# Patient Record
Sex: FEMALE | Race: BLACK OR AFRICAN AMERICAN | NOT HISPANIC OR LATINO | Employment: OTHER | URBAN - METROPOLITAN AREA
[De-identification: names, ages, dates, MRNs, and addresses within clinical notes are randomized per-mention and may not be internally consistent; named-entity substitution may affect disease eponyms.]

---

## 2017-01-16 ENCOUNTER — LAB VISIT (OUTPATIENT)
Dept: LAB | Facility: HOSPITAL | Age: 77
End: 2017-01-16
Attending: FAMILY MEDICINE
Payer: MEDICARE

## 2017-01-16 DIAGNOSIS — N18.3 CKD (CHRONIC KIDNEY DISEASE), STAGE 3 (MODERATE): ICD-10-CM

## 2017-01-16 LAB
ALBUMIN SERPL BCP-MCNC: 3.4 G/DL
ALP SERPL-CCNC: 66 U/L
ALT SERPL W/O P-5'-P-CCNC: 13 U/L
ANION GAP SERPL CALC-SCNC: 5 MMOL/L
AST SERPL-CCNC: 15 U/L
BASOPHILS # BLD AUTO: 0.01 K/UL
BASOPHILS NFR BLD: 0.2 %
BILIRUB SERPL-MCNC: 0.5 MG/DL
BUN SERPL-MCNC: 20 MG/DL
CALCIUM SERPL-MCNC: 8.7 MG/DL
CHLORIDE SERPL-SCNC: 109 MMOL/L
CO2 SERPL-SCNC: 27 MMOL/L
CREAT SERPL-MCNC: 1.2 MG/DL
DIFFERENTIAL METHOD: ABNORMAL
EOSINOPHIL # BLD AUTO: 0.3 K/UL
EOSINOPHIL NFR BLD: 4.4 %
ERYTHROCYTE [DISTWIDTH] IN BLOOD BY AUTOMATED COUNT: 13.3 %
EST. GFR  (AFRICAN AMERICAN): 51.4 ML/MIN/1.73 M^2
EST. GFR  (NON AFRICAN AMERICAN): 44.6 ML/MIN/1.73 M^2
GLUCOSE SERPL-MCNC: 107 MG/DL
HCT VFR BLD AUTO: 39.8 %
HGB BLD-MCNC: 12.8 G/DL
IRON SERPL-MCNC: 92 UG/DL
LYMPHOCYTES # BLD AUTO: 2.4 K/UL
LYMPHOCYTES NFR BLD: 38.9 %
MAGNESIUM SERPL-MCNC: 1.9 MG/DL
MCH RBC QN AUTO: 32.7 PG
MCHC RBC AUTO-ENTMCNC: 32.2 %
MCV RBC AUTO: 102 FL
MONOCYTES # BLD AUTO: 0.5 K/UL
MONOCYTES NFR BLD: 8.5 %
NEUTROPHILS # BLD AUTO: 2.9 K/UL
NEUTROPHILS NFR BLD: 47.8 %
PHOSPHATE SERPL-MCNC: 3.8 MG/DL
PLATELET # BLD AUTO: 247 K/UL
PMV BLD AUTO: 11.3 FL
POTASSIUM SERPL-SCNC: 4.3 MMOL/L
PROT SERPL-MCNC: 7.2 G/DL
PTH-INTACT SERPL-MCNC: 114 PG/ML
RBC # BLD AUTO: 3.91 M/UL
SATURATED IRON: 32 %
SODIUM SERPL-SCNC: 141 MMOL/L
TOTAL IRON BINDING CAPACITY: 292 UG/DL
TRANSFERRIN SERPL-MCNC: 197 MG/DL
WBC # BLD AUTO: 6.14 K/UL

## 2017-01-16 PROCEDURE — 36415 COLL VENOUS BLD VENIPUNCTURE: CPT | Mod: PO

## 2017-01-16 PROCEDURE — 80053 COMPREHEN METABOLIC PANEL: CPT

## 2017-01-16 PROCEDURE — 84100 ASSAY OF PHOSPHORUS: CPT

## 2017-01-16 PROCEDURE — 83970 ASSAY OF PARATHORMONE: CPT

## 2017-01-16 PROCEDURE — 83540 ASSAY OF IRON: CPT

## 2017-01-16 PROCEDURE — 83735 ASSAY OF MAGNESIUM: CPT

## 2017-01-16 PROCEDURE — 85025 COMPLETE CBC W/AUTO DIFF WBC: CPT

## 2017-01-18 ENCOUNTER — DOCUMENTATION ONLY (OUTPATIENT)
Dept: FAMILY MEDICINE | Facility: CLINIC | Age: 77
End: 2017-01-18

## 2017-01-18 NOTE — PROGRESS NOTES
Pre-Visit Chart Review  For Appointment Scheduled on 1/26/17    There are no preventive care reminders to display for this patient.

## 2017-01-20 ENCOUNTER — CLINICAL SUPPORT (OUTPATIENT)
Dept: OPHTHALMOLOGY | Facility: CLINIC | Age: 77
End: 2017-01-20
Payer: MEDICARE

## 2017-01-20 ENCOUNTER — OFFICE VISIT (OUTPATIENT)
Dept: OPTOMETRY | Facility: CLINIC | Age: 77
End: 2017-01-20
Payer: MEDICARE

## 2017-01-20 DIAGNOSIS — H40.013 OPEN ANGLE WITH BORDERLINE FINDINGS OF BOTH EYES: ICD-10-CM

## 2017-01-20 DIAGNOSIS — H40.013 OPEN ANGLE WITH BORDERLINE FINDINGS OF BOTH EYES: Primary | ICD-10-CM

## 2017-01-20 PROCEDURE — 92012 INTRM OPH EXAM EST PATIENT: CPT | Mod: S$GLB,,, | Performed by: OPTOMETRIST

## 2017-01-20 PROCEDURE — 76514 ECHO EXAM OF EYE THICKNESS: CPT | Mod: S$GLB,,, | Performed by: OPTOMETRIST

## 2017-01-20 PROCEDURE — 92133 CPTRZD OPH DX IMG PST SGM ON: CPT | Mod: S$GLB,,, | Performed by: OPTOMETRIST

## 2017-01-20 PROCEDURE — 99499 UNLISTED E&M SERVICE: CPT | Mod: S$GLB,,, | Performed by: OPTOMETRIST

## 2017-01-20 PROCEDURE — 92083 EXTENDED VISUAL FIELD XM: CPT | Mod: S$GLB,,, | Performed by: OPTOMETRIST

## 2017-01-20 NOTE — PROGRESS NOTES
HPI     CC: Pt here today for yearly glaucoma suspect eval. HVF 24-2 SS, OCT,   PAch , IOP check. Pt has no new visual complaints since last visit.     (-) pain / (-) discomfort  (-) headaches  (-) diplopia   (-) flashes / (--) floaters         Last edited by Filemon Thomason on 1/20/2017  2:39 PM.         Assessment /Plan     For exam results, see Encounter Report.    Open angle with borderline findings of both eyes    Glaucoma suspect secondary to larger than average CD ratio OU (OS > OD). Discussed results with patient. Within normal limits, no evidence of glaucoma. No drops needed at this time. Monitor yearly.   (+) fam hx  (+) DM  (+) HTN  (+) sleep apnea     Photos 11/28/16    IOP 1/20/17:  OD 10 mmHg, OS 10 mmHg    HVF 1/20/17  OD within normal limits     OS within normal limits    OCT 1/20/17  OD within normal limits     OS within normal limits    Pach 1/20/17   //     RTC in 1 year for comprehensive eye exam, or sooner prn.

## 2017-01-25 ENCOUNTER — OFFICE VISIT (OUTPATIENT)
Dept: FAMILY MEDICINE | Facility: CLINIC | Age: 77
End: 2017-01-25
Payer: MEDICARE

## 2017-01-25 VITALS
SYSTOLIC BLOOD PRESSURE: 140 MMHG | WEIGHT: 236.13 LBS | BODY MASS INDEX: 41.84 KG/M2 | HEIGHT: 63 IN | RESPIRATION RATE: 14 BRPM | OXYGEN SATURATION: 99 % | DIASTOLIC BLOOD PRESSURE: 72 MMHG | HEART RATE: 69 BPM

## 2017-01-25 DIAGNOSIS — I10 ESSENTIAL HYPERTENSION: Primary | ICD-10-CM

## 2017-01-25 DIAGNOSIS — G47.33 OSA ON CPAP: ICD-10-CM

## 2017-01-25 DIAGNOSIS — N18.30 CKD (CHRONIC KIDNEY DISEASE) STAGE 3, GFR 30-59 ML/MIN: ICD-10-CM

## 2017-01-25 DIAGNOSIS — N18.30 CONTROLLED TYPE 2 DIABETES MELLITUS WITH STAGE 3 CHRONIC KIDNEY DISEASE, WITHOUT LONG-TERM CURRENT USE OF INSULIN: ICD-10-CM

## 2017-01-25 DIAGNOSIS — E66.01 MORBID OBESITY WITH BMI OF 40.0-44.9, ADULT: ICD-10-CM

## 2017-01-25 DIAGNOSIS — R05.9 COUGH: ICD-10-CM

## 2017-01-25 DIAGNOSIS — J30.9 CHRONIC ALLERGIC RHINITIS: ICD-10-CM

## 2017-01-25 DIAGNOSIS — E11.22 CONTROLLED TYPE 2 DIABETES MELLITUS WITH STAGE 3 CHRONIC KIDNEY DISEASE, WITHOUT LONG-TERM CURRENT USE OF INSULIN: ICD-10-CM

## 2017-01-25 PROCEDURE — 1157F ADVNC CARE PLAN IN RCRD: CPT | Mod: S$GLB,,, | Performed by: PHYSICIAN ASSISTANT

## 2017-01-25 PROCEDURE — 99499 UNLISTED E&M SERVICE: CPT | Mod: S$GLB,,, | Performed by: PHYSICIAN ASSISTANT

## 2017-01-25 PROCEDURE — 1160F RVW MEDS BY RX/DR IN RCRD: CPT | Mod: S$GLB,,, | Performed by: PHYSICIAN ASSISTANT

## 2017-01-25 PROCEDURE — 99214 OFFICE O/P EST MOD 30 MIN: CPT | Mod: S$GLB,,, | Performed by: PHYSICIAN ASSISTANT

## 2017-01-25 PROCEDURE — 3044F HG A1C LEVEL LT 7.0%: CPT | Mod: S$GLB,,, | Performed by: PHYSICIAN ASSISTANT

## 2017-01-25 PROCEDURE — 99999 PR PBB SHADOW E&M-EST. PATIENT-LVL V: CPT | Mod: PBBFAC,,, | Performed by: PHYSICIAN ASSISTANT

## 2017-01-25 PROCEDURE — 1126F AMNT PAIN NOTED NONE PRSNT: CPT | Mod: S$GLB,,, | Performed by: PHYSICIAN ASSISTANT

## 2017-01-25 PROCEDURE — 3078F DIAST BP <80 MM HG: CPT | Mod: S$GLB,,, | Performed by: PHYSICIAN ASSISTANT

## 2017-01-25 PROCEDURE — 1159F MED LIST DOCD IN RCRD: CPT | Mod: S$GLB,,, | Performed by: PHYSICIAN ASSISTANT

## 2017-01-25 PROCEDURE — 4010F ACE/ARB THERAPY RXD/TAKEN: CPT | Mod: S$GLB,,, | Performed by: PHYSICIAN ASSISTANT

## 2017-01-25 PROCEDURE — 3077F SYST BP >= 140 MM HG: CPT | Mod: S$GLB,,, | Performed by: PHYSICIAN ASSISTANT

## 2017-01-25 RX ORDER — FLUTICASONE PROPIONATE 50 MCG
1 SPRAY, SUSPENSION (ML) NASAL DAILY
Qty: 3 BOTTLE | Refills: 3 | Status: SHIPPED | OUTPATIENT
Start: 2017-01-25 | End: 2018-03-16 | Stop reason: SDUPTHER

## 2017-01-25 RX ORDER — BENZONATATE 200 MG/1
200 CAPSULE ORAL 3 TIMES DAILY PRN
Qty: 30 CAPSULE | Refills: 0 | Status: SHIPPED | OUTPATIENT
Start: 2017-01-25 | End: 2017-02-02

## 2017-01-25 RX ORDER — CETIRIZINE HYDROCHLORIDE 5 MG/1
5 TABLET ORAL DAILY PRN
Qty: 30 TABLET | Refills: 0 | Status: SHIPPED | OUTPATIENT
Start: 2017-01-25 | End: 2019-01-10 | Stop reason: CLARIF

## 2017-01-25 NOTE — MR AVS SNAPSHOT
[facility]  [address]  [place] [address]  Phone: [phone]  Fax: [phone]                  Sammi Abreu   2017 12:40 PM   Office Visit    Description:  Female : 1940   Provider:  Sadaf Courtney PA-C   Department:  [facility] Medicine           Reason for Visit     Follow-up           Diagnoses this Visit        Comments    Essential hypertension    -  Primary     CHARLIE on CPAP         Chronic allergic rhinitis         Cough                To Do List           Future Appointments        Provider Department Dept Phone    2017 1:00 PM Burt Friend MD [facility] Cardiology [phone]    2017 10:40 AM Sadaf Courtney PA-C [facility] [phone]    2017 9:30 AM Osmani Villatoro MD [facility] [phone]      Goals (5 Years of Data)     None       These Medications        Disp Refills Start End    fluticasone (FLONASE) 50 mcg/actuation nasal spray 3 Bottle 3 2017     1 spray by Each Nare route once daily. - Each Nare    Pharmacy: [facility] [address] [address] Ph #: [phone]       benzonatate (TESSALON) 200 MG capsule 30 capsule 0 2017    Take 1 capsule (200 mg total) by mouth 3 (three) times daily as needed. - Oral    Pharmacy: [facility] [place] - [address] [address] Ph #: [phone]       cetirizine (ZYRTEC) 5 MG tablet 30 tablet 0 2017     Take 1 tablet (5 mg total) by mouth daily as needed. - Oral    [facility] [address] [place] [address] [place] - [address] [address] Ph #: [phone]         [facility]     [facility] On [facility] Nurse Care Line -  Assistance  Registered nurses in the [facility] Center provide clinical advisement, health education, appointment booking, and other advisory services.  Call for this free service at [phone].             Medications           Message regarding Medications     Verify  the changes and/or additions to your medication regime listed below are the same as discussed with your clinician today.  If any of these changes or additions are incorrect, please notify your healthcare provider.        START taking these NEW medications        Refills    benzonatate (TESSALON) 200 MG capsule 0    Sig: Take 1 capsule (200 mg total) by mouth 3 (three) times daily as needed.    Class: Normal    Route: Oral      CHANGE how you are taking these medications     Start Taking Instead of    cetirizine (ZYRTEC) 5 MG tablet cetirizine (ZYRTEC) 5 MG tablet    Dosage:  Take 1 tablet (5 mg total) by mouth daily as needed. Dosage:  Take 5 mg by mouth daily as needed.    Reason for Change:  Reorder            Verify that the below list of medications is an accurate representation of the medications you are currently taking.  If none reported, the list may be blank. If incorrect, please contact your healthcare provider. Carry this list with you in case of emergency.           Current Medications     ascorbic acid (VITAMIN C) 100 MG tablet Take 100 mg by mouth once daily. Hold until further notice, due to blood clot    b complex vitamins tablet Take 1 tablet by mouth once daily.    benzonatate (TESSALON) 200 MG capsule Take 1 capsule (200 mg total) by mouth 3 (three) times daily as needed.    blood sugar diagnostic Strp TrueResult  Strips/ lancets ac breakfast and bedtime    blood-glucose meter kit DX DM True Test  Use as instructed ac breakfast    calcitRIOL (ROCALTROL) 0.25 MCG Cap Take 0.25 mcg by mouth once daily.    calcium carbonate (OS-POLY) 600 mg (1,500 mg) Tab Take 600 mg by mouth 2 (two) times daily with meals.    cetirizine (ZYRTEC) 5 MG tablet Take 1 tablet (5 mg total) by mouth daily as needed.    diclofenac sodium 1 % Gel Apply 2 g topically 3 (three) times daily.    esomeprazole (NEXIUM) 40 MG capsule Take 40 mg by mouth once daily.    fish oil-omega-3 fatty acids 300-1,000 mg capsule Take 2 g by  "mouth once daily.    fluticasone (FLONASE) 50 mcg/actuation nasal spray 1 spray by Each Nare route once daily.    furosemide (LASIX) 40 MG tablet TAKE ONE TABLET BY MOUTH ONCE DAILY    hydrocodone-acetaminophen 5-325mg (NORCO) 5-325 mg per tablet Take 1 tablet by mouth every meal as needed for Pain.    INV losartan (COZAAR) 100 MG Tab Take 1 tablet (100 mg total) by mouth once daily.    IRON, FERROUS SULFATE, ORAL Take by mouth.    lactulose (CHRONULAC) 10 gram/15 mL solution Take 30 mLs (20 g total) by mouth 3 (three) times daily. 2 Teaspoon(s) Oral PRN Every evening.    lancets Misc TrueResult lancets and strips ac breakfast.    metoprolol succinate (TOPROL-XL) 25 MG 24 hr tablet Take 1 tablet (25 mg total) by mouth once daily.    mometasone 0.1% (ELOCON) 0.1 % cream Apply topically once daily.    nifedipine 30 MG ORAL TR24 (PROCARDIA-XL) 30 MG (OSM) 24 hr tablet TAKE ONE TABLET BY MOUTH IN THE EVENING    spironolactone (ALDACTONE) 25 MG tablet 1 tab  Three a week           Clinical Reference Information           Vital Signs - Last Recorded  Most recent update: 1/25/2017 12:39 PM by Leticia Velasquez MA    BP Pulse Resp Ht Wt SpO2    (!) 148/69 69 14 5' 3" (1.6 m) 107.1 kg (236 lb 1.8 oz) 99%    BMI                41.83 kg/m2          Blood Pressure          Most Recent Value    BP  (!)  148/69      Allergies as of 1/25/2017     Cymbalta [Duloxetine]    Darvon [Propoxyphene]    Atorvastatin    Naprosyn [Naproxen]    Penicillins      Immunizations Administered on Date of Encounter - 1/25/2017     None      Instructions      Established High Blood Pressure    High blood pressure (hypertension) is a chronic disease. Often health care providers dont know what causes it. But it can be caused by certain health conditions and medicines.  If you have high blood pressure, you may not have any symptoms. If you do have symptoms, they may include headache, dizziness, changes in your vision, chest pain, and shortness of " breath. But even without symptoms, high blood pressure thats not treated raises your risk for heart attack and stroke. High blood pressure is a serious health risk and shouldnt be ignored.  A blood pressure reading is made up of two numbers: a higher number over a lower number. The top number is the systolic pressure. The bottom number is the diastolic pressure. A normal blood pressure is less than 120 over less than 80.  High blood pressure is when either the top number is 140 or higher, or the bottom number is 90 or higher. This must be the result when taking your blood pressure a number of times. The blood pressures between normal and high are called prehypertension.  Home care  If you have high blood pressure, you should do what is listed below to lower your blood pressure. If you are taking medicines for high blood pressure, these methods may reduce or end your need for medicines in the future.  · Begin a weight-loss program if you are overweight.  · Cut back on how much salt you get in your diet. Heres how to do this:  ¨ Dont eat foods that have a lot of salt. These include olives, pickles, smoked meats, and salted potato chips.  ¨ Dont add salt to your food at the table.  ¨ Use only small amounts of salt when cooking.  · Begin an exercise program. Talk with your health care provider about the type of exercise program that would be best for you. It doesn't have to be hard. Even brisk walking for 20 minutes 3 times a week is a good form of exercise.  · Dont take medicines that have heart stimulants. This includes many cold and sinus decongestant pills and sprays, as well as diet pills. Check the warnings about hypertension on the label. Stimulants such as amphetamine or cocaine could be lethal for someone with high blood pressure. Never take these.  · Limit how much caffeine you get in your diet. Switch to caffeine-free products.  · Stop smoking. If you are a long-time smoker, this can be hard. Enroll in  a stop-smoking program to make it more likely that you will quit for good.  · Learn how to handle stress. This is an important part of any program to lower blood pressure. Learn about relaxation methods like meditation, yoga, or biofeedback.  · If your provider prescribed medicines, take them exactly as directed. Missing doses may cause your blood pressure get out of control.  · Consider buying an automatic blood pressure machine. You can get one of these at most pharmacies. Use this to watch your blood pressure at home. Give the results to your provider.  Follow-up care  You will need to make regular visits to your health care provider. This is to check your blood pressure and to make changes to your medicines. Make a follow-up appointment as directed.  When to seek medical advice  Call your health care provider right away if any of these occur:  · Chest pain or shortness of breath  · Severe headache  · Throbbing or rushing sound in the ears  · Nosebleed  · Sudden severe pain in your belly (abdomen)  · Extreme drowsiness, confusion, or fainting  · Dizziness or dizziness with a spinning sensation (vertigo)  · Weakness of an arm or leg or one side of the face  · You have problems speaking or seeing   © 6892-3298 [employer]. [address] [place] [address], [place] [address]. All rights reserved. This information is not intended as a substitute for professional medical care. Always follow your healthcare professional's instructions.          Diabetes (General Information)  Diabetes is a long-term health problem. It means your body does not make enough insulin. Or it may mean that your body cannot use the insulin it makes. Insulin is a hormone in your body. It lets blood sugar (glucose) reach the cells in your body. All of your cells need glucose for fuel.  When you have diabetes, the glucose in your blood builds up because it cannot get into the cells. This buildup is called high blood sugar  (hyperglycemia).  Your blood sugar level depends on several things. It depends on what kind of food you eat and how much of it you eat. It also depends on how much exercise you get, and how much insulin you have in your body. Eating too much of the wrong kinds of food or not taking diabetes medicine on time can cause high blood sugar. Infections can cause high blood sugar even if you are taking medicines correctly.  These things can also cause low blood sugar:  · Missing meals  · Not eating enough food  · Taking too much diabetes medicine  Diabetes can cause serious problems over time if you do not get treated. These problems include heart disease, stroke, kidney failure, and blindness. They also include nerve pain or loss of feeling in your legs and feet, and gangrene of the feet. By keeping your blood sugar under control you can prevent or delay these problems.  Normal blood sugar levels are 80 to 100 before a meal and less than 180 in the 1 to 2 hours after a meal.  Home care  Follow these guidelines when caring for yourself at home:  · Follow the diet your healthcare provider gives you. Take insulin or other diabetes medicine exactly as told to.  · Watch your blood sugar as you are told to. Keep a log of your results. This will help your provider change your medicines to keep your blood sugar under control.  · Try to reach your ideal weight. You may be able to cut back on or not have to take diabetes medicine if you eat the right foods and get exercise.  · Do not smoke. Smoking worsens the effects of diabetes on your circulation. You are much more likely to have a heart attack if you have diabetes and you smoke.  · Take good care of your feet. If you have lost feeling in your feet, you may not see an injury or infection. Check your feet and between your toes at least once a week.  · Wear a medical alert bracelet or necklace, or carry a card in your wallet that says you have diabetes. This will help healthcare  providers give you the right care if you get very ill and cannot tell them that you have diabetes.  Sick day plan  If you get a cold, the flu, or a bacterial or viral infection, take these steps:  · Look at your diabetes sick plan and call your healthcare provider as you were told to. You may need to call your provider right away if:  ¨ Your blood sugar is above 240 while taking your diabetes medicine  ¨ Your urine ketone levels are above normal or high  ¨ You have been vomiting more than 6 hours  ¨ You have trouble breathing or your breath ha s a fruity smell  ¨ You have a high fever  ¨ You have a fever for several days and you are not getting better  ¨ You get light-headed and are sleepier than usual  · Keep taking your diabetes pills (oral medicine) even if you have been vomiting and are feeling sick. Call your provider right away because you may need insulin to lower your blood sugar until you recover from your illness.  · Keep taking your insulin even if you have been vomiting and are feeling sick. Call your provider right away to ask if you need to change your insulin dose. This will depend on your blood sugar results.  · Check your blood sugar every 2 to 4 hours, or at least 4 times a day.  · Check your ketones often. If you are vomiting and having diarrhea, watch them more often.  · Do not skip meals. Try to eat small meals on a regular schedule. Do this even if you do not feel like eating.  · Drink water or other liquids that do not have caffeine or calories. This will keep you from getting dehydrated. If you are nauseated or vomiting, takes small sips every 5 minutes. To prevent dehydration try to drink a cup (8 ounces) of fluids every hour while you are awake.  General care  Always bring a source of fast-acting sugar with you in case you have symptoms of low blood sugar (below 70). At the first sign of low blood sugar, eat or drink 15 to 20 grams of fast-acting sugar to raise your blood sugar. Examples  are:  · 3 to 4 glucose tablets. You can buy these at most appweevres.  · 4 ounces (1/2 cup) of regular (not diet) soft drinks  · 4 ounces (1/2 cup) of any fruit juice  · 8 ounces (1 cup) of milk  · 5 to 6 pieces of hard candy  · 1 tablespoon of honey  Check your blood sugar 15 minutes after treating yourself. If it is still below 70, take 15 to 20 more grams of fast-acting sugar. Test again in 15 minutes. If it returns to normal (70 or above), eat a snack or meal to keep your blood sugar in a safe range. If it stays low, call your doctor or go to an emergency room.  Follow-up care  Follow-up with your healthcare provider, or as advised. For more information about diabetes, visit the American Diabetes Association website at www.diabetes.org or call 119-462-5191.  When to seek medical advice  Call your healthcare provider right away if you have any of these symptoms of high blood sugar:  · Frequent urination  · Dizziness  · Drowsiness  · Thirst  · Headache  · Nausea or vomiting  · Abdominal pain  · Eyesight changes  · Fast breathing  · Confusion or loss of consciousness  Also call your provider right away if you have any of these signs of low blood sugar:  · Fatigue  · Headache  · Shakes  · Excess sweating  · Hunger  · Feeling anxious or restless  · Eyesight changes  · Drowsiness  · Weakness  · Confusion or loss of consciousness  Call 911  Call for emergency help right away if any of these occur:  · Chest pain or shortness of breath  · Dizziness or fainting  · Weakness of an arm or leg or one side of the face  · Trouble speaking or seeing   © 7880-4214 [employer]. [address] [place] [address]. All rights reserved. This information is not intended as a substitute for professional medical care. Always follow your healthcare professional's instructions.            Walking for Fitness  Fitness walking has something for everyone, even people who are already fit. Walking is one of the safest ways  to condition your body aerobically. It can boost energy, help you lose weight, and reduce stress.    Physical benefits  · Walking strengthens your heart and lungs, and tones your muscles.  · When walking, your feet land with less impact than in other sports. This reduces chances of muscle, bone, and joint injury.  · Regular walking improves your cholesterol levels and lowers your risk of heart disease. And it helps you control your blood sugar if you have diabetes.  · Walking is a weight-bearing activity, which helps maintain bone density. This can help prevent osteoporosis.  Personal rewards  · Taking walks can help you relax and manage stress. And fitness walking may make you feel better about yourself.  · Walking can help you sleep better at night and make you less likely to be depressed.  · Regular walking may help maintain your memory as you get older.  · Walking is a great way to spend extra time with friends and family members. Be sure to invite your dog along!  Q&A about fitness walking  Q: Will walking keep me fit?  A: Yes. Regular walking at the right pace gives you all the benefits of other aerobic activities, such as jogging and swimming.  Q: Will walking help me lose weight and keep it off?  A: Yes. Per mile, walking can burn as many calories as jogging. Your health care provider can help work walking into your weight-loss plan.  Q: Is walking safe for my health?  A: Yes. Walking is safe if you have high blood pressure, diabetes, heart disease, or other conditions. Talk to your health care provider before you start.  © 7483-4149 [employer]. [address] [place] [address], [place] [address]. All rights reserved. This information is not intended as a substitute for professional medical care. Always follow your healthcare professional's instructions.      Weight Management: Getting Started  Healthy bodies come in all shapes and sizes. Not all bodies are made to be thin. For some people, a healthy  weight is higher than the average weight listed on weight charts. Your healthcare provider can help you decide on a healthy weight for you.    Reasons to lose weight  Losing weight can help with some health problems, such as high blood pressure, heart disease, diabetes, sleep apnea, and arthritis. You may also feel more energy.  Set your long-term goal  Your goal doesn't even have to be a specific weight. You may decide on a fitness goal (such as being able to walk 10 miles a week), or a health goal (such as lowering your blood pressure). Choose a goal that is measurable and reasonable, so you know when you've reached it. A goal of reaching a BMI of less than 25 is not always reasonable (or possible).   Make an action plan  Habits dont change overnight. Setting your goals too high can leave you feeling discouraged if you cant reach them. Be realistic. Choose one or two small changes you can make now. Set an action plan for how you are going to make these changes. When you can stick to this plan, keep making a few more small changes. Taking small steps will help you stay on the path to success.  Track your progress  Write down your goals. Then, keep a daily record of your progress. Write down what you eat and how active you are. This record lets you look back on how much youve done. It may also help when youre feeling frustrated. Reward yourself for success. Even if you dont reach every goal, give yourself credit for what you do get done.  Get support  Encouragement from others can help make losing weight easier. Ask your family members and friends for support. They may even want to join you. Also look to your healthcare provider, registered dietitian, and  for help. Your local hospital can give you more information about nutrition, exercise, and weight loss.  © 6869-4459 [employer]. [address] [place] [address], [place] [address]. All rights reserved. This information is not  intended as a substitute for professional medical care. Always follow your healthcare professional's instructions.

## 2017-01-25 NOTE — PATIENT INSTRUCTIONS
Established High Blood Pressure    High blood pressure (hypertension) is a chronic disease. Often health care providers dont know what causes it. But it can be caused by certain health conditions and medicines.  If you have high blood pressure, you may not have any symptoms. If you do have symptoms, they may include headache, dizziness, changes in your vision, chest pain, and shortness of breath. But even without symptoms, high blood pressure thats not treated raises your risk for heart attack and stroke. High blood pressure is a serious health risk and shouldnt be ignored.  A blood pressure reading is made up of two numbers: a higher number over a lower number. The top number is the systolic pressure. The bottom number is the diastolic pressure. A normal blood pressure is less than 120 over less than 80.  High blood pressure is when either the top number is 140 or higher, or the bottom number is 90 or higher. This must be the result when taking your blood pressure a number of times. The blood pressures between normal and high are called prehypertension.  Home care  If you have high blood pressure, you should do what is listed below to lower your blood pressure. If you are taking medicines for high blood pressure, these methods may reduce or end your need for medicines in the future.  · Begin a weight-loss program if you are overweight.  · Cut back on how much salt you get in your diet. Heres how to do this:  ¨ Dont eat foods that have a lot of salt. These include olives, pickles, smoked meats, and salted potato chips.  ¨ Dont add salt to your food at the table.  ¨ Use only small amounts of salt when cooking.  · Begin an exercise program. Talk with your health care provider about the type of exercise program that would be best for you. It doesn't have to be hard. Even brisk walking for 20 minutes 3 times a week is a good form of exercise.  · Dont take medicines that have heart stimulants. This includes many  cold and sinus decongestant pills and sprays, as well as diet pills. Check the warnings about hypertension on the label. Stimulants such as amphetamine or cocaine could be lethal for someone with high blood pressure. Never take these.  · Limit how much caffeine you get in your diet. Switch to caffeine-free products.  · Stop smoking. If you are a long-time smoker, this can be hard. Enroll in a stop-smoking program to make it more likely that you will quit for good.  · Learn how to handle stress. This is an important part of any program to lower blood pressure. Learn about relaxation methods like meditation, yoga, or biofeedback.  · If your provider prescribed medicines, take them exactly as directed. Missing doses may cause your blood pressure get out of control.  · Consider buying an automatic blood pressure machine. You can get one of these at most pharmacies. Use this to watch your blood pressure at home. Give the results to your provider.  Follow-up care  You will need to make regular visits to your health care provider. This is to check your blood pressure and to make changes to your medicines. Make a follow-up appointment as directed.  When to seek medical advice  Call your health care provider right away if any of these occur:  · Chest pain or shortness of breath  · Severe headache  · Throbbing or rushing sound in the ears  · Nosebleed  · Sudden severe pain in your belly (abdomen)  · Extreme drowsiness, confusion, or fainting  · Dizziness or dizziness with a spinning sensation (vertigo)  · Weakness of an arm or leg or one side of the face  · You have problems speaking or seeing   © 5171-3423 [employer]. [address] [place] [address], [place] [address]. All rights reserved. This information is not intended as a substitute for professional medical care. Always follow your healthcare professional's instructions.          Diabetes (General Information)  Diabetes is a long-term health problem. It means your  body does not make enough insulin. Or it may mean that your body cannot use the insulin it makes. Insulin is a hormone in your body. It lets blood sugar (glucose) reach the cells in your body. All of your cells need glucose for fuel.  When you have diabetes, the glucose in your blood builds up because it cannot get into the cells. This buildup is called high blood sugar (hyperglycemia).  Your blood sugar level depends on several things. It depends on what kind of food you eat and how much of it you eat. It also depends on how much exercise you get, and how much insulin you have in your body. Eating too much of the wrong kinds of food or not taking diabetes medicine on time can cause high blood sugar. Infections can cause high blood sugar even if you are taking medicines correctly.  These things can also cause low blood sugar:  · Missing meals  · Not eating enough food  · Taking too much diabetes medicine  Diabetes can cause serious problems over time if you do not get treated. These problems include heart disease, stroke, kidney failure, and blindness. They also include nerve pain or loss of feeling in your legs and feet, and gangrene of the feet. By keeping your blood sugar under control you can prevent or delay these problems.  Normal blood sugar levels are 80 to 100 before a meal and less than 180 in the 1 to 2 hours after a meal.  Home care  Follow these guidelines when caring for yourself at home:  · Follow the diet your healthcare provider gives you. Take insulin or other diabetes medicine exactly as told to.  · Watch your blood sugar as you are told to. Keep a log of your results. This will help your provider change your medicines to keep your blood sugar under control.  · Try to reach your ideal weight. You may be able to cut back on or not have to take diabetes medicine if you eat the right foods and get exercise.  · Do not smoke. Smoking worsens the effects of diabetes on your circulation. You are much  more likely to have a heart attack if you have diabetes and you smoke.  · Take good care of your feet. If you have lost feeling in your feet, you may not see an injury or infection. Check your feet and between your toes at least once a week.  · Wear a medical alert bracelet or necklace, or carry a card in your wallet that says you have diabetes. This will help healthcare providers give you the right care if you get very ill and cannot tell them that you have diabetes.  Sick day plan  If you get a cold, the flu, or a bacterial or viral infection, take these steps:  · Look at your diabetes sick plan and call your healthcare provider as you were told to. You may need to call your provider right away if:  ¨ Your blood sugar is above 240 while taking your diabetes medicine  ¨ Your urine ketone levels are above normal or high  ¨ You have been vomiting more than 6 hours  ¨ You have trouble breathing or your breath ha s a fruity smell  ¨ You have a high fever  ¨ You have a fever for several days and you are not getting better  ¨ You get light-headed and are sleepier than usual  · Keep taking your diabetes pills (oral medicine) even if you have been vomiting and are feeling sick. Call your provider right away because you may need insulin to lower your blood sugar until you recover from your illness.  · Keep taking your insulin even if you have been vomiting and are feeling sick. Call your provider right away to ask if you need to change your insulin dose. This will depend on your blood sugar results.  · Check your blood sugar every 2 to 4 hours, or at least 4 times a day.  · Check your ketones often. If you are vomiting and having diarrhea, watch them more often.  · Do not skip meals. Try to eat small meals on a regular schedule. Do this even if you do not feel like eating.  · Drink water or other liquids that do not have caffeine or calories. This will keep you from getting dehydrated. If you are nauseated or vomiting,  takes small sips every 5 minutes. To prevent dehydration try to drink a cup (8 ounces) of fluids every hour while you are awake.  General care  Always bring a source of fast-acting sugar with you in case you have symptoms of low blood sugar (below 70). At the first sign of low blood sugar, eat or drink 15 to 20 grams of fast-acting sugar to raise your blood sugar. Examples are:  · 3 to 4 glucose tablets. You can buy these at most drugstores.  · 4 ounces (1/2 cup) of regular (not diet) soft drinks  · 4 ounces (1/2 cup) of any fruit juice  · 8 ounces (1 cup) of milk  · 5 to 6 pieces of hard candy  · 1 tablespoon of honey  Check your blood sugar 15 minutes after treating yourself. If it is still below 70, take 15 to 20 more grams of fast-acting sugar. Test again in 15 minutes. If it returns to normal (70 or above), eat a snack or meal to keep your blood sugar in a safe range. If it stays low, call your doctor or go to an emergency room.  Follow-up care  Follow-up with your healthcare provider, or as advised. For more information about diabetes, visit the American Diabetes Association website at www.diabetes.org or call 075-610-4756.  When to seek medical advice  Call your healthcare provider right away if you have any of these symptoms of high blood sugar:  · Frequent urination  · Dizziness  · Drowsiness  · Thirst  · Headache  · Nausea or vomiting  · Abdominal pain  · Eyesight changes  · Fast breathing  · Confusion or loss of consciousness  Also call your provider right away if you have any of these signs of low blood sugar:  · Fatigue  · Headache  · Shakes  · Excess sweating  · Hunger  · Feeling anxious or restless  · Eyesight changes  · Drowsiness  · Weakness  · Confusion or loss of consciousness  Call 911  Call for emergency help right away if any of these occur:  · Chest pain or shortness of breath  · Dizziness or fainting  · Weakness of an arm or leg or one side of the face  · Trouble speaking or seeing   ©  8784-9307 [employer]. [address] [place] [address], [place] [address]. All rights reserved. This information is not intended as a substitute for professional medical care. Always follow your healthcare professional's instructions.            Walking for Fitness  Fitness walking has something for everyone, even people who are already fit. Walking is one of the safest ways to condition your body aerobically. It can boost energy, help you lose weight, and reduce stress.    Physical benefits  · Walking strengthens your heart and lungs, and tones your muscles.  · When walking, your feet land with less impact than in other sports. This reduces chances of muscle, bone, and joint injury.  · Regular walking improves your cholesterol levels and lowers your risk of heart disease. And it helps you control your blood sugar if you have diabetes.  · Walking is a weight-bearing activity, which helps maintain bone density. This can help prevent osteoporosis.  Personal rewards  · Taking walks can help you relax and manage stress. And fitness walking may make you feel better about yourself.  · Walking can help you sleep better at night and make you less likely to be depressed.  · Regular walking may help maintain your memory as you get older.  · Walking is a great way to spend extra time with friends and family members. Be sure to invite your dog along!  Q&A about fitness walking  Q: Will walking keep me fit?  A: Yes. Regular walking at the right pace gives you all the benefits of other aerobic activities, such as jogging and swimming.  Q: Will walking help me lose weight and keep it off?  A: Yes. Per mile, walking can burn as many calories as jogging. Your health care provider can help work walking into your weight-loss plan.  Q: Is walking safe for my health?  A: Yes. Walking is safe if you have high blood pressure, diabetes, heart disease, or other conditions. Talk to your health care provider before you start.  ©  6665-3941 [employer]. [address] [place] [address], [place] [address]. All rights reserved. This information is not intended as a substitute for professional medical care. Always follow your healthcare professional's instructions.      Weight Management: Getting Started  Healthy bodies come in all shapes and sizes. Not all bodies are made to be thin. For some people, a healthy weight is higher than the average weight listed on weight charts. Your healthcare provider can help you decide on a healthy weight for you.    Reasons to lose weight  Losing weight can help with some health problems, such as high blood pressure, heart disease, diabetes, sleep apnea, and arthritis. You may also feel more energy.  Set your long-term goal  Your goal doesn't even have to be a specific weight. You may decide on a fitness goal (such as being able to walk 10 miles a week), or a health goal (such as lowering your blood pressure). Choose a goal that is measurable and reasonable, so you know when you've reached it. A goal of reaching a BMI of less than 25 is not always reasonable (or possible).   Make an action plan  Habits dont change overnight. Setting your goals too high can leave you feeling discouraged if you cant reach them. Be realistic. Choose one or two small changes you can make now. Set an action plan for how you are going to make these changes. When you can stick to this plan, keep making a few more small changes. Taking small steps will help you stay on the path to success.  Track your progress  Write down your goals. Then, keep a daily record of your progress. Write down what you eat and how active you are. This record lets you look back on how much youve done. It may also help when youre feeling frustrated. Reward yourself for success. Even if you dont reach every goal, give yourself credit for what you do get done.  Get support  Encouragement from others can help make losing weight easier. Ask your family  members and friends for support. They may even want to join you. Also look to your healthcare provider, registered dietitian, and  for help. Your local hospital can give you more information about nutrition, exercise, and weight loss.  © 1292-7548 [employer]. [address] [place] [address], [place] [address]. All rights reserved. This information is not intended as a substitute for professional medical care. Always follow your healthcare professional's instructions.

## 2017-01-25 NOTE — PROGRESS NOTES
Subjective:       Patient ID: Sammi Abreu is a 76 y.o. female.    Chief Complaint: No chief complaint on file.    HPI Comments: Ms. Abreu comes to clinic today for follow up. She recently had blood work that revealed mild decrease in kidney function and mild increase in PTH. The patient is followed by Dr. Sanz, nephrology, every 6 months. The patient is followed by cardiologist, Dr. Friend. The patient does complain of allergy symptoms including runny nose, post nasal drip, and dry cough. The patient reports she is not taking any medication for this. The patient denies having fever, chills, or purulent mucus.    Review of Systems   Constitutional: Negative for activity change, appetite change and fever.   HENT: Positive for postnasal drip, rhinorrhea and sinus pressure.    Eyes: Negative for visual disturbance.   Respiratory: Positive for cough. Negative for shortness of breath.    Cardiovascular: Negative for chest pain.   Gastrointestinal: Negative for abdominal distention and abdominal pain.   Genitourinary: Negative for difficulty urinating and dysuria.   Musculoskeletal: Negative for arthralgias and myalgias.   Neurological: Negative for headaches.   Hematological: Negative for adenopathy.   Psychiatric/Behavioral: The patient is not nervous/anxious.        Objective:      Physical Exam   Constitutional: She is oriented to person, place, and time.   HENT:   Posterior oropharynx erythematous with post nasal drip present  Nasal turbinates edematous with clear rhinorrhea present   Eyes: Conjunctivae are normal.   Cardiovascular: Normal rate and regular rhythm.    Pulmonary/Chest: Effort normal and breath sounds normal. She has no wheezes.   Dry cough   Abdominal: Soft. Bowel sounds are normal. There is no tenderness.   Musculoskeletal: She exhibits no edema.   Lymphadenopathy:     She has no cervical adenopathy.   Neurological: She is alert and oriented to person, place, and time.   Skin: No erythema.    Psychiatric: Her behavior is normal.       Assessment:       1. Essential hypertension    2. CHARLIE on CPAP nightly    3. Chronic allergic rhinitis    4. Cough    5. CKD (chronic kidney disease) stage 3, GFR 30-59 ml/min    6. Controlled type 2 diabetes mellitus with stage 3 chronic kidney disease, without long-term current use of insulin    7. Morbid obesity with BMI of 40.0-44.9, adult        Plan:   Sammi was seen today for follow-up.    Diagnoses and all orders for this visit:    Essential hypertension  Stable  Low salt diet  Continue current medication  CHARLIE on CPAP nightly  Patient not compliant with cpap use  Chronic allergic rhinitis  -     fluticasone (FLONASE) 50 mcg/actuation nasal spray; 1 spray by Each Nare route once daily.  -     cetirizine (ZYRTEC) 5 MG tablet; Take 1 tablet (5 mg total) by mouth daily as needed.  If symptoms worsen or if fever, chills, purulent mucus develops call clinic for further treatment and evaluation  Cough  -     benzonatate (TESSALON) 200 MG capsule; Take 1 capsule (200 mg total) by mouth 3 (three) times daily as needed.    CKD (chronic kidney disease) stage 3, GFR 30-59 ml/min  Continue follow up with Dr. Sanz    Controlled type 2 diabetes mellitus with stage 3 chronic kidney disease, without long-term current use of insulin  Well controlled  Diabetic diet  Continue current medication  Morbid obesity with BMI of 40.0-44.9, adult  Increase exercise as able  Low carbohydrate, high fiber, high protein diet    Patient readiness: acceptance and barriers:none    During the course of the visit the patient was educated and counseled about the following:     Diabetes:  Discussed general issues about diabetes pathophysiology and management.  Educational material distributed.  Addressed ADA diet.  Suggested low cholesterol diet.  Encouraged aerobic exercise.  Discussed foot care.  Reminded to get yearly retinal exam.  Hypertension:   Medication: no change.  Dietary sodium  restriction.  Regular aerobic exercise.  Obesity:   General weight loss/lifestyle modification strategies discussed (elicit support from others; identify saboteurs; non-food rewards, etc).  Informal exercise measures discussed, e.g. taking stairs instead of elevator.    Goals: Diabetes: Maintain Hemoglobin A1C below 7, Hypertension: Reduce Blood Pressure and Obesity: Reduce calorie intake and BMI    Did patient meet goals/outcomes: Yes    The following self management tools provided: declined    Patient Instructions (the written plan) was given to the patient/family.     Time spent with patient: 30 minutes

## 2017-02-02 ENCOUNTER — OFFICE VISIT (OUTPATIENT)
Dept: CARDIOLOGY | Facility: CLINIC | Age: 77
End: 2017-02-02
Payer: MEDICARE

## 2017-02-02 VITALS
HEART RATE: 62 BPM | WEIGHT: 232.88 LBS | OXYGEN SATURATION: 99 % | HEIGHT: 64 IN | SYSTOLIC BLOOD PRESSURE: 155 MMHG | DIASTOLIC BLOOD PRESSURE: 68 MMHG | BODY MASS INDEX: 39.76 KG/M2

## 2017-02-02 DIAGNOSIS — N18.30 CONTROLLED TYPE 2 DIABETES MELLITUS WITH STAGE 3 CHRONIC KIDNEY DISEASE, WITHOUT LONG-TERM CURRENT USE OF INSULIN: ICD-10-CM

## 2017-02-02 DIAGNOSIS — E78.00 PURE HYPERCHOLESTEROLEMIA: ICD-10-CM

## 2017-02-02 DIAGNOSIS — E11.22 CONTROLLED TYPE 2 DIABETES MELLITUS WITH STAGE 3 CHRONIC KIDNEY DISEASE, WITHOUT LONG-TERM CURRENT USE OF INSULIN: ICD-10-CM

## 2017-02-02 DIAGNOSIS — Z91.89 CARDIOVASCULAR RISK FACTOR: Primary | ICD-10-CM

## 2017-02-02 DIAGNOSIS — I10 ESSENTIAL HYPERTENSION: ICD-10-CM

## 2017-02-02 DIAGNOSIS — I11.9 LVH (LEFT VENTRICULAR HYPERTROPHY) DUE TO HYPERTENSIVE DISEASE, WITHOUT HEART FAILURE: ICD-10-CM

## 2017-02-02 PROCEDURE — 99999 PR PBB SHADOW E&M-EST. PATIENT-LVL IV: CPT | Mod: PBBFAC,,, | Performed by: INTERNAL MEDICINE

## 2017-02-02 PROCEDURE — 4010F ACE/ARB THERAPY RXD/TAKEN: CPT | Mod: S$GLB,,, | Performed by: INTERNAL MEDICINE

## 2017-02-02 PROCEDURE — 1160F RVW MEDS BY RX/DR IN RCRD: CPT | Mod: S$GLB,,, | Performed by: INTERNAL MEDICINE

## 2017-02-02 PROCEDURE — 3077F SYST BP >= 140 MM HG: CPT | Mod: S$GLB,,, | Performed by: INTERNAL MEDICINE

## 2017-02-02 PROCEDURE — 1126F AMNT PAIN NOTED NONE PRSNT: CPT | Mod: S$GLB,,, | Performed by: INTERNAL MEDICINE

## 2017-02-02 PROCEDURE — 99214 OFFICE O/P EST MOD 30 MIN: CPT | Mod: S$GLB,,, | Performed by: INTERNAL MEDICINE

## 2017-02-02 PROCEDURE — 1157F ADVNC CARE PLAN IN RCRD: CPT | Mod: S$GLB,,, | Performed by: INTERNAL MEDICINE

## 2017-02-02 PROCEDURE — 3044F HG A1C LEVEL LT 7.0%: CPT | Mod: S$GLB,,, | Performed by: INTERNAL MEDICINE

## 2017-02-02 PROCEDURE — 99499 UNLISTED E&M SERVICE: CPT | Mod: S$GLB,,, | Performed by: INTERNAL MEDICINE

## 2017-02-02 PROCEDURE — 1159F MED LIST DOCD IN RCRD: CPT | Mod: S$GLB,,, | Performed by: INTERNAL MEDICINE

## 2017-02-02 PROCEDURE — 3078F DIAST BP <80 MM HG: CPT | Mod: S$GLB,,, | Performed by: INTERNAL MEDICINE

## 2017-02-02 NOTE — PROGRESS NOTES
Subjective:    Patient ID:  Sammi Abreu is a 76 y.o. female who presents for evaluation of Annual Exam  For HTN, ASCVD event  risk  PCP: Dr. Villatoro, see biannually  Referred by Ms. Leach, GENARO  Orthopedic:  Finger  Pain: Dr. 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 Problem exacerbated after left THR in 2/2015. Also note some progressive HILL. Past history significant for left groin DVT post op and currently on Xarelto. Have had HTN sor past 5 years. ECG is NSR, rate 69, late transition. Home -150/60-70. Denies any CP. Have been on nifedipine 60 mg for over 6 months along with diclofenac 75 mg for about 4 months. Off NSAID since use of Xarelto.     Recent ECHO CONCLUSIONS     1 - Concentric remodeling. Wall thickness is increased, with the septum measuring 1.4 cm and the posterior wall measuring 1.2 cm across.    2 - Normal left ventricular systolic function (EF 60-65%).     3 - Left ventricular diastolic dysfunction. E/e'(lat) is 14    4 - Mild mitral regurgitation.     5 - Normal right ventricular systolic function .     6 - Pulmonary hypertension. estimated PA systolic pressure is 50 mmHg.    7 - No significant change from Echo on 7/23/2013.     CTA non-coronary - mild aortic atherosclerosis    Venous US on the left - Acute deep venous thrombosis of the left common femoral vein over an approximately 7.1 cm in length.  No involvement of the left superficial femoral vein or left external iliac vein.    Since visit of 4/9/2015, no new problem. Still with peripheral edema especially with standing / sitting.  Medications reviewed, take Pravachol only when she remembers, about once a week. Labs in 10/2014, LDL-C 175.4, non-HDL-C 202, A1C 6.1%  Venous US - Impression:    RIGHT:  No evidence of Right  lower extremity DVT.      LEFT:  No evidence of Left lower extremity DVT.      Some venous reflux noted in CFV, on the right 776 msec, left 1154 msec. No evidence of DVT and the prior thrombus in the left CFV have resolved.    In 2/2017, returned for follow up after almost 2 years. No hospitalization, no new problem. Active with housework, previously walking now just now and then, average 0-2 times monthly for about 20 minutes. Lipid 10/2016 , non-. No prior history of MI nor stroke. Rarely take the ASA. ECG shows late transition in NSR. Labs - normal iron, CBC, CMP with CKD stage 3    Stress Echo 5/2015 - EKG Conclusions:    1. The EKG portion of this study is negative for ischemia at a peak heart rate of 155 bpm (109% of predicted).   2. Blood pressure remained stable throughout the protocol  (Presenting BP: 152/70 Peak BP: 205/72).   3. The following arrhythmias were present: PACs.   4. There were no symptoms of chest discomfort or significant dyspnea throughout the protocol.    ECHO CONCLUSIONS     1 - Concentric remodeling.     2 - Hyperdynamic left ventricular systolic function (EF 65-70%).     No evidence of stress induced myocardial ischemia.     Patient took her 25 mg of metoprolor succinate at 7 PM the evening prior to the examination.    ECHO 11/2016  CONCLUSIONS     1 - Concentric remodeling.     2 - Hyperdynamic left ventricular systolic function (EF 65-70%).     3 - Normal left ventricular diastolic function.     4 - Normal right ventricular systolic function .     5 - The estimated PA systolic pressure is 39 mmHg.     6 - No significant change from Echo in 5/2015.       Review of Systems   Constitution: no further malaise/fatigue. Negative for diaphoresis, fever, weakness, night sweats and weight gain.   HENT: Positive for seasonal congestion. Negative for headaches, nosebleeds and tinnitus.    Eyes: Negative for visual disturbance.   Cardiovascular: no further dyspnea on exertion, still  "some bilateral leg swelling and seldom palpitations (with caffeine). Negative for chest pain, claudication, cyanosis, irregular heartbeat, near-syncope, orthopnea and paroxysmal nocturnal dyspnea.   Respiratory: no further cough (bronchitis). Negative for shortness of breath, sleep disturbances due to breathing, snoring and wheezing.  [place] score 3  Endocrine: Negative for polydipsia and polyuria.   Hematologic/Lymphatic: Does not bruise/bleed easily.   Skin: Negative for nail changes, color change, flushing, poor wound healing and suspicious lesions.   Musculoskeletal: Positive for arthritis, back pain, joint pain, joint swelling, muscle cramps, neck pain and stiffness. Negative for falls, gout, muscle weakness and myalgias.   Gastrointestinal: Positive for abdominal pain, constipation and heartburn. Negative for hematemesis, hematochezia, melena and nausea.   Genitourinary: Positive for bladder incontinence.   Neurological: Positive for vertigo. Negative for disturbances in coordination, excessive daytime sleepiness, dizziness, focal weakness, light-headedness, loss of balance and numbness.   Psychiatric/Behavioral: Negative for depression and substance abuse. The patient is nervous/anxious. The patient does not have insomnia.    Allergic/Immunologic: Positive for environmental allergies.        Objective:    Physical Exam   Constitutional: She is oriented to person, place, and time. She appears well-developed and well-nourished.   HENT:   Head: Normocephalic.   Eyes: Conjunctivae and EOM are normal. Pupils are equal, round, and reactive to light.   Neck: Normal range of motion. Neck supple. No JVD present. No thyromegaly present.   Circumference 13.75"   Cardiovascular: Normal rate, regular rhythm, normal heart sounds and intact distal pulses.  Exam reveals no gallop and no friction rub.    No murmur heard.  Pulmonary/Chest: Effort normal and breath sounds normal. She has no rales. She exhibits no tenderness. " "  Abdominal: Soft. Bowel sounds are normal. There is no tenderness.   Waist 39" up to 41", hip 48"   Musculoskeletal: Normal range of motion. She exhibits no edema.   Lymphadenopathy:     She has no cervical adenopathy.   Neurological: She is alert and oriented to person, place, and time.   Skin: Skin is warm and dry. No rash noted.         Assessment:       1. Cardiovascular risk factor, ASCVD 10-year risk 38.7%, 2014    2. Controlled type 2 diabetes mellitus with stage 3 chronic kidney disease, without long-term current use of insulin    3. BMI 39.0-39.9,adult, today 39.9    4. LVH (left ventricular hypertrophy) due to hypertensive disease, without heart failure    5. Essential hypertension    6. Pure hypercholesterolemia         Plan:       Sammi was seen today for consult.    Diagnoses and associated orders for this visit:    Cardiovascular risk factor, ASCVD 10-year risk 38.7%, 2014    Controlled type 2 diabetes mellitus with stage 3 chronic kidney disease, without long-term current use of insulin    BMI 39.0-39.9,adult, today 39.9    LVH (left ventricular hypertrophy) due to hypertensive disease, without heart failure    Essential hypertension    Pure hypercholesterolemia    - CV status stable, continue current Rx, all medications reviewed, patient acknowledge good understanding.  - Instruction for Mediterranean diet and heart healthy exercise given.  - Highly recommend 30 minutes of exercise daily, can have Sunday off, with 2-3 sessions of muscle strengthening weekly. A  would be very helpful.  - Need to consider high-intensity Statin, intolerant to Lipitor  - Recommend at least annual cardiovascular evaluation in view of her significant risk factors.  - Healthy habits  - Weigh twice weekly, try to lose 1-2 lbs per week    CHARLIE off CPAP since 2015    - Check home blood pressure, 2 days weekly, do 2 readings within 5 minutes in AM and PM, keep log for review.    Phlebitis and thrombophlebitis " of superficial vessels of lower extremities, left    Diastolic dysfunction, left ventricle    HILL (dyspnea on exertion), post op THR 2/2015 - resolved    Peripheral edema - resolved  - Conservative Rx - waiting on knee-high support hose, on before OOB in am and off at bedtime, keep leg elevated when not active  - Low salt diet  - Walking is helpful, avoid inactivities         Patient Active Problem List   Diagnosis    Vertigo of central origin    DDD (degenerative disc disease), lumbar    Arthritis of hip    Lumbosacral spondylosis without myelopathy    HTN (hypertension), onset 2010    DM neuropathy, type II diabetes mellitus    Hyperlipidemia, baseline     Arthritis    Right groin pain    Radicular pain    Lumbar spondylosis    DM II (diabetes mellitus, type II), controlled, onset 2012    Degenerative disc disease    Shoulder pain    S/P hip replacement    Hip osteoarthritis    Left hip pain    Breast mass    Glaucoma suspect    Obesity, unspecified    BMI 39.0-39.9,adult, today 39.9    Phlebitis and thrombophlebitis of superficial vessels of lower extremities, post THR, left groin    Peripheral edema    LVH (left ventricular hypertrophy) due to hypertensive disease    Diastolic dysfunction, left ventricle    Diverticulosis    GERD (gastroesophageal reflux disease)    CHARLIE on CPAP nightly    Cardiovascular risk factor, ASCVD 10-year risk 38.7%, 2014    Obesity     Total face-to-face time with the patient was 35 minutes and greater than 50% was spent in counseling and coordination of care. The above assessment and plan have been discussed at length. Referring physician's note reviewed. Labs and procedure over the last 6 months reviewed. Problem List updated. Asked to bring in all active medications / pills bottles with next visit.

## 2017-02-02 NOTE — PATIENT INSTRUCTIONS
Heart Disease Education    The heart beats 60 to 100 times per minute, 24 hours a day. This equals almost 1000,000 times a day. It pumps blood with oxygen and nutrients to the tissues and organs of the body. But the heart is a muscle and needs its own supply of blood. Blood flow to the heart is supplied by the coronary arteries. Coronary artery disease (atherosclerosis) is a result of cholesterol, saturated fat, and calcium deposits (plaques) that build up inside the walls. This causes inflammation within the coronary arteries. These plaques narrow the artery and reduce blood flow to the heart muscle. The reduction in blood flow to the heart muscle decreases oxygen supply to the heart. If the narrowing is significant enough, the oxygen supply to one or more regions of the heart can be temporarily or permanently shut down. This can cause chest pain, and possibly death of heart tissue (heart attack).  Types of chest pain  Angina is the name for pain in the heart muscle. Angina is a warning sign of serious heart disease. When untreated it can lead to a heart attack, also known as acute myocardial infarction, or AMI. Angina occurs when there is not enough blood and oxygen flowing to the heart for the amount of work it is doing. This most often happens during physical exertion, when the heart is working hardest. It is usually relieved by rest or nitroglycerin. Angina may also occur after a large meal when extra blood is sent to the digestive organs and less goes to the heart. In the case of advanced or unstable heart disease, angina can occur at rest or awaken you from sleep. Angina usually lasts from a few minutes up to 20 minutes or more. When treated early, the effects of angina can be reversed without permanent damage to the heart. Angina is a serious condition and needs to be evaluated by a medical professional immediately.  There are two types of angina -- stable and unstable:  · Stable angina usually occurs  with a predictable level of activity. Being stable, its character, severity, and occurrence do not change much over time. It usually starts with activity, and resolves with rest or taking your medicine as instructed by your doctor. The symptoms usually do not last long.  · Unstable angina changes or gets worse over time. It is different from whatever you are used to. It may feel different or worse, begin without cause, occur with exercise or exertion, wake you up from sleep, and last longer. It may not respond in the same way as it does when you take your usual medicines for an attack. This type of angina can be a warning sign of an impending heart attack.     A heart attack is usually the result of a blood clot that suddenly forms in a coronary artery that has been narrowed with plaque. When this occurs, blood flow may be cut off to a part of the heart muscle, causing the cells to die. This weakens the pumping action of the heart, which affects the delivery of blood to all the other organs in the body including the brain. This damage is not reversible. However, early treatment can limit the amount of damage.  The pain you feel with angina and a heart attack may have a similar quality. However, it is usually different in intensity and duration. Here are some typical descriptions of a heart attack:  · It is most often experienced as a squeezing, crushing, pressure-like sensation in the center of the chest.  · It is sometimes described as something heavy sitting on my chest.  · It may feel more like a bad case of indigestion.  · The pain may spread from the chest to the arm, shoulder, throat or jaw.  · Sometimes the pain is not felt in the chest at all, but only in the arm, shoulder, throat or jaw.  · There may also be nausea, vomiting, dizziness or light-headedness, sweating and trouble breathing.  · Palpitations, or your heart beating rapidly  · A new, irregular heart beat  · Unexplained weakness  You may not be  "able to tell the difference between "bad" angina and a heart attack at home. Seek help if your symptoms are different than usual. Do not be in denial or just try to "tough it out."  Call 911  This is the fastest and safest way to get to the emergency department. The paramedics can also start treatment on the way to the hospital, saving valuable time for your heart.  · If the angina gets worse, if it continues, or if it stops and returns, call 911 immediately. Do not delay. You may be having a heart attack.  · After you call 911, take a second tablet or spray unless instructed otherwise. When repeating doses, sit down if possible, because it can make you feel lightheaded or dizzy. Wait another 5 minutes. If the angina still does not go away, take a third tablet or spray. Do not take more than 3 tablets or sprays within 15 minutes. Stay on the phone with 911 for further instruction.  · Your healthcare provider may give you slightly different instructions than those above. If so, follow them carefully.  Do not wait until symptoms become severe to call 911.  Other reasons to call 911 include:  · Trouble breathing  · Feeling lightheaded, faint, or dizzy  · Rapid heart beat  · Slower than usual heart rate compared to your normal  · Angina with weakness, dizziness, fainting, heavy sweating, nausea, or vomiting  · Extreme drowsiness, confusion  · Weakness of an arm or leg or one side of the face  · Difficulty with speech or vision  When to seek medical care  Remember, the signs and symptoms of a heart attack are not always like they are on TV. Sometimes they are not so obvious. You may only feel weak, or just not right. If it is not clear or if you have any doubt, call for advice.  · Seek help if there is a change in the type of pain, if it feels different, or if your symptoms are mild.  · Do not drive yourself. Have someone else drive you. If no one can drive, call 911.  · Do not delay. Fast diagnosis and treatment can " "prevent or limit the amount of heart damage during a heart attack.  · Do not go to your doctor's office or a clinic as they may not be able to provide all the testing and treatment required for this condition.  · If your doctor has given you medicine to take when symptoms occur, take them but don't delay getting help trying to locate medicines.  What happens in the emergency department  The emergency department is connected to your local emergency medical system (EMS) through 911. That's why during a cardiac emergency, calling 911 is the fastest way to get help. The goal of the emergency department is to rapidly screen, evaluate, and treat people.  Once you are there, an electrocardiogram (ECG or heart tracing) will be done. Blood samples may be taken to look for the presence of heart enzymes that leak from damaged heart cells and show if a heart attack is occurring. You will often be evaluated by a heart specialist (cardiologist) who decides the best course of action. In the case of severe angina or early heart attack, and depending on the circumstances, powerful "clot busting" medicines can be used to dissolve blood clots in the coronary artery. In other cases, you may be taken to a cardiac catheterization lab. Here, a tiny balloon-tipped catheter is advanced through blood vessels to the heart. There the balloon is inflated pushing open the blood vessel restoring blood flow.  Risk factors for heart disease  Risk factors for heart disease are a combination of genetic and lifestyle. Many risk factors work by either directly or indirectly damaging the blood vessels of the heart, or by increasing the risk of forming blood or cholesterol clots, which then clog up and block the arteries.     Examples of physical lifestyle risk factors:  · Cigarette smoking  · High blood pressure  · High blood cholesterol  · Use of stimulant drugs such as cocaine, crack, and amphetamines  · Eating a high-fat, high-cholesterol " meal  · Diabetes   · Obesity which increases risk for diabetes and high blood pressure  · Lack of regular physical activity     Examples of emotional lifestyle factors:  · Chronic high stress levels release stress hormones. These raise blood pressure and cholesterol level and makes blood clot more easily.  · Held-in anger, hostile or cynical attitude  · Social and emotional isolation, lack of intimacy  · Loss of relationship  · Depression  Other factors that increase the risk of heart attack that you cannot control :  · Age. The older you get beyond 40, the greater is your risk of significant coronary artery disease.  · Gender. More men than women get heart disease; but once past menopause, women who are not taking estrogen replacement have the same risk as men for a heart attack.  · Family history. If your mother, father, brother or sister has coronary artery disease, your risk of having it is higher than a person your age without this family history.  What can you do to decrease your risk  To reduce your risk of heart disease:  · Get regular checkups with your doctor.  · Take your medicines for blood pressure, cholesterol or diabetes as directed.  · Watch your diet. Eat a heart healthy diet choosing fresh foods, less salt, cholesterol, and fat  · Stop smoking. Get help if needed.  · Get regular exercise.  · Manage stress.  · Carry a list of medicines and doses in your wallet.  © 6797-0351 [employer]. [address] [place] [address], [place] [address]. All rights reserved. This information is not intended as a substitute for professional medical care. Always follow your healthcare professional's instructions.        Risk Factors for Heart Disease  A risk factor is something that increases your chance of having heart disease. Heart disease (also called coronary artery disease) involves damage to the heart arteries. These blood vessels need to work well to provide the oxygen your heart needs to pump blood to  "the rest of your body. Things like smoking or high cholesterol levels can damage arteries. You cant control some risk factors, such as age and a family history of heart disease. But there are many things you can control to reduce your risk for heart disease.    Unhealthy cholesterol levels  Cholesterol is a fat-like substance in your blood. It can build up along the artery walls. This is called plaque. Over time, plaque narrows the arteries and reduces blood flow to your heart or brain. If a blood clot forms or a piece of the plaque breaks off, it can completely block the artery and cause a heart attack or stroke. Your risk of heart disease goes up if you have high levels of LDL ("bad") cholesterol or triglycerides (another fatty substance that can build up). Youre also at risk if you have low HDL cholesterol ("good") cholesterol. HDL helps clear the bad cholesterol away. You're at risk if you have:  HDL cholesterol 50 mg/dL or lower; LDL cholesterol 100 mg/dL; or triglycerides of 150 mg/dL or higher.  Smoking  This is the most important risk factor you can change. Smoking causes inflammation and damages the smooth muscle that lines the arteries making them less flexible. It also raises your blood pressure, causing further damage to the artery lining. Smoking also increases your risk that your blood may clot, block an artery, and cause a heart attack or stroke. Smoking also damages your lungs, which affects the delivery of oxygen to the body. If you smoke, you are 2 to 4 times more likely to develop coronary artery disease. If you smoke, it's never too late to help your heart. Ask your doctor about nicotine replacement products and smoking cessation support.  High blood pressure  High blood pressure occurs when blood pushes too hard against artery walls. This causes damage to the artery walls and the formation of scar tissue as it heals. This makes the arteries stiff and weak. Plaque sticks to the scarred tissue " narrowing and hardening the arteries. High blood pressure also causes your heart to work harder to get blood out to the body. High blood pressure raises your risk of heart attack, also known as acute myocardial infarction, or AMI, and especially stroke. The brain tissue is especially sensitive to high blood pressure damage. You're at risk if your blood pressure is 120/80 or higher.  Negative emotions  Chronic stress, pent-up anger, and other negative emotions have been linked to heart disease. This occurs because stress increases the levels of a hormone that increase the demand on your heart. Over time, these emotions could raise your heart disease risk.  Metabolic syndrome  This is caused by a combination of certain risk factors. It puts you at extra high risk of heart disease, stroke, and diabetes. You have metabolic syndrome if you have 3 or more of the following: low HDL cholesterol; high triglycerides; high blood pressure; high blood sugar; extra weight around the waist.  Diabetes  Diabetes occurs when you have high levels of sugar (glucose) in your blood. This can damage arteries if not kept under control. Having diabetes also makes you more likely to have a silent heart attack--one without any symptoms.  Excess weight  Excess weight makes other risk factors, such as diabetes, more likely. Excess weight around the waist or stomach increases your heart disease risk the most.  Lack of physical activity  When youre not active, youre more likely to develop diabetes, high blood pressure, high cholesterol levels, and excess weight.     Most people with heart disease have more than one risk factor.   © 4283-4698 [employer]. [address] [place] [address], [place] [address]. All rights reserved. This information is not intended as a substitute for professional medical care. Always follow your healthcare professional's instructions.        Women and Heart Disease: Understanding the Risks  Risk factors are  habits and conditions that make heart disease more likely. The more you have, the higher your chances of heart attack, also known as acute myocardial infarction, or AMI, and other problems. You can manage most risk factors to help make your heart healthier. Below are factors that increase your risk for having heart disease.    Smoking  This is the most important risk factor you can change. Smoking causes inflammation and damages the smooth muscle that lines the arteries making them less flexible. It also raises your blood pressure, causing further damage to the artery lining. Smoking also increased your risk that your blood may clot, block an artery, and lead to a heart attack or stroke. Smoking also damages your lungs, which can affect the delivery of oxygen to the body. Research shows that smoking makes women up to 6 times more likely to have a heart attack. It's also important to avoid secondhand smoke (smoke from other peoples tobacco products). If you smoke, it's never too late to improve your heart. Ask your doctor about nicotine replacement products and smoking cessation counseling.  Diabetes  Diabetes causes high blood sugar, which can damage blood vessels if not kept under control. Having diabetes also makes you more likely to have a silent heart attack--one without any symptoms. This occurs because long periods of high sugar levels in your blood eventually degrade nerve conduction which reduces your sensation. This translates to decreased chest pain than would be felt in a person without diabetes during a heart attack. Youre at risk if your blood sugar level is above 100 mg/dL.  High cholesterol  Cholesterol is a fat-like substance in your blood. It can build up along the artery walls. This is called plaque. Over time, plaque narrows the arteries and reduces blood flow to your heart or brain. If a blood clot forms or a piece of the plaque breaks off, it can completely block the artery and cause a heart  "attack or stroke. Your risk of heart disease goes up if you have high levels of LDL ("bad") cholesterol or triglycerides (another fatty substance that can build up). Youre also at risk if you have low HDL cholesterol ("good") cholesterol. HDL helps clear the bad cholesterol away. Youre at risk if you have:  HDL cholesterol 50 mg/dL or lower; LDL cholesterol 100 mg/dL or higher; triglycerides of 150 mg/dL or higher.  High blood pressure  High blood pressure occurs when blood pushes too hard against artery walls. This causes damage to the artery walls and then the formation of scar tissue as it heals. This makes the arteries stiff and weak. Plaque sticks to the scarred tissue narrowing and hardening the arteries. High blood pressure also causes your heart to work harder to get blood out to the body. High blood pressure raises your risk of heart attack, also known as acute myocardial infarction, or AMI, and especially stroke. The brain tissue is especially sensitive to high blood pressure damage. Youre at risk if your blood pressure is 120/80 or higher.  Excess weight  Excess weight makes your heart work harder. This raises your risk of a heart attack. Being overweight also puts you at risk of developing diabetes and high blood pressure. Excess weight around the waist or stomach increases your risk the most. Being obese puts you at risk for developing heart disease.  Lack of exercise  Without regular exercise, youre more likely to develop other risk factors, such as being overweight and developing diabetes. High blood pressure and unhealthy lipid levels are also more likely. Exercise promotes good blood flow and ensures your heart is able to meet the demands placed on it.  Negative emotions  Emotions such as stress and pent-up anger have been linked to heart disease. Over time, these emotions could raise your heart disease risk. If you have heart disease, emotion such as anxiety and depression can make it worse. " Managing these emotions is important as they have been shown to reduce hormones that increase stress on the heart in the long run.  Metabolic syndrome  This is caused by a combination of certain risk factors. It puts you at extra high risk of heart disease, stroke, and diabetes. You have metabolic syndrome if you have 3 or more of the following: low HDL cholesterol; high triglycerides; high blood pressure; high blood sugar; extra weight around the waist.  Risks you cant control  A few risk factors cant be changed. But they still raise your heart disease risk.  · Family history. If your mother or sister had heart trouble before age 65 or your father or brother before age 55, youre at higher risk of having a heart attack.  · Age. The older you are, the higher your heart disease risk.     For more information  · www.smokefree.gov/eeii-eb-jg-expert  · www.women.smokefree.gov  · [employer] Smoking Quitline: 877-44U-QUIT (692-708-5961)      © 3663-9618 [employer]. [address] [place] [address]. All rights reserved. This information is not intended as a substitute for professional medical care. Always follow your healthcare professional's instructions.        Women and Heart Disease: What Women Need to Know  You may be surprised to learn that heart disease is the biggest threat to your health--even more so than breast cancer. And the same factors that put you at risk of a heart attack, also known as acute myocardial infarction, or AMI, also increase your chances of stroke and other health problems. The main risk factors include high blood pressure, smoking, poor diet, diabetes, family history, obesity, and sedentary lifestyle. If your heart is in trouble, your body may send you warning signs. Its up to you to notice these and talk to your healthcare provider about them. Your health--and your life--could depend on it.    Learn to read the signs  When your heart isnt getting  enough oxygen, you may experience a feeling called angina. It can be a sign that you are at risk for having a heart attack.  Angina is often referred to as chest pain, but this can be misleading. Its not always painful, and its not always in the chest. Many women have other symptoms along with--or instead of--chest pain or discomfort. Talk to your healthcare provider if you notice any of the following:  · Discomfort, aching, tightness, or pressure that comes and goes, in the back, abdomen, arm, shoulder, neck, or jaw or chest  · Feeling much more tired than usual, for no clear reason  · Becoming breathless while doing something that used to be easy  · Heartburn, nausea, or a burning feeling that seems unrelated to food  · Lightheadedness or faintness  Get to the heart of the problem  Women often dont realize their symptoms could be related to heart trouble. Even some healthcare providers dont make the connection. If you feel any of the symptoms listed here, see your healthcare provider and ask to be tested for heart disease--even if youre not sure thats the cause. Tests, such as a stress echocardiogram and nuclear imaging, will reveal more about the problem. If your symptoms are heart-related, your healthcare provider will start treatment.  Hormone therapy is not the answer  Healthcare providers used to think that older women could reduce their heart disease risk by taking hormones in pill form (hormone therapy, or HT). It turns out thats not true. In fact, HT could actually increase your risk of having a heart attack or stroke. Talk to your healthcare provider about the risks and benefits of HT. It may be prescribed for other health problems. But it should not be taken to prevent or treat heart disease.     Is it angina or a heart attack?  Angina that occurs on exertion and goes away after a few minutes of rest or with medicine is considered stable angina. Unstable angina is unpredictable or unexpected  angina symptoms that do not resolve, or go away and come back. This is a medical emergency and could be a sign of an active heart attack. Call 911 right away!   © 0440-0557 [employer]. [address] [place] [address], [place] [address]. All rights reserved. This information is not intended as a substitute for professional medical care. Always follow your healthcare professional's instructions.        Women and Heart Disease: Tips for Making Small Changes  Making even one lifestyle change for your heart reduces your risk for heart disease. Change is hard for everyone, so take it one step at a time. Here are some tips to help you get started on making changes that are good for your heart.    Make a plan  Trying to do too much too fast can end in failure:  · Start by writing down all the things youd like to do to lower your risks.  · Break each one into small steps. If you said, Cut down on fat, a small step could be to use fruit spread instead of butter on your toast. Or to take soup and a roll for lunch instead of going out for a hamburger and fries.  · Decide which step youd like to take first. Then choose a second and a third step.  · Check off each step as you achieve it. Add new steps as you go along.  · If a step isnt working, try another. Come back to the first one later.  Keep records  Keeping records helps you know your habits and see your successes:  · Keeping an exercise record can help you see your progress and keep you going.  · Keeping food records can help you see your eating patterns and plan ways to make small changes.  · Noting when you feel stressed or want to smoke can help you think of ways to avoid these triggers.  Reward yourself  Making changes isnt easy. You deserve to reward yourself when you succeed. Just making the change may be its own reward. But why not give yourself an extra pat on the back?  · Give yourself something special youve been wanting.  · Do something that youve  always promised yourself youd do, such as going dancing.  © 0711-1468 [employer]. [address] [place] [address], [place] [address]. All rights reserved. This information is not intended as a substitute for professional medical care. Always follow your healthcare professional's instructions.        4 Steps for Eating Healthier  Changing the way you eat can improve your health. It can lower your cholesterol and blood pressure, and help you stay at a healthy weight. Your diet doesnt have to be bland and boring to be healthy. Just watch your calories and follow these steps:    1. Eat fewer unhealthy fats  · Choose more fish and lean meats instead of fatty cuts of meat.  · Skip butter and lard, and use less margarine.  · Pass on foods that have palm, coconut, or hydrogenated oils.  · Eat fewer high-fat dairy foods like cheese, ice cream, and whole milk.  · Get a heart-healthy cookbook and try some low-fat recipes.  2. Go light on salt  · Keep the saltshaker off the table.  · Limit high-salt ingredients, such as soy sauce, bouillon, and garlic salt.  · Instead of adding salt when cooking, season your food with herbs and flavorings. Try lemon, garlic, and onion.  · Limit convenience foods, such as boxed or canned foods and restaurant food.  · Read food labels and choose lower-sodium options.  3. Limit sugar  · Pause before you add sugars to pancakes, cereal, coffee, or tea. This includes white and brown table sugar, syrup, honey, and molasses. Cut your usual amount by half.  · Use non-sugar sweeteners. Stevia, aspartame, and sucralose can satisfy a sweet tooth without adding calories.  · Swap out sugar-filled soda and other drinks. Buy sugar-free or low-calorie beverages. Remember water is always the best choice.  · Read labels and choose foods with less added sugar. Keep in mind that dairy foods and foods with fruit will have some natural sugar.  · Cut the sugar in recipes by 1/3 to 1/2. Boost the flavor with  extracts like almond, vanilla, or orange. Or add spices such as cinnamon or nutmeg.  4. Eat more fiber  · Eat fresh fruits and vegetables every day.  · Boost your diet with whole grains. Go for oats, whole-grain rice, and bran.  · Add beans and lentils to your meals.  · Drink more water to match your fiber increase. This is to help prevent constipation.  © 0972-3975 [employer]. [address] [place] [address], [place] [address]. All rights reserved. This information is not intended as a substitute for professional medical care. Always follow your healthcare professional's instructions.        Medication for Cholesterol Control  Cholesterol is a waxy substance in your bloodstream. If there is too much of it in your blood, it can build up in the walls of your arteries. Medication can give you the extra help you need to control your cholesterol.  How medication helps  There are different kinds of medications to help with cholesterol levels. Some help lower your LDL (bad cholesterol). Some help raise your HDL (good cholesterol). And some do both. It may take some time to find the right medication for you. Taking medication will be only one part of your cholesterol control plan. You will still need to eat right and get regular exercise.  Taking your medication  It is important to:  · Tell your doctor about any other medications you take. This includes over-the-counter medications. It also includes vitamins and herbs.  · Take your medication exactly as directed. This helps ensure that it works as it should.  · Do not skip a dose.  · Do not stop taking it if you feel better.  · Do not stop taking it when your cholesterol numbers improve.  · Order your refill before your medication runs out.  Side effects  Medications can cause side effects. These often occur at the start of taking a new medication. Side effects can include headache and upset stomach. These should go away in a few weeks. Tell your health care provider  about any side effects you have.  When to call your health care provider  When taking your medication, let your health care provider know if you have:  · Yellowing of the whites of eyes  · Blurred vision  · Muscle aches  · Trouble breathing   © 2000-2016 [employer]. [address] [place] [address]. All rights reserved. This information is not intended as a substitute for professional medical care. Always follow your healthcare professional's instructions.        Lifestyle Changes to Control Cholesterol  Diet, exercise, weight management, quitting smoking, stress management, and taking your medications right can help you control your cholesterol.    Diet  Your health care provider will give you information on changes to your diet you may need to make, based on your situation. Your provider may recommend that you see a registered dietitian for help with diet changes. Changes may include:  · Reducing the amount of fat and cholesterol in your meals  · Reducing the amount of sodium (salt) in your food, especially if you have high blood pressure  · Eating more fresh vegetables and fruits  · Eating lean proteins, such as fish, poultry, and legumes (beans and peas), and eating less red meat and processed meats  · Using low-fat dairy products  · Using vegetable and nut oils in limited amounts  · Limiting how many sweets and processed foods like chips, cookies, and baked goods that you eat   Exercise  Regular exercise is a good way to help your body control cholesterol. Regular exercise has many benefits. It can:  · Raise your good cholesterol.  · Help lower your bad cholesterol.  · Let blood flow better through your body.  · Give more oxygen to your muscles and tissues.  · Help you manage your weight.  Your health care provider may recommend that you get more physical activity if you haven't been active. Depending on your situation, your provider may recommend that you get moderate to vigorous  physical activity for at least 40 minutes each day and for at least 3 to 4 days each week. A few examples of moderate to vigorous activity include:  · Walking at a brisk pace, about 3 to 4 miles per hour  · Jogging or running  · Swimming or water aerobics  · Hiking  · Dancing  · Martial arts  · Tennis  · Riding a bicycle or stationary bike  · Dancing  Weight management  If you are overweight or obese, your health care provider will work with you to help you lose weight and lower your BMI (body mass index) to a normal or near-normal level. Making diet changes and getting more physical activity can help.    Quitting smoking  Smoking and other tobacco use can raise cholesterol and make it harder to control. Quitting is tough. But millions of people have given up tobacco for good. You can quit, too! Think about some of the reasons below to quit smoking. Do any of them make you think twice about your smoking habit?  Stop smoking because it:  · Keeps your cholesterol high, even if you make all the other changes youre supposed to.  · Damages your body, especially your heart, lungs, and blood vessels.  · Makes you more likely to have a heart attack (also known as acute myocardial infarction, or AMI), stroke, or cancer.  · Stains your teeth and makes your skin, clothes, and breath smell bad.  · Costs a lot of money.  Stress   Learn stress-management techniques to help you deal with stress in your home and work life.   Making the most of medications  Healthy eating and exercise are a good start to keeping your cholesterol down. But you may need some extra help from medication. If your doctor prescribes medication, be sure to take it exactly as directed. Remember:  · Tell your doctor about all other medications you take, including vitamins and herbs.  · Tell your doctor if you have any side effects after starting to take a medication. Examples of side effects to watch for include: muscle aches, weakness, blurred vision,  rust-colored urine, yellowing of eyes or skin (jaundice), or headache.  · Dont skip a dose or stop taking your medication because you feel better or because your cholesterol numbers go down. Never stop taking your medication unless your doctor has told you its OK.  © 2296-3391 [employer]. [address] [place] [address]. All rights reserved. This information is not intended as a substitute for professional medical care. Always follow your healthcare professional's instructions.

## 2017-03-16 ENCOUNTER — OFFICE VISIT (OUTPATIENT)
Dept: FAMILY MEDICINE | Facility: CLINIC | Age: 77
End: 2017-03-16
Payer: MEDICARE

## 2017-03-16 ENCOUNTER — HOSPITAL ENCOUNTER (OUTPATIENT)
Dept: RADIOLOGY | Facility: CLINIC | Age: 77
Discharge: HOME | End: 2017-03-16
Attending: INTERNAL MEDICINE
Payer: MEDICARE

## 2017-03-16 ENCOUNTER — DOCUMENTATION ONLY (OUTPATIENT)
Dept: FAMILY MEDICINE | Facility: CLINIC | Age: 77
End: 2017-03-16

## 2017-03-16 VITALS
BODY MASS INDEX: 39.55 KG/M2 | WEIGHT: 231.69 LBS | HEART RATE: 73 BPM | DIASTOLIC BLOOD PRESSURE: 61 MMHG | SYSTOLIC BLOOD PRESSURE: 126 MMHG | TEMPERATURE: 98 F | OXYGEN SATURATION: 99 % | HEIGHT: 64 IN

## 2017-03-16 DIAGNOSIS — J45.21 ASTHMATIC BRONCHITIS, MILD INTERMITTENT, WITH ACUTE EXACERBATION: Primary | ICD-10-CM

## 2017-03-16 DIAGNOSIS — E11.9 CONTROLLED TYPE 2 DIABETES MELLITUS WITHOUT COMPLICATION, WITHOUT LONG-TERM CURRENT USE OF INSULIN: ICD-10-CM

## 2017-03-16 DIAGNOSIS — J45.21 ASTHMATIC BRONCHITIS, MILD INTERMITTENT, WITH ACUTE EXACERBATION: ICD-10-CM

## 2017-03-16 DIAGNOSIS — E66.01 MORBID OBESITY DUE TO EXCESS CALORIES: ICD-10-CM

## 2017-03-16 PROCEDURE — 3074F SYST BP LT 130 MM HG: CPT | Mod: S$GLB,,, | Performed by: INTERNAL MEDICINE

## 2017-03-16 PROCEDURE — 3044F HG A1C LEVEL LT 7.0%: CPT | Mod: S$GLB,,, | Performed by: INTERNAL MEDICINE

## 2017-03-16 PROCEDURE — 99499 UNLISTED E&M SERVICE: CPT | Mod: S$GLB,,, | Performed by: INTERNAL MEDICINE

## 2017-03-16 PROCEDURE — 99214 OFFICE O/P EST MOD 30 MIN: CPT | Mod: 25,S$GLB,, | Performed by: INTERNAL MEDICINE

## 2017-03-16 PROCEDURE — 4010F ACE/ARB THERAPY RXD/TAKEN: CPT | Mod: S$GLB,,, | Performed by: INTERNAL MEDICINE

## 2017-03-16 PROCEDURE — 1160F RVW MEDS BY RX/DR IN RCRD: CPT | Mod: S$GLB,,, | Performed by: INTERNAL MEDICINE

## 2017-03-16 PROCEDURE — 1157F ADVNC CARE PLAN IN RCRD: CPT | Mod: S$GLB,,, | Performed by: INTERNAL MEDICINE

## 2017-03-16 PROCEDURE — 3078F DIAST BP <80 MM HG: CPT | Mod: S$GLB,,, | Performed by: INTERNAL MEDICINE

## 2017-03-16 PROCEDURE — 1125F AMNT PAIN NOTED PAIN PRSNT: CPT | Mod: S$GLB,,, | Performed by: INTERNAL MEDICINE

## 2017-03-16 PROCEDURE — 94640 AIRWAY INHALATION TREATMENT: CPT | Mod: S$GLB,,, | Performed by: INTERNAL MEDICINE

## 2017-03-16 PROCEDURE — 71020 XR CHEST PA AND LATERAL: CPT | Mod: TC,PO

## 2017-03-16 PROCEDURE — 1159F MED LIST DOCD IN RCRD: CPT | Mod: S$GLB,,, | Performed by: INTERNAL MEDICINE

## 2017-03-16 PROCEDURE — 71020 XR CHEST PA AND LATERAL: CPT | Mod: 26,,, | Performed by: RADIOLOGY

## 2017-03-16 PROCEDURE — 96372 THER/PROPH/DIAG INJ SC/IM: CPT | Mod: 59,S$GLB,, | Performed by: INTERNAL MEDICINE

## 2017-03-16 RX ORDER — PREDNISONE 20 MG/1
40 TABLET ORAL DAILY
Qty: 10 TABLET | Refills: 0 | Status: SHIPPED | OUTPATIENT
Start: 2017-03-16 | End: 2017-03-26

## 2017-03-16 RX ORDER — ALBUTEROL SULFATE 0.83 MG/ML
2.5 SOLUTION RESPIRATORY (INHALATION)
Status: COMPLETED | OUTPATIENT
Start: 2017-03-16 | End: 2017-03-16

## 2017-03-16 RX ORDER — DEXAMETHASONE SODIUM PHOSPHATE 4 MG/ML
4 INJECTION, SOLUTION INTRA-ARTICULAR; INTRALESIONAL; INTRAMUSCULAR; INTRAVENOUS; SOFT TISSUE
Status: COMPLETED | OUTPATIENT
Start: 2017-03-16 | End: 2017-03-16

## 2017-03-16 RX ORDER — DOXYCYCLINE 100 MG/1
100 CAPSULE ORAL EVERY 12 HOURS
Qty: 20 CAPSULE | Refills: 0 | Status: SHIPPED | OUTPATIENT
Start: 2017-03-16 | End: 2017-05-26 | Stop reason: ALTCHOICE

## 2017-03-16 RX ORDER — DEXAMETHASONE SODIUM PHOSPHATE 4 MG/ML
4 INJECTION, SOLUTION INTRA-ARTICULAR; INTRALESIONAL; INTRAMUSCULAR; INTRAVENOUS; SOFT TISSUE
Status: DISCONTINUED | OUTPATIENT
Start: 2017-03-16 | End: 2017-03-16

## 2017-03-16 RX ORDER — ALBUTEROL SULFATE 90 UG/1
2 AEROSOL, METERED RESPIRATORY (INHALATION) EVERY 6 HOURS PRN
Qty: 1 EACH | Refills: 11 | Status: SHIPPED | OUTPATIENT
Start: 2017-03-16 | End: 2017-09-08

## 2017-03-16 RX ADMIN — DEXAMETHASONE SODIUM PHOSPHATE 4 MG: 4 INJECTION, SOLUTION INTRA-ARTICULAR; INTRALESIONAL; INTRAMUSCULAR; INTRAVENOUS; SOFT TISSUE at 11:03

## 2017-03-16 RX ADMIN — ALBUTEROL SULFATE 2.5 MG: 0.83 SOLUTION RESPIRATORY (INHALATION) at 11:03

## 2017-03-16 NOTE — PATIENT INSTRUCTIONS
While on the prednisone it a very low carbohydrate diet.    If you need to use the puffer more than every 4 hours you need to be in the hospital.    If getting worse go to the ER.    Lose 5 pounds.  Suggest [facility]

## 2017-03-16 NOTE — PROGRESS NOTES
Pre-Visit Chart Review  For Appointment Scheduled on 3/16/17    There are no preventive care reminders to display for this patient.

## 2017-03-16 NOTE — MR AVS SNAPSHOT
[facility]  [address] [address] [address]  Phone: [phone]  Fax: [phone]                  Sammi Abreu   3/16/2017 9:20 AM   Office Visit    Description:  Female : 1940   Provider:  Mitchell Healy MD   Department:  [facility]           Reason for Visit     Cough     Chest Congestion           Diagnoses this Visit        Comments    Asthmatic bronchitis, mild intermittent, with acute exacerbation    -  Primary     Controlled type 2 diabetes mellitus without complication, without long-term current use of insulin         Morbid obesity due to excess calories                To Do List           Future Appointments        Provider Department Dept Phone    3/16/2017 11:45 AM [facility]Ray [phone]    2017 10:40 AM Sadaf Courtney PA-C [facility] [phone]    2017 9:30 AM Osmani Villatoro MD [facility] [phone]      Goals (5 Years of Data)     None      Follow-Up and Disposition     Return for if you are not better return in one week.       These Medications        Disp Refills Start End    albuterol 90 mcg/actuation inhaler 1 each 11 3/16/2017     Inhale 2 puffs into the lungs every 6 (six) hours as needed for Wheezing. - Inhalation    Pharmacy: [facility] [address] [place] [id number] [address] Ph #: [phone]       doxycycline (VIBRAMYCIN) 100 MG Cap 20 capsule 0 3/16/2017     Take 1 capsule (100 mg total) by mouth every 12 (twelve) hours. - Oral    Pharmacy: [facility] [address] [place] [id number] [address] Ph #: [phone]       predniSONE (DELTASONE) 20 MG tablet 10 tablet 0 3/16/2017 3/26/2017    Take 2 tablets (40 mg total) by mouth once daily. - Oral    Pharmacy: [facility] [address] [place] [id number] [address] Ph #: [phone]         [facility]     [facility] -  Assistance  Registered nurses in the  [facility] provide clinical advisement, health education, appointment booking, and other advisory services.  Call for this free service at [phone].             Medications           Message regarding Medications     Verify the changes and/or additions to your medication regime listed below are the same as discussed with your clinician today.  If any of these changes or additions are incorrect, please notify your healthcare provider.        START taking these NEW medications        Refills    albuterol 90 mcg/actuation inhaler 11    Sig: Inhale 2 puffs into the lungs every 6 (six) hours as needed for Wheezing.    Class: Normal    Route: Inhalation    doxycycline (VIBRAMYCIN) 100 MG Cap 0    Sig: Take 1 capsule (100 mg total) by mouth every 12 (twelve) hours.    Class: Normal    Route: Oral    predniSONE (DELTASONE) 20 MG tablet 0    Sig: Take 2 tablets (40 mg total) by mouth once daily.    Class: Normal    Route: Oral      These medications were administered today        Dose Freq    albuterol nebulizer solution 2.5 mg 2.5 mg Clinic/HOD 1 time    Sig: Take 3 mLs (2.5 mg total) by nebulization one time.    Class: Normal    Route: Nebulization    dexamethasone injection 4 mg 4 mg Clinic/HOD 1 time    Sig: Inject 1 mL (4 mg total) into the muscle one time.    Class: Normal    Route: Intramuscular      STOP taking these medications     hydrocodone-acetaminophen 5-325mg (NORCO) 5-325 mg per tablet Take 1 tablet by mouth every meal as needed for Pain.           Verify that the below list of medications is an accurate representation of the medications you are currently taking.  If none reported, the list may be blank. If incorrect, please contact your healthcare provider. Carry this list with you in case of emergency.           Current Medications     ascorbic acid (VITAMIN C) 100 MG tablet Take 100 mg by mouth once daily. Hold until further notice, due to blood clot    b complex vitamins tablet Take 1  tablet by mouth once daily.    blood sugar diagnostic Strp TrueResult  Strips/ lancets ac breakfast and bedtime    blood-glucose meter kit DX DM True Test  Use as instructed ac breakfast    calcitRIOL (ROCALTROL) 0.25 MCG Cap Take 0.25 mcg by mouth once daily.    calcium carbonate (OS-POLY) 600 mg (1,500 mg) Tab Take 600 mg by mouth 2 (two) times daily with meals.    cetirizine (ZYRTEC) 5 MG tablet Take 1 tablet (5 mg total) by mouth daily as needed.    diclofenac sodium 1 % Gel Apply 2 g topically 3 (three) times daily.    esomeprazole (NEXIUM) 40 MG capsule Take 40 mg by mouth once daily.    fish oil-omega-3 fatty acids 300-1,000 mg capsule Take 2 g by mouth once daily.    fluticasone (FLONASE) 50 mcg/actuation nasal spray 1 spray by Each Nare route once daily.    furosemide (LASIX) 40 MG tablet TAKE ONE TABLET BY MOUTH ONCE DAILY    INV losartan (COZAAR) 100 MG Tab Take 1 tablet (100 mg total) by mouth once daily.    IRON, FERROUS SULFATE, ORAL Take by mouth.    lactulose (CHRONULAC) 10 gram/15 mL solution Take 30 mLs (20 g total) by mouth 3 (three) times daily. 2 Teaspoon(s) Oral PRN Every evening.    lancets Misc TrueResult lancets and strips ac breakfast.    metoprolol succinate (TOPROL-XL) 25 MG 24 hr tablet Take 1 tablet (25 mg total) by mouth once daily.    nifedipine 30 MG ORAL TR24 (PROCARDIA-XL) 30 MG (OSM) 24 hr tablet TAKE ONE TABLET BY MOUTH IN THE EVENING    spironolactone (ALDACTONE) 25 MG tablet 1 tab  Three a week    albuterol 90 mcg/actuation inhaler Inhale 2 puffs into the lungs every 6 (six) hours as needed for Wheezing.    doxycycline (VIBRAMYCIN) 100 MG Cap Take 1 capsule (100 mg total) by mouth every 12 (twelve) hours.    mometasone 0.1% (ELOCON) 0.1 % cream Apply topically once daily.    predniSONE (DELTASONE) 20 MG tablet Take 2 tablets (40 mg total) by mouth once daily.           Clinical Reference Information           Your Vitals Were     BP Pulse Temp Height Weight SpO2    126/61 (BP  "Location: Right arm, Patient Position: Sitting, BP Method: Automatic) 73 98.3 °F (36.8 °C) (Oral) 5' 4" (1.626 m) 105.1 kg (231 lb 11.3 oz) 99%    BMI                39.77 kg/m2          Blood Pressure          Most Recent Value    BP  126/61      Allergies as of 3/16/2017     Cymbalta [Duloxetine]    Darvon [Propoxyphene]    Atorvastatin    Naprosyn [Naproxen]    Penicillins      Immunizations Administered on Date of Encounter - 3/16/2017     None      Orders Placed During Today's Visit     Future Labs/Procedures Expected by Expires    X-Ray Chest PA And Lateral  3/16/2017 6/14/2017      Administrations This Visit     albuterol nebulizer solution 2.5 mg     Admin Date Action Dose Route Administered By             03/16/2017 Given 2.5 mg Nebulization Yandy Worthy LPN                    dexamethasone injection 4 mg     Admin Date Action Dose Route Administered By             03/16/2017 Given 4 mg Intramuscular Yandy Worthy LPN                      Instructions    While on the prednisone it a very low carbohydrate diet.    If you need to use the puffer more than every 4 hours you need to be in the hospital.    If getting worse go to the ER.    Lose 5 pounds.  Suggest [place] diet       Language Assistance Services     ATTENTION: Language assistance services are available, free of charge. Please call [phone].      ATENCIÓN: Si habla español, tiene a de la cruz disposición servicios gratuitos de asistencia lingüística. Llame al [phone].     CHÚ Ý: N?u b?n nói Ti?ng Vi?t, có các d?ch v? h? tr? ngôn ng? mi?n phí dành cho b?n. G?i s? [phone].         [facility] complies with applicable Federal civil rights laws and does not discriminate on the basis of race, color, national origin, age, disability, or sex.        "

## 2017-03-16 NOTE — PROGRESS NOTES
"Subjective:       Patient ID: Sammi Abreu is a 76 y.o. female.    Chief Complaint: Cough and Chest Congestion    HPI     CHIEF COMPLAINT: Cough(+).  HPI: Gets asthmatic bronchitis about twice a year    ONSET/TIMING: .   6 days ago    DURATION:     Continuous        Paroxysmal: no .    QUALITY/COURSE:.  Worsening    INTENSITY/SEVERITY: Severity is # 6 (10 point scale)      The following symptoms/statements are positive if BOLD, negative otherwise.      CONTEXT/WHEN:  Tobacco_use. Smokers_in_home. Seasonal_pattern. Allergies/Hayfever. Sinusitis. Irritant_Exposure(smoke/dust/fumes). Exposure_to_others_with_similar_symptoms.        Similar_problems_in_past.   PAST TREATMENT OR EVALUATION:   previous PPD. Recent_previous_chest_x-ray. Recent_antibiotics.  Associated Symptoms:     sputum production: scant. copious. Hemoptysis.  Medical History: Past_pulmonary_infections.  Cardiovascular_disease.chronic_lung_disease.  tuberculosis. Asthma. AIDS. Gastroesophageal_reflux_disease .      Has diabetes.  Her sugars have remained good but slightly up with her fasting this morning 124.    Has morbid obesity.  Her weights are stable.    Review of Systems   Constitutional: Positive for chills, diaphoresis and fever. Negative for unexpected weight change.   HENT: Negative for rhinorrhea, sinus pressure and sore throat.    Respiratory: Positive for cough, shortness of breath and wheezing.    Cardiovascular: Negative for chest pain.   Musculoskeletal: Negative for myalgias.       Objective:      Vitals:    03/16/17 0957   BP: 126/61   Pulse: 73   Temp: 98.3 °F (36.8 °C)   TempSrc: Oral   SpO2: 99%   Weight: 105.1 kg (231 lb 11.3 oz)   Height: 5' 4" (1.626 m)   PainSc:   6   PainLoc: Chest     Physical Exam   Constitutional: She appears well-developed and well-nourished.   HENT:   Right Ear: External ear normal.   Left Ear: External ear normal.   Mouth/Throat: Oropharynx is clear and moist. No oropharyngeal exudate. "   Cardiovascular: Normal rate, regular rhythm and normal heart sounds.  Exam reveals no friction rub.    No murmur heard.  Pulmonary/Chest: No respiratory distress. She has wheezes. She has no rales. She exhibits no tenderness.   Breathing is tight with mild respiratory distress   Abdominal: Soft. Bowel sounds are normal. There is no tenderness.   Musculoskeletal: She exhibits no edema.   Lymphadenopathy:     She has no cervical adenopathy.   Nursing note and vitals reviewed.      after aerosol treatment she was moving air much better but was still wheezing.  Assessment:       1. Asthmatic bronchitis, mild intermittent, with acute exacerbation    2. Controlled type 2 diabetes mellitus without complication, without long-term current use of insulin    3. Morbid obesity due to excess calories          Plan:     Asthmatic bronchitis, mild intermittent, with acute exacerbation  -     albuterol nebulizer solution 2.5 mg; Take 3 mLs (2.5 mg total) by nebulization one time.  -     albuterol 90 mcg/actuation inhaler; Inhale 2 puffs into the lungs every 6 (six) hours as needed for Wheezing.  Dispense: 1 each; Refill: 11  -     X-Ray Chest PA And Lateral; Future; Expected date: 3/16/17  -     doxycycline (VIBRAMYCIN) 100 MG Cap; Take 1 capsule (100 mg total) by mouth every 12 (twelve) hours.  Dispense: 20 capsule; Refill: 0  -     predniSONE (DELTASONE) 20 MG tablet; Take 2 tablets (40 mg total) by mouth once daily.  Dispense: 10 tablet; Refill: 0  -     Discontinue: dexamethasone injection 4 mg; Inject 1 mL (4 mg total) into the vein one time.  -     dexamethasone injection 4 mg; Inject 1 mL (4 mg total) into the muscle one time.    Controlled type 2 diabetes mellitus without complication, without long-term current use of insulin    Morbid obesity due to excess calories      Return for if you are not better return in one week.

## 2017-03-16 NOTE — PROGRESS NOTES
2 Patient identifiers checked (name & ). Administered Dexamethasone sodium phosphate 4 mg IM in the right gluteus. Patient tolerated well. No bleeding at insertion site. Pain scale 0/10. Aseptic technique maintained.

## 2017-05-17 ENCOUNTER — DOCUMENTATION ONLY (OUTPATIENT)
Dept: FAMILY MEDICINE | Facility: CLINIC | Age: 77
End: 2017-05-17

## 2017-05-17 NOTE — PROGRESS NOTES
Pre-Visit Chart Review  For Appointment Scheduled on 5/26/17    Health Maintenance Due   Topic Date Due    Hemoglobin A1c  04/28/2017

## 2017-05-18 DIAGNOSIS — E11.9 TYPE 2 DIABETES MELLITUS WITHOUT COMPLICATION: ICD-10-CM

## 2017-05-26 ENCOUNTER — LAB VISIT (OUTPATIENT)
Dept: LAB | Facility: HOSPITAL | Age: 77
End: 2017-05-26
Attending: FAMILY MEDICINE
Payer: MEDICARE

## 2017-05-26 ENCOUNTER — OFFICE VISIT (OUTPATIENT)
Dept: FAMILY MEDICINE | Facility: CLINIC | Age: 77
End: 2017-05-26
Payer: MEDICARE

## 2017-05-26 VITALS
SYSTOLIC BLOOD PRESSURE: 130 MMHG | HEART RATE: 63 BPM | DIASTOLIC BLOOD PRESSURE: 60 MMHG | WEIGHT: 234.13 LBS | BODY MASS INDEX: 39.97 KG/M2 | OXYGEN SATURATION: 98 % | HEIGHT: 64 IN | RESPIRATION RATE: 14 BRPM

## 2017-05-26 DIAGNOSIS — E11.22 CONTROLLED TYPE 2 DIABETES MELLITUS WITH STAGE 3 CHRONIC KIDNEY DISEASE, WITHOUT LONG-TERM CURRENT USE OF INSULIN: ICD-10-CM

## 2017-05-26 DIAGNOSIS — E66.01 OBESITY, CLASS III, BMI 40-49.9 (MORBID OBESITY): ICD-10-CM

## 2017-05-26 DIAGNOSIS — N18.30 CONTROLLED TYPE 2 DIABETES MELLITUS WITH STAGE 3 CHRONIC KIDNEY DISEASE, WITHOUT LONG-TERM CURRENT USE OF INSULIN: ICD-10-CM

## 2017-05-26 DIAGNOSIS — R60.0 PERIPHERAL EDEMA: ICD-10-CM

## 2017-05-26 DIAGNOSIS — I10 ESSENTIAL HYPERTENSION: Primary | ICD-10-CM

## 2017-05-26 DIAGNOSIS — I10 ESSENTIAL HYPERTENSION: ICD-10-CM

## 2017-05-26 LAB
ANION GAP SERPL CALC-SCNC: 7 MMOL/L
BUN SERPL-MCNC: 23 MG/DL
CALCIUM SERPL-MCNC: 9.4 MG/DL
CHLORIDE SERPL-SCNC: 106 MMOL/L
CO2 SERPL-SCNC: 28 MMOL/L
CREAT SERPL-MCNC: 1.2 MG/DL
EST. GFR  (AFRICAN AMERICAN): 51.4 ML/MIN/1.73 M^2
EST. GFR  (NON AFRICAN AMERICAN): 44.6 ML/MIN/1.73 M^2
GLUCOSE SERPL-MCNC: 75 MG/DL
POTASSIUM SERPL-SCNC: 5.1 MMOL/L
SODIUM SERPL-SCNC: 141 MMOL/L

## 2017-05-26 PROCEDURE — 36415 COLL VENOUS BLD VENIPUNCTURE: CPT | Mod: PO

## 2017-05-26 PROCEDURE — 99999 PR PBB SHADOW E&M-EST. PATIENT-LVL V: CPT | Mod: PBBFAC,,, | Performed by: PHYSICIAN ASSISTANT

## 2017-05-26 PROCEDURE — 99499 UNLISTED E&M SERVICE: CPT | Mod: S$GLB,,, | Performed by: PHYSICIAN ASSISTANT

## 2017-05-26 PROCEDURE — 1125F AMNT PAIN NOTED PAIN PRSNT: CPT | Mod: S$GLB,,, | Performed by: PHYSICIAN ASSISTANT

## 2017-05-26 PROCEDURE — 80048 BASIC METABOLIC PNL TOTAL CA: CPT

## 2017-05-26 PROCEDURE — 1159F MED LIST DOCD IN RCRD: CPT | Mod: S$GLB,,, | Performed by: PHYSICIAN ASSISTANT

## 2017-05-26 PROCEDURE — 99214 OFFICE O/P EST MOD 30 MIN: CPT | Mod: S$GLB,,, | Performed by: PHYSICIAN ASSISTANT

## 2017-05-26 PROCEDURE — 4010F ACE/ARB THERAPY RXD/TAKEN: CPT | Mod: S$GLB,,, | Performed by: PHYSICIAN ASSISTANT

## 2017-05-26 PROCEDURE — 83036 HEMOGLOBIN GLYCOSYLATED A1C: CPT

## 2017-05-26 PROCEDURE — 3044F HG A1C LEVEL LT 7.0%: CPT | Mod: S$GLB,,, | Performed by: PHYSICIAN ASSISTANT

## 2017-05-26 RX ORDER — METOPROLOL SUCCINATE 50 MG/1
50 TABLET, EXTENDED RELEASE ORAL DAILY
Qty: 30 TABLET | Refills: 11 | Status: SHIPPED | OUTPATIENT
Start: 2017-05-26 | End: 2017-06-07 | Stop reason: SDUPTHER

## 2017-05-26 NOTE — PATIENT INSTRUCTIONS
Established High Blood Pressure    High blood pressure (hypertension) is a chronic disease. Often health care providers dont know what causes it. But it can be caused by certain health conditions and medicines.  If you have high blood pressure, you may not have any symptoms. If you do have symptoms, they may include headache, dizziness, changes in your vision, chest pain, and shortness of breath. But even without symptoms, high blood pressure thats not treated raises your risk for heart attack and stroke. High blood pressure is a serious health risk and shouldnt be ignored.  A blood pressure reading is made up of two numbers: a higher number over a lower number. The top number is the systolic pressure. The bottom number is the diastolic pressure. A normal blood pressure is less than 120 over less than 80.  High blood pressure is when either the top number is 140 or higher, or the bottom number is 90 or higher. This must be the result when taking your blood pressure a number of times. The blood pressures between normal and high are called prehypertension.  Home care  If you have high blood pressure, you should do what is listed below to lower your blood pressure. If you are taking medicines for high blood pressure, these methods may reduce or end your need for medicines in the future.  · Begin a weight-loss program if you are overweight.  · Cut back on how much salt you get in your diet. Heres how to do this:  ¨ Dont eat foods that have a lot of salt. These include olives, pickles, smoked meats, and salted potato chips.  ¨ Dont add salt to your food at the table.  ¨ Use only small amounts of salt when cooking.  · Begin an exercise program. Talk with your health care provider about the type of exercise program that would be best for you. It doesn't have to be hard. Even brisk walking for 20 minutes 3 times a week is a good form of exercise.  · Dont take medicines that have heart stimulants. This includes many  cold and sinus decongestant pills and sprays, as well as diet pills. Check the warnings about hypertension on the label. Stimulants such as amphetamine or cocaine could be lethal for someone with high blood pressure. Never take these.  · Limit how much caffeine you get in your diet. Switch to caffeine-free products.  · Stop smoking. If you are a long-time smoker, this can be hard. Enroll in a stop-smoking program to make it more likely that you will quit for good.  · Learn how to handle stress. This is an important part of any program to lower blood pressure. Learn about relaxation methods like meditation, yoga, or biofeedback.  · If your provider prescribed medicines, take them exactly as directed. Missing doses may cause your blood pressure get out of control.  · Consider buying an automatic blood pressure machine. You can get one of these at most pharmacies. Use this to watch your blood pressure at home. Give the results to your provider.  Follow-up care  You will need to make regular visits to your health care provider. This is to check your blood pressure and to make changes to your medicines. Make a follow-up appointment as directed.  When to seek medical advice  Call your health care provider right away if any of these occur:  · Chest pain or shortness of breath  · Severe headache  · Throbbing or rushing sound in the ears  · Nosebleed  · Sudden severe pain in your belly (abdomen)  · Extreme drowsiness, confusion, or fainting  · Dizziness or dizziness with a spinning sensation (vertigo)  · Weakness of an arm or leg or one side of the face  · You have problems speaking or seeing   Date Last Reviewed: 11/25/2014  © 9023-0374 "[employer]. [address] [place] [address], [place] [address]. All rights reserved. This information is not intended as a substitute for professional medical care. Always follow your healthcare professional's instructions.            Diabetes (General Information)  Diabetes is a  long-term health problem. It means your body does not make enough insulin. Or it may mean that your body cannot use the insulin it makes. Insulin is a hormone in your body. It lets blood sugar (glucose) reach the cells in your body. All of your cells need glucose for fuel.  When you have diabetes, the glucose in your blood builds up because it cannot get into the cells. This buildup is called high blood sugar (hyperglycemia).  Your blood sugar level depends on several things. It depends on what kind of food you eat and how much of it you eat. It also depends on how much exercise you get, and how much insulin you have in your body. Eating too much of the wrong kinds of food or not taking diabetes medicine on time can cause high blood sugar. Infections can cause high blood sugar even if you are taking medicines correctly.  These things can also cause low blood sugar:  · Missing meals  · Not eating enough food  · Taking too much diabetes medicine  Diabetes can cause serious problems over time if you do not get treated. These problems include heart disease, stroke, kidney failure, and blindness. They also include nerve pain or loss of feeling in your legs and feet, and gangrene of the feet. By keeping your blood sugar under control you can prevent or delay these problems.  Normal blood sugar levels are 80 to 100 before a meal and less than 180 in the 1 to 2 hours after a meal.  Home care  Follow these guidelines when caring for yourself at home:  · Follow the diet your healthcare provider gives you. Take insulin or other diabetes medicine exactly as told to.  · Watch your blood sugar as you are told to. Keep a log of your results. This will help your provider change your medicines to keep your blood sugar under control.  · Try to reach your ideal weight. You may be able to cut back on or not have to take diabetes medicine if you eat the right foods and get exercise.  · Do not smoke. Smoking worsens the effects of  diabetes on your circulation. You are much more likely to have a heart attack if you have diabetes and you smoke.  · Take good care of your feet. If you have lost feeling in your feet, you may not see an injury or infection. Check your feet and between your toes at least once a week.  · Wear a medical alert bracelet or necklace, or carry a card in your wallet that says you have diabetes. This will help healthcare providers give you the right care if you get very ill and cannot tell them that you have diabetes.  Sick day plan  If you get a cold, the flu, or a bacterial or viral infection, take these steps:  · Look at your diabetes sick plan and call your healthcare provider as you were told to. You may need to call your provider right away if:  ¨ Your blood sugar is above 240 while taking your diabetes medicine  ¨ Your urine ketone levels are above normal or high  ¨ You have been vomiting more than 6 hours  ¨ You have trouble breathing or your breath ha s a fruity smell  ¨ You have a high fever  ¨ You have a fever for several days and you are not getting better  ¨ You get light-headed and are sleepier than usual  · Keep taking your diabetes pills (oral medicine) even if you have been vomiting and are feeling sick. Call your provider right away because you may need insulin to lower your blood sugar until you recover from your illness.  · Keep taking your insulin even if you have been vomiting and are feeling sick. Call your provider right away to ask if you need to change your insulin dose. This will depend on your blood sugar results.  · Check your blood sugar every 2 to 4 hours, or at least 4 times a day.  · Check your ketones often. If you are vomiting and having diarrhea, watch them more often.  · Do not skip meals. Try to eat small meals on a regular schedule. Do this even if you do not feel like eating.  · Drink water or other liquids that do not have caffeine or calories. This will keep you from getting  dehydrated. If you are nauseated or vomiting, takes small sips every 5 minutes. To prevent dehydration try to drink a cup (8 ounces) of fluids every hour while you are awake.  General care  Always bring a source of fast-acting sugar with you in case you have symptoms of low blood sugar (below 70). At the first sign of low blood sugar, eat or drink 15 to 20 grams of fast-acting sugar to raise your blood sugar. Examples are:  · 3 to 4 glucose tablets. You can buy these at most [employer].  · 4 ounces (1/2 cup) of regular (not diet) soft drinks  · 4 ounces (1/2 cup) of any fruit juice  · 8 ounces (1 cup) of milk  · 5 to 6 pieces of hard candy  · 1 tablespoon of honey  Check your blood sugar 15 minutes after treating yourself. If it is still below 70, take 15 to 20 more grams of fast-acting sugar. Test again in 15 minutes. If it returns to normal (70 or above), eat a snack or meal to keep your blood sugar in a safe range. If it stays low, call your doctor or go to an emergency room.  Follow-up care  Follow-up with your healthcare provider, or as advised. For more information about diabetes, visit the American Diabetes Association website at www.diabetes.org or call 898-223-7215.  When to seek medical advice  Call your healthcare provider right away if you have any of these symptoms of high blood sugar:  · Frequent urination  · Dizziness  · Drowsiness  · Thirst  · Headache  · Nausea or vomiting  · Abdominal pain  · Eyesight changes  · Fast breathing  · Confusion or loss of consciousness  Also call your provider right away if you have any of these signs of low blood sugar:  · Fatigue  · Headache  · Shakes  · Excess sweating  · Hunger  · Feeling anxious or restless  · Eyesight changes  · Drowsiness  · Weakness  · Confusion or loss of consciousness  Call 911  Call for emergency help right away if any of these occur:  · Chest pain or shortness of breath  · Dizziness or fainting  · Weakness of an arm or leg or one side of the  face  · Trouble speaking or seeing   Date Last Reviewed: 6/1/2016 © 2000-2016 [employer]. [address] [place] [address], [place] [address]. All rights reserved. This information is not intended as a substitute for professional medical care. Always follow your healthcare professional's instructions.            Walking for Fitness  Fitness walking has something for everyone, even people who are already fit. Walking is one of the safest ways to condition your body aerobically. It can boost energy, help you lose weight, and reduce stress.    Physical benefits  · Walking strengthens your heart and lungs, and tones your muscles.  · When walking, your feet land with less impact than in other sports. This reduces chances of muscle, bone, and joint injury.  · Regular walking improves your cholesterol levels and lowers your risk of heart disease. And it helps you control your blood sugar if you have diabetes.  · Walking is a weight-bearing activity, which helps maintain bone density. This can help prevent osteoporosis.  Personal rewards  · Taking walks can help you relax and manage stress. And fitness walking may make you feel better about yourself.  · Walking can help you sleep better at night and make you less likely to be depressed.  · Regular walking may help maintain your memory as you get older.  · Walking is a great way to spend extra time with friends and family members. Be sure to invite your dog along!  Q&A about fitness walking  Q: Will walking keep me fit?  A: Yes. Regular walking at the right pace gives you all the benefits of other aerobic activities, such as jogging and swimming.  Q: Will walking help me lose weight and keep it off?  A: Yes. Per mile, walking can burn as many calories as jogging. Your health care provider can help work walking into your weight-loss plan.  Q: Is walking safe for my health?  A: Yes. Walking is safe if you have high blood pressure, diabetes, heart disease, or other  conditions. Talk to your health care provider before you start.  Date Last Reviewed: 5/9/2015  © 6273-4004 [employer]. [address] [place] [address], [place] [address]. All rights reserved. This information is not intended as a substitute for professional medical care. Always follow your healthcare professional's instructions.            Weight Management: Getting Started  Healthy bodies come in all shapes and sizes. Not all bodies are made to be thin. For some people, a healthy weight is higher than the average weight listed on weight charts. Your healthcare provider can help you decide on a healthy weight for you.    Reasons to lose weight  Losing weight can help with some health problems, such as high blood pressure, heart disease, diabetes, sleep apnea, and arthritis. You may also feel more energy.  Set your long-term goal  Your goal doesn't even have to be a specific weight. You may decide on a fitness goal (such as being able to walk 10 miles a week), or a health goal (such as lowering your blood pressure). Choose a goal that is measurable and reasonable, so you know when you've reached it. A goal of reaching a BMI of less than 25 is not always reasonable (or possible).   Make an action plan  Habits dont change overnight. Setting your goals too high can leave you feeling discouraged if you cant reach them. Be realistic. Choose one or two small changes you can make now. Set an action plan for how you are going to make these changes. When you can stick to this plan, keep making a few more small changes. Taking small steps will help you stay on the path to success.  Track your progress  Write down your goals. Then, keep a daily record of your progress. Write down what you eat and how active you are. This record lets you look back on how much youve done. It may also help when youre feeling frustrated. Reward yourself for success. Even if you dont reach every goal, give yourself credit for what you do  get done.  Get support  Encouragement from others can help make losing weight easier. Ask your family members and friends for support. They may even want to join you. Also look to your healthcare provider, registered dietitian, and  for help. Your local hospital can give you more information about nutrition, exercise, and weight loss.  Date Last Reviewed: 1/31/2016 © 2000-2016 [employer]. [address] [place] [address], [place] [address]. All rights reserved. This information is not intended as a substitute for professional medical care. Always follow your healthcare professional's instructions.        Patient to stop nifedipine  Patient to take two Toprol XL to equal 50mg nightly  Follow up in 10 days or sooner  Elevate legs  Wear compression stockings  Low salt diet

## 2017-05-26 NOTE — PROGRESS NOTES
"Subjective:       Patient ID: Sammi Abreu is a 76 y.o. female.    Chief Complaint: Follow-up (4mth f/u diabetes)    Ms. Abreu comes to clinic today for follow up. She also complains of leg swelling. The patient takes spironolactone three times weekly. She is prescribed lasix daily but she is not taking this as prescribed. The patient reports that if she has to "go out" she does not take the fluid pills as she will have to urinate too much. The patient does not like wearing compression stocking as they are difficult to get on and off. The patient is frustrated with this situation and looking for a solution. She does admit that after elevating her legs the swelling improves. The patient is due for a HGA1c today.       Review of Systems   Constitutional: Negative for activity change, appetite change and fever.   HENT: Negative for postnasal drip, rhinorrhea and sinus pressure.    Eyes: Negative for visual disturbance.   Respiratory: Negative for cough and shortness of breath.    Cardiovascular: Positive for leg swelling. Negative for chest pain.   Gastrointestinal: Negative for abdominal distention and abdominal pain.   Genitourinary: Negative for difficulty urinating and dysuria.   Musculoskeletal: Negative for arthralgias and myalgias.   Neurological: Negative for headaches.   Hematological: Negative for adenopathy.   Psychiatric/Behavioral: The patient is not nervous/anxious.        Objective:      Physical Exam   Constitutional: She is oriented to person, place, and time.   HENT:   Mouth/Throat: Oropharynx is clear and moist. No oropharyngeal exudate.   Eyes: Conjunctivae are normal. Pupils are equal, round, and reactive to light.   Cardiovascular: Normal rate and regular rhythm.    Pulmonary/Chest: Effort normal and breath sounds normal. She has no wheezes.   Abdominal: Soft. Bowel sounds are normal. She exhibits no distension. There is no tenderness.   Musculoskeletal: She exhibits edema. "   Lymphadenopathy:     She has no cervical adenopathy.   Neurological: She is alert and oriented to person, place, and time.   Skin: No erythema.   Psychiatric: Her behavior is normal.       Assessment:       1. Essential hypertension    2. Controlled type 2 diabetes mellitus with stage 3 chronic kidney disease, without long-term current use of insulin    3. Peripheral edema    4. Obesity, Class III, BMI 40-49.9 (morbid obesity)        Plan:   Sammi was seen today for follow-up.    Diagnoses and all orders for this visit:    Essential hypertension  -     Hemoglobin A1c; Future  -     Basic metabolic panel; Future    Controlled type 2 diabetes mellitus with stage 3 chronic kidney disease, without long-term current use of insulin  -     Hemoglobin A1c; Future  -     Basic metabolic panel; Future  -     Hemoglobin A1c; Future    Peripheral edema  Stop nifedipine due to possible side effect of peripheral edema  Increase toprol XL to 50mg  Follow up in 10 days for blood pressure check and to check on swelling  Take spironolactone three times weekly. Take lasix on other 4 days  Low salt diet  Elevate legs when resting  Try to wear compression stockings  Other orders  -     metoprolol succinate (TOPROL-XL) 50 MG 24 hr tablet; Take 1 tablet (50 mg total) by mouth once daily.    Obesity, Class III, BMI 40-49.9 (morbid obesity)  Low carbohydrate diet, high fiber, high protein diet  Increase exercise as able      Patient readiness: acceptance and barriers:none    During the course of the visit the patient was educated and counseled about the following:     Diabetes:  Discussed general issues about diabetes pathophysiology and management.  Addressed ADA diet.  Suggested low cholesterol diet.  Encouraged aerobic exercise.  Discussed foot care.  Reminded to get yearly retinal exam.  Hypertension:   Medication: see above changes.  Dietary sodium restriction.  Regular aerobic exercise.  Obesity:   General weight loss/lifestyle  modification strategies discussed (elicit support from others; identify saboteurs; non-food rewards, etc).  Informal exercise measures discussed, e.g. taking stairs instead of elevator.  Regular aerobic exercise program discussed.    Goals: Diabetes: Maintain Hemoglobin A1C below 7, Hypertension: Reduce Blood Pressure and Obesity: Reduce calorie intake and BMI    Did patient meet goals/outcomes: Yes    The following self management tools provided: declined    Patient Instructions (the written plan) was given to the patient/family.     Time spent with patient: 30 minutes

## 2017-05-27 LAB
ESTIMATED AVG GLUCOSE: 131 MG/DL
ESTIMATED AVG GLUCOSE: 131 MG/DL
HBA1C MFR BLD HPLC: 6.2 %
HBA1C MFR BLD HPLC: 6.2 %

## 2017-05-29 ENCOUNTER — HOSPITAL ENCOUNTER (OUTPATIENT)
Dept: RADIOLOGY | Facility: HOSPITAL | Age: 77
Discharge: HOME | End: 2017-05-29
Attending: FAMILY MEDICINE
Payer: MEDICARE

## 2017-05-29 ENCOUNTER — TELEPHONE (OUTPATIENT)
Dept: FAMILY MEDICINE | Facility: CLINIC | Age: 77
End: 2017-05-29

## 2017-05-29 DIAGNOSIS — R92.8 ABNORMAL MAMMOGRAM OF LEFT BREAST: ICD-10-CM

## 2017-05-29 PROCEDURE — 76642 ULTRASOUND BREAST LIMITED: CPT | Mod: 26,LT,, | Performed by: RADIOLOGY

## 2017-05-29 PROCEDURE — 77062 BREAST TOMOSYNTHESIS BI: CPT | Mod: 26,,, | Performed by: RADIOLOGY

## 2017-05-29 PROCEDURE — 77066 DX MAMMO INCL CAD BI: CPT | Mod: 26,,, | Performed by: RADIOLOGY

## 2017-05-29 PROCEDURE — 76642 ULTRASOUND BREAST LIMITED: CPT | Mod: TC,LT

## 2017-05-29 PROCEDURE — 77062 BREAST TOMOSYNTHESIS BI: CPT | Mod: TC

## 2017-05-29 NOTE — TELEPHONE ENCOUNTER
----- Message from Naveen GORDON Frisaida sent at 5/29/2017 10:10 AM CDT -----  Contact: same  Unsuccessful call placed to office.  Patient called in and stated she was returning a call regarding her lab results she had done this past Friday 5/26/17.    Patient call back number is [phone]

## 2017-05-29 NOTE — TELEPHONE ENCOUNTER
Patient has been given her results, she has stated that swelling has improved however she is still having some swelling.

## 2017-06-06 ENCOUNTER — DOCUMENTATION ONLY (OUTPATIENT)
Dept: FAMILY MEDICINE | Facility: CLINIC | Age: 77
End: 2017-06-06

## 2017-06-06 NOTE — PROGRESS NOTES
Pre-Visit Chart Review  For Appointment Scheduled on 6/7/17    There are no preventive care reminders to display for this patient.

## 2017-06-07 ENCOUNTER — OFFICE VISIT (OUTPATIENT)
Dept: FAMILY MEDICINE | Facility: CLINIC | Age: 77
End: 2017-06-07
Payer: MEDICARE

## 2017-06-07 VITALS
BODY MASS INDEX: 40.04 KG/M2 | OXYGEN SATURATION: 98 % | HEART RATE: 59 BPM | WEIGHT: 234.56 LBS | SYSTOLIC BLOOD PRESSURE: 123 MMHG | RESPIRATION RATE: 16 BRPM | DIASTOLIC BLOOD PRESSURE: 59 MMHG | HEIGHT: 64 IN

## 2017-06-07 DIAGNOSIS — I83.893 VARICOSE VEINS OF LEG WITH SWELLING, BILATERAL: ICD-10-CM

## 2017-06-07 DIAGNOSIS — I10 ESSENTIAL HYPERTENSION: Primary | ICD-10-CM

## 2017-06-07 DIAGNOSIS — R60.0 BILATERAL EDEMA OF LOWER EXTREMITY: ICD-10-CM

## 2017-06-07 PROCEDURE — 99999 PR PBB SHADOW E&M-EST. PATIENT-LVL V: CPT | Mod: PBBFAC,,, | Performed by: PHYSICIAN ASSISTANT

## 2017-06-07 PROCEDURE — 99213 OFFICE O/P EST LOW 20 MIN: CPT | Mod: S$GLB,,, | Performed by: PHYSICIAN ASSISTANT

## 2017-06-07 PROCEDURE — 1159F MED LIST DOCD IN RCRD: CPT | Mod: S$GLB,,, | Performed by: PHYSICIAN ASSISTANT

## 2017-06-07 PROCEDURE — 99499 UNLISTED E&M SERVICE: CPT | Mod: S$GLB,,, | Performed by: PHYSICIAN ASSISTANT

## 2017-06-07 PROCEDURE — 1126F AMNT PAIN NOTED NONE PRSNT: CPT | Mod: S$GLB,,, | Performed by: PHYSICIAN ASSISTANT

## 2017-06-07 RX ORDER — METOPROLOL SUCCINATE 50 MG/1
50 TABLET, EXTENDED RELEASE ORAL DAILY
Qty: 90 TABLET | Refills: 3 | Status: SHIPPED | OUTPATIENT
Start: 2017-06-07 | End: 2018-07-12 | Stop reason: SDUPTHER

## 2017-06-07 NOTE — PATIENT INSTRUCTIONS
Established High Blood Pressure    High blood pressure (hypertension) is a chronic disease. Often health care providers dont know what causes it. But it can be caused by certain health conditions and medicines.  If you have high blood pressure, you may not have any symptoms. If you do have symptoms, they may include headache, dizziness, changes in your vision, chest pain, and shortness of breath. But even without symptoms, high blood pressure thats not treated raises your risk for heart attack and stroke. High blood pressure is a serious health risk and shouldnt be ignored.  A blood pressure reading is made up of two numbers: a higher number over a lower number. The top number is the systolic pressure. The bottom number is the diastolic pressure. A normal blood pressure is less than 120 over less than 80.  High blood pressure is when either the top number is 140 or higher, or the bottom number is 90 or higher. This must be the result when taking your blood pressure a number of times. The blood pressures between normal and high are called prehypertension.  Home care  If you have high blood pressure, you should do what is listed below to lower your blood pressure. If you are taking medicines for high blood pressure, these methods may reduce or end your need for medicines in the future.  · Begin a weight-loss program if you are overweight.  · Cut back on how much salt you get in your diet. Heres how to do this:  ¨ Dont eat foods that have a lot of salt. These include olives, pickles, smoked meats, and salted potato chips.  ¨ Dont add salt to your food at the table.  ¨ Use only small amounts of salt when cooking.  · Begin an exercise program. Talk with your health care provider about the type of exercise program that would be best for you. It doesn't have to be hard. Even brisk walking for 20 minutes 3 times a week is a good form of exercise.  · Dont take medicines that have heart stimulants. This includes many  cold and sinus decongestant pills and sprays, as well as diet pills. Check the warnings about hypertension on the label. Stimulants such as amphetamine or cocaine could be lethal for someone with high blood pressure. Never take these.  · Limit how much caffeine you get in your diet. Switch to caffeine-free products.  · Stop smoking. If you are a long-time smoker, this can be hard. Enroll in a stop-smoking program to make it more likely that you will quit for good.  · Learn how to handle stress. This is an important part of any program to lower blood pressure. Learn about relaxation methods like meditation, yoga, or biofeedback.  · If your provider prescribed medicines, take them exactly as directed. Missing doses may cause your blood pressure get out of control.  · Consider buying an automatic blood pressure machine. You can get one of these at most pharmacies. Use this to watch your blood pressure at home. Give the results to your provider.  Follow-up care  You will need to make regular visits to your health care provider. This is to check your blood pressure and to make changes to your medicines. Make a follow-up appointment as directed.  When to seek medical advice  Call your health care provider right away if any of these occur:  · Chest pain or shortness of breath  · Severe headache  · Throbbing or rushing sound in the ears  · Nosebleed  · Sudden severe pain in your belly (abdomen)  · Extreme drowsiness, confusion, or fainting  · Dizziness or dizziness with a spinning sensation (vertigo)  · Weakness of an arm or leg or one side of the face  · You have problems speaking or seeing   Date Last Reviewed: 11/25/2014  © 1818-0332 [employer]. [address] [place] [address], [place] [address]. All rights reserved. This information is not intended as a substitute for professional medical care. Always follow your healthcare professional's instructions.            Diabetes (General Information)  Diabetes is a  long-term health problem. It means your body does not make enough insulin. Or it may mean that your body cannot use the insulin it makes. Insulin is a hormone in your body. It lets blood sugar (glucose) reach the cells in your body. All of your cells need glucose for fuel.  When you have diabetes, the glucose in your blood builds up because it cannot get into the cells. This buildup is called high blood sugar (hyperglycemia).  Your blood sugar level depends on several things. It depends on what kind of food you eat and how much of it you eat. It also depends on how much exercise you get, and how much insulin you have in your body. Eating too much of the wrong kinds of food or not taking diabetes medicine on time can cause high blood sugar. Infections can cause high blood sugar even if you are taking medicines correctly.  These things can also cause low blood sugar:  · Missing meals  · Not eating enough food  · Taking too much diabetes medicine  Diabetes can cause serious problems over time if you do not get treated. These problems include heart disease, stroke, kidney failure, and blindness. They also include nerve pain or loss of feeling in your legs and feet, and gangrene of the feet. By keeping your blood sugar under control you can prevent or delay these problems.  Normal blood sugar levels are 80 to 100 before a meal and less than 180 in the 1 to 2 hours after a meal.  Home care  Follow these guidelines when caring for yourself at home:  · Follow the diet your healthcare provider gives you. Take insulin or other diabetes medicine exactly as told to.  · Watch your blood sugar as you are told to. Keep a log of your results. This will help your provider change your medicines to keep your blood sugar under control.  · Try to reach your ideal weight. You may be able to cut back on or not have to take diabetes medicine if you eat the right foods and get exercise.  · Do not smoke. Smoking worsens the effects of  diabetes on your circulation. You are much more likely to have a heart attack if you have diabetes and you smoke.  · Take good care of your feet. If you have lost feeling in your feet, you may not see an injury or infection. Check your feet and between your toes at least once a week.  · Wear a medical alert bracelet or necklace, or carry a card in your wallet that says you have diabetes. This will help healthcare providers give you the right care if you get very ill and cannot tell them that you have diabetes.  Sick day plan  If you get a cold, the flu, or a bacterial or viral infection, take these steps:  · Look at your diabetes sick plan and call your healthcare provider as you were told to. You may need to call your provider right away if:  ¨ Your blood sugar is above 240 while taking your diabetes medicine  ¨ Your urine ketone levels are above normal or high  ¨ You have been vomiting more than 6 hours  ¨ You have trouble breathing or your breath ha s a fruity smell  ¨ You have a high fever  ¨ You have a fever for several days and you are not getting better  ¨ You get light-headed and are sleepier than usual  · Keep taking your diabetes pills (oral medicine) even if you have been vomiting and are feeling sick. Call your provider right away because you may need insulin to lower your blood sugar until you recover from your illness.  · Keep taking your insulin even if you have been vomiting and are feeling sick. Call your provider right away to ask if you need to change your insulin dose. This will depend on your blood sugar results.  · Check your blood sugar every 2 to 4 hours, or at least 4 times a day.  · Check your ketones often. If you are vomiting and having diarrhea, watch them more often.  · Do not skip meals. Try to eat small meals on a regular schedule. Do this even if you do not feel like eating.  · Drink water or other liquids that do not have caffeine or calories. This will keep you from getting  dehydrated. If you are nauseated or vomiting, takes small sips every 5 minutes. To prevent dehydration try to drink a cup (8 ounces) of fluids every hour while you are awake.  General care  Always bring a source of fast-acting sugar with you in case you have symptoms of low blood sugar (below 70). At the first sign of low blood sugar, eat or drink 15 to 20 grams of fast-acting sugar to raise your blood sugar. Examples are:  · 3 to 4 glucose tablets. You can buy these at most [employer].  · 4 ounces (1/2 cup) of regular (not diet) soft drinks  · 4 ounces (1/2 cup) of any fruit juice  · 8 ounces (1 cup) of milk  · 5 to 6 pieces of hard candy  · 1 tablespoon of honey  Check your blood sugar 15 minutes after treating yourself. If it is still below 70, take 15 to 20 more grams of fast-acting sugar. Test again in 15 minutes. If it returns to normal (70 or above), eat a snack or meal to keep your blood sugar in a safe range. If it stays low, call your doctor or go to an emergency room.  Follow-up care  Follow-up with your healthcare provider, or as advised. For more information about diabetes, visit the American Diabetes Association website at www.diabetes.org or call 361-400-3793.  When to seek medical advice  Call your healthcare provider right away if you have any of these symptoms of high blood sugar:  · Frequent urination  · Dizziness  · Drowsiness  · Thirst  · Headache  · Nausea or vomiting  · Abdominal pain  · Eyesight changes  · Fast breathing  · Confusion or loss of consciousness  Also call your provider right away if you have any of these signs of low blood sugar:  · Fatigue  · Headache  · Shakes  · Excess sweating  · Hunger  · Feeling anxious or restless  · Eyesight changes  · Drowsiness  · Weakness  · Confusion or loss of consciousness  Call 911  Call for emergency help right away if any of these occur:  · Chest pain or shortness of breath  · Dizziness or fainting  · Weakness of an arm or leg or one side of the  face  · Trouble speaking or seeing   Date Last Reviewed: 6/1/2016 © 2000-2016 [employer]. [address] [place] [address], [place] [address]. All rights reserved. This information is not intended as a substitute for professional medical care. Always follow your healthcare professional's instructions.            Walking for Fitness  Fitness walking has something for everyone, even people who are already fit. Walking is one of the safest ways to condition your body aerobically. It can boost energy, help you lose weight, and reduce stress.    Physical benefits  · Walking strengthens your heart and lungs, and tones your muscles.  · When walking, your feet land with less impact than in other sports. This reduces chances of muscle, bone, and joint injury.  · Regular walking improves your cholesterol levels and lowers your risk of heart disease. And it helps you control your blood sugar if you have diabetes.  · Walking is a weight-bearing activity, which helps maintain bone density. This can help prevent osteoporosis.  Personal rewards  · Taking walks can help you relax and manage stress. And fitness walking may make you feel better about yourself.  · Walking can help you sleep better at night and make you less likely to be depressed.  · Regular walking may help maintain your memory as you get older.  · Walking is a great way to spend extra time with friends and family members. Be sure to invite your dog along!  Q&A about fitness walking  Q: Will walking keep me fit?  A: Yes. Regular walking at the right pace gives you all the benefits of other aerobic activities, such as jogging and swimming.  Q: Will walking help me lose weight and keep it off?  A: Yes. Per mile, walking can burn as many calories as jogging. Your health care provider can help work walking into your weight-loss plan.  Q: Is walking safe for my health?  A: Yes. Walking is safe if you have high blood pressure, diabetes, heart disease, or other  conditions. Talk to your health care provider before you start.  Date Last Reviewed: 5/9/2015  © 9126-2759 [employer]. [address] [place] [address], [place] [address]. All rights reserved. This information is not intended as a substitute for professional medical care. Always follow your healthcare professional's instructions.            Weight Management: Getting Started  Healthy bodies come in all shapes and sizes. Not all bodies are made to be thin. For some people, a healthy weight is higher than the average weight listed on weight charts. Your healthcare provider can help you decide on a healthy weight for you.    Reasons to lose weight  Losing weight can help with some health problems, such as high blood pressure, heart disease, diabetes, sleep apnea, and arthritis. You may also feel more energy.  Set your long-term goal  Your goal doesn't even have to be a specific weight. You may decide on a fitness goal (such as being able to walk 10 miles a week), or a health goal (such as lowering your blood pressure). Choose a goal that is measurable and reasonable, so you know when you've reached it. A goal of reaching a BMI of less than 25 is not always reasonable (or possible).   Make an action plan  Habits dont change overnight. Setting your goals too high can leave you feeling discouraged if you cant reach them. Be realistic. Choose one or two small changes you can make now. Set an action plan for how you are going to make these changes. When you can stick to this plan, keep making a few more small changes. Taking small steps will help you stay on the path to success.  Track your progress  Write down your goals. Then, keep a daily record of your progress. Write down what you eat and how active you are. This record lets you look back on how much youve done. It may also help when youre feeling frustrated. Reward yourself for success. Even if you dont reach every goal, give yourself credit for what you do  get done.  Get support  Encouragement from others can help make losing weight easier. Ask your family members and friends for support. They may even want to join you. Also look to your healthcare provider, registered dietitian, and  for help. Your local hospital can give you more information about nutrition, exercise, and weight loss.  Date Last Reviewed: 1/31/2016  © 2348-3474 [employer]. [address] [place] [address], [place] [address]. All rights reserved. This information is not intended as a substitute for professional medical care. Always follow your healthcare professional's instructions.

## 2017-06-07 NOTE — PROGRESS NOTES
Subjective:       Patient ID: Sammi Abreu is a 76 y.o. female.    Chief Complaint: Follow-up (1wk f/u)    Ms. Abreu comes to clinic today for 10 day follow up of leg swelling. Since stopping the nifedipine she has had some improvement in her leg swelling though she does have chronic leg swelling. She is taking her diuretics as prescribed and is wearing compression stockings. The patient has no new complaints today and reports she is doing well. She is leaving in 2 days for a cross [place] bus trip.      Review of Systems   Constitutional: Negative for activity change, appetite change and fever.   HENT: Negative for postnasal drip, rhinorrhea and sinus pressure.    Eyes: Negative for visual disturbance.   Respiratory: Negative for cough and shortness of breath.    Cardiovascular: Positive for leg swelling. Negative for chest pain.   Gastrointestinal: Negative for abdominal distention and abdominal pain.   Genitourinary: Negative for difficulty urinating and dysuria.   Musculoskeletal: Negative for arthralgias and myalgias.   Neurological: Negative for headaches.   Hematological: Negative for adenopathy.   Psychiatric/Behavioral: The patient is not nervous/anxious.        Objective:      Physical Exam   Constitutional: She is oriented to person, place, and time.   HENT:   Mouth/Throat: Oropharynx is clear and moist. No oropharyngeal exudate.   Eyes: Conjunctivae are normal. Pupils are equal, round, and reactive to light.   Cardiovascular: Normal rate and regular rhythm.    Pulmonary/Chest: Effort normal and breath sounds normal. She has no wheezes.   Abdominal: Soft. Bowel sounds are normal. She exhibits no distension. There is no tenderness.   Musculoskeletal: She exhibits edema.   Lymphadenopathy:     She has no cervical adenopathy.   Neurological: She is alert and oriented to person, place, and time.   Skin: No erythema.   Psychiatric: Her behavior is normal.       Assessment:       1. Essential hypertension     2. Varicose veins of leg with swelling, bilateral        Plan:   Sammi was seen today for follow-up.    Diagnoses and all orders for this visit:    Essential hypertension  Controlled  Low salt diet  Continue current medication  Varicose veins of leg with swelling, bilateral  Swelling improving  Continue current medication  Continue compression stockings  Elevate legs when possible  Bilateral edema of lower extremity  Swelling improving  Continue current medication  Continue compression stockings  Elevate legs when possible  Be sure to wear compression stockings during travel.   Other orders  -     metoprolol succinate (TOPROL-XL) 50 MG 24 hr tablet; Take 1 tablet (50 mg total) by mouth once daily.

## 2017-06-22 ENCOUNTER — LAB VISIT (OUTPATIENT)
Dept: LAB | Facility: HOSPITAL | Age: 77
End: 2017-06-22
Attending: INTERNAL MEDICINE
Payer: MEDICARE

## 2017-06-22 DIAGNOSIS — D63.1 ANEMIA IN CHRONIC KIDNEY DISEASE(285.21): Primary | ICD-10-CM

## 2017-06-22 DIAGNOSIS — I12.9 MALIGNANT HYPERTENSIVE KIDNEY DISEASE WITH CHRONIC KIDNEY DISEASE STAGE I THROUGH STAGE IV, OR UNSPECIFIED(403.00): ICD-10-CM

## 2017-06-22 DIAGNOSIS — D63.1 ANEMIA IN CHRONIC KIDNEY DISEASE(285.21): ICD-10-CM

## 2017-06-22 DIAGNOSIS — E11.9 DIABETES MELLITUS WITHOUT COMPLICATION: ICD-10-CM

## 2017-06-22 DIAGNOSIS — N18.9 ANEMIA IN CHRONIC KIDNEY DISEASE(285.21): ICD-10-CM

## 2017-06-22 DIAGNOSIS — N18.9 ANEMIA IN CHRONIC KIDNEY DISEASE(285.21): Primary | ICD-10-CM

## 2017-06-22 DIAGNOSIS — N18.30 CHRONIC KIDNEY DISEASE, STAGE III (MODERATE): ICD-10-CM

## 2017-06-22 LAB
ALBUMIN SERPL BCP-MCNC: 3.3 G/DL
ALP SERPL-CCNC: 69 U/L
ALT SERPL W/O P-5'-P-CCNC: 13 U/L
ANION GAP SERPL CALC-SCNC: 8 MMOL/L
AST SERPL-CCNC: 15 U/L
BASOPHILS # BLD AUTO: 0.01 K/UL
BASOPHILS NFR BLD: 0.1 %
BILIRUB SERPL-MCNC: 0.8 MG/DL
BUN SERPL-MCNC: 12 MG/DL
CALCIUM SERPL-MCNC: 9.1 MG/DL
CHLORIDE SERPL-SCNC: 105 MMOL/L
CO2 SERPL-SCNC: 29 MMOL/L
CREAT SERPL-MCNC: 1.3 MG/DL
DIFFERENTIAL METHOD: ABNORMAL
EOSINOPHIL # BLD AUTO: 0.2 K/UL
EOSINOPHIL NFR BLD: 2.5 %
ERYTHROCYTE [DISTWIDTH] IN BLOOD BY AUTOMATED COUNT: 13.7 %
EST. GFR  (AFRICAN AMERICAN): 46.7 ML/MIN/1.73 M^2
EST. GFR  (NON AFRICAN AMERICAN): 40.5 ML/MIN/1.73 M^2
GLUCOSE SERPL-MCNC: 117 MG/DL
HCT VFR BLD AUTO: 42.8 %
HGB BLD-MCNC: 13.3 G/DL
LYMPHOCYTES # BLD AUTO: 2.1 K/UL
LYMPHOCYTES NFR BLD: 26.1 %
MCH RBC QN AUTO: 31.6 PG
MCHC RBC AUTO-ENTMCNC: 31.1 %
MCV RBC AUTO: 102 FL
MONOCYTES # BLD AUTO: 0.8 K/UL
MONOCYTES NFR BLD: 9.6 %
NEUTROPHILS # BLD AUTO: 4.9 K/UL
NEUTROPHILS NFR BLD: 61.4 %
PHOSPHATE SERPL-MCNC: 4 MG/DL
PLATELET # BLD AUTO: 231 K/UL
PMV BLD AUTO: 11 FL
POTASSIUM SERPL-SCNC: 4.4 MMOL/L
PROT SERPL-MCNC: 7.2 G/DL
RBC # BLD AUTO: 4.21 M/UL
SODIUM SERPL-SCNC: 142 MMOL/L
WBC # BLD AUTO: 7.92 K/UL

## 2017-06-22 PROCEDURE — 84100 ASSAY OF PHOSPHORUS: CPT

## 2017-06-22 PROCEDURE — 80053 COMPREHEN METABOLIC PANEL: CPT

## 2017-06-22 PROCEDURE — 36415 COLL VENOUS BLD VENIPUNCTURE: CPT | Mod: PO

## 2017-06-22 PROCEDURE — 85025 COMPLETE CBC W/AUTO DIFF WBC: CPT

## 2017-06-27 ENCOUNTER — TELEPHONE (OUTPATIENT)
Dept: FAMILY MEDICINE | Facility: CLINIC | Age: 77
End: 2017-06-27

## 2017-06-27 NOTE — TELEPHONE ENCOUNTER
----- Message from Elisha Alaniz sent at 6/27/2017 10:15 AM CDT -----  Patient is congested and coughing up mucus/scheduled appointment today at 1:20 today in [place] but would like earlier appointment if possible in [place]/please call patient back at [phone] to schedule or advise.

## 2017-06-27 NOTE — TELEPHONE ENCOUNTER
Patient states she has an appointment today in [facility] at 1:20 pm, requesting to know if there was anything available in [place] [facility] earlier than !:20 pm. Writer advised patient there is no earlier availability in [place] [facility]. Advised patient to be seen at an urgent care facility or ER for sooner access. Patient verbalized understanding.

## 2017-08-01 DIAGNOSIS — I10 HTN (HYPERTENSION), BENIGN: ICD-10-CM

## 2017-08-01 RX ORDER — LOSARTAN POTASSIUM 100 MG/1
TABLET ORAL
Qty: 90 TABLET | Refills: 0 | Status: SHIPPED | OUTPATIENT
Start: 2017-08-01 | End: 2017-11-05 | Stop reason: SDUPTHER

## 2017-09-05 ENCOUNTER — TELEPHONE (OUTPATIENT)
Dept: FAMILY MEDICINE | Facility: CLINIC | Age: 77
End: 2017-09-05

## 2017-09-05 DIAGNOSIS — E11.9 CONTROLLED TYPE 2 DIABETES MELLITUS WITHOUT COMPLICATION, WITHOUT LONG-TERM CURRENT USE OF INSULIN: Primary | ICD-10-CM

## 2017-09-05 NOTE — TELEPHONE ENCOUNTER
----- Message from Megha Sher sent at 9/5/2017  7:52 AM CDT -----  Contact: self  Patient needs to know if she needs orders for A1C blood work. Patient is coming in on 09/08/17.  Please call patient at [phone]. Thanks!

## 2017-09-06 NOTE — TELEPHONE ENCOUNTER
Lab orders placed by Dr. Villatoro. Patient notified. Verbalized understanding. Lab appointment scheduled for 9-7-17.  Patient agreed to lab appointment date and time.

## 2017-09-07 ENCOUNTER — LAB VISIT (OUTPATIENT)
Dept: LAB | Facility: HOSPITAL | Age: 77
End: 2017-09-07
Attending: FAMILY MEDICINE
Payer: MEDICARE

## 2017-09-07 ENCOUNTER — DOCUMENTATION ONLY (OUTPATIENT)
Dept: FAMILY MEDICINE | Facility: CLINIC | Age: 77
End: 2017-09-07

## 2017-09-07 DIAGNOSIS — E11.9 TYPE 2 DIABETES MELLITUS WITHOUT COMPLICATION: ICD-10-CM

## 2017-09-07 DIAGNOSIS — E11.9 CONTROLLED TYPE 2 DIABETES MELLITUS WITHOUT COMPLICATION, WITHOUT LONG-TERM CURRENT USE OF INSULIN: ICD-10-CM

## 2017-09-07 LAB
ANION GAP SERPL CALC-SCNC: 8 MMOL/L
BUN SERPL-MCNC: 16 MG/DL
CALCIUM SERPL-MCNC: 8.8 MG/DL
CHLORIDE SERPL-SCNC: 107 MMOL/L
CHOLEST SERPL-MCNC: 182 MG/DL
CHOLEST/HDLC SERPL: 4.3 {RATIO}
CO2 SERPL-SCNC: 28 MMOL/L
CREAT SERPL-MCNC: 1 MG/DL
EST. GFR  (AFRICAN AMERICAN): >60 ML/MIN/1.73 M^2
EST. GFR  (NON AFRICAN AMERICAN): 55.6 ML/MIN/1.73 M^2
ESTIMATED AVG GLUCOSE: 120 MG/DL
GLUCOSE SERPL-MCNC: 113 MG/DL
HBA1C MFR BLD HPLC: 5.8 %
HDLC SERPL-MCNC: 42 MG/DL
HDLC SERPL: 23.1 %
LDLC SERPL CALC-MCNC: 120.4 MG/DL
NONHDLC SERPL-MCNC: 140 MG/DL
POTASSIUM SERPL-SCNC: 4.3 MMOL/L
SODIUM SERPL-SCNC: 143 MMOL/L
TRIGL SERPL-MCNC: 98 MG/DL

## 2017-09-07 PROCEDURE — 80048 BASIC METABOLIC PNL TOTAL CA: CPT

## 2017-09-07 PROCEDURE — 83036 HEMOGLOBIN GLYCOSYLATED A1C: CPT

## 2017-09-07 PROCEDURE — 80061 LIPID PANEL: CPT

## 2017-09-07 PROCEDURE — 36415 COLL VENOUS BLD VENIPUNCTURE: CPT | Mod: PO

## 2017-09-07 NOTE — PROGRESS NOTES
Pre-Visit Chart Review  For Appointment Scheduled on 08/09/2017  Health Maintenance Due   Topic Date Due    Influenza Vaccine  08/01/2017    Foot Exam  11/02/2017

## 2017-09-08 ENCOUNTER — OFFICE VISIT (OUTPATIENT)
Dept: FAMILY MEDICINE | Facility: CLINIC | Age: 77
End: 2017-09-08
Payer: MEDICARE

## 2017-09-08 VITALS
BODY MASS INDEX: 41.41 KG/M2 | HEIGHT: 63 IN | SYSTOLIC BLOOD PRESSURE: 130 MMHG | OXYGEN SATURATION: 98 % | HEART RATE: 57 BPM | WEIGHT: 233.69 LBS | TEMPERATURE: 98 F | DIASTOLIC BLOOD PRESSURE: 80 MMHG

## 2017-09-08 DIAGNOSIS — J45.30 MILD PERSISTENT ASTHMA WITHOUT COMPLICATION: Primary | ICD-10-CM

## 2017-09-08 PROCEDURE — 1126F AMNT PAIN NOTED NONE PRSNT: CPT | Mod: S$GLB,,, | Performed by: FAMILY MEDICINE

## 2017-09-08 PROCEDURE — 3008F BODY MASS INDEX DOCD: CPT | Mod: S$GLB,,, | Performed by: FAMILY MEDICINE

## 2017-09-08 PROCEDURE — 3079F DIAST BP 80-89 MM HG: CPT | Mod: S$GLB,,, | Performed by: FAMILY MEDICINE

## 2017-09-08 PROCEDURE — 99214 OFFICE O/P EST MOD 30 MIN: CPT | Mod: S$GLB,,, | Performed by: FAMILY MEDICINE

## 2017-09-08 PROCEDURE — 99999 PR PBB SHADOW E&M-EST. PATIENT-LVL III: CPT | Mod: PBBFAC,,, | Performed by: FAMILY MEDICINE

## 2017-09-08 PROCEDURE — 1159F MED LIST DOCD IN RCRD: CPT | Mod: S$GLB,,, | Performed by: FAMILY MEDICINE

## 2017-09-08 PROCEDURE — 99499 UNLISTED E&M SERVICE: CPT | Mod: S$GLB,,, | Performed by: FAMILY MEDICINE

## 2017-09-08 PROCEDURE — 3075F SYST BP GE 130 - 139MM HG: CPT | Mod: S$GLB,,, | Performed by: FAMILY MEDICINE

## 2017-09-08 RX ORDER — HYDROGEN PEROXIDE 3 %
20 SOLUTION, NON-ORAL MISCELLANEOUS
Qty: 30 CAPSULE | Refills: 2 | Status: ON HOLD | OUTPATIENT
Start: 2017-09-08 | End: 2020-07-13

## 2017-09-08 RX ORDER — FLUTICASONE PROPIONATE 110 UG/1
1 AEROSOL, METERED RESPIRATORY (INHALATION) 2 TIMES DAILY
Qty: 12 G | Refills: 3 | Status: SHIPPED | OUTPATIENT
Start: 2017-09-08 | End: 2023-11-30

## 2017-09-08 RX ORDER — ALBUTEROL SULFATE 90 UG/1
2 AEROSOL, METERED RESPIRATORY (INHALATION) EVERY 6 HOURS PRN
Qty: 3 INHALER | Refills: 3 | Status: SHIPPED | OUTPATIENT
Start: 2017-09-08 | End: 2019-01-09 | Stop reason: SDUPTHER

## 2017-09-08 NOTE — PROGRESS NOTES
Subjective:       Patient ID: Sammi Abreu is a 76 y.o. female.    Chief Complaint: Hypertension and Diabetes    Diabetes   She presents for her follow-up diabetic visit. She has type 2 diabetes mellitus. Pertinent negatives for hypoglycemia include no mood changes, nervousness/anxiousness, pallor, speech difficulty or sweats. There are no diabetic associated symptoms. Pertinent negatives for diabetes include no blurred vision, no chest pain, no fatigue, no foot paresthesias, no foot ulcerations, no polydipsia, no polyphagia and no polyuria. Pertinent negatives for hypoglycemia complications include no blackouts and no nocturnal hypoglycemia. Diabetic complications include PVD. Pertinent negatives for diabetic complications include no autonomic neuropathy. Risk factors for coronary artery disease include sedentary lifestyle, post-menopausal, hypertension and obesity. Current diabetic treatment includes oral agent (monotherapy). She is compliant with treatment most of the time. Her home blood glucose trend is fluctuating minimally.   Knee Pain      Hypertension   The problem has been resolved since onset. The problem is controlled. Associated symptoms include shortness of breath. Pertinent negatives include no blurred vision, chest pain or sweats. Risk factors for coronary artery disease include dyslipidemia, stress and post-menopausal state. Compliance problems include diet.  Hypertensive end-organ damage includes PVD. Identifiable causes of hypertension include chronic renal disease.     Review of Systems   Constitutional: Negative.  Negative for fatigue.   HENT: Negative.    Eyes: Positive for visual disturbance. Negative for blurred vision.   Respiratory: Positive for cough, shortness of breath and wheezing.         Episodes of dry cough, exp dyspnea, and wheezes.   Cardiovascular: Negative for chest pain.   Endocrine: Negative for polydipsia, polyphagia and polyuria.   Genitourinary: Negative.     Musculoskeletal: Positive for arthralgias.        Chronic shoulder, and knee aches despite her TKR   Skin: Negative for pallor.   Neurological: Negative for speech difficulty.   Psychiatric/Behavioral: The patient is not nervous/anxious.        Objective:      Physical Exam   Constitutional: She is oriented to person, place, and time. She appears well-developed and well-nourished.   HENT:   Head: Normocephalic and atraumatic.   Right Ear: External ear normal.   Left Ear: External ear normal.   Nose: Nose normal.   Mouth/Throat: No oropharyngeal exudate.   Eyes: Conjunctivae and EOM are normal. Pupils are equal, round, and reactive to light. Right eye exhibits no discharge. Left eye exhibits no discharge. No scleral icterus.   Neck: Normal range of motion. Neck supple. No JVD present. No tracheal deviation present. No thyromegaly present.   Cardiovascular: Normal rate, normal heart sounds and intact distal pulses.  Exam reveals no gallop and no friction rub.    No murmur heard.  Pulses:       Dorsalis pedis pulses are 3+ on the right side, and 3+ on the left side.        Posterior tibial pulses are 3+ on the right side, and 3+ on the left side.   Pulmonary/Chest: Effort normal. No respiratory distress. She has wheezes. She has no rales. She exhibits no tenderness.   Abdominal: Soft. Bowel sounds are normal. She exhibits no distension and no mass. There is no tenderness. There is no rebound and no guarding.   Musculoskeletal: She exhibits no edema.        Right knee: She exhibits decreased range of motion, swelling and effusion. She exhibits normal alignment and no LCL laxity. Tenderness found. Medial joint line tenderness noted.        Left knee: Normal. She exhibits normal range of motion and no swelling. No tenderness found. No medial joint line, no lateral joint line and no MCL tenderness noted.   Feet:   Right Foot:   Protective Sensation: 6 sites tested. 3 sites sensed.   Skin Integrity: Negative for ulcer,  blister or skin breakdown.   Left Foot:   Protective Sensation: 6 sites tested. 3 sites sensed.   Skin Integrity: Negative for ulcer, blister or skin breakdown.   Lymphadenopathy:     She has no cervical adenopathy.   Neurological: She is alert and oriented to person, place, and time. She displays normal reflexes. No cranial nerve deficit. She exhibits normal muscle tone. Coordination normal.   Skin: Skin is dry. No rash noted. She is not diaphoretic. No erythema. No pallor.   Psychiatric: She has a normal mood and affect. Her behavior is normal. Judgment and thought content normal.   Vitals reviewed.      Lab Results   Component Value Date    HGBA1C 5.8 (H) 09/07/2017       Chemistry        Component Value Date/Time     09/07/2017 0834    K 4.3 09/07/2017 0834     09/07/2017 0834    CO2 28 09/07/2017 0834    BUN 16 09/07/2017 0834    CREATININE 1.0 09/07/2017 0834     (H) 09/07/2017 0834        Component Value Date/Time    CALCIUM 8.8 09/07/2017 0834    ALKPHOS 69 06/22/2017 1112    AST 15 06/22/2017 1112    ALT 13 06/22/2017 1112    BILITOT 0.8 06/22/2017 1112            Assessment:       1. Mild persistent asthma without complication        Plan:       Mild persistent asthma without complication  -     Complete PFT with bronchodilator; Future  -     PULSE OXIMETRY WITH REST - PULM; Future  -     fluticasone (FLOVENT HFA) 110 mcg/actuation inhaler; Inhale 1 puff into the lungs 2 (two) times daily. Controller  Dispense: 12 g; Refill: 3  -     VENTOLIN HFA 90 mcg/actuation inhaler; Inhale 2 puffs into the lungs every 6 (six) hours as needed for Wheezing. Rescue  Dispense: 3 Inhaler; Refill: 3    Other orders  -     esomeprazole (NEXIUM) 20 MG capsule; Take 1 capsule (20 mg total) by mouth before breakfast.  Dispense: 30 capsule; Refill: 2      Patient readiness: acceptance and barriers:social stressors    During the course of the visit the patient was educated and counseled about the following:      Diabetes:  Discussed general issues about diabetes pathophysiology and management.  Hypertension:   Screening for causes of secondary hypertension: GFR (chronic kidney disease).  Check blood pressures daily and record.  Obesity:   General weight loss/lifestyle modification strategies discussed (elicit support from others; identify saboteurs; non-food rewards, etc).  Informal exercise measures discussed, e.g. taking stairs instead of elevator.  Medication: bulk-forming agents.    Goals: Diabetes: Maintain Hemoglobin A1C below 7, Hypertension: Reduce Blood Pressure and Obesity: Reduce calorie intake and BMI    Did patient meet goals/outcomes: Yes    The following self management tools provided: blood pressure log  blood glucose log  excercise log    Patient Instructions (the written plan) was given to the patient/family.     Time spent with patient: 30 minutes

## 2017-09-11 ENCOUNTER — HOSPITAL ENCOUNTER (OUTPATIENT)
Dept: RESPIRATORY THERAPY | Facility: HOSPITAL | Age: 77
Discharge: HOME | End: 2017-09-11
Attending: FAMILY MEDICINE
Payer: MEDICARE

## 2017-09-11 VITALS — OXYGEN SATURATION: 97 %

## 2017-09-11 DIAGNOSIS — J45.30 MILD PERSISTENT ASTHMA WITHOUT COMPLICATION: ICD-10-CM

## 2017-09-11 PROCEDURE — 94060 EVALUATION OF WHEEZING: CPT

## 2017-09-11 PROCEDURE — 94760 N-INVAS EAR/PLS OXIMETRY 1: CPT

## 2017-09-11 PROCEDURE — 94727 GAS DIL/WSHOT DETER LNG VOL: CPT

## 2017-09-11 PROCEDURE — 94729 DIFFUSING CAPACITY: CPT

## 2017-09-20 NOTE — PROCEDURES
DATE OF STUDY:  09/11/2017    The patient of Dr. Villatoro.    The effort appears adequate.  The inspiratory portion of the flow volume loop is   normal.  The FVC is normal.  FEV1 is normal, measuring 2.07 liters or 114%   predicted, FEV1/FVC is normal.  FEF 25-75 is normal.  After administering an   appropriate bronchodilator, no significant change is appreciated.  Lung volumes   are normal.  Diffusion is normal.    IMPRESSION:  Pulmonary function studies are normal.    Clinical and radiographic correlation is suggested.      HES/HN  dd: 09/19/2017 20:31:20 (CDT)  td: 09/20/2017 02:58:44 (CDT)  Doc ID   #[id number]  Job ID #[id number]    CC:

## 2017-10-26 ENCOUNTER — CLINICAL SUPPORT (OUTPATIENT)
Dept: FAMILY MEDICINE | Facility: CLINIC | Age: 77
End: 2017-10-26
Payer: MEDICARE

## 2017-10-26 DIAGNOSIS — E78.2 MIXED HYPERLIPIDEMIA: ICD-10-CM

## 2017-10-26 DIAGNOSIS — I10 ESSENTIAL HYPERTENSION: Primary | ICD-10-CM

## 2017-10-26 PROCEDURE — 99499 UNLISTED E&M SERVICE: CPT | Mod: S$GLB,,, | Performed by: FAMILY MEDICINE

## 2017-10-26 PROCEDURE — G0008 ADMIN INFLUENZA VIRUS VAC: HCPCS | Mod: S$GLB,,, | Performed by: FAMILY MEDICINE

## 2017-10-26 PROCEDURE — 90662 IIV NO PRSV INCREASED AG IM: CPT | Mod: S$GLB,,, | Performed by: FAMILY MEDICINE

## 2017-11-05 DIAGNOSIS — I10 HTN (HYPERTENSION), BENIGN: ICD-10-CM

## 2017-11-06 RX ORDER — LOSARTAN POTASSIUM 100 MG/1
TABLET ORAL
Qty: 90 TABLET | Refills: 0 | Status: SHIPPED | OUTPATIENT
Start: 2017-11-06 | End: 2018-02-07 | Stop reason: SDUPTHER

## 2017-11-16 NOTE — PROGRESS NOTES
Subjective:       Patient ID: Sammi Abreu is a 76 y.o. female.    Chief Complaint: No chief complaint on file.    Hypertension   This is a chronic problem. The current episode started more than 1 year ago. The problem has been resolved since onset. The problem is controlled. Pertinent negatives include no anxiety, blurred vision, chest pain, headaches, palpitations or shortness of breath. Agents associated with hypertension include NSAIDs. Risk factors for coronary artery disease include obesity, sedentary lifestyle and dyslipidemia.     Review of Systems   Constitutional: Negative for fatigue and unexpected weight change.   Eyes: Negative for blurred vision.   Respiratory: Negative for chest tightness and shortness of breath.    Cardiovascular: Negative for chest pain, palpitations and leg swelling.   Gastrointestinal: Negative for abdominal pain.   Musculoskeletal: Positive for arthralgias, back pain, gait problem and joint swelling.   Neurological: Negative for dizziness, syncope, light-headedness and headaches.       Patient Active Problem List   Diagnosis    Vertigo of central origin    DDD (degenerative disc disease), lumbar    Arthritis of hip    Lumbosacral spondylosis without myelopathy    HTN (hypertension), onset 2010    DM neuropathy, type II diabetes mellitus    Hyperlipidemia, baseline     Arthritis    Right groin pain    Radicular pain    Lumbar spondylosis    DM II (diabetes mellitus, type II), controlled, onset 2012    Degenerative disc disease    Shoulder pain    S/P hip replacement    Hip osteoarthritis    Left hip pain    Breast mass    Glaucoma suspect    Obesity, unspecified    BMI 39.0-39.9,adult, today 39.9    Phlebitis and thrombophlebitis of superficial vessels of lower extremities, post THR, left groin    Peripheral edema    LVH (left ventricular hypertrophy) due to hypertensive disease    Diastolic dysfunction, left ventricle    Diverticulosis     GERD (gastroesophageal reflux disease)    CHARLIE on CPAP nightly    Cardiovascular risk factor, ASCVD 10-year risk 38.7%, 2014    Obesity       Objective:      Physical Exam   Constitutional: She is oriented to person, place, and time. She appears well-developed and well-nourished.   Cardiovascular: Normal rate, regular rhythm and normal heart sounds.    Pulmonary/Chest: Effort normal and breath sounds normal.   Musculoskeletal: She exhibits no edema.        Right knee: She exhibits decreased range of motion and swelling.        Left knee: She exhibits decreased range of motion and swelling.   Neurological: She is alert and oriented to person, place, and time.   Skin: Skin is warm and dry.   Psychiatric: She has a normal mood and affect.   Nursing note and vitals reviewed.      Lab Results   Component Value Date    WBC 7.92 06/22/2017    HGB 13.3 06/22/2017    HCT 42.8 06/22/2017     06/22/2017    CHOL 182 09/07/2017    TRIG 98 09/07/2017    HDL 42 09/07/2017    ALT 13 06/22/2017    AST 15 06/22/2017     09/07/2017    K 4.3 09/07/2017     09/07/2017    CREATININE 1.0 09/07/2017    BUN 16 09/07/2017    CO2 28 09/07/2017    HGBA1C 5.8 (H) 09/07/2017     The 10-year ASCVD risk score ([place] THOMAS Jr., et al., 2013) is: 27.7%    Values used to calculate the score:      Age: 76 years      Sex: Female      Is Non- : Yes      Diabetic: Yes      Tobacco smoker: No      Systolic Blood Pressure: 130 mmHg      Is BP treated: Yes      HDL Cholesterol: 42 mg/dL      Total Cholesterol: 182 mg/dL    Assessment:       1. Essential hypertension    2. Mixed hyperlipidemia        Plan:       Essential hypertension    Mixed hyperlipidemia    Other orders  -     Influenza - High Dose (65+) (PF) (IM)      Patient readiness: eager and barriers:readiness    During the course of the visit the patient was educated and counseled about the following:     Diabetes:  Discussed general issues about diabetes  pathophysiology and management.  Hypertension:   Dietary sodium restriction.  Regular aerobic exercise.  Check blood pressures daily and record.  Obesity:   General weight loss/lifestyle modification strategies discussed (elicit support from others; identify saboteurs; non-food rewards, etc).    Goals: Diabetes: Maintain Hemoglobin A1C below 7, Hypertension: Reduce Blood Pressure and Obesity: Reduce calorie intake and BMI    Did patient meet goals/outcomes: No    The following self management tools provided: blood pressure log    Patient Instructions (the written plan) was given to the patient/family.     Time spent with patient: 30 minutes

## 2017-11-22 ENCOUNTER — TELEPHONE (OUTPATIENT)
Dept: FAMILY MEDICINE | Facility: CLINIC | Age: 77
End: 2017-11-22

## 2017-11-22 NOTE — TELEPHONE ENCOUNTER
----- Message from Elisha Simmons LPN sent at 11/17/2017  4:14 PM CST -----  Contact: self       ----- Message -----  From: Janie Burns  Sent: 11/17/2017   4:00 PM  To: [facility] Staff    Patient want to speak with a nurse regarding a mammogram and ultra sound to be scheduled at the hospital. Please call back at [phone] ([email or web address])

## 2017-11-30 ENCOUNTER — TELEPHONE (OUTPATIENT)
Dept: FAMILY MEDICINE | Facility: CLINIC | Age: 77
End: 2017-11-30

## 2017-11-30 ENCOUNTER — HOSPITAL ENCOUNTER (EMERGENCY)
Facility: HOSPITAL | Age: 77
Discharge: HOME | End: 2017-11-30
Attending: EMERGENCY MEDICINE
Payer: MEDICARE

## 2017-11-30 ENCOUNTER — NURSE TRIAGE (OUTPATIENT)
Dept: ADMINISTRATIVE | Facility: CLINIC | Age: 77
End: 2017-11-30

## 2017-11-30 VITALS
SYSTOLIC BLOOD PRESSURE: 151 MMHG | TEMPERATURE: 99 F | BODY MASS INDEX: 39.87 KG/M2 | WEIGHT: 225 LBS | DIASTOLIC BLOOD PRESSURE: 70 MMHG | HEART RATE: 88 BPM | HEIGHT: 63 IN | RESPIRATION RATE: 18 BRPM | OXYGEN SATURATION: 99 %

## 2017-11-30 DIAGNOSIS — J18.9 PNEUMONIA OF LEFT LOWER LOBE DUE TO INFECTIOUS ORGANISM: Primary | ICD-10-CM

## 2017-11-30 DIAGNOSIS — R04.2 HEMOPTYSIS: ICD-10-CM

## 2017-11-30 LAB
ALBUMIN SERPL BCP-MCNC: 3 G/DL
ALP SERPL-CCNC: 83 U/L
ALT SERPL W/O P-5'-P-CCNC: 24 U/L
ANION GAP SERPL CALC-SCNC: 9 MMOL/L
AST SERPL-CCNC: 25 U/L
BASOPHILS # BLD AUTO: 0 K/UL
BASOPHILS NFR BLD: 0.2 %
BILIRUB SERPL-MCNC: 1.2 MG/DL
BUN SERPL-MCNC: 17 MG/DL
CALCIUM SERPL-MCNC: 8.8 MG/DL
CHLORIDE SERPL-SCNC: 104 MMOL/L
CO2 SERPL-SCNC: 26 MMOL/L
CREAT SERPL-MCNC: 1.3 MG/DL
DIFFERENTIAL METHOD: ABNORMAL
EOSINOPHIL # BLD AUTO: 0.2 K/UL
EOSINOPHIL NFR BLD: 1.7 %
ERYTHROCYTE [DISTWIDTH] IN BLOOD BY AUTOMATED COUNT: 13.9 %
EST. GFR  (AFRICAN AMERICAN): 46 ML/MIN/1.73 M^2
EST. GFR  (NON AFRICAN AMERICAN): 40 ML/MIN/1.73 M^2
FLUAV AG SPEC QL IA: NEGATIVE
FLUBV AG SPEC QL IA: NEGATIVE
GLUCOSE SERPL-MCNC: 134 MG/DL
HCT VFR BLD AUTO: 40.7 %
HGB BLD-MCNC: 13.5 G/DL
LYMPHOCYTES # BLD AUTO: 1.6 K/UL
LYMPHOCYTES NFR BLD: 12.1 %
MCH RBC QN AUTO: 32.6 PG
MCHC RBC AUTO-ENTMCNC: 33.2 G/DL
MCV RBC AUTO: 98 FL
MONOCYTES # BLD AUTO: 1 K/UL
MONOCYTES NFR BLD: 7.6 %
NEUTROPHILS # BLD AUTO: 10.3 K/UL
NEUTROPHILS NFR BLD: 78.4 %
PLATELET # BLD AUTO: 199 K/UL
PMV BLD AUTO: 8.8 FL
POTASSIUM SERPL-SCNC: 4.2 MMOL/L
PROT SERPL-MCNC: 7.4 G/DL
RBC # BLD AUTO: 4.15 M/UL
SODIUM SERPL-SCNC: 139 MMOL/L
SPECIMEN SOURCE: NORMAL
WBC # BLD AUTO: 13.1 K/UL

## 2017-11-30 PROCEDURE — 25000003 PHARM REV CODE 250: Performed by: NURSE PRACTITIONER

## 2017-11-30 PROCEDURE — 25500020 PHARM REV CODE 255

## 2017-11-30 PROCEDURE — 99284 EMERGENCY DEPT VISIT MOD MDM: CPT

## 2017-11-30 PROCEDURE — 36415 COLL VENOUS BLD VENIPUNCTURE: CPT

## 2017-11-30 PROCEDURE — 80053 COMPREHEN METABOLIC PANEL: CPT

## 2017-11-30 PROCEDURE — 87400 INFLUENZA A/B EACH AG IA: CPT

## 2017-11-30 PROCEDURE — 85025 COMPLETE CBC W/AUTO DIFF WBC: CPT

## 2017-11-30 RX ORDER — GUAIFENESIN/DEXTROMETHORPHAN 100-10MG/5
5 SYRUP ORAL 4 TIMES DAILY PRN
Qty: 120 ML | Refills: 0 | COMMUNITY
Start: 2017-11-30 | End: 2017-12-10

## 2017-11-30 RX ORDER — AZITHROMYCIN 250 MG/1
TABLET, FILM COATED ORAL
Qty: 6 TABLET | Refills: 0 | Status: SHIPPED | OUTPATIENT
Start: 2017-11-30 | End: 2017-11-30 | Stop reason: CLARIF

## 2017-11-30 RX ORDER — AZITHROMYCIN 250 MG/1
250 TABLET, FILM COATED ORAL DAILY
Qty: 4 TABLET | Refills: 0 | Status: SHIPPED | OUTPATIENT
Start: 2017-11-30 | End: 2017-12-06

## 2017-11-30 RX ORDER — AZITHROMYCIN 250 MG/1
500 TABLET, FILM COATED ORAL
Status: COMPLETED | OUTPATIENT
Start: 2017-11-30 | End: 2017-11-30

## 2017-11-30 RX ADMIN — IOHEXOL 100 ML: 350 INJECTION, SOLUTION INTRAVENOUS at 02:11

## 2017-11-30 RX ADMIN — AZITHROMYCIN 500 MG: 250 TABLET, FILM COATED ORAL at 04:11

## 2017-11-30 NOTE — TELEPHONE ENCOUNTER
Reason for Disposition   [1] Coughed up blood AND [2] > 1 tablespoon (15 ml) (Exception: blood-tinged sputum)    Protocols used:  COUGHING UP BLOOD-THERESA    Sammi is calling to report that she has chest congestion and is coughing up blood.  States it is a large amount of blood not just blood tinged sputum.  Advised to go to ER.Please contact caller directly with any further care advice.

## 2017-11-30 NOTE — ED PROVIDER NOTES
Encounter Date: 11/30/2017       History     Chief Complaint   Patient presents with    Hemoptysis     with congestion, chest pain, and low grade fever x4 days     Patient is a 77 y.o. female who presents to the ED 11/30/2017 with a chief complaint of hemoptysis; patient reports that Monday she woke up with itchy ears rhinorrhea and productive cough. she states her symptoms have progressively worsened and today she started coughing up blood.  She called her doctor's office and spoke with the nurse who told her to present to the ER.  She complains of mild shortness of breath with activity she denies chest pain, fever, nausea, vomiting, abdominal pain, lower extremity swelling or dysuria.  She has a history of asthma and uses an albuterol inhaler as needed a few times a day without relief of symptoms. She c/o some wheezing at night only. She denies any history of CHF. She has a history of blood clot in her leg after surgery 4 years ago and took blood thinners then but is not currently on blood thinners now. She denies any blood in stool or urine.  Past medical history includes diabetes and asthma and hypertension.  She denies any past surgical history.           Review of patient's allergies indicates:   Allergen Reactions    Cymbalta [duloxetine] Other (See Comments)     Nightmares      Darvon [propoxyphene] Nausea Only and Other (See Comments)     Sweating, slept for 3 days    Atorvastatin Other (See Comments)     Muscle cramps    Naprosyn [naproxen] Nausea Only    Penicillins Rash     Past Medical History:   Diagnosis Date    ALLERGIC RHINITIS     Arthritis     Cataract     OU    Degenerative disc disease     Diabetes mellitus type II     stopped meds    Diverticulosis     Edema     Fibromyalgia     Hyperlipidemia     Hypertension     Reflux     Sleep apnea     non compliant with CPAP    Vestibulitis of ear      Past Surgical History:   Procedure Laterality Date    HYSTERECTOMY      JOINT  REPLACEMENT      R total hip     Family History   Problem Relation Age of Onset    Hypertension Mother     Diabetes Sister     Glaucoma Neg Hx     Macular degeneration Neg Hx     Retinal detachment Neg Hx      Social History   Substance Use Topics    Smoking status: Former Smoker     Packs/day: 0.25     Years: 5.00     Quit date: 1/6/1972    Smokeless tobacco: Never Used    Alcohol use 0.0 oz/week      Comment: Rarely     Review of Systems   Constitutional: Negative for chills and fever.   HENT: Positive for congestion, ear pain, rhinorrhea, sinus pressure and sore throat. Negative for ear discharge, facial swelling, sinus pain, sneezing, tinnitus and trouble swallowing.    Respiratory: Positive for cough, shortness of breath and wheezing. Negative for chest tightness.    Cardiovascular: Negative for chest pain.   Gastrointestinal: Negative for abdominal pain, diarrhea and vomiting.   Genitourinary: Negative for dysuria.   Musculoskeletal: Negative for arthralgias, myalgias, neck pain and neck stiffness.   Skin: Negative for rash and wound.   Neurological: Negative for syncope and headaches.   Hematological: Negative for adenopathy.       Physical Exam     Initial Vitals [11/30/17 1245]   BP Pulse Resp Temp SpO2   138/64 98 16 99 °F (37.2 °C) 96 %      MAP       88.67         Physical Exam    Nursing note and vitals reviewed.  Constitutional: Vital signs are normal. She appears well-developed and well-nourished.   HENT:   Head: Normocephalic and atraumatic.   Right Ear: External ear normal. No lacerations. No drainage, swelling or tenderness. No foreign bodies. No mastoid tenderness. Tympanic membrane is bulging. Tympanic membrane is not injected, not scarred, not perforated, not erythematous and not retracted. A middle ear effusion is present. No hemotympanum. No decreased hearing is noted.   Left Ear: External ear normal. No lacerations. No drainage, swelling or tenderness. No foreign bodies. No mastoid  tenderness. Tympanic membrane is bulging. Tympanic membrane is not injected, not scarred, not perforated, not erythematous and not retracted. A middle ear effusion is present. No hemotympanum. No decreased hearing is noted.   Mouth/Throat: Uvula is midline and oropharynx is clear and moist. No oropharyngeal exudate.   Eyes: Conjunctivae are normal. Pupils are equal, round, and reactive to light.   Neck: Normal range of motion. Neck supple.   Cardiovascular: Normal rate, regular rhythm, normal heart sounds and intact distal pulses.   No murmur heard.  Pulmonary/Chest: No respiratory distress. She has no wheezes. She has rhonchi. She has no rales. She exhibits no tenderness.   Abdominal: Soft. Normal appearance and bowel sounds are normal. She exhibits no distension. There is no tenderness. There is no rebound.   Neurological: She is alert and oriented to person, place, and time. She has normal strength.   Skin: Skin is warm, dry and intact.   Psychiatric: She has a normal mood and affect. Her speech is normal and behavior is normal.         ED Course   Procedures  Labs Reviewed   CBC W/ AUTO DIFFERENTIAL   COMPREHENSIVE METABOLIC PANEL   INFLUENZA A AND B ANTIGEN             Medical Decision Making:   Differential Diagnosis:   Pneumonia  Bronchitis  Influenza  PE       APC / Resident Notes:   Patient is a 77 y.o. female who presents to the ED 11/30/2017 with a chief complaint of hemoptysis with associated upper respiratory symptoms since Monday and SOB. Patient is afebrile without hypoxia. On exam she is a well appearing 77 year old female without acute respiratory distress, no tachypnea, no accessory muscle use; bi breath sounds with scattered rhonchi; noted hemoptysis during examination; she has no abdominal pain or tenderness; She has no peripheral edema and + 2 distal pulses. Given hemoptysis, age and history of DVT; CTA of chest done and revealed no PE; LLL consistent with pneumonia but does not rule out nodule;  discussed recommendation for follow up CT in 3 months; patient treated with azithromycin for CAP and given robitussin DM for cough; patient has no anemia or plt dysfunction; follow up and return precautions discussed; patient verbalized understanding.                    ED Course as of Nov 30 1623   Thu Nov 30, 2017   1600 RDW: 13.9 [JK]      ED Course User Index  [JK] Clara Rust NP     Clinical Impression:   The primary encounter diagnosis was Pneumonia of left lower lobe due to infectious organism. A diagnosis of Hemoptysis was also pertinent to this visit.                           Clara Rust NP  11/30/17 3097

## 2017-12-01 ENCOUNTER — TELEPHONE (OUTPATIENT)
Dept: FAMILY MEDICINE | Facility: CLINIC | Age: 77
End: 2017-12-01

## 2017-12-01 NOTE — TELEPHONE ENCOUNTER
Patient seen in ER on yesterday 11-30-17. Hospital follow up appointment scheduled for 12-6-17.  Patient agreed to appointment date and time.

## 2017-12-01 NOTE — TELEPHONE ENCOUNTER
----- Message from Arelis Baig sent at 12/1/2017 10:09 AM CST -----  Contact: [phone]  Patient is requesting a call back from the nurse stated she need a hospital follow up.    Please call the patient upon request at phone number [phone].

## 2017-12-05 ENCOUNTER — HOSPITAL ENCOUNTER (OUTPATIENT)
Dept: RADIOLOGY | Facility: HOSPITAL | Age: 77
Discharge: HOME | End: 2017-12-05
Attending: FAMILY MEDICINE
Payer: MEDICARE

## 2017-12-05 DIAGNOSIS — R92.8 ABNORMAL MAMMOGRAM OF LEFT BREAST: ICD-10-CM

## 2017-12-05 PROCEDURE — 77065 DX MAMMO INCL CAD UNI: CPT | Mod: 26,LT,, | Performed by: RADIOLOGY

## 2017-12-05 PROCEDURE — 76642 ULTRASOUND BREAST LIMITED: CPT | Mod: TC,LT

## 2017-12-05 PROCEDURE — 77061 BREAST TOMOSYNTHESIS UNI: CPT | Mod: 26,LT,, | Performed by: RADIOLOGY

## 2017-12-05 PROCEDURE — 77061 BREAST TOMOSYNTHESIS UNI: CPT | Mod: TC,LT

## 2017-12-05 PROCEDURE — 76642 ULTRASOUND BREAST LIMITED: CPT | Mod: 26,LT,, | Performed by: RADIOLOGY

## 2017-12-05 PROCEDURE — 77065 DX MAMMO INCL CAD UNI: CPT | Mod: TC,LT

## 2017-12-06 ENCOUNTER — DOCUMENTATION ONLY (OUTPATIENT)
Dept: FAMILY MEDICINE | Facility: CLINIC | Age: 77
End: 2017-12-06

## 2017-12-06 ENCOUNTER — OFFICE VISIT (OUTPATIENT)
Dept: FAMILY MEDICINE | Facility: CLINIC | Age: 77
End: 2017-12-06
Payer: MEDICARE

## 2017-12-06 VITALS
BODY MASS INDEX: 41.79 KG/M2 | HEART RATE: 63 BPM | TEMPERATURE: 98 F | HEIGHT: 63 IN | OXYGEN SATURATION: 98 % | WEIGHT: 235.88 LBS | DIASTOLIC BLOOD PRESSURE: 68 MMHG | SYSTOLIC BLOOD PRESSURE: 145 MMHG

## 2017-12-06 DIAGNOSIS — I10 ESSENTIAL HYPERTENSION: ICD-10-CM

## 2017-12-06 DIAGNOSIS — J18.9 PNEUMONIA OF LEFT LOWER LOBE DUE TO INFECTIOUS ORGANISM: Primary | ICD-10-CM

## 2017-12-06 DIAGNOSIS — E66.01 SEVERE OBESITY (BMI >= 40): ICD-10-CM

## 2017-12-06 DIAGNOSIS — N18.30 CONTROLLED TYPE 2 DIABETES MELLITUS WITH STAGE 3 CHRONIC KIDNEY DISEASE, WITHOUT LONG-TERM CURRENT USE OF INSULIN: ICD-10-CM

## 2017-12-06 DIAGNOSIS — E11.22 CONTROLLED TYPE 2 DIABETES MELLITUS WITH STAGE 3 CHRONIC KIDNEY DISEASE, WITHOUT LONG-TERM CURRENT USE OF INSULIN: ICD-10-CM

## 2017-12-06 PROCEDURE — 99999 PR PBB SHADOW E&M-EST. PATIENT-LVL III: CPT | Mod: PBBFAC,,, | Performed by: PHYSICIAN ASSISTANT

## 2017-12-06 PROCEDURE — 99214 OFFICE O/P EST MOD 30 MIN: CPT | Mod: S$GLB,,, | Performed by: PHYSICIAN ASSISTANT

## 2017-12-06 PROCEDURE — 99499 UNLISTED E&M SERVICE: CPT | Mod: S$GLB,,, | Performed by: PHYSICIAN ASSISTANT

## 2017-12-06 RX ORDER — DOXYCYCLINE HYCLATE 100 MG/1
100 TABLET, DELAYED RELEASE ORAL 2 TIMES DAILY
Qty: 20 TABLET | Refills: 0 | Status: SHIPPED | OUTPATIENT
Start: 2017-12-06 | End: 2018-03-16

## 2017-12-06 NOTE — PROGRESS NOTES
"Subjective:       Patient ID: Sammi Abreu is a 77 y.o. female.    Chief Complaint: Hospital Follow Up    HPI   Pt is a 77 year old female presenting to the clinic for f/u ER visit on 11/30/17 for CAP after presenting with hemoptysis & SOB. Patient underwent CTA chest "1. Negative for pulmonary thromboembolic disease.2. Mild left lower lobe predominant air space and nodular opacities most compatible with an infectious process. Short-term followup in 3 months recommended to ensure lack of true underlying pulmonary nodules."     Patient reports she does feel better, but continues to have purulent cough. Her SOB has improved. She denies any wheezing or fevers.   Review of Systems   Constitutional: Negative for activity change, appetite change, chills, diaphoresis, fatigue and fever.   HENT: Positive for congestion. Negative for postnasal drip, rhinorrhea, sinus pain and sinus pressure.    Eyes: Negative for visual disturbance.   Respiratory: Positive for cough. Negative for choking, chest tightness, shortness of breath and wheezing.    Cardiovascular: Negative.  Negative for chest pain.   Gastrointestinal: Negative for abdominal pain, blood in stool, constipation, diarrhea, nausea and vomiting.   Genitourinary: Negative for dysuria, frequency, hematuria and urgency.   Musculoskeletal: Negative.    Skin: Negative.  Negative for color change and rash.   Neurological: Negative for dizziness and syncope.   Psychiatric/Behavioral: Negative for agitation, behavioral problems and confusion.       Objective:      Physical Exam   Constitutional: Vital signs are normal. She appears well-developed and well-nourished. No distress.   Cardiovascular: Normal rate, regular rhythm, S1 normal, S2 normal and normal heart sounds.  Exam reveals no gallop.    No murmur heard.  Pulses:       Radial pulses are 2+ on the right side, and 2+ on the left side.   Pulmonary/Chest: Effort normal. No apnea. No respiratory distress. She has no " decreased breath sounds. She has no wheezes. She has rhonchi in the right upper field, the right lower field, the left upper field, the left middle field and the left lower field.   Skin: Skin is warm and dry. She is not diaphoretic.   Psychiatric: She has a normal mood and affect. Her speech is normal and behavior is normal. Judgment and thought content normal. Cognition and memory are normal.       Assessment:       1. Pneumonia of left lower lobe due to infectious organism    2. Controlled type 2 diabetes mellitus with stage 3 chronic kidney disease, without long-term current use of insulin    3. Essential hypertension    4. Severe obesity (BMI >= 40)        Plan:   Sammi was seen today for hospital follow up.    Diagnoses and all orders for this visit:    Pneumonia of left lower lobe due to infectious organism  -     doxycycline (DORYX) 100 MG EC tablet; Take 1 tablet (100 mg total) by mouth 2 (two) times daily.  Add Mucinex twice daily with increased water intake  Take antibiotics with food.  Increase fluid intake.  Call the clinic if symptoms worsen, new symptoms develop or if you are not any better after completion of your antibiotics.      -     CT Chest Without Contrast; Future    Controlled type 2 diabetes mellitus with stage 3 chronic kidney disease, without long-term current use of insulin  Well controlled; no changes needed    Essential hypertension  Not at goal, but good for age; patient persistently coughing  We will not adjust anyting until her 2 week f/u when she is feeling better     Severe obesity (BMI >= 40)  Patient readiness: acceptance and barriers:none    During the course of the visit the patient was educated and counseled about the following:     Diabetes:  Discussed general issues about diabetes pathophysiology and management.  Hypertension:   Medication: no change.  Obesity:   Regular aerobic exercise program discussed.    Goals: Diabetes: Maintain Hemoglobin A1C below 7, Hypertension:  Reduce Blood Pressure and Obesity: Reduce calorie intake and BMI    Did patient meet goals/outcomes: No    The following self management tools provided: declined    Patient Instructions (the written plan) was given to the patient/family.     Time spent with patient: 30 minutes

## 2017-12-06 NOTE — PROGRESS NOTES
Pre-Visit Chart Review  For Appointment Scheduled on 12/06/17    Health Maintenance Due   Topic Date Due    DEXA SCAN  10/21/2017

## 2017-12-06 NOTE — PROGRESS NOTES
Pre-Visit Chart Review  For Appointment Scheduled on 12/8/17    Health Maintenance Due   Topic Date Due    DEXA SCAN  10/21/2017

## 2017-12-22 ENCOUNTER — LAB VISIT (OUTPATIENT)
Dept: LAB | Facility: HOSPITAL | Age: 77
End: 2017-12-22
Attending: INTERNAL MEDICINE
Payer: MEDICARE

## 2017-12-22 DIAGNOSIS — I12.9 PARENCHYMAL RENAL HYPERTENSION: ICD-10-CM

## 2017-12-22 DIAGNOSIS — R80.9 PROTEINURIA: ICD-10-CM

## 2017-12-22 DIAGNOSIS — N18.9 ANEMIA OF CHRONIC RENAL FAILURE: ICD-10-CM

## 2017-12-22 DIAGNOSIS — D63.1 ANEMIA OF CHRONIC RENAL FAILURE: ICD-10-CM

## 2017-12-22 DIAGNOSIS — E11.9 DIABETES MELLITUS WITHOUT COMPLICATION: ICD-10-CM

## 2017-12-22 DIAGNOSIS — N18.30 CHRONIC KIDNEY DISEASE, STAGE III (MODERATE): ICD-10-CM

## 2017-12-22 DIAGNOSIS — J20.9 ACUTE BRONCHITIS: ICD-10-CM

## 2017-12-22 DIAGNOSIS — J20.9 ACUTE BRONCHITIS: Primary | ICD-10-CM

## 2017-12-22 LAB
ALBUMIN SERPL BCP-MCNC: 3.1 G/DL
ALP SERPL-CCNC: 60 U/L
ALT SERPL W/O P-5'-P-CCNC: 9 U/L
ANION GAP SERPL CALC-SCNC: 6 MMOL/L
AST SERPL-CCNC: 14 U/L
BASOPHILS # BLD AUTO: 0.01 K/UL
BASOPHILS NFR BLD: 0.2 %
BILIRUB SERPL-MCNC: 0.7 MG/DL
BUN SERPL-MCNC: 18 MG/DL
CALCIUM SERPL-MCNC: 9 MG/DL
CHLORIDE SERPL-SCNC: 105 MMOL/L
CO2 SERPL-SCNC: 28 MMOL/L
CREAT SERPL-MCNC: 1.2 MG/DL
DIFFERENTIAL METHOD: ABNORMAL
EOSINOPHIL # BLD AUTO: 0.2 K/UL
EOSINOPHIL NFR BLD: 3.7 %
ERYTHROCYTE [DISTWIDTH] IN BLOOD BY AUTOMATED COUNT: 13.3 %
EST. GFR  (AFRICAN AMERICAN): 51.1 ML/MIN/1.73 M^2
EST. GFR  (NON AFRICAN AMERICAN): 44.3 ML/MIN/1.73 M^2
GLUCOSE SERPL-MCNC: 103 MG/DL
HCT VFR BLD AUTO: 38.6 %
HGB BLD-MCNC: 12.2 G/DL
IMM GRANULOCYTES # BLD AUTO: 0.01 K/UL
IMM GRANULOCYTES NFR BLD AUTO: 0.2 %
LYMPHOCYTES # BLD AUTO: 2 K/UL
LYMPHOCYTES NFR BLD: 36.4 %
MCH RBC QN AUTO: 32.1 PG
MCHC RBC AUTO-ENTMCNC: 31.6 G/DL
MCV RBC AUTO: 102 FL
MONOCYTES # BLD AUTO: 0.5 K/UL
MONOCYTES NFR BLD: 10 %
NEUTROPHILS # BLD AUTO: 2.7 K/UL
NEUTROPHILS NFR BLD: 49.5 %
NRBC BLD-RTO: 0 /100 WBC
PLATELET # BLD AUTO: 200 K/UL
PMV BLD AUTO: 11.5 FL
POTASSIUM SERPL-SCNC: 4.3 MMOL/L
PROT SERPL-MCNC: 6.9 G/DL
PTH-INTACT SERPL-MCNC: 57 PG/ML
RBC # BLD AUTO: 3.8 M/UL
SODIUM SERPL-SCNC: 139 MMOL/L
WBC # BLD AUTO: 5.39 K/UL

## 2017-12-22 PROCEDURE — 80053 COMPREHEN METABOLIC PANEL: CPT

## 2017-12-22 PROCEDURE — 36415 COLL VENOUS BLD VENIPUNCTURE: CPT | Mod: PO

## 2017-12-22 PROCEDURE — 83970 ASSAY OF PARATHORMONE: CPT

## 2017-12-22 PROCEDURE — 85025 COMPLETE CBC W/AUTO DIFF WBC: CPT

## 2018-01-08 DIAGNOSIS — I10 HYPERTENSION: ICD-10-CM

## 2018-01-08 RX ORDER — FUROSEMIDE 40 MG/1
TABLET ORAL
Qty: 90 TABLET | Refills: 4 | Status: SHIPPED | OUTPATIENT
Start: 2018-01-08 | End: 2019-01-16 | Stop reason: SDUPTHER

## 2018-02-07 DIAGNOSIS — I10 HTN (HYPERTENSION), BENIGN: ICD-10-CM

## 2018-02-07 RX ORDER — LOSARTAN POTASSIUM 100 MG/1
TABLET ORAL
Qty: 90 TABLET | Refills: 0 | Status: SHIPPED | OUTPATIENT
Start: 2018-02-07 | End: 2018-05-08 | Stop reason: SDUPTHER

## 2018-03-06 ENCOUNTER — HOSPITAL ENCOUNTER (OUTPATIENT)
Dept: RADIOLOGY | Facility: HOSPITAL | Age: 78
Discharge: HOME | End: 2018-03-06
Attending: PHYSICIAN ASSISTANT
Payer: MEDICARE

## 2018-03-06 ENCOUNTER — TELEPHONE (OUTPATIENT)
Dept: FAMILY MEDICINE | Facility: CLINIC | Age: 78
End: 2018-03-06

## 2018-03-06 DIAGNOSIS — J18.9 PNEUMONIA OF LEFT LOWER LOBE DUE TO INFECTIOUS ORGANISM: ICD-10-CM

## 2018-03-06 PROCEDURE — 71250 CT THORAX DX C-: CPT | Mod: TC

## 2018-03-06 PROCEDURE — 71250 CT THORAX DX C-: CPT | Mod: 26,,, | Performed by: RADIOLOGY

## 2018-03-06 NOTE — TELEPHONE ENCOUNTER
----- Message from WALTER Lee sent at 3/6/2018  2:27 PM CST -----  Please inform patient that pneumonia in the left lower lobe has resolved.

## 2018-03-07 ENCOUNTER — LAB VISIT (OUTPATIENT)
Dept: LAB | Facility: HOSPITAL | Age: 78
End: 2018-03-07
Attending: FAMILY MEDICINE
Payer: MEDICARE

## 2018-03-07 ENCOUNTER — TELEPHONE (OUTPATIENT)
Dept: FAMILY MEDICINE | Facility: CLINIC | Age: 78
End: 2018-03-07

## 2018-03-07 DIAGNOSIS — N18.30 CONTROLLED TYPE 2 DIABETES MELLITUS WITH STAGE 3 CHRONIC KIDNEY DISEASE, WITHOUT LONG-TERM CURRENT USE OF INSULIN: ICD-10-CM

## 2018-03-07 DIAGNOSIS — E11.22 CONTROLLED TYPE 2 DIABETES MELLITUS WITH STAGE 3 CHRONIC KIDNEY DISEASE, WITHOUT LONG-TERM CURRENT USE OF INSULIN: Primary | ICD-10-CM

## 2018-03-07 DIAGNOSIS — E11.22 CONTROLLED TYPE 2 DIABETES MELLITUS WITH STAGE 3 CHRONIC KIDNEY DISEASE, WITHOUT LONG-TERM CURRENT USE OF INSULIN: ICD-10-CM

## 2018-03-07 DIAGNOSIS — N18.30 CONTROLLED TYPE 2 DIABETES MELLITUS WITH STAGE 3 CHRONIC KIDNEY DISEASE, WITHOUT LONG-TERM CURRENT USE OF INSULIN: Primary | ICD-10-CM

## 2018-03-07 LAB
ALBUMIN SERPL BCP-MCNC: 3.3 G/DL
ALP SERPL-CCNC: 61 U/L
ALT SERPL W/O P-5'-P-CCNC: 14 U/L
ANION GAP SERPL CALC-SCNC: 4 MMOL/L
AST SERPL-CCNC: 13 U/L
BILIRUB SERPL-MCNC: 0.5 MG/DL
BUN SERPL-MCNC: 18 MG/DL
CALCIUM SERPL-MCNC: 8.9 MG/DL
CHLORIDE SERPL-SCNC: 108 MMOL/L
CO2 SERPL-SCNC: 29 MMOL/L
CREAT SERPL-MCNC: 1.1 MG/DL
EST. GFR  (AFRICAN AMERICAN): 56.8 ML/MIN/1.73 M^2
EST. GFR  (NON AFRICAN AMERICAN): 49.2 ML/MIN/1.73 M^2
ESTIMATED AVG GLUCOSE: 120 MG/DL
GLUCOSE SERPL-MCNC: 121 MG/DL
HBA1C MFR BLD HPLC: 5.8 %
POTASSIUM SERPL-SCNC: 4.5 MMOL/L
PROT SERPL-MCNC: 7 G/DL
SODIUM SERPL-SCNC: 141 MMOL/L

## 2018-03-07 PROCEDURE — 83036 HEMOGLOBIN GLYCOSYLATED A1C: CPT

## 2018-03-07 PROCEDURE — 36415 COLL VENOUS BLD VENIPUNCTURE: CPT | Mod: PO

## 2018-03-07 PROCEDURE — 80053 COMPREHEN METABOLIC PANEL: CPT

## 2018-03-07 NOTE — TELEPHONE ENCOUNTER
----- Message from Elisha Simmons LPN sent at 3/7/2018  1:26 PM CST -----      ----- Message -----  From: Fabiola Bettencourt  Sent: 3/7/2018  12:44 PM  To: Shauna Keen Staff    Returning your call.  Please call patient at [phone].

## 2018-03-13 DIAGNOSIS — E11.8 TYPE 2 DIABETES MELLITUS WITH COMPLICATION, WITHOUT LONG-TERM CURRENT USE OF INSULIN: ICD-10-CM

## 2018-03-13 DIAGNOSIS — Z78.0 ASYMPTOMATIC MENOPAUSAL STATE: Primary | ICD-10-CM

## 2018-03-15 ENCOUNTER — DOCUMENTATION ONLY (OUTPATIENT)
Dept: FAMILY MEDICINE | Facility: CLINIC | Age: 78
End: 2018-03-15

## 2018-03-15 NOTE — PROGRESS NOTES
Pre-Visit Chart Review  For Appointment Scheduled on 03/16/2018    Health Maintenance Due   Topic Date Due    DEXA SCAN  10/21/2017    Eye Exam  01/20/2018

## 2018-03-16 ENCOUNTER — OFFICE VISIT (OUTPATIENT)
Dept: FAMILY MEDICINE | Facility: CLINIC | Age: 78
End: 2018-03-16
Payer: MEDICARE

## 2018-03-16 VITALS
TEMPERATURE: 98 F | HEIGHT: 62 IN | WEIGHT: 238.13 LBS | BODY MASS INDEX: 43.82 KG/M2 | SYSTOLIC BLOOD PRESSURE: 138 MMHG | DIASTOLIC BLOOD PRESSURE: 78 MMHG | HEART RATE: 58 BPM

## 2018-03-16 DIAGNOSIS — R35.1 NOCTURIA: ICD-10-CM

## 2018-03-16 DIAGNOSIS — E11.40 TYPE 2 DIABETES MELLITUS WITH DIABETIC NEUROPATHY, WITHOUT LONG-TERM CURRENT USE OF INSULIN: Primary | Chronic | ICD-10-CM

## 2018-03-16 DIAGNOSIS — R35.0 URINARY FREQUENCY: ICD-10-CM

## 2018-03-16 DIAGNOSIS — J30.1 CHRONIC SEASONAL ALLERGIC RHINITIS DUE TO POLLEN: ICD-10-CM

## 2018-03-16 PROCEDURE — 99214 OFFICE O/P EST MOD 30 MIN: CPT | Mod: S$GLB,,, | Performed by: FAMILY MEDICINE

## 2018-03-16 PROCEDURE — 3044F HG A1C LEVEL LT 7.0%: CPT | Mod: CPTII,S$GLB,, | Performed by: FAMILY MEDICINE

## 2018-03-16 PROCEDURE — 99999 PR PBB SHADOW E&M-EST. PATIENT-LVL III: CPT | Mod: PBBFAC,,, | Performed by: FAMILY MEDICINE

## 2018-03-16 PROCEDURE — 3075F SYST BP GE 130 - 139MM HG: CPT | Mod: CPTII,S$GLB,, | Performed by: FAMILY MEDICINE

## 2018-03-16 PROCEDURE — 99499 UNLISTED E&M SERVICE: CPT | Mod: S$GLB,,, | Performed by: FAMILY MEDICINE

## 2018-03-16 PROCEDURE — 3078F DIAST BP <80 MM HG: CPT | Mod: CPTII,S$GLB,, | Performed by: FAMILY MEDICINE

## 2018-03-16 RX ORDER — LEVOCETIRIZINE DIHYDROCHLORIDE 5 MG/1
5 TABLET, FILM COATED ORAL NIGHTLY
Qty: 30 TABLET | Refills: 11 | Status: SHIPPED | OUTPATIENT
Start: 2018-03-16 | End: 2018-09-18 | Stop reason: SDUPTHER

## 2018-03-16 RX ORDER — NORTRIPTYLINE HYDROCHLORIDE 25 MG/1
25 CAPSULE ORAL NIGHTLY
Qty: 30 CAPSULE | Refills: 3 | Status: SHIPPED | OUTPATIENT
Start: 2018-03-16 | End: 2018-09-18

## 2018-03-16 RX ORDER — FLUTICASONE PROPIONATE 50 MCG
1 SPRAY, SUSPENSION (ML) NASAL DAILY
Qty: 3 BOTTLE | Refills: 3 | Status: SHIPPED | OUTPATIENT
Start: 2018-03-16 | End: 2019-04-22 | Stop reason: SDUPTHER

## 2018-04-09 NOTE — PROGRESS NOTES
Subjective:       Patient ID: Sammi Abreu is a 77 y.o. female.    Chief Complaint: Hypertension and Diabetes    Hypertension   This is a chronic problem. The current episode started more than 1 year ago. The problem has been gradually improving since onset. The problem is controlled. Associated symptoms include malaise/fatigue. Pertinent negatives include no blurred vision, chest pain, headaches, neck pain, palpitations, peripheral edema, PND or shortness of breath. There are no associated agents to hypertension. The current treatment provides moderate improvement. Compliance problems include exercise and diet.  Hypertensive end-organ damage includes PVD. Identifiable causes of hypertension include chronic renal disease.   Diabetes   She presents for her follow-up diabetic visit. She has type 2 diabetes mellitus. Her disease course has been improving. There are no hypoglycemic associated symptoms. Pertinent negatives for hypoglycemia include no confusion, dizziness or headaches. Associated symptoms include foot paresthesias. Pertinent negatives for diabetes include no blurred vision, no chest pain, no fatigue, no foot ulcerations, no polyphagia, no weakness and no weight loss. There are no hypoglycemic complications. Symptoms are improving. Diabetic complications include nephropathy, peripheral neuropathy and PVD.     Review of Systems   Constitutional: Positive for malaise/fatigue. Negative for chills, fatigue, fever, unexpected weight change and weight loss.   HENT: Positive for congestion, postnasal drip, rhinorrhea, sneezing and sore throat. Negative for ear discharge, ear pain and sinus pressure.    Eyes: Negative for blurred vision.   Respiratory: Positive for cough. Negative for chest tightness, shortness of breath and wheezing.    Cardiovascular: Negative for chest pain, palpitations, leg swelling and PND.   Gastrointestinal: Negative for abdominal pain.   Endocrine: Negative for polyphagia.    Musculoskeletal: Positive for arthralgias, back pain, gait problem and joint swelling. Negative for neck pain.   Neurological: Negative for dizziness, syncope, weakness, light-headedness and headaches.   Psychiatric/Behavioral: Negative for confusion.       Patient Active Problem List   Diagnosis    Vertigo of central origin    DDD (degenerative disc disease), lumbar    Arthritis of hip    Lumbosacral spondylosis without myelopathy    HTN (hypertension), onset 2010    DM neuropathy, type II diabetes mellitus    Hyperlipidemia, baseline     Arthritis    Right groin pain    Radicular pain    Lumbar spondylosis    DM II (diabetes mellitus, type II), controlled, onset 2012    Degenerative disc disease    Shoulder pain    S/P hip replacement    Hip osteoarthritis    Left hip pain    Breast mass    Glaucoma suspect    Obesity, unspecified    BMI 39.0-39.9,adult    Phlebitis and thrombophlebitis of superficial vessels of lower extremities, post THR, left groin    Peripheral edema    LVH (left ventricular hypertrophy) due to hypertensive disease    Diastolic dysfunction, left ventricle    Diverticulosis    GERD (gastroesophageal reflux disease)    CHARLIE on CPAP nightly    Cardiovascular risk factor, ASCVD 10-year risk 38.7%, 2014    Obesity       Objective:      Physical Exam   Constitutional: She is oriented to person, place, and time. She appears well-developed and well-nourished.   HENT:   Right Ear: Tympanic membrane and ear canal normal.   Left Ear: Tympanic membrane and ear canal normal.   Nose: Mucosal edema and rhinorrhea present. Right sinus exhibits no maxillary sinus tenderness and no frontal sinus tenderness. Left sinus exhibits no maxillary sinus tenderness and no frontal sinus tenderness.   Mouth/Throat: Posterior oropharyngeal erythema present. No oropharyngeal exudate, posterior oropharyngeal edema or tonsillar abscesses.   Cardiovascular: Normal rate, regular rhythm and  normal heart sounds.    Pulses:       Dorsalis pedis pulses are 2+ on the right side, and 2+ on the left side.        Posterior tibial pulses are 2+ on the right side, and 2+ on the left side.   Pulmonary/Chest: Effort normal and breath sounds normal.   Musculoskeletal: She exhibits no edema.   Feet:   Right Foot:   Protective Sensation: 6 sites tested. 3 sites sensed.   Left Foot:   Protective Sensation: 6 sites tested. 4 sites sensed.   Neurological: She is alert and oriented to person, place, and time.   Skin: Skin is warm and dry.   Psychiatric: She has a normal mood and affect.   Nursing note and vitals reviewed.      Lab Results   Component Value Date    WBC 5.39 12/22/2017    HGB 12.2 12/22/2017    HCT 38.6 12/22/2017     12/22/2017    CHOL 182 09/07/2017    TRIG 98 09/07/2017    HDL 42 09/07/2017    ALT 14 03/07/2018    AST 13 03/07/2018     03/07/2018    K 4.5 03/07/2018     03/07/2018    CREATININE 1.1 03/07/2018    BUN 18 03/07/2018    CO2 29 03/07/2018    HGBA1C 5.8 (H) 03/07/2018     The 10-year ASCVD risk score (Franciscobetty GUZMAN Jr., et al., 2013) is: 30.7%    Values used to calculate the score:      Age: 77 years      Sex: Female      Is Non- : Yes      Diabetic: Yes      Tobacco smoker: No      Systolic Blood Pressure: 138 mmHg      Is BP treated: Yes      HDL Cholesterol: 42 mg/dL      Total Cholesterol: 182 mg/dL    Assessment:       1. Type 2 diabetes mellitus with diabetic neuropathy, without long-term current use of insulin    2. Chronic seasonal allergic rhinitis due to pollen    3. Urinary frequency    4. Nocturia        Plan:       Type 2 diabetes mellitus with diabetic neuropathy, without long-term current use of insulin  -     Basic metabolic panel; Future; Expected date: 03/16/2018  -     Magnesium; Future; Expected date: 03/16/2018  -     CBC auto differential; Future; Expected date: 03/16/2018    Chronic seasonal allergic rhinitis due to pollen  -      fluticasone (FLONASE) 50 mcg/actuation nasal spray; 1 spray (50 mcg total) by Each Nare route once daily.  Dispense: 3 Bottle; Refill: 3  -     levocetirizine (XYZAL) 5 MG tablet; Take 1 tablet (5 mg total) by mouth every evening.  Dispense: 30 tablet; Refill: 11    Urinary frequency  -     URINALYSIS; Future; Expected date: 03/16/2018  -     Urine culture; Future; Expected date: 03/16/2018  -     nortriptyline (PAMELOR) 25 MG capsule; Take 1 capsule (25 mg total) by mouth every evening.  Dispense: 30 capsule; Refill: 3    Nocturia  -     Ambulatory referral to Urology      Patient readiness: acceptance and barriers:none    During the course of the visit the patient was educated and counseled about the following:     Diabetes:  Discussed general issues about diabetes pathophysiology and management.  Hypertension:   Screening for causes of secondary hypertension: GFR (chronic kidney disease).  Dietary sodium restriction.  Regular aerobic exercise.  Check blood pressures daily and record.  Follow up: 4 months and as needed.  Obesity:   General weight loss/lifestyle modification strategies discussed (elicit support from others; identify saboteurs; non-food rewards, etc).  Informal exercise measures discussed, e.g. taking stairs instead of elevator.  Regular aerobic exercise program discussed.  Medication: bulk-forming agents.    Goals: Diabetes: Maintain Hemoglobin A1C below 7, Hypertension: Reduce Blood Pressure and Obesity: Reduce calorie intake and BMI    Did patient meet goals/outcomes: Yes    The following self management tools provided: blood pressure log  blood glucose log  excercise log    Patient Instructions (the written plan) was given to the patient/family.     Time spent with patient: 30 minutes    Barriers to medications present (yes )    Adverse reactions to current medications (yes)    Over the counter medications reviewed (No)        30-minute visit. 20 minutes spent counseling patient on diet,  exercise, and weight loss.  This has been fully explained to the patient, who indicates understanding.

## 2018-04-11 NOTE — PROGRESS NOTES
Patient, Sammi Abreu (MRN #[id number]), presented with a recorded BMI of 43.55 kg/m^2 consistent with the definition of morbid obesity (ICD-10 E66.01). The patient's morbid obesity was monitored, evaluated, addressed and/or treated. This addendum to the medical record is made on 04/11/2018.

## 2018-05-08 DIAGNOSIS — I10 HTN (HYPERTENSION), BENIGN: ICD-10-CM

## 2018-05-10 RX ORDER — LOSARTAN POTASSIUM 100 MG/1
TABLET ORAL
Qty: 90 TABLET | Refills: 0 | Status: SHIPPED | OUTPATIENT
Start: 2018-05-10 | End: 2018-08-07 | Stop reason: SDUPTHER

## 2018-06-22 ENCOUNTER — LAB VISIT (OUTPATIENT)
Dept: LAB | Facility: HOSPITAL | Age: 78
End: 2018-06-22
Attending: INTERNAL MEDICINE
Payer: MEDICARE

## 2018-06-22 DIAGNOSIS — R80.9 PROTEINURIA: ICD-10-CM

## 2018-06-22 DIAGNOSIS — E11.9 DIABETES MELLITUS WITHOUT COMPLICATION: ICD-10-CM

## 2018-06-22 DIAGNOSIS — I12.9 PARENCHYMAL RENAL HYPERTENSION: ICD-10-CM

## 2018-06-22 DIAGNOSIS — N18.9 ANEMIA OF CHRONIC RENAL FAILURE: ICD-10-CM

## 2018-06-22 DIAGNOSIS — J20.9 ACUTE BRONCHITIS: Primary | ICD-10-CM

## 2018-06-22 DIAGNOSIS — D63.1 ANEMIA OF CHRONIC RENAL FAILURE: ICD-10-CM

## 2018-06-22 DIAGNOSIS — N18.30 CHRONIC KIDNEY DISEASE, STAGE III (MODERATE): ICD-10-CM

## 2018-06-22 DIAGNOSIS — J20.9 ACUTE BRONCHITIS: ICD-10-CM

## 2018-06-22 LAB
25(OH)D3+25(OH)D2 SERPL-MCNC: 26 NG/ML
ALBUMIN SERPL BCP-MCNC: 3.5 G/DL
ALP SERPL-CCNC: 64 U/L
ALT SERPL W/O P-5'-P-CCNC: 13 U/L
ANION GAP SERPL CALC-SCNC: 6 MMOL/L
AST SERPL-CCNC: 16 U/L
BASOPHILS # BLD AUTO: 0.01 K/UL
BASOPHILS NFR BLD: 0.2 %
BILIRUB SERPL-MCNC: 0.8 MG/DL
BUN SERPL-MCNC: 16 MG/DL
CALCIUM SERPL-MCNC: 9.1 MG/DL
CHLORIDE SERPL-SCNC: 104 MMOL/L
CO2 SERPL-SCNC: 29 MMOL/L
CREAT SERPL-MCNC: 1.1 MG/DL
DIFFERENTIAL METHOD: ABNORMAL
EOSINOPHIL # BLD AUTO: 0.2 K/UL
EOSINOPHIL NFR BLD: 3.2 %
ERYTHROCYTE [DISTWIDTH] IN BLOOD BY AUTOMATED COUNT: 13 %
EST. GFR  (AFRICAN AMERICAN): 56.8 ML/MIN/1.73 M^2
EST. GFR  (NON AFRICAN AMERICAN): 49.2 ML/MIN/1.73 M^2
GLUCOSE SERPL-MCNC: 119 MG/DL
HCT VFR BLD AUTO: 41.2 %
HGB BLD-MCNC: 13.5 G/DL
IMM GRANULOCYTES # BLD AUTO: 0.02 K/UL
IMM GRANULOCYTES NFR BLD AUTO: 0.3 %
LYMPHOCYTES # BLD AUTO: 2 K/UL
LYMPHOCYTES NFR BLD: 33.6 %
MCH RBC QN AUTO: 33.9 PG
MCHC RBC AUTO-ENTMCNC: 32.8 G/DL
MCV RBC AUTO: 104 FL
MONOCYTES # BLD AUTO: 0.6 K/UL
MONOCYTES NFR BLD: 9.9 %
NEUTROPHILS # BLD AUTO: 3.1 K/UL
NEUTROPHILS NFR BLD: 52.8 %
NRBC BLD-RTO: 0 /100 WBC
PHOSPHATE SERPL-MCNC: 3.8 MG/DL
PLATELET # BLD AUTO: 208 K/UL
PMV BLD AUTO: 11.6 FL
POTASSIUM SERPL-SCNC: 4.7 MMOL/L
PROT SERPL-MCNC: 7.3 G/DL
PTH-INTACT SERPL-MCNC: 115 PG/ML
RBC # BLD AUTO: 3.98 M/UL
SODIUM SERPL-SCNC: 139 MMOL/L
WBC # BLD AUTO: 5.86 K/UL

## 2018-06-22 PROCEDURE — 80053 COMPREHEN METABOLIC PANEL: CPT

## 2018-06-22 PROCEDURE — 85025 COMPLETE CBC W/AUTO DIFF WBC: CPT

## 2018-06-22 PROCEDURE — 84100 ASSAY OF PHOSPHORUS: CPT

## 2018-06-22 PROCEDURE — 83970 ASSAY OF PARATHORMONE: CPT

## 2018-06-22 PROCEDURE — 82306 VITAMIN D 25 HYDROXY: CPT

## 2018-06-22 PROCEDURE — 36415 COLL VENOUS BLD VENIPUNCTURE: CPT | Mod: PO

## 2018-07-09 ENCOUNTER — TELEPHONE (OUTPATIENT)
Dept: FAMILY MEDICINE | Facility: CLINIC | Age: 78
End: 2018-07-09

## 2018-07-09 DIAGNOSIS — Z12.31 VISIT FOR SCREENING MAMMOGRAM: Primary | ICD-10-CM

## 2018-07-09 NOTE — TELEPHONE ENCOUNTER
----- Message from Naveen GORDON Emily sent at 7/9/2018  2:44 PM CDT -----  Contact: same  Patient called in and stated she needs Dr. Villatoro to send an order for her annual mammo to [facility] so she can have that done.  Patient stressed it must be sent over to [facility].    Patient call back number is [phone]

## 2018-07-10 ENCOUNTER — HOSPITAL ENCOUNTER (OUTPATIENT)
Dept: RADIOLOGY | Facility: HOSPITAL | Age: 78
Discharge: HOME | End: 2018-07-10
Attending: FAMILY MEDICINE
Payer: MEDICARE

## 2018-07-10 DIAGNOSIS — Z12.31 VISIT FOR SCREENING MAMMOGRAM: ICD-10-CM

## 2018-07-10 PROCEDURE — 77067 SCR MAMMO BI INCL CAD: CPT | Mod: 26,,, | Performed by: RADIOLOGY

## 2018-07-10 PROCEDURE — 77063 BREAST TOMOSYNTHESIS BI: CPT | Mod: 26,,, | Performed by: RADIOLOGY

## 2018-07-10 PROCEDURE — 77067 SCR MAMMO BI INCL CAD: CPT | Mod: TC

## 2018-07-10 NOTE — TELEPHONE ENCOUNTER
Patient notified Mammogram orders placed. Mammogram appointment scheduled for today. Patient agreed to appointment date and time.

## 2018-07-13 RX ORDER — METOPROLOL SUCCINATE 50 MG/1
TABLET, EXTENDED RELEASE ORAL
Qty: 90 TABLET | Refills: 3 | Status: SHIPPED | OUTPATIENT
Start: 2018-07-13 | End: 2019-07-18 | Stop reason: SDUPTHER

## 2018-07-16 ENCOUNTER — HOSPITAL ENCOUNTER (OUTPATIENT)
Dept: RADIOLOGY | Facility: CLINIC | Age: 78
Discharge: HOME | End: 2018-07-16
Attending: FAMILY MEDICINE
Payer: MEDICARE

## 2018-07-16 DIAGNOSIS — Z78.0 ASYMPTOMATIC MENOPAUSAL STATE: ICD-10-CM

## 2018-07-16 DIAGNOSIS — Z78.0 ASYMPTOMATIC AGE-RELATED POSTMENOPAUSAL STATE: ICD-10-CM

## 2018-07-16 PROCEDURE — 77081 DXA BONE DENSITY APPENDICULR: CPT | Mod: 26,,, | Performed by: RADIOLOGY

## 2018-07-16 PROCEDURE — 77080 DXA BONE DENSITY AXIAL: CPT | Mod: 26,,, | Performed by: RADIOLOGY

## 2018-07-16 PROCEDURE — 77080 DXA BONE DENSITY AXIAL: CPT | Mod: TC,PO

## 2018-07-16 PROCEDURE — 77081 DXA BONE DENSITY APPENDICULR: CPT | Mod: TC,PO

## 2018-08-07 DIAGNOSIS — I10 HTN (HYPERTENSION), BENIGN: ICD-10-CM

## 2018-08-08 RX ORDER — LOSARTAN POTASSIUM 100 MG/1
TABLET ORAL
Qty: 90 TABLET | Refills: 1 | Status: SHIPPED | OUTPATIENT
Start: 2018-08-08 | End: 2019-01-16 | Stop reason: SDUPTHER

## 2018-09-18 ENCOUNTER — OFFICE VISIT (OUTPATIENT)
Dept: FAMILY MEDICINE | Facility: CLINIC | Age: 78
End: 2018-09-18
Payer: MEDICARE

## 2018-09-18 VITALS
TEMPERATURE: 99 F | BODY MASS INDEX: 43.29 KG/M2 | HEIGHT: 62 IN | WEIGHT: 235.25 LBS | RESPIRATION RATE: 16 BRPM | HEART RATE: 62 BPM | DIASTOLIC BLOOD PRESSURE: 70 MMHG | SYSTOLIC BLOOD PRESSURE: 126 MMHG | OXYGEN SATURATION: 98 %

## 2018-09-18 DIAGNOSIS — J30.1 CHRONIC SEASONAL ALLERGIC RHINITIS DUE TO POLLEN: ICD-10-CM

## 2018-09-18 DIAGNOSIS — J45.901 BRONCHITIS, ALLERGIC, UNSPECIFIED ASTHMA SEVERITY, WITH ACUTE EXACERBATION: Primary | ICD-10-CM

## 2018-09-18 DIAGNOSIS — N18.30 CKD (CHRONIC KIDNEY DISEASE), STAGE III: ICD-10-CM

## 2018-09-18 DIAGNOSIS — E78.2 MIXED HYPERLIPIDEMIA: ICD-10-CM

## 2018-09-18 DIAGNOSIS — R35.0 URINARY FREQUENCY: ICD-10-CM

## 2018-09-18 DIAGNOSIS — R73.02 GLUCOSE INTOLERANCE (IMPAIRED GLUCOSE TOLERANCE): Chronic | ICD-10-CM

## 2018-09-18 PROCEDURE — 90662 IIV NO PRSV INCREASED AG IM: CPT | Mod: PBBFAC,PO

## 2018-09-18 PROCEDURE — 99214 OFFICE O/P EST MOD 30 MIN: CPT | Mod: S$PBB,,, | Performed by: FAMILY MEDICINE

## 2018-09-18 PROCEDURE — 99999 PR PBB SHADOW E&M-EST. PATIENT-LVL III: CPT | Mod: PBBFAC,,, | Performed by: FAMILY MEDICINE

## 2018-09-18 PROCEDURE — 99213 OFFICE O/P EST LOW 20 MIN: CPT | Mod: PBBFAC,PO | Performed by: FAMILY MEDICINE

## 2018-09-18 PROCEDURE — 3078F DIAST BP <80 MM HG: CPT | Mod: CPTII,,, | Performed by: FAMILY MEDICINE

## 2018-09-18 PROCEDURE — 3074F SYST BP LT 130 MM HG: CPT | Mod: CPTII,,, | Performed by: FAMILY MEDICINE

## 2018-09-18 PROCEDURE — 1101F PT FALLS ASSESS-DOCD LE1/YR: CPT | Mod: CPTII,,, | Performed by: FAMILY MEDICINE

## 2018-09-18 RX ORDER — METHYLPREDNISOLONE 4 MG/1
TABLET ORAL
Qty: 1 PACKAGE | Refills: 1 | Status: SHIPPED | OUTPATIENT
Start: 2018-09-18 | End: 2018-10-09

## 2018-09-18 RX ORDER — NORTRIPTYLINE HYDROCHLORIDE 25 MG/1
25 CAPSULE ORAL NIGHTLY
Qty: 30 CAPSULE | Refills: 3 | Status: SHIPPED | OUTPATIENT
Start: 2018-09-18 | End: 2019-01-10 | Stop reason: CLARIF

## 2018-09-18 RX ORDER — LEVOCETIRIZINE DIHYDROCHLORIDE 5 MG/1
5 TABLET, FILM COATED ORAL NIGHTLY
Qty: 30 TABLET | Refills: 11 | Status: SHIPPED | OUTPATIENT
Start: 2018-09-18 | End: 2019-06-27 | Stop reason: ALTCHOICE

## 2018-09-18 NOTE — PROGRESS NOTES
SUBJECTIVE:   Sammi Abreu is a 77 y.o. female who complains of coryza, congestion and productive cough for 14 days. She denies a history of anorexia, chest pain, dizziness, fevers, myalgias and shortness of breath. She denies a history of asthma. Patient does not smoke cigarettes.     OBJECTIVE:  Vitals as noted above.  Appearance: alert, well appearing, and in no distress, oriented to person, place, and time and obese.   ENT- ENT exam normal, no neck nodes or sinus tenderness, neck without nodes, throat normal without erythema or exudate and post nasal drip noted.   Chest - clear to auscultation, no wheezes, rales or rhonchi, symmetric air entry, no tachypnea, retractions or cyanosis.    ASSESSMENT:   bronchitis  Bronchitis, allergic, unspecified asthma severity, with acute exacerbation  -     methylPREDNISolone (MEDROL DOSEPACK) 4 mg tablet; use as directed  Dispense: 1 Package; Refill: 1    Urinary frequency  -     nortriptyline (PAMELOR) 25 MG capsule; Take 1 capsule (25 mg total) by mouth every evening.  Dispense: 30 capsule; Refill: 3    Chronic seasonal allergic rhinitis due to pollen  -     levocetirizine (XYZAL) 5 MG tablet; Take 1 tablet (5 mg total) by mouth every evening.  Dispense: 30 tablet; Refill: 11    Glucose intolerance (impaired glucose tolerance)  -     Lipid panel; Future  -     Comprehensive metabolic panel; Future  -     Hemoglobin A1c; Future    CKD (chronic kidney disease), stage III  -     Lipid panel; Future  -     Comprehensive metabolic panel; Future  -     CBC auto differential; Future    Mixed hyperlipidemia  -     Lipid panel; Future        PLAN:  Symptomatic therapy suggested: push fluids, rest and return office visit prn if symptoms persist or worsen. Call or return to clinic prn if these symptoms worsen or fail to improve as anticipated.

## 2018-10-02 ENCOUNTER — TELEPHONE (OUTPATIENT)
Dept: FAMILY MEDICINE | Facility: CLINIC | Age: 78
End: 2018-10-02

## 2018-10-02 NOTE — TELEPHONE ENCOUNTER
----- Message from An Carl sent at 10/2/2018  8:57 AM CDT -----  Type: Needs Medical Advice    Who Called: self   Symptoms (please be specific):  Sweating How long has patient had these symptoms:  NA Pharmacy name and phone #: [facility] [address] Best Call Back Number: [phone]  Additional Information: Patient states new prescribed  rx  nortriptylin 25 mg capsules, causing the patient to sweat a lot. Patient asking to discuss with the nurse.

## 2018-10-02 NOTE — TELEPHONE ENCOUNTER
Medical necessity form, last clinical notes, and order for diabetic testing supplies faxed to [facility] at [phone]. Fax confirmation received.

## 2018-10-02 NOTE — TELEPHONE ENCOUNTER
----- Message from Artis Sosa sent at 10/1/2018  2:30 PM CDT -----  Contact: Laura ([email or web address])            Name of Who is Calling: Laura ([email or web address])      What is the request in detail: Needs new orders for patient's diabetic testing supply (strips and lancets) along clinical notes and medical necessity form fax to [phone]      Can the clinic reply by [facility]: no      What Number to Call Back if not in [facility]: [phone]

## 2018-10-02 NOTE — TELEPHONE ENCOUNTER
Please see attached message from patient. Patient states she has been experiencing increase sweating. Patient believes this is due to Nortriptyline. Spoke to Dr. Villatoro regarding this matter. Received verbal order to stop Nortriptyline x's 2 days. If issue of sweating resolves then sweating is due to Nortriptyline. If sweating continues after 2 days, patient needs to schedule appointment for evaluation. Patient notified. Appointment scheduled for 10-5-18. Patient agreed to appointment date and time.

## 2018-10-10 ENCOUNTER — TELEPHONE (OUTPATIENT)
Dept: FAMILY MEDICINE | Facility: CLINIC | Age: 78
End: 2018-10-10

## 2018-10-10 NOTE — TELEPHONE ENCOUNTER
[facility] diabetic testing supplies [facility] on 10[facility]2-18. Patient placed follow up call to office requesting status of this matter. Writer spoke to Mrs. Eloina Verdugo RN ([facility]) regarding this matter. Advised additional information needed related to diagnosis code for diabetic testing supplies. Diagnosis code E11.40/supporting clinical notes faxed to [facility] at this time. Fax confirmation received. Call placed to patient for notification. No answer received. Left message.             ----- Message from Elisha Simmons LPN sent at 10/9/2018  4:57 PM CDT -----      ----- Message -----  From: Elisha Alaniz  Sent: 10/9/2018   3:54 PM  To: Shauna Keen Staff    Type: Needs diabetic supplies ordered    Who Called:  Patient  Best Call Back Number: [phone]  Additional Information: Patient needs order sent to [facility] for Diabetic Glucose supply refill/please call back to confirm ordered.

## 2018-12-10 ENCOUNTER — LAB VISIT (OUTPATIENT)
Dept: LAB | Facility: HOSPITAL | Age: 78
End: 2018-12-10
Attending: INTERNAL MEDICINE
Payer: MEDICARE

## 2018-12-10 DIAGNOSIS — D63.1 ANEMIA OF CHRONIC RENAL FAILURE: ICD-10-CM

## 2018-12-10 DIAGNOSIS — N18.9 ANEMIA OF CHRONIC RENAL FAILURE: ICD-10-CM

## 2018-12-10 DIAGNOSIS — N18.30 CHRONIC KIDNEY DISEASE, STAGE III (MODERATE): ICD-10-CM

## 2018-12-10 DIAGNOSIS — R80.9 PROTEINURIA: ICD-10-CM

## 2018-12-10 DIAGNOSIS — E78.00 LOW DENSITY LIPOPROTEIN (LDL) CHOLESTEROL GREATER THAN 129 MG/DL: ICD-10-CM

## 2018-12-10 DIAGNOSIS — E11.9 DIABETES MELLITUS WITHOUT COMPLICATION: ICD-10-CM

## 2018-12-10 DIAGNOSIS — J20.9 ACUTE BRONCHITIS: Primary | ICD-10-CM

## 2018-12-10 LAB
25(OH)D3+25(OH)D2 SERPL-MCNC: 24 NG/ML
ALBUMIN SERPL BCP-MCNC: 3.2 G/DL
ALP SERPL-CCNC: 59 U/L
ALT SERPL W/O P-5'-P-CCNC: 12 U/L
ANION GAP SERPL CALC-SCNC: 6 MMOL/L
AST SERPL-CCNC: 15 U/L
BASOPHILS # BLD AUTO: 0.02 K/UL
BASOPHILS NFR BLD: 0.3 %
BILIRUB SERPL-MCNC: 0.6 MG/DL
BUN SERPL-MCNC: 20 MG/DL
CALCIUM SERPL-MCNC: 9.1 MG/DL
CHLORIDE SERPL-SCNC: 107 MMOL/L
CO2 SERPL-SCNC: 29 MMOL/L
CREAT SERPL-MCNC: 1 MG/DL
DIFFERENTIAL METHOD: ABNORMAL
EOSINOPHIL # BLD AUTO: 0.2 K/UL
EOSINOPHIL NFR BLD: 2.8 %
ERYTHROCYTE [DISTWIDTH] IN BLOOD BY AUTOMATED COUNT: 12.8 %
EST. GFR  (AFRICAN AMERICAN): >60 ML/MIN/1.73 M^2
EST. GFR  (NON AFRICAN AMERICAN): 54.9 ML/MIN/1.73 M^2
GLUCOSE SERPL-MCNC: 112 MG/DL
HCT VFR BLD AUTO: 40 %
HGB BLD-MCNC: 12.5 G/DL
IMM GRANULOCYTES # BLD AUTO: 0.02 K/UL
IMM GRANULOCYTES NFR BLD AUTO: 0.3 %
LYMPHOCYTES # BLD AUTO: 2.3 K/UL
LYMPHOCYTES NFR BLD: 35.1 %
MCH RBC QN AUTO: 32.7 PG
MCHC RBC AUTO-ENTMCNC: 31.3 G/DL
MCV RBC AUTO: 105 FL
MONOCYTES # BLD AUTO: 0.6 K/UL
MONOCYTES NFR BLD: 9.7 %
NEUTROPHILS # BLD AUTO: 3.4 K/UL
NEUTROPHILS NFR BLD: 51.8 %
NRBC BLD-RTO: 0 /100 WBC
PHOSPHATE SERPL-MCNC: 3.8 MG/DL
PLATELET # BLD AUTO: 209 K/UL
PMV BLD AUTO: 11.6 FL
POTASSIUM SERPL-SCNC: 4.4 MMOL/L
PROT SERPL-MCNC: 7.1 G/DL
PTH-INTACT SERPL-MCNC: 81 PG/ML
RBC # BLD AUTO: 3.82 M/UL
SODIUM SERPL-SCNC: 142 MMOL/L
WBC # BLD AUTO: 6.52 K/UL

## 2018-12-10 PROCEDURE — 85025 COMPLETE CBC W/AUTO DIFF WBC: CPT

## 2018-12-10 PROCEDURE — 82306 VITAMIN D 25 HYDROXY: CPT

## 2018-12-10 PROCEDURE — 84100 ASSAY OF PHOSPHORUS: CPT

## 2018-12-10 PROCEDURE — 80053 COMPREHEN METABOLIC PANEL: CPT

## 2018-12-10 PROCEDURE — 83970 ASSAY OF PARATHORMONE: CPT

## 2018-12-10 PROCEDURE — 36415 COLL VENOUS BLD VENIPUNCTURE: CPT | Mod: PO

## 2019-01-09 DIAGNOSIS — J45.30 MILD PERSISTENT ASTHMA WITHOUT COMPLICATION: ICD-10-CM

## 2019-01-09 RX ORDER — ALBUTEROL SULFATE 90 UG/1
AEROSOL, METERED RESPIRATORY (INHALATION)
Qty: 54 EACH | Refills: 3 | Status: SHIPPED | OUTPATIENT
Start: 2019-01-09 | End: 2019-06-27 | Stop reason: ALTCHOICE

## 2019-01-10 ENCOUNTER — HOSPITAL ENCOUNTER (EMERGENCY)
Facility: HOSPITAL | Age: 79
Discharge: HOME | End: 2019-01-10
Attending: EMERGENCY MEDICINE
Payer: MEDICARE

## 2019-01-10 VITALS
DIASTOLIC BLOOD PRESSURE: 60 MMHG | BODY MASS INDEX: 41.64 KG/M2 | RESPIRATION RATE: 18 BRPM | HEART RATE: 56 BPM | OXYGEN SATURATION: 97 % | SYSTOLIC BLOOD PRESSURE: 153 MMHG | WEIGHT: 235 LBS | TEMPERATURE: 98 F | HEIGHT: 63 IN

## 2019-01-10 DIAGNOSIS — I10 ACCELERATED HYPERTENSION: Primary | ICD-10-CM

## 2019-01-10 LAB
ANION GAP SERPL CALC-SCNC: 12 MMOL/L
BUN SERPL-MCNC: 19 MG/DL
CALCIUM SERPL-MCNC: 9.6 MG/DL
CHLORIDE SERPL-SCNC: 102 MMOL/L
CO2 SERPL-SCNC: 28 MMOL/L
CREAT SERPL-MCNC: 1.3 MG/DL
EST. GFR  (AFRICAN AMERICAN): 46 ML/MIN/1.73 M^2
EST. GFR  (NON AFRICAN AMERICAN): 40 ML/MIN/1.73 M^2
GLUCOSE SERPL-MCNC: 122 MG/DL
POTASSIUM SERPL-SCNC: 4.1 MMOL/L
SODIUM SERPL-SCNC: 142 MMOL/L

## 2019-01-10 PROCEDURE — 25000003 PHARM REV CODE 250: Performed by: EMERGENCY MEDICINE

## 2019-01-10 PROCEDURE — 99283 EMERGENCY DEPT VISIT LOW MDM: CPT

## 2019-01-10 PROCEDURE — 36415 COLL VENOUS BLD VENIPUNCTURE: CPT

## 2019-01-10 PROCEDURE — 80048 BASIC METABOLIC PNL TOTAL CA: CPT

## 2019-01-10 RX ORDER — SPIRONOLACTONE 25 MG/1
25 TABLET ORAL DAILY
COMMUNITY
End: 2019-01-16

## 2019-01-10 RX ORDER — METHYLPREDNISOLONE 4 MG/1
4 TABLET ORAL DAILY
COMMUNITY
Start: 2019-01-09 | End: 2019-01-14

## 2019-01-10 RX ORDER — CLONIDINE HYDROCHLORIDE 0.1 MG/1
0.1 TABLET ORAL 2 TIMES DAILY PRN
Qty: 15 TABLET | Refills: 0 | Status: SHIPPED | OUTPATIENT
Start: 2019-01-10 | End: 2019-06-27 | Stop reason: ALTCHOICE

## 2019-01-10 RX ORDER — CLONIDINE HYDROCHLORIDE 0.2 MG/1
0.2 TABLET ORAL
Status: COMPLETED | OUTPATIENT
Start: 2019-01-10 | End: 2019-01-10

## 2019-01-10 RX ADMIN — CLONIDINE HYDROCHLORIDE 0.2 MG: 0.2 TABLET ORAL at 08:01

## 2019-01-11 ENCOUNTER — TELEPHONE (OUTPATIENT)
Dept: FAMILY MEDICINE | Facility: CLINIC | Age: 79
End: 2019-01-11

## 2019-01-11 ENCOUNTER — NURSE TRIAGE (OUTPATIENT)
Dept: ADMINISTRATIVE | Facility: CLINIC | Age: 79
End: 2019-01-11

## 2019-01-11 NOTE — TELEPHONE ENCOUNTER
Reason for Disposition   Patient sounds very sick or weak to the triager    Protocols used: ST HIGH BLOOD PRESSURE-A-OH  Pt called re HBP. 1250 pm 128/70  npw /84 , took BP meds today losartan & metoprolol at 10am, 'not feeling right'.  rec ED. ED MD told pt to go to drug store for new pill. Going to drug store to get med now as she did not have time to  med yest. Rec ED.  Pt transferred to speak with Regina at [facility] med re appt.

## 2019-01-11 NOTE — TELEPHONE ENCOUNTER
----- Message from Megha Sher sent at 1/11/2019  1:13 PM CST -----  Contact: self  Type:  Sooner Apoointment Request    Caller is requesting a sooner appointment.  Caller declined first available appointment listed below.  Caller will not accept being placed on the waitlist and is requesting a message be sent to doctor.    Name of Caller:  self  When is the first available appointment?  02/04/19  Symptoms:  [facility] 01[facility]10[facility] dx hypertension  Best Call Back Number:  [phone]  Additional Information:  Patient states they want her to be seen in 3 or 4 days but her blood pressure when back up to 196 over 84. Please call patient. Thanks!

## 2019-01-16 ENCOUNTER — OFFICE VISIT (OUTPATIENT)
Dept: FAMILY MEDICINE | Facility: CLINIC | Age: 79
End: 2019-01-16
Payer: MEDICARE

## 2019-01-16 VITALS
HEART RATE: 67 BPM | DIASTOLIC BLOOD PRESSURE: 74 MMHG | HEIGHT: 63 IN | WEIGHT: 240.31 LBS | SYSTOLIC BLOOD PRESSURE: 172 MMHG | BODY MASS INDEX: 42.58 KG/M2 | TEMPERATURE: 99 F

## 2019-01-16 DIAGNOSIS — Z91.89 CARDIOVASCULAR RISK FACTOR: ICD-10-CM

## 2019-01-16 DIAGNOSIS — R63.5 ABNORMAL WEIGHT GAIN: ICD-10-CM

## 2019-01-16 DIAGNOSIS — I27.20 PULMONARY HYPERTENSION: Primary | ICD-10-CM

## 2019-01-16 DIAGNOSIS — R73.02 GLUCOSE INTOLERANCE (IMPAIRED GLUCOSE TOLERANCE): Chronic | ICD-10-CM

## 2019-01-16 DIAGNOSIS — E78.00 PURE HYPERCHOLESTEROLEMIA: ICD-10-CM

## 2019-01-16 DIAGNOSIS — I10 ESSENTIAL HYPERTENSION: ICD-10-CM

## 2019-01-16 DIAGNOSIS — I10 HTN (HYPERTENSION), BENIGN: ICD-10-CM

## 2019-01-16 PROCEDURE — 3078F PR MOST RECENT DIASTOLIC BLOOD PRESSURE < 80 MM HG: ICD-10-PCS | Mod: CPTII,S$GLB,, | Performed by: FAMILY MEDICINE

## 2019-01-16 PROCEDURE — 99214 PR OFFICE/OUTPT VISIT, EST, LEVL IV, 30-39 MIN: ICD-10-PCS | Mod: S$GLB,,, | Performed by: FAMILY MEDICINE

## 2019-01-16 PROCEDURE — 1101F PR PT FALLS ASSESS DOC 0-1 FALLS W/OUT INJ PAST YR: ICD-10-PCS | Mod: CPTII,S$GLB,, | Performed by: FAMILY MEDICINE

## 2019-01-16 PROCEDURE — 99214 OFFICE O/P EST MOD 30 MIN: CPT | Mod: S$GLB,,, | Performed by: FAMILY MEDICINE

## 2019-01-16 PROCEDURE — 99999 PR PBB SHADOW E&M-EST. PATIENT-LVL III: CPT | Mod: PBBFAC,,, | Performed by: FAMILY MEDICINE

## 2019-01-16 PROCEDURE — 99499 UNLISTED E&M SERVICE: CPT | Mod: S$GLB,,, | Performed by: FAMILY MEDICINE

## 2019-01-16 PROCEDURE — 3077F PR MOST RECENT SYSTOLIC BLOOD PRESSURE >= 140 MM HG: ICD-10-PCS | Mod: CPTII,S$GLB,, | Performed by: FAMILY MEDICINE

## 2019-01-16 PROCEDURE — 99499 RISK ADDL DX/OHS AUDIT: ICD-10-PCS | Mod: S$GLB,,, | Performed by: FAMILY MEDICINE

## 2019-01-16 PROCEDURE — 3077F SYST BP >= 140 MM HG: CPT | Mod: CPTII,S$GLB,, | Performed by: FAMILY MEDICINE

## 2019-01-16 PROCEDURE — 1101F PT FALLS ASSESS-DOCD LE1/YR: CPT | Mod: CPTII,S$GLB,, | Performed by: FAMILY MEDICINE

## 2019-01-16 PROCEDURE — 99999 PR PBB SHADOW E&M-EST. PATIENT-LVL III: ICD-10-PCS | Mod: PBBFAC,,, | Performed by: FAMILY MEDICINE

## 2019-01-16 PROCEDURE — 3078F DIAST BP <80 MM HG: CPT | Mod: CPTII,S$GLB,, | Performed by: FAMILY MEDICINE

## 2019-01-16 RX ORDER — ASPIRIN 325 MG
325 TABLET ORAL DAILY
COMMUNITY
End: 2020-01-20

## 2019-01-16 RX ORDER — LOSARTAN POTASSIUM 100 MG/1
100 TABLET ORAL DAILY
Qty: 90 TABLET | Refills: 4 | Status: SHIPPED | OUTPATIENT
Start: 2019-01-16 | End: 2020-01-20 | Stop reason: SDUPTHER

## 2019-01-16 RX ORDER — FUROSEMIDE 40 MG/1
40 TABLET ORAL DAILY
Qty: 90 TABLET | Refills: 4 | Status: ON HOLD | OUTPATIENT
Start: 2019-01-16 | End: 2020-07-29 | Stop reason: HOSPADM

## 2019-01-16 RX ORDER — SPIRONOLACTONE 50 MG/1
50 TABLET, FILM COATED ORAL DAILY
Qty: 90 TABLET | Refills: 3 | Status: SHIPPED | OUTPATIENT
Start: 2019-01-16 | End: 2020-01-20

## 2019-01-16 NOTE — PROGRESS NOTES
Subjective:       Patient ID: Sammi Abreu is a 78 y.o. female.    Chief Complaint: Hypertension and hospital follow up for hypertension    She has complaints of malignant HT. She has 210/100, headaches, concern of dyspnea, fatigue, and wheezes.she has poor compliance with lasix. She does not want to urinate that frequent. Echo 2016 PA 39, EF 65%.CKD 3.       Edema   This is a chronic problem. The current episode started 1 to 4 weeks ago. The problem occurs constantly. The problem has been unchanged. Associated symptoms include fatigue. Pertinent negatives include no abdominal pain or chest pain.     Review of Systems   Constitutional: Positive for appetite change, fatigue and unexpected weight change.   Respiratory: Positive for shortness of breath and wheezing. Negative for chest tightness.    Cardiovascular: Positive for leg swelling. Negative for chest pain.   Gastrointestinal: Negative for abdominal pain.   Genitourinary: Negative for menstrual problem.   Allergic/Immunologic: Positive for food allergies.   Psychiatric/Behavioral: Negative for dysphoric mood.       Patient Active Problem List   Diagnosis    Vertigo of central origin    DDD (degenerative disc disease), lumbar    Arthritis of hip    Lumbosacral spondylosis without myelopathy    HTN (hypertension), onset 2010    Hyperlipidemia, baseline     Arthritis    Radicular pain    Lumbar spondylosis    Degenerative disc disease    Shoulder pain    S/P hip replacement    Hip osteoarthritis    Left hip pain    Breast mass    Glaucoma suspect    Obesity, unspecified    BMI 39.0-39.9,adult    Peripheral edema    LVH (left ventricular hypertrophy) due to hypertensive disease    Diastolic dysfunction, left ventricle    GERD (gastroesophageal reflux disease)    CHARLIE on CPAP nightly    Cardiovascular risk factor, ASCVD 10-year risk 38.7%, 2014    Obesity    Glucose intolerance (impaired glucose tolerance)       Objective:       Physical Exam   Constitutional: She is oriented to person, place, and time. She appears well-developed and well-nourished. She appears ill.   HENT:   Head: Normocephalic and atraumatic.   Right Ear: External ear normal.   Left Ear: External ear normal.   Nose: Nose normal.   Mouth/Throat: No oropharyngeal exudate.   Eyes: Conjunctivae and EOM are normal. Pupils are equal, round, and reactive to light. Right eye exhibits no discharge. Left eye exhibits no discharge. No scleral icterus.   Neck: Normal range of motion. Neck supple. No JVD present. No tracheal deviation present. No thyromegaly present.   Cardiovascular: Normal rate, regular rhythm, normal heart sounds, intact distal pulses and normal pulses. PMI is displaced. Exam reveals no gallop and no friction rub.   No murmur heard.  Pulmonary/Chest: Effort normal. No stridor. No respiratory distress. She has decreased breath sounds in the right lower field and the left lower field. She has no wheezes. She has no rales. She exhibits no tenderness.   Abdominal: Soft. Bowel sounds are normal. She exhibits no distension and no mass. There is no tenderness. There is no rebound and no guarding.   Musculoskeletal: Normal range of motion. She exhibits no edema.   Lymphadenopathy:     She has no cervical adenopathy.   Neurological: She is alert and oriented to person, place, and time. She displays normal reflexes. No cranial nerve deficit. She exhibits normal muscle tone. Coordination normal.   Skin: Skin is dry. No rash noted. She is not diaphoretic. No erythema. No pallor.   Psychiatric: She has a normal mood and affect. Her behavior is normal. Judgment and thought content normal.   Vitals reviewed.      Lab Results   Component Value Date    WBC 6.52 12/10/2018    HGB 12.5 12/10/2018    HCT 40.0 12/10/2018     12/10/2018    CHOL 182 09/07/2017    TRIG 98 09/07/2017    HDL 42 09/07/2017    ALT 12 12/10/2018    AST 15 12/10/2018     01/10/2019    K 4.1  01/10/2019     01/10/2019    CREATININE 1.3 01/10/2019    BUN 19 01/10/2019    CO2 28 01/10/2019    HGBA1C 5.8 (H) 03/07/2018     The 10-year ASCVD risk score (Jatinder GUZMAN Jr., et al., 2013) is: 40.4%    Values used to calculate the score:      Age: 78 years      Sex: Female      Is Non- : Yes      Diabetic: Yes      Tobacco smoker: No      Systolic Blood Pressure: 172 mmHg      Is BP treated: Yes      HDL Cholesterol: 42 mg/dL      Total Cholesterol: 182 mg/dL    Assessment:       1. Pulmonary hypertension    2. HTN (hypertension), benign    3. Hypertension    4. BMI 39.0-39.9,adult    5. Cardiovascular risk factor, ASCVD 10-year risk 38.7%, 2014    6. Glucose intolerance (impaired glucose tolerance)    7. Pure hypercholesterolemia    8. Abnormal weight gain         Plan:       Pulmonary hypertension  -     spironolactone (ALDACTONE) 50 MG tablet; Take 1 tablet (50 mg total) by mouth once daily.  Dispense: 90 tablet; Refill: 3  -     Transthoracic echo (TTE) complete (Cupid Only); Future    HTN (hypertension), benign  -     losartan (COZAAR) 100 MG tablet; Take 1 tablet (100 mg total) by mouth once daily.  Dispense: 90 tablet; Refill: 4    Hypertension  -     furosemide (LASIX) 40 MG tablet; Take 1 tablet (40 mg total) by mouth once daily.  Dispense: 90 tablet; Refill: 4    BMI 39.0-39.9,adult    Cardiovascular risk factor, ASCVD 10-year risk 38.7%, 2014  -     TSH; Future; Expected date: 01/16/2019    Glucose intolerance (impaired glucose tolerance)  -     TSH; Future; Expected date: 01/16/2019    Pure hypercholesterolemia    Abnormal weight gain   -     TSH; Future; Expected date: 01/16/2019    There have been some probable medication compliance issues here. I have discussed with her the great importance of following the treatment plan exactly as directed in order to achieve a good medical outcome.    Patient readiness: acceptance and barriers:readiness, social stressors and poor sleep  habits    During the course of the visit the patient was educated and counseled about the following:     Hypertension:   Dietary sodium restriction.  Regular aerobic exercise.  Obesity:   General weight loss/lifestyle modification strategies discussed (elicit support from others; identify saboteurs; non-food rewards, etc).    Goals: Hypertension: Reduce Blood Pressure and Obesity: Reduce calorie intake and BMI    Did patient meet goals/outcomes: No    The following self management tools provided: blood pressure log  excercise log    Patient Instructions (the written plan) was given to the patient/family.     Time spent with patient: 30 minutes    Barriers to medications present (yes )    Adverse reactions to current medications (yes)    Over the counter medications reviewed (Yes)        40 minutes spent counseling patient on low salt diet, light exercise, and weight loss.  This has been fully explained to the patient, who indicates understanding.

## 2019-01-16 NOTE — PATIENT INSTRUCTIONS

## 2019-01-24 ENCOUNTER — OFFICE VISIT (OUTPATIENT)
Dept: OPHTHALMOLOGY | Facility: CLINIC | Age: 79
End: 2019-01-24
Payer: MEDICARE

## 2019-01-24 DIAGNOSIS — H40.013 OPEN ANGLE WITH BORDERLINE FINDINGS OF BOTH EYES: ICD-10-CM

## 2019-01-24 DIAGNOSIS — I10 ESSENTIAL HYPERTENSION: ICD-10-CM

## 2019-01-24 DIAGNOSIS — H52.7 REFRACTIVE ERROR: ICD-10-CM

## 2019-01-24 DIAGNOSIS — H25.13 NUCLEAR SCLEROSIS, BILATERAL: ICD-10-CM

## 2019-01-24 DIAGNOSIS — E11.9 DIABETES MELLITUS TYPE 2 WITHOUT RETINOPATHY: Primary | ICD-10-CM

## 2019-01-24 PROCEDURE — 92014 COMPRE OPH EXAM EST PT 1/>: CPT | Mod: S$GLB,,, | Performed by: OPHTHALMOLOGY

## 2019-01-24 PROCEDURE — 99499 UNLISTED E&M SERVICE: CPT | Mod: S$GLB,,, | Performed by: OPHTHALMOLOGY

## 2019-01-24 PROCEDURE — 99999 PR PBB SHADOW E&M-EST. PATIENT-LVL III: CPT | Mod: PBBFAC,,, | Performed by: OPHTHALMOLOGY

## 2019-01-24 PROCEDURE — 92133 CPTRZD OPH DX IMG PST SGM ON: CPT | Mod: S$GLB,,, | Performed by: OPHTHALMOLOGY

## 2019-01-24 PROCEDURE — 92014 PR EYE EXAM, EST PATIENT,COMPREHESV: ICD-10-PCS | Mod: S$GLB,,, | Performed by: OPHTHALMOLOGY

## 2019-01-24 PROCEDURE — 99999 PR PBB SHADOW E&M-EST. PATIENT-LVL III: ICD-10-PCS | Mod: PBBFAC,,, | Performed by: OPHTHALMOLOGY

## 2019-01-24 PROCEDURE — 92133 POSTERIOR SEGMENT OCT OPTIC NERVE(OCULAR COHERENCE TOMOGRAPHY) - OU - BOTH EYES: ICD-10-PCS | Mod: S$GLB,,, | Performed by: OPHTHALMOLOGY

## 2019-01-24 PROCEDURE — 99499 RISK ADDL DX/OHS AUDIT: ICD-10-PCS | Mod: S$GLB,,, | Performed by: OPHTHALMOLOGY

## 2019-01-24 NOTE — PROGRESS NOTES
HPI     Routine diabetic eye exam--dle-1/20/17 Raul    Pt denies any changes in vision. Complains of eyes always red- using otc   allergy gtts. Denies any eye pain. Denies any flashes or floaters   reacently (past 6 years). BSL controlled.    Hemoglobin A1C       Date                     Value               Ref Range             Status                03/07/2018               5.8 (H)             4.0 - 5.6 %           Final                   09/07/2017               5.8 (H)             4.0 - 5.6 %           Final                     05/26/2017               6.2                 4.5 - 6.2 %           Final                   05/26/2017               6.2                 4.5 - 6.2 %           Final              ----------    Agree with above. Recent hypertension - ED for , was treated with   clonidine, was still high for about a week. Dr. Villatoro increased her med   dosage. This am 128/79. Denies recent vision changes with increased BP.   States she is pre-diabetic.     Last edited by Kelly Banks MD on 1/24/2019  2:21 PM.   (History)        ROS     Positive for: Endocrine (DM type 2 - pre-dm per pt), Cardiovascular   (HTN), Eyes (glc suspect)    Negative for: Constitutional (denies HA), Gastrointestinal, Neurological,   Skin, Genitourinary (denies nephropathy), Musculoskeletal, HENT,   Respiratory, Psychiatric, Allergic/Imm, Heme/Lymph    Last edited by Kelly Banks MD on 1/24/2019  2:21 PM.   (History)        Assessment /Plan     For exam results, see Encounter Report.    Diabetes mellitus type 2 without retinopathy    Essential hypertension    Open angle with borderline findings of both eyes  -     Posterior Segment OCT Optic Nerve- Both eyes    Nuclear sclerosis, bilateral    Refractive error          No hemorrhages. Continue to work on BP control and maintain glucose control.    C:D asymmetry, inferior thinning OS. OCT nerve today WNL/stable, f/u with Dr. Carter in 1 year.     Not visually  significant.  Observe.    MRx printed, will not drop charge because [employer] won't pay for it if I do it.

## 2019-01-29 ENCOUNTER — TELEPHONE (OUTPATIENT)
Dept: FAMILY MEDICINE | Facility: CLINIC | Age: 79
End: 2019-01-29

## 2019-01-29 NOTE — TELEPHONE ENCOUNTER
----- Message from Katerin Herrera sent at 1/29/2019 10:49 AM CST -----  Contact: patient  Type: Needs Medical Advice    Who Called:  patient  Best Call Back Number: [phone]  Additional Information: patient states that she dropped some paperwork off in the office to be filled out by the doctor. She states that no one has given her a call back to let her know if the paperwork was ready to be picked up. Please advise

## 2019-03-07 ENCOUNTER — LAB VISIT (OUTPATIENT)
Dept: LAB | Facility: HOSPITAL | Age: 79
End: 2019-03-07
Attending: FAMILY MEDICINE
Payer: MEDICARE

## 2019-03-07 DIAGNOSIS — R63.5 ABNORMAL WEIGHT GAIN: ICD-10-CM

## 2019-03-07 DIAGNOSIS — N18.30 CKD (CHRONIC KIDNEY DISEASE), STAGE III: ICD-10-CM

## 2019-03-07 DIAGNOSIS — E78.2 MIXED HYPERLIPIDEMIA: ICD-10-CM

## 2019-03-07 DIAGNOSIS — Z91.89 CARDIOVASCULAR RISK FACTOR: ICD-10-CM

## 2019-03-07 DIAGNOSIS — R73.02 GLUCOSE INTOLERANCE (IMPAIRED GLUCOSE TOLERANCE): Chronic | ICD-10-CM

## 2019-03-07 LAB
ALBUMIN SERPL BCP-MCNC: 3.3 G/DL
ALP SERPL-CCNC: 64 U/L
ALT SERPL W/O P-5'-P-CCNC: 13 U/L
ANION GAP SERPL CALC-SCNC: 9 MMOL/L
AST SERPL-CCNC: 15 U/L
BASOPHILS # BLD AUTO: 0.02 K/UL
BASOPHILS NFR BLD: 0.3 %
BILIRUB SERPL-MCNC: 0.6 MG/DL
BUN SERPL-MCNC: 18 MG/DL
CALCIUM SERPL-MCNC: 9.2 MG/DL
CHLORIDE SERPL-SCNC: 106 MMOL/L
CHOLEST SERPL-MCNC: 184 MG/DL
CHOLEST/HDLC SERPL: 4.3 {RATIO}
CO2 SERPL-SCNC: 25 MMOL/L
CREAT SERPL-MCNC: 1 MG/DL
DIFFERENTIAL METHOD: ABNORMAL
EOSINOPHIL # BLD AUTO: 0.2 K/UL
EOSINOPHIL NFR BLD: 3.1 %
ERYTHROCYTE [DISTWIDTH] IN BLOOD BY AUTOMATED COUNT: 13.5 %
EST. GFR  (AFRICAN AMERICAN): >60 ML/MIN/1.73 M^2
EST. GFR  (NON AFRICAN AMERICAN): 54.9 ML/MIN/1.73 M^2
ESTIMATED AVG GLUCOSE: 137 MG/DL
GLUCOSE SERPL-MCNC: 122 MG/DL
HBA1C MFR BLD HPLC: 6.4 %
HCT VFR BLD AUTO: 40.9 %
HDLC SERPL-MCNC: 43 MG/DL
HDLC SERPL: 23.4 %
HGB BLD-MCNC: 12.7 G/DL
IMM GRANULOCYTES # BLD AUTO: 0.02 K/UL
IMM GRANULOCYTES NFR BLD AUTO: 0.3 %
LDLC SERPL CALC-MCNC: 122 MG/DL
LYMPHOCYTES # BLD AUTO: 2.2 K/UL
LYMPHOCYTES NFR BLD: 36.7 %
MCH RBC QN AUTO: 33 PG
MCHC RBC AUTO-ENTMCNC: 31.1 G/DL
MCV RBC AUTO: 106 FL
MONOCYTES # BLD AUTO: 0.6 K/UL
MONOCYTES NFR BLD: 10.2 %
NEUTROPHILS # BLD AUTO: 3 K/UL
NEUTROPHILS NFR BLD: 49.4 %
NONHDLC SERPL-MCNC: 141 MG/DL
NRBC BLD-RTO: 0 /100 WBC
PLATELET # BLD AUTO: 207 K/UL
PMV BLD AUTO: 11.1 FL
POTASSIUM SERPL-SCNC: 4.3 MMOL/L
PROT SERPL-MCNC: 6.9 G/DL
RBC # BLD AUTO: 3.85 M/UL
SODIUM SERPL-SCNC: 140 MMOL/L
T4 FREE SERPL-MCNC: 0.87 NG/DL
TRIGL SERPL-MCNC: 95 MG/DL
TSH SERPL DL<=0.005 MIU/L-ACNC: 5.66 UIU/ML
WBC # BLD AUTO: 6.07 K/UL

## 2019-03-07 PROCEDURE — 84439 ASSAY OF FREE THYROXINE: CPT

## 2019-03-07 PROCEDURE — 36415 COLL VENOUS BLD VENIPUNCTURE: CPT | Mod: PO

## 2019-03-07 PROCEDURE — 84443 ASSAY THYROID STIM HORMONE: CPT

## 2019-03-07 PROCEDURE — 83036 HEMOGLOBIN GLYCOSYLATED A1C: CPT

## 2019-03-07 PROCEDURE — 80053 COMPREHEN METABOLIC PANEL: CPT

## 2019-03-07 PROCEDURE — 80061 LIPID PANEL: CPT

## 2019-03-07 PROCEDURE — 85025 COMPLETE CBC W/AUTO DIFF WBC: CPT

## 2019-03-14 ENCOUNTER — OFFICE VISIT (OUTPATIENT)
Dept: FAMILY MEDICINE | Facility: CLINIC | Age: 79
End: 2019-03-14
Payer: MEDICARE

## 2019-03-14 ENCOUNTER — LAB VISIT (OUTPATIENT)
Dept: LAB | Facility: HOSPITAL | Age: 79
End: 2019-03-14
Attending: NURSE PRACTITIONER
Payer: MEDICARE

## 2019-03-14 VITALS
DIASTOLIC BLOOD PRESSURE: 65 MMHG | WEIGHT: 238.13 LBS | HEART RATE: 66 BPM | OXYGEN SATURATION: 97 % | HEIGHT: 63 IN | TEMPERATURE: 99 F | SYSTOLIC BLOOD PRESSURE: 166 MMHG | BODY MASS INDEX: 42.19 KG/M2

## 2019-03-14 DIAGNOSIS — M10.9 ACUTE GOUT OF HAND, UNSPECIFIED CAUSE, UNSPECIFIED LATERALITY: Primary | ICD-10-CM

## 2019-03-14 DIAGNOSIS — D53.9 ANEMIA, MACROCYTIC: ICD-10-CM

## 2019-03-14 DIAGNOSIS — E03.9 HYPOTHYROIDISM, UNSPECIFIED TYPE: ICD-10-CM

## 2019-03-14 DIAGNOSIS — M79.646 THUMB PAIN, UNSPECIFIED LATERALITY: ICD-10-CM

## 2019-03-14 DIAGNOSIS — R73.03 PREDIABETES: ICD-10-CM

## 2019-03-14 DIAGNOSIS — E66.01 OBESITY, MORBID, BMI 40.0-49.9: ICD-10-CM

## 2019-03-14 DIAGNOSIS — I10 ESSENTIAL HYPERTENSION: Primary | ICD-10-CM

## 2019-03-14 LAB
FOLATE SERPL-MCNC: 12.8 NG/ML
T4 FREE SERPL-MCNC: 0.88 NG/DL
TSH SERPL DL<=0.005 MIU/L-ACNC: 3.74 UIU/ML
URATE SERPL-MCNC: 6.8 MG/DL
VIT B12 SERPL-MCNC: 417 PG/ML

## 2019-03-14 PROCEDURE — 99999 PR PBB SHADOW E&M-EST. PATIENT-LVL III: CPT | Mod: PBBFAC,,, | Performed by: NURSE PRACTITIONER

## 2019-03-14 PROCEDURE — 1101F PT FALLS ASSESS-DOCD LE1/YR: CPT | Mod: CPTII,S$GLB,, | Performed by: NURSE PRACTITIONER

## 2019-03-14 PROCEDURE — 84439 ASSAY OF FREE THYROXINE: CPT

## 2019-03-14 PROCEDURE — 82607 VITAMIN B-12: CPT

## 2019-03-14 PROCEDURE — 3078F PR MOST RECENT DIASTOLIC BLOOD PRESSURE < 80 MM HG: ICD-10-PCS | Mod: CPTII,S$GLB,, | Performed by: NURSE PRACTITIONER

## 2019-03-14 PROCEDURE — 3078F DIAST BP <80 MM HG: CPT | Mod: CPTII,S$GLB,, | Performed by: NURSE PRACTITIONER

## 2019-03-14 PROCEDURE — 84443 ASSAY THYROID STIM HORMONE: CPT

## 2019-03-14 PROCEDURE — 84550 ASSAY OF BLOOD/URIC ACID: CPT

## 2019-03-14 PROCEDURE — 99499 RISK ADDL DX/OHS AUDIT: ICD-10-PCS | Mod: S$GLB,,, | Performed by: NURSE PRACTITIONER

## 2019-03-14 PROCEDURE — 99214 OFFICE O/P EST MOD 30 MIN: CPT | Mod: S$GLB,,, | Performed by: NURSE PRACTITIONER

## 2019-03-14 PROCEDURE — 36415 COLL VENOUS BLD VENIPUNCTURE: CPT | Mod: PO

## 2019-03-14 PROCEDURE — 3077F PR MOST RECENT SYSTOLIC BLOOD PRESSURE >= 140 MM HG: ICD-10-PCS | Mod: CPTII,S$GLB,, | Performed by: NURSE PRACTITIONER

## 2019-03-14 PROCEDURE — 82746 ASSAY OF FOLIC ACID SERUM: CPT

## 2019-03-14 PROCEDURE — 99214 PR OFFICE/OUTPT VISIT, EST, LEVL IV, 30-39 MIN: ICD-10-PCS | Mod: S$GLB,,, | Performed by: NURSE PRACTITIONER

## 2019-03-14 PROCEDURE — 1101F PR PT FALLS ASSESS DOC 0-1 FALLS W/OUT INJ PAST YR: ICD-10-PCS | Mod: CPTII,S$GLB,, | Performed by: NURSE PRACTITIONER

## 2019-03-14 PROCEDURE — 3077F SYST BP >= 140 MM HG: CPT | Mod: CPTII,S$GLB,, | Performed by: NURSE PRACTITIONER

## 2019-03-14 PROCEDURE — 99499 UNLISTED E&M SERVICE: CPT | Mod: S$GLB,,, | Performed by: NURSE PRACTITIONER

## 2019-03-14 PROCEDURE — 99999 PR PBB SHADOW E&M-EST. PATIENT-LVL III: ICD-10-PCS | Mod: PBBFAC,,, | Performed by: NURSE PRACTITIONER

## 2019-03-14 RX ORDER — COLCHICINE 0.6 MG/1
0.6 TABLET ORAL DAILY
Qty: 7 TABLET | Refills: 0 | Status: SHIPPED | OUTPATIENT
Start: 2019-03-14 | End: 2019-05-16

## 2019-03-14 NOTE — PATIENT INSTRUCTIONS
Hypothyroidism       You have hypothyroidism. This means your thyroid gland is not making enough thyroid hormone. This hormone is vital to body growth and metabolism. If you dont make enough, many body processes slow down. This can cause symptoms throughout the body. Hypothyroidism can range from mild to severe. The most severe form is called myxedema.  There are a number of causes of hypothyroidism. A common cause is Hashimotos disease. This disease causes the bodys own immune system to attack the thyroid gland. When you have certain treatments, such as surgery to remove the thyroid gland, this can also cause hypothyroidism.  Symptoms of hypothyroidism can include:  · Fatigue  · Trouble concentrating or thinking clearly; forgetfulness  · Dry skin  · Hair loss  · Weight gain  · Low tolerance to cold  · Constipation  · Depression  · Personality changes  · Tingling or prickling of the hands or feet  · Heavy, absent, or irregular periods (women only)  Older adults may sometimes have other symptoms. These can include:  · Muscle aches and weakness  · Confusion  · Incontinence (unable to control urine or stool)  · Trouble moving around  · Falling  Treatment for hypothyroidism involves taking thyroid hormone pills daily. These pills replace the hormone your thyroid doesnt make. You will likely need to take a daily pill for the rest of your life. Tips for taking this medicine are given below.  Home care  Tips for taking your medicine  · Take your thyroid hormone pills as prescribed by your healthcare provider. This is most often 1 pill a day on an empty stomach. Use a pillbox labeled with the days of the week. This will help you remember to take your pill each day.  · Dont take products that contain iron and calcium or antacids within 4 hours of taking your thyroid hormone pills.  · Dont take other medicines with your thyroid hormone pill without checking with your provider first.  · Tell your provider if you have  any side effects from your medicines that bother you.  · Never change the dosage or stop taking your thyroid pills without talking to your provider first.  General care  · Always talk with your provider before trying other medicines or treatments for your thyroid problem.  · If you see other healthcare providers, be sure to let them know about your thyroid problem.  Follow-up care  See your healthcare provider for checkups as advised. You may need regular tests to check the level of thyroid hormone in your blood.  When to seek medical advice  Call your healthcare provider right away if any of these occur:  · New symptoms develop  · Symptoms return, continue, or worsen even after treatment  · Extreme fatigue  · Puffy hands, face, or feet  · Fast or irregular heartbeat  · Confusion  Call 911  Call 911 right away if any of these occur:  · Fainting  · Chest pain  · Shortness of breath or trouble breathing  Date Last Reviewed: 8/24/2015  © 2443-1708 [employer]. [address] [place] [address], [place] [address]. All rights reserved. This information is not intended as a substitute for professional medical care. Always follow your healthcare professional's instructions.

## 2019-03-14 NOTE — PROGRESS NOTES
Subjective:       Patient ID: Sammi Abreu is a 78 y.o. female.    Chief Complaint: follow up hypertension    Patient presents today for three month follow-up. Patient denies distress or discomfort. Labs reviewed, showed hypothyroidism, prediabetes and macrocytic anemia,; additional labs ordered.        Past Medical History:   Diagnosis Date    ALLERGIC RHINITIS     Arthritis     Cataract     OU    Degenerative disc disease     Diabetes mellitus type II     stopped meds    Diverticulosis     Edema     Fibromyalgia     Hyperlipidemia     Hypertension     Reflux     Sleep apnea     non compliant with CPAP    Vestibulitis of ear        Review of patient's allergies indicates:   Allergen Reactions    Cymbalta [duloxetine] Other (See Comments)     Nightmares      Darvon [propoxyphene] Nausea Only and Other (See Comments)     Sweating, slept for 3 days    Atorvastatin Other (See Comments)     Muscle cramps    Naprosyn [naproxen] Nausea Only    Penicillins Rash         Current Outpatient Medications:     ascorbic acid (VITAMIN C) 100 MG tablet, Take 100 mg by mouth once daily. , Disp: , Rfl:     aspirin 81 MG Chew, Take 81 mg by mouth once daily., Disp: , Rfl:     b complex vitamins tablet, Take 1 tablet by mouth once daily., Disp: , Rfl:     cloNIDine (CATAPRES) 0.1 MG tablet, Take 1 tablet (0.1 mg total) by mouth 2 (two) times daily as needed. Take for systolic pressure greater than 180, Disp: 15 tablet, Rfl: 0    diclofenac sodium 1 % Gel, Apply 2 g topically 3 (three) times daily., Disp: 300 g, Rfl: 3    fluticasone (FLONASE) 50 mcg/actuation nasal spray, 1 spray (50 mcg total) by Each Nare route once daily., Disp: 3 Bottle, Rfl: 3    furosemide (LASIX) 40 MG tablet, Take 1 tablet (40 mg total) by mouth once daily., Disp: 90 tablet, Rfl: 4    lactulose (CHRONULAC) 10 gram/15 mL solution, Take 30 mLs (20 g total) by mouth 3 (three) times daily. 2 Teaspoon(s) Oral PRN Every evening., Disp:  "500 mL, Rfl: 3    levocetirizine (XYZAL) 5 MG tablet, Take 1 tablet (5 mg total) by mouth every evening., Disp: 30 tablet, Rfl: 11    losartan (COZAAR) 100 MG tablet, Take 1 tablet (100 mg total) by mouth once daily., Disp: 90 tablet, Rfl: 4    metoprolol succinate (TOPROL-XL) 50 MG 24 hr tablet, TAKE ONE TABLET BY MOUTH ONCE DAILY, Disp: 90 tablet, Rfl: 3    spironolactone (ALDACTONE) 50 MG tablet, Take 1 tablet (50 mg total) by mouth once daily., Disp: 90 tablet, Rfl: 3    VENTOLIN HFA 90 mcg/actuation inhaler, INHALE TWO PUFFS BY MOUTH INTO LUNGS EVERY 6 HOURS AS NEEDED FOR WHEEZING RESCUE, Disp: 54 each, Rfl: 3    esomeprazole (NEXIUM) 20 MG capsule, Take 1 capsule (20 mg total) by mouth before breakfast. (Patient taking differently: Take 20 mg by mouth daily as needed (heartburn). ), Disp: 30 capsule, Rfl: 2    Current Facility-Administered Medications:     lidocaine HCL 10 mg/ml (1%) injection 5 mL, 5 mL, Other, Once, Matt Gilliam MD    Review of Systems   Constitutional: Positive for fatigue.   HENT: Positive for congestion.    Eyes: Positive for itching.   Respiratory: Positive for shortness of breath.    Cardiovascular: Negative for chest pain and palpitations.   Gastrointestinal: Negative for constipation and diarrhea.   Endocrine: Positive for polyuria. Negative for polyphagia.   Genitourinary: Negative for frequency and urgency.   Musculoskeletal: Positive for arthralgias (right knee).   Skin: Negative for rash.   Allergic/Immunologic: Positive for environmental allergies.   Neurological: Negative for dizziness and light-headedness.   Psychiatric/Behavioral: Negative for agitation. The patient is not nervous/anxious.        Objective:      BP (!) 166/65 (BP Location: Right arm, Patient Position: Sitting, BP Method: Medium (Automatic)) Comment (BP Method): below elbow  Pulse 66   Temp 98.7 °F (37.1 °C) (Oral)   Ht 5' 3" (1.6 m)   Wt 108 kg (238 lb 1.6 oz)   SpO2 97%   BMI 42.18 kg/m² "   Physical Exam   Constitutional: She is oriented to person, place, and time. She appears well-developed and well-nourished.   Eyes: EOM are normal. Pupils are equal, round, and reactive to light.   Neck: Normal range of motion.   Cardiovascular: Normal rate, regular rhythm and normal heart sounds.   Pulmonary/Chest: Effort normal and breath sounds normal.   Abdominal: Soft. Bowel sounds are normal.   Musculoskeletal:        Left shoulder: She exhibits decreased range of motion and tenderness.   Neurological: She is alert and oriented to person, place, and time.   Skin: Skin is warm and dry.   Psychiatric: She has a normal mood and affect. Her behavior is normal. Judgment and thought content normal.       Assessment:       1. Essential hypertension    2. Anemia, macrocytic    3. Thumb pain, unspecified laterality    4. Hypothyroidism, unspecified type    5. Prediabetes    6. Obesity, morbid, BMI 40.0-49.9        Plan:       Essential hypertension   Current /65   One week BP log   Low sodium diet  BP Readings from Last 3 Encounters:   03/14/19 (!) 166/65   01/16/19 (!) 172/74   01/10/19 (!) 153/60     Anemia, macrocytic  -     Vitamin B12; Future; Expected date: 03/14/2019  -     Folate; Future; Expected date: 03/14/2019    Thumb pain, unspecified laterality  -     Uric acid; Future; Expected date: 03/14/2019    Hypothyroidism, unspecified type  -     TSH; Future; Expected date: 03/14/2019  -     T4, free; Future; Expected date: 03/14/2019    Prediabetes   Follow a low carbohydrate diet   Regular exercise  Hemoglobin A1C   Date Value Ref Range Status   03/07/2019 6.4 (H) 4.0 - 5.6 % Final     Comment:     ADA Screening Guidelines:  5.7-6.4%  Consistent with prediabetes  >or=6.5%  Consistent with diabetes  High levels of fetal hemoglobin interfere with the HbA1C  assay. Heterozygous hemoglobin variants (HbS, HgC, etc)do  not significantly interfere with this assay.   However, presence of multiple variants may  affect accuracy.     03/07/2018 5.8 (H) 4.0 - 5.6 % Final     Comment:     According to ADA guidelines, hemoglobin A1c <7.0% represents  optimal control in non-pregnant diabetic patients. Different  metrics may apply to specific patient populations.   Standards of Medical Care in Diabetes-2016.  For the purpose of screening for the presence of diabetes:  <5.7%     Consistent with the absence of diabetes  5.7-6.4%  Consistent with increasing risk for diabetes   (prediabetes)  >or=6.5%  Consistent with diabetes  Currently, no consensus exists for use of hemoglobin A1c  for diagnosis of diabetes for children.  This Hemoglobin A1c assay has significant interference with fetal   hemoglobin   (HbF). The results are invalid for patients with abnormal amounts of   HbF,   including those with known Hereditary Persistence   of Fetal Hemoglobin. Heterozygous hemoglobin variants (HbAS, HbAC,   HbAD, HbAE, HbA2) do not significantly interfere with this assay;   however, presence of multiple variants in a sample may impact the %   interference.     09/07/2017 5.8 (H) 4.0 - 5.6 % Final     Comment:     According to ADA guidelines, hemoglobin A1c <7.0% represents  optimal control in non-pregnant diabetic patients. Different  metrics may apply to specific patient populations.   Standards of Medical Care in Diabetes-2016.  For the purpose of screening for the presence of diabetes:  <5.7%     Consistent with the absence of diabetes  5.7-6.4%  Consistent with increasing risk for diabetes   (prediabetes)  >or=6.5%  Consistent with diabetes  Currently, no consensus exists for use of hemoglobin A1c  for diagnosis of diabetes for children.  This Hemoglobin A1c assay has significant interference with fetal   hemoglobin   (HbF). The results are invalid for patients with abnormal amounts of   HbF,   including those with known Hereditary Persistence   of Fetal Hemoglobin. Heterozygous hemoglobin variants (HbAS, HbAC,   HbAD, HbAE, HbA2) do not  "significantly interfere with this assay;   however, presence of multiple variants in a sample may impact the %   interference.       Obesity, morbid, BMI 40.0-49.9  Counseled patient on his ideal body weight, health consequences of being obese and current recommendations including weekly exercise and a heart healthy diet.  Current BMI is:Estimated body mass index is 42.18 kg/m² as calculated from the following:    Height as of this encounter: 5' 3" (1.6 m).    Weight as of this encounter: 108 kg (238 lb 1.6 oz)..  Patient is aware that ideal BMI < 25 or Weight in (lb) to have BMI = 25: 140.8.          Patient readiness: acceptance and barriers:none    During the course of the visit the patient was educated and counseled about the following:     Diabetes:  Discussed general issues about diabetes pathophysiology and management.  Addressed ADA diet.  Hypertension:   Dietary sodium restriction.  Regular aerobic exercise.  Obesity:   General weight loss/lifestyle modification strategies discussed (elicit support from others; identify saboteurs; non-food rewards, etc).  Regular aerobic exercise program discussed.    Goals: Diabetes: Maintain Hemoglobin A1C below 7, Hypertension: Reduce Blood Pressure and Obesity: Reduce calorie intake and BMI    Did patient meet goals/outcomes: No    The following self management tools provided: declined    Patient Instructions (the written plan) was given to the patient/family.     Time spent with patient: 30 minutes    Barriers to medications present (no )    Adverse reactions to current medications (no)    Over the counter medications reviewed (Yes)        "

## 2019-03-19 ENCOUNTER — CLINICAL SUPPORT (OUTPATIENT)
Dept: CARDIOLOGY | Facility: CLINIC | Age: 79
End: 2019-03-19
Attending: FAMILY MEDICINE
Payer: MEDICARE

## 2019-03-19 VITALS — BODY MASS INDEX: 42.19 KG/M2 | WEIGHT: 238.13 LBS | HEIGHT: 63 IN

## 2019-03-19 DIAGNOSIS — I27.20 PULMONARY HYPERTENSION: ICD-10-CM

## 2019-03-19 PROCEDURE — 99999 PR PBB SHADOW E&M-EST. PATIENT-LVL I: CPT | Mod: PBBFAC,,,

## 2019-03-19 PROCEDURE — 99999 PR PBB SHADOW E&M-EST. PATIENT-LVL I: ICD-10-PCS | Mod: PBBFAC,,,

## 2019-03-19 PROCEDURE — 93306 TRANSTHORACIC ECHO (TTE) COMPLETE (CUPID ONLY): ICD-10-PCS | Mod: S$GLB,,, | Performed by: INTERNAL MEDICINE

## 2019-03-19 PROCEDURE — 93306 TTE W/DOPPLER COMPLETE: CPT | Mod: S$GLB,,, | Performed by: INTERNAL MEDICINE

## 2019-03-20 DIAGNOSIS — I10 HYPERTENSION, UNSPECIFIED TYPE: Primary | ICD-10-CM

## 2019-03-20 LAB
ASCENDING AORTA: 2.54 CM
AV INDEX (PROSTH): 0.86
AV MEAN GRADIENT: 3.84 MMHG
AV PEAK GRADIENT: 6.45 MMHG
AV VALVE AREA: 2.66 CM2
AV VELOCITY RATIO: 0.82
BSA FOR ECHO PROCEDURE: 2.19 M2
CV ECHO LV RWT: 0.49 CM
DOP CALC AO PEAK VEL: 1.27 M/S
DOP CALC AO VTI: 31.56 CM
DOP CALC LVOT AREA: 3.08 CM2
DOP CALC LVOT DIAMETER: 1.98 CM
DOP CALC LVOT PEAK VEL: 1.04 M/S
DOP CALC LVOT STROKE VOLUME: 83.99 CM3
DOP CALCLVOT PEAK VEL VTI: 27.29 CM
E WAVE DECELERATION TIME: 344.29 MSEC
E/A RATIO: 0.71
ECHO LV POSTERIOR WALL: 1.12 CM (ref 0.6–1.1)
FRACTIONAL SHORTENING: 40 % (ref 28–44)
INTERVENTRICULAR SEPTUM: 1.2 CM (ref 0.6–1.1)
IVRT: 0.1 MSEC
LA MAJOR: 5.26 CM
LA MINOR: 5.09 CM
LA WIDTH: 3.86 CM
LEFT ATRIUM SIZE: 3.46 CM
LEFT ATRIUM VOLUME INDEX: 28.2 ML/M2
LEFT ATRIUM VOLUME: 58.73 CM3
LEFT INTERNAL DIMENSION IN SYSTOLE: 2.74 CM (ref 2.1–4)
LEFT VENTRICLE DIASTOLIC VOLUME INDEX: 45.89 ML/M2
LEFT VENTRICLE DIASTOLIC VOLUME: 95.57 ML
LEFT VENTRICLE MASS INDEX: 92.5 G/M2
LEFT VENTRICLE SYSTOLIC VOLUME INDEX: 13.4 ML/M2
LEFT VENTRICLE SYSTOLIC VOLUME: 27.96 ML
LEFT VENTRICULAR INTERNAL DIMENSION IN DIASTOLE: 4.56 CM (ref 3.5–6)
LEFT VENTRICULAR MASS: 192.66 G
MV PEAK A VEL: 1.13 M/S
MV PEAK E VEL: 0.8 M/S
PISA TR MAX VEL: 2.62 M/S
PULM VEIN S/D RATIO: 1.28
PV PEAK D VEL: 0.36 M/S
PV PEAK S VEL: 0.46 M/S
PV PEAK VELOCITY: 0.99 CM/S
RA MAJOR: 2.84 CM
RA PRESSURE: 3 MMHG
RA WIDTH: 3.47 CM
RIGHT VENTRICULAR END-DIASTOLIC DIMENSION: 2.96 CM
SINUS: 2.34 CM
STJ: 2.49 CM
TR MAX PG: 27.46 MMHG
TRICUSPID ANNULAR PLANE SYSTOLIC EXCURSION: 1.73 CM
TV REST PULMONARY ARTERY PRESSURE: 30 MMHG

## 2019-03-21 ENCOUNTER — OFFICE VISIT (OUTPATIENT)
Dept: CARDIOLOGY | Facility: CLINIC | Age: 79
End: 2019-03-21
Payer: MEDICARE

## 2019-03-21 ENCOUNTER — TELEPHONE (OUTPATIENT)
Dept: PULMONOLOGY | Facility: CLINIC | Age: 79
End: 2019-03-21

## 2019-03-21 VITALS
HEART RATE: 60 BPM | DIASTOLIC BLOOD PRESSURE: 85 MMHG | HEIGHT: 64 IN | BODY MASS INDEX: 40.42 KG/M2 | SYSTOLIC BLOOD PRESSURE: 186 MMHG | OXYGEN SATURATION: 95 % | WEIGHT: 236.75 LBS

## 2019-03-21 DIAGNOSIS — R06.09 DOE (DYSPNEA ON EXERTION): Primary | ICD-10-CM

## 2019-03-21 DIAGNOSIS — G47.19 EXCESSIVE DAYTIME SLEEPINESS: ICD-10-CM

## 2019-03-21 DIAGNOSIS — I11.9 HYPERTENSIVE LEFT VENTRICULAR HYPERTROPHY, WITHOUT HEART FAILURE: ICD-10-CM

## 2019-03-21 DIAGNOSIS — R09.89 LABILE HYPERTENSION: ICD-10-CM

## 2019-03-21 DIAGNOSIS — G47.33 OSA ON CPAP: ICD-10-CM

## 2019-03-21 DIAGNOSIS — E78.2 MIXED HYPERLIPIDEMIA: ICD-10-CM

## 2019-03-21 DIAGNOSIS — I10 HYPERTENSION, UNSPECIFIED TYPE: ICD-10-CM

## 2019-03-21 DIAGNOSIS — R73.02 GLUCOSE INTOLERANCE (IMPAIRED GLUCOSE TOLERANCE): Chronic | ICD-10-CM

## 2019-03-21 DIAGNOSIS — R94.31 NONSPECIFIC ABNORMAL ELECTROCARDIOGRAM (ECG) (EKG): ICD-10-CM

## 2019-03-21 DIAGNOSIS — Z91.89 CARDIOVASCULAR RISK FACTOR: ICD-10-CM

## 2019-03-21 PROCEDURE — 99215 PR OFFICE/OUTPT VISIT, EST, LEVL V, 40-54 MIN: ICD-10-PCS | Mod: S$GLB,,, | Performed by: INTERNAL MEDICINE

## 2019-03-21 PROCEDURE — 99215 OFFICE O/P EST HI 40 MIN: CPT | Mod: S$GLB,,, | Performed by: INTERNAL MEDICINE

## 2019-03-21 PROCEDURE — 3077F PR MOST RECENT SYSTOLIC BLOOD PRESSURE >= 140 MM HG: ICD-10-PCS | Mod: CPTII,S$GLB,, | Performed by: INTERNAL MEDICINE

## 2019-03-21 PROCEDURE — 3077F SYST BP >= 140 MM HG: CPT | Mod: CPTII,S$GLB,, | Performed by: INTERNAL MEDICINE

## 2019-03-21 PROCEDURE — 1101F PR PT FALLS ASSESS DOC 0-1 FALLS W/OUT INJ PAST YR: ICD-10-PCS | Mod: CPTII,S$GLB,, | Performed by: INTERNAL MEDICINE

## 2019-03-21 PROCEDURE — 93000 EKG 12-LEAD: ICD-10-PCS | Mod: S$GLB,,, | Performed by: INTERNAL MEDICINE

## 2019-03-21 PROCEDURE — 99999 PR PBB SHADOW E&M-EST. PATIENT-LVL III: ICD-10-PCS | Mod: PBBFAC,,, | Performed by: INTERNAL MEDICINE

## 2019-03-21 PROCEDURE — 99499 RISK ADDL DX/OHS AUDIT: ICD-10-PCS | Mod: S$GLB,,, | Performed by: INTERNAL MEDICINE

## 2019-03-21 PROCEDURE — 3079F PR MOST RECENT DIASTOLIC BLOOD PRESSURE 80-89 MM HG: ICD-10-PCS | Mod: CPTII,S$GLB,, | Performed by: INTERNAL MEDICINE

## 2019-03-21 PROCEDURE — 99499 UNLISTED E&M SERVICE: CPT | Mod: S$GLB,,, | Performed by: INTERNAL MEDICINE

## 2019-03-21 PROCEDURE — 99999 PR PBB SHADOW E&M-EST. PATIENT-LVL III: CPT | Mod: PBBFAC,,, | Performed by: INTERNAL MEDICINE

## 2019-03-21 PROCEDURE — 93000 ELECTROCARDIOGRAM COMPLETE: CPT | Mod: S$GLB,,, | Performed by: INTERNAL MEDICINE

## 2019-03-21 PROCEDURE — 3079F DIAST BP 80-89 MM HG: CPT | Mod: CPTII,S$GLB,, | Performed by: INTERNAL MEDICINE

## 2019-03-21 PROCEDURE — 1101F PT FALLS ASSESS-DOCD LE1/YR: CPT | Mod: CPTII,S$GLB,, | Performed by: INTERNAL MEDICINE

## 2019-03-21 NOTE — TELEPHONE ENCOUNTER
Left voicemail. Pt needs first available NEW PT with MD  ----- Message from Carly Adan LPN sent at 3/21/2019  4:37 PM CDT -----  Referral to Pulmonology put in. Please call pt to schedule.

## 2019-03-21 NOTE — PROGRESS NOTES
"Subjective:    Patient ID:  Sammi Abreu is a 78 y.o. female who presents for evaluation of Annual Exam  For labile HTN, ASCVD event risk, CHARLIE could not tolerate the mask, HILL, morbid obesity  PCP: Dr. Villatoro, see biannually  Orthopedic:  Finger  Pain: Dr. Gilliam  Eye: Dr. Carter  Sleep: Dr. 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     Activities: lots of HILL but been going to the gym twice a week for 90 minutes each, have to stop frequent to catch her breath  Nicotine: quit 1972  Alcohol: rare  Illicit drugs: none  Cardiac symptoms: HILL progressive over the last year  Home SBP: 145 to 158, feels very labile been off CPAP for at least 2 years  Medication compliance: yes and no  Diet: cut back on salt  Caffeine: none  Labs: 3/2019,  not on statin Rx, CMP (, Albumin 3.3), CBC, TSH, uric acid 6.8, A1C 6.4%  Last Echo: 3/2019  Last stress test: 5/2015 Lexiscan  Prior cardiovascular angiogram: No  ECG: NSR, rate 60, NSTWA  Fundoscopic exam: 2/2019, "fine"      In 4/2015:  Black female with start of peripheral swelling of lower extremities summer of last year. Problem exacerbated after left THR in 2/2015. Also note some progressive HILL. Past history significant for left groin DVT post op and currently on Xarelto. Have had HTN sor past 5 years. ECG is NSR, rate 69, late transition. Home -150/60-70. Denies any CP. Have been on nifedipine 60 mg for over 6 months along with diclofenac 75 mg for about 4 months. Off NSAID since use of Xarelto.     Recent ECHO CONCLUSIONS     1 - Concentric remodeling. Wall thickness is increased, with the septum measuring 1.4 cm and the posterior wall measuring 1.2 cm across.    2 - Normal left ventricular systolic function (EF 60-65%).     3 - Left ventricular diastolic dysfunction. E/e'(lat) is 14    4 - Mild mitral regurgitation.     5 - Normal right " ventricular systolic function .     6 - Pulmonary hypertension. estimated PA systolic pressure is 50 mmHg.    7 - No significant change from Echo on 7/23/2013.     CTA non-coronary - mild aortic atherosclerosis    Venous US on the left - Acute deep venous thrombosis of the left common femoral vein over an approximately 7.1 cm in length.  No involvement of the left superficial femoral vein or left external iliac vein.    Since visit of 4/9/2015, no new problem. Still with peripheral edema especially with standing / sitting.  Medications reviewed, take Pravachol only when she remembers, about once a week. Labs in 10/2014, LDL-C 175.4, non-HDL-C 202, A1C 6.1%  Venous US - Impression:    RIGHT:  No evidence of Right lower extremity DVT.      LEFT:  No evidence of Left lower extremity DVT.      Some venous reflux noted in CFV, on the right 776 msec, left 1154 msec. No evidence of DVT and the prior thrombus in the left CFV have resolved.    In 2/2017, returned for follow up after almost 2 years. No hospitalization, no new problem. Active with housework, previously walking now just now and then, average 0-2 times monthly for about 20 minutes. Lipid 10/2016 , non-. No prior history of MI nor stroke. Rarely take the ASA. ECG shows late transition in NSR. Labs - normal iron, CBC, CMP with CKD stage 3    Stress Echo 5/2015 - EKG Conclusions:    1. The EKG portion of this study is negative for ischemia at a peak heart rate of 155 bpm (109% of predicted).   2. Blood pressure remained stable throughout the protocol  (Presenting BP: 152/70 Peak BP: 205/72).   3. The following arrhythmias were present: PACs.   4. There were no symptoms of chest discomfort or significant dyspnea throughout the protocol.    ECHO CONCLUSIONS     1 - Concentric remodeling.     2 - Hyperdynamic left ventricular systolic function (EF 65-70%).     No evidence of stress induced myocardial ischemia.     Patient took her 25 mg of metoprolor  succinate at 7 PM the evening prior to the examination.    ECHO 11/2016  CONCLUSIONS     1 - Concentric remodeling.     2 - Hyperdynamic left ventricular systolic function (EF 65-70%).     3 - Normal left ventricular diastolic function.     4 - Normal right ventricular systolic function .     5 - The estimated PA systolic pressure is 39 mmHg.     6 - No significant change from Echo in 5/2015.     HPI Comments: in 3/2019, return for routine follow up after 2 years. Major concern is progressive HILL for the last year. Have very high ASCVD event risk.      Review of Systems   Constitution: have malaise/fatigue, sweating, night sweat and weight stable, up 4 lbs since 2/2017. Negative for diaphoresis, fever, weakness, and weight gain.   HENT: Positive for seasonal congestion. Negative for headaches, nosebleeds and tinnitus.    Eyes: Negative for visual disturbance.   Cardiovascular: progressive dyspnea on exertion, still some bilateral leg swelling and no palpitations (off caffeine). Negative for chest pain, claudication, cyanosis, irregular heartbeat, near-syncope, orthopnea and paroxysmal nocturnal dyspnea.   Respiratory: some cough (bronchitis) but have some wheezing. Negative for shortness of breath, sleep disturbances due to breathing, snoring and wheezing.  Williford score 3 up to an 8  Endocrine: Negative for polydipsia and polyuria.   Hematologic/Lymphatic: Does not bruise/bleed easily.   Skin: Negative for nail changes, color change, flushing, poor wound healing and suspicious lesions.   Musculoskeletal: Positive for arthritis, now gout, back pain, joint pain, joint swelling, muscle cramps, neck pain and stiffness. Negative for falls, gout, muscle weakness and myalgias.   Gastrointestinal: no further abdominal pain, constipation and heartburn, just flatus. Negative for hematemesis, hematochezia, melena and nausea.   Genitourinary: Positive for bladder incontinence, frequency, incomplete emptying, nocturia.  "  Neurological: Positive for vertigo and excess daytime sleepiness. Negative for disturbances in coordination, dizziness, focal weakness, light-headedness, loss of balance and numbness.   Psychiatric/Behavioral: Negative for depression and substance abuse. The patient is nervous/anxious. The patient does not have insomnia.    Allergic/Immunologic: Positive for environmental allergies.        Objective:    Physical Exam   Constitutional: She is oriented to person, place, and time. She appears well-developed and well-nourished.   HENT:   Head: Normocephalic.   Eyes: Conjunctivae and EOM are normal. Pupils are equal, round, and reactive to light.   Neck: Normal range of motion. Neck supple. No JVD present. No thyromegaly present.   Circumference 13.75"   Cardiovascular: Normal rate, regular rhythm, normal heart sounds and intact distal pulses.  Exam reveals no gallop and no friction rub.    No murmur heard.  Pulmonary/Chest: Effort normal and breath sounds normal. She has no rales. She exhibits no tenderness.   Abdominal: Soft. Bowel sounds are normal. There is no tenderness.   Waist 39" up to 42.5", hip 48"   Musculoskeletal: Normal range of motion. She exhibits trace pitting edema in both LE.   Lymphadenopathy:     She has no cervical adenopathy.   Neurological: She is alert and oriented to person, place, and time.   Skin: Skin is warm and dry. No rash noted.         Assessment:       1. HILL (dyspnea on exertion), post op THR 2/2015    2. Hypertension, unspecified type    3. BMI 40.0-44.9, adult, today 40.6    4. Cardiovascular risk factor, ASCVD 10-year risk 38.7%, 2014    5. Glucose intolerance (impaired glucose tolerance)    6. Mixed hyperlipidemia    7. Hypertensive left ventricular hypertrophy, without heart failure    8. CHARLIE on CPAP, off Rx due to URTI, could not tolerate    9. Labile hypertension    10. Excessive daytime sleepiness    11. Nonspecific abnormal electrocardiogram (ECG) (EKG)         Plan:     "   Sammi was seen today for consult.    Diagnoses and associated orders for this visit:    HILL (dyspnea on exertion), post op THR 2/2015  -     Echocardiogram stress test with CFD (Cupid Only); Future  -     Ambulatory consult to Pulmonology    Hypertension, unspecified type  -     EKG 12-lead    BMI 40.0-44.9, adult, today 40.6    Cardiovascular risk factor, ASCVD 10-year risk 38.7%, 2014  -     Echocardiogram stress test with CFD (Cupid Only); Future    Glucose intolerance (impaired glucose tolerance)    Mixed hyperlipidemia    Hypertensive left ventricular hypertrophy, without heart failure  -     Echocardiogram stress test with CFD (Cupid Only); Future    CHARLIE on CPAP, off Rx due to URTI, could not tolerate  -     Ambulatory consult to Pulmonology    Labile hypertension  -     Echocardiogram stress test with CFD (Cupid Only); Future    Excessive daytime sleepiness    Nonspecific abnormal electrocardiogram (ECG) (EKG)  -     Echocardiogram stress test with CFD (Cupid Only); Future    - All medical issues reviewed, continue current Rx.  - CV status stable, continue current Rx, all medications reviewed, patient acknowledge good understanding.  - Recommend healthy living: no nicotine, moderate alcohol, healthy diet and regular exercise aiming for some fitness, and weight control  - Instruction for Mediterranean diet and heart healthy exercise given.  - Highly recommend 30 minutes of exercise daily, can have Sunday off, with 2-3 sessions of muscle strengthening weekly. A  would be very helpful.  - Need to consider high-intensity Statin, intolerant to Lipitor  - Recommend at least annual cardiovascular evaluation in view of her significant risk factors.  - Highly recommend reevaluation and treatment for CHARLIE  - Weigh twice weekly, try to lose 1-2 lbs per week  - Check home blood pressure, 2 days weekly, do 2 readings within 5 minutes in AM and PM, keep log for review.    Phlebitis and thrombophlebitis of  superficial vessels of lower extremities, left    Diastolic dysfunction, left ventricle    Peripheral edema - resolved  - Conservative Rx - waiting on knee-high support hose, on before OOB in am and off at bedtime, keep leg elevated when not active  - Low salt diet  - Walking is helpful, avoid inactivities         Patient Active Problem List   Diagnosis    Vertigo of central origin    DDD (degenerative disc disease), lumbar    Arthritis of hip    Lumbosacral spondylosis without myelopathy    HTN (hypertension), onset 2010    Hyperlipidemia, baseline     Arthritis    Radicular pain    Lumbar spondylosis    Degenerative disc disease    Shoulder pain    S/P hip replacement    Hip osteoarthritis    Left hip pain    Breast mass    BMI 40.0-44.9, adult, today 40.6    Glaucoma suspect    Obesity, unspecified    BMI 39.0-39.9,adult    Peripheral edema    LVH (left ventricular hypertrophy) due to hypertensive disease    Diastolic dysfunction, left ventricle    HILL (dyspnea on exertion), post op THR 2/2015    GERD (gastroesophageal reflux disease)    CHARLIE on CPAP, off Rx due to URTI, could not tolerate    Cardiovascular risk factor, ASCVD 10-year risk 38.7%, 2014    Obesity    Glucose intolerance (impaired glucose tolerance)    Labile hypertension    Excessive daytime sleepiness    Nonspecific abnormal electrocardiogram (ECG) (EKG)     Total face-to-face time with the patient was 45 minutes and greater than 50% was spent in counseling and coordination of care. The above assessment and plan have been discussed at length. Labs and procedure over the last 6 months reviewed. Problem List updated. Asked to bring in all active medications / pills bottles with next visit.

## 2019-03-21 NOTE — PATIENT INSTRUCTIONS
Recommended Mediterranean dietEating Heart-Healthy Food: Using the DASH Plan  Eating for your heart doesnt have to be hard or boring. You just need to know how to make healthier choices. The DASH eating plan has been developed to help you do just that. DASH stands for Dietary Approaches to Stop Hypertension. It is a plan that has been proven to be healthier for your heart and to lower your risk for high blood pressure. It can also help lower your risk for cancer, heart disease, osteoporosis, and diabetes.  Choosing from Each Food Group  Choose foods from each of the food groups below each day. Try to get the recommended number of servings for each food group. The serving numbers are based on a diet of 2,000 calories a day. Talk to your doctor if youre unsure about your calorie needs.  Grains   Servings: 7-8 a day  A serving is:  · 1 slice bread  · 1 ounce dry cereal  · half a cup cooked rice or pasta  Best choices: Whole grains and any grains high in fiber.  Vegetables   Servings: 4-5 a day  A serving is:  · 1 cup raw leafy vegetable  · Half a cup cooked vegetable  · Three-quarter cup vegetable juice  Best choices: Fresh or frozen vegetable prepared without too much added salt or fat.    Fruits   Servings: 4-5 a day  A serving is:  · Three-quarter cup fruit juice  · 1 medium fruit  · One-quarter cup dried fruit  · One-half cup fresh, frozen, or canned fruit  Best choices: A variety of fresh fruits of different colors. Whole fruits are a much better choice than fruit juices.  Low-fat or Fat Free Dairy   Servings: 2-3 a day  A serving is:  · 8 ounces milk  · 1 cup yogurt  · One and a half ounces cheese  Best choices: Skim or 1% milk, low-fat or fat free yogurt or buttermilk, and low-fat cheeses.       Meat, Poultry, Fish   Servings: 2 or fewer a day  A serving is:  · 3 ounces cooked meat, poultry, or fish  Best choices: Lean meats and fish. Trim away visible fat. Broil, roast, or boil instead of frying. Remove skin  from poultry before eating.  Nuts, Seeds, Beans   Servings: 4-5 a week  A serving is:  · One third cup nuts (or one and a half ounces)  · 2 tablespoons sunflower seeds  · Half a cup cooked beans  Best choices: Dry roasted nuts with no salt added, lentils, kidney beans, garbanzo beans, and whole randolph beans.    Fats and Oils   Servings: 2 a day  A serving is:  · 1 teaspoon vegetable oil  · 1 teaspoon soft margarine  · 1 tablespoon low-fat mayonnaise  · 1 teaspoon regular mayonnaise  · 2 tablespoons light salad dressing  · 1 tablespoon regular salad dressing  Best choices: Monounsaturated and polyunsaturated fats such as olive, canola, or safflower oil.  Sweets   Servings: 5 a week or fewer  A serving is:  · 1 tablespoon sugar, maple syrup, or honey  · 1 tablespoon jam or jelly  · 1 half-ounce jelly beans (about 15)  · 8 ounces lemonade  Best choices: Dried fruit can be a satisfying sweet. Choose low-fat sweets when possible. And watch your serving sizes!       Aerobic Exercise for a Healthy Heart  Exercise is a lot more than an energy booster and a stress reliever. It also strengthens your heart muscle, lowers your blood pressure and blood cholesterol, and burns calories.      Remember, some activity is better than none.     Choose an Aerobic Activity  Choose a nonstop activity that makes your heart and lungs work harder than they do when you rest or walk normally. This aerobic exercise can improve the way your heart and other muscles use oxygen. Make it fun by exercising with a friend and choosing an activity you enjoy. Here are some ideas:  · Walking  · Swimming  · Bicycling  · Stair climbing  · Dancing  · Jogging  Exercise Regularly  If you havent been exercising regularly,  get your doctors okay first. Then start slowly.  Here are some tips:  · Begin exercising 3 times a week for 5-10 minutes at a time.  · When you feel comfortable, add a few minutes each week.  · Slowly build up to exercising 3-4 times each  week for 20-40 minutes. Aim for a total of 150 or more minutes a week.  · Be sure to carry your nitroglycerin with you when you exercise.  · If you get angina when youre exercising, stop what youre doing, take your nitroglycerin, and call your doctor.  © 1597-9066 [employer], [address] [place] [address], [place] [address]. All rights reserved. This information is not intended as a substitute for professional medical care. Always follow your healthcare professional's instructions.  Losing Weight (Cardiovascular)  Excess weight is a major risk factor for heart disease. Losing weight may help keep your arteries open so that your heart can get the oxygen-rich blood it needs. Weight loss can also help lower your blood pressure and reduce your risk for diabetes. All in all, losing weight makes you healthier.          Exercise with a friend. When activity is fun, you're more likely to stick with it.        Calories and Weight Loss  Calories are the fuel your body burns for energy. You get the calories you need from the food you eat. For healthy weight loss, women should eat at least 1,200 calories a day, men at least 1,500.    When you eat more calories than you need, your body stores the extra calories as fat. One pound of fat equals 3,500 calories.    To lose weight, try to burn 500 calories a day more than you eat. To do this, eat 250 calories less each day. Add activity to burn the other 250 calories. Walking 21/2 miles burns about 250 calories.    Eat a variety of healthy foods. Its the best way to make calories count.     Tips for losing weight:  Drink 8 to 10 glasses of water a day.    Dont skip meals. Instead, eat smaller portions.       Brisk Activity Is Best  Brisk activity gets your heart pumping faster. It makes your heart healthier. Its also the best way to burn calories. In fact, your body may keep burning calories for hours after you stop a brisk activity.    Begin by walking 10 minutes most  days.    Add more time and speed to your walk. Build up as you feel able.    Try to walk briskly at least 30 minutes most days. If needed, you can break this into 2 shorter sessions.     Check off the ideas below that you could try to make your day more active:    Take the stairs instead of the elevator.    Park your car farther away and walk.    Ride a bike to work or to the store.    Walk laps around the mall.  Obstructive Sleep Apnea  Obstructive sleep apnea is a condition that causes your air passages to become narrowed or blocked during sleep. As a result, breathing stops for short periods. Your body wakes up enough for breathing to begin again, though you don't remember it. The cycle of stopped breathing and brief awakenings can repeat dozens of times a night. This prevents the body from getting to the deeper stages of sleep that are needed for good rest and may cause your body's oxygen level to fall.  Signs of sleep apnea include loud snoring, noisy breathing, and gasping sounds during sleep. Daytime symptoms include waking up tired after a full night's sleep, waking up with headaches, feeling very sleepy or falling asleep during the day, and having problems with memory or concentration.  Risk factors for sleep apnea include:  · Being overweight  · Being a man, or a woman in menopause  · Smoking  · Using alcohol or sedating medicines  · Having enlarged structures in the nose or throat  Home care  Lifestyle changes that can help treat snoring and sleep apnea include the following:  · If you are overweight, lose weight. Talk to your healthcare provider about a weight-loss plan for you.  · Avoid alcohol for 3 to 4 hours before bedtime. Avoid sedating medications. Ask your healthcare provider about the medicines you take.  · If you smoke, talk to your healthcare provider about ways to quit.  · Sleep on your side. This can help prevent gravity from pulling relaxed throat tissues into your breathing  passages.  · If you have allergies or sinus problems that block your nose, ask your healthcare provider for help.  Follow-up care  Follow up with your healthcare provider, or as advised. A diagnosis of sleep apnea is made with a sleep study. Your healthcare provider can tell you more about this test.  When to seek medical advice  Sleep apnea can make you more likely to have certain health problems. These include high blood pressure, heart attack, stroke, and sexual dysfunction. If you have sleep apnea, talk to your healthcare provider about the best treatments for you.  Date Last Reviewed: 4/1/2017 © 2000-2017 [employer]. [address] [place] [address]. All rights reserved. This information is not intended as a substitute for professional medical care. Always follow your healthcare professional's instructions.        Snoring and Sleep Apnea: Notes for a Partner    Snoring and sleep apnea affect your life, as well as your partners. You can help in the treatment of the problem. Be supportive. Encourage your partner both to get treatment and to make the adjustments needed to follow through.  Adjusting to changes  Your partners treatment may involve making changes to certain life habits. You can help your partner make and stick with these changes. For example:  · Support and even join your partners exercise program.  · Be supportive if your partner gets CPAP (continuous positive airway pressure). He or she may feel self-conscious at first. Remind your partner to expect adjustments to CPAP before it feels just right.  · Consider joining a snoring and sleep apnea support group.  Go along to see the healthcare provider  You can give the healthcare provider the best account of your partners nighttime breathing and snoring patterns. Try to go along to healthcare providers appointments. If you cant go, write notes for your partner to give to the healthcare provider. Describe your partners  snoring and sleep breathing patterns in detail.  Tips for sleeping with a snorer  Until treatment takes care of your partners snoring:  · Try to go to bed first. It may help if youre already asleep when your partner starts to snore.  · Wear earplugs to bed. A fan or other source of background noise may also help drown out snoring.   Date Last Reviewed: 8/10/2015  © 5683-6279 [employer]. [address] [place] [address], [place] [address]. All rights reserved. This information is not intended as a substitute for professional medical care. Always follow your healthcare professional's instructions.

## 2019-03-28 ENCOUNTER — HOSPITAL ENCOUNTER (OUTPATIENT)
Dept: CARDIOLOGY | Facility: HOSPITAL | Age: 79
Discharge: HOME | End: 2019-03-28
Attending: INTERNAL MEDICINE
Payer: MEDICARE

## 2019-03-28 VITALS — SYSTOLIC BLOOD PRESSURE: 150 MMHG | HEART RATE: 63 BPM | DIASTOLIC BLOOD PRESSURE: 65 MMHG

## 2019-03-28 DIAGNOSIS — R06.09 DOE (DYSPNEA ON EXERTION): ICD-10-CM

## 2019-03-28 LAB
CV STRESS BASE HR: 66 BPM
DIASTOLIC BLOOD PRESSURE: 65 MMHG
OHS CV CPX 1 MINUTE RECOVERY HEART RATE: 113 BPM
OHS CV CPX 85 PERCENT MAX PREDICTED HEART RATE MALE: 118
OHS CV CPX ESTIMATED METS: 6
OHS CV CPX MAX PREDICTED HEART RATE: 139
OHS CV CPX PATIENT IS FEMALE: 1
OHS CV CPX PATIENT IS MALE: 0
OHS CV CPX PEAK DIASTOLIC BLOOD PRESSURE: 98 MMHG
OHS CV CPX PEAK HEAR RATE: 136 BPM
OHS CV CPX PEAK RATE PRESSURE PRODUCT: NORMAL
OHS CV CPX PEAK SYSTOLIC BLOOD PRESSURE: 219 MMHG
OHS CV CPX PERCENT MAX PREDICTED HEART RATE ACHIEVED: 98
OHS CV CPX PERCENT TARGET HEART RATE ACHIEVED: 111.48
OHS CV CPX RATE PRESSURE PRODUCT PRESENTING: 9900
OHS CV CPX TARGET HEART RATE: 122
STRESS ECHO POST EXERCISE DUR MIN: 3 MIN
STRESS ECHO POST EXERCISE DUR SEC: 46
SYSTOLIC BLOOD PRESSURE: 150 MMHG

## 2019-03-28 PROCEDURE — 93351 STRESS TTE COMPLETE: CPT

## 2019-03-29 ENCOUNTER — PES CALL (OUTPATIENT)
Dept: ADMINISTRATIVE | Facility: CLINIC | Age: 79
End: 2019-03-29

## 2019-04-22 ENCOUNTER — OFFICE VISIT (OUTPATIENT)
Dept: PULMONOLOGY | Facility: CLINIC | Age: 79
End: 2019-04-22
Payer: MEDICARE

## 2019-04-22 VITALS
TEMPERATURE: 97 F | DIASTOLIC BLOOD PRESSURE: 68 MMHG | HEART RATE: 75 BPM | WEIGHT: 233 LBS | RESPIRATION RATE: 17 BRPM | OXYGEN SATURATION: 97 % | BODY MASS INDEX: 39.78 KG/M2 | HEIGHT: 64 IN | SYSTOLIC BLOOD PRESSURE: 143 MMHG

## 2019-04-22 DIAGNOSIS — I70.0 CALCIFICATION OF AORTA: ICD-10-CM

## 2019-04-22 DIAGNOSIS — J30.1 CHRONIC SEASONAL ALLERGIC RHINITIS DUE TO POLLEN: ICD-10-CM

## 2019-04-22 DIAGNOSIS — R06.09 DOE (DYSPNEA ON EXERTION): ICD-10-CM

## 2019-04-22 DIAGNOSIS — R09.89 CHRONIC SINUS COMPLAINTS: Primary | ICD-10-CM

## 2019-04-22 DIAGNOSIS — G47.33 OBSTRUCTIVE SLEEP APNEA SYNDROME: ICD-10-CM

## 2019-04-22 DIAGNOSIS — J45.31 MILD PERSISTENT ASTHMA WITH ACUTE EXACERBATION: ICD-10-CM

## 2019-04-22 DIAGNOSIS — J47.9 BRONCHIECTASIS WITHOUT COMPLICATION: ICD-10-CM

## 2019-04-22 PROCEDURE — 99499 RISK ADDL DX/OHS AUDIT: ICD-10-PCS | Mod: S$GLB,,, | Performed by: INTERNAL MEDICINE

## 2019-04-22 PROCEDURE — 99499 UNLISTED E&M SERVICE: CPT | Mod: S$GLB,,, | Performed by: INTERNAL MEDICINE

## 2019-04-22 PROCEDURE — 99205 OFFICE O/P NEW HI 60 MIN: CPT | Mod: S$GLB,,, | Performed by: INTERNAL MEDICINE

## 2019-04-22 PROCEDURE — 99999 PR PBB SHADOW E&M-EST. PATIENT-LVL IV: ICD-10-PCS | Mod: PBBFAC,,, | Performed by: INTERNAL MEDICINE

## 2019-04-22 PROCEDURE — 99205 PR OFFICE/OUTPT VISIT, NEW, LEVL V, 60-74 MIN: ICD-10-PCS | Mod: S$GLB,,, | Performed by: INTERNAL MEDICINE

## 2019-04-22 PROCEDURE — 3077F PR MOST RECENT SYSTOLIC BLOOD PRESSURE >= 140 MM HG: ICD-10-PCS | Mod: CPTII,S$GLB,, | Performed by: INTERNAL MEDICINE

## 2019-04-22 PROCEDURE — 3078F PR MOST RECENT DIASTOLIC BLOOD PRESSURE < 80 MM HG: ICD-10-PCS | Mod: CPTII,S$GLB,, | Performed by: INTERNAL MEDICINE

## 2019-04-22 PROCEDURE — 1101F PR PT FALLS ASSESS DOC 0-1 FALLS W/OUT INJ PAST YR: ICD-10-PCS | Mod: CPTII,S$GLB,, | Performed by: INTERNAL MEDICINE

## 2019-04-22 PROCEDURE — 1101F PT FALLS ASSESS-DOCD LE1/YR: CPT | Mod: CPTII,S$GLB,, | Performed by: INTERNAL MEDICINE

## 2019-04-22 PROCEDURE — 3077F SYST BP >= 140 MM HG: CPT | Mod: CPTII,S$GLB,, | Performed by: INTERNAL MEDICINE

## 2019-04-22 PROCEDURE — 3078F DIAST BP <80 MM HG: CPT | Mod: CPTII,S$GLB,, | Performed by: INTERNAL MEDICINE

## 2019-04-22 PROCEDURE — 99999 PR PBB SHADOW E&M-EST. PATIENT-LVL IV: CPT | Mod: PBBFAC,,, | Performed by: INTERNAL MEDICINE

## 2019-04-22 RX ORDER — FLUTICASONE PROPIONATE 50 MCG
2 SPRAY, SUSPENSION (ML) NASAL DAILY
Qty: 3 BOTTLE | Refills: 3 | Status: SHIPPED | OUTPATIENT
Start: 2019-04-22 | End: 2020-09-11 | Stop reason: SDUPTHER

## 2019-04-22 RX ORDER — PREDNISONE 20 MG/1
TABLET ORAL
Qty: 21 TABLET | Refills: 1 | Status: SHIPPED | OUTPATIENT
Start: 2019-04-22 | End: 2020-06-02

## 2019-04-22 RX ORDER — MONTELUKAST SODIUM 10 MG/1
10 TABLET ORAL NIGHTLY
Qty: 90 TABLET | Refills: 3 | Status: SHIPPED | OUTPATIENT
Start: 2019-04-22 | End: 2020-09-11 | Stop reason: SDUPTHER

## 2019-04-22 RX ORDER — BUDESONIDE AND FORMOTEROL FUMARATE DIHYDRATE 160; 4.5 UG/1; UG/1
2 AEROSOL RESPIRATORY (INHALATION) EVERY 12 HOURS
Qty: 3 INHALER | Refills: 3 | Status: SHIPPED | OUTPATIENT
Start: 2019-04-22 | End: 2020-12-14

## 2019-04-22 RX ORDER — ALBUTEROL SULFATE 0.83 MG/ML
2.5 SOLUTION RESPIRATORY (INHALATION) EVERY 6 HOURS PRN
Qty: 120 EACH | Refills: 11 | Status: ON HOLD | OUTPATIENT
Start: 2019-04-22 | End: 2020-07-29 | Stop reason: HOSPADM

## 2019-04-22 RX ORDER — PNV NO.95/FERROUS FUM/FOLIC AC 28MG-0.8MG
100 TABLET ORAL DAILY
COMMUNITY
End: 2020-01-20

## 2019-04-22 RX ORDER — AZITHROMYCIN 500 MG/1
TABLET, FILM COATED ORAL
Qty: 3 TABLET | Refills: 3 | Status: SHIPPED | OUTPATIENT
Start: 2019-04-22 | End: 2019-06-20 | Stop reason: ALTCHOICE

## 2019-04-22 NOTE — LETTER
April 22, 2019      Burt Friend MD  [address]  [address]  [place] [address]           [facility]  [address]  [place] [address]  Phone: [phone]  Fax: [phone]          Patient: Sammi Abreu   MR Number: [id number]   YOB: 1940   Date of Visit: 4/22/2019       Dear Dr. Burt Friend:    Thank you for referring Sammi Abreu to me for evaluation. Attached you will find relevant portions of my assessment and plan of care.    If you have questions, please do not hesitate to call me. I look forward to following Sammi Abreu along with you.    Sincerely,    Gabe Suero MD    Enclosure  CC:  No Recipients    If you would like to receive this communication electronically, please contact [email or web address] or [phone] to request more information on [employer] Link access.    For providers and/or their staff who would like to refer a patient to [facility], please contact us through our one-stop-shop provider referral line, [facility], at [phone].    If you feel you have received this communication in error or would no longer like to receive these types of communications, please e-mail [email or web address]

## 2019-04-22 NOTE — PROGRESS NOTES
"4/22/2019    Sammi Abreu  New Patient Consult    No chief complaint on file.    Cc is asthma and sleep apnea      HPI: had dx 2009 sleep apnea, got cpap but could not use-not clear how severe sleep.  Pt wore off and on 2 yrs with bronchitis ppt non compliance.    Had severe bronchitis with hemoptysis ppt stopping cpap.     H/o intermittent wheezes, worse night, ongoing seasonally, sister with asthma, pt onset- 2010.  Had normal pft 9/11/17.   Uses albuterol daily and nocturnal arousal daily later.  Sleeps on right side as left side breathing worsens.  Good sense smell.      Sinus problems with drainage and itchy eyes.  occ sinus infection- zpak    The chief compliant  problem is new to me",    PFSH:  Past Medical History:   Diagnosis Date    ALLERGIC RHINITIS     Anemia     Arthritis     Cataract     OU    Degenerative disc disease     Diabetes mellitus type II     stopped meds    Diverticulosis     Edema     Fibromyalgia     Gout     Hyperlipidemia     Hypertension     Reflux     Sleep apnea     non compliant with CPAP    Vestibulitis of ear          Past Surgical History:   Procedure Laterality Date    COLONOSCOPY N/A 11/4/2014    [facility]    EGD (ESOPHAGOGASTRODUODENOSCOPY) N/A 2/17/2012    [facility]    TYLER-TRANSFORAMINAL Right 12/18/2012    [facility]    HYSTERECTOMY      INJECTION, JOINT Left 4/29/2013    [facility]    INJECTION, JOINT Left 11/13/2012    [facility]    INJECTION, JOINT Left 6/6/2012    [facility]    INJECTION, JOINT, SHOULDER, HIP, OR KNEE Left 5/9/2014    [facility]    INJECTION, JOINT, SHOULDER, HIP, OR KNEE Left 1/10/2014    [facility]    INJECTION, JOINT, SHOULDER, HIP, OR KNEE Left " "10/4/2013    [facility] at [facility] OR    INJECTION-JOINT Left 10/3/2014    [facility] at [facility]    INJECTION-STEROID-EPIDURAL-TRANSFORAMINAL Left 2014    [facility]formed [facility]    INJECTION-STEROID-EPIDURAL-TRANSFORAMINAL Left 2012    [facility]    JOINT REPLACEMENT      R total hip     Social History     Tobacco Use    Smoking status: Former Smoker     Packs/day: 0.25     Years: 5.00     Pack years: 1.25     Last attempt to quit: 1972     Years since quittin.3    Smokeless tobacco: Never Used   Substance Use Topics    Alcohol use: Yes     Alcohol/week: 0.0 oz     Comment: Rarely    Drug use: Yes     Types: Oxycodone, Hydrocodone     Family History   Problem Relation Age of Onset    Hypertension Mother     Diabetes Sister     Glaucoma Neg Hx     Macular degeneration Neg Hx     Retinal detachment Neg Hx      Review of patient's allergies indicates:   Allergen Reactions    Cymbalta [duloxetine] Other (See Comments)     Nightmares      Darvon [propoxyphene] Nausea Only and Other (See Comments)     Sweating, slept for 3 days    Atorvastatin Other (See Comments)     Muscle cramps    Naprosyn [naproxen] Nausea Only    Penicillins Rash       Performance Status:The patient's activity level is functions out of house.      Review of Systems:  a review of eleven systems covering constitutional, Eye, HEENT, Psych, Respiratory, Cardiac, GI, , Musculoskeletal, Endocrine, Dermatologic was negative except for pertinent findings as listed ABOVE and below:  pertinent positive as above, rest is good       Exam:Comprehensive exam done. BP (!) 143/68   Pulse 75   Temp 96.7 °F (35.9 °C) (Oral)   Resp 17   Ht 5' 4" (1.626 m)   Wt 105.7 kg (233 lb 0.4 oz)   SpO2 97% Comment: on room air  BMI 40.00 kg/m²   Exam included Vitals as listed, and patient's appearance and affect and alertness and mood, oral exam for " yeast and hygiene and pharynx lesions and Mallapatti (M) score, neck with inspection for jvd and masses and thyroid abnormalities and lymph nodes (supraclavicular and infraclavicular nodes and axillary also examined and noted if abn), chest exam included symmetry and effort and fremitus and percussion and auscultation, cardiac exam included rhythm and gallops and murmur and rubs and jvd and edema, abdominal exam for mass and hepatosplenomegaly and tenderness and hernias and bowel sounds, Musculoskeletal exam with muscle tone and posture and mobility/gait and  strength, and skin for rashes and cyanosis and pallor and turgor, extremity for clubbing.  Findings were normal except for pertinent findings listed below:  M4, chest is symmetric, no distress, normal percussion, normal fremitus and good normal breath sounds      Radiographs (ct chest and cxr) reviewed: view by direct vision  - 3/6/18 ct chest mild prominent bronchi with calcified aorta    Labs reviewed       Ct chest 3/6/18 calcified aorta, bronchiectasis mild lower lungs      Results for MEME SALAS (MRN [id number]) as of 4/22/2019 11:03   Ref. Range 12/22/2017 11:41 6/22/2018 08:48   Eosinophil% Latest Ref Range: 0.0 - 8.0 % 3.7 3.2   Eos # Latest Ref Range: 0.0 - 0.5 K/uL 0.2 0.2     PFT results reviewed 9/11/17 wnl.      Plan:  Clinical impression is apparently straight forward and impression with management as below.    Diagnoses and all orders for this visit:    Chronic sinus complaints  -     montelukast (SINGULAIR) 10 mg tablet; Take 1 tablet (10 mg total) by mouth every evening.  -     azithromycin (ZITHROMAX) 500 MG tablet; One daily for yellow mucous, repeat if needed  -     fluticasone (FLONASE) 50 mcg/actuation nasal spray; 2 sprays (100 mcg total) by Each Nare route once daily.    Chronic seasonal allergic rhinitis due to pollen    Mild persistent asthma with acute exacerbation  -     montelukast (SINGULAIR) 10 mg tablet; Take 1 tablet  (10 mg total) by mouth every evening.  -     budesonide-formoterol 160-4.5 mcg (SYMBICORT) 160-4.5 mcg/actuation HFAA; Inhale 2 puffs into the lungs every 12 (twelve) hours. Controller  -     predniSONE (DELTASONE) 20 MG tablet; One daily for 3 days and repeat for flare of lung symptoms as intructed  -     NEBULIZER FOR HOME USE  -     albuterol (PROVENTIL) 2.5 mg /3 mL (0.083 %) nebulizer solution; Take 3 mLs (2.5 mg total) by nebulization every 6 (six) hours as needed.    HILL (dyspnea on exertion)    Calcification of aorta    Obstructive sleep apnea syndrome  -     CPAP/BIPAP SUPPLIES    Bronchiectasis without complication- seen ct chest 3/6/18    BMI 40.0-44.9, adult,         Follow up in about 3 months (around 7/22/2019).    Discussed with patient above for education the following:      Patient Instructions   cpap- we will try to get your 2009 study to review how bad sleep apnea was - suggest using cpap now and sent in order for mask.   If severe need to try to treat.  epworth score is 10    Sinuses may worsen asthma- singulair, flonase 2 each side, zpak if infections- use afrin before flonase if stuffy.    Asthma mild persistent- ct chest in 2018 showed bronchiectasis (mild)   Use symbicort 2 twice daily to prevent, also singulair      Use albuterol inhaler or nebulizer-  Up to every 4 hours as needed      action plan is prednisone - take for 3 days and repeat as needed    Weight loss may help.  Might consider weight loss surgery.  Could help diabetes, sleep apnea, asthma,  Arthritis, and others.

## 2019-04-22 NOTE — PATIENT INSTRUCTIONS
cpap- we will try to get your 2009 study to review how bad sleep apnea was - suggest using cpap now and sent in order for mask.   If severe need to try to treat.  epworth score is 10    Sinuses may worsen asthma- singulair, flonase 2 each side, zpak if infections- use afrin before flonase if stuffy.    Asthma mild persistent- ct chest in 2018 showed bronchiectasis (mild)   Use symbicort 2 twice daily to prevent, also singulair      Use albuterol inhaler or nebulizer-  Up to every 4 hours as needed      action plan is prednisone - take for 3 days and repeat as needed    Weight loss may help.  Might consider weight loss surgery.  Could help diabetes, sleep apnea, asthma,  Arthritis, and others.

## 2019-04-30 ENCOUNTER — HOSPITAL ENCOUNTER (EMERGENCY)
Facility: HOSPITAL | Age: 79
Discharge: HOME | End: 2019-05-01
Attending: EMERGENCY MEDICINE
Payer: MEDICARE

## 2019-04-30 DIAGNOSIS — M25.551 PAIN OF RIGHT HIP JOINT: Primary | ICD-10-CM

## 2019-04-30 LAB
BASOPHILS # BLD AUTO: 0 K/UL (ref 0–0.2)
BASOPHILS NFR BLD: 0.5 % (ref 0–1.9)
DIFFERENTIAL METHOD: ABNORMAL
EOSINOPHIL # BLD AUTO: 0.2 K/UL (ref 0–0.5)
EOSINOPHIL NFR BLD: 2.1 % (ref 0–8)
ERYTHROCYTE [DISTWIDTH] IN BLOOD BY AUTOMATED COUNT: 14 % (ref 11.5–14.5)
HCT VFR BLD AUTO: 40.7 % (ref 37–48.5)
HGB BLD-MCNC: 13 G/DL (ref 12–16)
LYMPHOCYTES # BLD AUTO: 1.9 K/UL (ref 1–4.8)
LYMPHOCYTES NFR BLD: 24.7 % (ref 18–48)
MCH RBC QN AUTO: 32.1 PG (ref 27–31)
MCHC RBC AUTO-ENTMCNC: 32 G/DL (ref 32–36)
MCV RBC AUTO: 100 FL (ref 82–98)
MONOCYTES # BLD AUTO: 0.7 K/UL (ref 0.3–1)
MONOCYTES NFR BLD: 9.4 % (ref 4–15)
NEUTROPHILS # BLD AUTO: 4.9 K/UL (ref 1.8–7.7)
NEUTROPHILS NFR BLD: 63.3 % (ref 38–73)
PLATELET # BLD AUTO: 208 K/UL (ref 150–350)
PMV BLD AUTO: 8.9 FL (ref 9.2–12.9)
RBC # BLD AUTO: 4.05 M/UL (ref 4–5.4)
WBC # BLD AUTO: 7.8 K/UL (ref 3.9–12.7)

## 2019-04-30 PROCEDURE — 99285 EMERGENCY DEPT VISIT HI MDM: CPT | Mod: 25

## 2019-04-30 PROCEDURE — 83690 ASSAY OF LIPASE: CPT

## 2019-04-30 PROCEDURE — 36415 COLL VENOUS BLD VENIPUNCTURE: CPT

## 2019-04-30 PROCEDURE — 80053 COMPREHEN METABOLIC PANEL: CPT

## 2019-04-30 PROCEDURE — 85025 COMPLETE CBC W/AUTO DIFF WBC: CPT

## 2019-05-01 VITALS
SYSTOLIC BLOOD PRESSURE: 137 MMHG | WEIGHT: 233 LBS | DIASTOLIC BLOOD PRESSURE: 65 MMHG | RESPIRATION RATE: 17 BRPM | BODY MASS INDEX: 39.99 KG/M2 | HEART RATE: 64 BPM | TEMPERATURE: 98 F | OXYGEN SATURATION: 99 %

## 2019-05-01 LAB
ALBUMIN SERPL BCP-MCNC: 3.8 G/DL (ref 3.5–5.2)
ALP SERPL-CCNC: 64 U/L (ref 55–135)
ALT SERPL W/O P-5'-P-CCNC: 16 U/L (ref 10–44)
ANION GAP SERPL CALC-SCNC: 11 MMOL/L (ref 8–16)
AST SERPL-CCNC: 15 U/L (ref 10–40)
BILIRUB SERPL-MCNC: 0.4 MG/DL (ref 0.1–1)
BUN SERPL-MCNC: 29 MG/DL (ref 8–23)
CALCIUM SERPL-MCNC: 10 MG/DL (ref 8.7–10.5)
CHLORIDE SERPL-SCNC: 104 MMOL/L (ref 95–110)
CO2 SERPL-SCNC: 25 MMOL/L (ref 23–29)
CREAT SERPL-MCNC: 1.4 MG/DL (ref 0.5–1.4)
EST. GFR  (AFRICAN AMERICAN): 42 ML/MIN/1.73 M^2
EST. GFR  (NON AFRICAN AMERICAN): 37 ML/MIN/1.73 M^2
GLUCOSE SERPL-MCNC: 119 MG/DL (ref 70–110)
LIPASE SERPL-CCNC: 43 U/L (ref 4–60)
POTASSIUM SERPL-SCNC: 4.9 MMOL/L (ref 3.5–5.1)
PROT SERPL-MCNC: 7.7 G/DL (ref 6–8.4)
SODIUM SERPL-SCNC: 140 MMOL/L (ref 136–145)

## 2019-05-01 PROCEDURE — 25500020 PHARM REV CODE 255: Performed by: EMERGENCY MEDICINE

## 2019-05-01 RX ADMIN — IOHEXOL 100 ML: 350 INJECTION, SOLUTION INTRAVENOUS at 12:05

## 2019-05-01 NOTE — ED NOTES
Pt resting in bed. Updated on plan of care. Bed rails are up and call light is within patient reach. Will continue to monitor.

## 2019-05-01 NOTE — ED PROVIDER NOTES
Encounter Date: 4/30/2019    SCRIBE #1 NOTE: I, Leatha Choe , am scribing for, and in the presence of,  Dr. Jose . I have scribed the entire note.       History     Chief Complaint   Patient presents with    Hip Pain     right that radiates down right lower ext. denies injury     04/30/2019  10:40 PM       The patient is a 78 y.o. female who is presenting with the acute onset of anterior R hip pain that began earlier this evening while sitting. The pt reports that the pain is constant, moderate, and radiates down the anterior leg. The pt reports that the pain is exacerbated by standing and ROM. She denies being able to bear weight without significant pain. No numbness or weakness. No mitigating factors. No associated numbness, weakness, fever, emesis, or nausea. Pertinent PMhx includes arthritis, DDD, DM, HDL, HTN, fibromyalgia. Pertinent past surgical hx includes joint replacement.         The history is provided by the patient and medical records.     Review of patient's allergies indicates:   Allergen Reactions    Cymbalta [duloxetine] Other (See Comments)     Nightmares      Darvon [propoxyphene] Nausea Only and Other (See Comments)     Sweating, slept for 3 days    Atorvastatin Other (See Comments)     Muscle cramps    Naprosyn [naproxen] Nausea Only    Penicillins Rash     Past Medical History:   Diagnosis Date    ALLERGIC RHINITIS     Anemia     Arthritis     Cataract     OU    Degenerative disc disease     Diabetes mellitus type II     stopped meds    Diverticulosis     Edema     Fibromyalgia     Gout     Hyperlipidemia     Hypertension     Reflux     Sleep apnea     non compliant with CPAP    Vestibulitis of ear      Past Surgical History:   Procedure Laterality Date    COLONOSCOPY N/A 11/4/2014    [facility]    EGD (ESOPHAGOGASTRODUODENOSCOPY) N/A 2/17/2012    [facility] ENDO    TYLER-TRANSFORAMINAL Right  2012    [facility]    HYSTERECTOMY      INJECTION, JOINT Left 2013    [facility]    INJECTION, JOINT Left 2012    [facility]    INJECTION, JOINT Left 2012    [facility]    INJECTION, JOINT, SHOULDER, HIP, OR KNEE Left 2014    [facility] at [facility]    INJECTION, JOINT, SHOULDER, HIP, OR KNEE Left 1/10/2014    [facility]    INJECTION, JOINT, SHOULDER, HIP, OR KNEE Left 10/4/2013    [facility]    INJECTION-JOINT Left 10/3/2014    [facility]    INJECTION-STEROID-EPIDURAL-TRANSFORAMINAL Left 2014    [facility]    INJECTION-STEROID-EPIDURAL-TRANSFORAMINAL Left 2012    [facility]    JOINT REPLACEMENT      R total hip     Family History   Problem Relation Age of Onset    Hypertension Mother     Diabetes Sister     Glaucoma Neg Hx     Macular degeneration Neg Hx     Retinal detachment Neg Hx      Social History     Tobacco Use    Smoking status: Former Smoker     Packs/day: 0.25     Years: 5.00     Pack years: 1.25     Last attempt to quit: 1972     Years since quittin.3    Smokeless tobacco: Never Used   Substance Use Topics    Alcohol use: Yes     Alcohol/week: 0.0 oz     Comment: Rarely    Drug use: Yes     Types: Oxycodone, Hydrocodone     Review of Systems   Constitutional: Negative for activity change, diaphoresis and fever.   HENT: Negative for ear pain, rhinorrhea, sore throat and trouble swallowing.    Eyes: Negative for pain and visual disturbance.   Respiratory: Negative for cough, shortness of breath and stridor.    Cardiovascular: Negative for chest pain.   Gastrointestinal: Negative for abdominal pain, blood in stool, diarrhea, nausea and  vomiting.   Genitourinary: Negative for dysuria, hematuria, vaginal bleeding and vaginal discharge.   Musculoskeletal: Positive for arthralgias and myalgias. Negative for gait problem.   Skin: Negative for rash.   Neurological: Negative for seizures and headaches.   Psychiatric/Behavioral: Negative for hallucinations and suicidal ideas.       Physical Exam     Initial Vitals [04/30/19 2124]   BP Pulse Resp Temp SpO2   (!) 216/80 75 17 98.3 °F (36.8 °C) 98 %      MAP       --         Physical Exam    Nursing note and vitals reviewed.  Constitutional: She appears well-developed and well-nourished.   HENT:   Head: Normocephalic and atraumatic.   Eyes: EOM are normal. Pupils are equal, round, and reactive to light.   Cardiovascular: Normal rate, regular rhythm, normal heart sounds and intact distal pulses. Exam reveals no gallop and no friction rub.    No murmur heard.  2+ PT and DP pulses    Pulmonary/Chest: Breath sounds normal. She has no wheezes.   Abdominal: Soft. She exhibits no distension. There is no tenderness.   Musculoskeletal: She exhibits tenderness. She exhibits no edema.   Decreased AROM of the R hip secondary to pain, but able to flex and extend the hip and knee. There is TTP of the R lateral hip. No significant edema present.    Neurological: She is alert and oriented to person, place, and time. She has normal strength. No sensory deficit.   5/5 strength and sensation to BLE.    Skin: Skin is warm and dry. Capillary refill takes less than 2 seconds. No erythema.   No overlying skin changes to the R hip.          ED Course   Procedures  Labs Reviewed   CBC W/ AUTO DIFFERENTIAL - Abnormal; Notable for the following components:       Result Value    Mean Corpuscular Volume 100 (*)     Mean Corpuscular Hemoglobin 32.1 (*)     MPV 8.9 (*)     All other components within normal limits   COMPREHENSIVE METABOLIC PANEL - Abnormal; Notable for the following components:    Glucose 119 (*)     BUN, Bld 29 (*)      eGFR if  42 (*)     eGFR if non  37 (*)     All other components within normal limits   LIPASE          Imaging Results          CT Abdomen Pelvis With Contrast (In process)                  Medical Decision Making:   History:   Old Medical Records: I decided to obtain old medical records.  Clinical Tests:   Lab Tests: Ordered and Reviewed  Radiological Study: Ordered  ED Management:  79 yo F BL hip prosthesis pw pain to R hip x 1 day.    CT neg. Unlikely fracture, dislocation.  Unlikely septic arthritis or gonococcal arthropathy.  Unlikely gout flare, reactive arthritis, lupus arthropathy or other autoimmune etiology.  Patient ambulatory with mildly antalgic gait. Well appearing, nonseptic, VSS.    Plan DC home with close fu with orthopedics Dr. Solis for R hip pain. Pt understands and agrees with discharge instructions. Pt also given strict return precautions for any new or worsening symptoms and plans to follow up closely with PCP.               Scribe Attestation:   Scribe #1: I performed the above scribed service and the documentation accurately describes the services I performed. I attest to the accuracy of the note.            ED Course as of May 01 0437   Wed May 01, 2019   0218 IMPRESSION:  1. Evaluation of the pelvis limited due to bilateral hip prosthesis artifact. Bilateral hip prosthesis are in  anatomic alignment. No obvious acute fracture.  2. No acute findings.  3. Other findings as described above.  Thank you for allowing us to participate in the care of your patient.  Dictated and Authenticated by: Satish Perez M    [BD]      ED Course User Index  [BD] Nasim Jose MD     Clinical Impression:       ICD-10-CM ICD-9-CM   1. Pain of right hip joint M25.551 719.45                  Attending Attestation:     Physician Attestation for Scribe:    I, Dr. Nasim Jose, personally performed the services described in this documentation.   All medical record entries  made by the scribe were at my direction and in my presence.   I have reviewed the chart and agree that the record is accurate and complete.   Nasim Jose MD  4:39 AM 05/01/2019     DISCLAIMER: This note was prepared with [employer] voice recognition transcription software. Garbled syntax, mangled pronouns, and other bizarre constructions may be attributed to that software system.                   Nasim Jose MD  05/01/19 0449

## 2019-05-01 NOTE — ED NOTES
Pt presents to the ED with complaints of sudden posterior R hip pain which began while sitting down. Pain radiates downward into right leg. She reports hx of Sciatica but that she has not had an episode in a while. Bed rails are up and call light is within patient reach. Will continue to monitor.

## 2019-05-16 ENCOUNTER — OFFICE VISIT (OUTPATIENT)
Dept: ORTHOPEDICS | Facility: CLINIC | Age: 79
End: 2019-05-16
Payer: MEDICARE

## 2019-05-16 VITALS
HEART RATE: 63 BPM | DIASTOLIC BLOOD PRESSURE: 64 MMHG | SYSTOLIC BLOOD PRESSURE: 138 MMHG | HEIGHT: 64 IN | WEIGHT: 230 LBS | BODY MASS INDEX: 39.27 KG/M2

## 2019-05-16 DIAGNOSIS — Z96.641 HISTORY OF TOTAL HIP ARTHROPLASTY, RIGHT: Primary | ICD-10-CM

## 2019-05-16 DIAGNOSIS — Z96.642 HISTORY OF TOTAL HIP ARTHROPLASTY, LEFT: ICD-10-CM

## 2019-05-16 PROCEDURE — 3075F SYST BP GE 130 - 139MM HG: CPT | Mod: ,,, | Performed by: ORTHOPAEDIC SURGERY

## 2019-05-16 PROCEDURE — 3075F PR MOST RECENT SYSTOLIC BLOOD PRESS GE 130-139MM HG: ICD-10-PCS | Mod: ,,, | Performed by: ORTHOPAEDIC SURGERY

## 2019-05-16 PROCEDURE — 73502 PR X-RAY EXAM HIP UNI 2-3 VIEWS: ICD-10-PCS | Mod: ,,, | Performed by: ORTHOPAEDIC SURGERY

## 2019-05-16 PROCEDURE — 1101F PT FALLS ASSESS-DOCD LE1/YR: CPT | Mod: ,,, | Performed by: ORTHOPAEDIC SURGERY

## 2019-05-16 PROCEDURE — 3078F PR MOST RECENT DIASTOLIC BLOOD PRESSURE < 80 MM HG: ICD-10-PCS | Mod: ,,, | Performed by: ORTHOPAEDIC SURGERY

## 2019-05-16 PROCEDURE — 99213 OFFICE O/P EST LOW 20 MIN: CPT | Mod: ,,, | Performed by: ORTHOPAEDIC SURGERY

## 2019-05-16 PROCEDURE — 1101F PR PT FALLS ASSESS DOC 0-1 FALLS W/OUT INJ PAST YR: ICD-10-PCS | Mod: ,,, | Performed by: ORTHOPAEDIC SURGERY

## 2019-05-16 PROCEDURE — 99213 PR OFFICE/OUTPT VISIT, EST, LEVL III, 20-29 MIN: ICD-10-PCS | Mod: ,,, | Performed by: ORTHOPAEDIC SURGERY

## 2019-05-16 PROCEDURE — 3078F DIAST BP <80 MM HG: CPT | Mod: ,,, | Performed by: ORTHOPAEDIC SURGERY

## 2019-05-16 PROCEDURE — 73502 X-RAY EXAM HIP UNI 2-3 VIEWS: CPT | Mod: ,,, | Performed by: ORTHOPAEDIC SURGERY

## 2019-05-16 NOTE — PROGRESS NOTES
[facility]    Subjective:     Chief Complaint:   Chief Complaint   Patient presents with    Right Hip - Pain     Right hip pain x 5.1.19. States that she was sitting and states that had gotten a sharp pain in her hip and states that it radiated down her leg. States that the hip felt like it locked up on her.        Past Medical History:   Diagnosis Date    ALLERGIC RHINITIS     Anemia     Arthritis     Cataract     OU    Degenerative disc disease     Diabetes mellitus type II     stopped meds    Diverticulosis     Edema     Fibromyalgia     Gout     Hyperlipidemia     Hypertension     Reflux     Sleep apnea     non compliant with CPAP    Vestibulitis of ear        Past Surgical History:   Procedure Laterality Date    COLONOSCOPY N/A 11/4/2014    Performed by Jerardo García [facility] at [facility]    EGD (ESOPHAGOGASTRODUODENOSCOPY) N/A 2/17/2012    Performed by Jerardo [facility] at [facility] END[facility]    TYLER-TRANSFORAMINAL Right 12/18/2012    Performed by [facility] at [facility] OR    HIP SURGERY      HYSTERECTOMY      INJECTION, JOINT Left 4/29/2013    Performed by [facility] at [facility] OR    INJECTION, JOINT Left 11/13/2012    Performed by [facility] at [facility] OR    INJECTION, JOINT Left 6/6/2012    Performed by [facility] at [facility] OR    INJECTION, JOINT, SHOULDER, HIP, OR KNEE Left 5/9/2014    [facility]    INJECTION, JOINT, SHOULDER, HIP, OR KNEE Left 1/10/2014    [facility]    INJECTION, JOINT, SHOULDER, HIP, OR KNEE Left 10/4/2013    [facility]    INJECTION-JOINT Left 10/3/2014    [facility]    INJECTION-STEROID-EPIDURAL-TRANSFORAMINAL Left 6/5/2014    [facility]    INJECTION-STEROID-EPIDURAL-TRANSFORAMINAL Left 5/2/2012    Performed by [facility] at [facility] OR    JOINT REPLACEMENT      R  total hip       Current Outpatient Medications   Medication Sig    albuterol (PROVENTIL) 2.5 mg /3 mL (0.083 %) nebulizer solution Take 3 mLs (2.5 mg total) by nebulization every 6 (six) hours as needed.    ascorbic acid (VITAMIN C) 100 MG tablet Take 100 mg by mouth once daily.     aspirin 81 MG Chew Take 81 mg by mouth once daily.    b complex vitamins tablet Take 1 tablet by mouth once daily.    budesonide-formoterol 160-4.5 mcg (SYMBICORT) 160-4.5 mcg/actuation HFAA Inhale 2 puffs into the lungs every 12 (twelve) hours. Controller    cloNIDine (CATAPRES) 0.1 MG tablet Take 1 tablet (0.1 mg total) by mouth 2 (two) times daily as needed. Take for systolic pressure greater than 180    cyanocobalamin (VITAMIN B-12) 100 MCG tablet Take 100 mcg by mouth once daily.    diclofenac sodium 1 % Gel Apply 2 g topically 3 (three) times daily.    esomeprazole (NEXIUM) 20 MG capsule Take 1 capsule (20 mg total) by mouth before breakfast. (Patient taking differently: Take 20 mg by mouth daily as needed (heartburn). )    fluticasone (FLONASE) 50 mcg/actuation nasal spray 2 sprays (100 mcg total) by Each Nare route once daily.    furosemide (LASIX) 40 MG tablet Take 1 tablet (40 mg total) by mouth once daily.    lactulose (CHRONULAC) 10 gram/15 mL solution Take 30 mLs (20 g total) by mouth 3 (three) times daily. 2 Teaspoon(s) Oral PRN Every evening.    levocetirizine (XYZAL) 5 MG tablet Take 1 tablet (5 mg total) by mouth every evening.    losartan (COZAAR) 100 MG tablet Take 1 tablet (100 mg total) by mouth once daily.    metoprolol succinate (TOPROL-XL) 50 MG 24 hr tablet TAKE ONE TABLET BY MOUTH ONCE DAILY    montelukast (SINGULAIR) 10 mg tablet Take 1 tablet (10 mg total) by mouth every evening.    spironolactone (ALDACTONE) 50 MG tablet Take 1 tablet (50 mg total) by mouth once daily.    VENTOLIN HFA 90 mcg/actuation inhaler INHALE TWO PUFFS BY MOUTH INTO LUNGS EVERY 6 HOURS AS NEEDED FOR WHEEZING RESCUE     azithromycin (ZITHROMAX) 500 MG tablet One daily for yellow mucous, repeat if needed    predniSONE (DELTASONE) 20 MG tablet One daily for 3 days and repeat for flare of lung symptoms as intructed     Current Facility-Administered Medications   Medication    lidocaine HCL 10 mg/ml (1%) injection 5 mL       Review of patient's allergies indicates:   Allergen Reactions    Cymbalta [duloxetine] Other (See Comments)     Nightmares      Darvon [propoxyphene] Nausea Only and Other (See Comments)     Sweating, slept for 3 days    Atorvastatin Other (See Comments)     Muscle cramps    Naprosyn [naproxen] Nausea Only    Penicillins Rash       Family History   Problem Relation Age of Onset    Hypertension Mother     Diabetes Sister     Glaucoma Neg Hx     Macular degeneration Neg Hx     Retinal detachment Neg Hx        Social History     Socioeconomic History    Marital status:      Spouse name: Not on file    Number of children: Not on file    Years of education: Not on file    Highest education level: Not on file   Occupational History    Not on file   Social Needs    Financial resource strain: Not on file    Food insecurity:     Worry: Not on file     Inability: Not on file    Transportation needs:     Medical: Not on file     Non-medical: Not on file   Tobacco Use    Smoking status: Former Smoker     Packs/day: 0.25     Years: 5.00     Pack years: 1.25     Last attempt to quit: 1972     Years since quittin.3    Smokeless tobacco: Never Used   Substance and Sexual Activity    Alcohol use: Yes     Alcohol/week: 0.0 oz     Comment: Rarely    Drug use: Yes     Types: Oxycodone, Hydrocodone    Sexual activity: Not on file   Lifestyle    Physical activity:     Days per week: Not on file     Minutes per session: Not on file    Stress: Not on file   Relationships    Social connections:     Talks on phone: Not on file     Gets together: Not on file     Attends Cheondo[employer] service: Not  on file     Active member of club or organization: Not on file     Attends meetings of clubs or organizations: Not on file     Relationship status: Not on file   Other Topics Concern    Not on file   Social History Narrative    Not on file       History of present illness: Patient comes in today for her right hip. She had a right total hip done about 5 years ago. She's done very well with that but recently she had an incident where she had severe groin pain which lasted just minutes. She went to the emergency room and nothing was noted. She comes in for follow-up.      Review of Systems:    Constitution: Negative for chills, fever, and sweats.  Negative for unexplained weight loss.    HENT:  Negative for headaches and blurry vision.    Cardiovascular:Negative for chest pain or irregular heart beat. Negative for hypertension.    Respiratory:  Negative for cough and shortness of breath.    Gastrointestinal: Negative for abdominal pain, heartburn, melena, nausea, and vomitting.    Genitourinary:  Negative bladder incontinence and dysuria.    Musculoskeletal:  See HPI for details.     Neurological: Negative for numbness.    Psychiatric/Behavioral: Negative for depression.  The patient is not nervous/anxious.      Endocrine: Negative for polyuria    Hematologic/Lymphatic: Negative for bleeding problem.  Does not bruise/bleed easily.    Skin: Negative for poor would healing and rash    Objective:      Physical Examination:    Vital Signs:    Vitals:    05/16/19 1158   BP: 138/64   Pulse: 63       Body mass index is 39.48 kg/m².    This a well-developed, well nourished patient in no acute distress.  They are alert and oriented and cooperative to examination.        Patient has symmetric leg lengths. She has well-positioned incisions both on the right and left. She has no discomfort with rigorous motion of the hip  Pertinent New Results:    XRAY Report / Interpretation:   AP pelvis demonstrates bilateral total hip  arthroplasties to be in excellent position. No evidence of subsidence or loosening.    Lateral of the right hip demonstrates a total hip arthroplasty be in excellent position with no evidence of subsidence or loosening    Assessment/Plan:      History of bilateral total hips. No intervention at this time as she is asymptomatic. Should she develop further symptoms she will follow-up      This note was created using Dragon voice recognition software that occasionally misinterpreted phrases or words.

## 2019-05-20 ENCOUNTER — TELEPHONE (OUTPATIENT)
Dept: PULMONOLOGY | Facility: CLINIC | Age: 79
End: 2019-05-20

## 2019-05-20 NOTE — TELEPHONE ENCOUNTER
Faxed copies to [facility]     ----- Message from Jaelyn Paz sent at 5/20/2019  3:03 PM CDT -----  Contact: self  Patient [phone] is calling/[facility] will not fill prescription for c-pap and supplies as they are needing patient's sleep study results from [facility] from about 8 years ago/please send as soon as possible

## 2019-06-14 ENCOUNTER — LAB VISIT (OUTPATIENT)
Dept: LAB | Facility: HOSPITAL | Age: 79
End: 2019-06-14
Attending: INTERNAL MEDICINE
Payer: MEDICARE

## 2019-06-14 DIAGNOSIS — E11.9 DIABETES MELLITUS WITHOUT COMPLICATION: ICD-10-CM

## 2019-06-14 DIAGNOSIS — I12.9 PARENCHYMAL RENAL HYPERTENSION: ICD-10-CM

## 2019-06-14 DIAGNOSIS — R80.9 PROTEINURIA: ICD-10-CM

## 2019-06-14 DIAGNOSIS — J20.9 ACUTE BRONCHITIS: Primary | ICD-10-CM

## 2019-06-14 DIAGNOSIS — N18.30 CHRONIC KIDNEY DISEASE, STAGE III (MODERATE): ICD-10-CM

## 2019-06-14 DIAGNOSIS — N18.9 ANEMIA OF CHRONIC RENAL FAILURE: ICD-10-CM

## 2019-06-14 DIAGNOSIS — D63.1 ANEMIA OF CHRONIC RENAL FAILURE: ICD-10-CM

## 2019-06-14 LAB
ALBUMIN SERPL BCP-MCNC: 3.3 G/DL (ref 3.5–5.2)
ALP SERPL-CCNC: 63 U/L (ref 55–135)
ALT SERPL W/O P-5'-P-CCNC: 20 U/L (ref 10–44)
ANION GAP SERPL CALC-SCNC: 7 MMOL/L (ref 8–16)
AST SERPL-CCNC: 16 U/L (ref 10–40)
BASOPHILS # BLD AUTO: 0.02 K/UL (ref 0–0.2)
BASOPHILS NFR BLD: 0.3 % (ref 0–1.9)
BILIRUB SERPL-MCNC: 0.6 MG/DL (ref 0.1–1)
BUN SERPL-MCNC: 18 MG/DL (ref 8–23)
CALCIUM SERPL-MCNC: 9.3 MG/DL (ref 8.7–10.5)
CHLORIDE SERPL-SCNC: 105 MMOL/L (ref 95–110)
CO2 SERPL-SCNC: 27 MMOL/L (ref 23–29)
CREAT SERPL-MCNC: 1.2 MG/DL (ref 0.5–1.4)
DIFFERENTIAL METHOD: ABNORMAL
EOSINOPHIL # BLD AUTO: 0.1 K/UL (ref 0–0.5)
EOSINOPHIL NFR BLD: 1.7 % (ref 0–8)
ERYTHROCYTE [DISTWIDTH] IN BLOOD BY AUTOMATED COUNT: 12.9 % (ref 11.5–14.5)
EST. GFR  (AFRICAN AMERICAN): 50.7 ML/MIN/1.73 M^2
EST. GFR  (NON AFRICAN AMERICAN): 44 ML/MIN/1.73 M^2
GLUCOSE SERPL-MCNC: 113 MG/DL (ref 70–110)
HCT VFR BLD AUTO: 38.2 % (ref 37–48.5)
HGB BLD-MCNC: 12.2 G/DL (ref 12–16)
IMM GRANULOCYTES # BLD AUTO: 0.02 K/UL (ref 0–0.04)
IMM GRANULOCYTES NFR BLD AUTO: 0.3 % (ref 0–0.5)
LYMPHOCYTES # BLD AUTO: 2.1 K/UL (ref 1–4.8)
LYMPHOCYTES NFR BLD: 27.7 % (ref 18–48)
MCH RBC QN AUTO: 33.2 PG (ref 27–31)
MCHC RBC AUTO-ENTMCNC: 31.9 G/DL (ref 32–36)
MCV RBC AUTO: 104 FL (ref 82–98)
MONOCYTES # BLD AUTO: 0.9 K/UL (ref 0.3–1)
MONOCYTES NFR BLD: 11.4 % (ref 4–15)
NEUTROPHILS # BLD AUTO: 4.5 K/UL (ref 1.8–7.7)
NEUTROPHILS NFR BLD: 58.6 % (ref 38–73)
NRBC BLD-RTO: 0 /100 WBC
PLATELET # BLD AUTO: 183 K/UL (ref 150–350)
PMV BLD AUTO: 11.5 FL (ref 9.2–12.9)
POTASSIUM SERPL-SCNC: 5.2 MMOL/L (ref 3.5–5.1)
PROT SERPL-MCNC: 7.1 G/DL (ref 6–8.4)
RBC # BLD AUTO: 3.67 M/UL (ref 4–5.4)
SODIUM SERPL-SCNC: 139 MMOL/L (ref 136–145)
WBC # BLD AUTO: 7.69 K/UL (ref 3.9–12.7)

## 2019-06-14 PROCEDURE — 80053 COMPREHEN METABOLIC PANEL: CPT

## 2019-06-14 PROCEDURE — 36415 COLL VENOUS BLD VENIPUNCTURE: CPT | Mod: PO

## 2019-06-14 PROCEDURE — 85025 COMPLETE CBC W/AUTO DIFF WBC: CPT

## 2019-06-20 ENCOUNTER — OFFICE VISIT (OUTPATIENT)
Dept: FAMILY MEDICINE | Facility: CLINIC | Age: 79
End: 2019-06-20
Payer: MEDICARE

## 2019-06-20 VITALS
BODY MASS INDEX: 40.12 KG/M2 | DIASTOLIC BLOOD PRESSURE: 78 MMHG | TEMPERATURE: 99 F | HEART RATE: 78 BPM | HEIGHT: 64 IN | SYSTOLIC BLOOD PRESSURE: 136 MMHG | WEIGHT: 235 LBS | OXYGEN SATURATION: 96 %

## 2019-06-20 DIAGNOSIS — I15.2 HYPERTENSION ASSOCIATED WITH DIABETES: ICD-10-CM

## 2019-06-20 DIAGNOSIS — E78.00 TYPE 2 DIABETES MELLITUS WITH HYPERCHOLESTEROLEMIA: ICD-10-CM

## 2019-06-20 DIAGNOSIS — N18.30 CONTROLLED TYPE 2 DIABETES MELLITUS WITH STAGE 3 CHRONIC KIDNEY DISEASE, WITHOUT LONG-TERM CURRENT USE OF INSULIN: ICD-10-CM

## 2019-06-20 DIAGNOSIS — E11.22 CONTROLLED TYPE 2 DIABETES MELLITUS WITH STAGE 3 CHRONIC KIDNEY DISEASE, WITHOUT LONG-TERM CURRENT USE OF INSULIN: ICD-10-CM

## 2019-06-20 DIAGNOSIS — E66.01 OBESITY, MORBID, BMI 40.0-49.9: ICD-10-CM

## 2019-06-20 DIAGNOSIS — E11.69 TYPE 2 DIABETES MELLITUS WITH HYPERCHOLESTEROLEMIA: ICD-10-CM

## 2019-06-20 DIAGNOSIS — R22.1 LUMP IN NECK: Primary | ICD-10-CM

## 2019-06-20 DIAGNOSIS — E11.59 HYPERTENSION ASSOCIATED WITH DIABETES: ICD-10-CM

## 2019-06-20 PROCEDURE — 99999 PR PBB SHADOW E&M-EST. PATIENT-LVL IV: ICD-10-PCS | Mod: PBBFAC,,, | Performed by: NURSE PRACTITIONER

## 2019-06-20 PROCEDURE — 99499 RISK ADDL DX/OHS AUDIT: ICD-10-PCS | Mod: S$GLB,,, | Performed by: NURSE PRACTITIONER

## 2019-06-20 PROCEDURE — 1101F PR PT FALLS ASSESS DOC 0-1 FALLS W/OUT INJ PAST YR: ICD-10-PCS | Mod: CPTII,S$GLB,, | Performed by: NURSE PRACTITIONER

## 2019-06-20 PROCEDURE — 1101F PT FALLS ASSESS-DOCD LE1/YR: CPT | Mod: CPTII,S$GLB,, | Performed by: NURSE PRACTITIONER

## 2019-06-20 PROCEDURE — 3078F PR MOST RECENT DIASTOLIC BLOOD PRESSURE < 80 MM HG: ICD-10-PCS | Mod: CPTII,S$GLB,, | Performed by: NURSE PRACTITIONER

## 2019-06-20 PROCEDURE — 99214 PR OFFICE/OUTPT VISIT, EST, LEVL IV, 30-39 MIN: ICD-10-PCS | Mod: S$GLB,,, | Performed by: NURSE PRACTITIONER

## 2019-06-20 PROCEDURE — 99499 UNLISTED E&M SERVICE: CPT | Mod: S$GLB,,, | Performed by: NURSE PRACTITIONER

## 2019-06-20 PROCEDURE — 99999 PR PBB SHADOW E&M-EST. PATIENT-LVL IV: CPT | Mod: PBBFAC,,, | Performed by: NURSE PRACTITIONER

## 2019-06-20 PROCEDURE — 3075F PR MOST RECENT SYSTOLIC BLOOD PRESS GE 130-139MM HG: ICD-10-PCS | Mod: CPTII,S$GLB,, | Performed by: NURSE PRACTITIONER

## 2019-06-20 PROCEDURE — 99214 OFFICE O/P EST MOD 30 MIN: CPT | Mod: S$GLB,,, | Performed by: NURSE PRACTITIONER

## 2019-06-20 PROCEDURE — 3078F DIAST BP <80 MM HG: CPT | Mod: CPTII,S$GLB,, | Performed by: NURSE PRACTITIONER

## 2019-06-20 PROCEDURE — 3075F SYST BP GE 130 - 139MM HG: CPT | Mod: CPTII,S$GLB,, | Performed by: NURSE PRACTITIONER

## 2019-06-20 NOTE — PROGRESS NOTES
Subjective:       Patient ID: Sammi Abreu is a 78 y.o. female.    Chief Complaint: follow up hypertension    Hypertension   This is a chronic problem. The current episode started more than 1 year ago. Pertinent negatives include no chest pain, headaches, palpitations or shortness of breath.     The 10-year ASCVD risk score (Jatinder GUZMAN Jr., et al., 2013) is: 31.4%    Values used to calculate the score:      Age: 78 years      Sex: Female      Is Non- : Yes      Diabetic: Yes      Tobacco smoker: No      Systolic Blood Pressure: 136 mmHg      Is BP treated: Yes      HDL Cholesterol: 43 mg/dL      Total Cholesterol: 184 mg/dL    Past Medical History:   Diagnosis Date    ALLERGIC RHINITIS     Anemia     Arthritis     Cataract     OU    Degenerative disc disease     Diabetes mellitus type II     stopped meds    Diverticulosis     Edema     Fibromyalgia     Gout     Hyperlipidemia     Hypertension     Reflux     Sleep apnea     non compliant with CPAP    Vestibulitis of ear        Review of patient's allergies indicates:   Allergen Reactions    Cymbalta [duloxetine] Other (See Comments)     Nightmares      Darvon [propoxyphene] Nausea Only and Other (See Comments)     Sweating, slept for 3 days    Atorvastatin Other (See Comments)     Muscle cramps    Naprosyn [naproxen] Nausea Only    Penicillins Rash         Current Outpatient Medications:     albuterol (PROVENTIL) 2.5 mg /3 mL (0.083 %) nebulizer solution, Take 3 mLs (2.5 mg total) by nebulization every 6 (six) hours as needed., Disp: 120 each, Rfl: 11    ascorbic acid (VITAMIN C) 100 MG tablet, Take 100 mg by mouth once daily. , Disp: , Rfl:     aspirin 81 MG Chew, Take 81 mg by mouth once daily., Disp: , Rfl:     b complex vitamins tablet, Take 1 tablet by mouth once daily., Disp: , Rfl:     budesonide-formoterol 160-4.5 mcg (SYMBICORT) 160-4.5 mcg/actuation HFAA, Inhale 2 puffs into the lungs every 12 (twelve)  hours. Controller, Disp: 3 Inhaler, Rfl: 3    cloNIDine (CATAPRES) 0.1 MG tablet, Take 1 tablet (0.1 mg total) by mouth 2 (two) times daily as needed. Take for systolic pressure greater than 180, Disp: 15 tablet, Rfl: 0    cyanocobalamin (VITAMIN B-12) 100 MCG tablet, Take 100 mcg by mouth once daily., Disp: , Rfl:     diclofenac sodium 1 % Gel, Apply 2 g topically 3 (three) times daily., Disp: 300 g, Rfl: 3    esomeprazole (NEXIUM) 20 MG capsule, Take 1 capsule (20 mg total) by mouth before breakfast. (Patient taking differently: Take 20 mg by mouth daily as needed (heartburn). ), Disp: 30 capsule, Rfl: 2    fluticasone (FLONASE) 50 mcg/actuation nasal spray, 2 sprays (100 mcg total) by Each Nare route once daily., Disp: 3 Bottle, Rfl: 3    furosemide (LASIX) 40 MG tablet, Take 1 tablet (40 mg total) by mouth once daily., Disp: 90 tablet, Rfl: 4    lactulose (CHRONULAC) 10 gram/15 mL solution, Take 30 mLs (20 g total) by mouth 3 (three) times daily. 2 Teaspoon(s) Oral PRN Every evening., Disp: 500 mL, Rfl: 3    levocetirizine (XYZAL) 5 MG tablet, Take 1 tablet (5 mg total) by mouth every evening., Disp: 30 tablet, Rfl: 11    losartan (COZAAR) 100 MG tablet, Take 1 tablet (100 mg total) by mouth once daily., Disp: 90 tablet, Rfl: 4    metoprolol succinate (TOPROL-XL) 50 MG 24 hr tablet, TAKE ONE TABLET BY MOUTH ONCE DAILY, Disp: 90 tablet, Rfl: 3    montelukast (SINGULAIR) 10 mg tablet, Take 1 tablet (10 mg total) by mouth every evening., Disp: 90 tablet, Rfl: 3    predniSONE (DELTASONE) 20 MG tablet, One daily for 3 days and repeat for flare of lung symptoms as intructed, Disp: 21 tablet, Rfl: 1    spironolactone (ALDACTONE) 50 MG tablet, Take 1 tablet (50 mg total) by mouth once daily., Disp: 90 tablet, Rfl: 3    VENTOLIN HFA 90 mcg/actuation inhaler, INHALE TWO PUFFS BY MOUTH INTO LUNGS EVERY 6 HOURS AS NEEDED FOR WHEEZING RESCUE, Disp: 54 each, Rfl: 3    Current Facility-Administered Medications:  "    lidocaine HCL 10 mg/ml (1%) injection 5 mL, 5 mL, Other, Once, Matt Gilliam MD    Review of Systems   Constitutional: Negative for unexpected weight change.   HENT: Negative for trouble swallowing.    Eyes: Negative for visual disturbance.   Respiratory: Negative for shortness of breath.    Cardiovascular: Negative for chest pain, palpitations and leg swelling.   Gastrointestinal: Negative for blood in stool.   Genitourinary: Negative for hematuria.   Skin: Negative for rash.   Allergic/Immunologic: Negative for immunocompromised state.   Neurological: Negative for headaches.   Hematological: Does not bruise/bleed easily.   Psychiatric/Behavioral: Negative for agitation. The patient is not nervous/anxious.        Objective:      /78 (BP Location: Left arm, Patient Position: Sitting, BP Method: Large (Manual))   Pulse 78   Temp 98.7 °F (37.1 °C) (Oral)   Ht 5' 4" (1.626 m)   Wt 106.6 kg (235 lb 0.2 oz)   SpO2 96%   BMI 40.34 kg/m²   Physical Exam   Constitutional: She is oriented to person, place, and time. She appears well-developed and well-nourished.   Eyes: Pupils are equal, round, and reactive to light. EOM are normal.   Neck: Normal range of motion.   Cardiovascular: Normal rate, regular rhythm and normal heart sounds.   Pulmonary/Chest: Effort normal and breath sounds normal.   Abdominal: Soft. Bowel sounds are normal.   Musculoskeletal: Normal range of motion.   Neurological: She is alert and oriented to person, place, and time.   Skin: Skin is warm and dry.   Psychiatric: She has a normal mood and affect. Her behavior is normal. Judgment and thought content normal.       Assessment:       1. Lump in neck    2. Controlled type 2 diabetes mellitus with stage 3 chronic kidney disease, without long-term current use of insulin    3. Hypertension associated with diabetes    4. Type 2 diabetes mellitus with hypercholesterolemia    5. Obesity, morbid, BMI 40.0-49.9        Plan:       Lump in neck  -  "    US Soft Tissue Head Neck Thyroid; Future; Expected date: 06/20/2019    Controlled type 2 diabetes mellitus with stage 3 chronic kidney disease, without long-term current use of insulin  -     TSH; Future; Expected date: 06/20/2019  -     T4, free; Future; Expected date: 06/20/2019  -     Hemoglobin A1c; Future; Expected date: 06/20/2019  Stable, diet and exercise controlled  Hemoglobin A1C   Date Value Ref Range Status   03/07/2019 6.4 (H) 4.0 - 5.6 % Final     Comment:     ADA Screening Guidelines:  5.7-6.4%  Consistent with prediabetes  >or=6.5%  Consistent with diabetes  High levels of fetal hemoglobin interfere with the HbA1C  assay. Heterozygous hemoglobin variants (HbS, HgC, etc)do  not significantly interfere with this assay.   However, presence of multiple variants may affect accuracy.     03/07/2018 5.8 (H) 4.0 - 5.6 % Final     Comment:     According to ADA guidelines, hemoglobin A1c <7.0% represents  optimal control in non-pregnant diabetic patients. Different  metrics may apply to specific patient populations.   Standards of Medical Care in Diabetes-2016.  For the purpose of screening for the presence of diabetes:  <5.7%     Consistent with the absence of diabetes  5.7-6.4%  Consistent with increasing risk for diabetes   (prediabetes)  >or=6.5%  Consistent with diabetes  Currently, no consensus exists for use of hemoglobin A1c  for diagnosis of diabetes for children.  This Hemoglobin A1c assay has significant interference with fetal   hemoglobin   (HbF). The results are invalid for patients with abnormal amounts of   HbF,   including those with known Hereditary Persistence   of Fetal Hemoglobin. Heterozygous hemoglobin variants (HbAS, HbAC,   HbAD, HbAE, HbA2) do not significantly interfere with this assay;   however, presence of multiple variants in a sample may impact the %   interference.     09/07/2017 5.8 (H) 4.0 - 5.6 % Final     Comment:     According to ADA guidelines, hemoglobin A1c <7.0%  represents  optimal control in non-pregnant diabetic patients. Different  metrics may apply to specific patient populations.   Standards of Medical Care in Diabetes-2016.  For the purpose of screening for the presence of diabetes:  <5.7%     Consistent with the absence of diabetes  5.7-6.4%  Consistent with increasing risk for diabetes   (prediabetes)  >or=6.5%  Consistent with diabetes  Currently, no consensus exists for use of hemoglobin A1c  for diagnosis of diabetes for children.  This Hemoglobin A1c assay has significant interference with fetal   hemoglobin   (HbF). The results are invalid for patients with abnormal amounts of   HbF,   including those with known Hereditary Persistence   of Fetal Hemoglobin. Heterozygous hemoglobin variants (HbAS, HbAC,   HbAD, HbAE, HbA2) do not significantly interfere with this assay;   however, presence of multiple variants in a sample may impact the %   interference.       Hypertension associated with diabetes  -     Comprehensive metabolic panel; Future; Expected date: 06/20/2019  -     CBC W/ AUTO DIFFERENTIAL; Future; Expected date: 06/20/2019  Controlled, continue medication  BP Readings from Last 3 Encounters:   06/20/19 136/78   05/16/19 138/64   05/01/19 137/65     Type 2 diabetes mellitus with hypercholesterolemia  -     Lipid panel; Future; Expected date: 06/20/2019   The 10-year ASCVD risk score ([place] THOMAS Jr., et al., 2013) is: 31.4%    Values used to calculate the score:      Age: 78 years      Sex: Female      Is Non- : Yes      Diabetic: Yes      Tobacco smoker: No      Systolic Blood Pressure: 136 mmHg      Is BP treated: Yes      HDL Cholesterol: 43 mg/dL      Total Cholesterol: 184 mg/dL     Obesity, morbid, BMI 40.0-49.9  Counseled patient on his ideal body weight, health consequences of being obese and current recommendations including weekly exercise and a heart healthy diet.  Current BMI is:Estimated body mass index is 40.34 kg/m²  "as calculated from the following:    Height as of this encounter: 5' 4" (1.626 m).    Weight as of this encounter: 106.6 kg (235 lb 0.2 oz)..  Patient is aware that ideal BMI < 25 or Weight in (lb) to have BMI = 25: 145.3.            Patient readiness: acceptance and barriers:none    During the course of the visit the patient was educated and counseled about the following:     Diabetes:  Addressed ADA diet.  Encouraged aerobic exercise.  Hypertension:   Dietary sodium restriction.  Regular aerobic exercise.  Obesity:   General weight loss/lifestyle modification strategies discussed (elicit support from others; identify saboteurs; non-food rewards, etc).  Regular aerobic exercise program discussed.    Goals: Diabetes: Maintain Hemoglobin A1C below 7, Hypertension: Reduce Blood Pressure and Obesity: Reduce calorie intake and BMI    Did patient meet goals/outcomes: No    The following self management tools provided: declined    Patient Instructions (the written plan) was given to the patient/family.     Time spent with patient: 30 minutes    Barriers to medications present (no )    Adverse reactions to current medications (no)    Over the counter medications reviewed (Yes)        "

## 2019-06-21 ENCOUNTER — HOSPITAL ENCOUNTER (OUTPATIENT)
Dept: RADIOLOGY | Facility: CLINIC | Age: 79
Discharge: HOME | End: 2019-06-21
Attending: NURSE PRACTITIONER
Payer: MEDICARE

## 2019-06-21 DIAGNOSIS — R22.1 LUMP IN NECK: ICD-10-CM

## 2019-06-21 PROCEDURE — 76536 US EXAM OF HEAD AND NECK: CPT | Mod: 26,,, | Performed by: RADIOLOGY

## 2019-06-21 PROCEDURE — 76536 US SOFT TISSUE HEAD NECK THYROID: ICD-10-PCS | Mod: 26,,, | Performed by: RADIOLOGY

## 2019-06-21 PROCEDURE — 76536 US EXAM OF HEAD AND NECK: CPT | Mod: TC,PO

## 2019-06-27 ENCOUNTER — OFFICE VISIT (OUTPATIENT)
Dept: FAMILY MEDICINE | Facility: CLINIC | Age: 79
End: 2019-06-27
Payer: MEDICARE

## 2019-06-27 ENCOUNTER — NURSE TRIAGE (OUTPATIENT)
Dept: ADMINISTRATIVE | Facility: CLINIC | Age: 79
End: 2019-06-27

## 2019-06-27 ENCOUNTER — HOSPITAL ENCOUNTER (OUTPATIENT)
Dept: RADIOLOGY | Facility: CLINIC | Age: 79
Discharge: HOME | End: 2019-06-27
Attending: NURSE PRACTITIONER
Payer: MEDICARE

## 2019-06-27 VITALS
BODY MASS INDEX: 39.61 KG/M2 | HEART RATE: 62 BPM | HEIGHT: 64 IN | DIASTOLIC BLOOD PRESSURE: 62 MMHG | SYSTOLIC BLOOD PRESSURE: 120 MMHG | WEIGHT: 232 LBS | TEMPERATURE: 99 F

## 2019-06-27 DIAGNOSIS — R07.81 RIB PAIN ON LEFT SIDE: ICD-10-CM

## 2019-06-27 DIAGNOSIS — R73.03 PREDIABETES: ICD-10-CM

## 2019-06-27 DIAGNOSIS — J45.31 MILD PERSISTENT ASTHMA WITH ACUTE EXACERBATION: Primary | ICD-10-CM

## 2019-06-27 DIAGNOSIS — R05.9 COUGH: ICD-10-CM

## 2019-06-27 DIAGNOSIS — R06.02 SHORTNESS OF BREATH: ICD-10-CM

## 2019-06-27 DIAGNOSIS — I10 ESSENTIAL HYPERTENSION: ICD-10-CM

## 2019-06-27 DIAGNOSIS — R09.82 POST-NASAL DRIP: ICD-10-CM

## 2019-06-27 DIAGNOSIS — E66.01 SEVERE OBESITY (BMI 35.0-39.9) WITH COMORBIDITY: ICD-10-CM

## 2019-06-27 DIAGNOSIS — R06.2 WHEEZING: ICD-10-CM

## 2019-06-27 DIAGNOSIS — R60.0 PERIPHERAL EDEMA: ICD-10-CM

## 2019-06-27 PROBLEM — R09.89 CHRONIC SINUS COMPLAINTS: Status: RESOLVED | Noted: 2019-04-22 | Resolved: 2019-06-27

## 2019-06-27 PROBLEM — R73.02 GLUCOSE INTOLERANCE (IMPAIRED GLUCOSE TOLERANCE): Chronic | Status: RESOLVED | Noted: 2018-09-18 | Resolved: 2019-06-27

## 2019-06-27 PROBLEM — R09.89 LABILE HYPERTENSION: Status: RESOLVED | Noted: 2019-03-21 | Resolved: 2019-06-27

## 2019-06-27 PROCEDURE — 71100 XR RIBS 2 VIEW LEFT: ICD-10-PCS | Mod: 26,LT,S$GLB, | Performed by: RADIOLOGY

## 2019-06-27 PROCEDURE — 99999 PR PBB SHADOW E&M-EST. PATIENT-LVL V: ICD-10-PCS | Mod: PBBFAC,,, | Performed by: NURSE PRACTITIONER

## 2019-06-27 PROCEDURE — 1101F PR PT FALLS ASSESS DOC 0-1 FALLS W/OUT INJ PAST YR: ICD-10-PCS | Mod: CPTII,S$GLB,, | Performed by: NURSE PRACTITIONER

## 2019-06-27 PROCEDURE — 71100 X-RAY EXAM RIBS UNI 2 VIEWS: CPT | Mod: 26,LT,S$GLB, | Performed by: RADIOLOGY

## 2019-06-27 PROCEDURE — 99499 UNLISTED E&M SERVICE: CPT | Mod: S$GLB,,, | Performed by: NURSE PRACTITIONER

## 2019-06-27 PROCEDURE — 99999 PR PBB SHADOW E&M-EST. PATIENT-LVL V: CPT | Mod: PBBFAC,,, | Performed by: NURSE PRACTITIONER

## 2019-06-27 PROCEDURE — 99214 PR OFFICE/OUTPT VISIT, EST, LEVL IV, 30-39 MIN: ICD-10-PCS | Mod: S$GLB,,, | Performed by: NURSE PRACTITIONER

## 2019-06-27 PROCEDURE — 3074F SYST BP LT 130 MM HG: CPT | Mod: CPTII,S$GLB,, | Performed by: NURSE PRACTITIONER

## 2019-06-27 PROCEDURE — 99499 RISK ADDL DX/OHS AUDIT: ICD-10-PCS | Mod: S$GLB,,, | Performed by: NURSE PRACTITIONER

## 2019-06-27 PROCEDURE — 71100 X-RAY EXAM RIBS UNI 2 VIEWS: CPT | Mod: TC,FY,PO,LT

## 2019-06-27 PROCEDURE — 99214 OFFICE O/P EST MOD 30 MIN: CPT | Mod: S$GLB,,, | Performed by: NURSE PRACTITIONER

## 2019-06-27 PROCEDURE — 3078F DIAST BP <80 MM HG: CPT | Mod: CPTII,S$GLB,, | Performed by: NURSE PRACTITIONER

## 2019-06-27 PROCEDURE — 3078F PR MOST RECENT DIASTOLIC BLOOD PRESSURE < 80 MM HG: ICD-10-PCS | Mod: CPTII,S$GLB,, | Performed by: NURSE PRACTITIONER

## 2019-06-27 PROCEDURE — 1101F PT FALLS ASSESS-DOCD LE1/YR: CPT | Mod: CPTII,S$GLB,, | Performed by: NURSE PRACTITIONER

## 2019-06-27 PROCEDURE — 3074F PR MOST RECENT SYSTOLIC BLOOD PRESSURE < 130 MM HG: ICD-10-PCS | Mod: CPTII,S$GLB,, | Performed by: NURSE PRACTITIONER

## 2019-06-27 RX ORDER — GUAIFENESIN/DEXTROMETHORPHAN 100-10MG/5
5 SYRUP ORAL EVERY 6 HOURS PRN
Qty: 120 ML | Refills: 0 | COMMUNITY
Start: 2019-06-27 | End: 2019-07-07

## 2019-06-27 RX ORDER — AZITHROMYCIN 250 MG/1
TABLET, FILM COATED ORAL
Qty: 6 TABLET | Refills: 0 | Status: ON HOLD | OUTPATIENT
Start: 2019-06-27 | End: 2020-06-11 | Stop reason: HOSPADM

## 2019-06-27 RX ORDER — TRAMADOL HYDROCHLORIDE 50 MG/1
50 TABLET ORAL EVERY 6 HOURS PRN
Qty: 25 TABLET | Refills: 0 | Status: SHIPPED | OUTPATIENT
Start: 2019-06-27 | End: 2020-05-25

## 2019-06-27 NOTE — TELEPHONE ENCOUNTER
"Left lower ribcage pain, radiates into her back, getting worse, couldn't sleep.  Difficulty breathing, when she exerts herself, pain is worse when she takes a deep breath.  She has a cough, but states it's not much, but she coughs almost constantly through our conversation.  She has a humidifier and is using "DM Robitussin " cough medicine, nebulizer, and inhalers, none of it has been  Helping.  Refuses ER or [facility], wants an appt.    Reason for Disposition   SEVERE chest pain   Difficulty breathing    Protocols used: CHEST PAIN-A-OH      "

## 2019-06-27 NOTE — PATIENT INSTRUCTIONS
Rib Fracture    You broke one or more ribs. This is called a rib fracture. Rib fractures do not require a cast like other bones. They will heal by themselves in about 4-6 weeks. The first 3-4 weeks will be the most painful because deep breathing, coughing, or changing position from sitting to lying down, may cause the broken ends to move slightly.  Home care  · Rest. You should not be doing any heavy lifting or strenuous exertion until the pain goes away.  · It hurts to breathe when you have a broken rib. This puts you at risk of getting pneumonia from poor airflow through your lungs. To prevent this:  ¨ Take several very deep breaths once an hour while you're awake. Exhale through pursed lips as if you are blowing up a balloon. If possible, actually blow up a balloon or a rubber glove. This exercise builds up pressure inside the lung and prevents collapse of the small air sacs of the lung. This exercise may cause some pain at the site of injury, which is normal.  ¨ You may have gotten a breathing exercise device called an incentive spirometer. Use it at least 4 times a day, or as directed.  · Apply an ice pack over the injured area for 15 to 20 minutes every 1 to 2 hours. You should do this for the first 24 to 48 hours. You can make an ice pack by filling a plastic bag that seals at the top with ice cubes and then wrapping it with a thin towel. Continue with ice packs as needed for the relief of pain and swelling.  · You may use over-the-counter pain medicine to control pain, unless another pain medicine was prescribed. If you have chronic liver or kidney disease or ever had a stomach ulcer or GI bleeding, talk with your healthcare provider before using these medicines.  · If your pain is not controlled, contact your healthcare provider. Sometimes a stronger pain medicine may be needed. A nerve block can be done in case of severe pain. It will numb the nerve between the ribs.  Follow-up care  Follow up with your  healthcare provider, or as advised. Rarely, a broken rib will cause complications within the first few days that may not be evident during your initial exam. This can include collapsed lung, bleeding around the lung or into the abdomen, or pneumonia. Therefore, watch for the signs below.  If X-rays were taken, you will be told of any new findings that may affect your care.  Call 911  Call 911 if you have:  · Dizziness, weakness or fainting  · Shortness of breath with or without chest discomfort  · New or worsening abdominal pain  · Discomfort in other areas of your upper body such as your shoulders, jaw, neck, or arms  When to seek medical advice  Call your healthcare provider right away if any of these occur:  · Increasing chest pain with breathing  · Fever of 100.4°F (38°C) or above lasting for 24 to 48 hours  · Congested cough  Date Last Reviewed: 12/3/2015  © 5415-9657 [employer]. [address] [place] [address]. All rights reserved. This information is not intended as a substitute for professional medical care. Always follow your healthcare professional's instructions.        Costochondritis    Costochondritis is inflammation of a rib or the cartilage that connects a rib to your breastbone (sternum). It causes tenderness, and sometimes chest pain may be sharp or aching, or it may feel like pressure. Pain may get worse with deep breathing, movement, or exercise. In some cases, the pain is mistaken for a heart attack. Despite this, the condition is not serious. Read on to learn more about the condition and how it can be treated.  What causes costochondritis?  The cause of costochondritis is not completely clear, but it may happen after a chest injury, chest infection, or coughing episode. Some physical activities can sometimes lead to costochondritis. Large-breasted women may be more likely to have the condition. Often, the reason for the inflammation is unknown.  Diagnosing  costochondritis  There is no test for costochondritis. The condition is diagnosed by the symptoms you have. Your healthcare provider will perform a physical exam. He or she will ask you about your symptoms and examine your chest for tenderness. In some cases, tests are done to rule out more serious problems. These tests may include imaging tests such as chest X-ray, CT scan, or an ECG.  Treating costochondritis  If an underlying cause is found, treatment for that will likely relieve the problem. Costochondritis often goes away on its own. The course of the condition varies from person to person. It usually lasts from weeks to months. In some cases, mild symptoms continue for months to years. To ease symptoms:  · Take medicine as directed. These relieve pain and swelling. Ibuprofen or other NSAIDs are often recommended. In some cases, you may be given prescription medicine, such as muscle relaxants.  · Avoid activities that put stress on the chest or spine.  · Apply a heating pad (set to warm, not too high, heat) to the breastbone several times a day.  · Perform stretching exercises as directed.  Call the healthcare provider right away if you have any of the following:  · Pain that is not relieved by medicine  · Shortness of breath  · Lightheadedness, dizziness, or fainting  · Feeling of irregular heartbeat or fast pulse  Anyone with chest pain should see a healthcare provider, especially those who are older and may be at risk for heart disease.   Date Last Reviewed: 10/1/2016  © 3363-7887 [employer]. [address] [place] [address], [place] [address]. All rights reserved. This information is not intended as a substitute for professional medical care. Always follow your healthcare professional's instructions.

## 2019-06-27 NOTE — PROGRESS NOTES
Subjective:       Patient ID: Sammi Abreu is a 78 y.o. female.    Chief Complaint: Cough and Shortness of Breath    Chief Complaint  Chief Complaint   Patient presents with    Cough    Shortness of Breath       HPI  Sammi Abreu is a 78 y.o. female with medical diagnoses as listed in the medical history and problem list that presents with complaints of shortness of breath, cough, and pain to the left lower ribcage below breast that radiates into back with cough and deep breath.  She has had the symptoms for six days.  Her oxygen saturation on room air by pulse ox is 98%.  She does have a history of asthma with prescription for nebulizer treatments, Symbicort inhaler, daily prednisone 20 mg for 3 days as needed for flare and repeat as necessary, and Singulair. She has been taking all prescribed meds along with prednisone 20 mg daily for 7 days. Reviewed original instructions on prednisone prescription from Dr. Suero with patient.  Patient has also been taking mucinex, robitussin DM and tylenol. Cough is non productive. Denies fever and chills. Has had some wheezing. Symptoms are constant. Patient is regularly followed for hypertension, hyperlipidemia, prediabetes, GERD, and obstructive sleep apnea.  Patient does not wear a CPAP due to inability to tolerate.  Blood pressure today is 120/62.  BMI is 39.82 and the patient has lost 3 lb since her last visit.  Established patient with last clinic appointment 6/20/2019.        PAST MEDICAL HISTORY:  Past Medical History:   Diagnosis Date    ALLERGIC RHINITIS     Anemia     Arthritis     Cataract     OU    Degenerative disc disease     Diverticulosis     Edema     Fibromyalgia     Gout     Hyperlipidemia     Hypertension     Reflux     Sleep apnea     non compliant with CPAP    Vestibulitis of ear        PAST SURGICAL HISTORY:  Past Surgical History:   Procedure Laterality Date    COLONOSCOPY N/A 11/4/2014    Performed by [facility]  at [facility]    EGD (ESOPHAGOGASTRODUODENOSCOPY) N/A 2012    [facility] at [facility]    TYLER-TRANSFORAMINAL Right 2012    [facility]    HIP SURGERY      HYSTERECTOMY      INJECTION, JOINT Left 2013    [facility]    INJECTION, JOINT Left 2012    [facility]    INJECTION, JOINT Left 2012    [facility]    INJECTION, JOINT, SHOULDER, HIP, OR KNEE Left 2014    [facility]    INJECTION, JOINT, SHOULDER, HIP, OR KNEE Left 1/10/2014    [facility]    INJECTION, JOINT, SHOULDER, HIP, OR KNEE Left 10/4/2013    [facility]    INJECTION-JOINT Left 10/3/2014    [facility]    INJECTION-STEROID-EPIDURAL-TRANSFORAMINAL Left 2014    [facility]    INJECTION-STEROID-EPIDURAL-TRANSFORAMINAL Left 2012    [facility]    JOINT REPLACEMENT      R total hip       SOCIAL HISTORY:  Social History     Socioeconomic History    Marital status:      Spouse name: Not on file    Number of children: Not on file    Years of education: Not on file    Highest education level: Not on file   Occupational History    Not on file   Social Needs    Financial resource strain: Not on file    Food insecurity:     Worry: Not on file     Inability: Not on file    Transportation needs:     Medical: Not on file     Non-medical: Not on file   Tobacco Use    Smoking status: Former Smoker     Packs/day: 0.25     Years: 5.00     Pack years: 1.25     Last attempt to quit: 1972     Years since quittin.5    Smokeless tobacco: Never Used   Substance and Sexual Activity    Alcohol use: Yes     Alcohol/week: 0.0 oz     Comment: Rarely    Drug use: Yes     Types:  Oxycodone, Hydrocodone    Sexual activity: Not on file   Lifestyle    Physical activity:     Days per week: Not on file     Minutes per session: Not on file    Stress: Not on file   Relationships    Social connections:     Talks on phone: Not on file     Gets together: Not on file     Attends [employer] service: Not on file     Active member of club or organization: Not on file     Attends meetings of clubs or organizations: Not on file     Relationship status: Not on file   Other Topics Concern    Not on file   Social History Narrative    Not on file       FAMILY HISTORY:  Family History   Problem Relation Age of Onset    Hypertension Mother     Diabetes Sister     Glaucoma Neg Hx     Macular degeneration Neg Hx     Retinal detachment Neg Hx        ALLERGIES AND MEDICATIONS: updated and reviewed.  Review of patient's allergies indicates:   Allergen Reactions    Cymbalta [duloxetine] Other (See Comments)     Nightmares      Darvon [propoxyphene] Nausea Only and Other (See Comments)     Sweating, slept for 3 days    Atorvastatin Other (See Comments)     Muscle cramps    Naprosyn [naproxen] Nausea Only    Penicillins Rash     Current Outpatient Medications   Medication Sig Dispense Refill    albuterol (PROVENTIL) 2.5 mg /3 mL (0.083 %) nebulizer solution Take 3 mLs (2.5 mg total) by nebulization every 6 (six) hours as needed. 120 each 11    ascorbic acid (VITAMIN C) 100 MG tablet Take 100 mg by mouth once daily.       aspirin 81 MG Chew Take 81 mg by mouth once daily.      b complex vitamins tablet Take 1 tablet by mouth once daily.      budesonide-formoterol 160-4.5 mcg (SYMBICORT) 160-4.5 mcg/actuation HFAA Inhale 2 puffs into the lungs every 12 (twelve) hours. Controller 3 Inhaler 3    cyanocobalamin (VITAMIN B-12) 100 MCG tablet Take 100 mcg by mouth once daily.      diclofenac sodium 1 % Gel Apply 2 g topically 3 (three) times daily. 300 g 3    esomeprazole (NEXIUM) 20 MG capsule Take 1  capsule (20 mg total) by mouth before breakfast. (Patient taking differently: Take 20 mg by mouth daily as needed (heartburn). ) 30 capsule 2    fluticasone (FLONASE) 50 mcg/actuation nasal spray 2 sprays (100 mcg total) by Each Nare route once daily. 3 Bottle 3    furosemide (LASIX) 40 MG tablet Take 1 tablet (40 mg total) by mouth once daily. 90 tablet 4    lactulose (CHRONULAC) 10 gram/15 mL solution Take 30 mLs (20 g total) by mouth 3 (three) times daily. 2 Teaspoon(s) Oral PRN Every evening. 500 mL 3    losartan (COZAAR) 100 MG tablet Take 1 tablet (100 mg total) by mouth once daily. 90 tablet 4    metoprolol succinate (TOPROL-XL) 50 MG 24 hr tablet TAKE ONE TABLET BY MOUTH ONCE DAILY 90 tablet 3    montelukast (SINGULAIR) 10 mg tablet Take 1 tablet (10 mg total) by mouth every evening. 90 tablet 3    predniSONE (DELTASONE) 20 MG tablet One daily for 3 days and repeat for flare of lung symptoms as intructed 21 tablet 1    spironolactone (ALDACTONE) 50 MG tablet Take 1 tablet (50 mg total) by mouth once daily. 90 tablet 3    azithromycin (Z-ARNAUD) 250 MG tablet Take 2 tablets by mouth on day 1; Take 1 tablet by mouth on days 2-5 6 tablet 0    dextromethorphan-guaifenesin  mg/5 ml (ROBITUSSIN-DM)  mg/5 mL liquid Take 5 mLs by mouth every 6 (six) hours as needed. 120 mL 0    traMADol (ULTRAM) 50 mg tablet Take 1 tablet (50 mg total) by mouth every 6 (six) hours as needed for Pain. 25 tablet 0     Current Facility-Administered Medications   Medication Dose Route Frequency Provider Last Rate Last Dose    lidocaine HCL 10 mg/ml (1%) injection 5 mL  5 mL Other Once Matt Gilliam MD           I have reviewed the patient's medical, family, and social history in detail and updated the computerized patient record.    Review of Systems   Constitutional: Positive for activity change, appetite change and fatigue. Negative for chills and fever.        Patient feels more fatigued than usual, appetite is  "decreased, less active than usual due to illness.   HENT: Positive for congestion, ear pain, postnasal drip and rhinorrhea. Negative for sinus pressure, sinus pain and sore throat.         C/O nasal congestion and runny nose, secretions are clear, post nasal drip with throat clearing, some ear pain.   Eyes: Negative for visual disturbance.        Vision corrected with glasses.   Respiratory: Positive for cough, chest tightness, shortness of breath and wheezing.         Chest tightness, cough non-productive, wheezing and shortness of breath   Cardiovascular: Positive for leg swelling. Negative for chest pain and palpitations.        Chronic edema to ankles and feet.    Gastrointestinal: Negative for abdominal pain, constipation, diarrhea, nausea and vomiting.   Genitourinary: Negative for difficulty urinating and dysuria.   Musculoskeletal: Positive for arthralgias.        Left lower rib cage pain, even at rest, worse with cough or deep breath.   Skin: Negative for rash and wound.   Neurological: Negative for dizziness, light-headedness, numbness and headaches.   Hematological: Does not bruise/bleed easily.   Psychiatric/Behavioral: Positive for sleep disturbance. Negative for dysphoric mood. The patient is not nervous/anxious.         Pain to rib cage and cough are keeping patient awake at night.          Objective:      Vitals:    06/27/19 1420   BP: 120/62   BP Location: Right arm   Patient Position: Sitting   BP Method: X-Large (Manual)   Pulse: 62   Temp: 98.9 °F (37.2 °C)   TempSrc: Oral   Weight: 105.2 kg (232 lb)   Height: 5' 4" (1.626 m)     Physical Exam   Constitutional: She is oriented to person, place, and time. Vital signs are normal. She appears well-developed. She appears ill. She appears distressed.   Blood pressure 120/62, afebrile   HENT:   Head: Normocephalic and atraumatic.   Right Ear: Hearing, tympanic membrane, external ear and ear canal normal.   Left Ear: Hearing, tympanic membrane, external " ear and ear canal normal.   Nose: Nose normal. No mucosal edema.   Mouth/Throat: Mucous membranes are normal. Oropharyngeal exudate present.   Posterior pharynx with thick white PND noted   Eyes: Pupils are equal, round, and reactive to light. Conjunctivae and lids are normal. Right eye exhibits no discharge. Left eye exhibits no discharge. Right conjunctiva is not injected. Left conjunctiva is not injected.   Neck: Normal range of motion. Neck supple. Normal carotid pulses present. Carotid bruit is not present. Normal range of motion present.   Cardiovascular: Normal rate, regular rhythm, S1 normal, S2 normal, normal heart sounds, intact distal pulses and normal pulses.   No murmur heard.  Pulses:       Carotid pulses are 2+ on the right side, and 2+ on the left side.       Radial pulses are 2+ on the right side, and 2+ on the left side.   Edema noted to ankles and feet   Pulmonary/Chest: No respiratory distress. She has decreased breath sounds in the left lower field. She has wheezes. She has no rhonchi. She has no rales.   Decreased breath sounds bilateral lower lung fields, possibly due to decreased effort related to rib pain. Scattered expiratory wheezes, faint throughout   Musculoskeletal:        Arms:  Lymphadenopathy:        Head (right side): No submental, no submandibular and no tonsillar adenopathy present.        Head (left side): No submental, no submandibular and no tonsillar adenopathy present.     She has no cervical adenopathy.        Right: No supraclavicular adenopathy present.        Left: No supraclavicular adenopathy present.   Neurological: She is alert and oriented to person, place, and time. She has normal strength. Gait normal.   Skin: Skin is warm, dry and intact. No lesion and no rash noted. She is not diaphoretic. No pallor.   Psychiatric: She has a normal mood and affect. Her speech is normal and behavior is normal. Judgment and thought content normal. Her mood appears not anxious.  Cognition and memory are normal. She does not exhibit a depressed mood.         Assessment:       1. Mild persistent asthma with acute exacerbation    2. Cough    3. Wheezing    4. Shortness of breath    5. Post-nasal drip    6. Rib pain on left side    7. Essential hypertension    8. Peripheral edema    9. Prediabetes    10. Severe obesity (BMI 35.0-39.9) with comorbidity          Plan:       Sammi was seen today for cough and shortness of breath.    Diagnoses and all orders for this visit:    Mild persistent asthma with acute exacerbation  -     azithromycin (Z-ARNAUD) 250 MG tablet; Take 2 tablets by mouth on day 1; Take 1 tablet by mouth on days 2-5  - Patient to continue albuterol nebulizer treatments Q6 hours PRN SOB or wheezing, symbicort 2 puffs 2 times daily, and singulair 10 mg at bedtime. Advised to take prednisone 20 mg michaels x 3 more days, then stop. If she uses the albuterol inhaler, needs to wait 6 hours before using nebulized albuterol.     Cough  -     dextromethorphan-guaifenesin  mg/5 ml (ROBITUSSIN-DM)  mg/5 mL liquid; Take 5 mLs by mouth every 6 (six) hours as needed.    Wheezing   Patient to continue albuterol nebulizer treatments Q6 hours PRN SOB or wheezing, symbicort 2 puffs 2 times daily, and singulair 10 mg at bedtime. Advised to take prednisone 20 mg michaels x 3 more days, then stop. If she uses the albuterol inhaler, needs to wait 6 hours before using nebulized albuterol.     Shortness of breath   Patient to continue albuterol nebulizer treatments Q6 hours PRN SOB or wheezing, symbicort 2 puffs 2 times daily, and singulair 10 mg at bedtime. Advised to take prednisone 20 mg michaels x 3 more days, then stop. If she uses the albuterol inhaler, needs to wait 6 hours before using nebulized albuterol.     Post-nasal drip   Continue flonase and singulair as prescribed  Rib pain on left side  -     traMADol (ULTRAM) 50 mg tablet; Take 1 tablet (50 mg total) by mouth every 6 (six) hours as  needed for Pain.  -     X-Ray Ribs 2 View Left; Future  - Educational handouts provided. Rib fracture; Costochondritis  - Discussed importance of deep breathing exercises 10 breathes per hour while awake to prevent pneumonia/atelectasis    Essential hypertension   Blood pressure controlled 120/62, continue current medication without change  Peripheral edema   Chronic edema to feet and ankles, non pitting   Elevate legs when at rest   Continue diuretics, lasix and spironolactone as prescribed  Prediabetes     Lab Results   Component Value Date    HGBA1C 6.4 (H) 03/07/2019    Patient is aware that when A1c reaches 6.5% she will be diagnosed with type 2 diabetes   Discussed that prednisone elevates blood sugar and use can lead to a secondary type 2 diabetes but treatment is the same   At this time lifestyle and diet changes are recommended.  Increase exercise and lose weight by eating a portion controlled well-balanced diet.  Avoid concentrated sweets like cookies, cakes, and candy.  Do not drink sugar sweetened soft drinks.  Eat fewer white carbohydrates like potatoes, rice, bread, and pasta.  Choose sweet potatoes, brown rice, whole wheat bread and wheat pasta.      Severe obesity (BMI 35.0-39.9) with comorbidity   BMI today is 39.82 and patient has lost 3 pounds since last visit 6/20/19   Weight loss recommended with well balanced diet and portion controlled meals and increased activity.     Follow up if symptoms worsen or fail to improve.      Patient readiness: acceptance and barriers:none    During the course of the visit the patient was educated and counseled about the following:     Hypertension:   Medication: no change.  Dietary sodium restriction.  Regular aerobic exercise.  Obesity:   General weight loss/lifestyle modification strategies discussed (elicit support from others; identify saboteurs; non-food rewards, etc).  Diet interventions: moderate (500 kCal/d) deficit diet and qualitative changes (increase  low-fat,  high-fiber foods).  Informal exercise measures discussed, e.g. taking stairs instead of elevator.    Goals: Hypertension: Reduce Blood Pressure and Obesity: Reduce calorie intake and BMI    Did patient meet goals/outcomes: Yes    The following self management tools provided: declined    Patient Instructions (the written plan) was given to the patient/family.     Time spent with patient: 30 minutes    Barriers to medications present (no )    Adverse reactions to current medications (no)    Over the counter medications reviewed (Yes)

## 2019-07-08 ENCOUNTER — OFFICE VISIT (OUTPATIENT)
Dept: PODIATRY | Facility: CLINIC | Age: 79
End: 2019-07-08
Payer: MEDICARE

## 2019-07-08 VITALS
HEART RATE: 56 BPM | DIASTOLIC BLOOD PRESSURE: 76 MMHG | HEIGHT: 64 IN | SYSTOLIC BLOOD PRESSURE: 163 MMHG | BODY MASS INDEX: 40.12 KG/M2 | RESPIRATION RATE: 20 BRPM | WEIGHT: 235 LBS

## 2019-07-08 DIAGNOSIS — I70.90 ASO (ARTERIOSCLEROSIS OBLITERANS): ICD-10-CM

## 2019-07-08 DIAGNOSIS — E11.49 OTHER DIABETIC NEUROLOGICAL COMPLICATION ASSOCIATED WITH TYPE 2 DIABETES MELLITUS: Primary | ICD-10-CM

## 2019-07-08 PROCEDURE — 3077F PR MOST RECENT SYSTOLIC BLOOD PRESSURE >= 140 MM HG: ICD-10-PCS | Mod: ,,, | Performed by: PODIATRIST

## 2019-07-08 PROCEDURE — 1101F PR PT FALLS ASSESS DOC 0-1 FALLS W/OUT INJ PAST YR: ICD-10-PCS | Mod: ,,, | Performed by: PODIATRIST

## 2019-07-08 PROCEDURE — 3077F SYST BP >= 140 MM HG: CPT | Mod: ,,, | Performed by: PODIATRIST

## 2019-07-08 PROCEDURE — 99203 PR OFFICE/OUTPT VISIT, NEW, LEVL III, 30-44 MIN: ICD-10-PCS | Mod: ,,, | Performed by: PODIATRIST

## 2019-07-08 PROCEDURE — 99203 OFFICE O/P NEW LOW 30 MIN: CPT | Mod: ,,, | Performed by: PODIATRIST

## 2019-07-08 PROCEDURE — 3078F PR MOST RECENT DIASTOLIC BLOOD PRESSURE < 80 MM HG: ICD-10-PCS | Mod: ,,, | Performed by: PODIATRIST

## 2019-07-08 PROCEDURE — 1101F PT FALLS ASSESS-DOCD LE1/YR: CPT | Mod: ,,, | Performed by: PODIATRIST

## 2019-07-08 PROCEDURE — 3078F DIAST BP <80 MM HG: CPT | Mod: ,,, | Performed by: PODIATRIST

## 2019-07-08 NOTE — PROGRESS NOTES
[address] [facility] [address]  [place] [address]  Phone: [phone]   [phone]    Patient's PCP:Osmani Villatoro MD  Referring Provider: No ref. provider found    Subjective:      Chief Complaint:: Diabetic Foot Exam and Ingrown Toenail (right great toe medial and lateral border)    HPI  Sammi Abreu is a 78 y.o. female who presents with a complaint of  Possible infection in right great toe (ingrown toenail - medial and lateral border) lasting for about 1 month. Onset of the symptoms was unknown.  Current symptoms include pain, swelling, and drainage.  Aggravating factors are pressure in the area and standing/walking. Symptoms have not improved since this began.Treatment to date has not been tried. Patients rates pain 6/10 on pain scale. Patient also presents to the clinic for a diabetic foot exam.   Pt has seen Osmani Villatoro MD on 06/27/20149 who treats them for their diabetes.  Pt has been a diabetic for.  Patient using diet and exercise to treat diabetes.  Patient admits to having calf pain while walking.  Blood sugar: 137  Hemoglobin A1C: 6.4   (03/2019)        Vitals:    07/08/19 1517   BP: (!) 163/76   Pulse: (!) 56   Resp: 20     Shoe Size:     Past Surgical History:   Procedure Laterality Date    COLONOSCOPY N/A 11/4/2014    Performed by Jerardo García MD at [facility] ENDO    EGD (ESOPHAGOGASTRODUODENOSCOPY) N/A 2/17/2012    Performed by Jerardo [facility]ía [facility] at [facility] ENDO    TYLER-TRANSFORAMINAL Right 12/18/2012    Per[facility] by [facility] at [facility] OR    HIP SURGERY      HYSTERECTOMY      INJECTION, JOINT Left 4/29/2013    Performed by [facility] at [facility] OR    INJECTION, JOINT Left 11/13/2012    Performed by [facility]se MD at [facility] OR    INJECTION, JOINT Left 6/6/2012    Performed by [facility] at [facility] OR    INJECTION, JOINT, SHOULDER, HIP, OR KNEE Left 5/9/2014    [facility] at [facility]    INJECTION, JOINT, SHOULDER, HIP, OR  KNEE Left 1/10/2014    [facility] at [facility] OR    INJECTION, JOINT, SHOULDER, HIP, OR KNEE Left 10/4/2013    Performed by [facility] at [facility] OR    INJECTION-JOINT Left 10/3/2014    [facility] by [facility] at [facility] OR    INJECTION-STEROID-EPIDURAL-TRANSFORAMINAL Left 6/5/2014    Performed by [facility] at [facility] OR    INJECTION-STEROID-EPIDURAL-TRANSFORAMINAL Left 5/2/2012    [facility] at [facility] OR    JOINT REPLACEMENT      R total hip     Past Medical History:   Diagnosis Date    ALLERGIC RHINITIS     Anemia     Arthritis     Cataract     OU    Degenerative disc disease     Diverticulosis     Edema     Fibromyalgia     Gout     Hyperlipidemia     Hypertension     Reflux     Sleep apnea     non compliant with CPAP    Vestibulitis of ear      Family History   Problem Relation Age of Onset    Hypertension Mother     Diabetes Sister     Glaucoma Neg Hx     Macular degeneration Neg Hx     Retinal detachment Neg Hx         Social History:   Marital Status:   Alcohol History:  reports that she drinks alcohol.  Tobacco History:  reports that she quit smoking about 47 years ago. She has a 1.25 pack-year smoking history. She has never used smokeless tobacco.  Drug History:  reports that she has current or past drug history. Drugs: Oxycodone and Hydrocodone.    Review of patient's allergies indicates:   Allergen Reactions    Cymbalta [duloxetine] Other (See Comments)     Nightmares      Darvon [propoxyphene] Nausea Only and Other (See Comments)     Sweating, slept for 3 days    Atorvastatin Other (See Comments)     Muscle cramps    Naprosyn [naproxen] Nausea Only    Penicillins Rash       Current Outpatient Medications   Medication Sig Dispense Refill    albuterol (PROVENTIL) 2.5 mg /3 mL (0.083 %) nebulizer solution Take 3 mLs (2.5 mg total) by nebulization every 6 (six) hours as needed. 120 each 11    ascorbic acid (VITAMIN C) 100 MG tablet  Take 100 mg by mouth once daily.       aspirin 81 MG Chew Take 81 mg by mouth once daily.      azithromycin (Z-ARNAUD) 250 MG tablet Take 2 tablets by mouth on day 1; Take 1 tablet by mouth on days 2-5 6 tablet 0    b complex vitamins tablet Take 1 tablet by mouth once daily.      budesonide-formoterol 160-4.5 mcg (SYMBICORT) 160-4.5 mcg/actuation HFAA Inhale 2 puffs into the lungs every 12 (twelve) hours. Controller 3 Inhaler 3    cyanocobalamin (VITAMIN B-12) 100 MCG tablet Take 100 mcg by mouth once daily.      diclofenac sodium 1 % Gel Apply 2 g topically 3 (three) times daily. 300 g 3    esomeprazole (NEXIUM) 20 MG capsule Take 1 capsule (20 mg total) by mouth before breakfast. (Patient taking differently: Take 20 mg by mouth daily as needed (heartburn). ) 30 capsule 2    fluticasone (FLONASE) 50 mcg/actuation nasal spray 2 sprays (100 mcg total) by Each Nare route once daily. 3 Bottle 3    furosemide (LASIX) 40 MG tablet Take 1 tablet (40 mg total) by mouth once daily. 90 tablet 4    lactulose (CHRONULAC) 10 gram/15 mL solution Take 30 mLs (20 g total) by mouth 3 (three) times daily. 2 Teaspoon(s) Oral PRN Every evening. 500 mL 3    losartan (COZAAR) 100 MG tablet Take 1 tablet (100 mg total) by mouth once daily. 90 tablet 4    metoprolol succinate (TOPROL-XL) 50 MG 24 hr tablet TAKE ONE TABLET BY MOUTH ONCE DAILY 90 tablet 3    montelukast (SINGULAIR) 10 mg tablet Take 1 tablet (10 mg total) by mouth every evening. 90 tablet 3    predniSONE (DELTASONE) 20 MG tablet One daily for 3 days and repeat for flare of lung symptoms as intructed 21 tablet 1    spironolactone (ALDACTONE) 50 MG tablet Take 1 tablet (50 mg total) by mouth once daily. 90 tablet 3    traMADol (ULTRAM) 50 mg tablet Take 1 tablet (50 mg total) by mouth every 6 (six) hours as needed for Pain. 25 tablet 0     Current Facility-Administered Medications   Medication Dose Route Frequency Provider Last Rate Last Dose    lidocaine HCL  10 mg/ml (1%) injection 5 mL  5 mL Other Once Matt Gilliam MD           Review of Systems   Constitutional: Positive for chills. Negative for fatigue, fever and unexpected weight change.   HENT: Negative for hearing loss and trouble swallowing.    Eyes: Negative for photophobia and visual disturbance.   Respiratory: Positive for cough, shortness of breath and wheezing.    Cardiovascular: Positive for chest pain, palpitations and leg swelling.   Gastrointestinal: Positive for nausea. Negative for abdominal pain.   Genitourinary: Negative for dysuria and frequency.   Musculoskeletal: Positive for arthralgias. Negative for back pain and joint swelling.   Skin: Negative for rash.   Neurological: Negative for tremors, seizures, weakness, numbness and headaches.   Hematological: Does not bruise/bleed easily.         Objective:        Physical Exam:   Foot Exam    General  General Appearance: appears stated age and healthy   Orientation: alert and oriented to person, place, and time   Affect: appropriate   Gait: antalgic       Right Foot/Ankle     Inspection and Palpation  Tenderness: great toe interphalangeal joint     Neurovascular  Dorsalis pedis: doppler  Posterior tibial: doppler      Left Foot/Ankle      Neurovascular  Dorsalis pedis: 1+  Posterior tibial: doppler          Physical Exam   Cardiovascular:   Pulses:       Dorsalis pedis pulses are Doppler on the right side, and 1+ on the left side.        Posterior tibial pulses are Doppler on the right side, and Doppler on the left side.       Imaging:            Assessment:       1. Other diabetic neurological complication associated with type 2 diabetes mellitus    2. ASO (arteriosclerosis obliterans)      Plan:   Other diabetic neurological complication associated with type 2 diabetes mellitus    ASO (arteriosclerosis obliterans)    Because of nonpalpable pulses only audible by Doppler and intermittent claudication, I am ordering lower extremity arterial Dopplers  with pressures waveforms.  Once tests are done if they show abnormal blockage of the vessels I will refer to Dr. Gino Arana.  Follow up in about 2 weeks (around 7/22/2019).    Procedures - None    Counseling:     I provided patient education verbally regarding:   Patient diagnosis, treatment options, as well as alternatives, risks, and benefits.     This note was created using Dragon voice recognition software that occasionally misinterpreted phrases or words.

## 2019-07-08 NOTE — PATIENT INSTRUCTIONS

## 2019-07-15 ENCOUNTER — TELEPHONE (OUTPATIENT)
Dept: PODIATRY | Facility: CLINIC | Age: 79
End: 2019-07-15

## 2019-07-17 ENCOUNTER — TELEPHONE (OUTPATIENT)
Dept: FAMILY MEDICINE | Facility: CLINIC | Age: 79
End: 2019-07-17

## 2019-07-17 ENCOUNTER — TELEPHONE (OUTPATIENT)
Dept: PODIATRY | Facility: CLINIC | Age: 79
End: 2019-07-17

## 2019-07-17 NOTE — TELEPHONE ENCOUNTER
----- Message from Naveen HEIDI Diaz sent at 7/17/2019  3:30 PM CDT -----  Contact: same  Patient called in and stated she saw Dr. Dias at [facility] who told her he thinks she may have blockages in legs.  Patient she cannot see the vascular surgeon until next week but wanted to see if Dr. Villatoro could squeeze her in before then?  Patient would like a call back at [phone]

## 2019-07-17 NOTE — TELEPHONE ENCOUNTER
I spoke to patient.  Dr Villatoro is on vacation.  She is very upset because she is in pain.  She has seen a podiatrist and had a doppler done on her legs where they found blockage. Patient has an appointment with vascular surgery next week.  But she states she needs to know what to do for the pain before then.  I spoke to Dr. Dias's office and explained this to them. They stated they would be happy to call the patient to see what they could do to help her before she sees the vascular surgeon.

## 2019-07-18 RX ORDER — METOPROLOL SUCCINATE 50 MG/1
TABLET, EXTENDED RELEASE ORAL
Qty: 90 TABLET | Refills: 3 | Status: SHIPPED | OUTPATIENT
Start: 2019-07-18 | End: 2020-01-20 | Stop reason: SDUPTHER

## 2019-07-31 ENCOUNTER — HOSPITAL ENCOUNTER (OUTPATIENT)
Facility: HOSPITAL | Age: 79
Discharge: HOME | End: 2019-07-31
Attending: SURGERY | Admitting: SURGERY
Payer: MEDICARE

## 2019-07-31 ENCOUNTER — TELEPHONE (OUTPATIENT)
Dept: CARDIOLOGY | Facility: HOSPITAL | Age: 79
End: 2019-07-31

## 2019-07-31 VITALS
TEMPERATURE: 99 F | HEART RATE: 84 BPM | WEIGHT: 235 LBS | OXYGEN SATURATION: 99 % | BODY MASS INDEX: 41.64 KG/M2 | SYSTOLIC BLOOD PRESSURE: 165 MMHG | DIASTOLIC BLOOD PRESSURE: 73 MMHG | HEIGHT: 63 IN | RESPIRATION RATE: 16 BRPM

## 2019-07-31 DIAGNOSIS — I73.9 PERIPHERAL VASCULAR DISEASE, UNSPECIFIED: Primary | ICD-10-CM

## 2019-07-31 DIAGNOSIS — I70.228: ICD-10-CM

## 2019-07-31 PROCEDURE — C1769 GUIDE WIRE: HCPCS | Performed by: SURGERY

## 2019-07-31 PROCEDURE — 96374 THER/PROPH/DIAG INJ IV PUSH: CPT

## 2019-07-31 PROCEDURE — C1887 CATHETER, GUIDING: HCPCS | Performed by: SURGERY

## 2019-07-31 PROCEDURE — C1760 CLOSURE DEV, VASC: HCPCS | Performed by: SURGERY

## 2019-07-31 PROCEDURE — 63600175 PHARM REV CODE 636 W HCPCS: Mod: GZ | Performed by: SURGERY

## 2019-07-31 PROCEDURE — 25000003 PHARM REV CODE 250: Performed by: SURGERY

## 2019-07-31 PROCEDURE — 37228 HC TIB/PER REVASC W/TLA: CPT | Performed by: SURGERY

## 2019-07-31 PROCEDURE — 75710 ARTERY X-RAYS ARM/LEG: CPT | Mod: 59 | Performed by: SURGERY

## 2019-07-31 PROCEDURE — 99153 MOD SED SAME PHYS/QHP EA: CPT | Performed by: SURGERY

## 2019-07-31 PROCEDURE — C1725 CATH, TRANSLUMIN NON-LASER: HCPCS | Performed by: SURGERY

## 2019-07-31 PROCEDURE — 99152 MOD SED SAME PHYS/QHP 5/>YRS: CPT | Performed by: SURGERY

## 2019-07-31 PROCEDURE — C1894 INTRO/SHEATH, NON-LASER: HCPCS | Performed by: SURGERY

## 2019-07-31 PROCEDURE — 27201423 OPTIME MED/SURG SUP & DEVICES STERILE SUPPLY: Performed by: SURGERY

## 2019-07-31 RX ORDER — DEXTROSE MONOHYDRATE AND SODIUM CHLORIDE 5; .45 G/100ML; G/100ML
75 INJECTION, SOLUTION INTRAVENOUS CONTINUOUS
Status: DISCONTINUED | OUTPATIENT
Start: 2019-07-31 | End: 2019-07-31

## 2019-07-31 RX ORDER — HEPARIN SODIUM 1000 [USP'U]/ML
INJECTION, SOLUTION INTRAVENOUS; SUBCUTANEOUS
Status: DISCONTINUED | OUTPATIENT
Start: 2019-07-31 | End: 2019-07-31 | Stop reason: HOSPADM

## 2019-07-31 RX ORDER — CLOPIDOGREL BISULFATE 75 MG/1
75 TABLET ORAL DAILY
Qty: 30 TABLET | Refills: 3 | OUTPATIENT
Start: 2019-07-31 | End: 2020-01-20

## 2019-07-31 RX ORDER — MIDAZOLAM HYDROCHLORIDE 1 MG/ML
INJECTION INTRAMUSCULAR; INTRAVENOUS
Status: DISCONTINUED | OUTPATIENT
Start: 2019-07-31 | End: 2019-07-31 | Stop reason: HOSPADM

## 2019-07-31 RX ORDER — CLONIDINE HYDROCHLORIDE 0.1 MG/1
0.1 TABLET ORAL ONCE
Status: COMPLETED | OUTPATIENT
Start: 2019-07-31 | End: 2019-07-31

## 2019-07-31 RX ORDER — CLOPIDOGREL BISULFATE 75 MG/1
75 TABLET ORAL DAILY
Qty: 30 TABLET | Refills: 11 | Status: SHIPPED | OUTPATIENT
Start: 2019-07-31 | End: 2020-01-20

## 2019-07-31 RX ORDER — FENTANYL CITRATE 50 UG/ML
INJECTION, SOLUTION INTRAMUSCULAR; INTRAVENOUS
Status: DISCONTINUED | OUTPATIENT
Start: 2019-07-31 | End: 2019-07-31 | Stop reason: HOSPADM

## 2019-07-31 RX ORDER — CLOPIDOGREL BISULFATE 75 MG/1
75 TABLET ORAL DAILY
Status: DISCONTINUED | OUTPATIENT
Start: 2019-07-31 | End: 2019-07-31 | Stop reason: HOSPADM

## 2019-07-31 RX ORDER — HYDRALAZINE HYDROCHLORIDE 20 MG/ML
10 INJECTION INTRAMUSCULAR; INTRAVENOUS ONCE
Status: COMPLETED | OUTPATIENT
Start: 2019-07-31 | End: 2019-07-31

## 2019-07-31 RX ORDER — LIDOCAINE HYDROCHLORIDE 10 MG/ML
INJECTION INFILTRATION; PERINEURAL
Status: DISCONTINUED | OUTPATIENT
Start: 2019-07-31 | End: 2019-07-31 | Stop reason: HOSPADM

## 2019-07-31 RX ORDER — SODIUM CHLORIDE 9 MG/ML
1000 INJECTION, SOLUTION INTRAVENOUS CONTINUOUS
Status: DISCONTINUED | OUTPATIENT
Start: 2019-07-31 | End: 2019-07-31 | Stop reason: HOSPADM

## 2019-07-31 RX ADMIN — CLOPIDOGREL BISULFATE 75 MG: 75 TABLET, FILM COATED ORAL at 02:07

## 2019-07-31 RX ADMIN — SODIUM CHLORIDE 1000 ML: 0.9 INJECTION, SOLUTION INTRAVENOUS at 11:07

## 2019-07-31 RX ADMIN — CLONIDINE HYDROCHLORIDE 0.1 MG: 0.1 TABLET ORAL at 05:07

## 2019-07-31 RX ADMIN — HYDRALAZINE HYDROCHLORIDE 10 MG: 20 INJECTION INTRAMUSCULAR; INTRAVENOUS at 02:07

## 2019-07-31 NOTE — Clinical Note
Angiography of the  left femoral artery performed to evaluate for the placement of a closure device. Vascade for closure device

## 2019-07-31 NOTE — NURSING
Ambulated around unit without difficulty. Returned to room with no complaints of pain or distress noted. VSS except for HTN. MD notified see note. Dressing to left groin remains CDI. Proceed with discharge as ordered once BP returns to baseline.

## 2019-07-31 NOTE — Clinical Note
Angiography performed of the distal right anterior tibial artery. Angiography performed via hand injection with 10 mL of contrast. is inserted in to the Post angio.

## 2019-07-31 NOTE — Clinical Note
Angiography performed of the proximal suprarenal . Angiography performed via hand injection with 10 mL of contrast.

## 2019-07-31 NOTE — Clinical Note
Angiography of the left lower extremity performed with the catheter   and hand injected with 10 mL of contrast.

## 2019-07-31 NOTE — DISCHARGE INSTRUCTIONS
Home care  · Only do light and easy activities for the next 2 to 3 days. Ask for help with chores and errands while you recover. Have someone drive you to your appointments.  · Don't lift anything heavy ( over 10 pounds) for the next week.  · Ask your healthcare team when you can expect to return to work. Unless your job involves lifting, you may be able to return to your normal activities within a couple of days.  · Take your medicines as directed. Don't skip doses.  · Drink 6 to 8 glasses of water a day. This is to help flush the contrast dye out of your body. Call your healthcare team if your urine has any change in color.  · Take your temperature each day for 7 days. If you feel cold and clammy or start sweating, take your temperature right away and call your healthcare team.  · Check your incisions every day for signs of infection. These include redness, swelling, and drainage. It is normal to have a small bruise or bump where the catheter was inserted. A bruise that is getting larger is not normal and should be reported to your healthcare team. If you see blood forming in the incision, call your healthcare team. Go to the emergency department if you have uncontrolled bleeding from the artery site. This is especially true if you take medicines that make it difficult for your blood to clot. Examples are aspirin, clopidogrel, and warfarin.  · Eat a healthy diet. Make sure it is low in fat, salt, and cholesterol. Ask your healthcare team for diet information.   · Exercise as your healthcare team tells you to.   · Don't swim or take baths until your healthcare team says its OK. You can shower the day after the procedure. Keep the site clean and dry. This keeps the incision from getting wet and infected until the skin and artery can heal.  Follow-up care  · Make a follow-up appointment as advised by our staff. It's common to have a follow-up appointment 2 to 4 weeks after an angioplasty or coronary stent  procedure.  · Make a yearly appointment, too. This is to make sure you are still doing well and not having any new symptoms.  · Don't wait for a follow-up appointment if your medicines aren't working or you are having heart-related symptoms.  When to seek medical care  Call your healthcare provider right away if you have any of the following:  · Chest pain  · Constant or increasing pain or numbness in your leg  · Fever of 100.4°F (38.0°C) or higher, or as directed by your healthcare provider  · Symptoms of infection. These include redness, swelling, drainage, or warmth at the incision site.  · Shortness of breath  · A leg that feels cold or appears blue  · Bleeding, bruising, or a lot of swelling where the catheter was inserted  · Blood in your urine  · Black or tarry stools  · Any unusual bleeding

## 2019-07-31 NOTE — Clinical Note
Angiography performed of the ostial right popliteal artery. Angiography performed via hand injection with 10 mL of contrast.

## 2019-07-31 NOTE — PLAN OF CARE
Patient remains. HTN remains. Awaiting response. No needs expressed at this time. Family remains at bedside.

## 2019-07-31 NOTE — Clinical Note
Angiography performed of the ostial right . Angiography performed via hand injection with 10 mL of contrast.

## 2019-07-31 NOTE — Clinical Note
Angiography performed of the ostial right posterior tibial artery. Angiography performed via hand injection with 10 mL of contrast.

## 2019-08-01 NOTE — NURSING
Blood pressure now at base line. Proceed with discharge. Verbalizes understanding of discharge instructions, angio site care, and follow up care. Belongings gathered and dressed in personal clothing. Left via wheelchair to private auto accompanied by family.

## 2019-08-09 ENCOUNTER — TELEPHONE (OUTPATIENT)
Dept: FAMILY MEDICINE | Facility: CLINIC | Age: 79
End: 2019-08-09

## 2019-08-09 DIAGNOSIS — Z12.31 ENCOUNTER FOR SCREENING MAMMOGRAM FOR BREAST CANCER: Primary | ICD-10-CM

## 2019-08-09 NOTE — TELEPHONE ENCOUNTER
Patient's last screening mammogram performed 7/10/18. Orders placed for screening mammogram at this time. Appointment scheduled for 8/13/19. Patient agreed to appointment date and time.

## 2019-08-09 NOTE — TELEPHONE ENCOUNTER
----- Message from Maria Guadalupe Brock sent at 8/9/2019  8:44 AM CDT -----  Contact: SELF  PATIENT SAID SHE REC'D HER MAMMO RECALL LETTER. PLEASE CALL TO LET HER KNOW THAT ORDERS HAVE BEEN PUT IN SO SHE CAN SCHEDULED.

## 2019-08-13 ENCOUNTER — HOSPITAL ENCOUNTER (OUTPATIENT)
Dept: RADIOLOGY | Facility: CLINIC | Age: 79
Discharge: HOME | End: 2019-08-13
Attending: FAMILY MEDICINE
Payer: MEDICARE

## 2019-08-13 DIAGNOSIS — Z12.31 ENCOUNTER FOR SCREENING MAMMOGRAM FOR BREAST CANCER: ICD-10-CM

## 2019-08-13 PROCEDURE — 77067 SCR MAMMO BI INCL CAD: CPT | Mod: TC,PO

## 2019-08-13 PROCEDURE — 77063 MAMMO DIGITAL SCREENING BILAT WITH TOMOSYNTHESIS_CAD: ICD-10-PCS | Mod: 26,,, | Performed by: RADIOLOGY

## 2019-08-13 PROCEDURE — 77067 MAMMO DIGITAL SCREENING BILAT WITH TOMOSYNTHESIS_CAD: ICD-10-PCS | Mod: 26,,, | Performed by: RADIOLOGY

## 2019-08-13 PROCEDURE — 77063 BREAST TOMOSYNTHESIS BI: CPT | Mod: 26,,, | Performed by: RADIOLOGY

## 2019-08-13 PROCEDURE — 77067 SCR MAMMO BI INCL CAD: CPT | Mod: 26,,, | Performed by: RADIOLOGY

## 2019-10-28 ENCOUNTER — HOSPITAL ENCOUNTER (EMERGENCY)
Facility: HOSPITAL | Age: 79
Discharge: HOME | End: 2019-10-28
Attending: EMERGENCY MEDICINE
Payer: MEDICARE

## 2019-10-28 VITALS
OXYGEN SATURATION: 100 % | HEIGHT: 63 IN | WEIGHT: 230 LBS | BODY MASS INDEX: 40.75 KG/M2 | SYSTOLIC BLOOD PRESSURE: 214 MMHG | TEMPERATURE: 99 F | RESPIRATION RATE: 18 BRPM | HEART RATE: 65 BPM | DIASTOLIC BLOOD PRESSURE: 94 MMHG

## 2019-10-28 DIAGNOSIS — R68.84 JAW PAIN: ICD-10-CM
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
## 2019-10-28 DIAGNOSIS — S16.1XXA NECK STRAIN, INITIAL ENCOUNTER: ICD-10-CM

## 2019-10-28 LAB — POCT GLUCOSE: 129 MG/DL (ref 70–110)

## 2019-10-28 PROCEDURE — 82962 GLUCOSE BLOOD TEST: CPT

## 2019-10-28 PROCEDURE — 99284 EMERGENCY DEPT VISIT MOD MDM: CPT | Mod: 25

## 2019-10-28 PROCEDURE — 63600175 PHARM REV CODE 636 W HCPCS

## 2019-10-28 PROCEDURE — 96372 THER/PROPH/DIAG INJ SC/IM: CPT

## 2019-10-28 PROCEDURE — 25000003 PHARM REV CODE 250: Performed by: EMERGENCY MEDICINE

## 2019-10-28 RX ORDER — METHOCARBAMOL 500 MG/1
1500 TABLET, FILM COATED ORAL 4 TIMES DAILY
Status: DISCONTINUED | OUTPATIENT
Start: 2019-10-28 | End: 2019-10-28 | Stop reason: HOSPADM

## 2019-10-28 RX ORDER — NALOXONE HCL 0.4 MG/ML
VIAL (ML) INJECTION
Status: COMPLETED
Start: 2019-10-28 | End: 2019-10-28

## 2019-10-28 RX ORDER — HYDROCODONE BITARTRATE AND ACETAMINOPHEN 5; 325 MG/1; MG/1
1 TABLET ORAL
Status: COMPLETED | OUTPATIENT
Start: 2019-10-28 | End: 2019-10-28

## 2019-10-28 RX ORDER — ONDANSETRON 4 MG/1
4 TABLET, ORALLY DISINTEGRATING ORAL
Status: COMPLETED | OUTPATIENT
Start: 2019-10-28 | End: 2019-10-28

## 2019-10-28 RX ORDER — METHOCARBAMOL 500 MG/1
1500 TABLET, FILM COATED ORAL 4 TIMES DAILY
Status: DISCONTINUED | OUTPATIENT
Start: 2019-10-28 | End: 2019-10-28

## 2019-10-28 RX ORDER — NALOXONE HYDROCHLORIDE 1 MG/ML
1 INJECTION INTRAMUSCULAR; INTRAVENOUS; SUBCUTANEOUS
Status: DISCONTINUED | OUTPATIENT
Start: 2019-10-28 | End: 2019-10-28 | Stop reason: HOSPADM

## 2019-10-28 RX ORDER — ACETAMINOPHEN 325 MG/1
650 TABLET ORAL
Status: COMPLETED | OUTPATIENT
Start: 2019-10-28 | End: 2019-10-28

## 2019-10-28 RX ADMIN — NALOXONE HYDROCHLORIDE 0.4 MG: 0.4 INJECTION, SOLUTION INTRAMUSCULAR; INTRAVENOUS; SUBCUTANEOUS at 07:10

## 2019-10-28 RX ADMIN — METHOCARBAMOL 1500 MG: 500 TABLET, FILM COATED ORAL at 06:10

## 2019-10-28 RX ADMIN — ONDANSETRON 4 MG: 4 TABLET, ORALLY DISINTEGRATING ORAL at 09:10

## 2019-10-28 RX ADMIN — HYDROCODONE BITARTRATE AND ACETAMINOPHEN 1 TABLET: 5; 325 TABLET ORAL at 05:10

## 2019-10-28 RX ADMIN — ACETAMINOPHEN 650 MG: 325 TABLET ORAL at 05:10
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
## 2019-10-29 NOTE — ED NOTES
Pt AAOx4, Abc's intact. NADN. No adverse reaction to medication given. Post Narcan administration. Pt complained of nausea, stated that the Zofran helped her very much.

## 2019-10-29 NOTE — ED NOTES
Pt asking for food. Per Dr. Giraldo pt provided with sandwich and pudding. Pt tolerating oral intake without difficulty. Will continue to monitor Pt. Friends at bedside assisting Pt with food. Nurse in direct view of Pt. ABC's [place].

## 2019-10-29 NOTE — ED NOTES
Pt given 0.4mg intranasal Narcan, instantaneous arousal. Pt able to confirm , oriented to self, people in room with her are her spiritual sisters. MD aware.

## 2019-12-30 ENCOUNTER — CLINICAL SUPPORT (OUTPATIENT)
Dept: URGENT CARE | Facility: CLINIC | Age: 79
End: 2019-12-30
Payer: MEDICARE

## 2019-12-30 ENCOUNTER — TELEPHONE (OUTPATIENT)
Dept: FAMILY MEDICINE | Facility: CLINIC | Age: 79
End: 2019-12-30

## 2019-12-30 VITALS
SYSTOLIC BLOOD PRESSURE: 158 MMHG | WEIGHT: 237 LBS | HEIGHT: 63 IN | DIASTOLIC BLOOD PRESSURE: 67 MMHG | BODY MASS INDEX: 41.99 KG/M2 | OXYGEN SATURATION: 99 % | HEART RATE: 58 BPM | TEMPERATURE: 97 F

## 2019-12-30 DIAGNOSIS — H66.91 RIGHT OTITIS MEDIA, UNSPECIFIED OTITIS MEDIA TYPE: Primary | ICD-10-CM

## 2019-12-30 PROCEDURE — 99204 PR OFFICE/OUTPT VISIT, NEW, LEVL IV, 45-59 MIN: ICD-10-PCS | Mod: 25,S$GLB,, | Performed by: NURSE PRACTITIONER

## 2019-12-30 PROCEDURE — 96372 THER/PROPH/DIAG INJ SC/IM: CPT | Mod: S$GLB,,, | Performed by: NURSE PRACTITIONER

## 2019-12-30 PROCEDURE — 96372 PR INJECTION,THERAP/PROPH/DIAG2ST, IM OR SUBCUT: ICD-10-PCS | Mod: S$GLB,,, | Performed by: NURSE PRACTITIONER

## 2019-12-30 PROCEDURE — 99204 OFFICE O/P NEW MOD 45 MIN: CPT | Mod: 25,S$GLB,, | Performed by: NURSE PRACTITIONER

## 2019-12-30 RX ORDER — DEXAMETHASONE SODIUM PHOSPHATE 4 MG/ML
8 INJECTION, SOLUTION INTRA-ARTICULAR; INTRALESIONAL; INTRAMUSCULAR; INTRAVENOUS; SOFT TISSUE
Status: COMPLETED | OUTPATIENT
Start: 2019-12-30 | End: 2019-12-30

## 2019-12-30 RX ORDER — AMOXICILLIN 875 MG/1
875 TABLET, FILM COATED ORAL 2 TIMES DAILY
Qty: 20 TABLET | Refills: 0 | Status: SHIPPED | OUTPATIENT
Start: 2019-12-30 | End: 2020-01-09

## 2019-12-30 RX ADMIN — DEXAMETHASONE SODIUM PHOSPHATE 8 MG: 4 INJECTION, SOLUTION INTRA-ARTICULAR; INTRALESIONAL; INTRAMUSCULAR; INTRAVENOUS; SOFT TISSUE at 12:12

## 2019-12-30 NOTE — PROGRESS NOTES
"Subjective:       Patient ID: Sammi Abreu is a 79 y.o. female.    Vitals:  height is 5' 3" (1.6 m) and weight is 107.5 kg (237 lb). Her oral temperature is 97.2 °F (36.2 °C). Her blood pressure is 158/67 (abnormal) and her pulse is 58 (abnormal). Her oxygen saturation is 99%.     Chief Complaint: Otalgia    Pt presents with right ear pain x 2 weeks. Pt states pain is gradually worsening, aching quality, mod severity. She denies f/c/n/v.     Otalgia    There is pain in the right ear. This is a new problem. The current episode started 1 to 4 weeks ago. The problem has been gradually worsening. There has been no fever. The pain is moderate. Pertinent negatives include no coughing, hearing loss, rash, sore throat or vomiting. Treatments tried: Peroxide, Sinus meds. The treatment provided no relief.       Constitution: Negative for chills, sweating, fatigue and fever.   HENT: Positive for ear pain. Negative for hearing loss, congestion, sinus pain, sinus pressure, sore throat and voice change.    Neck: Negative for painful lymph nodes.   Eyes: Negative for eye redness.   Respiratory: Negative for chest tightness, cough, sputum production, bloody sputum, COPD, shortness of breath, stridor, wheezing and asthma.    Gastrointestinal: Negative for nausea and vomiting.   Musculoskeletal: Negative for muscle ache.   Skin: Negative for rash.   Allergic/Immunologic: Negative for seasonal allergies and asthma.   Hematologic/Lymphatic: Negative for swollen lymph nodes.       Objective:      Physical Exam   Constitutional: She is oriented to person, place, and time. She appears well-developed and well-nourished. She is cooperative.  Non-toxic appearance. She does not have a sickly appearance. She does not appear ill. No distress.   HENT:   Head: Normocephalic and atraumatic.   Right Ear: Hearing, external ear and ear canal normal. Tympanic membrane is erythematous.   Left Ear: Hearing, tympanic membrane, external ear and ear " canal normal.   Nose: Nose normal. No mucosal edema, rhinorrhea or nasal deformity. No epistaxis. Right sinus exhibits no maxillary sinus tenderness and no frontal sinus tenderness. Left sinus exhibits no maxillary sinus tenderness and no frontal sinus tenderness.   Mouth/Throat: Uvula is midline, oropharynx is clear and moist and mucous membranes are normal. No trismus in the jaw. Normal dentition. No uvula swelling. No oropharyngeal exudate, posterior oropharyngeal edema or posterior oropharyngeal erythema.   Eyes: Conjunctivae and lids are normal. No scleral icterus.   Neck: Trachea normal, full passive range of motion without pain and phonation normal. Neck supple. No neck rigidity. No edema and no erythema present.   Cardiovascular: Normal rate, regular rhythm, normal heart sounds, intact distal pulses and normal pulses.   Pulmonary/Chest: Effort normal and breath sounds normal. No stridor. No respiratory distress. She has no decreased breath sounds. She has no wheezes. She has no rhonchi. She has no rales. She exhibits no tenderness.   Abdominal: Normal appearance.   Musculoskeletal: Normal range of motion. She exhibits no edema or deformity.   Neurological: She is alert and oriented to person, place, and time. She exhibits normal muscle tone. Coordination normal.   Skin: Skin is warm, dry, intact, not diaphoretic and not pale.   Psychiatric: She has a normal mood and affect. Her speech is normal and behavior is normal. Judgment and thought content normal. Cognition and memory are normal.   Nursing note and vitals reviewed.        Assessment:       1. Right otitis media, unspecified otitis media type        Plan:         Right otitis media, unspecified otitis media type    Other orders  -     dexamethasone injection 8 mg  -     amoxicillin (AMOXIL) 875 MG tablet; Take 1 tablet (875 mg total) by mouth 2 (two) times daily. for 10 days  Dispense: 20 tablet; Refill: 0

## 2019-12-30 NOTE — TELEPHONE ENCOUNTER
----- Message from Leonila Bailey sent at 12/30/2019  8:01 AM CST -----  Contact: Pt  Type:  Same Day Appointment Request    Caller is requesting a same day appointment.  Caller declined first available appointment listed below.      Name of Caller:  Pt  When is the first available appointment?  01/06/2019  Symptoms: possible ear infection  Best Call Back Number:  [phone]  Additional Information:   Please Advise ---Thank you

## 2019-12-30 NOTE — PATIENT INSTRUCTIONS
Middle Ear Infection (Adult)  You have an infection of the middle ear, the space behind the eardrum. This is also called acute otitis media (AOM). Sometimes it is caused by the common cold. This is because congestion can block the internal passage (eustachian tube) that drains fluid from the middle ear. When the middle ear fills with fluid, bacteria can grow there and cause an infection. Oral antibiotics are used to treat this illness, not ear drops. Symptoms usually start to improve within 1 to 2 days of treatment.    Home care  The following are general care guidelines:  · Finish all of the antibiotic medicine given, even though you may feel better after the first few days.  · You may use over-the-counter medicine, such as acetaminophen or ibuprofen, to control pain and fever, unless something else was prescribed. If you have chronic liver or kidney disease or have ever had a stomach ulcer or gastrointestinal bleeding, talk with your healthcare provider before using these medicines. Do not give aspirin to anyone under 18 years of age who has a fever. It may cause severe illness or death.  Follow-up care  Follow up with your healthcare provider, or as advised, in 2 weeks if all symptoms have not gotten better, or if hearing doesn't go back to normal within 1 month.  When to seek medical advice  Call your healthcare provider right away if any of these occur:  · Ear pain gets worse or does not improve after 3 days of treatment  · Unusual drowsiness or confusion  · Neck pain, stiff neck, or headache  · Fluid or blood draining from the ear canal  · Fever of 100.4°F (38°C) or as advised   · Seizure  Date Last Reviewed: 6/1/2016  © 1504-9769 [employer]. [address] [place] [address], [place] [address]. All rights reserved. This information is not intended as a substitute for professional medical care. Always follow your healthcare professional's instructions.

## 2020-01-16 ENCOUNTER — LAB VISIT (OUTPATIENT)
Dept: LAB | Facility: HOSPITAL | Age: 80
End: 2020-01-16
Attending: NURSE PRACTITIONER
Payer: MEDICARE

## 2020-01-16 DIAGNOSIS — E11.69 TYPE 2 DIABETES MELLITUS WITH HYPERCHOLESTEROLEMIA: ICD-10-CM

## 2020-01-16 DIAGNOSIS — E78.00 TYPE 2 DIABETES MELLITUS WITH HYPERCHOLESTEROLEMIA: ICD-10-CM

## 2020-01-16 DIAGNOSIS — E11.59 HYPERTENSION ASSOCIATED WITH DIABETES: ICD-10-CM

## 2020-01-16 DIAGNOSIS — N18.30 CONTROLLED TYPE 2 DIABETES MELLITUS WITH STAGE 3 CHRONIC KIDNEY DISEASE, WITHOUT LONG-TERM CURRENT USE OF INSULIN: ICD-10-CM

## 2020-01-16 DIAGNOSIS — I15.2 HYPERTENSION ASSOCIATED WITH DIABETES: ICD-10-CM

## 2020-01-16 DIAGNOSIS — E11.22 CONTROLLED TYPE 2 DIABETES MELLITUS WITH STAGE 3 CHRONIC KIDNEY DISEASE, WITHOUT LONG-TERM CURRENT USE OF INSULIN: ICD-10-CM

## 2020-01-16 LAB
ALBUMIN SERPL BCP-MCNC: 3.3 G/DL (ref 3.5–5.2)
ALP SERPL-CCNC: 60 U/L (ref 55–135)
ALT SERPL W/O P-5'-P-CCNC: 13 U/L (ref 10–44)
ANION GAP SERPL CALC-SCNC: 10 MMOL/L (ref 8–16)
AST SERPL-CCNC: 16 U/L (ref 10–40)
BASOPHILS # BLD AUTO: 0.03 K/UL (ref 0–0.2)
BASOPHILS NFR BLD: 0.5 % (ref 0–1.9)
BILIRUB SERPL-MCNC: 0.6 MG/DL (ref 0.1–1)
BUN SERPL-MCNC: 21 MG/DL (ref 8–23)
CALCIUM SERPL-MCNC: 8.8 MG/DL (ref 8.7–10.5)
CHLORIDE SERPL-SCNC: 107 MMOL/L (ref 95–110)
CHOLEST SERPL-MCNC: 194 MG/DL (ref 120–199)
CHOLEST/HDLC SERPL: 4.9 {RATIO} (ref 2–5)
CO2 SERPL-SCNC: 25 MMOL/L (ref 23–29)
CREAT SERPL-MCNC: 1.2 MG/DL (ref 0.5–1.4)
DIFFERENTIAL METHOD: ABNORMAL
EOSINOPHIL # BLD AUTO: 0.3 K/UL (ref 0–0.5)
EOSINOPHIL NFR BLD: 5.1 % (ref 0–8)
ERYTHROCYTE [DISTWIDTH] IN BLOOD BY AUTOMATED COUNT: 13.4 % (ref 11.5–14.5)
EST. GFR  (AFRICAN AMERICAN): 50.4 ML/MIN/1.73 M^2
EST. GFR  (NON AFRICAN AMERICAN): 43.7 ML/MIN/1.73 M^2
GLUCOSE SERPL-MCNC: 107 MG/DL (ref 70–110)
HCT VFR BLD AUTO: 40.7 % (ref 37–48.5)
HDLC SERPL-MCNC: 40 MG/DL (ref 40–75)
HDLC SERPL: 20.6 % (ref 20–50)
HGB BLD-MCNC: 12.7 G/DL (ref 12–16)
IMM GRANULOCYTES # BLD AUTO: 0.03 K/UL (ref 0–0.04)
IMM GRANULOCYTES NFR BLD AUTO: 0.5 % (ref 0–0.5)
LDLC SERPL CALC-MCNC: 134.4 MG/DL (ref 63–159)
LYMPHOCYTES # BLD AUTO: 1.8 K/UL (ref 1–4.8)
LYMPHOCYTES NFR BLD: 32.5 % (ref 18–48)
MCH RBC QN AUTO: 33.8 PG (ref 27–31)
MCHC RBC AUTO-ENTMCNC: 31.2 G/DL (ref 32–36)
MCV RBC AUTO: 108 FL (ref 82–98)
MONOCYTES # BLD AUTO: 0.5 K/UL (ref 0.3–1)
MONOCYTES NFR BLD: 9.3 % (ref 4–15)
NEUTROPHILS # BLD AUTO: 2.9 K/UL (ref 1.8–7.7)
NEUTROPHILS NFR BLD: 52.1 % (ref 38–73)
NONHDLC SERPL-MCNC: 154 MG/DL
NRBC BLD-RTO: 0 /100 WBC
PLATELET # BLD AUTO: 205 K/UL (ref 150–350)
PMV BLD AUTO: 11.3 FL (ref 9.2–12.9)
POTASSIUM SERPL-SCNC: 4.4 MMOL/L (ref 3.5–5.1)
PROT SERPL-MCNC: 6.7 G/DL (ref 6–8.4)
RBC # BLD AUTO: 3.76 M/UL (ref 4–5.4)
SODIUM SERPL-SCNC: 142 MMOL/L (ref 136–145)
T4 FREE SERPL-MCNC: 0.81 NG/DL (ref 0.71–1.51)
TRIGL SERPL-MCNC: 98 MG/DL (ref 30–150)
TSH SERPL DL<=0.005 MIU/L-ACNC: 4.57 UIU/ML (ref 0.4–4)
WBC # BLD AUTO: 5.51 K/UL (ref 3.9–12.7)

## 2020-01-16 PROCEDURE — 36415 COLL VENOUS BLD VENIPUNCTURE: CPT | Mod: PO

## 2020-01-16 PROCEDURE — 85025 COMPLETE CBC W/AUTO DIFF WBC: CPT

## 2020-01-16 PROCEDURE — 84443 ASSAY THYROID STIM HORMONE: CPT

## 2020-01-16 PROCEDURE — 84439 ASSAY OF FREE THYROXINE: CPT

## 2020-01-16 PROCEDURE — 80053 COMPREHEN METABOLIC PANEL: CPT

## 2020-01-16 PROCEDURE — 80061 LIPID PANEL: CPT

## 2020-01-16 PROCEDURE — 83036 HEMOGLOBIN GLYCOSYLATED A1C: CPT

## 2020-01-17 LAB
ESTIMATED AVG GLUCOSE: 128 MG/DL (ref 68–131)
HBA1C MFR BLD HPLC: 6.1 % (ref 4–5.6)

## 2020-01-20 ENCOUNTER — HOSPITAL ENCOUNTER (OUTPATIENT)
Dept: RADIOLOGY | Facility: CLINIC | Age: 80
Discharge: HOME | End: 2020-01-20
Attending: FAMILY MEDICINE
Payer: MEDICARE

## 2020-01-20 ENCOUNTER — OFFICE VISIT (OUTPATIENT)
Dept: FAMILY MEDICINE | Facility: CLINIC | Age: 80
End: 2020-01-20
Payer: MEDICARE

## 2020-01-20 VITALS
SYSTOLIC BLOOD PRESSURE: 127 MMHG | HEART RATE: 61 BPM | HEIGHT: 63 IN | WEIGHT: 235.88 LBS | BODY MASS INDEX: 41.79 KG/M2 | TEMPERATURE: 99 F | DIASTOLIC BLOOD PRESSURE: 80 MMHG | OXYGEN SATURATION: 98 %

## 2020-01-20 DIAGNOSIS — M17.0 PRIMARY OSTEOARTHRITIS OF BOTH KNEES: ICD-10-CM

## 2020-01-20 DIAGNOSIS — H70.91 MASTOIDITIS OF RIGHT SIDE: ICD-10-CM

## 2020-01-20 DIAGNOSIS — I27.20 PULMONARY HYPERTENSION: ICD-10-CM

## 2020-01-20 DIAGNOSIS — I10 HTN (HYPERTENSION), BENIGN: ICD-10-CM

## 2020-01-20 DIAGNOSIS — Z96.659: Primary | ICD-10-CM

## 2020-01-20 DIAGNOSIS — T84.058A: Primary | ICD-10-CM

## 2020-01-20 DIAGNOSIS — D53.1 MEGALOBLASTIC RED BLOOD CELLS: ICD-10-CM

## 2020-01-20 PROCEDURE — 99999 PR PBB SHADOW E&M-EST. PATIENT-LVL V: CPT | Mod: PBBFAC,,, | Performed by: FAMILY MEDICINE

## 2020-01-20 PROCEDURE — 99999 PR PBB SHADOW E&M-EST. PATIENT-LVL V: ICD-10-PCS | Mod: PBBFAC,,, | Performed by: FAMILY MEDICINE

## 2020-01-20 PROCEDURE — 3079F DIAST BP 80-89 MM HG: CPT | Mod: CPTII,S$GLB,, | Performed by: FAMILY MEDICINE

## 2020-01-20 PROCEDURE — 73562 X-RAY EXAM OF KNEE 3: CPT | Mod: 26,50,S$GLB, | Performed by: RADIOLOGY

## 2020-01-20 PROCEDURE — 99499 RISK ADDL DX/OHS AUDIT: ICD-10-PCS | Mod: S$GLB,,, | Performed by: FAMILY MEDICINE

## 2020-01-20 PROCEDURE — 3074F SYST BP LT 130 MM HG: CPT | Mod: CPTII,S$GLB,, | Performed by: FAMILY MEDICINE

## 2020-01-20 PROCEDURE — 3079F PR MOST RECENT DIASTOLIC BLOOD PRESSURE 80-89 MM HG: ICD-10-PCS | Mod: CPTII,S$GLB,, | Performed by: FAMILY MEDICINE

## 2020-01-20 PROCEDURE — 99499 UNLISTED E&M SERVICE: CPT | Mod: S$GLB,,, | Performed by: FAMILY MEDICINE

## 2020-01-20 PROCEDURE — 1159F MED LIST DOCD IN RCRD: CPT | Mod: S$GLB,,, | Performed by: FAMILY MEDICINE

## 2020-01-20 PROCEDURE — 73562 XR KNEE ORTHO BILAT: ICD-10-PCS | Mod: 26,50,S$GLB, | Performed by: RADIOLOGY

## 2020-01-20 PROCEDURE — 99214 PR OFFICE/OUTPT VISIT, EST, LEVL IV, 30-39 MIN: ICD-10-PCS | Mod: S$GLB,,, | Performed by: FAMILY MEDICINE

## 2020-01-20 PROCEDURE — 1126F AMNT PAIN NOTED NONE PRSNT: CPT | Mod: S$GLB,,, | Performed by: FAMILY MEDICINE

## 2020-01-20 PROCEDURE — 1159F PR MEDICATION LIST DOCUMENTED IN MEDICAL RECORD: ICD-10-PCS | Mod: S$GLB,,, | Performed by: FAMILY MEDICINE

## 2020-01-20 PROCEDURE — 1101F PT FALLS ASSESS-DOCD LE1/YR: CPT | Mod: CPTII,S$GLB,, | Performed by: FAMILY MEDICINE

## 2020-01-20 PROCEDURE — 1126F PR PAIN SEVERITY QUANTIFIED, NO PAIN PRESENT: ICD-10-PCS | Mod: S$GLB,,, | Performed by: FAMILY MEDICINE

## 2020-01-20 PROCEDURE — 3074F PR MOST RECENT SYSTOLIC BLOOD PRESSURE < 130 MM HG: ICD-10-PCS | Mod: CPTII,S$GLB,, | Performed by: FAMILY MEDICINE

## 2020-01-20 PROCEDURE — 1101F PR PT FALLS ASSESS DOC 0-1 FALLS W/OUT INJ PAST YR: ICD-10-PCS | Mod: CPTII,S$GLB,, | Performed by: FAMILY MEDICINE

## 2020-01-20 PROCEDURE — 99214 OFFICE O/P EST MOD 30 MIN: CPT | Mod: S$GLB,,, | Performed by: FAMILY MEDICINE

## 2020-01-20 PROCEDURE — 73562 X-RAY EXAM OF KNEE 3: CPT | Mod: TC,50,FY,PO

## 2020-01-20 RX ORDER — THIAMINE HCL 100 MG
2 TABLET ORAL DAILY
Qty: 60 LOZENGE | Refills: 11 | Status: SHIPPED | OUTPATIENT
Start: 2020-01-20 | End: 2022-08-31 | Stop reason: ALTCHOICE

## 2020-01-20 RX ORDER — LOSARTAN POTASSIUM 100 MG/1
100 TABLET ORAL DAILY
Qty: 90 TABLET | Refills: 4 | Status: ON HOLD | OUTPATIENT
Start: 2020-01-20 | End: 2020-07-29 | Stop reason: HOSPADM

## 2020-01-20 RX ORDER — METOPROLOL SUCCINATE 50 MG/1
50 TABLET, EXTENDED RELEASE ORAL DAILY
Qty: 90 TABLET | Refills: 3 | Status: SHIPPED | OUTPATIENT
Start: 2020-01-20 | End: 2021-05-27

## 2020-01-20 RX ORDER — SPIRONOLACTONE 25 MG/1
25 TABLET ORAL DAILY
Qty: 90 TABLET | Refills: 6 | Status: ON HOLD | OUTPATIENT
Start: 2020-01-20 | End: 2020-08-16 | Stop reason: HOSPADM

## 2020-01-20 RX ORDER — CIPROFLOXACIN 500 MG/1
500 TABLET ORAL 2 TIMES DAILY
Qty: 20 TABLET | Refills: 0 | Status: SHIPPED | OUTPATIENT
Start: 2020-01-20 | End: 2020-06-02

## 2020-01-20 RX ORDER — ASPIRIN 81 MG/1
81 TABLET ORAL DAILY
Status: ON HOLD | COMMUNITY
End: 2020-07-29 | Stop reason: HOSPADM

## 2020-01-20 NOTE — PATIENT INSTRUCTIONS

## 2020-01-20 NOTE — PROGRESS NOTES
SUBJECTIVE:  Sammi Abreu is a 79 y.o. female who sustained a right knee injury several year(s) ago. Mechanism of injury: none. Immediate symptoms: immediate pain, delayed pain, delayed swelling, was able to bear weight directly after injury. Symptoms have been chronic since that time. Prior history of related problems: previous knee injury .    OBJECTIVE:  Vital signs as noted above.  Appearance: alert, well appearing, and in no distress, morbidly obese, acyanotic, in no respiratory distress and playful, active.  Knee exam: antalgic gait, reduced range of motion, negative drawer sign, positive pivot-shift, collateral ligaments intact, positive Guerda sign, negative Apley's sign, negative Lachman sign, patellar tenderness, normal ipsilateral hip exam, normal ipsilateral foot and ankle exam, abnormal contralateral knee exam.  X-ray: shows DJD changes, likely chronic.    ASSESSMENT:  Knee meniscal injury and underlying chronic DJD likely  Osteolysis around knee prosthesis    Primary osteoarthritis of both knees  -     Ambulatory referral to Physical Medicine Rehab  -     X-ray Knee Ortho Right; Future; Expected date: 01/20/2020  -     Cancel: X-ray Knee Ortho Left; Future; Expected date: 01/20/2020    Mastoiditis of right side  -     Ambulatory referral to ENT  -     ciprofloxacin HCl (CIPRO) 500 MG tablet; Take 1 tablet (500 mg total) by mouth 2 (two) times daily.  Dispense: 20 tablet; Refill: 0  -     CT Sinuses without Contrast; Future; Expected date: 01/20/2020    Megaloblastic red blood cells  -     cyanocobalamin, vitamin B-12, 2,500 mcg Lozg; Place 2 tablets under the tongue once daily.  Dispense: 60 lozenge; Refill: 11  -     Homocysteine, serum; Future; Expected date: 01/20/2020  -     Methylmalonic acid, serum; Future; Expected date: 01/20/2020    HTN (hypertension), benign  -     losartan (COZAAR) 100 MG tablet; Take 1 tablet (100 mg total) by mouth once daily.  Dispense: 90 tablet; Refill:  4    Pulmonary hypertension  -     spironolactone (ALDACTONE) 25 MG tablet; Take 1 tablet (25 mg total) by mouth once daily.  Dispense: 90 tablet; Refill: 6    Other orders  -     metoprolol succinate (TOPROL-XL) 50 MG 24 hr tablet; Take 1 tablet (50 mg total) by mouth once daily.  Dispense: 90 tablet; Refill: 3        PLAN:  rest the injured area as much as practical, elevate the injured limb, compressive bandage, splint dispensed and applied, purchase a splint, obtain crutches  See orders for this visit as documented in the electronic medical record.  Discussed health maintenance guidelines appropriate for age.

## 2020-01-21 ENCOUNTER — HOSPITAL ENCOUNTER (OUTPATIENT)
Dept: RADIOLOGY | Facility: HOSPITAL | Age: 80
Discharge: HOME | End: 2020-01-21
Attending: FAMILY MEDICINE
Payer: MEDICARE

## 2020-01-21 ENCOUNTER — TELEPHONE (OUTPATIENT)
Dept: FAMILY MEDICINE | Facility: CLINIC | Age: 80
End: 2020-01-21

## 2020-01-21 DIAGNOSIS — H70.91 MASTOIDITIS OF RIGHT SIDE: ICD-10-CM

## 2020-01-21 PROCEDURE — 70486 CT MAXILLOFACIAL W/O DYE: CPT | Mod: 26,,, | Performed by: RADIOLOGY

## 2020-01-21 PROCEDURE — 70486 CT MAXILLOFACIAL W/O DYE: CPT | Mod: TC

## 2020-01-21 PROCEDURE — 70486 CT SINUSES WITHOUT CONTRAST: ICD-10-PCS | Mod: 26,,, | Performed by: RADIOLOGY

## 2020-01-21 NOTE — TELEPHONE ENCOUNTER
----- Message from Antonio Mcarthur sent at 1/21/2020 12:59 PM CST -----  Contact: pt   Type:  Patient Returning Call    Who Called:  Pt   Who Left Message for Patient:  Sol   Does the patient know what this is regarding?:  Yes   Best Call Back Number:     Additional Information:returning a call

## 2020-01-23 ENCOUNTER — INITIAL CONSULT (OUTPATIENT)
Dept: PHYSICAL MEDICINE AND REHAB | Facility: CLINIC | Age: 80
End: 2020-01-23
Payer: MEDICARE

## 2020-01-23 VITALS
WEIGHT: 235 LBS | DIASTOLIC BLOOD PRESSURE: 72 MMHG | SYSTOLIC BLOOD PRESSURE: 149 MMHG | HEART RATE: 69 BPM | BODY MASS INDEX: 41.64 KG/M2 | HEIGHT: 63 IN

## 2020-01-23 DIAGNOSIS — M17.11 PRIMARY OSTEOARTHRITIS OF RIGHT KNEE: Primary | ICD-10-CM

## 2020-01-23 PROCEDURE — 1125F PR PAIN SEVERITY QUANTIFIED, PAIN PRESENT: ICD-10-PCS | Mod: S$GLB,,, | Performed by: PHYSICAL MEDICINE & REHABILITATION

## 2020-01-23 PROCEDURE — 99999 PR PBB SHADOW E&M-EST. PATIENT-LVL III: CPT | Mod: PBBFAC,,, | Performed by: PHYSICAL MEDICINE & REHABILITATION

## 2020-01-23 PROCEDURE — 3077F PR MOST RECENT SYSTOLIC BLOOD PRESSURE >= 140 MM HG: ICD-10-PCS | Mod: CPTII,S$GLB,, | Performed by: PHYSICAL MEDICINE & REHABILITATION

## 2020-01-23 PROCEDURE — 1101F PR PT FALLS ASSESS DOC 0-1 FALLS W/OUT INJ PAST YR: ICD-10-PCS | Mod: CPTII,S$GLB,, | Performed by: PHYSICAL MEDICINE & REHABILITATION

## 2020-01-23 PROCEDURE — 99204 PR OFFICE/OUTPT VISIT, NEW, LEVL IV, 45-59 MIN: ICD-10-PCS | Mod: S$GLB,,, | Performed by: PHYSICAL MEDICINE & REHABILITATION

## 2020-01-23 PROCEDURE — 99499 RISK ADDL DX/OHS AUDIT: ICD-10-PCS | Mod: S$GLB,,, | Performed by: PHYSICAL MEDICINE & REHABILITATION

## 2020-01-23 PROCEDURE — 99204 OFFICE O/P NEW MOD 45 MIN: CPT | Mod: S$GLB,,, | Performed by: PHYSICAL MEDICINE & REHABILITATION

## 2020-01-23 PROCEDURE — 1159F MED LIST DOCD IN RCRD: CPT | Mod: S$GLB,,, | Performed by: PHYSICAL MEDICINE & REHABILITATION

## 2020-01-23 PROCEDURE — 99999 PR PBB SHADOW E&M-EST. PATIENT-LVL III: ICD-10-PCS | Mod: PBBFAC,,, | Performed by: PHYSICAL MEDICINE & REHABILITATION

## 2020-01-23 PROCEDURE — 1125F AMNT PAIN NOTED PAIN PRSNT: CPT | Mod: S$GLB,,, | Performed by: PHYSICAL MEDICINE & REHABILITATION

## 2020-01-23 PROCEDURE — 3078F PR MOST RECENT DIASTOLIC BLOOD PRESSURE < 80 MM HG: ICD-10-PCS | Mod: CPTII,S$GLB,, | Performed by: PHYSICAL MEDICINE & REHABILITATION

## 2020-01-23 PROCEDURE — 3077F SYST BP >= 140 MM HG: CPT | Mod: CPTII,S$GLB,, | Performed by: PHYSICAL MEDICINE & REHABILITATION

## 2020-01-23 PROCEDURE — 3078F DIAST BP <80 MM HG: CPT | Mod: CPTII,S$GLB,, | Performed by: PHYSICAL MEDICINE & REHABILITATION

## 2020-01-23 PROCEDURE — 1159F PR MEDICATION LIST DOCUMENTED IN MEDICAL RECORD: ICD-10-PCS | Mod: S$GLB,,, | Performed by: PHYSICAL MEDICINE & REHABILITATION

## 2020-01-23 PROCEDURE — 1101F PT FALLS ASSESS-DOCD LE1/YR: CPT | Mod: CPTII,S$GLB,, | Performed by: PHYSICAL MEDICINE & REHABILITATION

## 2020-01-23 PROCEDURE — 99499 UNLISTED E&M SERVICE: CPT | Mod: S$GLB,,, | Performed by: PHYSICAL MEDICINE & REHABILITATION

## 2020-01-23 RX ORDER — DICLOFENAC SODIUM 10 MG/G
2 GEL TOPICAL DAILY
Qty: 2 TUBE | Refills: 6 | Status: SHIPPED | OUTPATIENT
Start: 2020-01-23 | End: 2020-06-02

## 2020-01-23 NOTE — LETTER
January 23, 2020      Osmani Villatoro MD  [address]  [place] [address]           [place] [facility]  [address] [place] [address]  [address]  [place] [address]  Phone: [phone]  Fax: [phone]          Patient: Sammi Abreu   MR Number: [id number]   YOB: 1940   Date of Visit: 1/23/2020       Dear Dr. Osmani Villatoro:    Thank you for referring Sammi Abreu to me for evaluation. Attached you will find relevant portions of my assessment and plan of care.    If you have questions, please do not hesitate to call me. I look forward to following Sammi Abreu along with you.    Sincerely,    China Humphries,     Enclosure  CC:  No Recipients    If you would like to receive this communication electronically, please contact [email or web address] or [phone] to request more information on [employer] Link access.    For providers and/or their staff who would like to refer a patient to [facility], please contact us through our one-stop-shop provider referral line, [facility], at [phone].    If you feel you have received this communication in error or would no longer like to receive these types of communications, please e-mail [email or web address]

## 2020-01-23 NOTE — PROGRESS NOTES
HPI:  Patient is a 79 y.o. year old female w. Right knee pain. It started over a year ago. It is painful w. Walking. At times she states her knee locks. This is extremely painful. It occurs several times a month. She does not report any trauma or falls. She denies weakness.    Imaging  X-ray Knee Ortho Bilateral   Order: [id number]   Status:  Final result   Visible to patient:  Yes (Patient Portal) Next appt:  01/30/2020 at 01:20 PM in Otolaryngology (Dipika Alonso NP) Dx:  Primary osteoarthritis of both knees   Details     Reading Physician Reading Date Result Priority   Daniel Bradley IV, MD 1/20/2020 Routine      Narrative     EXAMINATION:  XR KNEE ORTHO BILAT    CLINICAL HISTORY:  Bilateral primary osteoarthritis of knee    TECHNIQUE:  AP standing, lateral and Merchant views of both knees were performed.    COMPARISON:  None    FINDINGS:  The patella appear well positioned within the intercondylar notches bilaterally.  Some irregularity is suspected to the articular surface of the right femur and right patella at the patello femoral articulation suggestive of chondromalacia patella and osteochondral injury.  A quadriceps spur off of the left patella is noted.  A quadriceps spur off the right patella is noted.  No significant suprapatellar joint effusions are noted on either side.  Medial compartment narrowing on the left is noted with lateral compartment narrowing on the right.  Sclerosis is noted at the lateral compartment of the right knee      Impression       See above           Labs  egfr 50  Cr 1.2  Gluc nl  lfts nl  hgbaic elev 6.1      Past Medical History:   Diagnosis Date    ALLERGIC RHINITIS     Anemia     Arthritis     Back pain     Cataract     OU    COPD (chronic obstructive pulmonary disease)     Degenerative disc disease     Diabetes mellitus     NIDDM    Diverticulosis     DVT (deep venous thrombosis)     Edema     Fibromyalgia     Glaucoma     Gout     Hyperlipidemia      Hypertension     Incontinence     Osteoporosis     Reflux     Sleep apnea     non compliant with CPAP    Vestibulitis of ear      Past Surgical History:   Procedure Laterality Date    ANGIOGRAPHY OF LOWER EXTREMITY N/A 2019    Procedure: ANGIOGRAM, LOWER EXTREMITY;  Surgeon: Gino Arana MD;  Location: [facility] CATH/EP LAB;  Service: General;  Laterality: N/A;    HIP SURGERY      HYSTERECTOMY      JOINT REPLACEMENT      R total hip     Family History   Problem Relation Age of Onset    Hypertension Mother     Diabetes Sister     Glaucoma Neg Hx     Macular degeneration Neg Hx     Retinal detachment Neg Hx      Social History     Socioeconomic History    Marital status:      Spouse name: Not on file    Number of children: Not on file    Years of education: Not on file    Highest education level: Not on file   Occupational History    Not on file   Social Needs    Financial resource strain: Not on file    Food insecurity:     Worry: Not on file     Inability: Not on file    Transportation needs:     Medical: Not on file     Non-medical: Not on file   Tobacco Use    Smoking status: Former Smoker     Packs/day: 0.25     Years: 5.00     Pack years: 1.25     Last attempt to quit: 1972     Years since quittin.0    Smokeless tobacco: Never Used   Substance and Sexual Activity    Alcohol use: Yes     Alcohol/week: 0.0 standard drinks     Comment: Rarely    Drug use: Yes     Types: Oxycodone, Hydrocodone    Sexual activity: Not on file   Lifestyle    Physical activity:     Days per week: Not on file     Minutes per session: Not on file    Stress: Not on file   Relationships    Social connections:     Talks on phone: Not on file     Gets together: Not on file     Attends Congregation service: Not on file     Active member of club or organization: Not on file     Attends meetings of clubs or organizations: Not on file     Relationship status: Not on file   Other Topics Concern     Not on file   Social History Narrative    Not on file       Review of patient's allergies indicates:   Allergen Reactions    Cymbalta [duloxetine] Other (See Comments)     Nightmares      Darvon [propoxyphene] Nausea Only and Other (See Comments)     Sweating, slept for 3 days    Atorvastatin Other (See Comments)     Muscle cramps    Naprosyn [naproxen] Nausea Only    Penicillins Rash       Current Outpatient Medications:     albuterol (PROVENTIL) 2.5 mg /3 mL (0.083 %) nebulizer solution, Take 3 mLs (2.5 mg total) by nebulization every 6 (six) hours as needed., Disp: 120 each, Rfl: 11    ascorbic acid (VITAMIN C) 100 MG tablet, Take 100 mg by mouth once daily. , Disp: , Rfl:     aspirin (ECOTRIN) 81 MG EC tablet, Take 81 mg by mouth once daily., Disp: , Rfl:     azithromycin (Z-ARNAUD) 250 MG tablet, Take 2 tablets by mouth on day 1; Take 1 tablet by mouth on days 2-5, Disp: 6 tablet, Rfl: 0    b complex vitamins tablet, Take 1 tablet by mouth once daily., Disp: , Rfl:     budesonide-formoterol 160-4.5 mcg (SYMBICORT) 160-4.5 mcg/actuation HFAA, Inhale 2 puffs into the lungs every 12 (twelve) hours. Controller, Disp: 3 Inhaler, Rfl: 3    ciprofloxacin HCl (CIPRO) 500 MG tablet, Take 1 tablet (500 mg total) by mouth 2 (two) times daily., Disp: 20 tablet, Rfl: 0    cyanocobalamin, vitamin B-12, 2,500 mcg Lozg, Place 2 tablets under the tongue once daily., Disp: 60 lozenge, Rfl: 11    diclofenac sodium 1 % Gel, Apply 2 g topically 3 (three) times daily., Disp: 300 g, Rfl: 3    fluticasone (FLONASE) 50 mcg/actuation nasal spray, 2 sprays (100 mcg total) by Each Nare route once daily., Disp: 3 Bottle, Rfl: 3    furosemide (LASIX) 40 MG tablet, Take 1 tablet (40 mg total) by mouth once daily., Disp: 90 tablet, Rfl: 4    lactulose (CHRONULAC) 10 gram/15 mL solution, Take 30 mLs (20 g total) by mouth 3 (three) times daily. 2 Teaspoon(s) Oral PRN Every evening., Disp: 500 mL, Rfl: 3    losartan (COZAAR) 100  MG tablet, Take 1 tablet (100 mg total) by mouth once daily., Disp: 90 tablet, Rfl: 4    metoprolol succinate (TOPROL-XL) 50 MG 24 hr tablet, Take 1 tablet (50 mg total) by mouth once daily., Disp: 90 tablet, Rfl: 3    montelukast (SINGULAIR) 10 mg tablet, Take 1 tablet (10 mg total) by mouth every evening., Disp: 90 tablet, Rfl: 3    predniSONE (DELTASONE) 20 MG tablet, One daily for 3 days and repeat for flare of lung symptoms as intructed, Disp: 21 tablet, Rfl: 1    spironolactone (ALDACTONE) 25 MG tablet, Take 1 tablet (25 mg total) by mouth once daily., Disp: 90 tablet, Rfl: 6    traMADol (ULTRAM) 50 mg tablet, Take 1 tablet (50 mg total) by mouth every 6 (six) hours as needed for Pain., Disp: 25 tablet, Rfl: 0    diclofenac sodium (VOLTAREN) 1 % Gel, Apply 2 g topically once daily., Disp: 2 Tube, Rfl: 6    esomeprazole (NEXIUM) 20 MG capsule, Take 1 capsule (20 mg total) by mouth before breakfast. (Patient taking differently: Take 20 mg by mouth daily as needed (heartburn). ), Disp: 30 capsule, Rfl: 2    Current Facility-Administered Medications:     lidocaine HCL 10 mg/ml (1%) injection 5 mL, 5 mL, Other, Once, Matt Gilliam MD    Review of Systems  No nausea, vomiting, fevers, Chills , contipation, diarrhea or sweats    Physical Exam:      Vitals:    01/23/20 1109   BP: (!) 149/72   Pulse: 69     alert and oriented ×4 follows commands answers all questions appropriately  Manual muscle test 5 out of 5 sensation to light touch grossly intact  Positive crepitus right knee  Dec rom in full extension and fwd flexion  No knee laxity  Antalgic gait  No knee effusion no clubbing cyanosis or edema      Assessment:   knee oa  ckd 3-avoid nsaids  Plan:  Reviewed xrays images w. Pt.  Discussed treatment options, she would like to proceed with scheduling synvisc-one injection for her right knee  Will place prior auth  I have also given her voltaren gel to be used as an adjunct    Greater than 50%of time spent  reviewing diagnosis, prognosis and treatment

## 2020-01-29 DIAGNOSIS — M50.10 CERVICAL DISC DISORDER WITH RADICULOPATHY: Primary | ICD-10-CM

## 2020-01-30 ENCOUNTER — TELEPHONE (OUTPATIENT)
Dept: ORTHOPEDICS | Facility: CLINIC | Age: 80
End: 2020-01-30

## 2020-01-30 NOTE — TELEPHONE ENCOUNTER
----- Message from Jodi Murray sent at 1/30/2020  3:40 PM CST -----  Contact: Patient  Patient had hip surgery with Dr. Solis over 5 years ago and will be having a MRI of her back, but they need to know what kind of metal he used for her surgery. Please call [phone]

## 2020-01-31 NOTE — PROGRESS NOTES
Patient, Sammi Abreu (MRN #[id number]), presented with a recorded BMI of 41.79 kg/m^2 consistent with the definition of morbid obesity (ICD-10 E66.01). The patient's morbid obesity was monitored, evaluated, addressed and/or treated. This addendum to the medical record is made on 01/31/2020.

## 2020-02-04 ENCOUNTER — HOSPITAL ENCOUNTER (OUTPATIENT)
Dept: RADIOLOGY | Facility: HOSPITAL | Age: 80
Discharge: HOME | End: 2020-02-04
Attending: ORTHOPAEDIC SURGERY

## 2020-02-04 DIAGNOSIS — M50.10 CERVICAL DISC DISORDER WITH RADICULOPATHY: ICD-10-CM

## 2020-02-04 PROCEDURE — 72141 MRI NECK SPINE W/O DYE: CPT | Mod: TC,PO

## 2020-02-06 ENCOUNTER — LAB VISIT (OUTPATIENT)
Dept: LAB | Facility: HOSPITAL | Age: 80
End: 2020-02-06
Attending: INTERNAL MEDICINE
Payer: MEDICARE

## 2020-02-06 DIAGNOSIS — E11.9 DIABETES MELLITUS WITHOUT COMPLICATION: ICD-10-CM

## 2020-02-06 DIAGNOSIS — D63.1 ANEMIA OF CHRONIC RENAL FAILURE: ICD-10-CM

## 2020-02-06 DIAGNOSIS — J20.9 ACUTE BRONCHITIS: Primary | ICD-10-CM

## 2020-02-06 DIAGNOSIS — N18.30 CHRONIC KIDNEY DISEASE, STAGE III (MODERATE): ICD-10-CM

## 2020-02-06 DIAGNOSIS — N18.9 ANEMIA OF CHRONIC RENAL FAILURE: ICD-10-CM

## 2020-02-06 DIAGNOSIS — I12.9 PARENCHYMAL RENAL HYPERTENSION: ICD-10-CM

## 2020-02-06 DIAGNOSIS — R80.9 PROTEINURIA: ICD-10-CM

## 2020-02-06 DIAGNOSIS — E87.5 HYPERPOTASSEMIA: ICD-10-CM

## 2020-02-06 LAB
ALBUMIN SERPL BCP-MCNC: 3.3 G/DL (ref 3.5–5.2)
ALP SERPL-CCNC: 60 U/L (ref 55–135)
ALT SERPL W/O P-5'-P-CCNC: 11 U/L (ref 10–44)
ANION GAP SERPL CALC-SCNC: 5 MMOL/L (ref 8–16)
AST SERPL-CCNC: 16 U/L (ref 10–40)
BASOPHILS # BLD AUTO: 0.01 K/UL (ref 0–0.2)
BASOPHILS NFR BLD: 0.2 % (ref 0–1.9)
BILIRUB SERPL-MCNC: 0.6 MG/DL (ref 0.1–1)
BUN SERPL-MCNC: 21 MG/DL (ref 8–23)
CALCIUM SERPL-MCNC: 9.1 MG/DL (ref 8.7–10.5)
CHLORIDE SERPL-SCNC: 107 MMOL/L (ref 95–110)
CO2 SERPL-SCNC: 29 MMOL/L (ref 23–29)
CREAT SERPL-MCNC: 1.2 MG/DL (ref 0.5–1.4)
DIFFERENTIAL METHOD: ABNORMAL
EOSINOPHIL # BLD AUTO: 0.3 K/UL (ref 0–0.5)
EOSINOPHIL NFR BLD: 5 % (ref 0–8)
ERYTHROCYTE [DISTWIDTH] IN BLOOD BY AUTOMATED COUNT: 13.2 % (ref 11.5–14.5)
EST. GFR  (AFRICAN AMERICAN): 50.4 ML/MIN/1.73 M^2
EST. GFR  (NON AFRICAN AMERICAN): 43.7 ML/MIN/1.73 M^2
GLUCOSE SERPL-MCNC: 109 MG/DL (ref 70–110)
HCT VFR BLD AUTO: 40 % (ref 37–48.5)
HGB BLD-MCNC: 12.5 G/DL (ref 12–16)
IMM GRANULOCYTES # BLD AUTO: 0.01 K/UL (ref 0–0.04)
IMM GRANULOCYTES NFR BLD AUTO: 0.2 % (ref 0–0.5)
LYMPHOCYTES # BLD AUTO: 2.3 K/UL (ref 1–4.8)
LYMPHOCYTES NFR BLD: 35.9 % (ref 18–48)
MCH RBC QN AUTO: 34 PG (ref 27–31)
MCHC RBC AUTO-ENTMCNC: 31.3 G/DL (ref 32–36)
MCV RBC AUTO: 109 FL (ref 82–98)
MONOCYTES # BLD AUTO: 0.6 K/UL (ref 0.3–1)
MONOCYTES NFR BLD: 9.6 % (ref 4–15)
NEUTROPHILS # BLD AUTO: 3.1 K/UL (ref 1.8–7.7)
NEUTROPHILS NFR BLD: 49.1 % (ref 38–73)
NRBC BLD-RTO: 0 /100 WBC
PLATELET # BLD AUTO: 185 K/UL (ref 150–350)
PMV BLD AUTO: 11.5 FL (ref 9.2–12.9)
POTASSIUM SERPL-SCNC: 4.5 MMOL/L (ref 3.5–5.1)
PROT SERPL-MCNC: 6.8 G/DL (ref 6–8.4)
RBC # BLD AUTO: 3.68 M/UL (ref 4–5.4)
SODIUM SERPL-SCNC: 141 MMOL/L (ref 136–145)
WBC # BLD AUTO: 6.35 K/UL (ref 3.9–12.7)

## 2020-02-06 PROCEDURE — 80053 COMPREHEN METABOLIC PANEL: CPT

## 2020-02-06 PROCEDURE — 85025 COMPLETE CBC W/AUTO DIFF WBC: CPT

## 2020-02-06 PROCEDURE — 36415 COLL VENOUS BLD VENIPUNCTURE: CPT | Mod: PO

## 2020-05-05 ENCOUNTER — PATIENT MESSAGE (OUTPATIENT)
Dept: ADMINISTRATIVE | Facility: HOSPITAL | Age: 80
End: 2020-05-05

## 2020-05-22 ENCOUNTER — TELEPHONE (OUTPATIENT)
Dept: PAIN MEDICINE | Facility: CLINIC | Age: 80
End: 2020-05-22

## 2020-05-22 ENCOUNTER — PATIENT OUTREACH (OUTPATIENT)
Dept: ADMINISTRATIVE | Facility: OTHER | Age: 80
End: 2020-05-22

## 2020-05-22 NOTE — TELEPHONE ENCOUNTER
----- Message from Clara Scarlett sent at 5/22/2020  9:22 AM CDT -----  Contact: pt  Type: Needs Medical Advice    Who Called:      Best Call Back Number:   Additional Information: Requesting a call back from Nurse, regarding pt is in pain and wants to come in for a shot ,please call back with advice

## 2020-05-23 NOTE — PROGRESS NOTES
Patient's chart was reviewed.   Requested updates within Care Everywhere.  Hx of colon polyps  HM updated

## 2020-05-25 ENCOUNTER — OFFICE VISIT (OUTPATIENT)
Dept: PAIN MEDICINE | Facility: CLINIC | Age: 80
End: 2020-05-25
Payer: MEDICARE

## 2020-05-25 VITALS
HEIGHT: 63 IN | WEIGHT: 235 LBS | DIASTOLIC BLOOD PRESSURE: 79 MMHG | BODY MASS INDEX: 41.64 KG/M2 | HEART RATE: 62 BPM | SYSTOLIC BLOOD PRESSURE: 201 MMHG

## 2020-05-25 DIAGNOSIS — M51.36 DDD (DEGENERATIVE DISC DISEASE), LUMBAR: ICD-10-CM

## 2020-05-25 DIAGNOSIS — M54.16 LUMBAR RADICULITIS: Primary | ICD-10-CM

## 2020-05-25 DIAGNOSIS — Z01.818 PREOP TESTING: ICD-10-CM

## 2020-05-25 DIAGNOSIS — M54.16 LUMBAR RADICULOPATHY: Primary | ICD-10-CM

## 2020-05-25 DIAGNOSIS — M47.896 OTHER SPONDYLOSIS, LUMBAR REGION: ICD-10-CM

## 2020-05-25 PROCEDURE — 1159F PR MEDICATION LIST DOCUMENTED IN MEDICAL RECORD: ICD-10-PCS | Mod: S$GLB,,, | Performed by: ANESTHESIOLOGY

## 2020-05-25 PROCEDURE — 1101F PT FALLS ASSESS-DOCD LE1/YR: CPT | Mod: CPTII,S$GLB,, | Performed by: ANESTHESIOLOGY

## 2020-05-25 PROCEDURE — 99999 PR PBB SHADOW E&M-EST. PATIENT-LVL IV: CPT | Mod: PBBFAC,,, | Performed by: ANESTHESIOLOGY

## 2020-05-25 PROCEDURE — 96372 PR INJECTION,THERAP/PROPH/DIAG2ST, IM OR SUBCUT: ICD-10-PCS | Mod: S$GLB,,, | Performed by: ANESTHESIOLOGY

## 2020-05-25 PROCEDURE — 3077F SYST BP >= 140 MM HG: CPT | Mod: CPTII,S$GLB,, | Performed by: ANESTHESIOLOGY

## 2020-05-25 PROCEDURE — 99214 PR OFFICE/OUTPT VISIT, EST, LEVL IV, 30-39 MIN: ICD-10-PCS | Mod: 25,S$GLB,, | Performed by: ANESTHESIOLOGY

## 2020-05-25 PROCEDURE — 1125F AMNT PAIN NOTED PAIN PRSNT: CPT | Mod: S$GLB,,, | Performed by: ANESTHESIOLOGY

## 2020-05-25 PROCEDURE — 3078F DIAST BP <80 MM HG: CPT | Mod: CPTII,S$GLB,, | Performed by: ANESTHESIOLOGY

## 2020-05-25 PROCEDURE — 96372 THER/PROPH/DIAG INJ SC/IM: CPT | Mod: S$GLB,,, | Performed by: ANESTHESIOLOGY

## 2020-05-25 PROCEDURE — 99214 OFFICE O/P EST MOD 30 MIN: CPT | Mod: 25,S$GLB,, | Performed by: ANESTHESIOLOGY

## 2020-05-25 PROCEDURE — 1101F PR PT FALLS ASSESS DOC 0-1 FALLS W/OUT INJ PAST YR: ICD-10-PCS | Mod: CPTII,S$GLB,, | Performed by: ANESTHESIOLOGY

## 2020-05-25 PROCEDURE — 3077F PR MOST RECENT SYSTOLIC BLOOD PRESSURE >= 140 MM HG: ICD-10-PCS | Mod: CPTII,S$GLB,, | Performed by: ANESTHESIOLOGY

## 2020-05-25 PROCEDURE — 1159F MED LIST DOCD IN RCRD: CPT | Mod: S$GLB,,, | Performed by: ANESTHESIOLOGY

## 2020-05-25 PROCEDURE — 3078F PR MOST RECENT DIASTOLIC BLOOD PRESSURE < 80 MM HG: ICD-10-PCS | Mod: CPTII,S$GLB,, | Performed by: ANESTHESIOLOGY

## 2020-05-25 PROCEDURE — 1125F PR PAIN SEVERITY QUANTIFIED, PAIN PRESENT: ICD-10-PCS | Mod: S$GLB,,, | Performed by: ANESTHESIOLOGY

## 2020-05-25 PROCEDURE — 99999 PR PBB SHADOW E&M-EST. PATIENT-LVL IV: ICD-10-PCS | Mod: PBBFAC,,, | Performed by: ANESTHESIOLOGY

## 2020-05-25 RX ORDER — TRAMADOL HYDROCHLORIDE 50 MG/1
50 TABLET ORAL EVERY 8 HOURS PRN
Qty: 90 TABLET | Refills: 0 | Status: SHIPPED | OUTPATIENT
Start: 2020-05-25 | End: 2020-05-26 | Stop reason: SDUPTHER

## 2020-05-25 RX ORDER — KETOROLAC TROMETHAMINE 30 MG/ML
30 INJECTION, SOLUTION INTRAMUSCULAR; INTRAVENOUS
Status: COMPLETED | OUTPATIENT
Start: 2020-05-25 | End: 2020-05-25

## 2020-05-25 RX ADMIN — KETOROLAC TROMETHAMINE 30 MG: 30 INJECTION, SOLUTION INTRAMUSCULAR; INTRAVENOUS at 01:05

## 2020-05-25 NOTE — PROGRESS NOTES
This note was completed with dictation software and grammatical errors may exist.    REF PROV:Self, Aaareferral  PCP: Osmani Villatoro MD     CC:  Low back and left leg pain  INTERVAL HISTORY: Ms. Abreu is known patient to our clinic.  She was last seen in May 2016.  She presented last time with chronic left hip pain.  She's had short lived relief of left hip injections in underwent left hip replacement surgery.  She also has history of lower back and radiating left leg pain.  She underwent lumbar TYLER in the past with moderate benefit.  Pain has since recurred due to increased activity.  She wishes to have repeat procedure.  She denies any weakness.  No bowel bladder changes.  She rates her pain 8/10 today.  PRIOR HPI: The patient is a 72-year-old woman with a history of diabetes, hypertension, GERD, right hip replacement, sleep apnea and chronic low back pain with radiation into the left hip, thigh and buttocks who presents in referral from Dr. Villatoro. She has seen my colleague, Dr. Montes in the past and was most recently treated with a left hip injection that provided about 90% relief for about 3 months.  She was most recently seen by orthopedics for possible left hip replacement but concerns about blood transfusion caused her delay her possible left hip surgery.  She presents today with recurrence of her left hip and groin pain.  She rates the pain 8/10.      Pain intervention history:The patient has seen Dr. Zuniga in the past and recently consulted with nurse practitioner Clara Lofton and complaining of left thoracic pain as well and had discussed a possible thoracic and lumbar spine MRIs but apparently had declined these. She has a history of right hip replacement.  She has not been evaluated for her left knee pain by Dr. Guzmán.  She is status post left L2/3 and left L3/4 TFESI on 5/2/12 greater than 60% relief of her back and left leg pain. She is status post left hip injection on 6/6/12 and 11/2012 with  "greater than 90% relief. Has taken Gabapentin in the past did not work. Cymbalta caused nightmares. Right L2 and L3 transforaminal epidural steroid injection on 12/18/12 with 90% relief.   Left hip injection 4/29/2013 provided 90% relief of hip pain that lasted 3 months.  -  s/p repeat left hip injection on 10/4/2013 and 1/10/14 and states having about 80-90% relief of her hip for 3 months  - s/p repeat left hip injection on 5/9/14 with 80% x 3 months  - s/p repeat right L2 and L3 TFESI on 6/5/14 with 75% relief of her back pain  - s/p repeat left hip injection on 10/3/14 and reports about 50% relief so far.     ROS:She reports back pain, stomach pain and seizures. Balance of review of systems is negative.    Medical, surgical, family and social history reviewed elsewhere in record.    Medications/Allergies: See med card    .  Vitals:    05/25/20 1310   BP: (!) 201/79   Pulse: 62   Weight: 106.6 kg (235 lb)   Height: 5' 3" (1.6 m)   PainSc: 10-Worst pain ever   PainLoc: Back         Physical exam:  Gen: A and O x3, tearful, well-groomed, accompanied by friend, in wheelchair  Skin: No rashes or obvious lesions  HEENT: PERRLA  CVS: Regular rate and rhythm, normal S1 and S2, no murmurs.  Resp: Clear to auscultation bilaterally, no wheezes or rales.  Musculoskeletal: Positive crepitus over the right knee.  Full range of motion.    Neuro:  Upper extremities: 5/5 strength bilaterally   Lower extremities: 5/5 strength bilaterally  Reflexes: Patellar 2+, Achilles 2+.  Sensory:  Intact and symmetrical to light touch and pinprick in L2-S1 dermatomes bilaterally.    Lumbar spine:  Lumbar spine: ROM is unable to be examined as patient is in severe pain with placing weight on foot.     Oren's test increased pain right low back / hip  Supine straight leg raise is negative  Internal and external rotation of the hip causes Severe Left hip pain.  Myofascial exam: Tenderness to palpation across Left hip, groin " region.      Imaging:  MRI thoracic spine 2/22/12: There is a note of a synovial cyst on the left at T. 10/11 3 mm without foraminal stenosis.  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    Lumbar spine MRI 5/8/09: Annular disc bulge at L2/3 with left posterior lateral disc protrusion left greater than right foraminal narrowing.  Degenerative facet changes at L3/4 with superimposed right posterior lateral disc protrusion with moderate canal and moderate right greater than left foraminal narrowing.  L4/5 there is moderate annular disc bulge with facet changes and ligamentous hypertrophy causing mild to moderate canal stenosis with moderate right greater than left foraminal narrowing.    Lumbar spine MRI: 4/10/12: No major changes from previous MRI other than slightly progressed disc bulging at L3/4.    Assessment:  Patient presents with   1. Lumbar radiculitis     2. DDD (degenerative disc disease), lumbar     3. Other spondylosis, lumbar region           Plan:  1. I have stressed the importance of physical activity and exercise to improve overall health  2. Reviewed pertinent imaging and records with patient  3. I think that the patient's back pain and radicular leg symptoms are due to degenerative disc disease and have recommended a lumbar epidural steroid injection to the Left L2-3, L3-4 level(s).  4. IM Toradol today  5. She can take Tramadol as needed for pain in the meantime  6. Follow up after procedure

## 2020-05-26 ENCOUNTER — TELEPHONE (OUTPATIENT)
Dept: PAIN MEDICINE | Facility: CLINIC | Age: 80
End: 2020-05-26

## 2020-05-26 RX ORDER — TRAMADOL HYDROCHLORIDE 50 MG/1
50 TABLET ORAL EVERY 6 HOURS PRN
Qty: 28 TABLET | Refills: 0 | Status: SHIPPED | OUTPATIENT
Start: 2020-05-26 | End: 2020-05-28 | Stop reason: SINTOL

## 2020-05-26 NOTE — TELEPHONE ENCOUNTER
----- Message from Princess ABEL Castellano sent at 5/26/2020  9:17 AM CDT -----  Contact: pt  Type: Needs Medical Advice  Who Called:  Patient   Pharmacy name and phone #:     [id number] - [place] - [address]  [address] [place] [id number]  Phone: [phone] Fax: [phone]  Best Call Back Number: [phone] (home)   Additional Information: Requesting a call in regards to rx being denied for fill due to  The 90 days supply. Pharmacy will only fill 7 day supply and a new rx has to  Be sent over stating 7 day. Please call to correct.

## 2020-05-28 ENCOUNTER — HOSPITAL ENCOUNTER (OUTPATIENT)
Dept: RADIOLOGY | Facility: CLINIC | Age: 80
Discharge: HOME | End: 2020-05-28
Attending: NURSE PRACTITIONER
Payer: MEDICARE

## 2020-05-28 ENCOUNTER — TELEPHONE (OUTPATIENT)
Dept: FAMILY MEDICINE | Facility: CLINIC | Age: 80
End: 2020-05-28

## 2020-05-28 ENCOUNTER — OFFICE VISIT (OUTPATIENT)
Dept: FAMILY MEDICINE | Facility: CLINIC | Age: 80
End: 2020-05-28
Payer: MEDICARE

## 2020-05-28 VITALS
OXYGEN SATURATION: 96 % | HEART RATE: 63 BPM | DIASTOLIC BLOOD PRESSURE: 84 MMHG | BODY MASS INDEX: 40.86 KG/M2 | HEIGHT: 63 IN | SYSTOLIC BLOOD PRESSURE: 140 MMHG | RESPIRATION RATE: 18 BRPM | TEMPERATURE: 99 F | WEIGHT: 230.63 LBS

## 2020-05-28 DIAGNOSIS — R60.9 EDEMA, UNSPECIFIED TYPE: ICD-10-CM

## 2020-05-28 DIAGNOSIS — M16.10 ARTHRITIS OF HIP: ICD-10-CM

## 2020-05-28 DIAGNOSIS — I10 HTN (HYPERTENSION), BENIGN: ICD-10-CM

## 2020-05-28 DIAGNOSIS — Z96.649 STATUS POST HIP REPLACEMENT, UNSPECIFIED LATERALITY: ICD-10-CM

## 2020-05-28 DIAGNOSIS — M25.552 PAIN OF LEFT HIP JOINT: ICD-10-CM

## 2020-05-28 DIAGNOSIS — M51.36 DDD (DEGENERATIVE DISC DISEASE), LUMBAR: ICD-10-CM

## 2020-05-28 DIAGNOSIS — M79.606 PAIN OF LOWER EXTREMITY, UNSPECIFIED LATERALITY: ICD-10-CM

## 2020-05-28 DIAGNOSIS — M79.606 PAIN OF LOWER EXTREMITY, UNSPECIFIED LATERALITY: Primary | ICD-10-CM

## 2020-05-28 PROCEDURE — 1125F PR PAIN SEVERITY QUANTIFIED, PAIN PRESENT: ICD-10-PCS | Mod: S$GLB,,, | Performed by: NURSE PRACTITIONER

## 2020-05-28 PROCEDURE — 1159F MED LIST DOCD IN RCRD: CPT | Mod: S$GLB,,, | Performed by: NURSE PRACTITIONER

## 2020-05-28 PROCEDURE — 1125F AMNT PAIN NOTED PAIN PRSNT: CPT | Mod: S$GLB,,, | Performed by: NURSE PRACTITIONER

## 2020-05-28 PROCEDURE — 93970 US LOWER EXTREMITY VEINS BILATERAL: ICD-10-PCS | Mod: 26,,, | Performed by: RADIOLOGY

## 2020-05-28 PROCEDURE — 3079F DIAST BP 80-89 MM HG: CPT | Mod: CPTII,S$GLB,, | Performed by: NURSE PRACTITIONER

## 2020-05-28 PROCEDURE — 3079F PR MOST RECENT DIASTOLIC BLOOD PRESSURE 80-89 MM HG: ICD-10-PCS | Mod: CPTII,S$GLB,, | Performed by: NURSE PRACTITIONER

## 2020-05-28 PROCEDURE — 73502 X-RAY EXAM HIP UNI 2-3 VIEWS: CPT | Mod: TC,FY,PO,LT

## 2020-05-28 PROCEDURE — 99999 PR PBB SHADOW E&M-EST. PATIENT-LVL IV: ICD-10-PCS | Mod: PBBFAC,,, | Performed by: NURSE PRACTITIONER

## 2020-05-28 PROCEDURE — 99214 PR OFFICE/OUTPT VISIT, EST, LEVL IV, 30-39 MIN: ICD-10-PCS | Mod: S$GLB,,, | Performed by: NURSE PRACTITIONER

## 2020-05-28 PROCEDURE — 1159F PR MEDICATION LIST DOCUMENTED IN MEDICAL RECORD: ICD-10-PCS | Mod: S$GLB,,, | Performed by: NURSE PRACTITIONER

## 2020-05-28 PROCEDURE — 93970 EXTREMITY STUDY: CPT | Mod: TC,PO

## 2020-05-28 PROCEDURE — 99214 OFFICE O/P EST MOD 30 MIN: CPT | Mod: S$GLB,,, | Performed by: NURSE PRACTITIONER

## 2020-05-28 PROCEDURE — 3077F PR MOST RECENT SYSTOLIC BLOOD PRESSURE >= 140 MM HG: ICD-10-PCS | Mod: CPTII,S$GLB,, | Performed by: NURSE PRACTITIONER

## 2020-05-28 PROCEDURE — 73502 XR HIP 2 VIEW LEFT: ICD-10-PCS | Mod: 26,LT,S$GLB, | Performed by: RADIOLOGY

## 2020-05-28 PROCEDURE — 1101F PT FALLS ASSESS-DOCD LE1/YR: CPT | Mod: CPTII,S$GLB,, | Performed by: NURSE PRACTITIONER

## 2020-05-28 PROCEDURE — 99999 PR PBB SHADOW E&M-EST. PATIENT-LVL IV: CPT | Mod: PBBFAC,,, | Performed by: NURSE PRACTITIONER

## 2020-05-28 PROCEDURE — 3077F SYST BP >= 140 MM HG: CPT | Mod: CPTII,S$GLB,, | Performed by: NURSE PRACTITIONER

## 2020-05-28 PROCEDURE — 73502 X-RAY EXAM HIP UNI 2-3 VIEWS: CPT | Mod: 26,LT,S$GLB, | Performed by: RADIOLOGY

## 2020-05-28 PROCEDURE — 1101F PR PT FALLS ASSESS DOC 0-1 FALLS W/OUT INJ PAST YR: ICD-10-PCS | Mod: CPTII,S$GLB,, | Performed by: NURSE PRACTITIONER

## 2020-05-28 PROCEDURE — 93970 EXTREMITY STUDY: CPT | Mod: 26,,, | Performed by: RADIOLOGY

## 2020-05-28 RX ORDER — ACETAMINOPHEN 500 MG
500 TABLET ORAL EVERY 6 HOURS PRN
Qty: 20 TABLET | Refills: 0 | Status: ON HOLD | OUTPATIENT
Start: 2020-05-28 | End: 2020-06-11 | Stop reason: HOSPADM

## 2020-05-28 NOTE — TELEPHONE ENCOUNTER
Spoke to patient in regards to test results. Patient verbalized understanding. Patient has no questions or concerns at this time.

## 2020-05-28 NOTE — PATIENT INSTRUCTIONS
R.I.C.E.    R.I.C.E. stands for Rest, Ice, Compression, and Elevation. Doing these things helps limit pain and swelling after an injury. R.I.C.E. also helps injuries heal faster. Use R.I.C.E. for sprains, strains, and severe bruises or bumps. Follow the tips on this handout and begin R.I.C.E. as soon as possible after an injury.  ? Rest  Pain is your bodys way of telling you to rest an injured area. Whether you have hurt an elbow, hand, foot, or knee, limiting its use will prevent further injury and help you heal.  ? Ice  Applying ice right after an injury helps prevent swelling and reduce pain. Dont place ice directly on your skin.  · Wrap a cold pack or bag of ice in a thin cloth. Place it over the injured area.  · Ice for 10 minutes every 3 hours. Dont ice for more than 20 minutes at a time.  ? Compression  Putting pressure (compression) on an injury helps prevent swelling and provides support.  · Wrap the injured area firmly with an elastic bandage. If your hand or foot tingles, becomes discolored, or feels cold to the touch, the bandage may be too tight. Rewrap it more loosely.  · If your bandage becomes too loose, rewrap it.  · Do not wear an elastic bandage overnight.  ? Elevation  Keeping an injury elevated helps reduce swelling, pain, and throbbing. Elevation is most effective when the injury is kept elevated higher than the heart.     Call your healthcare provider if you notice any of the following:  · Fingers or toes feel numb, are cold to the touch, or change color  · Skin looks shiny or tight  · Pain, swelling, or bruising worsens and is not improved with elevation   Date Last Reviewed: 9/3/2015  © 2310-1274 [employer]. [address] [place] [address], [place] [address]. All rights reserved. This information is not intended as a substitute for professional medical care. Always follow your healthcare professional's instructions.

## 2020-05-28 NOTE — TELEPHONE ENCOUNTER
----- Message from Hugh Cordero sent at 5/28/2020  1:38 PM CDT -----  Contact: pt  Type:  Patient Returning Call    Who Called:  pt  Who Left Message for Patient:  Marion  Does the patient know what this is regarding?:  results  Best Call Back Number:      Additional Information:

## 2020-05-28 NOTE — PROGRESS NOTES
Patient ID: Sammi Abreu is a 79 y.o. female.    Chief Complaint:   Leg Pain (both legs )    Mrs. Abreu presents today reporting pain in her left leg. She states the pain starts in her left hip and radiates to her knee. She has had bilateral hip replacement in the past. Pain is described as aching and worse with movement. She reports using a cane to aid with mobility due to severe pain. Denies trauma, numbness, tingling, difficulty with bowels/urination.Treatments include Aleeve with some relief but she stopped taking because it upset her stomach. Patient states she does not take tramadol due to side effects. Due to a history of DVT, she is concerned the pain in her leg is caused by a clot and would like to be evaluated.     Patient is new to me. Reviewed past medical and social history.    Past Medical History:   Diagnosis Date    ALLERGIC RHINITIS     Anemia     Arthritis     Back pain     Cataract     OU    COPD (chronic obstructive pulmonary disease)     Degenerative disc disease     Diabetes mellitus     NIDDM    Diverticulosis     DVT (deep venous thrombosis)     Edema     Fibromyalgia     Glaucoma     Gout     Hx of colonic polyps     Hyperlipidemia     Hypertension     Incontinence     Osteoporosis     Reflux     Sleep apnea     non compliant with CPAP    Vestibulitis of ear      Past Surgical History:   Procedure Laterality Date    ANGIOGRAPHY OF LOWER EXTREMITY N/A 7/31/2019    Procedure: ANGIOGRAM, LOWER EXTREMITY;  Surgeon: Gino Arana MD;  Location: [facility] CATH[facility]EP LAB;  Service: General;  Laterality: N/A;    HIP SURGERY      HYSTERECTOMY      JOINT REPLACEMENT      R total hip     Current Outpatient Medications on File Prior to Visit   Medication Sig Dispense Refill    ascorbic acid (VITAMIN C) 100 MG tablet Take 100 mg by mouth once daily.       aspirin (ECOTRIN) 81 MG EC tablet Take 81 mg by mouth once daily.      azithromycin (Z-ARNAUD) 250 MG tablet Take 2  tablets by mouth on day 1; Take 1 tablet by mouth on days 2-5 6 tablet 0    b complex vitamins tablet Take 1 tablet by mouth once daily.      ciprofloxacin HCl (CIPRO) 500 MG tablet Take 1 tablet (500 mg total) by mouth 2 (two) times daily. 20 tablet 0    cyanocobalamin, vitamin B-12, 2,500 mcg Lozg Place 2 tablets under the tongue once daily. 60 lozenge 11    diclofenac sodium (VOLTAREN) 1 % Gel Apply 2 g topically once daily. 2 Tube 6    diclofenac sodium 1 % Gel Apply 2 g topically 3 (three) times daily. 300 g 3    fluticasone (FLONASE) 50 mcg/actuation nasal spray 2 sprays (100 mcg total) by Each Nare route once daily. 3 Bottle 3    furosemide (LASIX) 40 MG tablet Take 1 tablet (40 mg total) by mouth once daily. 90 tablet 4    lactulose (CHRONULAC) 10 gram/15 mL solution Take 30 mLs (20 g total) by mouth 3 (three) times daily. 2 Teaspoon(s) Oral PRN Every evening. 500 mL 3    metoprolol succinate (TOPROL-XL) 50 MG 24 hr tablet Take 1 tablet (50 mg total) by mouth once daily. 90 tablet 3    montelukast (SINGULAIR) 10 mg tablet Take 1 tablet (10 mg total) by mouth every evening. 90 tablet 3    predniSONE (DELTASONE) 20 MG tablet One daily for 3 days and repeat for flare of lung symptoms as intructed 21 tablet 1    spironolactone (ALDACTONE) 25 MG tablet Take 1 tablet (25 mg total) by mouth once daily. 90 tablet 6    albuterol (PROVENTIL) 2.5 mg /3 mL (0.083 %) nebulizer solution Take 3 mLs (2.5 mg total) by nebulization every 6 (six) hours as needed. 120 each 11    budesonide-formoterol 160-4.5 mcg (SYMBICORT) 160-4.5 mcg/actuation HFAA Inhale 2 puffs into the lungs every 12 (twelve) hours. Controller 3 Inhaler 3    esomeprazole (NEXIUM) 20 MG capsule Take 1 capsule (20 mg total) by mouth before breakfast. (Patient taking differently: Take 20 mg by mouth daily as needed (heartburn). ) 30 capsule 2    losartan (COZAAR) 100 MG tablet Take 1 tablet (100 mg total) by mouth once daily. 90 tablet 4     [DISCONTINUED] traMADoL (ULTRAM) 50 mg tablet Take 1 tablet (50 mg total) by mouth every 6 (six) hours as needed for Pain. Medically necessary for greater than 7 days for chronic pain (Patient not taking: Reported on 5/28/2020) 28 tablet 0     Current Facility-Administered Medications on File Prior to Visit   Medication Dose Route Frequency Provider Last Rate Last Dose    lidocaine HCL 10 mg/ml (1%) injection 5 mL  5 mL Other Once Matt Gilliam MD           Review of Systems   Genitourinary: Negative for difficulty urinating.   Musculoskeletal: Positive for gait problem, joint swelling and myalgias.   Skin: Negative for rash and wound.   Neurological: Negative for weakness and light-headedness.   All other systems reviewed and are negative.      Objective:      Physical Exam   Constitutional: She is oriented to person, place, and time. She appears well-developed and well-nourished.   HENT:   Head: Normocephalic.   Mouth/Throat: Oropharynx is clear and moist.   Eyes: Pupils are equal, round, and reactive to light. Conjunctivae and EOM are normal.   Neck: Normal range of motion. Neck supple.   Cardiovascular: Normal rate, regular rhythm and normal heart sounds.   Pulmonary/Chest: Effort normal and breath sounds normal.   Abdominal: Soft. Bowel sounds are normal.   Musculoskeletal:        Left hip: She exhibits decreased range of motion.   Neurological: She is alert and oriented to person, place, and time.   Skin: Skin is warm and dry.   Psychiatric: She has a normal mood and affect.   Vitals reviewed.      Assessment:       1. Pain of lower extremity, unspecified laterality    2. Pain of left hip joint    3. Edema, unspecified type     4. Status post hip replacement, unspecified laterality    5. Arthritis of hip    6. DDD (degenerative disc disease), lumbar    7. HTN (hypertension), benign        Plan:       Sammi was seen today for leg pain.    Diagnoses and all orders for this visit:    Pain of lower extremity,  unspecified laterality  -     US Lower Extremity Veins Bilateral; Future  -     acetaminophen (TYLENOL) 500 MG tablet; Take 1 tablet (500 mg total) by mouth every 6 (six) hours as needed for Pain.    Pain of left hip joint  -     X-Ray Hip 2 View Left; Future    Edema, unspecified type   -     US Lower Extremity Veins Bilateral; Future    Status post hip replacement, unspecified laterality    Arthritis of hip    DDD (degenerative disc disease), lumbar    HTN (hypertension), benign    Ultrasound and xray today. Will call with results and discuss further plan of care.  Patient education provided.  All questions and concerns addressed  RTC PRN and if symptoms worsen or fail to improve  Patient verbalizes understanding        Patient Instructions       R.I.C.E.    R.I.C.E. stands for Rest, Ice, Compression, and Elevation. Doing these things helps limit pain and swelling after an injury. R.I.C.E. also helps injuries heal faster. Use R.I.C.E. for sprains, strains, and severe bruises or bumps. Follow the tips on this handout and begin R.I.C.E. as soon as possible after an injury.  ? Rest  Pain is your bodys way of telling you to rest an injured area. Whether you have hurt an elbow, hand, foot, or knee, limiting its use will prevent further injury and help you heal.  ? Ice  Applying ice right after an injury helps prevent swelling and reduce pain. Dont place ice directly on your skin.  · Wrap a cold pack or bag of ice in a thin cloth. Place it over the injured area.  · Ice for 10 minutes every 3 hours. Dont ice for more than 20 minutes at a time.  ? Compression  Putting pressure (compression) on an injury helps prevent swelling and provides support.  · Wrap the injured area firmly with an elastic bandage. If your hand or foot tingles, becomes discolored, or feels cold to the touch, the bandage may be too tight. Rewrap it more loosely.  · If your bandage becomes too loose, rewrap it.  · Do not wear an elastic bandage  overnight.  ? Elevation  Keeping an injury elevated helps reduce swelling, pain, and throbbing. Elevation is most effective when the injury is kept elevated higher than the heart.     Call your healthcare provider if you notice any of the following:  · Fingers or toes feel numb, are cold to the touch, or change color  · Skin looks shiny or tight  · Pain, swelling, or bruising worsens and is not improved with elevation   Date Last Reviewed: 9/3/2015  © 7234-7447 [employer]. [address] [place] [address]. All rights reserved. This information is not intended as a substitute for professional medical care. Always follow your healthcare professional's instructions.

## 2020-06-01 RX ORDER — HYDROCODONE BITARTRATE AND ACETAMINOPHEN 5; 325 MG/1; MG/1
1 TABLET ORAL EVERY 8 HOURS PRN
Qty: 24 TABLET | Refills: 0 | Status: SHIPPED | OUTPATIENT
Start: 2020-06-01 | End: 2020-06-15

## 2020-06-01 NOTE — TELEPHONE ENCOUNTER
Please pend Hydrocodone-acetaminophen 5-325 mg q 8 h #24 for severe pain until she can have her procedure. If pain continues to be intolerable, I recommend evaluation at ED.

## 2020-06-02 ENCOUNTER — HOSPITAL ENCOUNTER (EMERGENCY)
Facility: HOSPITAL | Age: 80
Discharge: HOME | End: 2020-06-02
Attending: EMERGENCY MEDICINE
Payer: MEDICARE

## 2020-06-02 VITALS
WEIGHT: 230 LBS | HEART RATE: 60 BPM | BODY MASS INDEX: 40.75 KG/M2 | TEMPERATURE: 99 F | SYSTOLIC BLOOD PRESSURE: 145 MMHG | HEIGHT: 63 IN | RESPIRATION RATE: 20 BRPM | DIASTOLIC BLOOD PRESSURE: 93 MMHG | OXYGEN SATURATION: 100 %

## 2020-06-02 DIAGNOSIS — R10.31 RIGHT LOWER QUADRANT PAIN: ICD-10-CM

## 2020-06-02 LAB
ALBUMIN SERPL BCP-MCNC: 3.6 G/DL (ref 3.5–5.2)
ALP SERPL-CCNC: 82 U/L (ref 55–135)
ALT SERPL W/O P-5'-P-CCNC: 19 U/L (ref 10–44)
ANION GAP SERPL CALC-SCNC: 9 MMOL/L (ref 8–16)
AST SERPL-CCNC: 17 U/L (ref 10–40)
BASOPHILS # BLD AUTO: 0.01 K/UL (ref 0–0.2)
BASOPHILS NFR BLD: 0.2 % (ref 0–1.9)
BILIRUB SERPL-MCNC: 0.6 MG/DL (ref 0.1–1)
BILIRUB UR QL STRIP: NEGATIVE
BUN SERPL-MCNC: 21 MG/DL (ref 8–23)
CALCIUM SERPL-MCNC: 9.1 MG/DL (ref 8.7–10.5)
CHLORIDE SERPL-SCNC: 105 MMOL/L (ref 95–110)
CLARITY UR: CLEAR
CO2 SERPL-SCNC: 25 MMOL/L (ref 23–29)
COLOR UR: YELLOW
CREAT SERPL-MCNC: 1.2 MG/DL (ref 0.5–1.4)
DIFFERENTIAL METHOD: ABNORMAL
EOSINOPHIL # BLD AUTO: 0.3 K/UL (ref 0–0.5)
EOSINOPHIL NFR BLD: 4.1 % (ref 0–8)
ERYTHROCYTE [DISTWIDTH] IN BLOOD BY AUTOMATED COUNT: 12.5 % (ref 11.5–14.5)
EST. GFR  (AFRICAN AMERICAN): 50 ML/MIN/1.73 M^2
EST. GFR  (NON AFRICAN AMERICAN): 44 ML/MIN/1.73 M^2
GLUCOSE SERPL-MCNC: 145 MG/DL (ref 70–110)
GLUCOSE UR QL STRIP: NEGATIVE
HCT VFR BLD AUTO: 43.6 % (ref 37–48.5)
HGB BLD-MCNC: 13.9 G/DL (ref 12–16)
HGB UR QL STRIP: NEGATIVE
IMM GRANULOCYTES # BLD AUTO: 0.02 K/UL (ref 0–0.04)
IMM GRANULOCYTES NFR BLD AUTO: 0.3 % (ref 0–0.5)
KETONES UR QL STRIP: NEGATIVE
LACTATE SERPL-SCNC: 2.5 MMOL/L (ref 0.5–2.2)
LEUKOCYTE ESTERASE UR QL STRIP: NEGATIVE
LIPASE SERPL-CCNC: 22 U/L (ref 4–60)
LYMPHOCYTES # BLD AUTO: 2.5 K/UL (ref 1–4.8)
LYMPHOCYTES NFR BLD: 37.7 % (ref 18–48)
MCH RBC QN AUTO: 34 PG (ref 27–31)
MCHC RBC AUTO-ENTMCNC: 31.9 G/DL (ref 32–36)
MCV RBC AUTO: 107 FL (ref 82–98)
MONOCYTES # BLD AUTO: 0.5 K/UL (ref 0.3–1)
MONOCYTES NFR BLD: 7.8 % (ref 4–15)
NEUTROPHILS # BLD AUTO: 3.3 K/UL (ref 1.8–7.7)
NEUTROPHILS NFR BLD: 49.9 % (ref 38–73)
NITRITE UR QL STRIP: NEGATIVE
NRBC BLD-RTO: 0 /100 WBC
PH UR STRIP: 6 [PH] (ref 5–8)
PLATELET # BLD AUTO: 203 K/UL (ref 150–350)
PMV BLD AUTO: 10.8 FL (ref 9.2–12.9)
POTASSIUM SERPL-SCNC: 3.9 MMOL/L (ref 3.5–5.1)
PROT SERPL-MCNC: 7.6 G/DL (ref 6–8.4)
PROT UR QL STRIP: NEGATIVE
RBC # BLD AUTO: 4.09 M/UL (ref 4–5.4)
SODIUM SERPL-SCNC: 139 MMOL/L (ref 136–145)
SP GR UR STRIP: 1.02 (ref 1–1.03)
URN SPEC COLLECT METH UR: NORMAL
UROBILINOGEN UR STRIP-ACNC: NEGATIVE EU/DL
WBC # BLD AUTO: 6.66 K/UL (ref 3.9–12.7)

## 2020-06-02 PROCEDURE — 96374 THER/PROPH/DIAG INJ IV PUSH: CPT

## 2020-06-02 PROCEDURE — 83605 ASSAY OF LACTIC ACID: CPT

## 2020-06-02 PROCEDURE — 99284 EMERGENCY DEPT VISIT MOD MDM: CPT | Mod: 25

## 2020-06-02 PROCEDURE — 63600175 PHARM REV CODE 636 W HCPCS: Performed by: EMERGENCY MEDICINE

## 2020-06-02 PROCEDURE — 96361 HYDRATE IV INFUSION ADD-ON: CPT

## 2020-06-02 PROCEDURE — 85025 COMPLETE CBC W/AUTO DIFF WBC: CPT

## 2020-06-02 PROCEDURE — 36415 COLL VENOUS BLD VENIPUNCTURE: CPT

## 2020-06-02 PROCEDURE — 83690 ASSAY OF LIPASE: CPT

## 2020-06-02 PROCEDURE — 25000003 PHARM REV CODE 250: Performed by: EMERGENCY MEDICINE

## 2020-06-02 PROCEDURE — 96375 TX/PRO/DX INJ NEW DRUG ADDON: CPT

## 2020-06-02 PROCEDURE — 80053 COMPREHEN METABOLIC PANEL: CPT

## 2020-06-02 PROCEDURE — 81003 URINALYSIS AUTO W/O SCOPE: CPT

## 2020-06-02 RX ORDER — MORPHINE SULFATE 10 MG/ML
10 INJECTION INTRAMUSCULAR; INTRAVENOUS; SUBCUTANEOUS
Status: COMPLETED | OUTPATIENT
Start: 2020-06-02 | End: 2020-06-02

## 2020-06-02 RX ORDER — SODIUM CHLORIDE 9 MG/ML
1000 INJECTION, SOLUTION INTRAVENOUS
Status: COMPLETED | OUTPATIENT
Start: 2020-06-02 | End: 2020-06-02

## 2020-06-02 RX ORDER — DICLOFENAC SODIUM 25 MG/1
25 TABLET, DELAYED RELEASE ORAL 3 TIMES DAILY PRN
Qty: 15 TABLET | Refills: 0 | Status: SHIPPED | OUTPATIENT
Start: 2020-06-02 | End: 2020-06-15 | Stop reason: SDUPTHER

## 2020-06-02 RX ORDER — KETOROLAC TROMETHAMINE 30 MG/ML
15 INJECTION, SOLUTION INTRAMUSCULAR; INTRAVENOUS
Status: COMPLETED | OUTPATIENT
Start: 2020-06-02 | End: 2020-06-02

## 2020-06-02 RX ADMIN — MORPHINE SULFATE 10 MG: 10 INJECTION, SOLUTION INTRAMUSCULAR; INTRAVENOUS at 12:06

## 2020-06-02 RX ADMIN — KETOROLAC TROMETHAMINE 15 MG: 30 INJECTION, SOLUTION INTRAMUSCULAR at 12:06

## 2020-06-02 RX ADMIN — SODIUM CHLORIDE 1000 ML: 0.9 INJECTION, SOLUTION INTRAVENOUS at 12:06

## 2020-06-02 NOTE — ED PROVIDER NOTES
Encounter Date: 6/2/2020       History     Chief Complaint   Patient presents with    Hip Pain     chronic rt. hip pain  / pain management      Chief complaint:  Abdominal pain    HPI:  79-year-old female presents with a 2 week history of progressively worsening right flank pain and right lower quadrant pain.  She denies any nausea, vomiting, diarrhea, melena, hematochezia or hematemesis.  She has seen 2 pain doctors which have prescribed pain medication and performed hip injections.  She has bilateral hip arthroplasties I will place 10 years ago.  She denies any dysuria, urinary frequency or hematuria.  She has had no fever, night sweats or chills.        Review of patient's allergies indicates:   Allergen Reactions    Cymbalta [duloxetine] Other (See Comments)     Nightmares      Darvon [propoxyphene] Nausea Only and Other (See Comments)     Sweating, slept for 3 days    Atorvastatin Other (See Comments)     Muscle cramps    Naprosyn [naproxen] Nausea Only    Penicillins Rash    Tramadol Nausea Only and Palpitations     Past Medical History:   Diagnosis Date    ALLERGIC RHINITIS     Anemia     Arthritis     Back pain     Cataract     OU    COPD (chronic obstructive pulmonary disease)     Degenerative disc disease     Diabetes mellitus     NIDDM    Diverticulosis     DVT (deep venous thrombosis)     Edema     Fibromyalgia     Glaucoma     Gout     Hx of colonic polyps     Hyperlipidemia     Hypertension     Incontinence     Osteoporosis     Reflux     Sleep apnea     non compliant with CPAP    Vestibulitis of ear      Past Surgical History:   Procedure Laterality Date    ANGIOGRAPHY OF LOWER EXTREMITY N/A 7/31/2019    Procedure: ANGIOGRAM, LOWER EXTREMITY;  Surgeon: Gino Arana MD;  Location: [facility] CATH/EP LAB;  Service: General;  Laterality: N/A;    HIP SURGERY      HYSTERECTOMY      JOINT REPLACEMENT      R total hip     Family History   Problem Relation Age of Onset     Hypertension Mother     Diabetes Sister     Glaucoma Neg Hx     Macular degeneration Neg Hx     Retinal detachment Neg Hx      Social History     Tobacco Use    Smoking status: Former Smoker     Packs/day: 0.25     Years: 5.00     Pack years: 1.25     Last attempt to quit: 1972     Years since quittin.4    Smokeless tobacco: Never Used   Substance Use Topics    Alcohol use: Yes     Alcohol/week: 0.0 standard drinks     Comment: Rarely    Drug use: Yes     Types: Oxycodone, Hydrocodone     Review of Systems   Constitutional: Negative for activity change, appetite change, chills, fatigue and fever.   Eyes: Negative for visual disturbance.   Respiratory: Negative for apnea and shortness of breath.    Cardiovascular: Negative for chest pain and palpitations.   Gastrointestinal: Positive for abdominal pain. Negative for abdominal distention, anal bleeding, blood in stool, constipation, diarrhea, nausea and vomiting.   Genitourinary: Negative for difficulty urinating.   Musculoskeletal: Negative for neck pain.   Skin: Negative for pallor and rash.   Neurological: Negative for headaches.   Hematological: Does not bruise/bleed easily.   Psychiatric/Behavioral: Negative for agitation.       Physical Exam     Initial Vitals [20 1202]   BP Pulse Resp Temp SpO2   (!) 144/82 84 20 98.5 °F (36.9 °C) 99 %      MAP       --         Physical Exam    Nursing note and vitals reviewed.  Constitutional: She appears well-developed and well-nourished.   Morbidly obese   HENT:   Head: Normocephalic and atraumatic.   Eyes: Conjunctivae are normal.   Neck: Normal range of motion. Neck supple.   Cardiovascular: Normal rate, regular rhythm and normal heart sounds. Exam reveals no gallop and no friction rub.    No murmur heard.  Pulmonary/Chest: Effort normal and breath sounds normal. No respiratory distress. She has no wheezes. She has no rhonchi. She has no rales.   Abdominal: Soft. She exhibits no distension and no  mass. There is tenderness (Right CVA tenderness and right lower quadrant tenderness). There is no rebound and no guarding.   Musculoskeletal: Normal range of motion.   Neurological: She is alert and oriented to person, place, and time.   Skin: Skin is warm and dry. No erythema.   Psychiatric:   Anxious         ED Course   Procedures  Labs Reviewed   CBC W/ AUTO DIFFERENTIAL - Abnormal; Notable for the following components:       Result Value    Mean Corpuscular Volume 107 (*)     Mean Corpuscular Hemoglobin 34.0 (*)     Mean Corpuscular Hemoglobin Conc 31.9 (*)     All other components within normal limits   COMPREHENSIVE METABOLIC PANEL - Abnormal; Notable for the following components:    Glucose 145 (*)     eGFR if  50 (*)     eGFR if non  44 (*)     All other components within normal limits   LACTIC ACID, PLASMA - Abnormal; Notable for the following components:    Lactate (Lactic Acid) 2.5 (*)     All other components within normal limits   LIPASE   URINALYSIS, REFLEX TO URINE CULTURE    Narrative:     Preferred Collection Type->Urine, Clean Catch          Imaging Results    None          Medical Decision Making:   ED Management:  79-year-old female presents with right flank and abdominal pain.  The etiology remains unclear.  CT of the abdomen is normal with no evidence of diverticulitis, appendicitis, bowel obstruction or intra-abdominal bleed.  She has no bony tenderness although primary hip pathology remains a possibility.  She is encouraged to return for worsening pain or fever.                                 Clinical Impression:       ICD-10-CM ICD-9-CM   1. Right lower quadrant pain R10.31 789.03                                Chepe Champagne III, MD  06/02/20 [id number]

## 2020-06-03 ENCOUNTER — TELEPHONE (OUTPATIENT)
Dept: FAMILY MEDICINE | Facility: CLINIC | Age: 80
End: 2020-06-03

## 2020-06-03 ENCOUNTER — PES CALL (OUTPATIENT)
Dept: ADMINISTRATIVE | Facility: CLINIC | Age: 80
End: 2020-06-03

## 2020-06-03 NOTE — TELEPHONE ENCOUNTER
Call placed to patient to assist with scheduling an ER follow up appointment. Patient wants to schedule with Dr. Villatoro only. Unable to locate an appointment earlier than patient's existing scheduled appointment on 7-. Offered to schedule with different provider, patient declined. States she was just seen by NP on 5-, and does not wish to schedule with anyone other than Dr. Villatoro. Encouraged patient to call office if she changes her mind.         ----- Message from Naveen Diaz sent at 6/3/2020 10:32 AM CDT -----  Contact: same  Patient called in and stated she went to the ER at [facility] yesterday 6/2/2020 for right hip & groin pain.  Patient was not admitted.  Patient would like to see the soonest she can get in with Dr. Villatoro?  Patient declined care with an NP and Virtual.    Patient call back number is [phone]

## 2020-06-06 ENCOUNTER — CLINICAL SUPPORT (OUTPATIENT)
Dept: URGENT CARE | Facility: CLINIC | Age: 80
End: 2020-06-06
Payer: MEDICARE

## 2020-06-06 VITALS
WEIGHT: 230 LBS | HEIGHT: 63 IN | OXYGEN SATURATION: 98 % | BODY MASS INDEX: 40.75 KG/M2 | HEART RATE: 56 BPM | TEMPERATURE: 98 F | RESPIRATION RATE: 18 BRPM | SYSTOLIC BLOOD PRESSURE: 177 MMHG | DIASTOLIC BLOOD PRESSURE: 70 MMHG

## 2020-06-06 DIAGNOSIS — R10.31 RIGHT LOWER QUADRANT ABDOMINAL PAIN: Primary | ICD-10-CM

## 2020-06-06 PROCEDURE — 99214 PR OFFICE/OUTPT VISIT, EST, LEVL IV, 30-39 MIN: ICD-10-PCS | Mod: S$GLB,,, | Performed by: NURSE PRACTITIONER

## 2020-06-06 PROCEDURE — 99214 OFFICE O/P EST MOD 30 MIN: CPT | Mod: S$GLB,,, | Performed by: NURSE PRACTITIONER

## 2020-06-06 NOTE — PROGRESS NOTES
"Subjective:       Patient ID: Sammi Abreu is a 79 y.o. female.    Vitals:  height is 5' 3" (1.6 m) and weight is 104.3 kg (230 lb). Her oral temperature is 97.6 °F (36.4 °C). Her blood pressure is 177/70 (abnormal) and her pulse is 56 (abnormal). Her respiration is 18 and oxygen saturation is 98%.     Chief Complaint: Abdominal Pain    Sammi Abreu is a 79 year old female presenting to the clinic with c/o right lower quadrant abdominal pain. She states she began having pain in the left leg two weeks ago which has now moved to the RLQ and right hip. She was seen in the ED 4 days ago with no findings on abdominal CT. She states she continues to have pain.     Abdominal Pain   This is a new problem. The current episode started in the past 7 days. The problem occurs constantly. The problem has been rapidly worsening. The pain is at a severity of 10/10. Associated symptoms include arthralgias (right hip). Pertinent negatives include no diarrhea, dysuria, fever, frequency, headaches, myalgias, nausea or vomiting. Nothing aggravates the pain. The pain is relieved by nothing.       Constitution: Negative for chills, fatigue and fever.   HENT: Negative for congestion and sore throat.    Neck: Negative for painful lymph nodes.   Cardiovascular: Negative for chest pain and leg swelling.   Eyes: Negative for double vision and blurred vision.   Respiratory: Negative for cough and shortness of breath.    Gastrointestinal: Positive for abdominal pain. Negative for nausea, vomiting and diarrhea.   Genitourinary: Negative for dysuria, frequency, urgency and history of kidney stones.   Musculoskeletal: Positive for joint pain (right hip). Negative for joint swelling, muscle cramps and muscle ache.   Skin: Negative for color change, pale, rash and bruising.   Allergic/Immunologic: Negative for seasonal allergies.   Neurological: Negative for dizziness, history of vertigo, light-headedness, passing out and headaches. "   Hematologic/Lymphatic: Negative for swollen lymph nodes.   Psychiatric/Behavioral: Negative for nervous/anxious, sleep disturbance and depression. The patient is not nervous/anxious.        Objective:      Physical Exam   Constitutional: She is oriented to person, place, and time. She appears well-developed and well-nourished. She is cooperative.  Non-toxic appearance. She does not appear ill. No distress.   Patient visibly uncomfortable, holding right lower quadrant   HENT:   Head: Normocephalic and atraumatic.   Right Ear: Hearing, tympanic membrane, external ear and ear canal normal.   Left Ear: Hearing, tympanic membrane, external ear and ear canal normal.   Nose: Nose normal. No mucosal edema, rhinorrhea or nasal deformity. No epistaxis. Right sinus exhibits no maxillary sinus tenderness and no frontal sinus tenderness. Left sinus exhibits no maxillary sinus tenderness and no frontal sinus tenderness.   Mouth/Throat: Uvula is midline, oropharynx is clear and moist and mucous membranes are normal. No trismus in the jaw. Normal dentition. No uvula swelling. No posterior oropharyngeal erythema.   Eyes: Conjunctivae and lids are normal. Right eye exhibits no discharge. Left eye exhibits no discharge. No scleral icterus.   Neck: Trachea normal, normal range of motion, full passive range of motion without pain and phonation normal. Neck supple.   Cardiovascular: Normal rate, regular rhythm, normal heart sounds, intact distal pulses and normal pulses.   Pulmonary/Chest: Effort normal and breath sounds normal. No respiratory distress.   Abdominal: Soft. Normal appearance and bowel sounds are normal. She exhibits no distension, no pulsatile midline mass and no mass. There is tenderness (RLQ).   Musculoskeletal: Normal range of motion. She exhibits no edema or deformity.   Neurological: She is alert and oriented to person, place, and time. She exhibits normal muscle tone. Coordination normal.   Skin: Skin is warm, dry,  intact, not diaphoretic and not pale.   Psychiatric: She has a normal mood and affect. Her speech is normal and behavior is normal. Judgment and thought content normal. Cognition and memory are normal.   Nursing note and vitals reviewed.        Assessment:       1. Right lower quadrant abdominal pain        Plan:       Patient has RLQ with tenderness to palpation. She was seen in the ED 4 days ago with no definitive diagnosis but continues to have worsening pain. Recommended ER for additional workup. Patient offered EMS transfer but prefers to have family member bring her.   Right lower quadrant abdominal pain

## 2020-06-07 ENCOUNTER — HOSPITAL ENCOUNTER (INPATIENT)
Facility: HOSPITAL | Age: 80
LOS: 3 days | Discharge: HOME | DRG: 446 | End: 2020-06-11
Attending: EMERGENCY MEDICINE | Admitting: INTERNAL MEDICINE
Payer: MEDICARE

## 2020-06-07 DIAGNOSIS — K80.20 CHOLELITHIASIS WITHOUT CHOLECYSTITIS: Primary | ICD-10-CM

## 2020-06-07 DIAGNOSIS — K81.0 ACUTE CHOLECYSTITIS: ICD-10-CM

## 2020-06-07 DIAGNOSIS — M79.606 LEG PAIN: ICD-10-CM

## 2020-06-07 DIAGNOSIS — Z01.818 PRE-OP EXAM: ICD-10-CM

## 2020-06-07 DIAGNOSIS — M47.16 OSTEOARTHRITIS OF LUMBAR SPINE WITH MYELOPATHY: ICD-10-CM

## 2020-06-07 PROBLEM — R74.01 TRANSAMINITIS: Status: ACTIVE | Noted: 2020-06-07

## 2020-06-07 PROBLEM — E86.0 DEHYDRATION: Status: ACTIVE | Noted: 2020-06-07

## 2020-06-07 LAB
ALBUMIN SERPL BCP-MCNC: 3.7 G/DL (ref 3.5–5.2)
ALP SERPL-CCNC: 143 U/L (ref 55–135)
ALT SERPL W/O P-5'-P-CCNC: 246 U/L (ref 10–44)
ANION GAP SERPL CALC-SCNC: 11 MMOL/L (ref 8–16)
AST SERPL-CCNC: 215 U/L (ref 10–40)
BACTERIA #/AREA URNS HPF: NEGATIVE /HPF
BASOPHILS # BLD AUTO: 0.02 K/UL (ref 0–0.2)
BASOPHILS NFR BLD: 0.4 % (ref 0–1.9)
BILIRUB SERPL-MCNC: 0.8 MG/DL (ref 0.1–1)
BILIRUB UR QL STRIP: NEGATIVE
BUN SERPL-MCNC: 24 MG/DL (ref 8–23)
CALCIUM SERPL-MCNC: 8.6 MG/DL (ref 8.7–10.5)
CHLORIDE SERPL-SCNC: 106 MMOL/L (ref 95–110)
CK SERPL-CCNC: 153 U/L (ref 20–180)
CLARITY UR: CLEAR
CO2 SERPL-SCNC: 23 MMOL/L (ref 23–29)
COLOR UR: YELLOW
CREAT SERPL-MCNC: 1.3 MG/DL (ref 0.5–1.4)
DIFFERENTIAL METHOD: ABNORMAL
EOSINOPHIL # BLD AUTO: 0.2 K/UL (ref 0–0.5)
EOSINOPHIL NFR BLD: 3.8 % (ref 0–8)
ERYTHROCYTE [DISTWIDTH] IN BLOOD BY AUTOMATED COUNT: 12.6 % (ref 11.5–14.5)
EST. GFR  (AFRICAN AMERICAN): 45.7 ML/MIN/1.73 M^2
EST. GFR  (NON AFRICAN AMERICAN): 39.7 ML/MIN/1.73 M^2
GLUCOSE SERPL-MCNC: 131 MG/DL (ref 70–110)
GLUCOSE UR QL STRIP: NEGATIVE
HCT VFR BLD AUTO: 42 % (ref 37–48.5)
HGB BLD-MCNC: 13.5 G/DL (ref 12–16)
HGB UR QL STRIP: NEGATIVE
HYALINE CASTS #/AREA URNS LPF: 3 /LPF
IMM GRANULOCYTES # BLD AUTO: 0.01 K/UL (ref 0–0.04)
IMM GRANULOCYTES NFR BLD AUTO: 0.2 % (ref 0–0.5)
KETONES UR QL STRIP: NEGATIVE
LACTATE SERPL-SCNC: 1.4 MMOL/L (ref 0.5–1.9)
LEUKOCYTE ESTERASE UR QL STRIP: ABNORMAL
LIPASE SERPL-CCNC: 29 U/L (ref 4–60)
LYMPHOCYTES # BLD AUTO: 2.2 K/UL (ref 1–4.8)
LYMPHOCYTES NFR BLD: 42.8 % (ref 18–48)
MCH RBC QN AUTO: 33.3 PG (ref 27–31)
MCHC RBC AUTO-ENTMCNC: 32.1 G/DL (ref 32–36)
MCV RBC AUTO: 103 FL (ref 82–98)
MICROSCOPIC COMMENT: ABNORMAL
MONOCYTES # BLD AUTO: 0.6 K/UL (ref 0.3–1)
MONOCYTES NFR BLD: 11.1 % (ref 4–15)
NEUTROPHILS # BLD AUTO: 2.2 K/UL (ref 1.8–7.7)
NEUTROPHILS NFR BLD: 41.7 % (ref 38–73)
NITRITE UR QL STRIP: NEGATIVE
NRBC BLD-RTO: 0 /100 WBC
PH UR STRIP: 7 [PH] (ref 5–8)
PLATELET # BLD AUTO: 195 K/UL (ref 150–350)
PMV BLD AUTO: 11 FL (ref 9.2–12.9)
POTASSIUM SERPL-SCNC: 4.4 MMOL/L (ref 3.5–5.1)
PROT SERPL-MCNC: 7.5 G/DL (ref 6–8.4)
PROT UR QL STRIP: NEGATIVE
RBC # BLD AUTO: 4.06 M/UL (ref 4–5.4)
RBC #/AREA URNS HPF: 0 /HPF (ref 0–4)
SARS-COV-2 RDRP RESP QL NAA+PROBE: NEGATIVE
SODIUM SERPL-SCNC: 140 MMOL/L (ref 136–145)
SP GR UR STRIP: 1.01 (ref 1–1.03)
SQUAMOUS #/AREA URNS HPF: 2 /HPF
URN SPEC COLLECT METH UR: ABNORMAL
UROBILINOGEN UR STRIP-ACNC: NEGATIVE EU/DL
WBC # BLD AUTO: 5.21 K/UL (ref 3.9–12.7)
WBC #/AREA URNS HPF: 2 /HPF (ref 0–5)

## 2020-06-07 PROCEDURE — A9585 GADOBUTROL INJECTION: HCPCS | Performed by: FAMILY MEDICINE

## 2020-06-07 PROCEDURE — G0378 HOSPITAL OBSERVATION PER HR: HCPCS

## 2020-06-07 PROCEDURE — 81001 URINALYSIS AUTO W/SCOPE: CPT

## 2020-06-07 PROCEDURE — 99223 PR INITIAL HOSPITAL CARE,LEVL III: ICD-10-PCS | Mod: ,,, | Performed by: SURGERY

## 2020-06-07 PROCEDURE — U0002 COVID-19 LAB TEST NON-CDC: HCPCS

## 2020-06-07 PROCEDURE — 63600175 PHARM REV CODE 636 W HCPCS: Performed by: EMERGENCY MEDICINE

## 2020-06-07 PROCEDURE — 36415 COLL VENOUS BLD VENIPUNCTURE: CPT

## 2020-06-07 PROCEDURE — 99900035 HC TECH TIME PER 15 MIN (STAT)

## 2020-06-07 PROCEDURE — 85025 COMPLETE CBC W/AUTO DIFF WBC: CPT

## 2020-06-07 PROCEDURE — 96365 THER/PROPH/DIAG IV INF INIT: CPT

## 2020-06-07 PROCEDURE — 63600175 PHARM REV CODE 636 W HCPCS: Performed by: NURSE PRACTITIONER

## 2020-06-07 PROCEDURE — 83690 ASSAY OF LIPASE: CPT

## 2020-06-07 PROCEDURE — 87040 BLOOD CULTURE FOR BACTERIA: CPT | Mod: 59

## 2020-06-07 PROCEDURE — 99285 EMERGENCY DEPT VISIT HI MDM: CPT | Mod: 25

## 2020-06-07 PROCEDURE — 96376 TX/PRO/DX INJ SAME DRUG ADON: CPT

## 2020-06-07 PROCEDURE — 82550 ASSAY OF CK (CPK): CPT

## 2020-06-07 PROCEDURE — 80053 COMPREHEN METABOLIC PANEL: CPT

## 2020-06-07 PROCEDURE — 93005 ELECTROCARDIOGRAM TRACING: CPT | Performed by: INTERNAL MEDICINE

## 2020-06-07 PROCEDURE — 25500020 PHARM REV CODE 255: Performed by: FAMILY MEDICINE

## 2020-06-07 PROCEDURE — 96375 TX/PRO/DX INJ NEW DRUG ADDON: CPT

## 2020-06-07 PROCEDURE — 25500020 PHARM REV CODE 255: Performed by: EMERGENCY MEDICINE

## 2020-06-07 PROCEDURE — 25000003 PHARM REV CODE 250: Performed by: NURSE PRACTITIONER

## 2020-06-07 PROCEDURE — 99223 1ST HOSP IP/OBS HIGH 75: CPT | Mod: ,,, | Performed by: SURGERY

## 2020-06-07 PROCEDURE — 96361 HYDRATE IV INFUSION ADD-ON: CPT

## 2020-06-07 PROCEDURE — 83605 ASSAY OF LACTIC ACID: CPT

## 2020-06-07 PROCEDURE — 25000003 PHARM REV CODE 250: Performed by: EMERGENCY MEDICINE

## 2020-06-07 RX ORDER — ASCORBIC ACID 500 MG
500 TABLET ORAL DAILY
Status: DISCONTINUED | OUTPATIENT
Start: 2020-06-07 | End: 2020-06-11 | Stop reason: HOSPADM

## 2020-06-07 RX ORDER — POTASSIUM CHLORIDE 7.45 MG/ML
20 INJECTION INTRAVENOUS
Status: DISCONTINUED | OUTPATIENT
Start: 2020-06-07 | End: 2020-06-11 | Stop reason: HOSPADM

## 2020-06-07 RX ORDER — LEVOFLOXACIN 5 MG/ML
750 INJECTION, SOLUTION INTRAVENOUS
Status: COMPLETED | OUTPATIENT
Start: 2020-06-07 | End: 2020-06-07

## 2020-06-07 RX ORDER — SODIUM CHLORIDE 9 MG/ML
1000 INJECTION, SOLUTION INTRAVENOUS
Status: COMPLETED | OUTPATIENT
Start: 2020-06-07 | End: 2020-06-07

## 2020-06-07 RX ORDER — POTASSIUM CHLORIDE 20 MEQ/1
40 TABLET, EXTENDED RELEASE ORAL
Status: DISCONTINUED | OUTPATIENT
Start: 2020-06-07 | End: 2020-06-11 | Stop reason: HOSPADM

## 2020-06-07 RX ORDER — POTASSIUM CHLORIDE 7.45 MG/ML
40 INJECTION INTRAVENOUS
Status: DISCONTINUED | OUTPATIENT
Start: 2020-06-07 | End: 2020-06-11 | Stop reason: HOSPADM

## 2020-06-07 RX ORDER — ALBUTEROL SULFATE 0.83 MG/ML
2.5 SOLUTION RESPIRATORY (INHALATION) EVERY 6 HOURS PRN
Status: DISCONTINUED | OUTPATIENT
Start: 2020-06-07 | End: 2020-06-11 | Stop reason: HOSPADM

## 2020-06-07 RX ORDER — MONTELUKAST SODIUM 10 MG/1
10 TABLET ORAL NIGHTLY
Status: DISCONTINUED | OUTPATIENT
Start: 2020-06-07 | End: 2020-06-11 | Stop reason: HOSPADM

## 2020-06-07 RX ORDER — POTASSIUM CHLORIDE 20 MEQ/1
20 TABLET, EXTENDED RELEASE ORAL
Status: DISCONTINUED | OUTPATIENT
Start: 2020-06-07 | End: 2020-06-11 | Stop reason: HOSPADM

## 2020-06-07 RX ORDER — MAGNESIUM SULFATE 1 G/100ML
1 INJECTION INTRAVENOUS
Status: DISCONTINUED | OUTPATIENT
Start: 2020-06-07 | End: 2020-06-11 | Stop reason: HOSPADM

## 2020-06-07 RX ORDER — METOPROLOL TARTRATE 50 MG/1
50 TABLET ORAL
Status: COMPLETED | OUTPATIENT
Start: 2020-06-07 | End: 2020-06-07

## 2020-06-07 RX ORDER — CALCIUM CHLORIDE IN 0.9 % NACL 1 G/100 ML
1 INTRAVENOUS SOLUTION, PIGGYBACK (ML) INTRAVENOUS
Status: DISCONTINUED | OUTPATIENT
Start: 2020-06-07 | End: 2020-06-11 | Stop reason: HOSPADM

## 2020-06-07 RX ORDER — LANOLIN ALCOHOL/MO/W.PET/CERES
800 CREAM (GRAM) TOPICAL
Status: DISCONTINUED | OUTPATIENT
Start: 2020-06-07 | End: 2020-06-11 | Stop reason: HOSPADM

## 2020-06-07 RX ORDER — MORPHINE SULFATE 4 MG/ML
4 INJECTION, SOLUTION INTRAMUSCULAR; INTRAVENOUS
Status: COMPLETED | OUTPATIENT
Start: 2020-06-07 | End: 2020-06-07

## 2020-06-07 RX ORDER — AMOXICILLIN 250 MG
1 CAPSULE ORAL 2 TIMES DAILY
Status: DISCONTINUED | OUTPATIENT
Start: 2020-06-07 | End: 2020-06-11 | Stop reason: HOSPADM

## 2020-06-07 RX ORDER — FLUTICASONE PROPIONATE 50 MCG
2 SPRAY, SUSPENSION (ML) NASAL DAILY
Status: DISCONTINUED | OUTPATIENT
Start: 2020-06-07 | End: 2020-06-11 | Stop reason: HOSPADM

## 2020-06-07 RX ORDER — TALC
6 POWDER (GRAM) TOPICAL NIGHTLY PRN
Status: DISCONTINUED | OUTPATIENT
Start: 2020-06-07 | End: 2020-06-11 | Stop reason: HOSPADM

## 2020-06-07 RX ORDER — FLUTICASONE FUROATE AND VILANTEROL 100; 25 UG/1; UG/1
1 POWDER RESPIRATORY (INHALATION) DAILY
Status: DISCONTINUED | OUTPATIENT
Start: 2020-06-07 | End: 2020-06-11 | Stop reason: HOSPADM

## 2020-06-07 RX ORDER — MAGNESIUM SULFATE HEPTAHYDRATE 40 MG/ML
4 INJECTION, SOLUTION INTRAVENOUS
Status: DISCONTINUED | OUTPATIENT
Start: 2020-06-07 | End: 2020-06-11 | Stop reason: HOSPADM

## 2020-06-07 RX ORDER — SODIUM CHLORIDE 0.9 % (FLUSH) 0.9 %
10 SYRINGE (ML) INJECTION
Status: DISCONTINUED | OUTPATIENT
Start: 2020-06-07 | End: 2020-06-11 | Stop reason: HOSPADM

## 2020-06-07 RX ORDER — HYDRALAZINE HYDROCHLORIDE 20 MG/ML
10 INJECTION INTRAMUSCULAR; INTRAVENOUS EVERY 4 HOURS PRN
Status: DISCONTINUED | OUTPATIENT
Start: 2020-06-07 | End: 2020-06-11 | Stop reason: HOSPADM

## 2020-06-07 RX ORDER — LOSARTAN POTASSIUM 50 MG/1
100 TABLET ORAL
Status: COMPLETED | OUTPATIENT
Start: 2020-06-07 | End: 2020-06-07

## 2020-06-07 RX ORDER — PANTOPRAZOLE SODIUM 40 MG/1
40 TABLET, DELAYED RELEASE ORAL DAILY
Status: DISCONTINUED | OUTPATIENT
Start: 2020-06-07 | End: 2020-06-11 | Stop reason: HOSPADM

## 2020-06-07 RX ORDER — MAGNESIUM SULFATE HEPTAHYDRATE 40 MG/ML
2 INJECTION, SOLUTION INTRAVENOUS
Status: DISCONTINUED | OUTPATIENT
Start: 2020-06-07 | End: 2020-06-11 | Stop reason: HOSPADM

## 2020-06-07 RX ORDER — HYDROCODONE BITARTRATE AND ACETAMINOPHEN 5; 325 MG/1; MG/1
1 TABLET ORAL EVERY 6 HOURS PRN
Status: DISCONTINUED | OUTPATIENT
Start: 2020-06-07 | End: 2020-06-11 | Stop reason: HOSPADM

## 2020-06-07 RX ORDER — ONDANSETRON 2 MG/ML
4 INJECTION INTRAMUSCULAR; INTRAVENOUS
Status: COMPLETED | OUTPATIENT
Start: 2020-06-07 | End: 2020-06-07

## 2020-06-07 RX ORDER — DEXAMETHASONE SODIUM PHOSPHATE 4 MG/ML
4 INJECTION, SOLUTION INTRA-ARTICULAR; INTRALESIONAL; INTRAMUSCULAR; INTRAVENOUS; SOFT TISSUE EVERY 12 HOURS
Status: DISCONTINUED | OUTPATIENT
Start: 2020-06-07 | End: 2020-06-11 | Stop reason: HOSPADM

## 2020-06-07 RX ORDER — ONDANSETRON 2 MG/ML
4 INJECTION INTRAMUSCULAR; INTRAVENOUS EVERY 8 HOURS PRN
Status: DISCONTINUED | OUTPATIENT
Start: 2020-06-07 | End: 2020-06-11 | Stop reason: HOSPADM

## 2020-06-07 RX ORDER — LOSARTAN POTASSIUM 50 MG/1
100 TABLET ORAL DAILY
Status: DISCONTINUED | OUTPATIENT
Start: 2020-06-07 | End: 2020-06-11 | Stop reason: HOSPADM

## 2020-06-07 RX ORDER — FUROSEMIDE 40 MG/1
40 TABLET ORAL DAILY
Status: DISCONTINUED | OUTPATIENT
Start: 2020-06-07 | End: 2020-06-07

## 2020-06-07 RX ORDER — OXYCODONE HYDROCHLORIDE 5 MG/1
5 TABLET ORAL EVERY 6 HOURS PRN
Status: DISCONTINUED | OUTPATIENT
Start: 2020-06-07 | End: 2020-06-11 | Stop reason: HOSPADM

## 2020-06-07 RX ORDER — GADOBUTROL 604.72 MG/ML
10 INJECTION INTRAVENOUS
Status: COMPLETED | OUTPATIENT
Start: 2020-06-07 | End: 2020-06-07

## 2020-06-07 RX ORDER — MORPHINE SULFATE 2 MG/ML
2 INJECTION, SOLUTION INTRAMUSCULAR; INTRAVENOUS EVERY 4 HOURS PRN
Status: DISCONTINUED | OUTPATIENT
Start: 2020-06-07 | End: 2020-06-11 | Stop reason: HOSPADM

## 2020-06-07 RX ORDER — MORPHINE SULFATE 4 MG/ML
4 INJECTION, SOLUTION INTRAMUSCULAR; INTRAVENOUS EVERY 4 HOURS PRN
Status: DISCONTINUED | OUTPATIENT
Start: 2020-06-07 | End: 2020-06-11 | Stop reason: HOSPADM

## 2020-06-07 RX ORDER — SPIRONOLACTONE 25 MG/1
25 TABLET ORAL DAILY
Status: DISCONTINUED | OUTPATIENT
Start: 2020-06-07 | End: 2020-06-07

## 2020-06-07 RX ORDER — MORPHINE SULFATE 2 MG/ML
6 INJECTION, SOLUTION INTRAMUSCULAR; INTRAVENOUS
Status: COMPLETED | OUTPATIENT
Start: 2020-06-07 | End: 2020-06-07

## 2020-06-07 RX ORDER — METOPROLOL SUCCINATE 25 MG/1
50 TABLET, EXTENDED RELEASE ORAL DAILY
Status: DISCONTINUED | OUTPATIENT
Start: 2020-06-07 | End: 2020-06-11 | Stop reason: HOSPADM

## 2020-06-07 RX ADMIN — HYDRALAZINE HYDROCHLORIDE 10 MG: 20 INJECTION INTRAMUSCULAR; INTRAVENOUS at 02:06

## 2020-06-07 RX ADMIN — MORPHINE SULFATE 6 MG: 2 INJECTION, SOLUTION INTRAMUSCULAR; INTRAVENOUS at 08:06

## 2020-06-07 RX ADMIN — PROMETHAZINE HYDROCHLORIDE 6.25 MG: 25 INJECTION INTRAMUSCULAR; INTRAVENOUS at 10:06

## 2020-06-07 RX ADMIN — SENNOSIDES AND DOCUSATE SODIUM 1 TABLET: 8.6; 5 TABLET ORAL at 08:06

## 2020-06-07 RX ADMIN — DEXAMETHASONE SODIUM PHOSPHATE 4 MG: 4 INJECTION, SOLUTION INTRA-ARTICULAR; INTRALESIONAL; INTRAMUSCULAR; INTRAVENOUS; SOFT TISSUE at 08:06

## 2020-06-07 RX ADMIN — ONDANSETRON HYDROCHLORIDE 4 MG: 2 SOLUTION INTRAMUSCULAR; INTRAVENOUS at 08:06

## 2020-06-07 RX ADMIN — METOPROLOL TARTRATE 50 MG: 50 TABLET ORAL at 01:06

## 2020-06-07 RX ADMIN — MONTELUKAST 10 MG: 10 TABLET, FILM COATED ORAL at 08:06

## 2020-06-07 RX ADMIN — LEVOFLOXACIN 750 MG: 5 INJECTION, SOLUTION INTRAVENOUS at 12:06

## 2020-06-07 RX ADMIN — IOHEXOL 100 ML: 350 INJECTION, SOLUTION INTRAVENOUS at 11:06

## 2020-06-07 RX ADMIN — MORPHINE SULFATE 4 MG: 4 INJECTION INTRAVENOUS at 11:06

## 2020-06-07 RX ADMIN — SODIUM CHLORIDE 1000 ML: 0.9 INJECTION, SOLUTION INTRAVENOUS at 08:06

## 2020-06-07 RX ADMIN — ONDANSETRON 4 MG: 2 INJECTION INTRAMUSCULAR; INTRAVENOUS at 01:06

## 2020-06-07 RX ADMIN — LOSARTAN POTASSIUM 100 MG: 50 TABLET, FILM COATED ORAL at 01:06

## 2020-06-07 RX ADMIN — HYDROCODONE BITARTRATE AND ACETAMINOPHEN 1 TABLET: 5; 325 TABLET ORAL at 07:06

## 2020-06-07 RX ADMIN — GADOBUTROL 10 ML: 604.72 INJECTION INTRAVENOUS at 06:06

## 2020-06-07 NOTE — ED PROVIDER NOTES
Encounter Date: 6/7/2020       History     Chief Complaint   Patient presents with    Abdominal Pain     Patient presents complaining of abdominal pain intermittently for the last 1 week.  Patient states pain is getting worse.  Patient was previously evaluated at an ER with a negative CT scan.  She then saw urgent care physician who advised ER evaluation.  Patient states pain continues to get worse.  She describes pain in the mid to lower abdomen on the right side.  No nausea or vomiting.  No fever.  At the worst symptoms are moderate.        Review of patient's allergies indicates:   Allergen Reactions    Cymbalta [duloxetine] Other (See Comments)     Nightmares      Darvon [propoxyphene] Nausea Only and Other (See Comments)     Sweating, slept for 3 days    Atorvastatin Other (See Comments)     Muscle cramps    Naprosyn [naproxen] Nausea Only    Penicillins Rash    Tramadol Nausea Only and Palpitations     Past Medical History:   Diagnosis Date    ALLERGIC RHINITIS     Anemia     Arthritis     Back pain     Cataract     OU    COPD (chronic obstructive pulmonary disease)     Degenerative disc disease     Diabetes mellitus     NIDDM    Diverticulosis     DVT (deep venous thrombosis)     Edema     Fibromyalgia     Glaucoma     Gout     Hx of colonic polyps     Hyperlipidemia     Hypertension     Incontinence     Osteoporosis     Reflux     Sleep apnea     non compliant with CPAP    Vestibulitis of ear      Past Surgical History:   Procedure Laterality Date    ANGIOGRAPHY OF LOWER EXTREMITY N/A 7/31/2019    Procedure: ANGIOGRAM, LOWER EXTREMITY;  Surgeon: Gino Arana MD;  [facility]: [facility]ATH[facility]EP LAB;  Service: General;  Laterality: N/A;    HIP SURGERY      HYSTERECTOMY      JOINT REPLACEMENT      B total hip     Family History   Problem Relation Age of Onset    Hypertension Mother     Diabetes Sister     Glaucoma Neg Hx     Macular degeneration Neg Hx     Retinal detachment  Neg Hx      Social History     Tobacco Use    Smoking status: Former Smoker     Packs/day: 0.25     Years: 5.00     Pack years: 1.25     Last attempt to quit: 1972     Years since quittin.4    Smokeless tobacco: Never Used   Substance Use Topics    Alcohol use: Yes     Alcohol/week: 0.0 standard drinks     Comment: Rarely    Drug use: Yes     Types: Oxycodone, Hydrocodone     Review of Systems   Gastrointestinal: Positive for abdominal pain.   All other systems reviewed and are negative.      Physical Exam     Initial Vitals [20 0837]   BP Pulse Resp Temp SpO2   (!) 162/85 92 (!) 22 99 °F (37.2 °C) 99 %      MAP       --         Physical Exam    Nursing note and vitals reviewed.  Constitutional: She appears well-developed and well-nourished.   HENT:   Head: Normocephalic and atraumatic.   Eyes: EOM are normal.   Neck: Normal range of motion. Neck supple.   Cardiovascular: Normal rate, regular rhythm, normal heart sounds and intact distal pulses.   Pulmonary/Chest: Breath sounds normal. No respiratory distress.   Abdominal:   Abdomen is soft and diffusely tender.  More tender on the right side.  No McBurney's sign or Berrios sign   Neurological: She is alert and oriented to person, place, and time.   Skin: Skin is warm and dry. Capillary refill takes less than 2 seconds.   Psychiatric: She has a normal mood and affect. Her behavior is normal. Judgment and thought content normal.         ED Course   Procedures  Labs Reviewed   CBC W/ AUTO DIFFERENTIAL - Abnormal; Notable for the following components:       Result Value    Mean Corpuscular Volume 103 (*)     Mean Corpuscular Hemoglobin 33.3 (*)     All other components within normal limits   COMPREHENSIVE METABOLIC PANEL - Abnormal; Notable for the following components:    Glucose 131 (*)     BUN, Bld 24 (*)     Calcium 8.6 (*)     Alkaline Phosphatase 143 (*)      (*)      (*)     eGFR if  45.7 (*)     eGFR if non   39.7 (*)     All other components within normal limits   URINALYSIS, REFLEX TO URINE CULTURE - Abnormal; Notable for the following components:    Leukocytes, UA Trace (*)     All other components within normal limits    Narrative:     Preferred Collection Type->Urine, Clean Catch  Specimen Source->Urine   URINALYSIS MICROSCOPIC - Abnormal; Notable for the following components:    Hyaline Casts, UA 3 (*)     All other components within normal limits    Narrative:     Preferred Collection Type->Urine, Clean Catch  Specimen Source->Urine   CULTURE, BLOOD   CULTURE, BLOOD   LIPASE   LACTIC ACID, PLASMA   SARS-COV-2 RNA AMPLIFICATION, QUAL          Imaging Results          US Lower Extremity Veins Right (Final result)  Result time 06/07/20 11:40:50    Final result by Miki Cano MD (06/07/20 11:40:50)                 Narrative:    REASON: Swelling.    FINDINGS:    Grayscale, color and spectral Doppler analysis of the right lower  extremity deep venous system was performed.    There is normal compressibility, color and spectral Doppler analysis,  and augmentation in the right lower extremity deep venous system.    IMPRESSION:    No DVT of the right lower extremity veins.    Electronically Signed by iMki Cano on 6/7/2020 11:49 AM                             CT Abdomen Pelvis With Contrast (Final result)  Result time 06/07/20 11:18:49    Final result by Miki Cano MD (06/07/20 11:18:49)                 Narrative:    CMS MANDATED QUALITY DATA - CT RADIATION - 436    All CT scans at this facility utilize dose modulation, iterative  reconstruction, and/or weight based dosing when appropriate to reduce  radiation dose to as low as reasonably achievable.      REASON: Abd pain, fever, abscess suspected    TECHNIQUE: Abdomen and pelvis CT with IV contrast.    COMPARISON: CT abdomen and pelvis June 2, 2020.    FINDINGS:    The lung bases are clear. The heart is normal size.    Tiny left hepatic lobe  cyst identified. No other hepatic lesions  demonstrated. The gallbladder is mildly distended. Previously  identified gallstones are not identified gallstones are not seen with  CT. The pancreas, spleen, and adrenal glands are unremarkable.    Kidneys are normal size. No hydronephrosis or nephrolithiasis. Right  renal cortical scar noted. No enhancing renal lesions. The visualized  ureters are normal caliber. Pelvic structures are obscured from beam  hardening artifact from bilateral total hip arthroplasties. Visualized  portions of the bladder grossly unremarkable.    Colonic diverticulosis noted. The appendix is normal. The small bowel  is normal caliber. No bowel wall thickening or inflammatory changes.  The stomach is grossly unremarkable.    No intra-abdominal lymphadenopathy. Aorta is normal caliber with  atherosclerosis. No mesenteric fat stranding or free fluid.    No acute osseous abnormality.    IMPRESSION:    1.  No acute intra-abdominal abnormality.  2.  Gallbladder is distended. Previously identified gallstones seen  on prior ultrasound are not identified with CT.  3.  Colonic diverticulosis.    Electronically Signed by Miki Cano on 6/7/2020 11:26 AM                             US Abdomen Limited (Final result)  Result time 06/07/20 10:34:49    Final result by Miki Cano MD (06/07/20 10:34:49)                 Narrative:    Reason: abd pain    COMPARISON: None    FINDINGS:    Liver maintains normal echogenicity without focal mass or intrahepatic  bile duct dilation. Tiny hepatic cysts identified in the left liver  lobe measuring 6 mm. Hepatopedal flow in main portal vein.    Echogenic gallstones identified with posterior acoustic shadowing. No  gallbladder wall thickening or pericholecystic fluid. Common duct  diameter is normal.    Visualized pancreas is unremarkable. Right kidney is free of  nephrolithiasis or hydronephrosis. Aorta is nonaneurysmal.    IMPRESSION:    1.  Cholelithiasis.  2.   Tiny left liver lobe hepatic cyst.    Electronically Signed by Miki Cano on 6/7/2020 10:40 AM                               Medical Decision Making:   ED Management:  Differential is broad considered appendicitis, acute cholecystitis, acute pancreatitis.  CT scan shows a distended gallbladder and ultrasound shows gallstones.  Liver enzymes are elevated.  Up think this is the likely source of patient's pain.  Patient be admitted to Hospital Medicine.  I discussed the case with general surgeon on-call Dr. Mcdonald was available to see the patient in the hospital.  Patient be admitted in stable condition.                                 Clinical Impression:       ICD-10-CM ICD-9-CM   1. Acute cholecystitis K81.0 575.0   2. Leg pain M79.606 729.5   3. Pre-op exam Z01.818 V72.84             ED Disposition Condition    Admit                           Dima Daugherty MD  06/09/20 1546

## 2020-06-07 NOTE — CONSULTS
[facility]  General Surgery  Consult Note    Patient Name: Sammi Abreu  MRN: [id number]  Code Status: Full Code  Admission Date: 6/7/2020  Hospital Length of Stay: 0 days  Attending Physician: Cassie Baldwin MD  Primary Care Provider: Osmani Villatoro MD    Patient information was obtained from patient and ER records.     Inpatient consult to General surgery  Consult performed by: Jomar Mcdonald MD  Consult ordered by: Elida Fernandez NP        Subjective:     Principal Problem: Acute cholecystitis    History of Present Illness: 79-year-old female who was admitted today with cholelithiasis and elevated transaminases and alkaline phosphatase.  Her bilirubin is normal.  The patient has been having right lower quadrant pain and flank pain and hip pain for the past couple of weeks.  She was seen in urgent care and at the emergency department at [facility].  CT scans done there were negative.  Labs were unremarkable.  Her elevated transaminases or new finding today.  The patient says the pain is not related to food.  It hurts all the time but mostly when she stands up and especially when she walks.  She has had bilateral hip arthroplasties in the past.  She has had evaluation by pain management for left leg pain previously.  She has no significant abdominal pain or any right upper quadrant pain.  She is completely nontender in the epigastric and right upper quadrant area.  On palpation of her right buttock and hip she cries out with severe pain.  There is no skin change consistent with infection.  CT scan is unremarkable still.  Ultrasound identified cholelithiasis without evidence of acute cholecystitis.    No current facility-administered medications on file prior to encounter.      Current Outpatient Medications on File Prior to Encounter   Medication Sig    ascorbic acid (VITAMIN C) 100 MG tablet Take 100 mg by mouth once daily.     aspirin (ECOTRIN) 81 MG EC tablet Take 81 mg by mouth  once daily.    b complex vitamins tablet Take 1 tablet by mouth once daily.    cyanocobalamin, vitamin B-12, 2,500 mcg Lo Place 2 tablets under the tongue once daily.    diclofenac (VOLTAREN) 25 MG TbEC Take 1 tablet (25 mg total) by mouth 3 (three) times daily as needed.    fluticasone (FLONASE) 50 mcg/actuation nasal spray 2 sprays (100 mcg total) by Each Nare route once daily.    furosemide (LASIX) 40 MG tablet Take 1 tablet (40 mg total) by mouth once daily.    losartan (COZAAR) 100 MG tablet Take 1 tablet (100 mg total) by mouth once daily.    metoprolol succinate (TOPROL-XL) 50 MG 24 hr tablet Take 1 tablet (50 mg total) by mouth once daily.    montelukast (SINGULAIR) 10 mg tablet Take 1 tablet (10 mg total) by mouth every evening.    spironolactone (ALDACTONE) 25 MG tablet Take 1 tablet (25 mg total) by mouth once daily.    acetaminophen (TYLENOL) 500 MG tablet Take 1 tablet (500 mg total) by mouth every 6 (six) hours as needed for Pain.    albuterol (PROVENTIL) 2.5 mg /3 mL (0.083 %) nebulizer solution Take 3 mLs (2.5 mg total) by nebulization every 6 (six) hours as needed.    azithromycin (Z-ARNAUD) 250 MG tablet Take 2 tablets by mouth on day 1; Take 1 tablet by mouth on days 2-5    budesonide-formoterol 160-4.5 mcg (SYMBICORT) 160-4.5 mcg/actuation HFAA Inhale 2 puffs into the lungs every 12 (twelve) hours. Controller    esomeprazole (NEXIUM) 20 MG capsule Take 1 capsule (20 mg total) by mouth before breakfast. (Patient taking differently: Take 20 mg by mouth daily as needed (heartburn). )    HYDROcodone-acetaminophen (NORCO) 5-325 mg per tablet Take 1 tablet by mouth every 8 (eight) hours as needed for Pain.    lactulose (CHRONULAC) 10 gram/15 mL solution Take 30 mLs (20 g total) by mouth 3 (three) times daily. 2 Teaspoon(s) Oral PRN Every evening.       Review of patient's allergies indicates:   Allergen Reactions    Cymbalta [duloxetine] Other (See Comments)     Nightmares      Apple  [propoxyphene] Nausea Only and Other (See Comments)     Sweating, slept for 3 days    Atorvastatin Other (See Comments)     Muscle cramps    Naprosyn [naproxen] Nausea Only    Penicillins Rash    Tramadol Nausea Only and Palpitations       Past Medical History:   Diagnosis Date    ALLERGIC RHINITIS     Anemia     Arthritis     Back pain     Cataract     OU    COPD (chronic obstructive pulmonary disease)     Degenerative disc disease     Diabetes mellitus     NIDDM    Diverticulosis     DVT (deep venous thrombosis)     Edema     Fibromyalgia     Glaucoma     Gout     Hx of colonic polyps     Hyperlipidemia     Hypertension     Incontinence     Osteoporosis     Reflux     Sleep apnea     non compliant with CPAP    Vestibulitis of ear      Past Surgical History:   Procedure Laterality Date    ANGIOGRAPHY OF LOWER EXTREMITY N/A 2019    Procedure: ANGIOGRAM, LOWER EXTREMITY;  Surgeon: Gino Arana MD;  Location: [facility] CATH/EP LAB;  Service: General;  Laterality: N/A;    HIP SURGERY      HYSTERECTOMY      JOINT REPLACEMENT      B total hip     Family History     Problem Relation (Age of Onset)    Diabetes Sister    Hypertension Mother        Tobacco Use    Smoking status: Former Smoker     Packs/day: 0.25     Years: 5.00     Pack years: 1.25     Last attempt to quit: 1972     Years since quittin.4    Smokeless tobacco: Never Used   Substance and Sexual Activity    Alcohol use: Yes     Alcohol/week: 0.0 standard drinks     Comment: Rarely    Drug use: Yes     Types: Oxycodone, Hydrocodone    Sexual activity: Not on file     Review of Systems   Constitutional: Negative for activity change, chills, fever and unexpected weight change.   HENT: Negative for congestion, sore throat, trouble swallowing and voice change.    Eyes: Negative for redness and visual disturbance.   Respiratory: Negative for cough, shortness of breath and wheezing.    Cardiovascular: Negative for chest  pain and palpitations.   Gastrointestinal: Positive for constipation and vomiting (She did have 1 episode of vomiting after receiving some morphine but otherwise is not nauseated.). Negative for abdominal pain, blood in stool and nausea.   Endocrine: Negative.    Genitourinary: Negative for dysuria, frequency and hematuria.   Musculoskeletal: Negative for arthralgias, back pain and neck pain.   Skin: Negative for rash and wound.   Allergic/Immunologic: Negative.    Neurological: Negative for dizziness, weakness and headaches.   Hematological: Negative for adenopathy.   Psychiatric/Behavioral: Negative for agitation and dysphoric mood. The patient is not nervous/anxious.      Objective:     Vital Signs (Most Recent):  Temp: 97.4 °F (36.3 °C) (06/07/20 1600)  Pulse: 86 (06/07/20 1600)  Resp: 17 (06/07/20 1600)  BP: (!) 153/70 (06/07/20 1600)  SpO2: 98 % (06/07/20 1600) Vital Signs (24h Range):  Temp:  [97.4 °F (36.3 °C)-99 °F (37.2 °C)] 97.4 °F (36.3 °C)  Pulse:  [60-99] 86  Resp:  [17-22] 17  SpO2:  [95 %-99 %] 98 %  BP: (144-192)/(66-85) 153/70     Weight: 104.3 kg (230 lb)  Body mass index is 40.74 kg/m².    Physical Exam   Constitutional: She is oriented to person, place, and time. She appears well-developed and well-nourished. No distress.   Obese   HENT:   Head: Normocephalic and atraumatic.   Mouth/Throat: Oropharyngeal exudate present.   Eyes: Pupils are equal, round, and reactive to light. Conjunctivae and EOM are normal.   Neck: Normal range of motion.   Cardiovascular: Normal rate and regular rhythm.   No murmur heard.  Pulmonary/Chest: Effort normal and breath sounds normal. She has no wheezes. She has no rales.   Abdominal: Soft. Bowel sounds are normal. She exhibits no distension and no mass. There is tenderness (Minimal if any tenderness in the lower abdomen.  There is no right upper quadrant tenderness or epigastric tenderness at all.). No hernia.   Musculoskeletal: Normal range of motion. She exhibits  no edema.   Patient complains of significant pain in the right groin right hip and right buttock area and lateral thigh.  There is no evidence of infection.  The pain is worse with manipulation of the hip.      Neurological: She is alert and oriented to person, place, and time. No cranial nerve deficit.   Skin: Skin is warm and dry. No rash noted. No erythema.   Psychiatric: She has a normal mood and affect. Her behavior is normal.       Significant Labs:  CBC:   Recent Labs   Lab 06/07/20  0858   WBC 5.21   RBC 4.06   HGB 13.5   HCT 42.0      *   MCH 33.3*   MCHC 32.1     CMP:   Recent Labs   Lab 06/07/20  0858   *   CALCIUM 8.6*   ALBUMIN 3.7   PROT 7.5      K 4.4   CO2 23      BUN 24*   CREATININE 1.3   ALKPHOS 143*   *   *   BILITOT 0.8       Significant Diagnostics:  CT report and images reviewed   Ultrasound report reviewed.    Assessment/Plan:     Cholelithiasis without cholecystitis  1.  Cholelithiasis without evidence of cholecystitis on ultrasound.  2.  Elevated transaminases and alkaline phosphatase.  3.  Patient has no right upper quadrant or epigastric tenderness.  Her pain does not appear to be related to the gallbladder.  4.  Repeat CMP in the a.m..  Will make further plans regarding possible cholecystectomy pending those results.  Workup for her groin hip and leg pain needs to be done prior to proceeding as this was her presenting symptom.        VTE Risk Mitigation (From admission, onward)         Ordered     IP VTE HIGH RISK PATIENT  Once      06/07/20 1309     Place sequential compression device  Until discontinued      06/07/20 1309                Thank you for your consult. I will follow-up with patient. Please contact us if you have any additional questions.    Jomar Mcdonald MD  General Surgery  [facility]

## 2020-06-07 NOTE — SUBJECTIVE & OBJECTIVE
No current facility-administered medications on file prior to encounter.      Current Outpatient Medications on File Prior to Encounter   Medication Sig    ascorbic acid (VITAMIN C) 100 MG tablet Take 100 mg by mouth once daily.     aspirin (ECOTRIN) 81 MG EC tablet Take 81 mg by mouth once daily.    b complex vitamins tablet Take 1 tablet by mouth once daily.    cyanocobalamin, vitamin B-12, 2,500 mcg Lozg Place 2 tablets under the tongue once daily.    diclofenac (VOLTAREN) 25 MG TbEC Take 1 tablet (25 mg total) by mouth 3 (three) times daily as needed.    fluticasone (FLONASE) 50 mcg/actuation nasal spray 2 sprays (100 mcg total) by Each Nare route once daily.    furosemide (LASIX) 40 MG tablet Take 1 tablet (40 mg total) by mouth once daily.    losartan (COZAAR) 100 MG tablet Take 1 tablet (100 mg total) by mouth once daily.    metoprolol succinate (TOPROL-XL) 50 MG 24 hr tablet Take 1 tablet (50 mg total) by mouth once daily.    montelukast (SINGULAIR) 10 mg tablet Take 1 tablet (10 mg total) by mouth every evening.    spironolactone (ALDACTONE) 25 MG tablet Take 1 tablet (25 mg total) by mouth once daily.    acetaminophen (TYLENOL) 500 MG tablet Take 1 tablet (500 mg total) by mouth every 6 (six) hours as needed for Pain.    albuterol (PROVENTIL) 2.5 mg /3 mL (0.083 %) nebulizer solution Take 3 mLs (2.5 mg total) by nebulization every 6 (six) hours as needed.    azithromycin (Z-ARNAUD) 250 MG tablet Take 2 tablets by mouth on day 1; Take 1 tablet by mouth on days 2-5    budesonide-formoterol 160-4.5 mcg (SYMBICORT) 160-4.5 mcg/actuation HFAA Inhale 2 puffs into the lungs every 12 (twelve) hours. Controller    esomeprazole (NEXIUM) 20 MG capsule Take 1 capsule (20 mg total) by mouth before breakfast. (Patient taking differently: Take 20 mg by mouth daily as needed (heartburn). )    HYDROcodone-acetaminophen (NORCO) 5-325 mg per tablet Take 1 tablet by mouth every 8 (eight) hours as needed for  Pain.    lactulose (CHRONULAC) 10 gram/15 mL solution Take 30 mLs (20 g total) by mouth 3 (three) times daily. 2 Teaspoon(s) Oral PRN Every evening.       Review of patient's allergies indicates:   Allergen Reactions    Cymbalta [duloxetine] Other (See Comments)     Nightmares      Darvon [propoxyphene] Nausea Only and Other (See Comments)     Sweating, slept for 3 days    Atorvastatin Other (See Comments)     Muscle cramps    Naprosyn [naproxen] Nausea Only    Penicillins Rash    Tramadol Nausea Only and Palpitations       Past Medical History:   Diagnosis Date    ALLERGIC RHINITIS     Anemia     Arthritis     Back pain     Cataract     OU    COPD (chronic obstructive pulmonary disease)     Degenerative disc disease     Diabetes mellitus     NIDDM    Diverticulosis     DVT (deep venous thrombosis)     Edema     Fibromyalgia     Glaucoma     Gout     Hx of colonic polyps     Hyperlipidemia     Hypertension     Incontinence     Osteoporosis     Reflux     Sleep apnea     non compliant with CPAP    Vestibulitis of ear      Past Surgical History:   Procedure Laterality Date    ANGIOGRAPHY OF LOWER EXTREMITY N/A 2019    Procedure: ANGIOGRAM, LOWER EXTREMITY;  Surgeon: Gino Arana MD;  Location: [facility] CATH/EP LAB;  Service: General;  Laterality: N/A;    HIP SURGERY      HYSTERECTOMY      JOINT REPLACEMENT      B total hip     Family History     Problem Relation (Age of Onset)    Diabetes Sister    Hypertension Mother        Tobacco Use    Smoking status: Former Smoker     Packs/day: 0.25     Years: 5.00     Pack years: 1.25     Last attempt to quit: 1972     Years since quittin.4    Smokeless tobacco: Never Used   Substance and Sexual Activity    Alcohol use: Yes     Alcohol/week: 0.0 standard drinks     Comment: Rarely    Drug use: Yes     Types: Oxycodone, Hydrocodone    Sexual activity: Not on file     Review of Systems   Constitutional: Negative for activity  change, chills, fever and unexpected weight change.   HENT: Negative for congestion, sore throat, trouble swallowing and voice change.    Eyes: Negative for redness and visual disturbance.   Respiratory: Negative for cough, shortness of breath and wheezing.    Cardiovascular: Negative for chest pain and palpitations.   Gastrointestinal: Positive for constipation and vomiting (She did have 1 episode of vomiting after receiving some morphine but otherwise is not nauseated.). Negative for abdominal pain, blood in stool and nausea.   Endocrine: Negative.    Genitourinary: Negative for dysuria, frequency and hematuria.   Musculoskeletal: Negative for arthralgias, back pain and neck pain.   Skin: Negative for rash and wound.   Allergic/Immunologic: Negative.    Neurological: Negative for dizziness, weakness and headaches.   Hematological: Negative for adenopathy.   Psychiatric/Behavioral: Negative for agitation and dysphoric mood. The patient is not nervous/anxious.      Objective:     Vital Signs (Most Recent):  Temp: 97.4 °F (36.3 °C) (06/07/20 1600)  Pulse: 86 (06/07/20 1600)  Resp: 17 (06/07/20 1600)  BP: (!) 153/70 (06/07/20 1600)  SpO2: 98 % (06/07/20 1600) Vital Signs (24h Range):  Temp:  [97.4 °F (36.3 °C)-99 °F (37.2 °C)] 97.4 °F (36.3 °C)  Pulse:  [60-99] 86  Resp:  [17-22] 17  SpO2:  [95 %-99 %] 98 %  BP: (144-192)/(66-85) 153/70     Weight: 104.3 kg (230 lb)  Body mass index is 40.74 kg/m².    Physical Exam   Constitutional: She is oriented to person, place, and time. She appears well-developed and well-nourished. No distress.   Obese   HENT:   Head: Normocephalic and atraumatic.   Mouth/Throat: Oropharyngeal exudate present.   Eyes: Pupils are equal, round, and reactive to light. Conjunctivae and EOM are normal.   Neck: Normal range of motion.   Cardiovascular: Normal rate and regular rhythm.   No murmur heard.  Pulmonary/Chest: Effort normal and breath sounds normal. She has no wheezes. She has no rales.    Abdominal: Soft. Bowel sounds are normal. She exhibits no distension and no mass. There is tenderness (Minimal if any tenderness in the lower abdomen.  There is no right upper quadrant tenderness or epigastric tenderness at all.). No hernia.   Musculoskeletal: Normal range of motion. She exhibits no edema.   Patient complains of significant pain in the right groin right hip and right buttock area and lateral thigh.  There is no evidence of infection.  The pain is worse with manipulation of the hip.      Neurological: She is alert and oriented to person, place, and time. No cranial nerve deficit.   Skin: Skin is warm and dry. No rash noted. No erythema.   Psychiatric: She has a normal mood and affect. Her behavior is normal.       Significant Labs:  CBC:   Recent Labs   Lab 06/07/20  0858   WBC 5.21   RBC 4.06   HGB 13.5   HCT 42.0      *   MCH 33.3*   MCHC 32.1     CMP:   Recent Labs   Lab 06/07/20  0858   *   CALCIUM 8.6*   ALBUMIN 3.7   PROT 7.5      K 4.4   CO2 23      BUN 24*   CREATININE 1.3   ALKPHOS 143*   *   *   BILITOT 0.8       Significant Diagnostics:  CT report and images reviewed   Ultrasound report reviewed.

## 2020-06-07 NOTE — HPI
Ms. Abreu presents today with complaints of abd pain. It is severe. It is associated with constipation and nausea. She denies V/D, cough, fever, chills, SOB, chest pain, or dizziness. She has a history of asthma/COPD, HTN, pulm HTN, prediabetes, and arthritis s/p total hips and knee replacements. She has been having this abd pain for about a week. It starts in her mid back on the right side and wraps around to the RLQ. Her RLQ is exquisitely tender. Stools have been hard as she's been constipated, but they have been brown. She went to another ED on 6/2 and had a normal CT abd/pelvis with unremarkable labs. She then went to an urgent care for the same pain and was referred back to the hospital and came this morning. A CT abd/pelvis was repeated and showed cholelithiasis and gallbladder distention. Her CMP shows newly elevated liver enzymes that were normal 5 days ago. Dr. Mcdonald was consulted per ED for suspected acute cholecystitis.

## 2020-06-07 NOTE — ASSESSMENT & PLAN NOTE
1.  Cholelithiasis without evidence of cholecystitis on ultrasound.  2.  Elevated transaminases and alkaline phosphatase.  3.  Patient has no right upper quadrant or epigastric tenderness.  Her pain does not appear to be related to the gallbladder.  4.  Repeat CMP in the a.m..  Will make further plans regarding possible cholecystectomy pending those results.  Workup for her groin hip and leg pain needs to be done prior to proceeding as this was her presenting symptom.

## 2020-06-07 NOTE — ASSESSMENT & PLAN NOTE
Admit to med/surg tele  Consult Dr. Mcdonald - called per ED   Rocephin  Hold NPO  CT abd/pelvis and abd US done in ED showing cholelithiasis and gallbladder distention   IV antiemetics and pain medication as needed

## 2020-06-07 NOTE — SUBJECTIVE & OBJECTIVE
Past Medical History:   Diagnosis Date    ALLERGIC RHINITIS     Anemia     Arthritis     Back pain     Cataract     OU    COPD (chronic obstructive pulmonary disease)     Degenerative disc disease     Diabetes mellitus     NIDDM    Diverticulosis     DVT (deep venous thrombosis)     Edema     Fibromyalgia     Glaucoma     Gout     Hx of colonic polyps     Hyperlipidemia     Hypertension     Incontinence     Osteoporosis     Reflux     Sleep apnea     non compliant with CPAP    Vestibulitis of ear        Past Surgical History:   Procedure Laterality Date    ANGIOGRAPHY OF LOWER EXTREMITY N/A 7/31/2019    Procedure: ANGIOGRAM, LOWER EXTREMITY;  Surgeon: Gino Arana MD;  Location: [facility] CATH/EP LAB;  Service: General;  Laterality: N/A;    HIP SURGERY      HYSTERECTOMY      JOINT REPLACEMENT      B total hip       Review of patient's allergies indicates:   Allergen Reactions    Cymbalta [duloxetine] Other (See Comments)     Nightmares      Darvon [propoxyphene] Nausea Only and Other (See Comments)     Sweating, slept for 3 days    Atorvastatin Other (See Comments)     Muscle cramps    Naprosyn [naproxen] Nausea Only    Penicillins Rash    Tramadol Nausea Only and Palpitations       Current Facility-Administered Medications on File Prior to Encounter   Medication    lidocaine HCL 10 mg/ml (1%) injection 5 mL     Current Outpatient Medications on File Prior to Encounter   Medication Sig    ascorbic acid (VITAMIN C) 100 MG tablet Take 100 mg by mouth once daily.     aspirin (ECOTRIN) 81 MG EC tablet Take 81 mg by mouth once daily.    b complex vitamins tablet Take 1 tablet by mouth once daily.    cyanocobalamin, vitamin B-12, 2,500 mcg Lozg Place 2 tablets under the tongue once daily.    diclofenac (VOLTAREN) 25 MG TbEC Take 1 tablet (25 mg total) by mouth 3 (three) times daily as needed.    fluticasone (FLONASE) 50 mcg/actuation nasal spray 2 sprays (100 mcg total) by Each Nare  route once daily.    furosemide (LASIX) 40 MG tablet Take 1 tablet (40 mg total) by mouth once daily.    losartan (COZAAR) 100 MG tablet Take 1 tablet (100 mg total) by mouth once daily.    metoprolol succinate (TOPROL-XL) 50 MG 24 hr tablet Take 1 tablet (50 mg total) by mouth once daily.    montelukast (SINGULAIR) 10 mg tablet Take 1 tablet (10 mg total) by mouth every evening.    spironolactone (ALDACTONE) 25 MG tablet Take 1 tablet (25 mg total) by mouth once daily.    acetaminophen (TYLENOL) 500 MG tablet Take 1 tablet (500 mg total) by mouth every 6 (six) hours as needed for Pain.    albuterol (PROVENTIL) 2.5 mg /3 mL (0.083 %) nebulizer solution Take 3 mLs (2.5 mg total) by nebulization every 6 (six) hours as needed.    azithromycin (Z-ARNAUD) 250 MG tablet Take 2 tablets by mouth on day 1; Take 1 tablet by mouth on days 2-5    budesonide-formoterol 160-4.5 mcg (SYMBICORT) 160-4.5 mcg/actuation HFAA Inhale 2 puffs into the lungs every 12 (twelve) hours. Controller    esomeprazole (NEXIUM) 20 MG capsule Take 1 capsule (20 mg total) by mouth before breakfast. (Patient taking differently: Take 20 mg by mouth daily as needed (heartburn). )    HYDROcodone-acetaminophen (NORCO) 5-325 mg per tablet Take 1 tablet by mouth every 8 (eight) hours as needed for Pain.    lactulose (CHRONULAC) 10 gram/15 mL solution Take 30 mLs (20 g total) by mouth 3 (three) times daily. 2 Teaspoon(s) Oral PRN Every evening.     Family History     Problem Relation (Age of Onset)    Diabetes Sister    Hypertension Mother        Tobacco Use    Smoking status: Former Smoker     Packs/day: 0.25     Years: 5.00     Pack years: 1.25     Last attempt to quit: 1972     Years since quittin.4    Smokeless tobacco: Never Used   Substance and Sexual Activity    Alcohol use: Yes     Alcohol/week: 0.0 standard drinks     Comment: Rarely    Drug use: Yes     Types: Oxycodone, Hydrocodone    Sexual activity: Not on file     Review  of Systems   Constitutional: Positive for fatigue. Negative for activity change, appetite change, chills, diaphoresis, fever and unexpected weight change.   HENT: Negative for congestion, ear pain, facial swelling, hearing loss, sore throat and trouble swallowing.    Eyes: Negative for pain and discharge.   Respiratory: Negative for cough, chest tightness, shortness of breath and wheezing.    Cardiovascular: Negative for chest pain, palpitations and leg swelling.   Gastrointestinal: Positive for abdominal pain, constipation and nausea. Negative for blood in stool, diarrhea and vomiting.   Endocrine: Negative for polydipsia, polyphagia and polyuria.   Genitourinary: Negative for difficulty urinating, dysuria, flank pain, frequency and urgency.   Musculoskeletal: Negative for arthralgias, back pain, joint swelling, neck pain and neck stiffness.   Skin: Negative for rash and wound.   Allergic/Immunologic: Negative for environmental allergies and immunocompromised state.   Neurological: Negative for dizziness, seizures, syncope, speech difficulty, weakness, light-headedness, numbness and headaches.   Hematological: Negative for adenopathy.   Psychiatric/Behavioral: Negative for sleep disturbance and suicidal ideas. The patient is not nervous/anxious.    All other systems reviewed and are negative.    Objective:     Vital Signs (Most Recent):  Temp: 99 °F (37.2 °C) (06/07/20 0837)  Pulse: 76 (06/07/20 1231)  Resp: (!) 22 (06/07/20 0837)  BP: (!) 180/67 (06/07/20 1231)  SpO2: 96 % (06/07/20 1231) Vital Signs (24h Range):  Temp:  [99 °F (37.2 °C)] 99 °F (37.2 °C)  Pulse:  [60-92] 76  Resp:  [22] 22  SpO2:  [96 %-99 %] 96 %  BP: (144-180)/(66-85) 180/67     Weight: 104.3 kg (230 lb)  Body mass index is 40.74 kg/m².    Physical Exam   Constitutional: She is oriented to person, place, and time. She appears well-developed and well-nourished.   Appears uncomfortable   HENT:   Head: Normocephalic and atraumatic.   Eyes: Pupils  are equal, round, and reactive to light. EOM are normal.   Neck: Normal range of motion. Neck supple.   Cardiovascular: Normal rate, regular rhythm, normal heart sounds and intact distal pulses.   No murmur heard.  Pulmonary/Chest: Effort normal and breath sounds normal. No stridor. No respiratory distress. She has no wheezes.   Abdominal: Soft. Bowel sounds are normal. She exhibits no distension. There is tenderness.   Pain in the RLQ on palpation   Musculoskeletal: Normal range of motion. She exhibits edema.   bilat LE edema 1+   Neurological: She is alert and oriented to person, place, and time.   Skin: Skin is warm and dry. Capillary refill takes less than 2 seconds.   Psychiatric: She has a normal mood and affect.   Nursing note and vitals reviewed.        CRANIAL NERVES     CN III, IV, VI   Pupils are equal, round, and reactive to light.  Extraocular motions are normal.        Significant Labs:   CBC:   Recent Labs   Lab 06/07/20  0858   WBC 5.21   HGB 13.5   HCT 42.0        CMP:   Recent Labs   Lab 06/07/20  0858      K 4.4      CO2 23   *   BUN 24*   CREATININE 1.3   CALCIUM 8.6*   PROT 7.5   ALBUMIN 3.7   BILITOT 0.8   ALKPHOS 143*   *   *   ANIONGAP 11   EGFRNONAA 39.7*     Lipase:   Recent Labs   Lab 06/07/20  0858   LIPASE 29       Significant Imaging: I have reviewed all pertinent imaging results/findings within the past 24 hours.     X-ray Hip 2 View Left    Result Date: 5/28/2020  EXAMINATION: XR HIP 2 VIEW LEFT CLINICAL HISTORY: Pain in left hip TECHNIQUE: AP view of the pelvis and frog leg lateral view of the left hip were performed. COMPARISON: CT 05/01/2019 FINDINGS: Patient is status post bilateral total hip arthroplasty with placement of bipolar prostheses.  There is no periprosthetic fracture.  There is no evidence of hardware failure.  The pubic symphysis, sacroiliac joints are intact.  Hardware is stable in position and appearance compared to the prior  CT.  There are degenerative changes noted in the lower lumbar spine.     Patient is status post bilateral total hip arthroplasty with no acute abnormality. Electronically signed by: Amarilis Hillman Date:    05/28/2020 Time:    13:38    Ct Abdomen Pelvis With Contrast    Result Date: 6/7/2020  CMS MANDATED QUALITY DATA - CT RADIATION - 436 All CT scans at this facility utilize dose modulation, iterative reconstruction, and/or weight based dosing when appropriate to reduce radiation dose to as low as reasonably achievable. REASON: Abd pain, fever, abscess suspected TECHNIQUE: Abdomen and pelvis CT with IV contrast. COMPARISON: CT abdomen and pelvis June 2, 2020. FINDINGS: The lung bases are clear. The heart is normal size. Tiny left hepatic lobe cyst identified. No other hepatic lesions demonstrated. The gallbladder is mildly distended. Previously identified gallstones are not identified gallstones are not seen with CT. The pancreas, spleen, and adrenal glands are unremarkable. Kidneys are normal size. No hydronephrosis or nephrolithiasis. Right renal cortical scar noted. No enhancing renal lesions. The visualized ureters are normal caliber. Pelvic structures are obscured from beam hardening artifact from bilateral total hip arthroplasties. Visualized portions of the bladder grossly unremarkable. Colonic diverticulosis noted. The appendix is normal. The small bowel is normal caliber. No bowel wall thickening or inflammatory changes. The stomach is grossly unremarkable. No intra-abdominal lymphadenopathy. Aorta is normal caliber with atherosclerosis. No mesenteric fat stranding or free fluid. No acute osseous abnormality. IMPRESSION: 1.  No acute intra-abdominal abnormality. 2.  Gallbladder is distended. Previously identified gallstones seen on prior ultrasound are not identified with CT. 3.  Colonic diverticulosis. Electronically Signed by Miki Cano on 6/7/2020 11:26 AM    Us Lower Extremity Veins  Bilateral    Result Date: 5/28/2020  EXAMINATION: US LOWER EXTREMITY VEINS BILATERAL CLINICAL HISTORY: Edema, unspecified TECHNIQUE: Duplex and color flow Doppler and dynamic compression was performed of the bilateral lower extremity veins was performed. COMPARISON: Lower extremity venous ultrasound-04/13/2015 FINDINGS: Right thigh veins: The common femoral, femoral, popliteal, upper greater saphenous, and deep femoral veins are patent and free of thrombus. The veins are normally compressible and have normal phasic flow and augmentation response. Right calf veins: The visualized calf veins are patent. Left thigh veins: The common femoral, femoral, popliteal, upper greater saphenous, and deep femoral veins are patent and free of thrombus. The veins are normally compressible and have normal phasic flow and augmentation response. Left calf veins: The visualized calf veins are patent. Miscellaneous: None     No evidence of acute deep venous thrombosis in the left or right lower extremity veins. Electronically signed by: Naif Cervantes MD Date:    05/28/2020 Time:    13:12    Us Lower Extremity Veins Right    Result Date: 6/7/2020  REASON: Swelling. FINDINGS: Grayscale, color and spectral Doppler analysis of the right lower extremity deep venous system was performed. There is normal compressibility, color and spectral Doppler analysis, and augmentation in the right lower extremity deep venous system. IMPRESSION: No DVT of the right lower extremity veins. Electronically Signed by Miki Cano on 6/7/2020 11:49 AM    Us Abdomen Limited    Result Date: 6/7/2020  Reason: abd pain COMPARISON: None FINDINGS: Liver maintains normal echogenicity without focal mass or intrahepatic bile duct dilation. Tiny hepatic cysts identified in the left liver lobe measuring 6 mm. Hepatopedal flow in main portal vein. Echogenic gallstones identified with posterior acoustic shadowing. No gallbladder wall thickening or pericholecystic fluid.  Common duct diameter is normal. Visualized pancreas is unremarkable. Right kidney is free of nephrolithiasis or hydronephrosis. Aorta is nonaneurysmal. IMPRESSION: 1.  Cholelithiasis. 2.  Tiny left liver lobe hepatic cyst. Electronically Signed by Miki Cano on 6/7/2020 10:40 AM

## 2020-06-07 NOTE — H&P
[facility]  History & Physical    DOS:06/07/2020  2:04 PM    Patient Name: Sammi Abreu  MRN: [id number]  Admission Date: 6/7/2020  Attending Physician: Dr. Baldwin  Primary Care Provider: Osmani Villatoro MD         Patient information was obtained from patient, caregiver / friend and ER records.     Subjective:     Principal Problem:Acute cholecystitis    Chief Complaint:   Chief Complaint   Patient presents with    Abdominal Pain        HPI: Ms. Abreu presents today with complaints of abd pain. It is severe. It is associated with constipation and nausea. She denies V/D, cough, fever, chills, SOB, chest pain, or dizziness. She has a history of asthma/COPD, HTN, pulm HTN, prediabetes, and arthritis s/p total hips and knee replacements. She has been having this abd pain for about a week. It starts in her mid back on the right side and wraps around to the RLQ. Her RLQ is exquisitely tender. Stools have been hard as she's been constipated, but they have been brown. She went to another ED on 6/2 and had a normal CT abd/pelvis with unremarkable labs. She then went to an urgent care for the same pain and was referred back to the hospital and came this morning. A CT abd/pelvis was repeated and showed cholelithiasis and gallbladder distention. Her CMP shows newly elevated liver enzymes that were normal 5 days ago. Dr. Mcdonald was consulted per ED for suspected acute cholecystitis.     Past Medical History:   Diagnosis Date    ALLERGIC RHINITIS     Anemia     Arthritis     Back pain     Cataract     OU    COPD (chronic obstructive pulmonary disease)     Degenerative disc disease     Diabetes mellitus     NIDDM    Diverticulosis     DVT (deep venous thrombosis)     Edema     Fibromyalgia     Glaucoma     Gout     Hx of colonic polyps     Hyperlipidemia     Hypertension     Incontinence     Osteoporosis     Reflux     Sleep apnea     non compliant with CPAP     Vestibulitis of ear        Past Surgical History:   Procedure Laterality Date    ANGIOGRAPHY OF LOWER EXTREMITY N/A 7/31/2019    Procedure: ANGIOGRAM, LOWER EXTREMITY;  Surgeon: Gino Arana MD;  Location: [facility] CATH/EP LAB;  Service: General;  Laterality: N/A;    HIP SURGERY      HYSTERECTOMY      JOINT REPLACEMENT      B total hip       Review of patient's allergies indicates:   Allergen Reactions    Cymbalta [duloxetine] Other (See Comments)     Nightmares      Darvon [propoxyphene] Nausea Only and Other (See Comments)     Sweating, slept for 3 days    Atorvastatin Other (See Comments)     Muscle cramps    Naprosyn [naproxen] Nausea Only    Penicillins Rash    Tramadol Nausea Only and Palpitations       Current Facility-Administered Medications on File Prior to Encounter   Medication    lidocaine HCL 10 mg/ml (1%) injection 5 mL     Current Outpatient Medications on File Prior to Encounter   Medication Sig    ascorbic acid (VITAMIN C) 100 MG tablet Take 100 mg by mouth once daily.     aspirin (ECOTRIN) 81 MG EC tablet Take 81 mg by mouth once daily.    b complex vitamins tablet Take 1 tablet by mouth once daily.    cyanocobalamin, vitamin B-12, 2,500 mcg Lozg Place 2 tablets under the tongue once daily.    diclofenac (VOLTAREN) 25 MG TbEC Take 1 tablet (25 mg total) by mouth 3 (three) times daily as needed.    fluticasone (FLONASE) 50 mcg/actuation nasal spray 2 sprays (100 mcg total) by Each Nare route once daily.    furosemide (LASIX) 40 MG tablet Take 1 tablet (40 mg total) by mouth once daily.    losartan (COZAAR) 100 MG tablet Take 1 tablet (100 mg total) by mouth once daily.    metoprolol succinate (TOPROL-XL) 50 MG 24 hr tablet Take 1 tablet (50 mg total) by mouth once daily.    montelukast (SINGULAIR) 10 mg tablet Take 1 tablet (10 mg total) by mouth every evening.    spironolactone (ALDACTONE) 25 MG tablet Take 1 tablet (25 mg total) by mouth once daily.    acetaminophen  (TYLENOL) 500 MG tablet Take 1 tablet (500 mg total) by mouth every 6 (six) hours as needed for Pain.    albuterol (PROVENTIL) 2.5 mg /3 mL (0.083 %) nebulizer solution Take 3 mLs (2.5 mg total) by nebulization every 6 (six) hours as needed.    azithromycin (Z-ARNAUD) 250 MG tablet Take 2 tablets by mouth on day 1; Take 1 tablet by mouth on days 2-5    budesonide-formoterol 160-4.5 mcg (SYMBICORT) 160-4.5 mcg/actuation HFAA Inhale 2 puffs into the lungs every 12 (twelve) hours. Controller    esomeprazole (NEXIUM) 20 MG capsule Take 1 capsule (20 mg total) by mouth before breakfast. (Patient taking differently: Take 20 mg by mouth daily as needed (heartburn). )    HYDROcodone-acetaminophen (NORCO) 5-325 mg per tablet Take 1 tablet by mouth every 8 (eight) hours as needed for Pain.    lactulose (CHRONULAC) 10 gram/15 mL solution Take 30 mLs (20 g total) by mouth 3 (three) times daily. 2 Teaspoon(s) Oral PRN Every evening.     Family History     Problem Relation (Age of Onset)    Diabetes Sister    Hypertension Mother        Tobacco Use    Smoking status: Former Smoker     Packs/day: 0.25     Years: 5.00     Pack years: 1.25     Last attempt to quit: 1972     Years since quittin.4    Smokeless tobacco: Never Used   Substance and Sexual Activity    Alcohol use: Yes     Alcohol/week: 0.0 standard drinks     Comment: Rarely    Drug use: Yes     Types: Oxycodone, Hydrocodone    Sexual activity: Not on file     Review of Systems   Constitutional: Positive for fatigue. Negative for activity change, appetite change, chills, diaphoresis, fever and unexpected weight change.   HENT: Negative for congestion, ear pain, facial swelling, hearing loss, sore throat and trouble swallowing.    Eyes: Negative for pain and discharge.   Respiratory: Negative for cough, chest tightness, shortness of breath and wheezing.    Cardiovascular: Negative for chest pain, palpitations and leg swelling.   Gastrointestinal: Positive  for abdominal pain, constipation and nausea. Negative for blood in stool, diarrhea and vomiting.   Endocrine: Negative for polydipsia, polyphagia and polyuria.   Genitourinary: Negative for difficulty urinating, dysuria, flank pain, frequency and urgency.   Musculoskeletal: Negative for arthralgias, back pain, joint swelling, neck pain and neck stiffness.   Skin: Negative for rash and wound.   Allergic/Immunologic: Negative for environmental allergies and immunocompromised state.   Neurological: Negative for dizziness, seizures, syncope, speech difficulty, weakness, light-headedness, numbness and headaches.   Hematological: Negative for adenopathy.   Psychiatric/Behavioral: Negative for sleep disturbance and suicidal ideas. The patient is not nervous/anxious.    All other systems reviewed and are negative.    Objective:     Vital Signs (Most Recent):  Temp: 99 °F (37.2 °C) (06/07/20 0837)  Pulse: 76 (06/07/20 1231)  Resp: (!) 22 (06/07/20 0837)  BP: (!) 180/67 (06/07/20 1231)  SpO2: 96 % (06/07/20 1231) Vital Signs (24h Range):  Temp:  [99 °F (37.2 °C)] 99 °F (37.2 °C)  Pulse:  [60-92] 76  Resp:  [22] 22  SpO2:  [96 %-99 %] 96 %  BP: (144-180)/(66-85) 180/67     Weight: 104.3 kg (230 lb)  Body mass index is 40.74 kg/m².    Physical Exam   Constitutional: She is oriented to person, place, and time. She appears well-developed and well-nourished.   Appears uncomfortable   HENT:   Head: Normocephalic and atraumatic.   Eyes: Pupils are equal, round, and reactive to light. EOM are normal.   Neck: Normal range of motion. Neck supple.   Cardiovascular: Normal rate, regular rhythm, normal heart sounds and intact distal pulses.   No murmur heard.  Pulmonary/Chest: Effort normal and breath sounds normal. No stridor. No respiratory distress. She has no wheezes.   Abdominal: Soft. Bowel sounds are normal. She exhibits no distension. There is tenderness.   Pain in the RLQ on palpation   Musculoskeletal: Normal range of motion.  She exhibits edema.   bilat LE edema 1+   Neurological: She is alert and oriented to person, place, and time.   Skin: Skin is warm and dry. Capillary refill takes less than 2 seconds.   Psychiatric: She has a normal mood and affect.   Nursing note and vitals reviewed.        CRANIAL NERVES     CN III, IV, VI   Pupils are equal, round, and reactive to light.  Extraocular motions are normal.        Significant Labs:   CBC:   Recent Labs   Lab 06/07/20  0858   WBC 5.21   HGB 13.5   HCT 42.0        CMP:   Recent Labs   Lab 06/07/20  0858      K 4.4      CO2 23   *   BUN 24*   CREATININE 1.3   CALCIUM 8.6*   PROT 7.5   ALBUMIN 3.7   BILITOT 0.8   ALKPHOS 143*   *   *   ANIONGAP 11   EGFRNONAA 39.7*     Lipase:   Recent Labs   Lab 06/07/20  0858   LIPASE 29       Significant Imaging: I have reviewed all pertinent imaging results/findings within the past 24 hours.     X-ray Hip 2 View Left    Result Date: 5/28/2020  EXAMINATION: XR HIP 2 VIEW LEFT CLINICAL HISTORY: Pain in left hip TECHNIQUE: AP view of the pelvis and frog leg lateral view of the left hip were performed. COMPARISON: CT 05/01/2019 FINDINGS: Patient is status post bilateral total hip arthroplasty with placement of bipolar prostheses.  There is no periprosthetic fracture.  There is no evidence of hardware failure.  The pubic symphysis, sacroiliac joints are intact.  Hardware is stable in position and appearance compared to the prior CT.  There are degenerative changes noted in the lower lumbar spine.     Patient is status post bilateral total hip arthroplasty with no acute abnormality. Electronically signed by: Amarilis Hillman Date:    05/28/2020 Time:    13:38    Ct Abdomen Pelvis With Contrast    Result Date: 6/7/2020  CMS MANDATED QUALITY DATA - CT RADIATION - 436 All CT scans at this facility utilize dose modulation, iterative reconstruction, and/or weight based dosing when appropriate to reduce radiation  dose to as low as reasonably achievable. REASON: Abd pain, fever, abscess suspected TECHNIQUE: Abdomen and pelvis CT with IV contrast. COMPARISON: CT abdomen and pelvis June 2, 2020. FINDINGS: The lung bases are clear. The heart is normal size. Tiny left hepatic lobe cyst identified. No other hepatic lesions demonstrated. The gallbladder is mildly distended. Previously identified gallstones are not identified gallstones are not seen with CT. The pancreas, spleen, and adrenal glands are unremarkable. Kidneys are normal size. No hydronephrosis or nephrolithiasis. Right renal cortical scar noted. No enhancing renal lesions. The visualized ureters are normal caliber. Pelvic structures are obscured from beam hardening artifact from bilateral total hip arthroplasties. Visualized portions of the bladder grossly unremarkable. Colonic diverticulosis noted. The appendix is normal. The small bowel is normal caliber. No bowel wall thickening or inflammatory changes. The stomach is grossly unremarkable. No intra-abdominal lymphadenopathy. Aorta is normal caliber with atherosclerosis. No mesenteric fat stranding or free fluid. No acute osseous abnormality. IMPRESSION: 1.  No acute intra-abdominal abnormality. 2.  Gallbladder is distended. Previously identified gallstones seen on prior ultrasound are not identified with CT. 3.  Colonic diverticulosis. Electronically Signed by Miki Cano on 6/7/2020 11:26 AM    Us Lower Extremity Veins Bilateral    Result Date: 5/28/2020  EXAMINATION: US LOWER EXTREMITY VEINS BILATERAL CLINICAL HISTORY: Edema, unspecified TECHNIQUE: Duplex and color flow Doppler and dynamic compression was performed of the bilateral lower extremity veins was performed. COMPARISON: Lower extremity venous ultrasound-04/13/2015 FINDINGS: Right thigh veins: The common femoral, femoral, popliteal, upper greater saphenous, and deep femoral veins are patent and free of thrombus. The veins are normally compressible and  have normal phasic flow and augmentation response. Right calf veins: The visualized calf veins are patent. Left thigh veins: The common femoral, femoral, popliteal, upper greater saphenous, and deep femoral veins are patent and free of thrombus. The veins are normally compressible and have normal phasic flow and augmentation response. Left calf veins: The visualized calf veins are patent. Miscellaneous: None     No evidence of acute deep venous thrombosis in the left or right lower extremity veins. Electronically signed by: Naif Cervantes MD Date:    05/28/2020 Time:    13:12    Us Lower Extremity Veins Right    Result Date: 6/7/2020  REASON: Swelling. FINDINGS: Grayscale, color and spectral Doppler analysis of the right lower extremity deep venous system was performed. There is normal compressibility, color and spectral Doppler analysis, and augmentation in the right lower extremity deep venous system. IMPRESSION: No DVT of the right lower extremity veins. Electronically Signed by Miki Cano on 6/7/2020 11:49 AM    Us Abdomen Limited    Result Date: 6/7/2020  Reason: abd pain COMPARISON: None FINDINGS: Liver maintains normal echogenicity without focal mass or intrahepatic bile duct dilation. Tiny hepatic cysts identified in the left liver lobe measuring 6 mm. Hepatopedal flow in main portal vein. Echogenic gallstones identified with posterior acoustic shadowing. No gallbladder wall thickening or pericholecystic fluid. Common duct diameter is normal. Visualized pancreas is unremarkable. Right kidney is free of nephrolithiasis or hydronephrosis. Aorta is nonaneurysmal. IMPRESSION: 1.  Cholelithiasis. 2.  Tiny left liver lobe hepatic cyst. Electronically Signed by Miki Cano on 6/7/2020 10:40 AM            Assessment/Plan:     * Acute cholecystitis  Admit to med/surg tele  Consult Dr. Mcdonald - called per ED   Rocephin  Hold NPO  CT abd/pelvis and abd US done in ED showing cholelithiasis and gallbladder  distention   IV antiemetics and pain medication as needed      Dehydration  Given 1L bolus in ED   Hold lasix/aldactone for now - this may need to be resumed when she can tolerate PO intake  Repeat CMP in AM    Transaminitis  Suspect this is due d/t cholecystitis/gallstones  Given 1L bolus in ED  Repeat CMP in AM       HTN (hypertension), onset 2010  Pt did not take her meds today  Her BP is up to 220/98 in the ED, but she was just given her BP meds   Will add telemetry monitoring and hydralazine PRN        VTE Risk Mitigation (From admission, onward)         Ordered     IP VTE HIGH RISK PATIENT  Once      06/07/20 1309     Place sequential compression device  Until discontinued      06/07/20 1309                   Elida Fernandez NP  Department of Hospital Medicine   [facility]

## 2020-06-07 NOTE — ASSESSMENT & PLAN NOTE
Given 1L bolus in ED   Hold lasix/aldactone for now - this may need to be resumed when she can tolerate PO intake  Repeat CMP in AM

## 2020-06-07 NOTE — ASSESSMENT & PLAN NOTE
Pt did not take her meds today  Her BP is up to 220/98 in the ED, but she was just given her BP meds   Will add telemetry monitoring and hydralazine PRN

## 2020-06-07 NOTE — HPI
79-year-old female who was admitted today with cholelithiasis and elevated transaminases and alkaline phosphatase.  Her bilirubin is normal.  The patient has been having right lower quadrant pain and flank pain and hip pain for the past couple of weeks.  She was seen in urgent care and at the emergency department at [facility].  CT scans done there were negative.  Labs were unremarkable.  Her elevated transaminases or new finding today.  The patient says the pain is not related to food.  It hurts all the time but mostly when she stands up and especially when she walks.  She has had bilateral hip arthroplasties in the past.  She has had evaluation by pain management for left leg pain previously.  She has no significant abdominal pain or any right upper quadrant pain.  She is completely nontender in the epigastric and right upper quadrant area.  On palpation of her right buttock and hip she cries out with severe pain.  There is no skin change consistent with infection.  CT scan is unremarkable still.  Ultrasound identified cholelithiasis without evidence of acute cholecystitis.

## 2020-06-08 ENCOUNTER — TELEPHONE (OUTPATIENT)
Dept: PAIN MEDICINE | Facility: CLINIC | Age: 80
End: 2020-06-08

## 2020-06-08 PROBLEM — K80.20 CHOLELITHIASIS WITHOUT CHOLECYSTITIS: Status: RESOLVED | Noted: 2020-06-07 | Resolved: 2020-06-08

## 2020-06-08 LAB
ALBUMIN SERPL BCP-MCNC: 3.6 G/DL (ref 3.5–5.2)
ALBUMIN SERPL BCP-MCNC: 3.6 G/DL (ref 3.5–5.2)
ALP SERPL-CCNC: 118 U/L (ref 55–135)
ALP SERPL-CCNC: 118 U/L (ref 55–135)
ALT SERPL W/O P-5'-P-CCNC: 165 U/L (ref 10–44)
ALT SERPL W/O P-5'-P-CCNC: 165 U/L (ref 10–44)
ANION GAP SERPL CALC-SCNC: 9 MMOL/L (ref 8–16)
ANION GAP SERPL CALC-SCNC: 9 MMOL/L (ref 8–16)
AST SERPL-CCNC: 77 U/L (ref 10–40)
AST SERPL-CCNC: 77 U/L (ref 10–40)
BASOPHILS # BLD AUTO: 0.02 K/UL (ref 0–0.2)
BASOPHILS # BLD AUTO: 0.02 K/UL (ref 0–0.2)
BASOPHILS NFR BLD: 0.3 % (ref 0–1.9)
BASOPHILS NFR BLD: 0.3 % (ref 0–1.9)
BILIRUB SERPL-MCNC: 0.8 MG/DL (ref 0.1–1)
BILIRUB SERPL-MCNC: 0.8 MG/DL (ref 0.1–1)
BUN SERPL-MCNC: 17 MG/DL (ref 8–23)
BUN SERPL-MCNC: 17 MG/DL (ref 8–23)
CALCIUM SERPL-MCNC: 9.3 MG/DL (ref 8.7–10.5)
CALCIUM SERPL-MCNC: 9.3 MG/DL (ref 8.7–10.5)
CHLORIDE SERPL-SCNC: 102 MMOL/L (ref 95–110)
CHLORIDE SERPL-SCNC: 102 MMOL/L (ref 95–110)
CO2 SERPL-SCNC: 26 MMOL/L (ref 23–29)
CO2 SERPL-SCNC: 26 MMOL/L (ref 23–29)
CREAT SERPL-MCNC: 1.1 MG/DL (ref 0.5–1.4)
CREAT SERPL-MCNC: 1.1 MG/DL (ref 0.5–1.4)
DIFFERENTIAL METHOD: ABNORMAL
DIFFERENTIAL METHOD: ABNORMAL
EOSINOPHIL # BLD AUTO: 0 K/UL (ref 0–0.5)
EOSINOPHIL # BLD AUTO: 0 K/UL (ref 0–0.5)
EOSINOPHIL NFR BLD: 0 % (ref 0–8)
EOSINOPHIL NFR BLD: 0 % (ref 0–8)
ERYTHROCYTE [DISTWIDTH] IN BLOOD BY AUTOMATED COUNT: 12.6 % (ref 11.5–14.5)
ERYTHROCYTE [DISTWIDTH] IN BLOOD BY AUTOMATED COUNT: 12.6 % (ref 11.5–14.5)
EST. GFR  (AFRICAN AMERICAN): 56 ML/MIN/1.73 M^2
EST. GFR  (AFRICAN AMERICAN): 56 ML/MIN/1.73 M^2
EST. GFR  (NON AFRICAN AMERICAN): 48.5 ML/MIN/1.73 M^2
EST. GFR  (NON AFRICAN AMERICAN): 48.5 ML/MIN/1.73 M^2
GLUCOSE SERPL-MCNC: 138 MG/DL (ref 70–110)
GLUCOSE SERPL-MCNC: 138 MG/DL (ref 70–110)
HCT VFR BLD AUTO: 42.8 % (ref 37–48.5)
HCT VFR BLD AUTO: 42.8 % (ref 37–48.5)
HGB BLD-MCNC: 13.5 G/DL (ref 12–16)
HGB BLD-MCNC: 13.5 G/DL (ref 12–16)
IMM GRANULOCYTES # BLD AUTO: 0.02 K/UL (ref 0–0.04)
IMM GRANULOCYTES # BLD AUTO: 0.02 K/UL (ref 0–0.04)
IMM GRANULOCYTES NFR BLD AUTO: 0.3 % (ref 0–0.5)
IMM GRANULOCYTES NFR BLD AUTO: 0.3 % (ref 0–0.5)
LYMPHOCYTES # BLD AUTO: 0.9 K/UL (ref 1–4.8)
LYMPHOCYTES # BLD AUTO: 0.9 K/UL (ref 1–4.8)
LYMPHOCYTES NFR BLD: 14.3 % (ref 18–48)
LYMPHOCYTES NFR BLD: 14.3 % (ref 18–48)
MAGNESIUM SERPL-MCNC: 2.1 MG/DL (ref 1.6–2.6)
MCH RBC QN AUTO: 32.5 PG (ref 27–31)
MCH RBC QN AUTO: 32.5 PG (ref 27–31)
MCHC RBC AUTO-ENTMCNC: 31.5 G/DL (ref 32–36)
MCHC RBC AUTO-ENTMCNC: 31.5 G/DL (ref 32–36)
MCV RBC AUTO: 103 FL (ref 82–98)
MCV RBC AUTO: 103 FL (ref 82–98)
MONOCYTES # BLD AUTO: 0.2 K/UL (ref 0.3–1)
MONOCYTES # BLD AUTO: 0.2 K/UL (ref 0.3–1)
MONOCYTES NFR BLD: 3.5 % (ref 4–15)
MONOCYTES NFR BLD: 3.5 % (ref 4–15)
NEUTROPHILS # BLD AUTO: 5.3 K/UL (ref 1.8–7.7)
NEUTROPHILS # BLD AUTO: 5.3 K/UL (ref 1.8–7.7)
NEUTROPHILS NFR BLD: 81.6 % (ref 38–73)
NEUTROPHILS NFR BLD: 81.6 % (ref 38–73)
NRBC BLD-RTO: 0 /100 WBC
NRBC BLD-RTO: 0 /100 WBC
PLATELET # BLD AUTO: 194 K/UL (ref 150–350)
PLATELET # BLD AUTO: 194 K/UL (ref 150–350)
PMV BLD AUTO: 11.1 FL (ref 9.2–12.9)
PMV BLD AUTO: 11.1 FL (ref 9.2–12.9)
POTASSIUM SERPL-SCNC: 5.6 MMOL/L (ref 3.5–5.1)
POTASSIUM SERPL-SCNC: 5.6 MMOL/L (ref 3.5–5.1)
PROT SERPL-MCNC: 7.3 G/DL (ref 6–8.4)
PROT SERPL-MCNC: 7.3 G/DL (ref 6–8.4)
RBC # BLD AUTO: 4.15 M/UL (ref 4–5.4)
RBC # BLD AUTO: 4.15 M/UL (ref 4–5.4)
SODIUM SERPL-SCNC: 137 MMOL/L (ref 136–145)
SODIUM SERPL-SCNC: 137 MMOL/L (ref 136–145)
WBC # BLD AUTO: 6.52 K/UL (ref 3.9–12.7)
WBC # BLD AUTO: 6.52 K/UL (ref 3.9–12.7)

## 2020-06-08 PROCEDURE — 80053 COMPREHEN METABOLIC PANEL: CPT

## 2020-06-08 PROCEDURE — 94761 N-INVAS EAR/PLS OXIMETRY MLT: CPT

## 2020-06-08 PROCEDURE — 36415 COLL VENOUS BLD VENIPUNCTURE: CPT

## 2020-06-08 PROCEDURE — 85025 COMPLETE CBC W/AUTO DIFF WBC: CPT

## 2020-06-08 PROCEDURE — 99232 PR SUBSEQUENT HOSPITAL CARE,LEVL II: ICD-10-PCS | Mod: ,,, | Performed by: SURGERY

## 2020-06-08 PROCEDURE — 25000003 PHARM REV CODE 250: Performed by: INTERNAL MEDICINE

## 2020-06-08 PROCEDURE — 83735 ASSAY OF MAGNESIUM: CPT

## 2020-06-08 PROCEDURE — 25000003 PHARM REV CODE 250: Performed by: NURSE PRACTITIONER

## 2020-06-08 PROCEDURE — 94640 AIRWAY INHALATION TREATMENT: CPT

## 2020-06-08 PROCEDURE — 63600175 PHARM REV CODE 636 W HCPCS: Performed by: NURSE PRACTITIONER

## 2020-06-08 PROCEDURE — 12000002 HC ACUTE/MED SURGE SEMI-PRIVATE ROOM

## 2020-06-08 PROCEDURE — 25000242 PHARM REV CODE 250 ALT 637 W/ HCPCS: Performed by: NURSE PRACTITIONER

## 2020-06-08 PROCEDURE — 99232 SBSQ HOSP IP/OBS MODERATE 35: CPT | Mod: ,,, | Performed by: SURGERY

## 2020-06-08 RX ORDER — GABAPENTIN 300 MG/1
300 CAPSULE ORAL 3 TIMES DAILY
Status: DISCONTINUED | OUTPATIENT
Start: 2020-06-08 | End: 2020-06-10

## 2020-06-08 RX ADMIN — MORPHINE SULFATE 4 MG: 4 INJECTION INTRAVENOUS at 02:06

## 2020-06-08 RX ADMIN — DEXAMETHASONE SODIUM PHOSPHATE 4 MG: 4 INJECTION, SOLUTION INTRA-ARTICULAR; INTRALESIONAL; INTRAMUSCULAR; INTRAVENOUS; SOFT TISSUE at 10:06

## 2020-06-08 RX ADMIN — CEFTRIAXONE 1 G: 1 INJECTION, SOLUTION INTRAVENOUS at 09:06

## 2020-06-08 RX ADMIN — OXYCODONE HYDROCHLORIDE 5 MG: 5 TABLET ORAL at 10:06

## 2020-06-08 RX ADMIN — SENNOSIDES AND DOCUSATE SODIUM 1 TABLET: 8.6; 5 TABLET ORAL at 09:06

## 2020-06-08 RX ADMIN — GABAPENTIN 300 MG: 300 CAPSULE ORAL at 10:06

## 2020-06-08 RX ADMIN — MONTELUKAST 10 MG: 10 TABLET, FILM COATED ORAL at 10:06

## 2020-06-08 RX ADMIN — FLUTICASONE FUROATE AND VILANTEROL TRIFENATATE 1 PUFF: 100; 25 POWDER RESPIRATORY (INHALATION) at 07:06

## 2020-06-08 RX ADMIN — METOPROLOL SUCCINATE 50 MG: 25 TABLET, FILM COATED, EXTENDED RELEASE ORAL at 09:06

## 2020-06-08 RX ADMIN — LOSARTAN POTASSIUM 100 MG: 50 TABLET, FILM COATED ORAL at 09:06

## 2020-06-08 RX ADMIN — ASCORBIC ACID (VITAMIN C) 500 MG TABLET 500 MG: TABLET at 09:06

## 2020-06-08 RX ADMIN — DEXAMETHASONE SODIUM PHOSPHATE 4 MG: 4 INJECTION, SOLUTION INTRA-ARTICULAR; INTRALESIONAL; INTRAMUSCULAR; INTRAVENOUS; SOFT TISSUE at 09:06

## 2020-06-08 RX ADMIN — SENNOSIDES AND DOCUSATE SODIUM 1 TABLET: 8.6; 5 TABLET ORAL at 10:06

## 2020-06-08 RX ADMIN — PANTOPRAZOLE SODIUM 40 MG: 40 TABLET, DELAYED RELEASE ORAL at 05:06

## 2020-06-08 NOTE — SUBJECTIVE & OBJECTIVE
Interval History: No significant change. Denies abdominal pain.    Medications:  Continuous Infusions:  Scheduled Meds:   ascorbic acid (vitamin C)  500 mg Oral Daily    cefTRIAXone (ROCEPHIN) IVPB  1 g Intravenous Q24H    dexamethasone  4 mg Intravenous Q12H    fluticasone furoate-vilanteroL  1 puff Inhalation Daily    fluticasone propionate  2 spray Each Nostril Daily    losartan  100 mg Oral Daily    metoprolol succinate  50 mg Oral Daily    montelukast  10 mg Oral QHS    pantoprazole  40 mg Oral Daily    senna-docusate 8.6-50 mg  1 tablet Oral BID     PRN Meds:albuterol, calcium chloride IVPB, calcium chloride IVPB, calcium chloride IVPB, hydrALAZINE, HYDROcodone-acetaminophen, magnesium oxide, magnesium sulfate IVPB, magnesium sulfate IVPB, magnesium sulfate IVPB, magnesium sulfate IVPB, melatonin, morphine, morphine, ondansetron, oxyCODONE, potassium chloride in water, potassium chloride in water, potassium chloride in water, potassium chloride in water, potassium chloride, potassium chloride, potassium chloride, potassium chloride, sodium chloride 0.9%, sodium phosphate IVPB, sodium phosphate IVPB, sodium phosphate IVPB, sodium phosphate IVPB, sodium phosphate IVPB     Review of patient's allergies indicates:   Allergen Reactions    Cymbalta [duloxetine] Other (See Comments)     Nightmares      Darvon [propoxyphene] Nausea Only and Other (See Comments)     Sweating, slept for 3 days    Atorvastatin Other (See Comments)     Muscle cramps    Naprosyn [naproxen] Nausea Only    Penicillins Rash    Tramadol Nausea Only and Palpitations     Objective:     Vital Signs (Most Recent):  Temp: 98.3 °F (36.8 °C) (06/08/20 0752)  Pulse: 70 (06/08/20 0752)  Resp: 16 (06/08/20 0752)  BP: (!) 176/81 (06/08/20 0752)  SpO2: 98 % (06/08/20 0752) Vital Signs (24h Range):  Temp:  [97.4 °F (36.3 °C)-98.3 °F (36.8 °C)] 98.3 °F (36.8 °C)  Pulse:  [70-99] 70  Resp:  [16-20] 16  SpO2:  [95 %-99 %] 98 %  BP:  (153-192)/(67-81) 176/81     Weight: 104.3 kg (230 lb)  Body mass index is 40.74 kg/m².    Intake/Output - Last 3 Shifts       06/06 0700 - 06/07 0659 06/07 0700 - 06/08 0659 06/08 0700 - 06/09 0659    I.V. (mL/kg)  900 (8.6)     IV Piggyback  150     Total Intake(mL/kg)  1050 (10.1)     Urine (mL/kg/hr)   1 (0)    Total Output   1    Net  +1050 -1           Urine Occurrence  2 x           Physical Exam   Constitutional: She is oriented to person, place, and time. She appears well-developed and well-nourished. No distress.   HENT:   Head: Normocephalic and atraumatic.   Eyes: Pupils are equal, round, and reactive to light. EOM are normal. No scleral icterus.   Cardiovascular: Normal rate and regular rhythm.   No murmur heard.  Pulmonary/Chest: Effort normal and breath sounds normal. She has no wheezes. She has no rales.   Abdominal: Soft. Bowel sounds are normal. She exhibits no distension and no mass. There is no tenderness. No hernia.   Musculoskeletal: Normal range of motion. She exhibits no edema.   Symptoms in right leg, hip, back, and buttock unchanged.   Neurological: She is alert and oriented to person, place, and time. No cranial nerve deficit.   Skin: Skin is warm and dry. No rash noted. No erythema.   Psychiatric: She has a normal mood and affect. Her behavior is normal.       Significant Labs:  CBC:   Recent Labs   Lab 06/08/20  0545   WBC 6.52  6.52   RBC 4.15  4.15   HGB 13.5  13.5   HCT 42.8  42.8     194   *  103*   MCH 32.5*  32.5*   MCHC 31.5*  31.5*     CMP:   Recent Labs   Lab 06/08/20  0545   *  138*   CALCIUM 9.3  9.3   ALBUMIN 3.6  3.6   PROT 7.3  7.3     137   K 5.6*  5.6*   CO2 26  26     102   BUN 17  17   CREATININE 1.1  1.1   ALKPHOS 118  118   *  165*   AST 77*  77*   BILITOT 0.8  0.8

## 2020-06-08 NOTE — PLAN OF CARE
06/08/20 0752   Patient Assessment/Suction   Level of Consciousness (AVPU) alert   Respiratory Effort Normal;Unlabored   Expansion/Accessory Muscles/Retractions expansion symmetric;no retractions;no use of accessory muscles   All Lung Fields Breath Sounds clear   Rhythm/Pattern, Respiratory no shortness of breath reported   Cough Frequency no cough   PRE-TX-O2   O2 Device (Oxygen Therapy) room air   SpO2 98 %   Pulse Oximetry Type Intermittent   $ Pulse Oximetry - Multiple Charge Pulse Oximetry - Multiple   Pulse 70   Resp 16   Inhaler   $ Inhaler Charges MDI (Metered Dose Inahler) Treatment;Mouth rinsed post treatment   Daily Review of Necessity (Inhaler) completed   Respiratory Treatment Status (Inhaler) given   Treatment Route (Inhaler) mouthpiece   Patient Position (Inhaler) HOB elevated   Post Treatment Assessment (Inhaler) breath sounds unchanged   Signs of Intolerance (Inhaler) none   Breath Sounds Post-Respiratory Treatment   Throughout All Fields Post-Treatment All Fields   Throughout All Fields Post-Treatment no change   Post-treatment Heart Rate (beats/min) 72   Post-treatment Resp Rate (breaths/min) 18   PATIENT HAS PRN TX AVAILABLE WITH QD BREO

## 2020-06-08 NOTE — ASSESSMENT & PLAN NOTE
Advanced multilevel lumbar spine degenerative changes and not sure referred pain from there    Plan   Patient is going to need outpatient follow-up with spine surgeon for possible need for intervention

## 2020-06-08 NOTE — ASSESSMENT & PLAN NOTE
1.  Cholelithiasis without evidence of cholecystitis on ultrasound.  2.  Elevated transaminases improved. Alk Phos normal. WBC normal.  3.  Workup ongoing.  4. No plans for cholecystectomy at this time. Pt will follow up with me as outpt unless she develops abdominal pain sooner.

## 2020-06-08 NOTE — SUBJECTIVE & OBJECTIVE
Interval History:     Review of Systems  Objective:     Vital Signs (Most Recent):  Temp: 98.8 °F (37.1 °C) (06/08/20 1232)  Pulse: 72 (06/08/20 1232)  Resp: 19 (06/08/20 1232)  BP: (!) 172/81 (06/08/20 1232)  SpO2: 97 % (06/08/20 1232) Vital Signs (24h Range):  Temp:  [97.6 °F (36.4 °C)-98.8 °F (37.1 °C)] 98.8 °F (37.1 °C)  Pulse:  [70-82] 72  Resp:  [16-20] 19  SpO2:  [95 %-99 %] 97 %  BP: (155-176)/(73-81) 172/81     Weight: 104.3 kg (230 lb)  Body mass index is 40.74 kg/m².    Intake/Output Summary (Last 24 hours) at 6/8/2020 1648  Last data filed at 6/8/2020 0755  Gross per 24 hour   Intake --   Output 1 ml   Net -1 ml      Physical Exam   Constitutional: She is oriented to person, place, and time.   HENT:   Head: Normocephalic and atraumatic.   Eyes: Conjunctivae are normal.   Neck: No JVD present.   Cardiovascular: Normal heart sounds.   Pulmonary/Chest: Effort normal and breath sounds normal.   Abdominal: Soft. Bowel sounds are normal.   Very nonspecific abdominal exam and no significant tenderness on the right hypochondrium   Neurological: She is alert and oriented to person, place, and time.   Skin: Skin is warm.   Psychiatric: She has a normal mood and affect.   Nursing note and vitals reviewed.      Significant Labs:   CBC:   Recent Labs   Lab 06/07/20  0858 06/08/20  0545   WBC 5.21 6.52  6.52   HGB 13.5 13.5  13.5   HCT 42.0 42.8  42.8    194  194     CMP:   Recent Labs   Lab 06/07/20  0858 06/08/20  0545    137  137   K 4.4 5.6*  5.6*    102  102   CO2 23 26  26   * 138*  138*   BUN 24* 17  17   CREATININE 1.3 1.1  1.1   CALCIUM 8.6* 9.3  9.3   PROT 7.5 7.3  7.3   ALBUMIN 3.7 3.6  3.6   BILITOT 0.8 0.8  0.8   ALKPHOS 143* 118  118   * 77*  77*   * 165*  165*   ANIONGAP 11 9  9   EGFRNONAA 39.7* 48.5*  48.5*     Cardiac Markers: No results for input(s): CKMB, MYOGLOBIN, BNP, TROPISTAT in the last 48 hours.    Significant Imaging: I have  reviewed all pertinent imaging results/findings within the past 24 hours.

## 2020-06-08 NOTE — PLAN OF CARE
Problem: Fall Injury Risk  Goal: Absence of Fall and Fall-Related Injury  Outcome: Ongoing, Progressing     Problem: Adult Inpatient Plan of Care  Goal: Plan of Care Review  Outcome: Ongoing, Progressing  Goal: Patient-Specific Goal (Individualization)  Outcome: Ongoing, Progressing  Goal: Absence of Hospital-Acquired Illness or Injury  Outcome: Ongoing, Progressing  Goal: Optimal Comfort and Wellbeing  Outcome: Ongoing, Progressing     Problem: Bariatric Environmental Safety  Goal: Safety Maintained with Care  Outcome: Ongoing, Progressing     Problem: Skin Injury Risk Increased  Goal: Skin Health and Integrity  Outcome: Ongoing, Progressing

## 2020-06-08 NOTE — PLAN OF CARE
06/07/20 2030   Patient Assessment/Suction   Level of Consciousness (AVPU) alert   Respiratory Effort Unlabored   Expansion/Accessory Muscles/Retractions no use of accessory muscles   All Lung Fields Breath Sounds clear   Rhythm/Pattern, Respiratory unlabored   Cough Frequency no cough   PRE-TX-O2   O2 Device (Oxygen Therapy) room air   SpO2 95 %   Pulse 79   Resp 18   Aerosol Therapy   $ Aerosol Therapy Charges PRN treatment not required   Respiratory Treatment Status (SVN) PRN treatment not required   Respiratory Evaluation   $ Care Plan Tech Time 15 min   Evaluation For   (CARE PLAN)

## 2020-06-08 NOTE — HOSPITAL COURSE
Patient was admitted with epigastric area and bilateral nonspecific 0 than left side of the upper abdominal pain, patient is not sure with the pain is coming from  MRI scan was done with multilevel severe central canal stenosis at lumbar and sacral areas  New onset LFT elevations are noted to but general surgeon does not believe that patient have cholecystitis  She will be re-evaluated tomorrow for possible need for surgery.  But CT gallbladder is distended and ultrasound with cholelithiasis    6/9/2020  Pt complains of 10/10 burning, shooting and aching pain for a month in the right L 1-2 distribution. My pain MGMT doctor put me off for 12 days and I cannot live like this. If I stand up my legs seize up and I cannot walk.    6/10/2020  Pt is able to walk with a walker with 6/10 pain. The neurontin is helping the right L1-2 pain. I want outpatient physical therapy.    6/11/2020  Pt is able to ambulate with a walker but needs Home pt and ot. She will see Dr GARCIA pain management for an injection. Pt refuses inpatient rehab.

## 2020-06-08 NOTE — ASSESSMENT & PLAN NOTE
Suspect this is due d/t cholecystitis/gallstones    Plan   MRCP tomorrow after LFT test   Repeat CMP in AM   NPO

## 2020-06-08 NOTE — PROGRESS NOTES
[facility]  Progress Note    Patient Name: Sammi Abreu  MRN: [id number]  Patient Class: IP- Inpatient   Admission Date: 6/7/2020  Length of Stay: 0 days  Attending Physician: Mahesh Domingo MD  Primary Care Provider: Osmani Villatoro MD        Subjective:     Principal Problem:Acute cholecystitis        HPI:  Ms. Abreu presents today with complaints of abd pain. It is severe. It is associated with constipation and nausea. She denies V/D, cough, fever, chills, SOB, chest pain, or dizziness. She has a history of asthma/COPD, HTN, pulm HTN, prediabetes, and arthritis s/p total hips and knee replacements. She has been having this abd pain for about a week. It starts in her mid back on the right side and wraps around to the RLQ. Her RLQ is exquisitely tender. Stools have been hard as she's been constipated, but they have been brown. She went to another ED on 6/2 and had a normal CT abd/pelvis with unremarkable labs. She then went to an urgent care for the same pain and was referred back to the hospital and came this morning. A CT abd/pelvis was repeated and showed cholelithiasis and gallbladder distention. Her CMP shows newly elevated liver enzymes that were normal 5 days ago. Dr. Mcdonald was consulted per ED for suspected acute cholecystitis.     Overview/Hospital Course:  Patient was admitted with epigastric area and bilateral nonspecific 0 than left side of the upper abdominal pain, patient is not sure with the pain is coming from  MRI scan was done with multilevel severe central canal stenosis at lumbar and sacral areas  New onset LFT elevations are noted to but general surgeon does not believe that patient have cholecystitis  She will be re-evaluated tomorrow for possible need for surgery.  But CT gallbladder is distended and ultrasound with cholelithiasis    Interval History:     Review of Systems  Objective:     Vital Signs (Most Recent):  Temp: 98.8 °F (37.1 °C) (06/08/20  1232)  Pulse: 72 (06/08/20 1232)  Resp: 19 (06/08/20 1232)  BP: (!) 172/81 (06/08/20 1232)  SpO2: 97 % (06/08/20 1232) Vital Signs (24h Range):  Temp:  [97.6 °F (36.4 °C)-98.8 °F (37.1 °C)] 98.8 °F (37.1 °C)  Pulse:  [70-82] 72  Resp:  [16-20] 19  SpO2:  [95 %-99 %] 97 %  BP: (155-176)/(73-81) 172/81     Weight: 104.3 kg (230 lb)  Body mass index is 40.74 kg/m².    Intake/Output Summary (Last 24 hours) at 6/8/2020 1648  Last data filed at 6/8/2020 0755  Gross per 24 hour   Intake --   Output 1 ml   Net -1 ml      Physical Exam   Constitutional: She is oriented to person, place, and time.   HENT:   Head: Normocephalic and atraumatic.   Eyes: Conjunctivae are normal.   Neck: No JVD present.   Cardiovascular: Normal heart sounds.   Pulmonary/Chest: Effort normal and breath sounds normal.   Abdominal: Soft. Bowel sounds are normal.   Very nonspecific abdominal exam and no significant tenderness on the right hypochondrium   Neurological: She is alert and oriented to person, place, and time.   Skin: Skin is warm.   Psychiatric: She has a normal mood and affect.   Nursing note and vitals reviewed.      Significant Labs:   CBC:   Recent Labs   Lab 06/07/20  0858 06/08/20  0545   WBC 5.21 6.52  6.52   HGB 13.5 13.5  13.5   HCT 42.0 42.8  42.8    194  194     CMP:   Recent Labs   Lab 06/07/20  0858 06/08/20  0545    137  137   K 4.4 5.6*  5.6*    102  102   CO2 23 26  26   * 138*  138*   BUN 24* 17  17   CREATININE 1.3 1.1  1.1   CALCIUM 8.6* 9.3  9.3   PROT 7.5 7.3  7.3   ALBUMIN 3.7 3.6  3.6   BILITOT 0.8 0.8  0.8   ALKPHOS 143* 118  118   * 77*  77*   * 165*  165*   ANIONGAP 11 9  9   EGFRNONAA 39.7* 48.5*  48.5*     Cardiac Markers: No results for input(s): CKMB, MYOGLOBIN, BNP, TROPISTAT in the last 48 hours.    Significant Imaging: I have reviewed all pertinent imaging results/findings within the past 24 hours.      Assessment/Plan:      * questionable Acute  cholecystitis  Repeat liver function test and re-evaluation by surgeon tomorrow and possible knee decision for cholecystectomy  If obstructive pattern, patient going to need MRCP  No significant medications on the list to cause elevated liver enzymes    Plan   Continue Rocephin  Feed and keep NPO after midnight  IV antiemetics and pain medication as needed      Dehydration  Hold lasix/aldactone for now     Plan   Repeat CMP in AM    Transaminitis  Suspect this is due d/t cholecystitis/gallstones    Plan   MRCP tomorrow after LFT test   Repeat CMP in AM   NPO       Degenerative arthritis of lumbar spine  Advanced multilevel lumbar spine degenerative changes and not sure referred pain from there    Plan   Patient is going to need outpatient follow-up with spine surgeon for possible need for intervention      HTN (hypertension), onset 2010  Better controlled      VTE Risk Mitigation (From admission, onward)         Ordered     IP VTE HIGH RISK PATIENT  Once      06/07/20 1309     Place sequential compression device  Until discontinued      06/07/20 1309                      Mahesh Domingo MD  Department of Hospital Medicine   [facility]

## 2020-06-08 NOTE — PROGRESS NOTES
Discussed case with Dr. Mcdonald, he agrees pain is likely not from the gallbladder, but will continue to work this up given transaminitis. Pt does have pain with hip adduction as well as bending of the rt knee, she is exquisitely tender to the rt buttock and right hip. I ordered an MRI of the lumbar spine and sacrum which showed:  1. 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 Correlation for left L3 radiculopathy is requested.  2. Moderate central canal narrowing at L1-L2 and L4-L5.  3. Additional multilevel lumbar spine degenerative changes as  Described.    I have discussed this with the patient who is still in pain and ordered decadron to decrease inflammation in the short term. She will need to see neurosurgery, but this will likely need to be done on an outpatient basis. In the interim, she will need pain control, which she is sensitive to narcotics which make her nauseous.

## 2020-06-08 NOTE — PROGRESS NOTES
[facility]  General Surgery  Progress Note    Subjective:     History of Present Illness:  79-year-old female who was admitted today with cholelithiasis and elevated transaminases and alkaline phosphatase.  Her bilirubin is normal.  The patient has been having right lower quadrant pain and flank pain and hip pain for the past couple of weeks.  She was seen in urgent care and at the emergency department at [facility].  CT scans done there were negative.  Labs were unremarkable.  Her elevated transaminases or new finding today.  The patient says the pain is not related to food.  It hurts all the time but mostly when she stands up and especially when she walks.  She has had bilateral hip arthroplasties in the past.  She has had evaluation by pain management for left leg pain previously.  She has no significant abdominal pain or any right upper quadrant pain.  She is completely nontender in the epigastric and right upper quadrant area.  On palpation of her right buttock and hip she cries out with severe pain.  There is no skin change consistent with infection.  CT scan is unremarkable still.  Ultrasound identified cholelithiasis without evidence of acute cholecystitis.    Post-Op Info:  * No surgery found *         Interval History: No significant change. Denies abdominal pain.    Medications:  Continuous Infusions:  Scheduled Meds:   ascorbic acid (vitamin C)  500 mg Oral Daily    cefTRIAXone (ROCEPHIN) IVPB  1 g Intravenous Q24H    dexamethasone  4 mg Intravenous Q12H    fluticasone furoate-vilanteroL  1 puff Inhalation Daily    fluticasone propionate  2 spray Each Nostril Daily    losartan  100 mg Oral Daily    metoprolol succinate  50 mg Oral Daily    montelukast  10 mg Oral QHS    pantoprazole  40 mg Oral Daily    senna-docusate 8.6-50 mg  1 tablet Oral BID     PRN Meds:albuterol, calcium chloride IVPB, calcium chloride IVPB, calcium chloride IVPB, hydrALAZINE, HYDROcodone-acetaminophen,  magnesium oxide, magnesium sulfate IVPB, magnesium sulfate IVPB, magnesium sulfate IVPB, magnesium sulfate IVPB, melatonin, morphine, morphine, ondansetron, oxyCODONE, potassium chloride in water, potassium chloride in water, potassium chloride in water, potassium chloride in water, potassium chloride, potassium chloride, potassium chloride, potassium chloride, sodium chloride 0.9%, sodium phosphate IVPB, sodium phosphate IVPB, sodium phosphate IVPB, sodium phosphate IVPB, sodium phosphate IVPB     Review of patient's allergies indicates:   Allergen Reactions    Cymbalta [duloxetine] Other (See Comments)     Nightmares      Darvon [propoxyphene] Nausea Only and Other (See Comments)     Sweating, slept for 3 days    Atorvastatin Other (See Comments)     Muscle cramps    Naprosyn [naproxen] Nausea Only    Penicillins Rash    Tramadol Nausea Only and Palpitations     Objective:     Vital Signs (Most Recent):  Temp: 98.3 °F (36.8 °C) (06/08/20 0752)  Pulse: 70 (06/08/20 0752)  Resp: 16 (06/08/20 0752)  BP: (!) 176/81 (06/08/20 0752)  SpO2: 98 % (06/08/20 0752) Vital Signs (24h Range):  Temp:  [97.4 °F (36.3 °C)-98.3 °F (36.8 °C)] 98.3 °F (36.8 °C)  Pulse:  [70-99] 70  Resp:  [16-20] 16  SpO2:  [95 %-99 %] 98 %  BP: (153-192)/(67-81) 176/81     Weight: 104.3 kg (230 lb)  Body mass index is 40.74 kg/m².    Intake/Output - Last 3 Shifts       06/06 0700 - 06/07 0659 06/07 0700 - 06/08 0659 06/08 0700 - 06/09 0659    I.V. (mL/kg)  900 (8.6)     IV Piggyback  150     Total Intake(mL/kg)  1050 (10.1)     Urine (mL/kg/hr)   1 (0)    Total Output   1    Net  +1050 -1           Urine Occurrence  2 x           Physical Exam   Constitutional: She is oriented to person, place, and time. She appears well-developed and well-nourished. No distress.   HENT:   Head: Normocephalic and atraumatic.   Eyes: Pupils are equal, round, and reactive to light. EOM are normal. No scleral icterus.   Cardiovascular: Normal rate and regular  rhythm.   No murmur heard.  Pulmonary/Chest: Effort normal and breath sounds normal. She has no wheezes. She has no rales.   Abdominal: Soft. Bowel sounds are normal. She exhibits no distension and no mass. There is no tenderness. No hernia.   Musculoskeletal: Normal range of motion. She exhibits no edema.   Symptoms in right leg, hip, back, and buttock unchanged.   Neurological: She is alert and oriented to person, place, and time. No cranial nerve deficit.   Skin: Skin is warm and dry. No rash noted. No erythema.   Psychiatric: She has a normal mood and affect. Her behavior is normal.       Significant Labs:  CBC:   Recent Labs   Lab 06/08/20  0545   WBC 6.52  6.52   RBC 4.15  4.15   HGB 13.5  13.5   HCT 42.8  42.8     194   *  103*   MCH 32.5*  32.5*   MCHC 31.5*  31.5*     CMP:   Recent Labs   Lab 06/08/20  0545   *  138*   CALCIUM 9.3  9.3   ALBUMIN 3.6  3.6   PROT 7.3  7.3     137   K 5.6*  5.6*   CO2 26  26     102   BUN 17  17   CREATININE 1.1  1.1   ALKPHOS 118  118   *  165*   AST 77*  77*   BILITOT 0.8  0.8             Assessment/Plan:     Cholelithiasis without cholecystitis  1.  Cholelithiasis without evidence of cholecystitis on ultrasound.  2.  Elevated transaminases improved. Alk Phos normal. WBC normal.  3.  Workup ongoing.  4. No plans for cholecystectomy at this time. Pt will follow up with me as outpt unless she develops abdominal pain sooner.          Jomar Mcdonald MD  General Surgery  [facility]

## 2020-06-09 LAB
ALBUMIN SERPL BCP-MCNC: 3.5 G/DL (ref 3.5–5.2)
ALP SERPL-CCNC: 94 U/L (ref 55–135)
ALT SERPL W/O P-5'-P-CCNC: 125 U/L (ref 10–44)
ANION GAP SERPL CALC-SCNC: 10 MMOL/L (ref 8–16)
AST SERPL-CCNC: 45 U/L (ref 10–40)
BASOPHILS # BLD AUTO: 0.01 K/UL (ref 0–0.2)
BASOPHILS NFR BLD: 0.1 % (ref 0–1.9)
BILIRUB SERPL-MCNC: 0.7 MG/DL (ref 0.1–1)
BUN SERPL-MCNC: 27 MG/DL (ref 8–23)
CALCIUM SERPL-MCNC: 9.2 MG/DL (ref 8.7–10.5)
CHLORIDE SERPL-SCNC: 104 MMOL/L (ref 95–110)
CO2 SERPL-SCNC: 23 MMOL/L (ref 23–29)
CREAT SERPL-MCNC: 1.2 MG/DL (ref 0.5–1.4)
DIFFERENTIAL METHOD: ABNORMAL
EOSINOPHIL # BLD AUTO: 0 K/UL (ref 0–0.5)
EOSINOPHIL NFR BLD: 0 % (ref 0–8)
ERYTHROCYTE [DISTWIDTH] IN BLOOD BY AUTOMATED COUNT: 12.7 % (ref 11.5–14.5)
EST. GFR  (AFRICAN AMERICAN): 50.4 ML/MIN/1.73 M^2
EST. GFR  (NON AFRICAN AMERICAN): 43.7 ML/MIN/1.73 M^2
GLUCOSE SERPL-MCNC: 159 MG/DL (ref 70–110)
HCT VFR BLD AUTO: 39.6 % (ref 37–48.5)
HGB BLD-MCNC: 13.1 G/DL (ref 12–16)
IMM GRANULOCYTES # BLD AUTO: 0.11 K/UL (ref 0–0.04)
IMM GRANULOCYTES NFR BLD AUTO: 0.7 % (ref 0–0.5)
LYMPHOCYTES # BLD AUTO: 1.4 K/UL (ref 1–4.8)
LYMPHOCYTES NFR BLD: 8.7 % (ref 18–48)
MAGNESIUM SERPL-MCNC: 2.1 MG/DL (ref 1.6–2.6)
MCH RBC QN AUTO: 33.9 PG (ref 27–31)
MCHC RBC AUTO-ENTMCNC: 33.1 G/DL (ref 32–36)
MCV RBC AUTO: 102 FL (ref 82–98)
MONOCYTES # BLD AUTO: 0.7 K/UL (ref 0.3–1)
MONOCYTES NFR BLD: 3.9 % (ref 4–15)
NEUTROPHILS # BLD AUTO: 14.3 K/UL (ref 1.8–7.7)
NEUTROPHILS NFR BLD: 86.6 % (ref 38–73)
NRBC BLD-RTO: 0 /100 WBC
PLATELET # BLD AUTO: 191 K/UL (ref 150–350)
PMV BLD AUTO: 10.8 FL (ref 9.2–12.9)
POTASSIUM SERPL-SCNC: 5.2 MMOL/L (ref 3.5–5.1)
PROT SERPL-MCNC: 7 G/DL (ref 6–8.4)
RBC # BLD AUTO: 3.87 M/UL (ref 4–5.4)
SODIUM SERPL-SCNC: 137 MMOL/L (ref 136–145)
WBC # BLD AUTO: 16.48 K/UL (ref 3.9–12.7)

## 2020-06-09 PROCEDURE — 94640 AIRWAY INHALATION TREATMENT: CPT

## 2020-06-09 PROCEDURE — 25000003 PHARM REV CODE 250: Performed by: INTERNAL MEDICINE

## 2020-06-09 PROCEDURE — 63600175 PHARM REV CODE 636 W HCPCS: Performed by: NURSE PRACTITIONER

## 2020-06-09 PROCEDURE — 25000003 PHARM REV CODE 250: Performed by: NURSE PRACTITIONER

## 2020-06-09 PROCEDURE — 36415 COLL VENOUS BLD VENIPUNCTURE: CPT

## 2020-06-09 PROCEDURE — 99900035 HC TECH TIME PER 15 MIN (STAT)

## 2020-06-09 PROCEDURE — 83735 ASSAY OF MAGNESIUM: CPT

## 2020-06-09 PROCEDURE — 12000002 HC ACUTE/MED SURGE SEMI-PRIVATE ROOM

## 2020-06-09 PROCEDURE — 94761 N-INVAS EAR/PLS OXIMETRY MLT: CPT

## 2020-06-09 PROCEDURE — 80053 COMPREHEN METABOLIC PANEL: CPT

## 2020-06-09 PROCEDURE — 85025 COMPLETE CBC W/AUTO DIFF WBC: CPT

## 2020-06-09 RX ADMIN — MORPHINE SULFATE 4 MG: 4 INJECTION INTRAVENOUS at 02:06

## 2020-06-09 RX ADMIN — HYDROCODONE BITARTRATE AND ACETAMINOPHEN 1 TABLET: 5; 325 TABLET ORAL at 09:06

## 2020-06-09 RX ADMIN — LOSARTAN POTASSIUM 100 MG: 50 TABLET, FILM COATED ORAL at 08:06

## 2020-06-09 RX ADMIN — ASCORBIC ACID (VITAMIN C) 500 MG TABLET 500 MG: TABLET at 08:06

## 2020-06-09 RX ADMIN — HYDROCODONE BITARTRATE AND ACETAMINOPHEN 1 TABLET: 5; 325 TABLET ORAL at 03:06

## 2020-06-09 RX ADMIN — DEXAMETHASONE SODIUM PHOSPHATE 4 MG: 4 INJECTION, SOLUTION INTRA-ARTICULAR; INTRALESIONAL; INTRAMUSCULAR; INTRAVENOUS; SOFT TISSUE at 08:06

## 2020-06-09 RX ADMIN — MONTELUKAST 10 MG: 10 TABLET, FILM COATED ORAL at 08:06

## 2020-06-09 RX ADMIN — GABAPENTIN 300 MG: 300 CAPSULE ORAL at 08:06

## 2020-06-09 RX ADMIN — SENNOSIDES AND DOCUSATE SODIUM 1 TABLET: 8.6; 5 TABLET ORAL at 08:06

## 2020-06-09 RX ADMIN — METOPROLOL SUCCINATE 50 MG: 25 TABLET, FILM COATED, EXTENDED RELEASE ORAL at 08:06

## 2020-06-09 RX ADMIN — CEFTRIAXONE 1 G: 1 INJECTION, SOLUTION INTRAVENOUS at 08:06

## 2020-06-09 RX ADMIN — PANTOPRAZOLE SODIUM 40 MG: 40 TABLET, DELAYED RELEASE ORAL at 05:06

## 2020-06-09 RX ADMIN — FLUTICASONE FUROATE AND VILANTEROL TRIFENATATE 1 PUFF: 100; 25 POWDER RESPIRATORY (INHALATION) at 07:06

## 2020-06-09 NOTE — PLAN OF CARE
06/09/20 0757   Patient Assessment/Suction   Level of Consciousness (AVPU) alert   Respiratory Effort Normal;Unlabored   Expansion/Accessory Muscles/Retractions no use of accessory muscles;no retractions   All Lung Fields Breath Sounds coarse   Rhythm/Pattern, Respiratory unlabored;pattern regular;depth regular   PRE-TX-O2   O2 Device (Oxygen Therapy) room air   SpO2 98 %   Pulse Oximetry Type Intermittent   $ Pulse Oximetry - Multiple Charge Pulse Oximetry - Multiple   Pulse 105   Resp 16   Inhaler   $ Inhaler Charges MDI (Metered Dose Inahler) Treatment;Mouth rinsed post treatment   Daily Review of Necessity (Inhaler) completed   Respiratory Treatment Status (Inhaler) given;mouth rinsed post treatment   Treatment Route (Inhaler) mouthpiece   Patient Position (Inhaler) semi-[place]'s   Post Treatment Assessment (Inhaler) breath sounds unchanged   Signs of Intolerance (Inhaler) none   Respiratory Evaluation   $ Care Plan Tech Time 15 min   Evaluation For   (care plan)

## 2020-06-09 NOTE — PLAN OF CARE
Problem: Adult Inpatient Plan of Care  Goal: Patient-Specific Goal (Individualization)  Outcome: Ongoing, Progressing     Problem: Skin Injury Risk Increased  Goal: Skin Health and Integrity  Outcome: Ongoing, Progressing

## 2020-06-09 NOTE — SUBJECTIVE & OBJECTIVE
Interval History: Pt describes right L 1-2 radicular pain that has been present. She has spinal stenosis on lumbar MRI. I have asked neurology to review the above as she has no abdominal complaints. PT and OT evaluation      Review of Systems   Constitutional: Positive for diaphoresis and fatigue. Negative for chills and fever.   HENT: Negative.    Eyes: Negative.    Respiratory: Negative.    Cardiovascular: Negative.    Gastrointestinal: Negative.    Endocrine: Negative.    Genitourinary: Negative.    Musculoskeletal: Positive for arthralgias and myalgias.        L 1-2 radicular pain on the right secondary to spinal stenosis   Allergic/Immunologic: Negative.    Neurological: Positive for weakness.   Hematological: Negative.    Psychiatric/Behavioral: The patient is nervous/anxious.      Objective:     Vital Signs (Most Recent):  Temp: 97.5 °F (36.4 °C) (06/09/20 0812)  Pulse: 60 (06/09/20 0812)  Resp: 18 (06/09/20 0812)  BP: (!) 161/76 (06/09/20 0812)  SpO2: 99 % (06/09/20 0812) Vital Signs (24h Range):  Temp:  [97.5 °F (36.4 °C)-98.8 °F (37.1 °C)] 97.5 °F (36.4 °C)  Pulse:  [] 60  Resp:  [16-19] 18  SpO2:  [95 %-99 %] 99 %  BP: (161-178)/(68-81) 161/76     Weight: 104.3 kg (230 lb)  Body mass index is 40.74 kg/m².    Intake/Output Summary (Last 24 hours) at 6/9/2020 0959  Last data filed at 6/9/2020 0334  Gross per 24 hour   Intake 240 ml   Output --   Net 240 ml      Physical Exam   Constitutional: She appears distressed.   HENT:   Head: Normocephalic and atraumatic.   Eyes: Pupils are equal, round, and reactive to light. Conjunctivae and EOM are normal.   Neck: Normal range of motion. Neck supple.   Cardiovascular: Normal rate and regular rhythm. Exam reveals gallop.   Pulmonary/Chest: Effort normal and breath sounds normal.   Abdominal: Soft. Bowel sounds are normal.   Musculoskeletal:   Limited rom RLE secondary to pain   Skin: Skin is warm. She is diaphoretic.   Psychiatric:   I just feel like I am  getting no help       Significant Labs:   Bilirubin:   Recent Labs   Lab 06/02/20  1236 06/07/20  0858 06/08/20  0545 06/09/20  0555   BILITOT 0.6 0.8 0.8  0.8 0.7     Blood Culture: No results for input(s): LABBLOO in the last 48 hours.  BMP:   Recent Labs   Lab 06/09/20  0555   *      K 5.2*      CO2 23   BUN 27*   CREATININE 1.2   CALCIUM 9.2   MG 2.1     CBC:   Recent Labs   Lab 06/08/20  0545 06/09/20  0555   WBC 6.52  6.52 16.48*   HGB 13.5  13.5 13.1   HCT 42.8  42.8 39.6     194 191     CMP:   Recent Labs   Lab 06/08/20  0545 06/09/20  0555     137 137   K 5.6*  5.6* 5.2*     102 104   CO2 26  26 23   *  138* 159*   BUN 17  17 27*   CREATININE 1.1  1.1 1.2   CALCIUM 9.3  9.3 9.2   PROT 7.3  7.3 7.0   ALBUMIN 3.6  3.6 3.5   BILITOT 0.8  0.8 0.7   ALKPHOS 118  118 94   AST 77*  77* 45*   *  165* 125*   ANIONGAP 9  9 10   EGFRNONAA 48.5*  48.5* 43.7*     Cardiac Markers: No results for input(s): CKMB, MYOGLOBIN, BNP, TROPISTAT in the last 48 hours.  Coagulation: No results for input(s): PT, INR, APTT in the last 48 hours.  Magnesium:   Recent Labs   Lab 06/08/20  0545 06/09/20  0555   MG 2.1 2.1     Troponin: No results for input(s): TROPONINI in the last 48 hours.    Significant Imaging: I have reviewed all pertinent imaging results/findings within the past 24 hours.

## 2020-06-09 NOTE — PLAN OF CARE
06/09/20 0634   WATSON Message   Medicare Outpatient and Observation Notification regarding financial responsibility Given to patient/caregiver;Explained to patient/caregiver;Signed/date by patient/caregiver   Date WATSON was signed 06/08/20   Time WATSON was signed 1148

## 2020-06-09 NOTE — PROGRESS NOTES
[facility]  Progress Note    Patient Name: Sammi Abreu  MRN: [id number]  Patient Class: IP- Inpatient   Admission Date: 6/7/2020  Length of Stay: 1 days  Attending Physician: Mahesh Domingo MD  Primary Care Provider: Osmani Villatoro MD        Subjective:     Principal Problem:Acute cholecystitis        HPI:  Ms. Abreu presents today with complaints of abd pain. It is severe. It is associated with constipation and nausea. She denies V/D, cough, fever, chills, SOB, chest pain, or dizziness. She has a history of asthma/COPD, HTN, pulm HTN, prediabetes, and arthritis s/p total hips and knee replacements. She has been having this abd pain for about a week. It starts in her mid back on the right side and wraps around to the RLQ. Her RLQ is exquisitely tender. Stools have been hard as she's been constipated, but they have been brown. She went to another ED on 6/2 and had a normal CT abd/pelvis with unremarkable labs. She then went to an urgent care for the same pain and was referred back to the hospital and came this morning. A CT abd/pelvis was repeated and showed cholelithiasis and gallbladder distention. Her CMP shows newly elevated liver enzymes that were normal 5 days ago. Dr. Mcdonald was consulted per ED for suspected acute cholecystitis.     Overview/Hospital Course:  Patient was admitted with epigastric area and bilateral nonspecific 0 than left side of the upper abdominal pain, patient is not sure with the pain is coming from  MRI scan was done with multilevel severe central canal stenosis at lumbar and sacral areas  New onset LFT elevations are noted to but general surgeon does not believe that patient have cholecystitis  She will be re-evaluated tomorrow for possible need for surgery.  But CT gallbladder is distended and ultrasound with cholelithiasis    6/9/2020  Pt complains of 10/10 burning, shooting and aching pain for a month in the right L 1-2 distribution. My pain MGMT  doctor put me off for 12 days and I cannot live like this. If I stand up my legs seize up and I cannot walk.    Interval History: Pt describes right L 1-2 radicular pain that has been present. She has spinal stenosis on lumbar MRI. I have asked neurology to review the above as she has no abdominal complaints. PT and OT evaluation      Review of Systems   Constitutional: Positive for diaphoresis and fatigue. Negative for chills and fever.   HENT: Negative.    Eyes: Negative.    Respiratory: Negative.    Cardiovascular: Negative.    Gastrointestinal: Negative.    Endocrine: Negative.    Genitourinary: Negative.    Musculoskeletal: Positive for arthralgias and myalgias.        L 1-2 radicular pain on the right secondary to spinal stenosis   Allergic/Immunologic: Negative.    Neurological: Positive for weakness.   Hematological: Negative.    Psychiatric/Behavioral: The patient is nervous/anxious.      Objective:     Vital Signs (Most Recent):  Temp: 97.5 °F (36.4 °C) (06/09/20 0812)  Pulse: 60 (06/09/20 0812)  Resp: 18 (06/09/20 0812)  BP: (!) 161/76 (06/09/20 0812)  SpO2: 99 % (06/09/20 0812) Vital Signs (24h Range):  Temp:  [97.5 °F (36.4 °C)-98.8 °F (37.1 °C)] 97.5 °F (36.4 °C)  Pulse:  [] 60  Resp:  [16-19] 18  SpO2:  [95 %-99 %] 99 %  BP: (161-178)/(68-81) 161/76     Weight: 104.3 kg (230 lb)  Body mass index is 40.74 kg/m².    Intake/Output Summary (Last 24 hours) at 6/9/2020 0915  Last data filed at 6/9/2020 0334  Gross per 24 hour   Intake 240 ml   Output --   Net 240 ml      Physical Exam   Constitutional: She appears distressed.   HENT:   Head: Normocephalic and atraumatic.   Eyes: Pupils are equal, round, and reactive to light. Conjunctivae and EOM are normal.   Neck: Normal range of motion. Neck supple.   Cardiovascular: Normal rate and regular rhythm. Exam reveals gallop.   Pulmonary/Chest: Effort normal and breath sounds normal.   Abdominal: Soft. Bowel sounds are normal.   Musculoskeletal:   Limited  rom RLE secondary to pain   Skin: Skin is warm. She is diaphoretic.   Psychiatric:   I just feel like I am getting no help       Significant Labs:   Bilirubin:   Recent Labs   Lab 06/02/20  1236 06/07/20  0858 06/08/20  0545 06/09/20  0555   BILITOT 0.6 0.8 0.8  0.8 0.7     Blood Culture: No results for input(s): LABBLOO in the last 48 hours.  BMP:   Recent Labs   Lab 06/09/20  0555   *      K 5.2*      CO2 23   BUN 27*   CREATININE 1.2   CALCIUM 9.2   MG 2.1     CBC:   Recent Labs   Lab 06/08/20  0545 06/09/20  0555   WBC 6.52  6.52 16.48*   HGB 13.5  13.5 13.1   HCT 42.8  42.8 39.6     194 191     CMP:   Recent Labs   Lab 06/08/20  0545 06/09/20  0555     137 137   K 5.6*  5.6* 5.2*     102 104   CO2 26  26 23   *  138* 159*   BUN 17  17 27*   CREATININE 1.1  1.1 1.2   CALCIUM 9.3  9.3 9.2   PROT 7.3  7.3 7.0   ALBUMIN 3.6  3.6 3.5   BILITOT 0.8  0.8 0.7   ALKPHOS 118  118 94   AST 77*  77* 45*   *  165* 125*   ANIONGAP 9  9 10   EGFRNONAA 48.5*  48.5* 43.7*     Cardiac Markers: No results for input(s): CKMB, MYOGLOBIN, BNP, TROPISTAT in the last 48 hours.  Coagulation: No results for input(s): PT, INR, APTT in the last 48 hours.  Magnesium:   Recent Labs   Lab 06/08/20  0545 06/09/20  0555   MG 2.1 2.1     Troponin: No results for input(s): TROPONINI in the last 48 hours.    Significant Imaging: I have reviewed all pertinent imaging results/findings within the past 24 hours.      Assessment/Plan:      * questionable Acute cholecystitis  Repeat liver function test and re-evaluation by surgeon tomorrow and possible knee decision for cholecystectomy  If obstructive pattern, patient going to need MRCP  No significant medications on the list to cause elevated liver enzymes    Plan   Continue Rocephin  Feed and keep NPO after midnight  IV antiemetics and pain medication as needed    L 1-2 radicular pain on the right which is severe in a patient  with spinal stenosis  P) I have asked neurology to review the case and consider medications or intervention as therapy    Dehydration  Hold lasix/aldactone for now     Plan   Repeat CMP in AM    Transaminitis  Suspect this is due d/t cholecystitis/gallstones    Plan   MRCP tomorrow after LFT test   Repeat CMP in AM   NPO       Degenerative arthritis of lumbar spine  Advanced multilevel lumbar spine degenerative changes and not sure referred pain from there    Plan   Patient is going to need outpatient follow-up with spine surgeon for possible need for intervention      HTN (hypertension), onset 2010  Better controlled        VTE Risk Mitigation (From admission, onward)         Ordered     IP VTE HIGH RISK PATIENT  Once      06/07/20 1309     Place sequential compression device  Until discontinued      06/07/20 1309                      Burt King MD  Department of Hospital Medicine   [facility]
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
## 2020-06-10 LAB
ALBUMIN SERPL BCP-MCNC: 3.6 G/DL (ref 3.5–5.2)
ALBUMIN SERPL BCP-MCNC: 3.7 G/DL (ref 3.5–5.2)
ALP SERPL-CCNC: 101 U/L (ref 55–135)
ALP SERPL-CCNC: 96 U/L (ref 55–135)
ALT SERPL W/O P-5'-P-CCNC: 96 U/L (ref 10–44)
ALT SERPL W/O P-5'-P-CCNC: 97 U/L (ref 10–44)
ANION GAP SERPL CALC-SCNC: 10 MMOL/L (ref 8–16)
AST SERPL-CCNC: 26 U/L (ref 10–40)
AST SERPL-CCNC: 28 U/L (ref 10–40)
BASOPHILS # BLD AUTO: 0.02 K/UL (ref 0–0.2)
BASOPHILS NFR BLD: 0.1 % (ref 0–1.9)
BILIRUB DIRECT SERPL-MCNC: 0.1 MG/DL (ref 0.1–0.3)
BILIRUB SERPL-MCNC: 0.6 MG/DL (ref 0.1–1)
BILIRUB SERPL-MCNC: 0.8 MG/DL (ref 0.1–1)
BUN SERPL-MCNC: 30 MG/DL (ref 8–23)
CALCIUM SERPL-MCNC: 9.2 MG/DL (ref 8.7–10.5)
CHLORIDE SERPL-SCNC: 104 MMOL/L (ref 95–110)
CO2 SERPL-SCNC: 26 MMOL/L (ref 23–29)
CREAT SERPL-MCNC: 1.2 MG/DL (ref 0.5–1.4)
DIFFERENTIAL METHOD: ABNORMAL
EOSINOPHIL # BLD AUTO: 0 K/UL (ref 0–0.5)
EOSINOPHIL NFR BLD: 0 % (ref 0–8)
ERYTHROCYTE [DISTWIDTH] IN BLOOD BY AUTOMATED COUNT: 12.5 % (ref 11.5–14.5)
EST. GFR  (AFRICAN AMERICAN): 50.4 ML/MIN/1.73 M^2
EST. GFR  (NON AFRICAN AMERICAN): 43.7 ML/MIN/1.73 M^2
GLUCOSE SERPL-MCNC: 159 MG/DL (ref 70–110)
HCT VFR BLD AUTO: 41.5 % (ref 37–48.5)
HGB BLD-MCNC: 13.4 G/DL (ref 12–16)
IMM GRANULOCYTES # BLD AUTO: 0.12 K/UL (ref 0–0.04)
IMM GRANULOCYTES NFR BLD AUTO: 0.7 % (ref 0–0.5)
LYMPHOCYTES # BLD AUTO: 1.5 K/UL (ref 1–4.8)
LYMPHOCYTES NFR BLD: 9.4 % (ref 18–48)
MAGNESIUM SERPL-MCNC: 2.1 MG/DL (ref 1.6–2.6)
MCH RBC QN AUTO: 33.3 PG (ref 27–31)
MCHC RBC AUTO-ENTMCNC: 32.3 G/DL (ref 32–36)
MCV RBC AUTO: 103 FL (ref 82–98)
MONOCYTES # BLD AUTO: 0.7 K/UL (ref 0.3–1)
MONOCYTES NFR BLD: 4.4 % (ref 4–15)
NEUTROPHILS # BLD AUTO: 14 K/UL (ref 1.8–7.7)
NEUTROPHILS NFR BLD: 85.4 % (ref 38–73)
NRBC BLD-RTO: 0 /100 WBC
PLATELET # BLD AUTO: 199 K/UL (ref 150–350)
PMV BLD AUTO: 11 FL (ref 9.2–12.9)
POTASSIUM SERPL-SCNC: 5.4 MMOL/L (ref 3.5–5.1)
PROT SERPL-MCNC: 7 G/DL (ref 6–8.4)
PROT SERPL-MCNC: 7.8 G/DL (ref 6–8.4)
RBC # BLD AUTO: 4.02 M/UL (ref 4–5.4)
SODIUM SERPL-SCNC: 140 MMOL/L (ref 136–145)
WBC # BLD AUTO: 16.44 K/UL (ref 3.9–12.7)

## 2020-06-10 PROCEDURE — 94761 N-INVAS EAR/PLS OXIMETRY MLT: CPT

## 2020-06-10 PROCEDURE — 99900035 HC TECH TIME PER 15 MIN (STAT)

## 2020-06-10 PROCEDURE — 97161 PT EVAL LOW COMPLEX 20 MIN: CPT

## 2020-06-10 PROCEDURE — 36415 COLL VENOUS BLD VENIPUNCTURE: CPT

## 2020-06-10 PROCEDURE — 85025 COMPLETE CBC W/AUTO DIFF WBC: CPT

## 2020-06-10 PROCEDURE — 94640 AIRWAY INHALATION TREATMENT: CPT

## 2020-06-10 PROCEDURE — 80053 COMPREHEN METABOLIC PANEL: CPT

## 2020-06-10 PROCEDURE — 25000242 PHARM REV CODE 250 ALT 637 W/ HCPCS: Performed by: NURSE PRACTITIONER

## 2020-06-10 PROCEDURE — 12000002 HC ACUTE/MED SURGE SEMI-PRIVATE ROOM

## 2020-06-10 PROCEDURE — 63600175 PHARM REV CODE 636 W HCPCS: Performed by: NURSE PRACTITIONER

## 2020-06-10 PROCEDURE — 83735 ASSAY OF MAGNESIUM: CPT

## 2020-06-10 PROCEDURE — 25000003 PHARM REV CODE 250: Performed by: INTERNAL MEDICINE

## 2020-06-10 PROCEDURE — 80076 HEPATIC FUNCTION PANEL: CPT

## 2020-06-10 PROCEDURE — 97530 THERAPEUTIC ACTIVITIES: CPT

## 2020-06-10 PROCEDURE — 25000003 PHARM REV CODE 250: Performed by: NURSE PRACTITIONER

## 2020-06-10 RX ORDER — GABAPENTIN 300 MG/1
600 CAPSULE ORAL 3 TIMES DAILY
Status: DISCONTINUED | OUTPATIENT
Start: 2020-06-10 | End: 2020-06-11

## 2020-06-10 RX ADMIN — METOPROLOL SUCCINATE 50 MG: 25 TABLET, FILM COATED, EXTENDED RELEASE ORAL at 08:06

## 2020-06-10 RX ADMIN — HYDROCODONE BITARTRATE AND ACETAMINOPHEN 1 TABLET: 5; 325 TABLET ORAL at 02:06

## 2020-06-10 RX ADMIN — GABAPENTIN 300 MG: 300 CAPSULE ORAL at 08:06

## 2020-06-10 RX ADMIN — GABAPENTIN 600 MG: 300 CAPSULE ORAL at 09:06

## 2020-06-10 RX ADMIN — FLUTICASONE FUROATE AND VILANTEROL TRIFENATATE 1 PUFF: 100; 25 POWDER RESPIRATORY (INHALATION) at 10:06

## 2020-06-10 RX ADMIN — SENNOSIDES AND DOCUSATE SODIUM 1 TABLET: 8.6; 5 TABLET ORAL at 09:06

## 2020-06-10 RX ADMIN — FLUTICASONE PROPIONATE 100 MCG: 50 SPRAY, METERED NASAL at 08:06

## 2020-06-10 RX ADMIN — MONTELUKAST 10 MG: 10 TABLET, FILM COATED ORAL at 09:06

## 2020-06-10 RX ADMIN — GABAPENTIN 600 MG: 300 CAPSULE ORAL at 02:06

## 2020-06-10 RX ADMIN — LOSARTAN POTASSIUM 100 MG: 50 TABLET, FILM COATED ORAL at 08:06

## 2020-06-10 RX ADMIN — CEFTRIAXONE 1 G: 1 INJECTION, SOLUTION INTRAVENOUS at 08:06

## 2020-06-10 RX ADMIN — DEXAMETHASONE SODIUM PHOSPHATE 4 MG: 4 INJECTION, SOLUTION INTRA-ARTICULAR; INTRALESIONAL; INTRAMUSCULAR; INTRAVENOUS; SOFT TISSUE at 08:06

## 2020-06-10 RX ADMIN — SENNOSIDES AND DOCUSATE SODIUM 1 TABLET: 8.6; 5 TABLET ORAL at 08:06

## 2020-06-10 RX ADMIN — DEXAMETHASONE SODIUM PHOSPHATE 4 MG: 4 INJECTION, SOLUTION INTRA-ARTICULAR; INTRALESIONAL; INTRAMUSCULAR; INTRAVENOUS; SOFT TISSUE at 09:06

## 2020-06-10 RX ADMIN — OXYCODONE HYDROCHLORIDE 5 MG: 5 TABLET ORAL at 06:06

## 2020-06-10 RX ADMIN — ASCORBIC ACID (VITAMIN C) 500 MG TABLET 500 MG: TABLET at 08:06

## 2020-06-10 RX ADMIN — PANTOPRAZOLE SODIUM 40 MG: 40 TABLET, DELAYED RELEASE ORAL at 06:06

## 2020-06-10 RX ADMIN — OXYCODONE HYDROCHLORIDE 5 MG: 5 TABLET ORAL at 12:06

## 2020-06-10 NOTE — PLAN OF CARE
06/09/20 2030   Patient Assessment/Suction   Level of Consciousness (AVPU) alert   Respiratory Effort Unlabored   Expansion/Accessory Muscles/Retractions no use of accessory muscles   All Lung Fields Breath Sounds clear   Rhythm/Pattern, Respiratory unlabored   Cough Frequency no cough   PRE-TX-O2   O2 Device (Oxygen Therapy) room air   SpO2 98 %   Pulse 60   Resp 18   Aerosol Therapy   $ Aerosol Therapy Charges PRN treatment not required   Respiratory Treatment Status (SVN) PRN treatment not required   Respiratory Evaluation   $ Care Plan Tech Time 15 min   Evaluation For   (CARE PLAN)

## 2020-06-10 NOTE — PROGRESS NOTES
Formerly [facility], [facility] Medicine  Progress Note    Patient Name: Sammi Abreu  MRN: [id number]  Patient Class: IP- Inpatient   Admission Date: 6/7/2020  Length of Stay: 2 days  Attending Physician: Burt King MD  Primary Care Provider: Osmani Villatoro MD        Subjective:     Principal Problem:Acute cholecystitis        HPI:  Ms. Abreu presents today with complaints of abd pain. It is severe. It is associated with constipation and nausea. She denies V/D, cough, fever, chills, SOB, chest pain, or dizziness. She has a history of asthma/COPD, HTN, pulm HTN, prediabetes, and arthritis s/p total hips and knee replacements. She has been having this abd pain for about a week. It starts in her mid back on the right side and wraps around to the RLQ. Her RLQ is exquisitely tender. Stools have been hard as she's been constipated, but they have been brown. She went to another ED on 6/2 and had a normal CT abd/pelvis with unremarkable labs. She then went to an urgent care for the same pain and was referred back to the hospital and came this morning. A CT abd/pelvis was repeated and showed cholelithiasis and gallbladder distention. Her CMP shows newly elevated liver enzymes that were normal 5 days ago. Dr. Mcdonald was consulted per ED for suspected acute cholecystitis.     Overview/Hospital Course:  Patient was admitted with epigastric area and bilateral nonspecific 0 than left side of the upper abdominal pain, patient is not sure with the pain is coming from  MRI scan was done with multilevel severe central canal stenosis at lumbar and sacral areas  New onset LFT elevations are noted to but general surgeon does not believe that patient have cholecystitis  She will be re-evaluated tomorrow for possible need for surgery.  But CT gallbladder is distended and ultrasound with cholelithiasis    6/9/2020  Pt complains of 10/10 burning, shooting and aching pain for a month in the right L 1-2 distribution. My pain  MGMT doctor put me off for 12 days and I cannot live like this. If I stand up my legs seize up and I cannot walk.    6/10/2020  Pt is able to walk with a walker with 6/10 pain. The neurontin is helping the right L1-2 pain. I want outpatient physical therapy.    Interval History: My right hip pain is better and I am able to walk with a walker    Review of Systems   Constitutional: Positive for fatigue. Negative for diaphoresis.   HENT: Negative.    Eyes: Negative.    Respiratory: Negative.    Cardiovascular: Negative.    Gastrointestinal: Negative.    Endocrine: Negative.    Genitourinary: Negative.    Musculoskeletal: Positive for arthralgias, gait problem and myalgias.   Neurological: Positive for weakness.   Hematological: Negative.    Psychiatric/Behavioral: Negative.      Objective:     Vital Signs (Most Recent):  Temp: 98.7 °F (37.1 °C) (06/10/20 1242)  Pulse: 64 (06/10/20 1242)  Resp: 18 (06/10/20 1242)  BP: (!) 173/80 (06/10/20 1242)  SpO2: 98 % (06/10/20 1242) Vital Signs (24h Range):  Temp:  [97.5 °F (36.4 °C)-98.7 °F (37.1 °C)] 98.7 °F (37.1 °C)  Pulse:  [51-71] 64  Resp:  [18-20] 18  SpO2:  [96 %-99 %] 98 %  BP: (173-183)/(74-80) 173/80     Weight: 104.3 kg (230 lb)  Body mass index is 40.74 kg/m².    Intake/Output Summary (Last 24 hours) at 6/10/2020 1644  Last data filed at 6/10/2020 0628  Gross per 24 hour   Intake 660 ml   Output --   Net 660 ml      Physical Exam   Constitutional: She is oriented to person, place, and time. She appears distressed.   HENT:   Head: Normocephalic and atraumatic.   Eyes: Pupils are equal, round, and reactive to light.   Neck: Normal range of motion.   Cardiovascular: Normal rate and regular rhythm. Exam reveals gallop.   Pulmonary/Chest: Effort normal and breath sounds normal.   Abdominal: Soft. Bowel sounds are normal.   Musculoskeletal: She exhibits tenderness.   Painful ROM of the right hip   Neurological: She is alert and oriented to person, place, and time.   Skin:  Skin is warm. She is not diaphoretic.   Psychiatric: She has a normal mood and affect.       Significant Labs:   BMP:   Recent Labs   Lab 06/10/20  0451   *      K 5.4*      CO2 26   BUN 30*   CREATININE 1.2   CALCIUM 9.2   MG 2.1     CBC:   Recent Labs   Lab 06/09/20  0555 06/10/20  0451   WBC 16.48* 16.44*   HGB 13.1 13.4   HCT 39.6 41.5    199     CMP:   Recent Labs   Lab 06/09/20  0555 06/10/20  0451 06/10/20  1305    140  --    K 5.2* 5.4*  --     104  --    CO2 23 26  --    * 159*  --    BUN 27* 30*  --    CREATININE 1.2 1.2  --    CALCIUM 9.2 9.2  --    PROT 7.0 7.0 7.8   ALBUMIN 3.5 3.6 3.7   BILITOT 0.7 0.6 0.8   ALKPHOS 94 96 101   AST 45* 28 26   * 97* 96*   ANIONGAP 10 10  --    EGFRNONAA 43.7* 43.7*  --      Cardiac Markers: No results for input(s): CKMB, MYOGLOBIN, BNP, TROPISTAT in the last 48 hours.  POCT Glucose: No results for input(s): POCTGLUCOSE in the last 48 hours.  Urine Culture: No results for input(s): LABURIN in the last 48 hours.  Urine Studies: No results for input(s): COLORU, APPEARANCEUA, PHUR, SPECGRAV, PROTEINUA, GLUCUA, KETONESU, BILIRUBINUA, OCCULTUA, NITRITE, UROBILINOGEN, LEUKOCYTESUR, RBCUA, WBCUA, BACTERIA, SQUAMEPITHEL, HYALINECASTS in the last 48 hours.    Invalid input(s): JUAN    Significant Imaging: I have reviewed all pertinent imaging results/findings within the past 24 hours.      Assessment/Plan:      * questionable Acute cholecystitis  Repeat liver function test and re-evaluation by surgeon tomorrow and possible knee decision for cholecystectomy  If obstructive pattern, patient going to need MRCP  No significant medications on the list to cause elevated liver enzymes    Plan   Continue Rocephin  Feed and keep NPO after midnight  IV antiemetics and pain medication as needed    6/10/2020  MRCP notes gallstones but no evidence if cholecystitis or ductal disease      Dehydration  Hold lasix/aldactone for now      Plan   Repeat CMP in AM    Transaminitis  Suspect this is due d/t cholecystitis/gallstones    Plan   MRCP tomorrow after LFT test   Repeat CMP in AM   NPO   6/10/2020  Resolving    Degenerative arthritis of lumbar spine  Advanced multilevel lumbar spine degenerative changes and not sure referred pain from there    Plan   Patient is going to need outpatient follow-up with spine surgeon for possible need for intervention    6/10/2020  Pt is able to walk with a walker and has decreased right L1-2 pain on neurontin      HTN (hypertension), onset 2010  Better controlled        VTE Risk Mitigation (From admission, onward)         Ordered     IP VTE HIGH RISK PATIENT  Once      06/07/20 1309     Place sequential compression device  Until discontinued      06/07/20 1309                      Burt King MD  Department of Hospital Medicine   [facility]

## 2020-06-10 NOTE — CARE UPDATE
06/10/20 1033   Patient Assessment/Suction   Level of Consciousness (AVPU) alert   Respiratory Effort Normal;Unlabored   Expansion/Accessory Muscles/Retractions no use of accessory muscles   All Lung Fields Breath Sounds diminished   PRE-TX-O2   O2 Device (Oxygen Therapy) room air   SpO2 99 %   Pulse Oximetry Type Intermittent   $ Pulse Oximetry - Multiple Charge Pulse Oximetry - Multiple   Pulse (!) 55   Resp 20   Aerosol Therapy   $ Aerosol Therapy Charges PRN treatment not required   Inhaler   $ Inhaler Charges MDI (Metered Dose Inahler) Treatment;Independent   Daily Review of Necessity (Inhaler) completed   Respiratory Treatment Status (Inhaler) given;mouth rinsed post treatment   Treatment Route (Inhaler) mouthpiece   Patient Position (Inhaler) HOB elevated   Post Treatment Assessment (Inhaler) breath sounds unchanged   Signs of Intolerance (Inhaler) none   Respiratory Evaluation   $ Care Plan Tech Time 15 min

## 2020-06-10 NOTE — PT/OT/SLP EVAL
"Physical Therapy Evaluation    Patient Name:  Sammi Abreu   MRN:  [id number]    Recommendations:     Discharge Recommendations:  home health PT(Pt refusing placement)   Discharge Equipment Recommendations: none   Barriers to discharge: None    Assessment:     Sammi Abreu is a 79 y.o. female admitted with a medical diagnosis of Acute cholecystitis.  She presents with the following impairments/functional limitations:  weakness, impaired functional mobilty, impaired endurance, gait instability, pain, decreased coordination, impaired self care skills, decreased lower extremity function, decreased ROM, impaired coordination, decreased safety awareness. Pt supine in NAD on cell phone. Agreeable to PT eval and treatment; pt requests use of BSC. Pt states 10/10 pain at low back and groin with mobilization; Pt cries out in pain with mobilization but able to perform transfer and toilet transfer with CGA. Offered OOB to chair but pt states she would rather lie down 2/2 pain and that if she is still pain eases. Educated patient on role of PT in acute services (increased indep with functional mobility) and patient would need to transition to HHPT or OPPT to address specific evaluation of low back/ groin pain and intervention. PT edu patient goal is for patient to be able to go home alone again but if not able to safely then pt may benefit from placement for rehab. Pt states she is not agreeable. States she had rehab at a SNF after her hip surgery and she had to leave because she was unhappy with her care there. "I'll go home and manage before I go anywhere."     Rehab Prognosis: Fair; patient would benefit from acute skilled PT services to address these deficits and reach maximum level of function.    Recent Surgery: * No surgery found *      Plan:     During this hospitalization, patient to be seen 6 x/week to address the identified rehab impairments via therapeutic exercises, gait training, therapeutic activities " and progress toward the following goals:    · Plan of Care Expires:  07/10/20    Subjective     Chief Complaint: R back and groin pain  Patient/Family Comments/goals: home  Pain/Comfort:  · Pain Rating 1: 10/10  · Location - Side 1: Right  · Location 1: (back and groin pain)  · Pain Addressed 1: Reposition, Cessation of Activity, Distraction, Nurse notified  · Pain Rating Post-Intervention 1: 10/10    Patients cultural, spiritual, Mu-ism conflicts given the current situation:      Living Environment:  Pt lives alone in [facility] with threshold step to enter.   Prior to admission, patients level of function was Mod indep with RW but states unable to walk far 2/2 pain.  Equipment used at home: walker, rolling(using RW 2/2 leg and back pain).  DME owned (not currently used): none.  Upon discharge, patient will have assistance from unsure.    Objective:     Communicated with RN prior to session.  Patient found supine with bed alarm, peripheral IV  upon PT entry to room.    General Precautions: Standard,     Orthopedic Precautions:    Braces:       Exams:  · Cognitive Exam:  Patient is oriented to Person, Place, Time and Situation  · RLE ROM: WFL; guarded  · RLE Strength: WFL  · LLE ROM: WFL  · LLE Strength: WFL    Functional Mobility:  · Bed Mobility:     · Rolling Right: stand by assistance  · Supine to Sit: stand by assistance  · Transfers:     · Sit to Stand:  contact guard assistance with no AD  · Toilet Transfer: contact guard assistance with  no AD  using  Stand Pivot  · Gait: NT 2/2 10/10 pain  · Balance: good in sitting; poor in standing      Therapeutic Activities and Exercises:   bed mobility; transfer training; toileting (set up and supervision); PT educated pt/ family on importance of out of bed to chair and functional mobility to negate negative effects of prolonged bed rest. Will progress activity as tolerated by patient; At this time patient only able to tolerate positional changes and transfers due to  overall pain/deconditioning.     AM-PAC 6 CLICK MOBILITY  Total Score:17     Patient left supine with all lines intact, call button in reach, bed alarm on and RN notified. RN notified that patient has questions regarding NPO status and if she will be having tests this afternoon.     GOALS:   Multidisciplinary Problems     Physical Therapy Goals        Problem: Physical Therapy Goal    Goal Priority Disciplines Outcome Goal Variances Interventions   Physical Therapy Goal     PT, PT/OT      Description:  Goals to be met by: discharge     Patient will increase functional independence with mobility by performin. Supine to sit with Stand-by Assistance  2. Sit to stand transfer with Supervision  3. Bed to chair transfer with Supervision using Rolling Walker  4. Gait  x 30 feet with Supervision using Rolling Walker.                       History:     Past Medical History:   Diagnosis Date    ALLERGIC RHINITIS     Anemia     Arthritis     Back pain     Cataract     OU    COPD (chronic obstructive pulmonary disease)     Degenerative disc disease     Diabetes mellitus     NIDDM    Diverticulosis     DVT (deep venous thrombosis)     Edema     Fibromyalgia     Glaucoma     Gout     Hx of colonic polyps     Hyperlipidemia     Hypertension     Incontinence     Osteoporosis     Reflux     Sleep apnea     non compliant with CPAP    Vestibulitis of ear        Past Surgical History:   Procedure Laterality Date    ANGIOGRAPHY OF LOWER EXTREMITY N/A 2019    Procedure: ANGIOGRAM, LOWER EXTREMITY;  Surgeon: Gino Arana MD;  Location: [facility] CATH/EP LAB;  Service: General;  Laterality: N/A;    HIP SURGERY      HYSTERECTOMY      JOINT REPLACEMENT      B total hip       Time Tracking:     PT Received On: 06/10/20  PT Start Time: 1122     PT Stop Time: 1137  PT Total Time (min): 15 min     Billable Minutes: Evaluation 7 and Therapeutic Activity 8      Cynthia Ballard, PT  06/10/2020

## 2020-06-10 NOTE — PLAN OF CARE
06/10/20 1606   Medicare Message   Important Message from Medicare regarding Discharge Appeal Rights Given to patient/caregiver;Explained to patient/caregiver;Signed/date by patient/caregiver   Date IMM was signed 06/10/20   Time IMM was signed 1607

## 2020-06-10 NOTE — NURSING
Uneventful day, Cardiac diet ordered, Bed rest all day, IV saline lock intact and no redness noted. Medicated with pain once today. Takes meds whole. NAD
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
## 2020-06-10 NOTE — PLAN OF CARE
06/10/20 1637   Discharge Reassessment   Assessment Type Discharge Planning Reassessment   Anticipated Discharge Disposition Home-Health   Patient choice form signed by patient/caregiver List with quality metrics by geographic area provided  (Pt Choice Form signed by Pt at 1605.)   Post-Acute Status   Post-Acute Authorization Home Health   Home Health Status Awaiting Orders for HH   Discharge Delays None known at this time     Pt has no preference in HH agency at this time.  PHN protocol explained to Pt at this time.

## 2020-06-10 NOTE — PLAN OF CARE
Problem: Fall Injury Risk  Goal: Absence of Fall and Fall-Related Injury  Outcome: Ongoing, Progressing     Problem: Bariatric Environmental Safety  Goal: Safety Maintained with Care  Outcome: Ongoing, Progressing     Problem: Skin Injury Risk Increased  Goal: Skin Health and Integrity  Outcome: Ongoing, Progressing

## 2020-06-10 NOTE — PLAN OF CARE
Assessment completed with Pt at bedside.  She has [facility] insurance and her PCP is Dr. Villatoro.  She declines SNF placement at this time and preference is to discharge home with HH.  SW provided her with information on Medical Alert options for use at home.  Also encouraged her to contact [facility] to see if they have any options through insurance.  Will continue to follow.     06/10/20 7230   Discharge Assessment   Assessment Type Discharge Planning Assessment   Confirmed/corrected address and phone number on facesheet? Yes   Assessment information obtained from? Patient;Medical Record   Communicated expected length of stay with patient/caregiver yes   Prior to hospitilization cognitive status: Alert/Oriented   Prior to hospitalization functional status: Independent;Assistive Equipment   Current cognitive status: Alert/Oriented   Current Functional Status: Assistive Equipment;Independent   Facility Arrived From: Home   Lives With alone   Able to Return to Prior Arrangements yes   Is patient able to care for self after discharge? Yes   Who are your caregiver(s) and their phone number(s)? Pt is  and has one adult son.  She lists Bassem Herrera, friend ([phone] / [phone]) as her NOK.   Readmission Within the Last 30 Days no previous admission in last 30 days   Patient currently being followed by outpatient case management? No   Patient currently receives any other outside agency services? No   Equipment Currently Used at Home walker, rolling   Do you have any problems affording any of your prescribed medications? No   Is the patient taking medications as prescribed? yes   Does the patient have transportation home? Yes   Transportation Anticipated car, drives self;family or friend will provide   Does the patient receive services at the [facility]? No   Discharge Plan A Home Health   Discharge Plan B Home   DME Needed Upon Discharge    (TBD)   Patient/Family in Agreement with Plan yes

## 2020-06-11 VITALS
HEIGHT: 63 IN | DIASTOLIC BLOOD PRESSURE: 67 MMHG | RESPIRATION RATE: 18 BRPM | SYSTOLIC BLOOD PRESSURE: 155 MMHG | WEIGHT: 230 LBS | OXYGEN SATURATION: 96 % | HEART RATE: 58 BPM | BODY MASS INDEX: 40.75 KG/M2 | TEMPERATURE: 98 F

## 2020-06-11 LAB
ALBUMIN SERPL BCP-MCNC: 3.3 G/DL (ref 3.5–5.2)
ALP SERPL-CCNC: 86 U/L (ref 55–135)
ALT SERPL W/O P-5'-P-CCNC: 69 U/L (ref 10–44)
ANION GAP SERPL CALC-SCNC: 8 MMOL/L (ref 8–16)
AST SERPL-CCNC: 17 U/L (ref 10–40)
BASOPHILS # BLD AUTO: 0.02 K/UL (ref 0–0.2)
BASOPHILS NFR BLD: 0.1 % (ref 0–1.9)
BILIRUB SERPL-MCNC: 0.8 MG/DL (ref 0.1–1)
BUN SERPL-MCNC: 41 MG/DL (ref 8–23)
CALCIUM SERPL-MCNC: 8.8 MG/DL (ref 8.7–10.5)
CHLORIDE SERPL-SCNC: 103 MMOL/L (ref 95–110)
CO2 SERPL-SCNC: 26 MMOL/L (ref 23–29)
CREAT SERPL-MCNC: 1.3 MG/DL (ref 0.5–1.4)
DIFFERENTIAL METHOD: ABNORMAL
EOSINOPHIL # BLD AUTO: 0 K/UL (ref 0–0.5)
EOSINOPHIL NFR BLD: 0 % (ref 0–8)
ERYTHROCYTE [DISTWIDTH] IN BLOOD BY AUTOMATED COUNT: 12.7 % (ref 11.5–14.5)
EST. GFR  (AFRICAN AMERICAN): 45.7 ML/MIN/1.73 M^2
EST. GFR  (NON AFRICAN AMERICAN): 39.7 ML/MIN/1.73 M^2
GLUCOSE SERPL-MCNC: 163 MG/DL (ref 70–110)
HCT VFR BLD AUTO: 40.8 % (ref 37–48.5)
HGB BLD-MCNC: 13.1 G/DL (ref 12–16)
IMM GRANULOCYTES # BLD AUTO: 0.11 K/UL (ref 0–0.04)
IMM GRANULOCYTES NFR BLD AUTO: 0.8 % (ref 0–0.5)
LYMPHOCYTES # BLD AUTO: 1.7 K/UL (ref 1–4.8)
LYMPHOCYTES NFR BLD: 11.8 % (ref 18–48)
MAGNESIUM SERPL-MCNC: 2.3 MG/DL (ref 1.6–2.6)
MCH RBC QN AUTO: 33.5 PG (ref 27–31)
MCHC RBC AUTO-ENTMCNC: 32.1 G/DL (ref 32–36)
MCV RBC AUTO: 104 FL (ref 82–98)
MONOCYTES # BLD AUTO: 0.9 K/UL (ref 0.3–1)
MONOCYTES NFR BLD: 6.1 % (ref 4–15)
NEUTROPHILS # BLD AUTO: 11.8 K/UL (ref 1.8–7.7)
NEUTROPHILS NFR BLD: 81.2 % (ref 38–73)
NRBC BLD-RTO: 0 /100 WBC
PLATELET # BLD AUTO: 194 K/UL (ref 150–350)
PMV BLD AUTO: 10.9 FL (ref 9.2–12.9)
POTASSIUM SERPL-SCNC: 4.8 MMOL/L (ref 3.5–5.1)
PROT SERPL-MCNC: 6.7 G/DL (ref 6–8.4)
RBC # BLD AUTO: 3.91 M/UL (ref 4–5.4)
SODIUM SERPL-SCNC: 137 MMOL/L (ref 136–145)
WBC # BLD AUTO: 14.54 K/UL (ref 3.9–12.7)

## 2020-06-11 PROCEDURE — 94761 N-INVAS EAR/PLS OXIMETRY MLT: CPT

## 2020-06-11 PROCEDURE — 25000003 PHARM REV CODE 250: Performed by: NURSE PRACTITIONER

## 2020-06-11 PROCEDURE — 99900035 HC TECH TIME PER 15 MIN (STAT)

## 2020-06-11 PROCEDURE — 97530 THERAPEUTIC ACTIVITIES: CPT | Mod: CQ

## 2020-06-11 PROCEDURE — 97116 GAIT TRAINING THERAPY: CPT | Mod: CQ

## 2020-06-11 PROCEDURE — 80053 COMPREHEN METABOLIC PANEL: CPT

## 2020-06-11 PROCEDURE — 63600175 PHARM REV CODE 636 W HCPCS: Performed by: NURSE PRACTITIONER

## 2020-06-11 PROCEDURE — 83735 ASSAY OF MAGNESIUM: CPT

## 2020-06-11 PROCEDURE — 85025 COMPLETE CBC W/AUTO DIFF WBC: CPT

## 2020-06-11 PROCEDURE — 36415 COLL VENOUS BLD VENIPUNCTURE: CPT

## 2020-06-11 PROCEDURE — 94640 AIRWAY INHALATION TREATMENT: CPT

## 2020-06-11 RX ORDER — GABAPENTIN 300 MG/1
600 CAPSULE ORAL 3 TIMES DAILY
Qty: 180 CAPSULE | Refills: 11 | Status: SHIPPED | OUTPATIENT
Start: 2020-06-11 | End: 2020-07-10

## 2020-06-11 RX ORDER — OXYCODONE HYDROCHLORIDE 5 MG/1
5 TABLET ORAL EVERY 6 HOURS PRN
Qty: 28 TABLET | Refills: 0 | Status: SHIPPED | OUTPATIENT
Start: 2020-06-11 | End: 2020-06-18

## 2020-06-11 RX ORDER — GABAPENTIN 300 MG/1
300 CAPSULE ORAL 3 TIMES DAILY
Status: DISCONTINUED | OUTPATIENT
Start: 2020-06-11 | End: 2020-06-11 | Stop reason: HOSPADM

## 2020-06-11 RX ADMIN — OXYCODONE HYDROCHLORIDE 5 MG: 5 TABLET ORAL at 07:06

## 2020-06-11 RX ADMIN — SENNOSIDES AND DOCUSATE SODIUM 1 TABLET: 8.6; 5 TABLET ORAL at 08:06

## 2020-06-11 RX ADMIN — GABAPENTIN 600 MG: 300 CAPSULE ORAL at 07:06

## 2020-06-11 RX ADMIN — ASCORBIC ACID (VITAMIN C) 500 MG TABLET 500 MG: TABLET at 08:06

## 2020-06-11 RX ADMIN — METOPROLOL SUCCINATE 50 MG: 25 TABLET, FILM COATED, EXTENDED RELEASE ORAL at 07:06

## 2020-06-11 RX ADMIN — DEXAMETHASONE SODIUM PHOSPHATE 4 MG: 4 INJECTION, SOLUTION INTRA-ARTICULAR; INTRALESIONAL; INTRAMUSCULAR; INTRAVENOUS; SOFT TISSUE at 07:06

## 2020-06-11 RX ADMIN — HYDROCODONE BITARTRATE AND ACETAMINOPHEN 1 TABLET: 5; 325 TABLET ORAL at 02:06

## 2020-06-11 RX ADMIN — PANTOPRAZOLE SODIUM 40 MG: 40 TABLET, DELAYED RELEASE ORAL at 05:06

## 2020-06-11 RX ADMIN — FLUTICASONE PROPIONATE 100 MCG: 50 SPRAY, METERED NASAL at 07:06

## 2020-06-11 RX ADMIN — FLUTICASONE FUROATE AND VILANTEROL TRIFENATATE 1 PUFF: 100; 25 POWDER RESPIRATORY (INHALATION) at 07:06

## 2020-06-11 RX ADMIN — LOSARTAN POTASSIUM 100 MG: 50 TABLET, FILM COATED ORAL at 07:06

## 2020-06-11 RX ADMIN — CEFTRIAXONE 1 G: 1 INJECTION, SOLUTION INTRAVENOUS at 07:06

## 2020-06-11 NOTE — PLAN OF CARE
06/10/20 2100   Patient Assessment/Suction   Level of Consciousness (AVPU) alert   Respiratory Effort Unlabored   Expansion/Accessory Muscles/Retractions no use of accessory muscles   All Lung Fields Breath Sounds clear   Rhythm/Pattern, Respiratory unlabored   Cough Frequency no cough   PRE-TX-O2   O2 Device (Oxygen Therapy) room air   SpO2 97 %   Pulse 60   Resp 18   Temp 97.8 °F (36.6 °C)   /72   Aerosol Therapy   $ Aerosol Therapy Charges PRN treatment not required   Respiratory Treatment Status (SVN) PRN treatment not required   Respiratory Evaluation   $ Care Plan Tech Time 15 min   Evaluation For   (CARE PLAN)

## 2020-06-11 NOTE — PLAN OF CARE
06/11/20 1608   Final Note   Assessment Type Final Discharge Note   Anticipated Discharge Disposition Home-Health   Post-Acute Status   Post-Acute Authorization Home Health;E   E Status Set-up Complete   Home Health Status Set-up Complete   Discharge Delays (!) Home Medical Equipment (Insurance, Delivery)     Pt discharged home this date, and DME and HH orders faxed to [facility] at 1117.      SW received notification from Magda with [facility], that HH has been arranged with [facility].    SW also received authorization to pull RW and BSC from [facility].  Communicated this with Tere with [facility], who authorized SW pulling from DME closet here at [facility].  Pt was provided with cash price for TTB of $75.00, as PHN will not cover, and she declined and stated she would purchase elsewhere.     RW and BSC delivered to bedside, Pt signed delivery ticket, and Pt informed of HH agency arranged.  She verbalized no further needs at this time.

## 2020-06-11 NOTE — PLAN OF CARE
Problem: Fall Injury Risk  Goal: Absence of Fall and Fall-Related Injury  Outcome: Met     Problem: Adult Inpatient Plan of Care  Goal: Plan of Care Review  Outcome: Met  Goal: Patient-Specific Goal (Individualization)  Outcome: Met  Goal: Absence of Hospital-Acquired Illness or Injury  Outcome: Met  Goal: Optimal Comfort and Wellbeing  Outcome: Met  Goal: Readiness for Transition of Care  Outcome: Met  Goal: Rounds/Family Conference  Outcome: Met     Problem: Bariatric Environmental Safety  Goal: Safety Maintained with Care  Outcome: Met     Problem: Skin Injury Risk Increased  Goal: Skin Health and Integrity  Outcome: Met     Problem: Pain Acute  Goal: Optimal Pain Control  Outcome: Met

## 2020-06-11 NOTE — DISCHARGE SUMMARY
[facility] Medicine  Discharge Summary      Patient Name: Sammi Abreu  MRN: [id number]  Admission Date: 6/7/2020  Hospital Length of Stay: 3 days  Discharge Date and Time:  06/11/2020 10:45 AM  Attending Physician: Burt King MD   Discharging Provider: Burt King MD  Primary Care Provider: Osmani Villatoro MD      HPI:   Ms. Abreu presents today with complaints of abd pain. It is severe. It is associated with constipation and nausea. She denies V/D, cough, fever, chills, SOB, chest pain, or dizziness. She has a history of asthma/COPD, HTN, pulm HTN, prediabetes, and arthritis s/p total hips and knee replacements. She has been having this abd pain for about a week. It starts in her mid back on the right side and wraps around to the RLQ. Her RLQ is exquisitely tender. Stools have been hard as she's been constipated, but they have been brown. She went to another ED on 6/2 and had a normal CT abd/pelvis with unremarkable labs. She then went to an urgent care for the same pain and was referred back to the hospital and came this morning. A CT abd/pelvis was repeated and showed cholelithiasis and gallbladder distention. Her CMP shows newly elevated liver enzymes that were normal 5 days ago. Dr. Mcdonald was consulted per ED for suspected acute cholecystitis.     * No surgery found *      Hospital Course:   Patient was admitted with epigastric area and bilateral nonspecific 0 than left side of the upper abdominal pain, patient is not sure with the pain is coming from  MRI scan was done with multilevel severe central canal stenosis at lumbar and sacral areas  New onset LFT elevations are noted to but general surgeon does not believe that patient have cholecystitis  She will be re-evaluated tomorrow for possible need for surgery.  But CT gallbladder is distended and ultrasound with cholelithiasis    6/9/2020  Pt complains of 10/10 burning, shooting and aching pain for a month in the right L  1-2 distribution. My pain MGMT doctor put me off for 12 days and I cannot live like this. If I stand up my legs seize up and I cannot walk.    6/10/2020  Pt is able to walk with a walker with 6/10 pain. The neurontin is helping the right L1-2 pain. I want outpatient physical therapy.    6/11/2020  Pt is able to ambulate with a walker but needs Home pt and ot. She will see Dr GARCIA pain management for an injection. Pt refuses inpatient rehab.     Consults:   Consults (From admission, onward)        Status Ordering Provider     Inpatient consult to General surgery  Once     Provider:  LORENZA Mcdonald MD    Completed WALE VELARDE     Inpatient consult to Neurology  Once     Provider:  Matt So MD    Acknowledged AMADA MARIE     Inpatient consult to   Once     Provider:  (Not yet assigned)    AMADA [place]          No new Assessment & Plan notes have been filed under this hospital service since the last note was generated.  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    Specialty:  Family Medicine  Contact information:  [address]  [place] [address]  [phone]             Matt Garcia MD In 1 week.    Specialty:  Pain Medicine  Contact information:  [address] [place] [address] [address]  [place] [address]  [phone]                 Patient Instructions:       Ambulatory referral/consult to Home Health   Standing Status: Future   Referral Priority: Routine Referral Type: Home Health   Referral Reason: Specialty Services Required   Requested Specialty: Home Health Services   Number of Visits Requested: 1     Notify your health care provider if you experience any of the following:  temperature >100.4     Notify your health care provider if you experience any of the following:  persistent nausea and vomiting or diarrhea     Notify your health care provider if you experience any of the following:  severe uncontrolled pain     Notify your health care provider if you experience any of the following:  redness, tenderness, or signs of infection (pain, swelling, redness, odor or green/yellow discharge around incision site)     Notify your health care provider if you experience any of the following:  difficulty breathing or increased cough     Notify your health care provider if you experience any of the following:  severe persistent headache     Notify your health care provider if you experience any of the following:  worsening rash     Notify your health care provider if you experience any of the following:  persistent dizziness, light-headedness, or visual disturbances     Notify your health care provider if you experience any of the following:  increased confusion or weakness     Activity as tolerated       Significant Diagnostic Studies: Labs:   BMP:   Recent Labs   Lab 06/10/20  0451 06/11/20  0458   * 163*    137   K 5.4* 4.8    103   CO2 26 26   BUN 30* 41*   CREATININE 1.2 1.3   CALCIUM 9.2 8.8   MG 2.1 2.3   , CMP   Recent Labs   Lab 06/10/20  0451 06/10/20  1305 06/11/20  0458     --  137   K 5.4*  --  4.8     --  103   CO2 26  --  26   *  --  163*   BUN 30*  --  41*   CREATININE 1.2  --  1.3   CALCIUM 9.2  --  8.8   PROT 7.0 7.8 6.7   ALBUMIN 3.6 3.7 3.3*   BILITOT 0.6 0.8 0.8   ALKPHOS 96 101 86   AST 28 26 17   ALT 97* 96* 69*    ANIONGAP 10  --  8   ESTGFRAFRICA 50.4*  --  45.7*   EGFRNONAA 43.7*  --  39.7*   , CBC   Recent Labs   Lab 06/10/20  0451 06/11/20  0458   WBC 16.44* 14.54*   HGB 13.4 13.1   HCT 41.5 40.8    194   , INR No results found for: INR, PROTIME and Lipid Panel   Lab Results   Component Value Date    CHOL 194 01/16/2020    HDL 40 01/16/2020    LDLCALC 134.4 01/16/2020    TRIG 98 01/16/2020    CHOLHDL 20.6 01/16/2020       Pending Diagnostic Studies:     None         Medications:  Reconciled Home Medications:      Medication List      START taking these medications    gabapentin 300 MG capsule  Commonly known as:  NEURONTIN  Take 2 capsules (600 mg total) by mouth 3 (three) times daily.     oxyCODONE 5 MG immediate release tablet  Commonly known as:  ROXICODONE  Take 1 tablet (5 mg total) by mouth every 6 (six) hours as needed for Pain.        CHANGE how you take these medications    esomeprazole 20 MG capsule  Commonly known as:  NEXIUM  Take 1 capsule (20 mg total) by mouth before breakfast.  What changed:    · when to take this  · reasons to take this        CONTINUE taking these medications    albuterol 2.5 mg /3 mL (0.083 %) nebulizer solution  Commonly known as:  PROVENTIL  Take 3 mLs (2.5 mg total) by nebulization every 6 (six) hours as needed.     aspirin 81 MG EC tablet  Commonly known as:  ECOTRIN  Take 81 mg by mouth once daily.     budesonide-formoterol 160-4.5 mcg 160-4.5 mcg/actuation Hfaa  Commonly known as:  SYMBICORT  Inhale 2 puffs into the lungs every 12 (twelve) hours. Controller     cyanocobalamin (vitamin B-12) 2,500 mcg Lozg  Place 2 tablets under the tongue once daily.     diclofenac 25 MG Tbec  Commonly known as:  VOLTAREN  Take 1 tablet (25 mg total) by mouth 3 (three) times daily as needed.     fluticasone propionate 50 mcg/actuation nasal spray  Commonly known as:  FLONASE  2 sprays (100 mcg total) by Each Nare route once daily.     furosemide 40 MG tablet  Commonly known as:   LASIX  Take 1 tablet (40 mg total) by mouth once daily.     lactulose 10 gram/15 mL solution  Commonly known as:  CHRONULAC  Take 30 mLs (20 g total) by mouth 3 (three) times daily. 2 Teaspoon(s) Oral PRN Every evening.     losartan 100 MG tablet  Commonly known as:  COZAAR  Take 1 tablet (100 mg total) by mouth once daily.     montelukast 10 mg tablet  Commonly known as:  SINGULAIR  Take 1 tablet (10 mg total) by mouth every evening.     spironolactone 25 MG tablet  Commonly known as:  ALDACTONE  Take 1 tablet (25 mg total) by mouth once daily.     VITAMIN C 100 MG tablet  Generic drug:  ascorbic acid (vitamin C)  Take 100 mg by mouth once daily.        STOP taking these medications    acetaminophen 500 MG tablet  Commonly known as:  TYLENOL     azithromycin 250 MG tablet  Commonly known as:  Z-ARNAUD     b complex vitamins tablet        ASK your doctor about these medications    HYDROcodone-acetaminophen 5-325 mg per tablet  Commonly known as:  NORCO  Take 1 tablet by mouth every 8 (eight) hours as needed for Pain.     metoprolol succinate 50 MG 24 hr tablet  Commonly known as:  TOPROL-XL  Take 1 tablet (50 mg total) by mouth once daily.            Indwelling Lines/Drains at time of discharge:   Lines/Drains/Airways     None                 Time spent on the discharge of patient: 50 minutes  Patient was seen and examined on the date of discharge and determined to be suitable for discharge.         Burt King MD  Department of Hospital Medicine  [facility]

## 2020-06-11 NOTE — PLAN OF CARE
06/11/20 0718   Patient Assessment/Suction   Level of Consciousness (AVPU) alert   Respiratory Effort Normal;Unlabored   Expansion/Accessory Muscles/Retractions no use of accessory muscles;no retractions;expansion symmetric   All Lung Fields Breath Sounds clear   Rhythm/Pattern, Respiratory unlabored;pattern regular;depth regular;chest wiggle adequate;no shortness of breath reported   Cough Frequency infrequent   Cough Type good   PRE-TX-O2   O2 Device (Oxygen Therapy) room air   SpO2 95 %   Pulse Oximetry Type Intermittent   $ Pulse Oximetry - Multiple Charge Pulse Oximetry - Multiple   Pulse 72   Resp 18   Inhaler   $ Inhaler Charges MDI (Metered Dose Inahler) Treatment;Mouth rinsed post treatment   Daily Review of Necessity (Inhaler) completed   Respiratory Treatment Status (Inhaler) given   Treatment Route (Inhaler) mouthpiece   Patient Position (Inhaler) HOB elevated   Post Treatment Assessment (Inhaler) increased aeration   Signs of Intolerance (Inhaler) none   Breath Sounds Post-Respiratory Treatment   Throughout All Fields Post-Treatment All Fields   Throughout All Fields Post-Treatment aeration increased   Post-treatment Heart Rate (beats/min) 75   Post-treatment Resp Rate (breaths/min) 18   Respiratory Evaluation   $ Care Plan Tech Time 15 min

## 2020-06-11 NOTE — PT/OT/SLP PROGRESS
"Physical Therapy Treatment    Patient Name:  Sammi Abreu   MRN:  [id number]    Recommendations:     Discharge Recommendations:  home health PT(Pt refusing placement)   Discharge Equipment Recommendations: bedside commode, bath bench, walker, rolling   Barriers to discharge:      Assessment:     Sammi Abreu is a 79 y.o. female admitted with a medical diagnosis of Acute cholecystitis.  She presents with the following impairments/functional limitations:  weakness, impaired endurance, impaired self care skills, impaired balance, gait instability, impaired functional mobilty, pain, decreased lower extremity function, decreased upper extremity function. Pt tearful upon arrival with reports of R hip and back pain. Pt with increased pain in standing. Pt ambulated in room without noting LOB. Pt ambulates with decreased stance time on RLE and  decreased step length of BLE. Pt was educated in home safety and home equipment use. Pt reports decreased pain in sitting position. Pt is limited during tx 2/2 pain and frear of increased pain with functional mobility. Continue with PT and POC.     Rehab Prognosis: Fair; patient would benefit from acute skilled PT services to address these deficits and reach maximum level of function.    Recent Surgery: * No surgery found *      Plan:     During this hospitalization, patient to be seen 6 x/week to address the identified rehab impairments via therapeutic activities, gait training, therapeutic exercises and progress toward the following goals:    · Plan of Care Expires:  07/10/20    Subjective     Chief Complaint: Pt reports "20/10" pain in standing  Patient/Family Comments/goals: decreased pain  Pain/Comfort:  · Pain Rating 1: 10/10  · Location - Side 1: Right  · Location 1: (back and groin pain)  · Pain Addressed 1: Reposition, Distraction, Cessation of Activity, Nurse notified      Objective:     Communicated with RN prior to session.  Patient found HOB elevated with bed " alarm, peripheral IV upon PT entry to room.     General Precautions: Standard,     Orthopedic Precautions:    Braces:       Functional Mobility:  · Bed Mobility:     · Rolling Left:  stand by assistance  · Supine to Sit: stand by assistance  · Transfers:     · Sit to Stand:  contact guard assistance with rolling walker and 2 trials  · Bed to Chair: contact guard assistance with  rolling walker  using  Step Transfer  · Gait: 10 feet with RW and CGA x 2 trials  Pt required one seated rest break between trials      AM-PAC 6 CLICK MOBILITY          Therapeutic Activities and Exercises:   bed mobility; sitting EOB for trunk control and midline orientation; sit<> stands; transfer training; gait training     Patient left up in chair with all lines intact and call button in reach..    GOALS:   Multidisciplinary Problems     Physical Therapy Goals        Problem: Physical Therapy Goal    Goal Priority Disciplines Outcome Goal Variances Interventions   Physical Therapy Goal     PT, PT/OT      Description:  Goals to be met by: discharge     Patient will increase functional independence with mobility by performin. Supine to sit with Stand-by Assistance  2. Sit to stand transfer with Supervision  3. Bed to chair transfer with Supervision using Rolling Walker  4. Gait  x 30 feet with Supervision using Rolling Walker.                       Time Tracking:     PT Received On: 20  PT Start Time: 1113     PT Stop Time: 1153  PT Total Time (min): 40 min     Billable Minutes: Gait Training 8 and Therapeutic Activity 32    Treatment Type: Treatment  PT/PTA: PTA     PTA Visit Number: 1 Hillary Hammant, PTA  2020
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
## 2020-06-12 ENCOUNTER — TELEPHONE (OUTPATIENT)
Dept: FAMILY MEDICINE | Facility: CLINIC | Age: 80
End: 2020-06-12

## 2020-06-12 LAB
BACTERIA BLD CULT: NORMAL
BACTERIA BLD CULT: NORMAL

## 2020-06-12 PROCEDURE — G0180 PR HOME HEALTH MD CERTIFICATION: ICD-10-PCS | Mod: ,,, | Performed by: FAMILY MEDICINE

## 2020-06-12 PROCEDURE — G0180 MD CERTIFICATION HHA PATIENT: HCPCS | Mod: ,,, | Performed by: FAMILY MEDICINE

## 2020-06-12 NOTE — TELEPHONE ENCOUNTER
----- Message from Maricarmen Ibarra sent at 6/12/2020  4:45 PM CDT -----  Contact: Elmo [phone]  Needs a sooner hospital follow up appointment.

## 2020-06-15 ENCOUNTER — LAB VISIT (OUTPATIENT)
Dept: LAB | Facility: HOSPITAL | Age: 80
End: 2020-06-15
Attending: NURSE PRACTITIONER
Payer: MEDICARE

## 2020-06-15 ENCOUNTER — OFFICE VISIT (OUTPATIENT)
Dept: FAMILY MEDICINE | Facility: CLINIC | Age: 80
End: 2020-06-15
Payer: MEDICARE

## 2020-06-15 VITALS
SYSTOLIC BLOOD PRESSURE: 130 MMHG | DIASTOLIC BLOOD PRESSURE: 76 MMHG | BODY MASS INDEX: 40.97 KG/M2 | HEIGHT: 63 IN | WEIGHT: 231.25 LBS | HEART RATE: 82 BPM | OXYGEN SATURATION: 96 % | TEMPERATURE: 98 F

## 2020-06-15 DIAGNOSIS — D72.829 LEUKOCYTOSIS, UNSPECIFIED TYPE: ICD-10-CM

## 2020-06-15 DIAGNOSIS — M51.36 DDD (DEGENERATIVE DISC DISEASE), LUMBAR: ICD-10-CM

## 2020-06-15 DIAGNOSIS — Z09 HOSPITAL DISCHARGE FOLLOW-UP: Primary | ICD-10-CM

## 2020-06-15 DIAGNOSIS — R74.8 ELEVATED LIVER ENZYMES: ICD-10-CM

## 2020-06-15 DIAGNOSIS — E66.01 SEVERE OBESITY (BMI 35.0-39.9) WITH COMORBIDITY: ICD-10-CM

## 2020-06-15 DIAGNOSIS — E11.49 OTHER DIABETIC NEUROLOGICAL COMPLICATION ASSOCIATED WITH TYPE 2 DIABETES MELLITUS: ICD-10-CM

## 2020-06-15 DIAGNOSIS — K80.20 CALCULUS OF GALLBLADDER WITHOUT CHOLECYSTITIS WITHOUT OBSTRUCTION: ICD-10-CM

## 2020-06-15 DIAGNOSIS — I73.9 PERIPHERAL VASCULAR DISEASE, UNSPECIFIED: ICD-10-CM

## 2020-06-15 PROBLEM — E11.40 DIABETIC NEUROPATHY ASSOCIATED WITH TYPE 2 DIABETES MELLITUS: Status: ACTIVE | Noted: 2020-06-15

## 2020-06-15 LAB
ALBUMIN SERPL BCP-MCNC: 3 G/DL (ref 3.5–5.2)
ALBUMIN SERPL BCP-MCNC: 3 G/DL (ref 3.5–5.2)
ALP SERPL-CCNC: 91 U/L (ref 55–135)
ALP SERPL-CCNC: 91 U/L (ref 55–135)
ALT SERPL W/O P-5'-P-CCNC: 45 U/L (ref 10–44)
ALT SERPL W/O P-5'-P-CCNC: 45 U/L (ref 10–44)
ANION GAP SERPL CALC-SCNC: 7 MMOL/L (ref 8–16)
AST SERPL-CCNC: 24 U/L (ref 10–40)
AST SERPL-CCNC: 24 U/L (ref 10–40)
BASOPHILS # BLD AUTO: 0.03 K/UL (ref 0–0.2)
BASOPHILS NFR BLD: 0.4 % (ref 0–1.9)
BILIRUB DIRECT SERPL-MCNC: 0.2 MG/DL (ref 0.1–0.3)
BILIRUB SERPL-MCNC: 0.5 MG/DL (ref 0.1–1)
BILIRUB SERPL-MCNC: 0.5 MG/DL (ref 0.1–1)
BUN SERPL-MCNC: 20 MG/DL (ref 8–23)
CALCIUM SERPL-MCNC: 8.8 MG/DL (ref 8.7–10.5)
CHLORIDE SERPL-SCNC: 102 MMOL/L (ref 95–110)
CO2 SERPL-SCNC: 29 MMOL/L (ref 23–29)
CREAT SERPL-MCNC: 1.3 MG/DL (ref 0.5–1.4)
DIFFERENTIAL METHOD: ABNORMAL
EOSINOPHIL # BLD AUTO: 0.3 K/UL (ref 0–0.5)
EOSINOPHIL NFR BLD: 3.8 % (ref 0–8)
ERYTHROCYTE [DISTWIDTH] IN BLOOD BY AUTOMATED COUNT: 12.8 % (ref 11.5–14.5)
EST. GFR  (AFRICAN AMERICAN): 45.7 ML/MIN/1.73 M^2
EST. GFR  (NON AFRICAN AMERICAN): 39.7 ML/MIN/1.73 M^2
GLUCOSE SERPL-MCNC: 146 MG/DL (ref 70–110)
HCT VFR BLD AUTO: 42 % (ref 37–48.5)
HGB BLD-MCNC: 12.9 G/DL (ref 12–16)
IMM GRANULOCYTES # BLD AUTO: 0.06 K/UL (ref 0–0.04)
IMM GRANULOCYTES NFR BLD AUTO: 0.8 % (ref 0–0.5)
LYMPHOCYTES # BLD AUTO: 1.4 K/UL (ref 1–4.8)
LYMPHOCYTES NFR BLD: 17.5 % (ref 18–48)
MCH RBC QN AUTO: 33.4 PG (ref 27–31)
MCHC RBC AUTO-ENTMCNC: 30.7 G/DL (ref 32–36)
MCV RBC AUTO: 109 FL (ref 82–98)
MONOCYTES # BLD AUTO: 0.7 K/UL (ref 0.3–1)
MONOCYTES NFR BLD: 9.2 % (ref 4–15)
NEUTROPHILS # BLD AUTO: 5.3 K/UL (ref 1.8–7.7)
NEUTROPHILS NFR BLD: 68.3 % (ref 38–73)
NRBC BLD-RTO: 0 /100 WBC
PLATELET # BLD AUTO: 174 K/UL (ref 150–350)
PMV BLD AUTO: 11 FL (ref 9.2–12.9)
POTASSIUM SERPL-SCNC: 5 MMOL/L (ref 3.5–5.1)
PROT SERPL-MCNC: 6.6 G/DL (ref 6–8.4)
PROT SERPL-MCNC: 6.6 G/DL (ref 6–8.4)
RBC # BLD AUTO: 3.86 M/UL (ref 4–5.4)
SODIUM SERPL-SCNC: 138 MMOL/L (ref 136–145)
WBC # BLD AUTO: 7.72 K/UL (ref 3.9–12.7)

## 2020-06-15 PROCEDURE — 99999 PR PBB SHADOW E&M-EST. PATIENT-LVL V: CPT | Mod: PBBFAC,,, | Performed by: NURSE PRACTITIONER

## 2020-06-15 PROCEDURE — 36415 COLL VENOUS BLD VENIPUNCTURE: CPT | Mod: PO

## 2020-06-15 PROCEDURE — 85025 COMPLETE CBC W/AUTO DIFF WBC: CPT

## 2020-06-15 PROCEDURE — 3075F SYST BP GE 130 - 139MM HG: CPT | Mod: CPTII,S$GLB,, | Performed by: NURSE PRACTITIONER

## 2020-06-15 PROCEDURE — 1101F PT FALLS ASSESS-DOCD LE1/YR: CPT | Mod: CPTII,S$GLB,, | Performed by: NURSE PRACTITIONER

## 2020-06-15 PROCEDURE — 1101F PR PT FALLS ASSESS DOC 0-1 FALLS W/OUT INJ PAST YR: ICD-10-PCS | Mod: CPTII,S$GLB,, | Performed by: NURSE PRACTITIONER

## 2020-06-15 PROCEDURE — 1159F PR MEDICATION LIST DOCUMENTED IN MEDICAL RECORD: ICD-10-PCS | Mod: S$GLB,,, | Performed by: NURSE PRACTITIONER

## 2020-06-15 PROCEDURE — 99999 PR PBB SHADOW E&M-EST. PATIENT-LVL V: ICD-10-PCS | Mod: PBBFAC,,, | Performed by: NURSE PRACTITIONER

## 2020-06-15 PROCEDURE — 3078F DIAST BP <80 MM HG: CPT | Mod: CPTII,S$GLB,, | Performed by: NURSE PRACTITIONER

## 2020-06-15 PROCEDURE — 99214 OFFICE O/P EST MOD 30 MIN: CPT | Mod: 25,S$GLB,, | Performed by: NURSE PRACTITIONER

## 2020-06-15 PROCEDURE — 3075F PR MOST RECENT SYSTOLIC BLOOD PRESS GE 130-139MM HG: ICD-10-PCS | Mod: CPTII,S$GLB,, | Performed by: NURSE PRACTITIONER

## 2020-06-15 PROCEDURE — 1159F MED LIST DOCD IN RCRD: CPT | Mod: S$GLB,,, | Performed by: NURSE PRACTITIONER

## 2020-06-15 PROCEDURE — 3078F PR MOST RECENT DIASTOLIC BLOOD PRESSURE < 80 MM HG: ICD-10-PCS | Mod: CPTII,S$GLB,, | Performed by: NURSE PRACTITIONER

## 2020-06-15 PROCEDURE — 80053 COMPREHEN METABOLIC PANEL: CPT

## 2020-06-15 PROCEDURE — 1125F PR PAIN SEVERITY QUANTIFIED, PAIN PRESENT: ICD-10-PCS | Mod: S$GLB,,, | Performed by: NURSE PRACTITIONER

## 2020-06-15 PROCEDURE — 96372 PR INJECTION,THERAP/PROPH/DIAG2ST, IM OR SUBCUT: ICD-10-PCS | Mod: S$GLB,,, | Performed by: NURSE PRACTITIONER

## 2020-06-15 PROCEDURE — 1125F AMNT PAIN NOTED PAIN PRSNT: CPT | Mod: S$GLB,,, | Performed by: NURSE PRACTITIONER

## 2020-06-15 PROCEDURE — 99214 PR OFFICE/OUTPT VISIT, EST, LEVL IV, 30-39 MIN: ICD-10-PCS | Mod: 25,S$GLB,, | Performed by: NURSE PRACTITIONER

## 2020-06-15 PROCEDURE — 96372 THER/PROPH/DIAG INJ SC/IM: CPT | Mod: S$GLB,,, | Performed by: NURSE PRACTITIONER

## 2020-06-15 RX ORDER — METHYLPREDNISOLONE ACETATE 40 MG/ML
80 INJECTION, SUSPENSION INTRA-ARTICULAR; INTRALESIONAL; INTRAMUSCULAR; SOFT TISSUE
Status: COMPLETED | OUTPATIENT
Start: 2020-06-15 | End: 2020-06-15

## 2020-06-15 RX ORDER — DICLOFENAC SODIUM 25 MG/1
25 TABLET, DELAYED RELEASE ORAL 3 TIMES DAILY PRN
Qty: 15 TABLET | Refills: 0 | Status: SHIPPED | OUTPATIENT
Start: 2020-06-15 | End: 2020-06-15 | Stop reason: SDUPTHER

## 2020-06-15 RX ORDER — TIZANIDINE 4 MG/1
4 TABLET ORAL EVERY 6 HOURS PRN
Qty: 30 TABLET | Refills: 1 | Status: SHIPPED | OUTPATIENT
Start: 2020-06-15 | End: 2020-06-25

## 2020-06-15 RX ORDER — DICLOFENAC SODIUM 25 MG/1
25 TABLET, DELAYED RELEASE ORAL 3 TIMES DAILY PRN
Qty: 15 TABLET | Refills: 0 | Status: SHIPPED | OUTPATIENT
Start: 2020-06-15 | End: 2020-09-17

## 2020-06-15 RX ADMIN — METHYLPREDNISOLONE ACETATE 80 MG: 40 INJECTION, SUSPENSION INTRA-ARTICULAR; INTRALESIONAL; INTRAMUSCULAR; SOFT TISSUE at 11:06

## 2020-06-15 NOTE — PROGRESS NOTES
Subjective:       Patient ID: Sammi Abreu is a 79 y.o. female.    Chief Complaint: Hospital Follow Up    Admission Date: 6/7/2020  Hospital Length of Stay: 2 days  Patient presents today for hospital follow up. Patient was seen at [facility] complaints of abd pain and back pain. A CT abd/pelvis was repeated and showed cholelithiasis and gallbladder distention. Her CMP shows newly elevated liver enzymes that were normal 5 days ago. Dr. Mcdonald was consulted per ED for suspected acute cholecystitis. Neurology consulted- MRI MRCP: Spinal stenosis  -MRI showed central canal narrowing severe L3-L4, moderate L1-L2, L4-L5, possible radiculopathy left L3; Increased gabapentin. Encouraged PT.         Past Medical History:   Diagnosis Date    ALLERGIC RHINITIS     Anemia     Arthritis     Back pain     Cataract     OU    COPD (chronic obstructive pulmonary disease)     Degenerative disc disease     Diabetes mellitus     NIDDM    Diverticulosis     DVT (deep venous thrombosis)     Edema     Fibromyalgia     Glaucoma     Gout     Hx of colonic polyps     Hyperlipidemia     Hypertension     Incontinence     Osteoporosis     Reflux     Sleep apnea     non compliant with CPAP    Vestibulitis of ear        Review of patient's allergies indicates:   Allergen Reactions    Cymbalta [duloxetine] Other (See Comments)     Nightmares      Darvon [propoxyphene] Nausea Only and Other (See Comments)     Sweating, slept for 3 days    Atorvastatin Other (See Comments)     Muscle cramps    Naprosyn [naproxen] Nausea Only    Penicillins Rash    Tramadol Nausea Only and Palpitations         Current Outpatient Medications:     ascorbic acid (VITAMIN C) 100 MG tablet, Take 100 mg by mouth once daily. , Disp: , Rfl:     aspirin (ECOTRIN) 81 MG EC tablet, Take 81 mg by mouth once daily., Disp: , Rfl:     budesonide-formoterol 160-4.5 mcg (SYMBICORT) 160-4.5 mcg/actuation HFAA, Inhale 2 puffs into the lungs every 12  (twelve) hours. Controller, Disp: 3 Inhaler, Rfl: 3    cyanocobalamin, vitamin B-12, 2,500 mcg Lozg, Place 2 tablets under the tongue once daily., Disp: 60 lozenge, Rfl: 11    esomeprazole (NEXIUM) 20 MG capsule, Take 1 capsule (20 mg total) by mouth before breakfast. (Patient taking differently: Take 20 mg by mouth daily as needed (heartburn). ), Disp: 30 capsule, Rfl: 2    fluticasone (FLONASE) 50 mcg/actuation nasal spray, 2 sprays (100 mcg total) by Each Nare route once daily., Disp: 3 Bottle, Rfl: 3    furosemide (LASIX) 40 MG tablet, Take 1 tablet (40 mg total) by mouth once daily., Disp: 90 tablet, Rfl: 4    gabapentin (NEURONTIN) 300 MG capsule, Take 2 capsules (600 mg total) by mouth 3 (three) times daily., Disp: 180 capsule, Rfl: 11    lactulose (CHRONULAC) 10 gram/15 mL solution, Take 30 mLs (20 g total) by mouth 3 (three) times daily. 2 Teaspoon(s) Oral PRN Every evening., Disp: 500 mL, Rfl: 3    losartan (COZAAR) 100 MG tablet, Take 1 tablet (100 mg total) by mouth once daily., Disp: 90 tablet, Rfl: 4    metoprolol succinate (TOPROL-XL) 50 MG 24 hr tablet, Take 1 tablet (50 mg total) by mouth once daily., Disp: 90 tablet, Rfl: 3    montelukast (SINGULAIR) 10 mg tablet, Take 1 tablet (10 mg total) by mouth every evening., Disp: 90 tablet, Rfl: 3    spironolactone (ALDACTONE) 25 MG tablet, Take 1 tablet (25 mg total) by mouth once daily., Disp: 90 tablet, Rfl: 6    albuterol (PROVENTIL) 2.5 mg /3 mL (0.083 %) nebulizer solution, Take 3 mLs (2.5 mg total) by nebulization every 6 (six) hours as needed., Disp: 120 each, Rfl: 11    diclofenac (VOLTAREN) 25 MG TbEC, Take 1 tablet (25 mg total) by mouth 3 (three) times daily as needed., Disp: 15 tablet, Rfl: 0    tiZANidine (ZANAFLEX) 4 MG tablet, Take 1 tablet (4 mg total) by mouth every 6 (six) hours as needed., Disp: 30 tablet, Rfl: 1    Review of Systems   Constitutional: Negative for unexpected weight change.   HENT: Negative for trouble  "swallowing.    Eyes: Negative for visual disturbance.   Respiratory: Negative for shortness of breath.    Cardiovascular: Negative for chest pain, palpitations and leg swelling.   Gastrointestinal: Negative for blood in stool.   Genitourinary: Negative for hematuria.   Musculoskeletal: Positive for arthralgias and back pain.   Skin: Negative for rash.   Allergic/Immunologic: Negative for immunocompromised state.   Neurological: Negative for headaches.   Hematological: Does not bruise/bleed easily.   Psychiatric/Behavioral: Negative for agitation. The patient is not nervous/anxious.        Objective:      /76 (BP Location: Right arm, Patient Position: Sitting, BP Method: X-Large (Manual))   Pulse 82   Temp 97.6 °F (36.4 °C)   Ht 5' 3" (1.6 m)   Wt 104.9 kg (231 lb 4.2 oz)   SpO2 96%   BMI 40.97 kg/m²   Physical Exam  Constitutional:       Appearance: She is well-developed.   Eyes:      Pupils: Pupils are equal, round, and reactive to light.   Neck:      Musculoskeletal: Normal range of motion.   Cardiovascular:      Rate and Rhythm: Normal rate and regular rhythm.      Heart sounds: Normal heart sounds.   Pulmonary:      Effort: Pulmonary effort is normal.      Breath sounds: Normal breath sounds.   Abdominal:      General: Bowel sounds are normal.      Palpations: Abdomen is soft.   Musculoskeletal:      Lumbar back: She exhibits decreased range of motion, tenderness and pain.   Skin:     General: Skin is warm and dry.   Neurological:      Mental Status: She is alert and oriented to person, place, and time.   Psychiatric:         Behavior: Behavior normal.         Thought Content: Thought content normal.         Judgment: Judgment normal.         Assessment:       1. Hospital discharge follow-up    2. DDD (degenerative disc disease), lumbar    3. Calculus of gallbladder without cholecystitis without obstruction    4. Other diabetic neurological complication associated with type 2 diabetes mellitus    5. " "Peripheral vascular disease, unspecified    6. Elevated liver enzymes    7. Leukocytosis, unspecified type    8. Severe obesity (BMI 35.0-39.9) with comorbidity        Plan:       Hospital discharge follow-up  -     Hepatic function panel; Future; Expected date: 06/15/2020  -     CBC W/ AUTO DIFFERENTIAL; Future; Expected date: 06/15/2020  -     Comprehensive metabolic panel; Future; Expected date: 06/15/2020  -     Ambulatory referral/consult to General Surgery; Future; Expected date: 06/22/2020    DDD (degenerative disc disease), lumbar  -     methylPREDNISolone acetate injection 80 mg  -     Discontinue: diclofenac (VOLTAREN) 25 MG TbEC; Take 1 tablet (25 mg total) by mouth 3 (three) times daily as needed.  Dispense: 15 tablet; Refill: 0  -     tiZANidine (ZANAFLEX) 4 MG tablet; Take 1 tablet (4 mg total) by mouth every 6 (six) hours as needed.  Dispense: 30 tablet; Refill: 1    Calculus of gallbladder without cholecystitis without obstruction  -     Ambulatory referral/consult to General Surgery; Future; Expected date: 06/22/2020    Other diabetic neurological complication associated with type 2 diabetes mellitus  Stable, on Neurontin  Peripheral vascular disease, unspecified  Stable, continue regimen  Elevated liver enzymes  -     Hepatic function panel; Future; Expected date: 06/15/2020    Leukocytosis, unspecified type  -     CBC W/ AUTO DIFFERENTIAL; Future; Expected date: 06/15/2020  -     Comprehensive metabolic panel; Future; Expected date: 06/15/2020    Severe obesity (BMI 35.0-39.9) with comorbidity  Counseled patient on his ideal body weight, health consequences of being obese and current recommendations including weekly exercise and a heart healthy diet.  Current BMI is:Estimated body mass index is 40.97 kg/m² as calculated from the following:    Height as of this encounter: 5' 3" (1.6 m).    Weight as of this encounter: 104.9 kg (231 lb 4.2 oz)..  Patient is aware that ideal BMI < 25 or Weight in " (lb) to have BMI = 25: 140.8.            Patient readiness: acceptance and barriers:none    During the course of the visit the patient was educated and counseled about the following:     Hypertension:   Dietary sodium restriction.  Regular aerobic exercise.  Obesity:   General weight loss/lifestyle modification strategies discussed (elicit support from others; identify saboteurs; non-food rewards, etc).  Regular aerobic exercise program discussed.    Goals: Hypertension: Reduce Blood Pressure and Obesity: Reduce calorie intake and BMI    Did patient meet goals/outcomes: No    The following self management tools provided: declined    Patient Instructions (the written plan) was given to the patient/family.     Time spent with patient: 15 minutes    Barriers to medications present (no )    Adverse reactions to current medications (no)    Over the counter medications reviewed (Yes)

## 2020-06-15 NOTE — PROGRESS NOTES
Patient verified by name and . Patient received 80mg methylprednisolone in right ventrogluteal. Patient tolerated injection well. Patient advised to wait in clinic for 15 minutes in case of adverse reactions. Patient demonstrated understanding.

## 2020-06-15 NOTE — TELEPHONE ENCOUNTER
----- Message from Leonila Bailey sent at 6/15/2020  3:47 PM CDT -----  Regarding: Medication  Contact: pt  Type: Needs Medical Advice    Who Called:  PT  Best Call Back Number: [phone]  Additional Information: Requesting a call back regarding pt medication diclofenac (VOLTAREN) 25 MG TbEC pt stated the pharmacy said it never made it  Please Advise ---Thank you

## 2020-06-15 NOTE — H&P (VIEW-ONLY)
Subjective:       Patient ID: Sammi Abreu is a 79 y.o. female.    Chief Complaint: Hospital Follow Up    Admission Date: 6/7/2020  Hospital Length of Stay: 2 days  Patient presents today for hospital follow up. Patient was seen at [facility] complaints of abd pain and back pain. A CT abd/pelvis was repeated and showed cholelithiasis and gallbladder distention. Her CMP shows newly elevated liver enzymes that were normal 5 days ago. Dr. Mcdonald was consulted per ED for suspected acute cholecystitis. Neurology consulted- MRI MRCP: Spinal stenosis  -MRI showed central canal narrowing severe L3-L4, moderate L1-L2, L4-L5, possible radiculopathy left L3; Increased gabapentin. Encouraged PT.         Past Medical History:   Diagnosis Date    ALLERGIC RHINITIS     Anemia     Arthritis     Back pain     Cataract     OU    COPD (chronic obstructive pulmonary disease)     Degenerative disc disease     Diabetes mellitus     NIDDM    Diverticulosis     DVT (deep venous thrombosis)     Edema     Fibromyalgia     Glaucoma     Gout     Hx of colonic polyps     Hyperlipidemia     Hypertension     Incontinence     Osteoporosis     Reflux     Sleep apnea     non compliant with CPAP    Vestibulitis of ear        Review of patient's allergies indicates:   Allergen Reactions    Cymbalta [duloxetine] Other (See Comments)     Nightmares      Darvon [propoxyphene] Nausea Only and Other (See Comments)     Sweating, slept for 3 days    Atorvastatin Other (See Comments)     Muscle cramps    Naprosyn [naproxen] Nausea Only    Penicillins Rash    Tramadol Nausea Only and Palpitations         Current Outpatient Medications:     ascorbic acid (VITAMIN C) 100 MG tablet, Take 100 mg by mouth once daily. , Disp: , Rfl:     aspirin (ECOTRIN) 81 MG EC tablet, Take 81 mg by mouth once daily., Disp: , Rfl:     budesonide-formoterol 160-4.5 mcg (SYMBICORT) 160-4.5 mcg/actuation HFAA, Inhale 2 puffs into the lungs every 12  (twelve) hours. Controller, Disp: 3 Inhaler, Rfl: 3    cyanocobalamin, vitamin B-12, 2,500 mcg Lozg, Place 2 tablets under the tongue once daily., Disp: 60 lozenge, Rfl: 11    esomeprazole (NEXIUM) 20 MG capsule, Take 1 capsule (20 mg total) by mouth before breakfast. (Patient taking differently: Take 20 mg by mouth daily as needed (heartburn). ), Disp: 30 capsule, Rfl: 2    fluticasone (FLONASE) 50 mcg/actuation nasal spray, 2 sprays (100 mcg total) by Each Nare route once daily., Disp: 3 Bottle, Rfl: 3    furosemide (LASIX) 40 MG tablet, Take 1 tablet (40 mg total) by mouth once daily., Disp: 90 tablet, Rfl: 4    gabapentin (NEURONTIN) 300 MG capsule, Take 2 capsules (600 mg total) by mouth 3 (three) times daily., Disp: 180 capsule, Rfl: 11    lactulose (CHRONULAC) 10 gram/15 mL solution, Take 30 mLs (20 g total) by mouth 3 (three) times daily. 2 Teaspoon(s) Oral PRN Every evening., Disp: 500 mL, Rfl: 3    losartan (COZAAR) 100 MG tablet, Take 1 tablet (100 mg total) by mouth once daily., Disp: 90 tablet, Rfl: 4    metoprolol succinate (TOPROL-XL) 50 MG 24 hr tablet, Take 1 tablet (50 mg total) by mouth once daily., Disp: 90 tablet, Rfl: 3    montelukast (SINGULAIR) 10 mg tablet, Take 1 tablet (10 mg total) by mouth every evening., Disp: 90 tablet, Rfl: 3    spironolactone (ALDACTONE) 25 MG tablet, Take 1 tablet (25 mg total) by mouth once daily., Disp: 90 tablet, Rfl: 6    albuterol (PROVENTIL) 2.5 mg /3 mL (0.083 %) nebulizer solution, Take 3 mLs (2.5 mg total) by nebulization every 6 (six) hours as needed., Disp: 120 each, Rfl: 11    diclofenac (VOLTAREN) 25 MG TbEC, Take 1 tablet (25 mg total) by mouth 3 (three) times daily as needed., Disp: 15 tablet, Rfl: 0    tiZANidine (ZANAFLEX) 4 MG tablet, Take 1 tablet (4 mg total) by mouth every 6 (six) hours as needed., Disp: 30 tablet, Rfl: 1    Review of Systems   Constitutional: Negative for unexpected weight change.   HENT: Negative for trouble  "swallowing.    Eyes: Negative for visual disturbance.   Respiratory: Negative for shortness of breath.    Cardiovascular: Negative for chest pain, palpitations and leg swelling.   Gastrointestinal: Negative for blood in stool.   Genitourinary: Negative for hematuria.   Musculoskeletal: Positive for arthralgias and back pain.   Skin: Negative for rash.   Allergic/Immunologic: Negative for immunocompromised state.   Neurological: Negative for headaches.   Hematological: Does not bruise/bleed easily.   Psychiatric/Behavioral: Negative for agitation. The patient is not nervous/anxious.        Objective:      /76 (BP Location: Right arm, Patient Position: Sitting, BP Method: X-Large (Manual))   Pulse 82   Temp 97.6 °F (36.4 °C)   Ht 5' 3" (1.6 m)   Wt 104.9 kg (231 lb 4.2 oz)   SpO2 96%   BMI 40.97 kg/m²   Physical Exam  Constitutional:       Appearance: She is well-developed.   Eyes:      Pupils: Pupils are equal, round, and reactive to light.   Neck:      Musculoskeletal: Normal range of motion.   Cardiovascular:      Rate and Rhythm: Normal rate and regular rhythm.      Heart sounds: Normal heart sounds.   Pulmonary:      Effort: Pulmonary effort is normal.      Breath sounds: Normal breath sounds.   Abdominal:      General: Bowel sounds are normal.      Palpations: Abdomen is soft.   Musculoskeletal:      Lumbar back: She exhibits decreased range of motion, tenderness and pain.   Skin:     General: Skin is warm and dry.   Neurological:      Mental Status: She is alert and oriented to person, place, and time.   Psychiatric:         Behavior: Behavior normal.         Thought Content: Thought content normal.         Judgment: Judgment normal.         Assessment:       1. Hospital discharge follow-up    2. DDD (degenerative disc disease), lumbar    3. Calculus of gallbladder without cholecystitis without obstruction    4. Other diabetic neurological complication associated with type 2 diabetes mellitus    5. " "Peripheral vascular disease, unspecified    6. Elevated liver enzymes    7. Leukocytosis, unspecified type    8. Severe obesity (BMI 35.0-39.9) with comorbidity        Plan:       Hospital discharge follow-up  -     Hepatic function panel; Future; Expected date: 06/15/2020  -     CBC W/ AUTO DIFFERENTIAL; Future; Expected date: 06/15/2020  -     Comprehensive metabolic panel; Future; Expected date: 06/15/2020  -     Ambulatory referral/consult to General Surgery; Future; Expected date: 06/22/2020    DDD (degenerative disc disease), lumbar  -     methylPREDNISolone acetate injection 80 mg  -     Discontinue: diclofenac (VOLTAREN) 25 MG TbEC; Take 1 tablet (25 mg total) by mouth 3 (three) times daily as needed.  Dispense: 15 tablet; Refill: 0  -     tiZANidine (ZANAFLEX) 4 MG tablet; Take 1 tablet (4 mg total) by mouth every 6 (six) hours as needed.  Dispense: 30 tablet; Refill: 1    Calculus of gallbladder without cholecystitis without obstruction  -     Ambulatory referral/consult to General Surgery; Future; Expected date: 06/22/2020    Other diabetic neurological complication associated with type 2 diabetes mellitus  Stable, on Neurontin  Peripheral vascular disease, unspecified  Stable, continue regimen  Elevated liver enzymes  -     Hepatic function panel; Future; Expected date: 06/15/2020    Leukocytosis, unspecified type  -     CBC W/ AUTO DIFFERENTIAL; Future; Expected date: 06/15/2020  -     Comprehensive metabolic panel; Future; Expected date: 06/15/2020    Severe obesity (BMI 35.0-39.9) with comorbidity  Counseled patient on his ideal body weight, health consequences of being obese and current recommendations including weekly exercise and a heart healthy diet.  Current BMI is:Estimated body mass index is 40.97 kg/m² as calculated from the following:    Height as of this encounter: 5' 3" (1.6 m).    Weight as of this encounter: 104.9 kg (231 lb 4.2 oz)..  Patient is aware that ideal BMI < 25 or Weight in " (lb) to have BMI = 25: 140.8.            Patient readiness: acceptance and barriers:none    During the course of the visit the patient was educated and counseled about the following:     Hypertension:   Dietary sodium restriction.  Regular aerobic exercise.  Obesity:   General weight loss/lifestyle modification strategies discussed (elicit support from others; identify saboteurs; non-food rewards, etc).  Regular aerobic exercise program discussed.    Goals: Hypertension: Reduce Blood Pressure and Obesity: Reduce calorie intake and BMI    Did patient meet goals/outcomes: No    The following self management tools provided: declined    Patient Instructions (the written plan) was given to the patient/family.     Time spent with patient: 15 minutes    Barriers to medications present (no )    Adverse reactions to current medications (no)    Over the counter medications reviewed (Yes)

## 2020-06-22 ENCOUNTER — TELEPHONE (OUTPATIENT)
Dept: PAIN MEDICINE | Facility: CLINIC | Age: 80
End: 2020-06-22

## 2020-06-22 DIAGNOSIS — M54.16 LUMBAR RADICULOPATHY: Primary | ICD-10-CM

## 2020-06-22 DIAGNOSIS — Z01.818 PREOP TESTING: ICD-10-CM

## 2020-06-22 NOTE — TELEPHONE ENCOUNTER
----- Message from Josefina Mc sent at 6/22/2020 11:16 AM CDT -----  Regarding: Need medical advice  Type: Need medical advice      Who Called:  Pt  Best Call Back Number:   [phone]  Additional Information:   Pt would like a call back in regards to r/s of a spine injection. Please advise

## 2020-06-27 ENCOUNTER — LAB VISIT (OUTPATIENT)
Dept: PRIMARY CARE CLINIC | Facility: CLINIC | Age: 80
End: 2020-06-27
Payer: MEDICARE

## 2020-06-27 DIAGNOSIS — Z01.818 PREOP TESTING: ICD-10-CM

## 2020-06-27 PROCEDURE — U0003 INFECTIOUS AGENT DETECTION BY NUCLEIC ACID (DNA OR RNA); SEVERE ACUTE RESPIRATORY SYNDROME CORONAVIRUS 2 (SARS-COV-2) (CORONAVIRUS DISEASE [COVID-19]), AMPLIFIED PROBE TECHNIQUE, MAKING USE OF HIGH THROUGHPUT TECHNOLOGIES AS DESCRIBED BY CMS-2020-01-R: HCPCS

## 2020-06-27 NOTE — PROGRESS NOTES
.Pt presented for Pre-procedure drive thru testing. Patient identified using two patient identifiers prior to specimen collection. Questions answered prior to departure and education given.

## 2020-06-28 LAB — SARS-COV-2 RNA RESP QL NAA+PROBE: NOT DETECTED

## 2020-06-30 ENCOUNTER — HOSPITAL ENCOUNTER (OUTPATIENT)
Facility: AMBULARY SURGERY CENTER | Age: 80
Discharge: HOME | End: 2020-06-30
Attending: ANESTHESIOLOGY | Admitting: ANESTHESIOLOGY
Payer: MEDICARE

## 2020-06-30 DIAGNOSIS — M51.36 DDD (DEGENERATIVE DISC DISEASE), LUMBAR: Primary | ICD-10-CM

## 2020-06-30 DIAGNOSIS — M54.16 LUMBAR RADICULITIS: ICD-10-CM

## 2020-06-30 LAB — GLUCOSE SERPL-MCNC: 140 MG/DL (ref 70–110)

## 2020-06-30 PROCEDURE — 64484 PRA INJECT ANES/STEROID FORAMEN LUMBAR/SACRAL W IMG GUIDE ,EA ADD LEVEL: ICD-10-PCS | Mod: LT,,, | Performed by: ANESTHESIOLOGY

## 2020-06-30 PROCEDURE — 64484 NJX AA&/STRD TFRM EPI L/S EA: CPT | Performed by: ANESTHESIOLOGY

## 2020-06-30 PROCEDURE — 64483 PR EPIDURAL INJ, ANES/STEROID, TRANSFORAMINAL, LUMB/SACR, SNGL LEVL: ICD-10-PCS | Mod: LT,,, | Performed by: ANESTHESIOLOGY

## 2020-06-30 PROCEDURE — 64483 NJX AA&/STRD TFRM EPI L/S 1: CPT | Performed by: ANESTHESIOLOGY

## 2020-06-30 PROCEDURE — 64484 NJX AA&/STRD TFRM EPI L/S EA: CPT | Mod: LT,,, | Performed by: ANESTHESIOLOGY

## 2020-06-30 PROCEDURE — 64483 NJX AA&/STRD TFRM EPI L/S 1: CPT | Mod: LT,,, | Performed by: ANESTHESIOLOGY

## 2020-06-30 RX ORDER — METHYLPREDNISOLONE ACETATE 80 MG/ML
INJECTION, SUSPENSION INTRA-ARTICULAR; INTRALESIONAL; INTRAMUSCULAR; SOFT TISSUE
Status: DISCONTINUED | OUTPATIENT
Start: 2020-06-30 | End: 2020-06-30 | Stop reason: HOSPADM

## 2020-06-30 RX ORDER — ALPRAZOLAM 1 MG/1
1 TABLET, ORALLY DISINTEGRATING ORAL ONCE
Status: COMPLETED | OUTPATIENT
Start: 2020-06-30 | End: 2020-06-30

## 2020-06-30 RX ORDER — SODIUM CHLORIDE, SODIUM LACTATE, POTASSIUM CHLORIDE, CALCIUM CHLORIDE 600; 310; 30; 20 MG/100ML; MG/100ML; MG/100ML; MG/100ML
INJECTION, SOLUTION INTRAVENOUS ONCE AS NEEDED
Status: DISCONTINUED | OUTPATIENT
Start: 2020-06-30 | End: 2020-06-30 | Stop reason: HOSPADM

## 2020-06-30 RX ORDER — LIDOCAINE HYDROCHLORIDE 10 MG/ML
INJECTION, SOLUTION EPIDURAL; INFILTRATION; INTRACAUDAL; PERINEURAL
Status: DISCONTINUED | OUTPATIENT
Start: 2020-06-30 | End: 2020-06-30 | Stop reason: HOSPADM

## 2020-06-30 RX ORDER — BUPIVACAINE HYDROCHLORIDE 2.5 MG/ML
INJECTION, SOLUTION EPIDURAL; INFILTRATION; INTRACAUDAL
Status: DISCONTINUED | OUTPATIENT
Start: 2020-06-30 | End: 2020-06-30 | Stop reason: HOSPADM

## 2020-06-30 RX ADMIN — ALPRAZOLAM 1 MG: 1 TABLET, ORALLY DISINTEGRATING ORAL at 10:06

## 2020-06-30 NOTE — DISCHARGE SUMMARY
[facility]  Discharge Note  Short Stay    Admit Date: 6/30/2020    Discharge Date and Time: 6/30/2020    Attending Physician: Matt Gilliam MD     Discharge Provider: Matt Gilliam    Diagnoses:  Active Hospital Problems    Diagnosis  POA    *Lumbar radiculitis [M54.16]  Yes      Resolved Hospital Problems   No resolved problems to display.       Hospital Course: Lumbar TYLER  Discharged Condition: Good    Final Diagnoses:   Active Hospital Problems    Diagnosis  POA    *Lumbar radiculitis [M54.16]  Yes      Resolved Hospital Problems   No resolved problems to display.       Disposition: Home or Self Care    Follow up/Patient Instructions:    Medications:  Reconciled Home Medications:      Medication List      CHANGE how you take these medications    esomeprazole 20 MG capsule  Commonly known as: NEXIUM  Take 1 capsule (20 mg total) by mouth before breakfast.  What changed:   · when to take this  · reasons to take this        CONTINUE taking these medications    albuterol 2.5 mg /3 mL (0.083 %) nebulizer solution  Commonly known as: PROVENTIL  Take 3 mLs (2.5 mg total) by nebulization every 6 (six) hours as needed.     aspirin 81 MG EC tablet  Commonly known as: ECOTRIN  Take 81 mg by mouth once daily.     budesonide-formoterol 160-4.5 mcg 160-4.5 mcg/actuation Hfaa  Commonly known as: SYMBICORT  Inhale 2 puffs into the lungs every 12 (twelve) hours. Controller     cyanocobalamin (vitamin B-12) 2,500 mcg Lozg  Place 2 tablets under the tongue once daily.     diclofenac 25 MG Tbec  Commonly known as: VOLTAREN  Take 1 tablet (25 mg total) by mouth 3 (three) times daily as needed.     fluticasone propionate 50 mcg/actuation nasal spray  Commonly known as: FLONASE  2 sprays (100 mcg total) by Each Nare route once daily.     furosemide 40 MG tablet  Commonly known as: LASIX  Take 1 tablet (40 mg total) by mouth once daily.     gabapentin 300 MG capsule  Commonly known as: NEURONTIN  Take 2 capsules (600 mg total) by  mouth 3 (three) times daily.     lactulose 10 gram/15 mL solution  Commonly known as: CHRONULAC  Take 30 mLs (20 g total) by mouth 3 (three) times daily. 2 Teaspoon(s) Oral PRN Every evening.     losartan 100 MG tablet  Commonly known as: COZAAR  Take 1 tablet (100 mg total) by mouth once daily.     metoprolol succinate 50 MG 24 hr tablet  Commonly known as: TOPROL-XL  Take 1 tablet (50 mg total) by mouth once daily.     montelukast 10 mg tablet  Commonly known as: SINGULAIR  Take 1 tablet (10 mg total) by mouth every evening.     spironolactone 25 MG tablet  Commonly known as: ALDACTONE  Take 1 tablet (25 mg total) by mouth once daily.     VITAMIN C 100 MG tablet  Generic drug: ascorbic acid (vitamin C)  Take 100 mg by mouth once daily.          Discharge Procedure Orders   Call MD for:  temperature >100.4     Call MD for:  persistent nausea and vomiting or diarrhea     Call MD for:  severe uncontrolled pain     Call MD for:  redness, tenderness, or signs of infection (pain, swelling, redness, odor or green/yellow discharge around incision site)     Call MD for:  difficulty breathing or increased cough     Call MD for:  severe persistent headache        Follow up with MD in 2-3 weeks    Discharge Procedure Orders (must include Diet, Follow-up, Activity):   Discharge Procedure Orders (must include Diet, Follow-up, Activity)   Call MD for:  temperature >100.4     Call MD for:  persistent nausea and vomiting or diarrhea     Call MD for:  severe uncontrolled pain     Call MD for:  redness, tenderness, or signs of infection (pain, swelling, redness, odor or green/yellow discharge around incision site)     Call MD for:  difficulty breathing or increased cough     Call MD for:  severe persistent headache

## 2020-06-30 NOTE — OP NOTE
PROCEDURE DATE: 6/30/2020    PROCEDURE: Left L2-3, L3-4 transforaminal epidural steroid injection under fluoroscopy    DIAGNOSIS: Lumbar disc displacement without myelopathy  Post op diagnosis: Same    PHYSICIAN: Matt Gilliam MD    MEDICATIONS INJECTED:  Methylprednisone 40mg (0.5ml) and 1.5ml 0.25% bupivicaine at each nerve root.     LOCAL ANESTHETIC INJECTED:  Lidocaine 1%. 2 ml per site.    SEDATION MEDICATIONS: none    ESTIMATED BLOOD LOSS:  None    COMPLICATIONS:  None    TECHNIQUE:   A time-out was taken to identify patient and procedure side prior to starting the procedure. The patient was placed in a prone position, prepped and draped in the usual sterile fashion using ChloraPrep and sterile towels.  The area to be injected was determined under fluoroscopic guidance in AP and oblique view.  Local anesthetic was given by raising a wheal and going down to the hub of a 25-gauge 1.5 inch needle.  In oblique view, a 5 inch 22-gauge bent-tip spinal needle was introduced towards 6 oclock position of the pedicle of each above named nerve root level.  The needle was walked medially then hinged into the neural foramen and position was confirmed in AP and lateral views.  1ml contrast dye was injected to confirm appropriate placement and that there was no vascular uptake.  After negative aspiration for blood or CSF, the medication was then injected. This was performed at the left L2-3, L3-4 level(s). The patient tolerated the procedure well.    The patient was monitored after the procedure.  Patient was given post procedure and discharge instructions to follow at home. The patient was discharged in a stable condition.

## 2020-06-30 NOTE — PLAN OF CARE
Stable states ready to go home,nauseated but deng ice chips po, pain tolerable, no new leg weakness, to car per wc with RN to friend

## 2020-07-01 VITALS
TEMPERATURE: 98 F | HEART RATE: 58 BPM | SYSTOLIC BLOOD PRESSURE: 146 MMHG | DIASTOLIC BLOOD PRESSURE: 66 MMHG | OXYGEN SATURATION: 97 % | RESPIRATION RATE: 20 BRPM

## 2020-07-02 ENCOUNTER — EXTERNAL HOME HEALTH (OUTPATIENT)
Dept: HOME HEALTH SERVICES | Facility: HOSPITAL | Age: 80
End: 2020-07-02
Payer: MEDICARE

## 2020-07-07 ENCOUNTER — DOCUMENT SCAN (OUTPATIENT)
Dept: HOME HEALTH SERVICES | Facility: HOSPITAL | Age: 80
End: 2020-07-07
Payer: MEDICARE

## 2020-07-07 ENCOUNTER — PATIENT OUTREACH (OUTPATIENT)
Dept: ADMINISTRATIVE | Facility: OTHER | Age: 80
End: 2020-07-07

## 2020-07-07 NOTE — PROGRESS NOTES
Chart was reviewed for overdue [facility] Encounters (OLAYINKA)  topics  Updates were requested from care everywhere  Health Maintenance has been updated

## 2020-07-09 ENCOUNTER — OFFICE VISIT (OUTPATIENT)
Dept: SURGERY | Facility: CLINIC | Age: 80
End: 2020-07-09
Payer: MEDICARE

## 2020-07-09 VITALS
WEIGHT: 231.25 LBS | BODY MASS INDEX: 40.97 KG/M2 | HEART RATE: 77 BPM | SYSTOLIC BLOOD PRESSURE: 201 MMHG | DIASTOLIC BLOOD PRESSURE: 86 MMHG

## 2020-07-09 DIAGNOSIS — K80.10 CALCULUS OF GALLBLADDER WITH CHRONIC CHOLECYSTITIS WITHOUT OBSTRUCTION: ICD-10-CM

## 2020-07-09 DIAGNOSIS — K80.50 BILIARY COLIC: Primary | ICD-10-CM

## 2020-07-09 DIAGNOSIS — Z01.818 PREOP TESTING: ICD-10-CM

## 2020-07-09 PROCEDURE — 99999 PR PBB SHADOW E&M-EST. PATIENT-LVL V: CPT | Mod: PBBFAC,,, | Performed by: PHYSICIAN ASSISTANT

## 2020-07-09 PROCEDURE — 99212 PR OFFICE/OUTPT VISIT, EST, LEVL II, 10-19 MIN: ICD-10-PCS | Mod: S$GLB,,, | Performed by: PHYSICIAN ASSISTANT

## 2020-07-09 PROCEDURE — 99999 PR PBB SHADOW E&M-EST. PATIENT-LVL V: ICD-10-PCS | Mod: PBBFAC,,, | Performed by: PHYSICIAN ASSISTANT

## 2020-07-09 PROCEDURE — 99212 OFFICE O/P EST SF 10 MIN: CPT | Mod: S$GLB,,, | Performed by: PHYSICIAN ASSISTANT

## 2020-07-09 NOTE — PATIENT INSTRUCTIONS
..PRE-OP INSTRUCTIONS    Your procedure has been scheduled at:    [facility]pre-admit nurse  [phone]      DAY monday    DATE 07/13/2020       Please call the pre-op nurse to schedule your pre-op testing and registration  Someone from the hospital will call you the evening before surgery to let you know what time you need to be at the hospital for surgery.                                               1:  DO NOT EAT OR DRINK ANYTHING AFTER MIDNIGHT THE NIGHT BEFORE SURGERY.     2:  You will need to stop any blood thinners 1 week prior to surgery.  This includes Aspirin, fish oil, Pradaxa, Coumadin, Plavix, Pletal.  Please contact the prescribing doctor to be sure it is ok to stop these medicines.    3:  Pre-admit nurse will go over your medicines and let you know which ones not to take the morning of surgery    4:  Plan to have someone drive you home after you are released from the hospital.  You WILL NOT be able to drive yourself.    5:  If you have any questions or need to change your surgery date, please call Chanel at [phone]    AFTER SURGERY:    You can shower 48 hours after surgery, REMOVE WET BANDAGES AND BANDAIDS, leave the steri- strips on.  If you have not had a bowel movement within 3 days after surgery you may take a laxative of your choice.  Do not lift anything over 5-10 pounds.    You need to have a follow up appointment 7-14 days after surgery, call the office to schedule or if you have questions or concerns.    For [facility] patients, you will receive a [facility] message with a link to schedule your post op appointment.

## 2020-07-10 ENCOUNTER — TELEPHONE (OUTPATIENT)
Dept: SURGERY | Facility: CLINIC | Age: 80
End: 2020-07-10

## 2020-07-10 ENCOUNTER — LAB VISIT (OUTPATIENT)
Dept: PRIMARY CARE CLINIC | Facility: CLINIC | Age: 80
End: 2020-07-10
Payer: MEDICARE

## 2020-07-10 ENCOUNTER — ANESTHESIA EVENT (OUTPATIENT)
Dept: SURGERY | Facility: HOSPITAL | Age: 80
End: 2020-07-10
Payer: MEDICARE

## 2020-07-10 DIAGNOSIS — Z01.818 PREOP TESTING: ICD-10-CM

## 2020-07-10 PROCEDURE — U0003 INFECTIOUS AGENT DETECTION BY NUCLEIC ACID (DNA OR RNA); SEVERE ACUTE RESPIRATORY SYNDROME CORONAVIRUS 2 (SARS-COV-2) (CORONAVIRUS DISEASE [COVID-19]), AMPLIFIED PROBE TECHNIQUE, MAKING USE OF HIGH THROUGHPUT TECHNOLOGIES AS DESCRIBED BY CMS-2020-01-R: HCPCS

## 2020-07-10 RX ORDER — SODIUM CHLORIDE 9 MG/ML
INJECTION, SOLUTION INTRAVENOUS CONTINUOUS
Status: CANCELLED | OUTPATIENT
Start: 2020-07-13

## 2020-07-10 NOTE — PROGRESS NOTES
Patient ID: Sammi Abreu is a 79 y.o. female.    Chief Complaint: Consult (GALLSTONES)      HPI:  Sammi Abreu is a 79 y.o. female with a pmhx of chronic back pain, hypertension, and is pre-diabetic. She has known gallstones and presents today to discuss cholecystectomy. She reports that within the past month she has been having more frequent and gradually worsening upper abdominal pain, nausea, belching, and bloating. Sx occur several times per week. Symptoms are usually triggered by eating but relieved by nothing.  She is [employer]. She was briefly hospitalized in June for severe back pain and biliary colic. At that time she chose to postpone surgery until her back issues were improved. She was d/c and had Lumbar TYLER approx 2 weeks ago. Her back pain is improved somewhat and she is ambulating with less difficutly.       Review of Systems   Constitutional: Negative for activity change, chills and fever.   Respiratory: Negative for shortness of breath.    Cardiovascular: Negative for chest pain.   Gastrointestinal: Positive for abdominal distention, abdominal pain and nausea. Negative for vomiting.   Genitourinary: Negative for dysuria.   Musculoskeletal: Positive for arthralgias and back pain. Negative for myalgias.   Skin: Negative for wound.   Neurological: Negative for weakness.   Hematological: Does not bruise/bleed easily.   Psychiatric/Behavioral: The patient is not nervous/anxious.        Current Outpatient Medications   Medication Sig Dispense Refill    ascorbic acid (VITAMIN C) 100 MG tablet Take 100 mg by mouth once daily.       aspirin (ECOTRIN) 81 MG EC tablet Take 81 mg by mouth once daily.      cyanocobalamin, vitamin B-12, 2,500 mcg Lozg Place 2 tablets under the tongue once daily. 60 lozenge 11    diclofenac (VOLTAREN) 25 MG TbEC Take 1 tablet (25 mg total) by mouth 3 (three) times daily as needed. 15 tablet 0    fluticasone (FLONASE) 50 mcg/actuation nasal spray 2 sprays  (100 mcg total) by Each Nare route once daily. 3 Bottle 3    furosemide (LASIX) 40 MG tablet Take 1 tablet (40 mg total) by mouth once daily. 90 tablet 4    gabapentin (NEURONTIN) 300 MG capsule Take 2 capsules (600 mg total) by mouth 3 (three) times daily. 180 capsule 11    lactulose (CHRONULAC) 10 gram/15 mL solution Take 30 mLs (20 g total) by mouth 3 (three) times daily. 2 Teaspoon(s) Oral PRN Every evening. 500 mL 3    losartan (COZAAR) 100 MG tablet Take 1 tablet (100 mg total) by mouth once daily. 90 tablet 4    metoprolol succinate (TOPROL-XL) 50 MG 24 hr tablet Take 1 tablet (50 mg total) by mouth once daily. 90 tablet 3    montelukast (SINGULAIR) 10 mg tablet Take 1 tablet (10 mg total) by mouth every evening. 90 tablet 3    spironolactone (ALDACTONE) 25 MG tablet Take 1 tablet (25 mg total) by mouth once daily. 90 tablet 6    albuterol (PROVENTIL) 2.5 mg /3 mL (0.083 %) nebulizer solution Take 3 mLs (2.5 mg total) by nebulization every 6 (six) hours as needed. 120 each 11    budesonide-formoterol 160-4.5 mcg (SYMBICORT) 160-4.5 mcg/actuation HFAA Inhale 2 puffs into the lungs every 12 (twelve) hours. Controller 3 Inhaler 3    esomeprazole (NEXIUM) 20 MG capsule Take 1 capsule (20 mg total) by mouth before breakfast. (Patient taking differently: Take 20 mg by mouth daily as needed (heartburn). ) 30 capsule 2     No current facility-administered medications for this visit.        Review of patient's allergies indicates:   Allergen Reactions    Cymbalta [duloxetine] Other (See Comments)     Nightmares      Darvon [propoxyphene] Nausea Only and Other (See Comments)     Sweating, slept for 3 days    Atorvastatin Other (See Comments)     Muscle cramps    Naprosyn [naproxen] Nausea Only    Penicillins Rash    Tramadol Nausea Only and Palpitations       Past Medical History:   Diagnosis Date    ALLERGIC RHINITIS     Anemia     Arthritis     Back pain     Cataract     OU    COPD (chronic  obstructive pulmonary disease)     Degenerative disc disease     Diabetes mellitus     NIDDM    Diverticulosis     DVT (deep venous thrombosis)     Edema     Fibromyalgia     Glaucoma     Gout     Hx of colonic polyps     Hyperlipidemia     Hypertension     Incontinence     Osteoporosis     Reflux     Sleep apnea     non compliant with CPAP    Vestibulitis of ear        Past Surgical History:   Procedure Laterality Date    ANGIOGRAPHY OF LOWER EXTREMITY N/A 2019    Procedure: ANGIOGRAM, LOWER EXTREMITY;  Surgeon: Gino Arana MD;  Location: [facility] CATH/EP LAB;  Service: General;  Laterality: N/A;    HIP SURGERY      HYSTERECTOMY      JOINT REPLACEMENT      B total hip    TRANSFORAMINAL EPIDURAL INJECTION OF STEROID Left 2020    Procedure: Injection,steroid,epidural,transforaminal approach;  Surgeon: Matt Gilliam MD;  Location: [facility] OR;  Service: Pain Management;  Laterality: Left;  L2-3, L3-4       Family History   Problem Relation Age of Onset    Hypertension Mother     Diabetes Sister     Glaucoma Neg Hx     Macular degeneration Neg Hx     Retinal detachment Neg Hx        Social History     Socioeconomic History    Marital status:      Spouse name: Not on file    Number of children: Not on file    Years of education: Not on file    Highest education level: Not on file   Occupational History    Not on file   Social Needs    Financial resource strain: Not on file    Food insecurity     Worry: Not on file     Inability: Not on file    Transportation needs     Medical: Not on file     Non-medical: Not on file   Tobacco Use    Smoking status: Former Smoker     Packs/day: 0.25     Years: 5.00     Pack years: 1.25     Quit date: 1972     Years since quittin.5    Smokeless tobacco: Never Used   Substance and Sexual Activity    Alcohol use: Yes     Alcohol/week: 0.0 standard drinks     Comment: Rarely    Drug use: Yes     Types: Oxycodone, Hydrocodone     Sexual activity: Not on file   Lifestyle    Physical activity     Days per week: Not on file     Minutes per session: Not on file    Stress: Not on file   Relationships    Social connections     Talks on phone: Not on file     Gets together: Not on file     Attends Mor[employer] service: Not on file     Active member of club or organization: Not on file     Attends meetings of clubs or organizations: Not on file     Relationship status: Not on file   Other Topics Concern    Not on file   Social History Narrative    Not on file       Vitals:    07/09/20 1045   BP: (!) 201/86   Pulse: 77       Physical Exam  Constitutional:       Appearance: She is obese. She is not ill-appearing.   Cardiovascular:      Rate and Rhythm: Normal rate.   Pulmonary:      Effort: Pulmonary effort is normal.   Abdominal:      General: There is no distension.      Palpations: Abdomen is soft.      Tenderness: There is no abdominal tenderness.   Musculoskeletal: Normal range of motion.   Skin:     General: Skin is warm.   Neurological:      Mental Status: She is alert.         Assessment & Plan:      Symptomatic cholelithiasis   She has gradually worsening symptoms with increasing frequency. She is ready to proceed with cholecystectomy.  The risks of laparoscopic cholecystectomy including bleeding, infection, common bile duct injury, bile leak, injury to abdominal organs, failure to alleviate symptoms, pulmonary embolus, deep vein thrombosis, cardiac event, and possibility of conversion to an open operation were explained to the patient.   The nature of the patient's condition, probability of success, risks of refusing treatment, and alternatives and risks of the alternatives were also explained.  The patient verbalized understanding.

## 2020-07-10 NOTE — TELEPHONE ENCOUNTER
Spoke to pt, states that case for Monday needs to be ordered Emergent for approval, Forward message to Jessica WATSON.

## 2020-07-10 NOTE — TELEPHONE ENCOUNTER
----- Message from Jemima Jeffers sent at 7/10/2020 11:56 AM CDT -----  Contact: Patient [phone]  Type: Patient Call Back    Who called:Patient     What is the request in detail: Would like to speak to nurse. Patient states that she received a call from her insurance company regarding the surgery that's scheduled for Monday, 07-13-20. She's being told that the Dr need to send in an Emergency Referral in order for her to have the surgery. Please call pt in regards to this.     Would the patient rather a call back or a response via [facility]? Call back    Best call back number:[phone]

## 2020-07-10 NOTE — H&P (VIEW-ONLY)
Patient ID: Sammi Abreu is a 79 y.o. female.    Chief Complaint: Consult (GALLSTONES)      HPI:  Sammi Abreu is a 79 y.o. female with a pmhx of chronic back pain, hypertension, and is pre-diabetic. She has known gallstones and presents today to discuss cholecystectomy. She reports that within the past month she has been having more frequent and gradually worsening upper abdominal pain, nausea, belching, and bloating. Sx occur several times per week. Symptoms are usually triggered by eating but relieved by nothing.  She is Jewish. She was briefly hospitalized in June for severe back pain and biliary colic. At that time she chose to postpone surgery until her back issues were improved. She was d/c and had Lumbar TYLER approx 2 weeks ago. Her back pain is improved somewhat and she is ambulating with less difficutly.       Review of Systems   Constitutional: Negative for activity change, chills and fever.   Respiratory: Negative for shortness of breath.    Cardiovascular: Negative for chest pain.   Gastrointestinal: Positive for abdominal distention, abdominal pain and nausea. Negative for vomiting.   Genitourinary: Negative for dysuria.   Musculoskeletal: Positive for arthralgias and back pain. Negative for myalgias.   Skin: Negative for wound.   Neurological: Negative for weakness.   Hematological: Does not bruise/bleed easily.   Psychiatric/Behavioral: The patient is not nervous/anxious.        Current Outpatient Medications   Medication Sig Dispense Refill    ascorbic acid (VITAMIN C) 100 MG tablet Take 100 mg by mouth once daily.       aspirin (ECOTRIN) 81 MG EC tablet Take 81 mg by mouth once daily.      cyanocobalamin, vitamin B-12, 2,500 mcg Lozg Place 2 tablets under the tongue once daily. 60 lozenge 11    diclofenac (VOLTAREN) 25 MG TbEC Take 1 tablet (25 mg total) by mouth 3 (three) times daily as needed. 15 tablet 0    fluticasone (FLONASE) 50 mcg/actuation nasal spray 2 sprays  (100 mcg total) by Each Nare route once daily. 3 Bottle 3    furosemide (LASIX) 40 MG tablet Take 1 tablet (40 mg total) by mouth once daily. 90 tablet 4    gabapentin (NEURONTIN) 300 MG capsule Take 2 capsules (600 mg total) by mouth 3 (three) times daily. 180 capsule 11    lactulose (CHRONULAC) 10 gram/15 mL solution Take 30 mLs (20 g total) by mouth 3 (three) times daily. 2 Teaspoon(s) Oral PRN Every evening. 500 mL 3    losartan (COZAAR) 100 MG tablet Take 1 tablet (100 mg total) by mouth once daily. 90 tablet 4    metoprolol succinate (TOPROL-XL) 50 MG 24 hr tablet Take 1 tablet (50 mg total) by mouth once daily. 90 tablet 3    montelukast (SINGULAIR) 10 mg tablet Take 1 tablet (10 mg total) by mouth every evening. 90 tablet 3    spironolactone (ALDACTONE) 25 MG tablet Take 1 tablet (25 mg total) by mouth once daily. 90 tablet 6    albuterol (PROVENTIL) 2.5 mg /3 mL (0.083 %) nebulizer solution Take 3 mLs (2.5 mg total) by nebulization every 6 (six) hours as needed. 120 each 11    budesonide-formoterol 160-4.5 mcg (SYMBICORT) 160-4.5 mcg/actuation HFAA Inhale 2 puffs into the lungs every 12 (twelve) hours. Controller 3 Inhaler 3    esomeprazole (NEXIUM) 20 MG capsule Take 1 capsule (20 mg total) by mouth before breakfast. (Patient taking differently: Take 20 mg by mouth daily as needed (heartburn). ) 30 capsule 2     No current facility-administered medications for this visit.        Review of patient's allergies indicates:   Allergen Reactions    Cymbalta [duloxetine] Other (See Comments)     Nightmares      Darvon [propoxyphene] Nausea Only and Other (See Comments)     Sweating, slept for 3 days    Atorvastatin Other (See Comments)     Muscle cramps    Naprosyn [naproxen] Nausea Only    Penicillins Rash    Tramadol Nausea Only and Palpitations       Past Medical History:   Diagnosis Date    ALLERGIC RHINITIS     Anemia     Arthritis     Back pain     Cataract     OU    COPD (chronic  obstructive pulmonary disease)     Degenerative disc disease     Diabetes mellitus     NIDDM    Diverticulosis     DVT (deep venous thrombosis)     Edema     Fibromyalgia     Glaucoma     Gout     Hx of colonic polyps     Hyperlipidemia     Hypertension     Incontinence     Osteoporosis     Reflux     Sleep apnea     non compliant with CPAP    Vestibulitis of ear        Past Surgical History:   Procedure Laterality Date    ANGIOGRAPHY OF LOWER EXTREMITY N/A 2019    Procedure: ANGIOGRAM, LOWER EXTREMITY;  Surgeon: Gino Arana MD;  Location: [facility] CATH/EP LAB;  Service: General;  Laterality: N/A;    HIP SURGERY      HYSTERECTOMY      JOINT REPLACEMENT      B total hip    TRANSFORAMINAL EPIDURAL INJECTION OF STEROID Left 2020    Procedure: Injection,steroid,epidural,transforaminal approach;  Surgeon: Matt Gilliam MD;  [facility];  Service: Pain Management;  Laterality: Left;  L2-3, L3-4       Family History   Problem Relation Age of Onset    Hypertension Mother     Diabetes Sister     Glaucoma Neg Hx     Macular degeneration Neg Hx     Retinal detachment Neg Hx        Social History     Socioeconomic History    Marital status:      Spouse name: Not on file    Number of children: Not on file    Years of education: Not on file    Highest education level: Not on file   Occupational History    Not on file   Social Needs    Financial resource strain: Not on file    Food insecurity     Worry: Not on file     Inability: Not on file    Transportation needs     Medical: Not on file     Non-medical: Not on file   Tobacco Use    Smoking status: Former Smoker     Packs/day: 0.25     Years: 5.00     Pack years: 1.25     Quit date: 1972     Years since quittin.5    Smokeless tobacco: Never Used   Substance and Sexual Activity    Alcohol use: Yes     Alcohol/week: 0.0 standard drinks     Comment: Rarely    Drug use: Yes     Types: Oxycodone, Hydrocodone     Sexual activity: Not on file   Lifestyle    Physical activity     Days per week: Not on file     Minutes per session: Not on file    Stress: Not on file   Relationships    Social connections     Talks on phone: Not on file     Gets together: Not on file     Attends Confucianist service: Not on file     Active member of club or organization: Not on file     Attends meetings of clubs or organizations: Not on file     Relationship status: Not on file   Other Topics Concern    Not on file   Social History Narrative    Not on file       Vitals:    07/09/20 1045   BP: (!) 201/86   Pulse: 77       Physical Exam  Constitutional:       Appearance: She is obese. She is not ill-appearing.   Cardiovascular:      Rate and Rhythm: Normal rate.   Pulmonary:      Effort: Pulmonary effort is normal.   Abdominal:      General: There is no distension.      Palpations: Abdomen is soft.      Tenderness: There is no abdominal tenderness.   Musculoskeletal: Normal range of motion.   Skin:     General: Skin is warm.   Neurological:      Mental Status: She is alert.         Assessment & Plan:      Symptomatic cholelithiasis   She has gradually worsening symptoms with increasing frequency. She is ready to proceed with cholecystectomy.  The risks of laparoscopic cholecystectomy including bleeding, infection, common bile duct injury, bile leak, injury to abdominal organs, failure to alleviate symptoms, pulmonary embolus, deep vein thrombosis, cardiac event, and possibility of conversion to an open operation were explained to the patient.   The nature of the patient's condition, probability of success, risks of refusing treatment, and alternatives and risks of the alternatives were also explained.  The patient verbalized understanding.

## 2020-07-11 LAB — SARS-COV-2 RNA RESP QL NAA+PROBE: NOT DETECTED

## 2020-07-13 ENCOUNTER — ANESTHESIA (OUTPATIENT)
Dept: SURGERY | Facility: HOSPITAL | Age: 80
End: 2020-07-13
Payer: MEDICARE

## 2020-07-13 ENCOUNTER — HOSPITAL ENCOUNTER (INPATIENT)
Facility: HOSPITAL | Age: 80
LOS: 15 days | Discharge: HOME HEALTH | DRG: 175 | End: 2020-07-29
Attending: INTERNAL MEDICINE | Admitting: INTERNAL MEDICINE
Payer: MEDICARE

## 2020-07-13 ENCOUNTER — HOSPITAL ENCOUNTER (OUTPATIENT)
Facility: HOSPITAL | Age: 80
Discharge: HOME | End: 2020-07-13
Attending: SURGERY | Admitting: SURGERY
Payer: MEDICARE

## 2020-07-13 VITALS
SYSTOLIC BLOOD PRESSURE: 167 MMHG | HEART RATE: 78 BPM | RESPIRATION RATE: 16 BRPM | WEIGHT: 215 LBS | HEIGHT: 63 IN | OXYGEN SATURATION: 94 % | TEMPERATURE: 98 F | DIASTOLIC BLOOD PRESSURE: 76 MMHG | BODY MASS INDEX: 38.09 KG/M2

## 2020-07-13 DIAGNOSIS — N18.30 CKD (CHRONIC KIDNEY DISEASE) STAGE 3, GFR 30-59 ML/MIN: ICD-10-CM

## 2020-07-13 DIAGNOSIS — R78.81 MRSA BACTEREMIA: Primary | ICD-10-CM

## 2020-07-13 DIAGNOSIS — J96.01 ACUTE RESPIRATORY FAILURE WITH HYPOXEMIA: ICD-10-CM

## 2020-07-13 DIAGNOSIS — B96.89 BACTEREMIA DUE TO OTHER BACTERIA: ICD-10-CM

## 2020-07-13 DIAGNOSIS — K80.50 BILIARY COLIC: Primary | ICD-10-CM

## 2020-07-13 DIAGNOSIS — R79.89 AZOTEMIA: ICD-10-CM

## 2020-07-13 DIAGNOSIS — D75.829: ICD-10-CM

## 2020-07-13 DIAGNOSIS — Z86.69 HISTORY OF OBSTRUCTIVE SLEEP APNEA: ICD-10-CM

## 2020-07-13 DIAGNOSIS — I82.431 ACUTE DEEP VEIN THROMBOSIS (DVT) OF POPLITEAL VEIN OF RIGHT LOWER EXTREMITY: ICD-10-CM

## 2020-07-13 DIAGNOSIS — B95.62 MRSA BACTEREMIA: Primary | ICD-10-CM

## 2020-07-13 DIAGNOSIS — I27.20 PULMONARY HYPERTENSION: ICD-10-CM

## 2020-07-13 DIAGNOSIS — Z22.322 MRSA (METHICILLIN RESISTANT STAPH AUREUS) CULTURE POSITIVE: ICD-10-CM

## 2020-07-13 DIAGNOSIS — D64.9 ANEMIA, UNSPECIFIED TYPE: ICD-10-CM

## 2020-07-13 DIAGNOSIS — I26.99 BILATERAL PULMONARY EMBOLISM: ICD-10-CM

## 2020-07-13 DIAGNOSIS — I82.4Z2 DEEP VEIN THROMBOSIS (DVT) OF DISTAL VEIN OF LEFT LOWER EXTREMITY, UNSPECIFIED CHRONICITY: ICD-10-CM

## 2020-07-13 DIAGNOSIS — I26.99 PULMONARY EMBOLISM: ICD-10-CM

## 2020-07-13 DIAGNOSIS — D64.9 ACUTE ANEMIA: ICD-10-CM

## 2020-07-13 DIAGNOSIS — R79.89 ELEVATED TROPONIN: ICD-10-CM

## 2020-07-13 DIAGNOSIS — D62 ACUTE BLOOD LOSS ANEMIA: ICD-10-CM

## 2020-07-13 DIAGNOSIS — Z01.818 PREOP TESTING: ICD-10-CM

## 2020-07-13 DIAGNOSIS — E66.01 CLASS 2 SEVERE OBESITY WITH SERIOUS COMORBIDITY IN ADULT, UNSPECIFIED BMI, UNSPECIFIED OBESITY TYPE: ICD-10-CM

## 2020-07-13 DIAGNOSIS — Z86.718 HISTORY OF DVT IN ADULTHOOD: ICD-10-CM

## 2020-07-13 DIAGNOSIS — R78.81 BACTEREMIA DUE TO OTHER BACTERIA: ICD-10-CM

## 2020-07-13 DIAGNOSIS — I49.9 CARDIAC RHYTHM DISORDER OR DISTURBANCE OR CHANGE: ICD-10-CM

## 2020-07-13 DIAGNOSIS — R09.02 HYPOXEMIA: ICD-10-CM

## 2020-07-13 PROBLEM — J44.9 COPD (CHRONIC OBSTRUCTIVE PULMONARY DISEASE): Status: ACTIVE | Noted: 2020-07-13

## 2020-07-13 PROBLEM — Z90.49 STATUS POST LAPAROSCOPIC CHOLECYSTECTOMY: Status: ACTIVE | Noted: 2020-07-13

## 2020-07-13 PROBLEM — M54.9 CHRONIC BACK PAIN: Status: ACTIVE | Noted: 2020-07-13

## 2020-07-13 PROBLEM — E11.40 TYPE 2 DIABETES MELLITUS WITH DIABETIC NEUROPATHY, WITHOUT LONG-TERM CURRENT USE OF INSULIN: Status: ACTIVE | Noted: 2020-07-13

## 2020-07-13 PROBLEM — G89.29 CHRONIC BACK PAIN: Status: ACTIVE | Noted: 2020-07-13

## 2020-07-13 LAB
ALBUMIN SERPL BCP-MCNC: 3.2 G/DL (ref 3.5–5.2)
ALLENS TEST: ABNORMAL
ALP SERPL-CCNC: 82 U/L (ref 55–135)
ALT SERPL W/O P-5'-P-CCNC: 33 U/L (ref 10–44)
ANION GAP SERPL CALC-SCNC: 13 MMOL/L (ref 8–16)
AST SERPL-CCNC: 37 U/L (ref 10–40)
BASOPHILS # BLD AUTO: 0.01 K/UL (ref 0–0.2)
BASOPHILS NFR BLD: 0.1 % (ref 0–1.9)
BILIRUB SERPL-MCNC: 0.4 MG/DL (ref 0.1–1)
BUN SERPL-MCNC: 21 MG/DL (ref 8–23)
CALCIUM SERPL-MCNC: 8.8 MG/DL (ref 8.7–10.5)
CHLORIDE SERPL-SCNC: 106 MMOL/L (ref 95–110)
CO2 SERPL-SCNC: 22 MMOL/L (ref 23–29)
CREAT SERPL-MCNC: 1.4 MG/DL (ref 0.5–1.4)
D DIMER PPP IA.FEU-MCNC: 23.49 MG/L FEU
DELSYS: ABNORMAL
DIFFERENTIAL METHOD: ABNORMAL
EOSINOPHIL # BLD AUTO: 0 K/UL (ref 0–0.5)
EOSINOPHIL NFR BLD: 0 % (ref 0–8)
ERYTHROCYTE [DISTWIDTH] IN BLOOD BY AUTOMATED COUNT: 12.5 % (ref 11.5–14.5)
ERYTHROCYTE [SEDIMENTATION RATE] IN BLOOD BY WESTERGREN METHOD: 19 MM/H
EST. GFR  (AFRICAN AMERICAN): 42 ML/MIN/1.73 M^2
EST. GFR  (NON AFRICAN AMERICAN): 36 ML/MIN/1.73 M^2
FLOW: 2
GLUCOSE SERPL-MCNC: 145 MG/DL (ref 70–110)
HCO3 UR-SCNC: 23.1 MMOL/L (ref 24–28)
HCT VFR BLD AUTO: 37.9 % (ref 37–48.5)
HGB BLD-MCNC: 12.1 G/DL (ref 12–16)
IMM GRANULOCYTES # BLD AUTO: 0.05 K/UL (ref 0–0.04)
IMM GRANULOCYTES NFR BLD AUTO: 0.5 % (ref 0–0.5)
LYMPHOCYTES # BLD AUTO: 0.9 K/UL (ref 1–4.8)
LYMPHOCYTES NFR BLD: 8.4 % (ref 18–48)
MAGNESIUM SERPL-MCNC: 1.9 MG/DL (ref 1.6–2.6)
MCH RBC QN AUTO: 34.1 PG (ref 27–31)
MCHC RBC AUTO-ENTMCNC: 31.9 G/DL (ref 32–36)
MCV RBC AUTO: 107 FL (ref 82–98)
MODE: ABNORMAL
MONOCYTES # BLD AUTO: 0.2 K/UL (ref 0.3–1)
MONOCYTES NFR BLD: 1.5 % (ref 4–15)
NEUTROPHILS # BLD AUTO: 9.2 K/UL (ref 1.8–7.7)
NEUTROPHILS NFR BLD: 89.5 % (ref 38–73)
NRBC BLD-RTO: 0 /100 WBC
PCO2 BLDA: 36.7 MMHG (ref 35–45)
PH SMN: 7.41 [PH] (ref 7.35–7.45)
PHOSPHATE SERPL-MCNC: 4.7 MG/DL (ref 2.7–4.5)
PLATELET # BLD AUTO: 139 K/UL (ref 150–350)
PMV BLD AUTO: 10.4 FL (ref 9.2–12.9)
PO2 BLDA: 70 MMHG (ref 80–100)
POC BE: -2 MMOL/L
POC SATURATED O2: 94 % (ref 95–100)
POC TCO2: 24 MMOL/L (ref 23–27)
POTASSIUM SERPL-SCNC: 4.4 MMOL/L (ref 3.5–5.1)
PROT SERPL-MCNC: 7.4 G/DL (ref 6–8.4)
RBC # BLD AUTO: 3.55 M/UL (ref 4–5.4)
SAMPLE: ABNORMAL
SITE: ABNORMAL
SODIUM SERPL-SCNC: 141 MMOL/L (ref 136–145)
SP02: 94
TROPONIN I SERPL DL<=0.01 NG/ML-MCNC: 0.04 NG/ML (ref 0–0.03)
WBC # BLD AUTO: 10.23 K/UL (ref 3.9–12.7)

## 2020-07-13 PROCEDURE — 36000709 HC OR TIME LEV III EA ADD 15 MIN: Mod: PO | Performed by: SURGERY

## 2020-07-13 PROCEDURE — 63600175 PHARM REV CODE 636 W HCPCS: Performed by: NURSE PRACTITIONER

## 2020-07-13 PROCEDURE — 83735 ASSAY OF MAGNESIUM: CPT

## 2020-07-13 PROCEDURE — 63600175 PHARM REV CODE 636 W HCPCS: Mod: PO | Performed by: ANESTHESIOLOGY

## 2020-07-13 PROCEDURE — 82803 BLOOD GASES ANY COMBINATION: CPT

## 2020-07-13 PROCEDURE — 63600175 PHARM REV CODE 636 W HCPCS: Mod: PO | Performed by: NURSE ANESTHETIST, CERTIFIED REGISTERED

## 2020-07-13 PROCEDURE — 27201423 OPTIME MED/SURG SUP & DEVICES STERILE SUPPLY: Mod: PO | Performed by: SURGERY

## 2020-07-13 PROCEDURE — D9220A PRA ANESTHESIA: Mod: ANES,,, | Performed by: ANESTHESIOLOGY

## 2020-07-13 PROCEDURE — 83036 HEMOGLOBIN GLYCOSYLATED A1C: CPT

## 2020-07-13 PROCEDURE — 94640 AIRWAY INHALATION TREATMENT: CPT

## 2020-07-13 PROCEDURE — D9220A PRA ANESTHESIA: ICD-10-PCS | Mod: ANES,,, | Performed by: ANESTHESIOLOGY

## 2020-07-13 PROCEDURE — 27000221 HC OXYGEN, UP TO 24 HOURS

## 2020-07-13 PROCEDURE — 37000009 HC ANESTHESIA EA ADD 15 MINS: Mod: PO | Performed by: SURGERY

## 2020-07-13 PROCEDURE — G0378 HOSPITAL OBSERVATION PER HR: HCPCS

## 2020-07-13 PROCEDURE — 25000003 PHARM REV CODE 250: Mod: PO | Performed by: ANESTHESIOLOGY

## 2020-07-13 PROCEDURE — 36000708 HC OR TIME LEV III 1ST 15 MIN: Mod: PO | Performed by: SURGERY

## 2020-07-13 PROCEDURE — 80053 COMPREHEN METABOLIC PANEL: CPT

## 2020-07-13 PROCEDURE — D9220A PRA ANESTHESIA: Mod: CRNA,,, | Performed by: NURSE ANESTHETIST, CERTIFIED REGISTERED

## 2020-07-13 PROCEDURE — G0379 DIRECT REFER HOSPITAL OBSERV: HCPCS

## 2020-07-13 PROCEDURE — 88304 PR  SURG PATH,LEVEL III: ICD-10-PCS | Mod: 26,,, | Performed by: PATHOLOGY

## 2020-07-13 PROCEDURE — 84100 ASSAY OF PHOSPHORUS: CPT

## 2020-07-13 PROCEDURE — 36415 COLL VENOUS BLD VENIPUNCTURE: CPT

## 2020-07-13 PROCEDURE — 25000003 PHARM REV CODE 250: Mod: PO | Performed by: NURSE ANESTHETIST, CERTIFIED REGISTERED

## 2020-07-13 PROCEDURE — 25000003 PHARM REV CODE 250: Mod: PO | Performed by: PHYSICIAN ASSISTANT

## 2020-07-13 PROCEDURE — 85379 FIBRIN DEGRADATION QUANT: CPT

## 2020-07-13 PROCEDURE — 71000039 HC RECOVERY, EACH ADD'L HOUR: Mod: PO | Performed by: SURGERY

## 2020-07-13 PROCEDURE — 25000242 PHARM REV CODE 250 ALT 637 W/ HCPCS: Performed by: NURSE PRACTITIONER

## 2020-07-13 PROCEDURE — 37000008 HC ANESTHESIA 1ST 15 MINUTES: Mod: PO | Performed by: SURGERY

## 2020-07-13 PROCEDURE — 88304 TISSUE EXAM BY PATHOLOGIST: CPT | Performed by: PATHOLOGY

## 2020-07-13 PROCEDURE — 84484 ASSAY OF TROPONIN QUANT: CPT

## 2020-07-13 PROCEDURE — 93005 ELECTROCARDIOGRAM TRACING: CPT

## 2020-07-13 PROCEDURE — 94799 UNLISTED PULMONARY SVC/PX: CPT

## 2020-07-13 PROCEDURE — 85025 COMPLETE CBC W/AUTO DIFF WBC: CPT

## 2020-07-13 PROCEDURE — 71000033 HC RECOVERY, INTIAL HOUR: Mod: PO | Performed by: SURGERY

## 2020-07-13 PROCEDURE — 36600 WITHDRAWAL OF ARTERIAL BLOOD: CPT

## 2020-07-13 PROCEDURE — 94761 N-INVAS EAR/PLS OXIMETRY MLT: CPT

## 2020-07-13 PROCEDURE — 25000003 PHARM REV CODE 250: Performed by: NURSE PRACTITIONER

## 2020-07-13 PROCEDURE — 88304 TISSUE EXAM BY PATHOLOGIST: CPT | Mod: 26,,, | Performed by: PATHOLOGY

## 2020-07-13 PROCEDURE — 25000003 PHARM REV CODE 250: Mod: PO | Performed by: SURGERY

## 2020-07-13 PROCEDURE — 99900035 HC TECH TIME PER 15 MIN (STAT)

## 2020-07-13 PROCEDURE — D9220A PRA ANESTHESIA: ICD-10-PCS | Mod: CRNA,,, | Performed by: NURSE ANESTHETIST, CERTIFIED REGISTERED

## 2020-07-13 PROCEDURE — 47562 LAPAROSCOPIC CHOLECYSTECTOMY: CPT | Mod: ,,, | Performed by: SURGERY

## 2020-07-13 PROCEDURE — 47562 PR LAP,CHOLECYSTECTOMY: ICD-10-PCS | Mod: ,,, | Performed by: SURGERY

## 2020-07-13 RX ORDER — IBUPROFEN 200 MG
16 TABLET ORAL
Status: DISCONTINUED | OUTPATIENT
Start: 2020-07-13 | End: 2020-07-29 | Stop reason: HOSPADM

## 2020-07-13 RX ORDER — TALC
6 POWDER (GRAM) TOPICAL NIGHTLY PRN
Status: DISCONTINUED | OUTPATIENT
Start: 2020-07-13 | End: 2020-07-29 | Stop reason: HOSPADM

## 2020-07-13 RX ORDER — DEXAMETHASONE SODIUM PHOSPHATE 4 MG/ML
8 INJECTION, SOLUTION INTRA-ARTICULAR; INTRALESIONAL; INTRAMUSCULAR; INTRAVENOUS; SOFT TISSUE
Status: COMPLETED | OUTPATIENT
Start: 2020-07-13 | End: 2020-07-13

## 2020-07-13 RX ORDER — SUCCINYLCHOLINE CHLORIDE 20 MG/ML
INJECTION INTRAMUSCULAR; INTRAVENOUS
Status: DISCONTINUED | OUTPATIENT
Start: 2020-07-13 | End: 2020-07-13

## 2020-07-13 RX ORDER — ROCURONIUM BROMIDE 10 MG/ML
INJECTION, SOLUTION INTRAVENOUS
Status: DISCONTINUED | OUTPATIENT
Start: 2020-07-13 | End: 2020-07-13

## 2020-07-13 RX ORDER — HYDROCODONE BITARTRATE AND ACETAMINOPHEN 5; 325 MG/1; MG/1
1 TABLET ORAL EVERY 6 HOURS PRN
Qty: 15 TABLET | Refills: 0 | Status: ON HOLD | OUTPATIENT
Start: 2020-07-13 | End: 2020-08-16 | Stop reason: HOSPADM

## 2020-07-13 RX ORDER — PANTOPRAZOLE SODIUM 40 MG/1
40 TABLET, DELAYED RELEASE ORAL DAILY
Status: DISCONTINUED | OUTPATIENT
Start: 2020-07-14 | End: 2020-07-29 | Stop reason: HOSPADM

## 2020-07-13 RX ORDER — NEOSTIGMINE METHYLSULFATE 1 MG/ML
INJECTION, SOLUTION INTRAVENOUS
Status: DISCONTINUED | OUTPATIENT
Start: 2020-07-13 | End: 2020-07-13

## 2020-07-13 RX ORDER — POLYETHYLENE GLYCOL 3350 17 G/17G
17 POWDER, FOR SOLUTION ORAL 2 TIMES DAILY PRN
Status: DISCONTINUED | OUTPATIENT
Start: 2020-07-13 | End: 2020-07-16

## 2020-07-13 RX ORDER — SODIUM CHLORIDE 0.9 % (FLUSH) 0.9 %
10 SYRINGE (ML) INJECTION
Status: DISCONTINUED | OUTPATIENT
Start: 2020-07-13 | End: 2020-07-13 | Stop reason: HOSPADM

## 2020-07-13 RX ORDER — GLUCAGON 1 MG
1 KIT INJECTION
Status: DISCONTINUED | OUTPATIENT
Start: 2020-07-13 | End: 2020-07-29 | Stop reason: HOSPADM

## 2020-07-13 RX ORDER — CLINDAMYCIN PHOSPHATE 900 MG/50ML
900 INJECTION, SOLUTION INTRAVENOUS
Status: COMPLETED | OUTPATIENT
Start: 2020-07-13 | End: 2020-07-13

## 2020-07-13 RX ORDER — HYDROCODONE BITARTRATE AND ACETAMINOPHEN 5; 325 MG/1; MG/1
1 TABLET ORAL EVERY 6 HOURS PRN
Status: DISCONTINUED | OUTPATIENT
Start: 2020-07-13 | End: 2020-07-29 | Stop reason: HOSPADM

## 2020-07-13 RX ORDER — FUROSEMIDE 40 MG/1
40 TABLET ORAL DAILY
Status: DISCONTINUED | OUTPATIENT
Start: 2020-07-14 | End: 2020-07-14

## 2020-07-13 RX ORDER — INSULIN ASPART 100 [IU]/ML
0-5 INJECTION, SOLUTION INTRAVENOUS; SUBCUTANEOUS
Status: DISCONTINUED | OUTPATIENT
Start: 2020-07-13 | End: 2020-07-29 | Stop reason: HOSPADM

## 2020-07-13 RX ORDER — SODIUM CHLORIDE 9 MG/ML
INJECTION, SOLUTION INTRAVENOUS CONTINUOUS
Status: DISCONTINUED | OUTPATIENT
Start: 2020-07-13 | End: 2020-07-13 | Stop reason: HOSPADM

## 2020-07-13 RX ORDER — KETOROLAC TROMETHAMINE 30 MG/ML
INJECTION, SOLUTION INTRAMUSCULAR; INTRAVENOUS
Status: DISCONTINUED | OUTPATIENT
Start: 2020-07-13 | End: 2020-07-13

## 2020-07-13 RX ORDER — LIDOCAINE HYDROCHLORIDE 10 MG/ML
1 INJECTION, SOLUTION EPIDURAL; INFILTRATION; INTRACAUDAL; PERINEURAL ONCE
Status: ACTIVE | OUTPATIENT
Start: 2020-07-13

## 2020-07-13 RX ORDER — ONDANSETRON 2 MG/ML
INJECTION INTRAMUSCULAR; INTRAVENOUS
Status: DISCONTINUED | OUTPATIENT
Start: 2020-07-13 | End: 2020-07-13

## 2020-07-13 RX ORDER — GLYCOPYRROLATE 0.2 MG/ML
INJECTION INTRAMUSCULAR; INTRAVENOUS
Status: DISCONTINUED | OUTPATIENT
Start: 2020-07-13 | End: 2020-07-13

## 2020-07-13 RX ORDER — SPIRONOLACTONE 25 MG/1
25 TABLET ORAL DAILY
Status: DISCONTINUED | OUTPATIENT
Start: 2020-07-13 | End: 2020-07-14

## 2020-07-13 RX ORDER — ONDANSETRON 2 MG/ML
4 INJECTION INTRAMUSCULAR; INTRAVENOUS EVERY 4 HOURS PRN
Status: DISCONTINUED | OUTPATIENT
Start: 2020-07-13 | End: 2020-07-29 | Stop reason: HOSPADM

## 2020-07-13 RX ORDER — METOPROLOL SUCCINATE 50 MG/1
50 TABLET, EXTENDED RELEASE ORAL DAILY
Status: DISCONTINUED | OUTPATIENT
Start: 2020-07-13 | End: 2020-07-29 | Stop reason: HOSPADM

## 2020-07-13 RX ORDER — ONDANSETRON 2 MG/ML
4 INJECTION INTRAMUSCULAR; INTRAVENOUS DAILY PRN
Status: DISCONTINUED | OUTPATIENT
Start: 2020-07-13 | End: 2020-07-13 | Stop reason: HOSPADM

## 2020-07-13 RX ORDER — LIDOCAINE HCL/PF 100 MG/5ML
SYRINGE (ML) INTRAVENOUS
Status: DISCONTINUED | OUTPATIENT
Start: 2020-07-13 | End: 2020-07-13

## 2020-07-13 RX ORDER — HYDRALAZINE HYDROCHLORIDE 20 MG/ML
5 INJECTION INTRAMUSCULAR; INTRAVENOUS ONCE
Status: COMPLETED | OUTPATIENT
Start: 2020-07-13 | End: 2020-07-13

## 2020-07-13 RX ORDER — FENTANYL CITRATE 50 UG/ML
INJECTION, SOLUTION INTRAMUSCULAR; INTRAVENOUS
Status: DISCONTINUED | OUTPATIENT
Start: 2020-07-13 | End: 2020-07-13

## 2020-07-13 RX ORDER — OXYCODONE HYDROCHLORIDE 5 MG/1
5 TABLET ORAL
Status: DISCONTINUED | OUTPATIENT
Start: 2020-07-13 | End: 2020-07-13 | Stop reason: HOSPADM

## 2020-07-13 RX ORDER — FUROSEMIDE 10 MG/ML
40 INJECTION INTRAMUSCULAR; INTRAVENOUS ONCE
Status: COMPLETED | OUTPATIENT
Start: 2020-07-13 | End: 2020-07-13

## 2020-07-13 RX ORDER — LOSARTAN POTASSIUM 100 MG/1
100 TABLET ORAL DAILY
Status: DISCONTINUED | OUTPATIENT
Start: 2020-07-13 | End: 2020-07-14

## 2020-07-13 RX ORDER — ASPIRIN 81 MG/1
81 TABLET ORAL DAILY
Status: DISCONTINUED | OUTPATIENT
Start: 2020-07-14 | End: 2020-07-14

## 2020-07-13 RX ORDER — SODIUM CHLORIDE 0.9 % (FLUSH) 0.9 %
10 SYRINGE (ML) INJECTION
Status: DISCONTINUED | OUTPATIENT
Start: 2020-07-13 | End: 2020-07-29 | Stop reason: HOSPADM

## 2020-07-13 RX ORDER — SODIUM CHLORIDE, SODIUM LACTATE, POTASSIUM CHLORIDE, CALCIUM CHLORIDE 600; 310; 30; 20 MG/100ML; MG/100ML; MG/100ML; MG/100ML
INJECTION, SOLUTION INTRAVENOUS CONTINUOUS
Status: DISPENSED | OUTPATIENT
Start: 2020-07-13

## 2020-07-13 RX ORDER — MONTELUKAST SODIUM 10 MG/1
10 TABLET ORAL NIGHTLY
Status: DISCONTINUED | OUTPATIENT
Start: 2020-07-13 | End: 2020-07-29 | Stop reason: HOSPADM

## 2020-07-13 RX ORDER — FLUTICASONE PROPIONATE 50 MCG
2 SPRAY, SUSPENSION (ML) NASAL DAILY
Status: DISCONTINUED | OUTPATIENT
Start: 2020-07-14 | End: 2020-07-29 | Stop reason: HOSPADM

## 2020-07-13 RX ORDER — ENOXAPARIN SODIUM 100 MG/ML
40 INJECTION SUBCUTANEOUS EVERY 24 HOURS
Status: DISCONTINUED | OUTPATIENT
Start: 2020-07-14 | End: 2020-07-14

## 2020-07-13 RX ORDER — PROPOFOL 10 MG/ML
VIAL (ML) INTRAVENOUS
Status: DISCONTINUED | OUTPATIENT
Start: 2020-07-13 | End: 2020-07-13

## 2020-07-13 RX ORDER — ACETAMINOPHEN 10 MG/ML
INJECTION, SOLUTION INTRAVENOUS
Status: DISCONTINUED | OUTPATIENT
Start: 2020-07-13 | End: 2020-07-13

## 2020-07-13 RX ORDER — ACETAMINOPHEN 325 MG/1
650 TABLET ORAL EVERY 4 HOURS PRN
Status: DISCONTINUED | OUTPATIENT
Start: 2020-07-13 | End: 2020-07-29 | Stop reason: HOSPADM

## 2020-07-13 RX ORDER — FENTANYL CITRATE 50 UG/ML
25 INJECTION, SOLUTION INTRAMUSCULAR; INTRAVENOUS EVERY 5 MIN PRN
Status: DISCONTINUED | OUTPATIENT
Start: 2020-07-13 | End: 2020-07-13 | Stop reason: HOSPADM

## 2020-07-13 RX ORDER — BUPIVACAINE HYDROCHLORIDE AND EPINEPHRINE 2.5; 5 MG/ML; UG/ML
INJECTION, SOLUTION EPIDURAL; INFILTRATION; INTRACAUDAL; PERINEURAL
Status: DISCONTINUED | OUTPATIENT
Start: 2020-07-13 | End: 2020-07-13 | Stop reason: HOSPADM

## 2020-07-13 RX ORDER — METOCLOPRAMIDE HYDROCHLORIDE 5 MG/ML
10 INJECTION INTRAMUSCULAR; INTRAVENOUS EVERY 10 MIN PRN
Status: COMPLETED | OUTPATIENT
Start: 2020-07-13 | End: 2020-07-13

## 2020-07-13 RX ORDER — MIDAZOLAM HYDROCHLORIDE 1 MG/ML
INJECTION, SOLUTION INTRAMUSCULAR; INTRAVENOUS
Status: DISCONTINUED | OUTPATIENT
Start: 2020-07-13 | End: 2020-07-13

## 2020-07-13 RX ORDER — BUDESONIDE 0.5 MG/2ML
0.5 INHALANT ORAL EVERY 12 HOURS
Status: DISCONTINUED | OUTPATIENT
Start: 2020-07-13 | End: 2020-07-29 | Stop reason: HOSPADM

## 2020-07-13 RX ORDER — IBUPROFEN 200 MG
24 TABLET ORAL
Status: DISCONTINUED | OUTPATIENT
Start: 2020-07-13 | End: 2020-07-29 | Stop reason: HOSPADM

## 2020-07-13 RX ORDER — IPRATROPIUM BROMIDE AND ALBUTEROL SULFATE 2.5; .5 MG/3ML; MG/3ML
3 SOLUTION RESPIRATORY (INHALATION)
Status: DISCONTINUED | OUTPATIENT
Start: 2020-07-13 | End: 2020-07-28

## 2020-07-13 RX ADMIN — FENTANYL CITRATE 50 MCG: 50 INJECTION, SOLUTION INTRAMUSCULAR; INTRAVENOUS at 01:07

## 2020-07-13 RX ADMIN — SUGAMMADEX 200 MG: 100 INJECTION, SOLUTION INTRAVENOUS at 02:07

## 2020-07-13 RX ADMIN — ONDANSETRON 4 MG: 2 INJECTION, SOLUTION INTRAMUSCULAR; INTRAVENOUS at 01:07

## 2020-07-13 RX ADMIN — METOPROLOL SUCCINATE 50 MG: 50 TABLET, FILM COATED, EXTENDED RELEASE ORAL at 08:07

## 2020-07-13 RX ADMIN — SODIUM CHLORIDE, SODIUM LACTATE, POTASSIUM CHLORIDE, AND CALCIUM CHLORIDE: .6; .31; .03; .02 INJECTION, SOLUTION INTRAVENOUS at 01:07

## 2020-07-13 RX ADMIN — LOSARTAN POTASSIUM 100 MG: 100 TABLET ORAL at 08:07

## 2020-07-13 RX ADMIN — HYDRALAZINE HYDROCHLORIDE 5 MG: 20 INJECTION INTRAMUSCULAR; INTRAVENOUS at 04:07

## 2020-07-13 RX ADMIN — LIDOCAINE HYDROCHLORIDE 100 MG: 20 INJECTION PARENTERAL at 01:07

## 2020-07-13 RX ADMIN — FENTANYL CITRATE 25 MCG: 50 INJECTION INTRAMUSCULAR; INTRAVENOUS at 02:07

## 2020-07-13 RX ADMIN — SPIRONOLACTONE 25 MG: 25 TABLET ORAL at 08:07

## 2020-07-13 RX ADMIN — CLINDAMYCIN IN 5 PERCENT DEXTROSE 900 MG: 18 INJECTION, SOLUTION INTRAVENOUS at 01:07

## 2020-07-13 RX ADMIN — METOCLOPRAMIDE 10 MG: 5 INJECTION, SOLUTION INTRAMUSCULAR; INTRAVENOUS at 03:07

## 2020-07-13 RX ADMIN — BUDESONIDE INHALATION 0.5 MG: 0.5 SUSPENSION RESPIRATORY (INHALATION) at 07:07

## 2020-07-13 RX ADMIN — PROPOFOL 160 MG: 10 INJECTION, EMULSION INTRAVENOUS at 01:07

## 2020-07-13 RX ADMIN — NEOSTIGMINE METHYLSULFATE 4 MG: 1 INJECTION INTRAVENOUS at 02:07

## 2020-07-13 RX ADMIN — GLYCOPYRROLATE 0.6 MG: 0.2 INJECTION, SOLUTION INTRAMUSCULAR; INTRAVENOUS at 02:07

## 2020-07-13 RX ADMIN — SUCCINYLCHOLINE CHLORIDE 160 MG: 20 INJECTION, SOLUTION INTRAMUSCULAR; INTRAVENOUS at 01:07

## 2020-07-13 RX ADMIN — MIDAZOLAM 1 MG: 1 INJECTION INTRAMUSCULAR; INTRAVENOUS at 01:07

## 2020-07-13 RX ADMIN — FENTANYL CITRATE 25 MCG: 50 INJECTION, SOLUTION INTRAMUSCULAR; INTRAVENOUS at 01:07

## 2020-07-13 RX ADMIN — OXYCODONE HYDROCHLORIDE 5 MG: 5 TABLET ORAL at 02:07

## 2020-07-13 RX ADMIN — DEXAMETHASONE SODIUM PHOSPHATE 8 MG: 4 INJECTION, SOLUTION INTRAMUSCULAR; INTRAVENOUS at 12:07

## 2020-07-13 RX ADMIN — SODIUM CHLORIDE, SODIUM LACTATE, POTASSIUM CHLORIDE, AND CALCIUM CHLORIDE: .6; .31; .03; .02 INJECTION, SOLUTION INTRAVENOUS at 02:07

## 2020-07-13 RX ADMIN — MONTELUKAST 10 MG: 10 TABLET, FILM COATED ORAL at 08:07

## 2020-07-13 RX ADMIN — ROCURONIUM BROMIDE 20 MG: 10 INJECTION, SOLUTION INTRAVENOUS at 01:07

## 2020-07-13 RX ADMIN — ROCURONIUM BROMIDE 5 MG: 10 INJECTION, SOLUTION INTRAVENOUS at 01:07

## 2020-07-13 RX ADMIN — FUROSEMIDE 40 MG: 10 INJECTION, SOLUTION INTRAVENOUS at 08:07

## 2020-07-13 RX ADMIN — ACETAMINOPHEN 1000 MG: 10 INJECTION, SOLUTION INTRAVENOUS at 01:07

## 2020-07-13 RX ADMIN — IPRATROPIUM BROMIDE AND ALBUTEROL SULFATE 3 ML: .5; 3 SOLUTION RESPIRATORY (INHALATION) at 07:07

## 2020-07-13 NOTE — DISCHARGE INSTRUCTIONS
Discharge Instructions: After Your Surgery  Youve just had surgery. During surgery, you were given medicine called anesthesia to keep you relaxed and free of pain. After surgery, you may have some pain or nausea. This is common. Here are some tips for feeling better and getting well after surgery.     Stay on schedule with your medicine.   Going home  Your healthcare provider will show you how to take care of yourself when you go home. He or she will also answer your questions. Have an adult family member or friend drive you home. For the first 24 hours after your surgery:  · Do not drive or use heavy equipment.  · Do not make important decisions or sign legal papers.  · Do not drink alcohol.  · Have someone stay with you, if needed. He or she can watch for problems and help keep you safe.  Be sure to go to all follow-up visits with your healthcare provider. And rest after your surgery for as long as your healthcare provider tells you to.  Coping with pain  If you have pain after surgery, pain medicine will help you feel better. Take it as told, before pain becomes severe. Also, ask your healthcare provider or pharmacist about other ways to control pain. This might be with heat, ice, or relaxation. And follow any other instructions your surgeon or nurse gives you.  Tips for taking pain medicine  To get the best relief possible, remember these points:  · Pain medicines can upset your stomach. Taking them with a little food may help.  · Most pain relievers taken by mouth need at least 20 to 30 minutes to start to work.  · Taking medicine on a schedule can help you remember to take it. Try to time your medicine so that you can take it before starting an activity. This might be before you get dressed, go for a walk, or sit down for dinner.  · Constipation is a common side effect of pain medicines. Call your healthcare provider before taking any medicines such as laxatives or stool softeners to help ease constipation.  Also ask if you should skip any foods. Drinking lots of fluids and eating foods such as fruits and vegetables that are high in fiber can also help. Remember, do not take laxatives unless your surgeon has prescribed them.  · Drinking alcohol and taking pain medicine can cause dizziness and slow your breathing. It can even be deadly. Do not drink alcohol while taking pain medicine.  · Pain medicine can make you react more slowly to things. Do not drive or run machinery while taking pain medicine.  Your healthcare provider may tell you to take acetaminophen to help ease your pain. Ask him or her how much you are supposed to take each day. Acetaminophen or other pain relievers may interact with your prescription medicines or other over-the-counter (OTC) medicines. Some prescription medicines have acetaminophen and other ingredients. Using both prescription and OTC acetaminophen for pain can cause you to overdose. Read the labels on your OTC medicines with care. This will help you to clearly know the list of ingredients, how much to take, and any warnings. It may also help you not take too much acetaminophen. If you have questions or do not understand the information, ask your pharmacist or healthcare provider to explain it to you before you take the OTC medicine.  Managing nausea  Some people have an upset stomach after surgery. This is often because of anesthesia, pain, or pain medicine, or the stress of surgery. These tips will help you handle nausea and eat healthy foods as you get better. If you were on a special food plan before surgery, ask your healthcare provider if you should follow it while you get better. These tips may help:  · Do not push yourself to eat. Your body will tell you when to eat and how much.  · Start off with clear liquids and soup. They are easier to digest.  · Next try semi-solid foods, such as mashed potatoes, applesauce, and gelatin, as you feel ready.  · Slowly move to solid foods. Dont  eat fatty, rich, or spicy foods at first.  · Do not force yourself to have 3 large meals a day. Instead eat smaller amounts more often.  · Take pain medicines with a small amount of solid food, such as crackers or toast, to avoid nausea.     Call your surgeon if  · You still have pain an hour after taking medicine. The medicine may not be strong enough.  · You feel too sleepy, dizzy, or groggy. The medicine may be too strong.  · You have side effects like nausea, vomiting, or skin changes, such as rash, itching, or hives.       If you have obstructive sleep apnea  You were given anesthesia medicine during surgery to keep you comfortable and free of pain. After surgery, you may have more apnea spells because of this medicine and other medicines you were given. The spells may last longer than usual.   At home:  · Keep using the continuous positive airway pressure (CPAP) device when you sleep. Unless your healthcare provider tells you not to, use it when you sleep, day or night. CPAP is a common device used to treat obstructive sleep apnea.  · Talk with your provider before taking any pain medicine, muscle relaxants, or sedatives. Your provider will tell you about the possible dangers of taking these medicines.  Date Last Reviewed: 12/1/2016 © 2000-2017 [employer]. [address] [place] [address], [place] [address]. All rights reserved. This information is not intended as a substitute for professional medical care. Always follow your healthcare professional's instructions.        Department of General Surgery  [facility]  LAPAROSCOPIC CHOLECYSTECTOMY    DOS:   Minimal activity for 48 hours   Regular diet as tolerated   May shower in 48 hours   May remove outer dressing in 48 hours. Leave ster-strips in place. If steri-strips get wet, pat dry. If they fall off, do not worry.   Resume home medications as prescribed.    DONT:   Do not lift anything over 15 pounds until you have been released by  your physician.   Do not soak in tub   No driving for 24 hours or while taking narcotic pain medication.   DO NOT TAKE ADDITIONAL TYLENOL/ACETAMINOPHEN WHILE TAKING NARCOTIC PAIN MEDICINE THAT CONTAINS TYLENOL/ACETAMINOPHEN.    CALL PHYSICIAN FOR:   Redness, swelling, or bleeding from surgical site   Fever greater then 101.   Drainage from the incision site that appears infected.   Persistent pain not relieved by pain medication.    RETURN APPOINTMENT: Call to schedule appointment in 10-14 days    FOR EMERGENCIES: Contact  Your doctor at [phone]

## 2020-07-13 NOTE — TRANSFER OF CARE
"Anesthesia Transfer of Care Note    Patient: Sammi Abreu    Procedure(s) Performed: Procedure(s) (LRB):  CHOLECYSTECTOMY, LAPAROSCOPIC (N/A)    Patient location: PACU    Anesthesia Type: general    Transport from OR: Transported from OR on room air with adequate spontaneous ventilation    Post pain: adequate analgesia    Post assessment: no apparent anesthetic complications and tolerated procedure well    Post vital signs: stable    Level of consciousness: sedated and responds to stimulation    Nausea/Vomiting: no nausea/vomiting    Complications: none    Transfer of care protocol was followed      Last vitals:   Visit Vitals  BP (!) 164/68 (BP Location: Right arm, Patient Position: Lying)   Pulse (!) (P) 121   Temp (P) 36.7 °C (98 °F) (Skin)   Resp (!) (P) 24   Ht 5' 3" (1.6 m)   Wt 97.5 kg (215 lb)   SpO2 (!) (P) 74%   Breastfeeding No   BMI 38.09 kg/m²     "

## 2020-07-13 NOTE — DISCHARGE SUMMARY
Discharge Summary  General Surgery      Admit Date: 7/13/2020    Discharge Date :7/13/2020    Attending Physician: Jomar Mcdonald     Discharge Physician: Jomar Mcdonald    Discharged Condition: good    Discharge Diagnosis: Biliary colic [K80.50]    Treatments/Procedures: Procedure(s) (LRB):  CHOLECYSTECTOMY, LAPAROSCOPIC (N/A)    Hospital Course: Uneventful; Discharged home from Recovery    Significant Diagnostic Studies: none    Disposition: Home or Self Care    Diet: Regular    Follow up: Office 10-14 days    Activity: No heavy lifting till seen in office.    Patient Instructions:   Current Discharge Medication List      START taking these medications    Details   HYDROcodone-acetaminophen (NORCO) 5-325 mg per tablet Take 1 tablet by mouth every 6 (six) hours as needed for Pain.  Qty: 15 tablet, Refills: 0    Comments: Quantity prescribed more than 7 day supply? No         CONTINUE these medications which have NOT CHANGED    Details   budesonide-formoterol 160-4.5 mcg (SYMBICORT) 160-4.5 mcg/actuation HFAA Inhale 2 puffs into the lungs every 12 (twelve) hours. Controller  Qty: 3 Inhaler, Refills: 3    Associated Diagnoses: Mild persistent asthma with acute exacerbation      diclofenac (VOLTAREN) 25 MG TbEC Take 1 tablet (25 mg total) by mouth 3 (three) times daily as needed.  Qty: 15 tablet, Refills: 0    Associated Diagnoses: DDD (degenerative disc disease), lumbar      esomeprazole (NEXIUM) 20 MG capsule Take 1 capsule (20 mg total) by mouth before breakfast.  Qty: 30 capsule, Refills: 2      fluticasone (FLONASE) 50 mcg/actuation nasal spray 2 sprays (100 mcg total) by Each Nare route once daily.  Qty: 3 Bottle, Refills: 3    Associated Diagnoses: Chronic sinus complaints      furosemide (LASIX) 40 MG tablet Take 1 tablet (40 mg total) by mouth once daily.  Qty: 90 tablet, Refills: 4    Associated Diagnoses: Essential hypertension      losartan (COZAAR) 100 MG tablet Take 1 tablet (100 mg total) by mouth  once daily.  Qty: 90 tablet, Refills: 4    Associated Diagnoses: HTN (hypertension), benign      metoprolol succinate (TOPROL-XL) 50 MG 24 hr tablet Take 1 tablet (50 mg total) by mouth once daily.  Qty: 90 tablet, Refills: 3      montelukast (SINGULAIR) 10 mg tablet Take 1 tablet (10 mg total) by mouth every evening.  Qty: 90 tablet, Refills: 3    Associated Diagnoses: Chronic sinus complaints; Mild persistent asthma with acute exacerbation      spironolactone (ALDACTONE) 25 MG tablet Take 1 tablet (25 mg total) by mouth once daily.  Qty: 90 tablet, Refills: 6    Associated Diagnoses: Pulmonary hypertension      albuterol (PROVENTIL) 2.5 mg /3 mL (0.083 %) nebulizer solution Take 3 mLs (2.5 mg total) by nebulization every 6 (six) hours as needed.  Qty: 120 each, Refills: 11    Associated Diagnoses: Mild persistent asthma with acute exacerbation      ascorbic acid (VITAMIN C) 100 MG tablet Take 100 mg by mouth once daily.       aspirin (ECOTRIN) 81 MG EC tablet Take 81 mg by mouth once daily.      cyanocobalamin, vitamin B-12, 2,500 mcg Lozg Place 2 tablets under the tongue once daily.  Qty: 60 lozenge, Refills: 11    Associated Diagnoses: Megaloblastic red blood cells      lactulose (CHRONULAC) 10 gram/15 mL solution Take 30 mLs (20 g total) by mouth 3 (three) times daily. 2 Teaspoon(s) Oral PRN Every evening.  Qty: 500 mL, Refills: 3    Associated Diagnoses: DM (diabetes mellitus) type II controlled peripheral vascular disorder             Discharge Procedure Orders   Diet general

## 2020-07-13 NOTE — ASSESSMENT & PLAN NOTE
Monitor O2 sats, supplement O2 as needed  Stat ABG  Q 4 hr while awake duo nebs and b.i.d. Pulmicort  Incentive spirometer q.1 hour while awake

## 2020-07-13 NOTE — OP NOTE
PATIENT NAME:  Sammi Abreu     DATE OF PROCEDURE: 7/13/2020    PROCEDURE: Laparoscopic Cholecystectomy    PREOPERATIVE DIAGNOSIS: Cholelithiasis / Cholecystitis   POSTOPERATIVE DIAGNOSIS: Cholelithiasis / Cholecystitis     SURGEON: Jomar Soria Jr., M.D.  ASSISTANT: None     DESCRIPTION OF PROCEDURE: After appropriate consent was obtained, consent forms   signed and questions answered, the patient was taken to the Operating Room and   placed on the operating room table where general endotracheal anesthesia was   induced without difficulty. Preoperative antibiotics were administered. SCDs were in   place. A Timeout procedure was performed. The abdomen was prepped and draped in the usual sterile fashion. A midline incision was made beneath the umbilicus. Dissection was performed down to the fascia, which was incised. Stay sutures were placed on the fascia and the [place] trocar was inserted. The abdomen was insufflated. Under direct   vision and after injection with local anesthetic, a 5 mm trocar was placed in   the upper midline. A second 5 mm trocar was placed between these two. The   gallbladder was identified, grasped, and retracted. There was some mild to moderate  inflammation. The cystic duct was identified and carefully dissected. Three clips   were placed on the common duct side, 2 on the gallbladder side, and the duct was   transected. The artery was identified, dissected, clipped and cut as well. The   gallbladder was then dissected free of the liver bed with electrocautery. It was   placed in a retrieval bag and removed through the umbilical port site. No bile   was spilled. The gallbladder fossa was examined and found to be hemostatic. The trocars were then removed under direct vision.The abdomen was desufflated. The fascia at the umbilicus was closed with interrupted 0 Vicryl suture and additional local was injected. The skin was then closed with 4-0 Monocryl subcuticular stitches. Steri-Strips  were then applied to all incisions. The patient was awakened, extubated and transferred to the Recovery Room in good condition. The patient tolerated the procedure without complication. Lap and instrument counts were reported as correct at the termination of the procedure.    EBL: 10 cc  SPECIMEN: gallbladder  COMPLICATIONS: none  ANESTHESIA: General

## 2020-07-13 NOTE — ASSESSMENT & PLAN NOTE
Monitor O2 sats, supplement O2 as needed  Telemetry  Stat chest x-ray, CBC, CMP, magnesium level, phosphorus level, ABGs  Incentive spirometer q.1 hour while awake  Patient with a history of COPD- Add Q 4 hr duo nebs while awake and b.i.d. Pulmicort

## 2020-07-13 NOTE — HPI
This is a 79-year-old female who has a past medical history of arthritis and chronic back pain, fibromyalgia, glaucoma, hyperlipidemia, hypertension, sleep apnea with history of noncompliance with CPAP, morbid obesity, hypertension, GERD, COPD, diabetes type 2, prior DVT, and gallstones.  Please note the patient is also a Mu-ism.  Patient is being received from Outpatient surgical center.  She is status post laparoscopic cholecystectomy today by Dr. Jomar Soria.  Patient tolerated procedure well.  She however was unable to be discharged postop due to ongoing hypoxemia and has been transferred here for further evaluation and treatment.

## 2020-07-13 NOTE — ANESTHESIA PREPROCEDURE EVALUATION
07/13/2020  Sammi Abreu is a 79 y.o., female.    Anesthesia Evaluation    I have reviewed the Patient Summary Reports.    I have reviewed the Nursing Notes. I have reviewed the NPO Status.   I have reviewed the Medications.     Review of Systems  Anesthesia Hx:  Denies Family Hx of Anesthesia complications.   Denies Personal Hx of Anesthesia complications.   Cardiovascular:   Hypertension ECG has been reviewed.    Pulmonary:   COPD, moderate Asthma mild Sleep Apnea    Hepatic/GI:   GERD    Musculoskeletal:   Arthritis   Spine Disorders: lumbar Chronic Pain    Endocrine:   Diabetes, type 2        Physical Exam  General:  Morbid Obesity, Malnutrition    Airway/Jaw/Neck:  Airway Findings: Mouth Opening: Normal Tongue: Normal  General Airway Assessment: Adult  Mallampati: III  Improves to II with phonation.  TM Distance: Normal, at least 6 cm  Jaw/Neck Findings:  Neck ROM: Extension Decreased, Mild  Neck Findings:  Girth Increased     Eyes/Ears/Nose:  EYES/EARS/NOSE FINDINGS: Normal   Dental:  Dental Findings: Upper partial dentures   Chest/Lungs:  Chest/Lungs Findings: Normal Respiratory Rate     Heart/Vascular:  Heart Findings: Rate: Normal  Rhythm: Regular Rhythm        Mental Status:  Mental Status Findings:  Alert and Oriented, Cooperative, Anxious         Anesthesia Plan  Type of Anesthesia, risks & benefits discussed:  Anesthesia Type:  general  Patient's Preference:   Intra-op Monitoring Plan: standard ASA monitors  Intra-op Monitoring Plan Comments:   Post Op Pain Control Plan:   Post Op Pain Control Plan Comments:   Induction:   IV  Beta Blocker:  Patient is on a Beta-Blocker and has received one dose within the past 24 hours (No further documentation required).       Informed Consent: Patient understands risks and agrees with Anesthesia plan.  Questions answered. Anesthesia consent signed with  patient.  ASA Score: 3     Day of Surgery Review of History & Physical:    H&P update referred to the surgeon.         Ready For Surgery From Anesthesia Perspective.

## 2020-07-13 NOTE — OR NURSING
Per Dr. Mcdonald and Dr. Fontana patient to be a direct admit to [facility] in [place]. Patient systolic BP remaining in 200's; patient still having increase work of breathing and unable to tolerate room air. Patient on 2L nasal cannula. Patient friend, Bassem Herrera updated on plan and verbalizes understanding.

## 2020-07-13 NOTE — ANESTHESIA PROCEDURE NOTES
Intubation  Performed by: Miguel Leos CRNA  Authorized by: Elkin Taylor MD     Intubation:     Induction:  Intravenous    Intubated:  Postinduction    Mask Ventilation:  Easy mask    Attempts:  1    Attempted By:  CRNA    Method of Intubation:  Direct    Blade:  Herrera 2    Laryngeal View Grade: Grade I - full view of chords      Difficult Airway Encountered?: No      Complications:  None    Airway Device:  Oral endotracheal tube    Airway Device Size:  6.5    Style/Cuff Inflation:  Cuffed (inflated to minimal occlusive pressure)    Tube secured:  23    Secured at:  The lips    Placement Verified By:  Capnometry    Complicating Factors:  None    Findings Post-Intubation:  BS equal bilateral and atraumatic/condition of teeth unchanged

## 2020-07-13 NOTE — ASSESSMENT & PLAN NOTE
Patient is status post laparoscopic cholecystectomy done today by Dr. Jairo Soria at outpatient surgical center-  Orders as per surgeon left back low  Low-fat low-cholesterol ADA diet  P.r.n. pain and antiemetic medication

## 2020-07-14 ENCOUNTER — TELEPHONE (OUTPATIENT)
Dept: SURGERY | Facility: CLINIC | Age: 80
End: 2020-07-14

## 2020-07-14 PROBLEM — I26.99 BILATERAL PULMONARY EMBOLISM: Status: ACTIVE | Noted: 2020-07-14

## 2020-07-14 PROBLEM — R79.89 ELEVATED TROPONIN: Status: ACTIVE | Noted: 2020-07-14

## 2020-07-14 PROBLEM — I82.431 ACUTE DEEP VEIN THROMBOSIS (DVT) OF POPLITEAL VEIN OF RIGHT LOWER EXTREMITY: Status: ACTIVE | Noted: 2020-07-14

## 2020-07-14 PROBLEM — E87.5 HYPERKALEMIA: Status: ACTIVE | Noted: 2020-07-14

## 2020-07-14 LAB
ALBUMIN SERPL BCP-MCNC: 3.2 G/DL (ref 3.5–5.2)
ALP SERPL-CCNC: 77 U/L (ref 55–135)
ALT SERPL W/O P-5'-P-CCNC: 37 U/L (ref 10–44)
ANION GAP SERPL CALC-SCNC: 11 MMOL/L (ref 8–16)
ANION GAP SERPL CALC-SCNC: 13 MMOL/L (ref 8–16)
AORTIC ROOT ANNULUS: 2.68 CM
AORTIC VALVE CUSP SEPERATION: 1.85 CM
APTT BLDCRRT: 32.1 SEC (ref 21–32)
ASCENDING AORTA: 2.8 CM
AST SERPL-CCNC: 39 U/L (ref 10–40)
AV INDEX (PROSTH): 0.78
AV MEAN GRADIENT: 7 MMHG
AV PEAK GRADIENT: 12 MMHG
AV VALVE AREA: 2.58 CM2
AV VELOCITY RATIO: 0.73
BASOPHILS # BLD AUTO: 0.01 K/UL (ref 0–0.2)
BASOPHILS NFR BLD: 0.1 % (ref 0–1.9)
BILIRUB SERPL-MCNC: 0.5 MG/DL (ref 0.1–1)
BSA FOR ECHO PROCEDURE: 2.08 M2
BUN SERPL-MCNC: 20 MG/DL (ref 8–23)
BUN SERPL-MCNC: 25 MG/DL (ref 8–23)
CALCIUM SERPL-MCNC: 9.3 MG/DL (ref 8.7–10.5)
CALCIUM SERPL-MCNC: 9.3 MG/DL (ref 8.7–10.5)
CHLORIDE SERPL-SCNC: 103 MMOL/L (ref 95–110)
CHLORIDE SERPL-SCNC: 106 MMOL/L (ref 95–110)
CO2 SERPL-SCNC: 23 MMOL/L (ref 23–29)
CO2 SERPL-SCNC: 25 MMOL/L (ref 23–29)
CREAT SERPL-MCNC: 1.4 MG/DL (ref 0.5–1.4)
CREAT SERPL-MCNC: 1.5 MG/DL (ref 0.5–1.4)
CV ECHO LV RWT: 0.82 CM
DIFFERENTIAL METHOD: ABNORMAL
DOP CALC AO PEAK VEL: 1.73 M/S
DOP CALC AO VTI: 35.52 CM
DOP CALC LVOT AREA: 3.3 CM2
DOP CALC LVOT DIAMETER: 2.05 CM
DOP CALC LVOT PEAK VEL: 1.27 M/S
DOP CALC LVOT STROKE VOLUME: 91.48 CM3
DOP CALCLVOT PEAK VEL VTI: 27.73 CM
E WAVE DECELERATION TIME: 250.87 MSEC
E/A RATIO: 0.55
E/E' RATIO: 12 M/S
ECHO LV POSTERIOR WALL: 1.34 CM (ref 0.6–1.1)
EOSINOPHIL # BLD AUTO: 0 K/UL (ref 0–0.5)
EOSINOPHIL NFR BLD: 0 % (ref 0–8)
ERYTHROCYTE [DISTWIDTH] IN BLOOD BY AUTOMATED COUNT: 12.4 % (ref 11.5–14.5)
EST. GFR  (AFRICAN AMERICAN): 38 ML/MIN/1.73 M^2
EST. GFR  (AFRICAN AMERICAN): 42 ML/MIN/1.73 M^2
EST. GFR  (NON AFRICAN AMERICAN): 33 ML/MIN/1.73 M^2
EST. GFR  (NON AFRICAN AMERICAN): 36 ML/MIN/1.73 M^2
ESTIMATED AVG GLUCOSE: 137 MG/DL (ref 68–131)
FRACTIONAL SHORTENING: 35 % (ref 28–44)
GLUCOSE SERPL-MCNC: 133 MG/DL (ref 70–110)
GLUCOSE SERPL-MCNC: 142 MG/DL (ref 70–110)
HBA1C MFR BLD HPLC: 6.4 % (ref 4–5.6)
HCT VFR BLD AUTO: 36 % (ref 37–48.5)
HGB BLD-MCNC: 11.8 G/DL (ref 12–16)
IMM GRANULOCYTES # BLD AUTO: 0.02 K/UL (ref 0–0.04)
IMM GRANULOCYTES NFR BLD AUTO: 0.2 % (ref 0–0.5)
INR PPP: 1 (ref 0.8–1.2)
INTERVENTRICULAR SEPTUM: 1.33 CM (ref 0.6–1.1)
IVRT: 77.07 MSEC
LA MAJOR: 5.84 CM
LA MINOR: 5.78 CM
LA WIDTH: 3.29 CM
LEFT ATRIUM SIZE: 3.23 CM
LEFT ATRIUM VOLUME INDEX: 26.4 ML/M2
LEFT ATRIUM VOLUME: 52.48 CM3
LEFT INTERNAL DIMENSION IN SYSTOLE: 2.12 CM (ref 2.1–4)
LEFT VENTRICLE DIASTOLIC VOLUME INDEX: 21.91 ML/M2
LEFT VENTRICLE DIASTOLIC VOLUME: 43.61 ML
LEFT VENTRICLE MASS INDEX: 74 G/M2
LEFT VENTRICLE SYSTOLIC VOLUME INDEX: 7.4 ML/M2
LEFT VENTRICLE SYSTOLIC VOLUME: 14.78 ML
LEFT VENTRICULAR INTERNAL DIMENSION IN DIASTOLE: 3.28 CM (ref 3.5–6)
LEFT VENTRICULAR MASS: 146.5 G
LV LATERAL E/E' RATIO: 11 M/S
LV SEPTAL E/E' RATIO: 13.2 M/S
LYMPHOCYTES # BLD AUTO: 1.3 K/UL (ref 1–4.8)
LYMPHOCYTES NFR BLD: 12.3 % (ref 18–48)
MAGNESIUM SERPL-MCNC: 1.7 MG/DL (ref 1.6–2.6)
MCH RBC QN AUTO: 34 PG (ref 27–31)
MCHC RBC AUTO-ENTMCNC: 32.8 G/DL (ref 32–36)
MCV RBC AUTO: 104 FL (ref 82–98)
MONOCYTES # BLD AUTO: 0.4 K/UL (ref 0.3–1)
MONOCYTES NFR BLD: 3.5 % (ref 4–15)
MV A" WAVE DURATION": 177 MSEC
MV MEAN GRADIENT: 3 MMHG
MV PEAK A VEL: 1.21 M/S
MV PEAK E VEL: 0.66 M/S
MV STENOSIS PRESSURE HALF TIME: 72.75 MS
MV VALVE AREA P 1/2 METHOD: 3.02 CM2
NEUTROPHILS # BLD AUTO: 8.6 K/UL (ref 1.8–7.7)
NEUTROPHILS NFR BLD: 83.9 % (ref 38–73)
NRBC BLD-RTO: 0 /100 WBC
PISA MRMAX VEL: 0.05 M/S
PISA TR MAX VEL: 3.26 M/S
PLATELET # BLD AUTO: 120 K/UL (ref 150–350)
PMV BLD AUTO: 10.1 FL (ref 9.2–12.9)
POCT GLUCOSE: 121 MG/DL (ref 70–110)
POCT GLUCOSE: 136 MG/DL (ref 70–110)
POCT GLUCOSE: 143 MG/DL (ref 70–110)
POTASSIUM SERPL-SCNC: 5.1 MMOL/L (ref 3.5–5.1)
POTASSIUM SERPL-SCNC: 5.4 MMOL/L (ref 3.5–5.1)
PROT SERPL-MCNC: 7.3 G/DL (ref 6–8.4)
PROTHROMBIN TIME: 11.4 SEC (ref 9–12.5)
PULM VEIN A" WAVE DURATION": 95 MSEC
PULM VEIN S/D RATIO: 2.02
PV PEAK D VEL: 0.46 M/S
PV PEAK S VEL: 0.93 M/S
PV PEAK VELOCITY: 1.07 CM/S
RA MAJOR: 4.6 CM
RA PRESSURE: 15 MMHG
RA WIDTH: 2.88 CM
RBC # BLD AUTO: 3.47 M/UL (ref 4–5.4)
RIGHT VENTRICULAR END-DIASTOLIC DIMENSION: 2.52 CM
SINUS: 2.6 CM
SODIUM SERPL-SCNC: 139 MMOL/L (ref 136–145)
SODIUM SERPL-SCNC: 142 MMOL/L (ref 136–145)
STJ: 2.53 CM
TDI LATERAL: 0.06 M/S
TDI SEPTAL: 0.05 M/S
TDI: 0.06 M/S
TR MAX PG: 43 MMHG
TRICUSPID ANNULAR PLANE SYSTOLIC EXCURSION: 2.57 CM
TROPONIN I SERPL DL<=0.01 NG/ML-MCNC: 0.03 NG/ML (ref 0–0.03)
TROPONIN I SERPL DL<=0.01 NG/ML-MCNC: 0.03 NG/ML (ref 0–0.03)
TROPONIN I SERPL DL<=0.01 NG/ML-MCNC: 0.05 NG/ML (ref 0–0.03)
TROPONIN I SERPL DL<=0.01 NG/ML-MCNC: 0.07 NG/ML (ref 0–0.03)
TROPONIN I SERPL DL<=0.01 NG/ML-MCNC: 0.08 NG/ML (ref 0–0.03)
TV REST PULMONARY ARTERY PRESSURE: 58 MMHG
WBC # BLD AUTO: 10.27 K/UL (ref 3.9–12.7)

## 2020-07-14 PROCEDURE — 85730 THROMBOPLASTIN TIME PARTIAL: CPT

## 2020-07-14 PROCEDURE — 93005 ELECTROCARDIOGRAM TRACING: CPT

## 2020-07-14 PROCEDURE — 25000242 PHARM REV CODE 250 ALT 637 W/ HCPCS: Performed by: NURSE PRACTITIONER

## 2020-07-14 PROCEDURE — 85025 COMPLETE CBC W/AUTO DIFF WBC: CPT

## 2020-07-14 PROCEDURE — 63600175 PHARM REV CODE 636 W HCPCS: Performed by: INTERNAL MEDICINE

## 2020-07-14 PROCEDURE — 25000003 PHARM REV CODE 250: Performed by: NURSE PRACTITIONER

## 2020-07-14 PROCEDURE — 85610 PROTHROMBIN TIME: CPT

## 2020-07-14 PROCEDURE — 94761 N-INVAS EAR/PLS OXIMETRY MLT: CPT

## 2020-07-14 PROCEDURE — 94799 UNLISTED PULMONARY SVC/PX: CPT

## 2020-07-14 PROCEDURE — 84484 ASSAY OF TROPONIN QUANT: CPT | Mod: 91

## 2020-07-14 PROCEDURE — 27000221 HC OXYGEN, UP TO 24 HOURS

## 2020-07-14 PROCEDURE — 99900035 HC TECH TIME PER 15 MIN (STAT)

## 2020-07-14 PROCEDURE — 99223 PR INITIAL HOSPITAL CARE,LEVL III: ICD-10-PCS | Mod: ,,, | Performed by: INTERNAL MEDICINE

## 2020-07-14 PROCEDURE — 99223 1ST HOSP IP/OBS HIGH 75: CPT | Mod: ,,, | Performed by: INTERNAL MEDICINE

## 2020-07-14 PROCEDURE — 83735 ASSAY OF MAGNESIUM: CPT

## 2020-07-14 PROCEDURE — 63600175 PHARM REV CODE 636 W HCPCS: Performed by: NURSE PRACTITIONER

## 2020-07-14 PROCEDURE — 80053 COMPREHEN METABOLIC PANEL: CPT

## 2020-07-14 PROCEDURE — 94640 AIRWAY INHALATION TREATMENT: CPT

## 2020-07-14 PROCEDURE — 80048 BASIC METABOLIC PNL TOTAL CA: CPT

## 2020-07-14 PROCEDURE — 36415 COLL VENOUS BLD VENIPUNCTURE: CPT

## 2020-07-14 PROCEDURE — 25000003 PHARM REV CODE 250: Performed by: INTERNAL MEDICINE

## 2020-07-14 PROCEDURE — 84484 ASSAY OF TROPONIN QUANT: CPT

## 2020-07-14 PROCEDURE — 25500020 PHARM REV CODE 255

## 2020-07-14 PROCEDURE — 12000002 HC ACUTE/MED SURGE SEMI-PRIVATE ROOM

## 2020-07-14 RX ORDER — DOCUSATE SODIUM 100 MG/1
100 CAPSULE, LIQUID FILLED ORAL DAILY
Status: DISCONTINUED | OUTPATIENT
Start: 2020-07-14 | End: 2020-07-25

## 2020-07-14 RX ORDER — ENOXAPARIN SODIUM 100 MG/ML
1 INJECTION SUBCUTANEOUS
Status: DISCONTINUED | OUTPATIENT
Start: 2020-07-14 | End: 2020-07-14

## 2020-07-14 RX ORDER — HEPARIN SODIUM,PORCINE/D5W 25000/250
18 INTRAVENOUS SOLUTION INTRAVENOUS CONTINUOUS
Status: DISCONTINUED | OUTPATIENT
Start: 2020-07-14 | End: 2020-07-16

## 2020-07-14 RX ORDER — SODIUM CHLORIDE 9 MG/ML
INJECTION, SOLUTION INTRAVENOUS CONTINUOUS
Status: ACTIVE | OUTPATIENT
Start: 2020-07-14 | End: 2020-07-15

## 2020-07-14 RX ORDER — ASPIRIN 325 MG
325 TABLET ORAL ONCE
Status: COMPLETED | OUTPATIENT
Start: 2020-07-14 | End: 2020-07-14

## 2020-07-14 RX ADMIN — FUROSEMIDE 40 MG: 40 TABLET ORAL at 09:07

## 2020-07-14 RX ADMIN — FLUTICASONE PROPIONATE 100 MCG: 50 SPRAY, METERED NASAL at 10:07

## 2020-07-14 RX ADMIN — PANTOPRAZOLE SODIUM 40 MG: 40 TABLET, DELAYED RELEASE ORAL at 09:07

## 2020-07-14 RX ADMIN — SPIRONOLACTONE 25 MG: 25 TABLET ORAL at 09:07

## 2020-07-14 RX ADMIN — METOPROLOL SUCCINATE 50 MG: 50 TABLET, FILM COATED, EXTENDED RELEASE ORAL at 09:07

## 2020-07-14 RX ADMIN — SODIUM CHLORIDE: 0.9 INJECTION, SOLUTION INTRAVENOUS at 06:07

## 2020-07-14 RX ADMIN — POLYETHYLENE GLYCOL (3350) 17 G: 17 POWDER, FOR SOLUTION ORAL at 08:07

## 2020-07-14 RX ADMIN — IOHEXOL 100 ML: 350 INJECTION, SOLUTION INTRAVENOUS at 10:07

## 2020-07-14 RX ADMIN — IPRATROPIUM BROMIDE AND ALBUTEROL SULFATE 3 ML: .5; 3 SOLUTION RESPIRATORY (INHALATION) at 03:07

## 2020-07-14 RX ADMIN — IPRATROPIUM BROMIDE AND ALBUTEROL SULFATE 3 ML: .5; 3 SOLUTION RESPIRATORY (INHALATION) at 07:07

## 2020-07-14 RX ADMIN — ENOXAPARIN SODIUM 100 MG: 100 INJECTION SUBCUTANEOUS at 10:07

## 2020-07-14 RX ADMIN — BUDESONIDE INHALATION 0.5 MG: 0.5 SUSPENSION RESPIRATORY (INHALATION) at 07:07

## 2020-07-14 RX ADMIN — ASPIRIN 325 MG ORAL TABLET 325 MG: 325 PILL ORAL at 09:07

## 2020-07-14 RX ADMIN — HEPARIN SODIUM AND DEXTROSE 18 UNITS/KG/HR: 10000; 5 INJECTION INTRAVENOUS at 08:07

## 2020-07-14 RX ADMIN — MONTELUKAST 10 MG: 10 TABLET, FILM COATED ORAL at 08:07

## 2020-07-14 RX ADMIN — IPRATROPIUM BROMIDE AND ALBUTEROL SULFATE 3 ML: .5; 3 SOLUTION RESPIRATORY (INHALATION) at 11:07

## 2020-07-14 NOTE — PROGRESS NOTES
Dr. Hall updated on patient's status.  Stat CTA of the chest pending.  Continue full-dose Lovenox.  Trend troponins and check stat echocardiogram to see if any possible heart strain.  If CTA positive for pulmonary emboli then will consult pulmonology, if I CTA is negative for PE then will consult Cardiology.

## 2020-07-14 NOTE — HPI
79-year-old female underwent laparoscopic cholecystectomy yesterday at the [facility] in [place].  The patient had previously been admitted with elevated transaminases and right back and flank pain.  We postpone cholecystectomy at that time on that hospitalization.  She followed up and we rescheduled.  Postoperatively she had trouble weaning from O2.  She was transferred to [facility] for observation as she was in an outpatient surgical unit.  She has not had any specific acute events but her D-dimer was high in the CT scan was obtained which confirmed pulmonary emboli.  Her troponins are slightly elevated as well.  She is being worked up currently.  She is alert and in good spirits.  She denies any significant chest pain.  She does not appear to be in distress.

## 2020-07-14 NOTE — PROGRESS NOTES
[facility] Medicine  Progress Note    Patient Name: Sammi Abreu  MRN: [id number]  Patient Class: IP- Inpatient   Admission Date: 7/13/2020  Length of Stay: 0 days  Attending Physician: Yesenia Hall MD  Primary Care Provider: Osmani Villatoro MD      Subjective:     Principal Problem:Acute respiratory failure with hypoxemia secondary to extensive bilateral pulmonary emboli, status post laparoscopic cholecystectomy      HPI:  This is a 79-year-old female who has a past medical history of arthritis and chronic back pain, fibromyalgia, glaucoma, hyperlipidemia, hypertension, sleep apnea with history of noncompliance with CPAP, morbid obesity, hypertension, GERD, COPD, diabetes type 2, prior DVT, and gallstones.  Please note the patient is also a [employer].  Patient is being received from Outpatient surgical center.  She is status post laparoscopic cholecystectomy today by Dr. Jomar Soria.  Patient tolerated procedure well.  She however was unable to be discharged postop due to ongoing hypoxemia and has been transferred here for further evaluation and treatment.      Overview/Hospital Course:  The patient was monitored closely during her stay.  She was noted to have an elevated D-dimer.  She was placed on full-dose Lovenox.  Patient when CTA of the chest which showed extensive bilateral pulmonary emboli.  Pulmonology was consulted.  Patient's full-dose Lovenox was discontinued and she was placed on IV heparin drip.    Interval  History:  Patient with elevated D-dimer.  CTA of the chest was done which showed extensive bilateral pulmonary emboli.  Bilateral lower extremity venous ultrasound also ordered.     Review of Systems   Constitutional: Negative for activity change, appetite change, chills, diaphoresis, fatigue and fever.   HENT: Negative for ear discharge, ear pain and facial swelling.    Eyes: Negative for pain and redness.   Respiratory: Positive for shortness of  breath. Negative for cough.         Patient reports she does have some shortness of breath upon exertion which is chronic for her    Patient has a history of obstructive sleep apnea but reports she is not compliant with her CPAP machine at home   Cardiovascular: Negative for chest pain, palpitations and leg swelling.   Gastrointestinal: Positive for nausea. Negative for abdominal distention, blood in stool and vomiting.   Endocrine: Negative for polydipsia and polyphagia.   Musculoskeletal: Positive for back pain. Negative for gait problem, neck pain and neck stiffness.        Patient reports chronic back pain for greater than 20 years but states over the past   6 years has become progressively worse   Skin: Positive for wound. Negative for color change.        Abdominal surgical incisions   Allergic/Immunologic: Negative for food allergies.   Neurological: Negative for facial asymmetry, speech difficulty and weakness.        Forgetful   Hematological: Does not bruise/bleed easily.   Psychiatric/Behavioral: Negative for agitation, behavioral problems, confusion, hallucinations and suicidal ideas. The patient is not nervous/anxious.      Objective:     Vital Signs (Most Recent):  Temp: 96.3 °F (35.7 °C) (07/14/20 1540)  Pulse: 66 (07/14/20 1543)  Resp: 18 (07/14/20 1543)  BP: 133/61 (07/14/20 1540)  SpO2: 95 % (07/14/20 1543) Vital Signs (24h Range):  Temp:  [96.3 °F (35.7 °C)-98.1 °F (36.7 °C)] 96.3 °F (35.7 °C)  Pulse:  [57-81] 66  Resp:  [16-20] 18  SpO2:  [90 %-98 %] 95 %  BP: (133-189)/(60-84) 133/61     Weight: 97.1 kg (214 lb)  Body mass index is 37.91 kg/m².    Physical Exam  Constitutional:       General: She is not in acute distress.     Appearance: Normal appearance. She is obese. She is not ill-appearing, toxic-appearing or diaphoretic.      Comments: Morbidly obese   HENT:      Head: Normocephalic and atraumatic.      Mouth/Throat:      Mouth: Mucous membranes are moist.   Eyes:      General:          Right eye: No discharge.         Left eye: No discharge.      Extraocular Movements: Extraocular movements intact.      Conjunctiva/sclera: Conjunctivae normal.      Pupils: Pupils are equal, round, and reactive to light.   Neck:      Musculoskeletal: Normal range of motion and neck supple. No neck rigidity or muscular tenderness.   Cardiovascular:      Rate and Rhythm: Normal rate and regular rhythm.      Pulses: Normal pulses.   Pulmonary:      Effort: Pulmonary effort is normal. No respiratory distress.      Breath sounds: No wheezing or rales.      Comments: Bilateral breath sounds diminished  Abdominal:      General: Bowel sounds are normal. There is no distension.      Palpations: Abdomen is soft.      Tenderness: There is abdominal tenderness. There is no guarding.      Comments: Obese  Mild generalized tenderness   Genitourinary:     Comments: Not examined  Musculoskeletal: Normal range of motion.      Right lower leg: Edema present.      Left lower leg: Edema present.      Comments: Scant bilateral lower extremity edema   Skin:     General: Skin is warm and dry.      Capillary Refill: Capillary refill takes less than 2 seconds.      Comments: Abdominal surgical dressings intact   Neurological:      General: No focal deficit present.      Mental Status: She is alert and oriented to person, place, and time. Mental status is at baseline.      Cranial Nerves: No cranial nerve deficit.      Motor: No weakness.   Psychiatric:         Mood and Affect: Mood normal.         Behavior: Behavior normal.         Thought Content: Thought content normal.         Judgment: Judgment normal.           CRANIAL NERVES     CN III, IV, VI   Pupils are equal, round, and reactive to light.    Labs reviewed      Assessment/Plan:      * Acute respiratory failure with hypoxemia  Secondary to extensive bilateral pulmonary emboli-  Pulmonary consulted  Echocardiogram pending    Acute deep vein thrombosis (DVT) of popliteal vein of  right lower extremity  Continue full-dose anticoagulation      Hyperkalemia  Monitor  Trend BMP  Hold home Aldactone for now      Bilateral pulmonary embolism  07/14/2020 received message from staff nurse at 1046  that CTA of the chest results show patient with extensive bilateral pulmonary emboli  Monitor O2 sats, supplement O2 as needed  Continue full-dose Lovenox-was changed to IV heparin drip per pulmonology  Consult pulmonology  Echocardiogram pending    History of DVT in adulthood  Patient reports history of DVT in the right upper extremity greater than 10 years ago and  history of groin DVT following a hip surgery approximately 8 years ago-  Continue MARIANELA hose  Patient now with lower extremity DVT and extensive bilateral pulmonary emboli  Patient with decreased activity/ambulation due to increased recent back pain however she does have history of prior DVT in the past once related to prior hip surgery, consider hematology consult      Chronic back pain  With chronic pain syndrome  Patient reports recent back injection approximately 2 weeks ago  Continue p.r.n. pain medication  Patient reports she sees Dr. Gilliam      Hypoxemia  Secondary to extensive bilateral pulmonary emboli    Elevated troponin-  Suspected secondary to cardiac strain from extensive bilateral pulmonary emboli      History of obstructive sleep apnea  Noted  Patient reports she does not use her CPAP at home      COPD (chronic obstructive pulmonary disease)  Monitor O2 sats, supplement O2 as needed  Q 4 hr while awake duo nebs and b.i.d. Pulmicort  Incentive spirometer q.1 hour while awake      Type 2 diabetes mellitus with diabetic neuropathy, without long-term current use of insulin  Diabetic diet  Accu-Cheks with correctional sliding scale insulin  Blood sugars running 121- 136  HGB A1c is 6.4      Status post laparoscopic cholecystectomy  Patient is status post laparoscopic cholecystectomy done today by Dr. Jairo Soria at outpatient surgical  center-  Orders as per surgeon - Dr. Walker  Low-fat low-cholesterol ADA diet  P.r.n. pain and antiemetic medication      Morbid obesity  Noted        GERD (gastroesophageal reflux disease)  Continue PPI      Hyperlipidemia associated with type 2 diabetes mellitus  No home anti-lipidemic noted- low fat, low-cholesterol diet    Hypertension associated with diabetes  Continue home medications        VTE Risk Mitigation (From admission, onward)         Ordered     heparin 25,000 units in dextrose 5% (100 units/ml) IV bolus from bag INITIAL BOLUS  Once     Question:  Heparin Infusion Adjustment (DO NOT MODIFY ANSWER)  Answer:  [email or web address]\epic\Images\Pharmacy\HeparinInfusions\heparin HIGH INTENSITY nomogram for [facility] [id number]    07/14/20 1639     heparin 25,000 units in dextrose 5% 250 mL (100 units/mL) infusion HIGH INTENSITY nomogram - [facility]  Continuous     Question:  Heparin Infusion Adjustment (DO NOT MODIFY ANSWER)  Answer:  [email or web address]\epic\Images\Pharmacy\HeparinInfusions\heparin HIGH INTENSITY nomogram for [facility] [id number].pdf    07/14/20 1639     heparin 25,000 units in dextrose 5% (100 units/ml) IV bolus from bag - ADDITIONAL PRN BOLUS - 60 units/kg  As needed (PRN)     Question:  Heparin Infusion Adjustment (DO NOT MODIFY ANSWER)  Answer:  [email or web address]\epic\Images\Pharmacy\HeparinInfusions\heparin HIGH INTENSITY nomogram for [facility] [id number].pdf    07/14/20 1639     heparin 25,000 units in dextrose 5% (100 units/ml) IV bolus from bag - ADDITIONAL PRN BOLUS - 30 units/kg  As needed (PRN)     Question:  Heparin Infusion Adjustment (DO NOT MODIFY ANSWER)  Answer:  [email or web address]\epic\Images\Pharmacy\HeparinInfusions\heparin HIGH INTENSITY nomogram for [facility] [id number]    07/14/20 1639     IP VTE HIGH RISK PATIENT  Once      07/13/20 1900     Place MARIANELA hose  Until discontinued      07/13/20 1900                Time spent seeing patient( greater than 1/2 spent in direct contact) :  48 minutes    Elida Mccoy,  FNP  Department of Hospital Medicine   [facility]

## 2020-07-14 NOTE — TELEPHONE ENCOUNTER
----- Message from Bibiana Epps sent at 7/14/2020  2:32 PM CDT -----  Contact: yana  with  [facility] //[phone]    Type:  Pharmacy Calling to Clarify an RX    Name of Caller:  yana  Pharmacy Name:     [facility]  [place] [address]  [address] [place], [place] [address] [address]  Phone: [phone] Fax: [phone]     Prescription Name:  narco 5 / 325  mg  What do they need to clarify?:  diag   code  Best Call Back Number: yana  with  [facility] //[phone]

## 2020-07-14 NOTE — PLAN OF CARE
Patient AAO X 4. Patient free from injury during shift. Pain managed pharmacologically. Provided relief. Patient free from N/V during shift. IV access has saline lock. Patient placed on cardiac monitoring. Running NSR. Remained afebrile during shift. Pt on O2 at 2 liters via nasal canula. tolerating well. SCDs in place. Lasix given per order, purewick placed on pt for comfort. Restroom offered. Repositioned as needed. Safety maintained with bed alarm. Bed in lowest position, call light and personal items within reach. Patient verbalized understanding of care. Will continue to monitor with every 2 hour rounding.

## 2020-07-14 NOTE — H&P
[facility] Medicine  History & Physical    Patient Name: Sammi Abreu  MRN: [id number]  Admission Date: 7/13/2020  Attending Physician: Yesenia Hall MD   Primary Care Provider: Osmani Villatoro MD      Patient information was obtained from patient and Records available.     Subjective:     Principal Problem:Acute respiratory failure with hypoxemia    Chief Complaint:  Postop hypoxemia following laparoscopic cholecystectomy     HPI: This is a 79-year-old female who has a past medical history of arthritis and chronic back pain, fibromyalgia, glaucoma, hyperlipidemia, hypertension, sleep apnea with history of noncompliance with CPAP, morbid obesity, hypertension, GERD, COPD, diabetes type 2, prior DVT, and gallstones.  Please note the patient is also a [employer].  Patient is being received from Outpatient surgical center.  She is status post laparoscopic cholecystectomy today by Dr. Jomar Soria.  Patient tolerated procedure well.  She however was unable to be discharged postop due to ongoing hypoxemia and has been transferred here for further evaluation and treatment.    Patient room air O2 sats down the 80s when previously tried to be weaned off O2 after surgery.      Past Medical History:   Diagnosis Date    ALLERGIC RHINITIS     Anemia     Arthritis     Back pain     Bronchitis     Cataract     OU    Cholelithiasis     COPD (chronic obstructive pulmonary disease)     Degenerative disc disease     Diabetes mellitus     pre diabetic    Diverticulosis     DVT (deep venous thrombosis)     Edema     Encounter for blood transfusion 1979    Fibromyalgia     Glaucoma     Gout     Hx of colonic polyps     Hyperlipidemia     Hypertension     Incontinence     Osteoporosis     Reflux     Refusal of blood transfusions as patient is [employer]     Sleep apnea     non compliant with CPAP    Vestibulitis of ear        Past Surgical History:    Procedure Laterality Date    ANGIOGRAPHY OF LOWER EXTREMITY N/A 7/31/2019    Procedure: ANGIOGRAM, LOWER EXTREMITY;  Surgeon: Gino Arana MD;  Location: [facility] CATH/EP LAB;  Service: General;  Laterality: N/A;    HIP SURGERY      HYSTERECTOMY      JOINT REPLACEMENT      B total hip    TRANSFORAMINAL EPIDURAL INJECTION OF STEROID Left 6/30/2020    Procedure: Injection,steroid,epidural,transforaminal approach;  Surgeon: Matt Gilliam MD;  Location: [facility] OR;  Service: Pain Management;  Laterality: Left;  L2-3, L3-4       Review of patient's allergies indicates:   Allergen Reactions    Cymbalta [duloxetine] Other (See Comments)     Nightmares      Darvon [propoxyphene] Nausea Only and Other (See Comments)     Sweating, slept for 3 days    Atorvastatin Other (See Comments)     Muscle cramps    Naprosyn [naproxen] Nausea Only    Penicillins Rash    Tramadol Nausea Only and Palpitations       Current Facility-Administered Medications on File Prior to Encounter   Medication    [COMPLETED] clindamycin in D5W 900 mg/50 mL IVPB 900 mg    [COMPLETED] dexamethasone injection 8 mg    [COMPLETED] hydrALAZINE injection 5 mg    lactated ringers infusion    lidocaine (PF) 10 mg/ml (1%) injection 10 mg    [COMPLETED] metoclopramide HCl injection 10 mg    [DISCONTINUED] 0.9%  NaCl infusion    [DISCONTINUED] 0.9%  NaCl infusion    [DISCONTINUED] acetaminophen 1,000 mg/100 mL (10 mg/mL) injection    [DISCONTINUED] bupivacaine-EPINEPHrine (PF) 0.25%-1:200,000 injection    [DISCONTINUED] fentaNYL injection 25 mcg    [DISCONTINUED] fentaNYL injection    [DISCONTINUED] glycopyrrolate injection    [DISCONTINUED] ketorolac injection    [DISCONTINUED] lidocaine (cardiac) injection    [DISCONTINUED] midazolam injection    [DISCONTINUED] neostigmine injection    [DISCONTINUED] ondansetron injection 4 mg    [DISCONTINUED] ondansetron injection    [DISCONTINUED] oxyCODONE immediate release tablet 5 mg     [DISCONTINUED] propofol (DIPRIVAN) 10 mg/mL infusion    [DISCONTINUED] rocuronium injection    [DISCONTINUED] sodium chloride 0.9% flush 10 mL    [DISCONTINUED] succinylcholine injection     Current Outpatient Medications on File Prior to Encounter   Medication Sig    albuterol (PROVENTIL) 2.5 mg /3 mL (0.083 %) nebulizer solution Take 3 mLs (2.5 mg total) by nebulization every 6 (six) hours as needed.    ascorbic acid (VITAMIN C) 100 MG tablet Take 100 mg by mouth once daily.     aspirin (ECOTRIN) 81 MG EC tablet Take 81 mg by mouth once daily.    budesonide-formoterol 160-4.5 mcg (SYMBICORT) 160-4.5 mcg/actuation HFAA Inhale 2 puffs into the lungs every 12 (twelve) hours. Controller    cyanocobalamin, vitamin B-12, 2,500 mcg Lozg Place 2 tablets under the tongue once daily.    diclofenac (VOLTAREN) 25 MG TbEC Take 1 tablet (25 mg total) by mouth 3 (three) times daily as needed.    esomeprazole (NEXIUM) 20 MG capsule Take 1 capsule (20 mg total) by mouth before breakfast. (Patient taking differently: Take 20 mg by mouth daily as needed (heartburn). )    fluticasone (FLONASE) 50 mcg/actuation nasal spray 2 sprays (100 mcg total) by Each Nare route once daily.    furosemide (LASIX) 40 MG tablet Take 1 tablet (40 mg total) by mouth once daily.    HYDROcodone-acetaminophen (NORCO) 5-325 mg per tablet Take 1 tablet by mouth every 6 (six) hours as needed for Pain.    lactulose (CHRONULAC) 10 gram/15 mL solution Take 30 mLs (20 g total) by mouth 3 (three) times daily. 2 Teaspoon(s) Oral PRN Every evening.    losartan (COZAAR) 100 MG tablet Take 1 tablet (100 mg total) by mouth once daily.    metoprolol succinate (TOPROL-XL) 50 MG 24 hr tablet Take 1 tablet (50 mg total) by mouth once daily.    montelukast (SINGULAIR) 10 mg tablet Take 1 tablet (10 mg total) by mouth every evening.    spironolactone (ALDACTONE) 25 MG tablet Take 1 tablet (25 mg total) by mouth once daily.     Family History     Problem  Relation (Age of Onset)    Diabetes Sister    Hypertension Mother        Tobacco Use    Smoking status: Former Smoker     Packs/day: 0.25     Years: 5.00     Pack years: 1.25     Quit date: 1972     Years since quittin.5    Smokeless tobacco: Never Used   Substance and Sexual Activity    Alcohol use: Yes     Alcohol/week: 0.0 standard drinks     Comment: Rarely    Drug use: Yes     Types: Oxycodone, Hydrocodone    Sexual activity: Not on file     Review of Systems   Constitutional: Negative for activity change, appetite change, chills, diaphoresis, fatigue and fever.   HENT: Negative for ear discharge, ear pain and facial swelling.    Eyes: Negative for pain and redness.   Respiratory: Positive for shortness of breath. Negative for cough.         Patient reports she does have some shortness of breath upon exertion which is chronic for her    Patient has a history of obstructive sleep apnea but reports she is not compliant with her CPAP machine at home   Cardiovascular: Negative for chest pain, palpitations and leg swelling.   Gastrointestinal: Positive for nausea. Negative for abdominal distention, blood in stool and vomiting.   Endocrine: Negative for polydipsia and polyphagia.   Musculoskeletal: Positive for back pain. Negative for gait problem, neck pain and neck stiffness.        Patient reports chronic back pain for greater than 20 years but states over the past   6 years has become progressively worse   Skin: Positive for wound. Negative for color change.        Abdominal surgical incisions   Allergic/Immunologic: Negative for food allergies.   Neurological: Negative for facial asymmetry, speech difficulty and weakness.        Forgetful   Hematological: Does not bruise/bleed easily.   Psychiatric/Behavioral: Negative for agitation, behavioral problems, confusion, hallucinations and suicidal ideas. The patient is not nervous/anxious.      Objective:     Vital Signs (Most Recent):  Temp: 98 °F  (36.7 °C) (07/13/20 1906)  Pulse: 81 (07/13/20 1906)  Resp: 16 (07/13/20 1906)  BP: (!) 189/84 (07/13/20 1906)  SpO2: 97 % (07/13/20 1906) Vital Signs (24h Range):  Temp:  [98 °F (36.7 °C)-98.2 °F (36.8 °C)] 98 °F (36.7 °C)  Pulse:  [] 81  Resp:  [13-24] 16  SpO2:  [74 %-100 %] 97 %  BP: (164-208)/(66-91) 189/84        There is no height or weight on file to calculate BMI.    Physical Exam  Constitutional:       General: She is not in acute distress.     Appearance: Normal appearance. She is obese. She is not ill-appearing, toxic-appearing or diaphoretic.      Comments: Morbidly obese   HENT:      Head: Normocephalic and atraumatic.      Mouth/Throat:      Mouth: Mucous membranes are moist.   Eyes:      General:         Right eye: No discharge.         Left eye: No discharge.      Extraocular Movements: Extraocular movements intact.      Conjunctiva/sclera: Conjunctivae normal.      Pupils: Pupils are equal, round, and reactive to light.   Neck:      Musculoskeletal: Normal range of motion and neck supple. No neck rigidity or muscular tenderness.   Cardiovascular:      Rate and Rhythm: Normal rate and regular rhythm.      Pulses: Normal pulses.   Pulmonary:      Effort: Pulmonary effort is normal. No respiratory distress.      Breath sounds: No wheezing or rales.      Comments: Bilateral breath sounds diminished  Abdominal:      General: Bowel sounds are normal. There is no distension.      Palpations: Abdomen is soft.      Tenderness: There is abdominal tenderness. There is no guarding.      Comments: Obese  Mild generalized tenderness   Genitourinary:     Comments: Not examined  Musculoskeletal: Normal range of motion.      Right lower leg: Edema present.      Left lower leg: Edema present.      Comments: Scant bilateral lower extremity edema   Skin:     General: Skin is warm and dry.      Capillary Refill: Capillary refill takes less than 2 seconds.      Comments: Abdominal surgical dressings intact    Neurological:      General: No focal deficit present.      Mental Status: She is alert and oriented to person, place, and time. Mental status is at baseline.      Cranial Nerves: No cranial nerve deficit.      Motor: No weakness.   Psychiatric:         Mood and Affect: Mood normal.         Behavior: Behavior normal.         Thought Content: Thought content normal.         Judgment: Judgment normal.           CRANIAL NERVES     CN III, IV, VI   Pupils are equal, round, and reactive to light.       Admit labs, ABG, and chest x-ray pending    Assessment/Plan:     * Acute respiratory failure with hypoxemia-  History of COPD, history diastolic dysfunction  Monitor O2 sats, supplement O2 as needed  Telemetry  Stat chest x-ray, CBC, CMP, magnesium level, phosphorus level, ABGs  Incentive spirometer q.1 hour while awake  Patient with a history of COPD- Add Q 4 hr duo nebs while awake and b.i.d. Pulmicort  Will give 1 dose of IV Lasix now and continue home p.o. Lasix dose in a.m.      History of DVT in adulthood  Patient reports history of DVT in the right upper extremity greater than 10 years ago and  history of groin DVT approximately 8 years ago-  Add DVT Lovenox prophylactic add MARIANELA hose      Chronic back pain  With chronic pain syndrome  Patient reports recent back injection approximately 2 weeks ago  Continue p.r.n. pain medication  Patient reports she sees Dr. Gilliam      Hypoxemia  Check stat labs, ABG, chest x-ray  Encourage incentive spirometer  Also check D-dimer      History of obstructive sleep apnea  Noted      COPD (chronic obstructive pulmonary disease)  Monitor O2 sats, supplement O2 as needed  Stat ABG  Q 4 hr while awake duo nebs and b.i.d. Pulmicort  Incentive spirometer q.1 hour while awake      Type 2 diabetes mellitus with diabetic neuropathy, without long-term current use of insulin  Diabetic diet  Accu-Cheks with correctional sliding scale insulin  Check HGB A1c      Status post laparoscopic  cholecystectomy  Patient is status post laparoscopic cholecystectomy done today by Dr. Jairo Soria at outpatient surgical center-  Orders as per surgeon left back low  Low-fat low-cholesterol ADA diet  P.r.n. pain and antiemetic medication      Morbid obesity  Noted  Will encourage increased activity as tolerated      GERD (gastroesophageal reflux disease)  Continue PPI      Hyperlipidemia associated with type 2 diabetes mellitus  No home anti-lipidemic noted- low fat, low-cholesterol diet    Hypertension associated with diabetes  Continue home medications        VTE Risk Mitigation (From admission, onward)         Ordered     enoxaparin injection 40 mg  Every 24 hours      07/13/20 1900     IP VTE HIGH RISK PATIENT  Once      07/13/20 1900     Place sequential compression device  Until discontinued      07/13/20 1900     Place MARIANELA hose  Until discontinued      07/13/20 1900     Place MARIANELA hose  Until discontinued      07/13/20 1900               Time spent seeing patient( greater than 1/2 spent in direct contact) :  72 minutes      GENARO Ross  Department of Hospital Medicine   [facility]

## 2020-07-14 NOTE — CONSULTS
07/14/2020      Admit Date: 7/13/2020  Sammi Abreu  New Patient Consult    No chief complaint on file.      History of Present Illness:  Pt had laparoscopic rosette yesterday with post op low ox, cta with central pe.  Pt had prior h/o dvt- was on xarelto in 2015 - occurred with elective left hip replacement - left groin dvt.  Religion.      Pt seen by me last yr with h/o wheezes and cough and sob, also osas- non compliant.    Pt has had episodes of sob but vague - some sob with no cough/wheezes. Vague left leg swelling.    Pt had back problems needing injections in past- last injections est 5 yrs ago.     Pt developed back pain, worse movement ppt eval pcp/pain doc- injection set up.  She also developed left leg pain - upper thigh- occurred last couple months.  She was having eval with vascular doc for right leg with min right leg symptoms.  She had procedure with improved cirulation right.   Pt had bilat u/s legs in May, and right u/s in June -- both neg for dvt.      Pt developed sob- episodic with one episode of impending doom occurring 2 wks pta.      Pt had had some schwartz since severe episode 2 wks ago.      Pt had had severe pain in chest , then left leg, then sob - had eval at er , pcp, pain doc, then [facility]- biliary dz found and surg deferred til after back injection (injection was not effective for pain). Pt had lap rosette yesterday.  No recent cough/wheezes, no compliant with cpap.          PFSH:  Past Medical History:   Diagnosis Date    ALLERGIC RHINITIS     Anemia     Arthritis     Back pain     Bronchitis     Cataract     OU    Cholelithiasis     COPD (chronic obstructive pulmonary disease)     Degenerative disc disease     Diabetes mellitus     pre diabetic    Diverticulosis     DVT (deep venous thrombosis)     Edema     Encounter for blood transfusion 1979    Fibromyalgia     Glaucoma     Gout     Hx of colonic polyps     Hyperlipidemia     Hypertension     Incontinence      Osteoporosis     Reflux     Refusal of blood transfusions as patient is Hoahaoism     Sleep apnea     non compliant with CPAP    Vestibulitis of ear      Past Surgical History:   Procedure Laterality Date    ANGIOGRAPHY OF LOWER EXTREMITY N/A 2019    Procedure: ANGIOGRAM, LOWER EXTREMITY;  Surgeon: Gino Arana MD;  Location: [facility] CATH/EP LAB;  Service: General;  Laterality: N/A;    HIP SURGERY      HYSTERECTOMY      JOINT REPLACEMENT      B total hip    LAPAROSCOPIC CHOLECYSTECTOMY N/A 2020    Procedure: CHOLECYSTECTOMY, LAPAROSCOPIC;  Surgeon: Jomar Mcdonald MD;  Location: [facility] OR;  Service: General;  Laterality: N/A;    TRANSFORAMINAL EPIDURAL INJECTION OF STEROID Left 2020    Procedure: Injection,steroid,epidural,transforaminal approach;  Surgeon: Matt Gilliam MD;  Location: [facility] OR;  Service: Pain Management;  Laterality: Left;  L2-3, L3-4     Social History     Tobacco Use    Smoking status: Former Smoker     Packs/day: 0.25     Years: 5.00     Pack years: 1.25     Quit date: 1972     Years since quittin.5    Smokeless tobacco: Never Used   Substance Use Topics    Alcohol use: Yes     Alcohol/week: 0.0 standard drinks     Comment: Rarely    Drug use: Yes     Types: Oxycodone, Hydrocodone     Family History   Problem Relation Age of Onset    Hypertension Mother     Diabetes Sister     Glaucoma Neg Hx     Macular degeneration Neg Hx     Retinal detachment Neg Hx      Review of patient's allergies indicates:   Allergen Reactions    Cymbalta [duloxetine] Other (See Comments)     Nightmares      Darvon [propoxyphene] Nausea Only and Other (See Comments)     Sweating, slept for 3 days    Atorvastatin Other (See Comments)     Muscle cramps    Naprosyn [naproxen] Nausea Only    Penicillins Rash    Tramadol Nausea Only and Palpitations       Performance Status:Performance Status:The patient's activity level is housebound activities.    Review of  "Systems:  a review of eleven systems covering constitutional, Psych, Eye, HEENT, Respiratory, Cardiac, GI, , Musculoskeletal, Endocrine, Dermatologicwas negative except the above mentioned abnormalities and for any pertinent findings as listed below:  pertinent positive as above, rest is good         Exam:Comprehensive exam done. /61   Pulse 66   Temp 96.3 °F (35.7 °C)   Resp 18   Ht 5' 3" (1.6 m)   Wt 97.1 kg (214 lb)   SpO2 95%   Breastfeeding No   BMI 37.91 kg/m²   Exam included Vitals as listed, and patient's appearance and affect and alertness and mood, oral exam for yeast and hygiene and pharynx lesions and Mallapatti (M) score, neck with inspection for jvd and masses and thyroid abnormalities and lymph nodes (supraclavicular and infraclavicular nodes also examined and noted if abn), chest exam included symmetry and effort and fremitus and percussion and auscultation, cardiac exam included rhythm and gallops and murmur and rubs and jvd and edema, abdominal exam for mass and hepatosplenomegaly and tenderness and hernias and bowel sounds, Musculoskeletal exam with muscle tone and posture and mobility/gait and  strenght, and skin for rashes and cyanosis and pallor and turgor, extremity for clubbing.  Findings were normal except as listed below:  M3, obese, chest is symmetric, no distress, normal percussion, normal fremitus and good normal breath sounds, no clubbing nor edema , left leg sl larger than right.      Radiographs reviewed: view by direct vision  - 7/14/2020 massive bilat clot. Large pulm art c/w pulm htn.      No results found for this or any previous visit.]    Labs     Recent Labs   Lab 07/13/20 1940 07/14/20 0135   WBC 10.23 10.27   HGB 12.1 11.8*   HCT 37.9 36.0*   * 120*   HGBA1C 6.4*  --      Recent Labs   Lab 07/13/20 1940 07/14/20 0135 07/14/20  1138    142  --  139   K 4.4 5.4*  --  5.1    106  --  103   CO2 22* 23  --  25   BUN 21 20  --  25* "   CREATININE 1.4 1.4  --  1.5*   * 142*  --  133*   CALCIUM 8.8 9.3  --  9.3   MG 1.9 1.7  --   --    PHOS 4.7*  --   --   --    AST 37 39  --   --    ALT 33 37  --   --    ALKPHOS 82 77  --   --    BILITOT 0.4 0.5  --   --    PROT 7.4 7.3  --   --    ALBUMIN 3.2* 3.2*  --   --    TROPONINI 0.036* 0.075*   < > 0.026    < > = values in this interval not displayed.     Recent Labs   Lab 07/13/20 1946   PH 7.406   PCO2 36.7   PO2 70*   HCO3 23.1*     Microbiology Results (last 7 days)     ** No results found for the last 168 hours. **      Conclusion echo 7/14/2020     · The mean diastolic gradient across the mitral valve is 3 mmHg at a heart rate of 70 bpm.  · Concentric left ventricular remodeling.  · Small left ventricular cavity size.  · Hyperdynamic left ventricular systolic function. The estimated ejection fraction is 66%.  · Grade I (mild) left ventricular diastolic dysfunction consistent with impaired relaxation.  · Elevated central venous pressure (15 mmHg).  · The estimated PA systolic pressure is 58 mmHg.  · Pulmonary hypertension present.  · Mild to moderate pulmonic regurgitation.     Echo 3/19/2019-Conclusion    · Concentric left ventricular remodeling.  · Increased (hyperdynamic) left ventricular systolic function. The estimated ejection fraction is 73%  · No wall motion abnormalities.  · Normal LV diastolic function.  · Hyperdynamic right ventricular systolic function.  · Mild-to-moderate mitral regurgitation.  · Mild tricuspid regurgitation.  · Normal central venous pressure (3 mm Hg).  · The estimated PA systolic pressure is 30 mm Hg  · Suggest increase in dynamic state with increase in mitral regurgitation from Echo in 11/2016         Impression:  Active Hospital Problems    Diagnosis  POA    *Acute respiratory failure with hypoxemia [J96.01]  Yes    Bilateral pulmonary embolism [I26.99]  Yes    Hyperkalemia [E87.5]  Yes    Morbid obesity [E66.01]  Yes    Status post laparoscopic  cholecystectomy [Z90.49]  Not Applicable     07/13/2020 done by Dr. Jomar Soria      Type 2 diabetes mellitus with diabetic neuropathy, without long-term current use of insulin [E11.40]  Yes    COPD (chronic obstructive pulmonary disease) [J44.9]  Yes    History of obstructive sleep apnea [Z86.69]  Yes    Hypoxemia [R09.02]  Yes    Chronic back pain [M54.9, G89.29]  Yes    History of DVT in adulthood [Z86.718]  Not Applicable    GERD (gastroesophageal reflux disease) [K21.9]  Yes    Hyperlipidemia associated with type 2 diabetes mellitus [E11.69, E78.5]  Yes    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               Plan: unprovoked pe with delayed presentation - neg leg studies in May Lisha.  Consider life long anticoagg.  ivc filter reasonable later.  Increase risk bleeding in Jehov[employer] discussed.  Gastris prophylaxis noted.  Would use heparin drip here.  ivc filter consideration for problems.  Need f/u echo with pulm htn, no thrombolytic now post op and hemodynamic ok  F/u creat- creat 1.4 - hold lasix, give some ivf 50/h x a liter.

## 2020-07-14 NOTE — RESPIRATORY THERAPY
07/13/20 1945   Patient Assessment/Suction   Level of Consciousness (AVPU) alert   Respiratory Effort Unlabored   Expansion/Accessory Muscles/Retractions no use of accessory muscles   All Lung Fields Breath Sounds clear;diminished   Rhythm/Pattern, Respiratory unlabored   Cough Frequency infrequent   Cough Type good;nonproductive   PRE-TX-O2   O2 Device (Oxygen Therapy) nasal cannula   $ Is the patient on Low Flow Oxygen? Yes   Flow (L/min) 2   Oxygen Concentration (%) 28   SpO2 (!) 94 %   Pulse Oximetry Type Intermittent   $ Pulse Oximetry - Multiple Charge Pulse Oximetry - Multiple   Pulse 71   Resp 16   Aerosol Therapy   $ Aerosol Therapy Charges Aerosol Treatment   Respiratory Treatment Status (SVN) given   Treatment Route (SVN) mask   Patient Position (SVN) HOB elevated   Post Treatment Assessment (SVN) breath sounds unchanged   Signs of Intolerance (SVN) none   Breath Sounds Post-Respiratory Treatment   Throughout All Fields Post-Treatment All Fields   Throughout All Fields Post-Treatment aeration increased   Post-treatment Heart Rate (beats/min) 76   Post-treatment Resp Rate (breaths/min) 16   Ready to Wean/Extubation Screen   FIO2<=50 (chart decimal) 0.28   Labs   $ Was an ABG obtained? Arterial Puncture;ISTAT - Blood gas   $ Labs Tech Time 15 min   Critical Value Communication   Date Result Received 07/13/20   Time Result Received 1946   Resulting Department of Critical Value resp   Who communicated critical value from resulting department? Sheba   Critical Test #1 pH   Critical Test #1 Result 7.40   Critical Test #2 PCO2   Critical Test #2 Result 36   Critical Test #3  PO2   Critical Test #3 Result 70   Critical Test #4 HCO3   Critical Test #4 Result 23.1   Critical Test #5 O2SAT   Critical Test #5 Result 94%

## 2020-07-14 NOTE — CONSULTS
[facility]  General Surgery  Consult Note    Patient Name: Sammi Abreu  MRN: [id number]  Code Status: Full Code  Admission Date: 7/13/2020  Hospital Length of Stay: 0 days  Attending Physician: Yesenia Hall MD  Primary Care Provider: Osmani Villatoro MD    Patient information was obtained from patient and ER records.     Inpatient consult to General Surgery  Consult performed by: Jomar Mcdonald MD  Consult ordered by: GENARO Villarreal        Subjective:     Principal Problem: Acute respiratory failure with hypoxemia    History of Present Illness: 79-year-old female underwent laparoscopic cholecystectomy yesterday at the [facility] in [place].  The patient had previously been admitted with elevated transaminases and right back and flank pain.  We postpone cholecystectomy at that time on that hospitalization.  She followed up and we rescheduled.  Postoperatively she had trouble weaning from O2.  She was transferred to [facility] for observation as she was in an outpatient surgical unit.  She has not had any specific acute events but her D-dimer was high in the CT scan was obtained which confirmed pulmonary emboli.  Her troponins are slightly elevated as well.  She is being worked up currently.  She is alert and in good spirits.  She denies any significant chest pain.  She does not appear to be in distress.    Current Facility-Administered Medications on File Prior to Encounter   Medication    [COMPLETED] clindamycin in D5W 900 mg/50 mL IVPB 900 mg    [COMPLETED] dexamethasone injection 8 mg    [COMPLETED] hydrALAZINE injection 5 mg    lactated ringers infusion    lidocaine (PF) 10 mg/ml (1%) injection 10 mg    [COMPLETED] metoclopramide HCl injection 10 mg    [DISCONTINUED] 0.9%  NaCl infusion    [DISCONTINUED] 0.9%  NaCl infusion    [DISCONTINUED] acetaminophen 1,000 mg/100 mL (10 mg/mL) injection    [DISCONTINUED] bupivacaine-EPINEPHrine (PF)  0.25%-1:200,000 injection    [DISCONTINUED] fentaNYL injection 25 mcg    [DISCONTINUED] fentaNYL injection    [DISCONTINUED] glycopyrrolate injection    [DISCONTINUED] ketorolac injection    [DISCONTINUED] lidocaine (cardiac) injection    [DISCONTINUED] midazolam injection    [DISCONTINUED] neostigmine injection    [DISCONTINUED] ondansetron injection 4 mg    [DISCONTINUED] ondansetron injection    [DISCONTINUED] oxyCODONE immediate release tablet 5 mg    [DISCONTINUED] propofol (DIPRIVAN) 10 mg/mL infusion    [DISCONTINUED] rocuronium injection    [DISCONTINUED] sodium chloride 0.9% flush 10 mL    [DISCONTINUED] succinylcholine injection    [DISCONTINUED] sugammadex (BRIDION) 100 mg/mL injection     Current Outpatient Medications on File Prior to Encounter   Medication Sig    albuterol (PROVENTIL) 2.5 mg /3 mL (0.083 %) nebulizer solution Take 3 mLs (2.5 mg total) by nebulization every 6 (six) hours as needed.    ascorbic acid (VITAMIN C) 100 MG tablet Take 100 mg by mouth once daily.     aspirin (ECOTRIN) 81 MG EC tablet Take 81 mg by mouth once daily.    budesonide-formoterol 160-4.5 mcg (SYMBICORT) 160-4.5 mcg/actuation HFAA Inhale 2 puffs into the lungs every 12 (twelve) hours. Controller    cyanocobalamin, vitamin B-12, 2,500 mcg Lozg Place 2 tablets under the tongue once daily.    diclofenac (VOLTAREN) 25 MG TbEC Take 1 tablet (25 mg total) by mouth 3 (three) times daily as needed.    esomeprazole (NEXIUM) 20 MG capsule Take 1 capsule (20 mg total) by mouth before breakfast. (Patient taking differently: Take 20 mg by mouth daily as needed (heartburn). )    fluticasone (FLONASE) 50 mcg/actuation nasal spray 2 sprays (100 mcg total) by Each Nare route once daily.    furosemide (LASIX) 40 MG tablet Take 1 tablet (40 mg total) by mouth once daily.    HYDROcodone-acetaminophen (NORCO) 5-325 mg per tablet Take 1 tablet by mouth every 6 (six) hours as needed for Pain.    lactulose  (CHRONULAC) 10 gram/15 mL solution Take 30 mLs (20 g total) by mouth 3 (three) times daily. 2 Teaspoon(s) Oral PRN Every evening.    losartan (COZAAR) 100 MG tablet Take 1 tablet (100 mg total) by mouth once daily.    metoprolol succinate (TOPROL-XL) 50 MG 24 hr tablet Take 1 tablet (50 mg total) by mouth once daily.    montelukast (SINGULAIR) 10 mg tablet Take 1 tablet (10 mg total) by mouth every evening.    spironolactone (ALDACTONE) 25 MG tablet Take 1 tablet (25 mg total) by mouth once daily.       Review of patient's allergies indicates:   Allergen Reactions    Cymbalta [duloxetine] Other (See Comments)     Nightmares      Darvon [propoxyphene] Nausea Only and Other (See Comments)     Sweating, slept for 3 days    Atorvastatin Other (See Comments)     Muscle cramps    Naprosyn [naproxen] Nausea Only    Penicillins Rash    Tramadol Nausea Only and Palpitations       Past Medical History:   Diagnosis Date    ALLERGIC RHINITIS     Anemia     Arthritis     Back pain     Bronchitis     Cataract     OU    Cholelithiasis     COPD (chronic obstructive pulmonary disease)     Degenerative disc disease     Diabetes mellitus     pre diabetic    Diverticulosis     DVT (deep venous thrombosis)     Edema     Encounter for blood transfusion 1979    Fibromyalgia     Glaucoma     Gout     Hx of colonic polyps     Hyperlipidemia     Hypertension     Incontinence     Osteoporosis     Reflux     Refusal of blood transfusions as patient is Taoism     Sleep apnea     non compliant with CPAP    Vestibulitis of ear      Past Surgical History:   Procedure Laterality Date    ANGIOGRAPHY OF LOWER EXTREMITY N/A 7/31/2019    Procedure: ANGIOGRAM, LOWER EXTREMITY;  Surgeon: Gino Arana MD;  Location: [facility] CATH/EP LAB;  Service: General;  Laterality: N/A;    HIP SURGERY      HYSTERECTOMY      JOINT REPLACEMENT      B total hip    LAPAROSCOPIC CHOLECYSTECTOMY N/A 7/13/2020    Procedure:  CHOLECYSTECTOMY, LAPAROSCOPIC;  Surgeon: Jomar Mcdonald MD;  Location: [facility] OR;  Service: General;  Laterality: N/A;    TRANSFORAMINAL EPIDURAL INJECTION OF STEROID Left 2020    Procedure: Injection,steroid,epidural,transforaminal approach;  Surgeon: Matt Gilliam MD;  Location: [facility] OR;  Service: Pain Management;  Laterality: Left;  L2-3, L3-4     Family History     Problem Relation (Age of Onset)    Diabetes Sister    Hypertension Mother        Tobacco Use    Smoking status: Former Smoker     Packs/day: 0.25     Years: 5.00     Pack years: 1.25     Quit date: 1972     Years since quittin.5    Smokeless tobacco: Never Used   Substance and Sexual Activity    Alcohol use: Yes     Alcohol/week: 0.0 standard drinks     Comment: Rarely    Drug use: Yes     Types: Oxycodone, Hydrocodone    Sexual activity: Not on file     Review of Systems   Constitutional: Negative for activity change, chills, fever and unexpected weight change.   HENT: Negative for congestion, trouble swallowing and voice change.    Eyes: Negative for redness and visual disturbance.   Respiratory: Positive for shortness of breath. Negative for cough and wheezing.    Cardiovascular: Negative for chest pain and palpitations.   Gastrointestinal: Negative for abdominal pain, blood in stool, nausea and vomiting.   Endocrine: Negative.    Genitourinary: Negative for dysuria, frequency and hematuria.   Musculoskeletal: Positive for back pain. Negative for neck pain.   Skin: Negative for rash and wound.   Allergic/Immunologic: Negative.    Neurological: Negative for dizziness, weakness and headaches.   Hematological: Negative for adenopathy.   Psychiatric/Behavioral: Negative for agitation and dysphoric mood. The patient is not nervous/anxious.      Objective:     Vital Signs (Most Recent):  Temp: 96.6 °F (35.9 °C) (20 0756)  Pulse: 60 (20 1145)  Resp: 18 (20 1145)  BP: (!) 150/66 (20 0756)  SpO2: 95 % (20  1145) Vital Signs (24h Range):  Temp:  [96.6 °F (35.9 °C)-98.2 °F (36.8 °C)] 96.6 °F (35.9 °C)  Pulse:  [] 60  Resp:  [13-24] 18  SpO2:  [74 %-100 %] 95 %  BP: (150-208)/(66-91) 150/66     Weight: 97.5 kg (214 lb 15.2 oz)  Body mass index is 38.08 kg/m².    Physical Exam  Constitutional:       General: She is not in acute distress.     Appearance: She is well-developed.   HENT:      Head: Normocephalic and atraumatic.      Mouth/Throat:      Pharynx: No oropharyngeal exudate.   Eyes:      General: No scleral icterus.     Conjunctiva/sclera: Conjunctivae normal.      Pupils: Pupils are equal, round, and reactive to light.   Neck:      Musculoskeletal: Normal range of motion.      Thyroid: No thyromegaly.   Cardiovascular:      Rate and Rhythm: Normal rate and regular rhythm.      Heart sounds: No murmur.   Pulmonary:      Effort: Pulmonary effort is normal.      Breath sounds: Normal breath sounds. No wheezing or rales.   Abdominal:      General: Bowel sounds are normal. There is no distension.      Palpations: Abdomen is soft. There is no mass.      Tenderness: There is abdominal tenderness (Minimal and expected tenderness.).      Hernia: No hernia is present.      Comments: Incisions clean and dry.   Musculoskeletal: Normal range of motion.   Lymphadenopathy:      Cervical: No cervical adenopathy.   Skin:     General: Skin is warm and dry.      Findings: No erythema or rash.   Neurological:      Mental Status: She is alert and oriented to person, place, and time.      Cranial Nerves: No cranial nerve deficit.   Psychiatric:         Behavior: Behavior normal.         Significant Labs:  CBC:   Recent Labs   Lab 07/14/20 0135   WBC 10.27   RBC 3.47*   HGB 11.8*   HCT 36.0*   *   *   MCH 34.0*   MCHC 32.8     CMP:   Recent Labs   Lab 07/14/20 0135   *   CALCIUM 9.3   ALBUMIN 3.2*   PROT 7.3      K 5.4*   CO2 23      BUN 20   CREATININE 1.4   ALKPHOS 77   ALT 37   AST 39    BILITOT 0.5       Significant Diagnostics:  CT scan report and images reviewed.    Assessment/Plan:     * Acute respiratory failure with hypoxemia  1.  Pulmonary emboli.  2.  Patient received full-dose Lovenox.  3.  Cardiology and Pulmonary consult ordered.  4.  Echo pending.  5.  Following.      VTE Risk Mitigation (From admission, onward)         Ordered     enoxaparin injection 100 mg  Every 12 hours (non-standard times)      07/14/20 0930     Place MARIANELA hose  Until discontinued      07/13/20 1939     IP VTE HIGH RISK PATIENT  Once      07/13/20 1900     Place sequential compression device  Until discontinued      07/13/20 1900     Place MARIANELA hose  Until discontinued      07/13/20 1900     Place MARIANELA hose  Until discontinued      07/13/20 1900                Thank you for your consult. I will follow-up with patient. Please contact us if you have any additional questions.    Jomar Mcdonald MD  General Surgery  [facility]

## 2020-07-14 NOTE — RESPIRATORY THERAPY
07/14/20 0704   Patient Assessment/Suction   Level of Consciousness (AVPU) alert   Respiratory Effort Normal;Unlabored   Expansion/Accessory Muscles/Retractions no use of accessory muscles;no retractions;expansion symmetric   All Lung Fields Breath Sounds clear;diminished   Rhythm/Pattern, Respiratory unlabored;pattern regular;depth regular   Cough Frequency infrequent   Cough Type nonproductive;good   PRE-TX-O2   O2 Device (Oxygen Therapy) nasal cannula   $ Is the patient on Low Flow Oxygen? Yes   Flow (L/min) 2   SpO2 (!) 94 %   Pulse Oximetry Type Intermittent   $ Pulse Oximetry - Multiple Charge Pulse Oximetry - Multiple   Pulse (!) 57   Resp 16   Positioning HOB elevated 30 degrees   Aerosol Therapy   $ Aerosol Therapy Charges Aerosol Treatment  (duoneb given)   Respiratory Treatment Status (SVN) given   Treatment Route (SVN) mask   Patient Position (SVN) HOB elevated   Post Treatment Assessment (SVN) breath sounds unchanged   Signs of Intolerance (SVN) none   Breath Sounds Post-Respiratory Treatment   Throughout All Fields Post-Treatment All Fields   Throughout All Fields Post-Treatment no change   Post-treatment Heart Rate (beats/min) 60   Post-treatment Resp Rate (breaths/min) 16   Incentive Spirometer   $ Incentive Spirometer Charges done with encouragement;proper technique demonstrated   Administration (IS) proper technique demonstrated   Number of Repetitions (IS) 10   Level Incentive Spirometer (mL) 1250   Patient Tolerance (IS) good

## 2020-07-14 NOTE — ANESTHESIA POSTPROCEDURE EVALUATION
Anesthesia Post Evaluation    Patient: Sammi Abreu    Procedure(s) Performed: Procedure(s) (LRB):  CHOLECYSTECTOMY, LAPAROSCOPIC (N/A)    Final Anesthesia Type: general    Patient location during evaluation: PACU  Patient participation: Yes- Able to Participate  Level of consciousness: awake and alert  Post-procedure vital signs: reviewed and stable  Pain management: adequate  Airway patency: patent  CHARLIE mitigation strategies: Multimodal analgesia, Extubation while patient is awake and Extubation and recovery carried out in lateral, semiupright, or other nonsupine position  PONV status at discharge: nausea (controlled)  Anesthetic complications: no      Cardiovascular status: hemodynamically stable  Respiratory status: nasal cannula (Patient unable to maintain SAO2 >92% on room air and still tachypnic so [facility] for [facility] recovery and respiratory care.)  Hydration status: euvolemic  Follow-up not needed (Will see patient in AM.).          Vitals Value Taken Time   /76 07/13/20 1721   Temp 36.7 °C (98 °F) 07/13/20 1426   Pulse 78 07/13/20 1721   Resp 16 07/13/20 1721   SpO2 94 % 07/13/20 1721         Event Time   Out of Recovery 17:40:00         Pain/Don Score: Pain Rating Prior to Med Admin: 8 (7/13/2020  2:55 PM)  Don Score: 7 (7/13/2020  5:00 PM)

## 2020-07-14 NOTE — ADDENDUM NOTE
Addendum  created 07/14/20 0810 by Miguel Leos CRNA    Intraprocedure Meds edited, Orders acknowledged in Narrator

## 2020-07-14 NOTE — ASSESSMENT & PLAN NOTE
Patient reports history of DVT in the right upper extremity greater than 10 years ago and  history of groin DVT following a hip surgery approximately 8 years ago-  Continue MARIANELA hose  Patient now with lower extremity DVT and extensive bilateral pulmonary emboli  Patient with decreased activity/ambulation due to increased recent back pain however she does have history of prior DVT in the past once related to prior hip surgery, consider hematology consult

## 2020-07-14 NOTE — RESPIRATORY THERAPY
07/14/20 0709   Patient Assessment/Suction   Level of Consciousness (AVPU) alert   PRE-TX-O2   Pulse 60   Resp 16   Aerosol Therapy   $ Aerosol Therapy Charges Aerosol Treatment  (pulmicort given)   Respiratory Treatment Status (SVN) given   Treatment Route (SVN) mask   Patient Position (SVN) HOB elevated   Post Treatment Assessment (SVN) breath sounds unchanged   Signs of Intolerance (SVN) none   Breath Sounds Post-Respiratory Treatment   Throughout All Fields Post-Treatment All Fields   Throughout All Fields Post-Treatment no change   Post-treatment Heart Rate (beats/min) 59   Post-treatment Resp Rate (breaths/min) 16

## 2020-07-14 NOTE — ASSESSMENT & PLAN NOTE
With chronic pain syndrome  Patient reports recent back injection approximately 2 weeks ago  Continue p.r.n. pain medication  Patient reports she sees Dr. Gilliam

## 2020-07-14 NOTE — SUBJECTIVE & OBJECTIVE
Past Medical History:   Diagnosis Date    ALLERGIC RHINITIS     Anemia     Arthritis     Back pain     Bronchitis     Cataract     OU    Cholelithiasis     COPD (chronic obstructive pulmonary disease)     Degenerative disc disease     Diabetes mellitus     pre diabetic    Diverticulosis     DVT (deep venous thrombosis)     Edema     Encounter for blood transfusion 1979    Fibromyalgia     Glaucoma     Gout     Hx of colonic polyps     Hyperlipidemia     Hypertension     Incontinence     Osteoporosis     Reflux     Refusal of blood transfusions as patient is [employer]     Sleep apnea     non compliant with CPAP    Vestibulitis of ear        Past Surgical History:   Procedure Laterality Date    ANGIOGRAPHY OF LOWER EXTREMITY N/A 7/31/2019    Procedure: ANGIOGRAM, LOWER EXTREMITY;  Surgeon: Gino Arana MD;  [facility]: [facility] CATH[facility]EP LAB;  Service: General;  Laterality: N/A;    HIP SURGERY      HYSTERECTOMY      JOINT REPLACEMENT      B total hip    TRANSFORAMINAL EPIDURAL INJECTION OF STEROID Left 6/30/2020    Procedure: Injection,steroid,epidural,transforaminal approach;  Surgeon: Matt Gilliam MD;  Location: [facility];  Service: Pain Management;  Laterality: Left;  L2-3, L3-4       Review of patient's allergies indicates:   Allergen Reactions    Cymbalta [duloxetine] Other (See Comments)     Nightmares      Darvon [propoxyphene] Nausea Only and Other (See Comments)     Sweating, slept for 3 days    Atorvastatin Other (See Comments)     Muscle cramps    Naprosyn [naproxen] Nausea Only    Penicillins Rash    Tramadol Nausea Only and Palpitations       Current Facility-Administered Medications on File Prior to Encounter   Medication    [COMPLETED] clindamycin in D5W 900 mg/50 mL IVPB 900 mg    [COMPLETED] dexamethasone injection 8 mg    [COMPLETED] hydrALAZINE injection 5 mg    lactated ringers infusion    lidocaine (PF) 10 mg/ml (1%) injection 10 mg    [COMPLETED]  metoclopramide HCl injection 10 mg    [DISCONTINUED] 0.9%  NaCl infusion    [DISCONTINUED] 0.9%  NaCl infusion    [DISCONTINUED] acetaminophen 1,000 mg/100 mL (10 mg/mL) injection    [DISCONTINUED] bupivacaine-EPINEPHrine (PF) 0.25%-1:200,000 injection    [DISCONTINUED] fentaNYL injection 25 mcg    [DISCONTINUED] fentaNYL injection    [DISCONTINUED] glycopyrrolate injection    [DISCONTINUED] ketorolac injection    [DISCONTINUED] lidocaine (cardiac) injection    [DISCONTINUED] midazolam injection    [DISCONTINUED] neostigmine injection    [DISCONTINUED] ondansetron injection 4 mg    [DISCONTINUED] ondansetron injection    [DISCONTINUED] oxyCODONE immediate release tablet 5 mg    [DISCONTINUED] propofol (DIPRIVAN) 10 mg/mL infusion    [DISCONTINUED] rocuronium injection    [DISCONTINUED] sodium chloride 0.9% flush 10 mL    [DISCONTINUED] succinylcholine injection     Current Outpatient Medications on File Prior to Encounter   Medication Sig    albuterol (PROVENTIL) 2.5 mg /3 mL (0.083 %) nebulizer solution Take 3 mLs (2.5 mg total) by nebulization every 6 (six) hours as needed.    ascorbic acid (VITAMIN C) 100 MG tablet Take 100 mg by mouth once daily.     aspirin (ECOTRIN) 81 MG EC tablet Take 81 mg by mouth once daily.    budesonide-formoterol 160-4.5 mcg (SYMBICORT) 160-4.5 mcg/actuation HFAA Inhale 2 puffs into the lungs every 12 (twelve) hours. Controller    cyanocobalamin, vitamin B-12, 2,500 mcg Lozg Place 2 tablets under the tongue once daily.    diclofenac (VOLTAREN) 25 MG TbEC Take 1 tablet (25 mg total) by mouth 3 (three) times daily as needed.    esomeprazole (NEXIUM) 20 MG capsule Take 1 capsule (20 mg total) by mouth before breakfast. (Patient taking differently: Take 20 mg by mouth daily as needed (heartburn). )    fluticasone (FLONASE) 50 mcg/actuation nasal spray 2 sprays (100 mcg total) by Each Nare route once daily.    furosemide (LASIX) 40 MG tablet Take 1 tablet (40 mg  total) by mouth once daily.    HYDROcodone-acetaminophen (NORCO) 5-325 mg per tablet Take 1 tablet by mouth every 6 (six) hours as needed for Pain.    lactulose (CHRONULAC) 10 gram/15 mL solution Take 30 mLs (20 g total) by mouth 3 (three) times daily. 2 Teaspoon(s) Oral PRN Every evening.    losartan (COZAAR) 100 MG tablet Take 1 tablet (100 mg total) by mouth once daily.    metoprolol succinate (TOPROL-XL) 50 MG 24 hr tablet Take 1 tablet (50 mg total) by mouth once daily.    montelukast (SINGULAIR) 10 mg tablet Take 1 tablet (10 mg total) by mouth every evening.    spironolactone (ALDACTONE) 25 MG tablet Take 1 tablet (25 mg total) by mouth once daily.     Family History     Problem Relation (Age of Onset)    Diabetes Sister    Hypertension Mother        Tobacco Use    Smoking status: Former Smoker     Packs/day: 0.25     Years: 5.00     Pack years: 1.25     Quit date: 1972     Years since quittin.5    Smokeless tobacco: Never Used   Substance and Sexual Activity    Alcohol use: Yes     Alcohol/week: 0.0 standard drinks     Comment: Rarely    Drug use: Yes     Types: Oxycodone, Hydrocodone    Sexual activity: Not on file     Review of Systems   Constitutional: Negative for activity change, appetite change, chills, diaphoresis, fatigue and fever.   HENT: Negative for ear discharge, ear pain and facial swelling.    Eyes: Negative for pain and redness.   Respiratory: Positive for shortness of breath. Negative for cough.         Patient reports she does have some shortness of breath upon exertion which is chronic for her    Patient has a history of obstructive sleep apnea but reports she is not compliant with her CPAP machine at home   Cardiovascular: Negative for chest pain, palpitations and leg swelling.   Gastrointestinal: Positive for nausea. Negative for abdominal distention, blood in stool and vomiting.   Endocrine: Negative for polydipsia and polyphagia.   Musculoskeletal: Positive for back  pain. Negative for gait problem, neck pain and neck stiffness.        Patient reports chronic back pain for greater than 20 years but states over the past   6 years has become progressively worse   Skin: Positive for wound. Negative for color change.        Abdominal surgical incisions   Allergic/Immunologic: Negative for food allergies.   Neurological: Negative for facial asymmetry, speech difficulty and weakness.        Forgetful   Hematological: Does not bruise/bleed easily.   Psychiatric/Behavioral: Negative for agitation, behavioral problems, confusion, hallucinations and suicidal ideas. The patient is not nervous/anxious.      Objective:     Vital Signs (Most Recent):  Temp: 98 °F (36.7 °C) (07/13/20 1906)  Pulse: 81 (07/13/20 1906)  Resp: 16 (07/13/20 1906)  BP: (!) 189/84 (07/13/20 1906)  SpO2: 97 % (07/13/20 1906) Vital Signs (24h Range):  Temp:  [98 °F (36.7 °C)-98.2 °F (36.8 °C)] 98 °F (36.7 °C)  Pulse:  [] 81  Resp:  [13-24] 16  SpO2:  [74 %-100 %] 97 %  BP: (164-208)/(66-91) 189/84        There is no height or weight on file to calculate BMI.    Physical Exam  Constitutional:       General: She is not in acute distress.     Appearance: Normal appearance. She is obese. She is not ill-appearing, toxic-appearing or diaphoretic.      Comments: Morbidly obese   HENT:      Head: Normocephalic and atraumatic.      Mouth/Throat:      Mouth: Mucous membranes are moist.   Eyes:      General:         Right eye: No discharge.         Left eye: No discharge.      Extraocular Movements: Extraocular movements intact.      Conjunctiva/sclera: Conjunctivae normal.      Pupils: Pupils are equal, round, and reactive to light.   Neck:      Musculoskeletal: Normal range of motion and neck supple. No neck rigidity or muscular tenderness.   Cardiovascular:      Rate and Rhythm: Normal rate and regular rhythm.      Pulses: Normal pulses.   Pulmonary:      Effort: Pulmonary effort is normal. No respiratory distress.       Breath sounds: No wheezing or rales.      Comments: Bilateral breath sounds diminished  Abdominal:      General: Bowel sounds are normal. There is no distension.      Palpations: Abdomen is soft.      Tenderness: There is abdominal tenderness. There is no guarding.      Comments: Obese  Mild generalized tenderness   Genitourinary:     Comments: Not examined  Musculoskeletal: Normal range of motion.      Right lower leg: Edema present.      Left lower leg: Edema present.      Comments: Scant bilateral lower extremity edema   Skin:     General: Skin is warm and dry.      Capillary Refill: Capillary refill takes less than 2 seconds.      Comments: Abdominal surgical dressings intact   Neurological:      General: No focal deficit present.      Mental Status: She is alert and oriented to person, place, and time. Mental status is at baseline.      Cranial Nerves: No cranial nerve deficit.      Motor: No weakness.   Psychiatric:         Mood and Affect: Mood normal.         Behavior: Behavior normal.         Thought Content: Thought content normal.         Judgment: Judgment normal.           CRANIAL NERVES     CN III, IV, VI   Pupils are equal, round, and reactive to light.       Admit labs, ABG, and chest x-ray pending

## 2020-07-14 NOTE — PLAN OF CARE
Cm completed the assessment with pt at bedside. Pt takes care of her 98 year old mother.  PCP is Dr. Villatoro.  Insurance verified as [employer]. Pt is a diabetic, denies dialysis and coumadin.  Disposition: Pt will discharge to home with family.  No needs verbalized at this time.       07/14/20 1000   Discharge Assessment   Assessment Type Discharge Planning Assessment   Confirmed/corrected address and phone number on facesheet? Yes   Assessment information obtained from? Patient   Expected Length of Stay (days) 2   Communicated expected length of stay with patient/caregiver yes   Prior to hospitilization cognitive status: Alert/Oriented   Prior to hospitalization functional status: Independent;Assistive Equipment   Current cognitive status: Alert/Oriented   Current Functional Status: Assistive Equipment;Independent   Facility Arrived From: HOME   Lives With parent(s)   Able to Return to Prior Arrangements yes   Is patient able to care for self after discharge? Yes   Who are your caregiver(s) and their phone number(s)? Bette - [phone]   Patient's perception of discharge disposition home or selfcare   Readmission Within the Last 30 Days no previous admission in last 30 days   Patient currently being followed by outpatient case management? Yes   If yes, name of outpatient case management following: insurance company assigned oupatient case management   Patient currently receives any other outside agency services? No   Equipment Currently Used at Home CPAP;walker, rolling;cane, straight;bedside commode;glucometer   Do you have any problems affording any of your prescribed medications? No   Is the patient taking medications as prescribed? yes   Does the patient have transportation home? Yes   Transportation Anticipated family or friend will provide   Dialysis Name and Scheduled days na   Does the patient receive services at the Coumadin Clinic? No   Discharge Plan A Home with family   Discharge Plan B Home with family    DME Needed Upon Discharge  none   Patient/Family in Agreement with Plan yes

## 2020-07-14 NOTE — ASSESSMENT & PLAN NOTE
Patient is status post laparoscopic cholecystectomy done today by Dr. Jairo Soria at outpatient surgical center-  Orders as per surgeon - Dr. Walker  Low-fat low-cholesterol ADA diet  P.r.n. pain and antiemetic medication

## 2020-07-14 NOTE — ASSESSMENT & PLAN NOTE
Patient reports history of DVT in the right upper extremity greater than 10 years ago and  history of groin DVT approximately 8 years ago-  Add DVT Lovenox prophylactic add MARIANELA hose

## 2020-07-14 NOTE — ASSESSMENT & PLAN NOTE
1.  Pulmonary emboli.  2.  Patient received full-dose Lovenox.  3.  Cardiology and Pulmonary consult ordered.  4.  Echo pending.  5.  Following.

## 2020-07-14 NOTE — ASSESSMENT & PLAN NOTE
Monitor O2 sats, supplement O2 as needed  Q 4 hr while awake duo nebs and b.i.d. Pulmicort  Incentive spirometer q.1 hour while awake

## 2020-07-14 NOTE — PROGRESS NOTES
Received call from staff nurse that patient's D-dimer is 23.49.  Discussed renal status with Radiology-   Will order stat CTA of the chest and place on full-dose Lovenox.  Patient also with elevated troponin trending upward-will order stat repeat EKG and give a full-dose of aspirin

## 2020-07-14 NOTE — PLAN OF CARE
CM  unable to complete the assessment,ASPEN Marrero in room completing assessment.  Cm will follow-up.       07/14/20 0930   Discharge Assessment   Assessment Type Discharge Planning Assessment

## 2020-07-14 NOTE — ASSESSMENT & PLAN NOTE
Diabetic diet  Accu-Cheks with correctional sliding scale insulin  Blood sugars running 121- 136  HGB A1c is 6.4

## 2020-07-14 NOTE — SUBJECTIVE & OBJECTIVE
Current Facility-Administered Medications on File Prior to Encounter   Medication    [COMPLETED] clindamycin in D5W 900 mg/50 mL IVPB 900 mg    [COMPLETED] dexamethasone injection 8 mg    [COMPLETED] hydrALAZINE injection 5 mg    lactated ringers infusion    lidocaine (PF) 10 mg/ml (1%) injection 10 mg    [COMPLETED] metoclopramide HCl injection 10 mg    [DISCONTINUED] 0.9%  NaCl infusion    [DISCONTINUED] 0.9%  NaCl infusion    [DISCONTINUED] acetaminophen 1,000 mg/100 mL (10 mg/mL) injection    [DISCONTINUED] bupivacaine-EPINEPHrine (PF) 0.25%-1:200,000 injection    [DISCONTINUED] fentaNYL injection 25 mcg    [DISCONTINUED] fentaNYL injection    [DISCONTINUED] glycopyrrolate injection    [DISCONTINUED] ketorolac injection    [DISCONTINUED] lidocaine (cardiac) injection    [DISCONTINUED] midazolam injection    [DISCONTINUED] neostigmine injection    [DISCONTINUED] ondansetron injection 4 mg    [DISCONTINUED] ondansetron injection    [DISCONTINUED] oxyCODONE immediate release tablet 5 mg    [DISCONTINUED] propofol (DIPRIVAN) 10 mg/mL infusion    [DISCONTINUED] rocuronium injection    [DISCONTINUED] sodium chloride 0.9% flush 10 mL    [DISCONTINUED] succinylcholine injection    [DISCONTINUED] sugammadex (BRIDION) 100 mg/mL injection     Current Outpatient Medications on File Prior to Encounter   Medication Sig    albuterol (PROVENTIL) 2.5 mg /3 mL (0.083 %) nebulizer solution Take 3 mLs (2.5 mg total) by nebulization every 6 (six) hours as needed.    ascorbic acid (VITAMIN C) 100 MG tablet Take 100 mg by mouth once daily.     aspirin (ECOTRIN) 81 MG EC tablet Take 81 mg by mouth once daily.    budesonide-formoterol 160-4.5 mcg (SYMBICORT) 160-4.5 mcg/actuation HFAA Inhale 2 puffs into the lungs every 12 (twelve) hours. Controller    cyanocobalamin, vitamin B-12, 2,500 mcg Lozg Place 2 tablets under the tongue once daily.    diclofenac (VOLTAREN) 25 MG TbEC Take 1 tablet (25 mg total) by  mouth 3 (three) times daily as needed.    esomeprazole (NEXIUM) 20 MG capsule Take 1 capsule (20 mg total) by mouth before breakfast. (Patient taking differently: Take 20 mg by mouth daily as needed (heartburn). )    fluticasone (FLONASE) 50 mcg/actuation nasal spray 2 sprays (100 mcg total) by Each Nare route once daily.    furosemide (LASIX) 40 MG tablet Take 1 tablet (40 mg total) by mouth once daily.    HYDROcodone-acetaminophen (NORCO) 5-325 mg per tablet Take 1 tablet by mouth every 6 (six) hours as needed for Pain.    lactulose (CHRONULAC) 10 gram/15 mL solution Take 30 mLs (20 g total) by mouth 3 (three) times daily. 2 Teaspoon(s) Oral PRN Every evening.    losartan (COZAAR) 100 MG tablet Take 1 tablet (100 mg total) by mouth once daily.    metoprolol succinate (TOPROL-XL) 50 MG 24 hr tablet Take 1 tablet (50 mg total) by mouth once daily.    montelukast (SINGULAIR) 10 mg tablet Take 1 tablet (10 mg total) by mouth every evening.    spironolactone (ALDACTONE) 25 MG tablet Take 1 tablet (25 mg total) by mouth once daily.       Review of patient's allergies indicates:   Allergen Reactions    Cymbalta [duloxetine] Other (See Comments)     Nightmares      Darvon [propoxyphene] Nausea Only and Other (See Comments)     Sweating, slept for 3 days    Atorvastatin Other (See Comments)     Muscle cramps    Naprosyn [naproxen] Nausea Only    Penicillins Rash    Tramadol Nausea Only and Palpitations       Past Medical History:   Diagnosis Date    ALLERGIC RHINITIS     Anemia     Arthritis     Back pain     Bronchitis     Cataract     OU    Cholelithiasis     COPD (chronic obstructive pulmonary disease)     Degenerative disc disease     Diabetes mellitus     pre diabetic    Diverticulosis     DVT (deep venous thrombosis)     Edema     Encounter for blood transfusion 1979    Fibromyalgia     Glaucoma     Gout     Hx of colonic polyps     Hyperlipidemia     Hypertension     Incontinence      Osteoporosis     Reflux     Refusal of blood transfusions as patient is [employer]     Sleep apnea     non compliant with CPAP    Vestibulitis of ear      Past Surgical History:   Procedure Laterality Date    ANGIOGRAPHY OF LOWER EXTREMITY N/A 2019    Procedure: ANGIOGRAM, LOWER EXTREMITY;  Surgeon: Gino Arana MD;  Location: [facility] CATH/EP LAB;  Service: General;  Laterality: N/A;    HIP SURGERY      HYSTERECTOMY      JOINT REPLACEMENT      B total hip    LAPAROSCOPIC CHOLECYSTECTOMY N/A 2020    Procedure: CHOLECYSTECTOMY, LAPAROSCOPIC;  Surgeon: Jomar Mcdonald MD;  Location: [facility] OR;  Service: General;  Laterality: N/A;    TRANSFORAMINAL EPIDURAL INJECTION OF STEROID Left 2020    Procedure: Injection,steroid,epidural,transforaminal approach;  Surgeon: Matt Gilliam MD;  Location: [facility] OR;  Service: Pain Management;  Laterality: Left;  L2-3, L3-4     Family History     Problem Relation (Age of Onset)    Diabetes Sister    Hypertension Mother        Tobacco Use    Smoking status: Former Smoker     Packs/day: 0.25     Years: 5.00     Pack years: 1.25     Quit date: 1972     Years since quittin.5    Smokeless tobacco: Never Used   Substance and Sexual Activity    Alcohol use: Yes     Alcohol/week: 0.0 standard drinks     Comment: Rarely    Drug use: Yes     Types: Oxycodone, Hydrocodone    Sexual activity: Not on file     Review of Systems   Constitutional: Negative for activity change, chills, fever and unexpected weight change.   HENT: Negative for congestion, trouble swallowing and voice change.    Eyes: Negative for redness and visual disturbance.   Respiratory: Positive for shortness of breath. Negative for cough and wheezing.    Cardiovascular: Negative for chest pain and palpitations.   Gastrointestinal: Negative for abdominal pain, blood in stool, nausea and vomiting.   Endocrine: Negative.    Genitourinary: Negative for dysuria, frequency and hematuria.    Musculoskeletal: Positive for back pain. Negative for neck pain.   Skin: Negative for rash and wound.   Allergic/Immunologic: Negative.    Neurological: Negative for dizziness, weakness and headaches.   Hematological: Negative for adenopathy.   Psychiatric/Behavioral: Negative for agitation and dysphoric mood. The patient is not nervous/anxious.      Objective:     Vital Signs (Most Recent):  Temp: 96.6 °F (35.9 °C) (07/14/20 0756)  Pulse: 60 (07/14/20 1145)  Resp: 18 (07/14/20 1145)  BP: (!) 150/66 (07/14/20 0756)  SpO2: 95 % (07/14/20 1145) Vital Signs (24h Range):  Temp:  [96.6 °F (35.9 °C)-98.2 °F (36.8 °C)] 96.6 °F (35.9 °C)  Pulse:  [] 60  Resp:  [13-24] 18  SpO2:  [74 %-100 %] 95 %  BP: (150-208)/(66-91) 150/66     Weight: 97.5 kg (214 lb 15.2 oz)  Body mass index is 38.08 kg/m².    Physical Exam  Constitutional:       General: She is not in acute distress.     Appearance: She is well-developed.   HENT:      Head: Normocephalic and atraumatic.      Mouth/Throat:      Pharynx: No oropharyngeal exudate.   Eyes:      General: No scleral icterus.     Conjunctiva/sclera: Conjunctivae normal.      Pupils: Pupils are equal, round, and reactive to light.   Neck:      Musculoskeletal: Normal range of motion.      Thyroid: No thyromegaly.   Cardiovascular:      Rate and Rhythm: Normal rate and regular rhythm.      Heart sounds: No murmur.   Pulmonary:      Effort: Pulmonary effort is normal.      Breath sounds: Normal breath sounds. No wheezing or rales.   Abdominal:      General: Bowel sounds are normal. There is no distension.      Palpations: Abdomen is soft. There is no mass.      Tenderness: There is abdominal tenderness (Minimal and expected tenderness.).      Hernia: No hernia is present.      Comments: Incisions clean and dry.   Musculoskeletal: Normal range of motion.   Lymphadenopathy:      Cervical: No cervical adenopathy.   Skin:     General: Skin is warm and dry.      Findings: No erythema or rash.    Neurological:      Mental Status: She is alert and oriented to person, place, and time.      Cranial Nerves: No cranial nerve deficit.   Psychiatric:         Behavior: Behavior normal.         Significant Labs:  CBC:   Recent Labs   Lab 07/14/20 0135   WBC 10.27   RBC 3.47*   HGB 11.8*   HCT 36.0*   *   *   MCH 34.0*   MCHC 32.8     CMP:   Recent Labs   Lab 07/14/20 0135   *   CALCIUM 9.3   ALBUMIN 3.2*   PROT 7.3      K 5.4*   CO2 23      BUN 20   CREATININE 1.4   ALKPHOS 77   ALT 37   AST 39   BILITOT 0.5       Significant Diagnostics:  CT scan report and images reviewed.

## 2020-07-14 NOTE — ASSESSMENT & PLAN NOTE
07/14/2020 received message from staff nurse at 1046  that CTA of the chest results show patient with extensive bilateral pulmonary emboli  Monitor O2 sats, supplement O2 as needed  Continue full-dose Lovenox-was changed to IV heparin drip per pulmonology  Consult pulmonology

## 2020-07-14 NOTE — SUBJECTIVE & OBJECTIVE
Interval  History:  Patient with elevated D-dimer.  CTA of the chest was done which showed extensive bilateral pulmonary emboli.  Bilateral lower extremity venous ultrasound also ordered.     Review of Systems   Constitutional: Negative for activity change, appetite change, chills, diaphoresis, fatigue and fever.   HENT: Negative for ear discharge, ear pain and facial swelling.    Eyes: Negative for pain and redness.   Respiratory: Positive for shortness of breath. Negative for cough.         Patient reports she does have some shortness of breath upon exertion which is chronic for her    Patient has a history of obstructive sleep apnea but reports she is not compliant with her CPAP machine at home   Cardiovascular: Negative for chest pain, palpitations and leg swelling.   Gastrointestinal: Positive for nausea. Negative for abdominal distention, blood in stool and vomiting.   Endocrine: Negative for polydipsia and polyphagia.   Musculoskeletal: Positive for back pain. Negative for gait problem, neck pain and neck stiffness.        Patient reports chronic back pain for greater than 20 years but states over the past   6 years has become progressively worse   Skin: Positive for wound. Negative for color change.        Abdominal surgical incisions   Allergic/Immunologic: Negative for food allergies.   Neurological: Negative for facial asymmetry, speech difficulty and weakness.        Forgetful   Hematological: Does not bruise/bleed easily.   Psychiatric/Behavioral: Negative for agitation, behavioral problems, confusion, hallucinations and suicidal ideas. The patient is not nervous/anxious.      Objective:     Vital Signs (Most Recent):  Temp: 96.3 °F (35.7 °C) (07/14/20 1540)  Pulse: 66 (07/14/20 1543)  Resp: 18 (07/14/20 1543)  BP: 133/61 (07/14/20 1540)  SpO2: 95 % (07/14/20 1543) Vital Signs (24h Range):  Temp:  [96.3 °F (35.7 °C)-98.1 °F (36.7 °C)] 96.3 °F (35.7 °C)  Pulse:  [57-81] 66  Resp:  [16-20] 18  SpO2:  [90  %-98 %] 95 %  BP: (133-189)/(60-84) 133/61     Weight: 97.1 kg (214 lb)  Body mass index is 37.91 kg/m².    Physical Exam  Constitutional:       General: She is not in acute distress.     Appearance: Normal appearance. She is obese. She is not ill-appearing, toxic-appearing or diaphoretic.      Comments: Morbidly obese   HENT:      Head: Normocephalic and atraumatic.      Mouth/Throat:      Mouth: Mucous membranes are moist.   Eyes:      General:         Right eye: No discharge.         Left eye: No discharge.      Extraocular Movements: Extraocular movements intact.      Conjunctiva/sclera: Conjunctivae normal.      Pupils: Pupils are equal, round, and reactive to light.   Neck:      Musculoskeletal: Normal range of motion and neck supple. No neck rigidity or muscular tenderness.   Cardiovascular:      Rate and Rhythm: Normal rate and regular rhythm.      Pulses: Normal pulses.   Pulmonary:      Effort: Pulmonary effort is normal. No respiratory distress.      Breath sounds: No wheezing or rales.      Comments: Bilateral breath sounds diminished  Abdominal:      General: Bowel sounds are normal. There is no distension.      Palpations: Abdomen is soft.      Tenderness: There is abdominal tenderness. There is no guarding.      Comments: Obese  Mild generalized tenderness   Genitourinary:     Comments: Not examined  Musculoskeletal: Normal range of motion.      Right lower leg: Edema present.      Left lower leg: Edema present.      Comments: Scant bilateral lower extremity edema   Skin:     General: Skin is warm and dry.      Capillary Refill: Capillary refill takes less than 2 seconds.      Comments: Abdominal surgical dressings intact   Neurological:      General: No focal deficit present.      Mental Status: She is alert and oriented to person, place, and time. Mental status is at baseline.      Cranial Nerves: No cranial nerve deficit.      Motor: No weakness.   Psychiatric:         Mood and Affect: Mood normal.          Behavior: Behavior normal.         Thought Content: Thought content normal.         Judgment: Judgment normal.           CRANIAL NERVES     CN III, IV, VI   Pupils are equal, round, and reactive to light.    Labs reviewed

## 2020-07-14 NOTE — HOSPITAL COURSE
The patient was monitored closely during her stay.  She was noted to have an elevated D-dimer.  She was placed on full-dose Lovenox.  Patient when CTA of the chest which showed extensive bilateral pulmonary emboli.  Bilateral venous ultrasound showed left lower extremity DVT.  Pulmonology was consulted  .  Patient's full-dose Lovenox was discontinued and she was placed on IV heparin drip.  Patient was noted to hav and recommended full anticoagulation and also felt patient may benefit from IVC filter placement. Patient with worsening anemia.  Patient noted to be Jehovah witness.  Hematology was  consulted.  Patient was seen by vascular surgeon and plans were made for IVC filter placement.  However in the meantime, patient had blood cultures x2 that came back positive for MRSA.  She was started on IV vancomycin and  Infectious disease was consulted and the IVC filter placement was canceled.  Repeat blood cultures and echocardiogram were ordered.  Patient had MRI of the thoracic and lumbar spine with significant degenerative disease changes and some bulging noted in areas with some foraminal stenosis but no signs of osteomyelitis noted.  Cardiology was consulted to read echocardiogram and also for possible SADIE if echocardiogram was negative for  any signs of bacterial endocarditis.  Initial echocardiogram was negative.  Patient was scheduled for a SADIE which was negative for any signs of SBE.  Patient's overall condition remained stable.  Her anemia remained stable.  Her heparin drip was discontinued and she was started on oral anticoagulant.  Patient was discharged to home with home health to follow.  The patient was to follow-up with Dr. De Dios for weekly Procrit treatments.  Patient was to receive daptomycin 1 g IV daily times a total of 6 weeks and was to follow-up with Dr. Velarde as outpatient.

## 2020-07-15 PROBLEM — D75.829: Status: ACTIVE | Noted: 2020-07-15

## 2020-07-15 PROBLEM — D69.6 THROMBOCYTOPENIA: Status: ACTIVE | Noted: 2020-07-15

## 2020-07-15 LAB
ANION GAP SERPL CALC-SCNC: 10 MMOL/L (ref 8–16)
APTT BLDCRRT: 146.1 SEC (ref 21–32)
APTT BLDCRRT: 149.7 SEC (ref 21–32)
APTT BLDCRRT: 89.7 SEC (ref 21–32)
BASOPHILS # BLD AUTO: 0.03 K/UL (ref 0–0.2)
BASOPHILS NFR BLD: 0.2 % (ref 0–1.9)
BUN SERPL-MCNC: 27 MG/DL (ref 8–23)
CALCIUM SERPL-MCNC: 8.6 MG/DL (ref 8.7–10.5)
CHLORIDE SERPL-SCNC: 107 MMOL/L (ref 95–110)
CO2 SERPL-SCNC: 22 MMOL/L (ref 23–29)
CREAT SERPL-MCNC: 1.3 MG/DL (ref 0.5–1.4)
DIFFERENTIAL METHOD: ABNORMAL
EOSINOPHIL # BLD AUTO: 0.1 K/UL (ref 0–0.5)
EOSINOPHIL NFR BLD: 0.4 % (ref 0–8)
ERYTHROCYTE [DISTWIDTH] IN BLOOD BY AUTOMATED COUNT: 13 % (ref 11.5–14.5)
EST. GFR  (AFRICAN AMERICAN): 46 ML/MIN/1.73 M^2
EST. GFR  (NON AFRICAN AMERICAN): 40 ML/MIN/1.73 M^2
GLUCOSE SERPL-MCNC: 126 MG/DL (ref 70–110)
HCT VFR BLD AUTO: 34.8 % (ref 37–48.5)
HGB BLD-MCNC: 11.2 G/DL (ref 12–16)
IMM GRANULOCYTES # BLD AUTO: 0.05 K/UL (ref 0–0.04)
IMM GRANULOCYTES NFR BLD AUTO: 0.3 % (ref 0–0.5)
LYMPHOCYTES # BLD AUTO: 2.5 K/UL (ref 1–4.8)
LYMPHOCYTES NFR BLD: 16.3 % (ref 18–48)
MAGNESIUM SERPL-MCNC: 1.8 MG/DL (ref 1.6–2.6)
MCH RBC QN AUTO: 33.8 PG (ref 27–31)
MCHC RBC AUTO-ENTMCNC: 32.2 G/DL (ref 32–36)
MCV RBC AUTO: 105 FL (ref 82–98)
MONOCYTES # BLD AUTO: 1.1 K/UL (ref 0.3–1)
MONOCYTES NFR BLD: 7 % (ref 4–15)
NEUTROPHILS # BLD AUTO: 11.8 K/UL (ref 1.8–7.7)
NEUTROPHILS NFR BLD: 75.8 % (ref 38–73)
NRBC BLD-RTO: 0 /100 WBC
PLATELET # BLD AUTO: 128 K/UL (ref 150–350)
PMV BLD AUTO: 10.5 FL (ref 9.2–12.9)
POCT GLUCOSE: 136 MG/DL (ref 70–110)
POTASSIUM SERPL-SCNC: 4.1 MMOL/L (ref 3.5–5.1)
RBC # BLD AUTO: 3.31 M/UL (ref 4–5.4)
SODIUM SERPL-SCNC: 139 MMOL/L (ref 136–145)
WBC # BLD AUTO: 15.59 K/UL (ref 3.9–12.7)

## 2020-07-15 PROCEDURE — 63600175 PHARM REV CODE 636 W HCPCS: Performed by: INTERNAL MEDICINE

## 2020-07-15 PROCEDURE — 80048 BASIC METABOLIC PNL TOTAL CA: CPT

## 2020-07-15 PROCEDURE — 94640 AIRWAY INHALATION TREATMENT: CPT

## 2020-07-15 PROCEDURE — 36415 COLL VENOUS BLD VENIPUNCTURE: CPT

## 2020-07-15 PROCEDURE — 63600175 PHARM REV CODE 636 W HCPCS: Performed by: NURSE PRACTITIONER

## 2020-07-15 PROCEDURE — 99232 SBSQ HOSP IP/OBS MODERATE 35: CPT | Mod: ,,, | Performed by: INTERNAL MEDICINE

## 2020-07-15 PROCEDURE — 25000003 PHARM REV CODE 250: Performed by: INTERNAL MEDICINE

## 2020-07-15 PROCEDURE — 12000002 HC ACUTE/MED SURGE SEMI-PRIVATE ROOM

## 2020-07-15 PROCEDURE — 85730 THROMBOPLASTIN TIME PARTIAL: CPT | Mod: 91

## 2020-07-15 PROCEDURE — 25000003 PHARM REV CODE 250: Performed by: NURSE PRACTITIONER

## 2020-07-15 PROCEDURE — 83735 ASSAY OF MAGNESIUM: CPT

## 2020-07-15 PROCEDURE — 85025 COMPLETE CBC W/AUTO DIFF WBC: CPT

## 2020-07-15 PROCEDURE — 25000242 PHARM REV CODE 250 ALT 637 W/ HCPCS: Performed by: NURSE PRACTITIONER

## 2020-07-15 PROCEDURE — 94761 N-INVAS EAR/PLS OXIMETRY MLT: CPT

## 2020-07-15 PROCEDURE — 99232 PR SUBSEQUENT HOSPITAL CARE,LEVL II: ICD-10-PCS | Mod: ,,, | Performed by: INTERNAL MEDICINE

## 2020-07-15 PROCEDURE — 85730 THROMBOPLASTIN TIME PARTIAL: CPT

## 2020-07-15 PROCEDURE — 99900035 HC TECH TIME PER 15 MIN (STAT)

## 2020-07-15 PROCEDURE — 94799 UNLISTED PULMONARY SVC/PX: CPT

## 2020-07-15 PROCEDURE — 27000221 HC OXYGEN, UP TO 24 HOURS

## 2020-07-15 RX ORDER — SIMETHICONE 80 MG
2 TABLET,CHEWABLE ORAL 3 TIMES DAILY PRN
Status: DISCONTINUED | OUTPATIENT
Start: 2020-07-15 | End: 2020-07-29 | Stop reason: HOSPADM

## 2020-07-15 RX ADMIN — BUDESONIDE INHALATION 0.5 MG: 0.5 SUSPENSION RESPIRATORY (INHALATION) at 07:07

## 2020-07-15 RX ADMIN — ONDANSETRON 4 MG: 2 INJECTION INTRAMUSCULAR; INTRAVENOUS at 09:07

## 2020-07-15 RX ADMIN — HYDROCODONE BITARTRATE AND ACETAMINOPHEN 1 TABLET: 5; 325 TABLET ORAL at 02:07

## 2020-07-15 RX ADMIN — PANTOPRAZOLE SODIUM 40 MG: 40 TABLET, DELAYED RELEASE ORAL at 08:07

## 2020-07-15 RX ADMIN — IPRATROPIUM BROMIDE AND ALBUTEROL SULFATE 3 ML: .5; 3 SOLUTION RESPIRATORY (INHALATION) at 07:07

## 2020-07-15 RX ADMIN — HEPARIN SODIUM AND DEXTROSE 11 UNITS/KG/HR: 10000; 5 INJECTION INTRAVENOUS at 05:07

## 2020-07-15 RX ADMIN — IPRATROPIUM BROMIDE AND ALBUTEROL SULFATE 3 ML: .5; 3 SOLUTION RESPIRATORY (INHALATION) at 11:07

## 2020-07-15 RX ADMIN — MONTELUKAST 10 MG: 10 TABLET, FILM COATED ORAL at 09:07

## 2020-07-15 RX ADMIN — DOCUSATE SODIUM 100 MG: 100 CAPSULE, LIQUID FILLED ORAL at 08:07

## 2020-07-15 RX ADMIN — METOPROLOL SUCCINATE 50 MG: 50 TABLET, FILM COATED, EXTENDED RELEASE ORAL at 08:07

## 2020-07-15 RX ADMIN — HEPARIN SODIUM AND DEXTROSE 11 UNITS/KG/HR: 10000; 5 INJECTION INTRAVENOUS at 06:07

## 2020-07-15 NOTE — PLAN OF CARE
07/14/20 1937 07/14/20 1943   Patient Assessment/Suction   Level of Consciousness (AVPU) alert alert   Respiratory Effort Normal;Unlabored Normal;Unlabored   Expansion/Accessory Muscles/Retractions no use of accessory muscles no use of accessory muscles   All Lung Fields Breath Sounds clear;diminished clear;diminished   CHRISTOPHER Breath Sounds clear  --    LLL Breath Sounds clear;diminished  --    RUL Breath Sounds clear  --    RML Breath Sounds clear;diminished  --    RLL Breath Sounds clear;diminished  --    Rhythm/Pattern, Respiratory unlabored;pattern regular unlabored;pattern regular   Cough Frequency no cough no cough   PRE-TX-O2   O2 Device (Oxygen Therapy) nasal cannula  --    $ Is the patient on Low Flow Oxygen? Yes  --    Flow (L/min) 2  --    Oxygen Concentration (%) 28  --    SpO2 (!) 94 % 98 %   Pulse Oximetry Type Intermittent  --    $ Pulse Oximetry - Multiple Charge Pulse Oximetry - Multiple  --    Pulse 70 72   Resp 17 17   Aerosol Therapy   $ Aerosol Therapy Charges Aerosol Treatment Aerosol Treatment   Respiratory Treatment Status (SVN) given given   Treatment Route (SVN) mask;oxygen mask;oxygen   Patient Position (SVN) HOB elevated HOB elevated   Post Treatment Assessment (SVN) breath sounds unchanged breath sounds unchanged   Signs of Intolerance (SVN) none none   Breath Sounds Post-Respiratory Treatment   Throughout All Fields Post-Treatment All Fields All Fields   Throughout All Fields Post-Treatment no change no change   Post-treatment Heart Rate (beats/min) 72 72   Post-treatment Resp Rate (breaths/min) 17 16   Incentive Spirometer   $ Incentive Spirometer Charges  --  proper technique demonstrated   Administration (IS)  --  proper technique demonstrated   Number of Repetitions (IS)  --  10   Level Incentive Spirometer (mL)  --  1500   Patient Tolerance (IS)  --  good   Ready to Wean/Extubation Screen   FIO2<=50 (chart decimal) 0.28  --      Pt receives Q12 Pulmicort 0.5mg and Q4WA Duoneb  aerosol treatments

## 2020-07-15 NOTE — PROGRESS NOTES
[facility]  General Surgery  Progress Note    Subjective:     History of Present Illness:  79-year-old female underwent laparoscopic cholecystectomy yesterday at the [facility] in [place].  The patient had previously been admitted with elevated transaminases and right back and flank pain.  We postpone cholecystectomy at that time on that hospitalization.  She followed up and we rescheduled.  Postoperatively she had trouble weaning from O2.  She was transferred to [facility] for observation as she was in an outpatient surgical unit.  She has not had any specific acute events but her D-dimer was high in the CT scan was obtained which confirmed pulmonary emboli.  Her troponins are slightly elevated as well.  She is being worked up currently.  She is alert and in good spirits.  She denies any significant chest pain.  She does not appear to be in distress.    Post-Op Info:  * No surgery found *         Interval History: No new complaints. Tolerating diet    Medications:  Continuous Infusions:   sodium chloride 0.9% 50 mL/hr at 07/14/20 1820    heparin (porcine) in D5W 14 Units/kg/hr (07/15/20 0512)     Scheduled Meds:   albuterol-ipratropium  3 mL Nebulization Q4H WAKE    budesonide  0.5 mg Nebulization Q12H    docusate sodium  100 mg Oral Daily    fluticasone propionate  2 spray Each Nostril Daily    metoprolol succinate  50 mg Oral Daily    montelukast  10 mg Oral QHS    pantoprazole  40 mg Oral Daily     PRN Meds:acetaminophen, dextrose 50%, dextrose 50%, glucagon (human recombinant), glucose, glucose, heparin (PORCINE), heparin (PORCINE), HYDROcodone-acetaminophen, insulin aspart U-100, melatonin, ondansetron, polyethylene glycol, sodium chloride 0.9%     Review of patient's allergies indicates:   Allergen Reactions    Cymbalta [duloxetine] Other (See Comments)     Nightmares      Darvon [propoxyphene] Nausea Only and Other (See Comments)     Sweating, slept for 3  days    Atorvastatin Other (See Comments)     Muscle cramps    Naprosyn [naproxen] Nausea Only    Penicillins Rash    Tramadol Nausea Only and Palpitations     Objective:     Vital Signs (Most Recent):  Temp: 97 °F (36.1 °C) (07/14/20 2312)  Pulse: 65 (07/15/20 0708)  Resp: 16 (07/15/20 0708)  BP: (!) 149/66 (07/14/20 2312)  SpO2: 99 % (07/15/20 0704) Vital Signs (24h Range):  Temp:  [96.3 °F (35.7 °C)-98 °F (36.7 °C)] 97 °F (36.1 °C)  Pulse:  [60-75] 65  Resp:  [14-18] 16  SpO2:  [90 %-99 %] 99 %  BP: (133-159)/(49-66) 149/66     Weight: 103.8 kg (228 lb 14.4 oz)  Body mass index is 40.55 kg/m².    Intake/Output - Last 3 Shifts       07/13 0700 - 07/14 0659 07/14 0700 - 07/15 0659 07/15 0700 - 07/16 0659    I.V. (mL/kg)  0 (0)     IV Piggyback  28.1     Total Intake(mL/kg)  28.1 (0.3)     Urine (mL/kg/hr) 650      Total Output 650      Net -650 +28.1            Urine Occurrence  5 x           Physical Exam  Constitutional:       General: She is not in acute distress.  HENT:      Head: Normocephalic and atraumatic.   Eyes:      General: No scleral icterus.  Cardiovascular:      Rate and Rhythm: Normal rate and regular rhythm.   Pulmonary:      Effort: Pulmonary effort is normal. No respiratory distress.      Breath sounds: Normal breath sounds. No wheezing or rales.   Abdominal:      General: Bowel sounds are normal. There is no distension.      Palpations: Abdomen is soft.      Tenderness: There is abdominal tenderness (Expected tenderness, minimal).      Comments: Incisions clean.   Musculoskeletal:      Comments: Small hematoma at left arm   Neurological:      Mental Status: She is alert and oriented to person, place, and time.   Psychiatric:         Behavior: Behavior normal.         Significant Labs:  CBC:   Recent Labs   Lab 07/15/20  0158   WBC 15.59*   RBC 3.31*   HGB 11.2*   HCT 34.8*   *   *   MCH 33.8*   MCHC 32.2     BMP:   Recent Labs   Lab 07/15/20  0158   *      K 4.1       CO2 22*   BUN 27*   CREATININE 1.3   CALCIUM 8.6*   MG 1.8           Assessment/Plan:     * Acute respiratory failure with hypoxemia  1.  Pulmonary emboli.  2.  Patient received full-dose Lovenox.  3.  Cardiology and Pulmonary consult ordered.  4.  Echo pending.  5.  Following.    Hyperkalemia  1. S/P lap rosette. Pain better. Tolerating diet.  2. PE management per Hospital Medicine and Pulmonary.        Jomar Mcdonald MD  General Surgery  [facility]

## 2020-07-15 NOTE — SUBJECTIVE & OBJECTIVE
Interval History: No new complaints. Tolerating diet    Medications:  Continuous Infusions:   sodium chloride 0.9% 50 mL/hr at 07/14/20 1820    heparin (porcine) in D5W 14 Units/kg/hr (07/15/20 0512)     Scheduled Meds:   albuterol-ipratropium  3 mL Nebulization Q4H WAKE    budesonide  0.5 mg Nebulization Q12H    docusate sodium  100 mg Oral Daily    fluticasone propionate  2 spray Each Nostril Daily    metoprolol succinate  50 mg Oral Daily    montelukast  10 mg Oral QHS    pantoprazole  40 mg Oral Daily     PRN Meds:acetaminophen, dextrose 50%, dextrose 50%, glucagon (human recombinant), glucose, glucose, heparin (PORCINE), heparin (PORCINE), HYDROcodone-acetaminophen, insulin aspart U-100, melatonin, ondansetron, polyethylene glycol, sodium chloride 0.9%     Review of patient's allergies indicates:   Allergen Reactions    Cymbalta [duloxetine] Other (See Comments)     Nightmares      Darvon [propoxyphene] Nausea Only and Other (See Comments)     Sweating, slept for 3 days    Atorvastatin Other (See Comments)     Muscle cramps    Naprosyn [naproxen] Nausea Only    Penicillins Rash    Tramadol Nausea Only and Palpitations     Objective:     Vital Signs (Most Recent):  Temp: 97 °F (36.1 °C) (07/14/20 2312)  Pulse: 65 (07/15/20 0708)  Resp: 16 (07/15/20 0708)  BP: (!) 149/66 (07/14/20 2312)  SpO2: 99 % (07/15/20 0704) Vital Signs (24h Range):  Temp:  [96.3 °F (35.7 °C)-98 °F (36.7 °C)] 97 °F (36.1 °C)  Pulse:  [60-75] 65  Resp:  [14-18] 16  SpO2:  [90 %-99 %] 99 %  BP: (133-159)/(49-66) 149/66     Weight: 103.8 kg (228 lb 14.4 oz)  Body mass index is 40.55 kg/m².    Intake/Output - Last 3 Shifts       07/13 0700 - 07/14 0659 07/14 0700 - 07/15 0659 07/15 0700 - 07/16 0659    I.V. (mL/kg)  0 (0)     IV Piggyback  28.1     Total Intake(mL/kg)  28.1 (0.3)     Urine (mL/kg/hr) 650      Total Output 650      Net -650 +28.1            Urine Occurrence  5 x           Physical Exam  Constitutional:        General: She is not in acute distress.  HENT:      Head: Normocephalic and atraumatic.   Eyes:      General: No scleral icterus.  Cardiovascular:      Rate and Rhythm: Normal rate and regular rhythm.   Pulmonary:      Effort: Pulmonary effort is normal. No respiratory distress.      Breath sounds: Normal breath sounds. No wheezing or rales.   Abdominal:      General: Bowel sounds are normal. There is no distension.      Palpations: Abdomen is soft.      Tenderness: There is abdominal tenderness (Expected tenderness, minimal).      Comments: Incisions clean.   Musculoskeletal:      Comments: Small hematoma at left arm   Neurological:      Mental Status: She is alert and oriented to person, place, and time.   Psychiatric:         Behavior: Behavior normal.         Significant Labs:  CBC:   Recent Labs   Lab 07/15/20  0158   WBC 15.59*   RBC 3.31*   HGB 11.2*   HCT 34.8*   *   *   MCH 33.8*   MCHC 32.2     BMP:   Recent Labs   Lab 07/15/20  0158   *      K 4.1      CO2 22*   BUN 27*   CREATININE 1.3   CALCIUM 8.6*   MG 1.8

## 2020-07-15 NOTE — PLAN OF CARE
Plan of care reviewed with patient. Patient verbalized complete understanding. No pain or SOB noted throughout shift. Heparin gtt infusing according to the [employer]. PTT redraw scheduled. SR on cardiac monitor. All fall precautions maintained. Bed in lowest position, locked, call light within reach. Side rails up x's 2. Slip resistant socks maintained.

## 2020-07-15 NOTE — ASSESSMENT & PLAN NOTE
1. S/P lap rosette. Pain better. Tolerating diet.  2. PE management per [facility] Pulmonary.

## 2020-07-15 NOTE — PROGRESS NOTES
Progress Note  PULMONARY    Admit Date: 7/13/2020   07/15/2020  From consult 7/14:  History of Present Illness:  Pt had laparoscopic rosette yesterday with post op low ox, cta with central pe.  Pt had prior h/o dvt- was on xarelto in 2015 - occurred with elective left hip replacement - left groin dvt.  [employer].       Pt seen by me last yr with h/o wheezes and cough and sob, also osas- non compliant.     Pt has had episodes of sob but vague - some sob with no cough/wheezes. Vague left leg swelling.     Pt had back problems needing injections in past- last injections est 5 yrs ago.      Pt developed back pain, worse movement ppt eval pcp/pain doc- injection set up.  She also developed left leg pain - upper thigh- occurred last couple months.  She was having eval with vascular doc for right leg with min right leg symptoms.  She had procedure with improved cirulation right.   Pt had bilat u/s legs in May, and right u/s in June -- both neg for dvt.       Pt developed sob- episodic with one episode of impending doom occurring 2 wks pta.       Pt had had some schwartz since severe episode 2 wks ago.       Pt had had severe pain in chest , then left leg, then sob - had eval at er , pcp, pain doc, then [facility]- biliary dz found and surg deferred til after back injection (injection was not effective for pain). Pt had lap rosette yesterday.  No recent cough/wheezes, no compliant with cpap.         SUBJECTIVE:     7/15 having min umbilical wound pain, breathing better.          PFSH and Allergies reviewed.    OBJECTIVE:     Vitals (Most recent):  Vitals:    07/15/20 1122   BP:    Pulse: 68   Resp: 20   Temp:        Vitals (24 hour range):  Temp:  [96.3 °F (35.7 °C)-98 °F (36.7 °C)]   Pulse:  [63-75]   Resp:  [14-20]   BP: (133-159)/(49-66)   SpO2:  [90 %-99 %]       Intake/Output Summary (Last 24 hours) at 7/15/2020 1240  Last data filed at 7/14/2020 1820  Gross per 24 hour   Intake 28.12 ml   Output --   Net 28.12 ml           Physical Exam:  The patient's neuro status (alertness,orientation,cognitive function,motor skills,), pharyngeal exam (oral lesions, hygiene, abn dentition,), Neck (jvd,mass,thyroid,nodes in neck and above/below clavicle),RESPIRATORY(symmetry,effort,fremitus,percussion,auscultation),  Cor(rhythm,heart tones including gallops,perfusion,edema)ABD(distention,hepatic&splenomegaly,tenderness,masses), Skin(rash,cyanosis),Psyc(affect,judgement,).  Exam negative except for these pertinent findings:    Alert, in chair, chest is symmetric, no distress, normal percussion, normal fremitus and good normal breath sounds      Radiographs reviewed: view by direct vision  Massive pe 7/14 cta    No results found for this or any previous visit.]    Labs     Recent Labs   Lab 07/15/20  0158   WBC 15.59*   HGB 11.2*   HCT 34.8*   *     Recent Labs   Lab 07/14/20  1702 07/14/20  2304 07/15/20  0158   NA  --   --  139   K  --   --  4.1   CL  --   --  107   CO2  --   --  22*   BUN  --   --  27*   CREATININE  --   --  1.3   GLU  --   --  126*   CALCIUM  --   --  8.6*   MG  --   --  1.8   INR 1.0  --   --    TROPONINI 0.049* 0.029*  --    No results for input(s): PH, PCO2, PO2, HCO3 in the last 24 hours.  Microbiology Results (last 7 days)     ** No results found for the last 168 hours. **          Impression:  Active Hospital Problems    Diagnosis  POA    *Acute respiratory failure with hypoxemia [J96.01]  Yes    Bilateral pulmonary embolism [I26.99]  Yes    Hyperkalemia [E87.5]  Yes    Acute deep vein thrombosis (DVT) of popliteal vein of right lower extremity [I82.431]  Yes    Elevated troponin [R79.89]  Yes    Morbid obesity [E66.01]  Yes    Status post laparoscopic cholecystectomy [Z90.49]  Not Applicable     07/13/2020 done by Dr. Jomar Soria      Type 2 diabetes mellitus with diabetic neuropathy, without long-term current use of insulin [E11.40]  Yes    COPD (chronic obstructive pulmonary disease) [J44.9]  Yes     History of obstructive sleep apnea [Z86.69]  Yes    Hypoxemia [R09.02]  Yes    Chronic back pain [M54.9, G89.29]  Yes    History of DVT in adulthood [Z86.718]  Not Applicable    GERD (gastroesophageal reflux disease) [K21.9]  Yes    Pulmonary hypertension [I27.20]  Yes    Hyperlipidemia associated with type 2 diabetes mellitus [E11.69, E78.5]  Yes    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               Plan:     7/15 hit type 1, cont heparin going to oral rx in am.  Hold mobilization/walking with pulm htn    No resp meds needed.    Below umbilicus sl erythema- monitor.                                .

## 2020-07-15 NOTE — PLAN OF CARE
Patient AAO X 4. Patient free from injury during shift. Pain managed pharmacologically. Provided full relief. Patient free from N/V during shift. IV access has heparin running. Patient placed on cardiac monitoring. Running NSR. Remained afebrile during shift. Pt on O2 @1 liter  via nasal canula tolerating well. Pt able to transfer to bedside commode with assist X 1. Benson hose in place per order. Restroom offered. Repositioned as needed. Safety maintained with bed alarm. Bed in lowest position, call light and personal items within reach. Patient verbalized understanding of care. Will continue to monitor with every 2 hour rounding.

## 2020-07-15 NOTE — NURSING
Called down to lab for aptt draw on pt at 1235 and 0110.  in ER. Once drawn lab retested aptt, awaiting results.

## 2020-07-15 NOTE — RESPIRATORY THERAPY
07/15/20 0704   Patient Assessment/Suction   Level of Consciousness (AVPU) alert   Respiratory Effort Normal;Unlabored   All Lung Fields Breath Sounds diminished   PRE-TX-O2   O2 Device (Oxygen Therapy) nasal cannula   $ Is the patient on Low Flow Oxygen? Yes   Flow (L/min) 2   SpO2 99 %   Pulse Oximetry Type Intermittent   $ Pulse Oximetry - Multiple Charge Pulse Oximetry - Multiple   Pulse 69   Resp 16   Aerosol Therapy   $ Aerosol Therapy Charges Aerosol Treatment   Respiratory Treatment Status (SVN) given   Treatment Route (SVN) mask   Patient Position (SVN) HOB elevated   Signs of Intolerance (SVN) none   Breath Sounds Post-Respiratory Treatment   Throughout All Fields Post-Treatment All Fields   Throughout All Fields Post-Treatment diminished;aeration increased   Post-treatment Heart Rate (beats/min) 65   Post-treatment Resp Rate (breaths/min) 16        07/15/20 0704   Patient Assessment/Suction   Level of Consciousness (AVPU) alert   Respiratory Effort Normal;Unlabored   All Lung Fields Breath Sounds diminished   PRE-TX-O2   O2 Device (Oxygen Therapy) nasal cannula   $ Is the patient on Low Flow Oxygen? Yes   Flow (L/min) 2   SpO2 99 %   Pulse Oximetry Type Intermittent   $ Pulse Oximetry - Multiple Charge Pulse Oximetry - Multiple   Pulse 69   Resp 16   Aerosol Therapy   $ Aerosol Therapy Charges Aerosol Treatment   Respiratory Treatment Status (SVN) given   Treatment Route (SVN) mask   Patient Position (SVN) HOB elevated   Signs of Intolerance (SVN) none   Breath Sounds Post-Respiratory Treatment   Throughout All Fields Post-Treatment All Fields   Throughout All Fields Post-Treatment diminished;aeration increased   Post-treatment Heart Rate (beats/min) 65   Post-treatment Resp Rate (breaths/min) 16   Pt rec duoneb and pulmicort treatments

## 2020-07-15 NOTE — PLAN OF CARE
Heparin drip started per orders. No ocmplaints of pain. Up to BSC with stand by assist. 02 @ 1 L. No complaints of increased SOB. Purposeful rouhnding utilized to ensure needs met and safety maintained.

## 2020-07-15 NOTE — NURSING
Heparin drip restarted at this time with rate change of dec 2 units. New rate of 9.8ml. next aptt draw scheduled for 0910 will continue to monitor.

## 2020-07-16 PROBLEM — G47.33 OSA (OBSTRUCTIVE SLEEP APNEA): Status: ACTIVE | Noted: 2020-07-13

## 2020-07-16 LAB
APTT BLDCRRT: 66.3 SEC (ref 21–32)
APTT BLDCRRT: 75.4 SEC (ref 21–32)
POCT GLUCOSE: 113 MG/DL (ref 70–110)
POCT GLUCOSE: 115 MG/DL (ref 70–110)
POCT GLUCOSE: 123 MG/DL (ref 70–110)

## 2020-07-16 PROCEDURE — 25000242 PHARM REV CODE 250 ALT 637 W/ HCPCS: Performed by: NURSE PRACTITIONER

## 2020-07-16 PROCEDURE — 99232 PR SUBSEQUENT HOSPITAL CARE,LEVL II: ICD-10-PCS | Mod: ,,, | Performed by: INTERNAL MEDICINE

## 2020-07-16 PROCEDURE — 99900035 HC TECH TIME PER 15 MIN (STAT)

## 2020-07-16 PROCEDURE — 25000003 PHARM REV CODE 250: Performed by: NURSE PRACTITIONER

## 2020-07-16 PROCEDURE — 94799 UNLISTED PULMONARY SVC/PX: CPT

## 2020-07-16 PROCEDURE — 99232 SBSQ HOSP IP/OBS MODERATE 35: CPT | Mod: ,,, | Performed by: INTERNAL MEDICINE

## 2020-07-16 PROCEDURE — 25000003 PHARM REV CODE 250: Performed by: INTERNAL MEDICINE

## 2020-07-16 PROCEDURE — 94640 AIRWAY INHALATION TREATMENT: CPT

## 2020-07-16 PROCEDURE — 12000002 HC ACUTE/MED SURGE SEMI-PRIVATE ROOM

## 2020-07-16 PROCEDURE — 27000221 HC OXYGEN, UP TO 24 HOURS

## 2020-07-16 PROCEDURE — 94761 N-INVAS EAR/PLS OXIMETRY MLT: CPT

## 2020-07-16 PROCEDURE — 36415 COLL VENOUS BLD VENIPUNCTURE: CPT

## 2020-07-16 PROCEDURE — 85730 THROMBOPLASTIN TIME PARTIAL: CPT

## 2020-07-16 RX ORDER — BISACODYL 5 MG
5 TABLET, DELAYED RELEASE (ENTERIC COATED) ORAL DAILY
Status: DISCONTINUED | OUTPATIENT
Start: 2020-07-16 | End: 2020-07-29 | Stop reason: HOSPADM

## 2020-07-16 RX ORDER — POLYETHYLENE GLYCOL 3350 17 G/17G
17 POWDER, FOR SOLUTION ORAL DAILY
Status: DISCONTINUED | OUTPATIENT
Start: 2020-07-16 | End: 2020-07-29 | Stop reason: HOSPADM

## 2020-07-16 RX ADMIN — FLUTICASONE PROPIONATE 100 MCG: 50 SPRAY, METERED NASAL at 09:07

## 2020-07-16 RX ADMIN — HEPARIN SODIUM AND DEXTROSE 9 UNITS/KG/HR: 10000; 5 INJECTION INTRAVENOUS at 12:07

## 2020-07-16 RX ADMIN — BUDESONIDE INHALATION 0.5 MG: 0.5 SUSPENSION RESPIRATORY (INHALATION) at 07:07

## 2020-07-16 RX ADMIN — PANTOPRAZOLE SODIUM 40 MG: 40 TABLET, DELAYED RELEASE ORAL at 09:07

## 2020-07-16 RX ADMIN — METOPROLOL SUCCINATE 50 MG: 50 TABLET, FILM COATED, EXTENDED RELEASE ORAL at 09:07

## 2020-07-16 RX ADMIN — RIVAROXABAN 15 MG: 15 TABLET, FILM COATED ORAL at 05:07

## 2020-07-16 RX ADMIN — IPRATROPIUM BROMIDE AND ALBUTEROL SULFATE 3 ML: .5; 3 SOLUTION RESPIRATORY (INHALATION) at 11:07

## 2020-07-16 RX ADMIN — POLYETHYLENE GLYCOL (3350) 17 G: 17 POWDER, FOR SOLUTION ORAL at 12:07

## 2020-07-16 RX ADMIN — IPRATROPIUM BROMIDE AND ALBUTEROL SULFATE 3 ML: .5; 3 SOLUTION RESPIRATORY (INHALATION) at 08:07

## 2020-07-16 RX ADMIN — IPRATROPIUM BROMIDE AND ALBUTEROL SULFATE 3 ML: .5; 3 SOLUTION RESPIRATORY (INHALATION) at 03:07

## 2020-07-16 RX ADMIN — BUDESONIDE INHALATION 0.5 MG: 0.5 SUSPENSION RESPIRATORY (INHALATION) at 08:07

## 2020-07-16 RX ADMIN — BACITRACIN ZINC NEOMYCIN SULFATE POLYMYXIN B SULFATE: 400; 3.5; 5 OINTMENT TOPICAL at 12:07

## 2020-07-16 RX ADMIN — BISACODYL 5 MG: 5 TABLET, COATED ORAL at 12:07

## 2020-07-16 RX ADMIN — DOCUSATE SODIUM 100 MG: 100 CAPSULE, LIQUID FILLED ORAL at 09:07

## 2020-07-16 RX ADMIN — IPRATROPIUM BROMIDE AND ALBUTEROL SULFATE 3 ML: .5; 3 SOLUTION RESPIRATORY (INHALATION) at 07:07

## 2020-07-16 RX ADMIN — MONTELUKAST 10 MG: 10 TABLET, FILM COATED ORAL at 09:07

## 2020-07-16 RX ADMIN — RIVAROXABAN 15 MG: 15 TABLET, FILM COATED ORAL at 09:07

## 2020-07-16 NOTE — ASSESSMENT & PLAN NOTE
Body mass index is 40.55 kg/m².  General weight loss/lifestyle modification strategies discussed (elicit support from others; identify saboteurs; non-food rewards, etc).

## 2020-07-16 NOTE — PROGRESS NOTES
Progress Note  PULMONARY    Admit Date: 7/13/2020 07/16/2020  From consult 7/14:  History of Present Illness:  Pt had laparoscopic rosette yesterday with post op low ox, cta with central pe.  Pt had prior h/o dvt- was on xarelto in 2015 - occurred with elective left hip replacement - left groin dvt.  [employer] orthodox.       Pt seen by me last yr with h/o wheezes and cough and sob, also osas- non compliant.     Pt has had episodes of sob but vague - some sob with no cough/wheezes. Vague left leg swelling.     Pt had back problems needing injections in past- last injections est 5 yrs ago.      Pt developed back pain, worse movement ppt eval pcp/pain doc- injection set up.  She also developed left leg pain - upper thigh- occurred last couple months.  She was having eval with vascular doc for right leg with min right leg symptoms.  She had procedure with improved cirulation right.   Pt had bilat u/s legs in May, and right u/s in June -- both neg for dvt.       Pt developed sob- episodic with one episode of impending doom occurring 2 wks pta.       Pt had had some schwartz since severe episode 2 wks ago.       Pt had had severe pain in chest , then left leg, then sob - had eval at er , pcp, pain doc, then h- biliary dz found and surg deferred til after back injection (injection was not effective for pain). Pt had lap rosette yesterday.  No recent cough/wheezes, no compliant with cpap.         SUBJECTIVE:     7/15 having min umbilical wound pain, breathing better.  7/16 no c/o          PFSH and Allergies reviewed.    OBJECTIVE:     Vitals (Most recent):  Vitals:    07/16/20 0706   BP:    Pulse: 74   Resp: 16   Temp:        Vitals (24 hour range):  Temp:  [96.2 °F (35.7 °C)-98.9 °F (37.2 °C)]   Pulse:  [63-79]   Resp:  [16-20]   BP: (140-181)/(64-76)   SpO2:  [93 %-100 %]       Intake/Output Summary (Last 24 hours) at 7/16/2020 0842  Last data filed at 7/15/2020 2100  Gross per 24 hour   Intake --   Output 400 ml   Net -400 ml           Physical Exam:  The patient's neuro status (alertness,orientation,cognitive function,motor skills,), pharyngeal exam (oral lesions, hygiene, abn dentition,), Neck (jvd,mass,thyroid,nodes in neck and above/below clavicle),RESPIRATORY(symmetry,effort,fremitus,percussion,auscultation),  Cor(rhythm,heart tones including gallops,perfusion,edema)ABD(distention,hepatic&splenomegaly,tenderness,masses), Skin(rash,cyanosis),Psyc(affect,judgement,).  Exam negative except for these pertinent findings:    Alert, in chair, chest is symmetric, no distress, normal percussion, normal fremitus and good normal breath sounds      Radiographs reviewed: view by direct vision  Massive pe 7/14 cta    No results found for this or any previous visit.]    Labs     No results for input(s): WBC, HGB, HCT, PLT, BAND, METAMYELOCYT, MYELOPCT, HGBA1C in the last 24 hours.  No results for input(s): NA, K, CL, CO2, BUN, CREATININE, GLU, CALCIUM, CAION, MG, PHOS, AST, ALT, ALKPHOS, BILITOT, BILIDIR, PROT, ALBUMIN, PREALBUMIN, AMYLASE, LIPASE, CRP, HSCRP, SEDRATE, PROCAL, INR, PTT, LABHEPA, LACTATE, TROPONINI, CPK, CPKMB, MB, BNP in the last 24 hours.No results for input(s): PH, PCO2, PO2, HCO3 in the last 24 hours.  Microbiology Results (last 7 days)     ** No results found for the last 168 hours. **          Impression:  Active Hospital Problems    Diagnosis  POA    *Acute respiratory failure with hypoxemia [J96.01]  Yes    Thrombocytopenia [D69.6]  No    Heparin-induced thrombocytopenia, type 1 [D75.82]  No    Bilateral pulmonary embolism [I26.99]  Yes    Hyperkalemia [E87.5]  Yes    Acute deep vein thrombosis (DVT) of popliteal vein of right lower extremity [I82.431]  Yes    Elevated troponin [R79.89]  Yes    Morbid obesity [E66.01]  Yes    Status post laparoscopic cholecystectomy [Z90.49]  Not Applicable     07/13/2020 done by Dr. Jomar Soria      Type 2 diabetes mellitus with diabetic neuropathy, without long-term current  use of insulin [E11.40]  Yes    COPD (chronic obstructive pulmonary disease) [J44.9]  Yes    History of obstructive sleep apnea [Z86.69]  Yes    Hypoxemia [R09.02]  Yes    Chronic back pain [M54.9, G89.29]  Yes    History of DVT in adulthood [Z86.718]  Not Applicable    GERD (gastroesophageal reflux disease) [K21.9]  Yes    Pulmonary hypertension [I27.20]  Yes    Hyperlipidemia associated with type 2 diabetes mellitus [E11.69, E78.5]  Yes    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               Plan:     7/15 hit type 1, cont heparin going to oral rx in am.  Hold mobilization/walking with pulm htn    No resp meds needed.    Below umbilicus sl erythema- monitor.    7/16 plt up sl 128, will go to po rx,   Creat 1.3,     Dose xarelto 15 bid x 21 days then 20/d, stop heparin drip, Make certain Dr Mcdonald aware of periumbilical pain pt relates, outpt when pt on rm air with restricted activities next couple wks.  Discussed ivc filter to decrease anti coagg in future - minimize bleed risk. Pt prefers oral anticoagg.  Would anticoagg indefinitely.                          .

## 2020-07-16 NOTE — ASSESSMENT & PLAN NOTE
07/14/2020 received message from staff nurse at 1046  that CTA of the chest results show patient with extensive bilateral pulmonary emboli  Monitor O2 sats, supplement O2 as needed  Continue full-dose Lovenox-was changed to IV heparin drip per pulmonology  Consult pulmonology.    Heparin drip DCed. Now on xarelto

## 2020-07-16 NOTE — SUBJECTIVE & OBJECTIVE
Interval  History:  .  CTA of the chest was done which showed extensive bilateral pulmonary emboli.  Bilateral lower extremity venous demonstrates a closest deep vein thrombosis to proximal left popliteal vein.  She is on heparin drip.  She reports persistent dyspnea on exertion and is requiring 3 L nasal cannula.      Still with [facility] on 2 LNC this am. Heparin drip DCed. Now on OAC  Preparing for Dc. Qualifies for home oxygen     Review of Systems   Constitutional: Positive for activity change. Negative for appetite change, chills and fever.   HENT: Negative for ear discharge and facial swelling.    Respiratory: Positive for shortness of breath. Negative for cough.    Cardiovascular: Negative for chest pain, palpitations and leg swelling.   Gastrointestinal: Positive for nausea. Negative for abdominal distention, blood in stool and vomiting.   Musculoskeletal: Positive for back pain. Negative for gait problem, neck pain and neck stiffness.        Patient reports chronic back pain for greater than 20 years but states over the past   6 years has become progressively worse   Skin: Positive for wound. Negative for color change.        Abdominal surgical incisions   Neurological: Negative for facial asymmetry, speech difficulty and weakness.        Forgetful   Psychiatric/Behavioral: Negative for agitation and confusion. The patient is nervous/anxious.      Objective:     Vital Signs (Most Recent):  Temp: 98.9 °F (37.2 °C) (07/16/20 0931)  Pulse: 81 (07/16/20 1107)  Resp: 18 (07/16/20 1107)  BP: (!) 145/67 (07/16/20 0931)  SpO2: 98 % (07/16/20 1107) Vital Signs (24h Range):  Temp:  [96.2 °F (35.7 °C)-98.9 °F (37.2 °C)] 98.9 °F (37.2 °C)  Pulse:  [70-85] 81  Resp:  [16-18] 18  SpO2:  [90 %-100 %] 98 %  BP: (140-181)/(64-76) 145/67     Weight: 103.8 kg (228 lb 14.4 oz)  Body mass index is 40.55 kg/m².    Physical Exam  Constitutional:       General: She is not in acute distress.     Appearance: Normal appearance. She is  obese. She is not ill-appearing, toxic-appearing or diaphoretic.      Comments: Morbidly obese   HENT:      Head: Normocephalic and atraumatic.      Mouth/Throat:      Mouth: Mucous membranes are moist.   Eyes:      General:         Right eye: No discharge.         Left eye: No discharge.      Extraocular Movements: Extraocular movements intact.      Conjunctiva/sclera: Conjunctivae normal.      Pupils: Pupils are equal, round, and reactive to light.   Neck:      Musculoskeletal: Normal range of motion and neck supple. No neck rigidity or muscular tenderness.   Cardiovascular:      Rate and Rhythm: Normal rate and regular rhythm.      Pulses: Normal pulses.      Comments: Trace pretib edema L>R  Pulmonary:      Effort: Pulmonary effort is normal. No respiratory distress.      Breath sounds: No wheezing or rales.      Comments: Bilateral breath sounds diminished  Abdominal:      General: Bowel sounds are normal. There is no distension.      Palpations: Abdomen is soft.      Tenderness: There is abdominal tenderness. There is no guarding.      Comments: Obese  Mild generalized tenderness   Genitourinary:     Comments: Not examined  Musculoskeletal: Normal range of motion.      Right lower leg: Edema present.      Left lower leg: Edema present.      Comments: Scant bilateral lower extremity edema   Skin:     General: Skin is warm and dry.      Capillary Refill: Capillary refill takes less than 2 seconds.      Comments: Abdominal surgical dressings intact   Neurological:      General: No focal deficit present.      Mental Status: She is alert and oriented to person, place, and time. Mental status is at baseline.      Cranial Nerves: No cranial nerve deficit.      Motor: No weakness.   Psychiatric:         Mood and Affect: Mood normal.         Behavior: Behavior normal.         Thought Content: Thought content normal.         Judgment: Judgment normal.           CRANIAL NERVES     CN III, IV, VI   Pupils are equal,  round, and reactive to light.    Labs reviewed

## 2020-07-16 NOTE — PLAN OF CARE
07/15/20 1931 07/15/20 1935   Patient Assessment/Suction   Level of Consciousness (AVPU) alert alert   Respiratory Effort Normal;Unlabored  --    Expansion/Accessory Muscles/Retractions no use of accessory muscles  --    All Lung Fields Breath Sounds diminished  --    Rhythm/Pattern, Respiratory unlabored;pattern regular unlabored;pattern regular   Cough Frequency  --  no cough   PRE-TX-O2   O2 Device (Oxygen Therapy) nasal cannula  --    $ Is the patient on Low Flow Oxygen? Yes  --    Flow (L/min) 2  --    Oxygen Concentration (%) 28  --    SpO2 95 % 100 %   Pulse Oximetry Type Intermittent  --    $ Pulse Oximetry - Multiple Charge Pulse Oximetry - Multiple  --    Pulse 73 74   Resp 18 18   Aerosol Therapy   $ Aerosol Therapy Charges Aerosol Treatment Aerosol Treatment   Respiratory Treatment Status (SVN) given given   Treatment Route (SVN) mask;oxygen mask;oxygen   Patient Position (SVN) [place]'s   Post Treatment Assessment (SVN)  --  breath sounds unchanged   Signs of Intolerance (SVN) none none   Breath Sounds Post-Respiratory Treatment   Throughout All Fields Post-Treatment All Fields All Fields   Throughout All Fields Post-Treatment no change no change   Post-treatment Heart Rate (beats/min) 73 76   Post-treatment Resp Rate (breaths/min) 18 18   Incentive Spirometer   $ Incentive Spirometer Charges  --  done with encouragement   Administration (IS)  --  mouthpiece   Number of Repetitions (IS)  --  8   Level Incentive Spirometer (mL)  --  1000   Patient Tolerance (IS)  --  good   Ready to Wean/Extubation Screen   FIO2<=50 (chart decimal) 0.28  --

## 2020-07-16 NOTE — CARE UPDATE
07/16/20 0938   Patient Assessment/Suction   Level of Consciousness (AVPU) alert   Respiratory Effort Unlabored   Expansion/Accessory Muscles/Retractions no use of accessory muscles   Home Oxygen Qualification   Room Air SpO2 At Rest 90 %   Room Air SpO2 on Exertion (!) 86 %   SpO2 During Exertion on O2 92 %   Heart Rate on O2 99 bpm   Exertion O2 LPM 2 LPM   SpO2 on Recovery 96 %   Recovery Heart Rate 106 bpm   Recovery O2 LPM 2 LPM   Home O2 Eval Comments pt qualifies

## 2020-07-16 NOTE — PROGRESS NOTES
[facility] Medicine  Progress Note    Patient Name: Sammi Abreu  MRN: [id number]  Patient Class: IP- Inpatient   Admission Date: 7/13/2020  Length of Stay: 2 days  Attending Physician: Alix Ruth MD  Primary Care Provider: Osmani Villatoro MD        Subjective:     Principal Problem:Acute respiratory failure with hypoxemia        HPI:  This is a 79-year-old female who has a past medical history of arthritis and chronic back pain, fibromyalgia, glaucoma, hyperlipidemia, hypertension, sleep apnea with history of noncompliance with CPAP, morbid obesity, hypertension, GERD, COPD, diabetes type 2, prior DVT, and gallstones.  Please note the patient is also a [employer].  Patient is being received [facility].  She is status post laparoscopic cholecystectomy today by Dr. Jomar Soria.  Patient tolerated procedure well.  She however was unable to be discharged postop due to ongoing hypoxemia and has been transferred here for further evaluation and treatment.                Overview/Hospital Course:  The patient was monitored closely during her stay.  She was noted to have an elevated D-dimer.  She was placed on full-dose Lovenox.  Patient when CTA of the chest which showed extensive bilateral pulmonary emboli.  Pulmonology was consulted.  Patient's full-dose Lovenox was discontinued and she was placed on IV heparin drip.    Interval  History:  .  CTA of the chest was done which showed extensive bilateral pulmonary emboli.  Bilateral lower extremity venous demonstrates a closest deep vein thrombosis to proximal left popliteal vein.  She is on heparin drip.  She reports persistent dyspnea on exertion and is requiring 3 L nasal cannula.      Still with [facility]ILL on 2 LNC this am. Heparin drip DCed. Now on OAC  Preparing for Dc. Qualifies for home oxygen     Review of Systems   Constitutional: Positive for activity change. Negative for appetite change, chills and fever.    HENT: Negative for ear discharge and facial swelling.    Respiratory: Positive for shortness of breath. Negative for cough.    Cardiovascular: Negative for chest pain, palpitations and leg swelling.   Gastrointestinal: Positive for nausea. Negative for abdominal distention, blood in stool and vomiting.   Musculoskeletal: Positive for back pain. Negative for gait problem, neck pain and neck stiffness.        Patient reports chronic back pain for greater than 20 years but states over the past   6 years has become progressively worse   Skin: Positive for wound. Negative for color change.        Abdominal surgical incisions   Neurological: Negative for facial asymmetry, speech difficulty and weakness.        Forgetful   Psychiatric/Behavioral: Negative for agitation and confusion. The patient is nervous/anxious.      Objective:     Vital Signs (Most Recent):  Temp: 98.9 °F (37.2 °C) (07/16/20 0931)  Pulse: 81 (07/16/20 1107)  Resp: 18 (07/16/20 1107)  BP: (!) 145/67 (07/16/20 0931)  SpO2: 98 % (07/16/20 1107) Vital Signs (24h Range):  Temp:  [96.2 °F (35.7 °C)-98.9 °F (37.2 °C)] 98.9 °F (37.2 °C)  Pulse:  [70-85] 81  Resp:  [16-18] 18  SpO2:  [90 %-100 %] 98 %  BP: (140-181)/(64-76) 145/67     Weight: 103.8 kg (228 lb 14.4 oz)  Body mass index is 40.55 kg/m².    Physical Exam  Constitutional:       General: She is not in acute distress.     Appearance: Normal appearance. She is obese. She is not ill-appearing, toxic-appearing or diaphoretic.      Comments: Morbidly obese   HENT:      Head: Normocephalic and atraumatic.      Mouth/Throat:      Mouth: Mucous membranes are moist.   Eyes:      General:         Right eye: No discharge.         Left eye: No discharge.      Extraocular Movements: Extraocular movements intact.      Conjunctiva/sclera: Conjunctivae normal.      Pupils: Pupils are equal, round, and reactive to light.   Neck:      Musculoskeletal: Normal range of motion and neck supple. No neck rigidity or  muscular tenderness.   Cardiovascular:      Rate and Rhythm: Normal rate and regular rhythm.      Pulses: Normal pulses.      Comments: Trace pretib edema L>R  Pulmonary:      Effort: Pulmonary effort is normal. No respiratory distress.      Breath sounds: No wheezing or rales.      Comments: Bilateral breath sounds diminished  Abdominal:      General: Bowel sounds are normal. There is no distension.      Palpations: Abdomen is soft.      Tenderness: There is abdominal tenderness. There is no guarding.      Comments: Obese  Mild generalized tenderness   Genitourinary:     Comments: Not examined  Musculoskeletal: Normal range of motion.      Right lower leg: Edema present.      Left lower leg: Edema present.      Comments: Scant bilateral lower extremity edema   Skin:     General: Skin is warm and dry.      Capillary Refill: Capillary refill takes less than 2 seconds.      Comments: Abdominal surgical dressings intact   Neurological:      General: No focal deficit present.      Mental Status: She is alert and oriented to person, place, and time. Mental status is at baseline.      Cranial Nerves: No cranial nerve deficit.      Motor: No weakness.   Psychiatric:         Mood and Affect: Mood normal.         Behavior: Behavior normal.         Thought Content: Thought content normal.         Judgment: Judgment normal.           CRANIAL NERVES     CN III, IV, VI   Pupils are equal, round, and reactive to light.    Labs reviewed      Assessment/Plan:      * Acute respiratory failure with hypoxemia  Secondary to extensive bilateral pulmonary emboli-  Pulmonary consulted      Acute deep vein thrombosis (DVT) of popliteal vein of right lower extremity  Continue full-dose anticoagulation      Hyperkalemia  Monitor  Trend BMP  Hold home Aldactone for now      Bilateral pulmonary embolism  07/14/2020 received message from staff nurse at 1046  that CTA of the chest results show patient with extensive bilateral pulmonary  emboli  Monitor O2 sats, supplement O2 as needed  Continue full-dose Lovenox-was changed to IV heparin drip per pulmonology  Consult pulmonology.    Heparin drip DCed. Now on xarelto      History of DVT in adulthood  Patient reports history of DVT in the right upper extremity greater than 10 years ago and  history of groin DVT following a hip surgery approximately 8 years ago-  Continue MARIANELA hose  Patient now with lower extremity DVT and extensive bilateral pulmonary emboli  Patient with decreased activity/ambulation due to increased recent back pain however she does have history of prior DVT in the past once related to prior hip surgery, consider hematology consult      Chronic back pain  With chronic pain syndrome  Patient reports recent back injection approximately 2 weeks ago  Continue p.r.n. pain medication  Patient reports she sees Dr. Gilliam      Hypoxemia  Secondary to extensive bilateral pulmonary emboli      CHARLIE (obstructive sleep apnea)  Noted  Patient reports she does not use her CPAP at home      COPD (chronic obstructive pulmonary disease)  Monitor O2 sats, supplement O2 as needed  Q 4 hr while awake duo nebs and b.i.d. Pulmicort  Incentive spirometer q.1 hour while awake      Type 2 diabetes mellitus with diabetic neuropathy, without long-term current use of insulin  Diabetic diet  Accu-Cheks with correctional sliding scale insulin  Blood sugars running 121- 136  HGB A1c is 6.4      Status post laparoscopic cholecystectomy  Patient is status post laparoscopic cholecystectomy done today by Dr. Jairo Soria at outpatient surgical center-  Orders as per surgeon - Dr. Walker  Low-fat low-cholesterol ADA diet  P.r.n. pain and antiemetic medication      Morbid obesity  Body mass index is 40.55 kg/m².  General weight loss/lifestyle modification strategies discussed (elicit support from others; identify saboteurs; non-food rewards, etc).          GERD (gastroesophageal reflux disease)  Continue  PPI      Pulmonary hypertension  Secondary to PE      Hyperlipidemia associated with type 2 diabetes mellitus  No home anti-lipidemic noted- low fat, low-cholesterol diet    Hypertension associated with diabetes  Continue home medications        VTE Risk Mitigation (From admission, onward)         Ordered     rivaroxaban tablet 15 mg  2 times daily with meals      07/16/20 0849     IP VTE HIGH RISK PATIENT  Once      07/13/20 1900     Place MARIANELA cotoe  Until discontinued      07/13/20 1900                      Anabel Haji NP  Department of Hospital Medicine   [facility]

## 2020-07-16 NOTE — PROGRESS NOTES
[facility]  Progress Note    Patient Name: Sammi Abreu  MRN: [id number]  Patient Class: IP- Inpatient   Admission Date: 7/13/2020  Length of Stay: 2 days  Attending Physician: Alix Ruth MD  Primary Care Provider: Osmani Villatoro MD        Subjective:     Principal Problem:Acute respiratory failure with hypoxemia        HPI:  This is a 79-year-old female who has a past medical history of arthritis and chronic back pain, fibromyalgia, glaucoma, hyperlipidemia, hypertension, sleep apnea with history of noncompliance with CPAP, morbid obesity, hypertension, GERD, COPD, diabetes type 2, prior DVT, and gallstones.  Please note the patient is also a [employer].  Patient is being received from Outpatient surgical center.  She is status post laparoscopic cholecystectomy today by Dr. Jomar Soria.  Patient tolerated procedure well.  She however was unable to be discharged postop due to ongoing hypoxemia and has been transferred here for further evaluation and treatment.                Overview/Hospital Course:  The patient was monitored closely during her stay.  She was noted to have an elevated D-dimer.  She was placed on full-dose Lovenox.  Patient when CTA of the chest which showed extensive bilateral pulmonary emboli.  Pulmonology was consulted.  Patient's full-dose Lovenox was discontinued and she was placed on IV heparin drip.    Interval  History:  Patient with elevated D-dimer.  CTA of the chest was done which showed extensive bilateral pulmonary emboli.  Bilateral lower extremity venous demonstrates a closest deep vein thrombosis to proximal left popliteal vein.  She is on heparin drip.  She reports persistent dyspnea on exertion and is requiring 3 L nasal cannula.       Review of Systems   Constitutional: Positive for activity change. Negative for appetite change, chills and fever.   HENT: Negative for ear discharge and facial swelling.    Respiratory: Positive  for shortness of breath. Negative for cough.    Cardiovascular: Negative for chest pain, palpitations and leg swelling.   Gastrointestinal: Positive for nausea. Negative for abdominal distention, blood in stool and vomiting.   Musculoskeletal: Positive for back pain. Negative for gait problem, neck pain and neck stiffness.        Patient reports chronic back pain for greater than 20 years but states over the past   6 years has become progressively worse   Skin: Positive for wound. Negative for color change.        Abdominal surgical incisions   Neurological: Negative for facial asymmetry, speech difficulty and weakness.        Forgetful   Psychiatric/Behavioral: Negative for agitation and confusion. The patient is not nervous/anxious.      Objective:     Vital Signs (Most Recent):  Temp: 98.9 °F (37.2 °C) (07/16/20 0931)  Pulse: 81 (07/16/20 1107)  Resp: 18 (07/16/20 1107)  BP: (!) 145/67 (07/16/20 0931)  SpO2: 98 % (07/16/20 1107) Vital Signs (24h Range):  Temp:  [96.2 °F (35.7 °C)-98.9 °F (37.2 °C)] 98.9 °F (37.2 °C)  Pulse:  [70-85] 81  Resp:  [16-18] 18  SpO2:  [90 %-100 %] 98 %  BP: (140-181)/(64-76) 145/67     Weight: 103.8 kg (228 lb 14.4 oz)  Body mass index is 40.55 kg/m².    Physical Exam  Constitutional:       General: She is not in acute distress.     Appearance: Normal appearance. She is obese. She is not ill-appearing, toxic-appearing or diaphoretic.      Comments: Morbidly obese   HENT:      Head: Normocephalic and atraumatic.      Mouth/Throat:      Mouth: Mucous membranes are moist.   Eyes:      General:         Right eye: No discharge.         Left eye: No discharge.      Extraocular Movements: Extraocular movements intact.      Conjunctiva/sclera: Conjunctivae normal.      Pupils: Pupils are equal, round, and reactive to light.   Neck:      Musculoskeletal: Normal range of motion and neck supple. No neck rigidity or muscular tenderness.   Cardiovascular:      Rate and Rhythm: Normal rate and  regular rhythm.      Pulses: Normal pulses.      Comments: Trace pretib edema L>R  Pulmonary:      Effort: Pulmonary effort is normal. No respiratory distress.      Breath sounds: No wheezing or rales.      Comments: Bilateral breath sounds diminished  Abdominal:      General: Bowel sounds are normal. There is no distension.      Palpations: Abdomen is soft.      Tenderness: There is abdominal tenderness. There is no guarding.      Comments: Obese  Mild generalized tenderness   Genitourinary:     Comments: Not examined  Musculoskeletal: Normal range of motion.      Right lower leg: Edema present.      Left lower leg: Edema present.      Comments: Scant bilateral lower extremity edema   Skin:     General: Skin is warm and dry.      Capillary Refill: Capillary refill takes less than 2 seconds.      Comments: Abdominal surgical dressings intact   Neurological:      General: No focal deficit present.      Mental Status: She is alert and oriented to person, place, and time. Mental status is at baseline.      Cranial Nerves: No cranial nerve deficit.      Motor: No weakness.   Psychiatric:         Mood and Affect: Mood normal.         Behavior: Behavior normal.         Thought Content: Thought content normal.         Judgment: Judgment normal.           CRANIAL NERVES     CN III, IV, VI   Pupils are equal, round, and reactive to light.    Labs reviewed      Assessment/Plan:      * Acute respiratory failure with hypoxemia  Secondary to extensive bilateral pulmonary emboli-  Pulmonary consulted      Acute deep vein thrombosis (DVT) of popliteal vein of right lower extremity  Continue full-dose anticoagulation      Hyperkalemia  Monitor  Trend BMP  Hold home Aldactone for now      Bilateral pulmonary embolism  07/14/2020 received message from staff nurse at 1046  that CTA of the chest results show patient with extensive bilateral pulmonary emboli  Monitor O2 sats, supplement O2 as needed  Continue full-dose Lovenox-was  changed to IV heparin drip per pulmonology  Consult pulmonology. Plan to transition to OAC tomorrow      History of DVT in adulthood  Patient reports history of DVT in the right upper extremity greater than 10 years ago and  history of groin DVT following a hip surgery approximately 8 years ago-  Continue MARIANELA hose  Patient now with lower extremity DVT and extensive bilateral pulmonary emboli  Patient with decreased activity/ambulation due to increased recent back pain however she does have history of prior DVT in the past once related to prior hip surgery, consider hematology consult      Chronic back pain  With chronic pain syndrome  Patient reports recent back injection approximately 2 weeks ago  Continue p.r.n. pain medication  Patient reports she sees Dr. Gilliam      Hypoxemia  Secondary to extensive bilateral pulmonary emboli      CHARLIE (obstructive sleep apnea)  Noted  Patient reports she does not use her CPAP at home      COPD (chronic obstructive pulmonary disease)  Monitor O2 sats, supplement O2 as needed  Q 4 hr while awake duo nebs and b.i.d. Pulmicort  Incentive spirometer q.1 hour while awake      Type 2 diabetes mellitus with diabetic neuropathy, without long-term current use of insulin  Diabetic diet  Accu-Cheks with correctional sliding scale insulin  Blood sugars running 121- 136  HGB A1c is 6.4      Status post laparoscopic cholecystectomy  Patient is status post laparoscopic cholecystectomy done today by Dr. Jairo Soria at outpatient surgical center-  Orders as per surgeon - Dr. Walker  Low-fat low-cholesterol ADA diet  P.r.n. pain and antiemetic medication      Morbid obesity  Body mass index is 40.55 kg/m².  General weight loss/lifestyle modification strategies discussed (elicit support from others; identify saboteurs; non-food rewards, etc).          GERD (gastroesophageal reflux disease)  Continue PPI      Pulmonary hypertension  Secondary to PE      Hyperlipidemia associated with type 2 diabetes  mellitus  No home anti-lipidemic noted- low fat, low-cholesterol diet    Hypertension associated with diabetes  Continue home medications        VTE Risk Mitigation (From admission, onward)         Ordered     rivaroxaban tablet 15 mg  2 times daily with meals      07/16/20 0849     IP VTE HIGH RISK PATIENT  Once      07/13/20 1900     Place MARIANELA cotoe  Until discontinued      07/13/20 1900                      Anabel Haji NP  Department of Hospital Medicine   [facility]

## 2020-07-16 NOTE — PLAN OF CARE
Heparin drip discontinued and xarelto started per orders. Minimal complaints of pain. Given dulcolax and miralax today to help produce BM. Ambulation promoted. Home 02 eval completed, pt. Qualified for home 02. BS monitored and has not required SSI. Purposeful rounding utilized to ensure pt. Needs met and safety maintained.

## 2020-07-16 NOTE — ASSESSMENT & PLAN NOTE
07/14/2020 received message from staff nurse at 1046  that CTA of the chest results show patient with extensive bilateral pulmonary emboli  Monitor O2 sats, supplement O2 as needed  Continue full-dose Lovenox-was changed to IV heparin drip per pulmonology  Consult pulmonology. Plan to transition to OAC tomorrow

## 2020-07-16 NOTE — SUBJECTIVE & OBJECTIVE
Interval  History:  Patient with elevated D-dimer.  CTA of the chest was done which showed extensive bilateral pulmonary emboli.  Bilateral lower extremity venous demonstrates a closest deep vein thrombosis to proximal left popliteal vein.  She is on heparin drip.  She reports persistent dyspnea on exertion and is requiring 3 L nasal cannula.       Review of Systems   Constitutional: Positive for activity change. Negative for appetite change, chills and fever.   HENT: Negative for ear discharge and facial swelling.    Respiratory: Positive for shortness of breath. Negative for cough.    Cardiovascular: Negative for chest pain, palpitations and leg swelling.   Gastrointestinal: Positive for nausea. Negative for abdominal distention, blood in stool and vomiting.   Musculoskeletal: Positive for back pain. Negative for gait problem, neck pain and neck stiffness.        Patient reports chronic back pain for greater than 20 years but states over the past   6 years has become progressively worse   Skin: Positive for wound. Negative for color change.        Abdominal surgical incisions   Neurological: Negative for facial asymmetry, speech difficulty and weakness.        Forgetful   Psychiatric/Behavioral: Negative for agitation and confusion. The patient is not nervous/anxious.      Objective:     Vital Signs (Most Recent):  Temp: 98.9 °F (37.2 °C) (07/16/20 0931)  Pulse: 81 (07/16/20 1107)  Resp: 18 (07/16/20 1107)  BP: (!) 145/67 (07/16/20 0931)  SpO2: 98 % (07/16/20 1107) Vital Signs (24h Range):  Temp:  [96.2 °F (35.7 °C)-98.9 °F (37.2 °C)] 98.9 °F (37.2 °C)  Pulse:  [70-85] 81  Resp:  [16-18] 18  SpO2:  [90 %-100 %] 98 %  BP: (140-181)/(64-76) 145/67     Weight: 103.8 kg (228 lb 14.4 oz)  Body mass index is 40.55 kg/m².    Physical Exam  Constitutional:       General: She is not in acute distress.     Appearance: Normal appearance. She is obese. She is not ill-appearing, toxic-appearing or diaphoretic.      Comments:  Morbidly obese   HENT:      Head: Normocephalic and atraumatic.      Mouth/Throat:      Mouth: Mucous membranes are moist.   Eyes:      General:         Right eye: No discharge.         Left eye: No discharge.      Extraocular Movements: Extraocular movements intact.      Conjunctiva/sclera: Conjunctivae normal.      Pupils: Pupils are equal, round, and reactive to light.   Neck:      Musculoskeletal: Normal range of motion and neck supple. No neck rigidity or muscular tenderness.   Cardiovascular:      Rate and Rhythm: Normal rate and regular rhythm.      Pulses: Normal pulses.      Comments: Trace pretib edema L>R  Pulmonary:      Effort: Pulmonary effort is normal. No respiratory distress.      Breath sounds: No wheezing or rales.      Comments: Bilateral breath sounds diminished  Abdominal:      General: Bowel sounds are normal. There is no distension.      Palpations: Abdomen is soft.      Tenderness: There is abdominal tenderness. There is no guarding.      Comments: Obese  Mild generalized tenderness   Genitourinary:     Comments: Not examined  Musculoskeletal: Normal range of motion.      Right lower leg: Edema present.      Left lower leg: Edema present.      Comments: Scant bilateral lower extremity edema   Skin:     General: Skin is warm and dry.      Capillary Refill: Capillary refill takes less than 2 seconds.      Comments: Abdominal surgical dressings intact   Neurological:      General: No focal deficit present.      Mental Status: She is alert and oriented to person, place, and time. Mental status is at baseline.      Cranial Nerves: No cranial nerve deficit.      Motor: No weakness.   Psychiatric:         Mood and Affect: Mood normal.         Behavior: Behavior normal.         Thought Content: Thought content normal.         Judgment: Judgment normal.           CRANIAL NERVES     CN III, IV, VI   Pupils are equal, round, and reactive to light.    Labs reviewed

## 2020-07-16 NOTE — PLAN OF CARE
SW acknowledge RT home O2 evaluation for home oxygen.  SW sent secure message for NP requesting orders.       07/16/20 1009   Post-Acute Status   Post-Acute Authorization HME   HME Status Awaiting Clarification Orders

## 2020-07-16 NOTE — PLAN OF CARE
Patient AAO, VSS. Pt verbalized understanding of POC. Purposeful hourly/q2hr rounding done during shift to promote patient safety. Patient free from falls and injury during shift.  Heparin drip titrated according to PTT levels. No signs of bleeding noted. Telemetry monitor in place. Bed in lowest position, brakes locked, and call light within reach.  Will continue to monitor.

## 2020-07-16 NOTE — PROGRESS NOTES
[facility]  General Surgery  Progress Note    Subjective:     History of Present Illness:  79-year-old female underwent laparoscopic cholecystectomy yesterday at the [facility] in [place].  The patient had previously been admitted with elevated transaminases and right back and flank pain.  We postpone cholecystectomy at that time on that hospitalization.  She followed up and we rescheduled.  Postoperatively she had trouble weaning from O2.  She was transferred to [facility] for observation as she was in an outpatient surgical unit.  She has not had any specific acute events but her D-dimer was high in the CT scan was obtained which confirmed pulmonary emboli.  Her troponins are slightly elevated as well.  She is being worked up currently.  She is alert and in good spirits.  She denies any significant chest pain.  She does not appear to be in distress.    Post-Op Info:  * No surgery found *         Interval History: Tender at umbilicus.    Medications:  Continuous Infusions:  Scheduled Meds:   albuterol-ipratropium  3 mL Nebulization Q4H WAKE    bisacodyL  5 mg Oral Daily    budesonide  0.5 mg Nebulization Q12H    docusate sodium  100 mg Oral Daily    fluticasone propionate  2 spray Each Nostril Daily    metoprolol succinate  50 mg Oral Daily    montelukast  10 mg Oral QHS    neomycin-bacitracin-polymyxin   Topical (Top) Daily    pantoprazole  40 mg Oral Daily    polyethylene glycol  17 g Oral Daily    rivaroxaban  15 mg Oral BID WM     PRN Meds:acetaminophen, dextrose 50%, dextrose 50%, glucagon (human recombinant), glucose, glucose, HYDROcodone-acetaminophen, insulin aspart U-100, melatonin, ondansetron, simethicone, sodium chloride 0.9%     Review of patient's allergies indicates:   Allergen Reactions    Cymbalta [duloxetine] Other (See Comments)     Nightmares      Darvon [propoxyphene] Nausea Only and Other (See Comments)     Sweating, slept for 3 days     Atorvastatin Other (See Comments)     Muscle cramps    Naprosyn [naproxen] Nausea Only    Penicillins Rash    Tramadol Nausea Only and Palpitations     Objective:     Vital Signs (Most Recent):  Temp: 98.4 °F (36.9 °C) (07/16/20 1533)  Pulse: 83 (07/16/20 1533)  Resp: 16 (07/16/20 1533)  BP: (!) 176/68 (07/16/20 1533)  SpO2: (!) 92 % (07/16/20 1533) Vital Signs (24h Range):  Temp:  [98.1 °F (36.7 °C)-98.9 °F (37.2 °C)] 98.4 °F (36.9 °C)  Pulse:  [73-85] 83  Resp:  [16-18] 16  SpO2:  [90 %-100 %] 92 %  BP: (140-181)/(64-76) 176/68     Weight: 103.8 kg (228 lb 14.4 oz)  Body mass index is 40.55 kg/m².    Intake/Output - Last 3 Shifts       07/14 0700 - 07/15 0659 07/15 0700 - 07/16 0659 07/16 0700 - 07/17 0659    I.V. (mL/kg) 0 (0)      IV Piggyback 28.1      Total Intake(mL/kg) 28.1 (0.3)      Urine (mL/kg/hr)  400 (0.2)     Stool  0     Total Output  400     Net +28.1 -400            Urine Occurrence 5 x 2 x     Stool Occurrence  0 x           Physical Exam  Constitutional:       General: She is not in acute distress.  HENT:      Head: Normocephalic and atraumatic.   Eyes:      General: No scleral icterus.  Cardiovascular:      Rate and Rhythm: Normal rate and regular rhythm.   Pulmonary:      Effort: Pulmonary effort is normal. No respiratory distress.      Breath sounds: Normal breath sounds. No wheezing or rales.   Abdominal:      General: Bowel sounds are normal. There is no distension.      Palpations: Abdomen is soft.      Tenderness: There is abdominal tenderness.      Comments: Tenderness at umbilical incision as expected. No erythema.   Neurological:      Mental Status: She is alert and oriented to person, place, and time.   Psychiatric:         Behavior: Behavior normal.         Significant Labs:  CBC:   Recent Labs   Lab 07/15/20  0158   WBC 15.59*   RBC 3.31*   HGB 11.2*   HCT 34.8*   *   *   MCH 33.8*   MCHC 32.2     BMP:   Recent Labs   Lab 07/15/20  0158   *      K 4.1       CO2 22*   BUN 27*   CREATININE 1.3   CALCIUM 8.6*   MG 1.8           Assessment/Plan:     * Acute respiratory failure with hypoxemia  1.  Pulmonary emboli.  2.  Patient received full-dose Lovenox.  3.  Cardiology and Pulmonary consult ordered.  4.  Echo pending.  5.  Following.    Hyperkalemia  1. S/P lap rosette. Pain better. Tolerating diet.  2. PE management per Hospital Medicine and Pulmonary.    Status post laparoscopic cholecystectomy  1. S/P lap rosette.  2. Expected tenderness at umbilical incision.        Jomar Mcdonald MD  General Surgery  [facility]

## 2020-07-17 LAB
POCT GLUCOSE: 103 MG/DL (ref 70–110)
POCT GLUCOSE: 115 MG/DL (ref 70–110)
POCT GLUCOSE: 146 MG/DL (ref 70–110)
POCT GLUCOSE: 152 MG/DL (ref 70–110)

## 2020-07-17 PROCEDURE — 25000003 PHARM REV CODE 250: Performed by: INTERNAL MEDICINE

## 2020-07-17 PROCEDURE — 25000003 PHARM REV CODE 250: Performed by: NURSE PRACTITIONER

## 2020-07-17 PROCEDURE — 25000242 PHARM REV CODE 250 ALT 637 W/ HCPCS: Performed by: NURSE PRACTITIONER

## 2020-07-17 PROCEDURE — 94640 AIRWAY INHALATION TREATMENT: CPT

## 2020-07-17 PROCEDURE — 99232 SBSQ HOSP IP/OBS MODERATE 35: CPT | Mod: ,,, | Performed by: INTERNAL MEDICINE

## 2020-07-17 PROCEDURE — 94761 N-INVAS EAR/PLS OXIMETRY MLT: CPT

## 2020-07-17 PROCEDURE — 94799 UNLISTED PULMONARY SVC/PX: CPT

## 2020-07-17 PROCEDURE — 99232 PR SUBSEQUENT HOSPITAL CARE,LEVL II: ICD-10-PCS | Mod: ,,, | Performed by: INTERNAL MEDICINE

## 2020-07-17 PROCEDURE — 27000221 HC OXYGEN, UP TO 24 HOURS

## 2020-07-17 PROCEDURE — 12000002 HC ACUTE/MED SURGE SEMI-PRIVATE ROOM

## 2020-07-17 RX ADMIN — POLYETHYLENE GLYCOL (3350) 17 G: 17 POWDER, FOR SOLUTION ORAL at 08:07

## 2020-07-17 RX ADMIN — IPRATROPIUM BROMIDE AND ALBUTEROL SULFATE 3 ML: .5; 3 SOLUTION RESPIRATORY (INHALATION) at 08:07

## 2020-07-17 RX ADMIN — IPRATROPIUM BROMIDE AND ALBUTEROL SULFATE 3 ML: .5; 3 SOLUTION RESPIRATORY (INHALATION) at 11:07

## 2020-07-17 RX ADMIN — RIVAROXABAN 15 MG: 15 TABLET, FILM COATED ORAL at 05:07

## 2020-07-17 RX ADMIN — IPRATROPIUM BROMIDE AND ALBUTEROL SULFATE 3 ML: .5; 3 SOLUTION RESPIRATORY (INHALATION) at 06:07

## 2020-07-17 RX ADMIN — RIVAROXABAN 15 MG: 15 TABLET, FILM COATED ORAL at 08:07

## 2020-07-17 RX ADMIN — BACITRACIN ZINC NEOMYCIN SULFATE POLYMYXIN B SULFATE: 400; 3.5; 5 OINTMENT TOPICAL at 08:07

## 2020-07-17 RX ADMIN — METOPROLOL SUCCINATE 50 MG: 50 TABLET, FILM COATED, EXTENDED RELEASE ORAL at 08:07

## 2020-07-17 RX ADMIN — MONTELUKAST 10 MG: 10 TABLET, FILM COATED ORAL at 09:07

## 2020-07-17 RX ADMIN — FLUTICASONE PROPIONATE 100 MCG: 50 SPRAY, METERED NASAL at 08:07

## 2020-07-17 RX ADMIN — IPRATROPIUM BROMIDE AND ALBUTEROL SULFATE 3 ML: .5; 3 SOLUTION RESPIRATORY (INHALATION) at 03:07

## 2020-07-17 RX ADMIN — PANTOPRAZOLE SODIUM 40 MG: 40 TABLET, DELAYED RELEASE ORAL at 08:07

## 2020-07-17 RX ADMIN — BUDESONIDE INHALATION 0.5 MG: 0.5 SUSPENSION RESPIRATORY (INHALATION) at 06:07

## 2020-07-17 RX ADMIN — BUDESONIDE INHALATION 0.5 MG: 0.5 SUSPENSION RESPIRATORY (INHALATION) at 08:07

## 2020-07-17 RX ADMIN — ACETAMINOPHEN 650 MG: 325 TABLET ORAL at 10:07

## 2020-07-17 RX ADMIN — BISACODYL 5 MG: 5 TABLET, COATED ORAL at 08:07

## 2020-07-17 RX ADMIN — DOCUSATE SODIUM 100 MG: 100 CAPSULE, LIQUID FILLED ORAL at 08:07

## 2020-07-17 NOTE — PROGRESS NOTES
07/16/20 2047   Patient Assessment/Suction   Level of Consciousness (AVPU) alert   Respiratory Effort Normal;Unlabored   Expansion/Accessory Muscles/Retractions expansion symmetric   All Lung Fields Breath Sounds diminished   Rhythm/Pattern, Respiratory prolonged expiratory phase;pattern regular   Cough Frequency infrequent   Cough Type nonproductive   PRE-TX-O2   O2 Device (Oxygen Therapy) nasal cannula   Flow (L/min) 2   SpO2 (!) 92 %   Pulse Oximetry Type Intermittent   Pulse 86   Resp 18   Aerosol Therapy   $ Aerosol Therapy Charges Aerosol Treatment   Respiratory Treatment Status (SVN) given   Treatment Route (SVN) mask   Patient Position (SVN) semi-[place]'s   Post Treatment Assessment (SVN) breath sounds unchanged   Signs of Intolerance (SVN) none   Breath Sounds Post-Respiratory Treatment   Post-treatment Heart Rate (beats/min) 80   Post-treatment Resp Rate (breaths/min) 18   Incentive Spirometer   $ Incentive Spirometer Charges done with encouragement   Administration (IS) proper technique demonstrated   Number of Repetitions (IS) 10   Level Incentive Spirometer (mL) 1000   Patient Tolerance (IS) good

## 2020-07-17 NOTE — PROGRESS NOTES
Progress Note  PULMONARY    Admit Date: 7/13/2020 07/17/2020  From consult 7/14:  History of Present Illness:  Pt had laparoscopic rosette yesterday with post op low ox, cta with central pe.  Pt had prior h/o dvt- was on xarelto in 2015 - occurred with elective left hip replacement - left groin dvt.  Sabianist.       Pt seen by me last yr with h/o wheezes and cough and sob, also osas- non compliant.     Pt has had episodes of sob but vague - some sob with no cough/wheezes. Vague left leg swelling.     Pt had back problems needing injections in past- last injections est 5 yrs ago.      Pt developed back pain, worse movement ppt eval pcp/pain doc- injection set up.  She also developed left leg pain - upper thigh- occurred last couple months.  She was having eval with vascular doc for right leg with min right leg symptoms.  She had procedure with improved cirulation right.   Pt had bilat u/s legs in May, and right u/s in June -- both neg for dvt.       Pt developed sob- episodic with one episode of impending doom occurring 2 wks pta.       Pt had had some schwartz since severe episode 2 wks ago.       Pt had had severe pain in chest , then left leg, then sob - had eval at er , pcp, pain doc, then h- biliary dz found and surg deferred til after back injection (injection was not effective for pain). Pt had lap rosette yesterday.  No recent cough/wheezes, no compliant with cpap.         SUBJECTIVE:     7/15 having min umbilical wound pain, breathing better.  7/16 no c/o  July 17th-still on oxygen.  Mobilizing, no new complaints  Umbilicus more comfortable.      PFSH and Allergies reviewed.    OBJECTIVE:     Vitals (Most recent):  Vitals:    07/17/20 0818   BP: 135/63   Pulse: 97   Resp: 20   Temp: 99.8 °F (37.7 °C)       Vitals (24 hour range):  Temp:  [97.6 °F (36.4 °C)-99.8 °F (37.7 °C)]   Pulse:  [74-97]   Resp:  [16-20]   BP: (135-176)/(63-68)   SpO2:  [92 %-96 %]     No intake or output data in the 24 hours ending  07/17/20 1130       Physical Exam:  The patient's neuro status (alertness,orientation,cognitive function,motor skills,), pharyngeal exam (oral lesions, hygiene, abn dentition,), Neck (jvd,mass,thyroid,nodes in neck and above/below clavicle),RESPIRATORY(symmetry,effort,fremitus,percussion,auscultation),  Cor(rhythm,heart tones including gallops,perfusion,edema)ABD(distention,hepatic&splenomegaly,tenderness,masses), Skin(rash,cyanosis),Psyc(affect,judgement,).  Exam negative except for these pertinent findings:    Alert, in chair, chest is symmetric, no distress, normal percussion, normal fremitus and good normal breath sounds      Radiographs reviewed: view by direct vision  Massive pe 7/14 cta    No results found for this or any previous visit.]    Labs     No results for input(s): WBC, HGB, HCT, PLT, BAND, METAMYELOCYT, MYELOPCT, HGBA1C in the last 24 hours.  No results for input(s): NA, K, CL, CO2, BUN, CREATININE, GLU, CALCIUM, CAION, MG, PHOS, AST, ALT, ALKPHOS, BILITOT, BILIDIR, PROT, ALBUMIN, PREALBUMIN, AMYLASE, LIPASE, CRP, HSCRP, SEDRATE, PROCAL, INR, PTT, LABHEPA, LACTATE, TROPONINI, CPK, CPKMB, MB, BNP in the last 24 hours.No results for input(s): PH, PCO2, PO2, HCO3 in the last 24 hours.  Microbiology Results (last 7 days)     ** No results found for the last 168 hours. **          Impression:  Active Hospital Problems    Diagnosis  POA    *Acute respiratory failure with hypoxemia [J96.01]  Yes    Thrombocytopenia [D69.6]  No    Heparin-induced thrombocytopenia, type 1 [D75.82]  No    Bilateral pulmonary embolism [I26.99]  Yes    Hyperkalemia [E87.5]  Yes    Acute deep vein thrombosis (DVT) of popliteal vein of right lower extremity [I82.431]  Yes    Elevated troponin [R79.89]  Yes    Morbid obesity [E66.01]  Yes    Status post laparoscopic cholecystectomy [Z90.49]  Not Applicable     07/13/2020 done by Dr. Jomar Soria      Type 2 diabetes mellitus with diabetic neuropathy, without  long-term current use of insulin [E11.40]  Yes    COPD (chronic obstructive pulmonary disease) [J44.9]  Yes    CHARLIE (obstructive sleep apnea) [G47.33]  Yes    Hypoxemia [R09.02]  Yes    Chronic back pain [M54.9, G89.29]  Yes    History of DVT in adulthood [Z86.718]  Not Applicable    GERD (gastroesophageal reflux disease) [K21.9]  Yes    Pulmonary hypertension [I27.20]  Yes    Hyperlipidemia associated with type 2 diabetes mellitus [E11.69, E78.5]  Yes    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               Plan:     7/15 hit type 1, cont heparin going to oral rx in am.  Hold mobilization/walking with pulm htn    No resp meds needed.    Below umbilicus sl erythema- monitor.    7/16 plt up sl 128, will go to po rx,   Creat 1.3,     Dose xarelto 15 bid x 21 days then 20/d, stop heparin drip, Make certain Dr Mcdonald aware of periumbilical pain pt relates, outpt when pt on rm air with restricted activities next couple wks.  Discussed ivc filter to decrease anti coagg in future - minimize bleed risk. Pt prefers oral anticoagg.  Would anticoagg indefinitely.    July 17th-patient requires 3 L of oxygen currently-once on room air should be safe to go home.  She did have pulmonary hypertension on echo in addition to massive pulmonary emboli.  Would be cautious.      Will follow up CBC in a.m.                .

## 2020-07-17 NOTE — RESPIRATORY THERAPY
07/17/20 0649   Patient Assessment/Suction   Level of Consciousness (AVPU) alert   All Lung Fields Breath Sounds diminished   PRE-TX-O2   O2 Device (Oxygen Therapy) nasal cannula   $ Is the patient on Low Flow Oxygen? Yes   Flow (L/min) 3  (decreased to 2lpm)   SpO2 (!) 94 %   Pulse Oximetry Type Intermittent   $ Pulse Oximetry - Multiple Charge Pulse Oximetry - Multiple   Pulse 88   Resp 16   Aerosol Therapy   $ Aerosol Therapy Charges Aerosol Treatment   Respiratory Treatment Status (SVN) given   Treatment Route (SVN) mask   Patient Position (SVN) HOB elevated   Signs of Intolerance (SVN) none   Breath Sounds Post-Respiratory Treatment   Throughout All Fields Post-Treatment All Fields   Throughout All Fields Post-Treatment no change   Post-treatment Heart Rate (beats/min) 88   Post-treatment Resp Rate (breaths/min) 16   Incentive Spirometer   $ Incentive Spirometer Charges done with encouragement   Incentive Spirometer Predicted Level (mL) 2250   Administration (IS) instruction provided, follow-up   Number of Repetitions (IS) 10   Level Incentive Spirometer (mL) 1000   Patient Tolerance (IS) good

## 2020-07-17 NOTE — PROGRESS NOTES
[facility] Hospital Medicine  Progress Note    Patient Name: Sammi Abreu  MRN: [id number]  Patient Class: IP- Inpatient   Admission Date: 7/13/2020  Length of Stay: 3 days  Attending Physician: Alix Ruth MD  Primary Care Provider: Osmani Villatoro MD        Subjective:     Principal Problem:Acute respiratory failure with hypoxemia        HPI:  This is a 79-year-old female who has a past medical history of arthritis and chronic back pain, fibromyalgia, glaucoma, hyperlipidemia, hypertension, sleep apnea with history of noncompliance with CPAP, morbid obesity, hypertension, GERD, COPD, diabetes type 2, prior DVT, and gallstones.  Please note the patient is also a Denominational.  Patient is being received from Outpatient surgical center.  She is status post laparoscopic cholecystectomy today by Dr. Jomar Soria.  Patient tolerated procedure well.  She however was unable to be discharged postop due to ongoing hypoxemia and has been transferred here for further evaluation and treatment.                Overview/Hospital Course:  The patient was monitored closely during her stay.  She was noted to have an elevated D-dimer.  She was placed on full-dose Lovenox.  Patient when CTA of the chest which showed extensive bilateral pulmonary emboli.  Pulmonology was consulted.  Patient's full-dose Lovenox was discontinued and she was placed on IV heparin drip.    Interval  History:  .  CTA of the chest was done which showed extensive bilateral pulmonary emboli.  Bilateral lower extremity venous demonstrates a closest deep vein thrombosis to proximal left popliteal vein.  She is on heparin drip.  She reports persistent dyspnea on exertion and is requiring 3 L nasal cannula.      Still with HILL on 2 LNC this am. desats with exertion. On OAC and tolerating.   Pulmonary wishes to monitor patient until Monday.  Patient tearful this morning.       Review of Systems   Constitutional: Positive for activity  change. Negative for appetite change, chills and fever.   HENT: Negative for ear discharge and facial swelling.    Respiratory: Positive for shortness of breath. Negative for cough.    Cardiovascular: Negative for chest pain, palpitations and leg swelling.   Gastrointestinal: Positive for nausea. Negative for abdominal distention, blood in stool and vomiting.   Musculoskeletal: Positive for back pain. Negative for gait problem, neck pain and neck stiffness.        Patient reports chronic back pain for greater than 20 years but states over the past   6 years has become progressively worse   Skin: Positive for wound. Negative for color change.        Abdominal surgical incisions   Neurological: Negative for facial asymmetry, speech difficulty and weakness.        Forgetful   Psychiatric/Behavioral: Negative for agitation and confusion. The patient is nervous/anxious.      Objective:     Vital Signs (Most Recent):  Temp: 99.7 °F (37.6 °C) (07/17/20 1600)  Pulse: 94 (07/17/20 1546)  Resp: 16 (07/17/20 1546)  BP: 138/67 (07/17/20 1546)  SpO2: 95 % (07/17/20 1600) Vital Signs (24h Range):  Temp:  [97.6 °F (36.4 °C)-100.9 °F (38.3 °C)] 99.7 °F (37.6 °C)  Pulse:  [74-97] 94  Resp:  [16-20] 16  SpO2:  [86 %-96 %] 95 %  BP: (135-177)/(63-72) 138/67     Weight: 106.9 kg (235 lb 10.8 oz)  Body mass index is 41.75 kg/m².    Physical Exam  Constitutional:       General: She is not in acute distress.     Appearance: Normal appearance. She is obese. She is not ill-appearing, toxic-appearing or diaphoretic.      Comments: Morbidly obese   HENT:      Head: Normocephalic and atraumatic.      Mouth/Throat:      Mouth: Mucous membranes are moist.   Eyes:      General:         Right eye: No discharge.         Left eye: No discharge.      Extraocular Movements: Extraocular movements intact.      Conjunctiva/sclera: Conjunctivae normal.      Pupils: Pupils are equal, round, and reactive to light.   Neck:      Musculoskeletal: Normal range  of motion and neck supple. No neck rigidity or muscular tenderness.   Cardiovascular:      Rate and Rhythm: Normal rate and regular rhythm.      Pulses: Normal pulses.      Comments: Trace pretib edema L>R  Pulmonary:      Effort: Pulmonary effort is normal. No respiratory distress.      Breath sounds: No wheezing or rales.      Comments: Bilateral breath sounds diminished  Abdominal:      General: Bowel sounds are normal. There is no distension.      Palpations: Abdomen is soft.      Tenderness: There is abdominal tenderness. There is no guarding.      Comments: Obese  Mild generalized tenderness   Genitourinary:     Comments: Not examined  Musculoskeletal: Normal range of motion.      Right lower leg: Edema present.      Left lower leg: Edema present.      Comments: Scant bilateral lower extremity edema   Skin:     General: Skin is warm and dry.      Capillary Refill: Capillary refill takes less than 2 seconds.      Comments: Abdominal surgical dressings intact   Neurological:      General: No focal deficit present.      Mental Status: She is alert and oriented to person, place, and time. Mental status is at baseline.      Cranial Nerves: No cranial nerve deficit.      Motor: No weakness.   Psychiatric:         Mood and Affect: Mood normal.         Behavior: Behavior normal.         Thought Content: Thought content normal.         Judgment: Judgment normal.           CRANIAL NERVES     CN III, IV, VI   Pupils are equal, round, and reactive to light.    Labs reviewed      Assessment/Plan:      * Acute respiratory failure with hypoxemia  Secondary to extensive bilateral pulmonary emboli-  Pulmonary consulted      Acute deep vein thrombosis (DVT) of popliteal vein of right lower extremity  Continue full-dose anticoagulation      Hyperkalemia  Monitor  Trend BMP  Hold home Aldactone for now      Bilateral pulmonary embolism  07/14/2020 received message from staff nurse at 1046  that CTA of the chest results show  patient with extensive bilateral pulmonary emboli  Monitor O2 sats, supplement O2 as needed  Continue full-dose Lovenox-was changed to IV heparin drip per pulmonology  Consult pulmonology.    Heparin drip DCed. Now on xarelto      History of DVT in adulthood  Patient reports history of DVT in the right upper extremity greater than 10 years ago and  history of groin DVT following a hip surgery approximately 8 years ago-  Continue MARIANELA hose  Patient now with lower extremity DVT and extensive bilateral pulmonary emboli  Patient with decreased activity/ambulation due to increased recent back pain however she does have history of prior DVT in the past once related to prior hip surgery, consider hematology consult      Chronic back pain  With chronic pain syndrome  Patient reports recent back injection approximately 2 weeks ago  Continue p.r.n. pain medication  Patient reports she sees Dr. Gilliam      Hypoxemia  Secondary to extensive bilateral pulmonary emboli      CHARLIE (obstructive sleep apnea)  Noted  Patient reports she does not use her CPAP at home      COPD (chronic obstructive pulmonary disease)  Monitor O2 sats, supplement O2 as needed  Q 4 hr while awake duo nebs and b.i.d. Pulmicort  Incentive spirometer q.1 hour while awake      Type 2 diabetes mellitus with diabetic neuropathy, without long-term current use of insulin  Diabetic diet  Accu-Cheks with correctional sliding scale insulin  Blood sugars running 121- 136  HGB A1c is 6.4      Status post laparoscopic cholecystectomy  Patient is status post laparoscopic cholecystectomy done today by Dr. Jairo Soria at outpatient surgical center-  Orders as per surgeon - Dr. Walker  Low-fat low-cholesterol ADA diet  P.r.n. pain and antiemetic medication      Morbid obesity  Body mass index is 40.55 kg/m².  General weight loss/lifestyle modification strategies discussed (elicit support from others; identify saboteurs; non-food rewards, etc).          GERD (gastroesophageal  reflux disease)  Continue PPI      Pulmonary hypertension  Secondary to PE      Hyperlipidemia associated with type 2 diabetes mellitus  No home anti-lipidemic noted- low fat, low-cholesterol diet    Hypertension associated with diabetes  Continue home medications        VTE Risk Mitigation (From admission, onward)         Ordered     rivaroxaban tablet 15 mg  2 times daily with meals      07/16/20 0849     IP VTE HIGH RISK PATIENT  Once      07/13/20 1900     Place MARIANELA hose  Until discontinued      07/13/20 1900                      Anabel Haji NP  Department of Hospital Medicine   [facility]

## 2020-07-17 NOTE — PLAN OF CARE
Pt alert and oriented. Aware of POC. Ambulatory in room but has c/o SOB and chest pain with minimal exertion. Increased oxygen demands. Telemetry monitoring continued. Blood glucose levels WNL. Pt has umbilical tenderness but denies overall pain. Afebrile. Call light/ needed items placed within patient reach. Safety maintained.

## 2020-07-17 NOTE — SUBJECTIVE & OBJECTIVE
Interval  History:  .  CTA of the chest was done which showed extensive bilateral pulmonary emboli.  Bilateral lower extremity venous demonstrates a closest deep vein thrombosis to proximal left popliteal vein.  She is on heparin drip.  She reports persistent dyspnea on exertion and is requiring 3 L nasal cannula.      Still with HILL on 2 LNC this am. desats with exertion. On OAC and tolerating.   Pulmonary wishes to monitor patient until Monday.  Patient tearful this morning.       Review of Systems   Constitutional: Positive for activity change. Negative for appetite change, chills and fever.   HENT: Negative for ear discharge and facial swelling.    Respiratory: Positive for shortness of breath. Negative for cough.    Cardiovascular: Negative for chest pain, palpitations and leg swelling.   Gastrointestinal: Positive for nausea. Negative for abdominal distention, blood in stool and vomiting.   Musculoskeletal: Positive for back pain. Negative for gait problem, neck pain and neck stiffness.        Patient reports chronic back pain for greater than 20 years but states over the past   6 years has become progressively worse   Skin: Positive for wound. Negative for color change.        Abdominal surgical incisions   Neurological: Negative for facial asymmetry, speech difficulty and weakness.        Forgetful   Psychiatric/Behavioral: Negative for agitation and confusion. The patient is nervous/anxious.      Objective:     Vital Signs (Most Recent):  Temp: 99.7 °F (37.6 °C) (07/17/20 1600)  Pulse: 94 (07/17/20 1546)  Resp: 16 (07/17/20 1546)  BP: 138/67 (07/17/20 1546)  SpO2: 95 % (07/17/20 1600) Vital Signs (24h Range):  Temp:  [97.6 °F (36.4 °C)-100.9 °F (38.3 °C)] 99.7 °F (37.6 °C)  Pulse:  [74-97] 94  Resp:  [16-20] 16  SpO2:  [86 %-96 %] 95 %  BP: (135-177)/(63-72) 138/67     Weight: 106.9 kg (235 lb 10.8 oz)  Body mass index is 41.75 kg/m².    Physical Exam  Constitutional:       General: She is not in acute  distress.     Appearance: Normal appearance. She is obese. She is not ill-appearing, toxic-appearing or diaphoretic.      Comments: Morbidly obese   HENT:      Head: Normocephalic and atraumatic.      Mouth/Throat:      Mouth: Mucous membranes are moist.   Eyes:      General:         Right eye: No discharge.         Left eye: No discharge.      Extraocular Movements: Extraocular movements intact.      Conjunctiva/sclera: Conjunctivae normal.      Pupils: Pupils are equal, round, and reactive to light.   Neck:      Musculoskeletal: Normal range of motion and neck supple. No neck rigidity or muscular tenderness.   Cardiovascular:      Rate and Rhythm: Normal rate and regular rhythm.      Pulses: Normal pulses.      Comments: Trace pretib edema L>R  Pulmonary:      Effort: Pulmonary effort is normal. No respiratory distress.      Breath sounds: No wheezing or rales.      Comments: Bilateral breath sounds diminished  Abdominal:      General: Bowel sounds are normal. There is no distension.      Palpations: Abdomen is soft.      Tenderness: There is abdominal tenderness. There is no guarding.      Comments: Obese  Mild generalized tenderness   Genitourinary:     Comments: Not examined  Musculoskeletal: Normal range of motion.      Right lower leg: Edema present.      Left lower leg: Edema present.      Comments: Scant bilateral lower extremity edema   Skin:     General: Skin is warm and dry.      Capillary Refill: Capillary refill takes less than 2 seconds.      Comments: Abdominal surgical dressings intact   Neurological:      General: No focal deficit present.      Mental Status: She is alert and oriented to person, place, and time. Mental status is at baseline.      Cranial Nerves: No cranial nerve deficit.      Motor: No weakness.   Psychiatric:         Mood and Affect: Mood normal.         Behavior: Behavior normal.         Thought Content: Thought content normal.         Judgment: Judgment normal.           CRANIAL  NERVES     CN III, IV, VI   Pupils are equal, round, and reactive to light.    Labs reviewed

## 2020-07-17 NOTE — PLAN OF CARE
Patient accepted and authorization for home health services [facility] for home O2 w/portable tanks.           07/17/20 1420   Post-Acute Status   Post-Acute Authorization HME;Home Health   HME Status Set-up Complete   Home Health Status Set-up Complete

## 2020-07-17 NOTE — NURSING
Pt c/o SOB and chest pain with ambulation to bedside commode. Oxygen saturation at 92% on 2L nasal cannula. Color-WNL. Skin warm and dry to touch. Increased oxygen needs with exertion. Respiratory therapy notified for treatment. NAKUL Tanner NP notified of patient status.

## 2020-07-18 LAB
BACTERIA #/AREA URNS HPF: ABNORMAL /HPF
BACTERIA #/AREA URNS HPF: ABNORMAL /HPF
BASOPHILS # BLD AUTO: 0.02 K/UL (ref 0–0.2)
BASOPHILS NFR BLD: 0.2 % (ref 0–1.9)
BILIRUB UR QL STRIP: NEGATIVE
BILIRUB UR QL STRIP: NEGATIVE
CLARITY UR: ABNORMAL
CLARITY UR: CLEAR
COLOR UR: YELLOW
COLOR UR: YELLOW
DIFFERENTIAL METHOD: ABNORMAL
EOSINOPHIL # BLD AUTO: 0.1 K/UL (ref 0–0.5)
EOSINOPHIL NFR BLD: 1.1 % (ref 0–8)
ERYTHROCYTE [DISTWIDTH] IN BLOOD BY AUTOMATED COUNT: 13 % (ref 11.5–14.5)
GLUCOSE UR QL STRIP: NEGATIVE
GLUCOSE UR QL STRIP: NEGATIVE
HCT VFR BLD AUTO: 26.6 % (ref 37–48.5)
HGB BLD-MCNC: 8.5 G/DL (ref 12–16)
HGB UR QL STRIP: NEGATIVE
HGB UR QL STRIP: NEGATIVE
IMM GRANULOCYTES # BLD AUTO: 0.04 K/UL (ref 0–0.04)
IMM GRANULOCYTES NFR BLD AUTO: 0.4 % (ref 0–0.5)
KETONES UR QL STRIP: NEGATIVE
KETONES UR QL STRIP: NEGATIVE
LEUKOCYTE ESTERASE UR QL STRIP: ABNORMAL
LEUKOCYTE ESTERASE UR QL STRIP: ABNORMAL
LYMPHOCYTES # BLD AUTO: 1.9 K/UL (ref 1–4.8)
LYMPHOCYTES NFR BLD: 18.3 % (ref 18–48)
MCH RBC QN AUTO: 33.5 PG (ref 27–31)
MCHC RBC AUTO-ENTMCNC: 32 G/DL (ref 32–36)
MCV RBC AUTO: 105 FL (ref 82–98)
MICROSCOPIC COMMENT: ABNORMAL
MICROSCOPIC COMMENT: ABNORMAL
MONOCYTES # BLD AUTO: 1 K/UL (ref 0.3–1)
MONOCYTES NFR BLD: 10 % (ref 4–15)
NEUTROPHILS # BLD AUTO: 7.1 K/UL (ref 1.8–7.7)
NEUTROPHILS NFR BLD: 70 % (ref 38–73)
NITRITE UR QL STRIP: NEGATIVE
NITRITE UR QL STRIP: NEGATIVE
NRBC BLD-RTO: 0 /100 WBC
PH UR STRIP: 6 [PH] (ref 5–8)
PH UR STRIP: 6 [PH] (ref 5–8)
PLATELET # BLD AUTO: 128 K/UL (ref 150–350)
PMV BLD AUTO: 10.8 FL (ref 9.2–12.9)
POCT GLUCOSE: 108 MG/DL (ref 70–110)
POCT GLUCOSE: 115 MG/DL (ref 70–110)
POCT GLUCOSE: 119 MG/DL (ref 70–110)
POCT GLUCOSE: 181 MG/DL (ref 70–110)
PROT UR QL STRIP: NEGATIVE
PROT UR QL STRIP: NEGATIVE
RBC # BLD AUTO: 2.54 M/UL (ref 4–5.4)
RBC #/AREA URNS HPF: 0 /HPF (ref 0–4)
RBC #/AREA URNS HPF: 2 /HPF (ref 0–4)
SP GR UR STRIP: 1.01 (ref 1–1.03)
SP GR UR STRIP: 1.02 (ref 1–1.03)
SQUAMOUS #/AREA URNS HPF: 1 /HPF
SQUAMOUS #/AREA URNS HPF: 12 /HPF
URN SPEC COLLECT METH UR: ABNORMAL
URN SPEC COLLECT METH UR: ABNORMAL
UROBILINOGEN UR STRIP-ACNC: 1 EU/DL
UROBILINOGEN UR STRIP-ACNC: NEGATIVE EU/DL
WBC # BLD AUTO: 10.15 K/UL (ref 3.9–12.7)
WBC #/AREA URNS HPF: 11 /HPF (ref 0–5)
WBC #/AREA URNS HPF: 15 /HPF (ref 0–5)

## 2020-07-18 PROCEDURE — 36415 COLL VENOUS BLD VENIPUNCTURE: CPT

## 2020-07-18 PROCEDURE — 94799 UNLISTED PULMONARY SVC/PX: CPT

## 2020-07-18 PROCEDURE — 12000002 HC ACUTE/MED SURGE SEMI-PRIVATE ROOM

## 2020-07-18 PROCEDURE — 25000003 PHARM REV CODE 250: Performed by: INTERNAL MEDICINE

## 2020-07-18 PROCEDURE — 25000003 PHARM REV CODE 250: Performed by: NURSE PRACTITIONER

## 2020-07-18 PROCEDURE — 99233 PR SUBSEQUENT HOSPITAL CARE,LEVL III: ICD-10-PCS | Mod: ,,, | Performed by: INTERNAL MEDICINE

## 2020-07-18 PROCEDURE — 81000 URINALYSIS NONAUTO W/SCOPE: CPT | Mod: 91

## 2020-07-18 PROCEDURE — 87040 BLOOD CULTURE FOR BACTERIA: CPT

## 2020-07-18 PROCEDURE — 81000 URINALYSIS NONAUTO W/SCOPE: CPT

## 2020-07-18 PROCEDURE — 87086 URINE CULTURE/COLONY COUNT: CPT | Mod: 59

## 2020-07-18 PROCEDURE — 85025 COMPLETE CBC W/AUTO DIFF WBC: CPT

## 2020-07-18 PROCEDURE — 27000221 HC OXYGEN, UP TO 24 HOURS

## 2020-07-18 PROCEDURE — 63600175 PHARM REV CODE 636 W HCPCS: Performed by: INTERNAL MEDICINE

## 2020-07-18 PROCEDURE — 99233 SBSQ HOSP IP/OBS HIGH 50: CPT | Mod: ,,, | Performed by: INTERNAL MEDICINE

## 2020-07-18 PROCEDURE — 87077 CULTURE AEROBIC IDENTIFY: CPT

## 2020-07-18 PROCEDURE — 94761 N-INVAS EAR/PLS OXIMETRY MLT: CPT

## 2020-07-18 PROCEDURE — 25000242 PHARM REV CODE 250 ALT 637 W/ HCPCS: Performed by: NURSE PRACTITIONER

## 2020-07-18 PROCEDURE — 87186 SC STD MICRODIL/AGAR DIL: CPT

## 2020-07-18 PROCEDURE — 87086 URINE CULTURE/COLONY COUNT: CPT

## 2020-07-18 PROCEDURE — 94640 AIRWAY INHALATION TREATMENT: CPT

## 2020-07-18 RX ORDER — DEXTROMETHORPHAN POLISTIREX 30 MG/5 ML
1 SUSPENSION, EXTENDED RELEASE 12 HR ORAL ONCE
Status: COMPLETED | OUTPATIENT
Start: 2020-07-18 | End: 2020-07-18

## 2020-07-18 RX ADMIN — METOPROLOL SUCCINATE 50 MG: 50 TABLET, FILM COATED, EXTENDED RELEASE ORAL at 09:07

## 2020-07-18 RX ADMIN — PANTOPRAZOLE SODIUM 40 MG: 40 TABLET, DELAYED RELEASE ORAL at 09:07

## 2020-07-18 RX ADMIN — IPRATROPIUM BROMIDE AND ALBUTEROL SULFATE 3 ML: .5; 3 SOLUTION RESPIRATORY (INHALATION) at 08:07

## 2020-07-18 RX ADMIN — IPRATROPIUM BROMIDE AND ALBUTEROL SULFATE 3 ML: .5; 3 SOLUTION RESPIRATORY (INHALATION) at 04:07

## 2020-07-18 RX ADMIN — CEFTRIAXONE 1 G: 1 INJECTION, SOLUTION INTRAVENOUS at 05:07

## 2020-07-18 RX ADMIN — DOCUSATE SODIUM 100 MG: 100 CAPSULE, LIQUID FILLED ORAL at 09:07

## 2020-07-18 RX ADMIN — MINERAL OIL 1 ENEMA: 100 ENEMA RECTAL at 05:07

## 2020-07-18 RX ADMIN — IPRATROPIUM BROMIDE AND ALBUTEROL SULFATE 3 ML: .5; 3 SOLUTION RESPIRATORY (INHALATION) at 12:07

## 2020-07-18 RX ADMIN — ACETAMINOPHEN 650 MG: 325 TABLET ORAL at 05:07

## 2020-07-18 RX ADMIN — FLUTICASONE PROPIONATE 100 MCG: 50 SPRAY, METERED NASAL at 09:07

## 2020-07-18 RX ADMIN — BUDESONIDE INHALATION 0.5 MG: 0.5 SUSPENSION RESPIRATORY (INHALATION) at 08:07

## 2020-07-18 RX ADMIN — RIVAROXABAN 15 MG: 15 TABLET, FILM COATED ORAL at 09:07

## 2020-07-18 RX ADMIN — RIVAROXABAN 15 MG: 15 TABLET, FILM COATED ORAL at 05:07

## 2020-07-18 RX ADMIN — BACITRACIN ZINC NEOMYCIN SULFATE POLYMYXIN B SULFATE: 400; 3.5; 5 OINTMENT TOPICAL at 09:07

## 2020-07-18 RX ADMIN — POLYETHYLENE GLYCOL (3350) 17 G: 17 POWDER, FOR SOLUTION ORAL at 09:07

## 2020-07-18 RX ADMIN — BISACODYL 5 MG: 5 TABLET, COATED ORAL at 09:07

## 2020-07-18 RX ADMIN — ACETAMINOPHEN 650 MG: 325 TABLET ORAL at 11:07

## 2020-07-18 RX ADMIN — MONTELUKAST 10 MG: 10 TABLET, FILM COATED ORAL at 08:07

## 2020-07-18 NOTE — CARE UPDATE
07/18/20 0957   PRE-TX-O2   O2 Device (Oxygen Therapy) nasal cannula w/ humidification  (tried to wean off 02 but sat dropped to 86% room air)   $ Is the patient on Low Flow Oxygen? Yes   Flow (L/min) 2   Oxygen Concentration (%) 28   SpO2 (!) 91 %   Pulse Oximetry Type Intermittent   $ Pulse Oximetry - Multiple Charge Pulse Oximetry - Multiple   Ready to Wean/Extubation Screen   FIO2<=50 (chart decimal) 0.28

## 2020-07-18 NOTE — PLAN OF CARE
POC/Meds reviewed, pt verbalized understanding. Vitals stable. Febrile UA and Chest Xray done. Tele In place-NSR. Up with x1 assist  to BR. SCD's on. IS at bedside, instructed on use and return demonstration performed, encouraged frequently. Blood glucose monitoring ac/hs, coverage per pts sliding scale. Denies nausea. Reposistions self. Hourly/Q2hr rounding performed, safety maintained. Bed in lowest position, wheels locked, SR up x2, call light in easy reach. No  complaints at this time. Will continue to monitor.

## 2020-07-18 NOTE — PROGRESS NOTES
[facility]  Pulmonology  Progress Note    Subjective     7/18/2020:  Spiked a fever overnight.  Has since defervesced.  No new complaints.  Still short of breath with minimal exertion.  No new complaints.     Review of Systems   Constitutional: Positive for fever.   HENT: Positive for nosebleeds.    Respiratory: Positive for shortness of breath and dyspnea on extertion. Negative for wheezing.    Gastrointestinal: Negative for nausea, vomiting and abdominal pain.      I have personally reviewed the followng during today's evaluation:  past medical history, ROS, family history, social history, surgical history, current inpatient medications,drug allergies, vital signs over the past 24 hours, results of relevant diagnostic studies and nursing/provider documentation from the past 24 hours.     Objective     VS Temp:  [97 °F (36.1 °C)-101.1 °F (38.4 °C)]   Pulse:  [85-98]   Resp:  [16-19]   BP: (138-177)/(65-97)   SpO2:  [86 %-95 %]   Ideal body weight: 52.4 kg (115 lb 8.3 oz)  Adjusted ideal body weight: 74.2 kg (163 lb 9.3 oz)   I/O   Intake/Output Summary (Last 24 hours) at 7/18/2020 1122  Last data filed at 7/18/2020 0600  Gross per 24 hour   Intake 720 ml   Output 400 ml   Net 320 ml        Vent SpO2 (!) 91% on 2L NC   PE Physical Exam   Constitutional: She is oriented to person, place, and time. She appears well-developed and well-nourished.  Non-toxic appearance. She does not have a sickly appearance. No distress. Nasal cannula in place.   HENT:   Head: Normocephalic.   Right Ear: External ear normal.   Left Ear: External ear normal.   Nose: Nose normal.   Mouth/Throat: Oropharynx is clear and moist. Mallampati Score: II.   Neck: Normal range of motion. Neck supple. No JVD present. No thyromegaly present.   Cardiovascular: Normal rate, regular rhythm, normal heart sounds and intact distal pulses.   No murmur heard.  Pulmonary/Chest: Normal expansion, symmetric chest wall expansion and breath sounds normal.  She has no wheezes. She has no rhonchi. She has no rales.   Abdominal: Soft. Bowel sounds are normal. She exhibits no distension. There is no abdominal tenderness.   Musculoskeletal: Normal range of motion.         General: No edema.   Lymphadenopathy:     She has no cervical adenopathy.   Neurological: She is alert and oriented to person, place, and time. No cranial nerve deficit.   Skin: Skin is warm and dry. No rash noted.   Psychiatric: She has a normal mood and affect.   Nursing note and vitals reviewed.        Labs I have personally reviewed and interpreted all labs / diagnostic studies obtained over the past 24 hours, and relevant results are as follows:  Recent Labs   Lab 07/18/20  0504   WBC 10.15   RBC 2.54*   HGB 8.5*   HCT 26.6*   *   *   MCH 33.5*   MCHC 32.0     U/A:  unremarkable     Imaging I have personally reviewed and interpreted the following images and reviewed the associated Radiology report.  I have reviewed and interpreted all pertinent imaging results/findings within the past 24 hours.  CXR:  7/17/2020:  unremarkable     Micro I have personally reviewed and interpreted the available culture data.  Relevant results are as follows.  Blood Culture   Lab Results   Component Value Date    LABBLOO No growth after 5 days. 06/07/2020   , Sputum Culture   Lab Results   Component Value Date    RESPIRATORYC NO SIGNIFICANT ISOLATE. 05/05/2011    and Urine Culture    Lab Results   Component Value Date    LABURIN No growth 07/16/2018      Medications Scheduled    albuterol-ipratropium  3 mL Nebulization Q4H WAKE    bisacodyL  5 mg Oral Daily    budesonide  0.5 mg Nebulization Q12H    docusate sodium  100 mg Oral Daily    fluticasone propionate  2 spray Each Nostril Daily    metoprolol succinate  50 mg Oral Daily    montelukast  10 mg Oral QHS    neomycin-bacitracin-polymyxin   Topical (Top) Daily    pantoprazole  40 mg Oral Daily    polyethylene glycol  17 g Oral Daily     rivaroxaban  15 mg Oral BID WM      Continuous Infusions:      PRN   acetaminophen, dextrose 50%, dextrose 50%, glucagon (human recombinant), glucose, glucose, HYDROcodone-acetaminophen, insulin aspart U-100, melatonin, ondansetron, simethicone, sodium chloride 0.9%        Assessment       Active Hospital Problems    Diagnosis    *Acute respiratory failure with hypoxemia    Thrombocytopenia    Heparin-induced thrombocytopenia, type 1    Bilateral pulmonary embolism    Hyperkalemia    Acute deep vein thrombosis (DVT) of popliteal vein of right lower extremity    Elevated troponin    Morbid obesity    Status post laparoscopic cholecystectomy     07/13/2020 done by Dr. Jomar Soria      Type 2 diabetes mellitus with diabetic neuropathy, without long-term current use of insulin    COPD (chronic obstructive pulmonary disease)    CHARLIE (obstructive sleep apnea)    Hypoxemia    Chronic back pain    History of DVT in adulthood    GERD (gastroesophageal reflux disease)    Pulmonary hypertension    Hyperlipidemia associated with type 2 diabetes mellitus    Hypertension associated with diabetes      My Impression:  Acute hypoxemic respiratory failure 2/2 submassive Pulmonary embolism.  Isolated fever without clear infectious nidus.    Plan     Pulmonary  · Continue DOAC.  Cost may be an issue.  Consider consulting Case Management to evaluate for financial assistance programs.  · Monitor fever cure for now.  · Not quite ready to transition to the outpatient setting.  · Titrate supplemental oxygen to maintain SpO2 > 90%.       Maicol Serrano MD  Pulmonary / Critical Care Medicine  [facility]

## 2020-07-18 NOTE — PLAN OF CARE
"Plan of care review with pt, pt states "understanding,"  no redness/swelling noted to IV site, sterile strips has old dry drainage to abdominal lap site, elevated temp 101.2 new order per francesca zhou rocephine 1mg IV qd, BC x2, U/A and enema due to unsuccessful BM x5 days, pt easily exerted with locomotion, sat 88 RA, unable to ween pt off 02 2L NC at this time, will continue to monitor, observe and note any changes, safety maintain    " normal...

## 2020-07-18 NOTE — ASSESSMENT & PLAN NOTE
· Continue DOAC.  Cost may be an issue.  Consider consulting Case Management to evaluate for financial assistance programs.  · Continue ABx at the direction of the primary service.  · Not quite ready to transition to the outpatient setting.  · Titrate supplemental oxygen to maintain SpO2 > 90%.

## 2020-07-18 NOTE — NURSING
PT with fever of 101.1, NP notified, Tylenol given, Chest X-RAY ordered, UA ordered, IS encouraged.

## 2020-07-18 NOTE — PROGRESS NOTES
[facility]  General Surgery  Progress Note    Subjective:     History of Present Illness:  79-year-old female underwent laparoscopic cholecystectomy yesterday at the [facility] in [place].  The patient had previously been admitted with elevated transaminases and right back and flank pain.  We postpone cholecystectomy at that time on that hospitalization.  She followed up and we rescheduled.  Postoperatively she had trouble weaning from O2.  She was transferred to [facility] for observation as she was in an outpatient surgical unit.  She has not had any specific acute events but her D-dimer was high in the CT scan was obtained which confirmed pulmonary emboli.  Her troponins are slightly elevated as well.  She is being worked up currently.  She is alert and in good spirits.  She denies any significant chest pain.  She does not appear to be in distress.    Post-Op Info:  * No surgery found *         No new subjective & objective note has been filed under this hospital service since the last note was generated.    Assessment/Plan:     * Acute respiratory failure with hypoxemia  1.  Pulmonary emboli.  2.  Patient received full-dose Lovenox.  3.  Cardiology and Pulmonary consult ordered.  4.  Echo pending.  5.  Following.    Hyperkalemia  1. S/P lap rosette. Pain better. Tolerating diet.  2. PE management per Hospital Medicine and Pulmonary.    Status post laparoscopic cholecystectomy  1. S/P lap rosette.  2. Expected tenderness at umbilical incision.        Jomar Mcdonald MD  General Surgery  [facility]

## 2020-07-18 NOTE — CARE UPDATE
Receiving aerosol [id number] with duoneb, Q12 with pulmicort. Has IIS at bedside. Tolerates therapy well.       07/18/20 0823 07/18/20 0831   PRE-TX-O2   O2 Device (Oxygen Therapy) room air  --    SpO2 (!) 91 %  --    Pulse Oximetry Type Intermittent  --    $ Pulse Oximetry - Multiple Charge Pulse Oximetry - Multiple  --    Pulse 95 95   Resp 16 16   Aerosol Therapy   $ Aerosol Therapy Charges Aerosol Treatment Aerosol Treatment   Respiratory Treatment Status (SVN) given given   Treatment Route (SVN) mask;oxygen mask;oxygen   Patient Position (SVN) HOB elevated HOB elevated   Signs of Intolerance (SVN) none none   Breath Sounds Post-Respiratory Treatment   Throughout All Fields Post-Treatment All Fields All Fields   Throughout All Fields Post-Treatment aeration increased aeration increased   Post-treatment Heart Rate (beats/min) 93 93   Post-treatment Resp Rate (breaths/min) 16 16   Incentive Spirometer   $ Incentive Spirometer Charges done with encouragement  --    Administration (IS) proper technique demonstrated;instruction provided, follow-up  --    Number of Repetitions (IS) 10  --    Level Incentive Spirometer (mL) 1100  --    Patient Tolerance (IS) good  --

## 2020-07-18 NOTE — PROGRESS NOTES
[facility]  General Surgery  Progress Note    Subjective:     History of Present Illness:  79-year-old female underwent laparoscopic cholecystectomy yesterday at the [facility] in [place].  The patient had previously been admitted with elevated transaminases and right back and flank pain.  We postpone cholecystectomy at that time on that hospitalization.  She followed up and we rescheduled.  Postoperatively she had trouble weaning from O2.  She was transferred to [facility] for observation as she was in an outpatient surgical unit.  She has not had any specific acute events but her D-dimer was high in the CT scan was obtained which confirmed pulmonary emboli.  Her troponins are slightly elevated as well.  She is being worked up currently.  She is alert and in good spirits.  She denies any significant chest pain.  She does not appear to be in distress.    Post-Op Info:  * No surgery found *         Interval History: Feels OK. Still a little SOB.    Medications:  Continuous Infusions:  Scheduled Meds:   albuterol-ipratropium  3 mL Nebulization Q4H WAKE    bisacodyL  5 mg Oral Daily    budesonide  0.5 mg Nebulization Q12H    docusate sodium  100 mg Oral Daily    fluticasone propionate  2 spray Each Nostril Daily    metoprolol succinate  50 mg Oral Daily    montelukast  10 mg Oral QHS    neomycin-bacitracin-polymyxin   Topical (Top) Daily    pantoprazole  40 mg Oral Daily    polyethylene glycol  17 g Oral Daily    rivaroxaban  15 mg Oral BID WM     PRN Meds:acetaminophen, dextrose 50%, dextrose 50%, glucagon (human recombinant), glucose, glucose, HYDROcodone-acetaminophen, insulin aspart U-100, melatonin, ondansetron, simethicone, sodium chloride 0.9%     Review of patient's allergies indicates:   Allergen Reactions    Cymbalta [duloxetine] Other (See Comments)     Nightmares      Darvon [propoxyphene] Nausea Only and Other (See Comments)     Sweating, slept for 3 days     Atorvastatin Other (See Comments)     Muscle cramps    Naprosyn [naproxen] Nausea Only    Penicillins Rash    Tramadol Nausea Only and Palpitations     Objective:     Vital Signs (Most Recent):  Temp: 100 °F (37.8 °C) (07/18/20 1233)  Pulse: 106 (07/18/20 1233)  Resp: (!) 22 (07/18/20 1233)  BP: (!) 163/69 (07/18/20 1233)  SpO2: (!) 93 % (07/18/20 1233) Vital Signs (24h Range):  Temp:  [97 °F (36.1 °C)-101.1 °F (38.4 °C)] 100 °F (37.8 °C)  Pulse:  [] 106  Resp:  [16-22] 22  SpO2:  [86 %-95 %] 93 %  BP: (138-163)/(65-97) 163/69     Weight: 106.9 kg (235 lb 10.8 oz)  Body mass index is 41.75 kg/m².    Intake/Output - Last 3 Shifts       07/16 0700 - 07/17 0659 07/17 0700 - 07/18 0659 07/18 0700 - 07/19 0659    P.O.  960     Total Intake(mL/kg)  960 (9)     Urine (mL/kg/hr)  400 (0.2)     Stool       Total Output  400     Net  +560            Urine Occurrence  2 x           Physical Exam  Constitutional:       General: She is not in acute distress.  HENT:      Head: Normocephalic and atraumatic.   Eyes:      General: No scleral icterus.  Cardiovascular:      Rate and Rhythm: Normal rate and regular rhythm.   Pulmonary:      Effort: Pulmonary effort is normal. No respiratory distress.      Breath sounds: Normal breath sounds. No wheezing or rales.   Abdominal:      General: Bowel sounds are normal. There is no distension.      Palpations: Abdomen is soft.      Tenderness: There is abdominal tenderness (Minimal tenderness at umbilical incision.).   Neurological:      Mental Status: She is alert and oriented to person, place, and time.   Psychiatric:         Behavior: Behavior normal.         Significant Labs:  CBC:   Recent Labs   Lab 07/18/20  0504   WBC 10.15   RBC 2.54*   HGB 8.5*   HCT 26.6*   *   *   MCH 33.5*   MCHC 32.0     BMP:       Assessment/Plan:     * Acute respiratory failure with hypoxemia  1.  Pulmonary emboli.  2.  Patient received full-dose Lovenox.  3.  Cardiology and  Pulmonary consult ordered.  4.  Echo pending.  5.  Following.    Hyperkalemia  1. S/P lap rosette. Pain better. Tolerating diet.  2. PE management per Hospital Medicine and Pulmonary.    Status post laparoscopic cholecystectomy  1. S/P lap orsette.  2. Expected tenderness at umbilical incision.        Jomar Mcdonald MD  General Surgery  [facility]

## 2020-07-18 NOTE — PLAN OF CARE
07/17/20 2013   Patient Assessment/Suction   Level of Consciousness (AVPU) alert   Respiratory Effort Normal;Unlabored   Expansion/Accessory Muscles/Retractions expansion symmetric   All Lung Fields Breath Sounds diminished   Rhythm/Pattern, Respiratory pattern regular   Cough Frequency no cough   PRE-TX-O2   O2 Device (Oxygen Therapy) nasal cannula   Flow (L/min) 3   SpO2 (!) 91 %   Pulse Oximetry Type Intermittent   Pulse 91   Resp 18   Aerosol Therapy   $ Aerosol Therapy Charges Aerosol Treatment   Respiratory Treatment Status (SVN) given   Treatment Route (SVN) mask   Patient Position (SVN) HOB elevated   Post Treatment Assessment (SVN) breath sounds unchanged   Signs of Intolerance (SVN) none   Breath Sounds Post-Respiratory Treatment   Post-treatment Heart Rate (beats/min) 92   Post-treatment Resp Rate (breaths/min) 18   Incentive Spirometer   $ Incentive Spirometer Charges done with encouragement   Administration (IS) instruction provided, follow-up   Number of Repetitions (IS) 10   Level Incentive Spirometer (mL) 1000   Patient Tolerance (IS) good

## 2020-07-18 NOTE — SUBJECTIVE & OBJECTIVE
Interval  History:  .  CTA of the chest was done which showed extensive bilateral pulmonary emboli.  Bilateral lower extremity venous demonstrates a closest deep vein thrombosis to proximal left popliteal vein.     Still with HILL on 2 LNC this am. desats with exertion. On OAC and tolerating.   Pulmonary wishes to monitor patient until Monday.  No new issues overnight      Review of Systems   Constitutional: Positive for activity change. Negative for appetite change, chills and fever.   HENT: Negative for ear discharge and facial swelling.    Respiratory: Positive for shortness of breath. Negative for cough.    Cardiovascular: Negative for chest pain, palpitations and leg swelling.   Gastrointestinal: Positive for nausea. Negative for abdominal distention, blood in stool and vomiting.   Musculoskeletal: Positive for back pain. Negative for gait problem, neck pain and neck stiffness.        Patient reports chronic back pain for greater than 20 years but states over the past   6 years has become progressively worse   Skin: Positive for wound. Negative for color change.        Abdominal surgical incisions   Neurological: Negative for facial asymmetry, speech difficulty and weakness.        Forgetful   Psychiatric/Behavioral: Negative for agitation and confusion. The patient is nervous/anxious.      Objective:     Vital Signs (Most Recent):  Temp: 100 °F (37.8 °C) (07/18/20 1233)  Pulse: 106 (07/18/20 1233)  Resp: (!) 22 (07/18/20 1233)  BP: (!) 163/69 (07/18/20 1233)  SpO2: (!) 93 % (07/18/20 1233) Vital Signs (24h Range):  Temp:  [97 °F (36.1 °C)-101.1 °F (38.4 °C)] 100 °F (37.8 °C)  Pulse:  [] 106  Resp:  [16-22] 22  SpO2:  [86 %-95 %] 93 %  BP: (138-163)/(65-97) 163/69     Weight: 106.9 kg (235 lb 10.8 oz)  Body mass index is 41.75 kg/m².    Physical Exam  Constitutional:       General: She is not in acute distress.     Appearance: Normal appearance. She is obese. She is not ill-appearing, toxic-appearing or  diaphoretic.      Comments: Morbidly obese   HENT:      Head: Normocephalic and atraumatic.      Mouth/Throat:      Mouth: Mucous membranes are moist.   Eyes:      General:         Right eye: No discharge.         Left eye: No discharge.      Extraocular Movements: Extraocular movements intact.      Conjunctiva/sclera: Conjunctivae normal.      Pupils: Pupils are equal, round, and reactive to light.   Neck:      Musculoskeletal: Normal range of motion and neck supple. No neck rigidity or muscular tenderness.   Cardiovascular:      Rate and Rhythm: Normal rate and regular rhythm.      Pulses: Normal pulses.      Comments: Trace pretib edema L>R  Pulmonary:      Effort: Pulmonary effort is normal. No respiratory distress.      Breath sounds: No wheezing or rales.      Comments: Bilateral breath sounds diminished  Abdominal:      General: Bowel sounds are normal. There is no distension.      Palpations: Abdomen is soft.      Tenderness: There is abdominal tenderness. There is no guarding.      Comments: Obese  Mild generalized tenderness   Genitourinary:     Comments: Not examined  Musculoskeletal: Normal range of motion.      Right lower leg: Edema present.      Left lower leg: Edema present.      Comments: Scant bilateral lower extremity edema   Skin:     General: Skin is warm and dry.      Capillary Refill: Capillary refill takes less than 2 seconds.      Comments: Abdominal surgical dressings intact   Neurological:      General: No focal deficit present.      Mental Status: She is alert and oriented to person, place, and time. Mental status is at baseline.      Cranial Nerves: No cranial nerve deficit.      Motor: No weakness.   Psychiatric:         Mood and Affect: Mood normal.         Behavior: Behavior normal.         Thought Content: Thought content normal.         Judgment: Judgment normal.           CRANIAL NERVES     CN III, IV, VI   Pupils are equal, round, and reactive to light.    Labs reviewed

## 2020-07-18 NOTE — SUBJECTIVE & OBJECTIVE
Interval History: Feels OK. Still a little SOB.    Medications:  Continuous Infusions:  Scheduled Meds:   albuterol-ipratropium  3 mL Nebulization Q4H WAKE    bisacodyL  5 mg Oral Daily    budesonide  0.5 mg Nebulization Q12H    docusate sodium  100 mg Oral Daily    fluticasone propionate  2 spray Each Nostril Daily    metoprolol succinate  50 mg Oral Daily    montelukast  10 mg Oral QHS    neomycin-bacitracin-polymyxin   Topical (Top) Daily    pantoprazole  40 mg Oral Daily    polyethylene glycol  17 g Oral Daily    rivaroxaban  15 mg Oral BID WM     PRN Meds:acetaminophen, dextrose 50%, dextrose 50%, glucagon (human recombinant), glucose, glucose, HYDROcodone-acetaminophen, insulin aspart U-100, melatonin, ondansetron, simethicone, sodium chloride 0.9%     Review of patient's allergies indicates:   Allergen Reactions    Cymbalta [duloxetine] Other (See Comments)     Nightmares      Darvon [propoxyphene] Nausea Only and Other (See Comments)     Sweating, slept for 3 days    Atorvastatin Other (See Comments)     Muscle cramps    Naprosyn [naproxen] Nausea Only    Penicillins Rash    Tramadol Nausea Only and Palpitations     Objective:     Vital Signs (Most Recent):  Temp: 100 °F (37.8 °C) (07/18/20 1233)  Pulse: 106 (07/18/20 1233)  Resp: (!) 22 (07/18/20 1233)  BP: (!) 163/69 (07/18/20 1233)  SpO2: (!) 93 % (07/18/20 1233) Vital Signs (24h Range):  Temp:  [97 °F (36.1 °C)-101.1 °F (38.4 °C)] 100 °F (37.8 °C)  Pulse:  [] 106  Resp:  [16-22] 22  SpO2:  [86 %-95 %] 93 %  BP: (138-163)/(65-97) 163/69     Weight: 106.9 kg (235 lb 10.8 oz)  Body mass index is 41.75 kg/m².    Intake/Output - Last 3 Shifts       07/16 0700 - 07/17 0659 07/17 0700 - 07/18 0659 07/18 0700 - 07/19 0659    P.O.  960     Total Intake(mL/kg)  960 (9)     Urine (mL/kg/hr)  400 (0.2)     Stool       Total Output  400     Net  +560            Urine Occurrence  2 x           Physical Exam  Constitutional:       General: She is  not in acute distress.  HENT:      Head: Normocephalic and atraumatic.   Eyes:      General: No scleral icterus.  Cardiovascular:      Rate and Rhythm: Normal rate and regular rhythm.   Pulmonary:      Effort: Pulmonary effort is normal. No respiratory distress.      Breath sounds: Normal breath sounds. No wheezing or rales.   Abdominal:      General: Bowel sounds are normal. There is no distension.      Palpations: Abdomen is soft.      Tenderness: There is abdominal tenderness (Minimal tenderness at umbilical incision.).   Neurological:      Mental Status: She is alert and oriented to person, place, and time.   Psychiatric:         Behavior: Behavior normal.         Significant Labs:  CBC:   Recent Labs   Lab 07/18/20  0504   WBC 10.15   RBC 2.54*   HGB 8.5*   HCT 26.6*   *   *   MCH 33.5*   MCHC 32.0     BMP:

## 2020-07-18 NOTE — PROGRESS NOTES
[facility] Hospital Medicine  Progress Note    Patient Name: Sammi Abreu  MRN: [id number]  Patient Class: IP- Inpatient   Admission Date: 7/13/2020  Length of Stay: 4 days  Attending Physician: Alix Ruth MD  Primary Care Provider: Osmani Villatoro MD        Subjective:     Principal Problem:Acute respiratory failure with hypoxemia        HPI:  This is a 79-year-old female who has a past medical history of arthritis and chronic back pain, fibromyalgia, glaucoma, hyperlipidemia, hypertension, sleep apnea with history of noncompliance with CPAP, morbid obesity, hypertension, GERD, COPD, diabetes type 2, prior DVT, and gallstones.  Please note the patient is also a [employer].  Patient is being received [facility].  She is status post laparoscopic cholecystectomy today by Dr. Jomar Soria.  Patient tolerated procedure well.  She however was unable to be discharged postop due to ongoing hypoxemia and has been transferred here for further evaluation and treatment.                Overview/Hospital Course:  The patient was monitored closely during her stay.  She was noted to have an elevated D-dimer.  She was placed on full-dose Lovenox.  Patient when CTA of the chest which showed extensive bilateral pulmonary emboli.  Pulmonology was consulted.  Patient's full-dose Lovenox was discontinued and she was placed on IV heparin drip.    Interval  History:  .  CTA of the chest was done which showed extensive bilateral pulmonary emboli.  Bilateral lower extremity venous demonstrates a closest deep vein thrombosis to proximal left popliteal vein.     Still with HILL on 2 LNC this am. desats with exertion. On OAC and tolerating.   Pulmonary wishes to monitor patient until Monday.  No new issues overnight      Review of Systems   Constitutional: Positive for activity change. Negative for appetite change, chills and fever.   HENT: Negative for ear discharge and facial swelling.     Respiratory: Positive for shortness of breath. Negative for cough.    Cardiovascular: Negative for chest pain, palpitations and leg swelling.   Gastrointestinal: Positive for nausea. Negative for abdominal distention, blood in stool and vomiting.   Musculoskeletal: Positive for back pain. Negative for gait problem, neck pain and neck stiffness.        Patient reports chronic back pain for greater than 20 years but states over the past   6 years has become progressively worse   Skin: Positive for wound. Negative for color change.        Abdominal surgical incisions   Neurological: Negative for facial asymmetry, speech difficulty and weakness.        Forgetful   Psychiatric/Behavioral: Negative for agitation and confusion. The patient is nervous/anxious.      Objective:     Vital Signs (Most Recent):  Temp: 100 °F (37.8 °C) (07/18/20 1233)  Pulse: 106 (07/18/20 1233)  Resp: (!) 22 (07/18/20 1233)  BP: (!) 163/69 (07/18/20 1233)  SpO2: (!) 93 % (07/18/20 1233) Vital Signs (24h Range):  Temp:  [97 °F (36.1 °C)-101.1 °F (38.4 °C)] 100 °F (37.8 °C)  Pulse:  [] 106  Resp:  [16-22] 22  SpO2:  [86 %-95 %] 93 %  BP: (138-163)/(65-97) 163/69     Weight: 106.9 kg (235 lb 10.8 oz)  Body mass index is 41.75 kg/m².    Physical Exam  Constitutional:       General: She is not in acute distress.     Appearance: Normal appearance. She is obese. She is not ill-appearing, toxic-appearing or diaphoretic.      Comments: Morbidly obese   HENT:      Head: Normocephalic and atraumatic.      Mouth/Throat:      Mouth: Mucous membranes are moist.   Eyes:      General:         Right eye: No discharge.         Left eye: No discharge.      Extraocular Movements: Extraocular movements intact.      Conjunctiva/sclera: Conjunctivae normal.      Pupils: Pupils are equal, round, and reactive to light.   Neck:      Musculoskeletal: Normal range of motion and neck supple. No neck rigidity or muscular tenderness.   Cardiovascular:      Rate and  Rhythm: Normal rate and regular rhythm.      Pulses: Normal pulses.      Comments: Trace pretib edema L>R  Pulmonary:      Effort: Pulmonary effort is normal. No respiratory distress.      Breath sounds: No wheezing or rales.      Comments: Bilateral breath sounds diminished  Abdominal:      General: Bowel sounds are normal. There is no distension.      Palpations: Abdomen is soft.      Tenderness: There is abdominal tenderness. There is no guarding.      Comments: Obese  Mild generalized tenderness   Genitourinary:     Comments: Not examined  Musculoskeletal: Normal range of motion.      Right lower leg: Edema present.      Left lower leg: Edema present.      Comments: Scant bilateral lower extremity edema   Skin:     General: Skin is warm and dry.      Capillary Refill: Capillary refill takes less than 2 seconds.      Comments: Abdominal surgical dressings intact   Neurological:      General: No focal deficit present.      Mental Status: She is alert and oriented to person, place, and time. Mental status is at baseline.      Cranial Nerves: No cranial nerve deficit.      Motor: No weakness.   Psychiatric:         Mood and Affect: Mood normal.         Behavior: Behavior normal.         Thought Content: Thought content normal.         Judgment: Judgment normal.           CRANIAL NERVES     CN III, IV, VI   Pupils are equal, round, and reactive to light.    Labs reviewed      Assessment/Plan:      * Acute respiratory failure with hypoxemia  Secondary to extensive bilateral pulmonary emboli-  Pulmonary consulted      Acute deep vein thrombosis (DVT) of popliteal vein of right lower extremity  Continue full-dose anticoagulation      Hyperkalemia  Monitor  Trend BMP  Hold home Aldactone for now      Bilateral pulmonary embolism  07/14/2020 received message from staff nurse at 1046  that CTA of the chest results show patient with extensive bilateral pulmonary emboli  Monitor O2 sats, supplement O2 as needed  Continue  full-dose Lovenox-was changed to IV heparin drip per pulmonology  Consult pulmonology.    Heparin drip DCed. Now on xarelto      History of DVT in adulthood  Patient reports history of DVT in the right upper extremity greater than 10 years ago and  history of groin DVT following a hip surgery approximately 8 years ago-  Continue MAIRANELA hose  Patient now with lower extremity DVT and extensive bilateral pulmonary emboli  Patient with decreased activity/ambulation due to increased recent back pain however she does have history of prior DVT in the past once related to prior hip surgery, consider hematology consult      Chronic back pain  With chronic pain syndrome  Patient reports recent back injection approximately 2 weeks ago  Continue p.r.n. pain medication  Patient reports she sees Dr. Gilliam      Hypoxemia  Secondary to extensive bilateral pulmonary emboli      CHARLIE (obstructive sleep apnea)  Noted  Patient reports she does not use her CPAP at home      COPD (chronic obstructive pulmonary disease)  Monitor O2 sats, supplement O2 as needed  Q 4 hr while awake duo nebs and b.i.d. Pulmicort  Incentive spirometer q.1 hour while awake      Type 2 diabetes mellitus with diabetic neuropathy, without long-term current use of insulin  Diabetic diet  Accu-Cheks with correctional sliding scale insulin  Blood sugars running 121- 136  HGB A1c is 6.4      Status post laparoscopic cholecystectomy  Patient is status post laparoscopic cholecystectomy done today by Dr. Jairo Soria at outpatient surgical center-  Orders as per surgeon - Dr. Walker  Low-fat low-cholesterol ADA diet  P.r.n. pain and antiemetic medication      Morbid obesity  Body mass index is 40.55 kg/m².  General weight loss/lifestyle modification strategies discussed (elicit support from others; identify saboteurs; non-food rewards, etc).          GERD (gastroesophageal reflux disease)  Continue PPI      Pulmonary hypertension  Secondary to PE      Hyperlipidemia  associated with type 2 diabetes mellitus  No home anti-lipidemic noted- low fat, low-cholesterol diet    Hypertension associated with diabetes  Continue home medications        VTE Risk Mitigation (From admission, onward)         Ordered     rivaroxaban tablet 15 mg  2 times daily with meals      07/16/20 0849     IP VTE HIGH RISK PATIENT  Once      07/13/20 1900     Place MARIANELA cotoe  Until discontinued      07/13/20 1900                      Anabel Haji NP  Department of Hospital Medicine   [facility]

## 2020-07-19 PROBLEM — N39.0 UTI (URINARY TRACT INFECTION): Status: ACTIVE | Noted: 2020-07-19

## 2020-07-19 LAB
BACTERIA UR CULT: NORMAL
BACTERIA UR CULT: NORMAL
HCT VFR BLD AUTO: 26.7 % (ref 37–48.5)
HGB BLD-MCNC: 8.4 G/DL (ref 12–16)
POCT GLUCOSE: 124 MG/DL (ref 70–110)
POCT GLUCOSE: 140 MG/DL (ref 70–110)
POCT GLUCOSE: 146 MG/DL (ref 70–110)
POCT GLUCOSE: 173 MG/DL (ref 70–110)
PROCALCITONIN SERPL IA-MCNC: 0.19 NG/ML

## 2020-07-19 PROCEDURE — 99900035 HC TECH TIME PER 15 MIN (STAT)

## 2020-07-19 PROCEDURE — 25000003 PHARM REV CODE 250: Performed by: NURSE PRACTITIONER

## 2020-07-19 PROCEDURE — 27000221 HC OXYGEN, UP TO 24 HOURS

## 2020-07-19 PROCEDURE — 63600175 PHARM REV CODE 636 W HCPCS: Performed by: INTERNAL MEDICINE

## 2020-07-19 PROCEDURE — 94640 AIRWAY INHALATION TREATMENT: CPT

## 2020-07-19 PROCEDURE — 85018 HEMOGLOBIN: CPT

## 2020-07-19 PROCEDURE — 12000002 HC ACUTE/MED SURGE SEMI-PRIVATE ROOM

## 2020-07-19 PROCEDURE — 25000003 PHARM REV CODE 250: Performed by: INTERNAL MEDICINE

## 2020-07-19 PROCEDURE — 85014 HEMATOCRIT: CPT

## 2020-07-19 PROCEDURE — 25000242 PHARM REV CODE 250 ALT 637 W/ HCPCS: Performed by: NURSE PRACTITIONER

## 2020-07-19 PROCEDURE — 36415 COLL VENOUS BLD VENIPUNCTURE: CPT

## 2020-07-19 PROCEDURE — 99233 PR SUBSEQUENT HOSPITAL CARE,LEVL III: ICD-10-PCS | Mod: ,,, | Performed by: INTERNAL MEDICINE

## 2020-07-19 PROCEDURE — 99233 SBSQ HOSP IP/OBS HIGH 50: CPT | Mod: ,,, | Performed by: INTERNAL MEDICINE

## 2020-07-19 PROCEDURE — 94761 N-INVAS EAR/PLS OXIMETRY MLT: CPT

## 2020-07-19 PROCEDURE — 84145 PROCALCITONIN (PCT): CPT

## 2020-07-19 PROCEDURE — 94799 UNLISTED PULMONARY SVC/PX: CPT

## 2020-07-19 RX ORDER — BISACODYL 10 MG
10 SUPPOSITORY, RECTAL RECTAL DAILY PRN
Status: DISCONTINUED | OUTPATIENT
Start: 2020-07-19 | End: 2020-07-29 | Stop reason: HOSPADM

## 2020-07-19 RX ORDER — AMOXICILLIN 250 MG
1 CAPSULE ORAL DAILY PRN
Status: DISCONTINUED | OUTPATIENT
Start: 2020-07-19 | End: 2020-07-29 | Stop reason: HOSPADM

## 2020-07-19 RX ORDER — CIPROFLOXACIN 500 MG/1
500 TABLET ORAL EVERY 12 HOURS
Status: DISCONTINUED | OUTPATIENT
Start: 2020-07-19 | End: 2020-07-22

## 2020-07-19 RX ADMIN — RIVAROXABAN 15 MG: 15 TABLET, FILM COATED ORAL at 05:07

## 2020-07-19 RX ADMIN — CEFTRIAXONE 1 G: 1 INJECTION, SOLUTION INTRAVENOUS at 05:07

## 2020-07-19 RX ADMIN — IPRATROPIUM BROMIDE AND ALBUTEROL SULFATE 3 ML: .5; 3 SOLUTION RESPIRATORY (INHALATION) at 04:07

## 2020-07-19 RX ADMIN — ACETAMINOPHEN 650 MG: 325 TABLET ORAL at 05:07

## 2020-07-19 RX ADMIN — Medication 6 MG: at 08:07

## 2020-07-19 RX ADMIN — BUDESONIDE INHALATION 0.5 MG: 0.5 SUSPENSION RESPIRATORY (INHALATION) at 08:07

## 2020-07-19 RX ADMIN — RIVAROXABAN 15 MG: 15 TABLET, FILM COATED ORAL at 09:07

## 2020-07-19 RX ADMIN — IPRATROPIUM BROMIDE AND ALBUTEROL SULFATE 3 ML: .5; 3 SOLUTION RESPIRATORY (INHALATION) at 11:07

## 2020-07-19 RX ADMIN — CIPROFLOXACIN HYDROCHLORIDE 500 MG: 500 TABLET, FILM COATED ORAL at 08:07

## 2020-07-19 RX ADMIN — POLYETHYLENE GLYCOL (3350) 17 G: 17 POWDER, FOR SOLUTION ORAL at 09:07

## 2020-07-19 RX ADMIN — BISACODYL 5 MG: 5 TABLET, COATED ORAL at 09:07

## 2020-07-19 RX ADMIN — DOCUSATE SODIUM 100 MG: 100 CAPSULE, LIQUID FILLED ORAL at 09:07

## 2020-07-19 RX ADMIN — MONTELUKAST 10 MG: 10 TABLET, FILM COATED ORAL at 08:07

## 2020-07-19 RX ADMIN — BISACODYL 10 MG: 10 SUPPOSITORY RECTAL at 05:07

## 2020-07-19 RX ADMIN — PANTOPRAZOLE SODIUM 40 MG: 40 TABLET, DELAYED RELEASE ORAL at 09:07

## 2020-07-19 RX ADMIN — DOCUSATE SODIUM - SENNOSIDES 1 TABLET: 50; 8.6 TABLET, FILM COATED ORAL at 05:07

## 2020-07-19 RX ADMIN — IPRATROPIUM BROMIDE AND ALBUTEROL SULFATE 3 ML: .5; 3 SOLUTION RESPIRATORY (INHALATION) at 08:07

## 2020-07-19 RX ADMIN — METOPROLOL SUCCINATE 50 MG: 50 TABLET, FILM COATED, EXTENDED RELEASE ORAL at 09:07

## 2020-07-19 RX ADMIN — BACITRACIN ZINC NEOMYCIN SULFATE POLYMYXIN B SULFATE: 400; 3.5; 5 OINTMENT TOPICAL at 09:07

## 2020-07-19 RX ADMIN — FLUTICASONE PROPIONATE 100 MCG: 50 SPRAY, METERED NASAL at 09:07

## 2020-07-19 NOTE — PROGRESS NOTES
[facility]  Pulmonology  Progress Note    Subjective     7/18/2020:  Spiked a fever overnight.  Has since defervesced.  No new complaints.  Still short of breath with minimal exertion.  No new complaints.  7/19/2020:  Still spiking fevers.  Started on ceftriaxone and cipro by primary service yesterday.  Feeling cold today.  No othercomplaints.     Review of Systems   Constitutional: Positive for fever and chills.   HENT: Positive for nosebleeds.    Respiratory: Positive for shortness of breath and dyspnea on extertion. Negative for wheezing.    Gastrointestinal: Negative for nausea, vomiting and abdominal pain.      I have personally reviewed the followng during today's evaluation:  past medical history, ROS, family history, social history, surgical history, current inpatient medications,drug allergies, vital signs over the past 24 hours, results of relevant diagnostic studies and nursing/provider documentation from the past 24 hours.     Objective     VS Temp:  [99 °F (37.2 °C)-101 °F (38.3 °C)]   Pulse:  [77-91]   Resp:  [16-20]   BP: (126-147)/(58-65)   SpO2:  [92 %-97 %]   Ideal body weight: 52.4 kg (115 lb 8.3 oz)  Adjusted ideal body weight: 74.2 kg (163 lb 9.3 oz)   I/O   Intake/Output Summary (Last 24 hours) at 7/19/2020 1715  Last data filed at 7/19/2020 1438  Gross per 24 hour   Intake 365 ml   Output 450 ml   Net -85 ml        Vent SpO2 (!) 94% on 4L NC   PE Physical Exam   Constitutional: She is oriented to person, place, and time. She appears well-developed and well-nourished.  Non-toxic appearance. She does not have a sickly appearance. No distress. Nasal cannula in place.   HENT:   Head: Normocephalic.   Right Ear: External ear normal.   Left Ear: External ear normal.   Nose: Nose normal.   Mouth/Throat: Oropharynx is clear and moist. Mallampati Score: II.   Neck: Normal range of motion. Neck supple. No JVD present. No thyromegaly present.   Cardiovascular: Normal rate, regular rhythm, normal  heart sounds and intact distal pulses.   No murmur heard.  Pulmonary/Chest: Normal expansion, symmetric chest wall expansion and breath sounds normal. She has no wheezes. She has no rhonchi. She has no rales.   Abdominal: Soft. Bowel sounds are normal. She exhibits no distension. There is no abdominal tenderness.   Musculoskeletal: Normal range of motion.         General: No edema.   Lymphadenopathy:     She has no cervical adenopathy.   Neurological: She is alert and oriented to person, place, and time. No cranial nerve deficit.   Skin: Skin is warm and dry. No rash noted.   Psychiatric: She has a normal mood and affect.   Nursing note and vitals reviewed.        Labs I have personally reviewed and interpreted all labs / diagnostic studies obtained over the past 24 hours, and relevant results are as follows:  Recent Labs   Lab 07/19/20  1609   HGB 8.4*   HCT 26.7*     U/A:  unremarkable     Imaging I have personally reviewed and interpreted the following images and reviewed the associated Radiology report.  I have reviewed and interpreted all pertinent imaging results/findings within the past 24 hours.  CXR:  7/17/2020:  unremarkable     Micro I have personally reviewed and interpreted the available culture data.  Relevant results are as follows.  Blood Culture   Lab Results   Component Value Date    LABBLOO No growth after 5 days. 06/07/2020   , Sputum Culture   Lab Results   Component Value Date    RESPIRATORYC NO SIGNIFICANT ISOLATE. 05/05/2011    and Urine Culture    Lab Results   Component Value Date    LABURIN  07/18/2020     Multiple organisms isolated. None in predominance.  Repeat if    LABURIN clinically necessary. 07/18/2020      Medications Scheduled    albuterol-ipratropium  3 mL Nebulization Q4H WAKE    bisacodyL  5 mg Oral Daily    budesonide  0.5 mg Nebulization Q12H    cefTRIAXone (ROCEPHIN) IVPB  1 g Intravenous Q24H    ciprofloxacin HCl  500 mg Oral Q12H    docusate sodium  100 mg Oral  Daily    fluticasone propionate  2 spray Each Nostril Daily    metoprolol succinate  50 mg Oral Daily    montelukast  10 mg Oral QHS    neomycin-bacitracin-polymyxin   Topical (Top) Daily    pantoprazole  40 mg Oral Daily    polyethylene glycol  17 g Oral Daily    rivaroxaban  15 mg Oral BID WM      Continuous Infusions:      PRN   acetaminophen, bisacodyL, dextrose 50%, dextrose 50%, glucagon (human recombinant), glucose, glucose, HYDROcodone-acetaminophen, insulin aspart U-100, melatonin, ondansetron, senna-docusate 8.6-50 mg, simethicone, sodium chloride 0.9%        Assessment       Active Hospital Problems    Diagnosis    *Acute respiratory failure with hypoxemia    UTI (urinary tract infection)    Thrombocytopenia    Heparin-induced thrombocytopenia, type 1    Bilateral pulmonary embolism    Hyperkalemia    Acute deep vein thrombosis (DVT) of popliteal vein of right lower extremity    Elevated troponin    Morbid obesity    Status post laparoscopic cholecystectomy     07/13/2020 done by Dr. Jomar Soria      Type 2 diabetes mellitus with diabetic neuropathy, without long-term current use of insulin    COPD (chronic obstructive pulmonary disease)    CHARLIE (obstructive sleep apnea)    Hypoxemia    Chronic back pain    History of DVT in adulthood    GERD (gastroesophageal reflux disease)    Pulmonary hypertension    Hyperlipidemia associated with type 2 diabetes mellitus    Hypertension associated with diabetes      My Impression:  Acute hypoxemic respiratory failure 2/2 submassive Pulmonary embolism.  Persistent fever without other compelling evidence of occult infection possibly due to clot burden.    Plan     Pulmonary  · Continue DOAC.  Cost may be an issue.  Consider consulting Case Management to evaluate for financial assistance programs.  · Continue ABx at the direction of the primary service.  · Not quite ready to transition to the outpatient setting.  · Titrate supplemental  oxygen to maintain SpO2 > 90%.       Maicol Serrano MD  Pulmonary / Critical Care Medicine  [facility]

## 2020-07-19 NOTE — PLAN OF CARE
07/18/20 2001   Patient Assessment/Suction   Level of Consciousness (AVPU) alert   Respiratory Effort Normal;Unlabored   Expansion/Accessory Muscles/Retractions expansion symmetric   All Lung Fields Breath Sounds diminished   Rhythm/Pattern, Respiratory pattern regular   Cough Frequency infrequent   Cough Type nonproductive   PRE-TX-O2   O2 Device (Oxygen Therapy) nasal cannula   Flow (L/min) 4   SpO2 95 %   Pulse Oximetry Type Intermittent   Pulse 83   Resp 20   Aerosol Therapy   $ Aerosol Therapy Charges Aerosol Treatment   Respiratory Treatment Status (SVN) given   Treatment Route (SVN) mask   Patient Position (SVN) HOB elevated   Post Treatment Assessment (SVN) breath sounds unchanged   Signs of Intolerance (SVN) none   Breath Sounds Post-Respiratory Treatment   Post-treatment Heart Rate (beats/min) 84   Post-treatment Resp Rate (breaths/min) 20   Incentive Spirometer   $ Incentive Spirometer Charges done with encouragement   Administration (IS) proper technique demonstrated   Number of Repetitions (IS) 10   Level Incentive Spirometer (mL) 750   Patient Tolerance (IS) good

## 2020-07-19 NOTE — PROGRESS NOTES
[facility] Medicine  Progress Note    Patient Name: Sammi Abreu  MRN: [id number]  Patient Class: IP- Inpatient   Admission Date: 7/13/2020  Length of Stay: 5 days  Attending Physician: Alix Ruth MD  Primary Care Provider: Osmani Villatoro MD        Subjective:     Principal Problem:Acute respiratory failure with hypoxemia        HPI:  This is a 79-year-old female who has a past medical history of arthritis and chronic back pain, fibromyalgia, glaucoma, hyperlipidemia, hypertension, sleep apnea with history of noncompliance with CPAP, morbid obesity, hypertension, GERD, COPD, diabetes type 2, prior DVT, and gallstones.  Please note the patient is also a [employer].  Patient is being [facility].  She is status post laparoscopic cholecystectomy today by Dr. Jomar Soria.  Patient tolerated procedure well.  She however was unable to be discharged postop due to ongoing hypoxemia and has been transferred here for further evaluation and treatment.                Overview/Hospital Course:  The patient was monitored closely during her stay.  She was noted to have an elevated D-dimer.  She was placed on full-dose Lovenox.  Patient when CTA of the chest which showed extensive bilateral pulmonary emboli.  Pulmonology was consulted.  Patient's full-dose Lovenox was discontinued and she was placed on IV heparin drip.    Interval  History:  .  CTA of the chest was done which showed extensive bilateral pulmonary emboli.  Bilateral lower extremity venous demonstrates a closest deep vein thrombosis to proximal left popliteal vein.     Sitting up in chair. still with HILL, on  4 LNC this am. desats with exertion. On OAC and tolerating. Temp last pm 101.  CXR no acute process. UA culture pending  Pulmonary wishes to monitor patient until Monday.  No new issues overnight      Review of Systems   Constitutional: Positive for activity change. Negative for appetite  change, chills and fever.   HENT: Negative for ear discharge and facial swelling.    Respiratory: Positive for shortness of breath. Negative for cough.    Cardiovascular: Negative for chest pain, palpitations and leg swelling.   Gastrointestinal: Positive for nausea. Negative for abdominal distention, blood in stool and vomiting.   Musculoskeletal: Positive for back pain. Negative for gait problem, neck pain and neck stiffness.        Patient reports chronic back pain for greater than 20 years but states over the past   6 years has become progressively worse   Skin: Positive for wound. Negative for color change.        Abdominal surgical incisions   Neurological: Negative for facial asymmetry, speech difficulty and weakness.        Forgetful   Psychiatric/Behavioral: Negative for agitation and confusion. The patient is nervous/anxious.      Objective:     Vital Signs (Most Recent):  Temp: 99 °F (37.2 °C) (07/19/20 1111)  Pulse: 80 (07/19/20 1125)  Resp: 16 (07/19/20 1125)  BP: 139/60 (07/19/20 1111)  SpO2: 96 % (07/19/20 1125) Vital Signs (24h Range):  Temp:  [99 °F (37.2 °C)-101.3 °F (38.5 °C)] 99 °F (37.2 °C)  Pulse:  [77-92] 80  Resp:  [16-22] 16  SpO2:  [91 %-97 %] 96 %  BP: (131-160)/(58-70) 139/60     Weight: 106.9 kg (235 lb 10.8 oz)  Body mass index is 41.75 kg/m².    Physical Exam  Constitutional:       General: She is not in acute distress.     Appearance: Normal appearance. She is obese. She is not ill-appearing, toxic-appearing or diaphoretic.      Comments: Morbidly obese   HENT:      Head: Normocephalic and atraumatic.      Mouth/Throat:      Mouth: Mucous membranes are moist.   Eyes:      General:         Right eye: No discharge.         Left eye: No discharge.      Extraocular Movements: Extraocular movements intact.      Conjunctiva/sclera: Conjunctivae normal.      Pupils: Pupils are equal, round, and reactive to light.   Neck:      Musculoskeletal: Normal range of motion and neck supple. No neck  rigidity or muscular tenderness.   Cardiovascular:      Rate and Rhythm: Normal rate and regular rhythm.      Pulses: Normal pulses.      Comments: Trace pretib edema L>R  Pulmonary:      Effort: Pulmonary effort is normal. No respiratory distress.      Breath sounds: No wheezing or rales.      Comments: Bilateral breath sounds diminished  Abdominal:      General: Bowel sounds are normal. There is no distension.      Palpations: Abdomen is soft.      Tenderness: There is abdominal tenderness. There is no guarding.      Comments: Obese  Mild generalized tenderness   Genitourinary:     Comments: Not examined  Musculoskeletal: Normal range of motion.      Right lower leg: Edema present.      Left lower leg: Edema present.      Comments: Scant bilateral lower extremity edema   Skin:     General: Skin is warm and dry.      Capillary Refill: Capillary refill takes less than 2 seconds.      Comments: Abdominal surgical dressings intact   Neurological:      General: No focal deficit present.      Mental Status: She is alert and oriented to person, place, and time. Mental status is at baseline.      Cranial Nerves: No cranial nerve deficit.      Motor: No weakness.   Psychiatric:         Mood and Affect: Mood normal.         Behavior: Behavior normal.         Thought Content: Thought content normal.         Judgment: Judgment normal.      Comments: Appears depressed.           CRANIAL NERVES     CN III, IV, VI   Pupils are equal, round, and reactive to light.    Labs reviewed      Assessment/Plan:      * Acute respiratory failure with hypoxemia  Secondary to extensive bilateral pulmonary emboli-  Pulmonary consulted      UTI (urinary tract infection)  Check UA and urine cx. Received rocephin x 1 dose. Change to cipro  Blood culture pending      Heparin-induced thrombocytopenia, type 1  Monitor CBC.   Lab Results   Component Value Date    WBC 10.15 07/18/2020    HGB 8.5 (L) 07/18/2020    HCT 26.6 (L) 07/18/2020     (H)  07/18/2020     (L) 07/18/2020             Acute deep vein thrombosis (DVT) of popliteal vein of right lower extremity  Continue full-dose anticoagulation      Hyperkalemia  Monitor  Trend BMP  Hold home Aldactone for now- resolved       Bilateral pulmonary embolism  07/14/2020 received message from staff nurse at 1046  that CTA of the chest results show patient with extensive bilateral pulmonary emboli  Monitor O2 sats, supplement O2 as needed  Continue full-dose Lovenox-was changed to IV heparin drip per pulmonology  Consult pulmonology.    Heparin drip DCed. Now on xarelto      History of DVT in adulthood  Patient reports history of DVT in the right upper extremity greater than 10 years ago and  history of groin DVT following a hip surgery approximately 8 years ago-  Continue MARIANELA hose  Patient now with lower extremity DVT and extensive bilateral pulmonary emboli  Patient with decreased activity/ambulation due to increased recent back pain however she does have history of prior DVT in the past once related to prior hip surgery, consider hematology consult      Chronic back pain  With chronic pain syndrome  Patient reports recent back injection approximately 2 weeks ago  Continue p.r.n. pain medication  Patient reports she sees Dr. Gilliam      Hypoxemia  Secondary to extensive bilateral pulmonary emboli      CHARLIE (obstructive sleep apnea)  Noted  Patient reports she does not use her CPAP at home      COPD (chronic obstructive pulmonary disease)  Monitor O2 sats, supplement O2 as needed  Q 4 hr while awake duo nebs and b.i.d. Pulmicort  Incentive spirometer q.1 hour while awake      Type 2 diabetes mellitus with diabetic neuropathy, without long-term current use of insulin  Diabetic diet  Accu-Cheks with correctional sliding scale insulin  Blood sugars running 121- 136  HGB A1c is 6.4      Status post laparoscopic cholecystectomy  Patient is status post laparoscopic cholecystectomy done today by Dr. Jairo Soria  at outpatient surgical center-  Orders as per surgeon - Dr. Walker  Low-fat low-cholesterol ADA diet  P.r.n. pain and antiemetic medication      Morbid obesity  Body mass index is 40.55 kg/m².  General weight loss/lifestyle modification strategies discussed (elicit support from others; identify saboteurs; non-food rewards, etc).          GERD (gastroesophageal reflux disease)  Continue PPI      Pulmonary hypertension  Secondary to PE      Hyperlipidemia associated with type 2 diabetes mellitus  No home anti-lipidemic noted- low fat, low-cholesterol diet    Hypertension associated with diabetes  Continue home medications        VTE Risk Mitigation (From admission, onward)         Ordered     rivaroxaban tablet 15 mg  2 times daily with meals      07/16/20 0849     IP VTE HIGH RISK PATIENT  Once      07/13/20 1900     Place MARIANELA hose  Until discontinued      07/13/20 1900                      Anabel Haji NP  Department of Hospital Medicine   [facility]

## 2020-07-19 NOTE — ASSESSMENT & PLAN NOTE
Monitor CBC.   Lab Results   Component Value Date    WBC 10.15 07/18/2020    HGB 8.5 (L) 07/18/2020    HCT 26.6 (L) 07/18/2020     (H) 07/18/2020     (L) 07/18/2020

## 2020-07-19 NOTE — PLAN OF CARE
"Plan of care review with pt, pt states "understanding," no redness/swelling noted to IV site, sterile strips has old dry drainage to abdominal lap site, prune juice given due to unsuccessful BM x6 days, pt is compliant with medication administration, pt is encourage to increase fluid intake and ambulate as tolerate, 02 increase to 4L NC on today, pt was tolerating 3L on yesterday, will continue to monitor, observe and note any changes, safety maintain  "

## 2020-07-19 NOTE — CARE UPDATE
07/19/20 0818   Patient Assessment/Suction   Level of Consciousness (AVPU) alert   Respiratory Effort Unlabored   Expansion/Accessory Muscles/Retractions no use of accessory muscles   All Lung Fields Breath Sounds clear;diminished   PRE-TX-O2   O2 Device (Oxygen Therapy) nasal cannula w/ humidification   $ Is the patient on Low Flow Oxygen? Yes   Flow (L/min) 4   Oxygen Concentration (%) 36   SpO2 95 %   Pulse Oximetry Type Intermittent   $ Pulse Oximetry - Multiple Charge Pulse Oximetry - Multiple   Pulse 77   Resp 16   Aerosol Therapy   $ Aerosol Therapy Charges Aerosol Treatment   Respiratory Treatment Status (SVN) given   Treatment Route (SVN) mask;oxygen   Patient Position (SVN) semi-[place]'s   Signs of Intolerance (SVN) none   Breath Sounds Post-Respiratory Treatment   Throughout All Fields Post-Treatment All Fields   Throughout All Fields Post-Treatment aeration increased   Post-treatment Heart Rate (beats/min) 79   Post-treatment Resp Rate (breaths/min) 18   Incentive Spirometer   $ Incentive Spirometer Charges done with encouragement   Administration (IS) proper technique demonstrated   Number of Repetitions (IS) 14   Level Incentive Spirometer (mL) 750   Patient Tolerance (IS) good   Ready to Wean/Extubation Screen   FIO2<=50 (chart decimal) 0.36

## 2020-07-19 NOTE — PLAN OF CARE
POC discussed with pt, pt verbalized understanding. Oriented x4. Both PIV cdi. Normal sinus rhythm on tele. Incision to abdomen clean and intact, steri strips intact. SCD's in place. Blood glucose monitored, no insulin needed. 3 liters 02 on, sats remain above 90%, when attempt to decrease 02 sats rapidly decreases. Pt up to bedside commode with assist, becomes very short of breath with movement. Increase in temp, medicated with tylenol, continuing to monitor. Complaints of mild pain in the abdomen, denies the need for pain medications. Call light in reach, bed alarm set, safety maintained. No complaints or requests at this time, will continue to monitor.

## 2020-07-19 NOTE — SUBJECTIVE & OBJECTIVE
Interval  History:  .  CTA of the chest was done which showed extensive bilateral pulmonary emboli.  Bilateral lower extremity venous demonstrates a closest deep vein thrombosis to proximal left popliteal vein.     Sitting up in chair. still with HILL, on  4 LNC this am. desats with exertion. On OAC and tolerating. Temp last pm 101.  CXR no acute process. UA culture pending  Pulmonary wishes to monitor patient until Monday.  No new issues overnight      Review of Systems   Constitutional: Positive for activity change. Negative for appetite change, chills and fever.   HENT: Negative for ear discharge and facial swelling.    Respiratory: Positive for shortness of breath. Negative for cough.    Cardiovascular: Negative for chest pain, palpitations and leg swelling.   Gastrointestinal: Positive for nausea. Negative for abdominal distention, blood in stool and vomiting.   Musculoskeletal: Positive for back pain. Negative for gait problem, neck pain and neck stiffness.        Patient reports chronic back pain for greater than 20 years but states over the past   6 years has become progressively worse   Skin: Positive for wound. Negative for color change.        Abdominal surgical incisions   Neurological: Negative for facial asymmetry, speech difficulty and weakness.        Forgetful   Psychiatric/Behavioral: Negative for agitation and confusion. The patient is nervous/anxious.      Objective:     Vital Signs (Most Recent):  Temp: 99 °F (37.2 °C) (07/19/20 1111)  Pulse: 80 (07/19/20 1125)  Resp: 16 (07/19/20 1125)  BP: 139/60 (07/19/20 1111)  SpO2: 96 % (07/19/20 1125) Vital Signs (24h Range):  Temp:  [99 °F (37.2 °C)-101.3 °F (38.5 °C)] 99 °F (37.2 °C)  Pulse:  [77-92] 80  Resp:  [16-22] 16  SpO2:  [91 %-97 %] 96 %  BP: (131-160)/(58-70) 139/60     Weight: 106.9 kg (235 lb 10.8 oz)  Body mass index is 41.75 kg/m².    Physical Exam  Constitutional:       General: She is not in acute distress.     Appearance: Normal  appearance. She is obese. She is not ill-appearing, toxic-appearing or diaphoretic.      Comments: Morbidly obese   HENT:      Head: Normocephalic and atraumatic.      Mouth/Throat:      Mouth: Mucous membranes are moist.   Eyes:      General:         Right eye: No discharge.         Left eye: No discharge.      Extraocular Movements: Extraocular movements intact.      Conjunctiva/sclera: Conjunctivae normal.      Pupils: Pupils are equal, round, and reactive to light.   Neck:      Musculoskeletal: Normal range of motion and neck supple. No neck rigidity or muscular tenderness.   Cardiovascular:      Rate and Rhythm: Normal rate and regular rhythm.      Pulses: Normal pulses.      Comments: Trace pretib edema L>R  Pulmonary:      Effort: Pulmonary effort is normal. No respiratory distress.      Breath sounds: No wheezing or rales.      Comments: Bilateral breath sounds diminished  Abdominal:      General: Bowel sounds are normal. There is no distension.      Palpations: Abdomen is soft.      Tenderness: There is abdominal tenderness. There is no guarding.      Comments: Obese  Mild generalized tenderness   Genitourinary:     Comments: Not examined  Musculoskeletal: Normal range of motion.      Right lower leg: Edema present.      Left lower leg: Edema present.      Comments: Scant bilateral lower extremity edema   Skin:     General: Skin is warm and dry.      Capillary Refill: Capillary refill takes less than 2 seconds.      Comments: Abdominal surgical dressings intact   Neurological:      General: No focal deficit present.      Mental Status: She is alert and oriented to person, place, and time. Mental status is at baseline.      Cranial Nerves: No cranial nerve deficit.      Motor: No weakness.   Psychiatric:         Mood and Affect: Mood normal.         Behavior: Behavior normal.         Thought Content: Thought content normal.         Judgment: Judgment normal.      Comments: Appears depressed.            CRANIAL NERVES     CN III, IV, VI   Pupils are equal, round, and reactive to light.    Labs reviewed

## 2020-07-20 PROBLEM — D64.9 ACUTE ANEMIA: Status: ACTIVE | Noted: 2020-07-20

## 2020-07-20 PROBLEM — E66.812 CLASS 2 SEVERE OBESITY WITH SERIOUS COMORBIDITY IN ADULT: Status: ACTIVE | Noted: 2020-07-13

## 2020-07-20 PROBLEM — D62 ACUTE BLOOD LOSS ANEMIA: Status: ACTIVE | Noted: 2020-07-20

## 2020-07-20 LAB
ANION GAP SERPL CALC-SCNC: 9 MMOL/L (ref 8–16)
APTT BLDCRRT: 54.7 SEC (ref 21–32)
BACTERIA UR CULT: NORMAL
BACTERIA UR CULT: NORMAL
BASOPHILS # BLD AUTO: 0.01 K/UL (ref 0–0.2)
BASOPHILS # BLD AUTO: 0.02 K/UL (ref 0–0.2)
BASOPHILS NFR BLD: 0.1 % (ref 0–1.9)
BASOPHILS NFR BLD: 0.2 % (ref 0–1.9)
BUN SERPL-MCNC: 9 MG/DL (ref 8–23)
CALCIUM SERPL-MCNC: 8.2 MG/DL (ref 8.7–10.5)
CHLORIDE SERPL-SCNC: 100 MMOL/L (ref 95–110)
CO2 SERPL-SCNC: 26 MMOL/L (ref 23–29)
CREAT SERPL-MCNC: 1.1 MG/DL (ref 0.5–1.4)
DIFFERENTIAL METHOD: ABNORMAL
DIFFERENTIAL METHOD: ABNORMAL
EOSINOPHIL # BLD AUTO: 0.2 K/UL (ref 0–0.5)
EOSINOPHIL # BLD AUTO: 0.2 K/UL (ref 0–0.5)
EOSINOPHIL NFR BLD: 1.9 % (ref 0–8)
EOSINOPHIL NFR BLD: 1.9 % (ref 0–8)
ERYTHROCYTE [DISTWIDTH] IN BLOOD BY AUTOMATED COUNT: 13 % (ref 11.5–14.5)
ERYTHROCYTE [DISTWIDTH] IN BLOOD BY AUTOMATED COUNT: 13.1 % (ref 11.5–14.5)
EST. GFR  (AFRICAN AMERICAN): 56 ML/MIN/1.73 M^2
EST. GFR  (NON AFRICAN AMERICAN): 49 ML/MIN/1.73 M^2
FERRITIN SERPL-MCNC: 819 NG/ML (ref 20–300)
FINAL PATHOLOGIC DIAGNOSIS: NORMAL
GLUCOSE SERPL-MCNC: 118 MG/DL (ref 70–110)
GROSS: NORMAL
HCT VFR BLD AUTO: 25.8 % (ref 37–48.5)
HCT VFR BLD AUTO: 26 % (ref 37–48.5)
HGB BLD-MCNC: 8 G/DL (ref 12–16)
HGB BLD-MCNC: 8.1 G/DL (ref 12–16)
IMM GRANULOCYTES # BLD AUTO: 0.04 K/UL (ref 0–0.04)
IMM GRANULOCYTES # BLD AUTO: 0.06 K/UL (ref 0–0.04)
IMM GRANULOCYTES NFR BLD AUTO: 0.4 % (ref 0–0.5)
IMM GRANULOCYTES NFR BLD AUTO: 0.6 % (ref 0–0.5)
INR PPP: 1.4 (ref 0.8–1.2)
LYMPHOCYTES # BLD AUTO: 1.7 K/UL (ref 1–4.8)
LYMPHOCYTES # BLD AUTO: 2.1 K/UL (ref 1–4.8)
LYMPHOCYTES NFR BLD: 18.1 % (ref 18–48)
LYMPHOCYTES NFR BLD: 23.1 % (ref 18–48)
MCH RBC QN AUTO: 33.1 PG (ref 27–31)
MCH RBC QN AUTO: 33.5 PG (ref 27–31)
MCHC RBC AUTO-ENTMCNC: 30.8 G/DL (ref 32–36)
MCHC RBC AUTO-ENTMCNC: 31.4 G/DL (ref 32–36)
MCV RBC AUTO: 107 FL (ref 82–98)
MCV RBC AUTO: 107 FL (ref 82–98)
MONOCYTES # BLD AUTO: 1.2 K/UL (ref 0.3–1)
MONOCYTES # BLD AUTO: 1.3 K/UL (ref 0.3–1)
MONOCYTES NFR BLD: 13.7 % (ref 4–15)
MONOCYTES NFR BLD: 14.2 % (ref 4–15)
NEUTROPHILS # BLD AUTO: 5.5 K/UL (ref 1.8–7.7)
NEUTROPHILS # BLD AUTO: 6 K/UL (ref 1.8–7.7)
NEUTROPHILS NFR BLD: 60.8 % (ref 38–73)
NEUTROPHILS NFR BLD: 65 % (ref 38–73)
NRBC BLD-RTO: 0 /100 WBC
NRBC BLD-RTO: 0 /100 WBC
PLATELET # BLD AUTO: 156 K/UL (ref 150–350)
PLATELET # BLD AUTO: 183 K/UL (ref 150–350)
PMV BLD AUTO: 11 FL (ref 9.2–12.9)
PMV BLD AUTO: 11.2 FL (ref 9.2–12.9)
POCT GLUCOSE: 130 MG/DL (ref 70–110)
POCT GLUCOSE: 131 MG/DL (ref 70–110)
POCT GLUCOSE: 131 MG/DL (ref 70–110)
POCT GLUCOSE: 146 MG/DL (ref 70–110)
POTASSIUM SERPL-SCNC: 4.4 MMOL/L (ref 3.5–5.1)
PROTHROMBIN TIME: 15.6 SEC (ref 9–12.5)
RBC # BLD AUTO: 2.42 M/UL (ref 4–5.4)
RBC # BLD AUTO: 2.42 M/UL (ref 4–5.4)
SODIUM SERPL-SCNC: 135 MMOL/L (ref 136–145)
WBC # BLD AUTO: 9.08 K/UL (ref 3.9–12.7)
WBC # BLD AUTO: 9.29 K/UL (ref 3.9–12.7)

## 2020-07-20 PROCEDURE — 99233 PR SUBSEQUENT HOSPITAL CARE,LEVL III: ICD-10-PCS | Mod: ,,, | Performed by: INTERNAL MEDICINE

## 2020-07-20 PROCEDURE — 27000221 HC OXYGEN, UP TO 24 HOURS

## 2020-07-20 PROCEDURE — 25000003 PHARM REV CODE 250: Performed by: NURSE PRACTITIONER

## 2020-07-20 PROCEDURE — 63600175 PHARM REV CODE 636 W HCPCS: Performed by: NURSE PRACTITIONER

## 2020-07-20 PROCEDURE — 85730 THROMBOPLASTIN TIME PARTIAL: CPT

## 2020-07-20 PROCEDURE — 94761 N-INVAS EAR/PLS OXIMETRY MLT: CPT

## 2020-07-20 PROCEDURE — 12000002 HC ACUTE/MED SURGE SEMI-PRIVATE ROOM

## 2020-07-20 PROCEDURE — 85025 COMPLETE CBC W/AUTO DIFF WBC: CPT | Mod: 91

## 2020-07-20 PROCEDURE — 85610 PROTHROMBIN TIME: CPT

## 2020-07-20 PROCEDURE — 25000003 PHARM REV CODE 250: Performed by: INTERNAL MEDICINE

## 2020-07-20 PROCEDURE — 99233 SBSQ HOSP IP/OBS HIGH 50: CPT | Mod: ,,, | Performed by: INTERNAL MEDICINE

## 2020-07-20 PROCEDURE — 36415 COLL VENOUS BLD VENIPUNCTURE: CPT

## 2020-07-20 PROCEDURE — 63600175 PHARM REV CODE 636 W HCPCS: Performed by: INTERNAL MEDICINE

## 2020-07-20 PROCEDURE — 83540 ASSAY OF IRON: CPT

## 2020-07-20 PROCEDURE — 94799 UNLISTED PULMONARY SVC/PX: CPT

## 2020-07-20 PROCEDURE — 80048 BASIC METABOLIC PNL TOTAL CA: CPT

## 2020-07-20 PROCEDURE — 94640 AIRWAY INHALATION TREATMENT: CPT

## 2020-07-20 PROCEDURE — 25000242 PHARM REV CODE 250 ALT 637 W/ HCPCS: Performed by: NURSE PRACTITIONER

## 2020-07-20 PROCEDURE — 82728 ASSAY OF FERRITIN: CPT

## 2020-07-20 RX ORDER — HEPARIN SODIUM,PORCINE/D5W 25000/250
12 INTRAVENOUS SOLUTION INTRAVENOUS CONTINUOUS
Status: DISCONTINUED | OUTPATIENT
Start: 2020-07-20 | End: 2020-07-28

## 2020-07-20 RX ADMIN — METOPROLOL SUCCINATE 50 MG: 50 TABLET, FILM COATED, EXTENDED RELEASE ORAL at 08:07

## 2020-07-20 RX ADMIN — CIPROFLOXACIN HYDROCHLORIDE 500 MG: 500 TABLET, FILM COATED ORAL at 08:07

## 2020-07-20 RX ADMIN — IPRATROPIUM BROMIDE AND ALBUTEROL SULFATE 3 ML: .5; 3 SOLUTION RESPIRATORY (INHALATION) at 03:07

## 2020-07-20 RX ADMIN — BUDESONIDE INHALATION 0.5 MG: 0.5 SUSPENSION RESPIRATORY (INHALATION) at 07:07

## 2020-07-20 RX ADMIN — RIVAROXABAN 15 MG: 15 TABLET, FILM COATED ORAL at 08:07

## 2020-07-20 RX ADMIN — POLYETHYLENE GLYCOL (3350) 17 G: 17 POWDER, FOR SOLUTION ORAL at 08:07

## 2020-07-20 RX ADMIN — BACITRACIN ZINC NEOMYCIN SULFATE POLYMYXIN B SULFATE: 400; 3.5; 5 OINTMENT TOPICAL at 08:07

## 2020-07-20 RX ADMIN — IRON SUCROSE 200 MG: 20 INJECTION, SOLUTION INTRAVENOUS at 09:07

## 2020-07-20 RX ADMIN — MONTELUKAST 10 MG: 10 TABLET, FILM COATED ORAL at 09:07

## 2020-07-20 RX ADMIN — IPRATROPIUM BROMIDE AND ALBUTEROL SULFATE 3 ML: .5; 3 SOLUTION RESPIRATORY (INHALATION) at 07:07

## 2020-07-20 RX ADMIN — BISACODYL 5 MG: 5 TABLET, COATED ORAL at 08:07

## 2020-07-20 RX ADMIN — IPRATROPIUM BROMIDE AND ALBUTEROL SULFATE 3 ML: .5; 3 SOLUTION RESPIRATORY (INHALATION) at 11:07

## 2020-07-20 RX ADMIN — HEPARIN SODIUM 12 UNITS/KG/HR: 10000 INJECTION, SOLUTION INTRAVENOUS at 06:07

## 2020-07-20 RX ADMIN — DOCUSATE SODIUM 100 MG: 100 CAPSULE, LIQUID FILLED ORAL at 08:07

## 2020-07-20 RX ADMIN — CIPROFLOXACIN HYDROCHLORIDE 500 MG: 500 TABLET, FILM COATED ORAL at 09:07

## 2020-07-20 RX ADMIN — PANTOPRAZOLE SODIUM 40 MG: 40 TABLET, DELAYED RELEASE ORAL at 08:07

## 2020-07-20 RX ADMIN — FLUTICASONE PROPIONATE 100 MCG: 50 SPRAY, METERED NASAL at 08:07

## 2020-07-20 RX ADMIN — CEFTRIAXONE 1 G: 1 INJECTION, SOLUTION INTRAVENOUS at 05:07

## 2020-07-20 NOTE — ASSESSMENT & PLAN NOTE
Body mass index is 41.58 kg/m².  General weight loss/lifestyle modification strategies discussed (elicit support from others; identify saboteurs; non-food rewards, etc).

## 2020-07-20 NOTE — RESPIRATORY THERAPY
07/20/20 0707   Patient Assessment/Suction   Level of Consciousness (AVPU) alert   All Lung Fields Breath Sounds clear;diminished   PRE-TX-O2   O2 Device (Oxygen Therapy) nasal cannula   $ Is the patient on Low Flow Oxygen? Yes   Oxygen Concentration (%) 4   Oxygen Analyzed Concentration (%) 36 %   SpO2 97 %   Pulse Oximetry Type Intermittent   $ Pulse Oximetry - Multiple Charge Pulse Oximetry - Multiple   Pulse 86   Resp 16   Aerosol Therapy   $ Aerosol Therapy Charges Aerosol Treatment   Respiratory Treatment Status (SVN) given   Treatment Route (SVN) mask   Patient Position (SVN) HOB elevated   Post Treatment Assessment (SVN) breath sounds unchanged   Signs of Intolerance (SVN) none   Breath Sounds Post-Respiratory Treatment   Throughout All Fields Post-Treatment All Fields   Throughout All Fields Post-Treatment no change   Post-treatment Heart Rate (beats/min) 80   Post-treatment Resp Rate (breaths/min) 17   Ready to Wean/Extubation Screen   FIO2<=50 (chart decimal) 0.04

## 2020-07-20 NOTE — PLAN OF CARE
POC reviewed with pt. Pt AAOX4. Pt verbalized understanding. IV antibiotics administered per order. Pt afebrile throughout shift. NSR on telemetry. Bed in low position, call light in reach.

## 2020-07-20 NOTE — PLAN OF CARE
Awaiting pulmonology medically clearance.  Patient accepted with [facility] home health.  Home O2 w/portable tank approved by [facility] and [facility] ok to pull portable tanks.  Case management following for discharge planning needs.       07/20/20 0966   Discharge Reassessment   Assessment Type Discharge Planning Reassessment   Discharge Plan A Home Health   Discharge Plan B Home Health   DME Needed Upon Discharge  oxygen  (may need)

## 2020-07-20 NOTE — ASSESSMENT & PLAN NOTE
Possible. hgb trending down. Check iron studies. IV venofer x 1 dose. Trend CBC. Consult hematology.  Lab Results   Component Value Date    WBC 9.08 07/20/2020    HGB 8.0 (L) 07/20/2020    HCT 26.0 (L) 07/20/2020     (H) 07/20/2020     07/20/2020

## 2020-07-20 NOTE — PHYSICIAN QUERY
PT Name: Sammi Abreu  MR #: [id number]     Documentation Clarification      CDS: Mino Bryson RN CCDS               Contact information: [email or web address]     This form is a permanent document in the medical record.     Query Date: July 20, 2020    By submitting this query, we are merely seeking further clarification of documentation. Please utilize your independent clinical judgment when addressing the question(s) below.    The Medical Record reflects the following:    Supporting Clinical Findings Location in Medical Record   Bilateral lower extremity venous demonstrates a closest deep vein thrombosis to proximal left popliteal vein.    Acute deep vein thrombosis (DVT) of popliteal vein of right lower extremity   7/16/2020 Hospital Medicine progress notes Anabel Haij NP   Occlusive deep vein thrombosis is identified in the proximal left popliteal vein.  Other focal areas of DVT are not seen in either leg. 7/14/2020 Bilateral lower extremity ultrasound   enoxaparin 100 mg SC  heparin 2,812 units IV  Heparin IV infusion ranging from 9-18 Units/kg/hr  rivaroxaban 15 mg PO BID WM   7/14/2020 Medications    7/14-7/16/2020 Medication  Current Medication                                                                            Provider, please provide diagnosis or diagnoses associated with above clinical findings.    [ x  ] Acute deep vein thrombosis (DVT) of popliteal vein of left lower extremity   [   ] Acute deep vein thrombosis (DVT) of popliteal vein of right lower extremity   [   ] Other (please specify): ____________   [  ] Clinically undetermined

## 2020-07-20 NOTE — PLAN OF CARE
07/17/2020 OK to deliver portable tank to patient's hospital room per Rolonda with [facility]( [phone].      07/20/2020 4:00pm SW delivered portable tank to patient's hospital room. Patient signed delivery ticket and rental agreement.       07/20/20 1620   Post-Acute Status   Post-Acute Authorization E   E Status Set-up Complete

## 2020-07-20 NOTE — NURSING
Main campus microbiology called positive blood cultures from the ped and anaerobic bottles:  Gram positive cocci in clusters resembling staph.  [facility] Nur messaged.

## 2020-07-20 NOTE — PROGRESS NOTES
Progress Note  PULMONARY    Admit Date: 7/13/2020 07/20/2020  From consult 7/14:  History of Present Illness:  Pt had laparoscopic rosette yesterday with post op low ox, cta with central pe.  Pt had prior h/o dvt- was on xarelto in 2015 - occurred with elective left hip replacement - left groin dvt.  [facility].       Pt seen by me last yr with h/o wheezes and cough and sob, also osas- non compliant.     Pt has had episodes of sob but vague - some sob with no cough/wheezes. Vague left leg swelling.     Pt had back problems needing injections in past- last injections est 5 yrs ago.      Pt developed back pain, worse movement ppt eval pcp/pain doc- injection set up.  She also developed left leg pain - upper thigh- occurred last couple months.  She was having eval with vascular doc for right leg with min right leg symptoms.  She had procedure with improved cirulation right.   Pt had bilat u/s legs in May, and right u/s in June -- both neg for dvt.       Pt developed sob- episodic with one episode of impending doom occurring 2 wks pta.       Pt had had some schwartz since severe episode 2 wks ago.       Pt had had severe pain in chest , then left leg, then sob - had eval at [facility] , pcp, pain doc, then [facility]- biliary dz found and surg deferred til after back injection (injection was not effective for pain). Pt had lap rosette yesterday.  No recent cough/wheezes, no compliant with cpap.         SUBJECTIVE:     7/15 having min umbilical wound pain, breathing better.  7/16 no c/o  July 17th-still on oxygen.  Mobilizing, no new complaints  Umbilicus more comfortable.    July 20 schwartz walking 30 ft, feels like will collapse.  No blood loss appreciated        PFSH and Allergies reviewed.    OBJECTIVE:     Vitals (Most recent):  Vitals:    07/20/20 1524   BP:    Pulse: 74   Resp: 18   Temp:        Vitals (24 hour range):  Temp:  [98.5 °F (36.9 °C)-101 °F (38.3 °C)]   Pulse:  [74-91]   Resp:  [16-20]   BP: ()/(44-65)   SpO2:  [94  %-98 %]       Intake/Output Summary (Last 24 hours) at 7/20/2020 1609  Last data filed at 7/20/2020 0509  Gross per 24 hour   Intake 530 ml   Output 400 ml   Net 130 ml          Physical Exam:  The patient's neuro status (alertness,orientation,cognitive function,motor skills,), pharyngeal exam (oral lesions, hygiene, abn dentition,), Neck (jvd,mass,thyroid,nodes in neck and above/below clavicle),RESPIRATORY(symmetry,effort,fremitus,percussion,auscultation),  Cor(rhythm,heart tones including gallops,perfusion,edema)ABD(distention,hepatic&splenomegaly,tenderness,masses), Skin(rash,cyanosis),Psyc(affect,judgement,).  Exam negative except for these pertinent findings:    Alert, in chair, chest is symmetric, no distress, normal percussion, normal fremitus and good normal breath sounds      Radiographs reviewed: view by direct vision  Massive pe 7/14 cta    No results found for this or any previous visit.]    Labs     Recent Labs   Lab 07/20/20  0541   WBC 9.29   HGB 8.1*   HCT 25.8*        Recent Labs   Lab 07/20/20  0541   *   K 4.4      CO2 26   BUN 9   CREATININE 1.1   *   CALCIUM 8.2*   No results for input(s): PH, PCO2, PO2, HCO3 in the last 24 hours.  Microbiology Results (last 7 days)     Procedure Component Value Units Date/Time    Urine culture [[id number]] Collected: 07/18/20 1920    Order Status: Completed Specimen: Urine Updated: 07/20/20 1334     Urine Culture, Routine Multiple organisms isolated. None in predominance.  Repeat if      clinically necessary.    Narrative:      Specimen Source->Urine    Blood culture [[id number]] Collected: 07/18/20 1653    Order Status: Completed Specimen: Blood Updated: 07/20/20 0554     Blood Culture, Routine Gram stain juan bottle: Gram positive cocci in clusters resembling Staph       Results called to and read back by: Marika Mcclendon RN 07/20/2020  05:53    Blood culture [[id number]] Collected: 07/18/20 1842    Order Status: Completed Specimen: Blood  Updated: 07/20/20 0554     Blood Culture, Routine Gram stain peds bottle: Gram positive cocci in clusters resembling Staph       Results called to and read back by: Marika Mcclendon RN 07/20/2020  05:53    Urine culture [[id number]] Collected: 07/18/20 0425    Order Status: Completed Specimen: Urine Updated: 07/19/20 1253     Urine Culture, Routine Multiple organisms isolated. None in predominance.  Repeat if      clinically necessary.    Narrative:      Specimen Source->Urine    Urine Culture High Risk [[id number]] Collected: 07/18/20 0740    Order Status: Canceled Specimen: Urine, Clean Catch           Impression:  Active Hospital Problems    Diagnosis  POA    *Acute respiratory failure with hypoxemia [J96.01]  Yes    Acute anemia [D64.9]  No    UTI (urinary tract infection) [N39.0]  No    Thrombocytopenia [D69.6]  No    Heparin-induced thrombocytopenia, type 1 [D75.82]  No    Bilateral pulmonary embolism [I26.99]  Yes    Hyperkalemia [E87.5]  Yes    Acute deep vein thrombosis (DVT) of popliteal vein of right lower extremity [I82.431]  Yes    Elevated troponin [R79.89]  Yes    Morbid obesity [E66.01]  Yes    Status post laparoscopic cholecystectomy [Z90.49]  Not Applicable     07/13/2020 done by Dr. Jomar Soria      Type 2 diabetes mellitus with diabetic neuropathy, without long-term current use of insulin [E11.40]  Yes    COPD (chronic obstructive pulmonary disease) [J44.9]  Yes    CHARLIE (obstructive sleep apnea) [G47.33]  Yes    Hypoxemia [R09.02]  Yes    Chronic back pain [M54.9, G89.29]  Yes    History of DVT in adulthood [Z86.718]  Not Applicable    GERD (gastroesophageal reflux disease) [K21.9]  Yes    Pulmonary hypertension [I27.20]  Yes    Hyperlipidemia associated with type 2 diabetes mellitus [E11.69, E78.5]  Yes    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               Plan:     7/15 hit type 1, cont  heparin going to oral rx in am.  Hold mobilization/walking with pulm htn    No resp meds needed.    Below umbilicus sl erythema- monitor.    7/16 plt up sl 128, will go to po rx,   Creat 1.3,     Dose xarelto 15 bid x 21 days then 20/d, stop heparin drip, Make certain Dr Mcdonald aware of periumbilical pain pt relates, outpt when pt on rm air with restricted activities next couple wks.  Discussed ivc filter to decrease anti coagg in future - minimize bleed risk. Pt prefers oral anticoagg.  Would anticoagg indefinitely.    July 17th-patient requires 3 L of oxygen currently-once on room air should be safe to go home.  She did have pulmonary hypertension on echo in addition to massive pulmonary emboli.  Would be cautious.      Will follow up CBC in a.m.        July 20 had fevers over wkend. Still on 3lpm  From 12.1 hgb - nowhgb 8.1 jehovah witness on anticoagg.  I had discussed ivc filter- will recommend ivc filter.  Pt cannot go home as lives alone and hgb not stable.  Discussed ecf but pt reluctant and not stable for outpt.  Will ask heme regarding anemia management.  Will stop oral anticoagg.  May need gi eval.  Discussed with ASPEN Haji.  Iron/ferritin ordered - may need iv iron.            .

## 2020-07-20 NOTE — RESPIRATORY THERAPY
07/20/20 1516   Home Oxygen Qualification   Room Air SpO2 At Rest 92 %   Room Air SpO2 on Exertion (!) 86 %   SpO2 During Exertion on O2 96 %   Heart Rate on O2 84 bpm   Exertion O2 LPM 2 LPM   SpO2 on Recovery 95 %   Recovery Heart Rate 86 bpm   Recovery O2 LPM 2 LPM

## 2020-07-20 NOTE — SUBJECTIVE & OBJECTIVE
Interval  History: still with [facility]ILL, on  3LNC this am. desats with exertion. On OAC and tolerating. Afebrile this am. declining concerning due to blood thinners and unable to give PRBCs due to Jwitness.  Appears depressed.     Discussed with Dr. Suero. Recommend IVC filter placement.  Spoke with Dr. Pino-plan for Thurs/Fri.  Hold OAC. Add heparin drip. Explained POC to patient. Answered all questions.      Review of Systems   Constitutional: Positive for activity change. Negative for appetite change, chills and fever.   HENT: Negative for ear discharge and facial swelling.    Respiratory: Positive for shortness of breath. Negative for cough.    Cardiovascular: Negative for chest pain, palpitations and leg swelling.   Gastrointestinal: Positive for nausea. Negative for abdominal distention, blood in stool and vomiting.   Musculoskeletal: Positive for back pain. Negative for gait problem, neck pain and neck stiffness.        Patient reports chronic back pain for greater than 20 years but states over the past   6 years has become progressively worse   Skin: Positive for wound. Negative for color change.        Abdominal surgical incisions   Neurological: Negative for facial asymmetry, speech difficulty and weakness.        Forgetful   Psychiatric/Behavioral: Negative for agitation and confusion. The patient is nervous/anxious.      Objective:     Vital Signs (Most Recent):  Temp: 98.5 °F (36.9 °C) (07/20/20 1142)  Pulse: 74 (07/20/20 1524)  Resp: 18 (07/20/20 1524)  BP: 136/65 (07/20/20 1142)  SpO2: 95 % (07/20/20 1524) Vital Signs (24h Range):  Temp:  [98.5 °F (36.9 °C)-100 °F (37.8 °C)] 98.5 °F (36.9 °C)  Pulse:  [74-86] 74  Resp:  [16-20] 18  SpO2:  [94 %-98 %] 95 %  BP: ()/(44-65) 136/65     Weight: 106.5 kg (234 lb 11.2 oz)  Body mass index is 41.58 kg/m².    Physical Exam  Constitutional:       General: She is not in acute distress.     Appearance: Normal appearance. She is obese. She is not ill-appearing,  toxic-appearing or diaphoretic.      Comments: Morbidly obese   HENT:      Head: Normocephalic and atraumatic.      Mouth/Throat:      Mouth: Mucous membranes are moist.   Eyes:      General:         Right eye: No discharge.         Left eye: No discharge.      Extraocular Movements: Extraocular movements intact.      Conjunctiva/sclera: Conjunctivae normal.      Pupils: Pupils are equal, round, and reactive to light.   Neck:      Musculoskeletal: Normal range of motion and neck supple. No neck rigidity or muscular tenderness.   Cardiovascular:      Rate and Rhythm: Normal rate and regular rhythm.      Pulses: Normal pulses.      Comments: Trace pretib edema L>R  Pulmonary:      Effort: Pulmonary effort is normal. No respiratory distress.      Breath sounds: No wheezing or rales.      Comments: Bilateral breath sounds diminished  Abdominal:      General: Bowel sounds are normal. There is no distension.      Palpations: Abdomen is soft.      Tenderness: There is no abdominal tenderness. There is no guarding.      Comments: Obese     Genitourinary:     Comments: Not examined  Musculoskeletal: Normal range of motion.      Right lower leg: Edema present.      Left lower leg: Edema present.      Comments: Scant bilateral lower extremity edema   Skin:     General: Skin is warm and dry.      Capillary Refill: Capillary refill takes less than 2 seconds.      Comments: Abdominal surgical dressings intact   Neurological:      General: No focal deficit present.      Mental Status: She is alert and oriented to person, place, and time. Mental status is at baseline.      Cranial Nerves: No cranial nerve deficit.      Motor: No weakness.   Psychiatric:         Mood and Affect: Mood normal.         Behavior: Behavior normal.         Thought Content: Thought content normal.         Judgment: Judgment normal.      Comments: Appears depressed.           CRANIAL NERVES     CN III, IV, VI   Pupils are equal, round, and reactive to  light.    Labs reviewed

## 2020-07-20 NOTE — RESPIRATORY THERAPY
07/19/20 2002   Patient Assessment/Suction   Level of Consciousness (AVPU) alert   Respiratory Effort Normal;Unlabored   Expansion/Accessory Muscles/Retractions expansion symmetric;no retractions;no use of accessory muscles   All Lung Fields Breath Sounds clear   PRE-TX-O2   O2 Device (Oxygen Therapy) nasal cannula   Flow (L/min) 4   Oxygen Concentration (%) 36   SpO2 96 %   Pulse Oximetry Type Intermittent   Pulse 79   Resp 20   Aerosol Therapy   $ Aerosol Therapy Charges Aerosol Treatment   Respiratory Treatment Status (SVN) given   Treatment Route (SVN) mask;oxygen   Patient Position (SVN) semi-[place]'s   Signs of Intolerance (SVN) none   Breath Sounds Post-Respiratory Treatment   Throughout All Fields Post-Treatment All Fields   Throughout All Fields Post-Treatment no change   Post-treatment Heart Rate (beats/min) 81   Post-treatment Resp Rate (breaths/min) 18   Incentive Spirometer   $ Incentive Spirometer Charges done with encouragement;breath hold utilized   Administration (IS) proper technique demonstrated   Number of Repetitions (IS) 10   Level Incentive Spirometer (mL) 750   Patient Tolerance (IS) good   Ready to Wean/Extubation Screen   FIO2<=50 (chart decimal) 0.36

## 2020-07-20 NOTE — PROGRESS NOTES
[facility] Hospital Medicine  Progress Note    Patient Name: Sammi Abreu  MRN: [id number]  Patient Class: IP- Inpatient   Admission Date: 7/13/2020  Length of Stay: 6 days  Attending Physician: Alix Ruth MD  Primary Care Provider: Osmani Villatoro MD        Subjective:     Principal Problem:Acute respiratory failure with hypoxemia        HPI:  This is a 79-year-old female who has a past medical history of arthritis and chronic back pain, fibromyalgia, glaucoma, hyperlipidemia, hypertension, sleep apnea with history of noncompliance with CPAP, morbid obesity, hypertension, GERD, COPD, diabetes type 2, prior DVT, and gallstones.  Please note the patient is also a [employer].  Patient is being received from [facility]patient surgical [facility].  She is status post laparoscopic cholecystectomy today by Dr. Jomar Soria.  Patient tolerated procedure well.  She however was unable to be discharged postop due to ongoing hypoxemia and has been transferred here for further evaluation and treatment.                Overview/Hospital Course:  The patient was monitored closely during her stay.  She was noted to have an elevated D-dimer.  She was placed on full-dose Lovenox.  Patient when CTA of the chest which showed extensive bilateral pulmonary emboli.  Pulmonology was consulted.  Patient's full-dose Lovenox was discontinued and she was placed on IV heparin drip.    Interval  History: still with HILL, on  3LNC this am. desats with exertion. On OAC and tolerating. Afebrile this am. declining concerning due to blood thinners and unable to give PRBCs due to Jwitness.  Appears depressed.     Discussed with Dr. Suero. Recommend IVC filter placement.  Spoke with Dr. Pino-plan for Thurs/Fri.  Hold OAC. Add heparin drip. Explained POC to patient. Answered all questions.      Review of Systems   Constitutional: Positive for activity change. Negative for appetite change, chills and fever.   HENT: Negative for  ear discharge and facial swelling.    Respiratory: Positive for shortness of breath. Negative for cough.    Cardiovascular: Negative for chest pain, palpitations and leg swelling.   Gastrointestinal: Positive for nausea. Negative for abdominal distention, blood in stool and vomiting.   Musculoskeletal: Positive for back pain. Negative for gait problem, neck pain and neck stiffness.        Patient reports chronic back pain for greater than 20 years but states over the past   6 years has become progressively worse   Skin: Positive for wound. Negative for color change.        Abdominal surgical incisions   Neurological: Negative for facial asymmetry, speech difficulty and weakness.        Forgetful   Psychiatric/Behavioral: Negative for agitation and confusion. The patient is nervous/anxious.      Objective:     Vital Signs (Most Recent):  Temp: 98.5 °F (36.9 °C) (07/20/20 1142)  Pulse: 74 (07/20/20 1524)  Resp: 18 (07/20/20 1524)  BP: 136/65 (07/20/20 1142)  SpO2: 95 % (07/20/20 1524) Vital Signs (24h Range):  Temp:  [98.5 °F (36.9 °C)-100 °F (37.8 °C)] 98.5 °F (36.9 °C)  Pulse:  [74-86] 74  Resp:  [16-20] 18  SpO2:  [94 %-98 %] 95 %  BP: ()/(44-65) 136/65     Weight: 106.5 kg (234 lb 11.2 oz)  Body mass index is 41.58 kg/m².    Physical Exam  Constitutional:       General: She is not in acute distress.     Appearance: Normal appearance. She is obese. She is not ill-appearing, toxic-appearing or diaphoretic.      Comments: Morbidly obese   HENT:      Head: Normocephalic and atraumatic.      Mouth/Throat:      Mouth: Mucous membranes are moist.   Eyes:      General:         Right eye: No discharge.         Left eye: No discharge.      Extraocular Movements: Extraocular movements intact.      Conjunctiva/sclera: Conjunctivae normal.      Pupils: Pupils are equal, round, and reactive to light.   Neck:      Musculoskeletal: Normal range of motion and neck supple. No neck rigidity or muscular tenderness.    Cardiovascular:      Rate and Rhythm: Normal rate and regular rhythm.      Pulses: Normal pulses.      Comments: Trace pretib edema L>R  Pulmonary:      Effort: Pulmonary effort is normal. No respiratory distress.      Breath sounds: No wheezing or rales.      Comments: Bilateral breath sounds diminished  Abdominal:      General: Bowel sounds are normal. There is no distension.      Palpations: Abdomen is soft.      Tenderness: There is no abdominal tenderness. There is no guarding.      Comments: Obese     Genitourinary:     Comments: Not examined  Musculoskeletal: Normal range of motion.      Right lower leg: Edema present.      Left lower leg: Edema present.      Comments: Scant bilateral lower extremity edema   Skin:     General: Skin is warm and dry.      Capillary Refill: Capillary refill takes less than 2 seconds.      Comments: Abdominal surgical dressings intact   Neurological:      General: No focal deficit present.      Mental Status: She is alert and oriented to person, place, and time. Mental status is at baseline.      Cranial Nerves: No cranial nerve deficit.      Motor: No weakness.   Psychiatric:         Mood and Affect: Mood normal.         Behavior: Behavior normal.         Thought Content: Thought content normal.         Judgment: Judgment normal.      Comments: Appears depressed.           CRANIAL NERVES     CN III, IV, VI   Pupils are equal, round, and reactive to light.    Labs reviewed      Assessment/Plan:      * Acute respiratory failure with hypoxemia  Secondary to extensive bilateral pulmonary emboli-  Pulmonary consulted      Acute blood loss anemia  Possible. hgb trending down. Check iron studies. IV venofer x 1 dose. Trend CBC. Consult hematology.  Lab Results   Component Value Date    WBC 9.08 07/20/2020    HGB 8.0 (L) 07/20/2020    HCT 26.0 (L) 07/20/2020     (H) 07/20/2020     07/20/2020             Azotemia  Renal function at baseline      UTI (urinary tract  infection)  Check UA and urine cx. Received rocephin x 1 dose. Change to cipro  Blood culture pending      Heparin-induced thrombocytopenia, type 1  Monitor CBC.   Lab Results   Component Value Date    WBC 10.15 07/18/2020    HGB 8.5 (L) 07/18/2020    HCT 26.6 (L) 07/18/2020     (H) 07/18/2020     (L) 07/18/2020             Acute deep vein thrombosis (DVT) of popliteal vein of right lower extremity  Continue full-dose anticoagulation      Hyperkalemia  Monitor  Trend BMP  Hold home Aldactone for now- resolved      Bilateral pulmonary embolism  07/14/2020 received message from staff nurse at 1046  that CTA of the chest results show patient with extensive bilateral pulmonary emboli  Monitor O2 sats, supplement O2 as needed  Continue full-dose Lovenox-was changed to IV heparin drip per pulmonology  Consult pulmonology.    Heparin drip DCed. Now on xarelto      History of DVT in adulthood  Patient reports history of DVT in the right upper extremity greater than 10 years ago and  history of groin DVT following a hip surgery approximately 8 years ago-  Continue MARIANELA hose  Patient now with lower extremity DVT and extensive bilateral pulmonary emboli  Patient with decreased activity/ambulation due to increased recent back pain however she does have history of prior DVT in the past once related to prior hip surgery, consider hematology consult      Chronic back pain  With chronic pain syndrome  Patient reports recent back injection approximately 2 weeks ago  Continue p.r.n. pain medication  Patient reports she sees Dr. Gilliam      Hypoxemia  Secondary to extensive bilateral pulmonary emboli      CHARLIE (obstructive sleep apnea)  Noted  Patient reports she does not use her CPAP at home      COPD (chronic obstructive pulmonary disease)  Monitor O2 sats, supplement O2 as needed  Q 4 hr while awake duo nebs and b.i.d. Pulmicort  Incentive spirometer q.1 hour while awake      Type 2 diabetes mellitus with diabetic  neuropathy, without long-term current use of insulin  Diabetic diet  Accu-Cheks with correctional sliding scale insulin  Blood sugars running 121- 136  HGB A1c is 6.4      Status post laparoscopic cholecystectomy  Patient is status post laparoscopic cholecystectomy done today by Dr. aJiro Soria at outpatient surgical center-  Orders as per surgeon - Dr. Walker  Low-fat low-cholesterol ADA diet  P.r.n. pain and antiemetic medication      Class 2 severe obesity with serious comorbidity in adult  Body mass index is 41.58 kg/m².  General weight loss/lifestyle modification strategies discussed (elicit support from others; identify saboteurs; non-food rewards, etc).          GERD (gastroesophageal reflux disease)  Continue PPI      Pulmonary hypertension  Secondary to PE      Hyperlipidemia associated with type 2 diabetes mellitus  No home anti-lipidemic noted- low fat, low-cholesterol diet    Hypertension associated with diabetes  Continue home medications        VTE Risk Mitigation (From admission, onward)         Ordered     heparin 25,000 units in dextrose 5% 250 ml (100 units/mL) infusion MINIMAL INTENSITY nomogram - [facility]  Continuous     Question:  Heparin Infusion Adjustment (DO NOT MODIFY ANSWER)  Answer:  \\[email or web address]\epic\Images\Pharmacy\HeparinInfusions\heparin MINIMAL  INTENSITY nomogram for [facility] [id number]    07/20/20 1604     IP VTE HIGH RISK PATIENT  Once      07/13/20 1900     Place MARIANELA cotoe  Until discontinued      07/13/20 1900                      Anabel Haji NP  Department of Hospital Medicine   [facility]

## 2020-07-20 NOTE — PLAN OF CARE
Patient continues to complain of intermittent abdominal pain.  Medicated with prn pain meds as requested and available.  Dyspnea on minimal exertion noted, O2 4LPM NC in place, O2 sats WNL.

## 2020-07-21 ENCOUNTER — PATIENT OUTREACH (OUTPATIENT)
Dept: ADMINISTRATIVE | Facility: OTHER | Age: 80
End: 2020-07-21

## 2020-07-21 ENCOUNTER — TELEPHONE (OUTPATIENT)
Dept: HEMATOLOGY/ONCOLOGY | Facility: CLINIC | Age: 80
End: 2020-07-21

## 2020-07-21 PROBLEM — A49.01 STAPHYLOCOCCUS AUREUS INFECTION: Status: ACTIVE | Noted: 2020-07-21

## 2020-07-21 LAB
APTT BLDCRRT: 47 SEC (ref 21–32)
APTT BLDCRRT: 56 SEC (ref 21–32)
APTT BLDCRRT: 58.4 SEC (ref 21–32)
BASOPHILS # BLD AUTO: 0.02 K/UL (ref 0–0.2)
BASOPHILS NFR BLD: 0.2 % (ref 0–1.9)
DIFFERENTIAL METHOD: ABNORMAL
EOSINOPHIL # BLD AUTO: 0.2 K/UL (ref 0–0.5)
EOSINOPHIL NFR BLD: 2.6 % (ref 0–8)
ERYTHROCYTE [DISTWIDTH] IN BLOOD BY AUTOMATED COUNT: 13.1 % (ref 11.5–14.5)
FOLATE SERPL-MCNC: 14.1 NG/ML (ref 4–24)
HCT VFR BLD AUTO: 24.9 % (ref 37–48.5)
HGB BLD-MCNC: 7.8 G/DL (ref 12–16)
IMM GRANULOCYTES # BLD AUTO: 0.04 K/UL (ref 0–0.04)
IMM GRANULOCYTES NFR BLD AUTO: 0.5 % (ref 0–0.5)
IRON SERPL-MCNC: 17 UG/DL (ref 30–160)
LYMPHOCYTES # BLD AUTO: 2.1 K/UL (ref 1–4.8)
LYMPHOCYTES NFR BLD: 24.7 % (ref 18–48)
MCH RBC QN AUTO: 33.8 PG (ref 27–31)
MCHC RBC AUTO-ENTMCNC: 31.3 G/DL (ref 32–36)
MCV RBC AUTO: 108 FL (ref 82–98)
MONOCYTES # BLD AUTO: 1.2 K/UL (ref 0.3–1)
MONOCYTES NFR BLD: 13.8 % (ref 4–15)
NEUTROPHILS # BLD AUTO: 4.8 K/UL (ref 1.8–7.7)
NEUTROPHILS NFR BLD: 58.2 % (ref 38–73)
NRBC BLD-RTO: 0 /100 WBC
PLATELET # BLD AUTO: 182 K/UL (ref 150–350)
PMV BLD AUTO: 10.4 FL (ref 9.2–12.9)
POCT GLUCOSE: 130 MG/DL (ref 70–110)
POCT GLUCOSE: 98 MG/DL (ref 70–110)
RBC # BLD AUTO: 2.31 M/UL (ref 4–5.4)
SATURATED IRON: 8 % (ref 20–50)
TOTAL IRON BINDING CAPACITY: 207 UG/DL (ref 250–450)
TRANSFERRIN SERPL-MCNC: 140 MG/DL (ref 200–375)
VIT B12 SERPL-MCNC: 774 PG/ML (ref 210–950)
WBC # BLD AUTO: 8.33 K/UL (ref 3.9–12.7)

## 2020-07-21 PROCEDURE — 85025 COMPLETE CBC W/AUTO DIFF WBC: CPT

## 2020-07-21 PROCEDURE — 94761 N-INVAS EAR/PLS OXIMETRY MLT: CPT

## 2020-07-21 PROCEDURE — 99900035 HC TECH TIME PER 15 MIN (STAT)

## 2020-07-21 PROCEDURE — 94799 UNLISTED PULMONARY SVC/PX: CPT

## 2020-07-21 PROCEDURE — 25000003 PHARM REV CODE 250: Performed by: INTERNAL MEDICINE

## 2020-07-21 PROCEDURE — 63600175 PHARM REV CODE 636 W HCPCS: Performed by: INTERNAL MEDICINE

## 2020-07-21 PROCEDURE — 99232 PR SUBSEQUENT HOSPITAL CARE,LEVL II: ICD-10-PCS | Mod: ,,, | Performed by: INTERNAL MEDICINE

## 2020-07-21 PROCEDURE — 99223 1ST HOSP IP/OBS HIGH 75: CPT | Mod: ,,, | Performed by: INTERNAL MEDICINE

## 2020-07-21 PROCEDURE — 85730 THROMBOPLASTIN TIME PARTIAL: CPT

## 2020-07-21 PROCEDURE — 27000221 HC OXYGEN, UP TO 24 HOURS

## 2020-07-21 PROCEDURE — 94640 AIRWAY INHALATION TREATMENT: CPT

## 2020-07-21 PROCEDURE — 12000002 HC ACUTE/MED SURGE SEMI-PRIVATE ROOM

## 2020-07-21 PROCEDURE — 82607 VITAMIN B-12: CPT

## 2020-07-21 PROCEDURE — 25000003 PHARM REV CODE 250: Performed by: NURSE PRACTITIONER

## 2020-07-21 PROCEDURE — 25000242 PHARM REV CODE 250 ALT 637 W/ HCPCS: Performed by: NURSE PRACTITIONER

## 2020-07-21 PROCEDURE — 82746 ASSAY OF FOLIC ACID SERUM: CPT

## 2020-07-21 PROCEDURE — 36415 COLL VENOUS BLD VENIPUNCTURE: CPT

## 2020-07-21 PROCEDURE — 99232 SBSQ HOSP IP/OBS MODERATE 35: CPT | Mod: ,,, | Performed by: INTERNAL MEDICINE

## 2020-07-21 PROCEDURE — 99223 PR INITIAL HOSPITAL CARE,LEVL III: ICD-10-PCS | Mod: ,,, | Performed by: INTERNAL MEDICINE

## 2020-07-21 RX ORDER — FOLIC ACID 1 MG/1
1 TABLET ORAL DAILY
Status: DISCONTINUED | OUTPATIENT
Start: 2020-07-21 | End: 2020-07-29 | Stop reason: HOSPADM

## 2020-07-21 RX ADMIN — BACITRACIN ZINC NEOMYCIN SULFATE POLYMYXIN B SULFATE: 400; 3.5; 5 OINTMENT TOPICAL at 08:07

## 2020-07-21 RX ADMIN — IPRATROPIUM BROMIDE AND ALBUTEROL SULFATE 3 ML: .5; 3 SOLUTION RESPIRATORY (INHALATION) at 12:07

## 2020-07-21 RX ADMIN — PANTOPRAZOLE SODIUM 40 MG: 40 TABLET, DELAYED RELEASE ORAL at 08:07

## 2020-07-21 RX ADMIN — BUDESONIDE INHALATION 0.5 MG: 0.5 SUSPENSION RESPIRATORY (INHALATION) at 07:07

## 2020-07-21 RX ADMIN — FOLIC ACID 1 MG: 1 TABLET ORAL at 02:07

## 2020-07-21 RX ADMIN — CEFTRIAXONE 1 G: 1 INJECTION, SOLUTION INTRAVENOUS at 04:07

## 2020-07-21 RX ADMIN — METOPROLOL SUCCINATE 50 MG: 50 TABLET, FILM COATED, EXTENDED RELEASE ORAL at 08:07

## 2020-07-21 RX ADMIN — IPRATROPIUM BROMIDE AND ALBUTEROL SULFATE 3 ML: .5; 3 SOLUTION RESPIRATORY (INHALATION) at 07:07

## 2020-07-21 RX ADMIN — CIPROFLOXACIN HYDROCHLORIDE 500 MG: 500 TABLET, FILM COATED ORAL at 08:07

## 2020-07-21 RX ADMIN — MONTELUKAST 10 MG: 10 TABLET, FILM COATED ORAL at 08:07

## 2020-07-21 RX ADMIN — FLUTICASONE PROPIONATE 100 MCG: 50 SPRAY, METERED NASAL at 08:07

## 2020-07-21 NOTE — NURSING
Call received from lab that PTT resulted at 146.  Infusion stopped and ordered stat redraw per orders.

## 2020-07-21 NOTE — NURSING
Repeat PTT resulted in 58.4. Dose decreased by 2 as ordeed on nomogram. Repeat aPTT scheduled for redraw in 6 hours.

## 2020-07-21 NOTE — CONSULTS
HPI    79 year old female who has a past medical history of arthritis and chronic back pain, fibromyalgia, glaucoma, hyperlipidemia, hypertension, sleep apnea with history of noncompliance with CPAP, morbid obesity, hypertension, GERD, COPD, diabetes type 2, prior DVT, and gallstones.        Please note the patient is also a [employer].        Patient is being received from [facility]patient surgical center.  She is status post laparoscopic cholecystectomy today by Dr. Jomar Mcdonald.  Patient tolerated procedure well.  She however was unable to be discharged postop due to ongoing hypoxemia and has been transferred here for further evaluation and treatment.     CT scan shows extensive pulmonary embolism.  Patient is currently heparin drip.    Past Medical History:   Diagnosis Date    ALLERGIC RHINITIS     Anemia     Arthritis     Back pain     Bronchitis     Cataract     OU    Cholelithiasis     COPD (chronic obstructive pulmonary disease)     Degenerative disc disease     Diabetes mellitus     pre diabetic    Diverticulosis     DVT (deep venous thrombosis)     Edema     Encounter for blood transfusion 1979    Fibromyalgia     Glaucoma     Gout     Hx of colonic polyps     Hyperlipidemia     Hypertension     Incontinence     Osteoporosis     Reflux     Refusal of blood transfusions as patient is [employer]     Sleep apnea     non compliant with CPAP    Vestibulitis of ear      Past Surgical History:   Procedure Laterality Date    ANGIOGRAPHY OF LOWER EXTREMITY N/A 7/31/2019    Procedure: ANGIOGRAM, LOWER EXTREMITY;  Surgeon: Gino Arana MD;  Location: [facility] CATH/EP LAB;  Service: General;  Laterality: N/A;    HIP SURGERY      HYSTERECTOMY      JOINT REPLACEMENT      B total hip    LAPAROSCOPIC CHOLECYSTECTOMY N/A 7/13/2020    Procedure: CHOLECYSTECTOMY, LAPAROSCOPIC;  Surgeon: Jomar Mcdonald MD;  Location: [facility] OR;  Service: General;  Laterality: N/A;     TRANSFORAMINAL EPIDURAL INJECTION OF STEROID Left 2020    Procedure: Injection,steroid,epidural,transforaminal approach;  Surgeon: Matt Gilliam MD;  Location: [facility] OR;  Service: Pain Management;  Laterality: Left;  L2-3, L3-4     Social History     Socioeconomic History    Marital status:      Spouse name: Not on file    Number of children: Not on file    Years of education: Not on file    Highest education level: Not on file   Occupational History    Not on file   Social Needs    Financial resource strain: Not on file    Food insecurity     Worry: Not on file     Inability: Not on file    Transportation needs     Medical: Not on file     Non-medical: Not on file   Tobacco Use    Smoking status: Former Smoker     Packs/day: 0.25     Years: 5.00     Pack years: 1.25     Quit date: 1972     Years since quittin.5    Smokeless tobacco: Never Used   Substance and Sexual Activity    Alcohol use: Yes     Alcohol/week: 0.0 standard drinks     Comment: Rarely    Drug use: Yes     Types: Oxycodone, Hydrocodone    Sexual activity: Not on file   Lifestyle    Physical activity     Days per week: Not on file     Minutes per session: Not on file    Stress: Not on file   Relationships    Social connections     Talks on phone: Not on file     Gets together: Not on file     Attends Church service: Not on file     Active member of club or organization: Not on file     Attends meetings of clubs or organizations: Not on file     Relationship status: Not on file   Other Topics Concern    Not on file   Social History Narrative    Not on file     Family History   Problem Relation Age of Onset    Hypertension Mother     Diabetes Sister     Glaucoma Neg Hx     Macular degeneration Neg Hx     Retinal detachment Neg Hx      Review of patient's allergies indicates:   Allergen Reactions    Cymbalta [duloxetine] Other (See Comments)     Nightmares      Darvon [propoxyphene] Nausea Only and Other  (See Comments)     Sweating, slept for 3 days    Atorvastatin Other (See Comments)     Muscle cramps    Naprosyn [naproxen] Nausea Only    Penicillins Rash    Tramadol Nausea Only and Palpitations     Physical exam  Vitals:    07/21/20 1217   BP:    Pulse: 71   Resp: 18   Temp:      Wake alert bedside eat  Normocephalic atraumatic  Normal rate  Soft nontender      CMP  Sodium   Date Value Ref Range Status   07/20/2020 135 (L) 136 - 145 mmol/L Final     Potassium   Date Value Ref Range Status   07/20/2020 4.4 3.5 - 5.1 mmol/L Final     Chloride   Date Value Ref Range Status   07/20/2020 100 95 - 110 mmol/L Final     CO2   Date Value Ref Range Status   07/20/2020 26 23 - 29 mmol/L Final     Glucose   Date Value Ref Range Status   07/20/2020 118 (H) 70 - 110 mg/dL Final     BUN, Bld   Date Value Ref Range Status   07/20/2020 9 8 - 23 mg/dL Final     Creatinine   Date Value Ref Range Status   07/20/2020 1.1 0.5 - 1.4 mg/dL Final     Calcium   Date Value Ref Range Status   07/20/2020 8.2 (L) 8.7 - 10.5 mg/dL Final     Total Protein   Date Value Ref Range Status   07/14/2020 7.3 6.0 - 8.4 g/dL Final     Albumin   Date Value Ref Range Status   07/14/2020 3.2 (L) 3.5 - 5.2 g/dL Final     Total Bilirubin   Date Value Ref Range Status   07/14/2020 0.5 0.1 - 1.0 mg/dL Final     Comment:     For infants and newborns, interpretation of results should be based  on gestational age, weight and in agreement with clinical  observations.  Premature Infant recommended reference ranges:  Up to 24 hours.............<8.0 mg/dL  Up to 48 hours............<12.0 mg/dL  3-5 days..................<15.0 mg/dL  6-29 days.................<15.0 mg/dL       Alkaline Phosphatase   Date Value Ref Range Status   07/14/2020 77 55 - 135 U/L Final     AST   Date Value Ref Range Status   07/14/2020 39 10 - 40 U/L Final     ALT   Date Value Ref Range Status   07/14/2020 37 10 - 44 U/L Final     Anion Gap   Date Value Ref Range Status   07/20/2020 9 8 -  16 mmol/L Final     eGFR if    Date Value Ref Range Status   07/20/2020 56 (A) >60 mL/min/1.73 m^2 Final     eGFR if non    Date Value Ref Range Status   07/20/2020 49 (A) >60 mL/min/1.73 m^2 Final     Comment:     Calculation used to obtain the estimated glomerular filtration  rate (eGFR) is the CKD-EPI equation.        Lab Results   Component Value Date    WBC 8.33 07/21/2020    HGB 7.8 (L) 07/21/2020    HCT 24.9 (L) 07/21/2020     (H) 07/21/2020     07/21/2020       Assessment and recommendation     Status post laparoscopic cholecystectomy presented to hospital with difficulty breathing plus anemia.  CT scan shows extensive pulmonary embolisms.    Patient is Christianity    Status post total of 400 mg iron on this visit    > ultrasound lower extremity DVT studies  > pulmonary embolism likely provoked with risk factor of obesity.  Recommend long-term anticoagulation given the extensiveness of clots. Ok to do heparin drip for now.  Outpatient Eliquis.  > hypercoagulable workup outpatient  > agree with IV iron

## 2020-07-21 NOTE — PLAN OF CARE
POC reviewed with pt. Pt AAOX4.  Pt verbalized understanding. Pt reports SOB with activity. Activity adjusted per tolerance. Sat up in chair approx. 4 hours today. Tolerated well. Incentive spirometer encouraged throughout shift. Heparin drip infusing per order. IV antibiotics administered per order. Pt afebrile throughout shift. Bed in low position,call light in reach.

## 2020-07-21 NOTE — PROGRESS NOTES
Progress Note  PULMONARY    Admit Date: 7/13/2020 07/21/2020  From consult 7/14:  History of Present Illness:  Pt had laparoscopic rosette yesterday with post op low ox, cta with central pe.  Pt had prior h/o dvt- was on xarelto in 2015 - occurred with elective left hip replacement - left groin dvt.  [facility].       Pt seen by me last yr with h/o wheezes and cough and sob, also osas- non compliant.     Pt has had episodes of sob but vague - some sob with no cough/wheezes. Vague left leg swelling.     Pt had back problems needing injections in past- last injections est 5 yrs ago.      Pt developed back pain, worse movement ppt eval pcp/pain doc- injection set up.  She also developed left leg pain - upper thigh- occurred last couple months.  She was having eval with vascular doc for right leg with min right leg symptoms.  She had procedure with improved cirulation right.   Pt had bilat u/s legs in May, and right u/s in June -- both neg for dvt.       Pt developed sob- episodic with one episode of impending doom occurring 2 wks pta.       Pt had had some schwartz since severe episode 2 wks ago.       Pt had had severe pain in chest , then left leg, then sob - had eval at [facility] , pcp, pain doc, then [facility]- biliary dz found and surg deferred til after back injection (injection was not effective for pain). Pt had lap rosette yesterday.  No recent cough/wheezes, no compliant with cpap.         SUBJECTIVE:     7/15 having min umbilical wound pain, breathing better.  7/16 no c/o  July 17th-still on oxygen.  Mobilizing, no new complaints  Umbilicus more comfortable.    July 20 schwartz walking 30 ft, feels like will collapse.  No blood loss appreciated    July 21 less sob, feels better.     PFSH and Allergies reviewed.    OBJECTIVE:     Vitals (Most recent):  Vitals:    07/21/20 0716   BP:    Pulse: 83   Resp: 18   Temp:        Vitals (24 hour range):  Temp:  [98.1 °F (36.7 °C)-98.9 °F (37.2 °C)]   Pulse:  [74-87]   Resp:  [16-25]    BP: (136-176)/(63-76)   SpO2:  [94 %-96 %]       Intake/Output Summary (Last 24 hours) at 7/21/2020 0746  Last data filed at 7/21/2020 0600  Gross per 24 hour   Intake --   Output 575 ml   Net -575 ml          Physical Exam:  The patient's neuro status (alertness,orientation,cognitive function,motor skills,), pharyngeal exam (oral lesions, hygiene, abn dentition,), Neck (jvd,mass,thyroid,nodes in neck and above/below clavicle),RESPIRATORY(symmetry,effort,fremitus,percussion,auscultation),  Cor(rhythm,heart tones including gallops,perfusion,edema)ABD(distention,hepatic&splenomegaly,tenderness,masses), Skin(rash,cyanosis),Psyc(affect,judgement,).  Exam negative except for these pertinent findings:    Alert, in chair, chest is symmetric, no distress, normal percussion, normal fremitus and good normal breath sounds      Radiographs reviewed: view by direct vision  Massive pe 7/14 cta    No results found for this or any previous visit.]    Labs     Recent Labs   Lab 07/21/20  0423   WBC 8.33   HGB 7.8*   HCT 24.9*        Recent Labs   Lab 07/20/20  1624   INR 1.4*   No results for input(s): PH, PCO2, PO2, HCO3 in the last 24 hours.  Microbiology Results (last 7 days)     Procedure Component Value Units Date/Time    Blood culture [[id number]] Collected: 07/18/20 1653    Order Status: Completed Specimen: Blood Updated: 07/20/20 1745     Blood Culture, Routine Gram stain juan bottle: Gram positive cocci in clusters resembling Staph       Results called to and read back by: Marika Mcclendon RN 07/20/2020  05:53      Gram stain aer bottle: Gram positive cocci in clusters resembling Staph    Urine culture [[id number]] Collected: 07/18/20 1920    Order Status: Completed Specimen: Urine Updated: 07/20/20 1334     Urine Culture, Routine Multiple organisms isolated. None in predominance.  Repeat if      clinically necessary.    Narrative:      Specimen Source->Urine    Blood culture [[id number]] Collected: 07/18/20 1842     Order Status: Completed Specimen: Blood Updated: 07/20/20 0554     Blood Culture, Routine Gram stain peds bottle: Gram positive cocci in clusters resembling Staph       Results called to and read back by: Marika Mcclendon RN 07/20/2020  05:53    Urine culture [[id number]] Collected: 07/18/20 0425    Order Status: Completed Specimen: Urine Updated: 07/19/20 1253     Urine Culture, Routine Multiple organisms isolated. None in predominance.  Repeat if      clinically necessary.    Narrative:      Specimen Source->Urine    Urine Culture High Risk [[id number]] Collected: 07/18/20 0740    Order Status: Canceled Specimen: Urine, Clean Catch           Impression:  Active Hospital Problems    Diagnosis  POA    *Acute respiratory failure with hypoxemia [J96.01]  Yes    Acute anemia [D64.9]  No    Acute blood loss anemia [D62]  Yes    History of obstructive sleep apnea [Z86.69]  Yes    Azotemia [R79.89]  No    UTI (urinary tract infection) [N39.0]  No    Thrombocytopenia [D69.6]  No    Heparin-induced thrombocytopenia, type 1 [D75.82]  No    Bilateral pulmonary embolism [I26.99]  Yes    Hyperkalemia [E87.5]  Yes    Acute deep vein thrombosis (DVT) of popliteal vein of right lower extremity [I82.431]  Yes    Elevated troponin [R79.89]  Yes    Class 2 severe obesity with serious comorbidity in adult [E66.01]  Yes    Status post laparoscopic cholecystectomy [Z90.49]  Not Applicable     07/13/2020 done by Dr. Jomar Soria      Type 2 diabetes mellitus with diabetic neuropathy, without long-term current use of insulin [E11.40]  Yes    COPD (chronic obstructive pulmonary disease) [J44.9]  Yes    CHARLIE (obstructive sleep apnea) [G47.33]  Yes    Hypoxemia [R09.02]  Yes    Chronic back pain [M54.9, G89.29]  Yes    History of DVT in adulthood [Z86.718]  Not Applicable    GERD (gastroesophageal reflux disease) [K21.9]  Yes    Pulmonary hypertension [I27.20]  Yes    Hyperlipidemia associated with type 2 diabetes  mellitus [E11.69, E78.5]  Yes    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               Plan:     7/15 hit type 1, cont heparin going to oral rx in am.  Hold mobilization/walking with pulm htn    No resp meds needed.    Below umbilicus sl erythema- monitor.    7/16 plt up sl 128, will go to po rx,   Creat 1.3,     Dose xarelto 15 bid x 21 days then 20/d, stop heparin drip, Make certain Dr Mcdonald aware of periumbilical pain pt relates, outpt when pt on rm air with restricted activities next couple wks.  Discussed ivc filter to decrease anti coagg in future - minimize bleed risk. Pt prefers oral anticoagg.  Would anticoagg indefinitely.    July 17th-patient requires 3 L of oxygen currently-once on room air should be safe to go home.  She did have pulmonary hypertension on echo in addition to massive pulmonary emboli.  Would be cautious.      Will follow up CBC in a.m.        July 20 had fevers over wkend. Still on 3lpm  From 12.1 hgb - nowhgb 8.1 jehovah witness on anticoagg.  I had discussed ivc filter- will recommend ivc filter.  Pt cannot go home as lives alone and hgb not stable.  Discussed ecf but pt reluctant and not stable for outpt.  Will ask heme regarding anemia management.  Will stop oral anticoagg.  May need gi eval.  Discussed with ASPEN Haji.  Iron/ferritin ordered - may need iv iron.      7/21 hgb 7.8, arrangement for ivc filter, iron levels low.  On heparin drip now.      Consider ct abd  If hgb continues to fall?  No bloody stools.  Got iv iron.  Would rather leave on some anticoagg but maybe wise to stop anticoagg with ivc filter?      .

## 2020-07-21 NOTE — CARE UPDATE
07/21/20 0716   Patient Assessment/Suction   Level of Consciousness (AVPU) alert   Respiratory Effort Normal   Expansion/Accessory Muscles/Retractions expansion symmetric   All Lung Fields Breath Sounds Anterior:   LLL Breath Sounds diminished   RLL Breath Sounds diminished   Rhythm/Pattern, Respiratory pattern regular   Cough Frequency no cough   PRE-TX-O2   O2 Device (Oxygen Therapy) nasal cannula   $ Is the patient on Low Flow Oxygen? Yes   Flow (L/min) 3   Oxygen Concentration (%) 32   SpO2 96 %   Pulse Oximetry Type Intermittent   $ Pulse Oximetry - Multiple Charge Pulse Oximetry - Multiple   Pulse 83   Resp 18   Positioning HOB elevated 30 degrees   Aerosol Therapy   $ Aerosol Therapy Charges Aerosol Treatment   Daily Review of Necessity (SVN) completed   Respiratory Treatment Status (SVN) given   Treatment Route (SVN) mask   Patient Position (SVN) HOB elevated   Signs of Intolerance (SVN) none   POx, nebs QID & BID, IS QS

## 2020-07-21 NOTE — RESPIRATORY THERAPY
07/20/20 1907   Patient Assessment/Suction   Level of Consciousness (AVPU) alert   Respiratory Effort Normal;Unlabored   Expansion/Accessory Muscles/Retractions no use of accessory muscles   All Lung Fields Breath Sounds diminished   Rhythm/Pattern, Respiratory depth regular;pattern regular;unlabored   PRE-TX-O2   O2 Device (Oxygen Therapy) nasal cannula   $ Is the patient on Low Flow Oxygen? Yes   Oxygen Concentration (%) 2   SpO2 95 %   Pulse Oximetry Type Intermittent   $ Pulse Oximetry - Multiple Charge Pulse Oximetry - Multiple   Pulse 85   Resp (!) 25   Aerosol Therapy   $ Aerosol Therapy Charges Aerosol Treatment   Daily Review of Necessity (SVN) completed   Respiratory Treatment Status (SVN) given   Treatment Route (SVN) mask   Patient Position (SVN) semi-[place]'s   Post Treatment Assessment (SVN) breath sounds improved   Signs of Intolerance (SVN) none   Breath Sounds Post-Respiratory Treatment   Post-treatment Heart Rate (beats/min) 88   Post-treatment Resp Rate (breaths/min) 20   Incentive Spirometer   $ Incentive Spirometer Charges done with encouragement   Incentive Spirometer Predicted Level (mL) 2500   Administration (IS) mouthpiece   Number of Repetitions (IS) 8   Level Incentive Spirometer (mL) 750   Patient Tolerance (IS) fair   Ready to Wean/Extubation Screen   FIO2<=50 (chart decimal) 0.02

## 2020-07-22 ENCOUNTER — OUTSIDE PLACE OF SERVICE (OUTPATIENT)
Dept: INFECTIOUS DISEASES | Facility: CLINIC | Age: 80
End: 2020-07-22
Payer: MEDICARE

## 2020-07-22 PROBLEM — E87.5 HYPERKALEMIA: Status: RESOLVED | Noted: 2020-07-14 | Resolved: 2020-07-22

## 2020-07-22 PROBLEM — I82.402 DEEP VEIN THROMBOSIS (DVT) OF LEFT LOWER EXTREMITY: Status: ACTIVE | Noted: 2020-07-14

## 2020-07-22 PROBLEM — R78.81 MRSA BACTEREMIA: Status: ACTIVE | Noted: 2020-07-22

## 2020-07-22 PROBLEM — B95.62 MRSA BACTEREMIA: Status: ACTIVE | Noted: 2020-07-22

## 2020-07-22 PROBLEM — R79.89 ELEVATED FERRITIN: Status: ACTIVE | Noted: 2020-07-22

## 2020-07-22 PROBLEM — E11.22 TYPE 2 DIABETES MELLITUS WITH STAGE 2 CHRONIC KIDNEY DISEASE: Status: ACTIVE | Noted: 2020-07-22

## 2020-07-22 PROBLEM — N18.2 TYPE 2 DIABETES MELLITUS WITH STAGE 2 CHRONIC KIDNEY DISEASE: Status: ACTIVE | Noted: 2020-07-22

## 2020-07-22 PROBLEM — D69.6 THROMBOCYTOPENIA: Status: RESOLVED | Noted: 2020-07-15 | Resolved: 2020-07-22

## 2020-07-22 PROBLEM — E87.1 HYPONATREMIA: Status: ACTIVE | Noted: 2020-07-22

## 2020-07-22 LAB
APTT BLDCRRT: 42.5 SEC (ref 21–32)
BACTERIA BLD CULT: ABNORMAL
BASOPHILS # BLD AUTO: 0.02 K/UL (ref 0–0.2)
BASOPHILS NFR BLD: 0.3 % (ref 0–1.9)
DIFFERENTIAL METHOD: ABNORMAL
EOSINOPHIL # BLD AUTO: 0.3 K/UL (ref 0–0.5)
EOSINOPHIL NFR BLD: 3.3 % (ref 0–8)
ERYTHROCYTE [DISTWIDTH] IN BLOOD BY AUTOMATED COUNT: 13.2 % (ref 11.5–14.5)
HCT VFR BLD AUTO: 24.4 % (ref 37–48.5)
HGB BLD-MCNC: 7.6 G/DL (ref 12–16)
IMM GRANULOCYTES # BLD AUTO: 0.04 K/UL (ref 0–0.04)
IMM GRANULOCYTES NFR BLD AUTO: 0.5 % (ref 0–0.5)
LYMPHOCYTES # BLD AUTO: 2.1 K/UL (ref 1–4.8)
LYMPHOCYTES NFR BLD: 26.3 % (ref 18–48)
MCH RBC QN AUTO: 33.3 PG (ref 27–31)
MCHC RBC AUTO-ENTMCNC: 31.1 G/DL (ref 32–36)
MCV RBC AUTO: 107 FL (ref 82–98)
MONOCYTES # BLD AUTO: 0.9 K/UL (ref 0.3–1)
MONOCYTES NFR BLD: 10.9 % (ref 4–15)
NEUTROPHILS # BLD AUTO: 4.6 K/UL (ref 1.8–7.7)
NEUTROPHILS NFR BLD: 58.7 % (ref 38–73)
NRBC BLD-RTO: 0 /100 WBC
OB PNL STL: NEGATIVE
PLATELET # BLD AUTO: 227 K/UL (ref 150–350)
PMV BLD AUTO: 10.5 FL (ref 9.2–12.9)
POCT GLUCOSE: 109 MG/DL (ref 70–110)
RBC # BLD AUTO: 2.28 M/UL (ref 4–5.4)
WBC # BLD AUTO: 7.86 K/UL (ref 3.9–12.7)

## 2020-07-22 PROCEDURE — 63600175 PHARM REV CODE 636 W HCPCS: Performed by: NURSE PRACTITIONER

## 2020-07-22 PROCEDURE — 25000003 PHARM REV CODE 250: Performed by: INTERNAL MEDICINE

## 2020-07-22 PROCEDURE — 94761 N-INVAS EAR/PLS OXIMETRY MLT: CPT

## 2020-07-22 PROCEDURE — 63600175 PHARM REV CODE 636 W HCPCS: Performed by: INTERNAL MEDICINE

## 2020-07-22 PROCEDURE — 85025 COMPLETE CBC W/AUTO DIFF WBC: CPT

## 2020-07-22 PROCEDURE — 12000002 HC ACUTE/MED SURGE SEMI-PRIVATE ROOM

## 2020-07-22 PROCEDURE — 94799 UNLISTED PULMONARY SVC/PX: CPT

## 2020-07-22 PROCEDURE — 25000003 PHARM REV CODE 250: Performed by: NURSE PRACTITIONER

## 2020-07-22 PROCEDURE — 82272 OCCULT BLD FECES 1-3 TESTS: CPT

## 2020-07-22 PROCEDURE — 25000242 PHARM REV CODE 250 ALT 637 W/ HCPCS: Performed by: NURSE PRACTITIONER

## 2020-07-22 PROCEDURE — 99233 SBSQ HOSP IP/OBS HIGH 50: CPT | Mod: ,,, | Performed by: INTERNAL MEDICINE

## 2020-07-22 PROCEDURE — 99223 1ST HOSP IP/OBS HIGH 75: CPT | Mod: S$GLB,,, | Performed by: INTERNAL MEDICINE

## 2020-07-22 PROCEDURE — 94640 AIRWAY INHALATION TREATMENT: CPT

## 2020-07-22 PROCEDURE — 36415 COLL VENOUS BLD VENIPUNCTURE: CPT

## 2020-07-22 PROCEDURE — 99233 PR SUBSEQUENT HOSPITAL CARE,LEVL III: ICD-10-PCS | Mod: ,,, | Performed by: INTERNAL MEDICINE

## 2020-07-22 PROCEDURE — 99900035 HC TECH TIME PER 15 MIN (STAT)

## 2020-07-22 PROCEDURE — 87040 BLOOD CULTURE FOR BACTERIA: CPT | Mod: 59

## 2020-07-22 PROCEDURE — 27000221 HC OXYGEN, UP TO 24 HOURS

## 2020-07-22 PROCEDURE — 99223 PR INITIAL HOSPITAL CARE,LEVL III: ICD-10-PCS | Mod: S$GLB,,, | Performed by: INTERNAL MEDICINE

## 2020-07-22 PROCEDURE — 87040 BLOOD CULTURE FOR BACTERIA: CPT

## 2020-07-22 PROCEDURE — 85730 THROMBOPLASTIN TIME PARTIAL: CPT

## 2020-07-22 RX ADMIN — BUDESONIDE INHALATION 0.5 MG: 0.5 SUSPENSION RESPIRATORY (INHALATION) at 08:07

## 2020-07-22 RX ADMIN — CIPROFLOXACIN HYDROCHLORIDE 500 MG: 500 TABLET, FILM COATED ORAL at 09:07

## 2020-07-22 RX ADMIN — FLUTICASONE PROPIONATE 100 MCG: 50 SPRAY, METERED NASAL at 09:07

## 2020-07-22 RX ADMIN — IPRATROPIUM BROMIDE AND ALBUTEROL SULFATE 3 ML: .5; 3 SOLUTION RESPIRATORY (INHALATION) at 08:07

## 2020-07-22 RX ADMIN — IRON SUCROSE 200 MG: 20 INJECTION, SOLUTION INTRAVENOUS at 09:07

## 2020-07-22 RX ADMIN — METOPROLOL SUCCINATE 50 MG: 50 TABLET, FILM COATED, EXTENDED RELEASE ORAL at 09:07

## 2020-07-22 RX ADMIN — VANCOMYCIN HYDROCHLORIDE 1750 MG: 1 INJECTION, POWDER, LYOPHILIZED, FOR SOLUTION INTRAVENOUS at 06:07

## 2020-07-22 RX ADMIN — POLYETHYLENE GLYCOL (3350) 17 G: 17 POWDER, FOR SOLUTION ORAL at 09:07

## 2020-07-22 RX ADMIN — CEFTRIAXONE 1 G: 1 INJECTION, SOLUTION INTRAVENOUS at 05:07

## 2020-07-22 RX ADMIN — EPOETIN ALFA-EPBX 20000 UNITS: 10000 INJECTION, SOLUTION INTRAVENOUS; SUBCUTANEOUS at 05:07

## 2020-07-22 RX ADMIN — FOLIC ACID 1 MG: 1 TABLET ORAL at 09:07

## 2020-07-22 RX ADMIN — BACITRACIN ZINC NEOMYCIN SULFATE POLYMYXIN B SULFATE: 400; 3.5; 5 OINTMENT TOPICAL at 09:07

## 2020-07-22 RX ADMIN — BISACODYL 5 MG: 5 TABLET, COATED ORAL at 09:07

## 2020-07-22 RX ADMIN — PANTOPRAZOLE SODIUM 40 MG: 40 TABLET, DELAYED RELEASE ORAL at 09:07

## 2020-07-22 RX ADMIN — DAPTOMYCIN 1000 MG: 350 INJECTION, POWDER, LYOPHILIZED, FOR SOLUTION INTRAVENOUS at 03:07

## 2020-07-22 RX ADMIN — DOCUSATE SODIUM 100 MG: 100 CAPSULE, LIQUID FILLED ORAL at 09:07

## 2020-07-22 RX ADMIN — MONTELUKAST 10 MG: 10 TABLET, FILM COATED ORAL at 08:07

## 2020-07-22 RX ADMIN — HEPARIN SODIUM 8 UNITS/KG/HR: 10000 INJECTION, SOLUTION INTRAVENOUS at 07:07

## 2020-07-22 RX ADMIN — IPRATROPIUM BROMIDE AND ALBUTEROL SULFATE 3 ML: .5; 3 SOLUTION RESPIRATORY (INHALATION) at 03:07

## 2020-07-22 RX ADMIN — IPRATROPIUM BROMIDE AND ALBUTEROL SULFATE 3 ML: .5; 3 SOLUTION RESPIRATORY (INHALATION) at 11:07

## 2020-07-22 NOTE — PROGRESS NOTES
[facility]  Hematology/Oncology  Progress Note    Patient Name: Sammi Abreu  Admission Date: 7/13/2020  Hospital Length of Stay: 8 days  Code Status: Full Code   The patient location is:  Hospital  Visit type: Virtual visit with synchronous audio and video 25 min  Face-to-face or time spent with patient on the encounter:  Total time spent on and for  this encounter which includes non face-to-face time preparing to see patient, review of tests, obtaining and or reviewing separately obtained records documenting clinical information in the electronic or other health records, independently interpreting results which is not separately reported ,and communicating results to the patient/family/caregiver and in care coordination and treatment planning/communicating with pharmacy for prescriptions/addressing social needs/arranging follow-up and or referrals :  45 min    Each patient I provide medical services by telemedicine is:  (1) informed of the relationship between the physician and patient and the respective role of any other health care provider with respect to management of the patient; and (2) notified that he or she may decline to receive medical services by telemedicine and may withdraw from such care at any time.      Subjective:     Interval History:  Religious with extensive pulmonary embolus s/p lap choly  Currently on heparin drip  Patient found to be iron deficient currently being repleted also has CKD  Severe anemia  Medications:  Continuous Infusions:   heparin (porcine) in D5W 8 Units/kg/hr (07/22/20 0705)     Scheduled Meds:   albuterol-ipratropium  3 mL Nebulization Q4H WAKE    bisacodyL  5 mg Oral Daily    budesonide  0.5 mg Nebulization Q12H    cefTRIAXone (ROCEPHIN) IVPB  1 g Intravenous Q24H    DAPTOmycin (CUBICIN)  IV  1,000 mg Intravenous Once    docusate sodium  100 mg Oral Daily    fluticasone propionate  2 spray Each Nostril Daily    folic acid  1 mg  Oral Daily    iron sucrose (VENOFER) IVPB  200 mg Intravenous Daily    metoprolol succinate  50 mg Oral Daily    montelukast  10 mg Oral QHS    neomycin-bacitracin-polymyxin   Topical (Top) Daily    pantoprazole  40 mg Oral Daily    polyethylene glycol  17 g Oral Daily    vancomycin (VANCOCIN) IVPB  1,750 mg Intravenous Once     PRN Meds:acetaminophen, bisacodyL, dextrose 50%, dextrose 50%, glucagon (human recombinant), glucose, glucose, HYDROcodone-acetaminophen, insulin aspart U-100, melatonin, ondansetron, senna-docusate 8.6-50 mg, simethicone, sodium chloride 0.9%, Pharmacy to dose Vancomycin consult **AND** vancomycin - pharmacy to dose     Review of Systems   Reports still feeling short of breath.  But better since admission has been moving around the room going to potty chest  Appetite has not come back since gallbladder surgery  Very tired and fatigued  Denies noting any bleeding last colonoscopy was nearly 9 years ago  ,, denies abdominal pain, chest pain, palpitations dizziness feels like she might pass out  Denies abdominal symptoms nausea vomiting or diarrhea  Denies neurological issues  Objective:     Vital Signs (Most Recent):  Temp: 98.9 °F (37.2 °C) (07/22/20 1122)  Pulse: 78 (07/22/20 1122)  Resp: 18 (07/22/20 1122)  BP: (!) 167/71 (07/22/20 1122)  SpO2: (!) 94 % (07/22/20 1122) Vital Signs (24h Range):  Temp:  [97.4 °F (36.3 °C)-99.3 °F (37.4 °C)] 98.9 °F (37.2 °C)  Pulse:  [68-86] 78  Resp:  [14-18] 18  SpO2:  [92 %-98 %] 94 %  BP: (126-167)/(57-71) 167/71     Weight: 104 kg (229 lb 4.5 oz)  Body mass index is 40.61 kg/m².  Body surface area is 2.15 meters squared.      Intake/Output Summary (Last 24 hours) at 7/22/2020 1440  Last data filed at 7/21/2020 1600  Gross per 24 hour   Intake --   Output 300 ml   Net -300 ml         Significant Labs:   Lab Results   Component Value Date    WBC 7.86 07/22/2020    HGB 7.6 (L) 07/22/2020    HCT 24.4 (L) 07/22/2020     (H) 07/22/2020    PLT  227 07/22/2020     CMP  Sodium   Date Value Ref Range Status   07/20/2020 135 (L) 136 - 145 mmol/L Final     Potassium   Date Value Ref Range Status   07/20/2020 4.4 3.5 - 5.1 mmol/L Final     Chloride   Date Value Ref Range Status   07/20/2020 100 95 - 110 mmol/L Final     CO2   Date Value Ref Range Status   07/20/2020 26 23 - 29 mmol/L Final     Glucose   Date Value Ref Range Status   07/20/2020 118 (H) 70 - 110 mg/dL Final     BUN, Bld   Date Value Ref Range Status   07/20/2020 9 8 - 23 mg/dL Final     Creatinine   Date Value Ref Range Status   07/20/2020 1.1 0.5 - 1.4 mg/dL Final     Calcium   Date Value Ref Range Status   07/20/2020 8.2 (L) 8.7 - 10.5 mg/dL Final     Total Protein   Date Value Ref Range Status   07/14/2020 7.3 6.0 - 8.4 g/dL Final     Albumin   Date Value Ref Range Status   07/14/2020 3.2 (L) 3.5 - 5.2 g/dL Final     Total Bilirubin   Date Value Ref Range Status   07/14/2020 0.5 0.1 - 1.0 mg/dL Final     Comment:     For infants and newborns, interpretation of results should be based  on gestational age, weight and in agreement with clinical  observations.  Premature Infant recommended reference ranges:  Up to 24 hours.............<8.0 mg/dL  Up to 48 hours............<12.0 mg/dL  3-5 days..................<15.0 mg/dL  6-29 days.................<15.0 mg/dL       Alkaline Phosphatase   Date Value Ref Range Status   07/14/2020 77 55 - 135 U/L Final     AST   Date Value Ref Range Status   07/14/2020 39 10 - 40 U/L Final     ALT   Date Value Ref Range Status   07/14/2020 37 10 - 44 U/L Final     Anion Gap   Date Value Ref Range Status   07/20/2020 9 8 - 16 mmol/L Final     eGFR if    Date Value Ref Range Status   07/20/2020 56 (A) >60 mL/min/1.73 m^2 Final     eGFR if non    Date Value Ref Range Status   07/20/2020 49 (A) >60 mL/min/1.73 m^2 Final     Comment:     Calculation used to obtain the estimated glomerular filtration  rate (eGFR) is the CKD-EPI equation.         LAST 7 DAYS MICROBIOLOGY            Microbiology Results (last 7 days)      Procedure Component Value Units Date/Time     Blood culture [[id number]]  (Abnormal) Collected: 07/18/20 1842     Order Status: Completed Specimen: Blood Updated: 07/21/20 0751       Blood Culture, Routine Gram stain peds bottle: Gram positive cocci in clusters resembling Staph          Results called to and read back by: Marika Mcclendon RN 07/20/2020  05:53         STAPHYLOCOCCUS AUREUS  ID consult required at [facility].  For susceptibility see order #[id number]       Blood culture [[id number]]  (Abnormal) Collected: 07/18/20 1653     Order Status: Completed Specimen: Blood Updated: 07/21/20 0749       Blood Culture, Routine Gram stain juan bottle: Gram positive cocci in clusters resembling Staph          Results called to and read back by: Marika Mcclendon RN 07/20/2020  05:53         Gram stain aer bottle: Gram positive cocci in clusters resembling Staph         STAPHYLOCOCCUS AUREUS  Susceptibility pending  ID consult required at [facility].       Urine culture [[id number]] Collected: 07/18/20 1920     Order Status: Completed Specimen: Urine Updated: 07/20/20 1334       Urine Culture, Routine Multiple organisms isolated. None in predominance.  Repeat if         clinically necessary.     Narrative:       Specimen Source->Urine     Urine culture [[id number]] Collected: 07/18/20 0425     Order Status: Completed Specimen: Urine Updated: 07/19/20 1253       Urine Culture, Routine Multiple organisms isolated. None in predominance.  Repeat if         clinically necessary.     Narrative:       Specimen Source->Urine     Urine Culture High Risk [[id number]] Collected: 07/18/20 0740     Order Status: Canceled Specimen: Urine, Clean Catch           Impression:     Bilateral pulmonary emboli with extensive clot burden and involvement of main pulmonary arteries.     Mediastinal adenopathy of  uncertain etiology.  This may be inflammatory/reactive or neoplastic, and follow-up is needed.     Development of nonspecific foci of ground-glass opacity throughout both lungs.       Assessment/Plan:     Active Diagnoses:    Diagnosis Date Noted POA    PRINCIPAL PROBLEM:  Acute respiratory failure with hypoxemia [J96.01] 07/13/2020 Yes    Type 2 diabetes mellitus with stage 2 chronic kidney disease [E11.22, N18.2] 07/22/2020 Yes    Hyponatremia [E87.1] 07/22/2020 Yes    MRSA bacteremia [R78.81] 07/22/2020 No    Staphylococcus aureus infection [A49.01] 07/21/2020 No    Acute anemia [D64.9] 07/20/2020 No    Acute blood loss anemia [D62] 07/20/2020 Yes    History of obstructive sleep apnea [Z86.69]  Yes    Azotemia [R79.89]  No    UTI (urinary tract infection) [N39.0] 07/19/2020 No    Bilateral pulmonary embolism [I26.99] 07/14/2020 Yes    Deep vein thrombosis (DVT) of left lower extremity [I82.402] 07/14/2020 Yes    Elevated troponin [R79.89] 07/14/2020 Yes    Class 2 severe obesity with serious comorbidity in adult [E66.01] 07/13/2020 Yes    Status post laparoscopic cholecystectomy [Z90.49] 07/13/2020 Not Applicable    Type 2 diabetes mellitus with diabetic neuropathy, without long-term current use of insulin [E11.40] 07/13/2020 Yes    COPD (chronic obstructive pulmonary disease) [J44.9] 07/13/2020 Yes    CHARLIE (obstructive sleep apnea) [G47.33] 07/13/2020 Yes    Hypoxemia [R09.02] 07/13/2020 Yes    Chronic back pain [M54.9, G89.29] 07/13/2020 Yes    History of DVT in adulthood [Z86.718] 07/13/2020 Not Applicable    GERD (gastroesophageal reflux disease) [K21.9] 04/09/2015 Yes    Pulmonary hypertension [I27.20] 04/09/2015 Yes    Hyperlipidemia associated with type 2 diabetes mellitus [E11.69, E78.5]  Yes    Hypertension associated with diabetes [E11.59, I10] 07/16/2012 Yes      Problems Resolved During this Admission:    Diagnosis Date Noted Date Resolved POA    Thrombocytopenia [D69.6]  07/15/2020 07/22/2020 No    Hyperkalemia [E87.5] 07/14/2020 07/22/2020 Yes     Jehovahs witness, with CV iron deficiency and CKD related anemia  Extensive pulmonary embolus currently on heparin patient also has obstructive sleep apnea and COPD which might be accounting for additional respiratory discomfort  Advised checking stool for occult blood  Filter placement has been recommended by CT surgery on Thursday for thrombus of popliteal vein right lower extremity  Add retacrit patient also has CKD  Patient currently on Cubicin and Rocephin for positive blood cultures  Will follow-up  Continue post laparotomy care      Mali Mcarthur MD  Hematology/Oncology  [facility]

## 2020-07-22 NOTE — RESPIRATORY THERAPY
07/1940   Patient Assessment/Suction   Level of Consciousness (AVPU) alert   Respiratory Effort Normal;Unlabored   Expansion/Accessory Muscles/Retractions expansion symmetric;no retractions;no use of accessory muscles   All Lung Fields Breath Sounds coarse   Cough Type nonproductive   PRE-TX-O2   O2 Device (Oxygen Therapy) nasal cannula   Flow (L/min) 3   Oxygen Concentration (%) 32   SpO2 95 %   Pulse Oximetry Type Intermittent   Pulse 78   Resp 16   Aerosol Therapy   $ Aerosol Therapy Charges Aerosol Treatment   Respiratory Treatment Status (SVN) given   Treatment Route (SVN) mask;oxygen   Patient Position (SVN) semi-[place]'s   Signs of Intolerance (SVN) none   Breath Sounds Post-Respiratory Treatment   Throughout All Fields Post-Treatment All Fields   Throughout All Fields Post-Treatment no change   Post-treatment Heart Rate (beats/min) 80   Post-treatment Resp Rate (breaths/min) 18   Incentive Spirometer   $ Incentive Spirometer Charges done with encouragement   Administration (IS) proper technique demonstrated   Number of Repetitions (IS) 10   Level Incentive Spirometer (mL) 1000   Patient Tolerance (IS) good   Ready to Wean/Extubation Screen   FIO2<=50 (chart decimal) 0.32

## 2020-07-22 NOTE — ASSESSMENT & PLAN NOTE
Check UA and urine cx. Received rocephin. Continue IV abx daily.   Urine  cx negative.   Blood culture pending-- MRSA sensitivity pending.

## 2020-07-22 NOTE — ASSESSMENT & PLAN NOTE
Monitor CBC.   Lab Results   Component Value Date    WBC 7.86 07/22/2020    HGB 7.6 (L) 07/22/2020    HCT 24.4 (L) 07/22/2020     (H) 07/22/2020     07/22/2020

## 2020-07-22 NOTE — PROGRESS NOTES
Spoke with Dr. Pino regarding positive blood cultures. He stated that the IVC filter will be placed on hold until pt cleared by ID.

## 2020-07-22 NOTE — SUBJECTIVE & OBJECTIVE
Interval  History: still with HILL, on  3LNC this am. desats with exertion. On OAC and tolerating. Afebrile this am. declining hgb concerning due to blood thinners and unable to give PRBCs due to Jwitness.  Appears depressed.   On Heparin drip. Awaiting hematology eval.   IVC filter thurs/Friday.    Appetite good. Better spirits today     Review of Systems   Constitutional: Positive for activity change. Negative for appetite change, chills and fever.   HENT: Negative for ear discharge and facial swelling.    Respiratory: Positive for shortness of breath. Negative for cough.    Cardiovascular: Negative for chest pain, palpitations and leg swelling.   Gastrointestinal: Positive for nausea. Negative for abdominal distention, blood in stool and vomiting.   Musculoskeletal: Positive for back pain. Negative for gait problem, neck pain and neck stiffness.        Patient reports chronic back pain for greater than 20 years but states over the past   6 years has become progressively worse   Skin: Positive for wound. Negative for color change.        Abdominal surgical incisions   Neurological: Negative for facial asymmetry, speech difficulty and weakness.        Forgetful   Psychiatric/Behavioral: Negative for agitation and confusion. The patient is nervous/anxious.      Objective:     Vital Signs (Most Recent):  Temp: 99.3 °F (37.4 °C) (07/21/20 2047)  Pulse: 86 (07/21/20 2047)  Resp: 14 (07/21/20 2047)  BP: (!) 135/59 (07/21/20 2047)  SpO2: (!) 92 % (07/21/20 2047) Vital Signs (24h Range):  Temp:  [97.4 °F (36.3 °C)-99.3 °F (37.4 °C)] 99.3 °F (37.4 °C)  Pulse:  [68-86] 86  Resp:  [14-20] 14  SpO2:  [92 %-98 %] 92 %  BP: (126-176)/(57-76) 135/59     Weight: 104 kg (229 lb 4.5 oz)  Body mass index is 40.61 kg/m².    Physical Exam  Constitutional:       General: She is not in acute distress.     Appearance: Normal appearance. She is obese. She is not ill-appearing, toxic-appearing or diaphoretic.      Comments: Morbidly obese    HENT:      Head: Normocephalic and atraumatic.      Mouth/Throat:      Mouth: Mucous membranes are moist.   Eyes:      General:         Right eye: No discharge.         Left eye: No discharge.      Extraocular Movements: Extraocular movements intact.      Conjunctiva/sclera: Conjunctivae normal.      Pupils: Pupils are equal, round, and reactive to light.   Neck:      Musculoskeletal: Normal range of motion and neck supple. No neck rigidity or muscular tenderness.   Cardiovascular:      Rate and Rhythm: Normal rate and regular rhythm.      Pulses: Normal pulses.      Comments: Trace pretib edema L>R  Pulmonary:      Effort: Pulmonary effort is normal. No respiratory distress.      Breath sounds: No wheezing or rales.      Comments: Bilateral breath sounds diminished  Abdominal:      General: Bowel sounds are normal. There is no distension.      Palpations: Abdomen is soft.      Tenderness: There is no abdominal tenderness. There is no guarding.      Comments: Obese     Genitourinary:     Comments: Not examined  Musculoskeletal: Normal range of motion.      Right lower leg: Edema present.      Left lower leg: Edema present.      Comments: Scant bilateral lower extremity edema   Skin:     General: Skin is warm and dry.      Capillary Refill: Capillary refill takes less than 2 seconds.      Comments: Abdominal surgical dressings intact   Neurological:      General: No focal deficit present.      Mental Status: She is alert and oriented to person, place, and time. Mental status is at baseline.      Cranial Nerves: No cranial nerve deficit.      Motor: No weakness.   Psychiatric:         Mood and Affect: Mood normal.         Behavior: Behavior normal.         Thought Content: Thought content normal.         Judgment: Judgment normal.      Comments: Appears depressed.           CRANIAL NERVES     CN III, IV, VI   Pupils are equal, round, and reactive to light.    Labs reviewed

## 2020-07-22 NOTE — PLAN OF CARE
Recommendation:   1. Will add protein on tray for mid afternoon snack   2. Will add extra condiments to help with altered taste   3. Boost Pudding TID   4. RD to monitor    Interventions:  Carbohydrate Modified Diet  Purpose of the nutrition education- DM  Commercial Food- Boost Pudding TID    Goals: meet at least 75% EEN/EPN with meals and supplement by RD f/u  Nutrition Goal Status: new  Nutrition Discharge Planning: diabetic/cardiac diet

## 2020-07-22 NOTE — PLAN OF CARE
"Plan of care review with pt, pt states "understanding,"heparin infusing without difficulty, no redness/swelling noted to IV site, bruising present to right lower back and left arm, 02 3L NC in progress, less than 3 seconds capillary refills, easily exerted with locomotion, pt is encourage to cough and deep breathe, will continue to monitor, observe and note any changes, safety maintain    "

## 2020-07-22 NOTE — ASSESSMENT & PLAN NOTE
Diabetic diet  Accu-Cheks with correctional sliding scale insulin  Blood sugars running 130-173  HGB A1c is 6.4

## 2020-07-22 NOTE — PROGRESS NOTES
[facility] Hospital Medicine  Progress Note    Patient Name: Sammi Abreu  MRN: [id number]  Patient Class: IP- Inpatient   Admission Date: 7/13/2020  Length of Stay: 7 days  Attending Physician: Alix Ruth MD  Primary Care Provider: Osmani Villatoro MD        Subjective:     Principal Problem:Acute respiratory failure with hypoxemia        HPI:  This is a 79-year-old female who has a past medical history of arthritis and chronic back pain, fibromyalgia, glaucoma, hyperlipidemia, hypertension, sleep apnea with history of noncompliance with CPAP, morbid obesity, hypertension, GERD, COPD, diabetes type 2, prior DVT, and gallstones.  Please note the patient is also a [employer].  Patient is being received [facility].  She is status post laparoscopic cholecystectomy today by Dr. Jomar Soria.  Patient tolerated procedure well.  She however was unable to be discharged postop due to ongoing hypoxemia and has been transferred here for further evaluation and treatment.                Overview/Hospital Course:  The patient was monitored closely during her stay.  She was noted to have an elevated D-dimer.  She was placed on full-dose Lovenox.  Patient when CTA of the chest which showed extensive bilateral pulmonary emboli.  Pulmonology was consulted.  Patient's full-dose Lovenox was discontinued and she was placed on IV heparin drip.    Interval  History: still with HILL, on  3LNC this am. desats with exertion. On OAC and tolerating. Afebrile this am. declining hgb concerning due to blood thinners and unable to give PRBCs due to Jwitness.  Appears depressed.   On Heparin drip. Awaiting hematology eval.   IVC filter thurs/Friday.    Appetite good. Better spirits today     Review of Systems   Constitutional: Positive for activity change. Negative for appetite change, chills and fever.   HENT: Negative for ear discharge and facial swelling.    Respiratory: Positive for  shortness of breath. Negative for cough.    Cardiovascular: Negative for chest pain, palpitations and leg swelling.   Gastrointestinal: Positive for nausea. Negative for abdominal distention, blood in stool and vomiting.   Musculoskeletal: Positive for back pain. Negative for gait problem, neck pain and neck stiffness.        Patient reports chronic back pain for greater than 20 years but states over the past   6 years has become progressively worse   Skin: Positive for wound. Negative for color change.        Abdominal surgical incisions   Neurological: Negative for facial asymmetry, speech difficulty and weakness.        Forgetful   Psychiatric/Behavioral: Negative for agitation and confusion. The patient is nervous/anxious.      Objective:     Vital Signs (Most Recent):  Temp: 99.3 °F (37.4 °C) (07/21/20 2047)  Pulse: 86 (07/21/20 2047)  Resp: 14 (07/21/20 2047)  BP: (!) 135/59 (07/21/20 2047)  SpO2: (!) 92 % (07/21/20 2047) Vital Signs (24h Range):  Temp:  [97.4 °F (36.3 °C)-99.3 °F (37.4 °C)] 99.3 °F (37.4 °C)  Pulse:  [68-86] 86  Resp:  [14-20] 14  SpO2:  [92 %-98 %] 92 %  BP: (126-176)/(57-76) 135/59     Weight: 104 kg (229 lb 4.5 oz)  Body mass index is 40.61 kg/m².    Physical Exam  Constitutional:       General: She is not in acute distress.     Appearance: Normal appearance. She is obese. She is not ill-appearing, toxic-appearing or diaphoretic.      Comments: Morbidly obese   HENT:      Head: Normocephalic and atraumatic.      Mouth/Throat:      Mouth: Mucous membranes are moist.   Eyes:      General:         Right eye: No discharge.         Left eye: No discharge.      Extraocular Movements: Extraocular movements intact.      Conjunctiva/sclera: Conjunctivae normal.      Pupils: Pupils are equal, round, and reactive to light.   Neck:      Musculoskeletal: Normal range of motion and neck supple. No neck rigidity or muscular tenderness.   Cardiovascular:      Rate and Rhythm: Normal rate and regular  rhythm.      Pulses: Normal pulses.      Comments: Trace pretib edema L>R  Pulmonary:      Effort: Pulmonary effort is normal. No respiratory distress.      Breath sounds: No wheezing or rales.      Comments: Bilateral breath sounds diminished  Abdominal:      General: Bowel sounds are normal. There is no distension.      Palpations: Abdomen is soft.      Tenderness: There is no abdominal tenderness. There is no guarding.      Comments: Obese     Genitourinary:     Comments: Not examined  Musculoskeletal: Normal range of motion.      Right lower leg: Edema present.      Left lower leg: Edema present.      Comments: Scant bilateral lower extremity edema   Skin:     General: Skin is warm and dry.      Capillary Refill: Capillary refill takes less than 2 seconds.      Comments: Abdominal surgical dressings intact   Neurological:      General: No focal deficit present.      Mental Status: She is alert and oriented to person, place, and time. Mental status is at baseline.      Cranial Nerves: No cranial nerve deficit.      Motor: No weakness.   Psychiatric:         Mood and Affect: Mood normal.         Behavior: Behavior normal.         Thought Content: Thought content normal.         Judgment: Judgment normal.      Comments: Appears depressed.           CRANIAL NERVES     CN III, IV, VI   Pupils are equal, round, and reactive to light.    Labs reviewed      Assessment/Plan:      * Acute respiratory failure with hypoxemia  Secondary to extensive bilateral pulmonary emboli-  Pulmonary consulted      Staphylococcus aureus infection  Continue IV rocephin. Repeat blood cultures. Consult ID       Acute blood loss anemia  Possible. hgb trending down. Check iron studies. IV venofer x 1 dose. Trend CBC. Consult hematology.  Lab Results   Component Value Date    WBC 9.08 07/20/2020    HGB 8.0 (L) 07/20/2020    HCT 26.0 (L) 07/20/2020     (H) 07/20/2020     07/20/2020             Azotemia  Renal function at  baseline      UTI (urinary tract infection)  Check UA and urine cx. Received rocephin. Continue IV abx daily.   Urine  cx negative.   Blood culture pending-- MRSA sensitivity pending.      Heparin-induced thrombocytopenia, type 1  Monitor CBC.   Lab Results   Component Value Date    WBC 10.15 07/18/2020    HGB 8.5 (L) 07/18/2020    HCT 26.6 (L) 07/18/2020     (H) 07/18/2020     (L) 07/18/2020             Acute deep vein thrombosis (DVT) of popliteal vein of right lower extremity  Continue full-dose anticoagulation      Hyperkalemia  Monitor  Trend BMP  Hold home Aldactone for now- resolved      Bilateral pulmonary embolism  07/14/2020 received message from staff nurse at 1046  that CTA of the chest results show patient with extensive bilateral pulmonary emboli  Monitor O2 sats, supplement O2 as needed  Continue full-dose Lovenox-was changed to IV heparin drip per pulmonology  Consult pulmonology.    Heparin drip DCed. Now on xarelto      History of DVT in adulthood  Patient reports history of DVT in the right upper extremity greater than 10 years ago and  history of groin DVT following a hip surgery approximately 8 years ago-  Continue MARIANELA hose  Patient now with lower extremity DVT and extensive bilateral pulmonary emboli  Patient with decreased activity/ambulation due to increased recent back pain however she does have history of prior DVT in the past once related to prior hip surgery, consider hematology consult      Chronic back pain  With chronic pain syndrome  Patient reports recent back injection approximately 2 weeks ago  Continue p.r.n. pain medication  Patient reports she sees Dr. Gilliam      Hypoxemia  Secondary to extensive bilateral pulmonary emboli      CHARLIE (obstructive sleep apnea)  Noted  Patient reports she does not use her CPAP at home      COPD (chronic obstructive pulmonary disease)  Monitor O2 sats, supplement O2 as needed  Q 4 hr while awake duo nebs and b.i.d. Pulmicort  Incentive  spirometer q.1 hour while awake      Type 2 diabetes mellitus with diabetic neuropathy, without long-term current use of insulin  Diabetic diet  Accu-Cheks with correctional sliding scale insulin  Blood sugars running 121- 136  HGB A1c is 6.4      Status post laparoscopic cholecystectomy  Patient is status post laparoscopic cholecystectomy done today by Dr. Jairo Soria at outpatient surgical center-  Orders as per surgeon - Dr. Walker  Low-fat low-cholesterol ADA diet  P.r.n. pain and antiemetic medication      Class 2 severe obesity with serious comorbidity in adult  Body mass index is 41.58 kg/m².  General weight loss/lifestyle modification strategies discussed (elicit support from others; identify saboteurs; non-food rewards, etc).          GERD (gastroesophageal reflux disease)  Continue PPI      Pulmonary hypertension  Secondary to PE      Hyperlipidemia associated with type 2 diabetes mellitus  No home anti-lipidemic noted- low fat, low-cholesterol diet    Hypertension associated with diabetes  Continue home medications        VTE Risk Mitigation (From admission, onward)         Ordered     heparin 25,000 units in dextrose 5% 250 ml (100 units/mL) infusion MINIMAL INTENSITY nomogram - [facility]  Continuous     Question:  Heparin Infusion Adjustment (DO NOT MODIFY ANSWER)  Answer:  \\[email or web address]\epic\Images\Pharmacy\HeparinInfusions\heparin MINIMAL  INTENSITY nomogram for [facility] [email or web address]    07/20/20 1604     IP VTE HIGH RISK PATIENT  Once      07/13/20 1900     Place MARIANELA hose  Until discontinued      07/13/20 1900                      Anabel Haji NP  Department of Hospital Medicine   [facility]

## 2020-07-22 NOTE — CONSULTS
Pharmacokinetic Initial Assessment: IV Vancomycin    Assessment/Plan:    Initiate intravenous vancomycin with a dose of 1750 mg once  Pharmacy dosing by level  Desired empiric serum trough concentration is 15 to 20 mcg/mL  Draw vancomycin random level on 7/23 at 1730.  Pharmacy will continue to follow and monitor vancomycin.      Please contact pharmacy at extension [phone] with any questions regarding this assessment.     Thank you for the consult,   Jessy Christine       Patient brief summary:  Sammi Abreu is a 79 y.o. female initiated on antimicrobial therapy with IV Vancomycin for treatment of suspected bacteremia    Drug Allergies:   Review of patient's allergies indicates:   Allergen Reactions    Cymbalta [duloxetine] Other (See Comments)     Nightmares      Darvon [propoxyphene] Nausea Only and Other (See Comments)     Sweating, slept for 3 days    Atorvastatin Other (See Comments)     Muscle cramps    Naprosyn [naproxen] Nausea Only    Penicillins Rash    Tramadol Nausea Only and Palpitations       Actual Body Weight:   104    Renal Function:   Estimated Creatinine Clearance: 49.4 mL/min (based on SCr of 1.1 mg/dL).,     Dialysis Method (if applicable):  N/A    CBC (last 72 hours):  Recent Labs   Lab Result Units 07/19/20  1609 07/20/20  0541 07/20/20  1624 07/21/20  0423 07/22/20  0609   WBC K/uL  --  9.29 9.08 8.33 7.86   Hemoglobin g/dL 8.4* 8.1* 8.0* 7.8* 7.6*   Hematocrit % 26.7* 25.8* 26.0* 24.9* 24.4*   Platelets K/uL  --  156 183 182 227   Gran% %  --  65.0 60.8 58.2 58.7   Lymph% %  --  18.1 23.1 24.7 26.3   Mono% %  --  14.2 13.7 13.8 10.9   Eosinophil% %  --  1.9 1.9 2.6 3.3   Basophil% %  --  0.2 0.1 0.2 0.3   Differential Method   --  Automated Automated Automated Automated       Metabolic Panel (last 72 hours):  Recent Labs   Lab Result Units 07/20/20  0541   Sodium mmol/L 135*   Potassium mmol/L 4.4   Chloride mmol/L 100   CO2 mmol/L 26   Glucose mg/dL 118*   BUN, Bld mg/dL 9   Creatinine  mg/dL 1.1       Drug levels (last 3 results):  No results for input(s): VANCOMYCINRA, VANCOMYCINPE, VANCOMYCINTR in the last 72 hours.    Microbiologic Results:  Microbiology Results (last 7 days)       Procedure Component Value Units Date/Time    Blood culture [[id number]]     Order Status: Sent Specimen: Blood     Blood culture [[id number]]     Order Status: Sent Specimen: Blood     Blood culture [[id number]]  (Abnormal) Collected: 07/18/20 1842    Order Status: Completed Specimen: Blood Updated: 07/22/20 0937     Blood Culture, Routine Gram stain peds bottle: Gram positive cocci in clusters resembling Staph       Results called to and read back by: Marika Mcclendon RN 07/20/2020  05:53      METHICILLIN RESISTANT STAPHYLOCOCCUS AUREUS  ID consult required at [facility] locations.  For susceptibility see order #[id number]      Blood culture [[id number]]  (Abnormal)  (Susceptibility) Collected: 07/18/20 1653    Order Status: Completed Specimen: Blood Updated: 07/22/20 0937     Blood Culture, Routine Gram stain juan bottle: Gram positive cocci in clusters resembling Staph       Results called to and read back by: Marika Mcclendon RN 07/20/2020  05:53      Gram stain aer bottle: Gram positive cocci in clusters resembling Staph      METHICILLIN RESISTANT STAPHYLOCOCCUS AUREUS  ID consult required at [facility] [place] locations.      Blood culture [[id number]] Collected: 07/22/20 0610    Order Status: Sent Specimen: Blood Updated: 07/22/20 0930    Urine culture [[id number]] Collected: 07/18/20 1920    Order Status: Completed Specimen: Urine Updated: 07/20/20 1334     Urine Culture, Routine Multiple organisms isolated. None in predominance.  Repeat if      clinically necessary.    Narrative:      Specimen Source->Urine    Urine culture [[id number]] Collected: 07/18/20 0425    Order Status: Completed Specimen: Urine Updated: 07/19/20 1253     Urine Culture, Routine Multiple organisms isolated. None  in predominance.  Repeat if      clinically necessary.    Narrative:      Specimen Source->Urine    Urine Culture High Risk [[id number]] Collected: 07/18/20 0740    Order Status: Canceled Specimen: Urine, Clean Catch

## 2020-07-22 NOTE — ASSESSMENT & PLAN NOTE
Blood cultures x2 positive for MRSA  Add IV vancomycin  Check echocardiogram  Repeat blood cultures x2 now  Consult infectious disease for further evaluation and  recommendation

## 2020-07-22 NOTE — CONSULTS
[facility]  Department of Cardiothoracic Surgery  Consult Note      PATIENT NAME: Sammi Abreu  MRN: [id number]  TODAY'S DATE: 07/21/2020  ADMIT DATE: 7/13/2020                                                                 CONSULT REQUESTED BY: Alix Ruth MD      PRINCIPAL PROBLEM: Acute respiratory failure with hypoxemia      REASON FOR CONSULT:  DVT      HPI:  78 yo female admitted through ER following Lap Lakshmi with SOB and hypoxia. Workup revealed extensive bilateral PE. Also persistent DVT involving L popliteal vein  Has been anticoagulated but worsening anemia has caused concern  Referred to consider for IVC filter  Patient states she was on anticoagulation years ago ( she is unsure why) but this was stopped   States she was having worsening SOB even before the cholecystectomy       Review of patient's allergies indicates:   Allergen Reactions    Cymbalta [duloxetine] Other (See Comments)     Nightmares      Darvon [propoxyphene] Nausea Only and Other (See Comments)     Sweating, slept for 3 days    Atorvastatin Other (See Comments)     Muscle cramps    Naprosyn [naproxen] Nausea Only    Penicillins Rash    Tramadol Nausea Only and Palpitations       REVIEW OF SYSTEMS  CARDIOVASCULAR: No recent chest pain, palpitations, arm, neck, or jaw pain  RESPIRATORY: No recent fever, cough chills, SOB or congestion  : No blood in the urine  GI: No Nausea, vomiting, constipation, diarrhea, blood, or reflux  MUSCULOSKELETAL: No myalgias  NEURO: No lightheadedness or dizziness  EYES: No Double vision, blurry, vision or headache       VITAL SIGNS (Most Recent)  Temp: 97.4 °F (36.3 °C) (07/21/20 1456)  Pulse: 79 (07/21/20 1950)  Resp: 18 (07/21/20 1950)  BP: (!) 126/57 (07/21/20 1456)  SpO2: 98 % (07/21/20 1950)    VENTILATION STATUS  Resp: 18 (07/21/20 1950)  SpO2: 98 % (07/21/20 1950)  Oxygen Concentration (%):  [32] 32    I & O (Last 24H):    Intake/Output Summary (Last 24 hours) at 7/21/2020  2037  Last data filed at 7/21/2020 1600  Gross per 24 hour   Intake --   Output 1125 ml   Net -1125 ml       WEIGHTS  Wt Readings from Last 1 Encounters:   07/21/20 0600 104 kg (229 lb 4.5 oz)   07/20/20 0509 106.5 kg (234 lb 11.2 oz)   07/17/20 0553 106.9 kg (235 lb 10.8 oz)   07/14/20 1700 103.8 kg (228 lb 14.4 oz)   07/14/20 1159 97.1 kg (214 lb)   07/13/20 2101 97.5 kg (214 lb 15.2 oz)       PHYSICAL EXAM  CONSTITUTIONAL: Well built, well nourished in no apparent distress  NECK: no carotid bruit, no JVD  LUNGS: CTA  CHEST WALL: No tenderness  HEART: regular rate and rhythm, S1, S2 normal, no murmur, click, rub or gallop   ABDOMEN: soft, non-tender; bowel sounds normal; no masses, no organomegaly  EXTREMITIES: Extremities normal, no edema  NEURO: AAO X 3    MEDICATIONS   SCHEDULED MEDS:   albuterol-ipratropium  3 mL Nebulization Q4H WAKE    bisacodyL  5 mg Oral Daily    budesonide  0.5 mg Nebulization Q12H    cefTRIAXone (ROCEPHIN) IVPB  1 g Intravenous Q24H    ciprofloxacin HCl  500 mg Oral Q12H    docusate sodium  100 mg Oral Daily    fluticasone propionate  2 spray Each Nostril Daily    folic acid  1 mg Oral Daily    metoprolol succinate  50 mg Oral Daily    montelukast  10 mg Oral QHS    neomycin-bacitracin-polymyxin   Topical (Top) Daily    pantoprazole  40 mg Oral Daily    polyethylene glycol  17 g Oral Daily       CONTINUOUS INFUSIONS:   heparin (porcine) in D5W 8 Units/kg/hr (07/21/20 1908)       PRN MEDS:acetaminophen, bisacodyL, dextrose 50%, dextrose 50%, glucagon (human recombinant), glucose, glucose, HYDROcodone-acetaminophen, insulin aspart U-100, melatonin, ondansetron, senna-docusate 8.6-50 mg, simethicone, sodium chloride 0.9%    LABS AND DIAGNOSTICS   CBC LAST 3 DAYS  Recent Labs   Lab 07/20/20  0541 07/20/20  1624 07/21/20  0423   WBC 9.29 9.08 8.33   RBC 2.42* 2.42* 2.31*   HGB 8.1* 8.0* 7.8*   HCT 25.8* 26.0* 24.9*   * 107* 108*   MCH 33.5* 33.1* 33.8*   MCHC 31.4* 30.8*  31.3*   RDW 13.1 13.0 13.1    183 182   MPV 11.0 11.2 10.4   GRAN 65.0  6.0 60.8  5.5 58.2  4.8   LYMPH 18.1  1.7 23.1  2.1 24.7  2.1   MONO 14.2  1.3* 13.7  1.2* 13.8  1.2*   BASO 0.02 0.01 0.02   NRBC 0 0 0       COAGULATION LAST 3 DAYS  Recent Labs   Lab 07/20/20  1624 07/21/20  0423 07/21/20  1126 07/21/20  1903   INR 1.4*  --   --   --    APTT 54.7* 58.4* 56.0* 47.0*       CHEMISTRY LAST 3 DAYS  Recent Labs   Lab 07/15/20  0158 07/20/20  0541    135*   K 4.1 4.4    100   CO2 22* 26   ANIONGAP 10 9   BUN 27* 9   CREATININE 1.3 1.1   * 118*   CALCIUM 8.6* 8.2*   MG 1.8  --        CARDIAC PROFILE LAST 3 DAYS  Recent Labs   Lab 07/14/20  2304   TROPONINI 0.029*       ENDOCRINE LAST 3 DAYS  Recent Labs   Lab 07/19/20  0539   PROCAL 0.19       LAST ARTERIAL BLOOD GAS  ABG  No results for input(s): PH, PO2, PCO2, HCO3, BE in the last 168 hours.    LAST 7 DAYS MICROBIOLOGY   Microbiology Results (last 7 days)     Procedure Component Value Units Date/Time    Blood culture [[id number]]  (Abnormal) Collected: 07/18/20 1842    Order Status: Completed Specimen: Blood Updated: 07/21/20 0751     Blood Culture, Routine Gram stain peds bottle: Gram positive cocci in clusters resembling Staph       Results called to and read back by: Marika Mcclendon RN 07/20/2020  05:53      STAPHYLOCOCCUS AUREUS  ID consult required at [facility].  For susceptibility see order #[id number]      Blood culture [[id number]]  (Abnormal) Collected: 07/18/20 1653    Order Status: Completed Specimen: Blood Updated: 07/21/20 0749     Blood Culture, Routine Gram stain juan bottle: Gram positive cocci in clusters resembling Staph       Results called to and read back by: Marika Mcclendon RN 07/20/2020  05:53      Gram stain aer bottle: Gram positive cocci in clusters resembling Staph      STAPHYLOCOCCUS AUREUS  Susceptibility pending  ID consult required at [facility] [address] [facility] [place]  locations.      Urine culture [[id number]] Collected: 07/18/20 1920    Order Status: Completed Specimen: Urine Updated: 07/20/20 1334     Urine Culture, Routine Multiple organisms isolated. None in predominance.  Repeat if      clinically necessary.    Narrative:      Specimen Source->Urine    Urine culture [[id number]] Collected: 07/18/20 0425    Order Status: Completed Specimen: Urine Updated: 07/19/20 1253     Urine Culture, Routine Multiple organisms isolated. None in predominance.  Repeat if      clinically necessary.    Narrative:      Specimen Source->Urine    Urine Culture High Risk [[id number]] Collected: 07/18/20 0740    Order Status: Canceled Specimen: Urine, Clean Catch           MOST RECENT IMAGING  X-Ray Abdomen AP 1 View  Narrative: EXAMINATION:  XR ABDOMEN AP 1 VIEW    CLINICAL HISTORY:  distention;    TECHNIQUE:  AP View(s) of the abdomen was performed.    COMPARISON:  None    FINDINGS:  Surgical clips in the right upper quadrant are consistent with prior cholecystectomy.  The patient has degenerative disc disease at L1-2 L2-3 L3-4 and L4-5.  Patient has had bilateral hip joint replacements.  The bowel gas pattern is within normal limits.  No free air is seen.  No masses or calcifications are noted.  Impression: Prior cholecystectomy and prior bilateral hip joint replacements.  Degenerative disc disease from L1-L5.  Otherwise negative abdomen x-ray    Electronically signed by: Gino Juarez MD  Date:    07/20/2020  Time:    08:09  X-Ray Chest 1 View  Narrative: EXAMINATION:  XR CHEST 1 VIEW    CLINICAL HISTORY:  dyspnea;    TECHNIQUE:  Single frontal view of the chest was performed.    COMPARISON:  Chest of July 17, 2020.    FINDINGS:  There is chronic elevation of the right hemidiaphragm.  The cardiac size is within normal limits.  Mild atelectasis is seen at the right lung base.  The lung apices are clear.  No pneumothorax is seen.  Impression: Mild atelectasis at the right lung base.  Chronic  elevation of the right hemidiaphragm.    Electronically signed by: Gino Juarez MD  Date:    07/20/2020  Time:    08:08      LASTECHO  Results for orders placed during the hospital encounter of 07/13/20   Echo Color Flow Doppler? Yes    Narrative · The mean diastolic gradient across the mitral valve is 3 mmHg at a heart   rate of 70 bpm.  · Concentric left ventricular remodeling.  · Small left ventricular cavity size.  · Hyperdynamic left ventricular systolic function. The estimated ejection   fraction is 66%.  · Grade I (mild) left ventricular diastolic dysfunction consistent with   impaired relaxation.  · Elevated central venous pressure (15 mmHg).  · The estimated PA systolic pressure is 58 mmHg.  · Pulmonary hypertension present.  · Mild to moderate pulmonic regurgitation.          CURRENT/PREVIOUS VISIT EKG  Results for orders placed or performed during the hospital encounter of 07/13/20   EKG 12-lead    Collection Time: 07/14/20  9:43 AM    Narrative    Test Reason : R79.89,    Vent. Rate : 067 BPM     Atrial Rate : 067 BPM     P-R Int : 140 ms          QRS Dur : 078 ms      QT Int : 430 ms       P-R-T Axes : 067 009 040 degrees     QTc Int : 454 ms    Normal sinus rhythm  Normal ECG  When compared with ECG of 13-JUL-2020 21:21,  No significant change was found  Confirmed by Shaan Maza MD ([id number]) on 7/15/2020 9:00:53 AM    Referred By: KANG LOUIE           Confirmed By:Shaan Maza MD         HOSPITAL PROBLEMS WITH ASSESSMENT & PLAN:     Active Hospital Problems    Diagnosis    *Acute respiratory failure with hypoxemia    Acute anemia    Acute blood loss anemia    History of obstructive sleep apnea    Azotemia    UTI (urinary tract infection)    Thrombocytopenia    Heparin-induced thrombocytopenia, type 1    Bilateral pulmonary embolism    Hyperkalemia    Acute deep vein thrombosis (DVT) of popliteal vein of right lower extremity    Elevated troponin    Class 2 severe obesity with  serious comorbidity in adult    Status post laparoscopic cholecystectomy     07/13/2020 done by Dr. Jomar Soria      Type 2 diabetes mellitus with diabetic neuropathy, without long-term current use of insulin    COPD (chronic obstructive pulmonary disease)    CHARLIE (obstructive sleep apnea)    Hypoxemia    Chronic back pain    History of DVT in adulthood    GERD (gastroesophageal reflux disease)    Pulmonary hypertension    Hyperlipidemia associated with type 2 diabetes mellitus    Hypertension associated with diabetes       ASSESSMENT & PLAN:  PE/DVT      RECOMMENDATIONS:  Reasonable candidate for IVC filter  Agree with Dr Suero. Even if gets filter would maintain at least low level anticoagulation provided H/H stabilize  Will discuss with other involved physicians  If all agree can proceed Thursday. Patient will have to be transferred to [facility] for procedure    Thank you for the consult.     Chandler Pino MD  [facility]  Department of Cardiothoracic Surgery  Date of Service: 07/21/2020  8:37 PM

## 2020-07-22 NOTE — CONSULTS
Consult Note  Infectious Disease    Reason for Consult:      HPI: Sammi Abreu is a  79 y.o. female with past medical history of obesity, DM 2, HTN, DLP, CHARLIE, noncompliance with CPAP, COPD, GERD, DJD, fibromyalgia, chronic back pain, Jehovah witness, allergic to penicillin, anemia.      Patient was at outpatient surgical center, after laparoscopic cholecystectomy by Jomar Hall on 07/13/2020.  Postop she was hypoxemic and could not be discharged.  She was diagnosed with left popliteal DVT and extensive bilateral PE.  She was admitted and anticoagulated with heparin.  Patient used to be on anticoagulation for a long time but it was discontinued, patient does not know why it was started and white was stopped    While hospitalized she was found to have MRSA bacteremia.  Id consult was called.      I came and show patient.  She has been dealing with lower back pain and bilateral groin pain rule out June.  She so spine specialist who D a steroid injection at the L-spine by the end of June.  Her lower back pain continued to worsen it looks like it went across the abdomen and words the left anterior thigh.  She she was hospitalized and was told that her pain was due to gallbladder issues.  Initially she did not want to proceed cholecystectomy but the pain worsened so bad that she had her home health nurse contact the general surgeons office and scheduled an elective cholecystectomy.  She seems concerned with the bacteremia and the and tries Little so.    Patient is on 3 L O2.  Pulse ox 95  Afebrile.  T-max 99.3°    Antibiotics (From admission, onward)    Start     Stop Route Frequency Ordered    07/22/20 1243  vancomycin - pharmacy to dose  (vancomycin IVPB)      -- IV pharmacy to manage frequency 07/22/20 1145    07/18/20 1730  cefTRIAXone (ROCEPHIN) 1 g/50 mL D5W IVPB      -- IV Every 24 hours (non-standard times) 07/18/20 1630    07/16/20 1200  neomycin-bacitracin-polymyxin ointment      -- Top Daily 07/16/20  1052        Antifungals (From admission, onward)    None            Review of patient's allergies indicates:   Allergen Reactions    Cymbalta [duloxetine] Other (See Comments)     Nightmares      Darvon [propoxyphene] Nausea Only and Other (See Comments)     Sweating, slept for 3 days    Atorvastatin Other (See Comments)     Muscle cramps    Naprosyn [naproxen] Nausea Only    Penicillins Rash    Tramadol Nausea Only and Palpitations     Past Medical History:   Diagnosis Date    ALLERGIC RHINITIS     Anemia     Arthritis     Back pain     Bronchitis     Cataract     OU    Cholelithiasis     COPD (chronic obstructive pulmonary disease)     Degenerative disc disease     Diabetes mellitus     pre diabetic    Diverticulosis     DVT (deep venous thrombosis)     Edema     Encounter for blood transfusion 1979    Fibromyalgia     Glaucoma     Gout     Hx of colonic polyps     Hyperlipidemia     Hypertension     Incontinence     Osteoporosis     Reflux     Refusal of blood transfusions as patient is Nondenominational     Sleep apnea     non compliant with CPAP    Vestibulitis of ear      Past Surgical History:   Procedure Laterality Date    ANGIOGRAPHY OF LOWER EXTREMITY N/A 7/31/2019    Procedure: ANGIOGRAM, LOWER EXTREMITY;  Surgeon: Gino Arana MD;  Location: [facility] CATH/EP LAB;  Service: General;  Laterality: N/A;    HIP SURGERY      HYSTERECTOMY      JOINT REPLACEMENT      B total hip    LAPAROSCOPIC CHOLECYSTECTOMY N/A 7/13/2020    Procedure: CHOLECYSTECTOMY, LAPAROSCOPIC;  Surgeon: Jomar Mcdonald MD;  Location: [facility] OR;  Service: General;  Laterality: N/A;    TRANSFORAMINAL EPIDURAL INJECTION OF STEROID Left 6/30/2020    Procedure: Injection,steroid,epidural,transforaminal approach;  Surgeon: Matt Gilliam MD;  Location: [facility] OR;  Service: Pain Management;  Laterality: Left;  L2-3, L3-4     Social History     Socioeconomic History    Marital status:      Spouse name:  Not on file    Number of children: Not on file    Years of education: Not on file    Highest education level: Not on file   Occupational History    Not on file   Social Needs    Financial resource strain: Not on file    Food insecurity     Worry: Not on file     Inability: Not on file    Transportation needs     Medical: Not on file     Non-medical: Not on file   Tobacco Use    Smoking status: Former Smoker     Packs/day: 0.25     Years: 5.00     Pack years: 1.25     Quit date: 1972     Years since quittin.5    Smokeless tobacco: Never Used   Substance and Sexual Activity    Alcohol use: Yes     Alcohol/week: 0.0 standard drinks     Comment: Rarely    Drug use: Yes     Types: Oxycodone, Hydrocodone    Sexual activity: Not on file   Lifestyle    Physical activity     Days per week: Not on file     Minutes per session: Not on file    Stress: Not on file   Relationships    Social connections     Talks on phone: Not on file     Gets together: Not on file     Attends [employer] service: Not on file     Active member of club or organization: Not on file     Attends meetings of clubs or organizations: Not on file     Relationship status: Not on file   Other Topics Concern    Not on file   Social History Narrative    Not on file     Family History   Problem Relation Age of Onset    Hypertension Mother     Diabetes Sister     Glaucoma Neg Hx     Macular degeneration Neg Hx     Retinal detachment Neg Hx        Pertinent medications noted:     Review of Systems:   She had fever and chills but on day were minimal and they have resolved.  No change in vision, loss of vision or diplopia  No sinus congestion, purulent nasal discharge, post nasal drip or facial pain  No pain in mouth or throat. No problems with teeth, gums.  No chest pain, palpitations, syncope  No cough, sputum production, shortness of breath, dyspnea on exertion, pleurisy, hemoptysis  No nausea, vomiting, diarrhea, constipation,  blood in stool, or focal abd pain,  No dysphagia, odynophagia  No dysuria, hematuria, strangury, retention, incontinence, nocturia, prostatism,   No vaginal/penile discharge, genital ulcers, risk for STD  Severe back pain at the lower for occult and lumbar area, radiating in circular fashion, going towards the groin bilaterally and especially to worse the left thigh.  No unusual headaches, dizziness, vertigo, numbness, paresthesias, neuropathy, falls  No anxiety, depression, substance abuse, sleep disturbance  No diabetes, thyroid, hypogonadal conditions  No bleeding, lymphadenopathy, anemia, malignancy, unusual bruising  No new rashes, lesions, or wounds  No TB exposure  Outdoor activities:  No  Travel:  No  Implants:  Bilateral hip replacements  Antibiotic History:   Has received epidural steroid injection.  Received Decadron on 07/13.    EXAM & DIAGNOSTICS REVIEWED:   Vitals:     Temp:  [97.4 °F (36.3 °C)-99.3 °F (37.4 °C)]   Temp: 98.9 °F (37.2 °C) (07/22/20 1122)  Pulse: 78 (07/22/20 1122)  Resp: 18 (07/22/20 1122)  BP: (!) 167/71 (07/22/20 1122)  SpO2: (!) 94 % (07/22/20 1122)    Intake/Output Summary (Last 24 hours) at 7/22/2020 1149  Last data filed at 7/21/2020 1600  Gross per 24 hour   Intake --   Output 550 ml   Net -550 ml       General:  In NAD. Alert and attentive, cooperative, comfortable  Eyes:  Anicteric, PERRL, EOMI  ENT:  No ulcers, exudates, thrush, nares patent, dentition is fair  Neck:  supple, no masses or adenopathy appreciated  Lungs: Diminished  Heart:  RRR, no gallop/murmur/rub noted  Abd:  Soft, NT, ND, normal BS, no masses or organomegaly appreciated.  :  Voids/Little, urine clear, no flank tenderness  Musc:  Joints without effusion, swelling, erythema, synovitis, muscle wasting.  Over the lower thoracolumbar area , it about T12 and slightly over lumbar area  Skin:  No rashes. No palmar or plantar lesions. No subungual petechiae  Wound:   Neuro: Alert, attentive, speech fluent, face  symmetric, moves all extremities, no focal weakness.  Takes her time to move due to severe pain.  Psych:  Understandably anxious and concerned.  Lymphatic:     No cervical, supraclavicular, axillary, or inguinal nodes  Extrem: No edema, erythema, phlebitis, cellulitis, warm and well perfused  VAD:       Isolation:      Lines/Tubes/Drains:    General Labs reviewed:  Recent Labs   Lab 07/20/20  1624 07/21/20  0423 07/22/20  0609   WBC 9.08 8.33 7.86   HGB 8.0* 7.8* 7.6*   HCT 26.0* 24.9* 24.4*    182 227       Recent Labs   Lab 07/20/20  0541   *   K 4.4      CO2 26   BUN 9   CREATININE 1.1   CALCIUM 8.2*           Micro:  Microbiology Results (last 7 days)     Procedure Component Value Units Date/Time    Blood culture [[id number]]     Order Status: Sent Specimen: Blood     Blood culture [[id number]]     Order Status: Sent Specimen: Blood     Blood culture [[id number]]  (Abnormal) Collected: 07/18/20 1842    Order Status: Completed Specimen: Blood Updated: 07/22/20 0937     Blood Culture, Routine Gram stain peds bottle: Gram positive cocci in clusters resembling Staph       Results called to and read back by: Marika Mcclendon RN 07/20/2020  05:53      METHICILLIN RESISTANT STAPHYLOCOCCUS AUREUS  ID consult required at [facility].  For susceptibility see order #[id number]      Blood culture [[id number]]  (Abnormal)  (Susceptibility) Collected: 07/18/20 1653    Order Status: Completed Specimen: Blood Updated: 07/22/20 0937     Blood Culture, Routine Gram stain juan bottle: Gram positive cocci in clusters resembling Staph       Results called to and read back by: Marika Mcclendon RN 07/20/2020  05:53      Gram stain aer bottle: Gram positive cocci in clusters resembling Staph      METHICILLIN RESISTANT STAPHYLOCOCCUS AUREUS  ID consult required at [facility].      Blood culture [[id number]] Collected: 07/22/20 0610    Order Status: Sent Specimen:  Blood Updated: 07/22/20 0930    Urine culture [[id number]] Collected: 07/18/20 1920    Order Status: Completed Specimen: Urine Updated: 07/20/20 1334     Urine Culture, Routine Multiple organisms isolated. None in predominance.  Repeat if      clinically necessary.    Narrative:      Specimen Source->Urine    Urine culture [[id number]] Collected: 07/18/20 0425    Order Status: Completed Specimen: Urine Updated: 07/19/20 1253     Urine Culture, Routine Multiple organisms isolated. None in predominance.  Repeat if      clinically necessary.    Narrative:      Specimen Source->Urine    Urine Culture High Risk [[id number]] Collected: 07/18/20 0740    Order Status: Canceled Specimen: Urine, Clean Catch         Imaging Reviewed:   [id number] acute radiographic abnormality.   CT   Bilateral pulmonary emboli with extensive clot burden and involvement of main pulmonary arteries.  The patient's nurse, Anil was called at the time of dictation.  Additionally, a message was sent via secure chat to the hospitalist taking care of the patient.  Mediastinal adenopathy of uncertain etiology.  This may be inflammatory/reactive or neoplastic, and follow-up is needed.  Development of nonspecific foci of ground-glass opacity throughout both lungs.     US Nonocclusive thrombus within the left popliteal vein, suggestive of some recanalization of the prior thrombus in left popliteal vein.  No additional focus of thrombus.     Cardiology:  · The mean diastolic gradient across the mitral valve is 3 mmHg at a heart rate of 70 bpm.  · Concentric left ventricular remodeling.  · Small left ventricular cavity size.  · Hyperdynamic left ventricular systolic function. The estimated ejection fraction is 66%.  · Grade I (mild) left ventricular diastolic dysfunction consistent with impaired relaxation.  · Elevated central venous pressure (15 mmHg).  · The estimated PA systolic pressure is 58 mmHg.  · Pulmonary hypertension present.  · Mild to moderate  pulmonic regurgitation.      IMPRESSION & PLAN     MRSA bacteremia.  On 08/17.   I am concerned she could have diskitis, especially at the T12 area?    Has received epidural steroid injection.  Received Decadron on 07/13.    PMHx of obesity, DM 2, HTN, DLP, CHARLIE, noncompliance with CPAP, COPD, GERD, DJD, fibromyalgia, chronic back pain, Jehovah witness, allergic to penicillin, anemia.       Recommendations:  Continue vancomycin   Daptomycin IV x1, I think it will be helpful until vancomycin reaches therapeutic level.  She will need a SADIE when she is more stable from a respiratory standpoint.    Please hold off IVC filter placement, until we make sure the bacteremia does not recur.  Do we even even need need the IVC filter ??

## 2020-07-22 NOTE — PLAN OF CARE
Patient AAO X 4. Patient free from injury during shift. Pain managed pharmacologically. Provided full relief. Patient free from N/V during shift. IV access has heparin drip running. Patient placed on cardiac monitoring. Running NSR. Remained afebrile during shift. Pt on O2 at 3 liters via nasal canula tolerating well. Pt able to ambulate to bedside commode. Restroom offered. Repositioned as needed. Safety maintained with bed alarm. Bed in lowest position, call light and personal items within reach. Patient verbalized understanding of care. Will continue to monitor with every 2 hour rounding.

## 2020-07-22 NOTE — PROGRESS NOTES
"[facility]  Adult Nutrition  Progress Note    SUMMARY       Recommendations    Recommendation:   1. Will add protein on tray for mid afternoon snack   2. Will add extra condiments to help with altered taste   3. Boost Pudding TID   4. RD to monitor    Interventions:  Carbohydrate Modified Diet  Purpose of the nutrition education- DM  Commercial Food- Boost Pudding TID    Goals: meet at least 75% EEN/EPN with meals and supplement by RD f/u  Nutrition Goal Status: new  Communication of RD Recs: (POC)    Reason for Assessment    Reason For Assessment: consult  Diagnosis: (Acute respiratory failure with hypoxemia)  Relevant Medical History: Anemia, COPD, DM, diverticulosis, DVT, Edema, gout, HTN, HLD, reflux, osteoporosis  Interdisciplinary Rounds: did not attend    General Information Comments: Remote assessment completed; interview and NFPE completed by RD on site today, no wasting found but pt is obese. Patient noted a decreased appetite x1 month (only 1-2 meals daily). She says her taste has changed, everything is bland and she was having nausea. Still having both bijal. with food smell but getting better; eating 10 bites of eat meal. Pt was educated on tips for taste changes and nausea. Will add protein on tray for mid afternoon snack, extra condiments, and boost pudding TID (did not want shakes). DM diet was discussed briefly, pt thinks she is pre-diabetic and not interested in carb counting but will cut back on added sugar/soda.    Nutrition Discharge Planning: diabetic/cardiac diet    Nutrition Risk Screen    Nutrition Risk Screen: no indicators present    Nutrition/Diet History    Patient Reported Diet/Restrictions/Preferences: general  Food Allergies: NKFA  Factors Affecting Nutritional Intake: decreased appetite, altered taste    Anthropometrics    Temp: 98.9 °F (37.2 °C)  Height: 5' 3"  Height (inches): 63 in  Weight Method: Bed Scale  Weight: 104 kg (229 lb 4.5 oz)  Weight (lb): 229.28 " lb  Ideal Body Weight (IBW), Female: 115 lb  % Ideal Body Weight, Female (lb): 186.09 %  BMI (Calculated): 40.6  BMI Grade: greater than 40 - morbid obesity     Lab/Procedures/Meds    Pertinent Labs Reviewed: reviewed  Pertinent Labs Comments: 7/20: Na 135, eGFR 56, glucose 118, A1c 6.4  Pertinent Medications Reviewed: reviewed  Pertinent Medications Comments: abx, docusate, folic acid, iron sucrose, metoprolol succinate, pantoprazole, polyethylene glycol    Physical Findings/Assessment    Post laparoscopic cholesysectomy-prodecure on 7/13    Estimated/Assessed Needs    Weight Used For Calorie Calculations: 104 kg (229 lb 4.5 oz)  Energy Calorie Requirements (kcal): 3836-0011  Energy Need Method: Kcal/kg(14-18 kcal/kg for obese)  Protein Requirements: 104-125g(1-1.2 g/kg)  Weight Used For Protein Calculations: 104 kg (229 lb 4.5 oz)  Fluid Requirements (mL): 1mL/kcal or per MD  Estimated Fluid Requirement Method: RDA Method  RDA Method (mL): 1456  CHO Requirement: 182 g    Nutrition Prescription Ordered    Current Diet Order: DM  Nutrition Order Comments: Low fat, low cholesterol    Evaluation of Received Nutrient/Fluid Intake    I/O: -941 mL since admit  Fluid Required: (per MD)  Comments: 7/20/20  % Intake of Estimated Energy Needs: 25 - 50 %  % Meal Intake: 25 - 50 %    Nutrition Risk    Level of Risk/Frequency of Follow-up: low - moderate     Assessment and Plan  Nutrition Problem:  Inadequate energy intake    Related to (etiology):   Post laparoscopic cholesysectomy    Signs and Symptoms (as evidenced by):   Decreased appetite, altered taste    Interventions:  Carbohydrate Modified Diet  Purpose of the nutrition education- DM  Commercial Food- Boost Pudding TID    Nutrition Diagnosis Status:   New      Monitor and Evaluation    Food and Nutrient Intake: energy intake, food and beverage intake  Food and Nutrient Adminstration: diet order  Knowledge/Beliefs/Attitudes: food and nutrition knowledge/skill  Physical  Activity and Function: nutrition-related ADLs and IADLs  Anthropometric Measurements: weight, weight change  Biochemical Data, Medical Tests and Procedures: electrolyte and renal panel, glucose/endocrine profile  Nutrition-Focused Physical Findings: overall appearance     Malnutrition Assessment    Energy Intake (Malnutrition): less than or equal to 75% for greater than or equal to 1 month     Nutrition Follow-Up    RD Follow-up?: Yes

## 2020-07-22 NOTE — PROGRESS NOTES
Progress Note  PULMONARY    Admit Date: 7/13/2020 07/22/2020      SUBJECTIVE:     7/23- no new complaints. Still SOB worse w/ walking in the room. no appetite, nauseated     PFSH and Allergies reviewed.    OBJECTIVE:     Vitals (Most recent):  Vitals:    07/22/20 1557   BP:    Pulse: 70   Resp: 15   Temp:        Vitals (24 hour range):  Temp:  [97.8 °F (36.6 °C)-99.3 °F (37.4 °C)]   Pulse:  [69-86]   Resp:  [14-18]   BP: (135-167)/(59-71)   SpO2:  [92 %-98 %]     No intake or output data in the 24 hours ending 07/22/20 1608       Physical Exam:  The patient's neuro status (alertness,orientation,cognitive function,motor skills,), pharyngeal exam (oral lesions, hygiene, abn dentition,), Neck (jvd,mass,thyroid,nodes in neck and above/below clavicle),RESPIRATORY(symmetry,effort,fremitus,percussion,auscultation),  Cor(rhythm,heart tones including gallops,perfusion,edema)ABD(distention,hepatic&splenomegaly,tenderness,masses), Skin(rash,cyanosis),Psyc(affect,judgement,).  Exam negative except for these pertinent findings:    Obese, sitting in chair   Lungs clear  Bilateral trace pitting edema    Radiographs reviewed: view by direct vision    Massive pe 7/14 cta  US lower ext 7/22/20- Nonocclusive thrombus within the left popliteal vein, suggestive of some recanalization of the prior thrombus in left popliteal vein.  No additional focus of thrombus.    TTE 7/14-   · The mean diastolic gradient across the mitral valve is 3 mmHg at a heart rate of 70 bpm.  · Concentric left ventricular remodeling.  · Small left ventricular cavity size.  · Hyperdynamic left ventricular systolic function. The estimated ejection fraction is 66%.  · Grade I (mild) left ventricular diastolic dysfunction consistent with impaired relaxation.  · Elevated central venous pressure (15 mmHg).  · The estimated PA systolic pressure is 58 mmHg.  · Pulmonary hypertension present.  · Mild to moderate pulmonic regurgitation.    Labs     Recent Labs   Lab  07/22/20  0609   WBC 7.86   HGB 7.6*   HCT 24.4*        No results for input(s): NA, K, CL, CO2, BUN, CREATININE, GLU, CALCIUM, CAION, MG, PHOS, AST, ALT, ALKPHOS, BILITOT, BILIDIR, PROT, ALBUMIN, PREALBUMIN, AMYLASE, LIPASE, CRP, HSCRP, SEDRATE, PROCAL, INR, PTT, LABHEPA, LACTATE, TROPONINI, CPK, CPKMB, MB, BNP in the last 24 hours.No results for input(s): PH, PCO2, PO2, HCO3 in the last 24 hours.  Microbiology Results (last 7 days)     Procedure Component Value Units Date/Time    Blood culture [[id number]] Collected: 07/22/20 0610    Order Status: Completed Specimen: Blood Updated: 07/22/20 1545     Blood Culture, Routine No Growth to date    Blood culture [[id number]] Collected: 07/22/20 1412    Order Status: Sent Specimen: Blood from Peripheral, Right Hand Updated: 07/22/20 1416    Narrative:      Collection has been rescheduled by [email or web address] at 07/22/2020 12:48 Reason:   nurse billie checking on it will call if needed  Collection has been rescheduled by PVL1 at 07/22/2020 12:48 Reason:   nurse billie checking on it will call if needed    Blood culture [[id number]]     Order Status: Canceled Specimen: Blood     Blood culture [[id number]]  (Abnormal) Collected: 07/18/20 1842    Order Status: Completed Specimen: Blood Updated: 07/22/20 0937     Blood Culture, Routine Gram stain peds bottle: Gram positive cocci in clusters resembling Staph       Results called to and read back by: Marika Mcclendon RN 07/20/2020  05:53      METHICILLIN RESISTANT STAPHYLOCOCCUS AUREUS  ID consult required at [facility],[place] and [facility].  For susceptibility see order #[id number]      Blood culture [[id number]]  (Abnormal)  (Susceptibility) Collected: 07/18/20 1653    Order Status: Completed Specimen: Blood Updated: 07/22/20 0937     Blood Culture, Routine Gram stain juan bottle: Gram positive cocci in clusters resembling Staph       Results called to and read back by: Marika Mcclendon RN 07/20/2020  05:53      Gram stain aer  bottle: Gram positive cocci in clusters resembling Staph      METHICILLIN RESISTANT STAPHYLOCOCCUS AUREUS  ID consult required at [facility],[place] and [facility] locations.      Urine culture [[id number]] Collected: 07/18/20 1920    Order Status: Completed Specimen: Urine Updated: 07/20/20 1334     Urine Culture, Routine Multiple organisms isolated. None in predominance.  Repeat if      clinically necessary.    Narrative:      Specimen Source->Urine    Urine culture [[id number]] Collected: 07/18/20 0425    Order Status: Completed Specimen: Urine Updated: 07/19/20 1253     Urine Culture, Routine Multiple organisms isolated. None in predominance.  Repeat if      clinically necessary.    Narrative:      Specimen Source->Urine    Urine Culture High Risk [[id number]] Collected: 07/18/20 0740    Order Status: Canceled Specimen: Urine, Clean Catch           Impression:  Active Hospital Problems    Diagnosis  POA    *Acute respiratory failure with hypoxemia [J96.01]  Yes    Type 2 diabetes mellitus with stage 2 chronic kidney disease [E11.22, N18.2]  Yes     With chronic kidney disease stage 2 to stage III      Hyponatremia [E87.1]  Yes    MRSA bacteremia [R78.81]  No    Staphylococcus aureus infection [A49.01]  No    Acute anemia [D64.9]  No    Acute blood loss anemia [D62]  Yes    History of obstructive sleep apnea [Z86.69]  Yes    Azotemia [R79.89]  No    UTI (urinary tract infection) [N39.0]  No    Bilateral pulmonary embolism [I26.99]  Yes    Deep vein thrombosis (DVT) of left lower extremity [I82.402]  Yes    Elevated troponin [R79.89]  Yes    Class 2 severe obesity with serious comorbidity in adult [E66.01]  Yes    Status post laparoscopic cholecystectomy [Z90.49]  Not Applicable     07/13/2020 done by Dr. Jomar Soria      Type 2 diabetes mellitus with diabetic neuropathy, without long-term current use of insulin [E11.40]  Yes    COPD (chronic obstructive pulmonary disease) [J44.9]  Yes    CHARLIE  (obstructive sleep apnea) [G47.33]  Yes    Hypoxemia [R09.02]  Yes    Chronic back pain [M54.9, G89.29]  Yes    History of DVT in adulthood [Z86.718]  Not Applicable    GERD (gastroesophageal reflux disease) [K21.9]  Yes    Pulmonary hypertension [I27.20]  Yes    Hyperlipidemia associated with type 2 diabetes mellitus [E11.69, E78.5]  Yes    Hypertension associated with diabetes [E11.59, I10]  Yes      Resolved Hospital Problems    Diagnosis Date Resolved POA    Thrombocytopenia [D69.6] 07/22/2020 No    Hyperkalemia [E87.5] 07/22/2020 Yes               Plan:   Acute hypoxemic respiratory failure  Bilateral PE  Left lower extremity DVT  Pulmonary hypertension- acute on chronic  Diastolic dysfunction  DM2  HTN  Suspected COPD?  Minimal smoking history  Obesity  Status post lap rosette    - continue heparin drip  - follow-up repeat echo  - lasix 40mg x1 today  - continue inhaled bronchodilators  - she is status post IVC filter  - hematology following- recommend lifelong anticoagulation and outpatient Eliquis  - w pulmonary will sign off, please call if any questions    Anabel Champagne MD  Pulmonary & Critical Care Medicine

## 2020-07-23 PROBLEM — M51.34 DDD (DEGENERATIVE DISC DISEASE), THORACIC: Status: ACTIVE | Noted: 2020-07-23

## 2020-07-23 LAB
APTT BLDCRRT: 25.1 SEC (ref 21–32)
APTT BLDCRRT: 48.1 SEC (ref 21–32)
APTT BLDCRRT: 66.7 SEC (ref 21–32)
BASOPHILS # BLD AUTO: 0.02 K/UL (ref 0–0.2)
BASOPHILS NFR BLD: 0.2 % (ref 0–1.9)
CREAT SERPL-MCNC: 1.2 MG/DL (ref 0.5–1.4)
CRP SERPL-MCNC: 77.8 MG/L (ref 0–8.2)
DIFFERENTIAL METHOD: ABNORMAL
EOSINOPHIL # BLD AUTO: 0.2 K/UL (ref 0–0.5)
EOSINOPHIL NFR BLD: 2.5 % (ref 0–8)
ERYTHROCYTE [DISTWIDTH] IN BLOOD BY AUTOMATED COUNT: 13.2 % (ref 11.5–14.5)
EST. GFR  (AFRICAN AMERICAN): 50 ML/MIN/1.73 M^2
EST. GFR  (NON AFRICAN AMERICAN): 44 ML/MIN/1.73 M^2
HCT VFR BLD AUTO: 26.9 % (ref 37–48.5)
HGB BLD-MCNC: 8.2 G/DL (ref 12–16)
IMM GRANULOCYTES # BLD AUTO: 0.14 K/UL (ref 0–0.04)
IMM GRANULOCYTES NFR BLD AUTO: 1.7 % (ref 0–0.5)
LYMPHOCYTES # BLD AUTO: 2.2 K/UL (ref 1–4.8)
LYMPHOCYTES NFR BLD: 25.7 % (ref 18–48)
MCH RBC QN AUTO: 32 PG (ref 27–31)
MCHC RBC AUTO-ENTMCNC: 30.5 G/DL (ref 32–36)
MCV RBC AUTO: 105 FL (ref 82–98)
MONOCYTES # BLD AUTO: 0.9 K/UL (ref 0.3–1)
MONOCYTES NFR BLD: 10.1 % (ref 4–15)
NEUTROPHILS # BLD AUTO: 5 K/UL (ref 1.8–7.7)
NEUTROPHILS NFR BLD: 59.8 % (ref 38–73)
NRBC BLD-RTO: 1 /100 WBC
PLATELET # BLD AUTO: 276 K/UL (ref 150–350)
PMV BLD AUTO: 10.1 FL (ref 9.2–12.9)
PROCALCITONIN SERPL IA-MCNC: 0.15 NG/ML
RBC # BLD AUTO: 2.56 M/UL (ref 4–5.4)
VANCOMYCIN SERPL-MCNC: 7.1 UG/ML
WBC # BLD AUTO: 8.39 K/UL (ref 3.9–12.7)

## 2020-07-23 PROCEDURE — 94761 N-INVAS EAR/PLS OXIMETRY MLT: CPT

## 2020-07-23 PROCEDURE — 99223 PR INITIAL HOSPITAL CARE,LEVL III: ICD-10-PCS | Mod: ,,, | Performed by: INTERNAL MEDICINE

## 2020-07-23 PROCEDURE — 99231 SBSQ HOSP IP/OBS SF/LOW 25: CPT | Mod: S$GLB,,, | Performed by: INTERNAL MEDICINE

## 2020-07-23 PROCEDURE — 25000003 PHARM REV CODE 250: Performed by: NURSE PRACTITIONER

## 2020-07-23 PROCEDURE — 87040 BLOOD CULTURE FOR BACTERIA: CPT

## 2020-07-23 PROCEDURE — 25000242 PHARM REV CODE 250 ALT 637 W/ HCPCS: Performed by: NURSE PRACTITIONER

## 2020-07-23 PROCEDURE — 84145 PROCALCITONIN (PCT): CPT

## 2020-07-23 PROCEDURE — 85730 THROMBOPLASTIN TIME PARTIAL: CPT | Mod: 91

## 2020-07-23 PROCEDURE — 85730 THROMBOPLASTIN TIME PARTIAL: CPT

## 2020-07-23 PROCEDURE — 99232 PR SUBSEQUENT HOSPITAL CARE,LEVL II: ICD-10-PCS | Mod: ,,, | Performed by: INTERNAL MEDICINE

## 2020-07-23 PROCEDURE — A9585 GADOBUTROL INJECTION: HCPCS | Performed by: INTERNAL MEDICINE

## 2020-07-23 PROCEDURE — 25500020 PHARM REV CODE 255: Performed by: INTERNAL MEDICINE

## 2020-07-23 PROCEDURE — 63600175 PHARM REV CODE 636 W HCPCS: Performed by: NURSE PRACTITIONER

## 2020-07-23 PROCEDURE — 36415 COLL VENOUS BLD VENIPUNCTURE: CPT

## 2020-07-23 PROCEDURE — 85025 COMPLETE CBC W/AUTO DIFF WBC: CPT

## 2020-07-23 PROCEDURE — 63600175 PHARM REV CODE 636 W HCPCS: Performed by: INTERNAL MEDICINE

## 2020-07-23 PROCEDURE — 27000221 HC OXYGEN, UP TO 24 HOURS

## 2020-07-23 PROCEDURE — 12000002 HC ACUTE/MED SURGE SEMI-PRIVATE ROOM

## 2020-07-23 PROCEDURE — 99232 SBSQ HOSP IP/OBS MODERATE 35: CPT | Mod: ,,, | Performed by: INTERNAL MEDICINE

## 2020-07-23 PROCEDURE — 86140 C-REACTIVE PROTEIN: CPT

## 2020-07-23 PROCEDURE — 82565 ASSAY OF CREATININE: CPT

## 2020-07-23 PROCEDURE — 99231 PR SUBSEQUENT HOSPITAL CARE,LEVL I: ICD-10-PCS | Mod: S$GLB,,, | Performed by: INTERNAL MEDICINE

## 2020-07-23 PROCEDURE — 25000003 PHARM REV CODE 250: Performed by: INTERNAL MEDICINE

## 2020-07-23 PROCEDURE — 36410 VNPNXR 3YR/> PHY/QHP DX/THER: CPT

## 2020-07-23 PROCEDURE — 94640 AIRWAY INHALATION TREATMENT: CPT

## 2020-07-23 PROCEDURE — 99900035 HC TECH TIME PER 15 MIN (STAT)

## 2020-07-23 PROCEDURE — 80202 ASSAY OF VANCOMYCIN: CPT

## 2020-07-23 PROCEDURE — 99223 1ST HOSP IP/OBS HIGH 75: CPT | Mod: ,,, | Performed by: INTERNAL MEDICINE

## 2020-07-23 RX ORDER — FUROSEMIDE 10 MG/ML
40 INJECTION INTRAMUSCULAR; INTRAVENOUS ONCE
Status: COMPLETED | OUTPATIENT
Start: 2020-07-23 | End: 2020-07-23

## 2020-07-23 RX ADMIN — MONTELUKAST 10 MG: 10 TABLET, FILM COATED ORAL at 09:07

## 2020-07-23 RX ADMIN — FOLIC ACID 1 MG: 1 TABLET ORAL at 09:07

## 2020-07-23 RX ADMIN — VANCOMYCIN HYDROCHLORIDE 1750 MG: 1 INJECTION, POWDER, LYOPHILIZED, FOR SOLUTION INTRAVENOUS at 07:07

## 2020-07-23 RX ADMIN — IPRATROPIUM BROMIDE AND ALBUTEROL SULFATE 3 ML: .5; 3 SOLUTION RESPIRATORY (INHALATION) at 07:07

## 2020-07-23 RX ADMIN — METOPROLOL SUCCINATE 50 MG: 50 TABLET, FILM COATED, EXTENDED RELEASE ORAL at 09:07

## 2020-07-23 RX ADMIN — BACITRACIN ZINC NEOMYCIN SULFATE POLYMYXIN B SULFATE: 400; 3.5; 5 OINTMENT TOPICAL at 09:07

## 2020-07-23 RX ADMIN — GADOBUTROL 10 ML: 604.72 INJECTION INTRAVENOUS at 11:07

## 2020-07-23 RX ADMIN — FUROSEMIDE 40 MG: 10 INJECTION, SOLUTION INTRAMUSCULAR; INTRAVENOUS at 02:07

## 2020-07-23 RX ADMIN — BUDESONIDE INHALATION 0.5 MG: 0.5 SUSPENSION RESPIRATORY (INHALATION) at 07:07

## 2020-07-23 RX ADMIN — POLYETHYLENE GLYCOL (3350) 17 G: 17 POWDER, FOR SOLUTION ORAL at 09:07

## 2020-07-23 RX ADMIN — IRON SUCROSE 200 MG: 20 INJECTION, SOLUTION INTRAVENOUS at 09:07

## 2020-07-23 RX ADMIN — BISACODYL 5 MG: 5 TABLET, COATED ORAL at 09:07

## 2020-07-23 RX ADMIN — PANTOPRAZOLE SODIUM 40 MG: 40 TABLET, DELAYED RELEASE ORAL at 09:07

## 2020-07-23 RX ADMIN — FLUTICASONE PROPIONATE 100 MCG: 50 SPRAY, METERED NASAL at 09:07

## 2020-07-23 RX ADMIN — IPRATROPIUM BROMIDE AND ALBUTEROL SULFATE 3 ML: .5; 3 SOLUTION RESPIRATORY (INHALATION) at 05:07

## 2020-07-23 NOTE — ASSESSMENT & PLAN NOTE
Patient reports history of DVT in the right upper extremity greater than 10 years ago and  history of groin DVT following a hip surgery approximately 8 years ago-  Continue MARIANELA hose  Patient now with lower extremity DVT and extensive bilateral pulmonary emboli  Patient with decreased activity/ambulation due to increased recent back pain however she does have history of prior DVT in the past once related to prior hip surgery

## 2020-07-23 NOTE — PROGRESS NOTES
[facility] Medicine  Progress Note    Patient Name: Sammi Abreu  MRN: [id number]  Patient Class: IP- Inpatient   Admission Date: 7/13/2020  Length of Stay: 9 days  Attending Physician: Alix Ruth MD  Primary Care Provider: Osmani Villatoro MD      Subjective:     Principal Problem:Acute respiratory failure with hypoxemia secondary to bilateral pulmonary emboli, MRSA bacteremia, status post UTI, chronic back pain with lumbar and thoracic degenerative disc disease      HPI:  This is a 79-year-old female who has a past medical history of arthritis and chronic back pain, fibromyalgia, glaucoma, hyperlipidemia, hypertension, sleep apnea with history of noncompliance with CPAP, morbid obesity, hypertension, GERD, COPD, diabetes type 2, prior DVT, and gallstones.  Please note the patient is also a Islam.  Patient is being received [facility].  She is status post laparoscopic cholecystectomy today by Dr. Jomar Soria.  Patient tolerated procedure well.  She however was unable to be discharged postop due to ongoing hypoxemia and has been transferred here for further evaluation and treatment.    Overview/Hospital Course:  The patient was monitored closely during her stay.  She was noted to have an elevated D-dimer.  She was placed on full-dose Lovenox.  Patient when CTA of the chest which showed extensive bilateral pulmonary emboli.  Bilateral venous ultrasound showed left lower extremity DVT.  Pulmonology was consulted.  Patient's full-dose Lovenox was discontinued and she was placed on IV heparin drip.  Patient was noted to have worsening anemia.  Patient noted to be Jehovah witness.  Hematology consulted.  Patient had blood cultures x2 that came back positive for MRSA.  She was started on IV vancomycin.  Infectious disease was consulted.  Repeat blood cultures and echocardiogram ordered.  Patient had MRI of the thoracic and lumbar spine with significant  degenerative disease changes and some bulging noted in areas with some foraminal stenosis but no signs of osteomyelitis noted.     Interval  History:  MRI of the lumbar and thoracic spine done today.  No signs of osteomyelitis noted.  Repeat echocardiogram pending.     Review of Systems   Constitutional: Positive for activity change. Negative for appetite change, chills and fever.   HENT: Negative for ear discharge and facial swelling.    Respiratory: Positive for shortness of breath. Negative for cough.    Cardiovascular: Negative for chest pain, palpitations and leg swelling.   Gastrointestinal: Positive for nausea. Negative for abdominal distention, blood in stool and vomiting.   Musculoskeletal: Positive for back pain. Negative for gait problem, neck pain and neck stiffness.        Patient reports chronic back pain for greater than 20 years but states over the past   6 years has become progressively worse   Skin: Positive for wound. Negative for color change.        Abdominal surgical incisions   Neurological: Negative for facial asymmetry, speech difficulty and weakness.        Forgetful   Psychiatric/Behavioral: Negative for agitation and confusion. The patient is nervous/anxious.      Objective:     Vital Signs (Most Recent):  Temp: 97.8 °F (36.6 °C) (07/23/20 0907)  Pulse: 82 (07/23/20 0907)  Resp: 18 (07/23/20 0907)  BP: (!) 157/67 (07/23/20 0907)  SpO2: 95 % (07/23/20 0907) Vital Signs (24h Range):  Temp:  [97.5 °F (36.4 °C)-98.9 °F (37.2 °C)] 97.8 °F (36.6 °C)  Pulse:  [68-86] 82  Resp:  [15-20] 18  SpO2:  [93 %-98 %] 95 %  BP: (138-157)/(63-67) 157/67     Weight: 103.9 kg (229 lb)  Body mass index is 40.57 kg/m².    Physical Exam  Constitutional:       General: She is not in acute distress.     Appearance: Normal appearance. She is obese. She is not ill-appearing, toxic-appearing or diaphoretic.      Comments: Morbidly obese   HENT:      Head: Normocephalic and atraumatic.      Mouth/Throat:      Mouth:  Mucous membranes are moist.   Eyes:      General:         Right eye: No discharge.         Left eye: No discharge.      Extraocular Movements: Extraocular movements intact.      Conjunctiva/sclera: Conjunctivae normal.      Pupils: Pupils are equal, round, and reactive to light.   Neck:      Musculoskeletal: Normal range of motion and neck supple. No neck rigidity or muscular tenderness.   Cardiovascular:      Rate and Rhythm: Normal rate and regular rhythm.      Pulses: Normal pulses.      Comments: Trace pretib edema L>R  Pulmonary:      Effort: Pulmonary effort is normal. No respiratory distress.      Breath sounds: No wheezing or rales.      Comments: Bilateral breath sounds diminished  Abdominal:      General: Bowel sounds are normal. There is no distension.      Palpations: Abdomen is soft.      Tenderness: There is no abdominal tenderness. There is no guarding.      Comments: Obese     Genitourinary:     Comments: Not examined  Musculoskeletal: Normal range of motion.      Right lower leg: Edema present.      Left lower leg: Edema present.      Comments: Scant bilateral lower extremity edema   Skin:     General: Skin is warm and dry.      Capillary Refill: Capillary refill takes less than 2 seconds.      Comments: Abdominal surgical dressings intact   Neurological:      General: No focal deficit present.      Mental Status: She is alert and oriented to person, place, and time. Mental status is at baseline.      Cranial Nerves: No cranial nerve deficit.      Motor: No weakness.   Psychiatric:         Mood and Affect: Mood normal.         Behavior: Behavior normal.         Thought Content: Thought content normal.         Judgment: Judgment normal.      Comments:             CRANIAL NERVES     CN III, IV, VI   Pupils are equal, round, and reactive to light.     Labs reviewed      Assessment/Plan:      * Acute respiratory failure with hypoxemia  Secondary to extensive bilateral pulmonary emboli-  Orders as per  pulmonology  Patient currently remains on IV heparin drip      DDD (degenerative disc disease), thoracic  Noted    MRSA bacteremia  7/18/2020 Blood cultures x2 positive for MRSA  Infectious Disease consulted-orders as recommended by Infectious Disease  Repeat echocardiogram results currently pending  Patient currently remains on IV vancomycin  Repeat blood cultures x2 on 07/22 and x1 on 07/23 currently showing no growth      Type 2 diabetes mellitus with stage 2 chronic kidney disease  Monitor renal status  Renal dose all medications      Acute blood loss anemia   Monitor  Patient is a Jehovah Witness  Hematology consulted  Patient received a dose of IV Venofer       History of obstructive sleep apnea  Noted      UTI (urinary tract infection)  Resolved  Patient completed a round of IV Rocephin      Elevated troponin  Suspected secondary to extensive bilateral pulmonary emboli      Deep vein thrombosis (DVT) of left lower extremity  Continue full-dose anticoagulation-patient currently remains on IV heparin drip      Bilateral pulmonary embolism  Patient with extensive bilateral pulmonary emboli and LLE DVT  Patient currently remains on IV heparin drip  Pulmonology following  Plan was previously for patient to have IVC filter placement which has been placed on hold due to MRSA bacteremia        History of DVT in adulthood  Patient reports history of DVT in the right upper extremity greater than 10 years ago and  history of groin DVT following a hip surgery approximately 8 years ago-  Continue MARIANELA hose  Patient now with left lower extremity DVT and extensive bilateral pulmonary emboli  Patient with decreased activity/ambulation due to increased recent back pain however she does have history of prior DVT in the past once related to prior hip surgery     Chronic back pain  With chronic pain syndrome secondary to extensive degenerative disc disease of thoracic and lumbar spine with some areas of bulging disc and foraminal  stenosis-  Patient will need outpatient follow-up in the future    Continue p.r.n. pain medication  Patient reports she sees Dr. Gilliam for pain management      Hypoxemia  Secondary to extensive bilateral pulmonary emboli      CHARLIE (obstructive sleep apnea)  Noted  Patient reports she does not use her CPAP at home      COPD (chronic obstructive pulmonary disease)  Monitor O2 sats, supplement O2 as needed  Q 4 hr while awake duo nebs and b.i.d. Pulmicort  Incentive spirometer q.1 hour while awake      Type 2 diabetes mellitus with diabetic neuropathy, without long-term current use of insulin  Diabetic diet  Blood sugars previously running less than 150 and Accu-Cheks with sliding scale were changed to p.r.n.  HGB A1c is 6.4      Status post laparoscopic cholecystectomy  Patient is status post laparoscopic cholecystectomy done today by Dr. Jomar Mcdonald at outpatient surgical center-  Orders as per surgeon - Dr. Mcdonald  Low-fat low-cholesterol ADA diet  P.r.n. pain and antiemetic medication      Class 2 severe obesity with serious comorbidity in adult  Body mass index is 40.57 kg/m².  General weight loss/lifestyle modification strategies discussed (elicit support from others; identify saboteurs; non-food rewards, etc).          GERD (gastroesophageal reflux disease)  Continue PPI      Pulmonary hypertension  Noted      Hyperlipidemia associated with type 2 diabetes mellitus  No home anti-lipidemic noted- low fat, low-cholesterol diet    Hypertension associated with diabetes  Continue home medications      DDD (degenerative disc disease), lumbar  Noted        VTE Risk Mitigation (From admission, onward)         Ordered     heparin 25,000 units in dextrose 5% 250 ml (100 units/mL) infusion MINIMAL INTENSITY nomogram - [facility]  Continuous     Question:  Heparin Infusion Adjustment (DO NOT MODIFY ANSWER)  Answer:  [email or web address]  INTENSITY nomogram for [facility] [email or web address]     07/20/20 1604     IP VTE HIGH RISK PATIENT  Once      07/13/20 1900     Place MARIANELA hose  Until discontinued      07/13/20 1900                Time spent seeing patient( greater than 1/2 spent in direct contact) :  33 minutes      GENARO Ross  Department of Hospital Medicine   [facility]

## 2020-07-23 NOTE — ASSESSMENT & PLAN NOTE
With chronic pain syndrome secondary to extensive degenerative disc disease of thoracic and lumbar spine with some areas of bulging disc and foraminal stenosis-  Patient will need outpatient follow-up in the future    Continue p.r.n. pain medication  Patient reports she sees Dr. Gilliam for pain management

## 2020-07-23 NOTE — SUBJECTIVE & OBJECTIVE
Interval  History:  MRI of the lumbar and thoracic spine done today.  No signs of osteomyelitis noted.  Repeat echocardiogram pending.     Review of Systems   Constitutional: Positive for activity change. Negative for appetite change, chills and fever.   HENT: Negative for ear discharge and facial swelling.    Respiratory: Positive for shortness of breath. Negative for cough.    Cardiovascular: Negative for chest pain, palpitations and leg swelling.   Gastrointestinal: Positive for nausea. Negative for abdominal distention, blood in stool and vomiting.   Musculoskeletal: Positive for back pain. Negative for gait problem, neck pain and neck stiffness.        Patient reports chronic back pain for greater than 20 years but states over the past   6 years has become progressively worse   Skin: Positive for wound. Negative for color change.        Abdominal surgical incisions   Neurological: Negative for facial asymmetry, speech difficulty and weakness.        Forgetful   Psychiatric/Behavioral: Negative for agitation and confusion. The patient is nervous/anxious.      Objective:     Vital Signs (Most Recent):  Temp: 97.8 °F (36.6 °C) (07/23/20 0907)  Pulse: 82 (07/23/20 0907)  Resp: 18 (07/23/20 0907)  BP: (!) 157/67 (07/23/20 0907)  SpO2: 95 % (07/23/20 0907) Vital Signs (24h Range):  Temp:  [97.5 °F (36.4 °C)-98.9 °F (37.2 °C)] 97.8 °F (36.6 °C)  Pulse:  [68-86] 82  Resp:  [15-20] 18  SpO2:  [93 %-98 %] 95 %  BP: (138-157)/(63-67) 157/67     Weight: 103.9 kg (229 lb)  Body mass index is 40.57 kg/m².    Physical Exam  Constitutional:       General: She is not in acute distress.     Appearance: Normal appearance. She is obese. She is not ill-appearing, toxic-appearing or diaphoretic.      Comments: Morbidly obese   HENT:      Head: Normocephalic and atraumatic.      Mouth/Throat:      Mouth: Mucous membranes are moist.   Eyes:      General:         Right eye: No discharge.         Left eye: No discharge.      Extraocular  Movements: Extraocular movements intact.      Conjunctiva/sclera: Conjunctivae normal.      Pupils: Pupils are equal, round, and reactive to light.   Neck:      Musculoskeletal: Normal range of motion and neck supple. No neck rigidity or muscular tenderness.   Cardiovascular:      Rate and Rhythm: Normal rate and regular rhythm.      Pulses: Normal pulses.      Comments: Trace pretib edema L>R  Pulmonary:      Effort: Pulmonary effort is normal. No respiratory distress.      Breath sounds: No wheezing or rales.      Comments: Bilateral breath sounds diminished  Abdominal:      General: Bowel sounds are normal. There is no distension.      Palpations: Abdomen is soft.      Tenderness: There is no abdominal tenderness. There is no guarding.      Comments: Obese     Genitourinary:     Comments: Not examined  Musculoskeletal: Normal range of motion.      Right lower leg: Edema present.      Left lower leg: Edema present.      Comments: Scant bilateral lower extremity edema   Skin:     General: Skin is warm and dry.      Capillary Refill: Capillary refill takes less than 2 seconds.      Comments: Abdominal surgical dressings intact   Neurological:      General: No focal deficit present.      Mental Status: She is alert and oriented to person, place, and time. Mental status is at baseline.      Cranial Nerves: No cranial nerve deficit.      Motor: No weakness.   Psychiatric:         Mood and Affect: Mood normal.         Behavior: Behavior normal.         Thought Content: Thought content normal.         Judgment: Judgment normal.      Comments:             CRANIAL NERVES     CN III, IV, VI   Pupils are equal, round, and reactive to light.     Labs reviewed

## 2020-07-23 NOTE — PROGRESS NOTES
[facility] Medicine  Progress Note    Patient Name: Sammi Abreu  MRN: [id number]  Patient Class: IP- Inpatient   Admission Date: 7/13/2020  Length of Stay: 8 days  Attending Physician: Alix Ruth MD  Primary Care Provider: Osmani Villatoro MD      Subjective:     Principal Problem:Acute respiratory failure with hypoxemia secondary to bilateral pulmonary emboli, MRSA bacteremia, status post laparoscopic cholecystectomy      HPI:  This is a 79-year-old female who has a past medical history of arthritis and chronic back pain, fibromyalgia, glaucoma, hyperlipidemia, hypertension, sleep apnea with history of noncompliance with CPAP, morbid obesity, hypertension, GERD, COPD, diabetes type 2, prior DVT, and gallstones.  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  Patient is being received [facility].  She is status post laparoscopic cholecystectomy today by Dr. Jomar Soria.  Patient tolerated procedure well.  She however was unable to be discharged postop due to ongoing hypoxemia and has been transferred here for further evaluation and treatment.    Overview/Hospital Course:  The patient was monitored closely during her stay.  She was noted to have an elevated D-dimer.  She was placed on full-dose Lovenox.  Patient when CTA of the chest which showed extensive bilateral pulmonary emboli.  Bilateral venous ultrasound showed left lower extremity DVT.  Pulmonology was consulted.  Patient's full-dose Lovenox was discontinued and she was placed on IV heparin drip.  Patient was noted to have worsening anemia.  Patient noted to be Jehovah witness.  Hematology consulted.  Patient had blood cultures x2 that came back positive for MRSA.  She was started on IV vancomycin.  Infectious disease was consulted.  Repeat blood cultures and echocardiogram ordered.    Interval  History:  Patient with blood cultures x2 positive for MRSA.  Id consulted     Review of Systems    Constitutional: Positive for activity change. Negative for appetite change, chills and fever.   HENT: Negative for ear discharge and facial swelling.    Respiratory: Positive for shortness of breath. Negative for cough.    Cardiovascular: Negative for chest pain, palpitations and leg swelling.   Gastrointestinal:  Negative for abdominal distention, blood in stool and vomiting.   Musculoskeletal: Positive for back pain. Negative for gait problem, neck pain and neck stiffness.   Skin: Positive for wound. Negative for color change.        Abdominal surgical incisions   Neurological: Negative for facial asymmetry, speech difficulty and weakness.   Psychiatric/Behavioral: Negative for agitation and confusion. The patient is nervous/anxious.      Objective:     Vital Signs (Most Recent):  Temp: 97.8 °F (36.6 °C) (07/22/20 1547)  Pulse: 68 (07/22/20 1600)  Resp: 15 (07/22/20 1557)  BP: (!) 142/65 (07/22/20 1547)  SpO2: 98 % (07/22/20 1557) Vital Signs (24h Range):  Temp:  [97.8 °F (36.6 °C)-99.3 °F (37.4 °C)] 97.8 °F (36.6 °C)  Pulse:  [68-92] 68  Resp:  [14-18] 15  SpO2:  [92 %-98 %] 98 %  BP: (135-167)/(59-71) 142/65     Weight: 103.9 kg (229 lb)  Body mass index is 40.57 kg/m².    Physical Exam  Constitutional:       General: She is not in acute distress.     Appearance: Normal appearance. She is obese. She is not ill-appearing, toxic-appearing or diaphoretic.      Comments: Morbidly obese   HENT:      Head: Normocephalic and atraumatic.      Mouth/Throat:      Mouth: Mucous membranes are moist.   Eyes:      General:         Right eye: No discharge.         Left eye: No discharge.      Extraocular Movements: Extraocular movements intact.      Conjunctiva/sclera: Conjunctivae normal.      Pupils: Pupils are equal, round, and reactive to light.   Neck:      Musculoskeletal: Normal range of motion and neck supple. No neck rigidity or muscular tenderness.   Cardiovascular:      Rate and Rhythm: Normal rate and regular  rhythm.      Pulses: Normal pulses.      Comments: Trace pretib edema L>R  Pulmonary:      Effort: Pulmonary effort is normal. No respiratory distress.      Breath sounds: No wheezing or rales.      Comments: Bilateral breath sounds diminished  Abdominal:      General: Bowel sounds are normal. There is no distension.      Palpations: Abdomen is soft.      Tenderness: There is no abdominal tenderness. There is no guarding.      Comments: Obese     Genitourinary:     Comments: Not examined  Musculoskeletal: Normal range of motion.      Right lower leg: Edema present.      Left lower leg: Edema present.      Comments: Scant bilateral lower extremity edema   Skin:     General: Skin is warm and dry.      Capillary Refill: Capillary refill takes less than 2 seconds.      Comments: Abdominal surgical dressings intact   Neurological:      General: No focal deficit present.      Mental Status: She is alert and oriented to person, place, and time. Mental status is at baseline.      Cranial Nerves: No cranial nerve deficit.      Motor: No weakness.   Psychiatric:         Mood and Affect: Mood normal.         Behavior: Behavior normal.         Thought Content: Thought content normal.         Judgment: Judgment normal.     CRANIAL NERVES     CN III, IV, VI   Pupils are equal, round, and reactive to light.     Labs reviewed      Assessment/Plan:      * Acute respiratory failure with hypoxemia  Secondary to extensive bilateral pulmonary emboli-  Patient currently remains on heparin drip  Patient was previously awaiting IVC filter placement however now has MRSA bacteremia and that has been placed on hold      MRSA bacteremia  Blood cultures x2 positive for MRSA  Add IV vancomycin  Check echocardiogram  Repeat blood cultures x2 now  Consult infectious disease for further evaluation and  recommendation      Staphylococcus aureus infection  Start IV vancomycin now   Consult ID       Acute blood loss anemia  Possible. hgb trending  down. Check iron studies. IV venofer x 1 dose. Trend CBC. Consult hematology.  Lab Results   Component Value Date    WBC 9.08 07/20/2020    HGB 8.0 (L) 07/20/2020    HCT 26.0 (L) 07/20/2020     (H) 07/20/2020     07/20/2020     Please note the patient is a [employer]        Azotemia  Renal function at baseline      UTI (urinary tract infection)  Continue current treatment  Blood culture pending-- MRSA noted      Deep vein thrombosis (DVT) of left lower extremity  Continue full-dose anticoagulation      Bilateral pulmonary embolism  07/14/2020 received message from staff nurse at 1046  that CTA of the chest results show patient with extensive bilateral pulmonary emboli  Monitor O2 sats, supplement O2 as needed  Continue full-dose Lovenox-was changed to IV heparin drip per pulmonology  Consult pulmonology.        History of DVT in adulthood  Patient reports history of DVT in the right upper extremity greater than 10 years ago and  history of groin DVT following a hip surgery approximately 8 years ago-  Continue MARIANELA hose  Patient now with left lower extremity DVT and extensive bilateral pulmonary emboli  Patient with decreased activity/ambulation due to increased recent back pain however she does have history of prior DVT in the past once related to prior hip surgery,       Chronic back pain  With chronic pain syndrome  Patient reports recent back injection approximately 2 weeks ago  Continue p.r.n. pain medication  Patient reports she sees Dr. Gilliam      Hypoxemia  Secondary to extensive bilateral pulmonary emboli      CHARLIE (obstructive sleep apnea)  Noted  Patient reports she does not use her CPAP at home      COPD (chronic obstructive pulmonary disease)  Monitor O2 sats, supplement O2 as needed  Q 4 hr while awake duo nebs and b.i.d. Pulmicort  Incentive spirometer q.1 hour while awake      Type 2 diabetes mellitus with diabetic neuropathy, without long-term current use of insulin  Diabetic  diet  Accu-Cheks with correctional sliding scale insulin  Blood sugars running 130-173  HGB A1c is 6.4      Status post laparoscopic cholecystectomy  Patient is status post laparoscopic cholecystectomy done today by Dr. Jairo Soria at outpatient surgical center-  Orders as per surgeon - Dr. Walker  Low-fat low-cholesterol ADA diet  P.r.n. pain and antiemetic medication      Class 2 severe obesity with serious comorbidity in adult  Body mass index is 41.58 kg/m².  General weight loss/lifestyle modification strategies discussed (elicit support from others; identify saboteurs; non-food rewards, etc).          GERD (gastroesophageal reflux disease)  Continue PPI      Pulmonary hypertension  Secondary to PE      Hyperlipidemia associated with type 2 diabetes mellitus  No home anti-lipidemic noted- low fat, low-cholesterol diet    Hypertension associated with diabetes  Continue home medications        VTE Risk Mitigation (From admission, onward)         Ordered     heparin 25,000 units in dextrose 5% 250 ml (100 units/mL) infusion MINIMAL INTENSITY nomogram - [facility]  Continuous     Question:  Heparin Infusion Adjustment (DO NOT MODIFY ANSWER)  Answer:  [email or web address] MINIMAL  INTENSITY nomogram for [facility] [email or web address]    07/20/20 1604     IP VTE HIGH RISK PATIENT  Once      07/13/20 1900     Place MARIANELA hose  Until discontinued      07/13/20 1900                Time spent seeing patient( greater than 1/2 spent in direct contact) :  42 minutes      GENARO Ross  Department of Hospital Medicine   [facility]

## 2020-07-23 NOTE — PLAN OF CARE
07/23/20 0727   Patient Assessment/Suction   Respiratory Effort Normal;Unlabored   Expansion/Accessory Muscles/Retractions expansion symmetric;no retractions   All Lung Fields Breath Sounds Anterior:;Lateral:;diminished   Rhythm/Pattern, Respiratory unlabored   Cough Frequency no cough   PRE-TX-O2   O2 Device (Oxygen Therapy) nasal cannula w/ humidification   $ Is the patient on Low Flow Oxygen? Yes   SpO2 96 %   Pulse Oximetry Type Intermittent   $ Pulse Oximetry - Multiple Charge Pulse Oximetry - Multiple   Pulse 86   Resp 18   Aerosol Therapy   $ Aerosol Therapy Charges Aerosol Treatment   Respiratory Treatment Status (SVN) given   Treatment Route (SVN) mask   Patient Position (SVN) HOB elevated   Post Treatment Assessment (SVN) breath sounds unchanged   Signs of Intolerance (SVN) none   Breath Sounds Post-Respiratory Treatment   Throughout All Fields Post-Treatment All Fields   Throughout All Fields Post-Treatment no change   Post-treatment Heart Rate (beats/min) 82   Post-treatment Resp Rate (breaths/min) 16   Incentive Spirometer   $ Incentive Spirometer Charges done independently per patient   Administration (IS) self-administered

## 2020-07-23 NOTE — SUBJECTIVE & OBJECTIVE
Interval  History:       Review of Systems   Constitutional: Positive for activity change. Negative for appetite change, chills and fever.   HENT: Negative for ear discharge and facial swelling.    Respiratory: Positive for shortness of breath. Negative for cough.    Cardiovascular: Negative for chest pain, palpitations and leg swelling.   Gastrointestinal: Positive for nausea. Negative for abdominal distention, blood in stool and vomiting.   Musculoskeletal: Positive for back pain. Negative for gait problem, neck pain and neck stiffness.        Patient reports chronic back pain for greater than 20 years but states over the past   6 years has become progressively worse   Skin: Positive for wound. Negative for color change.        Abdominal surgical incisions   Neurological: Negative for facial asymmetry, speech difficulty and weakness.        Forgetful   Psychiatric/Behavioral: Negative for agitation and confusion. The patient is nervous/anxious.      Objective:     Vital Signs (Most Recent):  Temp: 97.8 °F (36.6 °C) (07/22/20 1547)  Pulse: 68 (07/22/20 1600)  Resp: 15 (07/22/20 1557)  BP: (!) 142/65 (07/22/20 1547)  SpO2: 98 % (07/22/20 1557) Vital Signs (24h Range):  Temp:  [97.8 °F (36.6 °C)-99.3 °F (37.4 °C)] 97.8 °F (36.6 °C)  Pulse:  [68-92] 68  Resp:  [14-18] 15  SpO2:  [92 %-98 %] 98 %  BP: (135-167)/(59-71) 142/65     Weight: 103.9 kg (229 lb)  Body mass index is 40.57 kg/m².    Physical Exam  Constitutional:       General: She is not in acute distress.     Appearance: Normal appearance. She is obese. She is not ill-appearing, toxic-appearing or diaphoretic.      Comments: Morbidly obese   HENT:      Head: Normocephalic and atraumatic.      Mouth/Throat:      Mouth: Mucous membranes are moist.   Eyes:      General:         Right eye: No discharge.         Left eye: No discharge.      Extraocular Movements: Extraocular movements intact.      Conjunctiva/sclera: Conjunctivae normal.      Pupils: Pupils are  equal, round, and reactive to light.   Neck:      Musculoskeletal: Normal range of motion and neck supple. No neck rigidity or muscular tenderness.   Cardiovascular:      Rate and Rhythm: Normal rate and regular rhythm.      Pulses: Normal pulses.      Comments: Trace pretib edema L>R  Pulmonary:      Effort: Pulmonary effort is normal. No respiratory distress.      Breath sounds: No wheezing or rales.      Comments: Bilateral breath sounds diminished  Abdominal:      General: Bowel sounds are normal. There is no distension.      Palpations: Abdomen is soft.      Tenderness: There is no abdominal tenderness. There is no guarding.      Comments: Obese     Genitourinary:     Comments: Not examined  Musculoskeletal: Normal range of motion.      Right lower leg: Edema present.      Left lower leg: Edema present.      Comments: Scant bilateral lower extremity edema   Skin:     General: Skin is warm and dry.      Capillary Refill: Capillary refill takes less than 2 seconds.      Comments: Abdominal surgical dressings intact   Neurological:      General: No focal deficit present.      Mental Status: She is alert and oriented to person, place, and time. Mental status is at baseline.      Cranial Nerves: No cranial nerve deficit.      Motor: No weakness.   Psychiatric:         Mood and Affect: Mood normal.         Behavior: Behavior normal.         Thought Content: Thought content normal.         Judgment: Judgment normal.      Comments: Appears depressed.           CRANIAL NERVES     CN III, IV, VI   Pupils are equal, round, and reactive to light.     Labs reviewed

## 2020-07-23 NOTE — ASSESSMENT & PLAN NOTE
Secondary to extensive bilateral pulmonary emboli-  Orders as per pulmonology  Patient currently remains on IV heparin drip

## 2020-07-23 NOTE — RESPIRATORY THERAPY
07/22/20 2008   Patient Assessment/Suction   Level of Consciousness (AVPU) alert   Respiratory Effort Normal;Unlabored   Expansion/Accessory Muscles/Retractions expansion symmetric;no retractions;no use of accessory muscles   All Lung Fields Breath Sounds diminished;clear   Cough Frequency no cough   PRE-TX-O2   O2 Device (Oxygen Therapy) nasal cannula   Flow (L/min) 3   Oxygen Concentration (%) 32   SpO2 (!) 93 %   Pulse Oximetry Type Intermittent   Pulse 78   Resp 18   Aerosol Therapy   $ Aerosol Therapy Charges Aerosol Treatment   Respiratory Treatment Status (SVN) given   Treatment Route (SVN) mask;oxygen   Patient Position (SVN) HOB elevated   Signs of Intolerance (SVN) none   Breath Sounds Post-Respiratory Treatment   Throughout All Fields Post-Treatment All Fields   Throughout All Fields Post-Treatment no change   Post-treatment Heart Rate (beats/min) 81   Post-treatment Resp Rate (breaths/min) 16   Incentive Spirometer   $ Incentive Spirometer Charges done independently per patient   Ready to Wean/Extubation Screen   FIO2<=50 (chart decimal) 0.32

## 2020-07-23 NOTE — ASSESSMENT & PLAN NOTE
Patient with extensive bilateral pulmonary emboli  Patient currently remains on IV heparin drip  Pulmonology following  Plan was previously for patient to have IVC filter placement which has been placed on hold due to MRSA bacteremia

## 2020-07-23 NOTE — ASSESSMENT & PLAN NOTE
Patient currently remains on IV heparin drip but will need oral anticoagulant prior to discharge

## 2020-07-23 NOTE — PROGRESS NOTES
Pharmacokinetic Assessment Follow Up: IV Vancomycin    Vancomycin serum concentration assessment(s):    The random level was drawn correctly and can be used to guide therapy at this time. The measurement is below the desired definitive target range of 15 to 20 mcg/mL.    Vancomycin Regimen Plan:    Pharmacy will re-dose vancomycin 1750 mg x 1 today. Pharmacy dosing by level. Vancomycin random level ordered for 7/24 at 1830.     Drug levels (last 3 results):  Recent Labs   Lab Result Units 07/23/20  1707   Vancomycin, Random ug/mL 7.1       Pharmacy will continue to follow and monitor vancomycin.    Please contact pharmacy at extension [phone] for questions regarding this assessment.    Thank you for the consult,   Jessy Christine       Patient brief summary:  Sammi Abreu is a 79 y.o. female initiated on antimicrobial therapy with IV Vancomycin for treatment of bacteremia    The patient's current regimen is pulse dosing    Drug Allergies:   Review of patient's allergies indicates:   Allergen Reactions    Cymbalta [duloxetine] Other (See Comments)     Nightmares      Darvon [propoxyphene] Nausea Only and Other (See Comments)     Sweating, slept for 3 days    Atorvastatin Other (See Comments)     Muscle cramps    Naprosyn [naproxen] Nausea Only    Penicillins Rash    Tramadol Nausea Only and Palpitations       Actual Body Weight:   103.9    Renal Function:   Estimated Creatinine Clearance: 45.2 mL/min (based on SCr of 1.2 mg/dL).,     Dialysis Method (if applicable):  N/A    CBC (last 72 hours):  Recent Labs   Lab Result Units 07/21/20  0423 07/22/20  0609 07/23/20  0514   WBC K/uL 8.33 7.86 8.39   Hemoglobin g/dL 7.8* 7.6* 8.2*   Hematocrit % 24.9* 24.4* 26.9*   Platelets K/uL 182 227 276   Gran% % 58.2 58.7 59.8   Lymph% % 24.7 26.3 25.7   Mono% % 13.8 10.9 10.1   Eosinophil% % 2.6 3.3 2.5   Basophil% % 0.2 0.3 0.2   Differential Method  Automated Automated Automated       Metabolic Panel (last 72 hours):  Recent  Labs   Lab Result Units 07/23/20  1002   Creatinine mg/dL 1.2       Vancomycin Administrations:  vancomycin given in the last 96 hours                     vancomycin (VANCOCIN) 1,750 mg in dextrose 5 % 500 mL IVPB (mg) 1,750 mg New Bag 07/22/20 1839                    Microbiologic Results:  Microbiology Results (last 7 days)       Procedure Component Value Units Date/Time    Blood culture [[id number]] Collected: 07/23/20 0514    Order Status: Completed Specimen: Blood from Antecubital, Left Arm Updated: 07/23/20 1745     Blood Culture, Routine No Growth to date    Blood culture [[id number]] Collected: 07/22/20 0610    Order Status: Completed Specimen: Blood Updated: 07/23/20 1012     Blood Culture, Routine No Growth to date      No Growth to date    Blood culture [[id number]] Collected: 07/22/20 1412    Order Status: Completed Specimen: Blood from Peripheral, Right Hand Updated: 07/23/20 0515     Blood Culture, Routine No Growth to date    Narrative:      Collection has been rescheduled by [email or web address] at 07/22/2020 12:48 Reason:   nurse billie checking on it will call if needed  Collection has been rescheduled by PVL1 at 07/22/2020 12:48 Reason:   nurse billie checking on it will call if needed    Blood culture [[id number]]     Order Status: Canceled Specimen: Blood     Blood culture [[id number]]  (Abnormal) Collected: 07/18/20 1842    Order Status: Completed Specimen: Blood Updated: 07/22/20 0937     Blood Culture, Routine Gram stain peds bottle: Gram positive cocci in clusters resembling Staph       Results called to and read back by: Marika Mcclendon RN 07/20/2020  05:53      METHICILLIN RESISTANT STAPHYLOCOCCUS AUREUS  ID consult required at [facility].Jess,[place] and [facility].  For susceptibility see order #[id number]      Blood culture [[id number]]  (Abnormal)  (Susceptibility) Collected: 07/18/20 1653    Order Status: Completed Specimen: Blood Updated: 07/22/20 0937     Blood Culture, Routine Gram stain juan bottle:  Gram positive cocci in clusters resembling Staph       Results called to and read back by: Marika Mcclendon RN 07/20/2020  05:53      Gram stain aer bottle: Gram positive cocci in clusters resembling Staph      METHICILLIN RESISTANT STAPHYLOCOCCUS AUREUS  ID consult required at [facility].Jess,[place] and [place] locations.      Urine culture [[id number]] Collected: 07/18/20 1920    Order Status: Completed Specimen: Urine Updated: 07/20/20 1334     Urine Culture, Routine Multiple organisms isolated. None in predominance.  Repeat if      clinically necessary.    Narrative:      Specimen Source->Urine    Urine culture [[id number]] Collected: 07/18/20 0425    Order Status: Completed Specimen: Urine Updated: 07/19/20 1253     Urine Culture, Routine Multiple organisms isolated. None in predominance.  Repeat if      clinically necessary.    Narrative:      Specimen Source->Urine    Urine Culture High Risk [[id number]] Collected: 07/18/20 0740    Order Status: Canceled Specimen: Urine, Clean Catch

## 2020-07-23 NOTE — PLAN OF CARE
Patient AAO X 4. Patient free from injury during shift. Pain managed pharmacologically. Provided full relief. Patient free from N/V during shift. IV access has heparin running. Patient placed on cardiac monitoring. NSR. Remained afebrile during shift. Pt on O2 at 3 liters via nasal canula tolerating well. Pt able to ambulate to [facility] with minimal assistance. Restroom offered. Repositioned as needed. Safety maintained with bed alarm. Bed in lowest position, call light and personal items within reach. Patient verbalized understanding of care. Will continue to monitor with every 2 hour rounding.

## 2020-07-23 NOTE — PLAN OF CARE
Please place on contact isolation for + blood MRSA.  Contact Infection Control @ [phone] for questions.

## 2020-07-23 NOTE — ASSESSMENT & PLAN NOTE
Body mass index is 40.57 kg/m².  General weight loss/lifestyle modification strategies discussed (elicit support from others; identify saboteurs; non-food rewards, etc).

## 2020-07-23 NOTE — ASSESSMENT & PLAN NOTE
Patient is status post laparoscopic cholecystectomy done today by Dr. Jomar Mcdonald at outpatient surgical center-  Orders as per surgeon - Dr. Mcdonald  Low-fat low-cholesterol ADA diet  P.r.n. pain and antiemetic medication

## 2020-07-23 NOTE — PROGRESS NOTES
Progress Note  Infectious Disease    Reason for Consult:      HPI: Sammi Abreu is a  79 y.o. female with past medical history of obesity, DM 2, HTN, DLP, CHARLIE, noncompliance with CPAP, COPD, GERD, DJD, fibromyalgia, chronic back pain, Jehovah witness, allergic to penicillin, anemia.      Patient was at outpatient surgical center, after laparoscopic cholecystectomy by Jomar Hall on 07/13/2020.  Postop she was hypoxemic and could not be discharged.  She was diagnosed with left popliteal DVT and extensive bilateral PE.  She was admitted and anticoagulated with heparin.  Patient used to be on anticoagulation for a long time but it was discontinued, patient does not know why it was started and white was stopped    While hospitalized she was found to have MRSA bacteremia.  Id consult was called.    I came and show patient.  She has been dealing with lower back pain and bilateral groin pain rule out June.  She so spine specialist who D a steroid injection at the L-spine by the end of June.  Her lower back pain continued to worsen it looks like it went across the abdomen and words the left anterior thigh.  She she was hospitalized and was told that her pain was due to gallbladder issues.  Initially she did not want to proceed cholecystectomy but the pain worsened so bad that she had her home health nurse contact the general surgeons office and scheduled an elective cholecystectomy.  She seems concerned with the bacteremia and the and tries Little so.    Patient is on 3 L O2.  Pulse ox 95  Afebrile.  T-max 99.3°    07/23/2020.  Afebrile.  T-max 99.9°.  Repeat blood cultures are negative.  Tolerating vancomycin well.  MRI results noted.  No diskitis to my surprise, which is great news    Antibiotics (From admission, onward)    Start     Stop Route Frequency Ordered    07/22/20 1243  vancomycin - pharmacy to dose  (vancomycin IVPB)      -- IV pharmacy to manage frequency 07/22/20 1145    07/16/20 1200   neomycin-bacitracin-polymyxin ointment      -- Top Daily 07/16/20 1052        Antifungals (From admission, onward)    None          EXAM & DIAGNOSTICS REVIEWED:   Vitals:     Temp:  [97.5 °F (36.4 °C)-99 °F (37.2 °C)]   Temp: 99 °F (37.2 °C) (07/23/20 1530)  Pulse: 69 (07/23/20 1530)  Resp: 18 (07/23/20 1530)  BP: (!) 143/62 (07/23/20 1530)  SpO2: 96 % (07/23/20 1530)    Intake/Output Summary (Last 24 hours) at 7/23/2020 1552  Last data filed at 7/23/2020 1452  Gross per 24 hour   Intake 864.41 ml   Output 1700 ml   Net -835.59 ml       General:  In NAD. Alert and attentive, cooperative, comfortable  Eyes:  Anicteric, PERRL, EOMI  ENT:  No ulcers, exudates, thrush, nares patent, dentition is fair  Neck:  supple, no masses or adenopathy appreciated  Lungs: Diminished  Heart:  RRR, no gallop/murmur/rub noted  Abd:  Soft, NT, ND, normal BS, no masses or organomegaly appreciated.  :  Voids/Little, urine clear, no flank tenderness  Musc:  Joints without effusion, swelling, erythema, synovitis, muscle wasting.  Over the lower thoracolumbar area , it about T12 and slightly over lumbar area  Skin:  No rashes. No palmar or plantar lesions. No subungual petechiae  Wound:   Neuro: Alert, attentive, speech fluent, face symmetric, moves all extremities, no focal weakness.  Takes her time to move due to severe pain.  Psych:  Understandably anxious and concerned.  Lymphatic:     No cervical, supraclavicular, axillary, or inguinal nodes  Extrem: No edema, erythema, phlebitis, cellulitis, warm and well perfused  VAD:       Isolation:      Lines/Tubes/Drains:    General Labs reviewed:  Recent Labs   Lab 07/21/20  0423 07/22/20  0609 07/23/20  0514   WBC 8.33 7.86 8.39   HGB 7.8* 7.6* 8.2*   HCT 24.9* 24.4* 26.9*    227 276       Recent Labs   Lab 07/20/20  0541 07/23/20  1002   *  --    K 4.4  --      --    CO2 26  --    BUN 9  --    CREATININE 1.1 1.2   CALCIUM 8.2*  --            Micro:  Microbiology Results  (last 7 days)     Procedure Component Value Units Date/Time    Blood culture [[id number]] Collected: 07/23/20 0514    Order Status: Sent Specimen: Blood from Antecubital, Left Arm Updated: 07/23/20 1019    Blood culture [[id number]] Collected: 07/22/20 0610    Order Status: Completed Specimen: Blood Updated: 07/23/20 1012     Blood Culture, Routine No Growth to date      No Growth to date    Blood culture [[id number]] Collected: 07/22/20 1412    Order Status: Completed Specimen: Blood from Peripheral, Right Hand Updated: 07/23/20 0515     Blood Culture, Routine No Growth to date    Narrative:      Collection has been rescheduled by [email or web address] at 07/22/2020 12:48 Reason:   nurse billie checking on it will call if needed  Collection has been rescheduled by PVL1 at 07/22/2020 12:48 Reason:   nurse billie checking on it will call if needed    Blood culture [[id number]]     Order Status: Canceled Specimen: Blood     Blood culture [[id number]]  (Abnormal) Collected: 07/18/20 1842    Order Status: Completed Specimen: Blood Updated: 07/22/20 0937     Blood Culture, Routine Gram stain peds bottle: Gram positive cocci in clusters resembling Staph       Results called to and read back by: Marika Mcclendon RN 07/20/2020  05:53      METHICILLIN RESISTANT STAPHYLOCOCCUS AUREUS  ID consult required at [facility].  For susceptibility see order #[id number]      Blood culture [[id number]]  (Abnormal)  (Susceptibility) Collected: 07/18/20 1653    Order Status: Completed Specimen: Blood Updated: 07/22/20 0937     Blood Culture, Routine Gram stain juan bottle: Gram positive cocci in clusters resembling Staph       Results called to and read back by: Marika Mcclendon RN 07/20/2020  05:53      Gram stain aer bottle: Gram positive cocci in clusters resembling Staph      METHICILLIN RESISTANT STAPHYLOCOCCUS AUREUS  ID consult required at [facility].      Urine culture [[id number]] Collected:  07/18/20 1920    Order Status: Completed Specimen: Urine Updated: 07/20/20 1334     Urine Culture, Routine Multiple organisms isolated. None in predominance.  Repeat if      clinically necessary.    Narrative:      Specimen Source->Urine    Urine culture [[id number]] Collected: 07/18/20 0425    Order Status: Completed Specimen: Urine Updated: 07/19/20 1253     Urine Culture, Routine Multiple organisms isolated. None in predominance.  Repeat if      clinically necessary.    Narrative:      Specimen Source->Urine    Urine Culture High Risk [[id number]] Collected: 07/18/20 0740    Order Status: Canceled Specimen: Urine, Clean Catch         Imaging Reviewed:   07/23/20201 MRI There is no acute or significant abnormality.  There is no fracture.  There is stable trace anterolisthesis of T10 on T11.  There is no evidence of discitis or osteomyelitis.  There is no significant spinal stenosis and there is no cord compression.  There is no significant foraminal stenosis suspected.  There is minimal bulging of the annulus at several levels and some degree of facet joint arthropathy throughout the mid and lower thoracic spine.     CXR07/17No acute radiographic abnormality.     CT   Bilateral pulmonary emboli with extensive clot burden and involvement of main pulmonary arteries.  The patient's nurse, Anil was called at the time of dictation.  Additionally, a message was sent via secure chat to the hospitalist taking care of the patient.  Mediastinal adenopathy of uncertain etiology.  This may be inflammatory/reactive or neoplastic, and follow-up is needed.  Development of nonspecific foci of ground-glass opacity throughout both lungs.     US Nonocclusive thrombus within the left popliteal vein, suggestive of some recanalization of the prior thrombus in left popliteal vein.  No additional focus of thrombus.     Cardiology:  · The mean diastolic gradient across the mitral valve is 3 mmHg at a heart rate of 70 bpm.  · Concentric  left ventricular remodeling.  · Small left ventricular cavity size.  · Hyperdynamic left ventricular systolic function. The estimated ejection fraction is 66%.  · Grade I (mild) left ventricular diastolic dysfunction consistent with impaired relaxation.  · Elevated central venous pressure (15 mmHg).  · The estimated PA systolic pressure is 58 mmHg.  · Pulmonary hypertension present.  · Mild to moderate pulmonic regurgitation.      IMPRESSION & PLAN     MRSA bacteremia.  On 08/17.   Has received steroids, ES I and Decadron on 07/13.  Being treated for PE, and DVT..  History of DM  History of bilateral hip replacement  History of job I witnessed; will have to be careful with amount of blood draws    PMHx of obesity, DM 2, HTN, DLP, CHARLIE, noncompliance with CPAP, COPD, GERD, DJD, fibromyalgia, chronic back pain, Jehovah witness, allergic to penicillin, anemia.       Recommendations:  Continue vancomycin  ECHO has no vegetation.  SADIE when she is more stable from a respiratory standpoint.  I have not decided on duration of treatment of 3 versus 6 weeks?    MRI of T-spine where she was very tender does not show any diskitis.  This is great.  It does not answer my question were is the source of MRSA? Recent TYLER, Lt antecubital IVsite?      Monor CRP (77 today)  Consider LTAC/SNF

## 2020-07-23 NOTE — ASSESSMENT & PLAN NOTE
Diabetic diet  Blood sugars previously running less than 150 and Accu-Cheks with sliding scale were changed to p.r.n.  HGB A1c is 6.4

## 2020-07-23 NOTE — ASSESSMENT & PLAN NOTE
7/18/2020 Blood cultures x2 positive for MRSA  Infectious Disease consulted-orders as recommended by Infectious Disease  Repeat echocardiogram results currently pending  Patient currently remains on IV vancomycin  Repeat blood cultures x2 on 07/22 and x1 on 07/23 currently showing no growth

## 2020-07-23 NOTE — PROGRESS NOTES
HPI    79 year old female who has a past medical history of arthritis and chronic back pain, fibromyalgia, glaucoma, hyperlipidemia, hypertension, sleep apnea with history of noncompliance with CPAP, morbid obesity, hypertension, GERD, COPD, diabetes type 2, prior DVT, and gallstones.        Please note the patient is also a Yarsani.        Patient is being received from Outpatient surgical center.  She is status post laparoscopic cholecystectomy today by Dr. Jomar Mcdonald.  Patient tolerated procedure well.  She however was unable to be discharged postop due to ongoing hypoxemia and has been transferred here for further evaluation and treatment.     CT scan shows extensive pulmonary embolism.  Patient is currently heparin drip.    Past Medical History:   Diagnosis Date    ALLERGIC RHINITIS     Anemia     Arthritis     Back pain     Bronchitis     Cataract     OU    Cholelithiasis     COPD (chronic obstructive pulmonary disease)     Degenerative disc disease     Diabetes mellitus     pre diabetic    Diverticulosis     DVT (deep venous thrombosis)     Edema     Encounter for blood transfusion 1979    Fibromyalgia     Glaucoma     Gout     Hx of colonic polyps     Hyperlipidemia     Hypertension     Incontinence     Osteoporosis     Reflux     Refusal of blood transfusions as patient is Buddhism     Sleep apnea     non compliant with CPAP    Vestibulitis of ear      Past Surgical History:   Procedure Laterality Date    ANGIOGRAPHY OF LOWER EXTREMITY N/A 7/31/2019    Procedure: ANGIOGRAM, LOWER EXTREMITY;  Surgeon: Gino Arana MD;  Location: [facility] CATH/EP LAB;  Service: General;  Laterality: N/A;    HIP SURGERY      HYSTERECTOMY      JOINT REPLACEMENT      B total hip    LAPAROSCOPIC CHOLECYSTECTOMY N/A 7/13/2020    Procedure: CHOLECYSTECTOMY, LAPAROSCOPIC;  Surgeon: Jomar Mcdonald MD;  Location: [facility] OR;  Service: General;  Laterality: N/A;     TRANSFORAMINAL EPIDURAL INJECTION OF STEROID Left 2020    Procedure: Injection,steroid,epidural,transforaminal approach;  Surgeon: Matt Gilliam MD;  Location: [facility];  Service: Pain Management;  Laterality: Left;  L2-3, L3-4     Social History     Socioeconomic History    Marital status:      Spouse name: Not on file    Number of children: Not on file    Years of education: Not on file    Highest education level: Not on file   Occupational History    Not on file   Social Needs    Financial resource strain: Not on file    Food insecurity     Worry: Not on file     Inability: Not on file    Transportation needs     Medical: Not on file     Non-medical: Not on file   Tobacco Use    Smoking status: Former Smoker     Packs/day: 0.25     Years: 5.00     Pack years: 1.25     Quit date: 1972     Years since quittin.5    Smokeless tobacco: Never Used   Substance and Sexual Activity    Alcohol use: Yes     Alcohol/week: 0.0 standard drinks     Comment: Rarely    Drug use: Yes     Types: Oxycodone, Hydrocodone    Sexual activity: Not on file   Lifestyle    Physical activity     Days per week: Not on file     Minutes per session: Not on file    Stress: Not on file   Relationships    Social connections     Talks on phone: Not on file     Gets together: Not on file     Attends Sabianism service: Not on file     Active member of club or organization: Not on file     Attends meetings of clubs or organizations: Not on file     Relationship status: Not on file   Other Topics Concern    Not on file   Social History Narrative    Not on file     Family History   Problem Relation Age of Onset    Hypertension Mother     Diabetes Sister     Glaucoma Neg Hx     Macular degeneration Neg Hx     Retinal detachment Neg Hx      Review of patient's allergies indicates:   Allergen Reactions    Cymbalta [duloxetine] Other (See Comments)     Nightmares      Darvon [propoxyphene] Nausea Only and Other  (See Comments)     Sweating, slept for 3 days    Atorvastatin Other (See Comments)     Muscle cramps    Naprosyn [naproxen] Nausea Only    Penicillins Rash    Tramadol Nausea Only and Palpitations     Physical exam  Vitals:    07/23/20 0907   BP: (!) 157/67   Pulse: 82   Resp: 18   Temp: 97.8 °F (36.6 °C)     Wake alert bedside eat  Normocephalic atraumatic  Normal rate  Soft nontender      CMP  Sodium   Date Value Ref Range Status   07/20/2020 135 (L) 136 - 145 mmol/L Final     Potassium   Date Value Ref Range Status   07/20/2020 4.4 3.5 - 5.1 mmol/L Final     Chloride   Date Value Ref Range Status   07/20/2020 100 95 - 110 mmol/L Final     CO2   Date Value Ref Range Status   07/20/2020 26 23 - 29 mmol/L Final     Glucose   Date Value Ref Range Status   07/20/2020 118 (H) 70 - 110 mg/dL Final     BUN, Bld   Date Value Ref Range Status   07/20/2020 9 8 - 23 mg/dL Final     Creatinine   Date Value Ref Range Status   07/20/2020 1.1 0.5 - 1.4 mg/dL Final     Calcium   Date Value Ref Range Status   07/20/2020 8.2 (L) 8.7 - 10.5 mg/dL Final     Total Protein   Date Value Ref Range Status   07/14/2020 7.3 6.0 - 8.4 g/dL Final     Albumin   Date Value Ref Range Status   07/14/2020 3.2 (L) 3.5 - 5.2 g/dL Final     Total Bilirubin   Date Value Ref Range Status   07/14/2020 0.5 0.1 - 1.0 mg/dL Final     Comment:     For infants and newborns, interpretation of results should be based  on gestational age, weight and in agreement with clinical  observations.  Premature Infant recommended reference ranges:  Up to 24 hours.............<8.0 mg/dL  Up to 48 hours............<12.0 mg/dL  3-5 days..................<15.0 mg/dL  6-29 days.................<15.0 mg/dL       Alkaline Phosphatase   Date Value Ref Range Status   07/14/2020 77 55 - 135 U/L Final     AST   Date Value Ref Range Status   07/14/2020 39 10 - 40 U/L Final     ALT   Date Value Ref Range Status   07/14/2020 37 10 - 44 U/L Final     Anion Gap   Date Value Ref  Range Status   07/20/2020 9 8 - 16 mmol/L Final     eGFR if    Date Value Ref Range Status   07/20/2020 56 (A) >60 mL/min/1.73 m^2 Final     eGFR if non    Date Value Ref Range Status   07/20/2020 49 (A) >60 mL/min/1.73 m^2 Final     Comment:     Calculation used to obtain the estimated glomerular filtration  rate (eGFR) is the CKD-EPI equation.        Lab Results   Component Value Date    WBC 8.39 07/23/2020    HGB 8.2 (L) 07/23/2020    HCT 26.9 (L) 07/23/2020     (H) 07/23/2020     07/23/2020       Assessment and recommendation     Status post laparoscopic cholecystectomy presented to hospital with difficulty breathing plus anemia.  CT scan shows extensive pulmonary embolisms.    Patient is Cheondoism    Status post total of 400 mg iron on this visit    Lower extremity DVT, IVC filter    > pulmonary embolism likely provoked with risk factor of obesity.  Recommend long-term anticoagulation given the extensiveness of clots. Ok to do heparin drip for now pending on IVC placement.  Outpatient Eliquis.    > hypercoagulable workup outpatient    > hematology oncology will continue follow intermittently

## 2020-07-23 NOTE — ASSESSMENT & PLAN NOTE
Monitor  Patient is a Jehovah Witness  Hematology consulted  Patient received a dose of IV Venofer

## 2020-07-23 NOTE — PLAN OF CARE
Awaiting pulmonology medically clearance and possibly move [facility] to [facility].  Patient accepted with [facility] home health.  Home O2 w/portable tank approved by [facility] and [facility] DME ok to pull portable tanks.  SW delivered portable tank to patient's hospital room.  Case management following for discharge planning needs.       07/23/20 6100   Discharge Reassessment   Assessment Type Discharge Planning Reassessment   Discharge Plan A Other   Discharge Plan B Home Health   DME Needed Upon Discharge  oxygen

## 2020-07-24 LAB
APTT BLDCRRT: 44.9 SEC (ref 21–32)
BASOPHILS # BLD AUTO: 0.03 K/UL (ref 0–0.2)
BASOPHILS NFR BLD: 0.3 % (ref 0–1.9)
BSA FOR ECHO PROCEDURE: 2.15 M2
CREAT SERPL-MCNC: 1.5 MG/DL (ref 0.5–1.4)
CRP SERPL-MCNC: 60.9 MG/L (ref 0–8.2)
CV ECHO LV RWT: 0.48 CM
DIFFERENTIAL METHOD: ABNORMAL
E WAVE DECELERATION TIME: 239.65 MSEC
E/A RATIO: 1.12
E/E' RATIO: 17.17 M/S
ECHO LV POSTERIOR WALL: 0.99 CM (ref 0.6–1.1)
EOSINOPHIL # BLD AUTO: 0.3 K/UL (ref 0–0.5)
EOSINOPHIL NFR BLD: 2.9 % (ref 0–8)
ERYTHROCYTE [DISTWIDTH] IN BLOOD BY AUTOMATED COUNT: 13.5 % (ref 11.5–14.5)
EST. GFR  (AFRICAN AMERICAN): 38 ML/MIN/1.73 M^2
EST. GFR  (NON AFRICAN AMERICAN): 33 ML/MIN/1.73 M^2
FRACTIONAL SHORTENING: 44 % (ref 28–44)
HCT VFR BLD AUTO: 26.9 % (ref 37–48.5)
HGB BLD-MCNC: 8.2 G/DL (ref 12–16)
IMM GRANULOCYTES # BLD AUTO: 0.24 K/UL (ref 0–0.04)
IMM GRANULOCYTES NFR BLD AUTO: 2.4 % (ref 0–0.5)
INTERVENTRICULAR SEPTUM: 0.97 CM (ref 0.6–1.1)
LEFT INTERNAL DIMENSION IN SYSTOLE: 2.33 CM (ref 2.1–4)
LEFT VENTRICLE DIASTOLIC VOLUME INDEX: 37.34 ML/M2
LEFT VENTRICLE DIASTOLIC VOLUME: 76.48 ML
LEFT VENTRICLE MASS INDEX: 64 G/M2
LEFT VENTRICLE SYSTOLIC VOLUME INDEX: 9.1 ML/M2
LEFT VENTRICLE SYSTOLIC VOLUME: 18.71 ML
LEFT VENTRICULAR INTERNAL DIMENSION IN DIASTOLE: 4.15 CM (ref 3.5–6)
LEFT VENTRICULAR MASS: 130.91 G
LV LATERAL E/E' RATIO: 17.17 M/S
LV SEPTAL E/E' RATIO: 17.17 M/S
LYMPHOCYTES # BLD AUTO: 2.4 K/UL (ref 1–4.8)
LYMPHOCYTES NFR BLD: 24.2 % (ref 18–48)
MCH RBC QN AUTO: 32.5 PG (ref 27–31)
MCHC RBC AUTO-ENTMCNC: 30.5 G/DL (ref 32–36)
MCV RBC AUTO: 107 FL (ref 82–98)
MONOCYTES # BLD AUTO: 0.9 K/UL (ref 0.3–1)
MONOCYTES NFR BLD: 8.9 % (ref 4–15)
MV PEAK A VEL: 0.92 M/S
MV PEAK E VEL: 1.03 M/S
NEUTROPHILS # BLD AUTO: 6.2 K/UL (ref 1.8–7.7)
NEUTROPHILS NFR BLD: 61.3 % (ref 38–73)
NRBC BLD-RTO: 1 /100 WBC
PISA TR MAX VEL: 2.2 M/S
PLATELET # BLD AUTO: 283 K/UL (ref 150–350)
PMV BLD AUTO: 9.8 FL (ref 9.2–12.9)
POCT GLUCOSE: 103 MG/DL (ref 70–110)
POCT GLUCOSE: 133 MG/DL (ref 70–110)
POCT GLUCOSE: 204 MG/DL (ref 70–110)
RBC # BLD AUTO: 2.52 M/UL (ref 4–5.4)
TDI LATERAL: 0.06 M/S
TDI SEPTAL: 0.06 M/S
TDI: 0.06 M/S
TR MAX PG: 19 MMHG
VANCOMYCIN SERPL-MCNC: 13.4 UG/ML
WBC # BLD AUTO: 10.09 K/UL (ref 3.9–12.7)

## 2020-07-24 PROCEDURE — 94761 N-INVAS EAR/PLS OXIMETRY MLT: CPT

## 2020-07-24 PROCEDURE — 86140 C-REACTIVE PROTEIN: CPT

## 2020-07-24 PROCEDURE — 99233 SBSQ HOSP IP/OBS HIGH 50: CPT | Mod: ,,, | Performed by: INTERNAL MEDICINE

## 2020-07-24 PROCEDURE — 80202 ASSAY OF VANCOMYCIN: CPT

## 2020-07-24 PROCEDURE — 63600175 PHARM REV CODE 636 W HCPCS: Performed by: INTERNAL MEDICINE

## 2020-07-24 PROCEDURE — 25000003 PHARM REV CODE 250: Performed by: INTERNAL MEDICINE

## 2020-07-24 PROCEDURE — 63600175 PHARM REV CODE 636 W HCPCS: Performed by: NURSE PRACTITIONER

## 2020-07-24 PROCEDURE — 99231 SBSQ HOSP IP/OBS SF/LOW 25: CPT | Mod: S$GLB,,, | Performed by: INTERNAL MEDICINE

## 2020-07-24 PROCEDURE — 27000221 HC OXYGEN, UP TO 24 HOURS

## 2020-07-24 PROCEDURE — 99231 PR SUBSEQUENT HOSPITAL CARE,LEVL I: ICD-10-PCS | Mod: S$GLB,,, | Performed by: INTERNAL MEDICINE

## 2020-07-24 PROCEDURE — 36415 COLL VENOUS BLD VENIPUNCTURE: CPT

## 2020-07-24 PROCEDURE — 85730 THROMBOPLASTIN TIME PARTIAL: CPT

## 2020-07-24 PROCEDURE — 99233 PR SUBSEQUENT HOSPITAL CARE,LEVL III: ICD-10-PCS | Mod: ,,, | Performed by: INTERNAL MEDICINE

## 2020-07-24 PROCEDURE — 85025 COMPLETE CBC W/AUTO DIFF WBC: CPT

## 2020-07-24 PROCEDURE — 94799 UNLISTED PULMONARY SVC/PX: CPT

## 2020-07-24 PROCEDURE — 99900035 HC TECH TIME PER 15 MIN (STAT)

## 2020-07-24 PROCEDURE — 82565 ASSAY OF CREATININE: CPT

## 2020-07-24 PROCEDURE — 12000002 HC ACUTE/MED SURGE SEMI-PRIVATE ROOM

## 2020-07-24 PROCEDURE — 25000242 PHARM REV CODE 250 ALT 637 W/ HCPCS: Performed by: NURSE PRACTITIONER

## 2020-07-24 PROCEDURE — 94640 AIRWAY INHALATION TREATMENT: CPT

## 2020-07-24 PROCEDURE — 25000003 PHARM REV CODE 250: Performed by: NURSE PRACTITIONER

## 2020-07-24 RX ORDER — SODIUM CHLORIDE 9 MG/ML
INJECTION, SOLUTION INTRAVENOUS CONTINUOUS
Status: DISCONTINUED | OUTPATIENT
Start: 2020-07-27 | End: 2020-07-28

## 2020-07-24 RX ADMIN — BUDESONIDE INHALATION 0.5 MG: 0.5 SUSPENSION RESPIRATORY (INHALATION) at 07:07

## 2020-07-24 RX ADMIN — IRON SUCROSE 200 MG: 20 INJECTION, SOLUTION INTRAVENOUS at 09:07

## 2020-07-24 RX ADMIN — FOLIC ACID 1 MG: 1 TABLET ORAL at 09:07

## 2020-07-24 RX ADMIN — BACITRACIN ZINC NEOMYCIN SULFATE POLYMYXIN B SULFATE: 400; 3.5; 5 OINTMENT TOPICAL at 09:07

## 2020-07-24 RX ADMIN — IPRATROPIUM BROMIDE AND ALBUTEROL SULFATE 3 ML: .5; 3 SOLUTION RESPIRATORY (INHALATION) at 03:07

## 2020-07-24 RX ADMIN — BUDESONIDE INHALATION 0.5 MG: 0.5 SUSPENSION RESPIRATORY (INHALATION) at 08:07

## 2020-07-24 RX ADMIN — MONTELUKAST 10 MG: 10 TABLET, FILM COATED ORAL at 08:07

## 2020-07-24 RX ADMIN — FLUTICASONE PROPIONATE 100 MCG: 50 SPRAY, METERED NASAL at 09:07

## 2020-07-24 RX ADMIN — METOPROLOL SUCCINATE 50 MG: 50 TABLET, FILM COATED, EXTENDED RELEASE ORAL at 09:07

## 2020-07-24 RX ADMIN — HEPARIN SODIUM 8 UNITS/KG/HR: 10000 INJECTION, SOLUTION INTRAVENOUS at 12:07

## 2020-07-24 RX ADMIN — POLYETHYLENE GLYCOL (3350) 17 G: 17 POWDER, FOR SOLUTION ORAL at 09:07

## 2020-07-24 RX ADMIN — PANTOPRAZOLE SODIUM 40 MG: 40 TABLET, DELAYED RELEASE ORAL at 09:07

## 2020-07-24 RX ADMIN — IPRATROPIUM BROMIDE AND ALBUTEROL SULFATE 3 ML: .5; 3 SOLUTION RESPIRATORY (INHALATION) at 07:07

## 2020-07-24 RX ADMIN — BISACODYL 5 MG: 5 TABLET, COATED ORAL at 09:07

## 2020-07-24 RX ADMIN — VANCOMYCIN HYDROCHLORIDE 1500 MG: 1.5 INJECTION, POWDER, LYOPHILIZED, FOR SOLUTION INTRAVENOUS at 08:07

## 2020-07-24 RX ADMIN — IPRATROPIUM BROMIDE AND ALBUTEROL SULFATE 3 ML: .5; 3 SOLUTION RESPIRATORY (INHALATION) at 08:07

## 2020-07-24 NOTE — ASSESSMENT & PLAN NOTE
With chronic pain syndrome secondary to extensive degenerative disc disease of thoracic and lumbar spine with some areas of bulging disc and foraminal stenosis-  MRI of lumbar and thoracic spine without contrast done with no signs of diskitis or osteomyelitis noted  Patient will need outpatient follow-up in the future    Continue p.r.n. pain medication  Patient reports she sees Dr. Gilliam for pain management

## 2020-07-24 NOTE — ASSESSMENT & PLAN NOTE
Diabetic diet  Blood sugars previously running less than 150 and Accu-Cheks with sliding scale were changed to p.r.n.- blood sugar 103 this a.m.  HGB A1c is 6.4

## 2020-07-24 NOTE — SUBJECTIVE & OBJECTIVE
Interval  Repeat echocardiogram pending.  Cardiology consult pending.  IVC filter placement canceled due to MRSA bacteremia.     Review of Systems   Constitutional: Positive for activity change. Negative for appetite change, chills and fever.   HENT: Negative for ear discharge and facial swelling.    Respiratory: Positive for shortness of breath. Negative for cough.    Cardiovascular: Negative for chest pain, palpitations and leg swelling.   Gastrointestinal: Negative for abdominal distention, blood in stool, nausea and vomiting.   Musculoskeletal: Positive for back pain. Negative for gait problem, neck pain and neck stiffness.         chronic back pain     Skin: Positive for wound. Negative for color change.        Abdominal surgical incisions   Neurological: Negative for facial asymmetry, speech difficulty and weakness.        Forgetful   Psychiatric/Behavioral: Negative for agitation and confusion. The patient is nervous/anxious.      Objective:     Vital Signs (Most Recent):  Temp: 98.8 °F (37.1 °C) (07/24/20 1235)  Pulse: 78 (07/24/20 1235)  Resp: 18 (07/24/20 1235)  BP: (!) 162/74 (07/24/20 1235)  SpO2: 97 % (07/24/20 1235) Vital Signs (24h Range):  Temp:  [96.5 °F (35.8 °C)-99 °F (37.2 °C)] 98.8 °F (37.1 °C)  Pulse:  [68-82] 78  Resp:  [16-20] 18  SpO2:  [94 %-99 %] 97 %  BP: (138-162)/(62-74) 162/74     Weight: 103.9 kg (229 lb)  Body mass index is 40.57 kg/m².    Physical Exam  Constitutional:       General: She is not in acute distress.     Appearance: Normal appearance. She is obese. She is not ill-appearing, toxic-appearing or diaphoretic.      Comments: Morbidly obese   HENT:      Head: Normocephalic and atraumatic.      Mouth/Throat:      Mouth: Mucous membranes are moist.   Eyes:      General:         Right eye: No discharge.         Left eye: No discharge.      Extraocular Movements: Extraocular movements intact.      Conjunctiva/sclera: Conjunctivae normal.      Pupils: Pupils are equal, round, and  reactive to light.   Neck:      Musculoskeletal: Normal range of motion and neck supple. No neck rigidity or muscular tenderness.   Cardiovascular:      Rate and Rhythm: Normal rate and regular rhythm.      Pulses: Normal pulses.      Comments: Trace pretib edema L>R  Pulmonary:      Effort: Pulmonary effort is normal. No respiratory distress.      Breath sounds: No wheezing or rales.      Comments: Bilateral breath sounds diminished  Abdominal:      General: Bowel sounds are normal. There is no distension.      Palpations: Abdomen is soft.      Tenderness: There is no abdominal tenderness. There is no guarding.      Comments: Obese     Genitourinary:     Comments: Not examined  Musculoskeletal: Normal range of motion.      Right lower leg: Edema present.      Left lower leg: Edema present.      Comments: Scant bilateral lower extremity edema   Skin:     General: Skin is warm and dry.      Capillary Refill: Capillary refill takes less than 2 seconds.      Comments: Abdominal surgical dressings intact   Neurological:      General: No focal deficit present.      Mental Status: She is alert and oriented to person, place, and time. Mental status is at baseline.      Cranial Nerves: No cranial nerve deficit.      Motor: No weakness.   Psychiatric:         Mood and Affect: Mood normal.         Behavior: Behavior normal.         Thought Content: Thought content normal.         Judgment: Judgment normal.      Comments:             CRANIAL NERVES     CN III, IV, VI   Pupils are equal, round, and reactive to light.     Labs reviewed

## 2020-07-24 NOTE — ASSESSMENT & PLAN NOTE
Patient is status post laparoscopic cholecystectomy done by Dr. Jomar Mcdonald at outpatient surgical center-  Orders as per surgeon - Dr. Mcdonald  Low-fat low-cholesterol ADA diet  P.r.n. pain and antiemetic medication

## 2020-07-24 NOTE — PROGRESS NOTES
[facility]  Hematology/Oncology  Progress Note    Patient Name: Sammi Abreu  Admission Date: 7/13/2020  Hospital Length of Stay: 10 days  Code Status: Full Code     Subjective:     Interval History:  Severe anemia in patient with bilateral extensive pulmonary embolus left lower extremity DVT Christian receiving iron and Procrit currently blood cultures came back positive for MRSA started on vancomycin infectious Disease following patient echocardiogram done osteomyelitis ruled out.  She remains on telemetry on heparin drip wearing nasal cannula oxygen.  Pulmonary also following patient continues to stay somewhat short of breath continues to have productive cough O2 sats have maintaining 99% on 3 L nasal cannula  She continues to report decreased appetite and nauseated status post IVC filter  Massive pe 7/14 cta  US lower ext 7/22/20- Nonocclusive thrombus within the left popliteal vein, suggestive of some recanalization of the prior thrombus in left popliteal vein.  No additional focus of thrombus.     TTE 7/14-   · The mean diastolic gradient across the mitral valve is 3 mmHg at a heart rate of 70 bpm.  · Concentric left ventricular remodeling.  · Small left ventricular cavity size.  · Hyperdynamic left ventricular systolic function. The estimated ejection fraction is 66%.  · Grade I (mild) left ventricular diastolic dysfunction consistent with impaired relaxation.  · Elevated central venous pressure (15 mmHg).  · The estimated PA systolic pressure is 58 mmHg.  · Pulmonary hypertension present.  · Mild to moderate pulmonic regurgitation.    Medications:  Continuous Infusions:   heparin (porcine) in D5W 8 Units/kg/hr (07/24/20 0003)     Scheduled Meds:   albuterol-ipratropium  3 mL Nebulization Q4H WAKE    bisacodyL  5 mg Oral Daily    budesonide  0.5 mg Nebulization Q12H    docusate sodium  100 mg Oral Daily    fluticasone propionate  2 spray Each Nostril Daily    folic  acid  1 mg Oral Daily    iron sucrose (VENOFER) IVPB  200 mg Intravenous Daily    metoprolol succinate  50 mg Oral Daily    montelukast  10 mg Oral QHS    neomycin-bacitracin-polymyxin   Topical (Top) Daily    pantoprazole  40 mg Oral Daily    polyethylene glycol  17 g Oral Daily     PRN Meds:acetaminophen, bisacodyL, dextrose 50%, dextrose 50%, glucagon (human recombinant), glucose, glucose, HYDROcodone-acetaminophen, insulin aspart U-100, melatonin, ondansetron, senna-docusate 8.6-50 mg, simethicone, sodium chloride 0.9%, Pharmacy to dose Vancomycin consult **AND** vancomycin - pharmacy to dose       Objective:     Vital Signs (Most Recent):  Temp: 98 °F (36.7 °C) (07/24/20 0900)  Pulse: 76 (07/24/20 0900)  Resp: 18 (07/24/20 0900)  BP: 138/63 (07/24/20 0900)  SpO2: (!) 94 % (07/24/20 0900) Vital Signs (24h Range):  Temp:  [96.5 °F (35.8 °C)-99 °F (37.2 °C)] 98 °F (36.7 °C)  Pulse:  [68-82] 76  Resp:  [16-20] 18  SpO2:  [94 %-99 %] 94 %  BP: (138-162)/(62-72) 138/63     Weight: 103.9 kg (229 lb)  Body mass index is 40.57 kg/m².  Body surface area is 2.15 meters squared.      Intake/Output Summary (Last 24 hours) at 7/24/2020 1204  Last data filed at 7/24/2020 0600  Gross per 24 hour   Intake 310.8 ml   Output 950 ml   Net -639.2 ml       Significant Labs:   Lab Results   Component Value Date    WBC 10.09 07/24/2020    HGB 8.2 (L) 07/24/2020    HCT 26.9 (L) 07/24/2020     (H) 07/24/2020     07/24/2020     CMP  Sodium   Date Value Ref Range Status   07/20/2020 135 (L) 136 - 145 mmol/L Final     Potassium   Date Value Ref Range Status   07/20/2020 4.4 3.5 - 5.1 mmol/L Final     Chloride   Date Value Ref Range Status   07/20/2020 100 95 - 110 mmol/L Final     CO2   Date Value Ref Range Status   07/20/2020 26 23 - 29 mmol/L Final     Glucose   Date Value Ref Range Status   07/20/2020 118 (H) 70 - 110 mg/dL Final     BUN, Bld   Date Value Ref Range Status   07/20/2020 9 8 - 23 mg/dL Final      Creatinine   Date Value Ref Range Status   07/23/2020 1.2 0.5 - 1.4 mg/dL Final     Calcium   Date Value Ref Range Status   07/20/2020 8.2 (L) 8.7 - 10.5 mg/dL Final     Total Protein   Date Value Ref Range Status   07/14/2020 7.3 6.0 - 8.4 g/dL Final     Albumin   Date Value Ref Range Status   07/14/2020 3.2 (L) 3.5 - 5.2 g/dL Final     Total Bilirubin   Date Value Ref Range Status   07/14/2020 0.5 0.1 - 1.0 mg/dL Final     Comment:     For infants and newborns, interpretation of results should be based  on gestational age, weight and in agreement with clinical  observations.  Premature Infant recommended reference ranges:  Up to 24 hours.............<8.0 mg/dL  Up to 48 hours............<12.0 mg/dL  3-5 days..................<15.0 mg/dL  6-29 days.................<15.0 mg/dL       Alkaline Phosphatase   Date Value Ref Range Status   07/14/2020 77 55 - 135 U/L Final     AST   Date Value Ref Range Status   07/14/2020 39 10 - 40 U/L Final     ALT   Date Value Ref Range Status   07/14/2020 37 10 - 44 U/L Final     Anion Gap   Date Value Ref Range Status   07/20/2020 9 8 - 16 mmol/L Final     eGFR if    Date Value Ref Range Status   07/23/2020 50 (A) >60 mL/min/1.73 m^2 Final     eGFR if non    Date Value Ref Range Status   07/23/2020 44 (A) >60 mL/min/1.73 m^2 Final     Comment:     Calculation used to obtain the estimated glomerular filtration  rate (eGFR) is the CKD-EPI equation.            Assessment/Plan:     Active Diagnoses:    Diagnosis Date Noted POA    PRINCIPAL PROBLEM:  Acute respiratory failure with hypoxemia [J96.01] 07/13/2020 Yes    DDD (degenerative disc disease), 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] 07/23/2020 Yes    Acute deep vein thrombosis (DVT) of popliteal vein of right lower extremity [I82.431]  Yes    Type 2 diabetes mellitus with stage 2 chronic kidney disease [E11.22, N18.2] 07/22/2020 Yes    MRSA bacteremia [R78.81] 07/22/2020 No    Acute blood loss anemia [D62]  07/20/2020 Yes    History of obstructive sleep apnea [Z86.69]  Yes    UTI (urinary tract infection) [N39.0] 07/19/2020 No    Bilateral pulmonary embolism [I26.99] 07/14/2020 Yes    Deep vein thrombosis (DVT) of left lower extremity [I82.402] 07/14/2020 Yes    Elevated troponin [R79.89] 07/14/2020 Yes    Class 2 severe obesity with serious comorbidity in adult [E66.01] 07/13/2020 Yes    Status post laparoscopic cholecystectomy [Z90.49] 07/13/2020 Not Applicable    Type 2 diabetes mellitus with diabetic neuropathy, without long-term current use of insulin [E11.40] 07/13/2020 Yes    COPD (chronic obstructive pulmonary disease) [J44.9] 07/13/2020 Yes    CHARLIE (obstructive sleep apnea) [G47.33] 07/13/2020 Yes    Hypoxemia [R09.02] 07/13/2020 Yes    Chronic back pain [M54.9, G89.29] 07/13/2020 Yes    History of DVT in adulthood [Z86.718] 07/13/2020 Not Applicable    GERD (gastroesophageal reflux disease) [K21.9] 04/09/2015 Yes    Pulmonary hypertension [I27.20] 04/09/2015 Yes    Hyperlipidemia associated with type 2 diabetes mellitus [E11.69, E78.5]  Yes    Hypertension associated with diabetes [E11.59, I10] 07/16/2012 Yes    DDD (degenerative disc disease), lumbar [M51.36] 07/05/2012 Yes      Problems Resolved During this Admission:    Diagnosis Date Noted Date Resolved POA    Azotemia [R79.89]  07/23/2020 No    Thrombocytopenia [D69.6] 07/15/2020 07/22/2020 No    Hyperkalemia [E87.5] 07/14/2020 07/22/2020 Yes     Extensive nature pulmonary embolus will require long-term anticoagulation currently on heparin  Patient growing Staph MRSA enlarged currently id following on vancomycin echo showing no vegetation  Osteomyelitis ruled out due to patient complaining of significant back pain and tender points per MRI  Status post IVC filter for deemed  Continue weekly Procrit if patient is in hospital as outpatient she should follow in clinic to continue Procrit to improve her hemoglobin  At this point continue  with heparin she will need long-term anticoagulation we will follow  Mali Mcarthur MD  Hematology/Oncology  [facility]

## 2020-07-24 NOTE — PLAN OF CARE
07/23/20 1927 07/23/20 1936   Patient Assessment/Suction   Level of Consciousness (AVPU) alert alert   Respiratory Effort Normal;Unlabored Normal;Unlabored   Expansion/Accessory Muscles/Retractions no use of accessory muscles  --    All Lung Fields Breath Sounds clear;diminished  --    CHRISTOPHER Breath Sounds clear  --    LLL Breath Sounds clear;diminished  --    RUL Breath Sounds clear  --    RML Breath Sounds clear;diminished  --    RLL Breath Sounds diminished  --    Rhythm/Pattern, Respiratory unlabored  --    Cough Frequency infrequent  --    Cough Type nonproductive  --    PRE-TX-O2   O2 Device (Oxygen Therapy) nasal cannula  --    $ Is the patient on Low Flow Oxygen? Yes  --    Flow (L/min) 3  --    Oxygen Concentration (%) 32  --    SpO2 (!) 94 % 98 %   Pulse Oximetry Type Intermittent  --    $ Pulse Oximetry - Multiple Charge Pulse Oximetry - Multiple  --    Pulse 76 78   Resp 18 18   Aerosol Therapy   $ Aerosol Therapy Charges Aerosol Treatment Aerosol Treatment   Respiratory Treatment Status (SVN) given given   Treatment Route (SVN) mask;oxygen mask;oxygen   Patient Position (SVN) sitting in chair sitting in chair   Post Treatment Assessment (SVN) breath sounds unchanged breath sounds unchanged   Signs of Intolerance (SVN) none none   Breath Sounds Post-Respiratory Treatment   Throughout All Fields Post-Treatment  --  All Fields   Throughout All Fields Post-Treatment  --  no change   Post-treatment Heart Rate (beats/min)  --  80   Post-treatment Resp Rate (breaths/min)  --  18   Ready to Wean/Extubation Screen   FIO2<=50 (chart decimal) 0.32  --

## 2020-07-24 NOTE — ASSESSMENT & PLAN NOTE
Patient with extensive bilateral pulmonary emboli and left lower extremity DVT-  Patient currently remains on IV heparin drip plan to change to 0 OAC once ok with  pulmonology  Pulmonology following  Plan was previously for patient to have IVC filter placement which has been placed on hold due to patient with MRSA bacteremia

## 2020-07-24 NOTE — PROGRESS NOTES
[facility] Hospital Medicine  Progress Note    Patient Name: Sammi Abreu  MRN: [id number]  Patient Class: IP- Inpatient   Admission Date: 7/13/2020  Length of Stay: 10 days  Attending Physician: Alix Ruth MD  Primary Care Provider: Osmani Villatoro MD      Subjective:     Principal Problem:Acute respiratory failure with hypoxemia secondary to bilateral pulmonary emboli, left lower extremity DVT, MRSA bacteremia    HPI:  This is a 79-year-old female who has a past medical history of arthritis and chronic back pain, fibromyalgia, glaucoma, hyperlipidemia, hypertension, sleep apnea with history of noncompliance with CPAP, morbid obesity, hypertension, GERD, COPD, diabetes type 2, prior DVT, and gallstones.  Please note the patient is also a [employer].  Patient is being received from Outpatient surgical center.  She is status post laparoscopic cholecystectomy today by Dr. Jomar Soria.  Patient tolerated procedure well.  She however was unable to be discharged postop due to ongoing hypoxemia and has been transferred here for further evaluation and treatment.                Overview/Hospital Course:  The patient was monitored closely during her stay.  She was noted to have an elevated D-dimer.  She was placed on full-dose Lovenox.  Patient when CTA of the chest which showed extensive bilateral pulmonary emboli.  Bilateral venous ultrasound showed left lower extremity DVT.  Pulmonology was consulted  .  Patient's full-dose Lovenox was discontinued and she was placed on IV heparin drip.  Patient was noted to hav and recommended full anticoagulation and also felt patient may benefit from IVC filter placement. Patient with worsening anemia.  Patient noted to be Jehovah witness.  Hematology was  consulted.  Patient was seen by vascular surgeon and plans were made for IVC filter placement.  However in the meantime, patient had blood cultures x2 that came back positive for MRSA.  She was  started on IV vancomycin and  Infectious disease was consulted and the IVC filter placement was canceled.  Repeat blood cultures and echocardiogram were ordered.  Patient had MRI of the thoracic and lumbar spine with significant degenerative disease changes and some bulging noted in areas with some foraminal stenosis but no signs of osteomyelitis noted.  Cardiology was consulted to read echocardiogram and also for possible SADIE if echocardiogram was negative for  any signs of bacterial endocarditis.    Interval  Repeat echocardiogram pending.  Cardiology consult pending.  IVC filter placement canceled due to MRSA bacteremia.     Review of Systems   Constitutional: Positive for activity change. Negative for appetite change, chills and fever.   HENT: Negative for ear discharge and facial swelling.    Respiratory: Positive for shortness of breath. Negative for cough.    Cardiovascular: Negative for chest pain, palpitations and leg swelling.   Gastrointestinal: Negative for abdominal distention, blood in stool, nausea and vomiting.   Musculoskeletal: Positive for back pain. Negative for gait problem, neck pain and neck stiffness.         chronic back pain     Skin: Positive for wound. Negative for color change.        Abdominal surgical incisions   Neurological: Negative for facial asymmetry, speech difficulty and weakness.        Forgetful   Psychiatric/Behavioral: Negative for agitation and confusion. The patient is nervous/anxious.      Objective:     Vital Signs (Most Recent):  Temp: 98.8 °F (37.1 °C) (07/24/20 1235)  Pulse: 78 (07/24/20 1235)  Resp: 18 (07/24/20 1235)  BP: (!) 162/74 (07/24/20 1235)  SpO2: 97 % (07/24/20 1235) Vital Signs (24h Range):  Temp:  [96.5 °F (35.8 °C)-99 °F (37.2 °C)] 98.8 °F (37.1 °C)  Pulse:  [68-82] 78  Resp:  [16-20] 18  SpO2:  [94 %-99 %] 97 %  BP: (138-162)/(62-74) 162/74     Weight: 103.9 kg (229 lb)  Body mass index is 40.57 kg/m².    Physical Exam  Constitutional:       General:  She is not in acute distress.     Appearance: Normal appearance. She is obese. She is not ill-appearing, toxic-appearing or diaphoretic.      Comments: Morbidly obese   HENT:      Head: Normocephalic and atraumatic.      Mouth/Throat:      Mouth: Mucous membranes are moist.   Eyes:      General:         Right eye: No discharge.         Left eye: No discharge.      Extraocular Movements: Extraocular movements intact.      Conjunctiva/sclera: Conjunctivae normal.      Pupils: Pupils are equal, round, and reactive to light.   Neck:      Musculoskeletal: Normal range of motion and neck supple. No neck rigidity or muscular tenderness.   Cardiovascular:      Rate and Rhythm: Normal rate and regular rhythm.      Pulses: Normal pulses.      Comments: Trace pretib edema L>R  Pulmonary:      Effort: Pulmonary effort is normal. No respiratory distress.      Breath sounds: No wheezing or rales.      Comments: Bilateral breath sounds diminished  Abdominal:      General: Bowel sounds are normal. There is no distension.      Palpations: Abdomen is soft.      Tenderness: There is no abdominal tenderness. There is no guarding.      Comments: Obese     Genitourinary:     Comments: Not examined  Musculoskeletal: Normal range of motion.      Right lower leg: Edema present.      Left lower leg: Edema present.      Comments: Scant bilateral lower extremity edema   Skin:     General: Skin is warm and dry.      Capillary Refill: Capillary refill takes less than 2 seconds.      Comments: Abdominal surgical dressings intact   Neurological:      General: No focal deficit present.      Mental Status: She is alert and oriented to person, place, and time. Mental status is at baseline.      Cranial Nerves: No cranial nerve deficit.      Motor: No weakness.   Psychiatric:         Mood and Affect: Mood normal.         Behavior: Behavior normal.         Thought Content: Thought content normal.         Judgment: Judgment normal.      Comments:              CRANIAL NERVES     CN III, IV, VI   Pupils are equal, round, and reactive to light.     Labs reviewed      Assessment/Plan:      * Acute respiratory failure with hypoxemia  Secondary to extensive bilateral pulmonary emboli-  Orders as per pulmonology  Patient currently remains on IV heparin drip- plan to change to oral anticoagulant when okay with pulmonology      DDD (degenerative disc disease), thoracic  Noted on MRI thoracic spine without contrast      MRSA bacteremia  7/18/2020 Blood cultures x2 positive for MRSA  Infectious Disease consulted-orders as recommended by Infectious Disease  Repeat echocardiogram results currently pending  Patient currently remains on IV vancomycin  Repeat blood cultures x2 on 07/22 and x1 on 07/23 currently showing no growth  Discussed with patient that cardiology has been consulted and she may need a SADIE  Also discussed with patient that infectious disease workup in progress and she may require several more weeks of IV antibiotics    Type 2 diabetes mellitus with stage 2 chronic kidney disease  Monitor renal status  Renal dose all medications      Acute blood loss anemia   Monitor  Patient is a Jehovah Witness  Hematology consulted previously for anemia  Patient received  IV Venofer       History of obstructive sleep apnea  Noted      UTI (urinary tract infection)  Resolved  Patient previously completed a round of IV Rocephin      Elevated troponin  Secondary to extensive bilateral pulmonary emboli      Deep vein thrombosis (DVT) of left lower extremity  Continue full-dose anticoagulation-patient currently remains on IV heparin drip  Plans for IVC filter placement were canceled due to MRSA bacteremia      Bilateral pulmonary embolism  Patient with extensive bilateral pulmonary emboli and left lower extremity DVT-  Patient currently remains on IV heparin drip plan to change to 0 OAC once ok with  pulmonology  Pulmonology following  Plan was previously for patient to have  IVC filter placement which has been placed on hold due to patient with MRSA bacteremia        History of DVT in adulthood  Patient reports history of DVT in the right upper extremity greater than 10 years ago and  history of groin DVT following a hip surgery approximately 8 years ago-  Continue MARIANELA hose  Patient now with left lower extremity DVT and extensive bilateral pulmonary emboli  Patient with decreased activity/ambulation due to increased recent back pain however she does have history of prior DVT in the past once related to prior hip surgery     Chronic back pain  With chronic pain syndrome secondary to extensive degenerative disc disease of thoracic and lumbar spine with some areas of bulging disc and foraminal stenosis-  MRI of lumbar and thoracic spine without contrast done with no signs of diskitis or osteomyelitis noted  Patient will need outpatient follow-up in the future    Continue p.r.n. pain medication  Patient reports she sees Dr. Gilliam for pain management      CHARLIE (obstructive sleep apnea)  Noted  Patient reports she does not use her CPAP at home      COPD (chronic obstructive pulmonary disease)  Monitor O2 sats, supplement O2 as needed  Q 4 hr while awake duo nebs and b.i.d. Pulmicort  Incentive spirometer q.1 hour while awake      Type 2 diabetes mellitus with diabetic neuropathy, without long-term current use of insulin  Diabetic diet  Blood sugars previously running less than 150 and Accu-Cheks with sliding scale were changed to p.r.n.- blood sugar 103 this a.m.  HGB A1c is 6.4      Status post laparoscopic cholecystectomy  Patient is status post laparoscopic cholecystectomy done by Dr. Jomar Mcdonlad at outpatient surgical center-  Orders as per surgeon - Dr. Mcdonald  Low-fat low-cholesterol ADA diet  P.r.n. pain and antiemetic medication      Class 2 severe obesity with serious comorbidity in adult  Body mass index is 40.57 kg/m².  General weight loss/lifestyle modification strategies  discussed (elicit support from others; identify saboteurs; non-food rewards, etc).          GERD (gastroesophageal reflux disease)  Continue PPI      Pulmonary hypertension  Noted      Hyperlipidemia associated with type 2 diabetes mellitus  No home anti-lipidemic noted- low fat, low-cholesterol diet    Hypertension associated with diabetes  Continue home medications      DDD (degenerative disc disease), lumbar  Noted on MRI lumbar spine without contrast        VTE Risk Mitigation (From admission, onward)         Ordered     heparin 25,000 units in dextrose 5% 250 ml (100 units/mL) infusion MINIMAL INTENSITY nomogram - [facility]  Continuous     Question:  Heparin Infusion Adjustment (DO NOT MODIFY ANSWER)  Answer:  [email or web address]\[email or web address] MINIMAL  INTENSITY nomogram for [facility] [email or web address]    07/20/20 1604     IP VTE HIGH RISK PATIENT  Once      07/13/20 1900     Place MARIANELA hose  Until discontinued      07/13/20 1900                Time spent seeing patient( greater than 1/2 spent in direct contact) :  48 minutes        GENARO Ross  Department of Hospital Medicine   [facility]

## 2020-07-24 NOTE — PLAN OF CARE
07/24/20 0809   Patient Assessment/Suction   Respiratory Effort Normal;Unlabored   Expansion/Accessory Muscles/Retractions expansion symmetric;no retractions   All Lung Fields Breath Sounds Anterior:;Lateral:;clear   Rhythm/Pattern, Respiratory unlabored   Cough Frequency infrequent   Cough Type nonproductive   PRE-TX-O2   O2 Device (Oxygen Therapy) nasal cannula   $ Is the patient on Low Flow Oxygen? Yes   Flow (L/min) 3   SpO2 99 %   Pulse Oximetry Type Intermittent   $ Pulse Oximetry - Multiple Charge Pulse Oximetry - Multiple   Pulse 82   Resp 16   Aerosol Therapy   $ Aerosol Therapy Charges Aerosol Treatment  (duoneb)   Respiratory Treatment Status (SVN) given   Treatment Route (SVN) mask   Patient Position (SVN) HOB elevated   Post Treatment Assessment (SVN) breath sounds unchanged   Signs of Intolerance (SVN) none   Breath Sounds Post-Respiratory Treatment   Throughout All Fields Post-Treatment All Fields   Throughout All Fields Post-Treatment no change   Post-treatment Heart Rate (beats/min) 80   Post-treatment Resp Rate (breaths/min) 18   Incentive Spirometer   $ Incentive Spirometer Charges done independently per patient   Administration (IS) self-administered

## 2020-07-24 NOTE — ASSESSMENT & PLAN NOTE
Monitor  Patient is a Jehovah Witness  Hematology consulted previously for anemia  Patient received  IV Venofer

## 2020-07-24 NOTE — PLAN OF CARE
Plan of care reviewed with patient. SR on telemetry. AKCI midline intact & patent. Heparin gtt at 8u/kg(5.9cc) hr. PTT 48.1, will redraw this am. O2-3L per NC in use. Remains free from falls/injury. Instructed to call for assistance as needed during night, verbalized understanding. Call light in reach, bed alarm on . Isolation maintained as per orders.

## 2020-07-24 NOTE — ASSESSMENT & PLAN NOTE
Secondary to extensive bilateral pulmonary emboli-  Orders as per pulmonology  Patient currently remains on IV heparin drip- plan to change to oral anticoagulant when okay with pulmonology

## 2020-07-24 NOTE — ASSESSMENT & PLAN NOTE
Patient reports history of DVT in the right upper extremity greater than 10 years ago and  history of groin DVT following a hip surgery approximately 8 years ago-  Continue MARIANELA hose  Patient now with left lower extremity DVT and extensive bilateral pulmonary emboli  Patient with decreased activity/ambulation due to increased recent back pain however she does have history of prior DVT in the past once related to prior hip surgery

## 2020-07-24 NOTE — PROGRESS NOTES
Progress Note  Infectious Disease    Reason for Consult:      HPI: Sammi Abreu is a  79 y.o. female with past medical history of obesity, DM 2, HTN, DLP, CHARLIE, noncompliance with CPAP, COPD, GERD, DJD, fibromyalgia, chronic back pain, Jehovah witness, allergic to penicillin, anemia.      Patient was at outpatient surgical center, after laparoscopic cholecystectomy by Jomar Hall on 07/13/2020.  Postop she was hypoxemic and could not be discharged.  She was diagnosed with left popliteal DVT and extensive bilateral PE.  She was admitted and anticoagulated with heparin.  Patient used to be on anticoagulation for a long time but it was discontinued, patient does not know why it was started and white was stopped    While hospitalized she was found to have MRSA bacteremia.  Id consult was called.    I came and show patient.  She has been dealing with lower back pain and bilateral groin pain rule out June.  She so spine specialist who D a steroid injection at the L-spine by the end of June.  Her lower back pain continued to worsen it looks like it went across the abdomen and words the left anterior thigh.  She she was hospitalized and was told that her pain was due to gallbladder issues.  Initially she did not want to proceed cholecystectomy but the pain worsened so bad that she had her home health nurse contact the general surgeons office and scheduled an elective cholecystectomy.  She seems concerned with the bacteremia and the and tries Little so.    Patient is on 3 L O2.  Pulse ox 95  Afebrile.  T-max 99.3°    07/23/2020.  Afebrile.  T-max 99.9°.  Repeat blood cultures are negative.  Tolerating vancomycin well.  MRI results noted.  No diskitis to my surprise, which is great news      07/24/2020  tmax 99.  No new complaints.  Receiving vancomycin for MRSA bacteremia.  Heparin drip for PE.    Antibiotics (From admission, onward)    Start     Stop Route Frequency Ordered    07/22/20 1243  vancomycin - pharmacy  to dose  (vancomycin IVPB)      -- IV pharmacy to manage frequency 07/22/20 1145    07/16/20 1200  neomycin-bacitracin-polymyxin ointment      -- Top Daily 07/16/20 1052        Antifungals (From admission, onward)    None          EXAM & DIAGNOSTICS REVIEWED:   Vitals:     Temp:  [96.5 °F (35.8 °C)-99 °F (37.2 °C)]   Temp: 98 °F (36.7 °C) (07/24/20 0900)  Pulse: 76 (07/24/20 0900)  Resp: 18 (07/24/20 0900)  BP: 138/63 (07/24/20 0900)  SpO2: (!) 94 % (07/24/20 0900)    Intake/Output Summary (Last 24 hours) at 7/24/2020 1017  Last data filed at 7/24/2020 0600  Gross per 24 hour   Intake 310.8 ml   Output 950 ml   Net -639.2 ml       General:  In NAD. Alert and attentive, cooperative, comfortable  Eyes:  Anicteric, PERRL, EOMI  ENT:  No ulcers, exudates, thrush, nares patent, dentition is fair  Neck:  supple, no masses or adenopathy appreciated  Lungs: Diminished  Heart:  RRR, no gallop/murmur/rub noted  Abd:  Soft, NT, ND, normal BS, no masses or organomegaly appreciated.  :  Voids/Little, urine clear, no flank tenderness  Musc:  Joints without effusion, swelling, erythema, synovitis, muscle wasting.  General muscle discomfort on lower thoracolumbar area   Skin:  No rashes. No palmar or plantar lesions. No subungual petechiae  Wound:   Neuro: Alert, attentive, speech fluent, face symmetric, moves all extremities, no focal weakness.  Takes her time to move due to severe pain.  Psych:  Understandably anxious and concerned.  Lymphatic:     No cervical, supraclavicular, axillary, or inguinal nodes  Extrem: No edema, erythema, phlebitis, cellulitis, warm and well perfused  VAD:       Isolation:      Lines/Tubes/Drains:    General Labs reviewed:  Recent Labs   Lab 07/22/20  0609 07/23/20  0514 07/24/20  0524   WBC 7.86 8.39 10.09   HGB 7.6* 8.2* 8.2*   HCT 24.4* 26.9* 26.9*    276 283       Recent Labs   Lab 07/20/20  0541 07/23/20  1002   *  --    K 4.4  --      --    CO2 26  --    BUN 9  --     CREATININE 1.1 1.2   CALCIUM 8.2*  --            Micro:  Microbiology Results (last 7 days)     Procedure Component Value Units Date/Time    Blood culture [[id number]] Collected: 07/22/20 0610    Order Status: Completed Specimen: Blood Updated: 07/24/20 1012     Blood Culture, Routine No Growth to date      No Growth to date      No Growth to date    Blood culture [[id number]] Collected: 07/22/20 1412    Order Status: Completed Specimen: Blood from Peripheral, Right Hand Updated: 07/23/20 2212     Blood Culture, Routine No Growth to date      No Growth to date    Narrative:      Collection has been rescheduled by [email or web address] at 07/22/2020 12:48 Reason:   nurse billie checking on it will call if needed  Collection has been rescheduled by [email or web address] at 07/22/2020 12:48 Reason:   nurse billie checking on it will call if needed    Blood culture [[id number]] Collected: 07/23/20 0514    Order Status: Completed Specimen: Blood from Antecubital, Left Arm Updated: 07/23/20 1745     Blood Culture, Routine No Growth to date    Blood culture [[id number]]     Order Status: Canceled Specimen: Blood     Blood culture [[id number]]  (Abnormal) Collected: 07/18/20 1842    Order Status: Completed Specimen: Blood Updated: 07/22/20 0937     Blood Culture, Routine Gram stain peds bottle: Gram positive cocci in clusters resembling Staph       Results called to and read back by: Marika Mcclendon RN 07/20/2020  05:53      METHICILLIN RESISTANT STAPHYLOCOCCUS AUREUS  ID consult required at [facility] and [facility].  For susceptibility see order #[id number]      Blood culture [[id number]]  (Abnormal)  (Susceptibility) Collected: 07/18/20 1653    Order Status: Completed Specimen: Blood Updated: 07/22/20 0937     Blood Culture, Routine Gram stain juan bottle: Gram positive cocci in clusters resembling Staph       Results called to and read back by: Marika Mcclendon RN 07/20/2020  05:53      Gram stain aer bottle: Gram positive cocci in clusters  resembling Staph      METHICILLIN RESISTANT STAPHYLOCOCCUS AUREUS  ID consult required at [facility] and [facility] locations.      Urine culture [[id number]] Collected: 07/18/20 1920    Order Status: Completed Specimen: Urine Updated: 07/20/20 1334     Urine Culture, Routine Multiple organisms isolated. None in predominance.  Repeat if      clinically necessary.    Narrative:      Specimen Source->Urine    Urine culture [[id number]] Collected: 07/18/20 0425    Order Status: Completed Specimen: Urine Updated: 07/19/20 1253     Urine Culture, Routine Multiple organisms isolated. None in predominance.  Repeat if      clinically necessary.    Narrative:      Specimen Source->Urine    Urine Culture High Risk [[id number]] Collected: 07/18/20 0740    Order Status: Canceled Specimen: Urine, Clean Catch         Imaging Reviewed:   07/23/20201 MRI There is no acute or significant abnormality.  There is no fracture.  There is stable trace anterolisthesis of T10 on T11.  There is no evidence of discitis or osteomyelitis.  There is no significant spinal stenosis and there is no cord compression.  There is no significant foraminal stenosis suspected.  There is minimal bulging of the annulus at several levels and some degree of facet joint arthropathy throughout the mid and lower thoracic spine.     CXR07/17No acute radiographic abnormality.     CT   Bilateral pulmonary emboli with extensive clot burden and involvement of main pulmonary arteries.  The patient's nurse, Anil was called at the time of dictation.  Additionally, a message was sent via secure chat to the hospitalist taking care of the patient.  Mediastinal adenopathy of uncertain etiology.  This may be inflammatory/reactive or neoplastic, and follow-up is needed.  Development of nonspecific foci of ground-glass opacity throughout both lungs.     US Nonocclusive thrombus within the left popliteal vein, suggestive of some recanalization of the prior thrombus in left  popliteal vein.  No additional focus of thrombus.     Cardiology:  · The mean diastolic gradient across the mitral valve is 3 mmHg at a heart rate of 70 bpm.  · Concentric left ventricular remodeling.  · Small left ventricular cavity size.  · Hyperdynamic left ventricular systolic function. The estimated ejection fraction is 66%.  · Grade I (mild) left ventricular diastolic dysfunction consistent with impaired relaxation.  · Elevated central venous pressure (15 mmHg).  · The estimated PA systolic pressure is 58 mmHg.  · Pulmonary hypertension present.  · Mild to moderate pulmonic regurgitation.      IMPRESSION & PLAN     MRSA bacteremia.  On 08/17. MRI of T-spine did not show diskitis.    Source of MRSA? Recent TYLER? Lt antecubital IVsite?  Has received steroids, ES I and Decadron on 07/13.  Elevated CRP 77--16.9  Being treated for PE, and DVT..  History of DM  History of bilateral hip replacement  History of job I witnessed; will have to be careful with amount of blood draws    PMHx of obesity, DM 2, HTN, DLP, CHARLIE, noncompliance with CPAP, COPD, GERD, DJD, fibromyalgia, chronic back pain, Jehovah witness, allergic to penicillin, anemia.       Recommendations:  Continue vancomycin  ECHO has no vegetation.  SADIE when she is more stable from a respiratory standpoint.  I have not decided on duration of treatment of 3 versus 6 weeks?   LTAC/SNF    Dr. Nielsen will be available this weekend if needed, 07/24--07/25/2020  I will see patient on Monday

## 2020-07-24 NOTE — ASSESSMENT & PLAN NOTE
Continue full-dose anticoagulation-patient currently remains on IV heparin drip  Plans for IVC filter placement were canceled due to MRSA bacteremia

## 2020-07-25 PROBLEM — N39.0 UTI (URINARY TRACT INFECTION): Status: RESOLVED | Noted: 2020-07-19 | Resolved: 2020-07-25

## 2020-07-25 LAB
APTT BLDCRRT: 43.5 SEC (ref 21–32)
BASOPHILS # BLD AUTO: 0.02 K/UL (ref 0–0.2)
BASOPHILS NFR BLD: 0.2 % (ref 0–1.9)
CRP SERPL-MCNC: 45.2 MG/L (ref 0–8.2)
DIFFERENTIAL METHOD: ABNORMAL
EOSINOPHIL # BLD AUTO: 0.2 K/UL (ref 0–0.5)
EOSINOPHIL NFR BLD: 2.2 % (ref 0–8)
ERYTHROCYTE [DISTWIDTH] IN BLOOD BY AUTOMATED COUNT: 14 % (ref 11.5–14.5)
HCT VFR BLD AUTO: 28 % (ref 37–48.5)
HGB BLD-MCNC: 8.4 G/DL (ref 12–16)
IMM GRANULOCYTES # BLD AUTO: 0.22 K/UL (ref 0–0.04)
IMM GRANULOCYTES NFR BLD AUTO: 2.7 % (ref 0–0.5)
LYMPHOCYTES # BLD AUTO: 1.9 K/UL (ref 1–4.8)
LYMPHOCYTES NFR BLD: 22.8 % (ref 18–48)
MCH RBC QN AUTO: 32.9 PG (ref 27–31)
MCHC RBC AUTO-ENTMCNC: 30 G/DL (ref 32–36)
MCV RBC AUTO: 110 FL (ref 82–98)
MONOCYTES # BLD AUTO: 0.8 K/UL (ref 0.3–1)
MONOCYTES NFR BLD: 10 % (ref 4–15)
NEUTROPHILS # BLD AUTO: 5.1 K/UL (ref 1.8–7.7)
NEUTROPHILS NFR BLD: 62.1 % (ref 38–73)
NRBC BLD-RTO: 2 /100 WBC
PLATELET # BLD AUTO: 302 K/UL (ref 150–350)
PMV BLD AUTO: 10.3 FL (ref 9.2–12.9)
POCT GLUCOSE: 115 MG/DL (ref 70–110)
POCT GLUCOSE: 141 MG/DL (ref 70–110)
RBC # BLD AUTO: 2.55 M/UL (ref 4–5.4)
VANCOMYCIN SERPL-MCNC: 15.4 UG/ML
WBC # BLD AUTO: 8.16 K/UL (ref 3.9–12.7)

## 2020-07-25 PROCEDURE — 36415 COLL VENOUS BLD VENIPUNCTURE: CPT

## 2020-07-25 PROCEDURE — 25000003 PHARM REV CODE 250: Performed by: INTERNAL MEDICINE

## 2020-07-25 PROCEDURE — 85025 COMPLETE CBC W/AUTO DIFF WBC: CPT

## 2020-07-25 PROCEDURE — 27000221 HC OXYGEN, UP TO 24 HOURS

## 2020-07-25 PROCEDURE — 25000003 PHARM REV CODE 250: Performed by: NURSE PRACTITIONER

## 2020-07-25 PROCEDURE — 94761 N-INVAS EAR/PLS OXIMETRY MLT: CPT

## 2020-07-25 PROCEDURE — 63600175 PHARM REV CODE 636 W HCPCS: Performed by: INTERNAL MEDICINE

## 2020-07-25 PROCEDURE — 94799 UNLISTED PULMONARY SVC/PX: CPT

## 2020-07-25 PROCEDURE — 94640 AIRWAY INHALATION TREATMENT: CPT

## 2020-07-25 PROCEDURE — 99900035 HC TECH TIME PER 15 MIN (STAT)

## 2020-07-25 PROCEDURE — 85730 THROMBOPLASTIN TIME PARTIAL: CPT

## 2020-07-25 PROCEDURE — 80202 ASSAY OF VANCOMYCIN: CPT

## 2020-07-25 PROCEDURE — 12000002 HC ACUTE/MED SURGE SEMI-PRIVATE ROOM

## 2020-07-25 PROCEDURE — 63600175 PHARM REV CODE 636 W HCPCS: Performed by: NURSE PRACTITIONER

## 2020-07-25 PROCEDURE — 86140 C-REACTIVE PROTEIN: CPT

## 2020-07-25 PROCEDURE — 25000242 PHARM REV CODE 250 ALT 637 W/ HCPCS: Performed by: NURSE PRACTITIONER

## 2020-07-25 RX ORDER — DOCUSATE SODIUM 100 MG/1
100 CAPSULE, LIQUID FILLED ORAL 2 TIMES DAILY PRN
Status: DISCONTINUED | OUTPATIENT
Start: 2020-07-25 | End: 2020-07-29 | Stop reason: HOSPADM

## 2020-07-25 RX ADMIN — HEPARIN SODIUM 8 UNITS/KG/HR: 10000 INJECTION, SOLUTION INTRAVENOUS at 07:07

## 2020-07-25 RX ADMIN — PANTOPRAZOLE SODIUM 40 MG: 40 TABLET, DELAYED RELEASE ORAL at 09:07

## 2020-07-25 RX ADMIN — FLUTICASONE PROPIONATE 100 MCG: 50 SPRAY, METERED NASAL at 09:07

## 2020-07-25 RX ADMIN — IPRATROPIUM BROMIDE AND ALBUTEROL SULFATE 3 ML: .5; 3 SOLUTION RESPIRATORY (INHALATION) at 04:07

## 2020-07-25 RX ADMIN — METOPROLOL SUCCINATE 50 MG: 50 TABLET, FILM COATED, EXTENDED RELEASE ORAL at 09:07

## 2020-07-25 RX ADMIN — BACITRACIN ZINC NEOMYCIN SULFATE POLYMYXIN B SULFATE: 400; 3.5; 5 OINTMENT TOPICAL at 09:07

## 2020-07-25 RX ADMIN — BUDESONIDE INHALATION 0.5 MG: 0.5 SUSPENSION RESPIRATORY (INHALATION) at 07:07

## 2020-07-25 RX ADMIN — BISACODYL 5 MG: 5 TABLET, COATED ORAL at 09:07

## 2020-07-25 RX ADMIN — IPRATROPIUM BROMIDE AND ALBUTEROL SULFATE 3 ML: .5; 3 SOLUTION RESPIRATORY (INHALATION) at 11:07

## 2020-07-25 RX ADMIN — IPRATROPIUM BROMIDE AND ALBUTEROL SULFATE 3 ML: .5; 3 SOLUTION RESPIRATORY (INHALATION) at 07:07

## 2020-07-25 RX ADMIN — VANCOMYCIN HYDROCHLORIDE 1500 MG: 1.5 INJECTION, POWDER, LYOPHILIZED, FOR SOLUTION INTRAVENOUS at 10:07

## 2020-07-25 RX ADMIN — MONTELUKAST 10 MG: 10 TABLET, FILM COATED ORAL at 08:07

## 2020-07-25 RX ADMIN — IPRATROPIUM BROMIDE AND ALBUTEROL SULFATE 3 ML: .5; 3 SOLUTION RESPIRATORY (INHALATION) at 06:07

## 2020-07-25 RX ADMIN — IRON SUCROSE 200 MG: 20 INJECTION, SOLUTION INTRAVENOUS at 09:07

## 2020-07-25 RX ADMIN — FOLIC ACID 1 MG: 1 TABLET ORAL at 09:07

## 2020-07-25 NOTE — PLAN OF CARE
07/25/20 0657   Patient Assessment/Suction   Respiratory Effort Normal;Unlabored   Expansion/Accessory Muscles/Retractions expansion symmetric;no retractions   All Lung Fields Breath Sounds Anterior:;Lateral:;diminished   Rhythm/Pattern, Respiratory unlabored   Cough Frequency infrequent   PRE-TX-O2   O2 Device (Oxygen Therapy) nasal cannula w/ humidification   $ Is the patient on Low Flow Oxygen? Yes   SpO2 (!) 94 %   Pulse Oximetry Type Intermittent   $ Pulse Oximetry - Multiple Charge Pulse Oximetry - Multiple   Pulse 91   Resp 18   Aerosol Therapy   $ Aerosol Therapy Charges Aerosol Treatment  (duoned)   Respiratory Treatment Status (SVN) given   Treatment Route (SVN) mask   Patient Position (SVN) HOB elevated   Post Treatment Assessment (SVN) breath sounds unchanged   Signs of Intolerance (SVN) none   Breath Sounds Post-Respiratory Treatment   Throughout All Fields Post-Treatment All Fields   Throughout All Fields Post-Treatment no change   Post-treatment Heart Rate (beats/min) 86   Post-treatment Resp Rate (breaths/min) 18   Incentive Spirometer   $ Incentive Spirometer Charges done with encouragement   Incentive Spirometer Predicted Level (mL) 1750   Administration (IS) proper technique demonstrated   Number of Repetitions (IS) 10   Level Incentive Spirometer (mL) 1000   Patient Tolerance (IS) good

## 2020-07-25 NOTE — PLAN OF CARE
07/24/20 1920 07/24/20 1926   Patient Assessment/Suction   Level of Consciousness (AVPU) alert alert   Respiratory Effort Normal;Unlabored  --    Expansion/Accessory Muscles/Retractions no use of accessory muscles  --    All Lung Fields Breath Sounds clear;diminished  --    CHRISTOPHER Breath Sounds clear  --    LLL Breath Sounds diminished  --    RUL Breath Sounds clear  --    RML Breath Sounds clear  --    RLL Breath Sounds diminished  --    Rhythm/Pattern, Respiratory unlabored  --    Cough Frequency infrequent infrequent   Cough Type nonproductive nonproductive   PRE-TX-O2   O2 Device (Oxygen Therapy) nasal cannula  --    $ Is the patient on Low Flow Oxygen? Yes  --    Flow (L/min) 3  --    Oxygen Concentration (%) 32  --    SpO2 (!) 93 % 97 %   Pulse Oximetry Type Intermittent  --    $ Pulse Oximetry - Multiple Charge Pulse Oximetry - Multiple  --    Pulse 67 69   Resp 18 18   Aerosol Therapy   $ Aerosol Therapy Charges Aerosol Treatment Aerosol Treatment   Respiratory Treatment Status (SVN) given given   Treatment Route (SVN) mask;oxygen mask;oxygen   Patient Position (SVN) sitting in chair sitting in chair   Post Treatment Assessment (SVN) breath sounds unchanged breath sounds unchanged   Signs of Intolerance (SVN) none none   Breath Sounds Post-Respiratory Treatment   Throughout All Fields Post-Treatment All Fields All Fields   Throughout All Fields Post-Treatment no change no change   Post-treatment Heart Rate (beats/min) 69 67   Post-treatment Resp Rate (breaths/min) 18 17   Incentive Spirometer   $ Incentive Spirometer Charges  --  done with encouragement   Administration (IS)  --  proper technique demonstrated   Number of Repetitions (IS)  --  5   Level Incentive Spirometer (mL)  --  1000   Patient Tolerance (IS)  --  good   Ready to Wean/Extubation Screen   FIO2<=50 (chart decimal) 0.32  --

## 2020-07-25 NOTE — ASSESSMENT & PLAN NOTE
Monitor  Patient is a Jehovah Witness  Hematology consulted previously for anemia  Patient received several doses of IV Venofer   Her hemoglobin and  hematocrit was trended closely

## 2020-07-25 NOTE — NURSING
Pt up in chair; safety maintained. Non-skid socks on, chair alarm set; and call bell in reach. Pt advised to call staff for mobility. No c/o pain at this time. Heparin drip maintained. Tele monitoring maintained.

## 2020-07-25 NOTE — PLAN OF CARE
Plan of care reviewed with pt,verbalized understanding. SR on telemetry. O2 @ 3 L Sat remained 94-95% . Heparin drip @ 5.9 continues. Call light kept within reach, bed in locked and lowest position, side rails up x2.

## 2020-07-25 NOTE — PROGRESS NOTES
Received message from case management that patient was refusing SNF and LTAC placement for IV antibiotics.

## 2020-07-25 NOTE — ASSESSMENT & PLAN NOTE
Patient with extensive bilateral pulmonary emboli and left lower extremity DVT-  Patient currently remains on IV heparin drip plan    Pulmonology following  Plan was previously for patient to have IVC filter placement which has been placed on hold due to patient with MRSA bacteremia

## 2020-07-25 NOTE — PROGRESS NOTES
[facility] Hospital Medicine  Progress Note    Patient Name: Sammi Abreu  MRN: [id number]  Patient Class: IP- Inpatient   Admission Date: 7/13/2020  Length of Stay: 11 days  Attending Physician: Alix Ruth MD  Primary Care Provider: Osmani Villatoro MD        Subjective:     Principal Problem:Acute respiratory failure with hypoxemia secondary to significant emboli bacteremia, left lower extremity DVT, Anemia, status post laparoscopic cholecystectomy    HPI:  This is a 79-year-old female who has a past medical history of arthritis and chronic back pain, fibromyalgia, glaucoma, hyperlipidemia, hypertension, sleep apnea with history of noncompliance with CPAP, morbid obesity, hypertension, GERD, COPD, diabetes type 2, prior DVT, and gallstones.  Please note the patient is also a Bapt[employer].  Patient is being received from [facility]patient surgical [facility].  She is status post laparoscopic cholecystectomy today by Dr. Jomar Soria.  Patient tolerated procedure well.  She however was unable to be discharged postop due to ongoing hypoxemia and has been transferred here for further evaluation and treatment.    Overview/Hospital Course:  The patient was monitored closely during her stay.  She was noted to have an elevated D-dimer.  She was placed on full-dose Lovenox.  Patient when CTA of the chest which showed extensive bilateral pulmonary emboli.  Bilateral venous ultrasound showed left lower extremity DVT.  Pulmonology was consulted  .  Patient's full-dose Lovenox was discontinued and she was placed on IV heparin drip.  Patient was noted to hav and recommended full anticoagulation and also felt patient may benefit from IVC filter placement. Patient with worsening anemia.  Patient noted to be Jehovah witness.  Hematology was  consulted.  Patient was seen by vascular surgeon and plans were made for IVC filter placement.  However in the meantime, patient had blood cultures x2 that came back  positive for MRSA.  She was started on IV vancomycin and  Infectious disease was consulted and the IVC filter placement was canceled.  Repeat blood cultures and echocardiogram were ordered.  Patient had MRI of the thoracic and lumbar spine with significant degenerative disease changes and some bulging noted in areas with some foraminal stenosis but no signs of osteomyelitis noted.  Cardiology was consulted to read echocardiogram and also for possible SADIE if echocardiogram was negative for  any signs of bacterial endocarditis.    Interval history: Cardiology consult pending.  Continue current treatment.  Infectious Disease following.     Review of Systems   Constitutional: Positive for activity change. Negative for appetite change, chills and fever.   HENT: Negative for ear discharge and facial swelling.    Respiratory: Positive for shortness of breath. Negative for cough.    Cardiovascular: Negative for chest pain, palpitations and leg swelling.   Gastrointestinal: Negative for abdominal distention, blood in stool, nausea and vomiting.   Musculoskeletal: Positive for back pain. Negative for gait problem, neck pain and neck stiffness.         chronic back pain     Skin: Positive for wound. Negative for color change.        Abdominal surgical incisions   Neurological: Negative for facial asymmetry, speech difficulty and weakness.            Psychiatric/Behavioral: Negative for agitation and confusion. The patient is nervous/anxious.      Objective:     Vital Signs (Most Recent):  Temp: 98.5 °F (36.9 °C) (07/25/20 1141)  Pulse: 71 (07/25/20 1141)  Resp: 18 (07/25/20 1141)  BP: (!) 149/67 (07/25/20 1141)  SpO2: 98 % (07/25/20 1141) Vital Signs (24h Range):  Temp:  [97.1 °F (36.2 °C)-98.8 °F (37.1 °C)] 98.5 °F (36.9 °C)  Pulse:  [66-92] 71  Resp:  [16-20] 18  SpO2:  [91 %-98 %] 98 %  BP: (138-181)/(65-78) 149/67     Weight: 103.9 kg (229 lb)  Body mass index is 40.57 kg/m².    Physical Exam  Constitutional:        General: She is not in acute distress.     Appearance: Normal appearance. She is obese. She is not ill-appearing, toxic-appearing or diaphoretic.      Comments: Morbidly obese   HENT:      Head: Normocephalic and atraumatic.      Mouth/Throat:      Mouth: Mucous membranes are moist.   Eyes:      General:         Right eye: No discharge.         Left eye: No discharge.      Extraocular Movements: Extraocular movements intact.      Conjunctiva/sclera: Conjunctivae normal.      Pupils: Pupils are equal, round, and reactive to light.   Neck:      Musculoskeletal: Normal range of motion and neck supple. No neck rigidity or muscular tenderness.   Cardiovascular:      Rate and Rhythm: Normal rate and regular rhythm.      Pulses: Normal pulses.      Comments: Trace pretib edema L>R  Pulmonary:      Effort: Pulmonary effort is normal. No respiratory distress.      Breath sounds: No wheezing or rales.      Comments: Bilateral breath sounds diminished  Abdominal:      General: Bowel sounds are normal. There is no distension.      Palpations: Abdomen is soft.      Tenderness: There is no abdominal tenderness. There is no guarding.      Comments: Obese     Genitourinary:     Comments: Not examined  Musculoskeletal: Normal range of motion.      Right lower leg: Edema present.      Left lower leg: Edema present.      Comments: Scant bilateral lower extremity edema   Skin:     General: Skin is warm and dry.      Capillary Refill: Capillary refill takes less than 2 seconds.      Comments: Abdominal surgical dressings intact   Neurological:      General: No focal deficit present.      Mental Status: She is alert and oriented to person, place, and time. Mental status is at baseline.      Cranial Nerves: No cranial nerve deficit.      Motor: No weakness.   Psychiatric:         Mood and Affect: Mood normal.         Behavior: Behavior normal.         Thought Content: Thought content normal.         Judgment: Judgment normal.       Comments:             CRANIAL NERVES     CN III, IV, VI   Pupils are equal, round, and reactive to light.     Labs reviewed      Assessment/Plan:      * Acute respiratory failure with hypoxemia  Secondary to extensive bilateral pulmonary emboli-  Orders as per pulmonology  Patient currently remains on IV heparin drip       DDD (degenerative disc disease), thoracic  Noted on MRI thoracic spine without contrast      MRSA bacteremia  7/18/2020 Blood cultures x2 positive for MRSA  Infectious Disease consulted-orders as recommended by Infectious Disease  Repeat echocardiogram results currently pending  Patient currently remains on IV vancomycin  Repeat blood cultures x2 on 07/22 and x1 on 07/23 currently showing no growth      Type 2 diabetes mellitus with stage 2 chronic kidney disease  Monitor renal status  Renal dose all medications      Acute blood loss anemia   Monitor  Patient is a Jehovah Witness  Hematology consulted previously for anemia  Patient received several doses of IV Venofer   Her hemoglobin and  hematocrit was trended closely      History of obstructive sleep apnea  Noted      Elevated troponin  Secondary to extensive bilateral pulmonary emboli      Deep vein thrombosis (DVT) of left lower extremity  Continue full-dose anticoagulation-patient currently remains on IV heparin drip  Plans for IVC filter placement were canceled due to MRSA bacteremia      Bilateral pulmonary embolism  Patient with extensive bilateral pulmonary emboli and left lower extremity DVT-  Patient currently remains on IV heparin drip plan    Pulmonology following  Plan was previously for patient to have IVC filter placement which has been placed on hold due to patient with MRSA bacteremia        History of DVT in adulthood  Patient reports history of DVT in the right upper extremity greater than 10 years ago and  history of groin DVT following a hip surgery approximately 8 years ago-  Continue MARIANELA hose  Patient now with left lower  extremity DVT and extensive bilateral pulmonary emboli  Patient with decreased activity/ambulation due to increased recent back pain however she does have history of prior DVT in the past once related to prior hip surgery     Chronic back pain  With chronic pain syndrome secondary to extensive degenerative disc disease of thoracic and lumbar spine with some areas of bulging disc and foraminal stenosis-  MRI of lumbar and thoracic spine without contrast done with no signs of diskitis or osteomyelitis noted  Patient will need outpatient follow-up in the future    Continue p.r.n. pain medication  Patient reports she sees Dr. Gilliam for pain management      CHARLIE (obstructive sleep apnea)  Noted  Patient reports she does not use her CPAP at home      COPD (chronic obstructive pulmonary disease)  Monitor O2 sats, supplement O2 as needed  Q 4 hr while awake duo nebs and b.i.d. Pulmicort  Incentive spirometer q.1 hour while awake      Type 2 diabetes mellitus with diabetic neuropathy, without long-term current use of insulin  Diabetic diet  Blood sugars previously running less than 150 and Accu-Cheks with sliding scale were changed to p.r.n.- blood sugar 103 this a.m.  HGB A1c is 6.4      Status post laparoscopic cholecystectomy  Patient is status post laparoscopic cholecystectomy done by Dr. Jomar Mcdonald at outpatient surgical center-  Orders as per surgeon - Dr. Mcdonald  Low-fat low-cholesterol ADA diet  P.r.n. pain and antiemetic medication      Class 2 severe obesity with serious comorbidity in adult  Body mass index is 40.57 kg/m².  General weight loss/lifestyle modification strategies discussed (elicit support from others; identify saboteurs; non-food rewards, etc).          GERD (gastroesophageal reflux disease)  Continue PPI      Pulmonary hypertension  Noted      Hyperlipidemia associated with type 2 diabetes mellitus  No home anti-lipidemic noted- low fat, low-cholesterol diet    Hypertension associated with  diabetes  Continue home medications      DDD (degenerative disc disease), lumbar  Noted on MRI lumbar spine without contrast        VTE Risk Mitigation (From admission, onward)         Ordered     heparin 25,000 units in dextrose 5% 250 ml (100 units/mL) infusion MINIMAL INTENSITY nomogram - [facility]  Continuous     Question:  Heparin Infusion Adjustment (DO NOT MODIFY ANSWER)  Answer:  \\[email or web address]\epic\Images\Pharmacy\HeparinInfusions\heparin MINIMAL  INTENSITY nomogram for [facility] [email or web address]    07/20/20 1604     IP VTE HIGH RISK PATIENT  Once      07/13/20 1900     Place MARIANELA hose  Until discontinued      07/13/20 1900                Time spent seeing patient( greater than 1/2 spent in direct contact) : 32 minutes    GENARO Ross  Department of Hospital Medicine   [facility]

## 2020-07-25 NOTE — SUBJECTIVE & OBJECTIVE
Interval history: Cardiology consult pending.  Continue current treatment.  Infectious Disease following.     Review of Systems   Constitutional: Positive for activity change. Negative for appetite change, chills and fever.   HENT: Negative for ear discharge and facial swelling.    Respiratory: Positive for shortness of breath. Negative for cough.    Cardiovascular: Negative for chest pain, palpitations and leg swelling.   Gastrointestinal: Negative for abdominal distention, blood in stool, nausea and vomiting.   Musculoskeletal: Positive for back pain. Negative for gait problem, neck pain and neck stiffness.         chronic back pain     Skin: Positive for wound. Negative for color change.        Abdominal surgical incisions   Neurological: Negative for facial asymmetry, speech difficulty and weakness.            Psychiatric/Behavioral: Negative for agitation and confusion. The patient is nervous/anxious.      Objective:     Vital Signs (Most Recent):  Temp: 98.5 °F (36.9 °C) (07/25/20 1141)  Pulse: 71 (07/25/20 1141)  Resp: 18 (07/25/20 1141)  BP: (!) 149/67 (07/25/20 1141)  SpO2: 98 % (07/25/20 1141) Vital Signs (24h Range):  Temp:  [97.1 °F (36.2 °C)-98.8 °F (37.1 °C)] 98.5 °F (36.9 °C)  Pulse:  [66-92] 71  Resp:  [16-20] 18  SpO2:  [91 %-98 %] 98 %  BP: (138-181)/(65-78) 149/67     Weight: 103.9 kg (229 lb)  Body mass index is 40.57 kg/m².    Physical Exam  Constitutional:       General: She is not in acute distress.     Appearance: Normal appearance. She is obese. She is not ill-appearing, toxic-appearing or diaphoretic.      Comments: Morbidly obese   HENT:      Head: Normocephalic and atraumatic.      Mouth/Throat:      Mouth: Mucous membranes are moist.   Eyes:      General:         Right eye: No discharge.         Left eye: No discharge.      Extraocular Movements: Extraocular movements intact.      Conjunctiva/sclera: Conjunctivae normal.      Pupils: Pupils are equal, round, and reactive to light.    Neck:      Musculoskeletal: Normal range of motion and neck supple. No neck rigidity or muscular tenderness.   Cardiovascular:      Rate and Rhythm: Normal rate and regular rhythm.      Pulses: Normal pulses.      Comments: Trace pretib edema L>R  Pulmonary:      Effort: Pulmonary effort is normal. No respiratory distress.      Breath sounds: No wheezing or rales.      Comments: Bilateral breath sounds diminished  Abdominal:      General: Bowel sounds are normal. There is no distension.      Palpations: Abdomen is soft.      Tenderness: There is no abdominal tenderness. There is no guarding.      Comments: Obese     Genitourinary:     Comments: Not examined  Musculoskeletal: Normal range of motion.      Right lower leg: Edema present.      Left lower leg: Edema present.      Comments: Scant bilateral lower extremity edema   Skin:     General: Skin is warm and dry.      Capillary Refill: Capillary refill takes less than 2 seconds.      Comments: Abdominal surgical dressings intact   Neurological:      General: No focal deficit present.      Mental Status: She is alert and oriented to person, place, and time. Mental status is at baseline.      Cranial Nerves: No cranial nerve deficit.      Motor: No weakness.   Psychiatric:         Mood and Affect: Mood normal.         Behavior: Behavior normal.         Thought Content: Thought content normal.         Judgment: Judgment normal.      Comments:             CRANIAL NERVES     CN III, IV, VI   Pupils are equal, round, and reactive to light.     Labs reviewed

## 2020-07-25 NOTE — PROGRESS NOTES
Pharmacokinetic Assessment Follow Up: IV Vancomycin    Vancomycin serum concentration assessment(s):    The random level was drawn correctly and can be used to guide therapy at this time. The measurement is below the desired definitive target range of 15 to 20 mcg/mL.    Vancomycin Regimen Plan:    Pharmacy will re-dose vancomycin 1500 mg x 1 today. Pharmacy dosing by level. Vancomycin random level ordered for 7/25 at 1930.    Drug levels (last 3 results):  Recent Labs   Lab Result Units 07/23/20  1707 07/24/20  1844   Vancomycin, Random ug/mL 7.1 13.4       Pharmacy will continue to follow and monitor vancomycin.    Please contact pharmacy at extension [phone] for questions regarding this assessment.    Thank you for the consult,   Jessy Christine       Patient brief summary:  Sammi Abreu is a 79 y.o. female initiated on antimicrobial therapy with IV Vancomycin for treatment of bacteremia    The patient's current regimen is pulse dosing    Drug Allergies:   Review of patient's allergies indicates:   Allergen Reactions    Cymbalta [duloxetine] Other (See Comments)     Nightmares      Darvon [propoxyphene] Nausea Only and Other (See Comments)     Sweating, slept for 3 days    Atorvastatin Other (See Comments)     Muscle cramps    Naprosyn [naproxen] Nausea Only    Penicillins Rash    Tramadol Nausea Only and Palpitations       Actual Body Weight:   103.9    Renal Function:   Estimated Creatinine Clearance: 36.2 mL/min (A) (based on SCr of 1.5 mg/dL (H)).,     Dialysis Method (if applicable):  N/A    CBC (last 72 hours):  Recent Labs   Lab Result Units 07/22/20  0609 07/23/20  0514 07/24/20  0524   WBC K/uL 7.86 8.39 10.09   Hemoglobin g/dL 7.6* 8.2* 8.2*   Hematocrit % 24.4* 26.9* 26.9*   Platelets K/uL 227 276 283   Gran% % 58.7 59.8 61.3   Lymph% % 26.3 25.7 24.2   Mono% % 10.9 10.1 8.9   Eosinophil% % 3.3 2.5 2.9   Basophil% % 0.3 0.2 0.3   Differential Method  Automated Automated Automated        Metabolic Panel (last 72 hours):  Recent Labs   Lab Result Units 07/23/20  1002 07/24/20  1844   Creatinine mg/dL 1.2 1.5*       Vancomycin Administrations:  vancomycin given in the last 96 hours                     vancomycin (VANCOCIN) 1,750 mg in dextrose 5 % 500 mL IVPB (mg) 1,750 mg New Bag 07/1942    vancomycin (VANCOCIN) 1,750 mg in dextrose 5 % 500 mL IVPB (mg) 1,750 mg New Bag 07/22/20 1839                    Microbiologic Results:  Microbiology Results (last 7 days)       Procedure Component Value Units Date/Time    Blood culture [[id number]] Collected: 07/23/20 0514    Order Status: Completed Specimen: Blood from Antecubital, Left Arm Updated: 07/24/20 1212     Blood Culture, Routine No Growth to date      No Growth to date    Blood culture [[id number]] Collected: 07/22/20 0610    Order Status: Completed Specimen: Blood Updated: 07/24/20 1012     Blood Culture, Routine No Growth to date      No Growth to date      No Growth to date    Blood culture [[id number]] Collected: 07/22/20 1412    Order Status: Completed Specimen: Blood from Peripheral, Right Hand Updated: 07/23/20 2212     Blood Culture, Routine No Growth to date      No Growth to date    Narrative:      Collection has been rescheduled by [email or web address] at 07/22/2020 12:48 Reason:   nurse billie checking on it will call if needed  Collection has been rescheduled by [email or web address] at 07/22/2020 12:48 Reason:   nurse billie checking on it will call if needed    Blood culture [[id number]]     Order Status: Canceled Specimen: Blood     Blood culture [[id number]]  (Abnormal) Collected: 07/18/20 1842    Order Status: Completed Specimen: Blood Updated: 07/22/20 0937     Blood Culture, Routine Gram stain peds bottle: Gram positive cocci in clusters resembling Staph       Results called to and read back by: Marika Mcclendon RN 07/20/2020  05:53      METHICILLIN RESISTANT STAPHYLOCOCCUS AUREUS  ID consult required at [facility].Jess,Shauna and Perfecto foley.  For  susceptibility see order #[id number]      Blood culture [[id number]]  (Abnormal)  (Susceptibility) Collected: 07/18/20 1653    Order Status: Completed Specimen: Blood Updated: 07/22/20 0937     Blood Culture, Routine Gram stain juan bottle: Gram positive cocci in clusters resembling Staph       Results called to and read back by: Marika Mcclendon RN 07/20/2020  05:53      Gram stain aer bottle: Gram positive cocci in clusters resembling Staph      METHICILLIN RESISTANT STAPHYLOCOCCUS AUREUS  ID consult required at [facility],[place] and [facility] locations.      Urine culture [[id number]] Collected: 07/18/20 1920    Order Status: Completed Specimen: Urine Updated: 07/20/20 1334     Urine Culture, Routine Multiple organisms isolated. None in predominance.  Repeat if      clinically necessary.    Narrative:      Specimen Source->Urine    Urine culture [[id number]] Collected: 07/18/20 0425    Order Status: Completed Specimen: Urine Updated: 07/19/20 1253     Urine Culture, Routine Multiple organisms isolated. None in predominance.  Repeat if      clinically necessary.    Narrative:      Specimen Source->Urine    Urine Culture High Risk [[id number]] Collected: 07/18/20 0740    Order Status: Canceled Specimen: Urine, Clean Catch

## 2020-07-26 LAB
APTT BLDCRRT: 30.5 SEC (ref 21–32)
APTT BLDCRRT: 30.7 SEC (ref 21–32)
APTT BLDCRRT: 40 SEC (ref 21–32)
BASOPHILS # BLD AUTO: 0.02 K/UL (ref 0–0.2)
BASOPHILS NFR BLD: 0.2 % (ref 0–1.9)
DIFFERENTIAL METHOD: ABNORMAL
EOSINOPHIL # BLD AUTO: 0.1 K/UL (ref 0–0.5)
EOSINOPHIL NFR BLD: 1.5 % (ref 0–8)
ERYTHROCYTE [DISTWIDTH] IN BLOOD BY AUTOMATED COUNT: 14.6 % (ref 11.5–14.5)
HCT VFR BLD AUTO: 29.3 % (ref 37–48.5)
HGB BLD-MCNC: 8.7 G/DL (ref 12–16)
IMM GRANULOCYTES # BLD AUTO: 0.19 K/UL (ref 0–0.04)
IMM GRANULOCYTES NFR BLD AUTO: 2.1 % (ref 0–0.5)
LYMPHOCYTES # BLD AUTO: 2.1 K/UL (ref 1–4.8)
LYMPHOCYTES NFR BLD: 23.2 % (ref 18–48)
MCH RBC QN AUTO: 32.8 PG (ref 27–31)
MCHC RBC AUTO-ENTMCNC: 29.7 G/DL (ref 32–36)
MCV RBC AUTO: 111 FL (ref 82–98)
MONOCYTES # BLD AUTO: 1 K/UL (ref 0.3–1)
MONOCYTES NFR BLD: 11.2 % (ref 4–15)
NEUTROPHILS # BLD AUTO: 5.6 K/UL (ref 1.8–7.7)
NEUTROPHILS NFR BLD: 61.8 % (ref 38–73)
NRBC BLD-RTO: 2 /100 WBC
PLATELET # BLD AUTO: 281 K/UL (ref 150–350)
PMV BLD AUTO: 9.8 FL (ref 9.2–12.9)
POCT GLUCOSE: 108 MG/DL (ref 70–110)
POCT GLUCOSE: 117 MG/DL (ref 70–110)
RBC # BLD AUTO: 2.65 M/UL (ref 4–5.4)
VANCOMYCIN SERPL-MCNC: 12 UG/ML
WBC # BLD AUTO: 9.11 K/UL (ref 3.9–12.7)

## 2020-07-26 PROCEDURE — 85730 THROMBOPLASTIN TIME PARTIAL: CPT

## 2020-07-26 PROCEDURE — 85730 THROMBOPLASTIN TIME PARTIAL: CPT | Mod: 91

## 2020-07-26 PROCEDURE — 25000003 PHARM REV CODE 250: Performed by: INTERNAL MEDICINE

## 2020-07-26 PROCEDURE — 25000003 PHARM REV CODE 250: Performed by: NURSE PRACTITIONER

## 2020-07-26 PROCEDURE — 94761 N-INVAS EAR/PLS OXIMETRY MLT: CPT

## 2020-07-26 PROCEDURE — 93005 ELECTROCARDIOGRAM TRACING: CPT

## 2020-07-26 PROCEDURE — 85025 COMPLETE CBC W/AUTO DIFF WBC: CPT

## 2020-07-26 PROCEDURE — 94640 AIRWAY INHALATION TREATMENT: CPT

## 2020-07-26 PROCEDURE — 27000221 HC OXYGEN, UP TO 24 HOURS

## 2020-07-26 PROCEDURE — 25000242 PHARM REV CODE 250 ALT 637 W/ HCPCS: Performed by: NURSE PRACTITIONER

## 2020-07-26 PROCEDURE — 94799 UNLISTED PULMONARY SVC/PX: CPT

## 2020-07-26 PROCEDURE — 11000001 HC ACUTE MED/SURG PRIVATE ROOM

## 2020-07-26 PROCEDURE — 36415 COLL VENOUS BLD VENIPUNCTURE: CPT

## 2020-07-26 PROCEDURE — 80202 ASSAY OF VANCOMYCIN: CPT

## 2020-07-26 RX ADMIN — IPRATROPIUM BROMIDE AND ALBUTEROL SULFATE 3 ML: .5; 3 SOLUTION RESPIRATORY (INHALATION) at 07:07

## 2020-07-26 RX ADMIN — FLUTICASONE PROPIONATE 100 MCG: 50 SPRAY, METERED NASAL at 10:07

## 2020-07-26 RX ADMIN — BUDESONIDE INHALATION 0.5 MG: 0.5 SUSPENSION RESPIRATORY (INHALATION) at 08:07

## 2020-07-26 RX ADMIN — BACITRACIN ZINC NEOMYCIN SULFATE POLYMYXIN B SULFATE: 400; 3.5; 5 OINTMENT TOPICAL at 10:07

## 2020-07-26 RX ADMIN — IPRATROPIUM BROMIDE AND ALBUTEROL SULFATE 3 ML: .5; 3 SOLUTION RESPIRATORY (INHALATION) at 08:07

## 2020-07-26 RX ADMIN — IPRATROPIUM BROMIDE AND ALBUTEROL SULFATE 3 ML: .5; 3 SOLUTION RESPIRATORY (INHALATION) at 03:07

## 2020-07-26 RX ADMIN — BUDESONIDE INHALATION 0.5 MG: 0.5 SUSPENSION RESPIRATORY (INHALATION) at 07:07

## 2020-07-26 RX ADMIN — IPRATROPIUM BROMIDE AND ALBUTEROL SULFATE 3 ML: .5; 3 SOLUTION RESPIRATORY (INHALATION) at 12:07

## 2020-07-26 RX ADMIN — FOLIC ACID 1 MG: 1 TABLET ORAL at 10:07

## 2020-07-26 RX ADMIN — MONTELUKAST 10 MG: 10 TABLET, FILM COATED ORAL at 08:07

## 2020-07-26 RX ADMIN — PANTOPRAZOLE SODIUM 40 MG: 40 TABLET, DELAYED RELEASE ORAL at 10:07

## 2020-07-26 RX ADMIN — BISACODYL 5 MG: 5 TABLET, COATED ORAL at 10:07

## 2020-07-26 RX ADMIN — METOPROLOL SUCCINATE 50 MG: 50 TABLET, FILM COATED, EXTENDED RELEASE ORAL at 10:07

## 2020-07-26 NOTE — PLAN OF CARE
07/25/20 1931 07/25/20 1936   Patient Assessment/Suction   Level of Consciousness (AVPU) alert alert   Respiratory Effort Unlabored  --    Expansion/Accessory Muscles/Retractions no use of accessory muscles  --    All Lung Fields Breath Sounds clear;diminished  --    CHRISTOPHER Breath Sounds clear  --    LLL Breath Sounds diminished  --    RUL Breath Sounds clear  --    RML Breath Sounds clear  --    RLL Breath Sounds diminished  --    Rhythm/Pattern, Respiratory unlabored  --    Cough Frequency infrequent  --    PRE-TX-O2   O2 Device (Oxygen Therapy) nasal cannula  --    $ Is the patient on Low Flow Oxygen? Yes  --    Flow (L/min) 3  --    Oxygen Concentration (%) 32  --    SpO2 (!) 94 % 99 %   Pulse Oximetry Type Intermittent  --    $ Pulse Oximetry - Multiple Charge Pulse Oximetry - Multiple  --    Pulse 74 75   Resp 18 18   Aerosol Therapy   $ Aerosol Therapy Charges Aerosol Treatment Aerosol Treatment   Respiratory Treatment Status (SVN) given given   Treatment Route (SVN) mask;oxygen mask;oxygen   Patient Position (SVN) HOB elevated HOB elevated   Post Treatment Assessment (SVN) breath sounds unchanged breath sounds unchanged   Signs of Intolerance (SVN) none none   Breath Sounds Post-Respiratory Treatment   Throughout All Fields Post-Treatment All Fields All Fields   Throughout All Fields Post-Treatment no change no change   Post-treatment Heart Rate (beats/min) 75 77   Post-treatment Resp Rate (breaths/min) 18 18   Incentive Spirometer   $ Incentive Spirometer Charges  --  done with encouragement   Administration (IS)  --  self-administered   Ready to Wean/Extubation Screen   FIO2<=50 (chart decimal) 0.32  --      Duoneb Q4WA, Pulimcort Q12

## 2020-07-26 NOTE — NURSING
Notified by monitor tech that pt had inverted T waves on ECG; pt assessed & lead placement appropriate, VSS, SBP little elevated at 161, pt denies any pain or SOB or discomfort laying supine in bed, resps even & unlabored, labs & prior EKGs  reviewed, PAULINE Mccoy NP notified, orders for chemistry received & to monitor pt  closely & call her/ Dr ELEANOR Lazaro, Cardiology, for any cardiac related stress/ issues.

## 2020-07-26 NOTE — PLAN OF CARE
Patient receiving aerosol tx via duoneb now and q4hr w/a and 0.5mg pulmicort now and q12hr.  Hr 84 and 02 saturation 96% on nc at 4lpm.  Patient averagING 1000 ml on IS.  No computer in room and Charge nurse notified.

## 2020-07-26 NOTE — PLAN OF CARE
Plan of care reviewed with pt,verbalized understanding. Contact precaution  maintained. Heparin drip @ 5.9 ml/hr continues. O2 @ 3 L. Hourly and Q2h rounds completed on this pt throughout shift. Call light kept within reach, bed in locked and lowest position, side rails up x2.

## 2020-07-26 NOTE — PROGRESS NOTES
Pharmacokinetic Assessment Follow Up: IV Vancomycin    Vancomycin serum concentration assessment(s):    The random level was drawn correctly and can be used to guide therapy at this time. The measurement is within the desired definitive target range of 15 to 20 mcg/mL.    Vancomycin Regimen Plan:    Repeat vancomycin 1500 mg x 1 and Re-dose when the random level is less than 20 mcg/mL, next level to be drawn at 2200 on 7/26    Drug levels (last 3 results):  Recent Labs   Lab Result Units 07/23/20  1707 07/24/20  1844 07/25/20  1958   Vancomycin, Random ug/mL 7.1 13.4 15.4       Pharmacy will continue to follow and monitor vancomycin.    Please contact pharmacy at extension [phone] for questions regarding this assessment.    Thank you for the consult,   Carly Stubbs       Patient brief summary:  Sammi Abreu is a 79 y.o. female initiated on antimicrobial therapy with IV Vancomycin for treatment of bacteremia    The patient's current regimen is pulse dosing,. Last dose 1500 mg.    Drug Allergies:   Review of patient's allergies indicates:   Allergen Reactions    Cymbalta [duloxetine] Other (See Comments)     Nightmares      Darvon [propoxyphene] Nausea Only and Other (See Comments)     Sweating, slept for 3 days    Atorvastatin Other (See Comments)     Muscle cramps    Naprosyn [naproxen] Nausea Only    Penicillins Rash    Tramadol Nausea Only and Palpitations       Actual Body Weight:   103.9 kg    Renal Function:   Estimated Creatinine Clearance: 36.2 mL/min (A) (based on SCr of 1.5 mg/dL (H)).,         CBC (last 72 hours):  Recent Labs   Lab Result Units 07/23/20  0514 07/24/20  0524 07/25/20  0449   WBC K/uL 8.39 10.09 8.16   Hemoglobin g/dL 8.2* 8.2* 8.4*   Hematocrit % 26.9* 26.9* 28.0*   Platelets K/uL 276 283 302   Gran% % 59.8 61.3 62.1   Lymph% % 25.7 24.2 22.8   Mono% % 10.1 8.9 10.0   Eosinophil% % 2.5 2.9 2.2   Basophil% % 0.2 0.3 0.2   Differential Method  Automated Automated Automated        Metabolic Panel (last 72 hours):  Recent Labs   Lab Result Units 07/23/20  1002 07/24/20  1844   Creatinine mg/dL 1.2 1.5*       Vancomycin Administrations:  vancomycin given in the last 96 hours                     vancomycin 1.5 g in dextrose 5 % 250 mL IVPB (ready to mix) (mg) 1,500 mg New Bag 07/24/20 2018    vancomycin (VANCOCIN) 1,750 mg in dextrose 5 % 500 mL IVPB (mg) 1,750 mg New Bag 07/1942    vancomycin (VANCOCIN) 1,750 mg in dextrose 5 % 500 mL IVPB (mg) 1,750 mg New Bag 07/22/20 1839                    Microbiologic Results:  Microbiology Results (last 7 days)       Procedure Component Value Units Date/Time    Blood culture [[id number]] Collected: 07/23/20 0514    Order Status: Completed Specimen: Blood from Antecubital, Left Arm Updated: 07/25/20 1212     Blood Culture, Routine No Growth to date      No Growth to date      No Growth to date    Blood culture [[id number]] Collected: 07/22/20 0610    Order Status: Completed Specimen: Blood Updated: 07/25/20 1012     Blood Culture, Routine No Growth to date      No Growth to date      No Growth to date      No Growth to date    Blood culture [[id number]] Collected: 07/22/20 1412    Order Status: Completed Specimen: Blood from Peripheral, Right Hand Updated: 07/24/20 2212     Blood Culture, Routine No Growth to date      No Growth to date      No Growth to date    Narrative:      Collection has been rescheduled by PVL1 at 07/22/2020 12:48 Reason:   nurse billie checking on it will call if needed  Collection has been rescheduled by PVL1 at 07/22/2020 12:48 Reason:   nurse billie checking on it will call if needed    Blood culture [[id number]]     Order Status: Canceled Specimen: Blood     Blood culture [[id number]]  (Abnormal) Collected: 07/18/20 1842    Order Status: Completed Specimen: Blood Updated: 07/22/20 0937     Blood Culture, Routine Gram stain peds bottle: Gram positive cocci in clusters resembling Staph       Results called to and read back  by: Marika Mcclendon RN 07/20/2020  05:53      METHICILLIN RESISTANT STAPHYLOCOCCUS AUREUS  ID consult required at [facility] and [facility] locations.  For susceptibility see order #[id number]      Blood culture [[id number]]  (Abnormal)  (Susceptibility) Collected: 07/18/20 1653    Order Status: Completed Specimen: Blood Updated: 07/22/20 0937     Blood Culture, Routine Gram stain juan bottle: Gram positive cocci in clusters resembling Staph       Results called to and read back by: Marika Mcclendon RN 07/20/2020  05:53      Gram stain aer bottle: Gram positive cocci in clusters resembling Staph      METHICILLIN RESISTANT STAPHYLOCOCCUS AUREUS  ID consult required at [facility] and [facility] locations.      Urine culture [[id number]] Collected: 07/18/20 1920    Order Status: Completed Specimen: Urine Updated: 07/20/20 1334     Urine Culture, Routine Multiple organisms isolated. None in predominance.  Repeat if      clinically necessary.    Narrative:      Specimen Source->Urine    Urine culture [[id number]] Collected: 07/18/20 0425    Order Status: Completed Specimen: Urine Updated: 07/19/20 1253     Urine Culture, Routine Multiple organisms isolated. None in predominance.  Repeat if      clinically necessary.    Narrative:      Specimen Source->Urine

## 2020-07-27 ENCOUNTER — ANESTHESIA EVENT (OUTPATIENT)
Dept: CARDIOLOGY | Facility: HOSPITAL | Age: 80
DRG: 175 | End: 2020-07-27
Payer: MEDICARE

## 2020-07-27 ENCOUNTER — ANESTHESIA (OUTPATIENT)
Dept: CARDIOLOGY | Facility: HOSPITAL | Age: 80
DRG: 175 | End: 2020-07-27
Payer: MEDICARE

## 2020-07-27 LAB
ANION GAP SERPL CALC-SCNC: 9 MMOL/L (ref 8–16)
APTT BLDCRRT: 34.6 SEC (ref 21–32)
APTT BLDCRRT: 35 SEC (ref 21–32)
BACTERIA BLD CULT: NORMAL
BACTERIA BLD CULT: NORMAL
BUN SERPL-MCNC: 9 MG/DL (ref 8–23)
CALCIUM SERPL-MCNC: 8.5 MG/DL (ref 8.7–10.5)
CHLORIDE SERPL-SCNC: 104 MMOL/L (ref 95–110)
CO2 SERPL-SCNC: 24 MMOL/L (ref 23–29)
CREAT SERPL-MCNC: 1.3 MG/DL (ref 0.5–1.4)
CRP SERPL-MCNC: 40 MG/L (ref 0–8.2)
ERYTHROCYTE [DISTWIDTH] IN BLOOD BY AUTOMATED COUNT: 15.2 % (ref 11.5–14.5)
EST. GFR  (AFRICAN AMERICAN): 46 ML/MIN/1.73 M^2
EST. GFR  (NON AFRICAN AMERICAN): 40 ML/MIN/1.73 M^2
GLUCOSE SERPL-MCNC: 112 MG/DL (ref 70–110)
HCT VFR BLD AUTO: 28.5 % (ref 37–48.5)
HGB BLD-MCNC: 8.8 G/DL (ref 12–16)
INR PPP: 1 (ref 0.8–1.2)
MAGNESIUM SERPL-MCNC: 2.1 MG/DL (ref 1.6–2.6)
MCH RBC QN AUTO: 33.7 PG (ref 27–31)
MCHC RBC AUTO-ENTMCNC: 30.9 G/DL (ref 32–36)
MCV RBC AUTO: 109 FL (ref 82–98)
PLATELET # BLD AUTO: 275 K/UL (ref 150–350)
PMV BLD AUTO: 10.3 FL (ref 9.2–12.9)
POTASSIUM SERPL-SCNC: 4.2 MMOL/L (ref 3.5–5.1)
PROTHROMBIN TIME: 10.7 SEC (ref 9–12.5)
RBC # BLD AUTO: 2.61 M/UL (ref 4–5.4)
SODIUM SERPL-SCNC: 137 MMOL/L (ref 136–145)
WBC # BLD AUTO: 10.32 K/UL (ref 3.9–12.7)

## 2020-07-27 PROCEDURE — 94640 AIRWAY INHALATION TREATMENT: CPT

## 2020-07-27 PROCEDURE — 85610 PROTHROMBIN TIME: CPT

## 2020-07-27 PROCEDURE — 85730 THROMBOPLASTIN TIME PARTIAL: CPT

## 2020-07-27 PROCEDURE — D9220A PRA ANESTHESIA: Mod: CRNA,,, | Performed by: NURSE ANESTHETIST, CERTIFIED REGISTERED

## 2020-07-27 PROCEDURE — 25000003 PHARM REV CODE 250: Performed by: NURSE PRACTITIONER

## 2020-07-27 PROCEDURE — 83735 ASSAY OF MAGNESIUM: CPT

## 2020-07-27 PROCEDURE — D9220A PRA ANESTHESIA: ICD-10-PCS | Mod: ANES,,, | Performed by: ANESTHESIOLOGY

## 2020-07-27 PROCEDURE — 99232 SBSQ HOSP IP/OBS MODERATE 35: CPT | Mod: ,,, | Performed by: INTERNAL MEDICINE

## 2020-07-27 PROCEDURE — D9220A PRA ANESTHESIA: Mod: ANES,,, | Performed by: ANESTHESIOLOGY

## 2020-07-27 PROCEDURE — 99232 PR SUBSEQUENT HOSPITAL CARE,LEVL II: ICD-10-PCS | Mod: ,,, | Performed by: INTERNAL MEDICINE

## 2020-07-27 PROCEDURE — 99231 PR SUBSEQUENT HOSPITAL CARE,LEVL I: ICD-10-PCS | Mod: S$GLB,,, | Performed by: INTERNAL MEDICINE

## 2020-07-27 PROCEDURE — 63600175 PHARM REV CODE 636 W HCPCS: Performed by: INTERNAL MEDICINE

## 2020-07-27 PROCEDURE — 27000221 HC OXYGEN, UP TO 24 HOURS

## 2020-07-27 PROCEDURE — 94761 N-INVAS EAR/PLS OXIMETRY MLT: CPT

## 2020-07-27 PROCEDURE — 25000003 PHARM REV CODE 250: Performed by: INTERNAL MEDICINE

## 2020-07-27 PROCEDURE — 85730 THROMBOPLASTIN TIME PARTIAL: CPT | Mod: 91

## 2020-07-27 PROCEDURE — 85027 COMPLETE CBC AUTOMATED: CPT

## 2020-07-27 PROCEDURE — 86140 C-REACTIVE PROTEIN: CPT

## 2020-07-27 PROCEDURE — 94799 UNLISTED PULMONARY SVC/PX: CPT

## 2020-07-27 PROCEDURE — 99231 SBSQ HOSP IP/OBS SF/LOW 25: CPT | Mod: S$GLB,,, | Performed by: INTERNAL MEDICINE

## 2020-07-27 PROCEDURE — C1751 CATH, INF, PER/CENT/MIDLINE: HCPCS

## 2020-07-27 PROCEDURE — 36573 INSJ PICC RS&I 5 YR+: CPT

## 2020-07-27 PROCEDURE — 36415 COLL VENOUS BLD VENIPUNCTURE: CPT

## 2020-07-27 PROCEDURE — 80048 BASIC METABOLIC PNL TOTAL CA: CPT

## 2020-07-27 PROCEDURE — 37000009 HC ANESTHESIA EA ADD 15 MINS: Performed by: SPECIALIST

## 2020-07-27 PROCEDURE — 11000001 HC ACUTE MED/SURG PRIVATE ROOM

## 2020-07-27 PROCEDURE — 37000008 HC ANESTHESIA 1ST 15 MINUTES: Performed by: SPECIALIST

## 2020-07-27 PROCEDURE — 25000242 PHARM REV CODE 250 ALT 637 W/ HCPCS: Performed by: NURSE PRACTITIONER

## 2020-07-27 PROCEDURE — 63600175 PHARM REV CODE 636 W HCPCS: Performed by: NURSE ANESTHETIST, CERTIFIED REGISTERED

## 2020-07-27 PROCEDURE — D9220A PRA ANESTHESIA: ICD-10-PCS | Mod: CRNA,,, | Performed by: NURSE ANESTHETIST, CERTIFIED REGISTERED

## 2020-07-27 RX ORDER — LIDOCAINE HYDROCHLORIDE 20 MG/ML
INJECTION INTRAVENOUS
Status: DISCONTINUED | OUTPATIENT
Start: 2020-07-27 | End: 2020-07-27

## 2020-07-27 RX ORDER — PROPOFOL 10 MG/ML
VIAL (ML) INTRAVENOUS
Status: DISCONTINUED | OUTPATIENT
Start: 2020-07-27 | End: 2020-07-27

## 2020-07-27 RX ORDER — PROPOFOL 10 MG/ML
INJECTION, EMULSION INTRAVENOUS
Status: COMPLETED
Start: 2020-07-27 | End: 2020-07-27

## 2020-07-27 RX ORDER — LIDOCAINE HYDROCHLORIDE 20 MG/ML
INJECTION, SOLUTION EPIDURAL; INFILTRATION; INTRACAUDAL; PERINEURAL
Status: DISCONTINUED
Start: 2020-07-27 | End: 2020-07-29 | Stop reason: HOSPADM

## 2020-07-27 RX ORDER — SODIUM CHLORIDE 0.9 % (FLUSH) 0.9 %
10 SYRINGE (ML) INJECTION EVERY 6 HOURS
Status: DISCONTINUED | OUTPATIENT
Start: 2020-07-27 | End: 2020-07-29 | Stop reason: HOSPADM

## 2020-07-27 RX ORDER — SODIUM CHLORIDE 0.9 % (FLUSH) 0.9 %
10 SYRINGE (ML) INJECTION
Status: DISCONTINUED | OUTPATIENT
Start: 2020-07-27 | End: 2020-07-29 | Stop reason: HOSPADM

## 2020-07-27 RX ADMIN — HEPARIN SODIUM 14 UNITS/KG/HR: 10000 INJECTION, SOLUTION INTRAVENOUS at 04:07

## 2020-07-27 RX ADMIN — PANTOPRAZOLE SODIUM 40 MG: 40 TABLET, DELAYED RELEASE ORAL at 03:07

## 2020-07-27 RX ADMIN — LIDOCAINE HYDROCHLORIDE 75 MG: 20 INJECTION, SOLUTION INTRAVENOUS at 01:07

## 2020-07-27 RX ADMIN — HEPARIN SODIUM 13 UNITS/KG/HR: 10000 INJECTION, SOLUTION INTRAVENOUS at 06:07

## 2020-07-27 RX ADMIN — IPRATROPIUM BROMIDE AND ALBUTEROL SULFATE 3 ML: .5; 3 SOLUTION RESPIRATORY (INHALATION) at 07:07

## 2020-07-27 RX ADMIN — IPRATROPIUM BROMIDE AND ALBUTEROL SULFATE 3 ML: .5; 3 SOLUTION RESPIRATORY (INHALATION) at 03:07

## 2020-07-27 RX ADMIN — MONTELUKAST 10 MG: 10 TABLET, FILM COATED ORAL at 08:07

## 2020-07-27 RX ADMIN — BUDESONIDE INHALATION 0.5 MG: 0.5 SUSPENSION RESPIRATORY (INHALATION) at 07:07

## 2020-07-27 RX ADMIN — SODIUM CHLORIDE: 9 INJECTION, SOLUTION INTRAVENOUS at 06:07

## 2020-07-27 RX ADMIN — HEPARIN SODIUM 12 UNITS/KG/HR: 10000 INJECTION, SOLUTION INTRAVENOUS at 06:07

## 2020-07-27 RX ADMIN — ACETAMINOPHEN 650 MG: 325 TABLET ORAL at 06:07

## 2020-07-27 RX ADMIN — PROPOFOL 20 MG: 10 INJECTION, EMULSION INTRAVENOUS at 01:07

## 2020-07-27 RX ADMIN — IPRATROPIUM BROMIDE AND ALBUTEROL SULFATE 3 ML: .5; 3 SOLUTION RESPIRATORY (INHALATION) at 11:07

## 2020-07-27 RX ADMIN — METOPROLOL SUCCINATE 50 MG: 50 TABLET, FILM COATED, EXTENDED RELEASE ORAL at 03:07

## 2020-07-27 RX ADMIN — FOLIC ACID 1 MG: 1 TABLET ORAL at 03:07

## 2020-07-27 RX ADMIN — VANCOMYCIN HYDROCHLORIDE 1750 MG: 1 INJECTION, POWDER, LYOPHILIZED, FOR SOLUTION INTRAVENOUS at 12:07

## 2020-07-27 RX ADMIN — PROPOFOL 60 MG: 10 INJECTION, EMULSION INTRAVENOUS at 01:07

## 2020-07-27 NOTE — SUBJECTIVE & OBJECTIVE
Interval history:  Patient scheduled for SADIE today.  Plan convert IV heparin drip to OAC when ok with  Consultants.     Review of Systems   Constitutional: Positive for activity change. Negative for appetite change, chills and fever.   HENT: Negative for ear discharge and facial swelling.    Respiratory: Positive for shortness of breath. Negative for cough.    Cardiovascular: Negative for chest pain, palpitations and leg swelling.   Gastrointestinal: Negative for abdominal distention, blood in stool, nausea and vomiting.   Musculoskeletal: Positive for back pain. Negative for gait problem, neck pain and neck stiffness.         chronic back pain     Skin: Positive for wound. Negative for color change.        Abdominal surgical incisions   Neurological: Negative for facial asymmetry, speech difficulty and weakness.            Psychiatric/Behavioral: Negative for agitation and confusion. The patient is nervous/anxious.      Objective:     Vital Signs (Most Recent):  Temp: 99.2 °F (37.3 °C) (07/27/20 0731)  Pulse: 79 (07/27/20 0742)  Resp: 16 (07/27/20 0742)  BP: (!) 153/68 (07/27/20 0731)  SpO2: 95 % (07/27/20 0737) Vital Signs (24h Range):  Temp:  [97.8 °F (36.6 °C)-99.6 °F (37.6 °C)] 99.2 °F (37.3 °C)  Pulse:  [73-89] 79  Resp:  [16-18] 16  SpO2:  [91 %-97 %] 95 %  BP: (153-197)/(67-83) 153/68     Weight: 103.9 kg (229 lb)  Body mass index is 40.57 kg/m².    Physical Exam  Constitutional:       General: She is not in acute distress.     Appearance: Normal appearance. She is obese. She is not ill-appearing, toxic-appearing or diaphoretic.      Comments: Morbidly obese   HENT:      Head: Normocephalic and atraumatic.      Mouth/Throat:      Mouth: Mucous membranes are moist.   Eyes:      General:         Right eye: No discharge.         Left eye: No discharge.      Extraocular Movements: Extraocular movements intact.      Conjunctiva/sclera: Conjunctivae normal.      Pupils: Pupils are equal, round, and reactive to  light.   Neck:      Musculoskeletal: Normal range of motion and neck supple. No neck rigidity or muscular tenderness.   Cardiovascular:      Rate and Rhythm: Normal rate and regular rhythm.      Pulses: Normal pulses.      Comments: Trace pretib edema L>R  Pulmonary:      Effort: Pulmonary effort is normal. No respiratory distress.      Breath sounds: No wheezing or rales.      Comments: Bilateral breath sounds diminished  Abdominal:      General: Bowel sounds are normal. There is no distension.      Palpations: Abdomen is soft.      Tenderness: There is no abdominal tenderness. There is no guarding.      Comments: Obese     Genitourinary:     Comments: Not examined  Musculoskeletal: Normal range of motion.      Right lower leg: Edema present.      Left lower leg: Edema present.      Comments: Scant bilateral lower extremity edema   Skin:     General: Skin is warm and dry.      Capillary Refill: Capillary refill takes less than 2 seconds.      Comments: Abdominal surgical dressings intact   Neurological:      General: No focal deficit present.      Mental Status: She is alert and oriented to person, place, and time. Mental status is at baseline.      Cranial Nerves: No cranial nerve deficit.      Motor: No weakness.   Psychiatric:         Mood and Affect: Mood normal.         Behavior: Behavior normal.         Thought Content: Thought content normal.         Judgment: Judgment normal.      Comments:             CRANIAL NERVES     CN III, IV, VI   Pupils are equal, round, and reactive to light.     Labs reviewed

## 2020-07-27 NOTE — PROGRESS NOTES
Progress Note  Infectious Disease    Reason for Consult:      HPI: Sammi Abreu is a  79 y.o. female with past medical history of obesity, DM 2, HTN, DLP, CHARLIE, noncompliance with CPAP, COPD, GERD, DJD, fibromyalgia, chronic back pain, Jehovah witness, allergic to penicillin, anemia.      Patient was at outpatient surgical center, after laparoscopic cholecystectomy by Jomar Hall on 07/13/2020.  Postop she was hypoxemic and could not be discharged.  She was diagnosed with left popliteal DVT and extensive bilateral PE.  She was admitted and anticoagulated with heparin.  Patient used to be on anticoagulation for a long time but it was discontinued, patient does not know why it was started and white was stopped    While hospitalized she was found to have MRSA bacteremia.  Id consult was called.    I came and show patient.  She has been dealing with lower back pain and bilateral groin pain rule out June.  She so spine specialist who D a steroid injection at the L-spine by the end of June.  Her lower back pain continued to worsen it looks like it went across the abdomen and words the left anterior thigh.  She she was hospitalized and was told that her pain was due to gallbladder issues.  Initially she did not want to proceed cholecystectomy but the pain worsened so bad that she had her home health nurse contact the general surgeons office and scheduled an elective cholecystectomy.  She seems concerned with the bacteremia and the and tries Little so.    Patient is on 3 L O2.  Pulse ox 95  Afebrile.  T-max 99.3°    07/23/2020.  Afebrile.  T-max 99.9°.  Repeat blood cultures are negative.  Tolerating vancomycin well.  MRI results noted.  No diskitis to my surprise, which is great news  07/24/2020  tmax 99.  No new complaints.  Receiving vancomycin for MRSA bacteremia.  Heparin drip for PE.  07/27/2020.  Chart reviewed.  No new events over over the weekend.  T-max 99.6°.  97% on 3 L. conor no evidence of  endocarditis.    Antibiotics (From admission, onward)    Start     Stop Route Frequency Ordered    07/22/20 1243  vancomycin - pharmacy to dose  (vancomycin IVPB)      -- IV pharmacy to manage frequency 07/22/20 1145    07/16/20 1200  neomycin-bacitracin-polymyxin ointment      -- Top Daily 07/16/20 1052        Antifungals (From admission, onward)    None          EXAM & DIAGNOSTICS REVIEWED:   Vitals:     Temp:  [97.4 °F (36.3 °C)-99.6 °F (37.6 °C)]   Temp: 99.2 °F (37.3 °C) (07/27/20 0731)  Pulse: 79 (07/27/20 0742)  Resp: 16 (07/27/20 0742)  BP: (!) 153/68 (07/27/20 0731)  SpO2: 95 % (07/27/20 0737)    Intake/Output Summary (Last 24 hours) at 7/27/2020 0939  Last data filed at 7/27/2020 0000  Gross per 24 hour   Intake 900 ml   Output 1050 ml   Net -150 ml       General:  In NAD. Alert and attentive, cooperative, comfortable  Eyes:  Anicteric, PERRL, EOMI  ENT:  No ulcers, exudates, thrush, nares patent, dentition is fair  Neck:  supple, no masses or adenopathy appreciated  Lungs: Diminished  Heart:  RRR, no gallop/murmur/rub noted  Abd:  Soft, NT, ND, normal BS, no masses or organomegaly appreciated.  :  Voids/Little, urine clear, no flank tenderness  Musc:  Joints without effusion, swelling, erythema, synovitis, muscle wasting.  General muscle discomfort on lower thoracolumbar area   Skin:  No rashes. No palmar or plantar lesions. No subungual petechiae  Wound:   Neuro: Alert, attentive, speech fluent, face symmetric, moves all extremities, no focal weakness.  Takes her time to move due to severe pain.  Psych:  Understandably anxious and concerned.  Lymphatic:     No cervical, supraclavicular, axillary, or inguinal nodes  Extrem: No edema, erythema, phlebitis, cellulitis, warm and well perfused  VAD:       Isolation:      Lines/Tubes/Drains:    General Labs reviewed:  Recent Labs   Lab 07/25/20  0449 07/26/20  0505 07/27/20  0449   WBC 8.16 9.11 10.32   HGB 8.4* 8.7* 8.8*   HCT 28.0* 29.3* 28.5*    281  275       Recent Labs   Lab 07/23/20  1002 07/24/20  1844 07/27/20  0449   NA  --   --  137   K  --   --  4.2   CL  --   --  104   CO2  --   --  24   BUN  --   --  9   CREATININE 1.2 1.5* 1.3   CALCIUM  --   --  8.5*           Micro:  Microbiology Results (last 7 days)     Procedure Component Value Units Date/Time    Blood culture [[id number]] Collected: 07/22/20 1412    Order Status: Completed Specimen: Blood from Peripheral, Right Hand Updated: 07/26/20 2212     Blood Culture, Routine No Growth to date      No Growth to date      No Growth to date      No Growth to date      No Growth to date    Narrative:      Collection has been rescheduled by PVL1 at 07/22/2020 12:48 Reason:   nurse billie checking on it will call if needed  Collection has been rescheduled by PVL1 at 07/22/2020 12:48 Reason:   nurse billie checking on it will call if needed    Blood culture [[id number]] Collected: 07/23/20 0514    Order Status: Completed Specimen: Blood from Antecubital, Left Arm Updated: 07/26/20 1212     Blood Culture, Routine No Growth to date      No Growth to date      No Growth to date      No Growth to date    Blood culture [[id number]] Collected: 07/22/20 0610    Order Status: Completed Specimen: Blood Updated: 07/26/20 1012     Blood Culture, Routine No Growth to date      No Growth to date      No Growth to date      No Growth to date      No Growth to date    Blood culture [[id number]]     Order Status: Canceled Specimen: Blood     Blood culture [[id number]]  (Abnormal) Collected: 07/18/20 1842    Order Status: Completed Specimen: Blood Updated: 07/22/20 0937     Blood Culture, Routine Gram stain peds bottle: Gram positive cocci in clusters resembling Staph       Results called to and read back by: Marika Mcclendon RN 07/20/2020  05:53      METHICILLIN RESISTANT STAPHYLOCOCCUS AUREUS  ID consult required at [facility].Jess,Shauna and Perfecto foley.  For susceptibility see order #[id number]      Blood culture [[id number]]   (Abnormal)  (Susceptibility) Collected: 07/18/20 1653    Order Status: Completed Specimen: Blood Updated: 07/22/20 0937     Blood Culture, Routine Gram stain juan bottle: Gram positive cocci in clusters resembling Staph       Results called to and read back by: Marika Mcclendon RN 07/20/2020  05:53      Gram stain aer bottle: Gram positive cocci in clusters resembling Staph      METHICILLIN RESISTANT STAPHYLOCOCCUS AUREUS  ID consult required at [facility] and [facility].      Urine culture [[id number]] Collected: 07/18/20 1920    Order Status: Completed Specimen: Urine Updated: 07/20/20 1334     Urine Culture, Routine Multiple organisms isolated. None in predominance.  Repeat if      clinically necessary.    Narrative:      Specimen Source->Urine        Imaging Reviewed:   07/23/20201 MRI There is no acute or significant abnormality.  There is no fracture.  There is stable trace anterolisthesis of T10 on T11.  There is no evidence of discitis or osteomyelitis.  There is no significant spinal stenosis and there is no cord compression.  There is no significant foraminal stenosis suspected.  There is minimal bulging of the annulus at several levels and some degree of facet joint arthropathy throughout the mid and lower thoracic spine.     CXR07/17No acute radiographic abnormality.     CT   Bilateral pulmonary emboli with extensive clot burden and involvement of main pulmonary arteries.  The patient's nurse, Anil was called at the time of dictation.  Additionally, a message was sent via secure chat to the hospitalist taking care of the patient.  Mediastinal adenopathy of uncertain etiology.  This may be inflammatory/reactive or neoplastic, and follow-up is needed.  Development of nonspecific foci of ground-glass opacity throughout both lungs.     US Nonocclusive thrombus within the left popliteal vein, suggestive of some recanalization of the prior thrombus in left popliteal vein.  No additional focus of  thrombus.     Cardiology:  · The mean diastolic gradient across the mitral valve is 3 mmHg at a heart rate of 70 bpm.  · Concentric left ventricular remodeling.  · Small left ventricular cavity size.  · Hyperdynamic left ventricular systolic function. The estimated ejection fraction is 66%.  · Grade I (mild) left ventricular diastolic dysfunction consistent with impaired relaxation.  · Elevated central venous pressure (15 mmHg).  · The estimated PA systolic pressure is 58 mmHg.  · Pulmonary hypertension present.  · Mild to moderate pulmonic regurgitation.      IMPRESSION & PLAN     MRSA bacteremia.  On 07/18.   MRI of T-spine did not show diskitis.  ECHO negative for vegetation, conor negative for vegetation. Source of MRSA? Recent TYLER? Lt antecubital IVsite?  Has received steroids, ES I and Decadron on 07/13.  Elevated CRP 77--45.2--40  Being treated for PE, and DVT.  I suspect it precipitated due to inactivity due to lower back pain.  History of DM  History of bilateral hip replacement  History of Jehovah witness will have to be careful with amount of blood draws    PMHx of obesity, DM 2, HTN, DLP, CHARLIE, noncompliance with CPAP, COPD, GERD, DJD, fibromyalgia, chronic back pain, Jehovah witness, allergic to penicillin, anemia.       Recommendations:  ECHO has no vegetation.  CONOR no vegetation  CRP improving really slow 60.9--45.2--40.0.    If patient goes to LTAC===== Continue vancomycin x 6 weeks Weekly labs CRP ESR  CBC with diff, CMP.  And by weekly vancomycin trough      If patient goes home===== daptomycin 1 g IV q.day for 6 weeks.  Weekly labs CRP ESR CPK, CBC with diff, CMP.

## 2020-07-27 NOTE — RESPIRATORY THERAPY
07/26/20 2052   Patient Assessment/Suction   Level of Consciousness (AVPU) alert   Respiratory Effort Unlabored   Expansion/Accessory Muscles/Retractions no use of accessory muscles   All Lung Fields Breath Sounds diminished;clear   Rhythm/Pattern, Respiratory unlabored   Cough Frequency frequent   Cough Type good;nonproductive   PRE-TX-O2   O2 Device (Oxygen Therapy) nasal cannula   Flow (L/min) 3   Oxygen Concentration (%) 32   SpO2 97 %   Pulse Oximetry Type Intermittent   $ Pulse Oximetry - Multiple Charge Pulse Oximetry - Multiple   Pulse 89   Resp 16   Aerosol Therapy   $ Aerosol Therapy Charges Aerosol Treatment   Respiratory Treatment Status (SVN) given   Treatment Route (SVN) mask   Patient Position (SVN) HOB elevated   Post Treatment Assessment (SVN) breath sounds unchanged   Signs of Intolerance (SVN) none   Breath Sounds Post-Respiratory Treatment   Throughout All Fields Post-Treatment All Fields   Throughout All Fields Post-Treatment no change   Post-treatment Heart Rate (beats/min) 73   Post-treatment Resp Rate (breaths/min) 16   Incentive Spirometer   $ Incentive Spirometer Charges done with encouragement   Incentive Spirometer Predicted Level (mL) 1750   Administration (IS) proper technique demonstrated   Number of Repetitions (IS) 10   Level Incentive Spirometer (mL) 1000   Patient Tolerance (IS) good   Ready to Wean/Extubation Screen   FIO2<=50 (chart decimal) 0.32

## 2020-07-27 NOTE — PLAN OF CARE
AAO x 4, Cardiac monitored SR, VSS, no complaints of pain, NPO at midnight for SADIE, up to bathroom with standby assist, free from falls, safety maintained, Heparin  12u/kg/hr running 8.9 ml/hr patient is currently therapeutic, IV Antibiotics tolerated, resting between care, will continue to monitor and round.

## 2020-07-27 NOTE — NURSING
patient for SADIE.  2 identifers used.  Pt on Heparin drip.  Dr Lazaro notified.  Stop Heparin drip til after procedure.  Pt connected to monitors procedure explained and consents obtained.  Anesthesia at bedside sedating for procedure.  Dr Lazaro performs SADIE.  VSS no problems noted.  Recovery phase started and completed.  Report called.  Pt transported to room 211 with portable oxygen. Heparin resumed.

## 2020-07-27 NOTE — PROGRESS NOTES
Progress Note  PULMONARY    Admit Date: 7/13/2020 07/27/2020      SUBJECTIVE:     7/23- no new complaints. Still SOB worse w/ walking in the room. no appetite, nauseated   7/27 no new c/o. Less schwartz      PFSH and Allergies reviewed.    OBJECTIVE:     Vitals (Most recent):  Vitals:    07/27/20 1415   BP: (!) 170/70   Pulse: 85   Resp: (!) 21   Temp:        Vitals (24 hour range):  Temp:  [97.8 °F (36.6 °C)-99.6 °F (37.6 °C)]   Pulse:  [73-95]   Resp:  [15-43]   BP: (131-197)/(65-93)   SpO2:  [78 %-97 %]       Intake/Output Summary (Last 24 hours) at 7/27/2020 1502  Last data filed at 7/27/2020 1348  Gross per 24 hour   Intake 640 ml   Output 1450 ml   Net -810 ml          Physical Exam:  The patient's neuro status (alertness,orientation,cognitive function,motor skills,), pharyngeal exam (oral lesions, hygiene, abn dentition,), Neck (jvd,mass,thyroid,nodes in neck and above/below clavicle),RESPIRATORY(symmetry,effort,fremitus,percussion,auscultation),  Cor(rhythm,heart tones including gallops,perfusion,edema)ABD(distention,hepatic&splenomegaly,tenderness,masses), Skin(rash,cyanosis),Psyc(affect,judgement,).  Exam negative except for these pertinent findings:    Obese, sitting in chair   Lungs clear  no pitting edema    Radiographs reviewed: view by direct vision    Massive pe 7/14 cta  US lower ext 7/22/20- Nonocclusive thrombus within the left popliteal vein, suggestive of some recanalization of the prior thrombus in left popliteal vein.  No additional focus of thrombus.    TTE 7/14-   · The mean diastolic gradient across the mitral valve is 3 mmHg at a heart rate of 70 bpm.  · Concentric left ventricular remodeling.  · Small left ventricular cavity size.  · Hyperdynamic left ventricular systolic function. The estimated ejection fraction is 66%.  · Grade I (mild) left ventricular diastolic dysfunction consistent with impaired relaxation.  · Elevated central venous pressure (15 mmHg).  · The estimated PA systolic  pressure is 58 mmHg.  · Pulmonary hypertension present.  · Mild to moderate pulmonic regurgitation.    Labs     Recent Labs   Lab 07/27/20 0449   WBC 10.32   HGB 8.8*   HCT 28.5*        Recent Labs   Lab 07/27/20 0449      K 4.2      CO2 24   BUN 9   CREATININE 1.3   *   CALCIUM 8.5*   MG 2.1   CRP 40.0*   INR 1.0   No results for input(s): PH, PCO2, PO2, HCO3 in the last 24 hours.  Microbiology Results (last 7 days)     Procedure Component Value Units Date/Time    Blood culture [[id number]] Collected: 07/23/20 0514    Order Status: Completed Specimen: Blood from Antecubital, Left Arm Updated: 07/27/20 1212     Blood Culture, Routine No Growth to date      No Growth to date      No Growth to date      No Growth to date      No Growth to date    Blood culture [[id number]] Collected: 07/22/20 0610    Order Status: Completed Specimen: Blood Updated: 07/27/20 1012     Blood Culture, Routine No growth after 5 days.    Blood culture [[id number]] Collected: 07/22/20 1412    Order Status: Completed Specimen: Blood from Peripheral, Right Hand Updated: 07/26/20 2212     Blood Culture, Routine No Growth to date      No Growth to date      No Growth to date      No Growth to date      No Growth to date    Narrative:      Collection has been rescheduled by PVL1 at 07/22/2020 12:48 Reason:   nurse billie checking on it will call if needed  Collection has been rescheduled by PVL1 at 07/22/2020 12:48 Reason:   nurse billie checking on it will call if needed    Blood culture [[id number]]     Order Status: Canceled Specimen: Blood     Blood culture [[id number]]  (Abnormal) Collected: 07/18/20 1842    Order Status: Completed Specimen: Blood Updated: 07/22/20 0937     Blood Culture, Routine Gram stain peds bottle: Gram positive cocci in clusters resembling Staph       Results called to and read back by: Marika Mcclendon RN 07/20/2020  05:53      METHICILLIN RESISTANT STAPHYLOCOCCUS AUREUS  ID consult required at [facility]  [facility] and [facility].  For susceptibility see order #[id number]      Blood culture [[id number]]  (Abnormal)  (Susceptibility) Collected: 07/18/20 1653    Order Status: Completed Specimen: Blood Updated: 07/22/20 0937     Blood Culture, Routine Gram stain juan bottle: Gram positive cocci in clusters resembling Staph       Results called to and read back by: Marika Mcclendon RN 07/20/2020  05:53      Gram stain aer bottle: Gram positive cocci in clusters resembling Staph      METHICILLIN RESISTANT STAPHYLOCOCCUS AUREUS  ID consult required at [facility] and [facility].            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 Jomar Soria      Type 2 diabetes mellitus with diabetic neuropathy, without long-term current use of insulin [E11.40]  Yes    COPD (chronic obstructive pulmonary disease) [J44.9]  Yes    CHARLIE (obstructive sleep apnea) [G47.33]  Yes    Chronic back pain [M54.9, G89.29]  Yes    History of DVT in adulthood [Z86.718]  Not Applicable    GERD (gastroesophageal reflux disease) [K21.9]  Yes    Pulmonary hypertension [I27.20]  Yes    Hyperlipidemia associated with type 2 diabetes mellitus [E11.69, E78.5]  Yes    Hypertension associated with diabetes [E11.59,  I10]  Yes    DDD (degenerative disc disease), lumbar [M51.36]  Yes      Resolved Hospital Problems    Diagnosis Date Resolved POA    Azotemia [R79.89] 07/23/2020 No    UTI (urinary tract infection) [N39.0] 07/25/2020 No    Thrombocytopenia [D69.6] 07/22/2020 No    Hyperkalemia [E87.5] 07/22/2020 Yes               Plan:   Acute hypoxemic respiratory failure  Bilateral PE  Left lower extremity DVT  Pulmonary hypertension- acute on chronic  Diastolic dysfunction  DM2  HTN  Suspected COPD?  Minimal smoking history  Obesity  Status post lap rosette    - continue heparin drip  - follow-up repeat echo  - lasix 40mg x1 today  - continue inhaled bronchodilators  - she is status post IVC filter  - hematology following- recommend lifelong anticoagulation and outpatient Eliquis  - w pulmonary will sign off, please call if any questions    Anabel Champagne MD  Pulmonary & Critical Care Medicine      Above from Dr Champagne and below from Dr Suero:  7/27/2020 - need to assure infection controlled.  ivc filter in place- could stop anticoagg in future.  Continue oral anticoagg- no gross bleeding.   Complex case.  Once plan for infection rx - consider outpt?

## 2020-07-27 NOTE — PROGRESS NOTES
[facility] Hospital Medicine  Progress Note    Patient Name: Sammi Abreu  MRN: [id number]  Patient Class: IP- Inpatient   Admission Date: 7/13/2020  Length of Stay: 13 days  Attending Physician: Alix Ruth MD  Primary Care Provider: Osmani Villatoro MD      Subjective:     Principal Problem:Acute respiratory failure with hypoxemia secondary to bilateral pulmonary emboli, MRSA bacteremia, anemia      HPI:  This is a 79-year-old female who has a past medical history of arthritis and chronic back pain, fibromyalgia, glaucoma, hyperlipidemia, hypertension, sleep apnea with history of noncompliance with CPAP, morbid obesity, hypertension, GERD, COPD, diabetes type 2, prior DVT, and gallstones.  Please note the patient is also a Mandaen.  Patient is being received from Outpatient surgical center.  She is status post laparoscopic cholecystectomy today by Dr. Jomar Soria.  Patient tolerated procedure well.  She however was unable to be discharged postop due to ongoing hypoxemia and has been transferred here for further evaluation and treatment.                Overview/Hospital Course:  The patient was monitored closely during her stay.  She was noted to have an elevated D-dimer.  She was placed on full-dose Lovenox.  Patient when CTA of the chest which showed extensive bilateral pulmonary emboli.  Bilateral venous ultrasound showed left lower extremity DVT.  Pulmonology was consulted  .  Patient's full-dose Lovenox was discontinued and she was placed on IV heparin drip.  Patient was noted to hav and recommended full anticoagulation and also felt patient may benefit from IVC filter placement. Patient with worsening anemia.  Patient noted to be Jehovah witness.  Hematology was  consulted.  Patient was seen by vascular surgeon and plans were made for IVC filter placement.  However in the meantime, patient had blood cultures x2 that came back positive for MRSA.  She was started on IV  vancomycin and  Infectious disease was consulted and the IVC filter placement was canceled.  Repeat blood cultures and echocardiogram were ordered.  Patient had MRI of the thoracic and lumbar spine with significant degenerative disease changes and some bulging noted in areas with some foraminal stenosis but no signs of osteomyelitis noted.  Cardiology was consulted to read echocardiogram and also for possible SADIE if echocardiogram was negative for  any signs of bacterial endocarditis.  Initial echocardiogram was negative.  Patient was scheduled for a SADIE.    Interval history:  Patient scheduled for SADIE today.  Plan convert IV heparin drip to OAC when ok with  Consultants.     Review of Systems   Constitutional: Positive for activity change. Negative for appetite change, chills and fever.   HENT: Negative for ear discharge and facial swelling.    Respiratory: Positive for shortness of breath. Negative for cough.    Cardiovascular: Negative for chest pain, palpitations and leg swelling.   Gastrointestinal: Negative for abdominal distention, blood in stool, nausea and vomiting.   Musculoskeletal: Positive for back pain. Negative for gait problem, neck pain and neck stiffness.         chronic back pain     Skin: Positive for wound. Negative for color change.        Abdominal surgical incisions   Neurological: Negative for facial asymmetry, speech difficulty and weakness.            Psychiatric/Behavioral: Negative for agitation and confusion. The patient is nervous/anxious.      Objective:     Vital Signs (Most Recent):  Temp: 99.2 °F (37.3 °C) (07/27/20 0731)  Pulse: 79 (07/27/20 0742)  Resp: 16 (07/27/20 0742)  BP: (!) 153/68 (07/27/20 0731)  SpO2: 95 % (07/27/20 0737) Vital Signs (24h Range):  Temp:  [97.8 °F (36.6 °C)-99.6 °F (37.6 °C)] 99.2 °F (37.3 °C)  Pulse:  [73-89] 79  Resp:  [16-18] 16  SpO2:  [91 %-97 %] 95 %  BP: (153-197)/(67-83) 153/68     Weight: 103.9 kg (229 lb)  Body mass index is 40.57  kg/m².    Physical Exam  Constitutional:       General: She is not in acute distress.     Appearance: Normal appearance. She is obese. She is not ill-appearing, toxic-appearing or diaphoretic.      Comments: Morbidly obese   HENT:      Head: Normocephalic and atraumatic.      Mouth/Throat:      Mouth: Mucous membranes are moist.   Eyes:      General:         Right eye: No discharge.         Left eye: No discharge.      Extraocular Movements: Extraocular movements intact.      Conjunctiva/sclera: Conjunctivae normal.      Pupils: Pupils are equal, round, and reactive to light.   Neck:      Musculoskeletal: Normal range of motion and neck supple. No neck rigidity or muscular tenderness.   Cardiovascular:      Rate and Rhythm: Normal rate and regular rhythm.      Pulses: Normal pulses.      Comments: Trace pretib edema L>R  Pulmonary:      Effort: Pulmonary effort is normal. No respiratory distress.      Breath sounds: No wheezing or rales.      Comments: Bilateral breath sounds diminished  Abdominal:      General: Bowel sounds are normal. There is no distension.      Palpations: Abdomen is soft.      Tenderness: There is no abdominal tenderness. There is no guarding.      Comments: Obese     Genitourinary:     Comments: Not examined  Musculoskeletal: Normal range of motion.      Right lower leg: Edema present.      Left lower leg: Edema present.      Comments: Scant bilateral lower extremity edema   Skin:     General: Skin is warm and dry.      Capillary Refill: Capillary refill takes less than 2 seconds.      Comments: Abdominal surgical dressings intact   Neurological:      General: No focal deficit present.      Mental Status: She is alert and oriented to person, place, and time. Mental status is at baseline.      Cranial Nerves: No cranial nerve deficit.      Motor: No weakness.   Psychiatric:         Mood and Affect: Mood normal.         Behavior: Behavior normal.         Thought Content: Thought content normal.          Judgment: Judgment normal.      Comments:             CRANIAL NERVES     CN III, IV, VI   Pupils are equal, round, and reactive to light.     Labs reviewed      Assessment/Plan:      * Acute respiratory failure with hypoxemia  Secondary to extensive bilateral pulmonary emboli-  Orders as per pulmonology  Patient currently remains on IV heparin drip       DDD (degenerative disc disease), thoracic  Noted on MRI thoracic spine without contrast      MRSA bacteremia  7/18/2020 Blood cultures x2 positive for MRSA  Infectious Disease consulted-orders as recommended by Infectious Disease  Repeat echocardiogram results currently pending  Patient currently remains on IV vancomycin  Repeat blood cultures x2 on 07/22 and x1 on 07/23 currently showing no growth    Patient scheduled for SADIE today with Dr. Armin Lazaro    Type 2 diabetes mellitus with stage 2 chronic kidney disease  Monitor renal status  Renal dose all medications      Acute blood loss anemia   Monitor  Patient is a Jehovah Witness  Hematology consulted previously for anemia  Patient received several doses of IV Venofer   Her hemoglobin and  hematocrit was trended closely      History of obstructive sleep apnea  Noted      Elevated troponin  Secondary to extensive bilateral pulmonary emboli      Deep vein thrombosis (DVT) of left lower extremity  Continue full-dose anticoagulation-patient currently remains on IV heparin drip  Plans for IVC filter placement were canceled due to MRSA bacteremia      Bilateral pulmonary embolism  Patient with extensive bilateral pulmonary emboli and left lower extremity DVT-  Patient currently remains on IV heparin drip plan    Pulmonology following  Plan was previously for patient to have IVC filter placement which has been placed on hold due to patient with MRSA bacteremia    Plan change patient to OAC once  okay with consultants      History of DVT in adulthood  Patient reports history of DVT in the right upper extremity  greater than 10 years ago and  history of groin DVT following a hip surgery approximately 8 years ago-  Continue MARIANELA hose  Patient now with left lower extremity DVT and extensive bilateral pulmonary emboli  Patient with decreased activity/ambulation due to increased recent back pain however she does have history of prior DVT in the past once related to prior hip surgery     Chronic back pain  With chronic pain syndrome secondary to extensive degenerative disc disease of thoracic and lumbar spine with some areas of bulging disc and foraminal stenosis-  MRI of lumbar and thoracic spine without contrast done with no signs of diskitis or osteomyelitis noted  Patient will need outpatient follow-up in the future    Continue p.r.n. pain medication  Patient reports she sees Dr. Gilliam for pain management      CHARLIE (obstructive sleep apnea)  Noted  Patient reports she does not use her CPAP at home      COPD (chronic obstructive pulmonary disease)  Monitor O2 sats, supplement O2 as needed  Q 4 hr while awake duo nebs and b.i.d. Pulmicort  Incentive spirometer q.1 hour while awake      Type 2 diabetes mellitus with diabetic neuropathy, without long-term current use of insulin  Diabetic diet  Blood sugars previously running less than 150 and Accu-Cheks with sliding scale were changed to p.r.n.- blood sugar 103 this a.m.  HGB A1c is 6.4      Status post laparoscopic cholecystectomy  Patient is status post laparoscopic cholecystectomy done by Dr. Jomar Mcdonald at outpatient surgical center-  Orders as per surgeon - Dr. Mcdonald  Low-fat low-cholesterol ADA diet  P.r.n. pain and antiemetic medication      Class 2 severe obesity with serious comorbidity in adult  Body mass index is 40.57 kg/m².  General weight loss/lifestyle modification strategies discussed (elicit support from others; identify saboteurs; non-food rewards, etc).          GERD (gastroesophageal reflux disease)  Continue PPI      Pulmonary  hypertension  Noted      Hyperlipidemia associated with type 2 diabetes mellitus  No home anti-lipidemic noted- low fat, low-cholesterol diet    Hypertension associated with diabetes  Continue home medications      DDD (degenerative disc disease), lumbar  Noted on MRI lumbar spine without contrast        VTE Risk Mitigation (From admission, onward)         Ordered     Place MARIANELA hose  Until discontinued      07/25/20 1514     heparin 25,000 units in dextrose 5% 250 ml (100 units/mL) infusion MINIMAL INTENSITY nomogram - [facility]  Continuous     Question:  Heparin Infusion Adjustment (DO NOT MODIFY ANSWER)  Answer:  \\[email or web address]\epic\Images\Pharmacy\HeparinInfusions\heparin MINIMAL  INTENSITY nomogram for [facility] [email or web address]    07/20/20 1604     IP VTE HIGH RISK PATIENT  Once      07/13/20 1900     Place MARIANELA hose  Until discontinued      07/13/20 1900                Time spent seeing patient( greater than 1/2 spent in direct contact) : 34 minutes      GENARO Ross  Department of Hospital Medicine   [facility]

## 2020-07-27 NOTE — PROGRESS NOTES
Patient cleared for oral anticoagulant per pulmonology and Hematology.  Patient discussed with Dr. Armin Lazaro and plan to discontinue heparin drip tomorrow and start on Eliquis 10 mg p.o. b.i.d. tomorrow

## 2020-07-27 NOTE — ANESTHESIA PREPROCEDURE EVALUATION
2020  Sammi Abreu is a 79 y.o., female.    Anesthesia Evaluation    I have reviewed the Patient Summary Reports.    I have reviewed the Nursing Notes. I have reviewed the NPO Status.   I have reviewed the Medications.     Review of Systems  Anesthesia Hx:  No problems with previous Anesthesia Smoking Status: Former Smoker - 1.25 pack years  Quit Smokin72  Smokeless Tobacco Status: Never Used  Alcohol use: Yes; 0.0 standard drinks per week  Drug use: Oxycodone, Hydrocodone       Social:  Former Smoker Smoking Status: Former Smoker - 1.25 pack years  Quit Smokin72  Smokeless Tobacco Status: Never Used  Alcohol use: Yes; 0.0 standard drinks per week  Drug use: Oxycodone, Hydrocodone       Cardiovascular:   Hypertension, poorly controlled hyperlipidemia    Pulmonary:   COPD, moderate Asthma asymptomatic Shortness of breath Sleep Apnea    Renal/:   Chronic Renal Disease    Hepatic/GI:   GERD, poorly controlled    Musculoskeletal:   Arthritis   Spine Disorders: lumbar Degenerative disease    Neurological:   Neuromuscular Disease,    Endocrine:   Diabetes, poorly controlled, type 2        Physical Exam  General:  Morbid Obesity    Airway/Jaw/Neck:  Airway Findings: Mouth Opening: Normal Tongue: Normal  General Airway Assessment: Adult, Good  Mallampati: II  Improves to II with phonation.  TM Distance: 4-6 cm      Dental:  Dental Findings: In tact   Chest/Lungs:  Chest/Lungs Findings: Clear to auscultation, Normal Respiratory Rate     Heart/Vascular:  Heart Findings: Rate: Normal  Rhythm: Regular Rhythm  Sounds: Normal  Heart murmur: negative       Mental Status:  Mental Status Findings:  Cooperative, Alert and Oriented         Anesthesia Plan  Type of Anesthesia, risks & benefits discussed:  Anesthesia Type:  general  Patient's Preference:   Intra-op Monitoring Plan: standard ASA  monitors  Intra-op Monitoring Plan Comments:   Post Op Pain Control Plan:   Post Op Pain Control Plan Comments:   Induction:   IV  Beta Blocker:  Patient is on a Beta-Blocker and has received one dose within the past 24 hours (No further documentation required).       Informed Consent: Patient understands risks and agrees with Anesthesia plan.  Questions answered. Anesthesia consent signed with patient.  ASA Score: 4     Day of Surgery Review of History & Physical: I have interviewed and examined the patient. I have reviewed the patient's H&P dated:  There are no significant changes.  H&P update referred to the surgeon.  H&P completed by Anesthesiologist.       Ready For Surgery From Anesthesia Perspective.

## 2020-07-27 NOTE — PROCEDURES
"Sammi Abreu is a 79 y.o. female patient.    Temp: 98.8 °F (37.1 °C) (07/27/20 1049)  Pulse: 82 (07/27/20 1145)  Resp: 18 (07/27/20 1145)  BP: (!) 186/79 (07/27/20 1049)  SpO2: 95 % (07/27/20 1145)  Weight: 103.9 kg (229 lb) (07/22/20 1122)  Height: 5' 3" (160 cm) (07/22/20 1122)    PICC  Date/Time: 7/27/2020 12:37 PM  Location procedure was performed: [facility]/PROGRESSIVE CARE UNIT  Performed by: Briseyda Werner RN  Consent Done: Yes  Time out: Immediately prior to procedure a time out was called to verify the correct patient, procedure, equipment, support staff and site/side marked as required  Indications: med administration and vascular access  Anesthesia: local infiltration  Local anesthetic: lidocaine 1% without epinephrine  Anesthetic Total (mL): 4  Preparation: skin prepped with ChloraPrep  Skin prep agent dried: skin prep agent completely dried prior to procedure  Sterile barriers: all five maximum sterile barriers used - cap, mask, sterile gown, sterile gloves, and large sterile sheet  Hand hygiene: hand hygiene performed prior to central venous catheter insertion  Location details: left basilic  Catheter type: double lumen  Catheter size: 5 Fr  Catheter Length: 45cm    Ultrasound guidance: yes  Vessel Caliber: medium and patent, compressibility normal  Vascular Doppler: not done  Needle advanced into vessel with real time Ultrasound guidance.  Guidewire confirmed in vessel.  Image recorded and saved.  Sterile sheath used.  no esophageal manometryNumber of attempts: 1  Post-procedure: blood return through all ports, chlorhexidine patch and sterile dressing applied  Technical procedures used: VADIM; Zuly 3CG  Estimated blood loss (mL): 0  Specimens: No  Implants: No  Assessment: placement verified by x-ray, free fluid flow, no pneumothorax on x-ray, tip termination and successful placement  Complications: none          Briseyda Werner  7/27/2020  "

## 2020-07-27 NOTE — BRIEF OP NOTE
SADIE note:  Good left ventricular contractility.  Mild mitral regurgitation.  No evidence of endocarditis.  Mitral, aortic, tricuspid and pulmonic valves were visualized.  No obvious PFO on color flow study.  Full report to follow

## 2020-07-27 NOTE — PLAN OF CARE
CM aware of pt needing IVAB for extended duration. CM unable to request home infusion from [facility] due to duration and dose of medication not decided at this time. CM will request auth as soon as able.        07/27/20 2109   Post-Acute Status   Post-Acute Authorization Other  (HOME INFUSION)

## 2020-07-27 NOTE — TRANSFER OF CARE
"Anesthesia Transfer of Care Note    Patient: Sammi Abreu    Procedure(s) Performed: Procedure(s) (LRB):  Transesophageal echo (SADIE) intra-procedure log documentation (N/A)    Patient location: ICU (502 procedure room)    Anesthesia Type: general    Transport from OR: Transported from OR on room air with adequate spontaneous ventilation    Post pain: adequate analgesia    Post assessment: no apparent anesthetic complications    Post vital signs: stable    Level of consciousness: awake and alert    Nausea/Vomiting: no nausea/vomiting    Complications: none    Transfer of care protocol was followed      Last vitals:   Visit Vitals  BP (!) 186/79 (BP Location: Left arm, Patient Position: Lying)   Pulse 82   Temp 37.1 °C (98.8 °F) (Oral)   Resp 18   Ht 5' 3" (1.6 m)   Wt 103.9 kg (229 lb)   SpO2 95%   Breastfeeding No   BMI 40.57 kg/m²     "

## 2020-07-27 NOTE — CARE UPDATE
07/27/20 0737 07/27/20 0742   Patient Assessment/Suction   Level of Consciousness (AVPU) alert  --    Respiratory Effort Unlabored  --    Expansion/Accessory Muscles/Retractions no use of accessory muscles  --    All Lung Fields Breath Sounds diminished  --    PRE-TX-O2   O2 Device (Oxygen Therapy) nasal cannula  --    $ Is the patient on Low Flow Oxygen? Yes  --    Flow (L/min) 3  --    Oxygen Concentration (%) 32  --    SpO2 95 %  --    Pulse Oximetry Type Intermittent  --    $ Pulse Oximetry - Multiple Charge Pulse Oximetry - Multiple  --    Pulse 79 79   Resp 18 16   Aerosol Therapy   $ Aerosol Therapy Charges Aerosol Treatment Aerosol Treatment   Respiratory Treatment Status (SVN) given given   Treatment Route (SVN) mask;oxygen mask;oxygen   Patient Position (SVN) HOB elevated HOB elevated   Signs of Intolerance (SVN) none none   Breath Sounds Post-Respiratory Treatment   Throughout All Fields Post-Treatment All Fields All Fields   Throughout All Fields Post-Treatment no change no change   Post-treatment Heart Rate (beats/min) 72 73   Post-treatment Resp Rate (breaths/min) 16 16   Incentive Spirometer   $ Incentive Spirometer Charges done with encouragement  --    Administration (IS) proper technique demonstrated  --    Number of Repetitions (IS) 10  --    Level Incentive Spirometer (mL) 1000  --    Patient Tolerance (IS) good  --    Ready to Wean/Extubation Screen   FIO2<=50 (chart decimal) 0.32  --

## 2020-07-27 NOTE — PLAN OF CARE
Problem: Adult Inpatient Plan of Care  Goal: Plan of Care Review  Outcome: Ongoing, Progressing   Heparin infusing. NAD noted. POC reviewed w pt and they verbalized understanding. Tele-  SR   Pt free from falls, Pt ambulates to the bathroom. Bed in low position, side rails up x 2. Call light in reach, bed alarm on and wheels locked. Will continue to monitor.

## 2020-07-28 LAB
APTT BLDCRRT: 87.1 SEC (ref 21–32)
BACTERIA BLD CULT: NORMAL
BASOPHILS # BLD AUTO: 0.02 K/UL (ref 0–0.2)
BASOPHILS NFR BLD: 0.2 % (ref 0–1.9)
DIFFERENTIAL METHOD: ABNORMAL
EOSINOPHIL # BLD AUTO: 0.1 K/UL (ref 0–0.5)
EOSINOPHIL NFR BLD: 1.4 % (ref 0–8)
ERYTHROCYTE [DISTWIDTH] IN BLOOD BY AUTOMATED COUNT: 15.7 % (ref 11.5–14.5)
HCT VFR BLD AUTO: 27.6 % (ref 37–48.5)
HGB BLD-MCNC: 8.7 G/DL (ref 12–16)
IMM GRANULOCYTES # BLD AUTO: 0.12 K/UL (ref 0–0.04)
IMM GRANULOCYTES NFR BLD AUTO: 1.2 % (ref 0–0.5)
LYMPHOCYTES # BLD AUTO: 2.7 K/UL (ref 1–4.8)
LYMPHOCYTES NFR BLD: 26.8 % (ref 18–48)
MCH RBC QN AUTO: 33.9 PG (ref 27–31)
MCHC RBC AUTO-ENTMCNC: 31.5 G/DL (ref 32–36)
MCV RBC AUTO: 107 FL (ref 82–98)
MONOCYTES # BLD AUTO: 1.1 K/UL (ref 0.3–1)
MONOCYTES NFR BLD: 11.2 % (ref 4–15)
NEUTROPHILS # BLD AUTO: 5.9 K/UL (ref 1.8–7.7)
NEUTROPHILS NFR BLD: 59.2 % (ref 38–73)
NRBC BLD-RTO: 1 /100 WBC
PLATELET # BLD AUTO: 221 K/UL (ref 150–350)
PLATELET BLD QL SMEAR: ABNORMAL
PMV BLD AUTO: 9.6 FL (ref 9.2–12.9)
RBC # BLD AUTO: 2.57 M/UL (ref 4–5.4)
VANCOMYCIN SERPL-MCNC: 13.2 UG/ML
WBC # BLD AUTO: 9.9 K/UL (ref 3.9–12.7)

## 2020-07-28 PROCEDURE — 25000242 PHARM REV CODE 250 ALT 637 W/ HCPCS: Performed by: NURSE PRACTITIONER

## 2020-07-28 PROCEDURE — 97803 MED NUTRITION INDIV SUBSEQ: CPT

## 2020-07-28 PROCEDURE — 99233 PR SUBSEQUENT HOSPITAL CARE,LEVL III: ICD-10-PCS | Mod: ,,, | Performed by: INTERNAL MEDICINE

## 2020-07-28 PROCEDURE — 99231 PR SUBSEQUENT HOSPITAL CARE,LEVL I: ICD-10-PCS | Mod: ,,, | Performed by: INTERNAL MEDICINE

## 2020-07-28 PROCEDURE — 85025 COMPLETE CBC W/AUTO DIFF WBC: CPT

## 2020-07-28 PROCEDURE — 11000001 HC ACUTE MED/SURG PRIVATE ROOM

## 2020-07-28 PROCEDURE — 85730 THROMBOPLASTIN TIME PARTIAL: CPT

## 2020-07-28 PROCEDURE — 25000003 PHARM REV CODE 250: Performed by: INTERNAL MEDICINE

## 2020-07-28 PROCEDURE — 36415 COLL VENOUS BLD VENIPUNCTURE: CPT

## 2020-07-28 PROCEDURE — 25000003 PHARM REV CODE 250: Performed by: NURSE PRACTITIONER

## 2020-07-28 PROCEDURE — 27000221 HC OXYGEN, UP TO 24 HOURS

## 2020-07-28 PROCEDURE — A4216 STERILE WATER/SALINE, 10 ML: HCPCS | Performed by: INTERNAL MEDICINE

## 2020-07-28 PROCEDURE — 99233 SBSQ HOSP IP/OBS HIGH 50: CPT | Mod: ,,, | Performed by: INTERNAL MEDICINE

## 2020-07-28 PROCEDURE — 94761 N-INVAS EAR/PLS OXIMETRY MLT: CPT

## 2020-07-28 PROCEDURE — 99231 SBSQ HOSP IP/OBS SF/LOW 25: CPT | Mod: ,,, | Performed by: INTERNAL MEDICINE

## 2020-07-28 PROCEDURE — 94799 UNLISTED PULMONARY SVC/PX: CPT

## 2020-07-28 PROCEDURE — 80202 ASSAY OF VANCOMYCIN: CPT

## 2020-07-28 PROCEDURE — 94640 AIRWAY INHALATION TREATMENT: CPT

## 2020-07-28 PROCEDURE — 99900035 HC TECH TIME PER 15 MIN (STAT)

## 2020-07-28 PROCEDURE — 63600175 PHARM REV CODE 636 W HCPCS: Performed by: INTERNAL MEDICINE

## 2020-07-28 PROCEDURE — 25000242 PHARM REV CODE 250 ALT 637 W/ HCPCS: Performed by: INTERNAL MEDICINE

## 2020-07-28 RX ORDER — IPRATROPIUM BROMIDE AND ALBUTEROL SULFATE 2.5; .5 MG/3ML; MG/3ML
3 SOLUTION RESPIRATORY (INHALATION) EVERY 8 HOURS
Status: DISCONTINUED | OUTPATIENT
Start: 2020-07-28 | End: 2020-07-29 | Stop reason: HOSPADM

## 2020-07-28 RX ORDER — DAPTOMYCIN 50 MG/ML
1000 INJECTION, POWDER, LYOPHILIZED, FOR SOLUTION INTRAVENOUS
Status: DISCONTINUED | OUTPATIENT
Start: 2020-07-28 | End: 2020-07-28 | Stop reason: SDUPTHER

## 2020-07-28 RX ADMIN — VANCOMYCIN HYDROCHLORIDE 1750 MG: 1 INJECTION, POWDER, LYOPHILIZED, FOR SOLUTION INTRAVENOUS at 02:07

## 2020-07-28 RX ADMIN — MONTELUKAST 10 MG: 10 TABLET, FILM COATED ORAL at 08:07

## 2020-07-28 RX ADMIN — FOLIC ACID 1 MG: 1 TABLET ORAL at 09:07

## 2020-07-28 RX ADMIN — ACETAMINOPHEN 650 MG: 325 TABLET ORAL at 03:07

## 2020-07-28 RX ADMIN — APIXABAN 10 MG: 2.5 TABLET, FILM COATED ORAL at 08:07

## 2020-07-28 RX ADMIN — IPRATROPIUM BROMIDE AND ALBUTEROL SULFATE 3 ML: .5; 2.5 SOLUTION RESPIRATORY (INHALATION) at 03:07

## 2020-07-28 RX ADMIN — Medication 10 ML: at 06:07

## 2020-07-28 RX ADMIN — BACITRACIN ZINC NEOMYCIN SULFATE POLYMYXIN B SULFATE: 400; 3.5; 5 OINTMENT TOPICAL at 09:07

## 2020-07-28 RX ADMIN — BUDESONIDE INHALATION 0.5 MG: 0.5 SUSPENSION RESPIRATORY (INHALATION) at 07:07

## 2020-07-28 RX ADMIN — METOPROLOL SUCCINATE 50 MG: 50 TABLET, FILM COATED, EXTENDED RELEASE ORAL at 09:07

## 2020-07-28 RX ADMIN — BISACODYL 5 MG: 5 TABLET, COATED ORAL at 09:07

## 2020-07-28 RX ADMIN — DAPTOMYCIN 1000 MG: 350 INJECTION, POWDER, LYOPHILIZED, FOR SOLUTION INTRAVENOUS at 07:07

## 2020-07-28 RX ADMIN — FLUTICASONE PROPIONATE 100 MCG: 50 SPRAY, METERED NASAL at 09:07

## 2020-07-28 RX ADMIN — IPRATROPIUM BROMIDE AND ALBUTEROL SULFATE 3 ML: .5; 3 SOLUTION RESPIRATORY (INHALATION) at 07:07

## 2020-07-28 RX ADMIN — Medication 10 ML: at 01:07

## 2020-07-28 RX ADMIN — APIXABAN 10 MG: 2.5 TABLET, FILM COATED ORAL at 11:07

## 2020-07-28 RX ADMIN — Medication 10 ML: at 12:07

## 2020-07-28 RX ADMIN — PANTOPRAZOLE SODIUM 40 MG: 40 TABLET, DELAYED RELEASE ORAL at 09:07

## 2020-07-28 NOTE — PROGRESS NOTES
Progress Note  PULMONARY    Admit Date: 7/13/2020 07/28/2020      SUBJECTIVE:     7/23- no new complaints. Still SOB worse w/ walking in the room. no appetite, nauseated   7/27 no new c/o. Less schwartz  7/28 no new c/o    PFSH and Allergies reviewed.    OBJECTIVE:     Vitals (Most recent):  Vitals:    07/28/20 0750   BP:    Pulse: 81   Resp: 17   Temp:        Vitals (24 hour range):  Temp:  [98.4 °F (36.9 °C)-100.4 °F (38 °C)]   Pulse:  [69-99]   Resp:  [15-43]   BP: (131-199)/(63-93)   SpO2:  [78 %-100 %]       Intake/Output Summary (Last 24 hours) at 7/28/2020 0832  Last data filed at 7/28/2020 0300  Gross per 24 hour   Intake 1272.27 ml   Output 800 ml   Net 472.27 ml          Physical Exam:  The patient's neuro status (alertness,orientation,cognitive function,motor skills,), pharyngeal exam (oral lesions, hygiene, abn dentition,), Neck (jvd,mass,thyroid,nodes in neck and above/below clavicle),RESPIRATORY(symmetry,effort,fremitus,percussion,auscultation),  Cor(rhythm,heart tones including gallops,perfusion,edema)ABD(distention,hepatic&splenomegaly,tenderness,masses), Skin(rash,cyanosis),Psyc(affect,judgement,).  Exam negative except for these pertinent findings:    Obese, sitting in chair   Lungs clear  no pitting edema    Radiographs reviewed: view by direct vision    Massive pe 7/14 cta  US lower ext 7/22/20- Nonocclusive thrombus within the left popliteal vein, suggestive of some recanalization of the prior thrombus in left popliteal vein.  No additional focus of thrombus.    TTE 7/14-   · The mean diastolic gradient across the mitral valve is 3 mmHg at a heart rate of 70 bpm.  · Concentric left ventricular remodeling.  · Small left ventricular cavity size.  · Hyperdynamic left ventricular systolic function. The estimated ejection fraction is 66%.  · Grade I (mild) left ventricular diastolic dysfunction consistent with impaired relaxation.  · Elevated central venous pressure (15 mmHg).  · The estimated PA  systolic pressure is 58 mmHg.  · Pulmonary hypertension present.  · Mild to moderate pulmonic regurgitation.    Labs     Recent Labs   Lab 07/28/20  0130   WBC 9.90   HGB 8.7*   HCT 27.6*        No results for input(s): NA, K, CL, CO2, BUN, CREATININE, GLU, CALCIUM, CAION, MG, PHOS, AST, ALT, ALKPHOS, BILITOT, BILIDIR, PROT, ALBUMIN, PREALBUMIN, AMYLASE, LIPASE, CRP, HSCRP, SEDRATE, PROCAL, INR, PTT, LABHEPA, LACTATE, TROPONINI, CPK, CPKMB, MB, BNP in the last 24 hours.No results for input(s): PH, PCO2, PO2, HCO3 in the last 24 hours.  Microbiology Results (last 7 days)     Procedure Component Value Units Date/Time    Blood culture [[id number]] Collected: 07/22/20 1412    Order Status: Completed Specimen: Blood from Peripheral, Right Hand Updated: 07/27/20 2212     Blood Culture, Routine No growth after 5 days.    Narrative:      Collection has been rescheduled by [email or web address] at 07/22/2020 12:48 Reason:   nurse billie checking on it will call if needed  Collection has been rescheduled by [email or web address] at 07/22/2020 12:48 Reason:   nurse billie checking on it will call if needed    Blood culture [[id number]] Collected: 07/23/20 0514    Order Status: Completed Specimen: Blood from Antecubital, Left Arm Updated: 07/27/20 1212     Blood Culture, Routine No Growth to date      No Growth to date      No Growth to date      No Growth to date      No Growth to date    Blood culture [[id number]] Collected: 07/22/20 0610    Order Status: Completed Specimen: Blood Updated: 07/27/20 1012     Blood Culture, Routine No growth after 5 days.    Blood culture [[id number]]     Order Status: Canceled Specimen: Blood     Blood culture [[id number]]  (Abnormal) Collected: 07/18/20 1842    Order Status: Completed Specimen: Blood Updated: 07/22/20 0937     Blood Culture, Routine Gram stain peds bottle: Gram positive cocci in clusters resembling Staph       Results called to and read back by: Marika Mcclendon RN 07/20/2020  05:53      METHICILLIN RESISTANT  STAPHYLOCOCCUS AUREUS  ID consult required at [facility] and [facility].  For susceptibility see order #[id number]      Blood culture [[id number]]  (Abnormal)  (Susceptibility) Collected: 07/18/20 1653    Order Status: Completed Specimen: Blood Updated: 07/22/20 0937     Blood Culture, Routine Gram stain juan bottle: Gram positive cocci in clusters resembling Staph       Results called to and read back by: Marika Mcclendon RN 07/20/2020  05:53      Gram stain aer bottle: Gram positive cocci in clusters resembling Staph      METHICILLIN RESISTANT STAPHYLOCOCCUS AUREUS  ID consult required at [facility] and [facility] locations.            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 Jomar Soria      Type 2 diabetes mellitus with diabetic neuropathy, without long-term current use of insulin [E11.40]  Yes    COPD (chronic obstructive pulmonary disease) [J44.9]  Yes    CHARLIE (obstructive sleep apnea) [G47.33]  Yes    Chronic back pain [M54.9, G89.29]  Yes    History of DVT in adulthood [Z86.718]  Not Applicable    GERD (gastroesophageal reflux disease) [K21.9]  Yes    Pulmonary hypertension [I27.20]  Yes    Hyperlipidemia associated with type 2 diabetes mellitus [E11.69, E78.5]  Yes     Hypertension associated with diabetes [E11.59, I10]  Yes    DDD (degenerative disc disease), lumbar [M51.36]  Yes      Resolved Hospital Problems    Diagnosis Date Resolved POA    Azotemia [R79.89] 07/23/2020 No    UTI (urinary tract infection) [N39.0] 07/25/2020 No    Thrombocytopenia [D69.6] 07/22/2020 No    Hyperkalemia [E87.5] 07/22/2020 Yes               Plan:     7/27/2020 - need to assure infection controlled.  ivc filter not placed.  Continue oral anticoagg- no gross bleeding.   Complex case.  Once plan for infection rx - consider outpt?  7/28 cxr on 27th with mild lower lung opacification as might be expected.    hgb stable at 8.7. could stop heparin drip soon- use oral rx.

## 2020-07-28 NOTE — SUBJECTIVE & OBJECTIVE
Interval history:  Heparin discontinued and patient started on Eliquis today.  Case management consulted to assist with setting up daptomycin for home use.  PICC line placed.  Plan possible discharge home in the morning if continues to improve.     Review of Systems   Constitutional: Positive for activity change. Negative for appetite change, chills and fever.   HENT: Negative for ear discharge and facial swelling.    Respiratory: Positive for shortness of breath. Negative for cough.    Cardiovascular: Negative for chest pain, palpitations and leg swelling.   Gastrointestinal: Negative for abdominal distention, blood in stool, nausea and vomiting.   Musculoskeletal: Positive for back pain. Negative for gait problem, neck pain and neck stiffness.         chronic back pain     Skin: Positive for wound. Negative for color change.        Abdominal surgical incisions   Neurological: Negative for facial asymmetry, speech difficulty and weakness.            Psychiatric/Behavioral: Negative for agitation and confusion. The patient is nervous/anxious.      Objective:     Vital Signs (Most Recent):  Temp: 98.2 °F (36.8 °C) (07/28/20 0746)  Pulse: 69 (07/28/20 1527)  Resp: 18 (07/28/20 1527)  BP: (!) 159/72 (07/28/20 0746)  SpO2: 95 % (07/28/20 1527) Vital Signs (24h Range):  Temp:  [98.2 °F (36.8 °C)-100.4 °F (38 °C)] 98.2 °F (36.8 °C)  Pulse:  [69-99] 69  Resp:  [16-22] 18  SpO2:  [95 %-100 %] 95 %  BP: (139-171)/(63-74) 159/72     Weight: 103.9 kg (229 lb)  Body mass index is 40.57 kg/m².    Physical Exam  Constitutional:       General: She is not in acute distress.     Appearance: Normal appearance. She is obese. She is not ill-appearing, toxic-appearing or diaphoretic.      Comments: Morbidly obese   HENT:      Head: Normocephalic and atraumatic.      Mouth/Throat:      Mouth: Mucous membranes are moist.   Eyes:      General:         Right eye: No discharge.         Left eye: No discharge.      Extraocular Movements:  Extraocular movements intact.      Conjunctiva/sclera: Conjunctivae normal.      Pupils: Pupils are equal, round, and reactive to light.   Neck:      Musculoskeletal: Normal range of motion and neck supple. No neck rigidity or muscular tenderness.   Cardiovascular:      Rate and Rhythm: Normal rate and regular rhythm.      Pulses: Normal pulses.      Comments: Trace pretib edema L>R  Pulmonary:      Effort: Pulmonary effort is normal. No respiratory distress.      Breath sounds: No wheezing or rales.      Comments: Bilateral breath sounds diminished  Abdominal:      General: Bowel sounds are normal. There is no distension.      Palpations: Abdomen is soft.      Tenderness: There is no abdominal tenderness. There is no guarding.      Comments: Obese     Genitourinary:     Comments: Not examined  Musculoskeletal: Normal range of motion.      Right lower leg: Edema present.      Left lower leg: Edema present.      Comments: Scant bilateral lower extremity edema   Skin:     General: Skin is warm and dry.      Capillary Refill: Capillary refill takes less than 2 seconds.      Comments: Abdominal surgical dressings intact   Neurological:      General: No focal deficit present.      Mental Status: She is alert and oriented to person, place, and time. Mental status is at baseline.      Cranial Nerves: No cranial nerve deficit.      Motor: No weakness.   Psychiatric:         Mood and Affect: Mood normal.         Behavior: Behavior normal.         Thought Content: Thought content normal.         Judgment: Judgment normal.      Comments:             CRANIAL NERVES     CN III, IV, VI   Pupils are equal, round, and reactive to light.     Labs reviewed

## 2020-07-28 NOTE — PROGRESS NOTES
Sammi Abreu [id number] is a 79 y.o. female who had been consulted for vancomycin dosing.    Vancomycin has been discontinued.  Pharmacy consult for vancomycin dosing in no longer required.      Thank you for allowing us to participate in this patient's care.     Jessy Verduzco

## 2020-07-28 NOTE — RESPIRATORY THERAPY
07/27/20 1924   Patient Assessment/Suction   Level of Consciousness (AVPU) alert   Respiratory Effort Unlabored   Expansion/Accessory Muscles/Retractions no use of accessory muscles   All Lung Fields Breath Sounds diminished   Rhythm/Pattern, Respiratory unlabored   Cough Frequency frequent   Cough Type good;nonproductive   PRE-TX-O2   O2 Device (Oxygen Therapy) nasal cannula   Flow (L/min) 3   Oxygen Concentration (%) 32   SpO2 98 %   Pulse Oximetry Type Intermittent   $ Pulse Oximetry - Multiple Charge Pulse Oximetry - Multiple   Pulse 99   Resp 16   Aerosol Therapy   $ Aerosol Therapy Charges Aerosol Treatment   Respiratory Treatment Status (SVN) given   Treatment Route (SVN) mask   Patient Position (SVN) HOB elevated   Post Treatment Assessment (SVN) breath sounds unchanged   Signs of Intolerance (SVN) none   Breath Sounds Post-Respiratory Treatment   Throughout All Fields Post-Treatment All Fields   Throughout All Fields Post-Treatment no change   Post-treatment Heart Rate (beats/min) 79   Post-treatment Resp Rate (breaths/min) 16   Ready to Wean/Extubation Screen   FIO2<=50 (chart decimal) 0.32

## 2020-07-28 NOTE — PLAN OF CARE
POC reviewed with pt, understanding verbalized. Contact precautions maintained. Heparin gtt d/c today- pt started on PO eliquis. SR on tele. Safety maintained. Will monitor.

## 2020-07-28 NOTE — PLAN OF CARE
07/28/20 1310   Post-Acute Status   Post-Acute Authorization Other  (HOME INFUSIOn)   Other Status   (SET UP COMPLETE)         PITER received auth for home infusion. Auth # is [id number]. PITER updated Leonila with [employer]. Per Leonila, she will be here to provide education for pt within 20 minutes.

## 2020-07-28 NOTE — PROGRESS NOTES
[facility] Hospital Medicine  Progress Note    Patient Name: Sammi Abreu  MRN: [id number]  Patient Class: IP- Inpatient   Admission Date: 7/13/2020  Length of Stay: 14 days  Attending Physician: Alix Ruth MD  Primary Care Provider: Osmani Villatoro MD      Subjective:     Principal Problem:Acute respiratory failure with hypoxemia      HPI:  This is a 79-year-old female who has a past medical history of arthritis and chronic back pain, fibromyalgia, glaucoma, hyperlipidemia, hypertension, sleep apnea with history of noncompliance with CPAP, morbid obesity, hypertension, GERD, COPD, diabetes type 2, prior DVT, and gallstones.  Please note the patient is also a [employer].  Patient is being received from Outpatient surgical center.  She is status post laparoscopic cholecystectomy today by Dr. Jomar Soria.  Patient tolerated procedure well.  She however was unable to be discharged postop due to ongoing hypoxemia and has been transferred here for further evaluation and treatment.    Overview/Hospital Course:  The patient was monitored closely during her stay.  She was noted to have an elevated D-dimer.  She was placed on full-dose Lovenox.  Patient when CTA of the chest which showed extensive bilateral pulmonary emboli.  Bilateral venous ultrasound showed left lower extremity DVT.  Pulmonology was consulted  .  Patient's full-dose Lovenox was discontinued and she was placed on IV heparin drip.  Patient was noted to hav and recommended full anticoagulation and also felt patient may benefit from IVC filter placement. Patient with worsening anemia.  Patient noted to be Jehovah witness.  Hematology was  consulted.  Patient was seen by vascular surgeon and plans were made for IVC filter placement.  However in the meantime, patient had blood cultures x2 that came back positive for MRSA.  She was started on IV vancomycin and  Infectious disease was consulted and the IVC filter placement was  canceled.  Repeat blood cultures and echocardiogram were ordered.  Patient had MRI of the thoracic and lumbar spine with significant degenerative disease changes and some bulging noted in areas with some foraminal stenosis but no signs of osteomyelitis noted.  Cardiology was consulted to read echocardiogram and also for possible SADIE if echocardiogram was negative for  any signs of bacterial endocarditis.  Initial echocardiogram was negative.  Patient was scheduled for a SADIE which was negative for any signs of SBE.    Interval history:  Heparin discontinued and patient started on Eliquis today.  Case management consulted to assist with setting up daptomycin for home use.  PICC line placed.  Plan possible discharge home in the morning if continues to improve.     Review of Systems   Constitutional: Positive for activity change. Negative for appetite change, chills and fever.   HENT: Negative for ear discharge and facial swelling.    Respiratory: Positive for shortness of breath. Negative for cough.    Cardiovascular: Negative for chest pain, palpitations and leg swelling.   Gastrointestinal: Negative for abdominal distention, blood in stool, nausea and vomiting.   Musculoskeletal: Positive for back pain. Negative for gait problem, neck pain and neck stiffness.         chronic back pain     Skin: Positive for wound. Negative for color change.        Abdominal surgical incisions   Neurological: Negative for facial asymmetry, speech difficulty and weakness.            Psychiatric/Behavioral: Negative for agitation and confusion. The patient is nervous/anxious.      Objective:     Vital Signs (Most Recent):  Temp: 98.2 °F (36.8 °C) (07/28/20 0746)  Pulse: 69 (07/28/20 1527)  Resp: 18 (07/28/20 1527)  BP: (!) 159/72 (07/28/20 0746)  SpO2: 95 % (07/28/20 1527) Vital Signs (24h Range):  Temp:  [98.2 °F (36.8 °C)-100.4 °F (38 °C)] 98.2 °F (36.8 °C)  Pulse:  [69-99] 69  Resp:  [16-22] 18  SpO2:  [95 %-100 %] 95 %  BP:  (139-171)/(63-74) 159/72     Weight: 103.9 kg (229 lb)  Body mass index is 40.57 kg/m².    Physical Exam  Constitutional:       General: She is not in acute distress.     Appearance: Normal appearance. She is obese. She is not ill-appearing, toxic-appearing or diaphoretic.      Comments: Morbidly obese   HENT:      Head: Normocephalic and atraumatic.      Mouth/Throat:      Mouth: Mucous membranes are moist.   Eyes:      General:         Right eye: No discharge.         Left eye: No discharge.      Extraocular Movements: Extraocular movements intact.      Conjunctiva/sclera: Conjunctivae normal.      Pupils: Pupils are equal, round, and reactive to light.   Neck:      Musculoskeletal: Normal range of motion and neck supple. No neck rigidity or muscular tenderness.   Cardiovascular:      Rate and Rhythm: Normal rate and regular rhythm.      Pulses: Normal pulses.      Comments: Trace pretib edema L>R  Pulmonary:      Effort: Pulmonary effort is normal. No respiratory distress.      Breath sounds: No wheezing or rales.      Comments: Bilateral breath sounds diminished  Abdominal:      General: Bowel sounds are normal. There is no distension.      Palpations: Abdomen is soft.      Tenderness: There is no abdominal tenderness. There is no guarding.      Comments: Obese     Genitourinary:     Comments: Not examined  Musculoskeletal: Normal range of motion.      Right lower leg: Edema present.      Left lower leg: Edema present.      Comments: Scant bilateral lower extremity edema   Skin:     General: Skin is warm and dry.      Capillary Refill: Capillary refill takes less than 2 seconds.      Comments: Abdominal surgical dressings intact   Neurological:      General: No focal deficit present.      Mental Status: She is alert and oriented to person, place, and time. Mental status is at baseline.      Cranial Nerves: No cranial nerve deficit.      Motor: No weakness.   Psychiatric:         Mood and Affect: Mood normal.          Behavior: Behavior normal.         Thought Content: Thought content normal.         Judgment: Judgment normal.      Comments:             CRANIAL NERVES     CN III, IV, VI   Pupils are equal, round, and reactive to light.     Labs reviewed      Assessment/Plan:      * Acute respiratory failure with hypoxemia  Secondary to extensive bilateral pulmonary emboli-  Orders as per pulmonology  Heparin drip discontinued today and patient started on Eliquis      DDD (degenerative disc disease), thoracic  Noted on MRI thoracic spine without contrast      MRSA bacteremia  7/18/2020 Blood cultures x2 positive for MRSA  Infectious Disease consulted-orders as recommended by Infectious Disease  Repeat echocardiogram were negative for any signs of SBE  Patient currently remains on IV vancomycin  Repeat blood cultures x2 on 07/22 and x1 on 07/23 currently showing no growth    Patient to be set up for daptomycin as outpatient-will give 1st dose today      Type 2 diabetes mellitus with stage 2 chronic kidney disease  Monitor renal status  Renal dose all medications      Acute blood loss anemia   Monitor  Patient is a Jehovah Witness  Hematology consulted previously for anemia  Patient received several doses of IV Venofer   Her hemoglobin and  hematocrit was trended closely-anemia remained stable at this time      History of obstructive sleep apnea  Noted      Elevated troponin  Secondary to extensive bilateral pulmonary emboli      Deep vein thrombosis (DVT) of left lower extremity  Continue full-dose anticoagulation-patient currently remains on IV heparin drip  Plans for IVC filter placement were canceled due to MRSA bacteremia      Bilateral pulmonary embolism  Patient with extensive bilateral pulmonary emboli and left lower extremity DVT-  Change patient from  IV heparin drip to Eliquis today    Pulmonology following  Plan was previously for patient to have IVC filter placement which has been canceled due to patient with MRSA  bacteremia        History of DVT in adulthood  Patient reports history of DVT in the right upper extremity greater than 10 years ago and  history of groin DVT following a hip surgery approximately 8 years ago-  Continue MARIANELA hose  Patient now with left lower extremity DVT and extensive bilateral pulmonary emboli  Patient with decreased activity/ambulation due to increased recent back pain however she does have history of prior DVT in the past once related to prior hip surgery     Chronic back pain  With chronic pain syndrome secondary to extensive degenerative disc disease of thoracic and lumbar spine with some areas of bulging disc and foraminal stenosis-  MRI of lumbar and thoracic spine without contrast done with no signs of diskitis or osteomyelitis noted  Patient will need outpatient follow-up in the future    Continue p.r.n. pain medication  Patient reports she sees Dr. Gilliam for pain management      CHARLIE (obstructive sleep apnea)  Noted  Patient reports she does not use her CPAP at home      COPD (chronic obstructive pulmonary disease)  Monitor O2 sats, supplement O2 as needed  Q 4 hr while awake duo nebs and b.i.d. Pulmicort  Incentive spirometer q.1 hour while awake      Type 2 diabetes mellitus with diabetic neuropathy, without long-term current use of insulin  Diabetic diet  Blood sugars previously running less than 150 and Accu-Cheks with sliding scale were changed to p.r.n.   HGB A1c is 6.4      Status post laparoscopic cholecystectomy  Patient is status post laparoscopic cholecystectomy done by Dr. Jomar Mcdonald at outpatient surgical center-  Orders as per surgeon - Dr. Mcdonald  Low-fat low-cholesterol ADA diet  P.r.n. pain and antiemetic medication      Class 2 severe obesity with serious comorbidity in adult  Body mass index is 40.57 kg/m².  General weight loss/lifestyle modification strategies discussed (elicit support from others; identify saboteurs; non-food rewards, etc).          GERD  (gastroesophageal reflux disease)  Continue PPI      Pulmonary hypertension  Noted      Hyperlipidemia associated with type 2 diabetes mellitus  No home anti-lipidemic noted- low fat, low-cholesterol diet    Hypertension associated with diabetes  Continue home medications      DDD (degenerative disc disease), lumbar  Noted on MRI lumbar spine without contrast        VTE Risk Mitigation (From admission, onward)         Ordered     apixaban tablet 5 mg  2 times daily      07/28/20 0932     apixaban tablet 10 mg  2 times daily      07/28/20 0932     Place MARIANELA hose  Until discontinued      07/25/20 1514     IP VTE HIGH RISK PATIENT  Once      07/13/20 1900     Place MARIANELA hose  Until discontinued      07/13/20 1900                Time spent seeing patient( greater than 1/2 spent in direct contact) : 34 minutes      GENARO Ross  Department of Hospital Medicine   [facility]

## 2020-07-28 NOTE — RESPIRATORY THERAPY
07/28/20 0746   Patient Assessment/Suction   Level of Consciousness (AVPU) alert   Respiratory Effort Normal;Unlabored   Expansion/Accessory Muscles/Retractions no use of accessory muscles;no retractions;expansion symmetric   All Lung Fields Breath Sounds diminished   Rhythm/Pattern, Respiratory unlabored;pattern regular;depth regular   Cough Frequency infrequent   Cough Type nonproductive;good   PRE-TX-O2   O2 Device (Oxygen Therapy) nasal cannula   $ Is the patient on Low Flow Oxygen? Yes   Flow (L/min) 3   SpO2 98 %   Pulse Oximetry Type Intermittent   $ Pulse Oximetry - Multiple Charge Pulse Oximetry - Multiple   Pulse 79   Resp 18   Positioning Sitting on edge of bed (dangling)   Aerosol Therapy   $ Aerosol Therapy Charges Aerosol Treatment   Respiratory Treatment Status (SVN) given   Treatment Route (SVN) mask   Patient Position (SVN) sitting on edge of bed   Post Treatment Assessment (SVN) breath sounds unchanged   Signs of Intolerance (SVN) none   Breath Sounds Post-Respiratory Treatment   Throughout All Fields Post-Treatment All Fields   Throughout All Fields Post-Treatment no change   Post-treatment Heart Rate (beats/min) 81   Post-treatment Resp Rate (breaths/min) 16

## 2020-07-28 NOTE — PLAN OF CARE
Recommendation:   1. Continue Liberalized diabetic diet  -cardiac when PO intakes improve  2.Continue  boost glucose control with meals and snack/condiments per pt preference  3. Weigh pt weekly, consider appetite stimulant     Interventions:  Carbohydrate Modified Diet  Nutrition education- DM  Commercial Food     Goals: meet at least 75% EEN/EPN with meals and supplement by RD f/u  Nutrition Goal Status: not meeting-continues  Communication of RD Recs: (POC, sticky note)

## 2020-07-28 NOTE — PROGRESS NOTES
Pharmacokinetic Assessment Follow Up: IV Vancomycin    Vancomycin serum concentration assessment(s):    The random level was drawn correctly and can be used to guide therapy at this time. The measurement is below the desired definitive target range of 15 to 20 mcg/mL.    Vancomycin Regimen Plan:    Change regimen to Vancomycin 1750 mg IV every 24 hours with next serum trough concentration measured at 0130 prior to 3rd dose on 7/30    Drug levels (last 3 results):  Recent Labs   Lab Result Units 07/25/20 1958 07/26/20 2033 07/28/20  0130   Vancomycin, Random ug/mL 15.4 12.0 13.2       Pharmacy will continue to follow and monitor vancomycin.    Please contact pharmacy at extension [phone] for questions regarding this assessment.    Thank you for the consult,   Carly Stubbs       Patient brief summary:  Sammi Abreu is a 79 y.o. female initiated on antimicrobial therapy with IV Vancomycin for treatment of bacteremia    The patient's current regimen is pulse dosing. Last dose 1750 mg.    Drug Allergies:   Review of patient's allergies indicates:   Allergen Reactions    Cymbalta [duloxetine] Other (See Comments)     Nightmares      Darvon [propoxyphene] Nausea Only and Other (See Comments)     Sweating, slept for 3 days    Atorvastatin Other (See Comments)     Muscle cramps    Naprosyn [naproxen] Nausea Only    Penicillins Rash    Tramadol Nausea Only and Palpitations       Actual Body Weight:   103.9 kg    Renal Function:   Estimated Creatinine Clearance: 41.8 mL/min (based on SCr of 1.3 mg/dL).,         CBC (last 72 hours):  Recent Labs   Lab Result Units 07/25/20  0449 07/26/20  0505 07/27/20  0449 07/28/20  0130   WBC K/uL 8.16 9.11 10.32 9.90   Hemoglobin g/dL 8.4* 8.7* 8.8* 8.7*   Hematocrit % 28.0* 29.3* 28.5* 27.6*   Platelets K/uL 302 281 275 221   Gran% % 62.1 61.8  --  59.2   Lymph% % 22.8 23.2  --  26.8   Mono% % 10.0 11.2  --  11.2   Eosinophil% % 2.2 1.5  --  1.4   Basophil% % 0.2 0.2  --  0.2    Differential Method  Automated Automated  --  Automated       Metabolic Panel (last 72 hours):  Recent Labs   Lab Result Units 07/27/20  0449   Sodium mmol/L 137   Potassium mmol/L 4.2   Chloride mmol/L 104   CO2 mmol/L 24   Glucose mg/dL 112*   BUN, Bld mg/dL 9   Creatinine mg/dL 1.3   Magnesium mg/dL 2.1       Vancomycin Administrations:  vancomycin given in the last 96 hours                     vancomycin (VANCOCIN) 1,750 mg in dextrose 5 % 500 mL IVPB (mg) 1,750 mg New Bag 07/28/20 0223    vancomycin (VANCOCIN) 1,750 mg in dextrose 5 % 500 mL IVPB (mg) 1,750 mg New Bag 07/27/20 0000    vancomycin 1.5 g in dextrose 5 % 250 mL IVPB (ready to mix) (mg) 1,500 mg New Bag 07/25/20 2227    vancomycin 1.5 g in dextrose 5 % 250 mL IVPB (ready to mix) (mg) 1,500 mg New Bag 07/24/20 2018                    Microbiologic Results:  Microbiology Results (last 7 days)       Procedure Component Value Units Date/Time    Blood culture [[id number]] Collected: 07/22/20 1412    Order Status: Completed Specimen: Blood from Peripheral, Right Hand Updated: 07/27/20 2212     Blood Culture, Routine No growth after 5 days.    Narrative:      Collection has been rescheduled by [email or web address] at 07/22/2020 12:48 Reason:   nurse billie checking on it will call if needed  Collection has been rescheduled by PVL1 at 07/22/2020 12:48 Reason:   nurse billie checking on it will call if needed    Blood culture [[id number]] Collected: 07/23/20 0514    Order Status: Completed Specimen: Blood from Antecubital, Left Arm Updated: 07/27/20 1212     Blood Culture, Routine No Growth to date      No Growth to date      No Growth to date      No Growth to date      No Growth to date    Blood culture [[id number]] Collected: 07/22/20 0610    Order Status: Completed Specimen: Blood Updated: 07/27/20 1012     Blood Culture, Routine No growth after 5 days.    Blood culture [[id number]]     Order Status: Canceled Specimen: Blood     Blood culture [[id number]]  (Abnormal)  Collected: 07/18/20 1842    Order Status: Completed Specimen: Blood Updated: 07/22/20 0937     Blood Culture, Routine Gram stain peds bottle: Gram positive cocci in clusters resembling Staph       Results called to and read back by: Marika Mcclendon RN 07/20/2020  05:53      METHICILLIN RESISTANT STAPHYLOCOCCUS AUREUS  ID consult required at [facility] and [facility] locations.  For susceptibility see order #[id number]      Blood culture [[id number]]  (Abnormal)  (Susceptibility) Collected: 07/18/20 1653    Order Status: Completed Specimen: Blood Updated: 07/22/20 0937     Blood Culture, Routine Gram stain juan bottle: Gram positive cocci in clusters resembling Staph       Results called to and read back by: Marika Mcclendon RN 07/20/2020  05:53      Gram stain aer bottle: Gram positive cocci in clusters resembling Staph      METHICILLIN RESISTANT STAPHYLOCOCCUS AUREUS  ID consult required at [facility] and [facility] locations.

## 2020-07-28 NOTE — PROGRESS NOTES
[facility]  Hematology/Oncology  Progress Note    Patient Name: Sammi Abreu  Admission Date: 7/13/2020  Hospital Length of Stay: 14 days  Code Status: Full Code     Subjective:     Interval History:      Severe anemia in patient with bilateral extensive pulmonary embolus left lower extremity DVT Denominational receiving iron and Procrit currently blood cultures came back positive for MRSA started on vancomycin     infectious Disease following patient echocardiogram done vegetation, osteomyelitis ruled out.  She remains on telemetry on heparin drip wearing nasal cannula oxygen.  Pulmonary also following patient continues to stay somewhat short of breath continues to have productive cough O2 sats have maintaining 99% on 3 L nasal cannula    She continues to report decreased appetite and nauseated status post IVC filter    Massive pe 7/14 cta  US lower ext 7/22/20- Nonocclusive thrombus within the left popliteal vein, suggestive of some recanalization of the prior thrombus in left popliteal vein.  No additional focus of thrombus.     TTE 7/14-   · The mean diastolic gradient across the mitral valve is 3 mmHg at a heart rate of 70 bpm.  · Concentric left ventricular remodeling.  · Small left ventricular cavity size.  · Hyperdynamic left ventricular systolic function. The estimated ejection fraction is 66%.  · Grade I (mild) left ventricular diastolic dysfunction consistent with impaired relaxation.  · Elevated central venous pressure (15 mmHg).  · The estimated PA systolic pressure is 58 mmHg.  · Pulmonary hypertension present.  · Mild to moderate pulmonic regurgitation.    Medications:  Continuous Infusions:   sodium chloride 0.9% 10 mL/hr at 07/27/20 0658    heparin (porcine) in D5W 10 Units/kg/hr (07/28/20 0426)     Scheduled Meds:   albuterol-ipratropium  3 mL Nebulization Q8H    bisacodyL  5 mg Oral Daily    budesonide  0.5 mg Nebulization Q12H    fluticasone propionate  2 spray  Each Nostril Daily    folic acid  1 mg Oral Daily    lidocaine (PF) 20 mg/mL (2%)        metoprolol succinate  50 mg Oral Daily    montelukast  10 mg Oral QHS    neomycin-bacitracin-polymyxin   Topical (Top) Daily    pantoprazole  40 mg Oral Daily    polyethylene glycol  17 g Oral Daily    sodium chloride 0.9%  10 mL Intravenous Q6H    [START ON 7/29/2020] vancomycin (VANCOCIN) IVPB  1,750 mg Intravenous Q24H     PRN Meds:acetaminophen, bisacodyL, dextrose 50%, dextrose 50%, docusate sodium, glucagon (human recombinant), glucose, glucose, HYDROcodone-acetaminophen, insulin aspart U-100, melatonin, ondansetron, senna-docusate 8.6-50 mg, simethicone, sodium chloride 0.9%, Flushing PICC Protocol **AND** sodium chloride 0.9% **AND** sodium chloride 0.9%, Pharmacy to dose Vancomycin consult **AND** vancomycin - pharmacy to dose       Objective:     Vital Signs (Most Recent):  Temp: 99 °F (37.2 °C) (07/28/20 0346)  Pulse: 81 (07/28/20 0750)  Resp: 17 (07/28/20 0750)  BP: (!) 157/70 (07/28/20 0346)  SpO2: 98 % (07/28/20 0750) Vital Signs (24h Range):  Temp:  [98.4 °F (36.9 °C)-100.4 °F (38 °C)] 99 °F (37.2 °C)  Pulse:  [69-99] 81  Resp:  [15-43] 17  SpO2:  [78 %-100 %] 98 %  BP: (131-199)/(63-93) 157/70     Weight: 103.9 kg (229 lb)  Body mass index is 40.57 kg/m².  Body surface area is 2.15 meters squared.      Intake/Output Summary (Last 24 hours) at 7/28/2020 0840  Last data filed at 7/28/2020 0300  Gross per 24 hour   Intake 1272.27 ml   Output 800 ml   Net 472.27 ml       Significant Labs:   Lab Results   Component Value Date    WBC 9.90 07/28/2020    HGB 8.7 (L) 07/28/2020    HCT 27.6 (L) 07/28/2020     (H) 07/28/2020     07/28/2020     CMP  Sodium   Date Value Ref Range Status   07/27/2020 137 136 - 145 mmol/L Final     Potassium   Date Value Ref Range Status   07/27/2020 4.2 3.5 - 5.1 mmol/L Final     Chloride   Date Value Ref Range Status   07/27/2020 104 95 - 110 mmol/L Final     CO2   Date  Value Ref Range Status   07/27/2020 24 23 - 29 mmol/L Final     Glucose   Date Value Ref Range Status   07/27/2020 112 (H) 70 - 110 mg/dL Final     BUN, Bld   Date Value Ref Range Status   07/27/2020 9 8 - 23 mg/dL Final     Creatinine   Date Value Ref Range Status   07/27/2020 1.3 0.5 - 1.4 mg/dL Final     Calcium   Date Value Ref Range Status   07/27/2020 8.5 (L) 8.7 - 10.5 mg/dL Final     Total Protein   Date Value Ref Range Status   07/14/2020 7.3 6.0 - 8.4 g/dL Final     Albumin   Date Value Ref Range Status   07/14/2020 3.2 (L) 3.5 - 5.2 g/dL Final     Total Bilirubin   Date Value Ref Range Status   07/14/2020 0.5 0.1 - 1.0 mg/dL Final     Comment:     For infants and newborns, interpretation of results should be based  on gestational age, weight and in agreement with clinical  observations.  Premature Infant recommended reference ranges:  Up to 24 hours.............<8.0 mg/dL  Up to 48 hours............<12.0 mg/dL  3-5 days..................<15.0 mg/dL  6-29 days.................<15.0 mg/dL       Alkaline Phosphatase   Date Value Ref Range Status   07/14/2020 77 55 - 135 U/L Final     AST   Date Value Ref Range Status   07/14/2020 39 10 - 40 U/L Final     ALT   Date Value Ref Range Status   07/14/2020 37 10 - 44 U/L Final     Anion Gap   Date Value Ref Range Status   07/27/2020 9 8 - 16 mmol/L Final     eGFR if    Date Value Ref Range Status   07/27/2020 46 (A) >60 mL/min/1.73 m^2 Final     eGFR if non    Date Value Ref Range Status   07/27/2020 40 (A) >60 mL/min/1.73 m^2 Final     Comment:     Calculation used to obtain the estimated glomerular filtration  rate (eGFR) is the CKD-EPI equation.          Assessment plan    [] Extensive pulmonary embolism +  DVT  > long-term anticoagulation.  Currently on heparin gtt.  Transition to Eliquis on discharge    [] Bacteremia MRSA id on board vancomycin.  No evidence of vegetation on echo  > continue ongoing antibiotics    [] Lizzette  witness   > thus far hemoglobin appear to be stable/trending up  > minimize blood draws  > Continue weekly Procrit if patient is in hospital as outpatient she should follow in clinic to continue Procrit to improve her hemoglobin      Fer Cuello MD  Hematology/Oncology  [facility]

## 2020-07-28 NOTE — PLAN OF CARE
Per pharmacy tech, eliquis is covered with $45 copay. ([facility] [address])      [facility] [place] - [address]  [address] [facility] [place] [address]  Phone: [phone] Fax: [phone]    [facility] [place] [address]  [address]  [address]  [facility] [address]  Phone: [phone] Fax: [phone]    [facility] [place] - [address]  [address] [address]  Phone: [phone] Fax: [phone]

## 2020-07-28 NOTE — ASSESSMENT & PLAN NOTE
Patient with extensive bilateral pulmonary emboli and left lower extremity DVT-  Change patient from  IV heparin drip to Eliquis today    Pulmonology following  Plan was previously for patient to have IVC filter placement which has been placed on hold due to patient with MRSA bacteremia

## 2020-07-28 NOTE — PLAN OF CARE
PIETR sent home infusion order to [facility] for review and to assign company.  PITER also sent to [facility] via [employer].         07/28/20 1023   Post-Acute Status   Post-Acute Authorization Other  (HOME INFUSION)   Other Status   (PENDING PAYOR REVIEW)

## 2020-07-28 NOTE — PROGRESS NOTES
[facility]  Adult Nutrition  Progress Note    SUMMARY     Recommendation:   1. Continue Liberalized diabetic diet  -cardiac when PO intakes improve  2.Continue  boost glucose control with meals and snack/condiments per pt preference  3. Weigh pt weekly, consider appetite stimulant     Interventions:  Carbohydrate Modified Diet  Nutrition education- DM  Commercial Food     Goals: meet at least 75% EEN/EPN with meals and supplement by RD f/u  Nutrition Goal Status: not meeting-continues  Communication of RD Recs: (POC, sticky note)     Reason for Assessment     Reason For Assessment: follow up  Diagnosis: (Acute respiratory failure with hypoxemia)  Relevant Medical History: Anemia, COPD, DM, diverticulosis, DVT, Edema, gout, HTN, HLD, reflux, osteoporosis  Interdisciplinary Rounds: attended     General Information Comments: Remote assessment completed; interview and NFPE completed by RD on site today, no wasting found but pt is obese. Patient noted a decreased appetite x1 month (only 1-2 meals daily). She says her taste has changed, everything is bland and she was having nausea. Still having both bijal. with food smell but getting better; eating 10 bites of eat meal. Pt was educated on tips for taste changes and nausea. Will add protein on tray for mid afternoon snack, extra condiments, and boost pudding TID (did not want shakes). DM diet was discussed briefly, pt thinks she is pre-diabetic and not interested in carb counting but will cut back on added sugar/soda.  7/28/20 S/p LAP rosette. Has bilateral pulmonary emboli. Pt's appetite and taste changes have not improved. MD ordered shakes not puddings but pt likes them, drank 1 boost glucose control yesterday, says she did not receive more. Had not drank the boost that came with breakfast, encouraged supplement intakes and using condiments on trays.      Nutrition Discharge Planning: diabetic/cardiac diet     Nutrition Risk Screen     Nutrition Risk  "Screen: no indicators present     Nutrition/Diet History     Patient Reported Diet/Restrictions/Preferences: general  Food Allergies: NKFA  Factors Affecting Nutritional Intake: decreased appetite, altered taste     Anthropometrics    Height: 5' 3"  Height (inches): 63 in  Weight Method: Bed Scale  Weight: 103.9 7/22, 104 kg (229 lb 4.5 oz)  Weight (lb): 229 lb  Ideal Body Weight (IBW), Female: 115 lb  % Ideal Body Weight, Female (lb): 186.09 %  BMI (Calculated): 40 kg/m2  BMI Grade: greater than 40 - morbid obesity     Lab/Procedures/Meds     Pertinent Labs Reviewed: reviewed  BMP  Lab Results   Component Value Date     07/27/2020    K 4.2 07/27/2020     07/27/2020    CO2 24 07/27/2020    BUN 9 07/27/2020    CREATININE 1.3 07/27/2020    CALCIUM 8.5 (L) 07/27/2020    ANIONGAP 9 07/27/2020    ESTGFRAFRICA 46 (A) 07/27/2020    EGFRNONAA 40 (A) 07/27/2020     POCT Glucose   Date Value Ref Range Status   07/26/2020 117 (H) 70 - 110 mg/dL Final   07/26/2020 108 70 - 110 mg/dL Final   07/25/2020 141 (H) 70 - 110 mg/dL Final     Lab Results   Component Value Date    HGBA1C 6.4 (H) 07/13/2020     Pertinent Medications Reviewed: reviewed  Folic acid, polyethylene glycol, NS @ 10 ml/hr, insulin, zofran, senna       Estimated/Assessed Needs  Weight Used For Calorie Calculations: 104 kg (229 lb 4.5 oz)  Energy Calorie Requirements (kcal): 1650 kcal  Energy Need Method: MSJ no AF obesity  Protein Requirements: 0.8-1.0 g protein/kg ( obesity) =  g protein  Weight Used For Protein Calculations: 104 kg  Fluid Requirements (mL): 1650 ml  Estimated Fluid Requirement Method: RDA Method  CHO Requirement: 185-206 g     Nutrition Prescription Ordered     Current Diet Order: DM 2000 kcal     Evaluation of Received Nutrient/Fluid Intake   Energy intake: not meeting  Protein intake: not meeting  Fluid intake: not meeting  % Intake of Estimated Energy Needs: 45-60%  % Meal Intake: 25 - 50 % + boost glucose control 1 x " daily     Nutrition Risk     Level of Risk/Frequency of Follow-up: moderate 2 x weekly     Assessment and Plan    Moderate chronic condition related malnutrition  R/t decreased appetite and taste changes  As evidenced by  1) PO intakes , 75% EEN 1 month  2) mild edema  Intervention: above  modified    Monitor and Evaluation     Food and Nutrient Intake: energy intake, food and beverage intake  Food and Nutrient Adminstration: diet order  Knowledge/Beliefs/Attitudes: food and nutrition knowledge/skill  Anthropometric Measurements: weight, weight change  Biochemical Data, Medical Tests and Procedures: electrolyte and renal panel, glucose/endocrine profile  Nutrition-Focused Physical Findings: overall appearance      Malnutrition Assessment   Scar: 20  Edema: 2+ ankles  Energy Intake (Malnutrition): less than or equal to 75% for greater than or equal to 1 month   NFPE: no wasting seen 7/22     Nutrition Follow-Up     RD Follow-up?: Yes

## 2020-07-28 NOTE — ANESTHESIA POSTPROCEDURE EVALUATION
Anesthesia Post Evaluation    Patient: Sammi Abreu    Procedure(s) Performed: Procedure(s) (LRB):  Transesophageal echo (SADIE) intra-procedure log documentation (N/A)    Final Anesthesia Type: general    Patient location during evaluation: PACU  Patient participation: Yes- Able to Participate  Level of consciousness: awake and alert and oriented  Post-procedure vital signs: reviewed and stable  Pain management: adequate  Airway patency: patent    PONV status at discharge: No PONV  Anesthetic complications: no      Cardiovascular status: blood pressure returned to baseline  Respiratory status: unassisted, spontaneous ventilation and room air  Hydration status: euvolemic  Follow-up not needed.          Vitals Value Taken Time   /70 07/28/20 0346   Temp 37.2 °C (99 °F) 07/28/20 0346   Pulse 75 07/28/20 0346   Resp 20 07/28/20 0346   SpO2 98 % 07/28/20 0346         No case tracking events are documented in the log.      Pain/Don Score: Pain Rating Prior to Med Admin: 5 (7/28/2020  3:35 AM)  Don Score: 10 (7/27/2020  2:16 PM)

## 2020-07-28 NOTE — PLAN OF CARE
AAO x 4, Cardiac monitored SR, VSS, low grade temperature noted of 99, patient complained of pain and warmth to right arm where Picc line was removed yesterday morning, PRN pain medication given, elevated arm, IV antibiotics tolerated, Heparin currently is on hold for 2 hours latest Aptt was 87.1, will decrease by 4 units from 14u/kg/hr to 10u/kg/hr, contact isolation maintained for MRSA in blood, up to bathroom with standby assist, free from falls, safety maintained, will continue to monitor and round.

## 2020-07-29 VITALS
WEIGHT: 226.44 LBS | SYSTOLIC BLOOD PRESSURE: 140 MMHG | BODY MASS INDEX: 40.12 KG/M2 | HEART RATE: 88 BPM | OXYGEN SATURATION: 95 % | RESPIRATION RATE: 20 BRPM | HEIGHT: 63 IN | DIASTOLIC BLOOD PRESSURE: 65 MMHG | TEMPERATURE: 99 F

## 2020-07-29 PROBLEM — J96.01 ACUTE RESPIRATORY FAILURE WITH HYPOXEMIA: Status: RESOLVED | Noted: 2020-07-13 | Resolved: 2020-07-29

## 2020-07-29 LAB
BASOPHILS NFR BLD: 0 % (ref 0–1.9)
BSA FOR ECHO PROCEDURE: 2.15 M2
DIFFERENTIAL METHOD: ABNORMAL
EOSINOPHIL NFR BLD: 2 % (ref 0–8)
ERYTHROCYTE [DISTWIDTH] IN BLOOD BY AUTOMATED COUNT: 15.5 % (ref 11.5–14.5)
HCT VFR BLD AUTO: 29.4 % (ref 37–48.5)
HGB BLD-MCNC: 8.9 G/DL (ref 12–16)
IMM GRANULOCYTES # BLD AUTO: ABNORMAL K/UL
IMM GRANULOCYTES NFR BLD AUTO: ABNORMAL %
LYMPHOCYTES NFR BLD: 19 % (ref 18–48)
MCH RBC QN AUTO: 33 PG (ref 27–31)
MCHC RBC AUTO-ENTMCNC: 30.3 G/DL (ref 32–36)
MCV RBC AUTO: 109 FL (ref 82–98)
METAMYELOCYTES NFR BLD MANUAL: 3 %
MONOCYTES NFR BLD: 8 % (ref 4–15)
NEUTROPHILS NFR BLD: 67 % (ref 38–73)
NEUTS BAND NFR BLD MANUAL: 1 %
NRBC BLD-RTO: 0 /100 WBC
PLATELET # BLD AUTO: 205 K/UL (ref 150–350)
PLATELET BLD QL SMEAR: ABNORMAL
PMV BLD AUTO: 10.1 FL (ref 9.2–12.9)
RBC # BLD AUTO: 2.7 M/UL (ref 4–5.4)
WBC # BLD AUTO: 7.99 K/UL (ref 3.9–12.7)

## 2020-07-29 PROCEDURE — 27000221 HC OXYGEN, UP TO 24 HOURS

## 2020-07-29 PROCEDURE — 25000003 PHARM REV CODE 250: Performed by: NURSE PRACTITIONER

## 2020-07-29 PROCEDURE — 36415 COLL VENOUS BLD VENIPUNCTURE: CPT

## 2020-07-29 PROCEDURE — 94761 N-INVAS EAR/PLS OXIMETRY MLT: CPT

## 2020-07-29 PROCEDURE — 63600175 PHARM REV CODE 636 W HCPCS: Performed by: NURSE PRACTITIONER

## 2020-07-29 PROCEDURE — 25000003 PHARM REV CODE 250: Performed by: INTERNAL MEDICINE

## 2020-07-29 PROCEDURE — 85007 BL SMEAR W/DIFF WBC COUNT: CPT

## 2020-07-29 PROCEDURE — 94799 UNLISTED PULMONARY SVC/PX: CPT

## 2020-07-29 PROCEDURE — 94640 AIRWAY INHALATION TREATMENT: CPT

## 2020-07-29 PROCEDURE — 99231 SBSQ HOSP IP/OBS SF/LOW 25: CPT | Mod: S$GLB,,, | Performed by: INTERNAL MEDICINE

## 2020-07-29 PROCEDURE — 99231 PR SUBSEQUENT HOSPITAL CARE,LEVL I: ICD-10-PCS | Mod: S$GLB,,, | Performed by: INTERNAL MEDICINE

## 2020-07-29 PROCEDURE — 25000242 PHARM REV CODE 250 ALT 637 W/ HCPCS: Performed by: NURSE PRACTITIONER

## 2020-07-29 PROCEDURE — 25000242 PHARM REV CODE 250 ALT 637 W/ HCPCS: Performed by: INTERNAL MEDICINE

## 2020-07-29 PROCEDURE — 85027 COMPLETE CBC AUTOMATED: CPT

## 2020-07-29 RX ORDER — IPRATROPIUM BROMIDE AND ALBUTEROL SULFATE 2.5; .5 MG/3ML; MG/3ML
3 SOLUTION RESPIRATORY (INHALATION) EVERY 8 HOURS
Qty: 270 ML | Refills: 0 | Status: SHIPPED | OUTPATIENT
Start: 2020-07-29 | End: 2022-10-25 | Stop reason: ALTCHOICE

## 2020-07-29 RX ORDER — ACETAMINOPHEN 325 MG/1
650 TABLET ORAL EVERY 6 HOURS PRN
Refills: 0
Start: 2020-07-29

## 2020-07-29 RX ORDER — FOLIC ACID 1 MG/1
1 TABLET ORAL DAILY
Qty: 30 TABLET | Refills: 0 | Status: SHIPPED | OUTPATIENT
Start: 2020-07-29 | End: 2022-04-26 | Stop reason: SDUPTHER

## 2020-07-29 RX ORDER — HYDROGEN PEROXIDE 3 %
20 SOLUTION, NON-ORAL MISCELLANEOUS
Qty: 30 CAPSULE | Refills: 2 | Status: SHIPPED | OUTPATIENT
Start: 2020-07-29 | End: 2022-04-26 | Stop reason: SDUPTHER

## 2020-07-29 RX ADMIN — METOPROLOL SUCCINATE 50 MG: 50 TABLET, FILM COATED, EXTENDED RELEASE ORAL at 09:07

## 2020-07-29 RX ADMIN — IPRATROPIUM BROMIDE AND ALBUTEROL SULFATE 3 ML: .5; 2.5 SOLUTION RESPIRATORY (INHALATION) at 12:07

## 2020-07-29 RX ADMIN — BUDESONIDE INHALATION 0.5 MG: 0.5 SUSPENSION RESPIRATORY (INHALATION) at 07:07

## 2020-07-29 RX ADMIN — FOLIC ACID 1 MG: 1 TABLET ORAL at 09:07

## 2020-07-29 RX ADMIN — BISACODYL 5 MG: 5 TABLET, COATED ORAL at 09:07

## 2020-07-29 RX ADMIN — APIXABAN 10 MG: 2.5 TABLET, FILM COATED ORAL at 09:07

## 2020-07-29 RX ADMIN — FLUTICASONE PROPIONATE 100 MCG: 50 SPRAY, METERED NASAL at 09:07

## 2020-07-29 RX ADMIN — DAPTOMYCIN 1000 MG: 350 INJECTION, POWDER, LYOPHILIZED, FOR SOLUTION INTRAVENOUS at 11:07

## 2020-07-29 RX ADMIN — PANTOPRAZOLE SODIUM 40 MG: 40 TABLET, DELAYED RELEASE ORAL at 09:07

## 2020-07-29 RX ADMIN — BACITRACIN ZINC NEOMYCIN SULFATE POLYMYXIN B SULFATE: 400; 3.5; 5 OINTMENT TOPICAL at 09:07

## 2020-07-29 RX ADMIN — IPRATROPIUM BROMIDE AND ALBUTEROL SULFATE 3 ML: .5; 2.5 SOLUTION RESPIRATORY (INHALATION) at 07:07

## 2020-07-29 NOTE — PLAN OF CARE
07/29/20 1331   Post-Acute Status   Post-Acute Authorization HME;Home Health;Medications   HME Status Set-up Complete   Home Health Status Set-up Complete   Medication Status Set-up Complete

## 2020-07-29 NOTE — PLAN OF CARE
Pt already has apt scheduled with PCP. AVS updated.   CM attempted to schedule follow up with Dr. Velarde- no answer. CM left  requesting office to contact pt to schedule apt within 1-2 weeks.   CM requested follow up apt with Dr. Suero's office from [facility].   Apt scheduled with [facility]

## 2020-07-29 NOTE — PLAN OF CARE
Pt clear for discharge from case management standpoint. Pt discharging to home with home infusion through [facility] and home health services through [facility]. Pt also discharging with new home o2.        07/29/20 0928   Final Note   Assessment Type Final Discharge Note   Anticipated Discharge Disposition Home-Health   Hospital Follow Up  Appt(s) scheduled? Yes

## 2020-07-29 NOTE — NURSING
Tele removed. PICC line left in and working fine. Orders reviewed. Prescriptions sent to pharmacy. Pt called daughter. Waiting on ride. Will be here in about an hour.

## 2020-07-29 NOTE — DISCHARGE SUMMARY
[facility] Medicine  Discharge Summary    Patient Name: Sammi Abreu  MRN: [id number]  Admission Date: 7/13/2020  Hospital Length of Stay: 15 days  Discharge Date and Time:  07/29/2020 3:38 PM  Attending Physician: No att. providers found   Discharging Provider: GENARO Ross  Primary Care Provider: Osmani Villatoro MD    HPI:   This is a 79-year-old female who has a past medical history of arthritis and chronic back pain, fibromyalgia, glaucoma, hyperlipidemia, hypertension, sleep apnea with history of noncompliance with CPAP, morbid obesity, hypertension, GERD, COPD, diabetes type 2, prior DVT, and gallstones.  Please note the patient is also a [facility].  Patient is being received from [facility]patient surgical center.  She is status post laparoscopic cholecystectomy today by Dr. Jomar Soria.  Patient tolerated procedure well.  She however was unable to be discharged postop due to ongoing hypoxemia and has been transferred here for further evaluation and treatment.    Procedure(s) (LRB):  Transesophageal echo (SADIE) intra-procedure log documentation (N/A)      Hospital Course:   The patient was monitored closely during her stay.  She was noted to have an elevated D-dimer.  She was placed on full-dose Lovenox.  Patient when CTA of the chest which showed extensive bilateral pulmonary emboli.  Bilateral venous ultrasound showed left lower extremity DVT.  Pulmonology was consulted  .  Patient's full-dose Lovenox was discontinued and she was placed on IV heparin drip.  Patient was noted to hav and recommended full anticoagulation and also felt patient may benefit from IVC filter placement. Patient with worsening anemia.  Patient noted to be Jehovah witness.  Hematology was  consulted.  Patient was seen by vascular surgeon and plans were made for IVC filter placement.  However in the meantime, patient had blood cultures x2 that came back positive for MRSA.  She was started on  IV vancomycin and  Infectious disease was consulted and the IVC filter placement was canceled.  Repeat blood cultures and echocardiogram were ordered.  Patient had MRI of the thoracic and lumbar spine with significant degenerative disease changes and some bulging noted in areas with some foraminal stenosis but no signs of osteomyelitis noted.  Cardiology was consulted to read echocardiogram and also for possible SADIE if echocardiogram was negative for  any signs of bacterial endocarditis.  Initial echocardiogram was negative.  Patient was scheduled for a SADIE which was negative for any signs of SBE.  Patient's overall condition remained stable.  Her anemia remained stable.  Her heparin drip was discontinued and she was started on oral anticoagulant.  Patient was discharged to home with home health to follow.  The patient was to follow-up with Dr. De Dios for weekly Procrit treatments.  Patient was to receive daptomycin 1 g IV daily times a total of 6 weeks and was to follow-up with Dr. Velarde as outpatient.     Consults:   Consults (From admission, onward)        Status Ordering Provider     Inpatient consult to Cardiology  Once     Provider:  Haydee Lazaro MD    Acknowledged WALE KILPATRICK     Inpatient consult to General Surgery  Once     Provider:  Kang Mcdonald MD    Completed WALE KILPATRICK     Inpatient consult to Hematology/Oncology  Once     Provider:  Dr. De Dios    Completed GABE SHANKS     Inpatient consult to Infectious Diseases  Once     Provider:  Dr. Velarde    Completed MUSTAPHA BOLAND     Inpatient consult to Midline team     P    Completed KANG CISNEROS     Inpatient consult to PICC team ([facility] [place])       P    Completed THEODORE LAUREN     Inpatient consult to Pulmonology  Once     Provider:  Gabe Shanks MD    Completed WALE KILPATRICK     Inpatient consult to Social Work/Case Management         Completed THEODORE LAUREN     Inpatient consult to Vascular Surgery  Once     Provider:   Chandler Pino MD    Acknowledged MUSTAPHA BOLAND        Service: Hospital Medicine    Final Active Diagnoses:    Diagnosis Date Noted POA    DDD (degenerative disc disease), 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] 07/23/2020 Yes    Type 2 diabetes mellitus with stage 2 chronic kidney disease [E11.22, N18.2] 07/22/2020 Yes    MRSA bacteremia [R78.81] 07/22/2020 No    Acute blood loss anemia [D62] 07/20/2020 Yes    History of obstructive sleep apnea [Z86.69]  Yes    Bilateral pulmonary embolism [I26.99] 07/14/2020 Yes    Deep vein thrombosis (DVT) of left lower extremity [I82.402] 07/14/2020 Yes    Elevated troponin [R79.89] 07/14/2020 Yes    Class 2 severe obesity with serious comorbidity in adult [E66.01] 07/13/2020 Yes    Status post laparoscopic cholecystectomy [Z90.49] 07/13/2020 Not Applicable    Type 2 diabetes mellitus with diabetic neuropathy, without long-term current use of insulin [E11.40] 07/13/2020 Yes    COPD (chronic obstructive pulmonary disease) [J44.9] 07/13/2020 Yes    CHARLIE (obstructive sleep apnea) [G47.33] 07/13/2020 Yes    Chronic back pain [M54.9, G89.29] 07/13/2020 Yes    History of DVT in adulthood [Z86.718] 07/13/2020 Not Applicable    GERD (gastroesophageal reflux disease) [K21.9] 04/09/2015 Yes    Pulmonary hypertension [I27.20] 04/09/2015 Yes    Hyperlipidemia associated with type 2 diabetes mellitus [E11.69, E78.5]  Yes    Hypertension associated with diabetes [E11.59, I10] 07/16/2012 Yes    DDD (degenerative disc disease), lumbar [M51.36] 07/05/2012 Yes      Problems Resolved During this Admission:    Diagnosis Date Noted Date Resolved POA    PRINCIPAL PROBLEM:  Acute respiratory failure with hypoxemia [J96.01] 07/13/2020 07/29/2020 Yes    Azotemia [R79.89]  07/23/2020 No    UTI (urinary tract infection) [N39.0] 07/19/2020 07/25/2020 No    Thrombocytopenia [D69.6] 07/15/2020 07/22/2020 No    Hyperkalemia [E87.5] 07/14/2020 07/22/2020 Yes       Discharged Condition:  "stable    Disposition: Home-Health Care c    Follow Up:  Follow-up Information     [facility]    Specialty: DME Provider  Why: DME- home O2 w/portable tank  Contact information:  [address]  [address]  [facility] [address]  [phone]             Jomar Mcdonald MD.    Specialties: General Surgery, Surgery  Contact information:  [address]  [address]  [facility] [address]  [phone]             [facility].    Specialty: Home Health Services  Why: Home Health  Contact information:  [address]  [address]  [facility] [address]  [phone]             [facility].    Why: Infusion  Contact information:  [address]  [address]  [facility] [address]  [phone]           Jade Velarde MD In 2 weeks.    Specialty: Infectious Diseases  Why: Follow-up for MRSA bacteremia. OFFICE SHOULD CONTACT YOU TO SCHEDULE  Contact information:  [address]  [address]  [place] [address]  [phone]                 Patient Instructions:      OXYGEN FOR HOME USE     Order Specific Question Answer Comments   Liter Flow 3    Duration Continuous    Qualifying SpO2: 88%    Testing done at: [facility]    Route nasal cannula    Portable mode: continuous    Device home concentrator portable tank   Length of need (in months): 3 mos    Patient condition with qualifying saturation other chronic pulmonary condition Pulmonary emboli   Height: 5' 3" (1.6 m)    Weight: 103.8 kg (228 lb 14.4 oz)    Does patient have medical equipment at home? CPAP    Does patient have medical equipment at home? walker, rolling    Does patient have medical equipment at home? cane, straight    Does patient have medical equipment at home? bedside commode    Does patient have medical equipment at home? glucometer    Alternative treatment measures have been tried or considered and deemed clinically ineffective. Yes    Vendor: [facility]    Expected Date of Delivery: 7/29/2020      Ambulatory referral/consult to Home Health   Standing " Status: Future   Referral Priority: Routine Referral Type: Home Health   Referral Reason: Specialty Services Required   Requested Specialty: Home Health Services   Number of Visits Requested: 1     Diet Cardiac     Notify your health care provider if you experience any of the following:  difficulty breathing or increased cough     Notify your health care provider if you experience any of the following:  redness, tenderness, or signs of infection (pain, swelling, redness, odor or green/yellow discharge around incision site)     Notify your health care provider if you experience any of the following:  severe uncontrolled pain     Notify your health care provider if you experience any of the following:  temperature >100.4     Notify your health care provider if you experience any of the following:  persistent nausea and vomiting or diarrhea     Activity as tolerated   Order Comments: No heavy lifting, no strenuous activity    No driving till cleared by surgeon       Significant Diagnostic Studies:   Recent Labs   Lab 07/28/20  0130 07/29/20  0448   WBC 9.90 7.99   HGB 8.7* 8.9*   HCT 27.6* 29.4*    205     CMP  Sodium   Date Value Ref Range Status   07/27/2020 137 136 - 145 mmol/L Final     Potassium   Date Value Ref Range Status   07/27/2020 4.2 3.5 - 5.1 mmol/L Final     Chloride   Date Value Ref Range Status   07/27/2020 104 95 - 110 mmol/L Final     CO2   Date Value Ref Range Status   07/27/2020 24 23 - 29 mmol/L Final     Glucose   Date Value Ref Range Status   07/27/2020 112 (H) 70 - 110 mg/dL Final     BUN, Bld   Date Value Ref Range Status   07/27/2020 9 8 - 23 mg/dL Final     Creatinine   Date Value Ref Range Status   07/27/2020 1.3 0.5 - 1.4 mg/dL Final     Calcium   Date Value Ref Range Status   07/27/2020 8.5 (L) 8.7 - 10.5 mg/dL Final     Total Protein   Date Value Ref Range Status   07/14/2020 7.3 6.0 - 8.4 g/dL Final     Albumin   Date Value Ref Range Status   07/14/2020 3.2 (L) 3.5 - 5.2 g/dL  Final     Total Bilirubin   Date Value Ref Range Status   07/14/2020 0.5 0.1 - 1.0 mg/dL Final     Comment:     For infants and newborns, interpretation of results should be based  on gestational age, weight and in agreement with clinical  observations.  Premature Infant recommended reference ranges:  Up to 24 hours.............<8.0 mg/dL  Up to 48 hours............<12.0 mg/dL  3-5 days..................<15.0 mg/dL  6-29 days.................<15.0 mg/dL       Alkaline Phosphatase   Date Value Ref Range Status   07/14/2020 77 55 - 135 U/L Final     AST   Date Value Ref Range Status   07/14/2020 39 10 - 40 U/L Final     ALT   Date Value Ref Range Status   07/14/2020 37 10 - 44 U/L Final     Anion Gap   Date Value Ref Range Status   07/27/2020 9 8 - 16 mmol/L Final     eGFR if    Date Value Ref Range Status   07/27/2020 46 (A) >60 mL/min/1.73 m^2 Final     eGFR if non    Date Value Ref Range Status   07/27/2020 40 (A) >60 mL/min/1.73 m^2 Final     Comment:     Calculation used to obtain the estimated glomerular filtration  rate (eGFR) is the CKD-EPI equation.           Pending Diagnostic Studies:     Procedure Component Value Units Date/Time    X-Ray Chest 1 View for PICC_Central line [[id number]]     Order Status: Sent Lab Status: No result        folate 14.1  Vitamin B12 774    PT 10.7 INR 1.0  Magnesium 2.1  CRP on admit 77.8 on discharge 40.0    Procalcitonin 0.15    Transesophageal Echo (SADIE) Complete w/ Limited Doppler and Color  Order# [id number]  Reading physician: Haydee Lazaro MD Ordering physician: Haydee Lazaro MD Study date: 7/27/20   Reason for Exam  Priority: Routine  Dx: MRSA (methicillin resistant staph aureus) culture positive [Z22.322 (ICD-10-CM)]   Comments:    Staff    Performing Sonographer   Emily Lozano    Vitals    Height Weight BMI (Calculated) BSA (Calculated - sq m) BP             Conclusion    · Hyperdynamic left ventricular systolic function.  The estimated ejection fraction is 65 - 70 %.  · Normal appearing left atrial appendage. No thrombus is present in the appendage. Normal appendage velocities.  · Mild mitral regurgitation.  · Mild tricuspid regurgitation.  · No evidence of endocarditis  · PICC line at the cavoatrial junction      Study Details    A limited echo was performed using color flow Doppler and limited spectral Doppler. A complete echo was performed using complete 2D.   Echocardiography Findings    Left Ventricle Hyperdynamic 65 - 70% ejection fraction .  Normal left ventricular cavity size.   Right Ventricle Normal cavity size.   Left Atrium Patent foramen ovale not present. No thrombus present. No mass present. No spontaneous echo contrast. Normal appearing left atrial appendage. No thrombus is present in the appendage. Normal appendage velocities.   Right Atrium A catheter is present in the right atrium.   Aortic Valve Aortic valve sclerosis is mild. There is no aortic stenosis. No regurgitation.   Mitral Valve The mitral valve appears structurally normal. Mild regurgitation.   Tricuspid Valve Mild regurgitation.   Pulmonic Valve The pulmonic valve is structurally normal.   Pericardium No pericardial effusion.   Wall Motion    Score Index: 1.00              The left ventricular wall motion is globally hyperkinetic.               Result Image Hyperlink     Show images for Transesophageal echo (SADIE)       Medications  (Filter: Administrations occurring from 07/27/20 1338 to 07/27/20 1346)  None   Signed    Electronically signed by Haydee Lazaro MD on 7/29/20 at 1129 CDT     Microbiology Results (Last 90 Days)    Procedure Component Value Units Date/Time   Blood culture [[id number]] Collected: 07/23/20 0514   Order Status: Completed Specimen: Blood from Antecubital, Left Arm Updated: 07/28/20 1212    Blood Culture, Routine No growth after 5 days.   Blood culture [[id number]] Collected: 07/22/20 1412   Order Status: Completed Specimen:  Blood from Peripheral, Right Hand Updated: 07/27/20 2212    Blood Culture, Routine No growth after 5 days.   Narrative:     Collection has been rescheduled by [email or web address] at 07/22/2020 12:48 Reason:   nurse billie checking on it will call if needed   Collection has been rescheduled by [email or web address] at 07/22/2020 12:48 Reason:   nurse billie checking on it will call if needed   Blood culture [[id number]]    Order Status: Canceled Specimen: Blood    Blood culture [[id number]] Collected: 07/22/20 0610   Order Status: Completed Specimen: Blood Updated: 07/27/20 1012    Blood Culture, Routine No growth after 5 days.   Urine culture [[id number]] Collected: 07/18/20 1920   Order Status: Completed Specimen: Urine Updated: 07/20/20 1334    Urine Culture, Routine Multiple organisms isolated. None in predominance.  Repeat if     clinically necessary.   Narrative:     Specimen Source->Urine   Blood culture [[id number]] (Abnormal) Collected: 07/18/20 1842   Order Status: Completed Specimen: Blood Updated: 07/22/20 0937    Blood Culture, Routine Gram stain peds bottle: Gram positive cocci in clusters resembling Staph      Results called to and read back by: Marika Mcclendon RN 07/20/2020  05:53     METHICILLIN RESISTANT STAPHYLOCOCCUS AUREUS   ID consult required at [facility].   For susceptibility see order #[id number]   Abnormal    Blood culture [[id number]] (Abnormal)  Collected: 07/18/20 1653   Order Status: Completed Specimen: Blood Updated: 07/22/20 0937    Blood Culture, Routine Gram stain juan bottle: Gram positive cocci in clusters resembling Staph      Results called to and read back by: Marika Mcclendon RN 07/20/2020  05:53     Gram stain aer bottle: Gram positive cocci in clusters resembling Staph     METHICILLIN RESISTANT STAPHYLOCOCCUS AUREUS   ID consult required at [facility].   Abnormal    Susceptibility     Methicillin resistant staphylococcus aureus     CULTURE, BLOOD      Clindamycin >4 mcg/mL Resistant     Erythromycin >4 mcg/mL Resistant     Oxacillin >2 mcg/mL Resistant     Penicillin 8 mcg/mL Resistant     Tetracycline <=4 mcg/mL Sensitive     Trimeth/Sulfa <=0.5/9.5 m... Sensitive     Vancomycin 1 mcg/mL Sensitive            Linear View         Urine Culture High Risk [[id number]] Collected: 07/18/20 0740   Order Status: Canceled Specimen: Urine, Clean Catch    Urine culture [[id number]] Collected: 07/18/20 0425   Order Status: Completed Specimen: Urine Updated: 07/19/20 1253    Urine Culture, Routine Multiple organisms isolated. None in predominance.  Repeat if     clinically necessary.   Narrative:     Specimen Source->Urine   Blood culture [[id number]] Collected: 06/07/20 0900   Order Status: Completed Specimen: Blood from Peripheral, Antecubital, Right Updated: 06/12/20 1032    Blood Culture, Routine No growth after 5 days.   Blood culture [[id number]] Collected: 06/07/20 0858   Order Status: Completed Specimen: Blood from Peripheral, Antecubital, Left Updated: 06/12/20 1032    Blood Culture, Routine No growth after 5 days.     Radiology Results (last 7 days)    Procedure Component Value Units Date/Time   X-Ray Chest 1 View S/P PICC Line by Nursing [[id number]] Resulted: 07/27/20 1326   Order Status: Completed Updated: 07/27/20 1328   Narrative:     EXAMINATION:   XR CHEST 1 VIEW S/P PICC LINE BY NURSING     CLINICAL HISTORY:   evaluate picc placement;     TECHNIQUE:   Single AP chest     COMPARISON:   07/19/2020     FINDINGS:   A left PICC line has its tip at the cavoatrial junction.  Heart size normal.  There is calcification of the aorta.  There is relative elevation of the right hemidiaphragm.  Hazy bibasilar opacification is present.  There is no pleural effusion or pneumothorax.    Impression:       Well-positioned left PICC line.  Bibasilar atelectasis or infiltrate without significant change.       Electronically signed by: Jomar Walls MD   Date: 07/27/2020   Time:  13:26   MRI Thoracic Spine W WO Cont [[id number]] Resulted: 07/23/20 1255   Order Status: Completed Updated: 07/23/20 1257   Narrative:     EXAMINATION:   MRI THORACIC SPINE W WO CONTRAST     CLINICAL HISTORY:   MRSA bacteremia;  rule out discitis;     TECHNIQUE:   Multiplanar multisequence pre and post contrast-enhanced thoracic spine MRI was performed.  10 mL Gadavist was administered intravenously without reported reaction.     COMPARISON:   Lumbar spine MRI without and with contrast obtained concurrently.  Thoracic spine MRI dated 02/28/2012     FINDINGS:   Vertebral column:     There is degenerative change with mild bulging of the annulus and facet joint arthropathy at multiple levels but there is no acute fracture.  There is moderate to marked disc space narrowing at the T1-2 level where there is associated degenerative endplate signal change and osteophyte formation.  Disc space narrowing at the T1-2 level has progressed compared to the prior MRI from 20/12.  There is no recent prior imaging study of the thoracic spine.  Thoracic vertebral body height is preserved.  There is no acute fracture.  There is stable trace anterolisthesis of T10 on T11.  There is multilevel endplate osteophyte formation.  There is no abnormal enhancement within the vertebral bodies or discs to suggest possible discitis or osteomyelitis.  There is a right foraminal perineural cyst at the T10-11 level.     Spinal canal, cord, epidural space:     The spinal canal is developmentally normal.  The thoracic cord is normal in caliber, contour and signal intensity.  There is no abnormal enhancement in the cord.  There is no abnormal epidural soft tissue mass or fluid collection.     There is minimal bulging of the annulus at several levels.  There is also some degree of facet joint arthropathy.  There is no significant spinal canal or foraminal stenosis suspected.  There is no cord compression.     Soft tissues: The prevertebral soft tissues  are grossly normal.    Impression:       1. There is no acute or significant abnormality.  There is no fracture.  There is stable trace anterolisthesis of T10 on T11.  There is no evidence of discitis or osteomyelitis.  There is no significant spinal stenosis and there is no cord compression.  There is no significant foraminal stenosis suspected.  There is minimal bulging of the annulus at several levels and some degree of facet joint arthropathy throughout the mid and lower thoracic spine.       Electronically signed by: Dario Olson MD   Date: 07/23/2020   Time: 12:55   MRI Lumbar Spine W WO Cont [[id number]] Resulted: 07/23/20 1248   Order Status: Completed Updated: 07/23/20 1251   Narrative:     EXAMINATION:   MRI LUMBAR SPINE W WO CONTRAST     CLINICAL HISTORY:   ro discitis;     TECHNIQUE:   Multiplanar multisequence pre and post contrast enhanced lumbar spine MRI was performed after the uneventful intravenous administration of 10 mL Gadavist.     COMPARISON:   Lumbar spine MRI without contrast dated 06/07/2020     FINDINGS:   Vertebral column: There is multilevel degenerative disc and facet disease which will be described by level but overall is without definite change compared to the prior study.  Vertebral body height is preserved.  There is no acute fracture.  There is marked disc space narrowing at the L1-2 and L2-3 levels where there is associated degenerative endplate signal change and osteophyte formation.  There is stable trace retrolisthesis of L2 on L3 and L3 on L4 with there is endplate osteophyte formation.  There is mild disc space narrowing at the L3-4 level.  Baseline marrow signal is normal.  There is no abnormal enhancement within the discs or vertebral bodies to suggest discitis or osteomyelitis.     Spinal canal, conus, epidural space: The spinal canal is developmentally normal.  The conus terminates at the level of L1 and is normal in contour and signal intensity.  There is no abnormal  epidural mass or fluid collection.  There is no abnormal epidural enhancement.     Findings by level:     On the sagittal images, there is mild degenerative change at the T9-10 through T11-12 levels but there is no significant spinal stenosis and there is no cord compression.  The foramina are somewhat suboptimally evaluated.  There is, however, a right foraminal perineural cyst at the T10-11 level.     T12-L1: There is mild facet joint arthropathy.  There is no spinal canal or significant foraminal stenosis.     L1-2: There is marked disc space narrowing.  There is a diffuse disc bulge with osteophytic ridging and there is facet joint arthropathy.  There is borderline spinal stenosis with stable mild-to-moderate right and mild left foraminal stenosis.     L2-3: There is marked disc space narrowing.  There is a diffuse disc bulge with osteophytic ridging, left greater than right facet joint arthropathy with ligamentum flavum thickening resulting in mild-to-moderate spinal stenosis with mild-to-moderate right and moderate left foraminal stenosis without significant change.     L3-4: There is mild disc space narrowing.  There is a diffuse disc bulge with osteophytic ridging eccentric to the left.  Also, there is left greater than right facet joint arthropathy with ligamentum flavum thickening.  The result is severe spinal canal, left greater than right lateral recess stenosis as well as severe left and moderate right foraminal stenosis without definite change.     L4-5: There is no significant disc space narrowing but there is marked facet joint arthropathy with ligamentum flavum thickening bilaterally.  There are bilateral facet joint effusions.  There is periarticular edema and mild periarticular enhancement.  There is a broad central disc protrusion.  The result is moderate spinal stenosis, moderate bilateral lateral recess stenosis and moderate bilateral foraminal stenosis without definite change.     L5-S1: There  is facet joint arthropathy with ligamentum flavum thickening.  There is a diffuse disc bulge.  There is no significant spinal stenosis.  There is moderate-to-marked right and moderate left foraminal stenosis without change.     Soft tissues: The prevertebral soft tissues are grossly normal.  The aorta is normal in caliber.    Impression:       1. There is extensive multilevel degenerative disc and facet disease.  There is no definite change in the appearance of the lumbar spine compared to the prior MRI dated 06/07/2020.  There is no fracture.  There are no findings to suggest discitis or osteomyelitis.  There is however some degree of disc space narrowing, disc bulge with osteophytic ridging with a without disc protrusion in addition to facet joint arthropathy contributing to multilevel spinal canal, lateral recess and foraminal stenosis.  These findings are discussed in detail by level above.   2. There is extensive facet joint arthropathy most apparent at the L4-5 level where there is also periarticular edema and mild enhancement       Electronically signed by: Dario Olson MD   Date: 07/23/2020   Time: 12:48     Medications:  Reconciled Home Medications:      Medication List      START taking these medications    acetaminophen 325 MG tablet  Commonly known as: TYLENOL  Take 2 tablets (650 mg total) by mouth every 6 (six) hours as needed (Do not take with any other Tylenol or acetaminophen containing products).     albuterol-ipratropium 2.5 mg-0.5 mg/3 mL nebulizer solution  Commonly known as: DUO-NEB  Take 3 mLs by nebulization every 8 (eight) hours.     * apixaban 5 mg Tab  Commonly known as: ELIQUIS  Take 2 tablets (10 mg total) by mouth 2 (two) times daily. for 6 days     * apixaban 5 mg Tab  Commonly known as: ELIQUIS  Take 1 tablet (5 mg total) by mouth 2 (two) times daily.  Start taking on: August 4, 2020     folic acid 1 MG tablet  Commonly known as: FOLVITE  Take 1 tablet (1 mg total) by mouth once  daily.     multivitamin capsule  Take 1 capsule by mouth once daily.     sodium chloride 0.9% SolP 50 mL with DAPTOmycin 350 mg SolR 1,000 mg  Inject 1,000 mg into the vein once daily.         * This list has 2 medication(s) that are the same as other medications prescribed for you. Read the directions carefully, and ask your doctor or other care provider to review them with you.            CHANGE how you take these medications    ascorbic acid (vitamin C) 100 MG tablet  Commonly known as: VITAMIN C  Take 5 tablets (500 mg total) by mouth every evening.  What changed:   · how much to take  · when to take this     esomeprazole 20 MG capsule  Commonly known as: NEXIUM  Take 1 capsule (20 mg total) by mouth before breakfast.  What changed:   · when to take this  · reasons to take this        CONTINUE taking these medications    budesonide-formoterol 160-4.5 mcg 160-4.5 mcg/actuation Hfaa  Commonly known as: SYMBICORT  Inhale 2 puffs into the lungs every 12 (twelve) hours. Controller     cyanocobalamin (vitamin B-12) 2,500 mcg Lozg  Place 2 tablets under the tongue once daily.     diclofenac 25 MG Tbec  Commonly known as: VOLTAREN  Take 1 tablet (25 mg total) by mouth 3 (three) times daily as needed.     fluticasone propionate 50 mcg/actuation nasal spray  Commonly known as: FLONASE  2 sprays (100 mcg total) by Each Nare route once daily.     HYDROcodone-acetaminophen 5-325 mg per tablet  Commonly known as: NORCO  Take 1 tablet by mouth every 6 (six) hours as needed for Pain.     lactulose 10 gram/15 mL solution  Commonly known as: CHRONULAC  Take 30 mLs (20 g total) by mouth 3 (three) times daily. 2 Teaspoon(s) Oral PRN Every evening.     metoprolol succinate 50 MG 24 hr tablet  Commonly known as: TOPROL-XL  Take 1 tablet (50 mg total) by mouth once daily.     montelukast 10 mg tablet  Commonly known as: SINGULAIR  Take 1 tablet (10 mg total) by mouth every evening.     spironolactone 25 MG tablet  Commonly known as:  ALDACTONE  Take 1 tablet (25 mg total) by mouth once daily.        STOP taking these medications    albuterol 2.5 mg /3 mL (0.083 %) nebulizer solution  Commonly known as: PROVENTIL     aspirin 81 MG EC tablet  Commonly known as: ECOTRIN     furosemide 40 MG tablet  Commonly known as: LASIX     losartan 100 MG tablet  Commonly known as: COZAAR            Indwelling Lines/Drains at time of discharge:   Lines/Drains/Airways     Peripherally Inserted Central Catheter Line            PICC Double Lumen 07/27/20 1233 left basilic 2 days                Time spent on the discharge of patient: 72  minutes  Patient was seen and examined on the date of discharge and determined to be suitable for discharge.         GENARO Ross  Department of Hospital Medicine  [facility]

## 2020-07-29 NOTE — PLAN OF CARE
I sent the pts updated HH orders to [facility] and [facility]. I sent [facility] the pts AVS . I updated Tere with [facility] that the pt is discharging home and she is going to get the concentrator sent to the home today. I updated the pts AVS with [facility], [facility] and [facility]. Sadaf Tamayo, [facility]     07/29/20 0922   Post-Acute Status   Post-Acute Authorization HME;Medications;Home Health   HME Status Set-up Complete   Home Health Status Set-up Complete   Medication Status Set-up Complete

## 2020-07-29 NOTE — PROGRESS NOTES
Went to visit patient inpatient at [facility]. She was in the middle of being discharged. She was last seen by Dr. Cuello on 07/28/2020. She is cleared for discharge and I will send a message to our nursing staff to have the patient set up as a hospital f/u in clinic with Dr. Cuello within the week.

## 2020-07-29 NOTE — PROGRESS NOTES
Progress Note  Infectious Disease    Reason for Consult:      HPI: Sammi Abreu is a  79 y.o. female with past medical history of obesity, DM 2, HTN, DLP, CHARLIE, noncompliance with CPAP, COPD, GERD, DJD, fibromyalgia, chronic back pain, Jehovah witness, allergic to penicillin, anemia.      Patient was at outpatient surgical center, after laparoscopic cholecystectomy by Jomar Hall on 07/13/2020.  Postop she was hypoxemic and could not be discharged.  She was diagnosed with left popliteal DVT and extensive bilateral PE.  She was admitted and anticoagulated with heparin.  Patient used to be on anticoagulation for a long time but it was discontinued, patient does not know why it was started and white was stopped    While hospitalized she was found to have MRSA bacteremia.  Id consult was called.    I came and show patient.  She has been dealing with lower back pain and bilateral groin pain rule out June.  She so spine specialist who D a steroid injection at the L-spine by the end of June.  Her lower back pain continued to worsen it looks like it went across the abdomen and words the left anterior thigh.  She she was hospitalized and was told that her pain was due to gallbladder issues.  Initially she did not want to proceed cholecystectomy but the pain worsened so bad that she had her home health nurse contact the general surgeons office and scheduled an elective cholecystectomy.  She seems concerned with the bacteremia and the and tries Little so.    Patient is on 3 L O2.  Pulse ox 95  Afebrile.  T-max 99.3°    07/23/2020.  Afebrile.  T-max 99.9°.  Repeat blood cultures are negative.  Tolerating vancomycin well.  MRI results noted.  No diskitis to my surprise, which is great news  07/24/2020  tmax 99.  No new complaints.  Receiving vancomycin for MRSA bacteremia.  Heparin drip for PE.  07/27/2020.  Chart reviewed.  No new events over over the weekend.  T-max 99.6°.  97% on 3 L. conor no evidence of  endocarditis.  07/29/2020.  She feels much better today.  T-max 99.6°.  CRP is improved.  The lower back pain she had POA is almost resolved.  She thought her the pain happen due to degenerative joint disease but now we are wondering if the pulmonary embolism was causing her that pain.  My concern is if MRSA was causing her some pain.  No matter what patient feels improved.  She is walking back and forth to restroom.  She still needs oxygen.  Her sister and mother will be with her for a couple of weeks.  She is concerned to give them MRSA.  I advised on bleach in and cleaning surfaces.  She has an appointment with Dr. ellsworth, her PCP tomorrow.  She is asking me if she should keep that appointment.  I asked her if she can postpone it by a week S she took but she feels that she if she tries to do that she may lose the appointment spot altogether.  I advised her to listen to her body and decide  At the site of peripheral IV on the left arm there is some erythema and swelling but that is markedly improved compared to 2 days before.  I again advised to upper apply warm compresses and keep it elevated.    Antibiotics (From admission, onward)    Start     Stop Route Frequency Ordered    07/28/20 1830  DAPTOmycin (CUBICIN) 1,000 mg in sodium chloride 0.9% IVPB      -- IV Every 24 hours (non-standard times) 07/28/20 1722    07/16/20 1200  neomycin-bacitracin-polymyxin ointment      -- Top Daily 07/16/20 1052        Antifungals (From admission, onward)    None          EXAM & DIAGNOSTICS REVIEWED:   Vitals:     Temp:  [98.5 °F (36.9 °C)-99.6 °F (37.6 °C)]   Temp: 98.7 °F (37.1 °C) (07/29/20 0742)  Pulse: 106 (07/29/20 0742)  Resp: 18 (07/29/20 0742)  BP: (!) 144/70 (07/29/20 0742)  SpO2: (!) 93 % (07/29/20 0742)    Intake/Output Summary (Last 24 hours) at 7/29/2020 0838  Last data filed at 7/29/2020 0425  Gross per 24 hour   Intake 1090 ml   Output 1501 ml   Net -411 ml       General:  In NAD. Alert and attentive, cooperative,  comfortable wearing oxygen nasal cannula    Eyes:  Anicteric, PERRL, EOMI  ENT:  No ulcers, exudates, thrush, nares patent, dentition is fair  Neck:  supple, no masses or adenopathy appreciated  Lungs: Diminished  Heart:  RRR, no gallop/murmur/rub noted  Abd:  Soft, NT, ND, normal BS, no masses or organomegaly appreciated.  :  Voids/Little, urine clear, no flank tenderness  Musc:  Joints without effusion, swelling, erythema, synovitis, muscle wasting.    Skin:  No rashes. No palmar or plantar lesions. No subungual petechiae  Wound:   Neuro: Alert, attentive, speech fluent, face symmetric, moves all extremities, no focal weakness.    Psych:  She looks slightly anxious and she looks like she doubts the plan of care.  I have to reviewed multiple times and explain carefully each and everything.  Lymphatic:     No cervical, supraclavicular, axillary, or inguinal nodes  Extrem: No edema, erythema, phlebitis, cellulitis, warm and well perfused  VAD:       Isolation:      Lines/Tubes/Drains:    General Labs reviewed:  Recent Labs   Lab 07/27/20  0449 07/28/20  0130 07/29/20  0448   WBC 10.32 9.90 7.99   HGB 8.8* 8.7* 8.9*   HCT 28.5* 27.6* 29.4*    221 205       Recent Labs   Lab 07/23/20  1002 07/24/20  1844 07/27/20  0449   NA  --   --  137   K  --   --  4.2   CL  --   --  104   CO2  --   --  24   BUN  --   --  9   CREATININE 1.2 1.5* 1.3   CALCIUM  --   --  8.5*           Micro:  Microbiology Results (last 7 days)     Procedure Component Value Units Date/Time    Blood culture [[id number]] Collected: 07/23/20 0514    Order Status: Completed Specimen: Blood from Antecubital, Left Arm Updated: 07/28/20 1212     Blood Culture, Routine No growth after 5 days.    Blood culture [[id number]] Collected: 07/22/20 1412    Order Status: Completed Specimen: Blood from Peripheral, Right Hand Updated: 07/27/20 2212     Blood Culture, Routine No growth after 5 days.    Narrative:      Collection has been rescheduled by PVL1 at  07/22/2020 12:48 Reason:   nurse billie checking on it will call if needed  Collection has been rescheduled by PVL1 at 07/22/2020 12:48 Reason:   nurse billie checking on it will call if needed    Blood culture [[id number]] Collected: 07/22/20 0610    Order Status: Completed Specimen: Blood Updated: 07/27/20 1012     Blood Culture, Routine No growth after 5 days.    Blood culture [[id number]]     Order Status: Canceled Specimen: Blood     Blood culture [[id number]]  (Abnormal) Collected: 07/18/20 1842    Order Status: Completed Specimen: Blood Updated: 07/22/20 0937     Blood Culture, Routine Gram stain peds bottle: Gram positive cocci in clusters resembling Staph       Results called to and read back by: Marika Mcclendon RN 07/20/2020  05:53      METHICILLIN RESISTANT STAPHYLOCOCCUS AUREUS  ID consult required at [facility] and [facility] locations.  For susceptibility see order #[id number]      Blood culture [[id number]]  (Abnormal)  (Susceptibility) Collected: 07/18/20 1653    Order Status: Completed Specimen: Blood Updated: 07/22/20 0937     Blood Culture, Routine Gram stain juan bottle: Gram positive cocci in clusters resembling Staph       Results called to and read back by: Marika Mcclendon RN 07/20/2020  05:53      Gram stain aer bottle: Gram positive cocci in clusters resembling Staph      METHICILLIN RESISTANT STAPHYLOCOCCUS AUREUS  ID consult required at [facility] and [facility] locations.          Imaging Reviewed:   07/23/20201 MRI There is no acute or significant abnormality.  There is no fracture.  There is stable trace anterolisthesis of T10 on T11.  There is no evidence of discitis or osteomyelitis.  There is no significant spinal stenosis and there is no cord compression.  There is no significant foraminal stenosis suspected.  There is minimal bulging of the annulus at several levels and some degree of facet joint arthropathy throughout the mid and lower thoracic spine.     CXR07/17No acute  radiographic abnormality.     CT   Bilateral pulmonary emboli with extensive clot burden and involvement of main pulmonary arteries.  The patient's nurse, Anil was called at the time of dictation.  Additionally, a message was sent via secure chat to the hospitalist taking care of the patient.  Mediastinal adenopathy of uncertain etiology.  This may be inflammatory/reactive or neoplastic, and follow-up is needed.  Development of nonspecific foci of ground-glass opacity throughout both lungs.     US Nonocclusive thrombus within the left popliteal vein, suggestive of some recanalization of the prior thrombus in left popliteal vein.  No additional focus of thrombus.     Cardiology:  · The mean diastolic gradient across the mitral valve is 3 mmHg at a heart rate of 70 bpm.  · Concentric left ventricular remodeling.  · Small left ventricular cavity size.  · Hyperdynamic left ventricular systolic function. The estimated ejection fraction is 66%.  · Grade I (mild) left ventricular diastolic dysfunction consistent with impaired relaxation.  · Elevated central venous pressure (15 mmHg).  · The estimated PA systolic pressure is 58 mmHg.  · Pulmonary hypertension present.  · Mild to moderate pulmonic regurgitation.      IMPRESSION & PLAN     MRSA bacteremia.  On 07/18.   MRI of T-spine did not show diskitis.  ECHO negative for vegetation, conor negative for vegetation. Source of MRSA? Recent TYLER? Lt antecubital IVsite?  Has received steroids, ES I and Decadron on 07/13.  Elevated CRP 77--45.2--40  Being treated for PE, and DVT.  I suspect it precipitated due to inactivity due to lower back pain.  History of DM  History of bilateral hip replacement  History of Jehovah witness will have to be careful with amount of blood draws    PMHx of obesity, DM 2, HTN, DLP, CHARLIE, noncompliance with CPAP, COPD, GERD, DJD, fibromyalgia, chronic back pain, Jehovah witness, allergic to penicillin, anemia.       Recommendations:  ECHO has no  vegetation.  SADIE no vegetation  CRP improving really slow 60.9--45.2--40.0.       daptomycin 1 g IV q.day for 6 weeks.  I advised her on side effects of daptomycin.  Weekly labs CRP ESR CPK, CBC with diff, CMP.   Home health for blood drawn and left arm PICC line care  Follow up in with me in ID Clinic at discharge in 2 weeks

## 2020-07-29 NOTE — PLAN OF CARE
Patient remains on aerosol tx via dfuoneb now and q8hr and 0.5mg pulmicort now and q12hr.  Ht 87 and 02 saturation 97% on nc at 3lpm.  Patient averaging 1000ml on IS.

## 2020-07-29 NOTE — PLAN OF CARE
Per Marissa with [facility]- the pt needs home O2 testing completed as the last test has . I updated her that the pts testing was completed and she asked for the provider to correct the home O2 order to reflect the new testing results. I updated ASPEN Marrero via secure chat. Sdaaf Tamayo, [facility]     20 1043   Post-Acute Status   Post-Acute Authorization E   Home Health Status Awaiting Clarification of Orders

## 2020-07-29 NOTE — CARE UPDATE
07/29/20 1026   Patient Assessment/Suction   Level of Consciousness (AVPU) alert   Respiratory Effort Unlabored   Home Oxygen Qualification   Room Air SpO2 At Rest (!) 88 %   Room Air SpO2 on Exertion (!) 87 %   SpO2 During Exertion on O2 94 %   Heart Rate on O2 87 bpm   Exertion O2 LPM 3 LPM   SpO2 on Recovery 94 %   Recovery Heart Rate 87 bpm   Recovery O2 LPM 3 LPM   Home O2 Eval Comments pt. qualifies

## 2020-07-29 NOTE — PLAN OF CARE
CM is awaiting updated HH orders to send to [facility]. The pt has home O2 delivered to bedside from previous . Pts AVS updated. Sadaf Tamayo, JODI     07/29/20 0917   Post-Acute Status   Post-Acute Authorization Home Health   Home Health Status Awaiting Orders for HH

## 2020-07-29 NOTE — PLAN OF CARE
Patient alert and oriented.  Up to bathroom with assist.  Full code.  Sinus rhythm on cardiac monitor.  3 liters on nasal canula.  MARIANELA hose on.  Contact isolation maintained.  Call light in reach.  Bed in low/locked position.  Bed alarm set for patient's safety.

## 2020-07-29 NOTE — PROGRESS NOTES
Progress note  Infectious disease      Chart reviewed  Afebrile  O2 sat 99% on 3 liters/minute nasal cannula.  Heparin has been discontinued.  Eliquis is started.  PICC line was placed  CRP 77.8--60.9--45.2--40         Plan is staying the same:      If patient goes to LTAC===== Continue vancomycin x 6 weeks   Weekly labs CRP ESR  CBC with diff, CMP.    And biweekly vancomycin trough       If patient goes home===== daptomycin 1 g IV q.day for 6 weeks.    Weekly labs CRP ESR CPK, CBC with diff, CMP.    Follow up in with me in ID Clinic at discharge

## 2020-07-29 NOTE — PLAN OF CARE
Pt clear for discharge from case management standpoint. Pt discharging to home with home infusion through [facility] and home health services through [facility]. Pt also discharging with new home o2.         07/29/20 1146   Final Note   Assessment Type Final Discharge Note   Anticipated Discharge Disposition Home-Health   Hospital Follow Up  Appt(s) scheduled? Yes

## 2020-07-29 NOTE — PLAN OF CARE
07/28/20 1957   Patient Assessment/Suction   Level of Consciousness (AVPU) alert   Respiratory Effort Normal;Unlabored   Expansion/Accessory Muscles/Retractions no use of accessory muscles   All Lung Fields Breath Sounds diminished   CHRISTOPHER Breath Sounds diminished   LLL Breath Sounds diminished   RUL Breath Sounds diminished   RML Breath Sounds diminished   RLL Breath Sounds diminished   Rhythm/Pattern, Respiratory unlabored;pattern regular   Cough Frequency infrequent   Cough Type nonproductive   PRE-TX-O2   O2 Device (Oxygen Therapy) nasal cannula   $ Is the patient on Low Flow Oxygen? Yes   Flow (L/min) 3   Oxygen Concentration (%) 32   SpO2 (!) 94 %   Pulse Oximetry Type Intermittent   $ Pulse Oximetry - Multiple Charge Pulse Oximetry - Multiple   Pulse 79   Resp 18   Aerosol Therapy   $ Aerosol Therapy Charges Aerosol Treatment   Respiratory Treatment Status (SVN) given   Treatment Route (SVN) mask;oxygen   Patient Position (SVN) HOB elevated   Post Treatment Assessment (SVN) breath sounds unchanged   Signs of Intolerance (SVN) none   Breath Sounds Post-Respiratory Treatment   Throughout All Fields Post-Treatment All Fields   Throughout All Fields Post-Treatment no change   CHRISTOPHER Post-Treatment no change   Post-treatment Heart Rate (beats/min) 77   Post-treatment Resp Rate (breaths/min) 18   Incentive Spirometer   $ Incentive Spirometer Charges done with encouragement   Administration (IS) proper technique demonstrated   Number of Repetitions (IS) 10   Level Incentive Spirometer (mL) 1000   Patient Tolerance (IS) good   Ready to Wean/Extubation Screen   FIO2<=50 (chart decimal) 0.32

## 2020-07-30 ENCOUNTER — PATIENT OUTREACH (OUTPATIENT)
Dept: ADMINISTRATIVE | Facility: CLINIC | Age: 80
End: 2020-07-30

## 2020-07-30 ENCOUNTER — OFFICE VISIT (OUTPATIENT)
Dept: FAMILY MEDICINE | Facility: CLINIC | Age: 80
End: 2020-07-30
Payer: MEDICARE

## 2020-07-30 ENCOUNTER — TELEPHONE (OUTPATIENT)
Dept: MEDSURG UNIT | Facility: HOSPITAL | Age: 80
End: 2020-07-30

## 2020-07-30 VITALS
SYSTOLIC BLOOD PRESSURE: 136 MMHG | OXYGEN SATURATION: 99 % | DIASTOLIC BLOOD PRESSURE: 70 MMHG | WEIGHT: 220.25 LBS | HEIGHT: 63 IN | BODY MASS INDEX: 39.02 KG/M2 | RESPIRATION RATE: 18 BRPM | TEMPERATURE: 98 F | HEART RATE: 94 BPM

## 2020-07-30 DIAGNOSIS — E11.59 HYPERTENSION ASSOCIATED WITH DIABETES: ICD-10-CM

## 2020-07-30 DIAGNOSIS — R78.81 BACTEREMIA ASSOCIATED WITH INTRAVASCULAR LINE, SUBSEQUENT ENCOUNTER: ICD-10-CM

## 2020-07-30 DIAGNOSIS — T82.7XXD BACTEREMIA ASSOCIATED WITH INTRAVASCULAR LINE, SUBSEQUENT ENCOUNTER: ICD-10-CM

## 2020-07-30 DIAGNOSIS — I26.99 PULMONARY EMBOLISM, UNSPECIFIED CHRONICITY, UNSPECIFIED PULMONARY EMBOLISM TYPE, UNSPECIFIED WHETHER ACUTE COR PULMONALE PRESENT: Primary | ICD-10-CM

## 2020-07-30 DIAGNOSIS — I51.9 DIASTOLIC DYSFUNCTION, LEFT VENTRICLE: ICD-10-CM

## 2020-07-30 DIAGNOSIS — Z86.69 HISTORY OF OBSTRUCTIVE SLEEP APNEA: ICD-10-CM

## 2020-07-30 DIAGNOSIS — R11.0 NAUSEA: ICD-10-CM

## 2020-07-30 DIAGNOSIS — Z00.00 ANNUAL PHYSICAL EXAM: ICD-10-CM

## 2020-07-30 DIAGNOSIS — I15.2 HYPERTENSION ASSOCIATED WITH DIABETES: ICD-10-CM

## 2020-07-30 DIAGNOSIS — M54.16 LUMBAR RADICULITIS: ICD-10-CM

## 2020-07-30 PROCEDURE — 99999 PR PBB SHADOW E&M-EST. PATIENT-LVL V: ICD-10-PCS | Mod: PBBFAC,,, | Performed by: FAMILY MEDICINE

## 2020-07-30 PROCEDURE — 99495 TRANSJ CARE MGMT MOD F2F 14D: CPT | Mod: S$GLB,,, | Performed by: FAMILY MEDICINE

## 2020-07-30 PROCEDURE — 99999 PR PBB SHADOW E&M-EST. PATIENT-LVL V: CPT | Mod: PBBFAC,,, | Performed by: FAMILY MEDICINE

## 2020-07-30 PROCEDURE — 99495 TCM SERVICES (MODERATE COMPLEXITY): ICD-10-PCS | Mod: S$GLB,,, | Performed by: FAMILY MEDICINE

## 2020-07-30 RX ORDER — ONDANSETRON 4 MG/1
4 TABLET, ORALLY DISINTEGRATING ORAL EVERY 8 HOURS PRN
Qty: 20 TABLET | Refills: 0 | Status: SHIPPED | OUTPATIENT
Start: 2020-07-30 | End: 2022-04-26 | Stop reason: ALTCHOICE

## 2020-07-30 NOTE — PATIENT INSTRUCTIONS
What is COPD?  COPD stands for chronic obstructive pulmonary disease. It means the airways in your lungs are blocked (obstructed). Because of this, it is hard to breathe. You may have trouble with daily activities because of shortness of breath. Over time the shortness of breath usually worsens making it more and more difficult to take care of yourself and take part in activities. Chronic bronchitis and emphysema are two common types of COPD.  What happens in chronic bronchitis?    The cells in the airways make more mucus than normal. The mucus builds up, narrowing the airways. This means less air travels into and out of the lungs. The lining of the airways may also become inflamed (swollen) and causes the airways to narrow even more.        What happens in emphysema?    The small airways are damaged and lose their stretchiness. The airways collapse when you exhale, causing air to get trapped in the air sacs. This means that less oxygen enters the blood vessels and less oxygen is delivered to all of the cells of your body. This makes it hard to breathe.     Damage to cilia    Cilia are small hairs that line and protect the airways. Smoking damages the cilia. Damaged cilia cant sweep mucus and particles away. Some of the cilia are destroyed. This damage worsens COPD.  How did I get COPD?  Most people get COPD from smoking. Cigarette smoke damages lungs, which can develop into COPD over many years.  How COPD affects you  COPD makes you work harder to breathe. Air may get trapped in the lungs, which prevents your lungs from filling completely with fresh oxygen-filled air when you inhale (breathe in). It's harder to take deep breaths especially when you are active and start breathing faster. Over time, your lungs may become enlarged filling the lung with air that does not transfer oxygen into the blood. These problems cause you to have shortness of breath (also called dyspnea). Wheezing (hoarse, whistling breathing),  chronic cough, and fatigue (feeling tired and worn out) are also common.   Date Last Reviewed: 5/1/2016  © 0566-7535 [employer] [address] [place] [address], [place] [address]. All rights reserved. This information is not intended as a substitute for professional medical care. Always follow your healthcare professional's instructions.

## 2020-07-30 NOTE — TELEPHONE ENCOUNTER
Secure chat message sent to Elida Mccoy NP regarding apixaban. D/C orders have pt taking 10 mg bid for 6 days. Then 5 mg bid. Pt stated that pharmacy instructions have pt taking 5 mg bid. Msg sent to Ms Mccoy to verify correct dose. Waiting for response.

## 2020-07-30 NOTE — PROGRESS NOTES
Subjective:       Patient ID: Sammi Abreu is a 79 y.o. female.    Chief Complaint: Follow-up    Transitional Care Note    Family and/or Caretaker present at visit?  No.  Diagnostic tests reviewed/disposition: I have reviewed all completed as well as pending diagnostic tests at the time of discharge.  Disease/illness education: yes  Home health/community services discussion/referrals: Patient does not have home health established from hospital visit.  They do not need home health.  If needed, we will set up home health for the patient.   Establishment or re-establishment of referral orders for community resources: No other necessary community resources.   Discussion with other health care providers: No discussion with other health care providers necessary.           Follow-up  This is a new (Hospital PE after knee surgery.) problem. Associated symptoms include arthralgias and fatigue. Pertinent negatives include no abdominal pain, chest pain, chills, coughing, diaphoresis, headaches, neck pain, numbness or rash.     Review of Systems   Constitutional: Positive for fatigue and unexpected weight change. Negative for activity change, appetite change, chills and diaphoresis.   HENT: Negative.  Negative for facial swelling, hearing loss, nosebleeds, postnasal drip, sneezing and trouble swallowing.    Eyes: Negative for photophobia, pain, discharge, redness, itching and visual disturbance.   Respiratory: Negative for apnea, cough, chest tightness, shortness of breath and wheezing.    Cardiovascular: Negative.  Negative for chest pain, palpitations and leg swelling.   Gastrointestinal: Negative.  Negative for abdominal distention, abdominal pain, anal bleeding, blood in stool and diarrhea.   Endocrine: Negative.  Negative for cold intolerance, heat intolerance, polydipsia, polyphagia and polyuria.   Genitourinary: Negative.  Negative for difficulty urinating, dysuria, frequency, genital sores, hematuria, pelvic pain,  urgency, vaginal bleeding and vaginal discharge.   Musculoskeletal: Positive for arthralgias and back pain. Negative for gait problem, neck pain and neck stiffness.        Post knee with old infusion. Limited ROM   Skin: Negative.  Negative for color change, pallor and rash.   Allergic/Immunologic: Negative.  Negative for environmental allergies and food allergies.   Neurological: Negative.  Negative for dizziness, tremors, seizures, syncope, speech difficulty, numbness and headaches.   Hematological: Negative for adenopathy.   Psychiatric/Behavioral: Negative for agitation, behavioral problems, confusion, decreased concentration, hallucinations, self-injury and sleep disturbance. The patient is not nervous/anxious and is not hyperactive.        Patient Active Problem List   Diagnosis    DDD (degenerative disc disease), lumbar    Arthritis of hip    Lumbosacral spondylosis without myelopathy    Hypertension associated with diabetes    Hyperlipidemia associated with type 2 diabetes mellitus    Arthritis    Degenerative arthritis of lumbar spine    Shoulder pain    S/P hip replacement    Left hip pain    Breast mass    Glaucoma suspect    Peripheral edema    LVH (left ventricular hypertrophy) due to hypertensive disease    Diastolic dysfunction, left ventricle    Pulmonary hypertension    GERD (gastroesophageal reflux disease)    Severe obesity (BMI 35.0-39.9) with comorbidity    Excessive daytime sleepiness    Nonspecific abnormal electrocardiogram (ECG) (EKG)    Chronic seasonal allergic rhinitis due to pollen    Mild persistent asthma with acute exacerbation    Calcification of aorta    Obstructive sleep apnea syndrome    Bronchiectasis without complication- seen ct chest 3/6/18    Prediabetes    questionable Acute cholecystitis    Transaminitis    Dehydration    Diabetic neuropathy associated with type 2 diabetes mellitus    Lumbar radiculitis    Preop testing    Class 2 severe  obesity with serious comorbidity in adult    Status post laparoscopic cholecystectomy    Type 2 diabetes mellitus with diabetic neuropathy, without long-term current use of insulin    COPD (chronic obstructive pulmonary disease)    CHARLIE (obstructive sleep apnea)    Chronic back pain    History of DVT in adulthood    Bilateral pulmonary embolism    Deep vein thrombosis (DVT) of left lower extremity    Elevated troponin    History of obstructive sleep apnea    Acute blood loss anemia    Type 2 diabetes mellitus with stage 2 chronic kidney disease    MRSA bacteremia    DDD (degenerative disc disease), thoracic       Objective:      Physical Exam    Lab Results   Component Value Date    WBC 7.99 07/29/2020    HGB 8.9 (L) 07/29/2020    HCT 29.4 (L) 07/29/2020     07/29/2020    CHOL 194 01/16/2020    TRIG 98 01/16/2020    HDL 40 01/16/2020    ALT 37 07/14/2020    AST 39 07/14/2020     07/27/2020    K 4.2 07/27/2020     07/27/2020    CREATININE 1.3 07/27/2020    BUN 9 07/27/2020    CO2 24 07/27/2020    TSH 4.572 (H) 01/16/2020    INR 1.0 07/27/2020    HGBA1C 6.4 (H) 07/13/2020     The 10-year ASCVD risk score (Jatinderbetty GUZMAN Jr., et al., 2013) is: 32.6%    Values used to calculate the score:      Age: 79 years      Sex: Female      Is Non- : Yes      Diabetic: Yes      Tobacco smoker: No      Systolic Blood Pressure: 136 mmHg      Is BP treated: Yes      HDL Cholesterol: 40 mg/dL      Total Cholesterol: 194 mg/dL    Assessment:       1. Pulmonary embolism, unspecified chronicity, unspecified pulmonary embolism type, unspecified whether acute cor pulmonale present    2. Bacteremia associated with intravascular line, subsequent encounter    3. Lumbar radiculitis    4. History of obstructive sleep apnea    5. Hypertension associated with diabetes    6. Diastolic dysfunction, left ventricle    7. Annual physical exam    8. Nausea        Plan:       Pulmonary embolism,  unspecified chronicity, unspecified pulmonary embolism type, unspecified whether acute cor pulmonale present  -     Discontinue: apixaban (ELIQUIS) 5 mg Tab; Take 1 tablet (5 mg total) by mouth 2 (two) times daily.  Dispense: 60 tablet; Refill: 3  -     Ambulatory referral/consult to Pulmonology; Future; Expected date: 08/06/2020  -     Basic metabolic panel; Future; Expected date: 08/27/2020  -     CBC auto differential; Future; Expected date: 08/27/2020    Bacteremia associated with intravascular line, subsequent encounter  -     Ambulatory referral/consult to Infectious Disease; Future; Expected date: 08/06/2020  -     Basic metabolic panel; Future; Expected date: 08/27/2020  -     CBC auto differential; Future; Expected date: 08/27/2020    Lumbar radiculitis    History of obstructive sleep apnea    Hypertension associated with diabetes    Diastolic dysfunction, left ventricle    Annual physical exam  -     HEPATITIS C ANTIBODY; Future; Expected date: 07/30/2020    Nausea  -     ondansetron (ZOFRAN-ODT) 4 MG TbDL; Take 1 tablet (4 mg total) by mouth every 8 (eight) hours as needed.  Dispense: 20 tablet; Refill: 0      spent counseling patient on diet, exercise, and weight loss.  This has been fully explained to the patient, who indicates understanding.    Discussed health maintenance guidelines appropriate for age.

## 2020-07-30 NOTE — PATIENT INSTRUCTIONS
Low-Salt Diet  This diet removes foods that are high in salt. It also limits the amount of salt you use when cooking. It is most often used for people with high blood pressure, edema (fluid retention), and kidney, liver, or heart disease.  Table salt contains the mineral sodium. Your body needs sodium to work normally. But too much sodium can make your health problems worse. Your healthcare provider is recommending a low-salt (also called low-sodium) diet for you. Your total daily allowance of salt is 1,500 to 2,300 milligrams (mg). It is less than 1 teaspoon of table salt. This means you can have only about 500 to 700 mg of sodium at each meal. People with certain health problems should limit salt intake to the lower end of the recommended range.    When you cook, dont add much salt. If you can cook without using salt, even better. Dont add salt to your food at the table.  When shopping, read food labels. Salt is often called sodium on the label. Choose foods that are salt-free, low salt, or very low salt. Note that foods with reduced salt may not lower your salt intake enough.    Beans, potatoes, and pasta  Ok: Dry beans, split peas, lentils, potatoes, rice, macaroni, pasta, spaghetti without added salt  Avoid: Potato chips, tortilla chips, and similar products  Breads and cereals  Ok: Low-sodium breads, rolls, cereals, and cakes; low-salt crackers, matzo crackers  Avoid: Salted crackers, pretzels, popcorn, Arabic toast, pancakes, muffins  Dairy  Ok: Milk, chocolate milk, hot chocolate mix, low-salt cheeses, and yogurt  Avoid: Processed cheese and cheese spreads; Roquefort, Camembert, and cottage cheese; buttermilk, instant breakfast drink  Desserts  Ok: Ice cream, frozen yogurt, juice bars, gelatin, cookies and pies, sugar, honey, jelly, hard candy  Avoid: Most pies, cakes and cookies prepared or processed with salt; instant pudding  Drinks  Ok: Tea, coffee, fizzy (carbonated) drinks, juices  Avoid: Flavored  coffees, electrolyte replacement drinks, sports drinks  Meats  Ok: All fresh meat, fish, poultry, low-salt tuna, eggs, egg substitute  Avoid: Smoked, pickled, brine-cured, or salted meats and fish. This includes cam, chipped beef, corned beef, hot dogs, deli meats, ham, kosher meats, salt pork, sausage, canned tuna, salted codfish, smoked salmon, herring, sardines, or anchovies.  Seasonings and spices  Ok: Most seasonings are okay. Good substitutes for salt include: fresh herb blends, hot sauce, lemon, garlic, mcclure, vinegar, dry mustard, parsley, cilantro, horseradish, tomato paste, regular margarine, mayonnaise, unsalted butter, cream cheese, vegetable oil, cream, low-salt salad dressing and gravy.  Avoid: Regular ketchup, relishes, pickles, soy sauce, teriyaki sauce, Worcestershire sauce, BBQ sauce, tartar sauce, meat tenderizer, chili sauce, regular gravy, regular salad dressing, salted butter  Soups  Ok: Low-salt soups and broths made with allowed foods  Avoid: Bouillon cubes, soups with smoked or salted meats, regular soup and broth  Vegetables  Ok: Most vegetables are okay; also low-salt tomato and vegetable juices  Avoid: Sauerkraut and other brine-soaked vegetables; pickles and other pickled vegetables; tomato juice, olives  Date Last Reviewed: 8/1/2016 © 2000-2017 [employer]. [address] [place] [id number]. All rights reserved. This information is not intended as a substitute for professional medical care. Always follow your healthcare professional's instructions.        Treating Deep Vein Thrombosis  The term venous thromboembolism (VTE) is used to describe two conditions, deep vein thrombosis (DVT) and pulmonary embolism (PE). They use the term VTE because the two conditions are very closely related, and because their prevention and treatment are closely related.   DVT is a condition in which a blood clot or thrombus forms in a deep vein. These blood clots are most common in  the leg. But one may develop in the arm or another part of the body. Part of the clot (embolus) can separate from the vein and travel to the lungs. This is called a pulmonary embolism. This can cut off the flow of blood to the lung. It is a medical emergency and may cause death.   Over time a blood clot can also harm veins. To protect your health, blood clots must be treated right away.   Treating DVT at home or in the hospital  A blood clot is treated with blood thinner medicines called anticoagulants. These medicines prevent more blood clots from forming. A blood clot is often treated at home. But your healthcare provider may have reasons to treat you in the hospital. It depends on your risks. It also depends on how severe your blood clot is and where it is.    Getting back to your regular activities as soon as possible helps to keep blood flowing. But your healthcare provider may want you to limit your activity for a period of time. Talk with him or her about what level of activity is right for you.  Medicine    Your healthcare provider will usually prescribe an anticoagulant to treat your blood clot. This is medicine that prevents blood clots. You take it by mouth (oral), by injection, or into a vein (intravenous or IV). Commonly used anticoagulants include warfarin and heparin. Other anticoagulants may also be used, including rivaroxaban, apixaban, dabigatran and enoxaparin. Make sure to take your anticoagulant exactly as directed.  If you are prescribed warfarin, it is important that you have regular blood tests as prescribed. Make sure you keep all appointments for lab tests, so that your healthcare provider knows whether to adjust your dosage. If you don't, you may not be getting the full benefit of the medicine or it may increase the risk of bleeding.   Other treatment   Other treatment includes the following:   · Your healthcare provider may prescribe special elastic (compression) stockings. These help the  blood flow in your veins. The gentle pressure on the leg helps prevent blood from pooling and forming blood clots.   · When you are sitting or lying down, put your legs up on a pillow. Move your ankle and toes to help the blood flow.  · You may need medicine to help relieve pain. Your healthcare provider will discuss these choices with you.  Procedures  You may have a medical procedure for your blood clot. Procedures include:  · Thrombolysis. A thin tube (catheter) is used to deliver medicine to break up the clot. A device may also be used to break up the clot.  · Angioplasty. This may be done after a clot is removed to help the blood flow better in the vein. A catheter with a balloon on the end is used to widen a vein. A tiny mesh tube (stent) may be placed to keep a vein open.  · Inferior vena cava filter placement. A small cone-shaped device is put in the inferior vena cava. The vena cava is the body's largest vein. The filter prevents a blood clot from traveling to your lungs. The filter is inserted into the vein through a catheter. This procedure may be done if blood thinners cannot be taken or if they don't work.     When to call your healthcare provider  Call your healthcare provider if you have the following symptoms:  · Pain, swelling, and redness in the leg, arm, or other area. They may mean a blood clot  · Blood in the urine  · Bleeding with bowel movements  · Very dark or tar-like stool  · Vomiting with blood  · Coughing up blood  · Nose bleeds  · Bleeding gums  · Bleeding from a cut that will not stop  · Vaginal bleeding  Call 911  Call 911 if you have the following symptoms. They may mean a blood clot in the lungs.  · Chest pain  · Trouble breathing  · Fast heartbeat  · Coughing (may cough up blood)  · Sweating  · Fainting  Call 911 if you have heavy or uncontrolled bleeding. Blood-thinning medicines increase the risk of bleeding.  Date Last Reviewed: 5/1/2016  © 5875-1800 [employer]  [address] [place] [id number]. All rights reserved. This information is not intended as a substitute for professional medical care. Always follow your healthcare professional's instructions.        Pulmonary Embolism (PE)  A pulmonary embolus is most often due to a blood clot that develops in a deep vein of the leg (deep vein thrombosis). If that clot, breaks loose and travels to the lung, it is called a pulmonary embolism (PE). This can cut off the flow of blood in the lungs.  A blood clot in the lungs is a medical emergency and may cause death.   Healthcare providers use the term venous thromboembolism (VTE) to describe these 2 conditions: deep vein thrombosis and pulmonary embolism. They use the term VTE because the 2 conditions are very closely related. And, because their prevention and treatment are also closely related.      A pulmonary embolism occurs when a blood clot forms in a vein and travels to the lungs.   How is pulmonary embolism diagnosed?  Your healthcare provider examines you and asks about your symptoms and health history. You may also have one or more of the following:  · Blood tests to check for blood clotting or other problems  · Imaging tests to look for clots in the veins or lung  · Electrocardiography (ECG) to test how well the heart is working  How is pulmonary embolism treated?  · Blood-thinning medicines (anticoagulants). These medicines thin the blood. They may be given as a pill, as an injection, or through a tube into a vein (intravenous or IV). Blood thinners help prevent more blood clots from forming. They also help to prevent an existing clot from getting larger.  · Thrombolysis. Thrombolytic medicines are used to quickly dissolve a blood clot. A long, narrow tube (catheter) is used to deliver medicine directly to the clot. Thrombolytic medicines increase the risk of bleeding so they are used very carefully.  · Inferior vena cava (IVC) filter surgery. The vena cava is  the bodys largest vein. It carries blood from the body to the heart. A small filter traps blood clots in the lower body and prevents them from traveling to the lungs. The filter is inserted into the vein through a catheter. The filter may be used if blood thinners cannot be taken or if they don't work.   · Pulmonary embolectomy. This is a procedure to remove a blood clot in the lungs. It may be done with surgery or with a catheter inserted in the body. It may be done when other treatments aren't safe or don't work.  What are the long-term concerns?  With treatment, blood clots are usually dissolved or removed. Some treatments can even help prevent future clots. But having a PE can put you at risk for another life-threatening blood clot. So, you will likely need to take anticoagulants to help keep blood clots from forming again. You may need to take this medicine for months or years.  You may also need to make lifestyle changes. This may include getting more active and eating healthier. You may need to wear elastic (compression) stockings and and take breaks on long trips.  Call 911  Call 911 or get emergency help if you have symptoms of a blood clot that has traveled to the lungs. The symptoms include:  · Chest pain  · Trouble breathing  · Coughing (may cough up blood)  · Fainting  · Fast heartbeat  · Sweating  Call 911 if you have heavy or uncontrolled bleeding.  When to call your healthcare provider  Call your healthcare provider if you have swelling or pain in your leg, arm, or other area. These are symptoms of a blood clot.  You may have bleeding if you take medicine to help prevent blood clots.  Call your healthcare provider if you have signs or symptoms of bleeding. This includes:  · Blood in the urine  · Bleeding with bowel movements  · Bleeding from the nose, gums, a cut, or vagina   Date Last Reviewed: 2/1/2017  © 1678-3685 [employer]. [address] [place] [address], [place] [address]. All rights  reserved. This information is not intended as a substitute for professional medical care. Always follow your healthcare professional's instructions.

## 2020-07-31 ENCOUNTER — OUTSIDE PLACE OF SERVICE (OUTPATIENT)
Dept: ADMINISTRATIVE | Facility: HOSPITAL | Age: 80
End: 2020-07-31
Payer: MEDICARE

## 2020-08-02 ENCOUNTER — HOSPITAL ENCOUNTER (INPATIENT)
Facility: HOSPITAL | Age: 80
LOS: 14 days | Discharge: HOME HEALTH | DRG: 682 | End: 2020-08-16
Attending: EMERGENCY MEDICINE | Admitting: INTERNAL MEDICINE
Payer: MEDICARE

## 2020-08-02 DIAGNOSIS — Z86.718 HISTORY OF DVT IN ADULTHOOD: ICD-10-CM

## 2020-08-02 DIAGNOSIS — B95.62 MRSA BACTEREMIA: ICD-10-CM

## 2020-08-02 DIAGNOSIS — N17.9 AKI (ACUTE KIDNEY INJURY): ICD-10-CM

## 2020-08-02 DIAGNOSIS — R31.0 GROSS HEMATURIA: ICD-10-CM

## 2020-08-02 DIAGNOSIS — R79.89 ELEVATED TROPONIN: ICD-10-CM

## 2020-08-02 DIAGNOSIS — R78.81 MRSA BACTEREMIA: ICD-10-CM

## 2020-08-02 DIAGNOSIS — R07.9 CHEST PAIN: ICD-10-CM

## 2020-08-02 DIAGNOSIS — J98.11 ATELECTASIS: ICD-10-CM

## 2020-08-02 DIAGNOSIS — D53.8 OTHER SPECIFIED NUTRITIONAL ANEMIAS: ICD-10-CM

## 2020-08-02 DIAGNOSIS — R74.01 TRANSAMINITIS: ICD-10-CM

## 2020-08-02 DIAGNOSIS — E44.0 MODERATE PROTEIN-CALORIE MALNUTRITION: ICD-10-CM

## 2020-08-02 DIAGNOSIS — D53.9 MACROCYTIC ANEMIA: Primary | ICD-10-CM

## 2020-08-02 DIAGNOSIS — I26.99 BILATERAL PULMONARY EMBOLISM: ICD-10-CM

## 2020-08-02 DIAGNOSIS — I26.99 PULMONARY INFARCT: ICD-10-CM

## 2020-08-02 DIAGNOSIS — J44.9 CHRONIC OBSTRUCTIVE PULMONARY DISEASE, UNSPECIFIED COPD TYPE: ICD-10-CM

## 2020-08-02 DIAGNOSIS — I27.20 PULMONARY HYPERTENSION: ICD-10-CM

## 2020-08-02 LAB
ALBUMIN SERPL BCP-MCNC: 2.6 G/DL (ref 3.5–5.2)
ALP SERPL-CCNC: 88 U/L (ref 55–135)
ALT SERPL W/O P-5'-P-CCNC: 102 U/L (ref 10–44)
ANION GAP SERPL CALC-SCNC: 11 MMOL/L (ref 8–16)
APTT BLDCRRT: 37.6 SEC (ref 21–32)
AST SERPL-CCNC: 324 U/L (ref 10–40)
BACTERIA #/AREA URNS HPF: NORMAL /HPF
BASOPHILS # BLD AUTO: 0.02 K/UL (ref 0–0.2)
BASOPHILS NFR BLD: 0.3 % (ref 0–1.9)
BILIRUB SERPL-MCNC: 0.6 MG/DL (ref 0.1–1)
BILIRUB UR QL STRIP: NEGATIVE
BNP SERPL-MCNC: 293 PG/ML (ref 0–99)
BUN SERPL-MCNC: 14 MG/DL (ref 8–23)
CALCIUM SERPL-MCNC: 8.2 MG/DL (ref 8.7–10.5)
CHLORIDE SERPL-SCNC: 102 MMOL/L (ref 95–110)
CLARITY UR: CLEAR
CO2 SERPL-SCNC: 24 MMOL/L (ref 23–29)
COLOR UR: YELLOW
CREAT SERPL-MCNC: 1.8 MG/DL (ref 0.5–1.4)
DIFFERENTIAL METHOD: ABNORMAL
EOSINOPHIL # BLD AUTO: 0.1 K/UL (ref 0–0.5)
EOSINOPHIL NFR BLD: 1.3 % (ref 0–8)
ERYTHROCYTE [DISTWIDTH] IN BLOOD BY AUTOMATED COUNT: 14.3 % (ref 11.5–14.5)
EST. GFR  (AFRICAN AMERICAN): 31 ML/MIN/1.73 M^2
EST. GFR  (NON AFRICAN AMERICAN): 27 ML/MIN/1.73 M^2
GLUCOSE SERPL-MCNC: 108 MG/DL (ref 70–110)
GLUCOSE UR QL STRIP: NEGATIVE
HCT VFR BLD AUTO: 33.2 % (ref 37–48.5)
HGB BLD-MCNC: 10.5 G/DL (ref 12–16)
HGB UR QL STRIP: ABNORMAL
HYALINE CASTS #/AREA URNS LPF: 0 /LPF
IMM GRANULOCYTES # BLD AUTO: 0.03 K/UL (ref 0–0.04)
IMM GRANULOCYTES NFR BLD AUTO: 0.4 % (ref 0–0.5)
INR PPP: 1.1 (ref 0.8–1.2)
KETONES UR QL STRIP: NEGATIVE
LACTATE SERPL-SCNC: 0.7 MMOL/L (ref 0.5–2.2)
LACTATE SERPL-SCNC: 0.9 MMOL/L (ref 0.5–2.2)
LEUKOCYTE ESTERASE UR QL STRIP: NEGATIVE
LIPASE SERPL-CCNC: 26 U/L (ref 4–60)
LYMPHOCYTES # BLD AUTO: 1.9 K/UL (ref 1–4.8)
LYMPHOCYTES NFR BLD: 23.7 % (ref 18–48)
MAGNESIUM SERPL-MCNC: 2.4 MG/DL (ref 1.6–2.6)
MCH RBC QN AUTO: 33.4 PG (ref 27–31)
MCHC RBC AUTO-ENTMCNC: 31.6 G/DL (ref 32–36)
MCV RBC AUTO: 106 FL (ref 82–98)
MICROSCOPIC COMMENT: NORMAL
MONOCYTES # BLD AUTO: 0.7 K/UL (ref 0.3–1)
MONOCYTES NFR BLD: 8.3 % (ref 4–15)
NEUTROPHILS # BLD AUTO: 5.3 K/UL (ref 1.8–7.7)
NEUTROPHILS NFR BLD: 66 % (ref 38–73)
NITRITE UR QL STRIP: NEGATIVE
NRBC BLD-RTO: 0 /100 WBC
PH UR STRIP: 7 [PH] (ref 5–8)
PLATELET # BLD AUTO: 286 K/UL (ref 150–350)
PLATELET BLD QL SMEAR: ABNORMAL
PMV BLD AUTO: 9.7 FL (ref 9.2–12.9)
POTASSIUM SERPL-SCNC: 4.5 MMOL/L (ref 3.5–5.1)
PROCALCITONIN SERPL IA-MCNC: 0.08 NG/ML
PROT SERPL-MCNC: 7 G/DL (ref 6–8.4)
PROT UR QL STRIP: ABNORMAL
PROTHROMBIN TIME: 11.7 SEC (ref 9–12.5)
RBC # BLD AUTO: 3.14 M/UL (ref 4–5.4)
RBC #/AREA URNS HPF: 2 /HPF (ref 0–4)
SARS-COV-2 RDRP RESP QL NAA+PROBE: NEGATIVE
SODIUM SERPL-SCNC: 137 MMOL/L (ref 136–145)
SP GR UR STRIP: 1.01 (ref 1–1.03)
TROPONIN I SERPL DL<=0.01 NG/ML-MCNC: 0.04 NG/ML (ref 0–0.03)
URN SPEC COLLECT METH UR: ABNORMAL
UROBILINOGEN UR STRIP-ACNC: NEGATIVE EU/DL
WBC # BLD AUTO: 7.97 K/UL (ref 3.9–12.7)
WBC #/AREA URNS HPF: 1 /HPF (ref 0–5)

## 2020-08-02 PROCEDURE — 83880 ASSAY OF NATRIURETIC PEPTIDE: CPT

## 2020-08-02 PROCEDURE — 83735 ASSAY OF MAGNESIUM: CPT

## 2020-08-02 PROCEDURE — 93005 ELECTROCARDIOGRAM TRACING: CPT

## 2020-08-02 PROCEDURE — 81000 URINALYSIS NONAUTO W/SCOPE: CPT

## 2020-08-02 PROCEDURE — 96361 HYDRATE IV INFUSION ADD-ON: CPT

## 2020-08-02 PROCEDURE — 83690 ASSAY OF LIPASE: CPT

## 2020-08-02 PROCEDURE — 85025 COMPLETE CBC W/AUTO DIFF WBC: CPT

## 2020-08-02 PROCEDURE — 99285 EMERGENCY DEPT VISIT HI MDM: CPT | Mod: 25

## 2020-08-02 PROCEDURE — G0378 HOSPITAL OBSERVATION PER HR: HCPCS

## 2020-08-02 PROCEDURE — U0002 COVID-19 LAB TEST NON-CDC: HCPCS

## 2020-08-02 PROCEDURE — 36415 COLL VENOUS BLD VENIPUNCTURE: CPT

## 2020-08-02 PROCEDURE — 84484 ASSAY OF TROPONIN QUANT: CPT

## 2020-08-02 PROCEDURE — 87040 BLOOD CULTURE FOR BACTERIA: CPT

## 2020-08-02 PROCEDURE — 12000002 HC ACUTE/MED SURGE SEMI-PRIVATE ROOM

## 2020-08-02 PROCEDURE — 25000003 PHARM REV CODE 250: Performed by: NURSE PRACTITIONER

## 2020-08-02 PROCEDURE — 85610 PROTHROMBIN TIME: CPT

## 2020-08-02 PROCEDURE — 25000003 PHARM REV CODE 250: Performed by: EMERGENCY MEDICINE

## 2020-08-02 PROCEDURE — 80053 COMPREHEN METABOLIC PANEL: CPT

## 2020-08-02 PROCEDURE — 85730 THROMBOPLASTIN TIME PARTIAL: CPT

## 2020-08-02 PROCEDURE — 83605 ASSAY OF LACTIC ACID: CPT | Mod: 91

## 2020-08-02 PROCEDURE — 84145 PROCALCITONIN (PCT): CPT

## 2020-08-02 PROCEDURE — 96360 HYDRATION IV INFUSION INIT: CPT

## 2020-08-02 RX ORDER — METOPROLOL SUCCINATE 50 MG/1
50 TABLET, EXTENDED RELEASE ORAL DAILY
Status: DISCONTINUED | OUTPATIENT
Start: 2020-08-03 | End: 2020-08-16 | Stop reason: HOSPADM

## 2020-08-02 RX ORDER — SUCRALFATE 1 G/10ML
1 SUSPENSION ORAL EVERY 6 HOURS
Status: DISCONTINUED | OUTPATIENT
Start: 2020-08-03 | End: 2020-08-02

## 2020-08-02 RX ORDER — ASCORBIC ACID 500 MG
500 TABLET ORAL NIGHTLY
Status: DISCONTINUED | OUTPATIENT
Start: 2020-08-02 | End: 2020-08-16 | Stop reason: HOSPADM

## 2020-08-02 RX ORDER — FOLIC ACID 1 MG/1
1 TABLET ORAL DAILY
Status: DISCONTINUED | OUTPATIENT
Start: 2020-08-03 | End: 2020-08-16 | Stop reason: HOSPADM

## 2020-08-02 RX ORDER — CLONIDINE HYDROCHLORIDE 0.2 MG/1
0.2 TABLET ORAL ONCE
Status: COMPLETED | OUTPATIENT
Start: 2020-08-02 | End: 2020-08-02

## 2020-08-02 RX ORDER — FLUTICASONE FUROATE AND VILANTEROL 100; 25 UG/1; UG/1
1 POWDER RESPIRATORY (INHALATION) DAILY
Status: DISCONTINUED | OUTPATIENT
Start: 2020-08-03 | End: 2020-08-16 | Stop reason: HOSPADM

## 2020-08-02 RX ORDER — LANOLIN ALCOHOL/MO/W.PET/CERES
2000 CREAM (GRAM) TOPICAL DAILY
Status: DISCONTINUED | OUTPATIENT
Start: 2020-08-03 | End: 2020-08-16 | Stop reason: HOSPADM

## 2020-08-02 RX ORDER — TALC
6 POWDER (GRAM) TOPICAL NIGHTLY PRN
Status: DISCONTINUED | OUTPATIENT
Start: 2020-08-02 | End: 2020-08-16 | Stop reason: HOSPADM

## 2020-08-02 RX ORDER — SODIUM CHLORIDE 9 MG/ML
INJECTION, SOLUTION INTRAVENOUS CONTINUOUS
Status: DISCONTINUED | OUTPATIENT
Start: 2020-08-02 | End: 2020-08-04

## 2020-08-02 RX ORDER — GLUCAGON 1 MG
1 KIT INJECTION
Status: DISCONTINUED | OUTPATIENT
Start: 2020-08-02 | End: 2020-08-16 | Stop reason: HOSPADM

## 2020-08-02 RX ORDER — LANOLIN ALCOHOL/MO/W.PET/CERES
800 CREAM (GRAM) TOPICAL
Status: DISCONTINUED | OUTPATIENT
Start: 2020-08-02 | End: 2020-08-16 | Stop reason: HOSPADM

## 2020-08-02 RX ORDER — IBUPROFEN 200 MG
16 TABLET ORAL
Status: DISCONTINUED | OUTPATIENT
Start: 2020-08-02 | End: 2020-08-16 | Stop reason: HOSPADM

## 2020-08-02 RX ORDER — SODIUM CHLORIDE 0.9 % (FLUSH) 0.9 %
10 SYRINGE (ML) INJECTION
Status: DISCONTINUED | OUTPATIENT
Start: 2020-08-02 | End: 2020-08-16 | Stop reason: HOSPADM

## 2020-08-02 RX ORDER — POTASSIUM CHLORIDE 20 MEQ/15ML
40 SOLUTION ORAL
Status: DISCONTINUED | OUTPATIENT
Start: 2020-08-02 | End: 2020-08-04

## 2020-08-02 RX ORDER — PANTOPRAZOLE SODIUM 40 MG/1
40 TABLET, DELAYED RELEASE ORAL DAILY
Status: DISCONTINUED | OUTPATIENT
Start: 2020-08-03 | End: 2020-08-16 | Stop reason: HOSPADM

## 2020-08-02 RX ORDER — MAG HYDROX/ALUMINUM HYD/SIMETH 200-200-20
30 SUSPENSION, ORAL (FINAL DOSE FORM) ORAL
Status: DISCONTINUED | OUTPATIENT
Start: 2020-08-02 | End: 2020-08-05

## 2020-08-02 RX ORDER — ACETAMINOPHEN 325 MG/1
650 TABLET ORAL EVERY 4 HOURS PRN
Status: DISCONTINUED | OUTPATIENT
Start: 2020-08-02 | End: 2020-08-16 | Stop reason: HOSPADM

## 2020-08-02 RX ORDER — AMOXICILLIN 250 MG
1 CAPSULE ORAL 2 TIMES DAILY
Status: DISCONTINUED | OUTPATIENT
Start: 2020-08-02 | End: 2020-08-16 | Stop reason: HOSPADM

## 2020-08-02 RX ORDER — IBUPROFEN 200 MG
24 TABLET ORAL
Status: DISCONTINUED | OUTPATIENT
Start: 2020-08-02 | End: 2020-08-16 | Stop reason: HOSPADM

## 2020-08-02 RX ORDER — MONTELUKAST SODIUM 10 MG/1
10 TABLET ORAL NIGHTLY
Status: DISCONTINUED | OUTPATIENT
Start: 2020-08-02 | End: 2020-08-16 | Stop reason: HOSPADM

## 2020-08-02 RX ORDER — ONDANSETRON 2 MG/ML
4 INJECTION INTRAMUSCULAR; INTRAVENOUS EVERY 8 HOURS PRN
Status: DISCONTINUED | OUTPATIENT
Start: 2020-08-02 | End: 2020-08-16 | Stop reason: HOSPADM

## 2020-08-02 RX ORDER — POTASSIUM CHLORIDE 20 MEQ/15ML
60 SOLUTION ORAL
Status: DISCONTINUED | OUTPATIENT
Start: 2020-08-02 | End: 2020-08-04

## 2020-08-02 RX ADMIN — APIXABAN 10 MG: 2.5 TABLET, FILM COATED ORAL at 10:08

## 2020-08-02 RX ADMIN — ACETAMINOPHEN 650 MG: 325 TABLET ORAL at 09:08

## 2020-08-02 RX ADMIN — SODIUM CHLORIDE: 0.9 INJECTION, SOLUTION INTRAVENOUS at 09:08

## 2020-08-02 RX ADMIN — ALUMINUM HYDROXIDE, MAGNESIUM HYDROXIDE, AND SIMETHICONE 30 ML: 200; 200; 20 SUSPENSION ORAL at 09:08

## 2020-08-02 RX ADMIN — MONTELUKAST 10 MG: 10 TABLET, FILM COATED ORAL at 09:08

## 2020-08-02 RX ADMIN — DOCUSATE SODIUM 50 MG AND SENNOSIDES 8.6 MG 1 TABLET: 8.6; 5 TABLET, FILM COATED ORAL at 09:08

## 2020-08-02 RX ADMIN — CLONIDINE HYDROCHLORIDE 0.2 MG: 0.2 TABLET ORAL at 09:08

## 2020-08-02 RX ADMIN — SODIUM CHLORIDE 500 ML: 0.9 INJECTION, SOLUTION INTRAVENOUS at 06:08

## 2020-08-02 RX ADMIN — Medication 500 MG: at 09:08

## 2020-08-02 NOTE — ED PROVIDER NOTES
Encounter Date: 8/2/2020    SCRIBE #1 NOTE: Mary GENTILE am scribing for, and in the presence of, Sage Palencia MD.       History     Chief Complaint   Patient presents with    Post-op Problem     pain to left lower chest and BLE after surgery on 7/27       Time seen by provider: 4:03 PM on 08/02/2020    Sammi Abreu is a 79 y.o. female with PMHx of chronic back pain, fibromyalgia, HTN, HLD, GERD, COPD, and type 2 DM who presents to the ED with c/o RLQ abdominal pain x2 days. Patient was admitted on 7/13 following laparoscopic cholecystectomy, hospitalized for 15 days and just d/c 3 days ago. During her hospital course, she was dx with bilateral PE, LLE DVT, and MRSA. She endorses compliance with IV Abx through PICC line. She is on Eliquis. The patient denies fever, cough, or any other symptoms at this time. No other pertinent PSHx.    The history is provided by the patient.     Review of patient's allergies indicates:   Allergen Reactions    Cymbalta [duloxetine] Other (See Comments)     Nightmares      Darvon [propoxyphene] Nausea Only and Other (See Comments)     Sweating, slept for 3 days    Atorvastatin Other (See Comments)     Muscle cramps    Naprosyn [naproxen] Nausea Only    Penicillins Rash    Tramadol Nausea Only and Palpitations     Past Medical History:   Diagnosis Date    ALLERGIC RHINITIS     Anemia     Arthritis     Back pain     Bronchitis     Cataract     OU    Cholelithiasis     COPD (chronic obstructive pulmonary disease)     Degenerative disc disease     Diabetes mellitus     pre diabetic    Diverticulosis     DVT (deep venous thrombosis)     Edema     Encounter for blood transfusion 1979    Fibromyalgia     Glaucoma     Gout     Hx of colonic polyps     Hyperlipidemia     Hypertension     Incontinence     Osteoporosis     Reflux     Refusal of blood transfusions as patient is [employer]     Sleep apnea     non compliant with CPAP     Vestibulitis of ear      Past Surgical History:   Procedure Laterality Date    ANGIOGRAPHY OF LOWER EXTREMITY N/A 2019    Procedure: ANGIOGRAM, LOWER EXTREMITY;  Surgeon: Gino Arana MD;  [facility] CATH[facility]EP LAB;  Service: General;  Laterality: N/A;    HIP SURGERY      HYSTERECTOMY      JOINT REPLACEMENT      B total hip    LAPAROSCOPIC CHOLECYSTECTOMY N/A 2020    Procedure: CHOLECYSTECTOMY, LAPAROSCOPIC;  Surgeon: Jomar Mcdonald MD;  Location: [facility] OR;  Service: General;  Laterality: N/A;    TRANSFORAMINAL EPIDURAL INJECTION OF STEROID Left 2020    Procedure: Injection,steroid,epidural,transforaminal approach;  Surgeon: Matt Gilliam MD;  Location: [facility] OR;  Service: Pain Management;  Laterality: Left;  L2-3, L3-4     Family History   Problem Relation Age of Onset    Hypertension Mother     Diabetes Sister     Glaucoma Neg Hx     Macular degeneration Neg Hx     Retinal detachment Neg Hx      Social History     Tobacco Use    Smoking status: Former Smoker     Packs/day: 0.25     Years: 5.00     Pack years: 1.25     Quit date: 1972     Years since quittin.6    Smokeless tobacco: Never Used   Substance Use Topics    Alcohol use: Yes     Alcohol/week: 0.0 standard drinks     Comment: Rarely    Drug use: Yes     Types: Oxycodone, Hydrocodone     Review of Systems   Constitutional: Negative for chills and fever.   HENT: Negative for congestion and sore throat.    Respiratory: Negative for cough and shortness of breath.    Cardiovascular: Negative for chest pain.   Gastrointestinal: Positive for abdominal pain. Negative for nausea and vomiting.   Genitourinary: Negative for dysuria.   Musculoskeletal: Negative for myalgias.   Skin: Negative for rash.   Neurological: Negative for weakness and headaches.   Hematological: Bruises/bleeds easily.       Physical Exam     Initial Vitals [20 1514]   BP Pulse Resp Temp SpO2   (!) 209/91 86 20 97.5 °F (36.4 °C) 99 %      MAP        --         Physical Exam    Nursing note and vitals reviewed.  Constitutional: She appears well-developed and well-nourished. She is not diaphoretic. No distress.   HENT:   Head: Normocephalic and atraumatic.   Eyes: EOM are normal. Pupils are equal, round, and reactive to light.   Neck: Normal range of motion. Neck supple.   Cardiovascular: Normal rate, regular rhythm, normal heart sounds and intact distal pulses. Exam reveals no gallop and no friction rub.    No murmur heard.  Pulmonary/Chest: Breath sounds normal. No respiratory distress. She has no wheezes. She has no rhonchi. She has no rales.   Abdominal: Soft. Bowel sounds are normal. There is abdominal tenderness in the right upper quadrant and epigastric area. There is no rebound and no guarding.   Musculoskeletal: Normal range of motion.      Comments: Left upper arm PICC line appears without redness or drainage.   Neurological: She is alert and oriented to person, place, and time.   Skin: Skin is warm and dry.   Psychiatric: She has a normal mood and affect. Her behavior is normal. Judgment and thought content normal.         ED Course   Procedures  Labs Reviewed   CBC W/ AUTO DIFFERENTIAL - Abnormal; Notable for the following components:       Result Value    RBC 3.14 (*)     Hemoglobin 10.5 (*)     Hematocrit 33.2 (*)     Mean Corpuscular Volume 106 (*)     Mean Corpuscular Hemoglobin 33.4 (*)     Mean Corpuscular Hemoglobin Conc 31.6 (*)     All other components within normal limits   COMPREHENSIVE METABOLIC PANEL - Abnormal; Notable for the following components:    Creatinine 1.8 (*)     Calcium 8.2 (*)     Albumin 2.6 (*)      (*)      (*)     eGFR if  31 (*)     eGFR if non  27 (*)     All other components within normal limits   URINALYSIS, REFLEX TO URINE CULTURE - Abnormal; Notable for the following components:    Protein, UA 2+ (*)     Occult Blood UA 3+ (*)     All other components within normal  limits    Narrative:     Specimen Source->Urine   TROPONIN I - Abnormal; Notable for the following components:    Troponin I 0.041 (*)     All other components within normal limits   B-TYPE NATRIURETIC PEPTIDE - Abnormal; Notable for the following components:     (*)     All other components within normal limits   APTT - Abnormal; Notable for the following components:    aPTT 37.6 (*)     All other components within normal limits   CULTURE, BLOOD   CULTURE, BLOOD   LACTIC ACID, PLASMA   MAGNESIUM   PROCALCITONIN   LIPASE   PROTIME-INR   SARS-COV-2 RNA AMPLIFICATION, QUAL   URINALYSIS MICROSCOPIC    Narrative:     Specimen Source->Urine   LACTIC ACID, PLASMA          Imaging Results          CT Abdomen Pelvis  Without Contrast (In process)                X-Ray Chest AP Portable (Final result)  Result time 08/02/20 16:54:51    Final result by Bridgette Arias MD (08/02/20 16:54:51)                 Impression:      There is bibasilar atelectasis versus infiltrate similar to 07/27/2020.      Electronically signed by: Bridgette Arias MD  Date:    08/02/2020  Time:    16:54             Narrative:    EXAMINATION:  XR CHEST AP PORTABLE    CLINICAL HISTORY:  Sepsis;    TECHNIQUE:  Single frontal view of the chest was performed.    COMPARISON:  08/02/2020    FINDINGS:  There is bibasilar atelectasis versus infiltrate similar to prior exam.  There is a right PICC with the tip in the superior vena cava.                                 Medical Decision Making:   History:   Old Medical Records: I decided to obtain old medical records.  Initial Assessment:   79-year-old female presented with multiple complaints.  Differential Diagnosis:   Initial differential diagnosis included but not limited to worsening pneumonia, dehydration, and pancreatitis.  Independently Interpreted Test(s):   I have ordered and independently interpreted EKG Reading(s) - see summary below       <> Summary of EKG Reading(s): Normal sinus rhythm at  rate of 76 bpm with normal axis and normal intervals. No acute ST segment changes.   Clinical Tests:   Lab Tests: Ordered and Reviewed  Radiological Study: Ordered and Reviewed  Medical Tests: Ordered and Reviewed  ED Management:  The patient was emergently evaluated in the emergency department, her evaluation was significant for an elderly female with epigastric tenderness.  The patient's EKG showed no acute abnormalities per my independent interpretation.  The patient's labs were significant for worsening kidney function and a rising troponin.  The patient's blood pressure is also noted to be poorly controlled.  The patient's CT scan showed no acute abnormalities per virtual Radiology.  The patient was started on IV fluids here in the emergency department.  I will admit her to the hospitalist service for further care.  The case was discussed with the APC on call for the hospitalist service.  She has accepted the patient for admission.  Other:   I have discussed this case with another health care provider.            Scribe Attestation:   Scribe #1: I performed the above scribed service and the documentation accurately describes the services I performed. I attest to the accuracy of the note.              I, Dr. Sage Palencia, personally performed the services described in this documentation. All medical record entries made by the scribe were at my direction and in my presence.  I have reviewed the chart and agree that the record reflects my personal performance and is accurate and complete. Sage Palencia MD.  7:58 PM 08/02/2020               Clinical Impression:       ICD-10-CM ICD-9-CM   1. Elevated troponin  R79.89 790.6   2. Chest pain  R07.9 786.50         Disposition:   Disposition: Placed in Observation  Condition: Stable     ED Disposition Condition    Observation                           Sage Palencia MD  08/02/20 2001

## 2020-08-03 ENCOUNTER — OUTSIDE PLACE OF SERVICE (OUTPATIENT)
Dept: INFECTIOUS DISEASES | Facility: CLINIC | Age: 80
End: 2020-08-03
Payer: MEDICARE

## 2020-08-03 ENCOUNTER — TELEPHONE (OUTPATIENT)
Dept: HEMATOLOGY/ONCOLOGY | Facility: CLINIC | Age: 80
End: 2020-08-03

## 2020-08-03 PROBLEM — D50.8 IRON DEFICIENCY ANEMIA SECONDARY TO INADEQUATE DIETARY IRON INTAKE: Status: ACTIVE | Noted: 2020-08-03

## 2020-08-03 PROBLEM — E44.0 MODERATE PROTEIN-CALORIE MALNUTRITION: Status: ACTIVE | Noted: 2020-08-03

## 2020-08-03 PROBLEM — J98.11 ATELECTASIS: Status: ACTIVE | Noted: 2020-08-03

## 2020-08-03 PROBLEM — I26.99 PULMONARY INFARCT: Status: ACTIVE | Noted: 2020-08-03

## 2020-08-03 PROBLEM — I50.32 CHRONIC DIASTOLIC CHF (CONGESTIVE HEART FAILURE): Status: ACTIVE | Noted: 2020-08-03

## 2020-08-03 PROBLEM — N17.9 AKI (ACUTE KIDNEY INJURY): Status: ACTIVE | Noted: 2020-08-03

## 2020-08-03 PROBLEM — R79.89 ELEVATED BRAIN NATRIURETIC PEPTIDE (BNP) LEVEL: Status: ACTIVE | Noted: 2020-08-03

## 2020-08-03 LAB
ALBUMIN SERPL BCP-MCNC: 2.2 G/DL (ref 3.5–5.2)
ALP SERPL-CCNC: 82 U/L (ref 55–135)
ALT SERPL W/O P-5'-P-CCNC: 134 U/L (ref 10–44)
ANION GAP SERPL CALC-SCNC: 10 MMOL/L (ref 8–16)
APTT BLDCRRT: 37.6 SEC (ref 21–32)
AST SERPL-CCNC: 455 U/L (ref 10–40)
BASOPHILS # BLD AUTO: 0.02 K/UL (ref 0–0.2)
BASOPHILS NFR BLD: 0.3 % (ref 0–1.9)
BILIRUB SERPL-MCNC: 0.5 MG/DL (ref 0.1–1)
BUN SERPL-MCNC: 13 MG/DL (ref 8–23)
CALCIUM SERPL-MCNC: 7.7 MG/DL (ref 8.7–10.5)
CHLORIDE SERPL-SCNC: 105 MMOL/L (ref 95–110)
CK SERPL-CCNC: ABNORMAL U/L (ref 20–180)
CO2 SERPL-SCNC: 23 MMOL/L (ref 23–29)
CREAT SERPL-MCNC: 1.6 MG/DL (ref 0.5–1.4)
DIFFERENTIAL METHOD: ABNORMAL
EOSINOPHIL # BLD AUTO: 0.1 K/UL (ref 0–0.5)
EOSINOPHIL NFR BLD: 1.9 % (ref 0–8)
ERYTHROCYTE [DISTWIDTH] IN BLOOD BY AUTOMATED COUNT: 14.4 % (ref 11.5–14.5)
EST. GFR  (AFRICAN AMERICAN): 36 ML/MIN/1.73 M^2
EST. GFR  (NON AFRICAN AMERICAN): 31 ML/MIN/1.73 M^2
ESTIMATED AVG GLUCOSE: 117 MG/DL (ref 68–131)
GLUCOSE SERPL-MCNC: 102 MG/DL (ref 70–110)
HAV IGM SERPL QL IA: NEGATIVE
HBA1C MFR BLD HPLC: 5.7 % (ref 4–5.6)
HBV CORE IGM SERPL QL IA: NEGATIVE
HBV SURFACE AG SERPL QL IA: NEGATIVE
HCT VFR BLD AUTO: 30.7 % (ref 37–48.5)
HCV AB SERPL QL IA: NEGATIVE
HGB BLD-MCNC: 9.2 G/DL (ref 12–16)
IMM GRANULOCYTES # BLD AUTO: 0.04 K/UL (ref 0–0.04)
IMM GRANULOCYTES NFR BLD AUTO: 0.5 % (ref 0–0.5)
INR PPP: 1.1 (ref 0.8–1.2)
LYMPHOCYTES # BLD AUTO: 1.5 K/UL (ref 1–4.8)
LYMPHOCYTES NFR BLD: 20.1 % (ref 18–48)
MAGNESIUM SERPL-MCNC: 2.4 MG/DL (ref 1.6–2.6)
MCH RBC QN AUTO: 32.6 PG (ref 27–31)
MCHC RBC AUTO-ENTMCNC: 30 G/DL (ref 32–36)
MCV RBC AUTO: 109 FL (ref 82–98)
MONOCYTES # BLD AUTO: 0.6 K/UL (ref 0.3–1)
MONOCYTES NFR BLD: 8.4 % (ref 4–15)
NEUTROPHILS # BLD AUTO: 5.1 K/UL (ref 1.8–7.7)
NEUTROPHILS NFR BLD: 68.8 % (ref 38–73)
NRBC BLD-RTO: 0 /100 WBC
PHOSPHATE SERPL-MCNC: 4.9 MG/DL (ref 2.7–4.5)
PLATELET # BLD AUTO: 262 K/UL (ref 150–350)
PMV BLD AUTO: 10.2 FL (ref 9.2–12.9)
POTASSIUM SERPL-SCNC: 4.8 MMOL/L (ref 3.5–5.1)
PROCALCITONIN SERPL IA-MCNC: 0.07 NG/ML
PROT SERPL-MCNC: 6.2 G/DL (ref 6–8.4)
PROTHROMBIN TIME: 12.1 SEC (ref 9–12.5)
RBC # BLD AUTO: 2.82 M/UL (ref 4–5.4)
SODIUM SERPL-SCNC: 138 MMOL/L (ref 136–145)
WBC # BLD AUTO: 7.36 K/UL (ref 3.9–12.7)

## 2020-08-03 PROCEDURE — 99223 PR INITIAL HOSPITAL CARE,LEVL III: ICD-10-PCS | Mod: ,,, | Performed by: INTERNAL MEDICINE

## 2020-08-03 PROCEDURE — 84145 PROCALCITONIN (PCT): CPT

## 2020-08-03 PROCEDURE — 27000221 HC OXYGEN, UP TO 24 HOURS

## 2020-08-03 PROCEDURE — 63600175 PHARM REV CODE 636 W HCPCS: Performed by: HOSPITALIST

## 2020-08-03 PROCEDURE — 96361 HYDRATE IV INFUSION ADD-ON: CPT

## 2020-08-03 PROCEDURE — 93005 ELECTROCARDIOGRAM TRACING: CPT

## 2020-08-03 PROCEDURE — 80053 COMPREHEN METABOLIC PANEL: CPT

## 2020-08-03 PROCEDURE — 25000242 PHARM REV CODE 250 ALT 637 W/ HCPCS: Performed by: NURSE PRACTITIONER

## 2020-08-03 PROCEDURE — 63600175 PHARM REV CODE 636 W HCPCS: Performed by: NURSE PRACTITIONER

## 2020-08-03 PROCEDURE — 99223 1ST HOSP IP/OBS HIGH 75: CPT | Mod: ,,, | Performed by: INTERNAL MEDICINE

## 2020-08-03 PROCEDURE — 85025 COMPLETE CBC W/AUTO DIFF WBC: CPT

## 2020-08-03 PROCEDURE — 80074 ACUTE HEPATITIS PANEL: CPT

## 2020-08-03 PROCEDURE — 85730 THROMBOPLASTIN TIME PARTIAL: CPT

## 2020-08-03 PROCEDURE — 83036 HEMOGLOBIN GLYCOSYLATED A1C: CPT

## 2020-08-03 PROCEDURE — 99223 PR INITIAL HOSPITAL CARE,LEVL III: ICD-10-PCS | Mod: S$GLB,,, | Performed by: INTERNAL MEDICINE

## 2020-08-03 PROCEDURE — 94761 N-INVAS EAR/PLS OXIMETRY MLT: CPT

## 2020-08-03 PROCEDURE — 12000002 HC ACUTE/MED SURGE SEMI-PRIVATE ROOM

## 2020-08-03 PROCEDURE — 25000003 PHARM REV CODE 250: Performed by: NURSE PRACTITIONER

## 2020-08-03 PROCEDURE — 82550 ASSAY OF CK (CPK): CPT

## 2020-08-03 PROCEDURE — 83735 ASSAY OF MAGNESIUM: CPT

## 2020-08-03 PROCEDURE — 36415 COLL VENOUS BLD VENIPUNCTURE: CPT

## 2020-08-03 PROCEDURE — 99223 1ST HOSP IP/OBS HIGH 75: CPT | Mod: S$GLB,,, | Performed by: INTERNAL MEDICINE

## 2020-08-03 PROCEDURE — 84100 ASSAY OF PHOSPHORUS: CPT

## 2020-08-03 PROCEDURE — 94640 AIRWAY INHALATION TREATMENT: CPT

## 2020-08-03 PROCEDURE — 85610 PROTHROMBIN TIME: CPT

## 2020-08-03 RX ORDER — BISACODYL 10 MG
10 SUPPOSITORY, RECTAL RECTAL ONCE
Status: COMPLETED | OUTPATIENT
Start: 2020-08-04 | End: 2020-08-03

## 2020-08-03 RX ORDER — MORPHINE SULFATE 2 MG/ML
2 INJECTION, SOLUTION INTRAMUSCULAR; INTRAVENOUS EVERY 6 HOURS PRN
Status: DISCONTINUED | OUTPATIENT
Start: 2020-08-03 | End: 2020-08-16 | Stop reason: HOSPADM

## 2020-08-03 RX ORDER — HYDRALAZINE HYDROCHLORIDE 20 MG/ML
10 INJECTION INTRAMUSCULAR; INTRAVENOUS ONCE
Status: COMPLETED | OUTPATIENT
Start: 2020-08-03 | End: 2020-08-03

## 2020-08-03 RX ORDER — HEPARIN SODIUM,PORCINE/D5W 25000/250
18 INTRAVENOUS SOLUTION INTRAVENOUS CONTINUOUS
Status: DISCONTINUED | OUTPATIENT
Start: 2020-08-03 | End: 2020-08-16

## 2020-08-03 RX ADMIN — HEPARIN SODIUM AND DEXTROSE 18 UNITS/KG/HR: 10000; 5 INJECTION INTRAVENOUS at 06:08

## 2020-08-03 RX ADMIN — ACETAMINOPHEN 650 MG: 325 TABLET ORAL at 03:08

## 2020-08-03 RX ADMIN — SODIUM CHLORIDE: 0.9 INJECTION, SOLUTION INTRAVENOUS at 12:08

## 2020-08-03 RX ADMIN — MORPHINE SULFATE 2 MG: 2 INJECTION, SOLUTION INTRAMUSCULAR; INTRAVENOUS at 09:08

## 2020-08-03 RX ADMIN — DOCUSATE SODIUM 50 MG AND SENNOSIDES 8.6 MG 1 TABLET: 8.6; 5 TABLET, FILM COATED ORAL at 09:08

## 2020-08-03 RX ADMIN — HYDRALAZINE HYDROCHLORIDE 10 MG: 20 INJECTION INTRAMUSCULAR; INTRAVENOUS at 09:08

## 2020-08-03 RX ADMIN — ACETAMINOPHEN 650 MG: 325 TABLET ORAL at 04:08

## 2020-08-03 RX ADMIN — ALUMINUM HYDROXIDE, MAGNESIUM HYDROXIDE, AND SIMETHICONE 30 ML: 200; 200; 20 SUSPENSION ORAL at 03:08

## 2020-08-03 RX ADMIN — BISACODYL 10 MG: 10 SUPPOSITORY RECTAL at 11:08

## 2020-08-03 RX ADMIN — Medication 500 MG: at 09:08

## 2020-08-03 RX ADMIN — THERA TABS 1 TABLET: TAB at 12:08

## 2020-08-03 RX ADMIN — PANTOPRAZOLE SODIUM 40 MG: 40 TABLET, DELAYED RELEASE ORAL at 12:08

## 2020-08-03 RX ADMIN — FLUTICASONE FUROATE AND VILANTEROL TRIFENATATE 1 PUFF: 100; 25 POWDER RESPIRATORY (INHALATION) at 07:08

## 2020-08-03 RX ADMIN — APIXABAN 10 MG: 2.5 TABLET, FILM COATED ORAL at 12:08

## 2020-08-03 RX ADMIN — ALUMINUM HYDROXIDE, MAGNESIUM HYDROXIDE, AND SIMETHICONE 30 ML: 200; 200; 20 SUSPENSION ORAL at 09:08

## 2020-08-03 RX ADMIN — FOLIC ACID 1 MG: 1 TABLET ORAL at 12:08

## 2020-08-03 RX ADMIN — METOPROLOL SUCCINATE 50 MG: 50 TABLET, FILM COATED, EXTENDED RELEASE ORAL at 12:08

## 2020-08-03 RX ADMIN — MONTELUKAST 10 MG: 10 TABLET, FILM COATED ORAL at 09:08

## 2020-08-03 RX ADMIN — SODIUM CHLORIDE: 0.9 INJECTION, SOLUTION INTRAVENOUS at 11:08

## 2020-08-03 RX ADMIN — CYANOCOBALAMIN TAB 1000 MCG 2000 MCG: 1000 TAB at 12:08

## 2020-08-03 NOTE — PLAN OF CARE
Results for MEME SALAS (MRN [id number]) as of 8/3/2020 18:08   Ref. Range 8/3/2020 16:24   CPK Latest Ref Range: 20 - 180 U/L 43853 (H)     Anabel Haji NP notified.

## 2020-08-03 NOTE — H&P
[facility] Medicine  History & Physical    Patient Name: Sammi Abreu  MRN: [id number]  Admission Date: 8/2/2020  Attending Physician: Jorge Alberto Ruiz,*   Primary Care Provider: Osmani Villatoro MD         Patient information was obtained from patient, past medical records and ER records.     Subjective:     Principal Problem:LUIZ (acute kidney injury)    Chief Complaint:   Chief Complaint   Patient presents with    Post-op Problem     pain to left lower chest and BLE after surgery on 7/27        HPI: Sammi Abreu is a 79-year-old female with past medical history significant for arthritis and chronic back pain, fibromyalgia, glaucoma, hyperlipidemia, hypertension, sleep apnea with history of noncompliance with CPAP, morbid obesity, hypertension, GERD, COPD, diabetes type 2, prior DVT, and gallstones who presented to the emergency department tonight with complaints of right lower quadrant pain x3 days.  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   Patient was discharged from this facility on 07/29/2020 after being treated for bilateral pulmonary embolism.  Patient is currently on Eliquis.  Patient underwent a CT abdomen pelvis while in the emergency department which identified surgical changes but nothing acute.  Patient is noted to have a mild LUIZ.  Her troponin is elevated at 0.41 which is consistent with prior levels.  She will be admitted to the service of hospital Medicine for continued treatment.    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     Sleep apnea     non compliant with CPAP    Vestibulitis of ear        Past Surgical History:   Procedure Laterality Date    ANGIOGRAPHY OF LOWER EXTREMITY N/A 7/31/2019    Procedure: ANGIOGRAM, LOWER EXTREMITY;  Surgeon: Gino Arana MD;  Location: [facility] CATH/EP LAB;  Service: General;  Laterality: N/A;    HIP SURGERY      HYSTERECTOMY      JOINT REPLACEMENT      B total hip    LAPAROSCOPIC CHOLECYSTECTOMY N/A 7/13/2020    Procedure: CHOLECYSTECTOMY, LAPAROSCOPIC;  Surgeon: Jomar Mcdonald MD;  Location: [facility] OR;  Service: General;  Laterality: N/A;    TRANSFORAMINAL EPIDURAL INJECTION OF STEROID Left 6/30/2020    Procedure: Injection,steroid,epidural,transforaminal approach;  Surgeon: Matt Gilliam MD;  Location: [facility] OR;  Service: Pain Management;  Laterality: Left;  L2-3, L3-4       Review of patient's allergies indicates:   Allergen Reactions    Cymbalta [duloxetine] Other (See Comments)     Nightmares      Darvon [propoxyphene] Nausea Only and Other (See Comments)     Sweating, slept for 3 days    Atorvastatin Other (See Comments)     Muscle cramps    Naprosyn [naproxen] Nausea Only    Penicillins Rash    Tramadol Nausea Only and Palpitations       Current Facility-Administered Medications on File Prior to Encounter   Medication    lactated ringers infusion    lidocaine (PF) 10 mg/ml (1%) injection 10 mg     Current Outpatient Medications on File Prior to Encounter   Medication Sig    acetaminophen (TYLENOL) 325 MG tablet Take 2 tablets (650 mg total) by mouth every 6 (six) hours as needed (Do not take with any other Tylenol or acetaminophen containing products).    albuterol-ipratropium (DUO-NEB) 2.5 mg-0.5 mg/3 mL nebulizer solution Take 3 mLs by nebulization every 8 (eight) hours.    [START ON 8/4/2020] apixaban (ELIQUIS) 5 mg Tab Take  1 tablet (5 mg total) by mouth 2 (two) times daily.    ascorbic acid, vitamin C, (VITAMIN C) 100 MG tablet Take 5 tablets (500 mg total) by mouth every evening.    esomeprazole (NEXIUM) 20 MG capsule Take 1 capsule (20 mg total) by mouth before breakfast.    fluticasone (FLONASE) 50 mcg/actuation nasal spray 2 sprays (100 mcg total) by Each Nare route once daily.    folic acid (FOLVITE) 1 MG tablet Take 1 tablet (1 mg total) by mouth once daily.    metoprolol succinate (TOPROL-XL) 50 MG 24 hr tablet Take 1 tablet (50 mg total) by mouth once daily.    montelukast (SINGULAIR) 10 mg tablet Take 1 tablet (10 mg total) by mouth every evening.    sodium chloride 0.9% SolP 50 mL with DAPTOmycin 350 mg SolR 1,000 mg Inject 1,000 mg into the vein once daily.    spironolactone (ALDACTONE) 25 MG tablet Take 1 tablet (25 mg total) by mouth once daily.    budesonide-formoterol 160-4.5 mcg (SYMBICORT) 160-4.5 mcg/actuation HFAA Inhale 2 puffs into the lungs every 12 (twelve) hours. Controller    cyanocobalamin, vitamin B-12, 2,500 mcg Lozg Place 2 tablets under the tongue once daily.    diclofenac (VOLTAREN) 25 MG TbEC Take 1 tablet (25 mg total) by mouth 3 (three) times daily as needed.    HYDROcodone-acetaminophen (NORCO) 5-325 mg per tablet Take 1 tablet by mouth every 6 (six) hours as needed for Pain. (Patient not taking: Reported on 7/30/2020)    lactulose (CHRONULAC) 10 gram/15 mL solution Take 30 mLs (20 g total) by mouth 3 (three) times daily. 2 Teaspoon(s) Oral PRN Every evening. (Patient not taking: Reported on 7/30/2020)    multivitamin capsule Take 1 capsule by mouth once daily.    ondansetron (ZOFRAN-ODT) 4 MG TbDL Take 1 tablet (4 mg total) by mouth every 8 (eight) hours as needed.     Family History     Problem Relation (Age of Onset)    Diabetes Sister    Hypertension Mother        Tobacco Use    Smoking status: Former Smoker     Packs/day: 0.25     Years: 5.00     Pack years: 1.25     Quit date:  1972     Years since quittin.6    Smokeless tobacco: Never Used   Substance and Sexual Activity    Alcohol use: Yes     Alcohol/week: 0.0 standard drinks     Comment: Rarely    Drug use: Yes     Types: Oxycodone, Hydrocodone    Sexual activity: Not on file     Review of Systems   Constitutional: Positive for fatigue. Negative for chills and fever.   HENT: Negative for congestion and ear pain.    Eyes: Negative for photophobia and visual disturbance.   Respiratory: Negative for cough and shortness of breath.    Cardiovascular: Negative for chest pain and palpitations.   Gastrointestinal: Positive for abdominal pain. Negative for diarrhea.        Decreased appetite   Endocrine: Negative for polydipsia and polyuria.   Genitourinary: Negative for dysuria and frequency.   Musculoskeletal: Negative for neck pain and neck stiffness.   Skin: Negative for color change and rash.   Neurological: Positive for weakness (generalized). Negative for light-headedness.   Hematological: Bruises/bleeds easily.   Psychiatric/Behavioral: Negative for agitation and confusion.   All other systems reviewed and are negative.    Objective:     Vital Signs (Most Recent):  Temp: 96.3 °F (35.7 °C) (20)  Pulse: 74 (20)  Resp: 18 (20)  BP: (!) 169/71 (20)  SpO2: 97 % (20 0048) Vital Signs (24h Range):  Temp:  [96.3 °F (35.7 °C)-98 °F (36.7 °C)] 96.3 °F (35.7 °C)  Pulse:  [74-86] 74  Resp:  [18-20] 18  SpO2:  [95 %-100 %] 97 %  BP: (169-222)/(71-93) 169/71     Weight: 103.5 kg (228 lb 2.8 oz)  Body mass index is 40.42 kg/m².    Physical Exam  Vitals signs and nursing note reviewed.   Constitutional:       General: She is not in acute distress.     Appearance: Normal appearance. She is well-developed.   HENT:      Head: Normocephalic and atraumatic.      Mouth/Throat:      Mouth: Mucous membranes are dry.   Eyes:      Conjunctiva/sclera: Conjunctivae normal.      Pupils: Pupils are  equal, round, and reactive to light.   Neck:      Musculoskeletal: Normal range of motion and neck supple.   Cardiovascular:      Rate and Rhythm: Normal rate and regular rhythm.   Pulmonary:      Effort: Pulmonary effort is normal.      Breath sounds: Decreased breath sounds present.   Abdominal:      General: Bowel sounds are normal. There is no distension.      Palpations: Abdomen is soft.      Tenderness: There is abdominal tenderness (mild generalized).   Skin:     General: Skin is warm and dry.      Capillary Refill: Capillary refill takes 2 to 3 seconds.      Findings: No rash.   Neurological:      General: No focal deficit present.      Mental Status: She is alert and oriented to person, place, and time.   Psychiatric:         Mood and Affect: Mood normal.         Behavior: Behavior normal.         Thought Content: Thought content normal.         Judgment: Judgment normal.           CRANIAL NERVES     CN III, IV, VI   Pupils are equal, round, and reactive to light.       Significant Labs:   CBC:   Recent Labs   Lab 08/02/20  1634   WBC 7.97   HGB 10.5*   HCT 33.2*        CMP:   Recent Labs   Lab 08/02/20  1634      K 4.5      CO2 24      BUN 14   CREATININE 1.8*   CALCIUM 8.2*   PROT 7.0   ALBUMIN 2.6*   BILITOT 0.6   ALKPHOS 88   *   *   ANIONGAP 11   EGFRNONAA 27*     Cardiac Markers:   Recent Labs   Lab 08/02/20  1634   *     Troponin:   Recent Labs   Lab 08/02/20  1634   TROPONINI 0.041*       Significant Imaging: CT abdomen/pelvis:  No acute findings per [facility].    Assessment/Plan:     * LUIZ (acute kidney injury)  Mild. Likely IVVD  Gentle IVF hydration.  Follow renal panel and electrolytes closely.  Follow renal recommendations.  Check Renal Ultrasound.  Adjust renal dose medications for Estimated Creatinine Clearance: 30.1 mL/min (A) (based on SCr of 1.8 mg/dL (H)).  Avoid NSAIDs, Potts-II inhibitors, ACE-I, Angiotensin Receptor Blockers, or  Aminoglycosides.  Urine analysis.              Chronic diastolic CHF (congestive heart failure)  Patient is identified as having Diastolic heart failure that is Chronic. CHF is currently controlled. Latest ECHO performed and demonstrates-   Results for orders placed during the hospital encounter of 07/13/20   Echo Color Flow Doppler? Yes    Narrative · Concentric left ventricular remodeling.  · Hyperdynamic left ventricular systolic function. The estimated ejection   fraction is 76%.  · Grade I (mild) left ventricular diastolic dysfunction consistent with   impaired relaxation.      . Hold Aldactone 2/2 LUIZ and monitor clinical status closely. Monitor on telemetry. Patient is off CHF pathway.  Monitor strict Is&Os and daily weights. Continue to stress to patient importance of self efficacy and  on diet for CHF. Last BNP reviewed- and noted below   Recent Labs   Lab 08/02/20  1634   *   Baseline BNP is unknown.  Currently receiving gentle IV hydration 2/2 LUIZ.  Monitor closely for s/s fluid overload.            Bilateral pulmonary embolism  Continue Eliquis 10mg bid until 8/4/20, then decrease to 5mg bid as per previous instructions.      COPD (chronic obstructive pulmonary disease)  Patient's COPD is controlled currently.  Patient is currently off COPD Pathway. Continue scheduled inhalers Supplemental oxygen as needed and monitor respiratory status closely.         Transaminitis  AST/ALT above baseline.  Check hepatitis panel.  Liver US in am.  S/P lab rosette 7/13/20.      Hypertension associated with diabetes  Chronic; controlled.  It does not appear that pt takes chronic hypoglycemic medications.  Accuchecks ac/hs with SSI coverage as needed.      VTE Risk Mitigation (From admission, onward)         Ordered     apixaban tablet 5 mg  2 times daily      08/02/20 2204     apixaban tablet 10 mg  2 times daily      08/02/20 2203     IP VTE HIGH RISK PATIENT  Once      08/02/20 2034     Place sequential  compression device  Until discontinued      08/02/20 2034                   Shikha Tanner, JOSE DAVIDP  Department of Hospital Medicine   [facility]

## 2020-08-03 NOTE — PROGRESS NOTES
Notified ASPEN Nweby patient awoke to the pain, swelling and some warmth to upper right extremity states is more than before and that pain radiates all throughout upper torso, new order noted for Ultrasound, will continue to monitor.

## 2020-08-03 NOTE — ASSESSMENT & PLAN NOTE
Mild. Likely IVVD  Gentle IVF hydration.  Follow renal panel and electrolytes closely.  Follow renal recommendations.  Check Renal Ultrasound.  Adjust renal dose medications for Estimated Creatinine Clearance: 30.1 mL/min (A) (based on SCr of 1.8 mg/dL (H)).  Avoid NSAIDs, Potts-II inhibitors, ACE-I, Angiotensin Receptor Blockers, or Aminoglycosides.  Urine analysis.

## 2020-08-03 NOTE — PLAN OF CARE
08/03/20 1827   Patient Assessment/Suction   Level of Consciousness (AVPU) alert   Respiratory Effort Normal;Unlabored   Expansion/Accessory Muscles/Retractions no retractions;no use of accessory muscles   All Lung Fields Breath Sounds diminished;Anterior:;Lateral:   PRE-TX-O2   O2 Device (Oxygen Therapy) nasal cannula   $ Is the patient on Low Flow Oxygen? Yes   Flow (L/min) 2   SpO2 98 %   Pulse Oximetry Type Intermittent   $ Pulse Oximetry - Multiple Charge Pulse Oximetry - Multiple   Pulse 76   Resp 18

## 2020-08-03 NOTE — HPI
Sammi Abreu is a 79-year-old female with past medical history significant for arthritis and chronic back pain, fibromyalgia, glaucoma, hyperlipidemia, hypertension, sleep apnea with history of noncompliance with CPAP, morbid obesity, hypertension, GERD, COPD, diabetes type 2, prior DVT, and gallstones [facility].  Of note the patient is also a [employer].   Patient was discharged from this facility on 07/29/2020 after being treated for bilateral pulmonary embolism.  Patient is currently on Eliquis.  Patient underwent a CT abdomen pelvis while in the emergency department which identified surgical changes but nothing acute.  Patient is noted to have a mild LUIZ.  Her troponin is elevated at 0.41 which is consistent with prior levels.  She will be admitted to the service of hospital Medicine for continued treatment.

## 2020-08-03 NOTE — RESPIRATORY THERAPY
08/03/20 0048   Patient Assessment/Suction   Level of Consciousness (AVPU) responds to voice   PRE-TX-O2   O2 Device (Oxygen Therapy) nasal cannula   Flow (L/min) 2   Oxygen Concentration (%) 28   SpO2 97 %   Pulse Oximetry Type Intermittent   Ready to Wean/Extubation Screen   FIO2<=50 (chart decimal) 0.28

## 2020-08-03 NOTE — CONSULTS
08/03/2020      Admit Date: 8/2/2020  Sammi Abreu  New Patient Consult    Chief Complaint   Patient presents with    Post-op Problem     pain to left lower chest and BLE after surgery on 7/27       History of Present Illness:    Pt was home 5 days after last admit, she had iv line on right and had left picc line placed.  She did develop mrsa bacteremia.  Pt was on ox at home but was ambulating.  2 days pta she developed weakness, chest tightness, and bilat arm tightness.  Pt represented to er- found to have lft up and creat up. Intake had been poor.  Her chest pain was more tightness like her arms.          Pt presented with months flank/chest pain with massive pe found after laproscopic rosette. Had mrsa bacteremia during stay and developed severe anemia - responded to iron rx, etc... pt Z[employer]ast[employer].  On eiiquis.  Had pulm htn on echo.    Pt presented with elevated creat to 1.8 from 1.3 bseline. P[t came to er with loer chest pain.       PFSH:  Past Medical History:   Diagnosis Date    ALLERGIC RHINITIS     Anemia     Arthritis     Back pain     Bronchitis     Cataract     OU    Cholelithiasis     COPD (chronic obstructive pulmonary disease)     Degenerative disc disease     Diabetes mellitus     pre diabetic    Diverticulosis     DVT (deep venous thrombosis)     Edema     Encounter for blood transfusion 1979    Fibromyalgia     Glaucoma     Gout     Hx of colonic polyps     Hyperlipidemia     Hypertension     Incontinence     Osteoporosis     Reflux     Refusal of blood transfusions as patient is [place]     Sleep apnea     non compliant with CPAP    Vestibulitis of ear      Past Surgical History:   Procedure Laterality Date    ANGIOGRAPHY OF LOWER EXTREMITY N/A 7/31/2019    Procedure: ANGIOGRAM, LOWER EXTREMITY;  Surgeon: Gino Arana MD;  Location: [facility] CATH/EP LAB;  Service: General;  Laterality: N/A;    HIP SURGERY      HYSTERECTOMY      JOINT  "REPLACEMENT      B total hip    LAPAROSCOPIC CHOLECYSTECTOMY N/A 2020    Procedure: CHOLECYSTECTOMY, LAPAROSCOPIC;  Surgeon: Jomar Mcdonald MD;  Location: [facility] OR;  Service: General;  Laterality: N/A;    TRANSFORAMINAL EPIDURAL INJECTION OF STEROID Left 2020    Procedure: Injection,steroid,epidural,transforaminal approach;  Surgeon: Matt Gilliam MD;  Location: [facility] OR;  Service: Pain Management;  Laterality: Left;  L2-3, L3-4     Social History     Tobacco Use    Smoking status: Former Smoker     Packs/day: 0.25     Years: 5.00     Pack years: 1.25     Quit date: 1972     Years since quittin.6    Smokeless tobacco: Never Used   Substance Use Topics    Alcohol use: Yes     Alcohol/week: 0.0 standard drinks     Comment: Rarely    Drug use: Yes     Types: Oxycodone, Hydrocodone     Family History   Problem Relation Age of Onset    Hypertension Mother     Diabetes Sister     Glaucoma Neg Hx     Macular degeneration Neg Hx     Retinal detachment Neg Hx      Review of patient's allergies indicates:   Allergen Reactions    Cymbalta [duloxetine] Other (See Comments)     Nightmares      Darvon [propoxyphene] Nausea Only and Other (See Comments)     Sweating, slept for 3 days    Atorvastatin Other (See Comments)     Muscle cramps    Naprosyn [naproxen] Nausea Only    Penicillins Rash    Tramadol Nausea Only and Palpitations       Performance Status:Performance Status:The patient's activity level is housebound activities.    Review of Systems:  a review of eleven systems covering constitutional, Psych, Eye, HEENT, Respiratory, Cardiac, GI, , Musculoskeletal, Endocrine, Dermatologicwas negative except the above mentioned abnormalities and for any pertinent findings as listed below:  pertinent positive as above, rest is good         Exam:Comprehensive exam done. BP (!) 196/89   Pulse 73   Temp 96.7 °F (35.9 °C)   Resp 18   Ht 5' 3" (1.6 m)   Wt 103.5 kg (228 lb 2.8 oz)   SpO2 97%  "  Breastfeeding No   BMI 40.42 kg/m²   Exam included Vitals as listed, and patient's appearance and affect and alertness and mood, oral exam for yeast and hygiene and pharynx lesions and Mallapatti (M) score, neck with inspection for jvd and masses and thyroid abnormalities and lymph nodes (supraclavicular and infraclavicular nodes also examined and noted if abn), chest exam included symmetry and effort and fremitus and percussion and auscultation, cardiac exam included rhythm and gallops and murmur and rubs and jvd and edema, abdominal exam for mass and hepatosplenomegaly and tenderness and hernias and bowel sounds, Musculoskeletal exam with muscle tone and posture and mobility/gait and  strenght, and skin for rashes and cyanosis and pallor and turgor, extremity for clubbing.  Findings were normal except as listed below:  M3, min rales,  Taunt edema right arm,less on left.  No edema legs    Radiographs reviewed: view by direct vision  cxr with bilat r> l lower lung atelectaiss and stranding c/w some pulm infarct. Ct abd also similair findings- 8/2.          Results for orders placed during the hospital encounter of 07/13/20   X-Ray Chest 1 View    Narrative EXAMINATION:  XR CHEST 1 VIEW    CLINICAL HISTORY:  dyspnea;    TECHNIQUE:  Single frontal view of the chest was performed.    COMPARISON:  Chest of July 17, 2020.    FINDINGS:  There is chronic elevation of the right hemidiaphragm.  The cardiac size is within normal limits.  Mild atelectasis is seen at the right lung base.  The lung apices are clear.  No pneumothorax is seen.      Impression Mild atelectasis at the right lung base.  Chronic elevation of the right hemidiaphragm.      Electronically signed by: Gino Juarez MD  Date:    07/20/2020  Time:    08:08   ]    Labs     Recent Labs   Lab 08/03/20  0418 08/03/20  1624   WBC  --  7.36   HGB  --  9.2*   HCT  --  30.7*   PLT  --  262   HGBA1C 5.7*  --      Recent Labs   Lab 08/02/20  1945  08/03/20  0418 08/03/20  0700   NA  --   --  138   K  --   --  4.8   CL  --   --  105   CO2  --   --  23   BUN  --   --  13   CREATININE  --   --  1.6*   GLU  --   --  102   CALCIUM  --   --  7.7*   MG  --   --  2.4   PHOS  --   --  4.9*   AST  --   --  455*   ALT  --   --  134*   ALKPHOS  --   --  82   BILITOT  --   --  0.5   PROT  --   --  6.2   ALBUMIN  --   --  2.2*   PROCAL  --  0.07  --    LACTATE 0.7  --   --    No results for input(s): PH, PCO2, PO2, HCO3 in the last 24 hours.  Microbiology Results (last 7 days)     Procedure Component Value Units Date/Time    Blood culture x two cultures. Draw prior to antibiotics. [[id number]] Collected: 08/02/20 1633    Order Status: Completed Specimen: Blood Updated: 08/03/20 0545     Blood Culture, Routine No Growth to date    Narrative:      Aerobic and anaerobic    Blood culture x two cultures. Draw prior to antibiotics. [[id number]] Collected: 08/02/20 1633    Order Status: Completed Specimen: Blood Updated: 08/03/20 0545     Blood Culture, Routine No Growth to date    Narrative:      Aerobic and anaerobic        Results for MEME SALAS (MRN [id number]) as of 8/3/2020 16:59   Ref. Range 8/2/2020 16:34 8/2/2020 19:45 8/3/2020 04:18 8/3/2020 07:00   AST Latest Ref Range: 10 - 40 U/L 324 (H)   455 (H)   ALT Latest Ref Range: 10 - 44 U/L 102 (H)   134 (H)     Impression:  Active Hospital Problems    Diagnosis  POA    *LUIZ (acute kidney injury) [N17.9]  Yes    Chronic diastolic CHF (congestive heart failure) [I50.32]  Yes    Pulmonary infarct [I26.99]  Yes    Atelectasis [J98.11]  Yes    Elevated troponin [R79.89]  Yes    Bilateral pulmonary embolism [I26.99]  Yes    COPD (chronic obstructive pulmonary disease) [J44.9]  Yes    Transaminitis [R74.0]  Yes    Pulmonary hypertension [I27.20]  Yes    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               Plan:   dvt on right arm- no  picc mentioned on right in epic.  Recurrent clot on rx for no clear reason.  mrsa bacteremia last admit  - source not clear.  teflaro may ppt transminitis - suspect had another pe- heparin drip ordered. Heme to see. Needs id f/u.  F/u lft. Monitor.  Dx not clear.

## 2020-08-03 NOTE — ASSESSMENT & PLAN NOTE
Patient's COPD is controlled currently.  Patient is currently off COPD Pathway. Continue scheduled inhalers Supplemental oxygen as needed and monitor respiratory status closely.

## 2020-08-03 NOTE — SUBJECTIVE & OBJECTIVE
Interval History: reports increasing RUE swelling and tenderness. US RUE + DVT.   Poor appetite and generalized weakness. +generalized body aches.     LFTS elevated. Hold dapto pending ID evaluation. US liver negative.     Review of Systems   Constitutional: Positive for activity change, appetite change and fatigue. Negative for diaphoresis and fever.   Respiratory: Positive for shortness of breath. Negative for cough.    Cardiovascular: Negative for chest pain and leg swelling.   Gastrointestinal: Positive for abdominal pain (diffuse aches) and constipation. Negative for abdominal distention.   Genitourinary: Negative for dysuria and flank pain.   Musculoskeletal: Positive for arthralgias and joint swelling.   Skin: Negative for rash.        RUE swelling and pain   Neurological: Negative for speech difficulty and numbness.   Psychiatric/Behavioral: Negative for agitation and confusion.     Objective:     Vital Signs (Most Recent):  Temp: 96.7 °F (35.9 °C) (08/03/20 1550)  Pulse: 73 (08/03/20 1550)  Resp: 18 (08/03/20 1550)  BP: (!) 196/89 (08/03/20 1550)  SpO2: 97 % (08/03/20 1550) Vital Signs (24h Range):  Temp:  [96.3 °F (35.7 °C)-98 °F (36.7 °C)] 96.7 °F (35.9 °C)  Pulse:  [67-83] 73  Resp:  [16-80] 18  SpO2:  [95 %-100 %] 97 %  BP: (140-222)/(64-93) 196/89     Weight: 103.5 kg (228 lb 2.8 oz)  Body mass index is 40.42 kg/m².    Intake/Output Summary (Last 24 hours) at 8/3/2020 1828  Last data filed at 8/3/2020 1733  Gross per 24 hour   Intake 880 ml   Output 525 ml   Net 355 ml      Physical Exam  Vitals signs and nursing note reviewed.   Constitutional:       General: She is not in acute distress.     Appearance: Normal appearance. She is well-developed. She is ill-appearing.   HENT:      Head: Normocephalic and atraumatic.      Right Ear: External ear normal.      Left Ear: External ear normal.      Mouth/Throat:      Mouth: Mucous membranes are moist.   Eyes:      Conjunctiva/sclera: Conjunctivae normal.       Pupils: Pupils are equal, round, and reactive to light.   Neck:      Musculoskeletal: Normal range of motion and neck supple.   Cardiovascular:      Rate and Rhythm: Normal rate and regular rhythm.      Heart sounds: Murmur present.      Comments: +1 pretib edema bilaterally  Pulmonary:      Effort: Pulmonary effort is normal.      Breath sounds: Stridor (decreased at base) present. No wheezing.      Comments: Decreased at base  Abdominal:      General: Bowel sounds are normal.      Palpations: Abdomen is soft.      Tenderness: There is no abdominal tenderness.   Musculoskeletal: Normal range of motion.         General: Swelling present.      Comments: RUE gross edema and tenderness with palpation   Skin:     General: Skin is warm and dry.      Findings: No bruising.      Comments: PICC to E   Neurological:      General: No focal deficit present.      Mental Status: She is alert and oriented to person, place, and time. Mental status is at baseline.      Motor: No weakness.      Comments: Generalized weakness   Psychiatric:         Behavior: Behavior normal.         Judgment: Judgment normal.      Comments: Flat affect         Significant Labs:   CBC:   Recent Labs   Lab 08/02/20  1634 08/03/20  1624   WBC 7.97 7.36   HGB 10.5* 9.2*   HCT 33.2* 30.7*    262     CMP:   Recent Labs   Lab 08/02/20  1634 08/03/20  0700    138   K 4.5 4.8    105   CO2 24 23    102   BUN 14 13   CREATININE 1.8* 1.6*   CALCIUM 8.2* 7.7*   PROT 7.0 6.2   ALBUMIN 2.6* 2.2*   BILITOT 0.6 0.5   ALKPHOS 88 82   * 455*   * 134*   ANIONGAP 11 10   EGFRNONAA 27* 31*       Significant Imaging: I have reviewed and interpreted all pertinent imaging results/findings within the past 24 hours.

## 2020-08-03 NOTE — PLAN OF CARE
08/03/20 0731   Patient Assessment/Suction   Level of Consciousness (AVPU) alert   Respiratory Effort Unlabored   Expansion/Accessory Muscles/Retractions no use of accessory muscles   All Lung Fields Breath Sounds diminished   PRE-TX-O2   O2 Device (Oxygen Therapy) nasal cannula   $ Is the patient on Low Flow Oxygen? Yes   Flow (L/min) 2   SpO2 97 %   Pulse Oximetry Type Intermittent   $ Pulse Oximetry - Multiple Charge Pulse Oximetry - Multiple   Pulse 72   Resp 16   Inhaler   $ Inhaler Charges MDI (Metered Dose Inahler) Treatment;Mouth rinsed post treatment   Respiratory Treatment Status (Inhaler) given   Treatment Route (Inhaler) mouthpiece   Patient Position (Inhaler) HOB elevated   Post Treatment Assessment (Inhaler) breath sounds unchanged   Signs of Intolerance (Inhaler) none

## 2020-08-03 NOTE — PLAN OF CARE
CM completed discharge assessment with pt bedside. Pt verified information on facesheet as correct. Pt reports Miranda Herrera as POA. PCP is Dr. Villatoro. Pharm is [facility] on [address]. Reports living at listed address alone- states her mother lives there part time. Pt reports being active with [facility] for home health services and [facility]cript for home IVAB (dapto 1g q day for 6 weeks). DME- oxygen ([facility], portable/concentrator, 2L), cpap, rw, cane, WC, glucometer. Reports being modified independent with ADLs and able to drive herself to apts. Reports that [facility] will provide transportation home upon DC. Pt was recently discharged from this facility with a long stay. DC plan is home with continued home health and home IVAB (disclosure completed). CM following to assist.      08/03/20 1240   Discharge Assessment   Assessment Type Discharge Planning Assessment   Confirmed/corrected address and phone number on facesheet? Yes   Assessment information obtained from? Patient;Medical Record   Communicated expected length of stay with patient/caregiver yes   Prior to hospitilization cognitive status: Alert/Oriented   Prior to hospitalization functional status: Assistive Equipment;Independent   Current cognitive status: Alert/Oriented   Current Functional Status: Independent;Assistive Equipment   Lives With alone   Able to Return to Prior Arrangements yes   Is patient able to care for self after discharge? Yes   Patient's perception of discharge disposition home health   Readmission Within the Last 30 Days current reason for admission unrelated to previous admission   If yes, most recent facility name: [facility]   Patient currently being followed by outpatient case management? No   Patient currently receives any other outside agency services? Yes   Name and contact number of agency or person providing outside services [facility]   Is it the patient/care giver preference to resume care with the current outside agency? Yes  "  Equipment Currently Used at Home walker, rolling;glucometer;oxygen;bedside commode;wheelchair;CPAP;cane, straight   Do you have any problems affording any of your prescribed medications? No   Is the patient taking medications as prescribed? yes   Does the patient have transportation home? Yes   Transportation Anticipated family or friend will provide   Does the patient receive services at the [facility]? No   Discharge Plan A Home Health   Discharge Plan B Home   DME Needed Upon Discharge  none   Patient/Family in Agreement with Plan yes   Readmission Questionnaire   At the time of your discharge, did someone talk to you about what your health problems were? Yes   At the time of discharge, did someone talk to you about what to watch out for regarding worsening of your health problem? Yes   At the time of discharge, did someone talk to you about what to do if you experienced worsening of your health problem? Yes   At the time of discharge, did someone talk to you about which medication to take when you left the hospital and which ones to stop taking? Yes   At the time of discharge, did someone talk to you about when and where to follow up with a doctor after you left the hospital? Yes   What do you believe caused you to be sick enough to be re-admitted? "swelling and hurting"   How often do you need to have someone help you when you read instructions, pamphlets, or other written material from your doctor or pharmacy? Never   Do you have problems taking your medications as prescribed? No   Do you have any problems affording any of  your prescribed medications? No   Do you have problems obtaining/receiving your medications? No   Does the patient have transportation to healthcare appointments? Yes   Does the patient have family/friends to help with healtcare needs after discharge? yes   Are you currently feeling confused? No   Are you currently having problems thinking? No   Are you currently having memory " problems? No

## 2020-08-03 NOTE — PLAN OF CARE
AAO x 4, Cardiac monitored SR, VSS, PRN pain medication given for complaints of warmth, swelling and ache to right arm, IV Fluids tolerated, was up to bedside commode with assistance, free from falls, safety maintained, bed in low position, bed alarm set, call light within reach, instructed to call for assistance, NPO, will continue to monitor and round.

## 2020-08-03 NOTE — CONSULTS
Consult Note  Infectious Disease    Reason for Consult:  Recent MRSA bacteremia    HPI: Sammi Abreu is a   79 y.o. female who was seen by my associate Dr. Velarde from 07/22 until 7/29 for MRSA bacteremia, unclear source.  She underwent a laparoscopic cholecystectomy on July 13th.  Postoperatively she was hypoxemic in could not be discharge and she was subsequently found with a left popliteal DVT and extend dense have bilateral pulmonary emboli.  She was admitted and anticoagulated.  During her hospitalization, on 07/18 she developed fever and had MRSA in her blood.  She has a history of bilateral hip replacements and complained of severe back pain in the lumbar area radiating bilaterally.  She had an echocardiogram which did not demonstrate any valvular abnormalities but did have elevated pulmonary artery pressure.  An MRI of the lumbar and thoracic spine was performed which was negative for any signs of infection.  Prior to her hospitalization she had an epidural steroid injection, and there was some concern that the MRSA may have come from an antecubital IV site.  She was discharged to home on 07/29 on daptomycin 1000 mg daily.  She return to the emergency room yesterday with complaints of right lower quadrant pain.  A CT scan of the abdomen which was performed which did not reveal any acute abnormalities.  The AST and ALT were very elevated and more so today, with an AST of 455 and ALT of 134.  A CPK is pending to determine whether it is skeletal muscle (potentially from daptomycin) or of liver origin.  Troponin and BNP were borderline elevated.  Home medications do not list a statin.  Creatinine was 1.8 on admission up from 1.3 on 07/27.    She reports eating poorly, drinking little but this preceded her cholecystectomy.  She was not voiding very much.  She did feel some discomfort when she infused the daptomycin.  Her symptoms were more chest burning and a general ill feeling.  No focal myositis or  swollen muscles or focal weakness.  Subsequent to my visit the CPK was resulted as 35,000.      Review of patient's allergies indicates:   Allergen Reactions    Cymbalta [duloxetine] Other (See Comments)     Nightmares      Darvon [propoxyphene] Nausea Only and Other (See Comments)     Sweating, slept for 3 days    Atorvastatin Other (See Comments)     Muscle cramps    Naprosyn [naproxen] Nausea Only    Penicillins Rash    Tramadol Nausea Only and Palpitations     Past Medical History:   Diagnosis Date    ALLERGIC RHINITIS     Anemia     Arthritis     Back pain     Bronchitis     Cataract     OU    Cholelithiasis     COPD (chronic obstructive pulmonary disease)     Degenerative disc disease     Diabetes mellitus     pre diabetic    Diverticulosis     DVT (deep venous thrombosis)     Edema     Encounter for blood transfusion 1979    Fibromyalgia     Glaucoma     Gout     Hx of colonic polyps     Hyperlipidemia     Hypertension     Incontinence     Osteoporosis     Reflux     Refusal of blood transfusions as patient is Evangelical     Sleep apnea     non compliant with CPAP    Vestibulitis of ear      Past Surgical History:   Procedure Laterality Date    ANGIOGRAPHY OF LOWER EXTREMITY N/A 7/31/2019    Procedure: ANGIOGRAM, LOWER EXTREMITY;  Surgeon: Gino Arana MD;  Location: [facility] CATH/EP LAB;  Service: General;  Laterality: N/A;    HIP SURGERY      HYSTERECTOMY      JOINT REPLACEMENT      B total hip    LAPAROSCOPIC CHOLECYSTECTOMY N/A 7/13/2020    Procedure: CHOLECYSTECTOMY, LAPAROSCOPIC;  Surgeon: Jomar Mcdonald MD;  Location: [facility];  Service: General;  Laterality: N/A;    TRANSFORAMINAL EPIDURAL INJECTION OF STEROID Left 6/30/2020    Procedure: Injection,steroid,epidural,transforaminal approach;  Surgeon: Matt Gilliam MD;  Location: [facility] OR;  Service: Pain Management;  Laterality: Left;  L2-3, L3-4     Social History     Socioeconomic History    Marital  status:      Spouse name: Not on file    Number of children: Not on file    Years of education: Not on file    Highest education level: Not on file   Occupational History    Not on file   Social Needs    Financial resource strain: Not on file    Food insecurity     Worry: Not on file     Inability: Not on file    Transportation needs     Medical: Not on file     Non-medical: Not on file   Tobacco Use    Smoking status: Former Smoker     Packs/day: 0.25     Years: 5.00     Pack years: 1.25     Quit date: 1972     Years since quittin.6    Smokeless tobacco: Never Used   Substance and Sexual Activity    Alcohol use: Yes     Alcohol/week: 0.0 standard drinks     Comment: Rarely    Drug use: Yes     Types: Oxycodone, Hydrocodone    Sexual activity: Not on file   Lifestyle    Physical activity     Days per week: Not on file     Minutes per session: Not on file    Stress: Not on file   Relationships    Social connections     Talks on phone: Not on file     Gets together: Not on file     Attends [employer] service: Not on file     Active member of club or organization: Not on file     Attends meetings of clubs or organizations: Not on file     Relationship status: Not on file   Other Topics Concern    Not on file   Social History Narrative    Not on file     Family History   Problem Relation Age of Onset    Hypertension Mother     Diabetes Sister     Glaucoma Neg Hx     Macular degeneration Neg Hx     Retinal detachment Neg Hx        Pertinent medications noted:     Review of Systems:   No chills, fever, sweats,   No change in vision,    No pain in mouth or throat. No problems with teeth, gums.  No angina or syncope  No cough, sputum production, shortness of breath, but eventually became too weak to walk and care for herself   nausea, without vomiting, diarrhea, unexpected focal abd pain,  No dysphagia, odynophagia  No dysuria,   No swelling of joints, redness of joints, injuries, or new  focal pain  No unusual headaches,    Depression  No diabetes with acceptable A1c  No bleeding, lymphadenopathy, but she does have some bruising on her abdomen from Lovenox  No new rashes, lesions,      No recent/prior steroids  Outdoor activities:  Travel:   Implants:   Antibiotic History: Daptomycin times 3-4 days prior to admission    EXAM & DIAGNOSTICS REVIEWED:   Vitals:     Temp:  [96.3 °F (35.7 °C)-98 °F (36.7 °C)]   Temp: 96.7 °F (35.9 °C) (08/03/20 1550)  Pulse: 73 (08/03/20 1550)  Resp: 18 (08/03/20 1550)  BP: (!) 196/89 (08/03/20 1550)  SpO2: 97 % (08/03/20 1550)    Intake/Output Summary (Last 24 hours) at 8/3/2020 1631  Last data filed at 8/3/2020 0300  Gross per 24 hour   Intake 400 ml   Output 525 ml   Net -125 ml       General:  In NAD. Alert and attentive, cooperative, comfortable but fatigued with malaise  Eyes:  Anicteric, PERRL, EOMI  ENT:  No ulcers, exudates, thrush, nares patent, dentition is good  Neck:  supple, no masses or adenopathy appreciated  Lungs: Clear, no consolidation, rales, wheezes, rub  Heart:  RRR, no gallop/murmur/rub noted  Abd:  Soft, NT, ND, normal BS, no masses or organomegaly appreciated.  :  Voids/ , no flank tenderness  Musc:  Joints without effusion, swelling, erythema, synovitis, muscle wasting.   Skin:  No rashes. No palmar or plantar lesions. No subungual petechiae  Wound:   Neuro:   alert, attentive, speech fluent, face symmetric, moves all extremities, no focal weakness. Ambulatory  Psych:  Calm, cooperative  Lymphatic:     No cervical, supraclavicular,   nodes  Extrem: Right upper extremity edema, without erythema, phlebitis, cellulitis, warm and well perfused.  She indicates the left antecubital fossa has a site of inflamed IV site from prior admission  VAD:   Left upper extremity PICC line    Isolation:  None but will be contact    Lines/Tubes/Drains:    General Labs reviewed:  Recent Labs   Lab 07/28/20  0130 07/29/20  0448 08/02/20  1634   WBC 9.90 7.99 7.97    HGB 8.7* 8.9* 10.5*   HCT 27.6* 29.4* 33.2*    205 286       Recent Labs   Lab 08/02/20  1634 08/03/20  0700    138   K 4.5 4.8    105   CO2 24 23   BUN 14 13   CREATININE 1.8* 1.6*   CALCIUM 8.2* 7.7*   PROT 7.0 6.2   BILITOT 0.6 0.5   ALKPHOS 88 82   * 134*   * 455*           Micro:  Microbiology Results (last 7 days)     Procedure Component Value Units Date/Time    Blood culture x two cultures. Draw prior to antibiotics. [[id number]] Collected: 08/02/20 1633    Order Status: Completed Specimen: Blood Updated: 08/03/20 0545     Blood Culture, Routine No Growth to date    Narrative:      Aerobic and anaerobic    Blood culture x two cultures. Draw prior to antibiotics. [[id number]] Collected: 08/02/20 1633    Order Status: Completed Specimen: Blood Updated: 08/03/20 0545     Blood Culture, Routine No Growth to date    Narrative:      Aerobic and anaerobic        Imaging Reviewed:   CXR   CT abdomen   Ultrasound right upper extremity  Cardiology:  [facility]    IMPRESSION & PLAN   1. Acute kidney injury, secondary to poor oral intake  2. Myositis, likely secondary to daptomycin  3. DVT the leg, pulmonary emboli, new DVT right arm, hypercoagulable condition  4. MRSA bacteremia, possibly due to left arm IV site    Recommendations:  Stop daptomycin, serial CPK measurements  Can start vancomycin tomorrow  Will discuss duration of therapy with Dr. Velarde  Due to her poor outcome a after discharge should consider therapy in another location.  She is afraid of COVID and has left against medical advice from [place], due to a poor experience    Discussed with Hospital Medicine.

## 2020-08-03 NOTE — ASSESSMENT & PLAN NOTE
Patient is identified as having Diastolic heart failure that is Chronic. CHF is currently controlled. Latest ECHO performed and demonstrates-   Results for orders placed during the hospital encounter of 07/13/20   Echo Color Flow Doppler? Yes    Narrative · Concentric left ventricular remodeling.  · Hyperdynamic left ventricular systolic function. The estimated ejection   fraction is 76%.  · Grade I (mild) left ventricular diastolic dysfunction consistent with   impaired relaxation.      . Hold Aldactone 2/2 LUIZ and monitor clinical status closely. Monitor on telemetry. Patient is off CHF pathway.  Monitor strict Is&Os and daily weights. Continue to stress to patient importance of self efficacy and  on diet for CHF. Last BNP reviewed- and noted below   Recent Labs   Lab 08/02/20  1634   *   Baseline BNP is unknown.  Currently receiving gentle IV hydration 2/2 LUIZ.  Monitor closely for s/s fluid overload.

## 2020-08-03 NOTE — ASSESSMENT & PLAN NOTE
Chronic; controlled.  It does not appear that pt takes chronic hypoglycemic medications.  Accuchecks ac/hs with SSI coverage as needed.

## 2020-08-03 NOTE — PLAN OF CARE
POC reviewed with patient and patient's family, understanding verbalized. AAOX4. PRN pain medication given as ordered (see MAR) for complaints of pain. IV fluid hydration per orders. Telemetry monitoring maintained. VS stable throughout shift. Safety maintained. Will continue to monitor.

## 2020-08-03 NOTE — TELEPHONE ENCOUNTER
Spoke with Marichuy regarding Hospital Consult--took information accordingly & sent information to WALTER Gustafson via text--patient being referred for Anti-Coag/Bilateral PE/DVT by Anabel Haji NP--voiced understanding--no further questions/concerns

## 2020-08-03 NOTE — SUBJECTIVE & OBJECTIVE
Past Medical History:   Diagnosis Date    ALLERGIC RHINITIS     Anemia     Arthritis     Back pain     Bronchitis     Cataract     OU    Cholelithiasis     COPD (chronic obstructive pulmonary disease)     Degenerative disc disease     Diabetes mellitus     pre diabetic    Diverticulosis     DVT (deep venous thrombosis)     Edema     Encounter for blood transfusion 1979    Fibromyalgia     Glaucoma     Gout     Hx of colonic polyps     Hyperlipidemia     Hypertension     Incontinence     Osteoporosis     Reflux     Refusal of blood transfusions as patient is Mormon     Sleep apnea     non compliant with CPAP    Vestibulitis of ear        Past Surgical History:   Procedure Laterality Date    ANGIOGRAPHY OF LOWER EXTREMITY N/A 7/31/2019    Procedure: ANGIOGRAM, LOWER EXTREMITY;  Surgeon: Gino Arana MD;  [facility] CATH/EP LAB;  Service: General;  Laterality: N/A;    HIP SURGERY      HYSTERECTOMY      JOINT REPLACEMENT      B total hip    LAPAROSCOPIC CHOLECYSTECTOMY N/A 7/13/2020    Procedure: CHOLECYSTECTOMY, LAPAROSCOPIC;  Surgeon: Jomar Mcdonald MD;  Location: [facility] OR;  Service: General;  Laterality: N/A;    TRANSFORAMINAL EPIDURAL INJECTION OF STEROID Left 6/30/2020    Procedure: Injection,steroid,epidural,transforaminal approach;  Surgeon: Matt Gilliam MD;  Location: [facility] OR;  Service: Pain Management;  Laterality: Left;  L2-3, L3-4       Review of patient's allergies indicates:   Allergen Reactions    Cymbalta [duloxetine] Other (See Comments)     Nightmares      Darvon [propoxyphene] Nausea Only and Other (See Comments)     Sweating, slept for 3 days    Atorvastatin Other (See Comments)     Muscle cramps    Naprosyn [naproxen] Nausea Only    Penicillins Rash    Tramadol Nausea Only and Palpitations       Current Facility-Administered Medications on File Prior to Encounter   Medication    lactated ringers infusion    lidocaine (PF) 10 mg/ml (1%)  injection 10 mg     Current Outpatient Medications on File Prior to Encounter   Medication Sig    acetaminophen (TYLENOL) 325 MG tablet Take 2 tablets (650 mg total) by mouth every 6 (six) hours as needed (Do not take with any other Tylenol or acetaminophen containing products).    albuterol-ipratropium (DUO-NEB) 2.5 mg-0.5 mg/3 mL nebulizer solution Take 3 mLs by nebulization every 8 (eight) hours.    [START ON 8/4/2020] apixaban (ELIQUIS) 5 mg Tab Take 1 tablet (5 mg total) by mouth 2 (two) times daily.    ascorbic acid, vitamin C, (VITAMIN C) 100 MG tablet Take 5 tablets (500 mg total) by mouth every evening.    esomeprazole (NEXIUM) 20 MG capsule Take 1 capsule (20 mg total) by mouth before breakfast.    fluticasone (FLONASE) 50 mcg/actuation nasal spray 2 sprays (100 mcg total) by Each Nare route once daily.    folic acid (FOLVITE) 1 MG tablet Take 1 tablet (1 mg total) by mouth once daily.    metoprolol succinate (TOPROL-XL) 50 MG 24 hr tablet Take 1 tablet (50 mg total) by mouth once daily.    montelukast (SINGULAIR) 10 mg tablet Take 1 tablet (10 mg total) by mouth every evening.    sodium chloride 0.9% SolP 50 mL with DAPTOmycin 350 mg SolR 1,000 mg Inject 1,000 mg into the vein once daily.    spironolactone (ALDACTONE) 25 MG tablet Take 1 tablet (25 mg total) by mouth once daily.    budesonide-formoterol 160-4.5 mcg (SYMBICORT) 160-4.5 mcg/actuation HFAA Inhale 2 puffs into the lungs every 12 (twelve) hours. Controller    cyanocobalamin, vitamin B-12, 2,500 mcg Lozg Place 2 tablets under the tongue once daily.    diclofenac (VOLTAREN) 25 MG TbEC Take 1 tablet (25 mg total) by mouth 3 (three) times daily as needed.    HYDROcodone-acetaminophen (NORCO) 5-325 mg per tablet Take 1 tablet by mouth every 6 (six) hours as needed for Pain. (Patient not taking: Reported on 7/30/2020)    lactulose (CHRONULAC) 10 gram/15 mL solution Take 30 mLs (20 g total) by mouth 3 (three) times daily. 2  Teaspoon(s) Oral PRN Every evening. (Patient not taking: Reported on 2020)    multivitamin capsule Take 1 capsule by mouth once daily.    ondansetron (ZOFRAN-ODT) 4 MG TbDL Take 1 tablet (4 mg total) by mouth every 8 (eight) hours as needed.     Family History     Problem Relation (Age of Onset)    Diabetes Sister    Hypertension Mother        Tobacco Use    Smoking status: Former Smoker     Packs/day: 0.25     Years: 5.00     Pack years: 1.25     Quit date: 1972     Years since quittin.6    Smokeless tobacco: Never Used   Substance and Sexual Activity    Alcohol use: Yes     Alcohol/week: 0.0 standard drinks     Comment: Rarely    Drug use: Yes     Types: Oxycodone, Hydrocodone    Sexual activity: Not on file     Review of Systems   Constitutional: Positive for fatigue. Negative for chills and fever.   HENT: Negative for congestion and ear pain.    Eyes: Negative for photophobia and visual disturbance.   Respiratory: Negative for cough and shortness of breath.    Cardiovascular: Negative for chest pain and palpitations.   Gastrointestinal: Positive for abdominal pain. Negative for diarrhea.        Decreased appetite   Endocrine: Negative for polydipsia and polyuria.   Genitourinary: Negative for dysuria and frequency.   Musculoskeletal: Negative for neck pain and neck stiffness.   Skin: Negative for color change and rash.   Neurological: Positive for weakness (generalized). Negative for light-headedness.   Hematological: Bruises/bleeds easily.   Psychiatric/Behavioral: Negative for agitation and confusion.   All other systems reviewed and are negative.    Objective:     Vital Signs (Most Recent):  Temp: 96.3 °F (35.7 °C) (20)  Pulse: 74 (20)  Resp: 18 (20)  BP: (!) 169/71 (20)  SpO2: 97 % (20 0048) Vital Signs (24h Range):  Temp:  [96.3 °F (35.7 °C)-98 °F (36.7 °C)] 96.3 °F (35.7 °C)  Pulse:  [74-86] 74  Resp:  [18-20] 18  SpO2:  [95 %-100 %] 97  %  BP: (169-222)/(71-93) 169/71     Weight: 103.5 kg (228 lb 2.8 oz)  Body mass index is 40.42 kg/m².    Physical Exam  Vitals signs and nursing note reviewed.   Constitutional:       General: She is not in acute distress.     Appearance: Normal appearance. She is well-developed.   HENT:      Head: Normocephalic and atraumatic.      Mouth/Throat:      Mouth: Mucous membranes are dry.   Eyes:      Conjunctiva/sclera: Conjunctivae normal.      Pupils: Pupils are equal, round, and reactive to light.   Neck:      Musculoskeletal: Normal range of motion and neck supple.   Cardiovascular:      Rate and Rhythm: Normal rate and regular rhythm.   Pulmonary:      Effort: Pulmonary effort is normal.      Breath sounds: Decreased breath sounds present.   Abdominal:      General: Bowel sounds are normal. There is no distension.      Palpations: Abdomen is soft.      Tenderness: There is abdominal tenderness (mild generalized).   Skin:     General: Skin is warm and dry.      Capillary Refill: Capillary refill takes 2 to 3 seconds.      Findings: No rash.   Neurological:      General: No focal deficit present.      Mental Status: She is alert and oriented to person, place, and time.   Psychiatric:         Mood and Affect: Mood normal.         Behavior: Behavior normal.         Thought Content: Thought content normal.         Judgment: Judgment normal.           CRANIAL NERVES     CN III, IV, VI   Pupils are equal, round, and reactive to light.       Significant Labs:   CBC:   Recent Labs   Lab 08/02/20  1634   WBC 7.97   HGB 10.5*   HCT 33.2*        CMP:   Recent Labs   Lab 08/02/20  1634      K 4.5      CO2 24      BUN 14   CREATININE 1.8*   CALCIUM 8.2*   PROT 7.0   ALBUMIN 2.6*   BILITOT 0.6   ALKPHOS 88   *   *   ANIONGAP 11   EGFRNONAA 27*     Cardiac Markers:   Recent Labs   Lab 08/02/20  1634   *     Troponin:   Recent Labs   Lab 08/02/20  1634   TROPONINI 0.041*        Significant Imaging: CT abdomen/pelvis:  No acute findings per [facility].

## 2020-08-04 PROBLEM — M62.82 NON-TRAUMATIC RHABDOMYOLYSIS: Status: ACTIVE | Noted: 2020-08-04

## 2020-08-04 LAB
ALBUMIN SERPL BCP-MCNC: 2.2 G/DL (ref 3.5–5.2)
ALBUMIN SERPL BCP-MCNC: 2.2 G/DL (ref 3.5–5.2)
ALP SERPL-CCNC: 75 U/L (ref 55–135)
ALP SERPL-CCNC: 75 U/L (ref 55–135)
ALT SERPL W/O P-5'-P-CCNC: 176 U/L (ref 10–44)
ALT SERPL W/O P-5'-P-CCNC: 176 U/L (ref 10–44)
ANION GAP SERPL CALC-SCNC: 11 MMOL/L (ref 8–16)
ANION GAP SERPL CALC-SCNC: 11 MMOL/L (ref 8–16)
APTT BLDCRRT: 102.9 SEC (ref 21–32)
APTT BLDCRRT: 36.5 SEC (ref 21–32)
APTT BLDCRRT: >150 SEC (ref 21–32)
APTT BLDCRRT: >150 SEC (ref 21–32)
AST SERPL-CCNC: 552 U/L (ref 10–40)
AST SERPL-CCNC: 552 U/L (ref 10–40)
BASOPHILS # BLD AUTO: 0.02 K/UL (ref 0–0.2)
BASOPHILS NFR BLD: 0.2 % (ref 0–1.9)
BILIRUB SERPL-MCNC: 0.3 MG/DL (ref 0.1–1)
BILIRUB SERPL-MCNC: 0.3 MG/DL (ref 0.1–1)
BUN SERPL-MCNC: 15 MG/DL (ref 8–23)
BUN SERPL-MCNC: 15 MG/DL (ref 8–23)
CALCIUM SERPL-MCNC: 7.8 MG/DL (ref 8.7–10.5)
CALCIUM SERPL-MCNC: 7.8 MG/DL (ref 8.7–10.5)
CHLORIDE SERPL-SCNC: 103 MMOL/L (ref 95–110)
CHLORIDE SERPL-SCNC: 103 MMOL/L (ref 95–110)
CK SERPL-CCNC: ABNORMAL U/L (ref 20–180)
CO2 SERPL-SCNC: 21 MMOL/L (ref 23–29)
CO2 SERPL-SCNC: 21 MMOL/L (ref 23–29)
CREAT SERPL-MCNC: 1.6 MG/DL (ref 0.5–1.4)
CREAT SERPL-MCNC: 1.6 MG/DL (ref 0.5–1.4)
DIFFERENTIAL METHOD: ABNORMAL
EOSINOPHIL # BLD AUTO: 0.1 K/UL (ref 0–0.5)
EOSINOPHIL NFR BLD: 1.7 % (ref 0–8)
ERYTHROCYTE [DISTWIDTH] IN BLOOD BY AUTOMATED COUNT: 14.6 % (ref 11.5–14.5)
EST. GFR  (AFRICAN AMERICAN): 36 ML/MIN/1.73 M^2
EST. GFR  (AFRICAN AMERICAN): 36 ML/MIN/1.73 M^2
EST. GFR  (NON AFRICAN AMERICAN): 31 ML/MIN/1.73 M^2
EST. GFR  (NON AFRICAN AMERICAN): 31 ML/MIN/1.73 M^2
ESTIMATED AVG GLUCOSE: 123 MG/DL (ref 68–131)
FERRITIN SERPL-MCNC: 2208 NG/ML (ref 20–300)
GLUCOSE SERPL-MCNC: 176 MG/DL (ref 70–110)
GLUCOSE SERPL-MCNC: 176 MG/DL (ref 70–110)
HBA1C MFR BLD HPLC: 5.9 % (ref 4–5.6)
HCT VFR BLD AUTO: 32.4 % (ref 37–48.5)
HGB BLD-MCNC: 10 G/DL (ref 12–16)
IMM GRANULOCYTES # BLD AUTO: 0.05 K/UL (ref 0–0.04)
IMM GRANULOCYTES NFR BLD AUTO: 0.6 % (ref 0–0.5)
LYMPHOCYTES # BLD AUTO: 1.9 K/UL (ref 1–4.8)
LYMPHOCYTES NFR BLD: 23.1 % (ref 18–48)
MAGNESIUM SERPL-MCNC: 2.2 MG/DL (ref 1.6–2.6)
MCH RBC QN AUTO: 33.7 PG (ref 27–31)
MCHC RBC AUTO-ENTMCNC: 30.9 G/DL (ref 32–36)
MCV RBC AUTO: 109 FL (ref 82–98)
MONOCYTES # BLD AUTO: 0.7 K/UL (ref 0.3–1)
MONOCYTES NFR BLD: 8.2 % (ref 4–15)
NEUTROPHILS # BLD AUTO: 5.4 K/UL (ref 1.8–7.7)
NEUTROPHILS NFR BLD: 66.2 % (ref 38–73)
NRBC BLD-RTO: 0 /100 WBC
PHOSPHATE SERPL-MCNC: 4.6 MG/DL (ref 2.7–4.5)
PLATELET # BLD AUTO: 300 K/UL (ref 150–350)
PMV BLD AUTO: 11 FL (ref 9.2–12.9)
POTASSIUM SERPL-SCNC: 4.4 MMOL/L (ref 3.5–5.1)
POTASSIUM SERPL-SCNC: 4.4 MMOL/L (ref 3.5–5.1)
PROT SERPL-MCNC: 6.1 G/DL (ref 6–8.4)
PROT SERPL-MCNC: 6.1 G/DL (ref 6–8.4)
RBC # BLD AUTO: 2.97 M/UL (ref 4–5.4)
RETICS/RBC NFR AUTO: 3.7 % (ref 0.5–2.5)
SODIUM SERPL-SCNC: 135 MMOL/L (ref 136–145)
SODIUM SERPL-SCNC: 135 MMOL/L (ref 136–145)
TSH SERPL DL<=0.005 MIU/L-ACNC: 3.26 UIU/ML (ref 0.4–4)
WBC # BLD AUTO: 8.18 K/UL (ref 3.9–12.7)

## 2020-08-04 PROCEDURE — 36415 COLL VENOUS BLD VENIPUNCTURE: CPT

## 2020-08-04 PROCEDURE — 99233 SBSQ HOSP IP/OBS HIGH 50: CPT | Mod: ,,, | Performed by: INTERNAL MEDICINE

## 2020-08-04 PROCEDURE — 85730 THROMBOPLASTIN TIME PARTIAL: CPT

## 2020-08-04 PROCEDURE — 25000003 PHARM REV CODE 250: Performed by: NURSE PRACTITIONER

## 2020-08-04 PROCEDURE — 80053 COMPREHEN METABOLIC PANEL: CPT

## 2020-08-04 PROCEDURE — 85025 COMPLETE CBC W/AUTO DIFF WBC: CPT

## 2020-08-04 PROCEDURE — 25000003 PHARM REV CODE 250: Performed by: HOSPITALIST

## 2020-08-04 PROCEDURE — 99223 1ST HOSP IP/OBS HIGH 75: CPT | Mod: ,,, | Performed by: INTERNAL MEDICINE

## 2020-08-04 PROCEDURE — 12000002 HC ACUTE/MED SURGE SEMI-PRIVATE ROOM

## 2020-08-04 PROCEDURE — 99232 SBSQ HOSP IP/OBS MODERATE 35: CPT | Mod: S$GLB,,, | Performed by: INTERNAL MEDICINE

## 2020-08-04 PROCEDURE — 84238 ASSAY NONENDOCRINE RECEPTOR: CPT

## 2020-08-04 PROCEDURE — 94640 AIRWAY INHALATION TREATMENT: CPT

## 2020-08-04 PROCEDURE — 97802 MEDICAL NUTRITION INDIV IN: CPT

## 2020-08-04 PROCEDURE — 63600175 PHARM REV CODE 636 W HCPCS: Performed by: INTERNAL MEDICINE

## 2020-08-04 PROCEDURE — 94761 N-INVAS EAR/PLS OXIMETRY MLT: CPT

## 2020-08-04 PROCEDURE — 27000221 HC OXYGEN, UP TO 24 HOURS

## 2020-08-04 PROCEDURE — 84100 ASSAY OF PHOSPHORUS: CPT

## 2020-08-04 PROCEDURE — 63600175 PHARM REV CODE 636 W HCPCS: Performed by: NURSE PRACTITIONER

## 2020-08-04 PROCEDURE — 83010 ASSAY OF HAPTOGLOBIN QUANT: CPT

## 2020-08-04 PROCEDURE — 83735 ASSAY OF MAGNESIUM: CPT

## 2020-08-04 PROCEDURE — 99223 PR INITIAL HOSPITAL CARE,LEVL III: ICD-10-PCS | Mod: ,,, | Performed by: INTERNAL MEDICINE

## 2020-08-04 PROCEDURE — 25000003 PHARM REV CODE 250: Performed by: INTERNAL MEDICINE

## 2020-08-04 PROCEDURE — 84443 ASSAY THYROID STIM HORMONE: CPT

## 2020-08-04 PROCEDURE — 83540 ASSAY OF IRON: CPT

## 2020-08-04 PROCEDURE — 85730 THROMBOPLASTIN TIME PARTIAL: CPT | Mod: 91

## 2020-08-04 PROCEDURE — 83036 HEMOGLOBIN GLYCOSYLATED A1C: CPT

## 2020-08-04 PROCEDURE — 99232 PR SUBSEQUENT HOSPITAL CARE,LEVL II: ICD-10-PCS | Mod: S$GLB,,, | Performed by: INTERNAL MEDICINE

## 2020-08-04 PROCEDURE — 82728 ASSAY OF FERRITIN: CPT

## 2020-08-04 PROCEDURE — 99233 PR SUBSEQUENT HOSPITAL CARE,LEVL III: ICD-10-PCS | Mod: ,,, | Performed by: INTERNAL MEDICINE

## 2020-08-04 PROCEDURE — 63600175 PHARM REV CODE 636 W HCPCS: Performed by: HOSPITALIST

## 2020-08-04 PROCEDURE — 85045 AUTOMATED RETICULOCYTE COUNT: CPT

## 2020-08-04 PROCEDURE — 82550 ASSAY OF CK (CPK): CPT

## 2020-08-04 RX ORDER — POTASSIUM CHLORIDE 1.5 G/1.58G
40 POWDER, FOR SOLUTION ORAL
Status: DISCONTINUED | OUTPATIENT
Start: 2020-08-04 | End: 2020-08-16 | Stop reason: HOSPADM

## 2020-08-04 RX ORDER — DRONABINOL 2.5 MG/1
2.5 CAPSULE ORAL 2 TIMES DAILY
Status: DISCONTINUED | OUTPATIENT
Start: 2020-08-04 | End: 2020-08-12

## 2020-08-04 RX ORDER — HYDRALAZINE HYDROCHLORIDE 25 MG/1
25 TABLET, FILM COATED ORAL EVERY 8 HOURS
Status: DISCONTINUED | OUTPATIENT
Start: 2020-08-04 | End: 2020-08-08

## 2020-08-04 RX ORDER — POTASSIUM CHLORIDE 1.5 G/1.58G
60 POWDER, FOR SOLUTION ORAL
Status: DISCONTINUED | OUTPATIENT
Start: 2020-08-04 | End: 2020-08-16 | Stop reason: HOSPADM

## 2020-08-04 RX ADMIN — FOLIC ACID 1 MG: 1 TABLET ORAL at 08:08

## 2020-08-04 RX ADMIN — DOCUSATE SODIUM 50 MG AND SENNOSIDES 8.6 MG 1 TABLET: 8.6; 5 TABLET, FILM COATED ORAL at 08:08

## 2020-08-04 RX ADMIN — ALUMINUM HYDROXIDE, MAGNESIUM HYDROXIDE, AND SIMETHICONE 30 ML: 200; 200; 20 SUSPENSION ORAL at 11:08

## 2020-08-04 RX ADMIN — MONTELUKAST 10 MG: 10 TABLET, FILM COATED ORAL at 08:08

## 2020-08-04 RX ADMIN — Medication 500 MG: at 08:08

## 2020-08-04 RX ADMIN — ACETAMINOPHEN 650 MG: 325 TABLET ORAL at 08:08

## 2020-08-04 RX ADMIN — HYDRALAZINE HYDROCHLORIDE 25 MG: 25 TABLET, FILM COATED ORAL at 12:08

## 2020-08-04 RX ADMIN — ALUMINUM HYDROXIDE, MAGNESIUM HYDROXIDE, AND SIMETHICONE 30 ML: 200; 200; 20 SUSPENSION ORAL at 05:08

## 2020-08-04 RX ADMIN — HEPARIN SODIUM AND DEXTROSE 16 UNITS/KG/HR: 10000; 5 INJECTION INTRAVENOUS at 01:08

## 2020-08-04 RX ADMIN — Medication 6 MG: at 08:08

## 2020-08-04 RX ADMIN — DRONABINOL 2.5 MG: 2.5 CAPSULE ORAL at 08:08

## 2020-08-04 RX ADMIN — THERA TABS 1 TABLET: TAB at 08:08

## 2020-08-04 RX ADMIN — VANCOMYCIN HYDROCHLORIDE 1500 MG: 1.5 INJECTION, POWDER, LYOPHILIZED, FOR SOLUTION INTRAVENOUS at 08:08

## 2020-08-04 RX ADMIN — MORPHINE SULFATE 2 MG: 2 INJECTION, SOLUTION INTRAMUSCULAR; INTRAVENOUS at 08:08

## 2020-08-04 RX ADMIN — CYANOCOBALAMIN TAB 1000 MCG 2000 MCG: 1000 TAB at 08:08

## 2020-08-04 RX ADMIN — MORPHINE SULFATE 2 MG: 2 INJECTION, SOLUTION INTRAMUSCULAR; INTRAVENOUS at 03:08

## 2020-08-04 RX ADMIN — METOPROLOL SUCCINATE 50 MG: 50 TABLET, FILM COATED, EXTENDED RELEASE ORAL at 08:08

## 2020-08-04 RX ADMIN — ALUMINUM HYDROXIDE, MAGNESIUM HYDROXIDE, AND SIMETHICONE 30 ML: 200; 200; 20 SUSPENSION ORAL at 08:08

## 2020-08-04 RX ADMIN — FLUTICASONE FUROATE AND VILANTEROL TRIFENATATE 1 PUFF: 100; 25 POWDER RESPIRATORY (INHALATION) at 07:08

## 2020-08-04 RX ADMIN — PANTOPRAZOLE SODIUM 40 MG: 40 TABLET, DELAYED RELEASE ORAL at 08:08

## 2020-08-04 RX ADMIN — ALUMINUM HYDROXIDE, MAGNESIUM HYDROXIDE, AND SIMETHICONE 30 ML: 200; 200; 20 SUSPENSION ORAL at 06:08

## 2020-08-04 RX ADMIN — SODIUM BICARBONATE: 84 INJECTION, SOLUTION INTRAVENOUS at 11:08

## 2020-08-04 RX ADMIN — HYDRALAZINE HYDROCHLORIDE 25 MG: 25 TABLET, FILM COATED ORAL at 10:08

## 2020-08-04 RX ADMIN — SODIUM BICARBONATE: 84 INJECTION, SOLUTION INTRAVENOUS at 10:08

## 2020-08-04 NOTE — NURSING
[facility] Haji made aware of pt morning aptt of >150. She states no further needs at this time to continue following the nomogram for heparin drip.

## 2020-08-04 NOTE — ASSESSMENT & PLAN NOTE
Continue Eliquis 10mg bid until 8/4/20, then decrease to 5mg bid as per previous instructions.  Concerning for eliquis failure. Change to heparin. Consult pulmonary and hem

## 2020-08-04 NOTE — PROGRESS NOTES
Consult Note  Infectious Disease    Reason for Consult:  Recent MRSA bacteremia    HPI: Sammi Abreu is a   79 y.o. female who was seen by my associate Dr. Velarde from 07/22 until 7/29 for MRSA bacteremia, unclear source.  She underwent a laparoscopic cholecystectomy on July 13th.  Postoperatively she was hypoxemic in could not be discharge and she was subsequently found with a left popliteal DVT and extend dense have bilateral pulmonary emboli.  She was admitted and anticoagulated.  During her hospitalization, on 07/18 she developed fever and had MRSA in her blood.  She has a history of bilateral hip replacements and complained of severe back pain in the lumbar area radiating bilaterally.  She had an echocardiogram which did not demonstrate any valvular abnormalities but did have elevated pulmonary artery pressure.  An MRI of the lumbar and thoracic spine was performed which was negative for any signs of infection.  Prior to her hospitalization she had an epidural steroid injection, and there was some concern that the MRSA may have come from an antecubital IV site.  She was discharged to home on 07/29 on daptomycin 1000 mg daily.  She return to the emergency room yesterday with complaints of right lower quadrant pain.  A CT scan of the abdomen which was performed which did not reveal any acute abnormalities.  The AST and ALT were very elevated and more so today, with an AST of 455 and ALT of 134.  A CPK is pending to determine whether it is skeletal muscle (potentially from daptomycin) or of liver origin.  Troponin and BNP were borderline elevated.  Home medications do not list a statin.  Creatinine was 1.8 on admission up from 1.3 on 07/27.    She reports eating poorly, drinking little but this preceded her cholecystectomy.  She was not voiding very much.  She did feel some discomfort when she infused the daptomycin.  Her symptoms were more chest burning and a general ill feeling.  No focal myositis or  swollen muscles or focal weakness.  Subsequent to my visit the CPK was resulted as 35,000.    8/4: history of midline right arm prior admit(no picc on right). Was on daptomycin at home as a single agent(no teflaro). AF, now on heparin drip due to failure on eliquis. Cr stable, AST higher, CPK still very high(due to daptomycin), receiving IV fluid with bicarbonate.  Still has a very poor appetite and she is in turned off by the smell of food.  Is willing to try Marinol.  She wanted to get out of bed because she fears pneumonia.  Right arm remain swollen.  Did have a bowel movement with the aid of a suppository.    EXAM & DIAGNOSTICS REVIEWED:   Vitals:     Temp:  [97.2 °F (36.2 °C)-99.3 °F (37.4 °C)]   Temp: 99.3 °F (37.4 °C) (08/04/20 1610)  Pulse: 79 (08/04/20 1610)  Resp: 18 (08/04/20 1610)  BP: (!) 142/65 (08/04/20 1610)  SpO2: 96 % (08/04/20 1610)    Intake/Output Summary (Last 24 hours) at 8/4/2020 1843  Last data filed at 8/4/2020 1700  Gross per 24 hour   Intake 1934.6 ml   Output 600 ml   Net 1334.6 ml       General:  In NAD. Alert and attentive, cooperative, comfortable    Eyes:  Anicteric,   EOMI  ENT:  No ulcers, exudates, thrush, nares patent, dentition is good  Neck:  supple,    Lungs: Clear, no consolidation, rales, wheezes, rub  Heart:  RRR, no gallop/murmur/rub noted  Abd:  Soft, NT, ND, normal BS, no masses or organomegaly appreciated.  :  Voids/ , no flank tenderness  Musc:  Joints without effusion, swelling, erythema, synovitis, muscle wasting.   Skin:  No rashes. No palmar or plantar lesions. No subungual petechiae  Wound:   Neuro:   alert, attentive, speech fluent, face symmetric, moves all extremities, no focal weakness. Ambulatory  Psych:  Calm, cooperative  Lymphatic:       Extrem: Right upper extremity edema, without erythema, phlebitis, cellulitis, warm and well perfused.    VAD:   Left upper extremity PICC line  Isolation:   contact    Lines/Tubes/Drains:    General Labs  reviewed:  Recent Labs   Lab 08/02/20  1634 08/03/20  1624 08/04/20  0030   WBC 7.97 7.36 8.18   HGB 10.5* 9.2* 10.0*   HCT 33.2* 30.7* 32.4*    262 300       Recent Labs   Lab 08/02/20  1634 08/03/20  0700 08/04/20  0030    138 135*  135*   K 4.5 4.8 4.4  4.4    105 103  103   CO2 24 23 21*  21*   BUN 14 13 15  15   CREATININE 1.8* 1.6* 1.6*  1.6*   CALCIUM 8.2* 7.7* 7.8*  7.8*   PROT 7.0 6.2 6.1  6.1   BILITOT 0.6 0.5 0.3  0.3   ALKPHOS 88 82 75  75   * 134* 176*  176*   * 455* 552*  552*           Micro:  Microbiology Results (last 7 days)     Procedure Component Value Units Date/Time    Blood culture x two cultures. Draw prior to antibiotics. [[id number]] Collected: 08/02/20 1633    Order Status: Completed Specimen: Blood Updated: 08/03/20 2322     Blood Culture, Routine No Growth to date      No Growth to date    Narrative:      Aerobic and anaerobic    Blood culture x two cultures. Draw prior to antibiotics. [[id number]] Collected: 08/02/20 1633    Order Status: Completed Specimen: Blood Updated: 08/03/20 2322     Blood Culture, Routine No Growth to date      No Growth to date    Narrative:      Aerobic and anaerobic        Imaging Reviewed:   CXR   CT abdomen   Ultrasound right upper extremity    Cardiology:  [facility] from prior admit    IMPRESSION & PLAN   1. Acute kidney injury, secondary to poor oral intake and possibly rhabdomyolysis  2. Myositis, likely secondary to daptomycin  3. DVT of leg, pulmonary emboli, new DVT right arm, hypercoagulable condition  4. MRSA bacteremia, possibly due to left arm IV site  5. anorexia    Recommendations:    serial CPK measurements    vancomycin until 9/2. Prefer this be given in a hospital setting such as LTAC   marinol

## 2020-08-04 NOTE — PROGRESS NOTES
Progress Note  PULMONARY    Admit Date: 8/2/2020 08/04/2020  From Dr Suero's consult 8/3-History of Present Illness:    Pt was home 5 days after last admit, she had iv line on right and had left picc line placed.  She did develop mrsa bacteremia.  Pt was on ox at home but was ambulating.  2 days pta she developed weakness, chest tightness, and bilat arm tightness.  Pt represented to er- found to have lft up and creat up. Intake had been poor.  Her chest pain was more tightness like her arms.     Pt presented with months flank/chest pain with massive pe found after laproscopic rosette. Had mrsa bacteremia during stay and developed severe anemia - responded to iron rx, etc... pt [facility].  On eiiquis.  Had pulm htn on echo.   Pt presented with elevated creat to 1.8 from 1.3 bseline. P[t came to er with loer chest pain.   Plan:   dvt on right arm- no picc mentioned on right in epic.  Recurrent clot on rx for no clear reason.  mrsa bacteremia last admit  - source not clear.  teflaro may ppt transminitis - suspect had another pe- heparin drip ordered. Heme to see. Needs id f/u.  F/u lft. Monitor.  Dx not clear.          SUBJECTIVE:     8/4 no new c/o- frustrated with illness.      PFSH and Allergies reviewed.    OBJECTIVE:     Vitals (Most recent):  Vitals:    08/04/20 0748   BP:    Pulse: 91   Resp: 18   Temp:        Vitals (24 hour range):  Temp:  [96.7 °F (35.9 °C)-98.2 °F (36.8 °C)]   Pulse:  [71-91]   Resp:  [18-80]   BP: (138-214)/(64-89)   SpO2:  [96 %-99 %]       Intake/Output Summary (Last 24 hours) at 8/4/2020 0803  Last data filed at 8/4/2020 0600  Gross per 24 hour   Intake 1694.6 ml   Output 600 ml   Net 1094.6 ml          Physical Exam:  The patient's neuro status (alertness,orientation,cognitive function,motor skills,), pharyngeal exam (oral lesions, hygiene, abn dentition,), Neck (jvd,mass,thyroid,nodes in neck and above/below  clavicle),RESPIRATORY(symmetry,effort,fremitus,percussion,auscultation),  Cor(rhythm,heart tones including gallops,perfusion,edema)ABD(distention,hepatic&splenomegaly,tenderness,masses), Skin(rash,cyanosis),Psyc(affect,judgement,).  Exam negative except for these pertinent findings:    Swollen arms right>> left, picc left . chest is symmetric, no distress, normal percussion, normal fremitus and good normal breath sounds      Radiographs reviewed: view by direct vision    Results for orders placed during the hospital encounter of 07/13/20   X-Ray Chest 1 View    Narrative EXAMINATION:  XR CHEST 1 VIEW    CLINICAL HISTORY:  dyspnea;    TECHNIQUE:  Single frontal view of the chest was performed.    COMPARISON:  Chest of July 17, 2020.    FINDINGS:  There is chronic elevation of the right hemidiaphragm.  The cardiac size is within normal limits.  Mild atelectasis is seen at the right lung base.  The lung apices are clear.  No pneumothorax is seen.      Impression Mild atelectasis at the right lung base.  Chronic elevation of the right hemidiaphragm.      Electronically signed by: Gino Juarez MD  Date:    07/20/2020  Time:    08:08   ]    Labs     Recent Labs   Lab 08/04/20  0030   WBC 8.18   HGB 10.0*   HCT 32.4*        Recent Labs   Lab 08/03/20  1624 08/04/20  0030   NA  --  135*  135*   K  --  4.4  4.4   CL  --  103  103   CO2  --  21*  21*   BUN  --  15  15   CREATININE  --  1.6*  1.6*   GLU  --  176*  176*   CALCIUM  --  7.8*  7.8*   MG  --  2.2   PHOS  --  4.6*   AST  --  552*  552*   ALT  --  176*  176*   ALKPHOS  --  75  75   BILITOT  --  0.3  0.3   PROT  --  6.1  6.1   ALBUMIN  --  2.2*  2.2*   INR 1.1  --    CPK 78256* >22309*   No results for input(s): PH, PCO2, PO2, HCO3 in the last 24 hours.  Microbiology Results (last 7 days)     Procedure Component Value Units Date/Time    Blood culture x two cultures. Draw prior to antibiotics. [[id number]] Collected: 08/02/20 1633    Order  Status: Completed Specimen: Blood Updated: 08/03/20 2322     Blood Culture, Routine No Growth to date      No Growth to date    Narrative:      Aerobic and anaerobic    Blood culture x two cultures. Draw prior to antibiotics. [[id number]] Collected: 08/02/20 1633    Order Status: Completed Specimen: Blood Updated: 08/03/20 2322     Blood Culture, Routine No Growth to date      No Growth to date    Narrative:      Aerobic and anaerobic          Impression:  Active Hospital Problems    Diagnosis  POA    *LUIZ (acute kidney injury) [N17.9]  Yes    Chronic diastolic CHF (congestive heart failure) [I50.32]  Yes    Pulmonary infarct [I26.99]  Yes    Atelectasis [J98.11]  Yes    Moderate protein-calorie malnutrition [E44.0]  Yes    Iron deficiency anemia secondary to inadequate dietary iron intake [D50.8]  Yes    Elevated troponin [R79.89]  Yes    Bilateral pulmonary embolism [I26.99]  Yes    COPD (chronic obstructive pulmonary disease) [J44.9]  Yes    Transaminitis [R74.0]  Yes    Morbid obesity [E66.01]  Yes    Pulmonary hypertension [I27.20]  Yes    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               Plan:   8/4 on heparin for dvt right arm, occurred since last admit while on eliquis.  Pt had massive clot with pulm htn suggested on echo.  Pt had mrsa bacteremia after rosette prior to last admit.    cpk [id number] with creat 1.6, consulted renal.  Was on Daptomycin/tefloaro.  Had bad anemia last admit - improved with iron- jehovah witness.    Heme to f/u.    ID saw, vanc was being considered.      May have had another pe- would be at high risk with pulm htn already found.  ivf considered but not with any concerns for mrsa.                                    .

## 2020-08-04 NOTE — PLAN OF CARE
Shahbaz Wei with [facility]- the pt is accepted for admit pending [facility] auth. MIAH Jordan CM sent request to N. Sadaf Tamayo LCSW     08/04/20 1431   Post-Acute Status   Post-Acute Authorization Placement   Post-Acute Placement Status Pending Payor Review

## 2020-08-04 NOTE — ASSESSMENT & PLAN NOTE
Patient's anemia is currently controlled. Has not received any PRBCs to date.. Etiology likely d/t iron def  Current CBC reviewed-   Lab Results   Component Value Date    HGB 9.2 (L) 08/03/2020    HCT 30.7 (L) 08/03/2020     Monitor serial CBC and transfuse if patient becomes hemodynamically unstable, symptomatic or H/H drops below 7/21.

## 2020-08-04 NOTE — NURSING
Called for lab to come draw aPTT at midnight, drawn at 0016, I called back around 0200 because it had not resulted and there was an RRT called on the unit, I was told by Wayne that it should have resulted,he stated he would check with Amarilis Velasquez,  I received a call at 0346 from Amarilis Velasquez in  the lab stating that the reason it took so long was because it was >150, I'm holding heparin for 2Hrs according to the nomogram, and will decrease by 4 units/kg/hr, I ordered a STAT aPTT to confirm results, waiting on lab to come and draw at this time. aPTT redrawn to confirm, new results confirmed >150, will continue to hold heparin 2Hr, will restart 0600 8/48/20, will decrease from 18 units/kg/hr to 14 units/kg/hr, will repeat aPTT @ 1130.

## 2020-08-04 NOTE — NURSING
Latest /72 Map 104, no distress noted.    Hydralazine 10mg IV ordered and administered.    Secured danielle Johnson (NP), patient blood pressure 214/86 , patient does not PRN BP medications, awaiting new orders.

## 2020-08-04 NOTE — CONSULTS
Nephrology Consult Note        Patient Name: Sammi Abreu  MRN: [id number]    Patient Class: IP- Inpatient   Admission Date: 8/2/2020  Length of Stay: 1 days  Date of Service: 8/4/2020    Attending Physician: Hugh Serrano MD  Primary Care Provider: Osmani Villatoro MD    Reason for Consult: luiz/ckd3/hyponatremia/acidosis/rhabdomyalisis/mrsa bacteremia/anemia/htn    SUBJECTIVE:     HPI: 79F was treated in hospital for MRSA bacteremia, had lap rosette 7/13, complicated by PE and DVT, was d/c 7/29 on daptomycin and returned to ER 5 days later with abdominal pain, elevated CPK and LFTs, LUIZ with modest rise in sCr. CT abdomen w/out contrast looked OK. She was given NS and heparin gtt, Dapto was changed with Vanco.    Past Medical History:   Diagnosis Date    ALLERGIC RHINITIS     Anemia     Arthritis     Back pain     Bronchitis     Cataract     OU    Cholelithiasis     COPD (chronic obstructive pulmonary disease)     Degenerative disc disease     Diabetes mellitus     pre diabetic    Diverticulosis     DVT (deep venous thrombosis)     Edema     Encounter for blood transfusion 1979    Fibromyalgia     Glaucoma     Gout     Hx of colonic polyps     Hyperlipidemia     Hypertension     Incontinence     Osteoporosis     Reflux     Refusal of blood transfusions as patient is [employer]alian     Sleep apnea     non compliant with CPAP    Vestibulitis of ear      Past Surgical History:   Procedure Laterality Date    ANGIOGRAPHY OF LOWER EXTREMITY N/A 7/31/2019    Procedure: ANGIOGRAM, LOWER EXTREMITY;  Surgeon: Gino Arana MD;  Location: [facility] CATH/EP LAB;  Service: General;  Laterality: N/A;    HIP SURGERY      HYSTERECTOMY      JOINT REPLACEMENT      B total hip    LAPAROSCOPIC CHOLECYSTECTOMY N/A 7/13/2020    Procedure: CHOLECYSTECTOMY, LAPAROSCOPIC;  Surgeon: Jomar Mcdonald MD;  Location: [facility] OR;  Service: General;  Laterality: N/A;    TRANSFORAMINAL EPIDURAL INJECTION OF STEROID  Left 2020    Procedure: Injection,steroid,epidural,transforaminal approach;  Surgeon: Matt Gilliam MD;  Location: [facility] OR;  Service: Pain Management;  Laterality: Left;  L2-3, L3-4     Family History   Problem Relation Age of Onset    Hypertension Mother     Diabetes Sister     Glaucoma Neg Hx     Macular degeneration Neg Hx     Retinal detachment Neg Hx      Social History     Tobacco Use    Smoking status: Former Smoker     Packs/day: 0.25     Years: 5.00     Pack years: 1.25     Quit date: 1972     Years since quittin.6    Smokeless tobacco: Never Used   Substance Use Topics    Alcohol use: Yes     Alcohol/week: 0.0 standard drinks     Comment: Rarely    Drug use: Yes     Types: Oxycodone, Hydrocodone       Review of patient's allergies indicates:   Allergen Reactions    Cymbalta [duloxetine] Other (See Comments)     Nightmares      Darvon [propoxyphene] Nausea Only and Other (See Comments)     Sweating, slept for 3 days    Atorvastatin Other (See Comments)     Muscle cramps    Naprosyn [naproxen] Nausea Only    Penicillins Rash    Tramadol Nausea Only and Palpitations       Outpatient meds:  Current Facility-Administered Medications on File Prior to Encounter   Medication Dose Route Frequency Provider Last Rate Last Dose    lactated ringers infusion   Intravenous Continuous Gino Reid MD 10 mL/hr at 20 1308      lidocaine (PF) 10 mg/ml (1%) injection 10 mg  1 mL Intradermal Once Gino Reid MD         Current Outpatient Medications on File Prior to Encounter   Medication Sig Dispense Refill    acetaminophen (TYLENOL) 325 MG tablet Take 2 tablets (650 mg total) by mouth every 6 (six) hours as needed (Do not take with any other Tylenol or acetaminophen containing products).  0    albuterol-ipratropium (DUO-NEB) 2.5 mg-0.5 mg/3 mL nebulizer solution Take 3 mLs by nebulization every 8 (eight) hours. 270 mL 0    apixaban (ELIQUIS) 5 mg Tab Take 1 tablet (5 mg  total) by mouth 2 (two) times daily. 60 tablet 3    ascorbic acid, vitamin C, (VITAMIN C) 100 MG tablet Take 5 tablets (500 mg total) by mouth every evening.      esomeprazole (NEXIUM) 20 MG capsule Take 1 capsule (20 mg total) by mouth before breakfast. 30 capsule 2    fluticasone (FLONASE) 50 mcg/actuation nasal spray 2 sprays (100 mcg total) by Each Nare route once daily. 3 Bottle 3    folic acid (FOLVITE) 1 MG tablet Take 1 tablet (1 mg total) by mouth once daily. 30 tablet 0    metoprolol succinate (TOPROL-XL) 50 MG 24 hr tablet Take 1 tablet (50 mg total) by mouth once daily. 90 tablet 3    montelukast (SINGULAIR) 10 mg tablet Take 1 tablet (10 mg total) by mouth every evening. 90 tablet 3    sodium chloride 0.9% SolP 50 mL with DAPTOmycin 350 mg SolR 1,000 mg Inject 1,000 mg into the vein once daily.      spironolactone (ALDACTONE) 25 MG tablet Take 1 tablet (25 mg total) by mouth once daily. 90 tablet 6    budesonide-formoterol 160-4.5 mcg (SYMBICORT) 160-4.5 mcg/actuation HFAA Inhale 2 puffs into the lungs every 12 (twelve) hours. Controller 3 Inhaler 3    cyanocobalamin, vitamin B-12, 2,500 mcg Lozg Place 2 tablets under the tongue once daily. 60 lozenge 11    diclofenac (VOLTAREN) 25 MG TbEC Take 1 tablet (25 mg total) by mouth 3 (three) times daily as needed. 15 tablet 0    HYDROcodone-acetaminophen (NORCO) 5-325 mg per tablet Take 1 tablet by mouth every 6 (six) hours as needed for Pain. (Patient not taking: Reported on 7/30/2020) 15 tablet 0    lactulose (CHRONULAC) 10 gram/15 mL solution Take 30 mLs (20 g total) by mouth 3 (three) times daily. 2 Teaspoon(s) Oral PRN Every evening. (Patient not taking: Reported on 7/30/2020) 500 mL 3    multivitamin capsule Take 1 capsule by mouth once daily.      ondansetron (ZOFRAN-ODT) 4 MG TbDL Take 1 tablet (4 mg total) by mouth every 8 (eight) hours as needed. 20 tablet 0       Scheduled meds:   aluminum-magnesium hydroxide-simethicone  30 mL Oral  QID (AC & HS)    ascorbic acid (vitamin C)  500 mg Oral QHS    cyanocobalamin  2,000 mcg Oral Daily    fluticasone furoate-vilanteroL  1 puff Inhalation Daily    folic acid  1 mg Oral Daily    metoprolol succinate  50 mg Oral Daily    montelukast  10 mg Oral QHS    multivitamin  1 tablet Oral Daily    pantoprazole  40 mg Oral Daily    senna-docusate 8.6-50 mg  1 tablet Oral BID       Infusions:   sodium chloride 0.9% 100 mL/hr at 08/03/20 2350    heparin (porcine) in D5W Stopped (08/04/20 0652)       PRN meds:  acetaminophen, dextrose 50%, dextrose 50%, glucagon (human recombinant), glucose, glucose, heparin (PORCINE), heparin (PORCINE), magnesium oxide, magnesium oxide, melatonin, morphine, ondansetron, potassium chloride 10%, potassium chloride 10%, potassium chloride 10%, sodium chloride 0.9%    Review of Systems:  Review of Systems   Constitutional: Negative for chills, fever, malaise/fatigue and weight loss.   HENT: Negative for hearing loss and nosebleeds.    Eyes: Negative for blurred vision, double vision and photophobia.   Respiratory: Negative for cough, shortness of breath and wheezing.    Cardiovascular: Negative for chest pain, palpitations and leg swelling.   Gastrointestinal: Positive for abdominal pain. Negative for constipation, diarrhea, heartburn, nausea and vomiting.   Genitourinary: Negative for dysuria, frequency and urgency.   Musculoskeletal: Positive for back pain. Negative for falls, joint pain and myalgias.   Skin: Negative for itching and rash.   Neurological: Negative for dizziness, speech change, focal weakness, loss of consciousness and headaches.   Endo/Heme/Allergies: Does not bruise/bleed easily.   Psychiatric/Behavioral: Negative for depression and substance abuse. The patient is not nervous/anxious.        OBJECTIVE:     Vital Signs and IO (Last 24H):  Temp:  [96.7 °F (35.9 °C)-98.2 °F (36.8 °C)]   Pulse:  [71-91]   Resp:  [18-80]   BP: (138-214)/(64-89)   SpO2:  [96  %-99 %]   I/O last 3 completed shifts:  In: 2094.6 [P.O.:880; I.V.:1214.6]  Out: 1125 [Urine:1125]    Wt Readings from Last 5 Encounters:   08/04/20 104.1 kg (229 lb 8 oz)   07/30/20 99.9 kg (220 lb 3.8 oz)   07/29/20 102.7 kg (226 lb 6.6 oz)   07/13/20 97.5 kg (215 lb)   07/09/20 104.9 kg (231 lb 4.2 oz)         Physical Exam:  Physical Exam  Constitutional:       Appearance: She is well-developed. She is not diaphoretic.   HENT:      Head: Normocephalic and atraumatic.   Eyes:      General: No scleral icterus.     Pupils: Pupils are equal, round, and reactive to light.   Neck:      Musculoskeletal: Neck supple.   Cardiovascular:      Rate and Rhythm: Normal rate and regular rhythm.   Pulmonary:      Effort: Pulmonary effort is normal. No respiratory distress.      Breath sounds: No stridor.   Abdominal:      General: There is no distension.      Palpations: Abdomen is soft.   Musculoskeletal: Normal range of motion.         General: No deformity.   Skin:     General: Skin is warm and dry.      Findings: No erythema or rash.   Neurological:      Mental Status: She is alert and oriented to person, place, and time.      Cranial Nerves: No cranial nerve deficit.   Psychiatric:         Behavior: Behavior normal.         Body mass index is 40.65 kg/m².    Laboratory:  Recent Labs   Lab 08/02/20  1634 08/03/20  0700 08/04/20  0030    138 135*  135*   K 4.5 4.8 4.4  4.4    105 103  103   CO2 24 23 21*  21*   BUN 14 13 15  15   CREATININE 1.8* 1.6* 1.6*  1.6*   ESTGFRAFRICA 31* 36* 36*  36*   EGFRNONAA 27* 31* 31*  31*    102 176*  176*       Recent Labs   Lab 08/02/20  1634 08/03/20  0700 08/04/20  0030   CALCIUM 8.2* 7.7* 7.8*  7.8*   ALBUMIN 2.6* 2.2* 2.2*  2.2*   PHOS  --  4.9* 4.6*   MG 2.4 2.4 2.2       Recent Labs   Lab 12/22/17  1141 06/22/18  0848 12/10/18  0945   PTH, Intact 57.0 115.0 H 81.0 H       No results for input(s): POCTGLUCOSE in the last 168 hours.    Recent Labs   Lab  01/16/20  0905 07/13/20  1940 08/03/20  0418   Hemoglobin A1C 6.1 H 6.4 H 5.7 H       Recent Labs   Lab 08/02/20  1634 08/03/20  1624 08/04/20  0030   WBC 7.97 7.36 8.18   HGB 10.5* 9.2* 10.0*   HCT 33.2* 30.7* 32.4*    262 300   * 109* 109*   MCHC 31.6* 30.0* 30.9*   MONO 8.3  0.7 8.4  0.6 8.2  0.7       Recent Labs   Lab 08/02/20  1634 08/03/20  0700 08/04/20  0030   BILITOT 0.6 0.5 0.3  0.3   PROT 7.0 6.2 6.1  6.1   ALBUMIN 2.6* 2.2* 2.2*  2.2*   ALKPHOS 88 82 75  75   * 134* 176*  176*   * 455* 552*  552*       Recent Labs   Lab 06/07/20  0952 07/18/20  0425 07/18/20  1920 08/02/20  1652   Color, UA Yellow Yellow Yellow Yellow   Appearance, UA Clear Clear Hazy A Clear   pH, UA 7.0 6.0 6.0 7.0   Specific [place], UA 1.015 1.015 1.025 1.010   Protein, UA Negative Negative Negative 2+ A   Glucose, UA Negative Negative Negative Negative   Ketones, UA Negative Negative Negative Negative   Urobilinogen, UA Negative Negative 1.0 Negative   Bilirubin (UA) Negative Negative Negative Negative   Occult Blood UA Negative Negative Negative 3+ A   Nitrite, UA Negative Negative Negative Negative   RBC, UA 0 2 0 2   WBC, UA 2 11 H 15 H 1   Bacteria Negative Occasional Few A None   Hyaline Casts, UA 3 A  --   --  0       Recent Labs   Lab 07/13/20 1946   POC PH 7.406   POC PCO2 36.7   POC HCO3 23.1 L   POC PO2 70 L   POC SATURATED O2 94 L   POC BE -2   Sample ARTERIAL       Microbiology Results (last 7 days)     Procedure Component Value Units Date/Time    Blood culture x two cultures. Draw prior to antibiotics. [[id number]] Collected: 08/02/20 1633    Order Status: Completed Specimen: Blood Updated: 08/03/20 2322     Blood Culture, Routine No Growth to date      No Growth to date    Narrative:      Aerobic and anaerobic    Blood culture x two cultures. Draw prior to antibiotics. [[id number]] Collected: 08/02/20 1633    Order Status: Completed Specimen: Blood Updated: 08/03/20 2322     Blood  Culture, Routine No Growth to date      No Growth to date    Narrative:      Aerobic and anaerobic          ASSESSMENT/PLAN:     Active Hospital Problems    Diagnosis  POA    *LUIZ (acute kidney injury) [N17.9]  Yes    Chronic diastolic CHF (congestive heart failure) [I50.32]  Yes    Pulmonary infarct [I26.99]  Yes    Atelectasis [J98.11]  Yes    Moderate protein-calorie malnutrition [E44.0]  Yes    Iron deficiency anemia secondary to inadequate dietary iron intake [D50.8]  Yes    Elevated troponin [R79.89]  Yes    Bilateral pulmonary embolism [I26.99]  Yes    COPD (chronic obstructive pulmonary disease) [J44.9]  Yes    Transaminitis [R74.0]  Yes    Morbid obesity [E66.01]  Yes    Pulmonary hypertension [I27.20]  Yes    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     LUIZ  CKD stage 3  Hyponatremia  Acidosis  MRSA bacteremia  Rhabdomyolysis 2/2 Daptomycin  No NSAIDs, ACEI/ARB, IV contrast or other nephrotoxins.  Keep MAP > 60, SBP > 100.  Dose meds for GFR < 30 ml/min.  Renal diet - low K, low phos.  Dose Vanco by levels.  Agree with VF, will change NS to bicarb gtt.  Avoid hypotonic infusions.    Anemia of CKD  Hgb and HCT are acceptable. Monitor.  Will provide CARIDAD and/or IV iron PRN.    HTN  BP seem controlled.   Tolerate asymptomatic HTN up to -160.  Continue home meds.    Thank you for allowing us to participate in the care of your patient!   We will follow the patient and provide recommendations as needed.    Dimas Sosa MD    [facility]  [address] [place] [address]  [place] [address]    [phone] - tel  [phone] - fax    8/4/2020 9:19 AM

## 2020-08-04 NOTE — ASSESSMENT & PLAN NOTE
Body mass index is 40.42 kg/m².  General weight loss/lifestyle modification strategies discussed (elicit support from others; identify saboteurs; non-food rewards, etc).

## 2020-08-04 NOTE — PLAN OF CARE
I sent the pts 3 day packet and LTAC consult to [facility] to review for acceptance. Sadaf Tamayo LCSW     08/04/20 1217   Post-Acute Status   Post-Acute Authorization Placement   Post-Acute Placement Status Referrals Sent

## 2020-08-04 NOTE — PLAN OF CARE
POC reviewed with patient, patient verbalized understanding, AAOX4, VSS, 2L O2 NC, c/o sob with exertion, c/o pain- pain medication administered per MD order, IVF maintained, heparin drip maintained, CONTACT precautions maintained, tele and safety maintained, patient visualized, appears sleep, eyes closed, respirations even and unlabored, bed in lowest position, side rails up X2, call light and table within reach, bed alarm on and audible, no distress noted at this time, will continue to monitor.

## 2020-08-04 NOTE — CARE UPDATE
08/04/20 0748   Patient Assessment/Suction   Level of Consciousness (AVPU) alert   Respiratory Effort Normal;Unlabored   Expansion/Accessory Muscles/Retractions no use of accessory muscles;no retractions;expansion symmetric   All Lung Fields Breath Sounds diminished   Rhythm/Pattern, Respiratory depth regular;pattern regular;unlabored   Cough Frequency no cough   PRE-TX-O2   O2 Device (Oxygen Therapy) nasal cannula   $ Is the patient on Low Flow Oxygen? Yes   Flow (L/min) 4   SpO2 98 %   Pulse Oximetry Type Intermittent   $ Pulse Oximetry - Multiple Charge Pulse Oximetry - Multiple   Pulse 91   Resp 18   Inhaler   $ Inhaler Charges MDI (Metered Dose Inahler) Treatment;Mouth rinsed post treatment   Daily Review of Necessity (Inhaler) completed   Respiratory Treatment Status (Inhaler) given;mouth rinsed post treatment   Treatment Route (Inhaler) mouthpiece   Patient Position (Inhaler) HOB elevated   Post Treatment Assessment (Inhaler) breath sounds unchanged   Signs of Intolerance (Inhaler) none   Breath Sounds Post-Respiratory Treatment   Throughout All Fields Post-Treatment All Fields   Throughout All Fields Post-Treatment no change   Post-treatment Heart Rate (beats/min) 91   Post-treatment Resp Rate (breaths/min) 18       Breo q day.

## 2020-08-04 NOTE — PLAN OF CARE
Intervention: fat/sodium modified diet and nutrition supplement therapy-commercial beverage     Recommendation:   1. Continue cardiac diet/ no added sugar- send double condiments  -add full DM restriction if glucose > 180 mg/dl  2. Send boost glucose control BID + boost pudding BID  3. Weigh pt weekly, antiemetic regimen as needed    4. Consider initiating PPN D 5 AA 2.75 @ 75 ml/hr + standard lipids  (provides 49 g protein (59% EPN), 1004 kcal (60% EEN))        Goals: meet at least >50% meals/supplements at f/u or start PPN  Nutrition Goal Status: new  Communication of RD Recs: reviewed with MD (POC, sticky note, second sign)

## 2020-08-04 NOTE — NURSING
Heparin drip stopped until aPTT is between 69.1-76.9 per pharmacy, charge nurse and primary  Nurse notified.

## 2020-08-04 NOTE — CONSULTS
[facility]  Adult Nutrition  Consult Note    SUMMARY   Intervention: fat/sodium modified diet and nutrition supplement therapy-commercial beverage     Recommendation:   1. Continue cardiac diet/ no added sugar- send double condiments  -add full DM restriction if glucose > 180 mg/dl  2. Send boost glucose control BID + boost pudding BID  3. Weigh pt weekly, antiemetic regimen as needed    4. Consider initiating PPN D 5 AA 2.75 @ 75 ml/hr + standard lipids  (provides 49 g protein (59% EPN), 1004 kcal (60% EEN))        Goals: meet at least >50% meals/supplements at f/u or start PPN  Nutrition Goal Status: new  Communication of RD Recs: reviewed with MD (POC, sticky note, second sign)     Reason for Assessment     Reason For Assessment: verbal consult ( MD in rounds)  Diagnosis: LUIZ  Relevant Medical History: Anemia, COPD, DM, diverticulosis, DVT, Edema, gout, HTN, HLD, reflux, osteoporosis  Interdisciplinary Rounds: attended     General Information Comments: 78 y/o readmitted in < 2 weeks with LUIZ. Ka and Phos WNL.  Decreased appetite and taste changes ( bland) + nausea x1.5 month (< 2 meals daily). Per son and pt this did not improve s/p LAP rosette or  Last discharge.  Eating 5-10 bites of each meal, drank 1 boost plus last night but 2 unopened on table this morning. Hot food make her most nauseous, reviewed education on tips for taste changes and nausea. Discussed low fat diet with son s/p LAP rosette. Last admit, DM diet was discussed briefly, pt thinks she is pre-diabetic and not interested in carb counting but will cut back on added sugar/soda.     Nutrition Discharge Planning: cardiac diet + boost glucose control as needed and PPN above     Nutrition Risk Screen     Nutrition Risk Screen: no indicators present     Nutrition/Diet History     Patient Reported Diet/Restrictions/Preferences: general, son encouraging PO intakes at home  Food Allergies: NKFA  Factors Affecting Nutritional Intake:  "decreased appetite, altered taste, nausea     Anthropometrics     Height: 5' 3"  Height (inches): 63 in  Weight Method: Bed Scale  Weight: 104.2 kg 8/4, 103.9 7/22, 104 kg (229 lb 4.5 oz)  Weight (lb): 229 lb  Ideal Body Weight (IBW), Female: 115 lb  % Ideal Body Weight, Female (lb): 186.09 %  BMI (Calculated): 40 kg/m2  BMI Grade: greater than 40 - morbid obesity     Lab/Procedures/Meds     Pertinent Labs Reviewed: reviewed  BMP  Lab Results   Component Value Date     (L) 08/04/2020     (L) 08/04/2020    K 4.4 08/04/2020    K 4.4 08/04/2020     08/04/2020     08/04/2020    CO2 21 (L) 08/04/2020    CO2 21 (L) 08/04/2020    BUN 15 08/04/2020    BUN 15 08/04/2020    CREATININE 1.6 (H) 08/04/2020    CREATININE 1.6 (H) 08/04/2020    CALCIUM 7.8 (L) 08/04/2020    CALCIUM 7.8 (L) 08/04/2020    ANIONGAP 11 08/04/2020    ANIONGAP 11 08/04/2020    ESTGFRAFRICA 36 (A) 08/04/2020    ESTGFRAFRICA 36 (A) 08/04/2020    EGFRNONAA 31 (A) 08/04/2020    EGFRNONAA 31 (A) 08/04/2020     Lab Results   Component Value Date    HGBA1C 5.9 (H) 08/04/2020   glucose 102-176 mg/dl since admission      Pertinent Medications Reviewed: reviewed  Vitamin C, folic acid, MVI, senna, zofran, KCl        Estimated/Assessed Needs  Weight Used For Calorie Calculations: 104 kg (229 lb 4.5 oz)  Energy Calorie Requirements (kcal): 1650 kcal  Energy Need Method: MSJ no AF obesity  Protein Requirements: 0.8-1.0 g protein/kg ( obesity/ LUIZ) =  g protein  Weight Used For Protein Calculations: 104 kg  Fluid Requirements (mL): 1650 ml  Estimated Fluid Requirement Method: RDA Method  CHO Requirement: 185-206 g     Nutrition Prescription Ordered     Current Diet Order: cardiac diet, boost plus with meals     Evaluation of Received Nutrient/Fluid Intake   Energy intake: not meeting  Protein intake: not meeting  Fluid intake: not meeting  % Intake of Estimated Energy Needs: 45-50%  % Meal Intake: 25% + boost glucose control 1 x " daily     Nutrition Risk     Level of Risk/Frequency of Follow-up: moderate 2 x weekly     Assessment and Plan     Moderate chronic condition related malnutrition  R/t decreased appetite and taste changes  As evidenced by  1) PO intakes , 75% EEN 1.5 month  2) mild edema  Intervention: above  modified     Monitor and Evaluation     Food and Nutrient Intake: energy intake, food and beverage intake  Food and Nutrient Adminstration: diet order, parenteral nutrition order  Knowledge/Beliefs/Attitudes: food and nutrition knowledge/skill  Anthropometric Measurements: weight, weight change  Biochemical Data, Medical Tests and Procedures: electrolyte and renal panel, glucose/endocrine profile  Nutrition-Focused Physical Findings: overall appearance      Malnutrition Assessment   Scar: 20  Edema: 1+  Energy Intake (Malnutrition): less than or equal to 75% for greater than or equal to 1 month   NFPE: no wasting seen 8/4/20     Nutrition Follow-Up     RD Follow-up?: Yes

## 2020-08-04 NOTE — ASSESSMENT & PLAN NOTE
AST/ALT above baseline.  Check hepatitis panel.  Liver US in am--negative.  S/P lab rosette 7/13/20.  Hold dapto-possible due to abx. Check CPT.   Possible due to clot burden. Discussed with Dr. Decker. No recommendations at this time. Cancel GI consult.

## 2020-08-04 NOTE — PROGRESS NOTES
Discussed with Dr. Cuello after seeing patient in hospital. Due to patient failing eliquis and family history of brother, nephew, and father having history of blood clots, he verbalized to me that he will see patient today to consult in hospital.

## 2020-08-04 NOTE — CARE UPDATE
08/04/20 0748   Patient Assessment/Suction   Level of Consciousness (AVPU) alert   Respiratory Effort Normal;Unlabored   Expansion/Accessory Muscles/Retractions no use of accessory muscles;no retractions;expansion symmetric   All Lung Fields Breath Sounds diminished   Rhythm/Pattern, Respiratory depth regular;pattern regular;unlabored   Cough Frequency no cough   PRE-TX-O2   O2 Device (Oxygen Therapy) room air   SpO2 98 %   Pulse Oximetry Type Intermittent   $ Pulse Oximetry - Multiple Charge Pulse Oximetry - Multiple   Pulse 91   Resp 18   Inhaler   $ Inhaler Charges MDI (Metered Dose Inahler) Treatment;Mouth rinsed post treatment   Daily Review of Necessity (Inhaler) completed   Respiratory Treatment Status (Inhaler) given;mouth rinsed post treatment   Treatment Route (Inhaler) mouthpiece   Patient Position (Inhaler) HOB elevated   Post Treatment Assessment (Inhaler) breath sounds unchanged   Signs of Intolerance (Inhaler) none   Breath Sounds Post-Respiratory Treatment   Throughout All Fields Post-Treatment All Fields   Throughout All Fields Post-Treatment no change   Post-treatment Heart Rate (beats/min) 91   Post-treatment Resp Rate (breaths/min) 18       Breo q day.

## 2020-08-04 NOTE — PLAN OF CARE
CM received call from Amaya with PHN. Stated she received LTAC request and will start reviewing.        08/04/20 1490   Post-Acute Status   Post-Acute Authorization Placement   Post-Acute Placement Status Pending Payor Medical Review

## 2020-08-04 NOTE — PROGRESS NOTES
[facility] Hospital Medicine  Progress Note    Patient Name: Sammi Abreu  MRN: [id number]  Patient Class: IP- Inpatient   Admission Date: 8/2/2020  Length of Stay: 0 days  Attending Physician: Hugh Serrano MD  Primary Care Provider: Osmani Villatoro MD        Subjective:     Principal Problem:LUIZ (acute kidney injury)        HPI:  Sammi Abreu is a 79-year-old female with past medical history significant for arthritis and chronic back pain, fibromyalgia, glaucoma, hyperlipidemia, hypertension, sleep apnea with history of noncompliance with CPAP, morbid obesity, hypertension, GERD, COPD, diabetes type 2, prior DVT, and gallstones who presented to the emergency department tonight with complaints of right lower quadrant pain x3 days.  Of note the patient is also a Judaism.   Patient was discharged from this facility on 07/29/2020 after being treated for bilateral pulmonary embolism.  Patient is currently on Eliquis.  Patient underwent a CT abdomen pelvis while in the emergency department which identified surgical changes but nothing acute.  Patient is noted to have a mild LUIZ.  Her troponin is elevated at 0.41 which is consistent with prior levels.  She will be admitted to the service of hospital Medicine for continued treatment.    Overview/Hospital Course:  No notes on file    Interval History: reports increasing RUE swelling and tenderness. US RUE + DVT.   Poor appetite and generalized weakness. +generalized body aches.     LFTS elevated. Hold dapto pending ID evaluation. US liver negative.     Review of Systems   Constitutional: Positive for activity change, appetite change and fatigue. Negative for diaphoresis and fever.   Respiratory: Positive for shortness of breath. Negative for cough.    Cardiovascular: Negative for chest pain and leg swelling.   Gastrointestinal: Positive for abdominal pain (diffuse aches) and constipation. Negative for abdominal distention.    Genitourinary: Negative for dysuria and flank pain.   Musculoskeletal: Positive for arthralgias and joint swelling.   Skin: Negative for rash.        RUE swelling and pain   Neurological: Negative for speech difficulty and numbness.   Psychiatric/Behavioral: Negative for agitation and confusion.     Objective:     Vital Signs (Most Recent):  Temp: 96.7 °F (35.9 °C) (08/03/20 1550)  Pulse: 73 (08/03/20 1550)  Resp: 18 (08/03/20 1550)  BP: (!) 196/89 (08/03/20 1550)  SpO2: 97 % (08/03/20 1550) Vital Signs (24h Range):  Temp:  [96.3 °F (35.7 °C)-98 °F (36.7 °C)] 96.7 °F (35.9 °C)  Pulse:  [67-83] 73  Resp:  [16-80] 18  SpO2:  [95 %-100 %] 97 %  BP: (140-222)/(64-93) 196/89     Weight: 103.5 kg (228 lb 2.8 oz)  Body mass index is 40.42 kg/m².    Intake/Output Summary (Last 24 hours) at 8/3/2020 1828  Last data filed at 8/3/2020 1733  Gross per 24 hour   Intake 880 ml   Output 525 ml   Net 355 ml      Physical Exam  Vitals signs and nursing note reviewed.   Constitutional:       General: She is not in acute distress.     Appearance: Normal appearance. She is well-developed. She is ill-appearing.   HENT:      Head: Normocephalic and atraumatic.      Right Ear: External ear normal.      Left Ear: External ear normal.      Mouth/Throat:      Mouth: Mucous membranes are moist.   Eyes:      Conjunctiva/sclera: Conjunctivae normal.      Pupils: Pupils are equal, round, and reactive to light.   Neck:      Musculoskeletal: Normal range of motion and neck supple.   Cardiovascular:      Rate and Rhythm: Normal rate and regular rhythm.      Heart sounds: Murmur present.      Comments: +1 pretib edema bilaterally  Pulmonary:      Effort: Pulmonary effort is normal.      Breath sounds: Stridor (decreased at base) present. No wheezing.      Comments: Decreased at base  Abdominal:      General: Bowel sounds are normal.      Palpations: Abdomen is soft.      Tenderness: There is no abdominal tenderness.   Musculoskeletal: Normal range  of motion.         General: Swelling present.      Comments: RUE gross edema and tenderness with palpation   Skin:     General: Skin is warm and dry.      Findings: No bruising.      Comments: PICC to JANAE   Neurological:      General: No focal deficit present.      Mental Status: She is alert and oriented to person, place, and time. Mental status is at baseline.      Motor: No weakness.      Comments: Generalized weakness   Psychiatric:         Behavior: Behavior normal.         Judgment: Judgment normal.      Comments: Flat affect         Significant Labs:   CBC:   Recent Labs   Lab 08/02/20  1634 08/03/20  1624   WBC 7.97 7.36   HGB 10.5* 9.2*   HCT 33.2* 30.7*    262     CMP:   Recent Labs   Lab 08/02/20  1634 08/03/20  0700    138   K 4.5 4.8    105   CO2 24 23    102   BUN 14 13   CREATININE 1.8* 1.6*   CALCIUM 8.2* 7.7*   PROT 7.0 6.2   ALBUMIN 2.6* 2.2*   BILITOT 0.6 0.5   ALKPHOS 88 82   * 455*   * 134*   ANIONGAP 11 10   EGFRNONAA 27* 31*       Significant Imaging: I have reviewed and interpreted all pertinent imaging results/findings within the past 24 hours.      Assessment/Plan:      * LUIZ (acute kidney injury)  Mild. Likely IVVD  Gentle IVF hydration.  Follow renal panel and electrolytes closely.  Follow renal recommendations.  Check Renal Ultrasound.  Adjust renal dose medications for Estimated Creatinine Clearance: 30.1 mL/min (A) (based on SCr of 1.8 mg/dL (H)).  Avoid NSAIDs, Potts-II inhibitors, ACE-I, Angiotensin Receptor Blockers, or Aminoglycosides.  Urine analysis.              Iron deficiency anemia secondary to inadequate dietary iron intake  Patient's anemia is currently controlled. Has not received any PRBCs to date.. Etiology likely d/t iron def  Current CBC reviewed-   Lab Results   Component Value Date    HGB 9.2 (L) 08/03/2020    HCT 30.7 (L) 08/03/2020     Monitor serial CBC and transfuse if patient becomes hemodynamically unstable, symptomatic or  H/H drops below 7/21.         Moderate protein-calorie malnutrition  Consult dietary. Add boost with meals.      Atelectasis  Monitor oxygen saturation. Encourage OOB and IS.      Pulmonary infarct  Due to bilateral pulmonary emboli. Pulmonary consulted.      Chronic diastolic CHF (congestive heart failure)  Patient is identified as having Diastolic heart failure that is Chronic. CHF is currently controlled. Latest ECHO performed and demonstrates-   Results for orders placed during the hospital encounter of 07/13/20   Echo Color Flow Doppler? Yes    Narrative · Concentric left ventricular remodeling.  · Hyperdynamic left ventricular systolic function. The estimated ejection   fraction is 76%.  · Grade I (mild) left ventricular diastolic dysfunction consistent with   impaired relaxation.      . Hold Aldactone 2/2 LUIZ and monitor clinical status closely. Monitor on telemetry. Patient is off CHF pathway.  Monitor strict Is&Os and daily weights. Continue to stress to patient importance of self efficacy and  on diet for CHF. Last BNP reviewed- and noted below   Recent Labs   Lab 08/02/20  1634   *   Baseline BNP is unknown.  Currently receiving gentle IV hydration 2/2 LUIZ.  Monitor closely for s/s fluid overload.            Bilateral pulmonary embolism  Continue Eliquis 10mg bid until 8/4/20, then decrease to 5mg bid as per previous instructions.  Concerning for eliquis failure. Change to heparin. Consult pulmonary and hem      COPD (chronic obstructive pulmonary disease)  Patient's COPD is controlled currently.  Patient is currently off COPD Pathway. Continue scheduled inhalers Supplemental oxygen as needed and monitor respiratory status closely.         Transaminitis  AST/ALT above baseline.  Check hepatitis panel.  Liver US in am--negative.  S/P lab rosette 7/13/20.  Hold dapto-possible due to abx. Check CPT.   Possible due to clot burden. Discussed with Dr. Decker. No recommendations at this time. Cancel  GI consult.      Morbid obesity  Body mass index is 40.42 kg/m².  General weight loss/lifestyle modification strategies discussed (elicit support from others; identify saboteurs; non-food rewards, etc).        Hypertension associated with diabetes  Chronic; controlled.  It does not appear that pt takes chronic hypoglycemic medications.  Accuchecks ac/hs with SSI coverage as needed.        VTE Risk Mitigation (From admission, onward)         Ordered     heparin 25,000 units in dextrose 5% 250 mL (100 units/mL) infusion HIGH INTENSITY nomogram - [facility]  Continuous     Question:  Heparin Infusion Adjustment (DO NOT MODIFY ANSWER)  Answer:  \\[email or web address]\epic\Images\Pharmacy\HeparinInfusions\heparin HIGH INTENSITY nomogram for [facility] [id number].pdf    08/03/20 1609     heparin 25,000 units in dextrose 5% (100 units/ml) IV bolus from bag - ADDITIONAL PRN BOLUS - 60 units/kg  As needed (PRN)     Question:  Heparin Infusion Adjustment (DO NOT MODIFY ANSWER)  Answer:  \\[email or web address]\epic\Images\Pharmacy\HeparinInfusions\heparin HIGH INTENSITY nomogram for [facility] [id number].pdf    08/03/20 1609     heparin 25,000 units in dextrose 5% (100 units/ml) IV bolus from bag - ADDITIONAL PRN BOLUS - 30 units/kg  As needed (PRN)     Question:  Heparin Infusion Adjustment (DO NOT MODIFY ANSWER)  Answer:  \\[email or web address]\epic\Images\Pharmacy\HeparinInfusions\heparin HIGH INTENSITY nomogram for [facility] [id number].pdf    08/03/20 1609     IP VTE HIGH RISK PATIENT  Once      08/02/20 2034     Place sequential compression device  Until discontinued      08/02/20 2034                      Anabel Haji NP  Department of Hospital Medicine   [facility]

## 2020-08-05 ENCOUNTER — OUTSIDE PLACE OF SERVICE (OUTPATIENT)
Dept: INFECTIOUS DISEASES | Facility: CLINIC | Age: 80
End: 2020-08-05
Payer: MEDICARE

## 2020-08-05 PROBLEM — D53.9 MACROCYTIC ANEMIA: Status: ACTIVE | Noted: 2020-08-03

## 2020-08-05 PROBLEM — R53.81 DEBILITY: Status: ACTIVE | Noted: 2020-08-05

## 2020-08-05 PROBLEM — E87.1 HYPONATREMIA: Status: ACTIVE | Noted: 2020-08-05

## 2020-08-05 PROBLEM — R79.89 ELEVATED FERRITIN LEVEL: Status: ACTIVE | Noted: 2020-08-05

## 2020-08-05 PROBLEM — K76.89 LIVER CYST: Status: ACTIVE | Noted: 2020-08-05

## 2020-08-05 PROBLEM — I82.621 ACUTE DEEP VEIN THROMBOSIS (DVT) OF RIGHT UPPER EXTREMITY: Status: ACTIVE | Noted: 2020-08-05

## 2020-08-05 LAB
ALBUMIN SERPL BCP-MCNC: 2.2 G/DL (ref 3.5–5.2)
ALP SERPL-CCNC: 85 U/L (ref 55–135)
ALT SERPL W/O P-5'-P-CCNC: 193 U/L (ref 10–44)
ANION GAP SERPL CALC-SCNC: 8 MMOL/L (ref 8–16)
APTT BLDCRRT: 110.1 SEC (ref 21–32)
APTT BLDCRRT: 34.7 SEC (ref 21–32)
APTT BLDCRRT: 80.7 SEC (ref 21–32)
AST SERPL-CCNC: 455 U/L (ref 10–40)
BASOPHILS # BLD AUTO: 0.02 K/UL (ref 0–0.2)
BASOPHILS NFR BLD: 0.2 % (ref 0–1.9)
BILIRUB SERPL-MCNC: 0.3 MG/DL (ref 0.1–1)
BUN SERPL-MCNC: 14 MG/DL (ref 8–23)
CALCIUM SERPL-MCNC: 8 MG/DL (ref 8.7–10.5)
CHLORIDE SERPL-SCNC: 102 MMOL/L (ref 95–110)
CK SERPL-CCNC: ABNORMAL U/L (ref 20–180)
CO2 SERPL-SCNC: 26 MMOL/L (ref 23–29)
CREAT SERPL-MCNC: 1.7 MG/DL (ref 0.5–1.4)
DIFFERENTIAL METHOD: ABNORMAL
EOSINOPHIL # BLD AUTO: 0.3 K/UL (ref 0–0.5)
EOSINOPHIL NFR BLD: 2.6 % (ref 0–8)
ERYTHROCYTE [DISTWIDTH] IN BLOOD BY AUTOMATED COUNT: 14.5 % (ref 11.5–14.5)
EST. GFR  (AFRICAN AMERICAN): 33 ML/MIN/1.73 M^2
EST. GFR  (NON AFRICAN AMERICAN): 29 ML/MIN/1.73 M^2
ESTIMATED AVG GLUCOSE: 123 MG/DL (ref 68–131)
GLUCOSE SERPL-MCNC: 110 MG/DL (ref 70–110)
HAPTOGLOB SERPL-MCNC: 255 MG/DL (ref 30–250)
HBA1C MFR BLD HPLC: 5.9 % (ref 4–5.6)
HCT VFR BLD AUTO: 29.9 % (ref 37–48.5)
HGB BLD-MCNC: 9 G/DL (ref 12–16)
IMM GRANULOCYTES # BLD AUTO: 0.05 K/UL (ref 0–0.04)
IMM GRANULOCYTES NFR BLD AUTO: 0.5 % (ref 0–0.5)
IRON SERPL-MCNC: 28 UG/DL (ref 30–160)
LYMPHOCYTES # BLD AUTO: 2.4 K/UL (ref 1–4.8)
LYMPHOCYTES NFR BLD: 23.4 % (ref 18–48)
MAGNESIUM SERPL-MCNC: 2.3 MG/DL (ref 1.6–2.6)
MCH RBC QN AUTO: 32 PG (ref 27–31)
MCHC RBC AUTO-ENTMCNC: 30.1 G/DL (ref 32–36)
MCV RBC AUTO: 106 FL (ref 82–98)
MONOCYTES # BLD AUTO: 1 K/UL (ref 0.3–1)
MONOCYTES NFR BLD: 9.8 % (ref 4–15)
NEUTROPHILS # BLD AUTO: 6.5 K/UL (ref 1.8–7.7)
NEUTROPHILS NFR BLD: 63.5 % (ref 38–73)
NRBC BLD-RTO: 0 /100 WBC
PATH REV BLD -IMP: NORMAL
PHOSPHATE SERPL-MCNC: 3.7 MG/DL (ref 2.7–4.5)
PLATELET # BLD AUTO: 266 K/UL (ref 150–350)
PMV BLD AUTO: 10.6 FL (ref 9.2–12.9)
POTASSIUM SERPL-SCNC: 4.4 MMOL/L (ref 3.5–5.1)
PROT SERPL-MCNC: 5.9 G/DL (ref 6–8.4)
RBC # BLD AUTO: 2.81 M/UL (ref 4–5.4)
SATURATED IRON: 16 % (ref 20–50)
SODIUM SERPL-SCNC: 136 MMOL/L (ref 136–145)
STFR SERPL-MCNC: 4.4 MG/L (ref 1.8–4.6)
TOTAL IRON BINDING CAPACITY: 175 UG/DL (ref 250–450)
TRANSFERRIN SERPL-MCNC: 118 MG/DL (ref 200–375)
VANCOMYCIN SERPL-MCNC: 10.6 UG/ML
WBC # BLD AUTO: 10.25 K/UL (ref 3.9–12.7)

## 2020-08-05 PROCEDURE — 25000003 PHARM REV CODE 250: Performed by: NURSE PRACTITIONER

## 2020-08-05 PROCEDURE — 63600175 PHARM REV CODE 636 W HCPCS: Performed by: NURSE PRACTITIONER

## 2020-08-05 PROCEDURE — 27000221 HC OXYGEN, UP TO 24 HOURS

## 2020-08-05 PROCEDURE — 36415 COLL VENOUS BLD VENIPUNCTURE: CPT

## 2020-08-05 PROCEDURE — 94640 AIRWAY INHALATION TREATMENT: CPT

## 2020-08-05 PROCEDURE — 99232 PR SUBSEQUENT HOSPITAL CARE,LEVL II: ICD-10-PCS | Mod: ,,, | Performed by: INTERNAL MEDICINE

## 2020-08-05 PROCEDURE — 80202 ASSAY OF VANCOMYCIN: CPT

## 2020-08-05 PROCEDURE — 63600175 PHARM REV CODE 636 W HCPCS: Performed by: INTERNAL MEDICINE

## 2020-08-05 PROCEDURE — 81240 F2 GENE: CPT

## 2020-08-05 PROCEDURE — 25000003 PHARM REV CODE 250: Performed by: INTERNAL MEDICINE

## 2020-08-05 PROCEDURE — 86146 BETA-2 GLYCOPROTEIN ANTIBODY: CPT

## 2020-08-05 PROCEDURE — 94799 UNLISTED PULMONARY SVC/PX: CPT

## 2020-08-05 PROCEDURE — 85613 RUSSELL VIPER VENOM DILUTED: CPT

## 2020-08-05 PROCEDURE — 85730 THROMBOPLASTIN TIME PARTIAL: CPT

## 2020-08-05 PROCEDURE — 99900035 HC TECH TIME PER 15 MIN (STAT)

## 2020-08-05 PROCEDURE — 85025 COMPLETE CBC W/AUTO DIFF WBC: CPT

## 2020-08-05 PROCEDURE — 63600175 PHARM REV CODE 636 W HCPCS: Performed by: HOSPITALIST

## 2020-08-05 PROCEDURE — 83735 ASSAY OF MAGNESIUM: CPT

## 2020-08-05 PROCEDURE — 82550 ASSAY OF CK (CPK): CPT

## 2020-08-05 PROCEDURE — 83036 HEMOGLOBIN GLYCOSYLATED A1C: CPT

## 2020-08-05 PROCEDURE — 25000003 PHARM REV CODE 250: Performed by: HOSPITALIST

## 2020-08-05 PROCEDURE — 84100 ASSAY OF PHOSPHORUS: CPT

## 2020-08-05 PROCEDURE — 99232 SBSQ HOSP IP/OBS MODERATE 35: CPT | Mod: ,,, | Performed by: INTERNAL MEDICINE

## 2020-08-05 PROCEDURE — 97161 PT EVAL LOW COMPLEX 20 MIN: CPT

## 2020-08-05 PROCEDURE — 97116 GAIT TRAINING THERAPY: CPT

## 2020-08-05 PROCEDURE — 99231 PR SUBSEQUENT HOSPITAL CARE,LEVL I: ICD-10-PCS | Mod: S$GLB,,, | Performed by: INTERNAL MEDICINE

## 2020-08-05 PROCEDURE — 80053 COMPREHEN METABOLIC PANEL: CPT

## 2020-08-05 PROCEDURE — 94761 N-INVAS EAR/PLS OXIMETRY MLT: CPT

## 2020-08-05 PROCEDURE — 85730 THROMBOPLASTIN TIME PARTIAL: CPT | Mod: 91

## 2020-08-05 PROCEDURE — 99231 SBSQ HOSP IP/OBS SF/LOW 25: CPT | Mod: S$GLB,,, | Performed by: INTERNAL MEDICINE

## 2020-08-05 PROCEDURE — 99900031 HC PATIENT EDUCATION (STAT)

## 2020-08-05 PROCEDURE — 86147 CARDIOLIPIN ANTIBODY EA IG: CPT

## 2020-08-05 PROCEDURE — 97530 THERAPEUTIC ACTIVITIES: CPT

## 2020-08-05 PROCEDURE — 81241 F5 GENE: CPT

## 2020-08-05 PROCEDURE — 12000002 HC ACUTE/MED SURGE SEMI-PRIVATE ROOM

## 2020-08-05 PROCEDURE — 85598 HEXAGNAL PHOSPH PLTLT NEUTRL: CPT

## 2020-08-05 RX ORDER — VANCOMYCIN HCL IN 5 % DEXTROSE 1G/250ML
1000 PLASTIC BAG, INJECTION (ML) INTRAVENOUS ONCE
Status: COMPLETED | OUTPATIENT
Start: 2020-08-05 | End: 2020-08-05

## 2020-08-05 RX ORDER — MAG HYDROX/ALUMINUM HYD/SIMETH 200-200-20
30 SUSPENSION, ORAL (FINAL DOSE FORM) ORAL
Status: DISCONTINUED | OUTPATIENT
Start: 2020-08-05 | End: 2020-08-16 | Stop reason: HOSPADM

## 2020-08-05 RX ORDER — POLYETHYLENE GLYCOL 3350 17 G/17G
17 POWDER, FOR SOLUTION ORAL DAILY
Status: DISCONTINUED | OUTPATIENT
Start: 2020-08-06 | End: 2020-08-16 | Stop reason: HOSPADM

## 2020-08-05 RX ADMIN — HYDRALAZINE HYDROCHLORIDE 25 MG: 25 TABLET, FILM COATED ORAL at 05:08

## 2020-08-05 RX ADMIN — HYDRALAZINE HYDROCHLORIDE 25 MG: 25 TABLET, FILM COATED ORAL at 09:08

## 2020-08-05 RX ADMIN — SODIUM BICARBONATE: 84 INJECTION, SOLUTION INTRAVENOUS at 11:08

## 2020-08-05 RX ADMIN — METOPROLOL SUCCINATE 50 MG: 50 TABLET, FILM COATED, EXTENDED RELEASE ORAL at 09:08

## 2020-08-05 RX ADMIN — FLUTICASONE FUROATE AND VILANTEROL TRIFENATATE 1 PUFF: 100; 25 POWDER RESPIRATORY (INHALATION) at 08:08

## 2020-08-05 RX ADMIN — PANTOPRAZOLE SODIUM 40 MG: 40 TABLET, DELAYED RELEASE ORAL at 09:08

## 2020-08-05 RX ADMIN — MONTELUKAST 10 MG: 10 TABLET, FILM COATED ORAL at 09:08

## 2020-08-05 RX ADMIN — DOCUSATE SODIUM 50 MG AND SENNOSIDES 8.6 MG 1 TABLET: 8.6; 5 TABLET, FILM COATED ORAL at 09:08

## 2020-08-05 RX ADMIN — DRONABINOL 2.5 MG: 2.5 CAPSULE ORAL at 09:08

## 2020-08-05 RX ADMIN — HYDRALAZINE HYDROCHLORIDE 25 MG: 25 TABLET, FILM COATED ORAL at 03:08

## 2020-08-05 RX ADMIN — MORPHINE SULFATE 2 MG: 2 INJECTION, SOLUTION INTRAMUSCULAR; INTRAVENOUS at 03:08

## 2020-08-05 RX ADMIN — THERA TABS 1 TABLET: TAB at 09:08

## 2020-08-05 RX ADMIN — Medication 6 MG: at 09:08

## 2020-08-05 RX ADMIN — Medication 500 MG: at 09:08

## 2020-08-05 RX ADMIN — MORPHINE SULFATE 2 MG: 2 INJECTION, SOLUTION INTRAMUSCULAR; INTRAVENOUS at 05:08

## 2020-08-05 RX ADMIN — FOLIC ACID 1 MG: 1 TABLET ORAL at 09:08

## 2020-08-05 RX ADMIN — CYANOCOBALAMIN TAB 1000 MCG 2000 MCG: 1000 TAB at 09:08

## 2020-08-05 RX ADMIN — HEPARIN SODIUM AND DEXTROSE 18 UNITS/KG/HR: 10000; 5 INJECTION INTRAVENOUS at 11:08

## 2020-08-05 RX ADMIN — HEPARIN SODIUM AND DEXTROSE 17.99 UNITS/KG/HR: 10000; 5 INJECTION INTRAVENOUS at 02:08

## 2020-08-05 RX ADMIN — VANCOMYCIN HYDROCHLORIDE 1000 MG: 1 INJECTION, POWDER, LYOPHILIZED, FOR SOLUTION INTRAVENOUS at 09:08

## 2020-08-05 RX ADMIN — ALUMINUM HYDROXIDE, MAGNESIUM HYDROXIDE, AND SIMETHICONE 30 ML: 200; 200; 20 SUSPENSION ORAL at 05:08

## 2020-08-05 NOTE — PROGRESS NOTES
Progress Note  PULMONARY    Admit Date: 8/2/2020 08/05/2020  From Dr Suero's consult 8/3-History of Present Illness:    Pt was home 5 days after last admit, she had iv line on right and had left picc line placed.  She did develop mrsa bacteremia.  Pt was on ox at home but was ambulating.  2 days pta she developed weakness, chest tightness, and bilat arm tightness.  Pt represented to er- found to have lft up and creat up. Intake had been poor.  Her chest pain was more tightness like her arms.     Pt presented with months flank/chest pain with massive pe found after laproscopic rosette. Had mrsa bacteremia during stay and developed severe anemia - responded to iron rx, etc... pt Rastafarian.  On eiiquis.  Had pulm htn on echo.   Pt presented with elevated creat to 1.8 from 1.3 bseline. P[t came to er with loer chest pain.   Plan:   dvt on right arm- no picc mentioned on right in epic.  Recurrent clot on rx for no clear reason.  mrsa bacteremia last admit  - source not clear.  teflaro may ppt transminitis - suspect had another pe- heparin drip ordered. Heme to see. Needs id f/u.  F/u lft. Monitor.  Dx not clear.          SUBJECTIVE:     8/4 no new c/o- frustrated with illness.  8/5 feels better, no new c/o      PFSH and Allergies reviewed.    OBJECTIVE:     Vitals (Most recent):  Vitals:    08/05/20 0809   BP:    Pulse: 89   Resp: 18   Temp:        Vitals (24 hour range):  Temp:  [97.1 °F (36.2 °C)-99.4 °F (37.4 °C)]   Pulse:  [72-91]   Resp:  [18]   BP: (138-207)/(62-84)   SpO2:  [95 %-100 %]       Intake/Output Summary (Last 24 hours) at 8/5/2020 0840  Last data filed at 8/5/2020 0600  Gross per 24 hour   Intake 2608.19 ml   Output 1025 ml   Net 1583.19 ml          Physical Exam:  The patient's neuro status (alertness,orientation,cognitive function,motor skills,), pharyngeal exam (oral lesions, hygiene, abn dentition,), Neck (jvd,mass,thyroid,nodes in neck and above/below  clavicle),RESPIRATORY(symmetry,effort,fremitus,percussion,auscultation),  Cor(rhythm,heart tones including gallops,perfusion,edema)ABD(distention,hepatic&splenomegaly,tenderness,masses), Skin(rash,cyanosis),Psyc(affect,judgement,).  Exam negative except for these pertinent findings:    Swollen arms right>> left, picc left . chest is symmetric, no distress, normal percussion, normal fremitus and good normal breath sounds      Radiographs reviewed: view by direct vision    Results for orders placed during the hospital encounter of 07/13/20   X-Ray Chest 1 View    Narrative EXAMINATION:  XR CHEST 1 VIEW    CLINICAL HISTORY:  dyspnea;    TECHNIQUE:  Single frontal view of the chest was performed.    COMPARISON:  Chest of July 17, 2020.    FINDINGS:  There is chronic elevation of the right hemidiaphragm.  The cardiac size is within normal limits.  Mild atelectasis is seen at the right lung base.  The lung apices are clear.  No pneumothorax is seen.      Impression Mild atelectasis at the right lung base.  Chronic elevation of the right hemidiaphragm.      Electronically signed by: Gino Juarez MD  Date:    07/20/2020  Time:    08:08   ]    Labs     No results for input(s): WBC, HGB, HCT, PLT, BAND, METAMYELOCYT, MYELOPCT, HGBA1C in the last 24 hours.  Recent Labs   Lab 08/05/20  0116      K 4.4      CO2 26   BUN 14   CREATININE 1.7*      CALCIUM 8.0*   MG 2.3   PHOS 3.7   *   *   ALKPHOS 85   BILITOT 0.3   PROT 5.9*   ALBUMIN 2.2*   CPK 48029*   No results for input(s): PH, PCO2, PO2, HCO3 in the last 24 hours.  Microbiology Results (last 7 days)     Procedure Component Value Units Date/Time    Blood culture x two cultures. Draw prior to antibiotics. [[id number]] Collected: 08/02/20 1633    Order Status: Completed Specimen: Blood Updated: 08/04/20 2322     Blood Culture, Routine No Growth to date      No Growth to date      No Growth to date    Narrative:      Aerobic and anaerobic     Blood culture x two cultures. Draw prior to antibiotics. [[id number]] Collected: 08/02/20 1633    Order Status: Completed Specimen: Blood Updated: 08/04/20 2322     Blood Culture, Routine No Growth to date      No Growth to date      No Growth to date    Narrative:      Aerobic and anaerobic          Impression:  Active Hospital Problems    Diagnosis  POA    *LUIZ (acute kidney injury) [N17.9]  Yes    Acute deep vein thrombosis (DVT) of right upper extremity [I82.621]  Yes     Occlusive thrombus of the right brachial vein and cephalic vein      Elevated ferritin level [R79.89]  Yes    Hyponatremia [E87.1]  Yes    Debility [R53.81]  Yes    Liver cyst [K76.89]  Yes    Non-traumatic rhabdomyolysis [M62.82]  Yes    Chronic diastolic CHF (congestive heart failure) [I50.32]  Yes    Pulmonary infarct [I26.99]  Yes    Atelectasis [J98.11]  Yes    Moderate protein-calorie malnutrition [E44.0]  Yes    Macrocytic anemia [D53.9]  Yes    MRSA bacteremia [R78.81]  Yes    Elevated troponin [R79.89]  Yes    Bilateral pulmonary embolism [I26.99]  Yes    COPD (chronic obstructive pulmonary disease) [J44.9]  Yes    Transaminitis [R74.0]  Yes    Morbid obesity [E66.01]  Yes    Pulmonary hypertension [I27.20]  Yes    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               Plan:   8/4 on heparin for dvt right arm, occurred since last admit while on eliquis.  Pt had massive clot with pulm htn suggested on echo.  Pt had mrsa bacteremia after rosette prior to last admit.    cpk 28077 with creat 1.6, consulted renal.  Was on Daptomycin/tefloaro.  Had bad anemia last admit - improved with iron- jehovah witness.    Heme to f/u.    ID saw, vanc was being considered.      May have had another pe- would be at high risk with pulm htn already found.  ivf considered but not with any concerns for mrsa.    8/5  Improving, cpk down,creat stable.  Breathing  better, feels better.  No ivc filter. pulm htn worrisome as would contribute to debility.                                  .

## 2020-08-05 NOTE — PROGRESS NOTES
[facility] Hospital Medicine  Progress Note    Patient Name: Sammi Abreu  MRN: [id number]  Patient Class: IP- Inpatient   Admission Date: 8/2/2020  Length of Stay: 2 days  Attending Physician: Hugh Serrano MD  Primary Care Provider: Osmani Villatoro MD      Subjective:     Principal Problem:LUIZ (acute kidney injury), MRSA bacteremia, DVT lower extremity and now on newly diagnosed DVT to right upper extremity, recently diagnosed bilateral pulmonary emboli, anorexia with malnutrition, debility    HPI:  Sammi Abreu is a 79-year-old female with past medical history significant for arthritis and chronic back pain, fibromyalgia, glaucoma, hyperlipidemia, hypertension, sleep apnea with history of noncompliance with CPAP, morbid obesity, hypertension, GERD, COPD, diabetes type 2, prior DVT, and gallstones [facility] x3 [facility].  Of note the patient is also a [employer].   Patient was discharged from this facility on 07/29/2020 after being treated for bilateral pulmonary embolism.  Patient is currently on Eliquis.  Patient underwent a CT abdomen pelvis while in the emergency department which identified surgical changes but nothing acute.  Patient is noted to have a mild LUIZ.  Her troponin is elevated at 0.41 which is consistent with prior levels.  She will be admitted to the service of hospital Medicine for continued treatment.    Overview/Hospital Course:  Patient monitor closely during hospitalization.  She was placed on continuous telemetry monitoring.  She was noted to have rhabdomyolysis with CPK greater than 91985 and LUIZ.  Nephrology was consulted.  She was noted to have metabolic acidosis initiated on bicarb drip.  Daptomycin was held and ID was consulted.  She was initiated on IV vancomycin.  She was noted have increased right upper extremity swelling.  Right upper extremity ultrasound demonstrated  occlusive thrombus of the right brachial vein and cephalic vein. She was initiated on heparin drip and her Eliquis was held.  There was concern for Eliquis failure.  Hematology consulted.  PT and OT were consulted for debility.  Patient with ongoing anorexia and poor p.o. intake. Dietary was consulted for protein calorie malnutrition.  Patient was started on TPN and lipids as recommended by diet addition.    Interval History:  Patient continues with poor p.o. intake.  Dietitian recommends TPN and lipids.  Continue physical therapy and occupational therapy for debility.    Review of Systems   Constitutional: Positive for activity change, appetite change and fatigue. Negative for chills, diaphoresis and fever.   HENT: Negative for ear discharge, ear pain and facial swelling.    Eyes: Negative for pain and redness.   Respiratory: Positive for cough and shortness of breath.    Cardiovascular: Positive for leg swelling.   Gastrointestinal: Negative for abdominal distention, abdominal pain, constipation, diarrhea, nausea and vomiting.        Poor appetite   Endocrine: Negative for polydipsia and polyphagia.   Genitourinary: Negative for difficulty urinating, dysuria, flank pain and hematuria.   Musculoskeletal: Positive for back pain. Negative for neck pain and neck stiffness.   Skin: Negative for color change.   Allergic/Immunologic: Negative for food allergies.   Neurological: Positive for weakness. Negative for seizures, facial asymmetry and speech difficulty.   Psychiatric/Behavioral: Negative for agitation, behavioral problems, confusion, hallucinations and suicidal ideas.     Objective:     Vital Signs (Most Recent):  Temp: 99.1 °F (37.3 °C) (08/05/20 1138)  Pulse: 71 (08/05/20 1138)  Resp: 18 (08/05/20 1138)  BP: 139/63 (08/05/20 1138)  SpO2: 95 % (08/05/20 1138) Vital Signs (24h Range):  Temp:  [97.1 °F (36.2 °C)-99.4 °F (37.4 °C)] 99.1 °F (37.3 °C)  Pulse:  [71-91] 71  Resp:  [18] 18  SpO2:  [95 %-97 %] 95 %  BP:  (138-188)/(62-79) 139/63     Weight: 104.1 kg (229 lb 8 oz)  Body mass index is 40.65 kg/m².    Intake/Output Summary (Last 24 hours) at 8/5/2020 1426  Last data filed at 8/5/2020 1346  Gross per 24 hour   Intake 3088.19 ml   Output 1025 ml   Net 2063.19 ml      Physical Exam  Constitutional:       General: She is not in acute distress.     Appearance: Normal appearance.   HENT:      Head: Normocephalic and atraumatic.      Mouth/Throat:      Mouth: Mucous membranes are moist.   Eyes:      General:         Right eye: No discharge.         Left eye: No discharge.      Extraocular Movements: Extraocular movements intact.      Conjunctiva/sclera: Conjunctivae normal.      Pupils: Pupils are equal, round, and reactive to light.   Neck:      Musculoskeletal: Normal range of motion and neck supple. No neck rigidity or muscular tenderness.   Cardiovascular:      Rate and Rhythm: Normal rate and regular rhythm.      Pulses: Normal pulses.      Heart sounds: Murmur present.   Pulmonary:      Effort: No respiratory distress.      Comments: Bilateral breath sounds diminished  Abdominal:      General: Bowel sounds are normal. There is no distension.      Tenderness: There is no abdominal tenderness. There is no guarding.      Comments: Obese   Genitourinary:     Comments: Not examined  Musculoskeletal: Normal range of motion.         General: Swelling present.      Comments: Generalized edema with edema also noted to upper and lower extremities   Skin:     General: Skin is warm and dry.      Capillary Refill: Capillary refill takes less than 2 seconds.   Neurological:      General: No focal deficit present.      Mental Status: She is alert and oriented to person, place, and time. Mental status is at baseline.      Cranial Nerves: No cranial nerve deficit.   Psychiatric:         Mood and Affect: Mood normal.         Behavior: Behavior normal.         Thought Content: Thought content normal.         Judgment: Judgment normal.        Labs reviewed      Assessment/Plan:      * LUIZ (acute kidney injury)  Monitor  Orders as per Nephrology  Patient currently remains on bicarb drip  Trend BMP  Renal dose all medications and avoid any possible nephrotoxic medications              Liver cyst  Noted  Will need outpatient follow-up      Debility  Fall and skin precautions  Continue physical therapy and occupational therapy      Hyponatremia  Monitor  Nephrology following      Elevated ferritin level  Noted      Acute deep vein thrombosis (DVT) of right upper extremity  Patient currently remains on IV heparin drip  Hematology consulted for anticoagulation recommendations      Non-traumatic rhabdomyolysis  It was felt this is secondary to daptomycin.  Id consulted.  Nephrology consulted.  Trend CPK daily    Daptomycin was discontinued and patient was placed on IV vancomycin      Macrocytic anemia  Monitor        Moderate protein-calorie malnutrition  Anorexia-  Dietitian consulted and recommended TPN lipids-add today  Monitor volume status closely  Continue p.o. supplement        Atelectasis  Monitor oxygen saturation. Encourage OOB and IS.      Pulmonary infarct  Due to bilateral pulmonary emboli. Pulmonary following      Chronic diastolic CHF (congestive heart failure)  Patient is identified as having Diastolic heart failure that is Chronic. CHF is currently controlled. Latest ECHO performed and demonstrates-   Results for orders placed during the hospital encounter of 07/13/20   Echo Color Flow Doppler? Yes    Narrative · Concentric left ventricular remodeling.  · Hyperdynamic left ventricular systolic function. The estimated ejection   fraction is 76%.  · Grade I (mild) left ventricular diastolic dysfunction consistent with   impaired relaxation.          Patient being placed on TPN-monitor volume status closely    MRSA bacteremia  Previously receiving daptomycin as outpatient-now on IV vancomycin    Elevated troponin  Noted- chronically  elevated    Bilateral pulmonary embolism  Patient previously on Eliquis at home  Continue IV heparin for now  Pulmonology and Hematology consulted      COPD (chronic obstructive pulmonary disease)  Patient's COPD is controlled currently.  Patient is currently off COPD Pathway. Continue scheduled inhalers Supplemental oxygen as needed and monitor respiratory status closely.         Transaminitis  AST/ALT above baseline.  Check hepatitis panel.  Liver US in am--negative.  S/P lab rosette 7/13/20.  Hold dapto-possible due to abx. Check CPT.   Possible due to clot burden. Discussed with Dr. Decker. No recommendations at this time. Cancel GI consult.      Morbid obesity  Body mass index is 40.65 kg/m².  General weight loss/lifestyle modification strategies discussed (elicit support from others; identify saboteurs; non-food rewards, etc).        Pulmonary hypertension  Noted  Orders as per pulmonology      Hypertension associated with diabetes  Monitor  Continue current treatment  Titrate medications as needed        VTE Risk Mitigation (From admission, onward)         Ordered     heparin 25,000 units in dextrose 5% 250 mL (100 units/mL) infusion HIGH INTENSITY nomogram - [facility]  Continuous     Question:  Heparin Infusion Adjustment (DO NOT MODIFY ANSWER)  Answer:  \\[email or web address]\epic\Images\Pharmacy\HeparinInfusions\heparin HIGH INTENSITY nomogram for [facility] [id number].pdf    08/03/20 1609     heparin 25,000 units in dextrose 5% (100 units/ml) IV bolus from bag - ADDITIONAL PRN BOLUS - 60 units/kg  As needed (PRN)     Question:  Heparin Infusion Adjustment (DO NOT MODIFY ANSWER)  Answer:  \\[email or web address]\epic\Images\Pharmacy\HeparinInfusions\heparin HIGH INTENSITY nomogram for [facility] [id number].pdf    08/03/20 1609     heparin 25,000 units in dextrose 5% (100 units/ml) IV bolus from bag - ADDITIONAL PRN BOLUS - 30 units/kg  As needed (PRN)     Question:  Heparin Infusion Adjustment (DO NOT MODIFY ANSWER)  Answer:   \\[email or web address]\epic\Images\Pharmacy\HeparinInfusions\heparin HIGH INTENSITY nomogram for [facility] [id number].pdf    08/03/20 1609     IP VTE HIGH RISK PATIENT  Once      08/02/20 2034     Place sequential compression device  Until discontinued      08/02/20 2034                Time spent seeing patient( greater than 1/2 spent in direct contact) : 38 minutes      GENARO Ross  Department of Hospital Medicine   [facility]

## 2020-08-05 NOTE — CONSULTS
Nephrology Consult Note        Patient Name: Sammi Abreu  MRN: [id number]    Patient Class: IP- Inpatient   Admission Date: 8/2/2020  Length of Stay: 2 days  Date of Service: 8/5/2020    Attending Physician: Hugh Serrano MD  Primary Care Provider: Osmani Villatoro MD    Reason for Consult: luiz/ckd3/hyponatremia/acidosis/rhabdomyalisis/mrsa bacteremia/anemia/htn    SUBJECTIVE:     HPI: 79F Jemirzava witness was treated in hospital for MRSA bacteremia, had lap rosette 7/13, complicated by PE and DVT, was d/c 7/29 on daptomycin and returned to ER 5 days later with abdominal pain, elevated CPK and LFTs, LUIZ with modest rise in sCr. CT abdomen w/out contrast looked OK. She was given NS and heparin gtt, Dapto was changed with Vanco.    Past Medical History:   Diagnosis Date    ALLERGIC RHINITIS     Anemia     Arthritis     Back pain     Bronchitis     Cataract     OU    Cholelithiasis     COPD (chronic obstructive pulmonary disease)     Degenerative disc disease     Diabetes mellitus     pre diabetic    Diverticulosis     DVT (deep venous thrombosis)     Edema     Encounter for blood transfusion 1979    Fibromyalgia     Glaucoma     Gout     Hx of colonic polyps     Hyperlipidemia     Hypertension     Incontinence     Osteoporosis     Reflux     Refusal of blood transfusions as patient is Denominational     Sleep apnea     non compliant with CPAP    Vestibulitis of ear      Past Surgical History:   Procedure Laterality Date    ANGIOGRAPHY OF LOWER EXTREMITY N/A 7/31/2019    Procedure: ANGIOGRAM, LOWER EXTREMITY;  Surgeon: Gino Arana MD;  Location: [facility] CATH/EP LAB;  Service: General;  Laterality: N/A;    HIP SURGERY      HYSTERECTOMY      JOINT REPLACEMENT      B total hip    LAPAROSCOPIC CHOLECYSTECTOMY N/A 7/13/2020    Procedure: CHOLECYSTECTOMY, LAPAROSCOPIC;  Surgeon: Jomar Mcdonald MD;  Location[facility];  Service: General;  Laterality: N/A;    TRANSFORAMINAL EPIDURAL  INJECTION OF STEROID Left 2020    Procedure: Injection,steroid,epidural,transforaminal approach;  Surgeon: Matt Gilliam MD;  Location: [facility] OR;  Service: Pain Management;  Laterality: Left;  L2-3, L3-4     Family History   Problem Relation Age of Onset    Hypertension Mother     Diabetes Sister     Glaucoma Neg Hx     Macular degeneration Neg Hx     Retinal detachment Neg Hx      Social History     Tobacco Use    Smoking status: Former Smoker     Packs/day: 0.25     Years: 5.00     Pack years: 1.25     Quit date: 1972     Years since quittin.6    Smokeless tobacco: Never Used   Substance Use Topics    Alcohol use: Yes     Alcohol/week: 0.0 standard drinks     Comment: Rarely    Drug use: Yes     Types: Oxycodone, Hydrocodone       Review of patient's allergies indicates:   Allergen Reactions    Cymbalta [duloxetine] Other (See Comments)     Nightmares      Darvon [propoxyphene] Nausea Only and Other (See Comments)     Sweating, slept for 3 days    Atorvastatin Other (See Comments)     Muscle cramps    Naprosyn [naproxen] Nausea Only    Penicillins Rash    Tramadol Nausea Only and Palpitations       Outpatient meds:  Current Facility-Administered Medications on File Prior to Encounter   Medication Dose Route Frequency Provider Last Rate Last Dose    lactated ringers infusion   Intravenous Continuous Gino Reid MD 10 mL/hr at 20 1308      lidocaine (PF) 10 mg/ml (1%) injection 10 mg  1 mL Intradermal Once Gino Reid MD         Current Outpatient Medications on File Prior to Encounter   Medication Sig Dispense Refill    acetaminophen (TYLENOL) 325 MG tablet Take 2 tablets (650 mg total) by mouth every 6 (six) hours as needed (Do not take with any other Tylenol or acetaminophen containing products).  0    albuterol-ipratropium (DUO-NEB) 2.5 mg-0.5 mg/3 mL nebulizer solution Take 3 mLs by nebulization every 8 (eight) hours. 270 mL 0    apixaban (ELIQUIS) 5 mg Tab  Take 1 tablet (5 mg total) by mouth 2 (two) times daily. 60 tablet 3    ascorbic acid, vitamin C, (VITAMIN C) 100 MG tablet Take 5 tablets (500 mg total) by mouth every evening.      esomeprazole (NEXIUM) 20 MG capsule Take 1 capsule (20 mg total) by mouth before breakfast. 30 capsule 2    fluticasone (FLONASE) 50 mcg/actuation nasal spray 2 sprays (100 mcg total) by Each Nare route once daily. 3 Bottle 3    folic acid (FOLVITE) 1 MG tablet Take 1 tablet (1 mg total) by mouth once daily. 30 tablet 0    metoprolol succinate (TOPROL-XL) 50 MG 24 hr tablet Take 1 tablet (50 mg total) by mouth once daily. 90 tablet 3    montelukast (SINGULAIR) 10 mg tablet Take 1 tablet (10 mg total) by mouth every evening. 90 tablet 3    sodium chloride 0.9% SolP 50 mL with DAPTOmycin 350 mg SolR 1,000 mg Inject 1,000 mg into the vein once daily.      spironolactone (ALDACTONE) 25 MG tablet Take 1 tablet (25 mg total) by mouth once daily. 90 tablet 6    budesonide-formoterol 160-4.5 mcg (SYMBICORT) 160-4.5 mcg/actuation HFAA Inhale 2 puffs into the lungs every 12 (twelve) hours. Controller 3 Inhaler 3    cyanocobalamin, vitamin B-12, 2,500 mcg Lozg Place 2 tablets under the tongue once daily. 60 lozenge 11    diclofenac (VOLTAREN) 25 MG TbEC Take 1 tablet (25 mg total) by mouth 3 (three) times daily as needed. 15 tablet 0    HYDROcodone-acetaminophen (NORCO) 5-325 mg per tablet Take 1 tablet by mouth every 6 (six) hours as needed for Pain. (Patient not taking: Reported on 7/30/2020) 15 tablet 0    lactulose (CHRONULAC) 10 gram/15 mL solution Take 30 mLs (20 g total) by mouth 3 (three) times daily. 2 Teaspoon(s) Oral PRN Every evening. (Patient not taking: Reported on 7/30/2020) 500 mL 3    multivitamin capsule Take 1 capsule by mouth once daily.      ondansetron (ZOFRAN-ODT) 4 MG TbDL Take 1 tablet (4 mg total) by mouth every 8 (eight) hours as needed. 20 tablet 0       Scheduled meds:   ascorbic acid (vitamin C)  500  mg Oral QHS    cyanocobalamin  2,000 mcg Oral Daily    dronabinoL  2.5 mg Oral BID    fluticasone furoate-vilanteroL  1 puff Inhalation Daily    folic acid  1 mg Oral Daily    hydrALAZINE  25 mg Oral Q8H    metoprolol succinate  50 mg Oral Daily    montelukast  10 mg Oral QHS    multivitamin  1 tablet Oral Daily    pantoprazole  40 mg Oral Daily    senna-docusate 8.6-50 mg  1 tablet Oral BID       Infusions:   heparin (porcine) in D5W 17.99 Units/kg/hr (08/05/20 0253)    sodium bicarbonate drip 100 mL/hr at 08/04/20 2217       PRN meds:  acetaminophen, aluminum-magnesium hydroxide-simethicone, dextrose 50%, dextrose 50%, glucagon (human recombinant), glucose, glucose, heparin (PORCINE), heparin (PORCINE), magnesium oxide, magnesium oxide, melatonin, morphine, ondansetron, potassium chloride, potassium chloride, potassium chloride, sodium chloride 0.9%, Pharmacy to dose Vancomycin consult **AND** vancomycin - pharmacy to dose    Review of Systems:  ROS    OBJECTIVE:     Vital Signs and IO (Last 24H):  Temp:  [97.1 °F (36.2 °C)-99.4 °F (37.4 °C)]   Pulse:  [72-91]   Resp:  [18]   BP: (138-207)/(62-84)   SpO2:  [95 %-100 %]   I/O last 3 completed shifts:  In: 4062.8 [P.O.:840; I.V.:3222.8]  Out: 1625 [Urine:1625]    Wt Readings from Last 5 Encounters:   08/04/20 104.1 kg (229 lb 8 oz)   07/30/20 99.9 kg (220 lb 3.8 oz)   07/29/20 102.7 kg (226 lb 6.6 oz)   07/13/20 97.5 kg (215 lb)   07/09/20 104.9 kg (231 lb 4.2 oz)         Physical Exam:  Physical Exam  Constitutional:       Appearance: She is well-developed. She is not diaphoretic.   HENT:      Head: Normocephalic and atraumatic.   Eyes:      General: No scleral icterus.     Pupils: Pupils are equal, round, and reactive to light.   Neck:      Musculoskeletal: Neck supple.   Cardiovascular:      Rate and Rhythm: Normal rate and regular rhythm.   Pulmonary:      Effort: Pulmonary effort is normal. No respiratory distress.      Breath sounds: No stridor.    Abdominal:      General: There is no distension.      Palpations: Abdomen is soft.   Musculoskeletal: Normal range of motion.         General: No deformity.   Skin:     General: Skin is warm and dry.      Findings: No erythema or rash.   Neurological:      Mental Status: She is alert and oriented to person, place, and time.      Cranial Nerves: No cranial nerve deficit.   Psychiatric:         Behavior: Behavior normal.         Body mass index is 40.65 kg/m².    Laboratory:  Recent Labs   Lab 08/03/20  0700 08/04/20  0030 08/05/20  0116    135*  135* 136   K 4.8 4.4  4.4 4.4    103  103 102   CO2 23 21*  21* 26   BUN 13 15  15 14   CREATININE 1.6* 1.6*  1.6* 1.7*   ESTGFRAFRICA 36* 36*  36* 33*   EGFRNONAA 31* 31*  31* 29*    176*  176* 110       Recent Labs   Lab 08/03/20  0700 08/04/20  0030 08/05/20  0116   CALCIUM 7.7* 7.8*  7.8* 8.0*   ALBUMIN 2.2* 2.2*  2.2* 2.2*   PHOS 4.9* 4.6* 3.7   MG 2.4 2.2 2.3       Recent Labs   Lab 12/22/17  1141 06/22/18  0848 12/10/18  0945   PTH, Intact 57.0 115.0 H 81.0 H       No results for input(s): POCTGLUCOSE in the last 168 hours.    Recent Labs   Lab 07/13/20  1940 08/03/20  0418 08/04/20  0030   Hemoglobin A1C 6.4 H 5.7 H 5.9 H       Recent Labs   Lab 08/02/20  1634 08/03/20  1624 08/04/20  0030   WBC 7.97 7.36 8.18   HGB 10.5* 9.2* 10.0*   HCT 33.2* 30.7* 32.4*    262 300   * 109* 109*   MCHC 31.6* 30.0* 30.9*   MONO 8.3  0.7 8.4  0.6 8.2  0.7       Recent Labs   Lab 08/03/20  0700 08/04/20  0030 08/05/20  0116   BILITOT 0.5 0.3  0.3 0.3   PROT 6.2 6.1  6.1 5.9*   ALBUMIN 2.2* 2.2*  2.2* 2.2*   ALKPHOS 82 75  75 85   * 176*  176* 193*   * 552*  552* 455*       Recent Labs   Lab 06/07/20  0952 07/18/20  0425 07/18/20  1920 08/02/20  1652   Color, UA Yellow Yellow Yellow Yellow   Appearance, UA Clear Clear Hazy A Clear   pH, UA 7.0 6.0 6.0 7.0   Specific [place], UA 1.015 1.015 1.025 1.010   Protein, UA  Negative Negative Negative 2+ A   Glucose, UA Negative Negative Negative Negative   Ketones, UA Negative Negative Negative Negative   Urobilinogen, UA Negative Negative 1.0 Negative   Bilirubin (UA) Negative Negative Negative Negative   Occult Blood UA Negative Negative Negative 3+ A   Nitrite, UA Negative Negative Negative Negative   RBC, UA 0 2 0 2   WBC, UA 2 11 H 15 H 1   Bacteria Negative Occasional Few A None   Hyaline Casts, UA 3 A  --   --  0       Recent Labs   Lab 07/13/20  1946   POC PH 7.406   POC PCO2 36.7   POC HCO3 23.1 L   POC PO2 70 L   POC SATURATED O2 94 L   POC BE -2   Sample ARTERIAL       Microbiology Results (last 7 days)     Procedure Component Value Units Date/Time    Blood culture x two cultures. Draw prior to antibiotics. [[id number]] Collected: 08/02/20 1633    Order Status: Completed Specimen: Blood Updated: 08/04/20 2322     Blood Culture, Routine No Growth to date      No Growth to date      No Growth to date    Narrative:      Aerobic and anaerobic    Blood culture x two cultures. Draw prior to antibiotics. [[id number]] Collected: 08/02/20 1633    Order Status: Completed Specimen: Blood Updated: 08/04/20 2322     Blood Culture, Routine No Growth to date      No Growth to date      No Growth to date    Narrative:      Aerobic and anaerobic          ASSESSMENT/PLAN:     Active Hospital Problems    Diagnosis  POA    *LUIZ (acute kidney injury) [N17.9]  Yes    Acute deep vein thrombosis (DVT) of right upper extremity [I82.621]  Yes     Occlusive thrombus of the right brachial vein and cephalic vein      Elevated ferritin level [R79.89]  Yes    Hyponatremia [E87.1]  Yes    Debility [R53.81]  Yes    Liver cyst [K76.89]  Yes    Non-traumatic rhabdomyolysis [M62.82]  Yes    Chronic diastolic CHF (congestive heart failure) [I50.32]  Yes    Pulmonary infarct [I26.99]  Yes    Atelectasis [J98.11]  Yes    Moderate protein-calorie malnutrition [E44.0]  Yes    Macrocytic anemia [D53.9]   Yes    MRSA bacteremia [R78.81]  Yes    Elevated troponin [R79.89]  Yes    Bilateral pulmonary embolism [I26.99]  Yes    COPD (chronic obstructive pulmonary disease) [J44.9]  Yes    Transaminitis [R74.0]  Yes    Morbid obesity [E66.01]  Yes    Pulmonary hypertension [I27.20]  Yes    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     LUIZ  CKD stage 3  Hyponatremia  Acidosis  MRSA bacteremia  Rhabdomyolysis 2/2 Daptomycin  No NSAIDs, ACEI/ARB, IV contrast or other nephrotoxins.  Keep MAP > 60, SBP > 100.  Dose meds for GFR < 30 ml/min.  Renal diet - low K, low phos.  Dose Vanco by levels.  Continue with bicarb gtt.  Avoid hypotonic infusions.     6/7/2020 08:58 8/3/2020 16:24 8/4/2020 00:30 8/5/2020 01:16    33496 (H) >82456 (H) 73277 (H)     Anemia of CKD  Jehovah witness  Hypercoagulable state with thrombosis  Hgb and HCT are acceptable. Monitor.  Appreciate heme input.    HTN  BP seem controlled.   Tolerate asymptomatic HTN up to -160.  Continue home meds.    Thank you for allowing us to participate in the care of your patient!   We will follow the patient and provide recommendations as needed.    Dimas Sosa MD    [facility]  [address] [place] [address]  OLIVIA Garg [id number]    [phone] - tel  [phone] - fax    8/5/2020 9:19 AM

## 2020-08-05 NOTE — PLAN OF CARE
POC reviewed with patient, patient verbalized understanding, AAOX4, VSS, 4L O2 NC, c/o sob with exertion, pain medication administered per MD order, IVF maintained, heparin drip maintained according to nomogram, activity maintained, tele and safety maintained, patient visualized, sleeping between care, bed in lowest position, side rails up X2, call light and table within reach, bed alarm on and audible, no distress noted, will continue to monitor.

## 2020-08-05 NOTE — ASSESSMENT & PLAN NOTE
Body mass index is 40.65 kg/m².  General weight loss/lifestyle modification strategies discussed (elicit support from others; identify saboteurs; non-food rewards, etc).

## 2020-08-05 NOTE — ASSESSMENT & PLAN NOTE
Patient currently remains on IV heparin drip  Hematology consulted for anticoagulation recommendations

## 2020-08-05 NOTE — RESPIRATORY THERAPY
08/04/20 2001   Patient Assessment/Suction   Level of Consciousness (AVPU) alert   Respiratory Effort Normal;Unlabored   PRE-TX-O2   O2 Device (Oxygen Therapy) nasal cannula   Flow (L/min) 4   Oxygen Concentration (%) 36   SpO2 97 %   Pulse Oximetry Type Intermittent   Ready to Wean/Extubation Screen   FIO2<=50 (chart decimal) 0.36

## 2020-08-05 NOTE — CONSULTS
Pharmacokinetic Initial Assessment: IV Vancomycin    Assessment/Plan:    Initiate intravenous vancomycin with a dose of 1500 mg once  Desired empiric serum trough concentration is 15 to 20 mcg/mL  Pharmacy will dose by level  Draw vancomycin random level on 8/5 at 1930.  Pharmacy will continue to follow and monitor vancomycin.      Please contact pharmacy at extension [phone] with any questions regarding this assessment.     Thank you for the consult,   Jessy Christine       Patient brief summary:  Sammi Abreu is a 79 y.o. female initiated on antimicrobial therapy with IV Vancomycin for treatment of suspected bacteremia    Drug Allergies:   Review of patient's allergies indicates:   Allergen Reactions    Cymbalta [duloxetine] Other (See Comments)     Nightmares      Darvon [propoxyphene] Nausea Only and Other (See Comments)     Sweating, slept for 3 days    Atorvastatin Other (See Comments)     Muscle cramps    Naprosyn [naproxen] Nausea Only    Penicillins Rash    Tramadol Nausea Only and Palpitations       Actual Body Weight:   104.1    Renal Function:   Estimated Creatinine Clearance: 34 mL/min (A) (based on SCr of 1.6 mg/dL (H)).,     Dialysis Method (if applicable):  N/A    CBC (last 72 hours):  Recent Labs   Lab Result Units 08/02/20  1634 08/03/20  0418 08/03/20  1624 08/04/20  0030   WBC K/uL 7.97  --  7.36 8.18   Hemoglobin g/dL 10.5*  --  9.2* 10.0*   Hemoglobin A1C %  --  5.7*  --  5.9*   Hematocrit % 33.2*  --  30.7* 32.4*   Platelets K/uL 286  --  262 300   Gran% % 66.0  --  68.8 66.2   Lymph% % 23.7  --  20.1 23.1   Mono% % 8.3  --  8.4 8.2   Eosinophil% % 1.3  --  1.9 1.7   Basophil% % 0.3  --  0.3 0.2   Differential Method  Automated  --  Automated Automated       Metabolic Panel (last 72 hours):  Recent Labs   Lab Result Units 08/02/20  1634 08/02/20  1652 08/03/20  0700 08/04/20  0030   Sodium mmol/L 137  --  138 135*  135*   Potassium mmol/L 4.5  --  4.8 4.4  4.4   Chloride mmol/L 102  --   105 103  103   CO2 mmol/L 24  --  23 21*  21*   Glucose mg/dL 108  --  102 176*  176*   Glucose, UA   --  Negative  --   --    BUN, Bld mg/dL 14  --  13 15  15   Creatinine mg/dL 1.8*  --  1.6* 1.6*  1.6*   Albumin g/dL 2.6*  --  2.2* 2.2*  2.2*   Total Bilirubin mg/dL 0.6  --  0.5 0.3  0.3   Alkaline Phosphatase U/L 88  --  82 75  75   AST U/L 324*  --  455* 552*  552*   ALT U/L 102*  --  134* 176*  176*   Magnesium mg/dL 2.4  --  2.4 2.2   Phosphorus mg/dL  --   --  4.9* 4.6*       Drug levels (last 3 results):  No results for input(s): VANCOMYCINRA, VANCOMYCINPE, VANCOMYCINTR in the last 72 hours.    Microbiologic Results:  Microbiology Results (last 7 days)       Procedure Component Value Units Date/Time    Blood culture x two cultures. Draw prior to antibiotics. [[id number]] Collected: 08/02/20 1633    Order Status: Completed Specimen: Blood Updated: 08/03/20 2322     Blood Culture, Routine No Growth to date      No Growth to date    Narrative:      Aerobic and anaerobic    Blood culture x two cultures. Draw prior to antibiotics. [[id number]] Collected: 08/02/20 1633    Order Status: Completed Specimen: Blood Updated: 08/03/20 2322     Blood Culture, Routine No Growth to date      No Growth to date    Narrative:      Aerobic and anaerobic

## 2020-08-05 NOTE — NURSING
Spoke with Elida Mccoy NP about patient not having enough IV accesses and pt getting many different IV medications. Okay to hold TPN until AM.

## 2020-08-05 NOTE — HOSPITAL COURSE
Patient monitor closely during hospitalization.  She was placed on continuous telemetry monitoring.  She was noted to have rhabdomyolysis with CPK greater than 75703 and LUIZ.  Nephrology was consulted.  She was noted to have metabolic acidosis initiated on bicarb drip.  Daptomycin was held and ID was consulted.  She was initiated on IV vancomycin.  She was noted have increased right upper extremity swelling.  Right upper extremity ultrasound demonstrated occlusive thrombus of the right brachial vein and cephalic vein. She was initiated on heparin drip and her Eliquis was held.  There was concern for Eliquis failure.  Hematology consulted.  PT and OT were consulted for debility.  Patient with ongoing anorexia and poor p.o. intake. Dietary was consulted for protein calorie malnutrition.  It was recommended patient be placed on TPN lipids.  Her renal status was monitored closely and she remain on bicarb drip with plans to start TPN once acute kidney injury resolved if no improvement in her p.o. intake.  Patient was noted to have some hematuria during her stay.  A retroperitoneal ultrasound was ordered.  Hematuria has resolved.  Patient to follow-up with urologist as outpatient.  Patient also underwent endoscopic evaluation for anemia by GI specialist which has been negative.  Hematology service recommended Coumadin initiation at place of Eliquis.  Patient underwent heparin-Coumadin bridging, until she achieved therapeutic INR  Patient has declined skilled nursing facility and LTAC placement.  Patient was discharged once her INR was 2 INR  with home health, home infusion therapy until September 2, 2020 as per recommendations by ID specialist.  Patient to also receive home physical therapy.

## 2020-08-05 NOTE — ASSESSMENT & PLAN NOTE
Patient is identified as having Diastolic heart failure that is Chronic. CHF is currently controlled. Latest ECHO performed and demonstrates-   Results for orders placed during the hospital encounter of 07/13/20   Echo Color Flow Doppler? Yes    Narrative · Concentric left ventricular remodeling.  · Hyperdynamic left ventricular systolic function. The estimated ejection   fraction is 76%.  · Grade I (mild) left ventricular diastolic dysfunction consistent with   impaired relaxation.          Patient being placed on TPN-monitor volume status closely

## 2020-08-05 NOTE — RESPIRATORY THERAPY
08/05/20 0809   Patient Assessment/Suction   Level of Consciousness (AVPU) alert   Respiratory Effort Normal;Unlabored   Expansion/Accessory Muscles/Retractions no use of accessory muscles;no retractions;expansion symmetric   All Lung Fields Breath Sounds diminished   Rhythm/Pattern, Respiratory unlabored;pattern regular;depth regular   Cough Frequency no cough   PRE-TX-O2   O2 Device (Oxygen Therapy) nasal cannula   $ Is the patient on Low Flow Oxygen? Yes   Flow (L/min) 4   SpO2 96 %   Pulse Oximetry Type Intermittent   $ Pulse Oximetry - Multiple Charge Pulse Oximetry - Multiple   Pulse 89   Resp 18   Positioning HOB elevated 45 degrees   Inhaler   $ Inhaler Charges MDI (Metered Dose Inahler) Treatment;Mouth rinsed post treatment   Respiratory Treatment Status (Inhaler) given;mouth rinsed post treatment   Treatment Route (Inhaler) mouthpiece   Patient Position (Inhaler) HOB elevated   Post Treatment Assessment (Inhaler) breath sounds unchanged   Signs of Intolerance (Inhaler) none   Breath Sounds Post-Respiratory Treatment   Throughout All Fields Post-Treatment All Fields   Throughout All Fields Post-Treatment no change   Post-treatment Heart Rate (beats/min) 90   Post-treatment Resp Rate (breaths/min) 18

## 2020-08-05 NOTE — ASSESSMENT & PLAN NOTE
It was felt this is secondary to daptomycin.  Id consulted.  Nephrology consulted.  Trend CPK daily    Daptomycin was discontinued and patient was placed on IV vancomycin

## 2020-08-05 NOTE — ASSESSMENT & PLAN NOTE
Anorexia-  Dietitian consulted and recommended TPN lipids-add today  Monitor volume status closely  Continue p.o. supplement

## 2020-08-05 NOTE — ASSESSMENT & PLAN NOTE
Chronic; controlled.  It does not appear that pt takes chronic hypoglycemic medications.  Accuchecks ac/hs with SSI coverage as needed.    Hypertension uncontrolled.  Add hydralazine 25 mg b.i.d..  He

## 2020-08-05 NOTE — PLAN OF CARE
Per Sanjuana with [facility], heparin gtt cannot be managed at LTAC.   CM discussed LTAC VS SNF VS HH with pt bedside. Pt is agreeable to LTAC, but adamant that she will not go to SNF. Stated if LTAC is denied, she will do home health with home IVAB again.     PITER spoke with Amaya (N) [phone] to discuss LTAC request. Per Amaya, pt does not meet LTAC criteria due to IVAB being Q24 hours. Stated pt is more appropriate for SNF level of care. Stated she will hold on to referral until hema/onc makes their recommendations and in case IVAB need changes. Stated once pt is closer to medically ready to DC to let her know to discuss case again and then have case more than likely sent to MRU. CM following.        08/05/20 1243   Post-Acute Status   Post-Acute Authorization Placement   Post-Acute Placement Status Pending Payor Medical Review

## 2020-08-05 NOTE — PROGRESS NOTES
[facility] Medicine  Progress Note    Patient Name: Sammi Abreu  MRN: [id number]  Patient Class: IP- Inpatient   Admission Date: 8/2/2020  Length of Stay: 1 days  Attending Physician: Hugh Serrano MD  Primary Care Provider: Osmani Villatoro MD        Subjective:     Principal Problem:LUIZ (acute kidney injury)        HPI:  Sammi Abreu is a 79-year-old female with past medical history significant for arthritis and chronic back pain, fibromyalgia, glaucoma, hyperlipidemia, hypertension, sleep apnea with history of noncompliance with CPAP, morbid obesity, hypertension, GERD, COPD, diabetes type 2, prior DVT, and gallstones who presented to the emergency department tonight with complaints of right lower quadrant pain x3 days.  Of note the patient is also a [employer].   Patient was discharged from this facility on 07/29/2020 after being treated for bilateral pulmonary embolism.  Patient is currently on Eliquis.  Patient underwent a CT abdomen pelvis while in the emergency department which identified surgical changes but nothing acute.  Patient is noted to have a mild LUIZ.  Her troponin is elevated at 0.41 which is consistent with prior levels.  She will be admitted to the service of hospital Medicine for continued treatment.    Overview/Hospital Course:  Patient monitor closely during hospitalization.  She was placed on telemetry no arrhythmias noted.  She was noted to have rhabdomyolysis with CPK greater than 78632 and LUIZ.  Nephrology consulted.  She was noted to have metabolic acidosis initiated on bicarb drip.  Daptomycin held and ID consulted.  She was noted have increased right upper extremity swelling.  Right upper extremity ultrasound demonstrated deep vein thrombosis.  She was initiated on heparin drip and Eliquis was held.  There was concern for Eliquis failure.  Hematology consulted.  PT and OT consult.  Dietary consulted for protein calorie  malnutrition.    Interval History: reportsRUE swelling and tenderness persists. CPK elevated.  nephrology consult pending +generalized body aches.     Now on Vancomycin per ID. On heparin drip.     Discussed with patient plan for LTAC placement.    Review of Systems   Constitutional: Positive for activity change, appetite change and fatigue. Negative for diaphoresis and fever.   Respiratory: Positive for shortness of breath. Negative for cough.    Cardiovascular: Negative for chest pain and leg swelling.   Gastrointestinal: Positive for abdominal pain (diffuse aches) and constipation. Negative for abdominal distention.   Genitourinary: Negative for dysuria and flank pain.   Musculoskeletal: Positive for arthralgias and joint swelling.   Skin: Negative for rash.        RUE swelling and pain   Neurological: Negative for speech difficulty and numbness.   Psychiatric/Behavioral: Negative for agitation and confusion.     Objective:     Vital Signs (Most Recent):  Temp: 99 °F (37.2 °C) (08/04/20 2021)  Pulse: 80 (08/04/20 1921)  Resp: 18 (08/04/20 2021)  BP: (!) 173/74 (08/04/20 1921)  SpO2: 97 % (08/04/20 2001) Vital Signs (24h Range):  Temp:  [97.2 °F (36.2 °C)-99.4 °F (37.4 °C)] 99 °F (37.2 °C)  Pulse:  [74-91] 80  Resp:  [18-20] 18  SpO2:  [96 %-100 %] 97 %  BP: (138-207)/(65-84) 173/74     Weight: 104.1 kg (229 lb 8 oz)  Body mass index is 40.65 kg/m².    Intake/Output Summary (Last 24 hours) at 8/4/2020 2116  Last data filed at 8/4/2020 1700  Gross per 24 hour   Intake 1934.6 ml   Output 300 ml   Net 1634.6 ml      Physical Exam  Vitals signs and nursing note reviewed.   Constitutional:       General: She is not in acute distress.     Appearance: Normal appearance. She is well-developed. She is ill-appearing.   HENT:      Head: Normocephalic and atraumatic.      Right Ear: External ear normal.      Left Ear: External ear normal.      Mouth/Throat:      Mouth: Mucous membranes are moist.   Eyes:       Conjunctiva/sclera: Conjunctivae normal.      Pupils: Pupils are equal, round, and reactive to light.   Neck:      Musculoskeletal: Normal range of motion and neck supple.   Cardiovascular:      Rate and Rhythm: Normal rate and regular rhythm.      Heart sounds: Murmur present.      Comments: +1 pretib edema bilaterally  Pulmonary:      Effort: Pulmonary effort is normal.      Breath sounds: Stridor (decreased at base) present. No wheezing.      Comments: Decreased at base  Abdominal:      General: Bowel sounds are normal.      Palpations: Abdomen is soft.      Tenderness: There is no abdominal tenderness.   Musculoskeletal: Normal range of motion.         General: Swelling present.      Comments: RUE gross edema and tenderness with palpation   Skin:     General: Skin is warm and dry.      Findings: No bruising.      Comments: PICC to [facility]   Neurological:      General: No focal deficit present.      Mental Status: She is alert and oriented to person, place, and time. Mental status is at baseline.      Motor: No weakness.      Comments: Generalized weakness   Psychiatric:         Behavior: Behavior normal.         Judgment: Judgment normal.      Comments: Flat affect         Significant Labs:   CBC:   Recent Labs   Lab 08/03/20  1624 08/04/20  0030   WBC 7.36 8.18   HGB 9.2* 10.0*   HCT 30.7* 32.4*    300     CMP:   Recent Labs   Lab 08/03/20  0700 08/04/20  0030    135*  135*   K 4.8 4.4  4.4    103  103   CO2 23 21*  21*    176*  176*   BUN 13 15  15   CREATININE 1.6* 1.6*  1.6*   CALCIUM 7.7* 7.8*  7.8*   PROT 6.2 6.1  6.1   ALBUMIN 2.2* 2.2*  2.2*   BILITOT 0.5 0.3  0.3   ALKPHOS 82 75  75   * 552*  552*   * 176*  176*   ANIONGAP 10 11  11   EGFRNONAA 31* 31*  31*       Significant Imaging: I have reviewed and interpreted all pertinent imaging results/findings within the past 24 hours.      Assessment/Plan:      * LUIZ (acute kidney injury)  Mild. Likely  IVVD  Gentle IVF hydration.  Follow renal panel and electrolytes closely.  Follow renal recommendations.  Check Renal Ultrasound.  Adjust renal dose medications for Estimated Creatinine Clearance: 34 mL/min (A) (based on SCr of 1.6 mg/dL (H)).  Avoid NSAIDs, Potts-II inhibitors, ACE-I, Angiotensin Receptor Blockers, or Aminoglycosides.  Urine analysis.  Nephrology consulted.  Initiated on bicarb drip              Non-traumatic rhabdomyolysis  It was felt this is secondary to daptomycin.  Id consulted.  Nephrology consulted.  Trend CPK daily      Iron deficiency anemia secondary to inadequate dietary iron intake  Patient's anemia is currently controlled. Has not received any PRBCs to date.. Etiology likely d/t iron def  Current CBC reviewed-   Lab Results   Component Value Date    HGB 9.2 (L) 08/03/2020    HCT 30.7 (L) 08/03/2020     Monitor serial CBC and transfuse if patient becomes hemodynamically unstable, symptomatic or H/H drops below 7/21.         Moderate protein-calorie malnutrition  Consult dietary. Add boost with meals.      Atelectasis  Monitor oxygen saturation. Encourage OOB and IS.      Pulmonary infarct  Due to bilateral pulmonary emboli. Pulmonary consulted.      Chronic diastolic CHF (congestive heart failure)  Patient is identified as having Diastolic heart failure that is Chronic. CHF is currently controlled. Latest ECHO performed and demonstrates-   Results for orders placed during the hospital encounter of 07/13/20   Echo Color Flow Doppler? Yes    Narrative · Concentric left ventricular remodeling.  · Hyperdynamic left ventricular systolic function. The estimated ejection   fraction is 76%.  · Grade I (mild) left ventricular diastolic dysfunction consistent with   impaired relaxation.      . Hold Aldactone 2/2 LUIZ and monitor clinical status closely. Monitor on telemetry. Patient is off CHF pathway.  Monitor strict Is&Os and daily weights. Continue to stress to patient importance of self efficacy  and  on diet for CHF. Last BNP reviewed- and noted below   Recent Labs   Lab 08/02/20  1634   *   Baseline BNP is unknown.  Currently receiving gentle IV hydration 2/2 LUIZ.  Monitor closely for s/s fluid overload.            MRSA bacteremia  Initiated on daptomycin IV recently. receiving outpatient. Trend cmp, cbc. On hold. Currently on vanco.    Bilateral pulmonary embolism  Continue Eliquis 10mg bid until 8/4/20, then decrease to 5mg bid as per previous instructions.  Concerning for eliquis failure. Change to heparin. Consult pulmonary and hem      COPD (chronic obstructive pulmonary disease)  Patient's COPD is controlled currently.  Patient is currently off COPD Pathway. Continue scheduled inhalers Supplemental oxygen as needed and monitor respiratory status closely.         Transaminitis  AST/ALT above baseline.  Check hepatitis panel.  Liver US in am--negative.  S/P lab rosette 7/13/20.  Hold dapto-possible due to abx. Check CPT.   Possible due to clot burden. Discussed with Dr. Decker. No recommendations at this time. Cancel GI consult.      Morbid obesity  Body mass index is 40.42 kg/m².  General weight loss/lifestyle modification strategies discussed (elicit support from others; identify saboteurs; non-food rewards, etc).        Hypertension associated with diabetes  Chronic; controlled.  It does not appear that pt takes chronic hypoglycemic medications.  Accuchecks ac/hs with SSI coverage as needed.    Hypertension uncontrolled.  Add hydralazine 25 mg b.i.d..  He        VTE Risk Mitigation (From admission, onward)         Ordered     heparin 25,000 units in dextrose 5% 250 mL (100 units/mL) infusion HIGH INTENSITY nomogram - [facility]  Continuous     Question:  Heparin Infusion Adjustment (DO NOT MODIFY ANSWER)  Answer:  [email or web address]\[email or web address]\Images\Pharmacy\HeparinInfusions\heparin HIGH INTENSITY nomogram for [facility] [id number]    08/03/20 1609     heparin 25,000 units in dextrose 5% (100 units/ml) IV  bolus from bag - ADDITIONAL PRN BOLUS - 60 units/kg  As needed (PRN)     Question:  Heparin Infusion Adjustment (DO NOT MODIFY ANSWER)  Answer:  \\[email or web address]\epic\Images\Pharmacy\HeparinInfusions\heparin HIGH INTENSITY nomogram for [facility] [id number]    08/03/20 1609     heparin 25,000 units in dextrose 5% (100 units/ml) IV bolus from bag - ADDITIONAL PRN BOLUS - 30 units/kg  As needed (PRN)     Question:  Heparin Infusion Adjustment (DO NOT MODIFY ANSWER)  Answer:  [email or web address]\epic\Images\Pharmacy\HeparinInfusions\heparin HIGH INTENSITY nomogram for [facility] [id number].pdf    08/03/20 1609     IP VTE HIGH RISK PATIENT  Once      08/02/20 2034     Place sequential compression device  Until discontinued      08/02/20 2034                      Anabel Haji NP  Department of Hospital Medicine   [facility]

## 2020-08-05 NOTE — PT/OT/SLP EVAL
Physical Therapy Evaluation    Patient Name:  Sammi Abreu   MRN:  [id number]    Recommendations:     Discharge Recommendations:  (LTAC vs HHPT)   Discharge Equipment Recommendations: none   Barriers to discharge: None    Assessment:     Sammi Abreu is a 79 y.o. female admitted with a medical diagnosis of LUIZ (acute kidney injury).  She presents with the following impairments/functional limitations:  weakness, impaired endurance, impaired self care skills, impaired functional mobilty, impaired balance, impaired cardiopulmonary response to activity. Patient is agreeable to PT evaluation. She has a medical history of fibromyalgia, back pain, glaucoma, HLD, HTN, sleep apnea, GERD, COPD, T2DM, DVT, and PE. She lives alone, but her mother stays with her part time. She uses a RW at baseline. She denies any falls. She requests to use BSC. She requires SBA for supine to sit, and CGA for bed to BSC transfer. She requires TotalA for hygiene after using BSC. She ambulated 10' to and from sink declining use of RW. She is agreeable to sit up in recliner chair. Patient grabbing anterior upper legs noting fatigue after walking.     Rehab Prognosis: Fair; patient would benefit from acute skilled PT services to address these deficits and reach maximum level of function.    Recent Surgery: * No surgery found *      Plan:     During this hospitalization, patient to be seen 6 x/week to address the identified rehab impairments via gait training, therapeutic activities, therapeutic exercises and progress toward the following goals:    · Plan of Care Expires:  09/05/20    Subjective     Chief Complaint: sleepy  Patient/Family Comments/goals: use BSC  Pain/Comfort:  · Pain Rating 1: 0/10    Patients cultural, spiritual, Church conflicts given the current situation:      Living Environment:  Patient lives alone in a [facility] with threshold to enter  Prior to admission, patients level of function was Felecia with RW. She denies any  falls. She was discharged on 2020 after a prolonged hospitalization. She was receiving HHPT. Equipment used at home: walker, rolling, oxygen, bedside commode, wheelchair, cane, straight, raised toilet.  DME owned (not currently used): none.  Upon discharge, patient will have assistance from mother?    Objective:     Communicated with MIAH Simeon prior to session.  Patient found HOB elevated with PICC line, bed alarm, telemetry, SCD, oxygen  upon PT entry to room.    General Precautions: Standard, fall, respiratory   Orthopedic Precautions:N/A   Braces: N/A     Exams:  · RLE Strength: WFL  · LLE Strength: WFL    Functional Mobility:  · Bed Mobility:     · Supine to Sit: stand by assistance  · Transfers:     · Sit to Stand:  contact guard assistance with no AD  · Gait: 10' to/from sink with no AD, CGA, slow gait, O2  · Balance: Fair    Therapeutic Activities and Exercises:   Patient was educated on the importance of OOB activity during hospital stay, safe transfers and ambulation, D/C planning    Per RN patient's O2 increased from 4L to 5L after O2 extender placed     AM-PAC 6 CLICK MOBILITY  Total Score:22     Patient left up in chair with all lines intact, call button in reach, chair alarm on and RN notified.    GOALS:   Multidisciplinary Problems     Physical Therapy Goals        Problem: Physical Therapy Goal    Goal Priority Disciplines Outcome Goal Variances Interventions   Physical Therapy Goal     PT, PT/OT      Description: Goals to be met by: 2020     Patient will increase functional independence with mobility by performin. Supine to sit with Supervision  2. Sit to stand transfer with Supervision  3. Bed to chair transfer with Supervision using Rolling Walker  4. Gait  x 150 feet with Supervision using Rolling Walker.   5. Lower extremity exercise program x20 reps per handout, with independence                     History:     Past Medical History:   Diagnosis Date    ALLERGIC RHINITIS      Anemia     Arthritis     Back pain     Bronchitis     Cataract     OU    Cholelithiasis     COPD (chronic obstructive pulmonary disease)     Degenerative disc disease     Diabetes mellitus     pre diabetic    Diverticulosis     DVT (deep venous thrombosis)     Edema     Encounter for blood transfusion 1979    Fibromyalgia     Glaucoma     Gout     Hx of colonic polyps     Hyperlipidemia     Hypertension     Incontinence     Osteoporosis     Reflux     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     Sleep apnea     non compliant with CPAP    Vestibulitis of ear        Past Surgical History:   Procedure Laterality Date    ANGIOGRAPHY OF LOWER EXTREMITY N/A 7/31/2019    Procedure: ANGIOGRAM, LOWER EXTREMITY;  Surgeon: Gino Arana MD;  Location: [facility] CATH/EP LAB;  Service: General;  Laterality: N/A;    HIP SURGERY      HYSTERECTOMY      JOINT REPLACEMENT      B total hip    LAPAROSCOPIC CHOLECYSTECTOMY N/A 7/13/2020    Procedure: CHOLECYSTECTOMY, LAPAROSCOPIC;  Surgeon: Jomar Mcdonald MD;  Location: [facility] OR;  Service: General;  Laterality: N/A;    TRANSFORAMINAL EPIDURAL INJECTION OF STEROID Left 6/30/2020    Procedure: Injection,steroid,epidural,transforaminal approach;  Surgeon: Matt Gilliam MD;  Location: [facility] OR;  Service: Pain Management;  Laterality: Left;  L2-3, L3-4       Time Tracking:     PT Received On: 08/05/20  PT Start Time: 0902     PT Stop Time: 0940  PT Total Time (min): 38 min     Billable Minutes: Evaluation 10, Gait Training 14 and Therapeutic Activity 14      Zhanna Rose, PT  08/05/2020

## 2020-08-05 NOTE — ASSESSMENT & PLAN NOTE
Monitor  Orders as per Nephrology  Patient currently remains on bicarb drip  Trend BMP  Renal dose all medications and avoid any possible nephrotoxic medications

## 2020-08-05 NOTE — SUBJECTIVE & OBJECTIVE
Interval History: reportsRUE swelling and tenderness persists. CPK elevated.  nephrology consult pending +generalized body aches.     Now on Vancomycin per ID. On heparin drip.     Discussed with patient plan for LTAC placement.    Review of Systems   Constitutional: Positive for activity change, appetite change and fatigue. Negative for diaphoresis and fever.   Respiratory: Positive for shortness of breath. Negative for cough.    Cardiovascular: Negative for chest pain and leg swelling.   Gastrointestinal: Positive for abdominal pain (diffuse aches) and constipation. Negative for abdominal distention.   Genitourinary: Negative for dysuria and flank pain.   Musculoskeletal: Positive for arthralgias and joint swelling.   Skin: Negative for rash.        RUE swelling and pain   Neurological: Negative for speech difficulty and numbness.   Psychiatric/Behavioral: Negative for agitation and confusion.     Objective:     Vital Signs (Most Recent):  Temp: 99 °F (37.2 °C) (08/04/20 2021)  Pulse: 80 (08/04/20 1921)  Resp: 18 (08/04/20 2021)  BP: (!) 173/74 (08/04/20 1921)  SpO2: 97 % (08/04/20 2001) Vital Signs (24h Range):  Temp:  [97.2 °F (36.2 °C)-99.4 °F (37.4 °C)] 99 °F (37.2 °C)  Pulse:  [74-91] 80  Resp:  [18-20] 18  SpO2:  [96 %-100 %] 97 %  BP: (138-207)/(65-84) 173/74     Weight: 104.1 kg (229 lb 8 oz)  Body mass index is 40.65 kg/m².    Intake/Output Summary (Last 24 hours) at 8/4/2020 2116  Last data filed at 8/4/2020 1700  Gross per 24 hour   Intake 1934.6 ml   Output 300 ml   Net 1634.6 ml      Physical Exam  Vitals signs and nursing note reviewed.   Constitutional:       General: She is not in acute distress.     Appearance: Normal appearance. She is well-developed. She is ill-appearing.   HENT:      Head: Normocephalic and atraumatic.      Right Ear: External ear normal.      Left Ear: External ear normal.      Mouth/Throat:      Mouth: Mucous membranes are moist.   Eyes:      Conjunctiva/sclera: Conjunctivae  normal.      Pupils: Pupils are equal, round, and reactive to light.   Neck:      Musculoskeletal: Normal range of motion and neck supple.   Cardiovascular:      Rate and Rhythm: Normal rate and regular rhythm.      Heart sounds: Murmur present.      Comments: +1 pretib edema bilaterally  Pulmonary:      Effort: Pulmonary effort is normal.      Breath sounds: Stridor (decreased at base) present. No wheezing.      Comments: Decreased at base  Abdominal:      General: Bowel sounds are normal.      Palpations: Abdomen is soft.      Tenderness: There is no abdominal tenderness.   Musculoskeletal: Normal range of motion.         General: Swelling present.      Comments: RUE gross edema and tenderness with palpation   Skin:     General: Skin is warm and dry.      Findings: No bruising.      Comments: PICC to [facility]   Neurological:      General: No focal deficit present.      Mental Status: She is alert and oriented to person, place, and time. Mental status is at baseline.      Motor: No weakness.      Comments: Generalized weakness   Psychiatric:         Behavior: Behavior normal.         Judgment: Judgment normal.      Comments: Flat affect         Significant Labs:   CBC:   Recent Labs   Lab 08/03/20  1624 08/04/20  0030   WBC 7.36 8.18   HGB 9.2* 10.0*   HCT 30.7* 32.4*    300     CMP:   Recent Labs   Lab 08/03/20  0700 08/04/20  0030    135*  135*   K 4.8 4.4  4.4    103  103   CO2 23 21*  21*    176*  176*   BUN 13 15  15   CREATININE 1.6* 1.6*  1.6*   CALCIUM 7.7* 7.8*  7.8*   PROT 6.2 6.1  6.1   ALBUMIN 2.2* 2.2*  2.2*   BILITOT 0.5 0.3  0.3   ALKPHOS 82 75  75   * 552*  552*   * 176*  176*   ANIONGAP 10 11  11   EGFRNONAA 31* 31*  31*       Significant Imaging: I have reviewed and interpreted all pertinent imaging results/findings within the past 24 hours.

## 2020-08-05 NOTE — ASSESSMENT & PLAN NOTE
Patient previously on Eliquis at home  Continue IV heparin for now  Pulmonology and Hematology consulted

## 2020-08-05 NOTE — SUBJECTIVE & OBJECTIVE
Interval History:  Patient continues with poor p.o. intake.  Dietitian recommends TPN and lipids.  Continue physical therapy and occupational therapy for debility.    Review of Systems   Constitutional: Positive for activity change, appetite change and fatigue. Negative for chills, diaphoresis and fever.   HENT: Negative for ear discharge, ear pain and facial swelling.    Eyes: Negative for pain and redness.   Respiratory: Positive for cough and shortness of breath.    Cardiovascular: Positive for leg swelling.   Gastrointestinal: Negative for abdominal distention, abdominal pain, constipation, diarrhea, nausea and vomiting.        Poor appetite   Endocrine: Negative for polydipsia and polyphagia.   Genitourinary: Negative for difficulty urinating, dysuria, flank pain and hematuria.   Musculoskeletal: Positive for back pain. Negative for neck pain and neck stiffness.   Skin: Negative for color change.   Allergic/Immunologic: Negative for food allergies.   Neurological: Positive for weakness. Negative for seizures, facial asymmetry and speech difficulty.   Psychiatric/Behavioral: Negative for agitation, behavioral problems, confusion, hallucinations and suicidal ideas.     Objective:     Vital Signs (Most Recent):  Temp: 99.1 °F (37.3 °C) (08/05/20 1138)  Pulse: 71 (08/05/20 1138)  Resp: 18 (08/05/20 1138)  BP: 139/63 (08/05/20 1138)  SpO2: 95 % (08/05/20 1138) Vital Signs (24h Range):  Temp:  [97.1 °F (36.2 °C)-99.4 °F (37.4 °C)] 99.1 °F (37.3 °C)  Pulse:  [71-91] 71  Resp:  [18] 18  SpO2:  [95 %-97 %] 95 %  BP: (138-188)/(62-79) 139/63     Weight: 104.1 kg (229 lb 8 oz)  Body mass index is 40.65 kg/m².    Intake/Output Summary (Last 24 hours) at 8/5/2020 1426  Last data filed at 8/5/2020 1346  Gross per 24 hour   Intake 3088.19 ml   Output 1025 ml   Net 2063.19 ml      Physical Exam  Constitutional:       General: She is not in acute distress.     Appearance: Normal appearance.   HENT:      Head: Normocephalic and  atraumatic.      Mouth/Throat:      Mouth: Mucous membranes are moist.   Eyes:      General:         Right eye: No discharge.         Left eye: No discharge.      Extraocular Movements: Extraocular movements intact.      Conjunctiva/sclera: Conjunctivae normal.      Pupils: Pupils are equal, round, and reactive to light.   Neck:      Musculoskeletal: Normal range of motion and neck supple. No neck rigidity or muscular tenderness.   Cardiovascular:      Rate and Rhythm: Normal rate and regular rhythm.      Pulses: Normal pulses.      Heart sounds: Murmur present.   Pulmonary:      Effort: No respiratory distress.      Comments: Bilateral breath sounds diminished  Abdominal:      General: Bowel sounds are normal. There is no distension.      Tenderness: There is no abdominal tenderness. There is no guarding.      Comments: Obese   Genitourinary:     Comments: Not examined  Musculoskeletal: Normal range of motion.         General: Swelling present.      Comments: Generalized edema with edema also noted to upper and lower extremities   Skin:     General: Skin is warm and dry.      Capillary Refill: Capillary refill takes less than 2 seconds.   Neurological:      General: No focal deficit present.      Mental Status: She is alert and oriented to person, place, and time. Mental status is at baseline.      Cranial Nerves: No cranial nerve deficit.   Psychiatric:         Mood and Affect: Mood normal.         Behavior: Behavior normal.         Thought Content: Thought content normal.         Judgment: Judgment normal.       Labs reviewed

## 2020-08-05 NOTE — ASSESSMENT & PLAN NOTE
Initiated on daptomycin IV recently. receiving outpatient. Trend cmp, cbc. On hold. Currently on vanco.

## 2020-08-05 NOTE — NURSING
Lab lindy PTT earlier this AM. Unable to get result back and needing to redraw. Lab unable to stick pt. Heparin drip still infusing. Lab to send another tech up to stick pt. Notified Dr. Hernandez. Waiting for new orders.     1345- aPTT back (110.1). Notified Dr. Cuello. Hold heparin for 2 hours. Decrease dose by 4units/kg/hr. New rate at 14 units/kg/hr

## 2020-08-05 NOTE — ASSESSMENT & PLAN NOTE
Mild. Likely IVVD  Gentle IVF hydration.  Follow renal panel and electrolytes closely.  Follow renal recommendations.  Check Renal Ultrasound.  Adjust renal dose medications for Estimated Creatinine Clearance: 34 mL/min (A) (based on SCr of 1.6 mg/dL (H)).  Avoid NSAIDs, Potts-II inhibitors, ACE-I, Angiotensin Receptor Blockers, or Aminoglycosides.  Urine analysis.  Nephrology consulted.  Initiated on bicarb drip

## 2020-08-05 NOTE — ASSESSMENT & PLAN NOTE
It was felt this is secondary to daptomycin.  Id consulted.  Nephrology consulted.  Trend CPK daily

## 2020-08-05 NOTE — PLAN OF CARE
Plan of care reviewed with pt. AAOX4 throughout shift.  Continuous heparin at 14units/kg/hr maintained. PTT lab redraw at 2145. Cardiac diet. Contact precautions maintained. Continue to Monitor Labs. VSS. Continuous telemetry monitor maintained. Safety maintained. Will continue to monitor. No complaints at this time.

## 2020-08-05 NOTE — CONSULTS
HPI    79-year-old female with past medical history significant for arthritis and chronic back pain, fibromyalgia, glaucoma, hyperlipidemia, hypertension, sleep apnea with history of noncompliance with CPAP, morbid obesity, hypertension, GERD, COPD, diabetes type 2, prior DVT, and gallstones who presented to the emergency department [facility] with complaints of right lower quadrant pain x3 days.      Of note the patient is also a Sikhism.   Patient was discharged from this facility on 07/29/2020 after being treated for bilateral pulmonary embolism as well as bacteremia MRSA..  Patient also has recent history of cholecystectomy on July 13th.    On this visit patient was found to have right occlusive brachial vein and cephalic thrombus while on Eliquis.  Hematology was consult for evaluation and recommendation.    Patient history including chronic kidney disease, anemia iron deficiency    Past Medical History:   Diagnosis Date    ALLERGIC RHINITIS     Anemia     Arthritis     Back pain     Bronchitis     Cataract     OU    Cholelithiasis     COPD (chronic obstructive pulmonary disease)     Degenerative disc disease     Diabetes mellitus     pre diabetic    Diverticulosis     DVT (deep venous thrombosis)     Edema     Encounter for blood transfusion 1979    Fibromyalgia     Glaucoma     Gout     Hx of colonic polyps     Hyperlipidemia     Hypertension     Incontinence     Osteoporosis     Reflux     Refusal of blood transfusions as patient is [employer]     Sleep apnea     non compliant with CPAP    Vestibulitis of ear      Past Surgical History:   Procedure Laterality Date    ANGIOGRAPHY OF LOWER EXTREMITY N/A 7/31/2019    Procedure: ANGIOGRAM, LOWER EXTREMITY;  Surgeon: Gino Arana MD;  Location: [facility] CATH/EP LAB;  Service: General;  Laterality: N/A;    HIP SURGERY      HYSTERECTOMY      JOINT REPLACEMENT      B total hip    LAPAROSCOPIC CHOLECYSTECTOMY N/A 7/13/2020     Procedure: CHOLECYSTECTOMY, LAPAROSCOPIC;  Surgeon: Jomar Mcdonald MD;  Location: [facility] OR;  Service: General;  Laterality: N/A;    TRANSFORAMINAL EPIDURAL INJECTION OF STEROID Left 2020    Procedure: Injection,steroid,epidural,transforaminal approach;  Surgeon: Matt Gilliam MD;  Location: [facility] OR;  Service: Pain Management;  Laterality: Left;  L2-3, L3-4     Review of patient's allergies indicates:   Allergen Reactions    Cymbalta [duloxetine] Other (See Comments)     Nightmares      Darvon [propoxyphene] Nausea Only and Other (See Comments)     Sweating, slept for 3 days    Atorvastatin Other (See Comments)     Muscle cramps    Naprosyn [naproxen] Nausea Only    Penicillins Rash    Tramadol Nausea Only and Palpitations     Social History     Socioeconomic History    Marital status:      Spouse name: Not on file    Number of children: Not on file    Years of education: Not on file    Highest education level: Not on file   Occupational History    Not on file   Social Needs    Financial resource strain: Not on file    Food insecurity     Worry: Not on file     Inability: Not on file    Transportation needs     Medical: Not on file     Non-medical: Not on file   Tobacco Use    Smoking status: Former Smoker     Packs/day: 0.25     Years: 5.00     Pack years: 1.25     Quit date: 1972     Years since quittin.6    Smokeless tobacco: Never Used   Substance and Sexual Activity    Alcohol use: Yes     Alcohol/week: 0.0 standard drinks     Comment: Rarely    Drug use: Yes     Types: Oxycodone, Hydrocodone    Sexual activity: Not on file   Lifestyle    Physical activity     Days per week: Not on file     Minutes per session: Not on file    Stress: Not on file   Relationships    Social connections     Talks on phone: Not on file     Gets together: Not on file     Attends Adventism service: Not on file     Active member of club or organization: Not on file     Attends meetings of  clubs or organizations: Not on file     Relationship status: Not on file   Other Topics Concern    Not on file   Social History Narrative    Not on file     Family History   Problem Relation Age of Onset    Hypertension Mother     Diabetes Sister     Glaucoma Neg Hx     Macular degeneration Neg Hx     Retinal detachment Neg Hx      Physical exam  Vitals:    08/04/20 2021   BP:    Pulse:    Resp: 18   Temp: 99 °F (37.2 °C)     Patient is awake alert at the bedside  No acute distress  Regular rate  No respiratory distress  Soft nontender nondistended  Distal Pulses intact  Normal affect   deferred  No rash  No lesions    CMP  Sodium   Date Value Ref Range Status   08/04/2020 135 (L) 136 - 145 mmol/L Final   08/04/2020 135 (L) 136 - 145 mmol/L Final     Potassium   Date Value Ref Range Status   08/04/2020 4.4 3.5 - 5.1 mmol/L Final   08/04/2020 4.4 3.5 - 5.1 mmol/L Final     Chloride   Date Value Ref Range Status   08/04/2020 103 95 - 110 mmol/L Final   08/04/2020 103 95 - 110 mmol/L Final     CO2   Date Value Ref Range Status   08/04/2020 21 (L) 23 - 29 mmol/L Final   08/04/2020 21 (L) 23 - 29 mmol/L Final     Glucose   Date Value Ref Range Status   08/04/2020 176 (H) 70 - 110 mg/dL Final   08/04/2020 176 (H) 70 - 110 mg/dL Final     BUN, Bld   Date Value Ref Range Status   08/04/2020 15 8 - 23 mg/dL Final   08/04/2020 15 8 - 23 mg/dL Final     Creatinine   Date Value Ref Range Status   08/04/2020 1.6 (H) 0.5 - 1.4 mg/dL Final   08/04/2020 1.6 (H) 0.5 - 1.4 mg/dL Final     Calcium   Date Value Ref Range Status   08/04/2020 7.8 (L) 8.7 - 10.5 mg/dL Final   08/04/2020 7.8 (L) 8.7 - 10.5 mg/dL Final     Total Protein   Date Value Ref Range Status   08/04/2020 6.1 6.0 - 8.4 g/dL Final   08/04/2020 6.1 6.0 - 8.4 g/dL Final     Albumin   Date Value Ref Range Status   08/04/2020 2.2 (L) 3.5 - 5.2 g/dL Final   08/04/2020 2.2 (L) 3.5 - 5.2 g/dL Final     Total Bilirubin   Date Value Ref Range Status   08/04/2020 0.3  0.1 - 1.0 mg/dL Final     Comment:     For infants and newborns, interpretation of results should be based  on gestational age, weight and in agreement with clinical  observations.  Premature Infant recommended reference ranges:  Up to 24 hours.............<8.0 mg/dL  Up to 48 hours............<12.0 mg/dL  3-5 days..................<15.0 mg/dL  6-29 days.................<15.0 mg/dL     08/04/2020 0.3 0.1 - 1.0 mg/dL Final     Comment:     For infants and newborns, interpretation of results should be based  on gestational age, weight and in agreement with clinical  observations.  Premature Infant recommended reference ranges:  Up to 24 hours.............<8.0 mg/dL  Up to 48 hours............<12.0 mg/dL  3-5 days..................<15.0 mg/dL  6-29 days.................<15.0 mg/dL       Alkaline Phosphatase   Date Value Ref Range Status   08/04/2020 75 55 - 135 U/L Final   08/04/2020 75 55 - 135 U/L Final     AST   Date Value Ref Range Status   08/04/2020 552 (H) 10 - 40 U/L Final   08/04/2020 552 (H) 10 - 40 U/L Final     ALT   Date Value Ref Range Status   08/04/2020 176 (H) 10 - 44 U/L Final   08/04/2020 176 (H) 10 - 44 U/L Final     Anion Gap   Date Value Ref Range Status   08/04/2020 11 8 - 16 mmol/L Final   08/04/2020 11 8 - 16 mmol/L Final     eGFR if    Date Value Ref Range Status   08/04/2020 36 (A) >60 mL/min/1.73 m^2 Final   08/04/2020 36 (A) >60 mL/min/1.73 m^2 Final     eGFR if non    Date Value Ref Range Status   08/04/2020 31 (A) >60 mL/min/1.73 m^2 Final     Comment:     Calculation used to obtain the estimated glomerular filtration  rate (eGFR) is the CKD-EPI equation.      08/04/2020 31 (A) >60 mL/min/1.73 m^2 Final     Comment:     Calculation used to obtain the estimated glomerular filtration  rate (eGFR) is the CKD-EPI equation.        Lab Results   Component Value Date    WBC 8.18 08/04/2020    HGB 10.0 (L) 08/04/2020    HCT 32.4 (L) 08/04/2020     (H)  08/04/2020     08/04/2020       Assessment and recommendation    Newly discovered right occlusive thrombus brachial vein and cephalic vein well patient is on Eliquis treated for pulmonary embolism and lower extremity DVT.  > continue on heparin drip  > renal insufficiency prevent use of Lovenox dosage effectively unless factor Xa can be monitored effectively  > warfarin will be second-line choice for reason above  > in the setting of anticoagulation failure it reasonable to workup for occult malignancy including lymphoma  > hypercoagulable indicated  Factor 5 Leiden, antithrombin gene mutation, antiphospholipid syndrome workup  Protein C protein S will not be reliable while patient on heparin GTT  > project lifelong anticoagulation regardless above finding  > spoke to Dr. Hernandez she will assume the care    Anemia macrocytic with MCV of 109 with components of iron deficiency  > severe elevated ferritin reflection of acute reactant given the recent history of bacteremia  > B12 774  > iron 17, transferrin 104, TIBC 207, iron saturation 8%.  > peripheral smear examination

## 2020-08-06 LAB
ALBUMIN SERPL BCP-MCNC: 2 G/DL (ref 3.5–5.2)
ALP SERPL-CCNC: 89 U/L (ref 55–135)
ALT SERPL W/O P-5'-P-CCNC: 177 U/L (ref 10–44)
ANION GAP SERPL CALC-SCNC: 7 MMOL/L (ref 8–16)
APTT BLDCRRT: 57.5 SEC (ref 21–32)
APTT BLDCRRT: 62.4 SEC (ref 21–32)
AST SERPL-CCNC: 289 U/L (ref 10–40)
BILIRUB SERPL-MCNC: 0.4 MG/DL (ref 0.1–1)
BUN SERPL-MCNC: 12 MG/DL (ref 8–23)
CALCIUM SERPL-MCNC: 7.9 MG/DL (ref 8.7–10.5)
CHLORIDE SERPL-SCNC: 99 MMOL/L (ref 95–110)
CK SERPL-CCNC: ABNORMAL U/L (ref 20–180)
CO2 SERPL-SCNC: 31 MMOL/L (ref 23–29)
CREAT SERPL-MCNC: 1.9 MG/DL (ref 0.5–1.4)
EST. GFR  (AFRICAN AMERICAN): 29 ML/MIN/1.73 M^2
EST. GFR  (NON AFRICAN AMERICAN): 25 ML/MIN/1.73 M^2
GLUCOSE SERPL-MCNC: 95 MG/DL (ref 70–110)
MAGNESIUM SERPL-MCNC: 2 MG/DL (ref 1.6–2.6)
PHOSPHATE SERPL-MCNC: 4.6 MG/DL (ref 2.7–4.5)
POTASSIUM SERPL-SCNC: 4.2 MMOL/L (ref 3.5–5.1)
PROT SERPL-MCNC: 5.7 G/DL (ref 6–8.4)
SODIUM SERPL-SCNC: 137 MMOL/L (ref 136–145)
VANCOMYCIN SERPL-MCNC: 12.9 UG/ML

## 2020-08-06 PROCEDURE — 81000 URINALYSIS NONAUTO W/SCOPE: CPT

## 2020-08-06 PROCEDURE — 97116 GAIT TRAINING THERAPY: CPT | Mod: CQ

## 2020-08-06 PROCEDURE — 83735 ASSAY OF MAGNESIUM: CPT

## 2020-08-06 PROCEDURE — 80202 ASSAY OF VANCOMYCIN: CPT

## 2020-08-06 PROCEDURE — 25000003 PHARM REV CODE 250: Performed by: INTERNAL MEDICINE

## 2020-08-06 PROCEDURE — 84100 ASSAY OF PHOSPHORUS: CPT

## 2020-08-06 PROCEDURE — 27000221 HC OXYGEN, UP TO 24 HOURS

## 2020-08-06 PROCEDURE — 99232 PR SUBSEQUENT HOSPITAL CARE,LEVL II: ICD-10-PCS | Mod: ,,, | Performed by: INTERNAL MEDICINE

## 2020-08-06 PROCEDURE — 63600175 PHARM REV CODE 636 W HCPCS: Performed by: HOSPITALIST

## 2020-08-06 PROCEDURE — 25000003 PHARM REV CODE 250: Performed by: NURSE PRACTITIONER

## 2020-08-06 PROCEDURE — 36415 COLL VENOUS BLD VENIPUNCTURE: CPT

## 2020-08-06 PROCEDURE — 97530 THERAPEUTIC ACTIVITIES: CPT

## 2020-08-06 PROCEDURE — 63600175 PHARM REV CODE 636 W HCPCS: Performed by: NURSE PRACTITIONER

## 2020-08-06 PROCEDURE — 97166 OT EVAL MOD COMPLEX 45 MIN: CPT

## 2020-08-06 PROCEDURE — 82550 ASSAY OF CK (CPK): CPT

## 2020-08-06 PROCEDURE — 94799 UNLISTED PULMONARY SVC/PX: CPT

## 2020-08-06 PROCEDURE — 94761 N-INVAS EAR/PLS OXIMETRY MLT: CPT

## 2020-08-06 PROCEDURE — 99232 SBSQ HOSP IP/OBS MODERATE 35: CPT | Mod: S$GLB,,, | Performed by: INTERNAL MEDICINE

## 2020-08-06 PROCEDURE — 12000002 HC ACUTE/MED SURGE SEMI-PRIVATE ROOM

## 2020-08-06 PROCEDURE — 63600175 PHARM REV CODE 636 W HCPCS: Performed by: INTERNAL MEDICINE

## 2020-08-06 PROCEDURE — 99232 SBSQ HOSP IP/OBS MODERATE 35: CPT | Mod: ,,, | Performed by: INTERNAL MEDICINE

## 2020-08-06 PROCEDURE — 87086 URINE CULTURE/COLONY COUNT: CPT

## 2020-08-06 PROCEDURE — 94640 AIRWAY INHALATION TREATMENT: CPT

## 2020-08-06 PROCEDURE — 25000003 PHARM REV CODE 250: Performed by: HOSPITALIST

## 2020-08-06 PROCEDURE — 99232 PR SUBSEQUENT HOSPITAL CARE,LEVL II: ICD-10-PCS | Mod: S$GLB,,, | Performed by: INTERNAL MEDICINE

## 2020-08-06 PROCEDURE — 80053 COMPREHEN METABOLIC PANEL: CPT

## 2020-08-06 PROCEDURE — 85730 THROMBOPLASTIN TIME PARTIAL: CPT | Mod: 91

## 2020-08-06 RX ORDER — VANCOMYCIN HCL IN 5 % DEXTROSE 1G/250ML
1000 PLASTIC BAG, INJECTION (ML) INTRAVENOUS ONCE
Status: COMPLETED | OUTPATIENT
Start: 2020-08-06 | End: 2020-08-07

## 2020-08-06 RX ORDER — BENZONATATE 100 MG/1
100 CAPSULE ORAL 3 TIMES DAILY PRN
Status: DISCONTINUED | OUTPATIENT
Start: 2020-08-06 | End: 2020-08-16 | Stop reason: HOSPADM

## 2020-08-06 RX ADMIN — THERA TABS 1 TABLET: TAB at 08:08

## 2020-08-06 RX ADMIN — DOCUSATE SODIUM 50 MG AND SENNOSIDES 8.6 MG 1 TABLET: 8.6; 5 TABLET, FILM COATED ORAL at 08:08

## 2020-08-06 RX ADMIN — PANTOPRAZOLE SODIUM 40 MG: 40 TABLET, DELAYED RELEASE ORAL at 08:08

## 2020-08-06 RX ADMIN — VANCOMYCIN HYDROCHLORIDE 1000 MG: 1 INJECTION, POWDER, LYOPHILIZED, FOR SOLUTION INTRAVENOUS at 11:08

## 2020-08-06 RX ADMIN — DRONABINOL 2.5 MG: 2.5 CAPSULE ORAL at 09:08

## 2020-08-06 RX ADMIN — METOPROLOL SUCCINATE 50 MG: 50 TABLET, FILM COATED, EXTENDED RELEASE ORAL at 08:08

## 2020-08-06 RX ADMIN — HYDRALAZINE HYDROCHLORIDE 25 MG: 25 TABLET, FILM COATED ORAL at 02:08

## 2020-08-06 RX ADMIN — CYANOCOBALAMIN TAB 1000 MCG 2000 MCG: 1000 TAB at 08:08

## 2020-08-06 RX ADMIN — DOCUSATE SODIUM 50 MG AND SENNOSIDES 8.6 MG 1 TABLET: 8.6; 5 TABLET, FILM COATED ORAL at 09:08

## 2020-08-06 RX ADMIN — HEPARIN SODIUM AND DEXTROSE 11 UNITS/KG/HR: 10000; 5 INJECTION INTRAVENOUS at 07:08

## 2020-08-06 RX ADMIN — Medication 500 MG: at 09:08

## 2020-08-06 RX ADMIN — MONTELUKAST 10 MG: 10 TABLET, FILM COATED ORAL at 09:08

## 2020-08-06 RX ADMIN — HYDRALAZINE HYDROCHLORIDE 25 MG: 25 TABLET, FILM COATED ORAL at 09:08

## 2020-08-06 RX ADMIN — FLUTICASONE FUROATE AND VILANTEROL TRIFENATATE 1 PUFF: 100; 25 POWDER RESPIRATORY (INHALATION) at 07:08

## 2020-08-06 RX ADMIN — FOLIC ACID 1 MG: 1 TABLET ORAL at 08:08

## 2020-08-06 RX ADMIN — SODIUM BICARBONATE: 84 INJECTION, SOLUTION INTRAVENOUS at 01:08

## 2020-08-06 RX ADMIN — HYDRALAZINE HYDROCHLORIDE 25 MG: 25 TABLET, FILM COATED ORAL at 06:08

## 2020-08-06 NOTE — PT/OT/SLP EVAL
Occupational Therapy   Evaluation    Name: Sammi Abreu  MRN: [id number]  Admitting Diagnosis:  LUIZ (acute kidney injury)      Recommendations:     Discharge Recommendations: home with home health, nursing facility, skilled, other (see comments)(pending pt progress; pt will require 24/7 Supervision if D/Cs home; pt is at high risk of fall)  Discharge Equipment Recommendations:  none  Barriers to discharge:       Assessment:     Sammi Abreu is a 79 y.o. female with a medical diagnosis of LUIZ (acute kidney injury).  She presents with decreased endurance, MRSA, debility and at high risk of fall. Patient demonstrates the following performance deficits affecting function: weakness, impaired endurance, impaired self care skills, impaired functional mobilty, gait instability, decreased upper extremity function, decreased lower extremity function, decreased safety awareness, impaired cardiopulmonary response to activity.   Pt has PMHx significant for laparoscopic cholecystectomy on July 13th with Postoperative hypoxemia and subsequently found with left popliteal DVT and bilateral pulmonary emboli.   D/C recommendations are ongoing and pending pt progress - SNF versus HH; if pt D/Cs home, pt will require 24/7 Supervision.    Rehab Prognosis: Good; patient would benefit from acute skilled OT services to address these deficits and reach maximum level of function.       Plan:     Patient to be seen 3 x/week to address the above listed problems via self-care/home management, therapeutic activities, therapeutic exercises  · Plan of Care Expires: 08/28/20  · Plan of Care Reviewed with: patient    Subjective     Chief Complaint: None  Patient/Family Comments/goals: To get stronger and be able to go home     Occupational Profile:  Living Environment: Lives alone but her mother stays with her part time   Previous level of function: Pt reports use of RW at baseline   Equipment Used at Home:  bedside commode, cane, straight,  oxygen, walker, rolling, wheelchair  Assistance upon Discharge: Recommend SNF versus HH pending pt progress     Pain/Comfort:  · Pain Rating 1: (abdominal discomfort; unrated)  · Pain Addressed 1: Reposition, Distraction, Nurse notified  · Pain Rating Post-Intervention 1: 0/10    Patients cultural, spiritual, Congregational conflicts given the current situation: no    Objective:     Communicated with: Yaquelin Simmons prior to/post session.  Patient found up in bedside chair with telemetry, oxygen, PICC line(chair alarm) upon OT entry to room.    General Precautions: Standard, contact, fall, respiratory   Orthopedic Precautions:N/A   Braces: N/A     Occupational Performance:    Functional Mobility/Transfers:  · Patient completed Sit <> Stand Transfer with Min (A)  with  rolling walker   · Patient completed Toilet Transfer Stand Pivot technique with minimum assistance with  rolling walker and bedside commode  · Functional Mobility: Pt walking with Min (A) using RW from bedside chair > sink with BSC positioned behind pt for seated rest breaks as needed; once grooming completed, pt requesting to use the commode; pt then performing sit<>stand 2+ more times to complete toileting tasks; pt reports significant fatigue upon return to bedside chair resolving once seated    Activities of Daily Living:  · Grooming: stand by assistance standing sink side after Set-up of items  · Lower Body Dressing: Max (A)  from bedside chair  · Toileting: Max/Mod (A) from bedside commode - pt able to perform sit<>stand multiple times with overall Min (A)/CGA to complete toileting tasks; pt able to perform hygiene of heather area; however, requires (A) of buttocks    Cognitive/Visual Perceptual:  Cognitive/Psychosocial Skills:     -       Oriented to: Person, Place, Time and Situation   -       Follows Commands/attention:Follows multistep  commands  -       Communication: clear/fluent  -       Safety awareness/insight to disability: impaired      Physical Exam:  Balance:    -       Sit balance is NORMAL; static standing balance is FAIR  Postural examination/scapula alignment:    -       Rounded shoulders  Skin integrity: Visible skin intact  Edema:  Bilateral LE  Dominant hand:    -       Right  Upper Extremity Strength:    -       Right Upper Extremity: WFL  -       Left Upper Extremity: WFL  Fine Motor Coordination:    -       Intact    AMPAC 6 Click ADL:  AMPAC Total Score: 15    Treatment & Education:  - OTR providing education/instruction regarding OT role/POC, safety awareness/fall prevention, adaptive techniques to increase safety and (I) with self care tasks, safe walker management, energy conservation techniques - pt will require reinforcement  - OTR also providing education/instruction regarding use of call button for all OOB mobility, use of bed/chair alarms, etc - pt verbalizes/demonstrates understanding and in agreement when appropriate   Education:    Patient left up in bedside chair with all lines intact, call button in reach, chair alarm on and nurseYaquelin present    GOALS:   Multidisciplinary Problems     Occupational Therapy Goals        Problem: Occupational Therapy Goal    Goal Priority Disciplines Outcome Interventions   Occupational Therapy Goal     OT, PT/OT Ongoing, Progressing    Description: Goals to be met by: 8/28/2020     Patient will increase functional independence with ADLs by performing:    LE Dressing with Stand-by Assistance.  Grooming while standing with Stand-by Assistance.  Toileting from toilet with Stand-by Assistance for hygiene and clothing management.   Toilet transfer to toilet with Stand-by Assistance.                     History:     Past Medical History:   Diagnosis Date    ALLERGIC RHINITIS     Anemia     Arthritis     Back pain     Bronchitis     Cataract     OU    Cholelithiasis     COPD (chronic obstructive pulmonary disease)     Degenerative disc disease     Diabetes mellitus     pre  diabetic    Diverticulosis     DVT (deep venous thrombosis)     Edema     Encounter for blood transfusion 1979    Fibromyalgia     Glaucoma     Gout     Hx of colonic polyps     Hyperlipidemia     Hypertension     Incontinence     Osteoporosis     Reflux     Refusal of blood transfusions as patient is [employer]     Sleep apnea     non compliant with CPAP    Vestibulitis of ear        Past Surgical History:   Procedure Laterality Date    ANGIOGRAPHY OF LOWER EXTREMITY N/A 7/31/2019    Procedure: ANGIOGRAM, LOWER EXTREMITY;  Surgeon: Gino Arana MD;  Location: [facility] CATH/EP LAB;  Service: General;  Laterality: N/A;    HIP SURGERY      HYSTERECTOMY      JOINT REPLACEMENT      B total hip    LAPAROSCOPIC CHOLECYSTECTOMY N/A 7/13/2020    Procedure: CHOLECYSTECTOMY, LAPAROSCOPIC;  Surgeon: Jomar Mcdonald MD;  Location: [facility];  Service: General;  Laterality: N/A;    TRANSFORAMINAL EPIDURAL INJECTION OF STEROID Left 6/30/2020    Procedure: Injection,steroid,epidural,transforaminal approach;  Surgeon: Matt Gilliam MD;  [facility];  Service: Pain Management;  Laterality: Left;  L2-3, L3-4       Time Tracking:     OT Date of Treatment: 08/06/20  OT Start Time: 1414  OT Stop Time: 1438  OT Total Time (min): 24 min    Billable Minutes:Evaluation 15  Self Care/Home Management 9    PASCALE Hyde, PATEL  8/6/2020
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
## 2020-08-06 NOTE — ASSESSMENT & PLAN NOTE
Patient is identified as having Diastolic heart failure that is Chronic. CHF is currently controlled. Latest ECHO performed and demonstrates-   Results for orders placed during the hospital encounter of 07/13/20   Echo Color Flow Doppler? Yes    Narrative · Concentric left ventricular remodeling.  · Hyperdynamic left ventricular systolic function. The estimated ejection   fraction is 76%.  · Grade I (mild) left ventricular diastolic dysfunction consistent with   impaired relaxation.        Monitor volume status closely

## 2020-08-06 NOTE — PROGRESS NOTES
Patient with acute kidney injury on bicarb drip.  Discontinue TPN for now until acute kidney injury resolved and then plan start TPN if p.o. intake remains poor.

## 2020-08-06 NOTE — PLAN OF CARE
Problem: Occupational Therapy Goal  Goal: Occupational Therapy Goal  Description: Goals to be met by: 8/28/2020     Patient will increase functional independence with ADLs by performing:    LE Dressing with Stand-by Assistance.  Grooming while standing with Stand-by Assistance.  Toileting from toilet with Stand-by Assistance for hygiene and clothing management.   Toilet transfer to toilet with Stand-by Assistance.    Outcome: Ongoing, Progressing  POC initiated and established in collaboration with patient.

## 2020-08-06 NOTE — PROGRESS NOTES
[facility] Hospital Medicine  Progress Note    Patient Name: Sammi Abreu  MRN: [id number]  Patient Class: IP- Inpatient   Admission Date: 8/2/2020  Length of Stay: 3 days  Attending Physician: Hugh Serrano MD  Primary Care Provider: Osmani Villatoro MD      Subjective:     Principal Problem:LUIZ (acute kidney injury), bilateral pulmonary emboli, lower extremity DVT, newly diagnosed right upper extremity DVT, elevated liver enzymes, anorexia with malnutrition      HPI:  Sammi Abreu is a 79-year-old female with past medical history significant for arthritis and chronic back pain, fibromyalgia, glaucoma, hyperlipidemia, hypertension, sleep apnea with history of noncompliance with CPAP, morbid obesity, hypertension, GERD, COPD, diabetes type 2, prior DVT, and gallstones [facility]3 [facility].  Of note the patient is also a [employer].   Patient was discharged from this facility on 07/29/2020 after being treated for bilateral pulmonary embolism.  Patient is currently on Eliquis.  Patient underwent a CT abdomen pelvis while in the emergency department which identified surgical changes but nothing acute.  Patient is noted to have a mild LUIZ.  Her troponin is elevated at 0.41 which is consistent with prior levels.  She will be admitted to the service of hospital Medicine for continued treatment.    Overview/Hospital Course:  Patient monitor closely during hospitalization.  She was placed on continuous telemetry monitoring.  She was noted to have rhabdomyolysis with CPK greater than 64451 and LUIZ.  Nephrology was consulted.  She was noted to have metabolic acidosis initiated on bicarb drip.  Daptomycin was held and ID was consulted.  She was initiated on IV vancomycin.  She was noted have increased right upper extremity swelling.  Right upper extremity ultrasound demonstrated occlusive thrombus of the right  brachial vein and cephalic vein. She was initiated on heparin drip and her Eliquis was held.  There was concern for Eliquis failure.  Hematology consulted.  PT and OT were consulted for debility.  Patient with ongoing anorexia and poor p.o. intake. Dietary was consulted for protein calorie malnutrition.  It was recommended patient be placed on TPN lipids.  Her renal status was monitored closely and she remain on bicarb drip with plans to start TPN once acute kidney injury resolved if no improvement in her p.o. intake.    Interval History:  Patient remains on bicarb drip.  Hold off TPN until renal status stabilized.  P.o. intake encouraged    Review of Systems   Constitutional: Positive for activity change, appetite change and fatigue. Negative for chills, diaphoresis and fever.   HENT: Negative for ear discharge, ear pain and facial swelling.    Eyes: Negative for pain and redness.   Respiratory: Positive for cough and shortness of breath.    Cardiovascular: Positive for leg swelling.   Gastrointestinal: Negative for abdominal distention, abdominal pain, constipation, diarrhea, nausea and vomiting.        Poor appetite   Endocrine: Negative for polydipsia and polyphagia.   Genitourinary: Negative for difficulty urinating, dysuria, flank pain and hematuria.   Musculoskeletal: Positive for back pain. Negative for neck pain and neck stiffness.   Skin: Negative for color change.   Allergic/Immunologic: Negative for food allergies.   Neurological: Positive for weakness. Negative for seizures, facial asymmetry and speech difficulty.   Psychiatric/Behavioral: Negative for agitation, behavioral problems, confusion, hallucinations and suicidal ideas.     Objective:     Vital Signs (Most Recent):  Temp: 99 °F (37.2 °C) (08/06/20 1056)  Pulse: 78 (08/06/20 1056)  Resp: 18 (08/06/20 1056)  BP: (!) 135/58 (08/06/20 1056)  SpO2: 96 % (08/06/20 1056) Vital Signs (24h Range):  Temp:  [97.5 °F (36.4 °C)-99.7 °F (37.6 °C)] 99 °F  (37.2 °C)  Pulse:  [77-90] 78  Resp:  [16-18] 18  SpO2:  [95 %-99 %] 96 %  BP: (135-182)/(58-81) 135/58     Weight: 104.1 kg (229 lb 8 oz)  Body mass index is 40.65 kg/m².    Intake/Output Summary (Last 24 hours) at 8/6/2020 1307  Last data filed at 8/6/2020 1100  Gross per 24 hour   Intake 1840 ml   Output 600 ml   Net 1240 ml      Physical Exam  Constitutional:       General: She is not in acute distress.     Appearance: Normal appearance.   HENT:      Head: Normocephalic and atraumatic.      Mouth/Throat:      Mouth: Mucous membranes are moist.   Eyes:      General:         Right eye: No discharge.         Left eye: No discharge.      Extraocular Movements: Extraocular movements intact.      Conjunctiva/sclera: Conjunctivae normal.      Pupils: Pupils are equal, round, and reactive to light.   Neck:      Musculoskeletal: Normal range of motion and neck supple. No neck rigidity or muscular tenderness.   Cardiovascular:      Rate and Rhythm: Normal rate and regular rhythm.      Pulses: Normal pulses.      Heart sounds: Murmur present.   Pulmonary:      Effort: No respiratory distress.      Comments: Bilateral breath sounds diminished  Abdominal:      General: Bowel sounds are normal. There is no distension.      Tenderness: There is no abdominal tenderness. There is no guarding.      Comments: Obese   Genitourinary:     Comments: Not examined  Musculoskeletal: Normal range of motion.         General: Swelling present.      Comments: Generalized edema with edema also noted to upper and lower extremities   Skin:     General: Skin is warm and dry.      Capillary Refill: Capillary refill takes less than 2 seconds.   Neurological:      General: No focal deficit present.      Mental Status: She is alert and oriented to person, place, and time. Mental status is at baseline.      Cranial Nerves: No cranial nerve deficit.   Psychiatric:         Mood and Affect: Mood normal.         Behavior: Behavior normal.         Thought  Content: Thought content normal.         Judgment: Judgment normal.     Labs reviewed      Assessment/Plan:      * LUIZ (acute kidney injury)  Monitor  Orders as per Nephrology- Patient currently remains on bicarb drip  Trend BMP  Renal dose all medications and avoid any possible nephrotoxic medications              Liver cyst  Noted  Will need outpatient follow-up      Debility  Fall and skin precautions  Continue physical therapy and occupational therapy      Hyponatremia  Monitor  Nephrology following      Elevated ferritin level  Noted      Acute deep vein thrombosis (DVT) of right upper extremity  Patient developed new right upper extremity DVT despite being on apixaban-  Patient currently remains on IV heparin drip  Hematology consulted for anticoagulation recommendations  Patient will need lifelong anticoagulation  Hematology workup in progress      Non-traumatic rhabdomyolysis  It was felt this is secondary to daptomycin.  Id consulted.  Nephrology consulted.  Trend CPK daily-improving    Daptomycin was discontinued and patient was placed on IV vancomycin      Macrocytic anemia  Monitor  Hematology following        Moderate protein-calorie malnutrition  Anorexia-  Dietitian consulted and recommended TPN and lipids -plan to start once renal status stabilized, patient currently remains on bicarb drip  Monitor volume status closely  Continue p.o. supplement        Atelectasis  Monitor oxygen saturation. Encourage OOB and IS.      Pulmonary infarct  Due to bilateral pulmonary emboli.   Pulmonary following      Chronic diastolic CHF (congestive heart failure)  Patient is identified as having Diastolic heart failure that is Chronic. CHF is currently controlled. Latest ECHO performed and demonstrates-   Results for orders placed during the hospital encounter of 07/13/20   Echo Color Flow Doppler? Yes    Narrative · Concentric left ventricular remodeling.  · Hyperdynamic left ventricular systolic function. The  estimated ejection   fraction is 76%.  · Grade I (mild) left ventricular diastolic dysfunction consistent with   impaired relaxation.        Monitor volume status closely    MRSA bacteremia  Previously receiving daptomycin as outpatient-now on IV vancomycin  Orders as per infectious disease    Elevated troponin  Noted- chronically elevated    Bilateral pulmonary embolism  Patient previously on Eliquis at home  Continue IV heparin for now  Pulmonology and Hematology consulted    Hematology workup in progress    Patient will need lifelong anticoagulation      COPD (chronic obstructive pulmonary disease)  Monitor O2 sats, supplement O2 as needed  Orders as per pulmonology        Transaminitis  Monitor-trending downward  Hold dapto-possible due to abx.   Possible due to clot burden.  Previously discussed with Dr. Decker. No recommendations at this time. C  GI consult was canceled.      Morbid obesity  Body mass index is 40.65 kg/m².  General weight loss/lifestyle modification strategies discussed (elicit support from others; identify saboteurs; non-food rewards, etc).        Pulmonary hypertension  Noted  Orders as per pulmonology      Hypertension associated with diabetes  Monitor  Continue current treatment  Titrate medications as needed        VTE Risk Mitigation (From admission, onward)         Ordered     heparin 25,000 units in dextrose 5% 250 mL (100 units/mL) infusion HIGH INTENSITY nomogram - [facility]  Continuous     Question:  Heparin Infusion Adjustment (DO NOT MODIFY ANSWER)  Answer:  \\[email or web address]\[email or web address]\Images\Pharmacy\HeparinInfusions\heparin HIGH INTENSITY nomogram for [facility] [id number]    08/03/20 1609     heparin 25,000 units in dextrose 5% (100 units/ml) IV bolus from bag - ADDITIONAL PRN BOLUS - 60 units/kg  As needed (PRN)     Question:  Heparin Infusion Adjustment (DO NOT MODIFY ANSWER)  Answer:  \[email or web address]\Images\Pharmacy\HeparinInfusions\heparin HIGH INTENSITY nomogram for [facility] [id number]     08/03/20 1609     heparin 25,000 units in dextrose 5% (100 units/ml) IV bolus from bag - ADDITIONAL PRN BOLUS - 30 units/kg  As needed (PRN)     Question:  Heparin Infusion Adjustment (DO NOT MODIFY ANSWER)  Answer:  [email or web address]\Images\Pharmacy\HeparinInfusions\heparin HIGH INTENSITY nomogram for [facility] [id number]    08/03/20 1609     IP VTE HIGH RISK PATIENT  Once      08/02/20 2034     Place sequential compression device  Until discontinued      08/02/20 2034                Time spent seeing patient( greater than 1/2 spent in direct contact) :  37 minutes    GENARO Ross  Department of Hospital Medicine   [facility]

## 2020-08-06 NOTE — SUBJECTIVE & OBJECTIVE
Interval History:  Patient remains on bicarb drip.  Hold off TPN until renal status stabilized.  P.o. intake encouraged    Review of Systems   Constitutional: Positive for activity change, appetite change and fatigue. Negative for chills, diaphoresis and fever.   HENT: Negative for ear discharge, ear pain and facial swelling.    Eyes: Negative for pain and redness.   Respiratory: Positive for cough and shortness of breath.    Cardiovascular: Positive for leg swelling.   Gastrointestinal: Negative for abdominal distention, abdominal pain, constipation, diarrhea, nausea and vomiting.        Poor appetite   Endocrine: Negative for polydipsia and polyphagia.   Genitourinary: Negative for difficulty urinating, dysuria, flank pain and hematuria.   Musculoskeletal: Positive for back pain. Negative for neck pain and neck stiffness.   Skin: Negative for color change.   Allergic/Immunologic: Negative for food allergies.   Neurological: Positive for weakness. Negative for seizures, facial asymmetry and speech difficulty.   Psychiatric/Behavioral: Negative for agitation, behavioral problems, confusion, hallucinations and suicidal ideas.     Objective:     Vital Signs (Most Recent):  Temp: 99 °F (37.2 °C) (08/06/20 1056)  Pulse: 78 (08/06/20 1056)  Resp: 18 (08/06/20 1056)  BP: (!) 135/58 (08/06/20 1056)  SpO2: 96 % (08/06/20 1056) Vital Signs (24h Range):  Temp:  [97.5 °F (36.4 °C)-99.7 °F (37.6 °C)] 99 °F (37.2 °C)  Pulse:  [77-90] 78  Resp:  [16-18] 18  SpO2:  [95 %-99 %] 96 %  BP: (135-182)/(58-81) 135/58     Weight: 104.1 kg (229 lb 8 oz)  Body mass index is 40.65 kg/m².    Intake/Output Summary (Last 24 hours) at 8/6/2020 1307  Last data filed at 8/6/2020 1100  Gross per 24 hour   Intake 1840 ml   Output 600 ml   Net 1240 ml      Physical Exam  Constitutional:       General: She is not in acute distress.     Appearance: Normal appearance.   HENT:      Head: Normocephalic and atraumatic.      Mouth/Throat:      Mouth:  Mucous membranes are moist.   Eyes:      General:         Right eye: No discharge.         Left eye: No discharge.      Extraocular Movements: Extraocular movements intact.      Conjunctiva/sclera: Conjunctivae normal.      Pupils: Pupils are equal, round, and reactive to light.   Neck:      Musculoskeletal: Normal range of motion and neck supple. No neck rigidity or muscular tenderness.   Cardiovascular:      Rate and Rhythm: Normal rate and regular rhythm.      Pulses: Normal pulses.      Heart sounds: Murmur present.   Pulmonary:      Effort: No respiratory distress.      Comments: Bilateral breath sounds diminished  Abdominal:      General: Bowel sounds are normal. There is no distension.      Tenderness: There is no abdominal tenderness. There is no guarding.      Comments: Obese   Genitourinary:     Comments: Not examined  Musculoskeletal: Normal range of motion.         General: Swelling present.      Comments: Generalized edema with edema also noted to upper and lower extremities   Skin:     General: Skin is warm and dry.      Capillary Refill: Capillary refill takes less than 2 seconds.   Neurological:      General: No focal deficit present.      Mental Status: She is alert and oriented to person, place, and time. Mental status is at baseline.      Cranial Nerves: No cranial nerve deficit.   Psychiatric:         Mood and Affect: Mood normal.         Behavior: Behavior normal.         Thought Content: Thought content normal.         Judgment: Judgment normal.     Labs reviewed

## 2020-08-06 NOTE — PLAN OF CARE
Patient alert and oriented, takes meds whole, iv heparin infusing, urine orange in color, scds on, 1 assist with adls and transfers. Continent of bowel and bladder. Able to make needs known. Safety and neuro intact

## 2020-08-06 NOTE — RESPIRATORY THERAPY
08/06/20 0750   Patient Assessment/Suction   Level of Consciousness (AVPU) alert   All Lung Fields Breath Sounds diminished   PRE-TX-O2   O2 Device (Oxygen Therapy) nasal cannula   $ Is the patient on Low Flow Oxygen? Yes   Flow (L/min) 4   SpO2 96 %   Pulse Oximetry Type Intermittent   $ Pulse Oximetry - Multiple Charge Pulse Oximetry - Multiple   Pulse 84   Resp 16   Inhaler   $ Inhaler Charges MDI (Metered Dose Inahler) Treatment   Respiratory Treatment Status (Inhaler) given   Treatment Route (Inhaler) mouthpiece   Patient Position (Inhaler) HOB elevated   Signs of Intolerance (Inhaler) none   Breath Sounds Post-Respiratory Treatment   Throughout All Fields Post-Treatment All Fields   Throughout All Fields Post-Treatment no change   Post-treatment Heart Rate (beats/min) 86   Post-treatment Resp Rate (breaths/min) 16   Incentive Spirometer   $ Incentive Spirometer Charges done with encouragement   Incentive Spirometer Predicted Level (mL) 1950   Administration (IS) instruction provided, follow-up   Number of Repetitions (IS) 10   Level Incentive Spirometer (mL) 750   Patient Tolerance (IS) good

## 2020-08-06 NOTE — CONSULTS
Nephrology Consult Note        Patient Name: Sammi Abreu  MRN: [id number]    Patient Class: IP- Inpatient   Admission Date: 8/2/2020  Length of Stay: 3 days  Date of Service: 8/6/2020    Attending Physician: Hugh Serrano MD  Primary Care Provider: Osmani Villatoro MD    Reason for Consult: luzi/ckd3/hyponatremia/acidosis/rhabdomyalisis/mrsa bacteremia/anemia/htn    SUBJECTIVE:     HPI: 79F Jeoliva witness was treated in hospital for MRSA bacteremia, had lap rosette 7/13, complicated by PE and DVT, was d/c 7/29 on daptomycin and returned to ER 5 days later with abdominal pain, elevated CPK and LFTs, LUIZ with modest rise in sCr. CT abdomen w/out contrast looked OK. She was given NS and heparin gtt, Dapto was changed with Vanco.    8/6 VSS, no new complains.    Past Medical History:   Diagnosis Date    ALLERGIC RHINITIS     Anemia     Arthritis     Back pain     Bronchitis     Cataract     OU    Cholelithiasis     COPD (chronic obstructive pulmonary disease)     Degenerative disc disease     Diabetes mellitus     pre diabetic    Diverticulosis     DVT (deep venous thrombosis)     Edema     Encounter for blood transfusion 1979    Fibromyalgia     Glaucoma     Gout     Hx of colonic polyps     Hyperlipidemia     Hypertension     Incontinence     Osteoporosis     Reflux     Refusal of blood transfusions as patient is Jewish     Sleep apnea     non compliant with CPAP    Vestibulitis of ear      Past Surgical History:   Procedure Laterality Date    ANGIOGRAPHY OF LOWER EXTREMITY N/A 7/31/2019    Procedure: ANGIOGRAM, LOWER EXTREMITY;  Surgeon: Gino Arana MD;  Location: [facility] CATH/EP LAB;  Service: General;  Laterality: N/A;    HIP SURGERY      HYSTERECTOMY      JOINT REPLACEMENT      B total hip    LAPAROSCOPIC CHOLECYSTECTOMY N/A 7/13/2020    Procedure: CHOLECYSTECTOMY, LAPAROSCOPIC;  Surgeon: Jomar Mcdonald MD;  Location: [facility] OR;  Service: General;  Laterality: N/A;     TRANSFORAMINAL EPIDURAL INJECTION OF STEROID Left 2020    Procedure: Injection,steroid,epidural,transforaminal approach;  Surgeon: Matt Gilliam MD;  Location: [facility] OR;  Service: Pain Management;  Laterality: Left;  L2-3, L3-4     Family History   Problem Relation Age of Onset    Hypertension Mother     Diabetes Sister     Glaucoma Neg Hx     Macular degeneration Neg Hx     Retinal detachment Neg Hx      Social History     Tobacco Use    Smoking status: Former Smoker     Packs/day: 0.25     Years: 5.00     Pack years: 1.25     Quit date: 1972     Years since quittin.6    Smokeless tobacco: Never Used   Substance Use Topics    Alcohol use: Yes     Alcohol/week: 0.0 standard drinks     Comment: Rarely    Drug use: Yes     Types: Oxycodone, Hydrocodone       Review of patient's allergies indicates:   Allergen Reactions    Cymbalta [duloxetine] Other (See Comments)     Nightmares      Darvon [propoxyphene] Nausea Only and Other (See Comments)     Sweating, slept for 3 days    Atorvastatin Other (See Comments)     Muscle cramps    Naprosyn [naproxen] Nausea Only    Penicillins Rash    Tramadol Nausea Only and Palpitations       Outpatient meds:  Current Facility-Administered Medications on File Prior to Encounter   Medication Dose Route Frequency Provider Last Rate Last Dose    lactated ringers infusion   Intravenous Continuous Gino Reid MD 10 mL/hr at 20 1308      lidocaine (PF) 10 mg/ml (1%) injection 10 mg  1 mL Intradermal Once Gino Reid MD         Current Outpatient Medications on File Prior to Encounter   Medication Sig Dispense Refill    acetaminophen (TYLENOL) 325 MG tablet Take 2 tablets (650 mg total) by mouth every 6 (six) hours as needed (Do not take with any other Tylenol or acetaminophen containing products).  0    albuterol-ipratropium (DUO-NEB) 2.5 mg-0.5 mg/3 mL nebulizer solution Take 3 mLs by nebulization every 8 (eight) hours. 270 mL 0     apixaban (ELIQUIS) 5 mg Tab Take 1 tablet (5 mg total) by mouth 2 (two) times daily. 60 tablet 3    ascorbic acid, vitamin C, (VITAMIN C) 100 MG tablet Take 5 tablets (500 mg total) by mouth every evening.      esomeprazole (NEXIUM) 20 MG capsule Take 1 capsule (20 mg total) by mouth before breakfast. 30 capsule 2    fluticasone (FLONASE) 50 mcg/actuation nasal spray 2 sprays (100 mcg total) by Each Nare route once daily. 3 Bottle 3    folic acid (FOLVITE) 1 MG tablet Take 1 tablet (1 mg total) by mouth once daily. 30 tablet 0    metoprolol succinate (TOPROL-XL) 50 MG 24 hr tablet Take 1 tablet (50 mg total) by mouth once daily. 90 tablet 3    montelukast (SINGULAIR) 10 mg tablet Take 1 tablet (10 mg total) by mouth every evening. 90 tablet 3    sodium chloride 0.9% SolP 50 mL with DAPTOmycin 350 mg SolR 1,000 mg Inject 1,000 mg into the vein once daily.      spironolactone (ALDACTONE) 25 MG tablet Take 1 tablet (25 mg total) by mouth once daily. 90 tablet 6    budesonide-formoterol 160-4.5 mcg (SYMBICORT) 160-4.5 mcg/actuation HFAA Inhale 2 puffs into the lungs every 12 (twelve) hours. Controller 3 Inhaler 3    cyanocobalamin, vitamin B-12, 2,500 mcg Lozg Place 2 tablets under the tongue once daily. 60 lozenge 11    diclofenac (VOLTAREN) 25 MG TbEC Take 1 tablet (25 mg total) by mouth 3 (three) times daily as needed. 15 tablet 0    HYDROcodone-acetaminophen (NORCO) 5-325 mg per tablet Take 1 tablet by mouth every 6 (six) hours as needed for Pain. (Patient not taking: Reported on 7/30/2020) 15 tablet 0    lactulose (CHRONULAC) 10 gram/15 mL solution Take 30 mLs (20 g total) by mouth 3 (three) times daily. 2 Teaspoon(s) Oral PRN Every evening. (Patient not taking: Reported on 7/30/2020) 500 mL 3    multivitamin capsule Take 1 capsule by mouth once daily.      ondansetron (ZOFRAN-ODT) 4 MG TbDL Take 1 tablet (4 mg total) by mouth every 8 (eight) hours as needed. 20 tablet 0       Scheduled meds:    ascorbic acid (vitamin C)  500 mg Oral QHS    cyanocobalamin  2,000 mcg Oral Daily    dronabinoL  2.5 mg Oral BID    fat emulsion 20%  250 mL Intravenous Daily    fluticasone furoate-vilanteroL  1 puff Inhalation Daily    folic acid  1 mg Oral Daily    hydrALAZINE  25 mg Oral Q8H    metoprolol succinate  50 mg Oral Daily    montelukast  10 mg Oral QHS    multivitamin  1 tablet Oral Daily    pantoprazole  40 mg Oral Daily    polyethylene glycol  17 g Oral Daily    senna-docusate 8.6-50 mg  1 tablet Oral BID       Infusions:   amino acid 2.75% - dextrose 5% (CLINIMIX-E) solution with additives (1L provides 27.5 gm AA, 50 gm CHO (170 kcal/L dextrose), Na 35, K 30, Mg 5, Ca 4.5, Acetate 51, Cl 39, Phos 15) Stopped (08/05/20 1600)    heparin (porcine) in D5W 11 Units/kg/hr (08/06/20 0056)    sodium bicarbonate drip 100 mL/hr at 08/06/20 0128       PRN meds:  acetaminophen, aluminum-magnesium hydroxide-simethicone, dextrose 50%, dextrose 50%, glucagon (human recombinant), glucose, glucose, heparin (PORCINE), heparin (PORCINE), magnesium oxide, magnesium oxide, melatonin, morphine, ondansetron, potassium chloride, potassium chloride, potassium chloride, sodium chloride 0.9%, Pharmacy to dose Vancomycin consult **AND** vancomycin - pharmacy to dose    Review of Systems:  ROS    OBJECTIVE:     Vital Signs and IO (Last 24H):  Temp:  [97.7 °F (36.5 °C)-99.7 °F (37.6 °C)]   Pulse:  [71-91]   Resp:  [17-18]   BP: (139-188)/(63-81)   SpO2:  [95 %-99 %]   I/O last 3 completed shifts:  In: 4208.2 [P.O.:1200; I.V.:3008.2]  Out: 1425 [Urine:1425]    Wt Readings from Last 5 Encounters:   08/04/20 104.1 kg (229 lb 8 oz)   07/30/20 99.9 kg (220 lb 3.8 oz)   07/29/20 102.7 kg (226 lb 6.6 oz)   07/13/20 97.5 kg (215 lb)   07/09/20 104.9 kg (231 lb 4.2 oz)         Physical Exam:  Physical Exam  Constitutional:       Appearance: She is well-developed. She is not diaphoretic.   HENT:      Head: Normocephalic and atraumatic.    Eyes:      General: No scleral icterus.     Pupils: Pupils are equal, round, and reactive to light.   Neck:      Musculoskeletal: Neck supple.   Cardiovascular:      Rate and Rhythm: Normal rate and regular rhythm.   Pulmonary:      Effort: Pulmonary effort is normal. No respiratory distress.      Breath sounds: No stridor.   Abdominal:      General: There is no distension.      Palpations: Abdomen is soft.   Musculoskeletal: Normal range of motion.         General: No deformity.   Skin:     General: Skin is warm and dry.      Findings: No erythema or rash.   Neurological:      Mental Status: She is alert and oriented to person, place, and time.      Cranial Nerves: No cranial nerve deficit.   Psychiatric:         Behavior: Behavior normal.         Body mass index is 40.65 kg/m².    Laboratory:  Recent Labs   Lab 08/04/20  0030 08/05/20  0116 08/06/20  0643   *  135* 136 137   K 4.4  4.4 4.4 4.2     103 102 99   CO2 21*  21* 26 31*   BUN 15  15 14 12   CREATININE 1.6*  1.6* 1.7* 1.9*   ESTGFRAFRICA 36*  36* 33* 29*   EGFRNONAA 31*  31* 29* 25*   *  176* 110 95       Recent Labs   Lab 08/04/20  0030 08/05/20  0116 08/06/20  0643   CALCIUM 7.8*  7.8* 8.0* 7.9*   ALBUMIN 2.2*  2.2* 2.2* 2.0*   PHOS 4.6* 3.7 4.6*   MG 2.2 2.3 2.0       Recent Labs   Lab 12/22/17  1141 06/22/18  0848 12/10/18  0945   PTH, Intact 57.0 115.0 H 81.0 H       No results for input(s): POCTGLUCOSE in the last 168 hours.    Recent Labs   Lab 08/03/20  0418 08/04/20  0030 08/05/20  0116   Hemoglobin A1C 5.7 H 5.9 H 5.9 H       Recent Labs   Lab 08/03/20  1624 08/04/20  0030 08/05/20  2042   WBC 7.36 8.18 10.25   HGB 9.2* 10.0* 9.0*   HCT 30.7* 32.4* 29.9*    300 266   * 109* 106*   MCHC 30.0* 30.9* 30.1*   MONO 8.4  0.6 8.2  0.7 9.8  1.0       Recent Labs   Lab 08/04/20  0030 08/05/20  0116 08/06/20  0643   BILITOT 0.3  0.3 0.3 0.4   PROT 6.1  6.1 5.9* 5.7*   ALBUMIN 2.2*  2.2* 2.2* 2.0*    ALKPHOS 75  75 85 89   *  176* 193* 177*   *  552* 455* 289*       Recent Labs   Lab 06/07/20  0952 07/18/20  0425 07/18/20  1920 08/02/20  1652   Color, UA Yellow Yellow Yellow Yellow   Appearance, UA Clear Clear Hazy A Clear   pH, UA 7.0 6.0 6.0 7.0   Specific Payne, UA 1.015 1.015 1.025 1.010   Protein, UA Negative Negative Negative 2+ A   Glucose, UA Negative Negative Negative Negative   Ketones, UA Negative Negative Negative Negative   Urobilinogen, UA Negative Negative 1.0 Negative   Bilirubin (UA) Negative Negative Negative Negative   Occult Blood UA Negative Negative Negative 3+ A   Nitrite, UA Negative Negative Negative Negative   RBC, UA 0 2 0 2   WBC, UA 2 11 H 15 H 1   Bacteria Negative Occasional Few A None   Hyaline Casts, UA 3 A  --   --  0       Recent Labs   Lab 07/13/20  1946   POC PH 7.406   POC PCO2 36.7   POC HCO3 23.1 L   POC PO2 70 L   POC SATURATED O2 94 L   POC BE -2   Sample ARTERIAL       Microbiology Results (last 7 days)     Procedure Component Value Units Date/Time    Blood culture x two cultures. Draw prior to antibiotics. [[id number]] Collected: 08/02/20 1633    Order Status: Completed Specimen: Blood Updated: 08/05/20 2322     Blood Culture, Routine No Growth to date      No Growth to date      No Growth to date      No Growth to date    Narrative:      Aerobic and anaerobic    Blood culture x two cultures. Draw prior to antibiotics. [[id number]] Collected: 08/02/20 1633    Order Status: Completed Specimen: Blood Updated: 08/05/20 2322     Blood Culture, Routine No Growth to date      No Growth to date      No Growth to date      No Growth to date    Narrative:      Aerobic and anaerobic          ASSESSMENT/PLAN:     Active Hospital Problems    Diagnosis  POA    *LUIZ (acute kidney injury) [N17.9]  Yes    Acute deep vein thrombosis (DVT) of right upper extremity [I82.621]  Yes     Occlusive thrombus of the right brachial vein and cephalic vein      Elevated  ferritin level [R79.89]  Yes    Hyponatremia [E87.1]  Yes    Debility [R53.81]  Yes    Liver cyst [K76.89]  Yes    Non-traumatic rhabdomyolysis [M62.82]  Yes    Chronic diastolic CHF (congestive heart failure) [I50.32]  Yes    Pulmonary infarct [I26.99]  Yes    Atelectasis [J98.11]  Yes    Moderate protein-calorie malnutrition [E44.0]  Yes    Macrocytic anemia [D53.9]  Yes    MRSA bacteremia [R78.81]  Yes    Elevated troponin [R79.89]  Yes    Bilateral pulmonary embolism [I26.99]  Yes    COPD (chronic obstructive pulmonary disease) [J44.9]  Yes    Transaminitis [R74.0]  Yes    Morbid obesity [E66.01]  Yes    Pulmonary hypertension [I27.20]  Yes    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     LUIZ, sCr not better yet  CKD stage 3  Hyponatremia  Acidosis  MRSA bacteremia  Rhabdomyolysis 2/2 Daptomycin  No NSAIDs, ACEI/ARB, IV contrast or other nephrotoxins.  Keep MAP > 60, SBP > 100.  Dose meds for GFR < 30 ml/min.  Renal diet - low K, low phos.  Dose Vanco by levels.  Continue with bicarb gtt.  Avoid hypotonic infusions.     6/7/2020 08:58 8/3/2020 16:24 8/4/2020 00:30 8/5/2020 01:16    98874 (H) >98875 (H) 29001 (H)     Anemia of CKD  Jehovah witness  Hypercoagulable state with thrombosis  Hgb and HCT are acceptable. Monitor.  Appreciate heme input.    HTN  BP seem controlled.   Tolerate asymptomatic HTN up to -160.  Continue home meds.    Thank you for allowing us to participate in the care of your patient!   We will follow the patient and provide recommendations as needed.    Dimas Sosa MD    [facility]  [address] [place] [address]  [place] [address]    [phone] - tel  [phone] - fax    8/6/2020 9:19 AM

## 2020-08-06 NOTE — ASSESSMENT & PLAN NOTE
Patient developed new right upper extremity DVT despite being on apixaban-  Patient currently remains on IV heparin drip  Hematology consulted for anticoagulation recommendations  Patient will need lifelong anticoagulation  Hematology workup in progress

## 2020-08-06 NOTE — ASSESSMENT & PLAN NOTE
It was felt this is secondary to daptomycin.  Id consulted.  Nephrology consulted.  Trend CPK daily-improving    Daptomycin was discontinued and patient was placed on IV vancomycin

## 2020-08-06 NOTE — RESPIRATORY THERAPY
08/05/20 1945   Patient Assessment/Suction   Level of Consciousness (AVPU) alert   PRE-TX-O2   O2 Device (Oxygen Therapy) nasal cannula   Flow (L/min) 4   Oxygen Concentration (%) 36   SpO2 95 %   Pulse Oximetry Type Intermittent   Ready to Wean/Extubation Screen   FIO2<=50 (chart decimal) 0.36

## 2020-08-06 NOTE — PROGRESS NOTES
Progress Note  PULMONARY    Admit Date: 8/2/2020 08/06/2020  From Dr Suero's consult 8/3-History of Present Illness:    Pt was home 5 days after last admit, she had iv line on right and had left picc line placed.  She did develop mrsa bacteremia.  Pt was on ox at home but was ambulating.  2 days pta she developed weakness, chest tightness, and bilat arm tightness.  Pt represented to er- found to have lft up and creat up. Intake had been poor.  Her chest pain was more tightness like her arms.     Pt presented with months flank/chest pain with massive pe found after laproscopic rosette. Had mrsa bacteremia during stay and developed severe anemia - responded to iron rx, etc... pt Scientologist.  On eiiquis.  Had pulm htn on echo.   Pt presented with elevated creat to 1.8 from 1.3 bseline. P[t came to er with loer chest pain.   Plan:   dvt on right arm- no picc mentioned on right in epic.  Recurrent clot on rx for no clear reason.  mrsa bacteremia last admit  - source not clear.  teflaro may ppt transminitis - suspect had another pe- heparin drip ordered. Heme to see. Needs id f/u.  F/u lft. Monitor.  Dx not clear.          SUBJECTIVE:     8/4 no new c/o- frustrated with illness.  8/5 feels better, no new c/o  8/6 no new c/o      PFSH and Allergies reviewed.    OBJECTIVE:     Vitals (Most recent):  Vitals:    08/06/20 0805   BP: (!) 145/64   Pulse: 90   Resp: 18   Temp: 97.5 °F (36.4 °C)       Vitals (24 hour range):  Temp:  [97.5 °F (36.4 °C)-99.7 °F (37.6 °C)]   Pulse:  [71-90]   Resp:  [16-18]   BP: (139-182)/(63-81)   SpO2:  [95 %-99 %]       Intake/Output Summary (Last 24 hours) at 8/6/2020 0808  Last data filed at 8/6/2020 0700  Gross per 24 hour   Intake 1840 ml   Output 400 ml   Net 1440 ml          Physical Exam:  The patient's neuro status (alertness,orientation,cognitive function,motor skills,), pharyngeal exam (oral lesions, hygiene, abn dentition,), Neck (jvd,mass,thyroid,nodes in neck and above/below  clavicle),RESPIRATORY(symmetry,effort,fremitus,percussion,auscultation),  Cor(rhythm,heart tones including gallops,perfusion,edema)ABD(distention,hepatic&splenomegaly,tenderness,masses), Skin(rash,cyanosis),Psyc(affect,judgement,).  Exam negative except for these pertinent findings:    Swollen arms right>> left, picc left . chest is symmetric, no distress, normal percussion, normal fremitus and good normal breath sounds      Radiographs reviewed: view by direct vision    Results for orders placed during the hospital encounter of 07/13/20   X-Ray Chest 1 View    Narrative EXAMINATION:  XR CHEST 1 VIEW    CLINICAL HISTORY:  dyspnea;    TECHNIQUE:  Single frontal view of the chest was performed.    COMPARISON:  Chest of July 17, 2020.    FINDINGS:  There is chronic elevation of the right hemidiaphragm.  The cardiac size is within normal limits.  Mild atelectasis is seen at the right lung base.  The lung apices are clear.  No pneumothorax is seen.      Impression Mild atelectasis at the right lung base.  Chronic elevation of the right hemidiaphragm.      Electronically signed by: Gino Juarez MD  Date:    07/20/2020  Time:    08:08   ]    Labs     Recent Labs   Lab 08/05/20  2042   WBC 10.25   HGB 9.0*   HCT 29.9*        Recent Labs   Lab 08/06/20  0643      K 4.2   CL 99   CO2 31*   BUN 12   CREATININE 1.9*   GLU 95   CALCIUM 7.9*   MG 2.0   PHOS 4.6*   *   *   ALKPHOS 89   BILITOT 0.4   PROT 5.7*   ALBUMIN 2.0*   No results for input(s): PH, PCO2, PO2, HCO3 in the last 24 hours.  Microbiology Results (last 7 days)     Procedure Component Value Units Date/Time    Blood culture x two cultures. Draw prior to antibiotics. [[id number]] Collected: 08/02/20 1633    Order Status: Completed Specimen: Blood Updated: 08/05/20 2322     Blood Culture, Routine No Growth to date      No Growth to date      No Growth to date      No Growth to date    Narrative:      Aerobic and anaerobic    Blood culture  x two cultures. Draw prior to antibiotics. [[id number]] Collected: 08/02/20 1633    Order Status: Completed Specimen: Blood Updated: 08/05/20 2322     Blood Culture, Routine No Growth to date      No Growth to date      No Growth to date      No Growth to date    Narrative:      Aerobic and anaerobic          Impression:  Active Hospital Problems    Diagnosis  POA    *LUIZ (acute kidney injury) [N17.9]  Yes    Acute deep vein thrombosis (DVT) of right upper extremity [I82.621]  Yes     Occlusive thrombus of the right brachial vein and cephalic vein      Elevated ferritin level [R79.89]  Yes    Hyponatremia [E87.1]  Yes    Debility [R53.81]  Yes    Liver cyst [K76.89]  Yes    Non-traumatic rhabdomyolysis [M62.82]  Yes    Chronic diastolic CHF (congestive heart failure) [I50.32]  Yes    Pulmonary infarct [I26.99]  Yes    Atelectasis [J98.11]  Yes    Moderate protein-calorie malnutrition [E44.0]  Yes    Macrocytic anemia [D53.9]  Yes    MRSA bacteremia [R78.81]  Yes    Elevated troponin [R79.89]  Yes    Bilateral pulmonary embolism [I26.99]  Yes    COPD (chronic obstructive pulmonary disease) [J44.9]  Yes    Transaminitis [R74.0]  Yes    Morbid obesity [E66.01]  Yes    Pulmonary hypertension [I27.20]  Yes    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               Plan:   8/4 on heparin for dvt right arm, occurred since last admit while on eliquis.  Pt had massive clot with pulm htn suggested on echo.  Pt had mrsa bacteremia after rosette prior to last admit.    cpk 08961 with creat 1.6, consulted renal.  Was on Daptomycin/tefloaro.  Had bad anemia last admit - improved with iron- jehovah witness.    Heme to f/u.    ID saw, vanc was being considered.      May have had another pe- would be at high risk with pulm htn already found.  ivf considered but not with any concerns for mrsa.    8/5  Improving, cpk down,creat stable.   Breathing better, feels better.  No ivc filter. pulm htn worrisome as would contribute to debility.      8/6 I/o  1120/400???? Bun/creat 12/1.9, will order cpk for am  Patient is steadily improving.  She will need follow-up for pulmonary hypertension as it was found on her last echo.  With adequate anticoagulation-good chance pulmonary hypertension will clear.  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  Complex case.  I discussed her with Infectious Disease yesterday                          .

## 2020-08-06 NOTE — ASSESSMENT & PLAN NOTE
Anorexia-  Dietitian consulted and recommended TPN and lipids -plan to start once renal status stabilized, patient currently remains on bicarb drip  Monitor volume status closely  Continue p.o. supplement

## 2020-08-06 NOTE — PROGRESS NOTES
Pharmacokinetic Assessment Follow Up: IV Vancomycin    Vancomycin serum concentration assessment(s):    The random level was drawn correctly and can be used to guide therapy at this time. The measurement is below the desired definitive target range of 15 to 20 mcg/mL.    Vancomycin Regimen Plan:    Pharmacy dosing by level. Pharmacy will re-dose vancomycin 1000 mg x 1 today. Vancomycin random level ordered for 8/6 at 2030.    Drug levels (last 3 results):  Recent Labs   Lab Result Units 08/05/20  1916   Vancomycin, Random ug/mL 10.6       Pharmacy will continue to follow and monitor vancomycin.    Please contact pharmacy at extension [phone] for questions regarding this assessment.    Thank you for the consult,   Jessy Christine       Patient brief summary:  Sammi Abreu is a 79 y.o. female initiated on antimicrobial therapy with IV Vancomycin for treatment of bacteremia    The patient's current regimen is pulse dosing    Drug Allergies:   Review of patient's allergies indicates:   Allergen Reactions    Cymbalta [duloxetine] Other (See Comments)     Nightmares      Darvon [propoxyphene] Nausea Only and Other (See Comments)     Sweating, slept for 3 days    Atorvastatin Other (See Comments)     Muscle cramps    Naprosyn [naproxen] Nausea Only    Penicillins Rash    Tramadol Nausea Only and Palpitations       Actual Body Weight:   104.1    Renal Function:   Estimated Creatinine Clearance: 32 mL/min (A) (based on SCr of 1.7 mg/dL (H)).,     Dialysis Method (if applicable):  N/A    CBC (last 72 hours):  Recent Labs   Lab Result Units 08/03/20  0418 08/03/20  1624 08/04/20  0030 08/05/20  0116   WBC K/uL  --  7.36 8.18  --    Hemoglobin g/dL  --  9.2* 10.0*  --    Hemoglobin A1C % 5.7*  --  5.9* 5.9*   Hematocrit %  --  30.7* 32.4*  --    Platelets K/uL  --  262 300  --    Gran% %  --  68.8 66.2  --    Lymph% %  --  20.1 23.1  --    Mono% %  --  8.4 8.2  --    Eosinophil% %  --  1.9 1.7  --    Basophil% %  --  0.3 0.2   --    Differential Method   --  Automated Automated  --        Metabolic Panel (last 72 hours):  Recent Labs   Lab Result Units 08/03/20  0700 08/04/20  0030 08/05/20  0116   Sodium mmol/L 138 135*  135* 136   Potassium mmol/L 4.8 4.4  4.4 4.4   Chloride mmol/L 105 103  103 102   CO2 mmol/L 23 21*  21* 26   Glucose mg/dL 102 176*  176* 110   BUN, Bld mg/dL 13 15  15 14   Creatinine mg/dL 1.6* 1.6*  1.6* 1.7*   Albumin g/dL 2.2* 2.2*  2.2* 2.2*   Total Bilirubin mg/dL 0.5 0.3  0.3 0.3   Alkaline Phosphatase U/L 82 75  75 85   AST U/L 455* 552*  552* 455*   ALT U/L 134* 176*  176* 193*   Magnesium mg/dL 2.4 2.2 2.3   Phosphorus mg/dL 4.9* 4.6* 3.7       Vancomycin Administrations:  vancomycin given in the last 96 hours                     vancomycin 1.5 g in dextrose 5 % 250 mL IVPB (ready to mix) (mg) 1,500 mg New Bag 08/04/20 2021                    Microbiologic Results:  Microbiology Results (last 7 days)       Procedure Component Value Units Date/Time    Blood culture x two cultures. Draw prior to antibiotics. [[id number]] Collected: 08/02/20 1633    Order Status: Completed Specimen: Blood Updated: 08/04/20 2322     Blood Culture, Routine No Growth to date      No Growth to date      No Growth to date    Narrative:      Aerobic and anaerobic    Blood culture x two cultures. Draw prior to antibiotics. [[id number]] Collected: 08/02/20 1633    Order Status: Completed Specimen: Blood Updated: 08/04/20 2322     Blood Culture, Routine No Growth to date      No Growth to date      No Growth to date    Narrative:      Aerobic and anaerobic

## 2020-08-06 NOTE — ASSESSMENT & PLAN NOTE
Previously receiving daptomycin as outpatient-now on IV vancomycin  Orders as per infectious disease

## 2020-08-06 NOTE — PLAN OF CARE
POC reviewed with patient and patient's family, understanding verbalized. AAOX4. IV fluid hydration per orders. Telemetry monitoring maintained. VS stable throughout shift. Safety maintained. Will continue to monitor.

## 2020-08-06 NOTE — PLAN OF CARE
Problem: Physical Therapy Goal  Goal: Physical Therapy Goal  Description: Goals to be met by: 2020     Patient will increase functional independence with mobility by performin. Supine to sit with Supervision  2. Sit to stand transfer with Supervision  3. Bed to chair transfer with Supervision using Rolling Walker  4. Gait  x 150 feet with Supervision using Rolling Walker.   5. Lower extremity exercise program x20 reps per handout, with independence    Outcome: Ongoing, Progressing   Ambulate with rw and Min A with O2 attached.

## 2020-08-06 NOTE — ASSESSMENT & PLAN NOTE
Monitor-trending downward  Hold dapto-possible due to abx.   Possible due to clot burden.  Previously discussed with Dr. Decker. No recommendations at this time. C  GI consult was canceled.

## 2020-08-06 NOTE — ASSESSMENT & PLAN NOTE
Monitor  Orders as per Nephrology- Patient currently remains on bicarb drip  Trend BMP  Renal dose all medications and avoid any possible nephrotoxic medications

## 2020-08-06 NOTE — PT/OT/SLP PROGRESS
Physical Therapy Treatment    Patient Name:  Sammi Abreu   MRN:  [id number]    Recommendations:     Discharge Recommendations:  (LTAC vs HHPT)   Discharge Equipment Recommendations: none   Barriers to discharge: None    Assessment:     Sammi Abreu is a 79 y.o. female admitted with a medical diagnosis of LUIZ (acute kidney injury).  She presents with the following impairments/functional limitations:  weakness, impaired endurance, impaired self care skills, impaired functional mobilty, gait instability, impaired cardiopulmonary response to activity . Tolerated treatment. Ambulated with rw and Min A with O2 attached and chair follow for safety.    Rehab Prognosis: Fair; patient would benefit from acute skilled PT services to address these deficits and reach maximum level of function.    Recent Surgery: * No surgery found *      Plan:     During this hospitalization, patient to be seen 6 x/week to address the identified rehab impairments via gait training, therapeutic activities, therapeutic exercises and progress toward the following goals:    · Plan of Care Expires:  09/05/20    Subjective     Chief Complaint: discomfort L thigh  Patient/Family Comments/goals: to return home  Pain/Comfort:  · Pain Rating 1: other (see comments)(did not rate)  · Location - Side 1: Left  · Location - Orientation 1: generalized  · Location 1: thigh  · Pain Addressed 1: Reposition, Nurse notified      Objective:     Communicated with nurse Jamisno prior to session.  Patient found supine with PICC line, oxygen, bed alarm, telemetry upon PT entry to room.     General Precautions: Standard, fall, respiratory, contact   Orthopedic Precautions:N/A   Braces: N/A     Functional Mobility:  · Bed Mobility:     · Rolling Left:  minimum assistance  · Supine to Sit: minimum assistance  · Transfers:     · Sit to Stand:  minimum assistance with rolling walker  · Gait: 40' with rw and Min A with O2 attached and IV in tow.      AM-PAC 6 CLICK  MOBILITY          Therapeutic Activities and Exercises:   Transferred EOB with assistance.   Ambulated slowly in hallway with rw and Min A .   Chair <> BSC with Min A. Performed hygiene task without difficulty.    Patient left up in chair with all lines intact, call button in reach, chair alarm on and nurse Yaquelin notified..    GOALS:   Multidisciplinary Problems     Physical Therapy Goals        Problem: Physical Therapy Goal    Goal Priority Disciplines Outcome Goal Variances Interventions   Physical Therapy Goal     PT, PT/OT      Description: Goals to be met by: 2020     Patient will increase functional independence with mobility by performin. Supine to sit with Supervision  2. Sit to stand transfer with Supervision  3. Bed to chair transfer with Supervision using Rolling Walker  4. Gait  x 150 feet with Supervision using Rolling Walker.   5. Lower extremity exercise program x20 reps per handout, with independence                     Time Tracking:     PT Received On: 20  PT Start Time: 0950     PT Stop Time: 1010  PT Total Time (min): 20 min     Billable Minutes: Gait Training 12min and Therapeutic Activity 8min    Treatment Type: Treatment  PT/PTA: PTA     PTA Visit Number: 1     Ernestine Zhang PTA  2020

## 2020-08-07 ENCOUNTER — TELEPHONE (OUTPATIENT)
Dept: INFECTIOUS DISEASES | Facility: CLINIC | Age: 80
End: 2020-08-07

## 2020-08-07 PROBLEM — R82.90 ABNORMAL URINALYSIS: Status: ACTIVE | Noted: 2020-08-07

## 2020-08-07 PROBLEM — D64.9 ANEMIA: Status: ACTIVE | Noted: 2020-08-03

## 2020-08-07 PROBLEM — R31.9 HEMATURIA: Status: ACTIVE | Noted: 2020-08-07

## 2020-08-07 LAB
ALBUMIN SERPL BCP-MCNC: 2 G/DL (ref 3.5–5.2)
ALP SERPL-CCNC: 97 U/L (ref 55–135)
ALT SERPL W/O P-5'-P-CCNC: 157 U/L (ref 10–44)
ANION GAP SERPL CALC-SCNC: 8 MMOL/L (ref 8–16)
APTT BLDCRRT: 43 SEC (ref 21–32)
APTT BLDCRRT: 58.2 SEC (ref 21–32)
APTT BLDCRRT: 88 SEC (ref 21–32)
AST SERPL-CCNC: 201 U/L (ref 10–40)
BACTERIA #/AREA URNS HPF: ABNORMAL /HPF
BACTERIA #/AREA URNS HPF: ABNORMAL /HPF
BACTERIA BLD CULT: NORMAL
BACTERIA BLD CULT: NORMAL
BILIRUB SERPL-MCNC: 0.4 MG/DL (ref 0.1–1)
BILIRUB UR QL STRIP: NEGATIVE
BILIRUB UR QL STRIP: NEGATIVE
BUN SERPL-MCNC: 12 MG/DL (ref 8–23)
CALCIUM SERPL-MCNC: 7.9 MG/DL (ref 8.7–10.5)
CHLORIDE SERPL-SCNC: 96 MMOL/L (ref 95–110)
CK SERPL-CCNC: 8326 U/L (ref 20–180)
CLARITY UR: ABNORMAL
CLARITY UR: ABNORMAL
CO2 SERPL-SCNC: 34 MMOL/L (ref 23–29)
COLOR UR: ABNORMAL
COLOR UR: YELLOW
CREAT SERPL-MCNC: 1.8 MG/DL (ref 0.5–1.4)
ERYTHROCYTE [DISTWIDTH] IN BLOOD BY AUTOMATED COUNT: 14.3 % (ref 11.5–14.5)
EST. GFR  (AFRICAN AMERICAN): 31 ML/MIN/1.73 M^2
EST. GFR  (NON AFRICAN AMERICAN): 27 ML/MIN/1.73 M^2
GLUCOSE SERPL-MCNC: 99 MG/DL (ref 70–110)
GLUCOSE UR QL STRIP: NEGATIVE
GLUCOSE UR QL STRIP: NEGATIVE
HCT VFR BLD AUTO: 23.7 % (ref 37–48.5)
HCT VFR BLD AUTO: 28.1 % (ref 37–48.5)
HGB BLD-MCNC: 7.3 G/DL (ref 12–16)
HGB BLD-MCNC: 8.5 G/DL (ref 12–16)
HGB UR QL STRIP: ABNORMAL
HGB UR QL STRIP: ABNORMAL
HYALINE CASTS #/AREA URNS LPF: 0 /LPF
KETONES UR QL STRIP: NEGATIVE
KETONES UR QL STRIP: NEGATIVE
LEUKOCYTE ESTERASE UR QL STRIP: ABNORMAL
LEUKOCYTE ESTERASE UR QL STRIP: NEGATIVE
MAGNESIUM SERPL-MCNC: 1.9 MG/DL (ref 1.6–2.6)
MCH RBC QN AUTO: 32.6 PG (ref 27–31)
MCHC RBC AUTO-ENTMCNC: 30.8 G/DL (ref 32–36)
MCV RBC AUTO: 106 FL (ref 82–98)
MICROSCOPIC COMMENT: ABNORMAL
MICROSCOPIC COMMENT: ABNORMAL
NITRITE UR QL STRIP: NEGATIVE
NITRITE UR QL STRIP: NEGATIVE
PH UR STRIP: >8 [PH] (ref 5–8)
PH UR STRIP: >8 [PH] (ref 5–8)
PHOSPHATE SERPL-MCNC: 4.1 MG/DL (ref 2.7–4.5)
PLATELET # BLD AUTO: 215 K/UL (ref 150–350)
PMV BLD AUTO: 9.6 FL (ref 9.2–12.9)
POTASSIUM SERPL-SCNC: 3.7 MMOL/L (ref 3.5–5.1)
PROT SERPL-MCNC: 5.7 G/DL (ref 6–8.4)
PROT UR QL STRIP: ABNORMAL
PROT UR QL STRIP: ABNORMAL
RBC # BLD AUTO: 2.24 M/UL (ref 4–5.4)
RBC #/AREA URNS HPF: >100 /HPF (ref 0–4)
RBC #/AREA URNS HPF: >100 /HPF (ref 0–4)
SODIUM SERPL-SCNC: 138 MMOL/L (ref 136–145)
SP GR UR STRIP: 1.01 (ref 1–1.03)
SP GR UR STRIP: 1.01 (ref 1–1.03)
SQUAMOUS #/AREA URNS HPF: 11 /HPF
SQUAMOUS #/AREA URNS HPF: 16 /HPF
URATE CRY URNS QL MICRO: ABNORMAL
URN SPEC COLLECT METH UR: ABNORMAL
URN SPEC COLLECT METH UR: ABNORMAL
UROBILINOGEN UR STRIP-ACNC: NEGATIVE EU/DL
UROBILINOGEN UR STRIP-ACNC: NEGATIVE EU/DL
VANCOMYCIN SERPL-MCNC: 15.6 UG/ML
WBC # BLD AUTO: 7.67 K/UL (ref 3.9–12.7)
WBC #/AREA URNS HPF: 48 /HPF (ref 0–5)
WBC #/AREA URNS HPF: 55 /HPF (ref 0–5)

## 2020-08-07 PROCEDURE — 97535 SELF CARE MNGMENT TRAINING: CPT | Mod: CO

## 2020-08-07 PROCEDURE — 85730 THROMBOPLASTIN TIME PARTIAL: CPT | Mod: 91

## 2020-08-07 PROCEDURE — 94761 N-INVAS EAR/PLS OXIMETRY MLT: CPT

## 2020-08-07 PROCEDURE — 25000003 PHARM REV CODE 250: Performed by: INTERNAL MEDICINE

## 2020-08-07 PROCEDURE — 36415 COLL VENOUS BLD VENIPUNCTURE: CPT

## 2020-08-07 PROCEDURE — 25000003 PHARM REV CODE 250: Performed by: HOSPITALIST

## 2020-08-07 PROCEDURE — 63600175 PHARM REV CODE 636 W HCPCS: Performed by: INTERNAL MEDICINE

## 2020-08-07 PROCEDURE — 80202 ASSAY OF VANCOMYCIN: CPT

## 2020-08-07 PROCEDURE — 12000002 HC ACUTE/MED SURGE SEMI-PRIVATE ROOM

## 2020-08-07 PROCEDURE — 85014 HEMATOCRIT: CPT

## 2020-08-07 PROCEDURE — 84100 ASSAY OF PHOSPHORUS: CPT

## 2020-08-07 PROCEDURE — 82550 ASSAY OF CK (CPK): CPT

## 2020-08-07 PROCEDURE — 25000003 PHARM REV CODE 250: Performed by: NURSE PRACTITIONER

## 2020-08-07 PROCEDURE — 97803 MED NUTRITION INDIV SUBSEQ: CPT

## 2020-08-07 PROCEDURE — 94799 UNLISTED PULMONARY SVC/PX: CPT

## 2020-08-07 PROCEDURE — 63600175 PHARM REV CODE 636 W HCPCS: Performed by: HOSPITALIST

## 2020-08-07 PROCEDURE — 81000 URINALYSIS NONAUTO W/SCOPE: CPT

## 2020-08-07 PROCEDURE — 27000221 HC OXYGEN, UP TO 24 HOURS

## 2020-08-07 PROCEDURE — 87086 URINE CULTURE/COLONY COUNT: CPT

## 2020-08-07 PROCEDURE — 80053 COMPREHEN METABOLIC PANEL: CPT

## 2020-08-07 PROCEDURE — 85018 HEMOGLOBIN: CPT

## 2020-08-07 PROCEDURE — 25000242 PHARM REV CODE 250 ALT 637 W/ HCPCS: Performed by: HOSPITALIST

## 2020-08-07 PROCEDURE — 85027 COMPLETE CBC AUTOMATED: CPT

## 2020-08-07 PROCEDURE — 85730 THROMBOPLASTIN TIME PARTIAL: CPT

## 2020-08-07 PROCEDURE — 97116 GAIT TRAINING THERAPY: CPT | Mod: CQ

## 2020-08-07 PROCEDURE — 94640 AIRWAY INHALATION TREATMENT: CPT

## 2020-08-07 PROCEDURE — 83735 ASSAY OF MAGNESIUM: CPT

## 2020-08-07 PROCEDURE — 99231 PR SUBSEQUENT HOSPITAL CARE,LEVL I: ICD-10-PCS | Mod: ,,, | Performed by: INTERNAL MEDICINE

## 2020-08-07 PROCEDURE — 99231 SBSQ HOSP IP/OBS SF/LOW 25: CPT | Mod: ,,, | Performed by: INTERNAL MEDICINE

## 2020-08-07 RX ORDER — BISACODYL 10 MG
10 SUPPOSITORY, RECTAL RECTAL ONCE
Status: COMPLETED | OUTPATIENT
Start: 2020-08-07 | End: 2020-08-07

## 2020-08-07 RX ORDER — VANCOMYCIN HCL IN 5 % DEXTROSE 1G/250ML
1000 PLASTIC BAG, INJECTION (ML) INTRAVENOUS ONCE
Status: COMPLETED | OUTPATIENT
Start: 2020-08-07 | End: 2020-08-08

## 2020-08-07 RX ORDER — IPRATROPIUM BROMIDE AND ALBUTEROL SULFATE 2.5; .5 MG/3ML; MG/3ML
3 SOLUTION RESPIRATORY (INHALATION) EVERY 6 HOURS
Status: DISCONTINUED | OUTPATIENT
Start: 2020-08-07 | End: 2020-08-16 | Stop reason: HOSPADM

## 2020-08-07 RX ORDER — BISACODYL 10 MG
10 SUPPOSITORY, RECTAL RECTAL DAILY PRN
Status: DISCONTINUED | OUTPATIENT
Start: 2020-08-07 | End: 2020-08-16 | Stop reason: HOSPADM

## 2020-08-07 RX ADMIN — PANTOPRAZOLE SODIUM 40 MG: 40 TABLET, DELAYED RELEASE ORAL at 08:08

## 2020-08-07 RX ADMIN — DOCUSATE SODIUM 50 MG AND SENNOSIDES 8.6 MG 1 TABLET: 8.6; 5 TABLET, FILM COATED ORAL at 08:08

## 2020-08-07 RX ADMIN — VANCOMYCIN HYDROCHLORIDE 1000 MG: 1 INJECTION, POWDER, LYOPHILIZED, FOR SOLUTION INTRAVENOUS at 10:08

## 2020-08-07 RX ADMIN — FLUTICASONE FUROATE AND VILANTEROL TRIFENATATE 1 PUFF: 100; 25 POWDER RESPIRATORY (INHALATION) at 06:08

## 2020-08-07 RX ADMIN — HEPARIN SODIUM AND DEXTROSE 8 UNITS/KG/HR: 10000; 5 INJECTION INTRAVENOUS at 07:08

## 2020-08-07 RX ADMIN — HYDRALAZINE HYDROCHLORIDE 25 MG: 25 TABLET, FILM COATED ORAL at 06:08

## 2020-08-07 RX ADMIN — IPRATROPIUM BROMIDE AND ALBUTEROL SULFATE 3 ML: .5; 2.5 SOLUTION RESPIRATORY (INHALATION) at 07:08

## 2020-08-07 RX ADMIN — THERA TABS 1 TABLET: TAB at 08:08

## 2020-08-07 RX ADMIN — HYDRALAZINE HYDROCHLORIDE 25 MG: 25 TABLET, FILM COATED ORAL at 10:08

## 2020-08-07 RX ADMIN — DRONABINOL 2.5 MG: 2.5 CAPSULE ORAL at 10:08

## 2020-08-07 RX ADMIN — METOPROLOL SUCCINATE 50 MG: 50 TABLET, FILM COATED, EXTENDED RELEASE ORAL at 08:08

## 2020-08-07 RX ADMIN — CYANOCOBALAMIN TAB 1000 MCG 2000 MCG: 1000 TAB at 08:08

## 2020-08-07 RX ADMIN — HYDRALAZINE HYDROCHLORIDE 25 MG: 25 TABLET, FILM COATED ORAL at 03:08

## 2020-08-07 RX ADMIN — FOLIC ACID 1 MG: 1 TABLET ORAL at 08:08

## 2020-08-07 RX ADMIN — MONTELUKAST 10 MG: 10 TABLET, FILM COATED ORAL at 10:08

## 2020-08-07 RX ADMIN — BISACODYL 10 MG: 10 SUPPOSITORY RECTAL at 03:08

## 2020-08-07 RX ADMIN — Medication 500 MG: at 10:08

## 2020-08-07 RX ADMIN — DOCUSATE SODIUM 50 MG AND SENNOSIDES 8.6 MG 1 TABLET: 8.6; 5 TABLET, FILM COATED ORAL at 10:08

## 2020-08-07 RX ADMIN — BENZONATATE 100 MG: 100 CAPSULE ORAL at 06:08

## 2020-08-07 RX ADMIN — SODIUM BICARBONATE: 84 INJECTION, SOLUTION INTRAVENOUS at 06:08

## 2020-08-07 RX ADMIN — DRONABINOL 2.5 MG: 2.5 CAPSULE ORAL at 08:08

## 2020-08-07 NOTE — PLAN OF CARE
Problem: Occupational Therapy Goal  Goal: Occupational Therapy Goal  Description: Goals to be met by: 8/28/2020     Patient will increase functional independence with ADLs by performing:    LE Dressing with Stand-by Assistance.  Grooming while standing with Stand-by Assistance.-CGA this tx.  Toileting from toilet with Stand-by Assistance for hygiene and clothing management.   Toilet transfer to toilet with Stand-by Assistance.    Outcome: Ongoing, Progressing

## 2020-08-07 NOTE — ASSESSMENT & PLAN NOTE
Hematuria noted-patient currently remains on IV heparin drip for history of bilateral pulmonary emboli and recurrent DVT     Today- Hgb down to 7.3- Check retroperitoneal ultrasound and consult Urology

## 2020-08-07 NOTE — ASSESSMENT & PLAN NOTE
Multifactorial--  Poor nutrition and also history of acute blood loss  Monitor  Hematology following

## 2020-08-07 NOTE — PROGRESS NOTES
[facility]  Adult Nutrition  Progress Note    SUMMARY     Intervention: fat/sodium modified diet and nutrition supplement therapy-commercial beverage, collaboration with care providers, prescription medication appetite stimulant     Recommendation:   1. Continue cardiac diet/ no added sugar- send double condiments  -add full DM restriction if glucose > 180 mg/dl  2. d/c boost and boost pudding as pt says they make stomach cram. Send boost breeze TID + beneprotein BID  3. Weigh pt weekly, antiemetic regimen as needed, consider appetite stimulant     4. Initiate nutrition support  -Can place NGT for supplemental TF Diabetisource AC @ 20 advancing to 55 ml/hr + flush per MD   (provides 1584 kcal ( 96% EEN), 79 g protein ( 1005 EPN, 1082 ml free water)  OR   -PPN D 5 AA 2.75 @ 75 ml/hr + standard lipids when kidney function improves  (provides 49 g protein (59% EPN), 1004 kcal (60% EEN))    5) continue appetite stimulant     Goals: meet at least >50% meals/supplements at f/u or start PPN  Nutrition Goal Status: not met-continues  Communication of RD Recs: reviewed with MD (POC, sticky note)     Reason for Assessment     Reason For Assessment: follow up  Diagnosis: LUIZ  Relevant Medical History: Anemia, COPD, DM, diverticulosis, DVT, Edema, gout, HTN, HLD, reflux, osteoporosis  Interdisciplinary Rounds: attended     General Information Comments: 80 y/o readmitted in < 2 weeks with LUIZ. Ka and Phos WNL.  Decreased appetite and taste changes ( bland) + nausea x1.5 month (< 2 meals daily). Per son and pt this did not improve s/p LAP rosette or  Last discharge.  Eating 5-10 bites of each meal, drank 1 boost plus last night but 2 unopened on table this morning. Hot food make her most nauseous, reviewed education on tips for taste changes and nausea. Discussed low fat diet with son s/p LAP rosette. Last admit, DM diet was discussed briefly, pt thinks she is pre-diabetic and not interested in carb counting but  "will cut back on added sugar/soda.  8/7/20 Pt still only eating 10 bites of meals r/t nausea. All foods tastes bland and unappetizing. Boost and boost pudding she says, "make her stomach cramp," > 5 shakes unopened on tray table. Educated pt on the importance of supplements at this time, willing to try boost breeze. Appetite stimulant added. PPN initiated per MD but had to be canceled as pt's renal function worsening and volume overloaded.     Nutrition Discharge Planning: cardiac diet + boost breeze and supplemental nutrition support above     Nutrition Risk Screen     Nutrition Risk Screen: no indicators present     Nutrition/Diet History     Patient Reported Diet/Restrictions/Preferences: general, son encouraging PO intakes at home  Food Allergies: NKFA  Factors Affecting Nutritional Intake: decreased appetite, altered taste, nausea     Anthropometrics     Height: 5' 3"  Height (inches): 63 in  Weight Method: Bed Scale  Weight: 104.1 kg 8/4, 104.2 kg 8/4, 103.9 7/22, 104 kg (229 lb 4.5 oz)  Weight (lb): 229 lb  Ideal Body Weight (IBW), Female: 115 lb  % Ideal Body Weight, Female (lb): 186.09 %  BMI (Calculated): 40 kg/m2  BMI Grade: greater than 40 - morbid obesity     Lab/Procedures/Meds     Pertinent Labs Reviewed: reviewed  BMP  Lab Results   Component Value Date     08/07/2020    K 3.7 08/07/2020    CL 96 08/07/2020    CO2 34 (H) 08/07/2020    BUN 12 08/07/2020    CREATININE 1.8 (H) 08/07/2020    CALCIUM 7.9 (L) 08/07/2020    ANIONGAP 8 08/07/2020    ESTGFRAFRICA 31 (A) 08/07/2020    EGFRNONAA 27 (A) 08/07/2020     Lab Results   Component Value Date    CALCIUM 7.9 (L) 08/07/2020    PHOS 4.1 08/07/2020     No results found for: POCTGLUCOSE     Pertinent Medications Reviewed: reviewed  Dronabinol, polyethylene glycol, senna, zofran, KCl, Vitamin C, B12, folic acid, MVI        Estimated/Assessed Needs  Weight Used For Calorie Calculations: 104 kg (229 lb 4.5 oz)  Energy Calorie Requirements (kcal): 1650 " kcal  Energy Need Method: MSJ no AF obesity  Protein Requirements: 0.8-1.0 g protein/kg ( obesity/ LUIZ) =  g protein  Weight Used For Protein Calculations: 104 kg  Fluid Requirements (mL): 1650 ml  Estimated Fluid Requirement Method: RDA Method  CHO Requirement: 185-206 g     Nutrition Prescription Ordered     Current Diet Order: cardiac diet, boost glucose control TID and boost pudding BID     Evaluation of Received Nutrient/Fluid Intake   Energy intake: not meeting  Protein intake: not meeting  Fluid intake: not meeting  % Intake of Estimated Energy Needs: 45%  % Meal Intake: 25%      Nutrition Risk     Level of Risk/Frequency of Follow-up: moderate/high 2-3 x weekly     Assessment and Plan     Moderate chronic condition related malnutrition  R/t decreased appetite and taste changes  As evidenced by  1) PO intakes , 75% EEN 1.5 month  2) mild edema  Intervention: above  continues     Monitor and Evaluation     Food and Nutrient Intake: energy intake, food and beverage intake  Food and Nutrient Adminstration: diet order, parenteral/enteral nutrition order  Knowledge/Beliefs/Attitudes: food and nutrition knowledge/skill  Anthropometric Measurements: weight, weight change  Biochemical Data, Medical Tests and Procedures: electrolyte and renal panel, glucose/endocrine profile  Nutrition-Focused Physical Findings: overall appearance      Malnutrition Assessment   Scar: 20  Edema: 2+  Energy Intake (Malnutrition): less than or equal to 75% for greater than or equal to 1 month   NFPE: no wasting seen 8/4/20     Nutrition Follow-Up     RD Follow-up?: Yes

## 2020-08-07 NOTE — ASSESSMENT & PLAN NOTE
Patient previously on Eliquis at home but developed additional DVT while on it-  Patient currently on  IV heparin   Pulmonology and Hematology following    Hematology workup in progress    Patient will need lifelong anticoagulation

## 2020-08-07 NOTE — PROGRESS NOTES
[facility] Medicine  Progress Note    Patient Name: Sammi Abreu  MRN: [id number]  Patient Class: IP- Inpatient   Admission Date: 8/2/2020  Length of Stay: 4 days  Attending Physician: Hugh Serrano MD  Primary Care Provider: Osmani Villatoro MD        Subjective:     Principal Problem:LUIZ (acute kidney injury) , history of bilateral pulmonary emboli with lower extremity DVT with previous admission and found to have MRSA bacteremia at that time, patient now readmitted due to failed outpatient p.o. oral anticoagulant with new right upper extremity DVT, patient now with new onset hematuria, ongoing debility, persistent anorexia with malnutrition      HPI:  Sammi Abreu is a 79-year-old female with past medical history significant for arthritis and chronic back pain, fibromyalgia, glaucoma, hyperlipidemia, hypertension, sleep apnea with history of noncompliance with CPAP, morbid obesity, hypertension, GERD, COPD, diabetes type 2, prior DVT, and gallstones who presented to the emergency department tonight with complaints of right lower quadrant pain x3 days.  Of note the patient is also a [employer].   Patient was discharged from this facility on 07/29/2020 after being treated for bilateral pulmonary embolism.  Patient is currently on Eliquis.  Patient underwent a CT abdomen pelvis while in the emergency department which identified surgical changes but nothing acute.  Patient is noted to have a mild LUIZ.  Her troponin is elevated at 0.41 which is consistent with prior levels.  She will be admitted to the service of hospital Medicine for continued treatment.    Overview/Hospital Course:  Patient monitor closely during hospitalization.  She was placed on continuous telemetry monitoring.  She was noted to have rhabdomyolysis with CPK greater than 91506 and LUIZ.  Nephrology was consulted.  She was noted to have metabolic acidosis initiated on bicarb drip.  Daptomycin was held and  "ID was consulted.  She was initiated on IV vancomycin.  She was noted have increased right upper extremity swelling.  Right upper extremity ultrasound demonstrated occlusive thrombus of the right brachial vein and cephalic vein. She was initiated on heparin drip and her Eliquis was held.  There was concern for Eliquis failure.  Hematology consulted.  PT and OT were consulted for debility.  Patient with ongoing anorexia and poor p.o. intake. Dietary was consulted for protein calorie malnutrition.  It was recommended patient be placed on TPN lipids.  Her renal status was monitored closely and she remain on bicarb drip with plans to start TPN once acute kidney injury resolved if no improvement in her p.o. intake.  Patient was noted to have some hematuria during her stay.  A retroperitoneal ultrasound was ordered.    Interval History:  Patient remains on bicarb drip for ongoing LUIZ.  Patient remains on heparin drip due to failed oral anticoagulation therapy with history of recently diagnosed bilateral pulmonary emboli lower extremity DVT and now new onset of right upper extremity DVT.  Patient being followed by hematology.  Patient now with hematuria.  Will check retroperitoneal ultrasound and check with Urology.  Patient discussed with Dr. Serrano.     Review of Systems   Constitutional: Positive for activity change, appetite change and fatigue. Negative for chills, diaphoresis and fever.   HENT: Negative for ear discharge, ear pain and facial swelling.    Eyes: Negative for pain and redness.   Respiratory: Positive for cough and shortness of breath.    Cardiovascular: Positive for leg swelling.   Gastrointestinal: Negative for abdominal distention, abdominal pain, constipation, diarrhea, nausea and vomiting.        Poor appetite- patient reports things taste bad and "smell bad"  and she states she has not been hungry   Endocrine: Negative for polydipsia and polyphagia.   Genitourinary: Negative for difficulty " urinating, dysuria, flank pain and hematuria.   Musculoskeletal: Positive for back pain. Negative for neck pain and neck stiffness.   Skin: Negative for color change.   Allergic/Immunologic: Negative for food allergies.   Neurological: Positive for weakness. Negative for seizures, facial asymmetry and speech difficulty.   Psychiatric/Behavioral: Negative for agitation, behavioral problems, confusion, hallucinations and suicidal ideas.     Objective:     Vital Signs (Most Recent):  Temp: 98.7 °F (37.1 °C) (08/07/20 1052)  Pulse: 81 (08/07/20 1052)  Resp: 18 (08/07/20 1052)  BP: (!) 146/66 (08/07/20 1052)  SpO2: 96 % (08/07/20 1052) Vital Signs (24h Range):  Temp:  [97 °F (36.1 °C)-99.8 °F (37.7 °C)] 98.7 °F (37.1 °C)  Pulse:  [76-96] 81  Resp:  [18-20] 18  SpO2:  [93 %-96 %] 96 %  BP: (136-174)/(60-77) 146/66     Weight: 104.1 kg (229 lb 8 oz)  Body mass index is 40.65 kg/m².    Intake/Output Summary (Last 24 hours) at 8/7/2020 1242  Last data filed at 8/7/2020 1100  Gross per 24 hour   Intake 640 ml   Output 1850 ml   Net -1210 ml      Physical Exam  Constitutional:       General: She is not in acute distress.     Appearance: Normal appearance.   HENT:      Head: Normocephalic and atraumatic.      Mouth/Throat:      Mouth: Mucous membranes are moist.   Eyes:      General:         Right eye: No discharge.         Left eye: No discharge.      Extraocular Movements: Extraocular movements intact.      Conjunctiva/sclera: Conjunctivae normal.      Pupils: Pupils are equal, round, and reactive to light.   Neck:      Musculoskeletal: Normal range of motion and neck supple. No neck rigidity or muscular tenderness.   Cardiovascular:      Rate and Rhythm: Normal rate and regular rhythm.      Pulses: Normal pulses.      Heart sounds: Murmur present.   Pulmonary:      Effort: No respiratory distress.      Comments: Bilateral breath sounds diminished  Abdominal:      General: Bowel sounds are normal. There is no distension.       Tenderness: There is no abdominal tenderness. There is no guarding.      Comments: Obese   Genitourinary:     Comments: Not examined  Musculoskeletal: Normal range of motion.         General: Swelling present.      Comments: Generalized edema with edema also noted to upper and lower extremities   Skin:     General: Skin is warm and dry.      Capillary Refill: Capillary refill takes less than 2 seconds.   Neurological:      General: No focal deficit present.      Mental Status: She is alert and oriented to person, place, and time. Mental status is at baseline.      Cranial Nerves: No cranial nerve deficit.   Psychiatric:         Mood and Affect: Mood normal.         Behavior: Behavior normal.         Thought Content: Thought content normal.         Judgment: Judgment normal.      Labs reviewed      Assessment/Plan:      * LUIZ (acute kidney injury)  Monitor  Orders as per Nephrology- Patient currently remains on bicarb drip  Trend BMP  Renal dose all medications and avoid any possible nephrotoxic medications              Hematuria  Hematuria noted-patient currently remains on IV heparin drip for history of bilateral pulmonary emboli and recurrent DVT     Today- Hgb down to 7.3- Check retroperitoneal ultrasound and consult Urology      Liver cyst  Noted  Will need outpatient follow-up      Debility  Fall and skin precautions  Continue physical therapy and occupational therapy      Hyponatremia  Resolved for now-monitor for recurrence      Elevated ferritin level  Noted      Acute deep vein thrombosis (DVT) of right upper extremity  Patient developed new right upper extremity DVT despite being on apixaban-  Patient currently remains on IV heparin drip  Hematology following  Patient will need lifelong anticoagulation  Hematology workup in progress      Non-traumatic rhabdomyolysis  It was felt this is secondary to daptomycin.  Id consulted.  Nephrology consulted.  Trend CPK daily-improving    Daptomycin was discontinued  on admission and patient was placed on IV vancomycin      Anemia  Multifactorial--  Poor nutrition and also history of acute blood loss  Monitor  Hematology following        Moderate protein-calorie malnutrition  Anorexia-patient continues report that food smells and tastes bad   Dietitian consulted and recommended TPN and lipids -plan to start once renal status stabilized, patient currently remains on bicarb drip  Monitor volume status closely  Continue p.o. supplement        Atelectasis  Monitor oxygen saturation. Encourage OOB and IS.      Pulmonary infarct  Due to bilateral pulmonary emboli.   Pulmonary following      Chronic diastolic CHF (congestive heart failure)  Patient is identified as having Diastolic heart failure that is Chronic. CHF is currently controlled. Latest ECHO performed and demonstrates-   Results for orders placed during the hospital encounter of 07/13/20   Echo Color Flow Doppler? Yes    Narrative · Concentric left ventricular remodeling.  · Hyperdynamic left ventricular systolic function. The estimated ejection   fraction is 76%.  · Grade I (mild) left ventricular diastolic dysfunction consistent with   impaired relaxation.        Patient remains on Bicarb drip with generalized edema- Monitor volume status closely    MRSA bacteremia  Previously receiving daptomycin as outpatient-now on IV vancomycin  Orders as per infectious disease    Elevated troponin  Noted- chronically elevated    Bilateral pulmonary embolism  Patient previously on Eliquis at home but developed additional DVT while on it-  Patient currently on  IV heparin   Pulmonology and Hematology following    Hematology workup in progress    Patient will need lifelong anticoagulation      COPD (chronic obstructive pulmonary disease)  Monitor O2 sats, supplement O2 as needed  Orders as per pulmonology        Transaminitis  Monitor-trending downward  Felt possibley related to daptomycin-Dr. Mitchell discontinued on  admission  Possiblely due to clot burden.  Previously discussed with Dr. Decker. No recommendations at this time.   GI consult was previously canceled.      Morbid obesity  Body mass index is 40.65 kg/m².  General weight loss/lifestyle modification strategies discussed (elicit support from others; identify saboteurs; non-food rewards, etc).        Pulmonary hypertension  Noted  Orders as per pulmonology      Hypertension associated with diabetes  Monitor  Continue current treatment  Titrate medications as needed        VTE Risk Mitigation (From admission, onward)         Ordered     heparin 25,000 units in dextrose 5% 250 mL (100 units/mL) infusion HIGH INTENSITY nomogram - [facility]  Continuous     Question:  Heparin Infusion Adjustment (DO NOT MODIFY ANSWER)  Answer:  [email or web address]\epic\Images\Pharmacy\HeparinInfusions\heparin HIGH INTENSITY nomogram for [facility] [id number].pdf    08/03/20 1609     heparin 25,000 units in dextrose 5% (100 units/ml) IV bolus from bag - ADDITIONAL PRN BOLUS - 60 units/kg  As needed (PRN)     Question:  Heparin Infusion Adjustment (DO NOT MODIFY ANSWER)  Answer:  [email or web address]\epic\Images\Pharmacy\HeparinInfusions\heparin HIGH INTENSITY nomogram for [facility] [id number].pdf    08/03/20 1609     heparin 25,000 units in dextrose 5% (100 units/ml) IV bolus from bag - ADDITIONAL PRN BOLUS - 30 units/kg  As needed (PRN)     Question:  Heparin Infusion Adjustment (DO NOT MODIFY ANSWER)  Answer:  [email or web address]\epic\Images\Pharmacy\HeparinInfusions\heparin HIGH INTENSITY nomogram for [facility] [id number].pdf    08/03/20 1609     IP VTE HIGH RISK PATIENT  Once      08/02/20 2034     Place sequential compression device  Until discontinued      08/02/20 2034                Time spent seeing patient( greater than 1/2 spent in direct contact) :  46 minutes        [facility], [facility]

## 2020-08-07 NOTE — PROGRESS NOTES
Pharmacokinetic Assessment Follow Up: IV Vancomycin    Vancomycin serum concentration assessment(s):    The random level was drawn correctly and can be used to guide therapy at this time. The measurement is below the desired definitive target range of 15 to 20 mcg/mL. However, renal function is declining.    Vancomycin Regimen Plan:    Give Vancomycin 1000 mg x 1 and Re-dose when the random level is less than 20 mcg/mL, next level to be drawn at 2100 on 8/7    Drug levels (last 3 results):  Recent Labs   Lab Result Units 08/05/20  1916 08/06/20  2040   Vancomycin, Random ug/mL 10.6 12.9       Pharmacy will continue to follow and monitor vancomycin.    Please contact pharmacy at extension [phone] for questions regarding this assessment.    Thank you for the consult,   Carly Stubbs       Patient brief summary:  Sammi Abreu is a 79 y.o. female initiated on antimicrobial therapy with IV Vancomycin for treatment of bacteremia    The patient's current regimen is pulse dosing. Last dose 1000 mg.    Drug Allergies:   Review of patient's allergies indicates:   Allergen Reactions    Cymbalta [duloxetine] Other (See Comments)     Nightmares      Darvon [propoxyphene] Nausea Only and Other (See Comments)     Sweating, slept for 3 days    Atorvastatin Other (See Comments)     Muscle cramps    Naprosyn [naproxen] Nausea Only    Penicillins Rash    Tramadol Nausea Only and Palpitations       Actual Body Weight:   104.1 kg    Renal Function:   Estimated Creatinine Clearance: 28.6 mL/min (A) (based on SCr of 1.9 mg/dL (H)).,         CBC (last 72 hours):  Recent Labs   Lab Result Units 08/04/20  0030 08/05/20  0116 08/05/20  2042   WBC K/uL 8.18  --  10.25   Hemoglobin g/dL 10.0*  --  9.0*   Hemoglobin A1C % 5.9* 5.9*  --    Hematocrit % 32.4*  --  29.9*   Platelets K/uL 300  --  266   Gran% % 66.2  --  63.5   Lymph% % 23.1  --  23.4   Mono% % 8.2  --  9.8   Eosinophil% % 1.7  --  2.6   Basophil% % 0.2  --  0.2   Differential  Method  Automated  --  Automated       Metabolic Panel (last 72 hours):  Recent Labs   Lab Result Units 08/04/20  0030 08/05/20  0116 08/06/20  0643   Sodium mmol/L 135*  135* 136 137   Potassium mmol/L 4.4  4.4 4.4 4.2   Chloride mmol/L 103  103 102 99   CO2 mmol/L 21*  21* 26 31*   Glucose mg/dL 176*  176* 110 95   BUN, Bld mg/dL 15  15 14 12   Creatinine mg/dL 1.6*  1.6* 1.7* 1.9*   Albumin g/dL 2.2*  2.2* 2.2* 2.0*   Total Bilirubin mg/dL 0.3  0.3 0.3 0.4   Alkaline Phosphatase U/L 75  75 85 89   AST U/L 552*  552* 455* 289*   ALT U/L 176*  176* 193* 177*   Magnesium mg/dL 2.2 2.3 2.0   Phosphorus mg/dL 4.6* 3.7 4.6*       Vancomycin Administrations:  vancomycin given in the last 96 hours                     vancomycin in dextrose 5 % 1 gram/250 mL IVPB 1,000 mg (mg) 1,000 mg New Bag 08/05/20 2139    vancomycin 1.5 g in dextrose 5 % 250 mL IVPB (ready to mix) (mg) 1,500 mg New Bag 08/04/20 2021                    Microbiologic Results:  Microbiology Results (last 7 days)       Procedure Component Value Units Date/Time    Urine Culture High Risk [[id number]]     Order Status: No result Specimen: Urine, Catheterized     Blood culture x two cultures. Draw prior to antibiotics. [[id number]] Collected: 08/02/20 1633    Order Status: Completed Specimen: Blood Updated: 08/05/20 2322     Blood Culture, Routine No Growth to date      No Growth to date      No Growth to date      No Growth to date    Narrative:      Aerobic and anaerobic    Blood culture x two cultures. Draw prior to antibiotics. [[id number]] Collected: 08/02/20 1633    Order Status: Completed Specimen: Blood Updated: 08/05/20 2322     Blood Culture, Routine No Growth to date      No Growth to date      No Growth to date      No Growth to date    Narrative:      Aerobic and anaerobic

## 2020-08-07 NOTE — CONSULTS
[facility] Consult Note - ONS  Staff: MD Mayra  Group:Mayra/Kb/Faizan/Chris  Referring provider and please cc: Elida Mccoy  PCP: Osmani Villatoro MD    MyChart Utilization:       Subjective:        Sammi Abreu is a 79 y.o. female with past medical history significant for arthritis and chronic back pain, fibromyalgia, glaucoma, hyperlipidemia, hypertension, sleep apnea with history of noncompliance with CPAP, morbid obesity, hypertension, GERD, COPD, diabetes type 2, prior DVT, and gallstones s/p recent lap choley on 7/13 compicated by PE and DVT. dc'd on 7/29 on daptomycin for MRSA bactremia and eliquis.   Of note the patient is also a [employer].      She presented to the emergency department 8/3/20 with complaints of right lower quadrant pain x3 days.      Urology consulted for gross hematuria, which started a few days ago and has intermittently cleared. Currently on heparin for dvrt of right arm. Hgb down to 7.3. rbus pending. ua from yesterday with blood (voided). Independent review of ct from 8/3 showed no hydro, no stones  On vancomycin    Denies any h/o stones. Any h/o of ever having hematuria. No uti sx.       Cath urine today sent for ua reflexive AND culture  Urine history:   8/6/20 Void: 3+bld/tr leuk/1+prot, >100 rbc/48 wbc/mod jenny/16 squames  8/2/20 3+bld/tr leuk  Component       Urine Culture, Routine   Latest Ref Rng & Units          7/18/2020      7:20 PM clinically necessary.   7/18/2020      7:20 PM Multiple organisms isolated. None in predominance.  Repeat if   7/18/2020      4:25 AM clinically necessary.   7/18/2020      4:25 AM Multiple organisms isolated. None in predominance.  Repeat if   7/16/2018       No growth   4/19/2011       NO SIGNIFICANT GROWTH   8/24/2009       NO SIGNIFICANT GROWTH       Current REVIEW OF SYSTEMS:  Neg except for as stated above       PMHx:  Past Medical History:   Diagnosis Date    ALLERGIC RHINITIS     Anemia      Arthritis     Back pain     Bronchitis     Cataract     OU    Cholelithiasis     COPD (chronic obstructive pulmonary disease)     Degenerative disc disease     Diabetes mellitus     pre diabetic    Diverticulosis     DVT (deep venous thrombosis)     Edema     Encounter for blood transfusion     Fibromyalgia     Glaucoma     Gout     Hx of colonic polyps     Hyperlipidemia     Hypertension     Incontinence     Osteoporosis     Reflux     Refusal of blood transfusions as patient is [employer]     Sleep apnea     non compliant with CPAP    Vestibulitis of ear      Kidney stones:     PSHx:  Past Surgical History:   Procedure Laterality Date    ANGIOGRAPHY OF LOWER EXTREMITY N/A 2019    Procedure: ANGIOGRAM, LOWER EXTREMITY;  Surgeon: Gino Arana MD;  Location: [facility] CATH/EP LAB;  Service: General;  Laterality: N/A;    HIP SURGERY      HYSTERECTOMY      JOINT REPLACEMENT      B total hip    LAPAROSCOPIC CHOLECYSTECTOMY N/A 2020    Procedure: CHOLECYSTECTOMY, LAPAROSCOPIC;  Surgeon: Jomar Mcdonald MD;  Location: [facility];  Service: General;  Laterality: N/A;    TRANSFORAMINAL EPIDURAL INJECTION OF STEROID Left 2020    Procedure: Injection,steroid,epidural,transforaminal approach;  Surgeon: Matt Gilliam MD;  [facility] OR;  Service: Pain Management;  Laterality: Left;  L2-3, L3-4         Stents/Valves/Foreign Bodies/Cardiac Evaluation/Cardiologist:   GI:      Fam Hx:    Family History   Problem Relation Age of Onset    Hypertension Mother     Diabetes Sister     Glaucoma Neg Hx     Macular degeneration Neg Hx     Retinal detachment Neg Hx            Soc Hx:  Social History     Tobacco Use    Smoking status: Former Smoker     Packs/day: 0.25     Years: 5.00     Pack years: 1.25     Quit date: 1972     Years since quittin.6    Smokeless tobacco: Never Used   Substance Use Topics    Alcohol use: Yes     Alcohol/week: 0.0 standard drinks      Comment: Rarely    Drug use: Yes     Types: Oxycodone, Hydrocodone       Lives in:[place]    Allergies:  Cymbalta [duloxetine], Darvon [propoxyphene], Atorvastatin, Naprosyn [naproxen], Penicillins, and Tramadol    Anticoagulation/Aspirin: eliquis for dvt    Objective:     Vitals:    08/07/20 1508   BP: (!) 146/74   Pulse: 80   Resp: 18   Temp: 98.2 °F (36.8 °C)         Physical Exam:  General:obese  Neurologic: CN grossly normal. Normal sensation.   Psychiatric: awake, alert and oriented x 3. Mood and affect normal. Cooperative.  Eyes: PERRLA, normal conjunctiva  Respiratory: no increased work on breathing. No wheezing.   Cardiovascular: No obvious extremity edema. Warm and well perfused.  GI: no obvious stomach distension  Musculoskeletal: normal range of motion of bilateral upper extremities. Normal muscle strength and tone.  Skin: no obvious rashes or lesions. No tightening of skin noted.    16fr in and out cath placed and yellow urine noted and sent for ua reflexive and culture to confirm no infection with 400cc residual (with urge to void)    Pelvic exam deferred    LABS REVIEW:  Recent Labs   Lab 08/04/20  0030 08/05/20  2042 08/07/20  1145   WBC 8.18 10.25 7.67   Hemoglobin 10.0 L 9.0 L 7.3 L   Hematocrit 32.4 L 29.9 L 23.7 L   Platelets 300 266 215   ]  Recent Labs   Lab 08/05/20  0116 08/06/20  0643 08/07/20  0428   Sodium 136 137 138   Potassium 4.4 4.2 3.7   Chloride 102 99 96   CO2 26 31 H 34 H   BUN, Bld 14 12 12   Creatinine 1.7 H 1.9 H 1.8 H   Glucose 110 95 99   Calcium 8.0 L 7.9 L 7.9 L   Magnesium 2.3 2.0 1.9   Phosphorus 3.7 4.6 H 4.1   Alkaline Phosphatase 85 89 97   Total Protein 5.9 L 5.7 L 5.7 L   Albumin 2.2 L 2.0 L 2.0 L   Total Bilirubin 0.3 0.4 0.4    H 289 H 201 H    H 177 H 157 H   ]    Lab Results   Component Value Date    HGBA1C 5.9 (H) 08/05/2020        Pertinent urologic PATHOLOGY REVIEW:  See hpi for recent    Pertinent Urologic RADIOGRAPHIC REVIEW:  See hpi for  recent       Assessment:     Sammi Abreu is a 79 y.o. female with   1. Macrocytic anemia    2. Chest pain    3. Elevated troponin    4. LUIZ (acute kidney injury)    5. History of DVT in adulthood    6. Gross hematuria      urology consulted for:  Gross hematuria     Plan:     16fr in and out cath placed and yellow urine noted and sent for ua reflexive and culture to confirm no infection with 400cc residual (with urge to void)    Suspect she had hematuria bc of her rhabdomyolysis and her hep gtt.     Urine has now cleared.     Will send cath urine to ensure no infection. Voided urine yesterday had 16 squames.     rbus pending still (ordered by hospitalist)    Will make a 1 month f/u with urology NP to ensure cath urine shows no blood. If so may need cystoscopy.     Anabel Nunez MD

## 2020-08-07 NOTE — ASSESSMENT & PLAN NOTE
It was felt this is secondary to daptomycin.  Id consulted.  Nephrology consulted.  Trend CPK daily-improving    Daptomycin was discontinued on admission and patient was placed on IV vancomycin

## 2020-08-07 NOTE — PT/OT/SLP PROGRESS
Physical Therapy Treatment    Patient Name:  Sammi Abreu   MRN:  [id number]    Recommendations:     Discharge Recommendations:  (LTAC vs HHPT)   Discharge Equipment Recommendations: none   Barriers to discharge: None    Assessment:     Sammi Abreu is a 79 y.o. female admitted with a medical diagnosis of LUIZ (acute kidney injury).  She presents with the following impairments/functional limitations:  weakness, impaired endurance, impaired self care skills, impaired functional mobilty, gait instability, decreased lower extremity function, pain, impaired cardiopulmonary response to activity . Tolerated treatment fair. Reports L thigh pain with ambulation, nurse informed. Ambulated short distance with limited weight LLE.     Rehab Prognosis: Good; patient would benefit from acute skilled PT services to address these deficits and reach maximum level of function.    Recent Surgery: * No surgery found *      Plan:     During this hospitalization, patient to be seen 6 x/week to address the identified rehab impairments via gait training, therapeutic activities, therapeutic exercises and progress toward the following goals:    · Plan of Care Expires:  09/05/20    Subjective     Chief Complaint: pain L thigh  Patient/Family Comments/goals: to return home  Pain/Comfort:  · Pain Rating 1: other (see comments)(did not rate)  · Location - Side 1: Left  · Location - Orientation 1: anterior  · Location 1: thigh(with ambulation)  · Pain Addressed 1: Reposition, Nurse notified      Objective:     Communicated with nurse Jamison prior to session.  Patient found up in chair with oxygen, telemetry, peripheral IV(chair alarm) upon PT entry to room.     General Precautions: Standard, contact, fall, respiratory   Orthopedic Precautions:N/A   Braces: N/A     Functional Mobility:  · Transfers:     · Sit to Stand:  minimum assistance with rolling walker  · Gait: 40' with rw and Min A with O2 attached and chair follow.      AM-PAC 6  CLICK MOBILITY          Therapeutic Activities and Exercises:   Ambulated with rw and Min A with report of pain noted L thigh area.    Returned to room in chair.    Patient left up in chair with all lines intact, call button in reach, chair alarm on and nurse Yaquelin notified..    GOALS:   Multidisciplinary Problems     Physical Therapy Goals        Problem: Physical Therapy Goal    Goal Priority Disciplines Outcome Goal Variances Interventions   Physical Therapy Goal     PT, PT/OT Ongoing, Progressing     Description: Goals to be met by: 2020     Patient will increase functional independence with mobility by performin. Supine to sit with Supervision  2. Sit to stand transfer with Supervision  3. Bed to chair transfer with Supervision using Rolling Walker  4. Gait  x 150 feet with Supervision using Rolling Walker.   5. Lower extremity exercise program x20 reps per handout, with independence                     Time Tracking:     PT Received On: 20  PT Start Time: 940     PT Stop Time: 958  PT Total Time (min): 18 min     Billable Minutes: Gait Training 18min    Treatment Type: Treatment  PT/PTA: PTA     PTA Visit Number: 2     Ernestine Zhang PTA  2020

## 2020-08-07 NOTE — CONSULTS
Nephrology Consult Note        Patient Name: Sammi Abreu  MRN: [id number]    Patient Class: IP- Inpatient   Admission Date: 8/2/2020  Length of Stay: 4 days  Date of Service: 8/7/2020    Attending Physician: Hugh Serrano MD  Primary Care Provider: Osmani Villatoro MD    Reason for Consult: luiz/ckd3/hyponatremia/acidosis/rhabdomyalisis/mrsa bacteremia/anemia/htn    SUBJECTIVE:     HPI: 79F Jeoliva mack was treated in hospital for MRSA bacteremia, had lap rosette 7/13, complicated by PE and DVT, was d/c 7/29 on daptomycin and returned to ER 5 days later with abdominal pain, elevated CPK and LFTs, LUIZ with modest rise in sCr. CT abdomen w/out contrast looked OK. She was given NS and heparin gtt, Dapto was changed with Vanco.    8/6 VSS, no new complains.  8/7 VSS, no new complains. Appreciate ID input.    Past Medical History:   Diagnosis Date    ALLERGIC RHINITIS     Anemia     Arthritis     Back pain     Bronchitis     Cataract     OU    Cholelithiasis     COPD (chronic obstructive pulmonary disease)     Degenerative disc disease     Diabetes mellitus     pre diabetic    Diverticulosis     DVT (deep venous thrombosis)     Edema     Encounter for blood transfusion 1979    Fibromyalgia     Glaucoma     Gout     Hx of colonic polyps     Hyperlipidemia     Hypertension     Incontinence     Osteoporosis     Reflux     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     Sleep apnea     non compliant with CPAP    Vestibulitis of ear      Past Surgical History:   Procedure Laterality Date    ANGIOGRAPHY OF LOWER EXTREMITY N/A 7/31/2019    Procedure: ANGIOGRAM, LOWER EXTREMITY;  Surgeon: Gino Arana MD;  Location: [facility] CATH/EP LAB;  Service: General;  Laterality: N/A;    HIP SURGERY      HYSTERECTOMY      JOINT REPLACEMENT      B total hip    LAPAROSCOPIC CHOLECYSTECTOMY N/A 7/13/2020    Procedure: CHOLECYSTECTOMY, LAPAROSCOPIC;  Surgeon: Jomar Mcdonald MD;  [facility];  Service: General;  Laterality: N/A;    TRANSFORAMINAL EPIDURAL INJECTION OF STEROID Left 2020    Procedure: Injection,steroid,epidural,transforaminal approach;  Surgeon: Matt Gilliam MD;  Location: [facility] OR;  Service: Pain Management;  Laterality: Left;  L2-3, L3-4     Family History   Problem Relation Age of Onset    Hypertension Mother     Diabetes Sister     Glaucoma Neg Hx     Macular degeneration Neg Hx     Retinal detachment Neg Hx      Social History     Tobacco Use    Smoking status: Former Smoker     Packs/day: 0.25     Years: 5.00     Pack years: 1.25     Quit date: 1972     Years since quittin.6    Smokeless tobacco: Never Used   Substance Use Topics    Alcohol use: Yes     Alcohol/week: 0.0 standard drinks     Comment: Rarely    Drug use: Yes     Types: Oxycodone, Hydrocodone       Review of patient's allergies indicates:   Allergen Reactions    Cymbalta [duloxetine] Other (See Comments)     Nightmares      Darvon [propoxyphene] Nausea Only and Other (See Comments)     Sweating, slept for 3 days    Atorvastatin Other (See Comments)     Muscle cramps    Naprosyn [naproxen] Nausea Only    Penicillins Rash    Tramadol Nausea Only and Palpitations       Outpatient meds:  Current Facility-Administered Medications on File Prior to Encounter   Medication Dose Route Frequency Provider Last Rate Last Dose    lactated ringers infusion   Intravenous Continuous Gino Reid MD 10 mL/hr at 20 1308      lidocaine (PF) 10 mg/ml (1%) injection 10 mg  1 mL Intradermal Once Gino Reid MD         Current Outpatient Medications on File Prior to Encounter   Medication Sig Dispense Refill    acetaminophen (TYLENOL) 325 MG tablet Take 2 tablets (650 mg total) by mouth every 6 (six) hours as needed (Do not take with any other Tylenol or acetaminophen containing products).  0    albuterol-ipratropium (DUO-NEB) 2.5 mg-0.5 mg/3 mL nebulizer solution Take 3 mLs by  nebulization every 8 (eight) hours. 270 mL 0    apixaban (ELIQUIS) 5 mg Tab Take 1 tablet (5 mg total) by mouth 2 (two) times daily. 60 tablet 3    ascorbic acid, vitamin C, (VITAMIN C) 100 MG tablet Take 5 tablets (500 mg total) by mouth every evening.      esomeprazole (NEXIUM) 20 MG capsule Take 1 capsule (20 mg total) by mouth before breakfast. 30 capsule 2    fluticasone (FLONASE) 50 mcg/actuation nasal spray 2 sprays (100 mcg total) by Each Nare route once daily. 3 Bottle 3    folic acid (FOLVITE) 1 MG tablet Take 1 tablet (1 mg total) by mouth once daily. 30 tablet 0    metoprolol succinate (TOPROL-XL) 50 MG 24 hr tablet Take 1 tablet (50 mg total) by mouth once daily. 90 tablet 3    montelukast (SINGULAIR) 10 mg tablet Take 1 tablet (10 mg total) by mouth every evening. 90 tablet 3    sodium chloride 0.9% SolP 50 mL with DAPTOmycin 350 mg SolR 1,000 mg Inject 1,000 mg into the vein once daily.      spironolactone (ALDACTONE) 25 MG tablet Take 1 tablet (25 mg total) by mouth once daily. 90 tablet 6    budesonide-formoterol 160-4.5 mcg (SYMBICORT) 160-4.5 mcg/actuation HFAA Inhale 2 puffs into the lungs every 12 (twelve) hours. Controller 3 Inhaler 3    cyanocobalamin, vitamin B-12, 2,500 mcg Lozg Place 2 tablets under the tongue once daily. 60 lozenge 11    diclofenac (VOLTAREN) 25 MG TbEC Take 1 tablet (25 mg total) by mouth 3 (three) times daily as needed. 15 tablet 0    HYDROcodone-acetaminophen (NORCO) 5-325 mg per tablet Take 1 tablet by mouth every 6 (six) hours as needed for Pain. (Patient not taking: Reported on 7/30/2020) 15 tablet 0    lactulose (CHRONULAC) 10 gram/15 mL solution Take 30 mLs (20 g total) by mouth 3 (three) times daily. 2 Teaspoon(s) Oral PRN Every evening. (Patient not taking: Reported on 7/30/2020) 500 mL 3    multivitamin capsule Take 1 capsule by mouth once daily.      ondansetron (ZOFRAN-ODT) 4 MG TbDL Take 1 tablet (4 mg total) by mouth every 8 (eight) hours as  needed. 20 tablet 0       Scheduled meds:   ascorbic acid (vitamin C)  500 mg Oral QHS    cyanocobalamin  2,000 mcg Oral Daily    dronabinoL  2.5 mg Oral BID    fluticasone furoate-vilanteroL  1 puff Inhalation Daily    folic acid  1 mg Oral Daily    hydrALAZINE  25 mg Oral Q8H    metoprolol succinate  50 mg Oral Daily    montelukast  10 mg Oral QHS    multivitamin  1 tablet Oral Daily    pantoprazole  40 mg Oral Daily    polyethylene glycol  17 g Oral Daily    senna-docusate 8.6-50 mg  1 tablet Oral BID       Infusions:   heparin (porcine) in D5W Stopped (08/07/20 0630)    sodium bicarbonate drip 100 mL/hr at 08/07/20 0644       PRN meds:  acetaminophen, aluminum-magnesium hydroxide-simethicone, benzonatate, dextrose 50%, dextrose 50%, glucagon (human recombinant), glucose, glucose, heparin (PORCINE), heparin (PORCINE), magnesium oxide, magnesium oxide, melatonin, morphine, ondansetron, potassium chloride, potassium chloride, potassium chloride, sodium chloride 0.9%, Pharmacy to dose Vancomycin consult **AND** vancomycin - pharmacy to dose    Review of Systems:  ROS    OBJECTIVE:     Vital Signs and IO (Last 24H):  Temp:  [97 °F (36.1 °C)-99.8 °F (37.7 °C)]   Pulse:  [76-96]   Resp:  [18-20]   BP: (135-174)/(58-77)   SpO2:  [93 %-96 %]   I/O last 3 completed shifts:  In: 2240 [P.O.:1240; I.V.:1000]  Out: 1950 [Urine:1950]    Wt Readings from Last 5 Encounters:   08/04/20 104.1 kg (229 lb 8 oz)   07/30/20 99.9 kg (220 lb 3.8 oz)   07/29/20 102.7 kg (226 lb 6.6 oz)   07/13/20 97.5 kg (215 lb)   07/09/20 104.9 kg (231 lb 4.2 oz)         Physical Exam:  Physical Exam  Constitutional:       Appearance: She is well-developed. She is not diaphoretic.   HENT:      Head: Normocephalic and atraumatic.   Eyes:      General: No scleral icterus.     Pupils: Pupils are equal, round, and reactive to light.   Neck:      Musculoskeletal: Neck supple.   Cardiovascular:      Rate and Rhythm: Normal rate and regular  rhythm.   Pulmonary:      Effort: Pulmonary effort is normal. No respiratory distress.      Breath sounds: No stridor.   Abdominal:      General: There is no distension.      Palpations: Abdomen is soft.   Musculoskeletal: Normal range of motion.         General: No deformity.   Skin:     General: Skin is warm and dry.      Findings: No erythema or rash.   Neurological:      Mental Status: She is alert and oriented to person, place, and time.      Cranial Nerves: No cranial nerve deficit.   Psychiatric:         Behavior: Behavior normal.         Body mass index is 40.65 kg/m².    Laboratory:  Recent Labs   Lab 08/05/20  0116 08/06/20  0643 08/07/20  0428    137 138   K 4.4 4.2 3.7    99 96   CO2 26 31* 34*   BUN 14 12 12   CREATININE 1.7* 1.9* 1.8*   ESTGFRAFRICA 33* 29* 31*   EGFRNONAA 29* 25* 27*    95 99       Recent Labs   Lab 08/05/20  0116 08/06/20 0643 08/07/20  0428   CALCIUM 8.0* 7.9* 7.9*   ALBUMIN 2.2* 2.0* 2.0*   PHOS 3.7 4.6* 4.1   MG 2.3 2.0 1.9       Recent Labs   Lab 12/22/17  1141 06/22/18  0848 12/10/18  0945   PTH, Intact 57.0 115.0 H 81.0 H       No results for input(s): POCTGLUCOSE in the last 168 hours.    Recent Labs   Lab 08/03/20  0418 08/04/20  0030 08/05/20  0116   Hemoglobin A1C 5.7 H 5.9 H 5.9 H       Recent Labs   Lab 08/03/20  1624 08/04/20  0030 08/05/20  2042   WBC 7.36 8.18 10.25   HGB 9.2* 10.0* 9.0*   HCT 30.7* 32.4* 29.9*    300 266   * 109* 106*   MCHC 30.0* 30.9* 30.1*   MONO 8.4  0.6 8.2  0.7 9.8  1.0       Recent Labs   Lab 08/05/20  0116 08/06/20  0643 08/07/20  0428   BILITOT 0.3 0.4 0.4   PROT 5.9* 5.7* 5.7*   ALBUMIN 2.2* 2.0* 2.0*   ALKPHOS 85 89 97   * 177* 157*   * 289* 201*       Recent Labs   Lab 06/07/20  0952  07/18/20  1920 08/02/20  1652 08/06/20  2350   Color, UA Yellow   < > Yellow Yellow Brown A   Appearance, UA Clear   < > Hazy A Clear Cloudy A   pH, UA 7.0   < > 6.0 7.0 >8.0 A   Specific [place], UA 1.015    < > 1.025 1.010 1.010   Protein, UA Negative   < > Negative 2+ A 1+ A   Glucose, UA Negative   < > Negative Negative Negative   Ketones, UA Negative   < > Negative Negative Negative   Urobilinogen, UA Negative   < > 1.0 Negative Negative   Bilirubin (UA) Negative   < > Negative Negative Negative   Occult Blood UA Negative   < > Negative 3+ A 3+ A   Nitrite, UA Negative   < > Negative Negative Negative   RBC, UA 0   < > 0 2 >100 H   WBC, UA 2   < > 15 H 1 48 H   Bacteria Negative   < > Few A None Moderate A   Hyaline Casts, UA 3 A  --   --  0 0    < > = values in this interval not displayed.       Recent Labs   Lab 07/13/20 1946   POC PH 7.406   POC PCO2 36.7   POC HCO3 23.1 L   POC PO2 70 L   POC SATURATED O2 94 L   POC BE -2   Sample ARTERIAL       Microbiology Results (last 7 days)     Procedure Component Value Units Date/Time    Urine culture [[id number]] Collected: 08/06/20 2350    Order Status: No result Specimen: Urine Updated: 08/07/20 0058    Urine Culture High Risk [[id number]] Collected: 08/06/20 2352    Order Status: Canceled Specimen: Urine, Catheterized     Blood culture x two cultures. Draw prior to antibiotics. [[id number]] Collected: 08/02/20 1633    Order Status: Completed Specimen: Blood Updated: 08/06/20 2322     Blood Culture, Routine No Growth to date      No Growth to date      No Growth to date      No Growth to date      No Growth to date    Narrative:      Aerobic and anaerobic    Blood culture x two cultures. Draw prior to antibiotics. [[id number]] Collected: 08/02/20 1633    Order Status: Completed Specimen: Blood Updated: 08/06/20 2322     Blood Culture, Routine No Growth to date      No Growth to date      No Growth to date      No Growth to date      No Growth to date    Narrative:      Aerobic and anaerobic          ASSESSMENT/PLAN:     Active Hospital Problems    Diagnosis  POA    *LUIZ (acute kidney injury) [N17.9]  Yes    Acute deep vein thrombosis (DVT) of right upper extremity  [I82.621]  Yes     Occlusive thrombus of the right brachial vein and cephalic vein      Elevated ferritin level [R79.89]  Yes    Hyponatremia [E87.1]  Yes    Debility [R53.81]  Yes    Liver cyst [K76.89]  Yes    Non-traumatic rhabdomyolysis [M62.82]  Yes    Chronic diastolic CHF (congestive heart failure) [I50.32]  Yes    Pulmonary infarct [I26.99]  Yes    Atelectasis [J98.11]  Yes    Moderate protein-calorie malnutrition [E44.0]  Yes    Macrocytic anemia [D53.9]  Yes    MRSA bacteremia [R78.81]  Yes    Elevated troponin [R79.89]  Yes    Bilateral pulmonary embolism [I26.99]  Yes    COPD (chronic obstructive pulmonary disease) [J44.9]  Yes    Transaminitis [R74.0]  Yes    Morbid obesity [E66.01]  Yes    Pulmonary hypertension [I27.20]  Yes    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     LUIZ, sCr not better yet  CKD stage 3  Hyponatremia  Acidosis  MRSA bacteremia  Rhabdomyolysis 2/2 Daptomycin  No NSAIDs, ACEI/ARB, IV contrast or other nephrotoxins.  Keep MAP > 60, SBP > 100.  Dose meds for GFR < 30 ml/min.  Renal diet - low K, low phos.  Dose Vanco by levels.  Continue with bicarb gtt.  Avoid hypotonic infusions.     8/3/2020 16:24 8/4/2020 00:30 8/5/2020 01:16 8/6/2020 06:43 8/7/2020 04:28   CPK 49847 (H) >57217 (H) 53389 (H) 86692 (H) 8326 (H)     Anemia of CKD  Jehovah witness  Hypercoagulable state with thrombosis  Hgb and HCT are acceptable. Monitor.  Appreciate heme input.    HTN  BP seem controlled.   Tolerate asymptomatic HTN up to -160.  Continue home meds.    Thank you for allowing us to participate in the care of your patient!   We will follow the patient and provide recommendations as needed.    Dimas Sosa MD    [facility]  [address] [place] [address]  [place] [address]    [phone] - tel  [phone] - fax    8/7/2020

## 2020-08-07 NOTE — ASSESSMENT & PLAN NOTE
Monitor-trending downward  Felt possibley related to daptomycin-  Mycin discontinued on admission  Possiblely due to clot burden.  Previously discussed with Dr. Decker. No recommendations at this time.   GI consult was previously canceled.

## 2020-08-07 NOTE — PT/OT/SLP PROGRESS
Occupational Therapy   Treatment    Name: Sammi Abreu  MRN: [id number]  Admitting Diagnosis:  LUIZ (acute kidney injury)       Recommendations:     Discharge Recommendations: home with home health, nursing facility, skilled  Discharge Equipment Recommendations:  none  Barriers to discharge:  Decreased caregiver support    Assessment:     Sammi Abreu is a 79 y.o. female with a medical diagnosis of LUIZ (acute kidney injury).  She presents with willingness to participate, narrow IMMANUEL, good demo and understanding of wider IMMANUEL when cued, and leans on sink counter for entirety of standing ADLs at sink. Pt requires CGA for ambulation prior to standing activity and then Cady during ambulation after to get back to chair. Pt with low activity tolerance. Pt with edema of all limbs and greatest complaint in RUE. Performance deficits affecting function are weakness, impaired endurance, impaired self care skills, impaired cardiopulmonary response to activity, impaired balance, gait instability, decreased lower extremity function.     Rehab Prognosis:  Fair; patient would benefit from acute skilled OT services to address these deficits and reach maximum level of function.       Plan:     Patient to be seen 3 x/week to address the above listed problems via self-care/home management, therapeutic activities, therapeutic exercises  · Plan of Care Expires: 08/28/20  · Plan of Care Reviewed with: patient    Subjective     Pain/Comfort:  · Pain Rating 1: 0/10    Objective:     Communicated with: Yaquelin DOOLEY prior to session.  Patient found up in chair with recliner in use with oxygen, peripheral IV, telemetry(chair alarm) upon OT entry to room.    General Precautions: Standard, contact, fall, respiratory   Orthopedic Precautions:N/A   Braces: N/A     Occupational Performance:     Functional Mobility/Transfers:  · Patient completed Sit <> Stand Transfer with contact guard assistance  with  rolling walker and from bedside chair. pt  requires Cady for descent to chair after standing ADLs.   · Functional Mobility: FAIR+  Pt has good awareness that she is weaker than baseline.    Activities of Daily Living:  · Grooming: contact guard assistance for oral hygiene standing at sink. pt requires use of counter for stability throughout activity.    [facility] 6 Click ADL: 16    Treatment & Education:  -MCGUIRE ed for wider IMMANUEL to decrease fall risk while standing at sink. Pt v/u and returns proper demo.    Patient left up in chair with recliner in use. with all lines intact, call button in reach, chair alarm on and ADT, Marichuy notifiedEducation:   that pt c/o blood in IV.    GOALS:   Multidisciplinary Problems     Occupational Therapy Goals        Problem: Occupational Therapy Goal    Goal Priority Disciplines Outcome Interventions   Occupational Therapy Goal     OT, PT/OT Ongoing, Progressing    Description: Goals to be met by: 8/28/2020     Patient will increase functional independence with ADLs by performing:    LE Dressing with Stand-by Assistance.  Grooming while standing with Stand-by Assistance.  Toileting from toilet with Stand-by Assistance for hygiene and clothing management.   Toilet transfer to toilet with Stand-by Assistance.                     Time Tracking:     OT Date of Treatment: 08/07/20  OT Start Time: 1418  OT Stop Time: 1435  OT Total Time (min): 17 min    Billable Minutes:Self Care/Home Management 17 min    AISHA Polo  8/7/2020

## 2020-08-07 NOTE — ASSESSMENT & PLAN NOTE
Patient developed new right upper extremity DVT despite being on apixaban-  Patient currently remains on IV heparin drip  Hematology following  Patient will need lifelong anticoagulation  Hematology workup in progress
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
## 2020-08-07 NOTE — SUBJECTIVE & OBJECTIVE
"Interval History:  Patient remains on bicarb drip for ongoing LUIZ.  Patient remains on heparin drip due to failed oral anticoagulation therapy with history of recently diagnosed bilateral pulmonary emboli lower extremity DVT and now new onset of right upper extremity DVT.  Patient being followed by hematology.  Patient now with hematuria.  Will check retroperitoneal ultrasound and check with Hematology.    Review of Systems   Constitutional: Positive for activity change, appetite change and fatigue. Negative for chills, diaphoresis and fever.   HENT: Negative for ear discharge, ear pain and facial swelling.    Eyes: Negative for pain and redness.   Respiratory: Positive for cough and shortness of breath.    Cardiovascular: Positive for leg swelling.   Gastrointestinal: Negative for abdominal distention, abdominal pain, constipation, diarrhea, nausea and vomiting.        Poor appetite- patient reports things taste bad and "smell bad"  and she states she has not been hungry   Endocrine: Negative for polydipsia and polyphagia.   Genitourinary: Negative for difficulty urinating, dysuria, flank pain and hematuria.   Musculoskeletal: Positive for back pain. Negative for neck pain and neck stiffness.   Skin: Negative for color change.   Allergic/Immunologic: Negative for food allergies.   Neurological: Positive for weakness. Negative for seizures, facial asymmetry and speech difficulty.   Psychiatric/Behavioral: Negative for agitation, behavioral problems, confusion, hallucinations and suicidal ideas.     Objective:     Vital Signs (Most Recent):  Temp: 98.7 °F (37.1 °C) (08/07/20 1052)  Pulse: 81 (08/07/20 1052)  Resp: 18 (08/07/20 1052)  BP: (!) 146/66 (08/07/20 1052)  SpO2: 96 % (08/07/20 1052) Vital Signs (24h Range):  Temp:  [97 °F (36.1 °C)-99.8 °F (37.7 °C)] 98.7 °F (37.1 °C)  Pulse:  [76-96] 81  Resp:  [18-20] 18  SpO2:  [93 %-96 %] 96 %  BP: (136-174)/(60-77) 146/66     Weight: 104.1 kg (229 lb 8 oz)  Body mass " index is 40.65 kg/m².    Intake/Output Summary (Last 24 hours) at 8/7/2020 1242  Last data filed at 8/7/2020 1100  Gross per 24 hour   Intake 640 ml   Output 1850 ml   Net -1210 ml      Physical Exam  Constitutional:       General: She is not in acute distress.     Appearance: Normal appearance.   HENT:      Head: Normocephalic and atraumatic.      Mouth/Throat:      Mouth: Mucous membranes are moist.   Eyes:      General:         Right eye: No discharge.         Left eye: No discharge.      Extraocular Movements: Extraocular movements intact.      Conjunctiva/sclera: Conjunctivae normal.      Pupils: Pupils are equal, round, and reactive to light.   Neck:      Musculoskeletal: Normal range of motion and neck supple. No neck rigidity or muscular tenderness.   Cardiovascular:      Rate and Rhythm: Normal rate and regular rhythm.      Pulses: Normal pulses.      Heart sounds: Murmur present.   Pulmonary:      Effort: No respiratory distress.      Comments: Bilateral breath sounds diminished  Abdominal:      General: Bowel sounds are normal. There is no distension.      Tenderness: There is no abdominal tenderness. There is no guarding.      Comments: Obese   Genitourinary:     Comments: Not examined  Musculoskeletal: Normal range of motion.         General: Swelling present.      Comments: Generalized edema with edema also noted to upper and lower extremities   Skin:     General: Skin is warm and dry.      Capillary Refill: Capillary refill takes less than 2 seconds.   Neurological:      General: No focal deficit present.      Mental Status: She is alert and oriented to person, place, and time. Mental status is at baseline.      Cranial Nerves: No cranial nerve deficit.   Psychiatric:         Mood and Affect: Mood normal.         Behavior: Behavior normal.         Thought Content: Thought content normal.         Judgment: Judgment normal.      Labs reviewed

## 2020-08-07 NOTE — ASSESSMENT & PLAN NOTE
Anorexia-patient continues report that food smells and tastes bad   Dietitian consulted and recommended TPN and lipids -plan to start once renal status stabilized, patient currently remains on bicarb drip  Monitor volume status closely  Continue p.o. supplement

## 2020-08-07 NOTE — PROGRESS NOTES
Progress Note  PULMONARY    Admit Date: 8/2/2020 08/07/2020    SUBJECTIVE:     8/4 no new c/o- frustrated with illness.  8/5 feels better, no new c/o  8/6 no new c/o  8/7- no new complaints    PFSH and Allergies reviewed.    OBJECTIVE:     Vitals (Most recent):  Vitals:    08/07/20 0735   BP: (!) 174/70   Pulse: 96   Resp: 18   Temp: 97 °F (36.1 °C)       Vitals (24 hour range):  Temp:  [97 °F (36.1 °C)-99.8 °F (37.7 °C)]   Pulse:  [76-96]   Resp:  [18-20]   BP: (135-174)/(58-77)   SpO2:  [93 %-96 %]       Intake/Output Summary (Last 24 hours) at 8/7/2020 1044  Last data filed at 8/7/2020 0900  Gross per 24 hour   Intake 880 ml   Output 1750 ml   Net -870 ml          Physical Exam:  The patient's neuro status (alertness,orientation,cognitive function,motor skills,), pharyngeal exam (oral lesions, hygiene, abn dentition,), Neck (jvd,mass,thyroid,nodes in neck and above/below clavicle),RESPIRATORY(symmetry,effort,fremitus,percussion,auscultation),  Cor(rhythm,heart tones including gallops,perfusion,edema)ABD(distention,hepatic&splenomegaly,tenderness,masses), Skin(rash,cyanosis),Psyc(affect,judgement,).  Exam negative except for these pertinent findings:    Awake, alert, no distress  Coughs  Bilateral fine crackles  RUE pitting edema  Lower ext nonpitting edema        Radiographs reviewed: view by direct vision    Results for orders placed during the hospital encounter of 07/13/20   X-Ray Chest 1 View    Narrative EXAMINATION:  XR CHEST 1 VIEW    CLINICAL HISTORY:  dyspnea;    TECHNIQUE:  Single frontal view of the chest was performed.    COMPARISON:  Chest of July 17, 2020.    FINDINGS:  There is chronic elevation of the right hemidiaphragm.  The cardiac size is within normal limits.  Mild atelectasis is seen at the right lung base.  The lung apices are clear.  No pneumothorax is seen.      Impression Mild atelectasis at the right lung base.  Chronic elevation of the right hemidiaphragm.      Electronically signed  by: Gino Juarez MD  Date:    07/20/2020  Time:    08:08   ]    TTE 7/14/20-   · The mean diastolic gradient across the mitral valve is 3 mmHg at a heart rate of 70 bpm.  · Concentric left ventricular remodeling.  · Small left ventricular cavity size.  · Hyperdynamic left ventricular systolic function. The estimated ejection fraction is 66%.  · Grade I (mild) left ventricular diastolic dysfunction consistent with impaired relaxation.  · Elevated central venous pressure (15 mmHg).  · The estimated PA systolic pressure is 58 mmHg.  · Pulmonary hypertension present.  · Mild to moderate pulmonic regurgitation.    Labs     No results for input(s): WBC, HGB, HCT, PLT, BAND, METAMYELOCYT, MYELOPCT, HGBA1C in the last 24 hours.  Recent Labs   Lab 08/07/20  0428      K 3.7   CL 96   CO2 34*   BUN 12   CREATININE 1.8*   GLU 99   CALCIUM 7.9*   MG 1.9   PHOS 4.1   *   *   ALKPHOS 97   BILITOT 0.4   PROT 5.7*   ALBUMIN 2.0*   CPK 8326*   No results for input(s): PH, PCO2, PO2, HCO3 in the last 24 hours.  Microbiology Results (last 7 days)     Procedure Component Value Units Date/Time    Urine culture [[id number]] Collected: 08/06/20 2350    Order Status: No result Specimen: Urine Updated: 08/07/20 0058    Urine Culture High Risk [[id number]] Collected: 08/06/20 2352    Order Status: Canceled Specimen: Urine, Catheterized     Blood culture x two cultures. Draw prior to antibiotics. [[id number]] Collected: 08/02/20 1633    Order Status: Completed Specimen: Blood Updated: 08/06/20 2322     Blood Culture, Routine No Growth to date      No Growth to date      No Growth to date      No Growth to date      No Growth to date    Narrative:      Aerobic and anaerobic    Blood culture x two cultures. Draw prior to antibiotics. [[id number]] Collected: 08/02/20 1633    Order Status: Completed Specimen: Blood Updated: 08/06/20 2322     Blood Culture, Routine No Growth to date      No Growth to date      No Growth to date       No Growth to date      No Growth to date    Narrative:      Aerobic and anaerobic          Impression:  Active Hospital Problems    Diagnosis  POA    *LUIZ (acute kidney injury) [N17.9]  Yes    Abnormal urinalysis [R82.90]  Yes    Hematuria [R31.9]  No    Acute deep vein thrombosis (DVT) of right upper extremity [I82.621]  Yes     Occlusive thrombus of the right brachial vein and cephalic vein      Elevated ferritin level [R79.89]  Yes    Hyponatremia [E87.1]  Yes    Debility [R53.81]  Yes    Liver cyst [K76.89]  Yes    Non-traumatic rhabdomyolysis [M62.82]  Yes    Chronic diastolic CHF (congestive heart failure) [I50.32]  Yes    Pulmonary infarct [I26.99]  Yes    Atelectasis [J98.11]  Yes    Moderate protein-calorie malnutrition [E44.0]  Yes    Macrocytic anemia [D53.9]  Yes    MRSA bacteremia [R78.81]  Yes    Elevated troponin [R79.89]  Yes    Bilateral pulmonary embolism [I26.99]  Yes    COPD (chronic obstructive pulmonary disease) [J44.9]  Yes    Transaminitis [R74.0]  Yes    Morbid obesity [E66.01]  Yes    Pulmonary hypertension [I27.20]  Yes    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               Plan:   8/4 on heparin for dvt right arm, occurred since last admit while on eliquis.  Pt had massive clot with pulm htn suggested on echo.  Pt had mrsa bacteremia after rosette prior to last admit.    cpk 40314 with creat 1.6, consulted renal.  Was on Daptomycin/tefloaro.  Had bad anemia last admit - improved with iron- jehovah witness.    Heme to f/u.    ID saw, vanc was being considered.      May have had another pe- would be at high risk with pulm htn already found.  ivf considered but not with any concerns for mrsa.    8/5  Improving, cpk down,creat stable.  Breathing better, feels better.  No ivc filter. pulm htn worrisome as would contribute to debility.      8/6 I/o  1120/400???? Bun/creat 12/1.9, will  order cpk for am  Patient is steadily improving.  She will need follow-up for pulmonary hypertension as it was found on her last echo.  With adequate anticoagulation-good chance pulmonary hypertension will clear.  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  Complex case.  I discussed her with Infectious Disease yesterday    8/7- continue anticoagulation, continue to monitor CPK. Management of LUIZ per primary team. ID following w/ antibiotics for MRSA bacteremia. Needs repeat echo as outpatient to monitor pulmonary pressures    Anabel Champagne MD  Pulmonary & Critical Care Medicine                        .

## 2020-08-07 NOTE — PLAN OF CARE
Problem: Physical Therapy Goal  Goal: Physical Therapy Goal  Description: Goals to be met by: 2020     Patient will increase functional independence with mobility by performin. Supine to sit with Supervision  2. Sit to stand transfer with Supervision  3. Bed to chair transfer with Supervision using Rolling Walker  4. Gait  x 150 feet with Supervision using Rolling Walker.   5. Lower extremity exercise program x20 reps per handout, with independence    Outcome: Ongoing, Progressing   Ambulate with rw and assistance for safety.

## 2020-08-07 NOTE — PLAN OF CARE
08/06/20 1908   Patient Assessment/Suction   Level of Consciousness (AVPU) alert   Respiratory Effort Normal;Unlabored   Expansion/Accessory Muscles/Retractions no use of accessory muscles   Rhythm/Pattern, Respiratory unlabored;pattern regular   PRE-TX-O2   O2 Device (Oxygen Therapy) nasal cannula   $ Is the patient on Low Flow Oxygen? Yes   Flow (L/min) 4   Oxygen Concentration (%) 36   SpO2 95 %   Pulse Oximetry Type Intermittent   $ Pulse Oximetry - Multiple Charge Pulse Oximetry - Multiple   Pulse 84   Resp 18   Incentive Spirometer   $ Incentive Spirometer Charges done with encouragement   Administration (IS) instruction provided, follow-up   Number of Repetitions (IS) 10   Level Incentive Spirometer (mL) 700   Patient Tolerance (IS) good   Ready to Wean/Extubation Screen   FIO2<=50 (chart decimal) 0.36

## 2020-08-07 NOTE — ASSESSMENT & PLAN NOTE
Patient is identified as having Diastolic heart failure that is Chronic. CHF is currently controlled. Latest ECHO performed and demonstrates-   Results for orders placed during the hospital encounter of 07/13/20   Echo Color Flow Doppler? Yes    Narrative · Concentric left ventricular remodeling.  · Hyperdynamic left ventricular systolic function. The estimated ejection   fraction is 76%.  · Grade I (mild) left ventricular diastolic dysfunction consistent with   impaired relaxation.        Patient remains on Bicarb drip with generalized edema- Monitor volume status closely

## 2020-08-07 NOTE — PROGRESS NOTES
PATIENT'S VOIDED 450 ML OF BROWN URINE, URINE SAMPLE SENT TO LAB, BLADDER SCAN POST VOID WAS 62ML OF URINE.

## 2020-08-08 ENCOUNTER — OUTSIDE PLACE OF SERVICE (OUTPATIENT)
Dept: PULMONOLOGY | Facility: CLINIC | Age: 80
End: 2020-08-08
Payer: MEDICARE

## 2020-08-08 DIAGNOSIS — I26.99 BILATERAL PULMONARY EMBOLISM: ICD-10-CM

## 2020-08-08 DIAGNOSIS — J98.11 ATELECTASIS: ICD-10-CM

## 2020-08-08 LAB
ALBUMIN SERPL BCP-MCNC: 2.1 G/DL (ref 3.5–5.2)
ANION GAP SERPL CALC-SCNC: 10 MMOL/L (ref 8–16)
APTT BLDCRRT: 40.5 SEC (ref 21–32)
BACTERIA UR CULT: NORMAL
BUN SERPL-MCNC: 10 MG/DL (ref 8–23)
CALCIUM SERPL-MCNC: 8 MG/DL (ref 8.7–10.5)
CHLORIDE SERPL-SCNC: 97 MMOL/L (ref 95–110)
CK SERPL-CCNC: 4535 U/L (ref 20–180)
CO2 SERPL-SCNC: 32 MMOL/L (ref 23–29)
CREAT SERPL-MCNC: 1.7 MG/DL (ref 0.5–1.4)
ERYTHROCYTE [DISTWIDTH] IN BLOOD BY AUTOMATED COUNT: 14.3 % (ref 11.5–14.5)
EST. GFR  (AFRICAN AMERICAN): 33 ML/MIN/1.73 M^2
EST. GFR  (NON AFRICAN AMERICAN): 29 ML/MIN/1.73 M^2
GLUCOSE SERPL-MCNC: 91 MG/DL (ref 70–110)
HCT VFR BLD AUTO: 25.3 % (ref 37–48.5)
HGB BLD-MCNC: 7.7 G/DL (ref 12–16)
MCH RBC QN AUTO: 32.6 PG (ref 27–31)
MCHC RBC AUTO-ENTMCNC: 30.4 G/DL (ref 32–36)
MCV RBC AUTO: 107 FL (ref 82–98)
PHOSPHATE SERPL-MCNC: 4.3 MG/DL (ref 2.7–4.5)
PLATELET # BLD AUTO: 234 K/UL (ref 150–350)
PMV BLD AUTO: 10.7 FL (ref 9.2–12.9)
POTASSIUM SERPL-SCNC: 3.7 MMOL/L (ref 3.5–5.1)
RBC # BLD AUTO: 2.36 M/UL (ref 4–5.4)
SODIUM SERPL-SCNC: 139 MMOL/L (ref 136–145)
VANCOMYCIN SERPL-MCNC: 14.7 UG/ML
WBC # BLD AUTO: 7.94 K/UL (ref 3.9–12.7)

## 2020-08-08 PROCEDURE — 80202 ASSAY OF VANCOMYCIN: CPT

## 2020-08-08 PROCEDURE — 85027 COMPLETE CBC AUTOMATED: CPT

## 2020-08-08 PROCEDURE — 63600175 PHARM REV CODE 636 W HCPCS: Performed by: INTERNAL MEDICINE

## 2020-08-08 PROCEDURE — 63600175 PHARM REV CODE 636 W HCPCS: Performed by: NURSE PRACTITIONER

## 2020-08-08 PROCEDURE — 99233 SBSQ HOSP IP/OBS HIGH 50: CPT | Mod: ,,, | Performed by: INTERNAL MEDICINE

## 2020-08-08 PROCEDURE — 99233 PR SUBSEQUENT HOSPITAL CARE,LEVL III: ICD-10-PCS | Mod: S$GLB,,, | Performed by: INTERNAL MEDICINE

## 2020-08-08 PROCEDURE — 85730 THROMBOPLASTIN TIME PARTIAL: CPT

## 2020-08-08 PROCEDURE — 12000002 HC ACUTE/MED SURGE SEMI-PRIVATE ROOM

## 2020-08-08 PROCEDURE — 36415 COLL VENOUS BLD VENIPUNCTURE: CPT

## 2020-08-08 PROCEDURE — 94640 AIRWAY INHALATION TREATMENT: CPT

## 2020-08-08 PROCEDURE — 25000003 PHARM REV CODE 250: Performed by: INTERNAL MEDICINE

## 2020-08-08 PROCEDURE — 99233 PR SUBSEQUENT HOSPITAL CARE,LEVL III: ICD-10-PCS | Mod: ,,, | Performed by: INTERNAL MEDICINE

## 2020-08-08 PROCEDURE — 25000003 PHARM REV CODE 250: Performed by: NURSE PRACTITIONER

## 2020-08-08 PROCEDURE — 27000221 HC OXYGEN, UP TO 24 HOURS

## 2020-08-08 PROCEDURE — 25000003 PHARM REV CODE 250: Performed by: HOSPITALIST

## 2020-08-08 PROCEDURE — 99233 SBSQ HOSP IP/OBS HIGH 50: CPT | Mod: S$GLB,,, | Performed by: INTERNAL MEDICINE

## 2020-08-08 PROCEDURE — 25000242 PHARM REV CODE 250 ALT 637 W/ HCPCS: Performed by: HOSPITALIST

## 2020-08-08 PROCEDURE — 80069 RENAL FUNCTION PANEL: CPT

## 2020-08-08 PROCEDURE — 94761 N-INVAS EAR/PLS OXIMETRY MLT: CPT

## 2020-08-08 PROCEDURE — 63600175 PHARM REV CODE 636 W HCPCS: Performed by: HOSPITALIST

## 2020-08-08 PROCEDURE — 82550 ASSAY OF CK (CPK): CPT

## 2020-08-08 PROCEDURE — 94799 UNLISTED PULMONARY SVC/PX: CPT

## 2020-08-08 RX ORDER — VANCOMYCIN HCL IN 5 % DEXTROSE 1G/250ML
1000 PLASTIC BAG, INJECTION (ML) INTRAVENOUS ONCE
Status: COMPLETED | OUTPATIENT
Start: 2020-08-08 | End: 2020-08-09

## 2020-08-08 RX ORDER — HYDRALAZINE HYDROCHLORIDE 25 MG/1
50 TABLET, FILM COATED ORAL EVERY 8 HOURS
Status: DISCONTINUED | OUTPATIENT
Start: 2020-08-08 | End: 2020-08-16 | Stop reason: HOSPADM

## 2020-08-08 RX ADMIN — FOLIC ACID 1 MG: 1 TABLET ORAL at 09:08

## 2020-08-08 RX ADMIN — Medication 500 MG: at 08:08

## 2020-08-08 RX ADMIN — MONTELUKAST 10 MG: 10 TABLET, FILM COATED ORAL at 08:08

## 2020-08-08 RX ADMIN — IPRATROPIUM BROMIDE AND ALBUTEROL SULFATE 3 ML: .5; 2.5 SOLUTION RESPIRATORY (INHALATION) at 01:08

## 2020-08-08 RX ADMIN — IPRATROPIUM BROMIDE AND ALBUTEROL SULFATE 3 ML: .5; 2.5 SOLUTION RESPIRATORY (INHALATION) at 07:08

## 2020-08-08 RX ADMIN — HYDRALAZINE HYDROCHLORIDE 50 MG: 25 TABLET, FILM COATED ORAL at 02:08

## 2020-08-08 RX ADMIN — DOCUSATE SODIUM 50 MG AND SENNOSIDES 8.6 MG 1 TABLET: 8.6; 5 TABLET, FILM COATED ORAL at 08:08

## 2020-08-08 RX ADMIN — DRONABINOL 2.5 MG: 2.5 CAPSULE ORAL at 08:08

## 2020-08-08 RX ADMIN — POLYETHYLENE GLYCOL 3350 17 G: 17 POWDER, FOR SOLUTION ORAL at 09:08

## 2020-08-08 RX ADMIN — CYANOCOBALAMIN TAB 1000 MCG 2000 MCG: 1000 TAB at 09:08

## 2020-08-08 RX ADMIN — HEPARIN SODIUM AND DEXTROSE 8 UNITS/KG/HR: 10000; 5 INJECTION INTRAVENOUS at 09:08

## 2020-08-08 RX ADMIN — SODIUM BICARBONATE: 84 INJECTION, SOLUTION INTRAVENOUS at 08:08

## 2020-08-08 RX ADMIN — THERA TABS 1 TABLET: TAB at 09:08

## 2020-08-08 RX ADMIN — DOCUSATE SODIUM 50 MG AND SENNOSIDES 8.6 MG 1 TABLET: 8.6; 5 TABLET, FILM COATED ORAL at 09:08

## 2020-08-08 RX ADMIN — MORPHINE SULFATE 2 MG: 2 INJECTION, SOLUTION INTRAMUSCULAR; INTRAVENOUS at 10:08

## 2020-08-08 RX ADMIN — HYDRALAZINE HYDROCHLORIDE 25 MG: 25 TABLET, FILM COATED ORAL at 05:08

## 2020-08-08 RX ADMIN — PANTOPRAZOLE SODIUM 40 MG: 40 TABLET, DELAYED RELEASE ORAL at 09:08

## 2020-08-08 RX ADMIN — FLUTICASONE FUROATE AND VILANTEROL TRIFENATATE 1 PUFF: 100; 25 POWDER RESPIRATORY (INHALATION) at 07:08

## 2020-08-08 RX ADMIN — HYDRALAZINE HYDROCHLORIDE 50 MG: 25 TABLET, FILM COATED ORAL at 08:08

## 2020-08-08 RX ADMIN — IPRATROPIUM BROMIDE AND ALBUTEROL SULFATE 3 ML: .5; 2.5 SOLUTION RESPIRATORY (INHALATION) at 12:08

## 2020-08-08 RX ADMIN — DRONABINOL 2.5 MG: 2.5 CAPSULE ORAL at 09:08

## 2020-08-08 RX ADMIN — SODIUM BICARBONATE: 84 INJECTION, SOLUTION INTRAVENOUS at 06:08

## 2020-08-08 RX ADMIN — IRON DEXTRAN 100 MG: 50 INJECTION INTRAMUSCULAR; INTRAVENOUS at 09:08

## 2020-08-08 RX ADMIN — METOPROLOL SUCCINATE 50 MG: 50 TABLET, FILM COATED, EXTENDED RELEASE ORAL at 09:08

## 2020-08-08 RX ADMIN — VANCOMYCIN HYDROCHLORIDE 1000 MG: 1 INJECTION, POWDER, LYOPHILIZED, FOR SOLUTION INTRAVENOUS at 11:08

## 2020-08-08 NOTE — ASSESSMENT & PLAN NOTE
Hematuria noted-patient currently remains on IV heparin drip for history of bilateral pulmonary emboli and recurrent DVT     Today- Hgb down to 7.7- Check retroperitoneal ultrasound and consult Urology

## 2020-08-08 NOTE — ASSESSMENT & PLAN NOTE
Multifactorial--  Poor nutrition and also history of acute blood loss  Monitor  Hematology following. Follow CBC and transfuse as needed. Patient is Mandaeism, receiving IV Iron.

## 2020-08-08 NOTE — ASSESSMENT & PLAN NOTE
Monitor-trending downward. Felt possibley related to daptomycin.  Possiblely due to clot burden.  Previously discussed with Dr. Decker. No recommendations at this time.

## 2020-08-08 NOTE — PLAN OF CARE
08/07/20 1938   Patient Assessment/Suction   Level of Consciousness (AVPU) alert   Respiratory Effort Normal;Unlabored   Expansion/Accessory Muscles/Retractions no use of accessory muscles   All Lung Fields Breath Sounds Anterior:;Posterior:;diminished   CHRISTOPHER Breath Sounds diminished   LLL Breath Sounds diminished   RUL Breath Sounds diminished   RML Breath Sounds diminished   RLL Breath Sounds diminished   Rhythm/Pattern, Respiratory unlabored   PRE-TX-O2   O2 Device (Oxygen Therapy) nasal cannula   $ Is the patient on Low Flow Oxygen? Yes   Flow (L/min) 2   Oxygen Concentration (%) 28   SpO2 95 %   Pulse Oximetry Type Intermittent   $ Pulse Oximetry - Multiple Charge Pulse Oximetry - Multiple   Pulse 79   Resp 18   Aerosol Therapy   $ Aerosol Therapy Charges Aerosol Treatment   Respiratory Treatment Status (SVN) given   Treatment Route (SVN) mask;oxygen   Patient Position (SVN) sitting in chair   Post Treatment Assessment (SVN) breath sounds unchanged   Signs of Intolerance (SVN) none   Breath Sounds Post-Respiratory Treatment   Throughout All Fields Post-Treatment All Fields   Throughout All Fields Post-Treatment no change   Post-treatment Heart Rate (beats/min) 80   Post-treatment Resp Rate (breaths/min) 17   Incentive Spirometer   $ Incentive Spirometer Charges done with encouragement   Administration (IS) proper technique demonstrated   Number of Repetitions (IS) 10   Level Incentive Spirometer (mL) 750   Patient Tolerance (IS) good   Equipment Change   $ RT Equipment Treatment nebuilzer   Ready to Wean/Extubation Screen   FIO2<=50 (chart decimal) 0.28

## 2020-08-08 NOTE — ASSESSMENT & PLAN NOTE
It was felt this is secondary to daptomycin. Trend CPK daily-improving. Follow ID and nephrology recommendations.     Daptomycin was discontinued on admission and patient was placed on IV vancomycin

## 2020-08-08 NOTE — CARE UPDATE
08/08/20 0724   Patient Assessment/Suction   Level of Consciousness (AVPU) alert   Respiratory Effort Normal;Unlabored   Expansion/Accessory Muscles/Retractions no use of accessory muscles;no retractions;expansion symmetric   All Lung Fields Breath Sounds diminished   Rhythm/Pattern, Respiratory pattern regular;depth regular;unlabored   Cough Frequency no cough   PRE-TX-O2   O2 Device (Oxygen Therapy) nasal cannula   Flow (L/min) 2   SpO2 97 %   Pulse 87   Resp 18   Aerosol Therapy   $ Aerosol Therapy Charges Aerosol Treatment   Daily Review of Necessity (SVN) completed   Respiratory Treatment Status (SVN) given   Treatment Route (SVN) mask   Patient Position (SVN) HOB elevated   Post Treatment Assessment (SVN) breath sounds unchanged   Signs of Intolerance (SVN) none   Breath Sounds Post-Respiratory Treatment   Throughout All Fields Post-Treatment All Fields   Throughout All Fields Post-Treatment no change   Post-treatment Heart Rate (beats/min) 89   Post-treatment Resp Rate (breaths/min) 18       Breo q day. Aerosol treatments q6 with Duoneb.

## 2020-08-08 NOTE — SUBJECTIVE & OBJECTIVE
"Interval History:  Patient remains on bicarb drip for ongoing LUIZ.  Patient remains on heparin drip due to failed oral anticoagulation therapy with history of recently diagnosed bilateral pulmonary emboli lower extremity DVT and now new onset of right upper extremity DVT.  Patient being followed by hematology.  Patient now with hematuria.  Will check retroperitoneal ultrasound and check with Hematology.    Review of Systems   Constitutional: Positive for activity change, appetite change and fatigue. Negative for chills, diaphoresis and fever.   HENT: Negative for ear discharge, ear pain and facial swelling.    Eyes: Negative for pain and redness.   Respiratory: Positive for cough and shortness of breath.    Cardiovascular: Positive for leg swelling.   Gastrointestinal: Negative for abdominal distention, abdominal pain, constipation, diarrhea, nausea and vomiting.        Poor appetite- patient reports things taste bad and "smell bad"  and she states she has not been hungry   Endocrine: Negative for polydipsia and polyphagia.   Genitourinary: Negative for difficulty urinating, dysuria, flank pain and hematuria.   Musculoskeletal: Positive for back pain. Negative for neck pain and neck stiffness.   Skin: Negative for color change.   Allergic/Immunologic: Negative for food allergies.   Neurological: Positive for weakness. Negative for seizures, facial asymmetry and speech difficulty.   Psychiatric/Behavioral: Negative for agitation, behavioral problems, confusion, hallucinations and suicidal ideas.     Objective:     Vital Signs (Most Recent):  Temp: 98 °F (36.7 °C) (08/08/20 0728)  Pulse: 90 (08/08/20 0728)  Resp: 18 (08/08/20 1018)  BP: (!) 175/74 (08/08/20 0900)  SpO2: 98 % (08/08/20 0728) Vital Signs (24h Range):  Temp:  [98 °F (36.7 °C)-99.5 °F (37.5 °C)] 98 °F (36.7 °C)  Pulse:  [74-92] 90  Resp:  [14-18] 18  SpO2:  [94 %-98 %] 98 %  BP: (146-205)/(66-79) 175/74     Weight: 104.1 kg (229 lb 8 oz)  Body mass " index is 40.65 kg/m².    Intake/Output Summary (Last 24 hours) at 8/8/2020 1130  Last data filed at 8/7/2020 2300  Gross per 24 hour   Intake 480 ml   Output 1000 ml   Net -520 ml      Physical Exam  Constitutional:       General: She is not in acute distress.     Appearance: Normal appearance.   HENT:      Head: Normocephalic and atraumatic.      Mouth/Throat:      Mouth: Mucous membranes are moist.   Eyes:      General:         Right eye: No discharge.         Left eye: No discharge.      Extraocular Movements: Extraocular movements intact.      Conjunctiva/sclera: Conjunctivae normal.      Pupils: Pupils are equal, round, and reactive to light.   Neck:      Musculoskeletal: Normal range of motion and neck supple. No neck rigidity or muscular tenderness.   Cardiovascular:      Rate and Rhythm: Normal rate and regular rhythm.      Pulses: Normal pulses.      Heart sounds: Murmur present.   Pulmonary:      Effort: No respiratory distress.      Comments: Bilateral breath sounds diminished  Abdominal:      General: Bowel sounds are normal. There is no distension.      Tenderness: There is no abdominal tenderness. There is no guarding.      Comments: Obese   Genitourinary:     Comments: Not examined  Musculoskeletal: Normal range of motion.         General: Swelling present.      Comments: Generalized edema with edema also noted to upper and lower extremities   Skin:     General: Skin is warm and dry.      Capillary Refill: Capillary refill takes less than 2 seconds.   Neurological:      General: No focal deficit present.      Mental Status: She is alert and oriented to person, place, and time. Mental status is at baseline.      Cranial Nerves: No cranial nerve deficit.   Psychiatric:         Mood and Affect: Mood normal.         Behavior: Behavior normal.         Thought Content: Thought content normal.         Judgment: Judgment normal.       Lab Results   Component Value Date    WBC 7.94 08/08/2020    HGB 7.7 (L)  08/08/2020    HCT 25.3 (L) 08/08/2020     (H) 08/08/2020     08/08/2020     CMP  Sodium   Date Value Ref Range Status   08/07/2020 138 136 - 145 mmol/L Final     Potassium   Date Value Ref Range Status   08/07/2020 3.7 3.5 - 5.1 mmol/L Final     Chloride   Date Value Ref Range Status   08/07/2020 96 95 - 110 mmol/L Final     CO2   Date Value Ref Range Status   08/07/2020 34 (H) 23 - 29 mmol/L Final     Glucose   Date Value Ref Range Status   08/07/2020 99 70 - 110 mg/dL Final     BUN, Bld   Date Value Ref Range Status   08/07/2020 12 8 - 23 mg/dL Final     Creatinine   Date Value Ref Range Status   08/07/2020 1.8 (H) 0.5 - 1.4 mg/dL Final     Calcium   Date Value Ref Range Status   08/07/2020 7.9 (L) 8.7 - 10.5 mg/dL Final     Total Protein   Date Value Ref Range Status   08/07/2020 5.7 (L) 6.0 - 8.4 g/dL Final     Albumin   Date Value Ref Range Status   08/07/2020 2.0 (L) 3.5 - 5.2 g/dL Final     Total Bilirubin   Date Value Ref Range Status   08/07/2020 0.4 0.1 - 1.0 mg/dL Final     Comment:     For infants and newborns, interpretation of results should be based  on gestational age, weight and in agreement with clinical  observations.  Premature Infant recommended reference ranges:  Up to 24 hours.............<8.0 mg/dL  Up to 48 hours............<12.0 mg/dL  3-5 days..................<15.0 mg/dL  6-29 days.................<15.0 mg/dL       Alkaline Phosphatase   Date Value Ref Range Status   08/07/2020 97 55 - 135 U/L Final     AST   Date Value Ref Range Status   08/07/2020 201 (H) 10 - 40 U/L Final     ALT   Date Value Ref Range Status   08/07/2020 157 (H) 10 - 44 U/L Final     Anion Gap   Date Value Ref Range Status   08/07/2020 8 8 - 16 mmol/L Final     eGFR if    Date Value Ref Range Status   08/07/2020 31 (A) >60 mL/min/1.73 m^2 Final     eGFR if non    Date Value Ref Range Status   08/07/2020 27 (A) >60 mL/min/1.73 m^2 Final     Comment:     Calculation used to  obtain the estimated glomerular filtration  rate (eGFR) is the CKD-EPI equation.        Microbiology Results (last 7 days)     Procedure Component Value Units Date/Time    Blood culture x two cultures. Draw prior to antibiotics. [[id number]] Collected: 08/02/20 1633    Order Status: Completed Specimen: Blood Updated: 08/07/20 2322     Blood Culture, Routine No growth after 5 days.    Narrative:      Aerobic and anaerobic    Blood culture x two cultures. Draw prior to antibiotics. [[id number]] Collected: 08/02/20 1633    Order Status: Completed Specimen: Blood Updated: 08/07/20 2322     Blood Culture, Routine No growth after 5 days.    Narrative:      Aerobic and anaerobic    Urine culture [[id number]] Collected: 08/07/20 2040    Order Status: No result Specimen: Urine Updated: 08/07/20 2129    Urine Culture High Risk [[id number]] Collected: 08/07/20 2039    Order Status: Canceled Specimen: Urine, Catheterized     Urine culture [[id number]] Collected: 08/06/20 2350    Order Status: No result Specimen: Urine Updated: 08/07/20 0058    Urine Culture High Risk [[id number]] Collected: 08/06/20 2352    Order Status: Canceled Specimen: Urine, Catheterized           Radiology:  SADIE (07-):  · Hyperdynamic left ventricular systolic function. The estimated ejection fraction is 65 - 70 %.  · Normal appearing left atrial appendage. No thrombus is present in the appendage. Normal appendage velocities.  · Mild mitral regurgitation.  · Mild tricuspid regurgitation.  · No evidence of endocarditis  · PICC line at the cavoatrial junction    ECHO (07-):  · Concentric left ventricular remodeling.  · Hyperdynamic left ventricular systolic function. The estimated ejection fraction is 76%.  · Grade I (mild) left ventricular diastolic dysfunction consistent with impaired relaxation.    · CXR: Concentric left ventricular remodeling.  · Hyperdynamic left ventricular systolic function. The estimated ejection fraction is 76%.  Grade I  (mild) left ventricular diastolic dysfunction consistent with impaired relaxation.    CXR: There is bibasilar atelectasis versus infiltrate similar to 07/27/2020.    CT abdomen and pelvis without contrast:  Moderate basilar atelectasis. Status post cholecystectomy without evidence for complication.    RUQ abdominal US:  Prior cholecystectomy.  Small simple cyst of the left lobe of the liver otherwise negative right upper quadrant ultrasound.    RUE venous doppler scan:  Occlusive thrombus of the right brachial vein and cephalic vein.    KUB:  Possible ileus.  Prior cholecystectomy.  Prior bilateral hip joint replacement.    Renal US: Features suggesting intrinsic renal disease.  No hydronephrosis.

## 2020-08-08 NOTE — ASSESSMENT & PLAN NOTE
Anorexia-patient continues report that food smells and tastes bad   On IV TPN.  Monitor volume status closely  Continue p.o. supplement

## 2020-08-08 NOTE — PT/OT/SLP PROGRESS
Physical Therapy      Patient Name:  Sammi Abreu   MRN:  [id number]    Patient not seen today secondary to Other (Comment)(Nursing hold 2* muscle aches and cramps). Will follow-up tomorrow  .    Marybeth Joshi, PTA

## 2020-08-08 NOTE — PROGRESS NOTES
Progress Note  PULMONARY    Admit Date: 8/2/2020 08/08/2020    SUBJECTIVE:     8/4 no new c/o- frustrated with illness.  8/5 feels better, no new c/o  8/6 no new c/o  8/7- no new complaints  8/8 - No issues overnight  No new complaints.  Subjectively unchanged over the past 24 hours.    PFSH and Allergies reviewed.    OBJECTIVE:     Vitals (Most recent):  Vitals:    08/08/20 1310   BP:    Pulse: 93   Resp: 20   Temp:        Vitals (24 hour range):  Temp:  [98 °F (36.7 °C)-99.5 °F (37.5 °C)]   Pulse:  [74-93]   Resp:  [14-20]   BP: (135-205)/(63-79)   SpO2:  [94 %-98 %]       Intake/Output Summary (Last 24 hours) at 8/8/2020 1343  Last data filed at 8/7/2020 2300  Gross per 24 hour   Intake 480 ml   Output 1000 ml   Net -520 ml          Physical Exam:  AAO x 3; NAD; sitting up in a chair on 2 LNC  CTA B; no accessory mm use  RRR; no MGR  Abdomen S/NT/ND; no HSM  Ext wwp; no C/C/D  No focal neurologic deficits        Radiographs reviewed: view by direct vision    Results for orders placed during the hospital encounter of 07/13/20   X-Ray Chest 1 View    Narrative EXAMINATION:  XR CHEST 1 VIEW    CLINICAL HISTORY:  dyspnea;    TECHNIQUE:  Single frontal view of the chest was performed.    COMPARISON:  Chest of July 17, 2020.    FINDINGS:  There is chronic elevation of the right hemidiaphragm.  The cardiac size is within normal limits.  Mild atelectasis is seen at the right lung base.  The lung apices are clear.  No pneumothorax is seen.      Impression Mild atelectasis at the right lung base.  Chronic elevation of the right hemidiaphragm.      Electronically signed by: Gino Juarez MD  Date:    07/20/2020  Time:    08:08   ]    TTE 7/14/20-   · The mean diastolic gradient across the mitral valve is 3 mmHg at a heart rate of 70 bpm.  · Concentric left ventricular remodeling.  · Small left ventricular cavity size.  · Hyperdynamic left ventricular systolic function. The estimated ejection fraction is 66%.  · Grade I  (mild) left ventricular diastolic dysfunction consistent with impaired relaxation.  · Elevated central venous pressure (15 mmHg).  · The estimated PA systolic pressure is 58 mmHg.  · Pulmonary hypertension present.  · Mild to moderate pulmonic regurgitation.    Labs     Recent Labs   Lab 08/08/20 0448   WBC 7.94   HGB 7.7*   HCT 25.3*        Recent Labs   Lab 08/08/20 0448      K 3.7   CL 97   CO2 32*   BUN 10   CREATININE 1.7*   GLU 91   CALCIUM 8.0*   PHOS 4.3   ALBUMIN 2.1*   CPK 4535*   No results for input(s): PH, PCO2, PO2, HCO3 in the last 24 hours.  Microbiology Results (last 7 days)     Procedure Component Value Units Date/Time    Blood culture x two cultures. Draw prior to antibiotics. [[id number]] Collected: 08/02/20 1633    Order Status: Completed Specimen: Blood Updated: 08/07/20 2322     Blood Culture, Routine No growth after 5 days.    Narrative:      Aerobic and anaerobic    Blood culture x two cultures. Draw prior to antibiotics. [[id number]] Collected: 08/02/20 1633    Order Status: Completed Specimen: Blood Updated: 08/07/20 2322     Blood Culture, Routine No growth after 5 days.    Narrative:      Aerobic and anaerobic    Urine culture [[id number]] Collected: 08/07/20 2040    Order Status: No result Specimen: Urine Updated: 08/07/20 2129    Urine Culture High Risk [[id number]] Collected: 08/07/20 2039    Order Status: Canceled Specimen: Urine, Catheterized     Urine culture [[id number]] Collected: 08/06/20 2350    Order Status: No result Specimen: Urine Updated: 08/07/20 0058    Urine Culture High Risk [[id number]] Collected: 08/06/20 2352    Order Status: Canceled Specimen: Urine, Catheterized           Impression:  Active Hospital Problems    Diagnosis  POA    *LUIZ (acute kidney injury) [N17.9]  Yes    Hematuria [R31.9]  No    Patient is Buddhist [Z78.9]  Yes    Acute deep vein thrombosis (DVT) of right upper extremity [I82.621]  Yes     Occlusive thrombus of the right  brachial vein and cephalic vein      Elevated ferritin level [R79.89]  Yes    Hyponatremia [E87.1]  Yes    Debility [R53.81]  Yes    Liver cyst [K76.89]  Yes    Non-traumatic rhabdomyolysis [M62.82]  Yes    Chronic diastolic CHF (congestive heart failure) [I50.32]  Yes    Pulmonary infarct [I26.99]  Yes    Atelectasis [J98.11]  Yes    Moderate protein-calorie malnutrition [E44.0]  Yes    Anemia [D64.9]  Yes    MRSA bacteremia [R78.81]  Yes    Elevated troponin [R79.89]  Yes    Bilateral pulmonary embolism [I26.99]  Yes    COPD (chronic obstructive pulmonary disease) [J44.9]  Yes    Transaminitis [R74.0]  Yes    Morbid obesity [E66.01]  Yes    Pulmonary hypertension [I27.20]  Yes    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               Plan:   8/4 on heparin for dvt right arm, occurred since last admit while on eliquis.  Pt had massive clot with pulm htn suggested on echo.  Pt had mrsa bacteremia after rosette prior to last admit.    cpk [id number] with creat 1.6, consulted renal.  Was on Daptomycin/tefloaro.  Had bad anemia last admit - improved with iron- jehovah witness.    Heme to f/u.    ID saw, vanc was being considered.      May have had another pe- would be at high risk with pulm htn already found.  ivf considered but not with any concerns for mrsa.    8/5  Improving, cpk down,creat stable.  Breathing better, feels better.  No ivc filter. pulm htn worrisome as would contribute to debility.      8/6 I/o  1120/400???? Bun/creat 12/1.9, will order cpk for am  Patient is steadily improving.  She will need follow-up for pulmonary hypertension as it was found on her last echo.  With adequate anticoagulation-good chance pulmonary hypertension will clear.  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  Complex case.  I discussed her with Infectious Disease yesterday    8/7- continue  anticoagulation, continue to monitor CPK. Management of LUIZ per primary team. ID following w/ antibiotics for MRSA bacteremia. Needs repeat echo as outpatient to monitor pulmonary pressures    8/8 - Continue UFH.  Will need chronic AC and repeat echo going forward.  Please call with any additional questions over the weekend.    Maicol Serrano MD  Pulmonary / Critical Care Medicine  [facility]  08/08/2020  1:46 PM                        .

## 2020-08-08 NOTE — PROGRESS NOTES
[facility] Medicine  Progress Note    Patient Name: Sammi Abreu  MRN: [id number]  Patient Class: IP- Inpatient   Admission Date: 8/2/2020  Length of Stay: 5 days  Attending Physician: Alix Ruth MD  Primary Care Provider: Osmani Villatoro MD        Subjective:     Principal Problem:LUIZ (acute kidney injury)    HPI:  Sammi Abreu is a 79-year-old female with past medical history significant for arthritis and chronic back pain, fibromyalgia, glaucoma, hyperlipidemia, hypertension, sleep apnea with history of noncompliance with CPAP, morbid obesity, hypertension, GERD, COPD, diabetes type 2, prior DVT, and gallstones [facility] x3 [facility].  Of note the patient is also a Yaz[employer]ism.   Patient was discharged from this facility on 07/29/2020 after being treated for bilateral pulmonary embolism.  Patient is currently on Eliquis.  Patient underwent a CT abdomen pelvis while in the emergency department which identified surgical changes but nothing acute.  Patient is noted to have a mild LUIZ.  Her troponin is elevated at 0.41 which is consistent with prior levels.  She will be admitted to the service of hospital Medicine for continued treatment.    Overview/Hospital Course:  Patient monitor closely during hospitalization.  She was placed on continuous telemetry monitoring.  She was noted to have rhabdomyolysis with CPK greater than 21199 and LUIZ.  Nephrology was consulted.  She was noted to have metabolic acidosis initiated on bicarb drip.  Daptomycin was held and ID was consulted.  She was initiated on IV vancomycin.  She was noted have increased right upper extremity swelling.  Right upper extremity ultrasound demonstrated occlusive thrombus of the right brachial vein and cephalic vein. She was initiated on heparin drip and her Eliquis was held.  There was concern for Eliquis failure.  Hematology  "consulted.  PT and OT were consulted for debility.  Patient with ongoing anorexia and poor p.o. intake. Dietary was consulted for protein calorie malnutrition.  It was recommended patient be placed on TPN lipids.  Her renal status was monitored closely and she remain on bicarb drip with plans to start TPN once acute kidney injury resolved if no improvement in her p.o. intake.  Patient was noted to have some hematuria during her stay.  A retroperitoneal ultrasound was ordered.    Interval History:  Patient remains on bicarb drip for ongoing LUIZ.  Patient remains on heparin drip due to failed oral anticoagulation therapy with history of recently diagnosed bilateral pulmonary emboli lower extremity DVT and now new onset of right upper extremity DVT. Patient is sitting in chair, worked with PT and now feeling exhausted. Patient being followed by hematology.      Review of Systems   Constitutional: Positive for activity change, appetite change and fatigue. Negative for chills, diaphoresis and fever.   HENT: Negative for ear discharge, ear pain and facial swelling.    Eyes: Negative for pain and redness.   Respiratory: Positive for cough and shortness of breath.    Cardiovascular: Positive for leg swelling.   Gastrointestinal: Negative for abdominal distention, abdominal pain, constipation, diarrhea, nausea and vomiting.        Poor appetite- patient reports things taste bad and "smell bad"  and she states she has not been hungry   Endocrine: Negative for polydipsia and polyphagia.   Genitourinary: Negative for difficulty urinating, dysuria, flank pain and hematuria.   Musculoskeletal: Positive for back pain. Negative for neck pain and neck stiffness.   Skin: Negative for color change.   Allergic/Immunologic: Negative for food allergies.   Neurological: Positive for weakness. Negative for seizures, facial asymmetry and speech difficulty.   Psychiatric/Behavioral: Negative for agitation, behavioral problems, confusion, " hallucinations and suicidal ideas.     Objective:     Vital Signs (Most Recent):  Temp: 98 °F (36.7 °C) (08/08/20 0728)  Pulse: 90 (08/08/20 0728)  Resp: 18 (08/08/20 1018)  BP: (!) 175/74 (08/08/20 0900)  SpO2: 98 % (08/08/20 0728) Vital Signs (24h Range):  Temp:  [98 °F (36.7 °C)-99.5 °F (37.5 °C)] 98 °F (36.7 °C)  Pulse:  [74-92] 90  Resp:  [14-18] 18  SpO2:  [94 %-98 %] 98 %  BP: (146-205)/(66-79) 175/74     Weight: 104.1 kg (229 lb 8 oz)  Body mass index is 40.65 kg/m².    Intake/Output Summary (Last 24 hours) at 8/8/2020 1130  Last data filed at 8/7/2020 2300  Gross per 24 hour   Intake 480 ml   Output 1000 ml   Net -520 ml      Physical Exam  Constitutional:       General: She is not in acute distress.     Appearance: Normal appearance.   HENT:      Head: Normocephalic and atraumatic.      Mouth/Throat:      Mouth: Mucous membranes are moist.   Eyes:      General:         Right eye: No discharge.         Left eye: No discharge.      Extraocular Movements: Extraocular movements intact.      Conjunctiva/sclera: Conjunctivae normal.      Pupils: Pupils are equal, round, and reactive to light.   Neck:      Musculoskeletal: Normal range of motion and neck supple. No neck rigidity or muscular tenderness.   Cardiovascular:      Rate and Rhythm: Normal rate and regular rhythm.      Pulses: Normal pulses.      Heart sounds: Murmur present.   Pulmonary:      Effort: No respiratory distress.      Comments: Bilateral breath sounds diminished  Abdominal:      General: Bowel sounds are normal. There is no distension.      Tenderness: There is no abdominal tenderness. There is no guarding.      Comments: Obese   Genitourinary:     Comments: Not examined  Musculoskeletal: Normal range of motion.         General: Swelling present.      Comments: Generalized edema with edema also noted to upper and lower extremities   Skin:     General: Skin is warm and dry.      Capillary Refill: Capillary refill takes less than 2 seconds.    Neurological:      General: No focal deficit present.      Mental Status: She is alert and oriented to person, place, and time. Mental status is at baseline.      Cranial Nerves: No cranial nerve deficit.   Psychiatric:         Mood and Affect: Mood normal.         Behavior: Behavior normal.         Thought Content: Thought content normal.         Judgment: Judgment normal.       Lab Results   Component Value Date    WBC 7.94 08/08/2020    HGB 7.7 (L) 08/08/2020    HCT 25.3 (L) 08/08/2020     (H) 08/08/2020     08/08/2020     CMP  Sodium   Date Value Ref Range Status   08/07/2020 138 136 - 145 mmol/L Final     Potassium   Date Value Ref Range Status   08/07/2020 3.7 3.5 - 5.1 mmol/L Final     Chloride   Date Value Ref Range Status   08/07/2020 96 95 - 110 mmol/L Final     CO2   Date Value Ref Range Status   08/07/2020 34 (H) 23 - 29 mmol/L Final     Glucose   Date Value Ref Range Status   08/07/2020 99 70 - 110 mg/dL Final     BUN, Bld   Date Value Ref Range Status   08/07/2020 12 8 - 23 mg/dL Final     Creatinine   Date Value Ref Range Status   08/07/2020 1.8 (H) 0.5 - 1.4 mg/dL Final     Calcium   Date Value Ref Range Status   08/07/2020 7.9 (L) 8.7 - 10.5 mg/dL Final     Total Protein   Date Value Ref Range Status   08/07/2020 5.7 (L) 6.0 - 8.4 g/dL Final     Albumin   Date Value Ref Range Status   08/07/2020 2.0 (L) 3.5 - 5.2 g/dL Final     Total Bilirubin   Date Value Ref Range Status   08/07/2020 0.4 0.1 - 1.0 mg/dL Final     Comment:     For infants and newborns, interpretation of results should be based  on gestational age, weight and in agreement with clinical  observations.  Premature Infant recommended reference ranges:  Up to 24 hours.............<8.0 mg/dL  Up to 48 hours............<12.0 mg/dL  3-5 days..................<15.0 mg/dL  6-29 days.................<15.0 mg/dL       Alkaline Phosphatase   Date Value Ref Range Status   08/07/2020 97 55 - 135 U/L Final     AST   Date Value Ref  Range Status   08/07/2020 201 (H) 10 - 40 U/L Final     ALT   Date Value Ref Range Status   08/07/2020 157 (H) 10 - 44 U/L Final     Anion Gap   Date Value Ref Range Status   08/07/2020 8 8 - 16 mmol/L Final     eGFR if    Date Value Ref Range Status   08/07/2020 31 (A) >60 mL/min/1.73 m^2 Final     eGFR if non    Date Value Ref Range Status   08/07/2020 27 (A) >60 mL/min/1.73 m^2 Final     Comment:     Calculation used to obtain the estimated glomerular filtration  rate (eGFR) is the CKD-EPI equation.        Microbiology Results (last 7 days)     Procedure Component Value Units Date/Time    Blood culture x two cultures. Draw prior to antibiotics. [[id number]] Collected: 08/02/20 1633    Order Status: Completed Specimen: Blood Updated: 08/07/20 2322     Blood Culture, Routine No growth after 5 days.    Narrative:      Aerobic and anaerobic    Blood culture x two cultures. Draw prior to antibiotics. [[id number]] Collected: 08/02/20 1633    Order Status: Completed Specimen: Blood Updated: 08/07/20 2322     Blood Culture, Routine No growth after 5 days.    Narrative:      Aerobic and anaerobic    Urine culture [[id number]] Collected: 08/07/20 2040    Order Status: No result Specimen: Urine Updated: 08/07/20 2129    Urine Culture High Risk [[id number]] Collected: 08/07/20 2039    Order Status: Canceled Specimen: Urine, Catheterized     Urine culture [[id number]] Collected: 08/06/20 2350    Order Status: No result Specimen: Urine Updated: 08/07/20 0058    Urine Culture High Risk [[id number]] Collected: 08/06/20 2352    Order Status: Canceled Specimen: Urine, Catheterized           Radiology:  SADIE (07-):  · Hyperdynamic left ventricular systolic function. The estimated ejection fraction is 65 - 70 %.  · Normal appearing left atrial appendage. No thrombus is present in the appendage. Normal appendage velocities.  · Mild mitral regurgitation.  · Mild tricuspid regurgitation.  · No evidence  of endocarditis  · PICC line at the cavoatrial junction    ECHO (07-):  · Concentric left ventricular remodeling.  · Hyperdynamic left ventricular systolic function. The estimated ejection fraction is 76%.  · Grade I (mild) left ventricular diastolic dysfunction consistent with impaired relaxation.    · CXR: Concentric left ventricular remodeling.  · Hyperdynamic left ventricular systolic function. The estimated ejection fraction is 76%.  Grade I (mild) left ventricular diastolic dysfunction consistent with impaired relaxation.    CXR: There is bibasilar atelectasis versus infiltrate similar to 07/27/2020.    CT abdomen and pelvis without contrast:  Moderate basilar atelectasis. Status post cholecystectomy without evidence for complication.    RUQ abdominal US:  Prior cholecystectomy.  Small simple cyst of the left lobe of the liver otherwise negative right upper quadrant ultrasound.    RUE venous doppler scan:  Occlusive thrombus of the right brachial vein and cephalic vein.    KUB:  Possible ileus.  Prior cholecystectomy.  Prior bilateral hip joint replacement.    Renal US: Features suggesting intrinsic renal disease.  No hydronephrosis.      Assessment/Plan:      * LUIZ (acute kidney injury)  Monitor  Orders as per Nephrology- Patient currently remains on bicarb drip  Trend BMP  Renal dose all medications and avoid any possible nephrotoxic medications    Patient is Worship  Follow hematology recommendation, on IV Iron for anemia management. Follow CBC.     Hematuria  Hematuria noted-patient currently remains on IV heparin drip for history of bilateral pulmonary emboli and recurrent DVT.      Liver cyst  Noted  Will need outpatient follow-up      Debility  Fall and skin precautions  Continue physical therapy and occupational therapy      Hyponatremia  Resolved for now-monitor for recurrence      Elevated ferritin level  Noted      Acute deep vein thrombosis (DVT) of right upper extremity  Patient  developed new right upper extremity DVT despite being on apixaban-  Patient currently remains on IV heparin drip  Hematology following  Patient will need lifelong anticoagulation  Hematology workup in progress      Non-traumatic rhabdomyolysis  It was felt this is secondary to daptomycin. Trend CPK daily-improving. Follow ID and nephrology recommendations.     Daptomycin was discontinued on admission and patient was placed on IV vancomycin      Anemia  Multifactorial--  Poor nutrition and also history of acute blood loss  Monitor  Hematology following. Follow CBC and transfuse as needed. Patient is Baptism, receiving IV Iron.        Moderate protein-calorie malnutrition  Anorexia-patient continues report that food smells and tastes bad   On IV TPN.  Monitor volume status closely  Continue p.o. supplement        Atelectasis  Monitor oxygen saturation. Encourage OOB and IS.      Pulmonary infarct  Due to bilateral pulmonary emboli.   Pulmonary following      Chronic diastolic CHF (congestive heart failure)  Patient is identified as having Diastolic heart failure that is Chronic. CHF is currently controlled. Latest ECHO performed and demonstrates-   Results for orders placed during the hospital encounter of 07/13/20   Echo Color Flow Doppler? Yes    Narrative · Concentric left ventricular remodeling.  · Hyperdynamic left ventricular systolic function. The estimated ejection   fraction is 76%.  · Grade I (mild) left ventricular diastolic dysfunction consistent with   impaired relaxation.        Patient remains on Bicarb drip with generalized edema- Monitor volume status closely    MRSA bacteremia  Previously receiving daptomycin as outpatient-now on IV vancomycin  Orders as per infectious disease    Elevated troponin  Noted- chronically elevated    Bilateral pulmonary embolism  Patient previously on Eliquis at home but developed additional DVT while on it-  Patient currently on  IV heparin   Pulmonology and  Hematology following    Patient will need lifelong anticoagulation      COPD (chronic obstructive pulmonary disease)  Monitor O2 sats, supplement O2 as needed  Orders as per pulmonology        Transaminitis  Monitor-trending downward. Felt possibley related to daptomycin.  Possiblely due to clot burden.  Previously discussed with Dr. Decker. No recommendations at this time.         Morbid obesity  Body mass index is 40.65 kg/m².  General weight loss/lifestyle modification strategies discussed (elicit support from others; identify saboteurs; non-food rewards, etc).        Pulmonary hypertension  Noted  Orders as per pulmonology      Hypertension associated with diabetes  Monitor  Continue current treatment  Titrate medications as needed    Continue physical therapy.    VTE Risk Mitigation (From admission, onward)         Ordered     heparin 25,000 units in dextrose 5% 250 mL (100 units/mL) infusion HIGH INTENSITY nomogram - [facility]  Continuous     Question:  Heparin Infusion Adjustment (DO NOT MODIFY ANSWER)  Answer:  [email or web address]\[email or web address]\heparin HIGH INTENSITY nomogram for [facility] [id number]    08/03/20 1609     heparin 25,000 units in dextrose 5% (100 units/ml) IV bolus from bag - ADDITIONAL PRN BOLUS - 60 units/kg  As needed (PRN)     Question:  Heparin Infusion Adjustment (DO NOT MODIFY ANSWER)  Answer:  [email or web address]\[email or web address]\heparin HIGH INTENSITY nomogram for [facility] [id number]    08/03/20 1609     heparin 25,000 units in dextrose 5% (100 units/ml) IV bolus from bag - ADDITIONAL PRN BOLUS - 30 units/kg  As needed (PRN)     Question:  Heparin Infusion Adjustment (DO NOT MODIFY ANSWER)  Answer:  [email or web address]\Images\[email or web address]\HeparinInfusions\heparin HIGH INTENSITY nomogram for [facility] [id number]    08/03/20 1609     IP VTE HIGH RISK PATIENT  Once      08/02/20 2034     Place sequential compression device  Until discontinued      08/02/20 2034                       Alix Ruth MD  Department of Hospital Medicine   [facility]

## 2020-08-08 NOTE — PROGRESS NOTES
Nephrology Progress Note    Patient Name: Sammi Abreu  MRN: [id number]    Patient Class: IP- Inpatient   Admission Date: 8/2/2020  Length of Stay: 5 days  Date of Service: 8/8/2020    Attending Physician: Alix Ruth MD  Primary Care Provider: Osmani Villatoro MD    Reason for Consult: luiz/ckd3/hyponatremia/acidosis/rhabdomyalisis/mrsa bacteremia/anemia/htn    Chief Complaint   Patient presents with    Post-op Problem     pain to left lower chest and BLE after surgery on 7/27       SUBJECTIVE:     HPI: 79F Jehova witness was treated in hospital for MRSA bacteremia, had lap rosette 7/13, complicated by PE and DVT, was d/c 7/29 on daptomycin and returned to ER 5 days later with abdominal pain, elevated CPK and LFTs, LUIZ with modest rise in sCr. CT abdomen w/out contrast looked OK. She was given NS and heparin gtt, Dapto was changed with Vanco.    8/6 VSS, no new complains.  8/7 VSS, no new complains. Appreciate ID input.  8/8 some spikes in BP, on 2L NC, UOP 1.5L    Outpatient meds:  Current Facility-Administered Medications on File Prior to Encounter   Medication Dose Route Frequency Provider Last Rate Last Dose    lactated ringers infusion   Intravenous Continuous Gino Reid MD 10 mL/hr at 07/13/20 1308      lidocaine (PF) 10 mg/ml (1%) injection 10 mg  1 mL Intradermal Once Gino Reid MD         Current Outpatient Medications on File Prior to Encounter   Medication Sig Dispense Refill    acetaminophen (TYLENOL) 325 MG tablet Take 2 tablets (650 mg total) by mouth every 6 (six) hours as needed (Do not take with any other Tylenol or acetaminophen containing products).  0    albuterol-ipratropium (DUO-NEB) 2.5 mg-0.5 mg/3 mL nebulizer solution Take 3 mLs by nebulization every 8 (eight) hours. 270 mL 0    apixaban (ELIQUIS) 5 mg Tab Take 1 tablet (5 mg total) by mouth 2 (two) times daily. 60 tablet 3    ascorbic acid, vitamin C, (VITAMIN C) 100 MG tablet Take 5 tablets (500 mg total) by mouth  every evening.      esomeprazole (NEXIUM) 20 MG capsule Take 1 capsule (20 mg total) by mouth before breakfast. 30 capsule 2    fluticasone (FLONASE) 50 mcg/actuation nasal spray 2 sprays (100 mcg total) by Each Nare route once daily. 3 Bottle 3    folic acid (FOLVITE) 1 MG tablet Take 1 tablet (1 mg total) by mouth once daily. 30 tablet 0    metoprolol succinate (TOPROL-XL) 50 MG 24 hr tablet Take 1 tablet (50 mg total) by mouth once daily. 90 tablet 3    montelukast (SINGULAIR) 10 mg tablet Take 1 tablet (10 mg total) by mouth every evening. 90 tablet 3    sodium chloride 0.9% SolP 50 mL with DAPTOmycin 350 mg SolR 1,000 mg Inject 1,000 mg into the vein once daily.      spironolactone (ALDACTONE) 25 MG tablet Take 1 tablet (25 mg total) by mouth once daily. 90 tablet 6    budesonide-formoterol 160-4.5 mcg (SYMBICORT) 160-4.5 mcg/actuation HFAA Inhale 2 puffs into the lungs every 12 (twelve) hours. Controller 3 Inhaler 3    cyanocobalamin, vitamin B-12, 2,500 mcg Lozg Place 2 tablets under the tongue once daily. 60 lozenge 11    diclofenac (VOLTAREN) 25 MG TbEC Take 1 tablet (25 mg total) by mouth 3 (three) times daily as needed. 15 tablet 0    HYDROcodone-acetaminophen (NORCO) 5-325 mg per tablet Take 1 tablet by mouth every 6 (six) hours as needed for Pain. (Patient not taking: Reported on 7/30/2020) 15 tablet 0    lactulose (CHRONULAC) 10 gram/15 mL solution Take 30 mLs (20 g total) by mouth 3 (three) times daily. 2 Teaspoon(s) Oral PRN Every evening. (Patient not taking: Reported on 7/30/2020) 500 mL 3    multivitamin capsule Take 1 capsule by mouth once daily.      ondansetron (ZOFRAN-ODT) 4 MG TbDL Take 1 tablet (4 mg total) by mouth every 8 (eight) hours as needed. 20 tablet 0       Scheduled meds:   albuterol-ipratropium  3 mL Nebulization Q6H    ascorbic acid (vitamin C)  500 mg Oral QHS    cyanocobalamin  2,000 mcg Oral Daily    dronabinoL  2.5 mg Oral BID    fluticasone  furoate-vilanteroL  1 puff Inhalation Daily    folic acid  1 mg Oral Daily    hydrALAZINE  25 mg Oral Q8H    iron dextran IVPB  100 mg Intravenous Daily    metoprolol succinate  50 mg Oral Daily    montelukast  10 mg Oral QHS    multivitamin  1 tablet Oral Daily    pantoprazole  40 mg Oral Daily    polyethylene glycol  17 g Oral Daily    senna-docusate 8.6-50 mg  1 tablet Oral BID       Infusions:   heparin (porcine) in D5W 8 Units/kg/hr (08/08/20 0943)    sodium bicarbonate drip 100 mL/hr at 08/08/20 0613       PRN meds:  acetaminophen, aluminum-magnesium hydroxide-simethicone, benzonatate, bisacodyL, dextrose 50%, dextrose 50%, glucagon (human recombinant), glucose, glucose, heparin (PORCINE), heparin (PORCINE), magnesium oxide, magnesium oxide, melatonin, morphine, ondansetron, potassium chloride, potassium chloride, potassium chloride, sodium chloride 0.9%, Pharmacy to dose Vancomycin consult **AND** vancomycin - pharmacy to dose    Review of Systems:  negative    OBJECTIVE:     Vital Signs and IO (Last 24H):  Temp:  [98 °F (36.7 °C)-99.5 °F (37.5 °C)]   Pulse:  [74-92]   Resp:  [14-18]   BP: (146-205)/(66-79)   SpO2:  [94 %-98 %]   I/O last 3 completed shifts:  In: 1360 [P.O.:1360]  Out: 2350 [Urine:2350]    Wt Readings from Last 5 Encounters:   08/04/20 104.1 kg (229 lb 8 oz)   07/30/20 99.9 kg (220 lb 3.8 oz)   07/29/20 102.7 kg (226 lb 6.6 oz)   07/13/20 97.5 kg (215 lb)   07/09/20 104.9 kg (231 lb 4.2 oz)         Physical Exam:      Body mass index is 40.65 kg/m².    Laboratory:  Recent Labs   Lab 08/05/20  0116 08/06/20  0643 08/07/20  0428    137 138   K 4.4 4.2 3.7    99 96   CO2 26 31* 34*   BUN 14 12 12   CREATININE 1.7* 1.9* 1.8*   ESTGFRAFRICA 33* 29* 31*   EGFRNONAA 29* 25* 27*    95 99       Recent Labs   Lab 08/05/20  0116 08/06/20  0643 08/07/20  0428   CALCIUM 8.0* 7.9* 7.9*   ALBUMIN 2.2* 2.0* 2.0*   PHOS 3.7 4.6* 4.1   MG 2.3 2.0 1.9       Recent Labs   Lab  12/22/17  1141 06/22/18  0848 12/10/18  0945   PTH, Intact 57.0 115.0 H 81.0 H       No results for input(s): POCTGLUCOSE in the last 168 hours.    Recent Labs   Lab 08/03/20  0418 08/04/20  0030 08/05/20  0116   Hemoglobin A1C 5.7 H 5.9 H 5.9 H       Recent Labs   Lab 08/04/20  0030 08/05/20  2042 08/07/20  1145 08/07/20  1928 08/08/20  0448   WBC 8.18 10.25 7.67  --  7.94   HGB 10.0* 9.0* 7.3* 8.5* 7.7*   HCT 32.4* 29.9* 23.7* 28.1* 25.3*    266 215  --  234   * 106* 106*  --  107*   MCHC 30.9* 30.1* 30.8*  --  30.4*   MONO 8.2  0.7 9.8  1.0  --   --   --        Recent Labs   Lab 08/05/20  0116 08/06/20  0643 08/07/20  0428   BILITOT 0.3 0.4 0.4   PROT 5.9* 5.7* 5.7*   ALBUMIN 2.2* 2.0* 2.0*   ALKPHOS 85 89 97   * 177* 157*   * 289* 201*       Recent Labs   Lab 06/07/20  0952  08/02/20  1652 08/06/20  2350 08/07/20 2040   Color, UA Yellow   < > Yellow Brown A Yellow   Appearance, UA Clear   < > Clear Cloudy A Cloudy A   pH, UA 7.0   < > 7.0 >8.0 A >8.0 A   Specific [place], UA 1.015   < > 1.010 1.010 1.010   Protein, UA Negative   < > 2+ A 1+ A Trace A   Glucose, UA Negative   < > Negative Negative Negative   Ketones, UA Negative   < > Negative Negative Negative   Urobilinogen, UA Negative   < > Negative Negative Negative   Bilirubin (UA) Negative   < > Negative Negative Negative   Occult Blood UA Negative   < > 3+ A 3+ A 3+ A   Nitrite, UA Negative   < > Negative Negative Negative   RBC, UA 0   < > 2 >100 H >100 H   WBC, UA 2   < > 1 48 H 55 H   Bacteria Negative   < > None Moderate A Few A   Hyaline Casts, UA 3 A  --  0 0  --     < > = values in this interval not displayed.       Recent Labs   Lab 07/13/20 1946   POC PH 7.406   POC PCO2 36.7   POC HCO3 23.1 L   POC PO2 70 L   POC SATURATED O2 94 L   POC BE -2   Sample ARTERIAL       Microbiology Results (last 7 days)     Procedure Component Value Units Date/Time    Blood culture x two cultures. Draw prior to antibiotics.  [[id number]] Collected: 08/02/20 1633    Order Status: Completed Specimen: Blood Updated: 08/07/20 2322     Blood Culture, Routine No growth after 5 days.    Narrative:      Aerobic and anaerobic    Blood culture x two cultures. Draw prior to antibiotics. [[id number]] Collected: 08/02/20 1633    Order Status: Completed Specimen: Blood Updated: 08/07/20 2322     Blood Culture, Routine No growth after 5 days.    Narrative:      Aerobic and anaerobic    Urine culture [[id number]] Collected: 08/07/20 2040    Order Status: No result Specimen: Urine Updated: 08/07/20 2129    Urine Culture High Risk [[id number]] Collected: 08/07/20 2039    Order Status: Canceled Specimen: Urine, Catheterized     Urine culture [[id number]] Collected: 08/06/20 2350    Order Status: No result Specimen: Urine Updated: 08/07/20 0058    Urine Culture High Risk [[id number]] Collected: 08/06/20 2352    Order Status: Canceled Specimen: Urine, Catheterized           ASSESSMENT/PLAN:     Active Hospital Problems    Diagnosis  POA    *LUIZ (acute kidney injury) [N17.9]  Yes    Hematuria [R31.9]  No    Patient is [place] [Z78.9]  Yes    Acute deep vein thrombosis (DVT) of right upper extremity [I82.621]  Yes     Occlusive thrombus of the right brachial vein and cephalic vein      Elevated ferritin level [R79.89]  Yes    Hyponatremia [E87.1]  Yes    Debility [R53.81]  Yes    Liver cyst [K76.89]  Yes    Non-traumatic rhabdomyolysis [M62.82]  Yes    Chronic diastolic CHF (congestive heart failure) [I50.32]  Yes    Pulmonary infarct [I26.99]  Yes    Atelectasis [J98.11]  Yes    Moderate protein-calorie malnutrition [E44.0]  Yes    Anemia [D64.9]  Yes    MRSA bacteremia [R78.81]  Yes    Elevated troponin [R79.89]  Yes    Bilateral pulmonary embolism [I26.99]  Yes    COPD (chronic obstructive pulmonary disease) [J44.9]  Yes    Transaminitis [R74.0]  Yes    Morbid obesity [E66.01]  Yes    Pulmonary hypertension [I27.20]  Yes     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        8/3/2020 16:24 8/4/2020 00:30 8/5/2020 01:16 8/6/2020 06:43 8/7/2020 04:28   CPK 16975 (H) >78261 (H) 07057 (H) 94802 (H) 8326 (H)     Anemia of CKD  Jehovah witness  Hypercoagulable state with thrombosis  Stable  On IV iron  Appreciate heme input.    HTN  Bump hydralazine to 50mg TID.    LUIZ/CKD III  Stable as of yesterday- labs pending.    Acute rhabdomyolysis 2/2 daptomycin  CPK is trending down.    MRSA bacteremia  On vancomycin.    Thank you for allowing us to participate in the care of your patient!   We will follow the patient and provide recommendations as needed.    Barbie Gonzalze MD    [facility]  [address] [place] [id number]    [phone] - tel  [phone] - fax    8/8/2020

## 2020-08-08 NOTE — PROGRESS NOTES
HPI    79-year-old female with past medical history significant for arthritis and chronic back pain, fibromyalgia, glaucoma, hyperlipidemia, hypertension, sleep apnea with history of noncompliance with CPAP, morbid obesity, hypertension, GERD, COPD, diabetes type 2, prior DVT, and gallstones who presented to the emergency department [facility] with complaints of right lower quadrant pain x3 days.      Of note the patient is also a Jew.   Patient was discharged from this facility on 07/29/2020 after being treated for bilateral pulmonary embolism as well as bacteremia MRSA..  Patient also has recent history of cholecystectomy on July 13th.    On this visit patient was found to have right occlusive brachial vein and cephalic thrombus while on Eliquis.  Hematology was consult for evaluation and recommendation.    Patient history including chronic kidney disease, anemia iron deficiency    Past Medical History:   Diagnosis Date    ALLERGIC RHINITIS     Anemia     Arthritis     Back pain     Bronchitis     Cataract     OU    Cholelithiasis     COPD (chronic obstructive pulmonary disease)     Degenerative disc disease     Diabetes mellitus     pre diabetic    Diverticulosis     DVT (deep venous thrombosis)     Edema     Encounter for blood transfusion 1979    Fibromyalgia     Glaucoma     Gout     Hx of colonic polyps     Hyperlipidemia     Hypertension     Incontinence     Osteoporosis     Reflux     Refusal of blood transfusions as patient is Mandaen     Sleep apnea     non compliant with CPAP    Vestibulitis of ear      Past Surgical History:   Procedure Laterality Date    ANGIOGRAPHY OF LOWER EXTREMITY N/A 7/31/2019    Procedure: ANGIOGRAM, LOWER EXTREMITY;  Surgeon: Gino Arana MD;  Location: [facility] CATH/EP LAB;  Service: General;  Laterality: N/A;    HIP SURGERY      HYSTERECTOMY      JOINT REPLACEMENT      B total hip    LAPAROSCOPIC CHOLECYSTECTOMY N/A 7/13/2020     Procedure: CHOLECYSTECTOMY, LAPAROSCOPIC;  Surgeon: Jomar Mcdonald MD;  Location: [facility] OR;  Service: General;  Laterality: N/A;    TRANSFORAMINAL EPIDURAL INJECTION OF STEROID Left 2020    Procedure: Injection,steroid,epidural,transforaminal approach;  Surgeon: Matt Gilliam MD;  Location: [facility];  Service: Pain Management;  Laterality: Left;  L2-3, L3-4     Review of patient's allergies indicates:   Allergen Reactions    Cymbalta [duloxetine] Other (See Comments)     Nightmares      Darvon [propoxyphene] Nausea Only and Other (See Comments)     Sweating, slept for 3 days    Atorvastatin Other (See Comments)     Muscle cramps    Naprosyn [naproxen] Nausea Only    Penicillins Rash    Tramadol Nausea Only and Palpitations     Social History     Socioeconomic History    Marital status:      Spouse name: Not on file    Number of children: Not on file    Years of education: Not on file    Highest education level: Not on file   Occupational History    Not on file   Social Needs    Financial resource strain: Not on file    Food insecurity     Worry: Not on file     Inability: Not on file    Transportation needs     Medical: Not on file     Non-medical: Not on file   Tobacco Use    Smoking status: Former Smoker     Packs/day: 0.25     Years: 5.00     Pack years: 1.25     Quit date: 1972     Years since quittin.6    Smokeless tobacco: Never Used   Substance and Sexual Activity    Alcohol use: Yes     Alcohol/week: 0.0 standard drinks     Comment: Rarely    Drug use: Yes     Types: Oxycodone, Hydrocodone    Sexual activity: Not on file   Lifestyle    Physical activity     Days per week: Not on file     Minutes per session: Not on file    Stress: Not on file   Relationships    Social connections     Talks on phone: Not on file     Gets together: Not on file     Attends Gnosticist service: Not on file     Active member of club or organization: Not on file     Attends meetings of  clubs or organizations: Not on file     Relationship status: Not on file   Other Topics Concern    Not on file   Social History Narrative    Not on file     Family History   Problem Relation Age of Onset    Hypertension Mother     Diabetes Sister     Glaucoma Neg Hx     Macular degeneration Neg Hx     Retinal detachment Neg Hx      Physical exam  Vitals:    08/08/20 0331   BP: (!) 158/66   Pulse: 91   Resp: 14   Temp: 98.9 °F (37.2 °C)     Patient is awake alert at the bedside  No acute distress  Regular rate  No respiratory distress  Soft nontender nondistended  Distal Pulses intact  Normal affect   deferred  No rash  No lesions    CMP  Sodium   Date Value Ref Range Status   08/07/2020 138 136 - 145 mmol/L Final     Potassium   Date Value Ref Range Status   08/07/2020 3.7 3.5 - 5.1 mmol/L Final     Chloride   Date Value Ref Range Status   08/07/2020 96 95 - 110 mmol/L Final     CO2   Date Value Ref Range Status   08/07/2020 34 (H) 23 - 29 mmol/L Final     Glucose   Date Value Ref Range Status   08/07/2020 99 70 - 110 mg/dL Final     BUN, Bld   Date Value Ref Range Status   08/07/2020 12 8 - 23 mg/dL Final     Creatinine   Date Value Ref Range Status   08/07/2020 1.8 (H) 0.5 - 1.4 mg/dL Final     Calcium   Date Value Ref Range Status   08/07/2020 7.9 (L) 8.7 - 10.5 mg/dL Final     Total Protein   Date Value Ref Range Status   08/07/2020 5.7 (L) 6.0 - 8.4 g/dL Final     Albumin   Date Value Ref Range Status   08/07/2020 2.0 (L) 3.5 - 5.2 g/dL Final     Total Bilirubin   Date Value Ref Range Status   08/07/2020 0.4 0.1 - 1.0 mg/dL Final     Comment:     For infants and newborns, interpretation of results should be based  on gestational age, weight and in agreement with clinical  observations.  Premature Infant recommended reference ranges:  Up to 24 hours.............<8.0 mg/dL  Up to 48 hours............<12.0 mg/dL  3-5 days..................<15.0 mg/dL  6-29 days.................<15.0 mg/dL       Alkaline  Phosphatase   Date Value Ref Range Status   08/07/2020 97 55 - 135 U/L Final     AST   Date Value Ref Range Status   08/07/2020 201 (H) 10 - 40 U/L Final     ALT   Date Value Ref Range Status   08/07/2020 157 (H) 10 - 44 U/L Final     Anion Gap   Date Value Ref Range Status   08/07/2020 8 8 - 16 mmol/L Final     eGFR if    Date Value Ref Range Status   08/07/2020 31 (A) >60 mL/min/1.73 m^2 Final     eGFR if non    Date Value Ref Range Status   08/07/2020 27 (A) >60 mL/min/1.73 m^2 Final     Comment:     Calculation used to obtain the estimated glomerular filtration  rate (eGFR) is the CKD-EPI equation.        Lab Results   Component Value Date    WBC 7.94 08/08/2020    HGB 7.7 (L) 08/08/2020    HCT 25.3 (L) 08/08/2020     (H) 08/08/2020     08/08/2020       Assessment and recommendation    Right occlusive thrombus brachial vein and cephalic vein while patient is on Eliquis treated for pulmonary embolism and lower extremity DVT.  > heparin drip gtt  > renal insufficiency prevent use of Lovenox dosage effectively unless factor Xa can be monitored effectively  > warfarin will be second-line choice for reason above.  It can be made to transition when patient is ready to be discharge/transfer/hospital team discretion   > hypercoagulable in process (8/5/20)  > given the extensive thrombosis projected lifelong anticoagulation regardless above finding    Anemia macrocytic with MCV of 109 with components of iron deficiency  > severe elevated ferritin reflection of acute reactant given the recent history of bacteremia  > B12 774  > continue daily folic acid    Iron def  > iron , transferrin 118, TIBC 175, iron saturation 16% ferritin 2208  > currently patient has been treated for MRSA bacteremia.  > IV iron replacement  > minimize blood drawn    Jehovah Witness  > not able to receive any blood product  > as above minimize blood drawn    Hematuria  > urology follows    Bacteremia  sepsis  > currently on IV antibiotics      Complexity case

## 2020-08-08 NOTE — PROGRESS NOTES
Pharmacokinetic Assessment Follow Up: IV Vancomycin    Vancomycin serum concentration assessment(s):    The random level was drawn correctly and can be used to guide therapy at this time. The measurement is within the desired definitive target range of 15 to 20 mcg/mL.    Vancomycin Regimen Plan:    Give 1000 mg x 1 and  Re-dose when the random level is less than 20 mcg/mL, next level to be drawn at 2100 on 8-8    Drug levels (last 3 results):  Recent Labs   Lab Result Units 08/05/20  1916 08/06/20 2040 08/07/20  1929   Vancomycin, Random ug/mL 10.6 12.9 15.6       Pharmacy will continue to follow and monitor vancomycin.    Please contact pharmacy at extension [phone] for questions regarding this assessment.    Thank you for the consult,   Carly Stubbs       Patient brief summary:  Sammi Abreu is a 79 y.o. female initiated on antimicrobial therapy with IV Vancomycin for treatment of bacteremia    The patient's current regimen is pulse dosing. Last dose was 1000 mg    Drug Allergies:   Review of patient's allergies indicates:   Allergen Reactions    Cymbalta [duloxetine] Other (See Comments)     Nightmares      Darvon [propoxyphene] Nausea Only and Other (See Comments)     Sweating, slept for 3 days    Atorvastatin Other (See Comments)     Muscle cramps    Naprosyn [naproxen] Nausea Only    Penicillins Rash    Tramadol Nausea Only and Palpitations       Actual Body Weight:   104.1 kg      Renal Function:   Estimated Creatinine Clearance: 30.2 mL/min (A) (based on SCr of 1.8 mg/dL (H)).,       CBC (last 72 hours):  Recent Labs   Lab Result Units 08/05/20  0116 08/05/20 2042 08/07/20  1145 08/07/20  1928   WBC K/uL  --  10.25 7.67  --    Hemoglobin g/dL  --  9.0* 7.3* 8.5*   Hemoglobin A1C % 5.9*  --   --   --    Hematocrit %  --  29.9* 23.7* 28.1*   Platelets K/uL  --  266 215  --    Gran% %  --  63.5  --   --    Lymph% %  --  23.4  --   --    Mono% %  --  9.8  --   --    Eosinophil% %  --  2.6  --   --     Basophil% %  --  0.2  --   --    Differential Method   --  Automated  --   --        Metabolic Panel (last 72 hours):  Recent Labs   Lab Result Units 08/05/20  0116 08/06/20  0643 08/06/20 2350 08/07/20 0428 08/07/20 2040   Sodium mmol/L 136 137  --  138  --    Potassium mmol/L 4.4 4.2  --  3.7  --    Chloride mmol/L 102 99  --  96  --    CO2 mmol/L 26 31*  --  34*  --    Glucose mg/dL 110 95  --  99  --    Glucose, UA   --   --  Negative  --  Negative   BUN, Bld mg/dL 14 12  --  12  --    Creatinine mg/dL 1.7* 1.9*  --  1.8*  --    Albumin g/dL 2.2* 2.0*  --  2.0*  --    Total Bilirubin mg/dL 0.3 0.4  --  0.4  --    Alkaline Phosphatase U/L 85 89  --  97  --    AST U/L 455* 289*  --  201*  --    ALT U/L 193* 177*  --  157*  --    Magnesium mg/dL 2.3 2.0  --  1.9  --    Phosphorus mg/dL 3.7 4.6*  --  4.1  --        Vancomycin Administrations:  vancomycin given in the last 96 hours                     vancomycin in dextrose 5 % 1 gram/250 mL IVPB 1,000 mg (mg) 1,000 mg New Bag 08/06/20 2300    vancomycin in dextrose 5 % 1 gram/250 mL IVPB 1,000 mg (mg) 1,000 mg New Bag 08/05/20 2139    vancomycin 1.5 g in dextrose 5 % 250 mL IVPB (ready to mix) (mg) 1,500 mg New Bag 08/04/20 2021                    Microbiologic Results:  Microbiology Results (last 7 days)       Procedure Component Value Units Date/Time    Urine culture [[id number]] Collected: 08/07/20 2040    Order Status: No result Specimen: Urine Updated: 08/07/20 2129    Urine Culture High Risk [[id number]] Collected: 08/07/20 2039    Order Status: Canceled Specimen: Urine, Catheterized     Urine culture [[id number]] Collected: 08/06/20 2350    Order Status: No result Specimen: Urine Updated: 08/07/20 0058    Urine Culture High Risk [[id number]] Collected: 08/06/20 2352    Order Status: Canceled Specimen: Urine, Catheterized     Blood culture x two cultures. Draw prior to antibiotics. [[id number]] Collected: 08/02/20 1633    Order Status: Completed Specimen:  Blood Updated: 08/06/20 2322     Blood Culture, Routine No Growth to date      No Growth to date      No Growth to date      No Growth to date      No Growth to date    Narrative:      Aerobic and anaerobic    Blood culture x two cultures. Draw prior to antibiotics. [[id number]] Collected: 08/02/20 1633    Order Status: Completed Specimen: Blood Updated: 08/06/20 2322     Blood Culture, Routine No Growth to date      No Growth to date      No Growth to date      No Growth to date      No Growth to date    Narrative:      Aerobic and anaerobic

## 2020-08-08 NOTE — PLAN OF CARE
Pt in bed resting in bed with eyes closed. Pt alert and oriented. Able to make needs and wants known. No complaints or distress noted at this time. Pt denies feeling any chest pain at this time. No respiratory distress noted. Pt ambulatory. Bed low, call light in reach, free of falls, safety maintained, VSS, will continue to monitor.

## 2020-08-08 NOTE — ASSESSMENT & PLAN NOTE
Patient previously on Eliquis at home but developed additional DVT while on it-  Patient currently on  IV heparin   Pulmonology and Hematology following    Patient will need lifelong anticoagulation

## 2020-08-09 ENCOUNTER — PATIENT OUTREACH (OUTPATIENT)
Dept: ADMINISTRATIVE | Facility: OTHER | Age: 80
End: 2020-08-09

## 2020-08-09 LAB
ALBUMIN SERPL BCP-MCNC: 2.1 G/DL (ref 3.5–5.2)
ANION GAP SERPL CALC-SCNC: 8 MMOL/L (ref 8–16)
APTT BLDCRRT: 41.1 SEC (ref 21–32)
B2 GLYCOPROT1 IGA SER QL: <9 SAU
B2 GLYCOPROT1 IGG SER QL: <9 SGU
B2 GLYCOPROT1 IGM SER QL: <9 SMU
BACTERIA UR CULT: NO GROWTH
BUN SERPL-MCNC: 9 MG/DL (ref 8–23)
CALCIUM SERPL-MCNC: 8.1 MG/DL (ref 8.7–10.5)
CHLORIDE SERPL-SCNC: 97 MMOL/L (ref 95–110)
CK SERPL-CCNC: 2637 U/L (ref 20–180)
CO2 SERPL-SCNC: 36 MMOL/L (ref 23–29)
CREAT SERPL-MCNC: 1.7 MG/DL (ref 0.5–1.4)
EST. GFR  (AFRICAN AMERICAN): 33 ML/MIN/1.73 M^2
EST. GFR  (NON AFRICAN AMERICAN): 29 ML/MIN/1.73 M^2
GLUCOSE SERPL-MCNC: 106 MG/DL (ref 70–110)
PHOSPHATE SERPL-MCNC: 4.8 MG/DL (ref 2.7–4.5)
POTASSIUM SERPL-SCNC: 3.7 MMOL/L (ref 3.5–5.1)
SODIUM SERPL-SCNC: 141 MMOL/L (ref 136–145)
VANCOMYCIN SERPL-MCNC: 17 UG/ML

## 2020-08-09 PROCEDURE — 99232 PR SUBSEQUENT HOSPITAL CARE,LEVL II: ICD-10-PCS | Mod: ,,, | Performed by: UROLOGY

## 2020-08-09 PROCEDURE — 85730 THROMBOPLASTIN TIME PARTIAL: CPT

## 2020-08-09 PROCEDURE — 25000003 PHARM REV CODE 250: Performed by: INTERNAL MEDICINE

## 2020-08-09 PROCEDURE — 63600175 PHARM REV CODE 636 W HCPCS: Performed by: INTERNAL MEDICINE

## 2020-08-09 PROCEDURE — 97116 GAIT TRAINING THERAPY: CPT | Mod: CQ

## 2020-08-09 PROCEDURE — 82550 ASSAY OF CK (CPK): CPT

## 2020-08-09 PROCEDURE — 97110 THERAPEUTIC EXERCISES: CPT | Mod: CQ

## 2020-08-09 PROCEDURE — 80069 RENAL FUNCTION PANEL: CPT

## 2020-08-09 PROCEDURE — 25000242 PHARM REV CODE 250 ALT 637 W/ HCPCS: Performed by: HOSPITALIST

## 2020-08-09 PROCEDURE — 99233 PR SUBSEQUENT HOSPITAL CARE,LEVL III: ICD-10-PCS | Mod: ,,, | Performed by: INTERNAL MEDICINE

## 2020-08-09 PROCEDURE — 99900031 HC PATIENT EDUCATION (STAT)

## 2020-08-09 PROCEDURE — 80202 ASSAY OF VANCOMYCIN: CPT

## 2020-08-09 PROCEDURE — 12000002 HC ACUTE/MED SURGE SEMI-PRIVATE ROOM

## 2020-08-09 PROCEDURE — 94640 AIRWAY INHALATION TREATMENT: CPT

## 2020-08-09 PROCEDURE — 36415 COLL VENOUS BLD VENIPUNCTURE: CPT

## 2020-08-09 PROCEDURE — 94761 N-INVAS EAR/PLS OXIMETRY MLT: CPT

## 2020-08-09 PROCEDURE — 94799 UNLISTED PULMONARY SVC/PX: CPT

## 2020-08-09 PROCEDURE — 27000221 HC OXYGEN, UP TO 24 HOURS

## 2020-08-09 PROCEDURE — 25000003 PHARM REV CODE 250: Performed by: NURSE PRACTITIONER

## 2020-08-09 PROCEDURE — 99233 SBSQ HOSP IP/OBS HIGH 50: CPT | Mod: ,,, | Performed by: INTERNAL MEDICINE

## 2020-08-09 PROCEDURE — 25000003 PHARM REV CODE 250: Performed by: HOSPITALIST

## 2020-08-09 PROCEDURE — 63600175 PHARM REV CODE 636 W HCPCS: Performed by: HOSPITALIST

## 2020-08-09 PROCEDURE — 99232 SBSQ HOSP IP/OBS MODERATE 35: CPT | Mod: ,,, | Performed by: UROLOGY

## 2020-08-09 RX ORDER — POTASSIUM CHLORIDE 20 MEQ/1
20 TABLET, EXTENDED RELEASE ORAL ONCE
Status: COMPLETED | OUTPATIENT
Start: 2020-08-09 | End: 2020-08-09

## 2020-08-09 RX ORDER — VANCOMYCIN HCL IN 5 % DEXTROSE 1G/250ML
1000 PLASTIC BAG, INJECTION (ML) INTRAVENOUS
Status: DISCONTINUED | OUTPATIENT
Start: 2020-08-09 | End: 2020-08-16 | Stop reason: HOSPADM

## 2020-08-09 RX ORDER — VANCOMYCIN HCL IN 5 % DEXTROSE 1G/250ML
1000 PLASTIC BAG, INJECTION (ML) INTRAVENOUS ONCE
Status: DISCONTINUED | OUTPATIENT
Start: 2020-08-09 | End: 2020-08-09

## 2020-08-09 RX ORDER — WARFARIN SODIUM 5 MG/1
5 TABLET ORAL DAILY
Status: DISCONTINUED | OUTPATIENT
Start: 2020-08-09 | End: 2020-08-12

## 2020-08-09 RX ADMIN — IPRATROPIUM BROMIDE AND ALBUTEROL SULFATE 3 ML: .5; 2.5 SOLUTION RESPIRATORY (INHALATION) at 12:08

## 2020-08-09 RX ADMIN — HYDRALAZINE HYDROCHLORIDE 50 MG: 25 TABLET, FILM COATED ORAL at 02:08

## 2020-08-09 RX ADMIN — FOLIC ACID 1 MG: 1 TABLET ORAL at 08:08

## 2020-08-09 RX ADMIN — MONTELUKAST 10 MG: 10 TABLET, FILM COATED ORAL at 09:08

## 2020-08-09 RX ADMIN — METOPROLOL SUCCINATE 50 MG: 50 TABLET, FILM COATED, EXTENDED RELEASE ORAL at 08:08

## 2020-08-09 RX ADMIN — IRON DEXTRAN 100 MG: 50 INJECTION INTRAMUSCULAR; INTRAVENOUS at 08:08

## 2020-08-09 RX ADMIN — POTASSIUM CHLORIDE 20 MEQ: 1500 TABLET, FILM COATED, EXTENDED RELEASE ORAL at 12:08

## 2020-08-09 RX ADMIN — CYANOCOBALAMIN TAB 1000 MCG 2000 MCG: 1000 TAB at 08:08

## 2020-08-09 RX ADMIN — HYDRALAZINE HYDROCHLORIDE 50 MG: 25 TABLET, FILM COATED ORAL at 05:08

## 2020-08-09 RX ADMIN — SODIUM BICARBONATE: 84 INJECTION, SOLUTION INTRAVENOUS at 08:08

## 2020-08-09 RX ADMIN — DOCUSATE SODIUM 50 MG AND SENNOSIDES 8.6 MG 1 TABLET: 8.6; 5 TABLET, FILM COATED ORAL at 09:08

## 2020-08-09 RX ADMIN — DRONABINOL 2.5 MG: 2.5 CAPSULE ORAL at 08:08

## 2020-08-09 RX ADMIN — POLYETHYLENE GLYCOL 3350 17 G: 17 POWDER, FOR SOLUTION ORAL at 08:08

## 2020-08-09 RX ADMIN — IPRATROPIUM BROMIDE AND ALBUTEROL SULFATE 3 ML: .5; 2.5 SOLUTION RESPIRATORY (INHALATION) at 07:08

## 2020-08-09 RX ADMIN — WARFARIN SODIUM 5 MG: 5 TABLET ORAL at 05:08

## 2020-08-09 RX ADMIN — DRONABINOL 2.5 MG: 2.5 CAPSULE ORAL at 09:08

## 2020-08-09 RX ADMIN — DOCUSATE SODIUM 50 MG AND SENNOSIDES 8.6 MG 1 TABLET: 8.6; 5 TABLET, FILM COATED ORAL at 08:08

## 2020-08-09 RX ADMIN — HYDRALAZINE HYDROCHLORIDE 50 MG: 25 TABLET, FILM COATED ORAL at 09:08

## 2020-08-09 RX ADMIN — THERA TABS 1 TABLET: TAB at 08:08

## 2020-08-09 RX ADMIN — FLUTICASONE FUROATE AND VILANTEROL TRIFENATATE 1 PUFF: 100; 25 POWDER RESPIRATORY (INHALATION) at 07:08

## 2020-08-09 RX ADMIN — Medication 500 MG: at 09:08

## 2020-08-09 RX ADMIN — PANTOPRAZOLE SODIUM 40 MG: 40 TABLET, DELAYED RELEASE ORAL at 08:08

## 2020-08-09 NOTE — PROGRESS NOTES
HPI    79-year-old female with past medical history significant for arthritis and chronic back pain, fibromyalgia, glaucoma, hyperlipidemia, hypertension, sleep apnea with history of noncompliance with CPAP, morbid obesity, hypertension, GERD, COPD, diabetes type 2, prior DVT, and gallstones who presented to the emergency department [facility] with complaints of right lower quadrant pain x3 days.      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   Patient was discharged from this facility on 07/29/2020 after being treated for bilateral pulmonary embolism as well as bacteremia MRSA..  Patient also has recent history of cholecystectomy on July 13th.    On this visit patient was found to have right occlusive brachial vein and cephalic thrombus while on Eliquis.  Hematology was consult for evaluation and recommendation.    Patient history including chronic kidney disease, anemia iron deficiency    Past Medical History:   Diagnosis Date    ALLERGIC RHINITIS     Anemia     Arthritis     Back pain     Bronchitis     Cataract     OU    Cholelithiasis     COPD (chronic obstructive pulmonary disease)     Degenerative disc disease     Diabetes mellitus     pre diabetic    Diverticulosis     DVT (deep venous thrombosis)     Edema     Encounter for blood transfusion 1979    Fibromyalgia     Glaucoma     Gout     Hx of colonic polyps     Hyperlipidemia     Hypertension     Incontinence     Osteoporosis     Reflux     Refusal of blood transfusions as patient is [employer]     Sleep apnea     non compliant with CPAP    Vestibulitis of ear      Past Surgical History:   Procedure Laterality Date    ANGIOGRAPHY OF LOWER EXTREMITY N/A 7/31/2019    Procedure: ANGIOGRAM, LOWER EXTREMITY;  Surgeon: Gino Arana MD;  Location: [facility] CATH[facility]EP LAB;  Service: General;  Laterality: N/A;    HIP SURGERY      HYSTERECTOMY      JOINT REPLACEMENT      B total hip    LAPAROSCOPIC CHOLECYSTECTOMY N/A 7/13/2020     Procedure: CHOLECYSTECTOMY, LAPAROSCOPIC;  Surgeon: Jomar Mcdonald MD;  [facility];  Service: General;  Laterality: N/A;    TRANSFORAMINAL EPIDURAL INJECTION OF STEROID Left 2020    Procedure: Injection,steroid,epidural,transforaminal approach;  Surgeon: Matt Gilliam MD;  Location: [facility] OR;  Service: Pain Management;  Laterality: Left;  L2-3, L3-4     Review of patient's allergies indicates:   Allergen Reactions    Cymbalta [duloxetine] Other (See Comments)     Nightmares      Darvon [propoxyphene] Nausea Only and Other (See Comments)     Sweating, slept for 3 days    Atorvastatin Other (See Comments)     Muscle cramps    Naprosyn [naproxen] Nausea Only    Penicillins Rash    Tramadol Nausea Only and Palpitations     Social History     Socioeconomic History    Marital status:      Spouse name: Not on file    Number of children: Not on file    Years of education: Not on file    Highest education level: Not on file   Occupational History    Not on file   Social Needs    Financial resource strain: Not on file    Food insecurity     Worry: Not on file     Inability: Not on file    Transportation needs     Medical: Not on file     Non-medical: Not on file   Tobacco Use    Smoking status: Former Smoker     Packs/day: 0.25     Years: 5.00     Pack years: 1.25     Quit date: 1972     Years since quittin.6    Smokeless tobacco: Never Used   Substance and Sexual Activity    Alcohol use: Yes     Alcohol/week: 0.0 standard drinks     Comment: Rarely    Drug use: Yes     Types: Oxycodone, Hydrocodone    Sexual activity: Not on file   Lifestyle    Physical activity     Days per week: Not on file     Minutes per session: Not on file    Stress: Not on file   Relationships    Social connections     Talks on phone: Not on file     Gets together: Not on file     Attends [employer] service: Not on file     Active member of club or organization: Not on file     Attends meetings of  clubs or organizations: Not on file     Relationship status: Not on file   Other Topics Concern    Not on file   Social History Narrative    Not on file     Family History   Problem Relation Age of Onset    Hypertension Mother     Diabetes Sister     Glaucoma Neg Hx     Macular degeneration Neg Hx     Retinal detachment Neg Hx      Physical exam  Vitals:    08/09/20 0732   BP:    Pulse: 90   Resp: 18   Temp:      Patient is awake alert at the bedside  No acute distress  Regular rate  No respiratory distress  Soft nontender nondistended  Distal Pulses intact  Normal affect   deferred  No rash  No lesions    CMP  Sodium   Date Value Ref Range Status   08/09/2020 141 136 - 145 mmol/L Final     Potassium   Date Value Ref Range Status   08/09/2020 3.7 3.5 - 5.1 mmol/L Final     Chloride   Date Value Ref Range Status   08/09/2020 97 95 - 110 mmol/L Final     CO2   Date Value Ref Range Status   08/09/2020 36 (H) 23 - 29 mmol/L Final     Glucose   Date Value Ref Range Status   08/09/2020 106 70 - 110 mg/dL Final     BUN, Bld   Date Value Ref Range Status   08/09/2020 9 8 - 23 mg/dL Final     Creatinine   Date Value Ref Range Status   08/09/2020 1.7 (H) 0.5 - 1.4 mg/dL Final     Calcium   Date Value Ref Range Status   08/09/2020 8.1 (L) 8.7 - 10.5 mg/dL Final     Total Protein   Date Value Ref Range Status   08/07/2020 5.7 (L) 6.0 - 8.4 g/dL Final     Albumin   Date Value Ref Range Status   08/09/2020 2.1 (L) 3.5 - 5.2 g/dL Final     Total Bilirubin   Date Value Ref Range Status   08/07/2020 0.4 0.1 - 1.0 mg/dL Final     Comment:     For infants and newborns, interpretation of results should be based  on gestational age, weight and in agreement with clinical  observations.  Premature Infant recommended reference ranges:  Up to 24 hours.............<8.0 mg/dL  Up to 48 hours............<12.0 mg/dL  3-5 days..................<15.0 mg/dL  6-29 days.................<15.0 mg/dL       Alkaline Phosphatase   Date Value Ref  Range Status   08/07/2020 97 55 - 135 U/L Final     AST   Date Value Ref Range Status   08/07/2020 201 (H) 10 - 40 U/L Final     ALT   Date Value Ref Range Status   08/07/2020 157 (H) 10 - 44 U/L Final     Anion Gap   Date Value Ref Range Status   08/09/2020 8 8 - 16 mmol/L Final     eGFR if    Date Value Ref Range Status   08/09/2020 33 (A) >60 mL/min/1.73 m^2 Final     eGFR if non    Date Value Ref Range Status   08/09/2020 29 (A) >60 mL/min/1.73 m^2 Final     Comment:     Calculation used to obtain the estimated glomerular filtration  rate (eGFR) is the CKD-EPI equation.        Lab Results   Component Value Date    WBC 7.94 08/08/2020    HGB 7.7 (L) 08/08/2020    HCT 25.3 (L) 08/08/2020     (H) 08/08/2020     08/08/2020       Assessment and recommendation    Right occlusive thrombus brachial vein and cephalic vein while patient is on Eliquis treated for pulmonary embolism and lower extremity DVT.  > currently on heparin drip gtt  > renal insufficiency prevent use of Lovenox dosage effectively unless factor Xa can be monitored effectively  > warfarin will be second-line choice for reason above.  It can be made to transition when patient is ready to be discharge/transfer/ by hospital team discretion   > hypercoagulable in process (8/5/20) - still in process?   > given the extensive thrombosis projected lifelong anticoagulation regardless above finding    Anemia macrocytic with MCV of 109 with components of iron deficiency  > B12 774  > continue daily folic acid    Iron def  > iron , transferrin 118, TIBC 175, iron saturation 16% ferritin 2208  > currently patient has been treated for MRSA bacteremia.  > IV iron replacement  > minimize blood drawn    [employer]  > not able to receive any blood product  > as above minimize blood drawn    Hematuria  > urology follows    Bacteremia sepsis  > currently on IV antibiotics      Complexity case

## 2020-08-09 NOTE — PLAN OF CARE
Pt in bed resting in bed with eyes closed. Pt alert and oriented. Able to make needs and wants known. No complaints or distress noted at this time. Pt denies feeling short of breathe. No respiratory distress noted. Pt tup in chair half of the shift. Pt feet elevated due to dependent edema. Pt ambulatory. Bed low, call light in reach, free of falls, safety maintained, VSS, will continue to monitor.

## 2020-08-09 NOTE — PROGRESS NOTES
Urology Consult Progress Note-[place]  Staff: Mayra/Kb/Chris/Faizan    Referring physician or department:     Subjective:      Sammi Abreu is a 79 y.o. male with with rhabdomyolysis, urology consulted for gross hematuria which has since resolved.      Urine culture (cath)8/7: ng but does show 3+bld  rbus 8/8/20: no hydro, normal rbus    C/o not being able to taste anything, says this has been her issue since before she was admitted.     She has not had any more hematuria     Objective:     Temp:  [97.7 °F (36.5 °C)-99.5 °F (37.5 °C)] 99 °F (37.2 °C)  Pulse:  [83-94] 93  Resp:  [18-20] 18  SpO2:  [94 %-100 %] 97 %  BP: (114-196)/(66-82) 114/82  I/O last 3 completed shifts:  In: 720 [P.O.:720]  Out: 2475 [Urine:2475]  I/O this shift:  In: -   Out: 450 [Urine:450]    UOP - 500/425/450    Physical Exam:  General:WDWN in NAD  Neurologic: CN grossly normal. Normal sensation.   Psychiatric: awake, alert and oriented x 3. Mood and affect normal. Cooperative.  Eyes: PERRLA, normal conjunctiva  Respiratory: no increased work on breathing. No wheezing.   Cardiovascular: No obvious extremity edema. Warm and well perfused.  GI: no obvious stomach distension  Musculoskeletal: normal range of motion of bilateral upper extremities. Normal muscle strength and tone.  Skin: no obvious rashes or lesions. No tightening of skin noted.    Labs:     Recent Labs   Lab 08/07/20  0428 08/08/20  0448 08/09/20  0505    139 141   K 3.7 3.7 3.7   CL 96 97 97   CO2 34* 32* 36*   BUN 12 10 9   CREATININE 1.8* 1.7* 1.7*   CALCIUM 7.9* 8.0* 8.1*     Recent Labs   Lab 08/05/20  2042 08/07/20  1145 08/07/20  1928 08/08/20  0448   WBC 10.25 7.67  --  7.94   HGB 9.0* 7.3* 8.5* 7.7*   HCT 29.9* 23.7* 28.1* 25.3*    215  --  234       Radiology:  See hpi    Assessment:       1. Macrocytic anemia    2. Chest pain    3. Elevated troponin    4. LUIZ (acute kidney injury)    5. History of DVT in adulthood    6. Gross  hematuria             Plan:     Suspect she had hematuria bc of her rhabdomyolysis and her hep gtt.   Urine has now cleared.   Culture negative  Will make a 1 month f/u with urology NP to ensure cath urine shows no blood. If so may need cystoscopy.     Anabel Nunez MD  [facility] (Kb/Chris/Mayra/Faizan)   Office: [phone]

## 2020-08-09 NOTE — PT/OT/SLP PROGRESS
"Physical Therapy Treatment    Patient Name:  Sammi Abreu   MRN:  [id number]    Recommendations:     Discharge Recommendations:  home health PT   Discharge Equipment Recommendations: none   Barriers to discharge: None    Assessment:     Sammi Abreu is a 79 y.o. female admitted with a medical diagnosis of LUIZ (acute kidney injury).  She presents with the following impairments/functional limitations:  decreased lower extremity function, decreased safety awareness, pain, impaired cardiopulmonary response to activity, however, patient was able to increase gait distance today.  VC's required for posture.    Rehab Prognosis: Fair; patient would benefit from acute skilled PT services to address these deficits and reach maximum level of function.    Recent Surgery: * No surgery found *      Plan:     During this hospitalization, patient to be seen 6 x/week to address the identified rehab impairments via gait training, therapeutic activities, therapeutic exercises and progress toward the following goals:    · Plan of Care Expires:  (09/05/2020)    Subjective     Chief Complaint: "I wanna go home"  Patient/Family Comments/goals: "I need my strength to get home"  Pain/Comfort:  · Pain Rating 1: 0/10  · Location 1: (Left thigh, increased to 9/10 with gait)  · Pain Rating Post-Intervention 1: 5/10(once ceasing gait, pain in left thigh immediately decreased.)      Objective:     Communicated with patient and her nurse Shanti prior to session.  Patient found HOB elevated with (supine in bed with sheets covering her.) upon PT entry to room.     General Precautions: Standard, (standard)   Orthopedic Precautions:N/A   Braces:       Functional Mobility:  · Bed Mobility:  Rolling Left:  stand by assistance  · Supine to Sit: contact guard assistance  · Sit to Supine: contact guard assistance  · Transfers:  Sit to Stand:  contact guard assistance with rolling walker  · Gait: 100ft with RW with follow for WC and IV " pole.      AM-PAC 6 CLICK MOBILITY  Turning over in bed (including adjusting bedclothes, sheets and blankets)?: 3  Sitting down on and standing up from a chair with arms (e.g., wheelchair, bedside commode, etc.): 3  Moving from lying on back to sitting on the side of the bed?: 3  Moving to and from a bed to a chair (including a wheelchair)?: 3  Need to walk in hospital room?: 3  Climbing 3-5 steps with a railing?: 1  Basic Mobility Total Score: 16       Therapeutic Activities and Exercises:   Patient performed sitting therex x 20 as follows: ankle pumps, LAQ, marching   Patient also transferred to bedside commode with SBA, mod I with self care.    Patient left supine with all lines intact, call button in reach, Nurse Shanti  notified and informed that IV was leaking...    GOALS:   Multidisciplinary Problems     Physical Therapy Goals        Problem: Physical Therapy Goal    Goal Priority Disciplines Outcome Goal Variances Interventions   Physical Therapy Goal     PT, PT/OT Ongoing, Progressing     Description: Goals to be met by: 2020     Patient will increase functional independence with mobility by performin. Supine to sit with Supervision  2. Sit to stand transfer with Supervision  3. Bed to chair transfer with Supervision using Rolling Walker  4. Gait  x 150 feet with Supervision using Rolling Walker.   5. Lower extremity exercise program x20 reps per handout, with independence                     Time Tracking:     PT Received On: 20  PT Start Time: 1007     PT Stop Time: 1043  PT Total Time (min): 36 min     Billable Minutes: Gait Training 15 and Therapeutic Activity 16    Treatment Type: Treatment  PT/PTA: PTA     PTA Visit Number: 3     Anabel Lofton, PTA  2020

## 2020-08-09 NOTE — PROGRESS NOTES
[facility] Medicine  Progress Note    Patient Name: Sammi Abreu  MRN: [id number]  Patient Class: IP- Inpatient   Admission Date: 8/2/2020  Length of Stay: 6 days  Attending Physician: Alix Ruth MD  Primary Care Provider: Osmani Villatoro MD        Subjective:     Principal Problem:LUIZ (acute kidney injury)    HPI:  Sammi Abreu is a 79-year-old female with past medical history significant for arthritis and chronic back pain, fibromyalgia, glaucoma, hyperlipidemia, hypertension, sleep apnea with history of noncompliance with CPAP, morbid obesity, hypertension, GERD, COPD, diabetes type 2, prior DVT, and gallstones [facility] x3 [facility].  Of note the patient is also a [employer].   Patient was discharged from this facility on 07/29/2020 after being treated for bilateral pulmonary embolism.  Patient is currently on Eliquis.  Patient underwent a CT abdomen pelvis while in the emergency department which identified surgical changes but nothing acute.  Patient is noted to have a mild LUIZ.  Her troponin is elevated at 0.41 which is consistent with prior levels.  She will be admitted to the service of hospital Medicine for continued treatment.    Overview/Hospital Course:  Patient monitor closely during hospitalization.  She was placed on continuous telemetry monitoring.  She was noted to have rhabdomyolysis with CPK greater than 04119 and LUIZ.  Nephrology was consulted.  She was noted to have metabolic acidosis initiated on bicarb drip.  Daptomycin was held and ID was consulted.  She was initiated on IV vancomycin.  She was noted have increased right upper extremity swelling.  Right upper extremity ultrasound demonstrated occlusive thrombus of the right brachial vein and cephalic vein. She was initiated on heparin drip and her Eliquis was held.  There was concern for Eliquis failure.  Hematology  "consulted.  PT and OT were consulted for debility.  Patient with ongoing anorexia and poor p.o. intake. Dietary was consulted for protein calorie malnutrition.  It was recommended patient be placed on TPN lipids.  Her renal status was monitored closely and she remain on bicarb drip with plans to start TPN once acute kidney injury resolved if no improvement in her p.o. intake.  Patient was noted to have some hematuria during her stay.  A retroperitoneal ultrasound was ordered.    Interval History:  Patient remains on bicarb drip for ongoing LUIZ.  Patient remains on heparin drip due to failed oral anticoagulation therapy with history of recently diagnosed bilateral pulmonary emboli lower extremity DVT and now new onset of right upper extremity DVT.  Patient being followed by hematology.  Hematuria has resolved.  Receiving Iron infusions.    Review of Systems   Constitutional: Positive for activity change, appetite change and fatigue. Negative for chills, diaphoresis and fever.   HENT: Negative for ear discharge, ear pain and facial swelling.    Eyes: Negative for pain and redness.   Respiratory: Positive for cough and shortness of breath.    Cardiovascular: Positive for leg swelling.   Gastrointestinal: Negative for abdominal distention, abdominal pain, constipation, diarrhea, nausea and vomiting.        Poor appetite- patient reports things taste bad and "smell bad"  and she states she has not been hungry   Endocrine: Negative for polydipsia and polyphagia.   Genitourinary: Negative for difficulty urinating, dysuria, flank pain and hematuria.   Musculoskeletal: Positive for back pain. Negative for neck pain and neck stiffness.   Skin: Negative for color change.   Allergic/Immunologic: Negative for food allergies.   Neurological: Positive for weakness. Negative for seizures, facial asymmetry and speech difficulty.   Psychiatric/Behavioral: Negative for agitation, behavioral problems, confusion, hallucinations and " suicidal ideas.     Objective:     Vital Signs (Most Recent):  Temp: 97.7 °F (36.5 °C) (08/09/20 0710)  Pulse: 90 (08/09/20 0732)  Resp: 18 (08/09/20 0732)  BP: (!) 146/66 (08/09/20 0710)  SpO2: 100 % (08/09/20 0732) Vital Signs (24h Range):  Temp:  [97.3 °F (36.3 °C)-98.6 °F (37 °C)] 97.7 °F (36.5 °C)  Pulse:  [82-94] 90  Resp:  [18-20] 18  SpO2:  [94 %-100 %] 100 %  BP: (134-196)/(61-82) 146/66     Weight: 107.5 kg (236 lb 15.9 oz)  Body mass index is 41.98 kg/m².    Intake/Output Summary (Last 24 hours) at 8/9/2020 0922  Last data filed at 8/9/2020 0729  Gross per 24 hour   Intake 480 ml   Output 2325 ml   Net -1845 ml      Physical Exam  Constitutional:       General: She is not in acute distress.     Appearance: Normal appearance.   HENT:      Head: Normocephalic and atraumatic.      Mouth/Throat:      Mouth: Mucous membranes are moist.   Eyes:      General:         Right eye: No discharge.         Left eye: No discharge.      Extraocular Movements: Extraocular movements intact.      Conjunctiva/sclera: Conjunctivae normal.      Pupils: Pupils are equal, round, and reactive to light.   Neck:      Musculoskeletal: Normal range of motion and neck supple. No neck rigidity or muscular tenderness.   Cardiovascular:      Rate and Rhythm: Normal rate and regular rhythm.      Pulses: Normal pulses.      Heart sounds: Murmur present.   Pulmonary:      Effort: No respiratory distress.      Comments: Bilateral breath sounds diminished  Abdominal:      General: Bowel sounds are normal. There is no distension.      Tenderness: There is no abdominal tenderness. There is no guarding.      Comments: Obese   Genitourinary:     Comments: Not examined  Musculoskeletal: Normal range of motion.         General: Swelling present.      Comments: Generalized edema with edema also noted to upper and lower extremities   Skin:     General: Skin is warm and dry.      Capillary Refill: Capillary refill takes less than 2 seconds.    Neurological:      General: No focal deficit present.      Mental Status: She is alert and oriented to person, place, and time. Mental status is at baseline.      Cranial Nerves: No cranial nerve deficit.   Psychiatric:         Mood and Affect: Mood normal.         Behavior: Behavior normal.         Thought Content: Thought content normal.         Judgment: Judgment normal.       Lab Results   Component Value Date    WBC 7.94 08/08/2020    HGB 7.7 (L) 08/08/2020    HCT 25.3 (L) 08/08/2020     (H) 08/08/2020     08/08/2020     CMP  Sodium   Date Value Ref Range Status   08/09/2020 141 136 - 145 mmol/L Final     Potassium   Date Value Ref Range Status   08/09/2020 3.7 3.5 - 5.1 mmol/L Final     Chloride   Date Value Ref Range Status   08/09/2020 97 95 - 110 mmol/L Final     CO2   Date Value Ref Range Status   08/09/2020 36 (H) 23 - 29 mmol/L Final     Glucose   Date Value Ref Range Status   08/09/2020 106 70 - 110 mg/dL Final     BUN, Bld   Date Value Ref Range Status   08/09/2020 9 8 - 23 mg/dL Final     Creatinine   Date Value Ref Range Status   08/09/2020 1.7 (H) 0.5 - 1.4 mg/dL Final     Calcium   Date Value Ref Range Status   08/09/2020 8.1 (L) 8.7 - 10.5 mg/dL Final     Total Protein   Date Value Ref Range Status   08/07/2020 5.7 (L) 6.0 - 8.4 g/dL Final     Albumin   Date Value Ref Range Status   08/09/2020 2.1 (L) 3.5 - 5.2 g/dL Final     Total Bilirubin   Date Value Ref Range Status   08/07/2020 0.4 0.1 - 1.0 mg/dL Final     Comment:     For infants and newborns, interpretation of results should be based  on gestational age, weight and in agreement with clinical  observations.  Premature Infant recommended reference ranges:  Up to 24 hours.............<8.0 mg/dL  Up to 48 hours............<12.0 mg/dL  3-5 days..................<15.0 mg/dL  6-29 days.................<15.0 mg/dL       Alkaline Phosphatase   Date Value Ref Range Status   08/07/2020 97 55 - 135 U/L Final     AST   Date Value Ref  Range Status   08/07/2020 201 (H) 10 - 40 U/L Final     ALT   Date Value Ref Range Status   08/07/2020 157 (H) 10 - 44 U/L Final     Anion Gap   Date Value Ref Range Status   08/09/2020 8 8 - 16 mmol/L Final     eGFR if    Date Value Ref Range Status   08/09/2020 33 (A) >60 mL/min/1.73 m^2 Final     eGFR if non    Date Value Ref Range Status   08/09/2020 29 (A) >60 mL/min/1.73 m^2 Final     Comment:     Calculation used to obtain the estimated glomerular filtration  rate (eGFR) is the CKD-EPI equation.        Microbiology Results (last 7 days)     Procedure Component Value Units Date/Time    Urine culture [[id number]] Collected: 08/06/20 2350    Order Status: Completed Specimen: Urine Updated: 08/08/20 1421     Urine Culture, Routine No significant growth    Narrative:      Specimen Source->Urine    Blood culture x two cultures. Draw prior to antibiotics. [[id number]] Collected: 08/02/20 1633    Order Status: Completed Specimen: Blood Updated: 08/07/20 2322     Blood Culture, Routine No growth after 5 days.    Narrative:      Aerobic and anaerobic    Blood culture x two cultures. Draw prior to antibiotics. [[id number]] Collected: 08/02/20 1633    Order Status: Completed Specimen: Blood Updated: 08/07/20 2322     Blood Culture, Routine No growth after 5 days.    Narrative:      Aerobic and anaerobic    Urine culture [[id number]] Collected: 08/07/20 2040    Order Status: No result Specimen: Urine Updated: 08/07/20 2129    Urine Culture High Risk [[id number]] Collected: 08/07/20 2039    Order Status: Canceled Specimen: Urine, Catheterized     Urine Culture High Risk [[id number]] Collected: 08/06/20 2352    Order Status: Canceled Specimen: Urine, Catheterized           Radiology:  SADIE (07-):  · Hyperdynamic left ventricular systolic function. The estimated ejection fraction is 65 - 70 %.  · Normal appearing left atrial appendage. No thrombus is present in the appendage. Normal appendage  velocities.  · Mild mitral regurgitation.  · Mild tricuspid regurgitation.  · No evidence of endocarditis  · PICC line at the cavoatrial junction    ECHO (07-):  · Concentric left ventricular remodeling.  · Hyperdynamic left ventricular systolic function. The estimated ejection fraction is 76%.  · Grade I (mild) left ventricular diastolic dysfunction consistent with impaired relaxation.    · CXR: Concentric left ventricular remodeling.  · Hyperdynamic left ventricular systolic function. The estimated ejection fraction is 76%.  Grade I (mild) left ventricular diastolic dysfunction consistent with impaired relaxation.    CXR: There is bibasilar atelectasis versus infiltrate similar to 07/27/2020.    CT abdomen and pelvis without contrast:  Moderate basilar atelectasis. Status post cholecystectomy without evidence for complication.    RUQ abdominal US:  Prior cholecystectomy.  Small simple cyst of the left lobe of the liver otherwise negative right upper quadrant ultrasound.    RUE venous doppler scan:  Occlusive thrombus of the right brachial vein and cephalic vein.    KUB:  Possible ileus.  Prior cholecystectomy.  Prior bilateral hip joint replacement.    Renal US: Features suggesting intrinsic renal disease.  No hydronephrosis.Interval History:  Patient remains on bicarb drip for LUIZ.  Patient remains on heparin drip due to failed oral anticoagulation therapy with history of recently diagnosed bilateral pulmonary emboli lower extremity DVT and now new onset of right upper extremity DVT.  Patient being followed by hematology.  Patient now with hematuria.  Will check retroperitoneal ultrasound and check with Hematology.    Review of Systems   Constitutional: Positive for activity change, appetite change and fatigue. Negative for chills, diaphoresis and fever.   HENT: Negative for ear discharge, ear pain and facial swelling.    Eyes: Negative for pain and redness.   Respiratory: Positive for cough and shortness  "of breath.    Cardiovascular: Positive for leg swelling.   Gastrointestinal: Negative for abdominal distention, abdominal pain, constipation, diarrhea, nausea and vomiting.        Poor appetite- patient reports things taste bad and "smell bad"  and she states she has not been hungry   Endocrine: Negative for polydipsia and polyphagia.   Genitourinary: Negative for difficulty urinating, dysuria, flank pain and hematuria.   Musculoskeletal: Positive for back pain. Negative for neck pain and neck stiffness.   Skin: Negative for color change.   Allergic/Immunologic: Negative for food allergies.   Neurological: Positive for weakness. Negative for seizures, facial asymmetry and speech difficulty.   Psychiatric/Behavioral: Negative for agitation, behavioral problems, confusion, hallucinations and suicidal ideas.     Objective:     Vital Signs (Most Recent):  Temp: 97.7 °F (36.5 °C) (08/09/20 0710)  Pulse: 90 (08/09/20 0732)  Resp: 18 (08/09/20 0732)  BP: (!) 146/66 (08/09/20 0710)  SpO2: 100 % (08/09/20 0732) Vital Signs (24h Range):  Temp:  [97.3 °F (36.3 °C)-98.6 °F (37 °C)] 97.7 °F (36.5 °C)  Pulse:  [82-94] 90  Resp:  [18-20] 18  SpO2:  [94 %-100 %] 100 %  BP: (134-196)/(61-82) 146/66     Weight: 107.5 kg (236 lb 15.9 oz)  Body mass index is 41.98 kg/m².    Intake/Output Summary (Last 24 hours) at 8/9/2020 0857  Last data filed at 8/9/2020 0729  Gross per 24 hour   Intake 480 ml   Output 2325 ml   Net -1845 ml      Physical Exam  Constitutional:       General: She is not in acute distress.     Appearance: Normal appearance.   HENT:      Head: Normocephalic and atraumatic.      Mouth/Throat:      Mouth: Mucous membranes are moist.   Eyes:      General:         Right eye: No discharge.         Left eye: No discharge.      Extraocular Movements: Extraocular movements intact.      Conjunctiva/sclera: Conjunctivae normal.      Pupils: Pupils are equal, round, and reactive to light.   Neck:      Musculoskeletal: Normal " range of motion and neck supple. No neck rigidity or muscular tenderness.   Cardiovascular:      Rate and Rhythm: Normal rate and regular rhythm.      Pulses: Normal pulses.      Heart sounds: Murmur present.   Pulmonary:      Effort: No respiratory distress.      Comments: Bilateral breath sounds diminished  Abdominal:      General: Bowel sounds are normal. There is no distension.      Tenderness: There is no abdominal tenderness. There is no guarding.      Comments: Obese   Genitourinary:     Comments: Not examined  Musculoskeletal: Normal range of motion.         General: Swelling present.      Comments: Generalized edema with edema also noted to upper and lower extremities   Skin:     General: Skin is warm and dry.      Capillary Refill: Capillary refill takes less than 2 seconds.   Neurological:      General: No focal deficit present.      Mental Status: She is alert and oriented to person, place, and time. Mental status is at baseline.      Cranial Nerves: No cranial nerve deficit.   Psychiatric:         Mood and Affect: Mood normal.         Behavior: Behavior normal.         Thought Content: Thought content normal.         Judgment: Judgment normal.       Lab Results   Component Value Date    WBC 7.94 08/08/2020    HGB 7.7 (L) 08/08/2020    HCT 25.3 (L) 08/08/2020     (H) 08/08/2020     08/08/2020     CMP  Sodium   Date Value Ref Range Status   08/09/2020 141 136 - 145 mmol/L Final     Potassium   Date Value Ref Range Status   08/09/2020 3.7 3.5 - 5.1 mmol/L Final     Chloride   Date Value Ref Range Status   08/09/2020 97 95 - 110 mmol/L Final     CO2   Date Value Ref Range Status   08/09/2020 36 (H) 23 - 29 mmol/L Final     Glucose   Date Value Ref Range Status   08/09/2020 106 70 - 110 mg/dL Final     BUN, Bld   Date Value Ref Range Status   08/09/2020 9 8 - 23 mg/dL Final     Creatinine   Date Value Ref Range Status   08/09/2020 1.7 (H) 0.5 - 1.4 mg/dL Final     Calcium   Date Value Ref Range  Status   08/09/2020 8.1 (L) 8.7 - 10.5 mg/dL Final     Total Protein   Date Value Ref Range Status   08/07/2020 5.7 (L) 6.0 - 8.4 g/dL Final     Albumin   Date Value Ref Range Status   08/09/2020 2.1 (L) 3.5 - 5.2 g/dL Final     Total Bilirubin   Date Value Ref Range Status   08/07/2020 0.4 0.1 - 1.0 mg/dL Final     Comment:     For infants and newborns, interpretation of results should be based  on gestational age, weight and in agreement with clinical  observations.  Premature Infant recommended reference ranges:  Up to 24 hours.............<8.0 mg/dL  Up to 48 hours............<12.0 mg/dL  3-5 days..................<15.0 mg/dL  6-29 days.................<15.0 mg/dL       Alkaline Phosphatase   Date Value Ref Range Status   08/07/2020 97 55 - 135 U/L Final     AST   Date Value Ref Range Status   08/07/2020 201 (H) 10 - 40 U/L Final     ALT   Date Value Ref Range Status   08/07/2020 157 (H) 10 - 44 U/L Final     Anion Gap   Date Value Ref Range Status   08/09/2020 8 8 - 16 mmol/L Final     eGFR if    Date Value Ref Range Status   08/09/2020 33 (A) >60 mL/min/1.73 m^2 Final     eGFR if non    Date Value Ref Range Status   08/09/2020 29 (A) >60 mL/min/1.73 m^2 Final     Comment:     Calculation used to obtain the estimated glomerular filtration  rate (eGFR) is the CKD-EPI equation.        Microbiology Results (last 7 days)     Procedure Component Value Units Date/Time    Urine culture [[id number]] Collected: 08/06/20 2350    Order Status: Completed Specimen: Urine Updated: 08/08/20 1421     Urine Culture, Routine No significant growth    Narrative:      Specimen Source->Urine    Blood culture x two cultures. Draw prior to antibiotics. [[id number]] Collected: 08/02/20 1633    Order Status: Completed Specimen: Blood Updated: 08/07/20 4868     Blood Culture, Routine No growth after 5 days.    Narrative:      Aerobic and anaerobic    Blood culture x two cultures. Draw prior to antibiotics.  [[id number]] Collected: 08/02/20 1633    Order Status: Completed Specimen: Blood Updated: 08/07/20 2322     Blood Culture, Routine No growth after 5 days.    Narrative:      Aerobic and anaerobic    Urine culture [[id number]] Collected: 08/07/20 2040    Order Status: No result Specimen: Urine Updated: 08/07/20 2129    Urine Culture High Risk [[id number]] Collected: 08/07/20 2039    Order Status: Canceled Specimen: Urine, Catheterized     Urine Culture High Risk [[id number]] Collected: 08/06/20 2352    Order Status: Canceled Specimen: Urine, Catheterized           Radiology:  SADIE (07-):  · Hyperdynamic left ventricular systolic function. The estimated ejection fraction is 65 - 70 %.  · Normal appearing left atrial appendage. No thrombus is present in the appendage. Normal appendage velocities.  · Mild mitral regurgitation.  · Mild tricuspid regurgitation.  · No evidence of endocarditis  · PICC line at the cavoatrial junction    ECHO (07-):  · Concentric left ventricular remodeling.  · Hyperdynamic left ventricular systolic function. The estimated ejection fraction is 76%.  · Grade I (mild) left ventricular diastolic dysfunction consistent with impaired relaxation.    · CXR: Concentric left ventricular remodeling.  · Hyperdynamic left ventricular systolic function. The estimated ejection fraction is 76%.  Grade I (mild) left ventricular diastolic dysfunction consistent with impaired relaxation.    CXR: There is bibasilar atelectasis versus infiltrate similar to 07/27/2020.    CT abdomen and pelvis without contrast:  Moderate basilar atelectasis. Status post cholecystectomy without evidence for complication.    RUQ abdominal US:  Prior cholecystectomy.  Small simple cyst of the left lobe of the liver otherwise negative right upper quadrant ultrasound.    RUE venous doppler scan:  Occlusive thrombus of the right brachial vein and cephalic vein.    KUB:  Possible ileus.  Prior cholecystectomy.  Prior bilateral  hip joint replacement.    Renal US: Features suggesting intrinsic renal disease.  No hydronephrosis.    Assessment/Plan:      * LUIZ (acute kidney injury)  Monitor.  Much improved.  Discontinue intravenous sodium bicarbonate infusion.  Trend BMP  Renal dose all medications and avoid any possible nephrotoxic medications    Patient is [employer]  Follow hematology recommendation, on IV Iron for anemia management. Follow CBC.       Hematuria  Hematuria noted-patient currently remains on IV heparin drip for history of bilateral pulmonary emboli and recurrent DVT     Urology team following.  Hematuria has resolved.  Outpatient follow-up is planned.    Liver cyst  Noted  Will need outpatient follow-up    Debility  Fall and skin precautions  Continue physical therapy and occupational therapy      Hyponatremia  Resolved for now-monitor for recurrence    Elevated ferritin level  Noted    Acute deep vein thrombosis (DVT) of right upper extremity  Patient developed new right upper extremity DVT despite being on apixaban-  Hematology is recommending initiation of warfarin therapy.  Will continue heparin infusion with bridging until INR 2.0 or above.  Fall precautions discussed with the patient.      Non-traumatic rhabdomyolysis  It was felt this is secondary to daptomycin. Trend CPK daily-improving. Follow ID and nephrology recommendations.     Daptomycin was discontinued on admission and patient was placed on IV vancomycin    Anemia  Multifactorial--  Poor nutrition and also history of acute blood loss  Monitor  Hematology following. Follow CBC and transfuse as needed. Patient is [employer], receiving IV Iron.    Moderate protein-calorie malnutrition  Anorexia-patient continues report that food smells and tastes bad   On IV TPN.  Monitor volume status closely  Continue p.o. supplement    Atelectasis  Monitor oxygen saturation. Encourage OOB and IS.      Pulmonary infarct  Due to bilateral pulmonary emboli.    Pulmonary following      Chronic diastolic CHF (congestive heart failure)  Patient is identified as having Diastolic heart failure that is Chronic. CHF is currently controlled. Latest ECHO performed and demonstrates-   Results for orders placed during the hospital encounter of 07/13/20   Echo Color Flow Doppler? Yes    Narrative · Concentric left ventricular remodeling.  · Hyperdynamic left ventricular systolic function. The estimated ejection   fraction is 76%.  · Grade I (mild) left ventricular diastolic dysfunction consistent with   impaired relaxation.        Patient remains on Bicarb drip with generalized edema- Monitor volume status closely    MRSA bacteremia  Previously receiving daptomycin as outpatient-now on IV vancomycin  Orders as per infectious disease    Elevated troponin  Noted- chronically elevated    Bilateral pulmonary embolism  Patient previously on Eliquis at home but developed additional DVT while on it-  Patient currently on  IV heparin   Pulmonology and Hematology following    Patient will need lifelong anticoagulation    COPD (chronic obstructive pulmonary disease)  Monitor O2 sats, supplement O2 as needed  Orders as per pulmonology    Transaminitis  Monitor-trending downward. Felt possibley related to daptomycin.  Possiblely due to clot burden.  Previously discussed with Dr. Decker. No recommendations at this time.     Morbid obesity  Body mass index is 40.65 kg/m².  General weight loss/lifestyle modification strategies discussed (elicit support from others; identify saboteurs; non-food rewards, etc).    Pulmonary hypertension  Noted  Orders as per pulmonology    Hypertension associated with diabetes  Monitor  Continue current treatment  Titrate medications as needed      Disposition:  Patient declines skilled nursing facility or LTAC placement.  Patient will prefer home health, home infusion therapy and physical therapy.    VTE Risk Mitigation (From admission, onward)         Ordered      heparin 25,000 units in dextrose 5% 250 mL (100 units/mL) infusion HIGH INTENSITY nomogram - [facility]  Continuous     Question:  Heparin Infusion Adjustment (DO NOT MODIFY ANSWER)  Answer:  \\[email or web address]\epic\Images\[email or web address]\HeparinInfusions\heparin HIGH INTENSITY nomogram for [facility] [id number]    08/03/20 1609     heparin 25,000 units in dextrose 5% (100 units/ml) IV bolus from bag - ADDITIONAL PRN BOLUS - 60 units/kg  As needed (PRN)     Question:  Heparin Infusion Adjustment (DO NOT MODIFY ANSWER)  Answer:  \\[email or web address]\epic\Images\[email or web address]\HeparinInfusions\heparin HIGH INTENSITY nomogram for [facility] [id number]    08/03/20 1609     heparin 25,000 units in dextrose 5% (100 units/ml) IV bolus from bag - ADDITIONAL PRN BOLUS - 30 units/kg  As needed (PRN)     Question:  Heparin Infusion Adjustment (DO NOT MODIFY ANSWER)  Answer:  \\[email or web address]\[email or web address]\heparin HIGH INTENSITY nomogram for [facility] [id number]    08/03/20 1609     IP VTE HIGH RISK PATIENT  Once      08/02/20 2034     Place sequential compression device  Until discontinued      08/02/20 2034                      Alix Ruth MD  Department of Hospital Medicine   [facility]

## 2020-08-09 NOTE — PLAN OF CARE
Patient tolerated transfers, therex, and gait training with O2, endurance remains limited with gait, however patient with increase since yesterdays session.

## 2020-08-09 NOTE — CARE UPDATE
08/09/20 0729   PRE-TX-O2   O2 Device (Oxygen Therapy) nasal cannula   $ Is the patient on Low Flow Oxygen? Yes   Flow (L/min) 2   SpO2 99 %   Pulse Oximetry Type Intermittent   $ Pulse Oximetry - Multiple Charge Pulse Oximetry - Multiple   Pulse 92   Resp 18   resp txs q4 hrs tolerates well.

## 2020-08-09 NOTE — PROGRESS NOTES
Pharmacokinetic Assessment Follow Up: IV Vancomycin    Vancomycin serum concentration assessment(s):    The random level was drawn correctly and can be used to guide therapy at this time. The measurement is below the desired definitive target range of 15 to 20 mcg/mL.    Vancomycin Regimen Plan:    Give Vancomycin 1000 mg x 1 and Re-dose when the random level is less than 20 mcg/mL, next level to be drawn at 2130 on 8/9    Drug levels (last 3 results):  Recent Labs   Lab Result Units 08/06/20 2040 08/07/20 1929 08/08/20  2100   Vancomycin, Random ug/mL 12.9 15.6 14.7       Pharmacy will continue to follow and monitor vancomycin.    Please contact pharmacy at extension [phone] for questions regarding this assessment.    Thank you for the consult,   Carly Stubbs       Patient brief summary:  Sammi Abreu is a 79 y.o. female initiated on antimicrobial therapy with IV Vancomycin for treatment of bacteremia    The patient's current regimen is pulse dosing. Last dose 1000 mg    Drug Allergies:   Review of patient's allergies indicates:   Allergen Reactions    Cymbalta [duloxetine] Other (See Comments)     Nightmares      Darvon [propoxyphene] Nausea Only and Other (See Comments)     Sweating, slept for 3 days    Atorvastatin Other (See Comments)     Muscle cramps    Naprosyn [naproxen] Nausea Only    Penicillins Rash    Tramadol Nausea Only and Palpitations       Actual Body Weight:   104.1kg    Renal Function:   Estimated Creatinine Clearance: 32 mL/min (A) (based on SCr of 1.7 mg/dL (H)).,         CBC (last 72 hours):  Recent Labs   Lab Result Units 08/07/20  1145 08/07/20 1928 08/08/20  0448   WBC K/uL 7.67  --  7.94   Hemoglobin g/dL 7.3* 8.5* 7.7*   Hematocrit % 23.7* 28.1* 25.3*   Platelets K/uL 215  --  234       Metabolic Panel (last 72 hours):  Recent Labs   Lab Result Units 08/06/20  0643 08/06/20  2350 08/07/20  0428 08/07/20 2040 08/08/20  0448   Sodium mmol/L 137  --  138  --  139   Potassium mmol/L  4.2  --  3.7  --  3.7   Chloride mmol/L 99  --  96  --  97   CO2 mmol/L 31*  --  34*  --  32*   Glucose mg/dL 95  --  99  --  91   Glucose, UA   --  Negative  --  Negative  --    BUN, Bld mg/dL 12  --  12  --  10   Creatinine mg/dL 1.9*  --  1.8*  --  1.7*   Albumin g/dL 2.0*  --  2.0*  --  2.1*   Total Bilirubin mg/dL 0.4  --  0.4  --   --    Alkaline Phosphatase U/L 89  --  97  --   --    AST U/L 289*  --  201*  --   --    ALT U/L 177*  --  157*  --   --    Magnesium mg/dL 2.0  --  1.9  --   --    Phosphorus mg/dL 4.6*  --  4.1  --  4.3       Vancomycin Administrations:  vancomycin given in the last 96 hours                     vancomycin in dextrose 5 % 1 gram/250 mL IVPB 1,000 mg (mg) 1,000 mg New Bag 08/07/20 2251    vancomycin in dextrose 5 % 1 gram/250 mL IVPB 1,000 mg (mg) 1,000 mg New Bag 08/06/20 2300    vancomycin in dextrose 5 % 1 gram/250 mL IVPB 1,000 mg (mg) 1,000 mg New Bag 08/05/20 2139                    Microbiologic Results:  Microbiology Results (last 7 days)       Procedure Component Value Units Date/Time    Urine culture [[id number]] Collected: 08/06/20 2350    Order Status: Completed Specimen: Urine Updated: 08/08/20 1421     Urine Culture, Routine No significant growth    Narrative:      Specimen Source->Urine    Blood culture x two cultures. Draw prior to antibiotics. [[id number]] Collected: 08/02/20 1633    Order Status: Completed Specimen: Blood Updated: 08/07/20 2322     Blood Culture, Routine No growth after 5 days.    Narrative:      Aerobic and anaerobic    Blood culture x two cultures. Draw prior to antibiotics. [[id number]] Collected: 08/02/20 1633    Order Status: Completed Specimen: Blood Updated: 08/07/20 2322     Blood Culture, Routine No growth after 5 days.    Narrative:      Aerobic and anaerobic    Urine culture [[id number]] Collected: 08/07/20 2040    Order Status: No result Specimen: Urine Updated: 08/07/20 2129    Urine Culture High Risk [[id number]] Collected: 08/07/20 2039     Order Status: Canceled Specimen: Urine, Catheterized     Urine Culture High Risk [[id number]] Collected: 08/06/20 8340    Order Status: Canceled Specimen: Urine, Catheterized

## 2020-08-09 NOTE — PLAN OF CARE
08/08/20 1917   Patient Assessment/Suction   Level of Consciousness (AVPU) alert   Respiratory Effort Normal;Unlabored   Expansion/Accessory Muscles/Retractions no use of accessory muscles   All Lung Fields Breath Sounds diminished   Rhythm/Pattern, Respiratory unlabored;pattern regular   Cough Frequency frequent   Cough Type good;nonproductive   PRE-TX-O2   O2 Device (Oxygen Therapy) nasal cannula   $ Is the patient on Low Flow Oxygen? Yes   Flow (L/min) 2   Oxygen Concentration (%) 28   SpO2 (!) 94 %   Pulse Oximetry Type Intermittent   $ Pulse Oximetry - Multiple Charge Pulse Oximetry - Multiple   Pulse 83   Resp 18   Aerosol Therapy   $ Aerosol Therapy Charges Aerosol Treatment   Respiratory Treatment Status (SVN) given   Treatment Route (SVN) mask;oxygen   Patient Position (SVN) sitting in chair   Post Treatment Assessment (SVN) breath sounds unchanged   Signs of Intolerance (SVN) none   Breath Sounds Post-Respiratory Treatment   Throughout All Fields Post-Treatment All Fields   Throughout All Fields Post-Treatment no change   Post-treatment Heart Rate (beats/min) 85   Post-treatment Resp Rate (breaths/min) 18   Incentive Spirometer   $ Incentive Spirometer Charges done with encouragement   Administration (IS) proper technique demonstrated   Number of Repetitions (IS) 10   Level Incentive Spirometer (mL) 750   Patient Tolerance (IS) good   Ready to Wean/Extubation Screen   FIO2<=50 (chart decimal) 0.28

## 2020-08-09 NOTE — SUBJECTIVE & OBJECTIVE
"Interval History:  Patient remains on bicarb drip for ongoing LUIZ.  Patient remains on heparin drip due to failed oral anticoagulation therapy with history of recently diagnosed bilateral pulmonary emboli lower extremity DVT and now new onset of right upper extremity DVT.  Patient being followed by hematology.  Patient now with hematuria.Receiving Iron infusions.    Review of Systems   Constitutional: Positive for activity change, appetite change and fatigue. Negative for chills, diaphoresis and fever.   HENT: Negative for ear discharge, ear pain and facial swelling.    Eyes: Negative for pain and redness.   Respiratory: Positive for cough and shortness of breath.    Cardiovascular: Positive for leg swelling.   Gastrointestinal: Negative for abdominal distention, abdominal pain, constipation, diarrhea, nausea and vomiting.        Poor appetite- patient reports things taste bad and "smell bad"  and she states she has not been hungry   Endocrine: Negative for polydipsia and polyphagia.   Genitourinary: Negative for difficulty urinating, dysuria, flank pain and hematuria.   Musculoskeletal: Positive for back pain. Negative for neck pain and neck stiffness.   Skin: Negative for color change.   Allergic/Immunologic: Negative for food allergies.   Neurological: Positive for weakness. Negative for seizures, facial asymmetry and speech difficulty.   Psychiatric/Behavioral: Negative for agitation, behavioral problems, confusion, hallucinations and suicidal ideas.     Objective:     Vital Signs (Most Recent):  Temp: 97.7 °F (36.5 °C) (08/09/20 0710)  Pulse: 90 (08/09/20 0732)  Resp: 18 (08/09/20 0732)  BP: (!) 146/66 (08/09/20 0710)  SpO2: 100 % (08/09/20 0732) Vital Signs (24h Range):  Temp:  [97.3 °F (36.3 °C)-98.6 °F (37 °C)] 97.7 °F (36.5 °C)  Pulse:  [82-94] 90  Resp:  [18-20] 18  SpO2:  [94 %-100 %] 100 %  BP: (134-196)/(61-82) 146/66     Weight: 107.5 kg (236 lb 15.9 oz)  Body mass index is 41.98 " kg/m².    Intake/Output Summary (Last 24 hours) at 8/9/2020 0922  Last data filed at 8/9/2020 0729  Gross per 24 hour   Intake 480 ml   Output 2325 ml   Net -1845 ml      Physical Exam  Constitutional:       General: She is not in acute distress.     Appearance: Normal appearance.   HENT:      Head: Normocephalic and atraumatic.      Mouth/Throat:      Mouth: Mucous membranes are moist.   Eyes:      General:         Right eye: No discharge.         Left eye: No discharge.      Extraocular Movements: Extraocular movements intact.      Conjunctiva/sclera: Conjunctivae normal.      Pupils: Pupils are equal, round, and reactive to light.   Neck:      Musculoskeletal: Normal range of motion and neck supple. No neck rigidity or muscular tenderness.   Cardiovascular:      Rate and Rhythm: Normal rate and regular rhythm.      Pulses: Normal pulses.      Heart sounds: Murmur present.   Pulmonary:      Effort: No respiratory distress.      Comments: Bilateral breath sounds diminished  Abdominal:      General: Bowel sounds are normal. There is no distension.      Tenderness: There is no abdominal tenderness. There is no guarding.      Comments: Obese   Genitourinary:     Comments: Not examined  Musculoskeletal: Normal range of motion.         General: Swelling present.      Comments: Generalized edema with edema also noted to upper and lower extremities   Skin:     General: Skin is warm and dry.      Capillary Refill: Capillary refill takes less than 2 seconds.   Neurological:      General: No focal deficit present.      Mental Status: She is alert and oriented to person, place, and time. Mental status is at baseline.      Cranial Nerves: No cranial nerve deficit.   Psychiatric:         Mood and Affect: Mood normal.         Behavior: Behavior normal.         Thought Content: Thought content normal.         Judgment: Judgment normal.       Lab Results   Component Value Date    WBC 7.94 08/08/2020    HGB 7.7 (L) 08/08/2020     HCT 25.3 (L) 08/08/2020     (H) 08/08/2020     08/08/2020     CMP  Sodium   Date Value Ref Range Status   08/09/2020 141 136 - 145 mmol/L Final     Potassium   Date Value Ref Range Status   08/09/2020 3.7 3.5 - 5.1 mmol/L Final     Chloride   Date Value Ref Range Status   08/09/2020 97 95 - 110 mmol/L Final     CO2   Date Value Ref Range Status   08/09/2020 36 (H) 23 - 29 mmol/L Final     Glucose   Date Value Ref Range Status   08/09/2020 106 70 - 110 mg/dL Final     BUN, Bld   Date Value Ref Range Status   08/09/2020 9 8 - 23 mg/dL Final     Creatinine   Date Value Ref Range Status   08/09/2020 1.7 (H) 0.5 - 1.4 mg/dL Final     Calcium   Date Value Ref Range Status   08/09/2020 8.1 (L) 8.7 - 10.5 mg/dL Final     Total Protein   Date Value Ref Range Status   08/07/2020 5.7 (L) 6.0 - 8.4 g/dL Final     Albumin   Date Value Ref Range Status   08/09/2020 2.1 (L) 3.5 - 5.2 g/dL Final     Total Bilirubin   Date Value Ref Range Status   08/07/2020 0.4 0.1 - 1.0 mg/dL Final     Comment:     For infants and newborns, interpretation of results should be based  on gestational age, weight and in agreement with clinical  observations.  Premature Infant recommended reference ranges:  Up to 24 hours.............<8.0 mg/dL  Up to 48 hours............<12.0 mg/dL  3-5 days..................<15.0 mg/dL  6-29 days.................<15.0 mg/dL       Alkaline Phosphatase   Date Value Ref Range Status   08/07/2020 97 55 - 135 U/L Final     AST   Date Value Ref Range Status   08/07/2020 201 (H) 10 - 40 U/L Final     ALT   Date Value Ref Range Status   08/07/2020 157 (H) 10 - 44 U/L Final     Anion Gap   Date Value Ref Range Status   08/09/2020 8 8 - 16 mmol/L Final     eGFR if    Date Value Ref Range Status   08/09/2020 33 (A) >60 mL/min/1.73 m^2 Final     eGFR if non    Date Value Ref Range Status   08/09/2020 29 (A) >60 mL/min/1.73 m^2 Final     Comment:     Calculation used to obtain the  estimated glomerular filtration  rate (eGFR) is the CKD-EPI equation.        Microbiology Results (last 7 days)     Procedure Component Value Units Date/Time    Urine culture [[id number]] Collected: 08/06/20 2350    Order Status: Completed Specimen: Urine Updated: 08/08/20 1421     Urine Culture, Routine No significant growth    Narrative:      Specimen Source->Urine    Blood culture x two cultures. Draw prior to antibiotics. [[id number]] Collected: 08/02/20 1633    Order Status: Completed Specimen: Blood Updated: 08/07/20 2322     Blood Culture, Routine No growth after 5 days.    Narrative:      Aerobic and anaerobic    Blood culture x two cultures. Draw prior to antibiotics. [[id number]] Collected: 08/02/20 1633    Order Status: Completed Specimen: Blood Updated: 08/07/20 2322     Blood Culture, Routine No growth after 5 days.    Narrative:      Aerobic and anaerobic    Urine culture [[id number]] Collected: 08/07/20 2040    Order Status: No result Specimen: Urine Updated: 08/07/20 2129    Urine Culture High Risk [[id number]] Collected: 08/07/20 2039    Order Status: Canceled Specimen: Urine, Catheterized     Urine Culture High Risk [[id number]] Collected: 08/06/20 2352    Order Status: Canceled Specimen: Urine, Catheterized           Radiology:  SADIE (07-):  · Hyperdynamic left ventricular systolic function. The estimated ejection fraction is 65 - 70 %.  · Normal appearing left atrial appendage. No thrombus is present in the appendage. Normal appendage velocities.  · Mild mitral regurgitation.  · Mild tricuspid regurgitation.  · No evidence of endocarditis  · PICC line at the cavoatrial junction    ECHO (07-):  · Concentric left ventricular remodeling.  · Hyperdynamic left ventricular systolic function. The estimated ejection fraction is 76%.  · Grade I (mild) left ventricular diastolic dysfunction consistent with impaired relaxation.    · CXR: Concentric left ventricular remodeling.  · Hyperdynamic left  "ventricular systolic function. The estimated ejection fraction is 76%.  Grade I (mild) left ventricular diastolic dysfunction consistent with impaired relaxation.    CXR: There is bibasilar atelectasis versus infiltrate similar to 07/27/2020.    CT abdomen and pelvis without contrast:  Moderate basilar atelectasis. Status post cholecystectomy without evidence for complication.    RUQ abdominal US:  Prior cholecystectomy.  Small simple cyst of the left lobe of the liver otherwise negative right upper quadrant ultrasound.    RUE venous doppler scan:  Occlusive thrombus of the right brachial vein and cephalic vein.    KUB:  Possible ileus.  Prior cholecystectomy.  Prior bilateral hip joint replacement.    Renal US: Features suggesting intrinsic renal disease.  No hydronephrosis.Interval History:  Patient remains on bicarb drip for LUIZ.  Patient remains on heparin drip due to failed oral anticoagulation therapy with history of recently diagnosed bilateral pulmonary emboli lower extremity DVT and now new onset of right upper extremity DVT.  Patient being followed by hematology.  Patient now with hematuria.  Will check retroperitoneal ultrasound and check with Hematology.    Review of Systems   Constitutional: Positive for activity change, appetite change and fatigue. Negative for chills, diaphoresis and fever.   HENT: Negative for ear discharge, ear pain and facial swelling.    Eyes: Negative for pain and redness.   Respiratory: Positive for cough and shortness of breath.    Cardiovascular: Positive for leg swelling.   Gastrointestinal: Negative for abdominal distention, abdominal pain, constipation, diarrhea, nausea and vomiting.        Poor appetite- patient reports things taste bad and "smell bad"  and she states she has not been hungry   Endocrine: Negative for polydipsia and polyphagia.   Genitourinary: Negative for difficulty urinating, dysuria, flank pain and hematuria.   Musculoskeletal: Positive for back pain. " Negative for neck pain and neck stiffness.   Skin: Negative for color change.   Allergic/Immunologic: Negative for food allergies.   Neurological: Positive for weakness. Negative for seizures, facial asymmetry and speech difficulty.   Psychiatric/Behavioral: Negative for agitation, behavioral problems, confusion, hallucinations and suicidal ideas.     Objective:     Vital Signs (Most Recent):  Temp: 97.7 °F (36.5 °C) (08/09/20 0710)  Pulse: 90 (08/09/20 0732)  Resp: 18 (08/09/20 0732)  BP: (!) 146/66 (08/09/20 0710)  SpO2: 100 % (08/09/20 0732) Vital Signs (24h Range):  Temp:  [97.3 °F (36.3 °C)-98.6 °F (37 °C)] 97.7 °F (36.5 °C)  Pulse:  [82-94] 90  Resp:  [18-20] 18  SpO2:  [94 %-100 %] 100 %  BP: (134-196)/(61-82) 146/66     Weight: 107.5 kg (236 lb 15.9 oz)  Body mass index is 41.98 kg/m².    Intake/Output Summary (Last 24 hours) at 8/9/2020 0857  Last data filed at 8/9/2020 0729  Gross per 24 hour   Intake 480 ml   Output 2325 ml   Net -1845 ml      Physical Exam  Constitutional:       General: She is not in acute distress.     Appearance: Normal appearance.   HENT:      Head: Normocephalic and atraumatic.      Mouth/Throat:      Mouth: Mucous membranes are moist.   Eyes:      General:         Right eye: No discharge.         Left eye: No discharge.      Extraocular Movements: Extraocular movements intact.      Conjunctiva/sclera: Conjunctivae normal.      Pupils: Pupils are equal, round, and reactive to light.   Neck:      Musculoskeletal: Normal range of motion and neck supple. No neck rigidity or muscular tenderness.   Cardiovascular:      Rate and Rhythm: Normal rate and regular rhythm.      Pulses: Normal pulses.      Heart sounds: Murmur present.   Pulmonary:      Effort: No respiratory distress.      Comments: Bilateral breath sounds diminished  Abdominal:      General: Bowel sounds are normal. There is no distension.      Tenderness: There is no abdominal tenderness. There is no guarding.      Comments:  Obese   Genitourinary:     Comments: Not examined  Musculoskeletal: Normal range of motion.         General: Swelling present.      Comments: Generalized edema with edema also noted to upper and lower extremities   Skin:     General: Skin is warm and dry.      Capillary Refill: Capillary refill takes less than 2 seconds.   Neurological:      General: No focal deficit present.      Mental Status: She is alert and oriented to person, place, and time. Mental status is at baseline.      Cranial Nerves: No cranial nerve deficit.   Psychiatric:         Mood and Affect: Mood normal.         Behavior: Behavior normal.         Thought Content: Thought content normal.         Judgment: Judgment normal.       Lab Results   Component Value Date    WBC 7.94 08/08/2020    HGB 7.7 (L) 08/08/2020    HCT 25.3 (L) 08/08/2020     (H) 08/08/2020     08/08/2020     CMP  Sodium   Date Value Ref Range Status   08/09/2020 141 136 - 145 mmol/L Final     Potassium   Date Value Ref Range Status   08/09/2020 3.7 3.5 - 5.1 mmol/L Final     Chloride   Date Value Ref Range Status   08/09/2020 97 95 - 110 mmol/L Final     CO2   Date Value Ref Range Status   08/09/2020 36 (H) 23 - 29 mmol/L Final     Glucose   Date Value Ref Range Status   08/09/2020 106 70 - 110 mg/dL Final     BUN, Bld   Date Value Ref Range Status   08/09/2020 9 8 - 23 mg/dL Final     Creatinine   Date Value Ref Range Status   08/09/2020 1.7 (H) 0.5 - 1.4 mg/dL Final     Calcium   Date Value Ref Range Status   08/09/2020 8.1 (L) 8.7 - 10.5 mg/dL Final     Total Protein   Date Value Ref Range Status   08/07/2020 5.7 (L) 6.0 - 8.4 g/dL Final     Albumin   Date Value Ref Range Status   08/09/2020 2.1 (L) 3.5 - 5.2 g/dL Final     Total Bilirubin   Date Value Ref Range Status   08/07/2020 0.4 0.1 - 1.0 mg/dL Final     Comment:     For infants and newborns, interpretation of results should be based  on gestational age, weight and in agreement with  clinical  observations.  Premature Infant recommended reference ranges:  Up to 24 hours.............<8.0 mg/dL  Up to 48 hours............<12.0 mg/dL  3-5 days..................<15.0 mg/dL  6-29 days.................<15.0 mg/dL       Alkaline Phosphatase   Date Value Ref Range Status   08/07/2020 97 55 - 135 U/L Final     AST   Date Value Ref Range Status   08/07/2020 201 (H) 10 - 40 U/L Final     ALT   Date Value Ref Range Status   08/07/2020 157 (H) 10 - 44 U/L Final     Anion Gap   Date Value Ref Range Status   08/09/2020 8 8 - 16 mmol/L Final     eGFR if    Date Value Ref Range Status   08/09/2020 33 (A) >60 mL/min/1.73 m^2 Final     eGFR if non    Date Value Ref Range Status   08/09/2020 29 (A) >60 mL/min/1.73 m^2 Final     Comment:     Calculation used to obtain the estimated glomerular filtration  rate (eGFR) is the CKD-EPI equation.        Microbiology Results (last 7 days)     Procedure Component Value Units Date/Time    Urine culture [[id number]] Collected: 08/06/20 2350    Order Status: Completed Specimen: Urine Updated: 08/08/20 1421     Urine Culture, Routine No significant growth    Narrative:      Specimen Source->Urine    Blood culture x two cultures. Draw prior to antibiotics. [[id number]] Collected: 08/02/20 1633    Order Status: Completed Specimen: Blood Updated: 08/07/20 2322     Blood Culture, Routine No growth after 5 days.    Narrative:      Aerobic and anaerobic    Blood culture x two cultures. Draw prior to antibiotics. [[id number]] Collected: 08/02/20 1633    Order Status: Completed Specimen: Blood Updated: 08/07/20 2322     Blood Culture, Routine No growth after 5 days.    Narrative:      Aerobic and anaerobic    Urine culture [[id number]] Collected: 08/07/20 2040    Order Status: No result Specimen: Urine Updated: 08/07/20 2129    Urine Culture High Risk [[id number]] Collected: 08/07/20 2039    Order Status: Canceled Specimen: Urine, Catheterized     Urine  Culture High Risk [[id number]] Collected: 08/06/20 8204    Order Status: Canceled Specimen: Urine, Catheterized           Radiology:  SADIE (07-):  · Hyperdynamic left ventricular systolic function. The estimated ejection fraction is 65 - 70 %.  · Normal appearing left atrial appendage. No thrombus is present in the appendage. Normal appendage velocities.  · Mild mitral regurgitation.  · Mild tricuspid regurgitation.  · No evidence of endocarditis  · PICC line at the cavoatrial junction    ECHO (07-):  · Concentric left ventricular remodeling.  · Hyperdynamic left ventricular systolic function. The estimated ejection fraction is 76%.  · Grade I (mild) left ventricular diastolic dysfunction consistent with impaired relaxation.    · CXR: Concentric left ventricular remodeling.  · Hyperdynamic left ventricular systolic function. The estimated ejection fraction is 76%.  Grade I (mild) left ventricular diastolic dysfunction consistent with impaired relaxation.    CXR: There is bibasilar atelectasis versus infiltrate similar to 07/27/2020.    CT abdomen and pelvis without contrast:  Moderate basilar atelectasis. Status post cholecystectomy without evidence for complication.    RUQ abdominal US:  Prior cholecystectomy.  Small simple cyst of the left lobe of the liver otherwise negative right upper quadrant ultrasound.    RUE venous doppler scan:  Occlusive thrombus of the right brachial vein and cephalic vein.    KUB:  Possible ileus.  Prior cholecystectomy.  Prior bilateral hip joint replacement.    Renal US: Features suggesting intrinsic renal disease.  No hydronephrosis.

## 2020-08-09 NOTE — NURSING
Pt had high blood pressure. Night NP Trevor notified. No new orders noted. Will continue to monitor.

## 2020-08-09 NOTE — PROGRESS NOTES
Nephrology Progress Note    Patient Name: Sammi Abreu  MRN: [id number]    Patient Class: IP- Inpatient   Admission Date: 8/2/2020  Length of Stay: 6 days  Date of Service: 8/9/2020    Attending Physician: Alix Ruth MD  Primary Care Provider: Osmani Villatoro MD    Reason for Consult: luiz/ckd3/hyponatremia/acidosis/rhabdomyalisis/mrsa bacteremia/anemia/htn    Chief Complaint   Patient presents with    Post-op Problem     pain to left lower chest and BLE after surgery on 7/27       SUBJECTIVE:     HPI: 79F Jehova witness was treated in hospital for MRSA bacteremia, had lap rosette 7/13, complicated by PE and DVT, was d/c 7/29 on daptomycin and returned to ER 5 days later with abdominal pain, elevated CPK and LFTs, LUIZ with modest rise in sCr. CT abdomen w/out contrast looked OK. She was given NS and heparin gtt, Dapto was changed with Vanco.    8/6 VSS, no new complains.  8/7 VSS, no new complains. Appreciate ID input.  8/8 some spikes in BP, on 2L NC, UOP 1.5L  8/9 intermittent spikes in BP, better this AM, on 2L NC, UOP 1.8L, c/o swelling    Outpatient meds:  Current Facility-Administered Medications on File Prior to Encounter   Medication Dose Route Frequency Provider Last Rate Last Dose    lactated ringers infusion   Intravenous Continuous Gino Reid MD 10 mL/hr at 07/13/20 1308      lidocaine (PF) 10 mg/ml (1%) injection 10 mg  1 mL Intradermal Once Gino Reid MD         Current Outpatient Medications on File Prior to Encounter   Medication Sig Dispense Refill    acetaminophen (TYLENOL) 325 MG tablet Take 2 tablets (650 mg total) by mouth every 6 (six) hours as needed (Do not take with any other Tylenol or acetaminophen containing products).  0    albuterol-ipratropium (DUO-NEB) 2.5 mg-0.5 mg/3 mL nebulizer solution Take 3 mLs by nebulization every 8 (eight) hours. 270 mL 0    apixaban (ELIQUIS) 5 mg Tab Take 1 tablet (5 mg total) by mouth 2 (two) times daily. 60 tablet 3    ascorbic  acid, vitamin C, (VITAMIN C) 100 MG tablet Take 5 tablets (500 mg total) by mouth every evening.      esomeprazole (NEXIUM) 20 MG capsule Take 1 capsule (20 mg total) by mouth before breakfast. 30 capsule 2    fluticasone (FLONASE) 50 mcg/actuation nasal spray 2 sprays (100 mcg total) by Each Nare route once daily. 3 Bottle 3    folic acid (FOLVITE) 1 MG tablet Take 1 tablet (1 mg total) by mouth once daily. 30 tablet 0    metoprolol succinate (TOPROL-XL) 50 MG 24 hr tablet Take 1 tablet (50 mg total) by mouth once daily. 90 tablet 3    montelukast (SINGULAIR) 10 mg tablet Take 1 tablet (10 mg total) by mouth every evening. 90 tablet 3    sodium chloride 0.9% SolP 50 mL with DAPTOmycin 350 mg SolR 1,000 mg Inject 1,000 mg into the vein once daily.      spironolactone (ALDACTONE) 25 MG tablet Take 1 tablet (25 mg total) by mouth once daily. 90 tablet 6    budesonide-formoterol 160-4.5 mcg (SYMBICORT) 160-4.5 mcg/actuation HFAA Inhale 2 puffs into the lungs every 12 (twelve) hours. Controller 3 Inhaler 3    cyanocobalamin, vitamin B-12, 2,500 mcg Lozg Place 2 tablets under the tongue once daily. 60 lozenge 11    diclofenac (VOLTAREN) 25 MG TbEC Take 1 tablet (25 mg total) by mouth 3 (three) times daily as needed. 15 tablet 0    HYDROcodone-acetaminophen (NORCO) 5-325 mg per tablet Take 1 tablet by mouth every 6 (six) hours as needed for Pain. (Patient not taking: Reported on 7/30/2020) 15 tablet 0    lactulose (CHRONULAC) 10 gram/15 mL solution Take 30 mLs (20 g total) by mouth 3 (three) times daily. 2 Teaspoon(s) Oral PRN Every evening. (Patient not taking: Reported on 7/30/2020) 500 mL 3    multivitamin capsule Take 1 capsule by mouth once daily.      ondansetron (ZOFRAN-ODT) 4 MG TbDL Take 1 tablet (4 mg total) by mouth every 8 (eight) hours as needed. 20 tablet 0       Scheduled meds:   albuterol-ipratropium  3 mL Nebulization Q6H    ascorbic acid (vitamin C)  500 mg Oral QHS    cyanocobalamin   2,000 mcg Oral Daily    dronabinoL  2.5 mg Oral BID    fluticasone furoate-vilanteroL  1 puff Inhalation Daily    folic acid  1 mg Oral Daily    hydrALAZINE  50 mg Oral Q8H    iron dextran IVPB  100 mg Intravenous Daily    metoprolol succinate  50 mg Oral Daily    montelukast  10 mg Oral QHS    multivitamin  1 tablet Oral Daily    pantoprazole  40 mg Oral Daily    polyethylene glycol  17 g Oral Daily    senna-docusate 8.6-50 mg  1 tablet Oral BID       Infusions:   heparin (porcine) in D5W 8 Units/kg/hr (08/08/20 0943)    sodium bicarbonate drip 100 mL/hr at 08/09/20 0857       PRN meds:  acetaminophen, aluminum-magnesium hydroxide-simethicone, benzonatate, bisacodyL, dextrose 50%, dextrose 50%, glucagon (human recombinant), glucose, glucose, heparin (PORCINE), heparin (PORCINE), magnesium oxide, magnesium oxide, melatonin, morphine, ondansetron, potassium chloride, potassium chloride, potassium chloride, sodium chloride 0.9%, Pharmacy to dose Vancomycin consult **AND** vancomycin - pharmacy to dose    Review of Systems:  negative    OBJECTIVE:     Vital Signs and IO (Last 24H):  Temp:  [97.3 °F (36.3 °C)-98.6 °F (37 °C)]   Pulse:  [82-94]   Resp:  [18-20]   BP: (134-196)/(61-82)   SpO2:  [94 %-100 %]   I/O last 3 completed shifts:  In: 720 [P.O.:720]  Out: 2475 [Urine:2475]    Wt Readings from Last 5 Encounters:   08/09/20 107.5 kg (236 lb 15.9 oz)   07/30/20 99.9 kg (220 lb 3.8 oz)   07/29/20 102.7 kg (226 lb 6.6 oz)   07/13/20 97.5 kg (215 lb)   07/09/20 104.9 kg (231 lb 4.2 oz)     Physical Exam:  Constitutional: nad, aao x 3  Heart: rrr, no m/r/g, wwp, + edema  Lungs: ctab, no w/r/r/c, no lb  Abdomen: s/nt/nd, +BS    Body mass index is 41.98 kg/m².    Laboratory:  Recent Labs   Lab 08/07/20  0428 08/08/20  0448 08/09/20  0505    139 141   K 3.7 3.7 3.7   CL 96 97 97   CO2 34* 32* 36*   BUN 12 10 9   CREATININE 1.8* 1.7* 1.7*   ESTGFRAFRICA 31* 33* 33*   EGFRNONAA 27* 29* 29*   GLU 99 91 106        Recent Labs   Lab 08/05/20  0116 08/06/20  0643 08/07/20  0428 08/08/20  0448 08/09/20  0505   CALCIUM 8.0* 7.9* 7.9* 8.0* 8.1*   ALBUMIN 2.2* 2.0* 2.0* 2.1* 2.1*   PHOS 3.7 4.6* 4.1 4.3 4.8*   MG 2.3 2.0 1.9  --   --        Recent Labs   Lab 12/22/17  1141 06/22/18  0848 12/10/18  0945   PTH, Intact 57.0 115.0 H 81.0 H       No results for input(s): POCTGLUCOSE in the last 168 hours.    Recent Labs   Lab 08/03/20  0418 08/04/20  0030 08/05/20  0116   Hemoglobin A1C 5.7 H 5.9 H 5.9 H       Recent Labs   Lab 08/04/20  0030 08/05/20  2042 08/07/20  1145 08/07/20  1928 08/08/20  0448   WBC 8.18 10.25 7.67  --  7.94   HGB 10.0* 9.0* 7.3* 8.5* 7.7*   HCT 32.4* 29.9* 23.7* 28.1* 25.3*    266 215  --  234   * 106* 106*  --  107*   MCHC 30.9* 30.1* 30.8*  --  30.4*   MONO 8.2  0.7 9.8  1.0  --   --   --        Recent Labs   Lab 08/05/20  0116 08/06/20  0643 08/07/20  0428 08/08/20  0448 08/09/20  0505   BILITOT 0.3 0.4 0.4  --   --    PROT 5.9* 5.7* 5.7*  --   --    ALBUMIN 2.2* 2.0* 2.0* 2.1* 2.1*   ALKPHOS 85 89 97  --   --    * 177* 157*  --   --    * 289* 201*  --   --        Recent Labs   Lab 06/07/20  0952  08/02/20  1652 08/06/20 2350 08/07/20 2040   Color, UA Yellow   < > Yellow Brown A Yellow   Appearance, UA Clear   < > Clear Cloudy A Cloudy A   pH, UA 7.0   < > 7.0 >8.0 A >8.0 A   Specific [place], UA 1.015   < > 1.010 1.010 1.010   Protein, UA Negative   < > 2+ A 1+ A Trace A   Glucose, UA Negative   < > Negative Negative Negative   Ketones, UA Negative   < > Negative Negative Negative   Urobilinogen, UA Negative   < > Negative Negative Negative   Bilirubin (UA) Negative   < > Negative Negative Negative   Occult Blood UA Negative   < > 3+ A 3+ A 3+ A   Nitrite, UA Negative   < > Negative Negative Negative   RBC, UA 0   < > 2 >100 H >100 H   WBC, UA 2   < > 1 48 H 55 H   Bacteria Negative   < > None Moderate A Few A   Hyaline Casts, UA 3 A  --  0 0  --     < > = values in  this interval not displayed.       Recent Labs   Lab 07/13/20  1946   POC PH 7.406   POC PCO2 36.7   POC HCO3 23.1 L   POC PO2 70 L   POC SATURATED O2 94 L   POC BE -2   Sample ARTERIAL       Microbiology Results (last 7 days)     Procedure Component Value Units Date/Time    Urine culture [[id number]] Collected: 08/07/20 2040    Order Status: Completed Specimen: Urine Updated: 08/09/20 1031     Urine Culture, Routine No growth    Narrative:      Specimen Source->Urine    Urine culture [[id number]] Collected: 08/06/20 2350    Order Status: Completed Specimen: Urine Updated: 08/08/20 1421     Urine Culture, Routine No significant growth    Narrative:      Specimen Source->Urine    Blood culture x two cultures. Draw prior to antibiotics. [[id number]] Collected: 08/02/20 1633    Order Status: Completed Specimen: Blood Updated: 08/07/20 2322     Blood Culture, Routine No growth after 5 days.    Narrative:      Aerobic and anaerobic    Blood culture x two cultures. Draw prior to antibiotics. [[id number]] Collected: 08/02/20 1633    Order Status: Completed Specimen: Blood Updated: 08/07/20 2322     Blood Culture, Routine No growth after 5 days.    Narrative:      Aerobic and anaerobic    Urine Culture High Risk [[id number]] Collected: 08/07/20 2039    Order Status: Canceled Specimen: Urine, Catheterized     Urine Culture High Risk [[id number]] Collected: 08/06/20 2352    Order Status: Canceled Specimen: Urine, Catheterized           ASSESSMENT/PLAN:     Active Hospital Problems    Diagnosis  POA    *LUIZ (acute kidney injury) [N17.9]  Yes    Hematuria [R31.9]  No    Patient is [employer] [Z78.9]  Yes    Acute deep vein thrombosis (DVT) of right upper extremity [I82.621]  Yes     Occlusive thrombus of the right brachial vein and cephalic vein      Elevated ferritin level [R79.89]  Yes    Hyponatremia [E87.1]  Yes    Debility [R53.81]  Yes    Liver cyst [K76.89]  Yes    Non-traumatic rhabdomyolysis [M62.82]  Yes     Chronic diastolic CHF (congestive heart failure) [I50.32]  Yes    Pulmonary infarct [I26.99]  Yes    Atelectasis [J98.11]  Yes    Moderate protein-calorie malnutrition [E44.0]  Yes    Anemia [D64.9]  Yes    MRSA bacteremia [R78.81]  Yes    Elevated troponin [R79.89]  Yes    Bilateral pulmonary embolism [I26.99]  Yes    COPD (chronic obstructive pulmonary disease) [J44.9]  Yes    Transaminitis [R74.0]  Yes    Morbid obesity [E66.01]  Yes    Pulmonary hypertension [I27.20]  Yes    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        8/3/2020 16:24 8/4/2020 00:30 8/5/2020 01:16 8/6/2020 06:43 8/7/2020 04:28   CPK 12802 (H) >56397 (H) 95638 (H) 95028 (H) 8326 (H)     Anemia of CKD  Jehovah witness  Hypercoagulable state with thrombosis  No CBC today  On IV iron  Heme/onc following    HTN/Edema  Bumped hydralazine to 50mg TID yest- got third dose this AM- trend BP today.  D/C bicarb gtt.  If still edematous tomorrow, will give diuretic tomorrow.   Aldactone is on hold for now.    LUIZ/CKD III  Stable     Acute rhabdomyolysis 2/2 daptomycin  CPK is trending down.    MRSA bacteremia  On vancomycin    Hypokalemia  Ordered repletion (20mEq).    Alkalosis  D/C bicarb gtt.    Thank you for allowing us to participate in the care of your patient!   We will follow the patient and provide recommendations as needed.    Barbie Gonzalez MD    [facility]  [address] [place] [address]  OLIVIA Garg [id number]    [phone] - tel  [phone] - fax    8/9/2020

## 2020-08-10 ENCOUNTER — TELEPHONE (OUTPATIENT)
Dept: HEMATOLOGY/ONCOLOGY | Facility: CLINIC | Age: 80
End: 2020-08-10

## 2020-08-10 LAB
ALBUMIN SERPL BCP-MCNC: 2.1 G/DL (ref 3.5–5.2)
ANION GAP SERPL CALC-SCNC: 9 MMOL/L (ref 8–16)
APTT BLDCRRT: 37.6 SEC (ref 21–32)
APTT BLDCRRT: 38.3 SEC (ref 21–32)
APTT BLDCRRT: 75.4 SEC (ref 21–32)
APTT HEX PL PPP: POSITIVE S
BASOPHILS # BLD AUTO: 0.02 K/UL (ref 0–0.2)
BASOPHILS NFR BLD: 0.2 % (ref 0–1.9)
BUN SERPL-MCNC: 7 MG/DL (ref 8–23)
CALCIUM SERPL-MCNC: 8.1 MG/DL (ref 8.7–10.5)
CHLORIDE SERPL-SCNC: 100 MMOL/L (ref 95–110)
CK SERPL-CCNC: 1643 U/L (ref 20–180)
CO2 SERPL-SCNC: 31 MMOL/L (ref 23–29)
CREAT SERPL-MCNC: 1.7 MG/DL (ref 0.5–1.4)
DIFFERENTIAL METHOD: ABNORMAL
EOSINOPHIL # BLD AUTO: 0.3 K/UL (ref 0–0.5)
EOSINOPHIL NFR BLD: 4 % (ref 0–8)
ERYTHROCYTE [DISTWIDTH] IN BLOOD BY AUTOMATED COUNT: 14.6 % (ref 11.5–14.5)
EST. GFR  (AFRICAN AMERICAN): 33 ML/MIN/1.73 M^2
EST. GFR  (NON AFRICAN AMERICAN): 29 ML/MIN/1.73 M^2
GLUCOSE SERPL-MCNC: 131 MG/DL (ref 70–110)
HCT VFR BLD AUTO: 24.7 % (ref 37–48.5)
HGB BLD-MCNC: 7.4 G/DL (ref 12–16)
IMM GRANULOCYTES # BLD AUTO: 0.08 K/UL (ref 0–0.04)
IMM GRANULOCYTES NFR BLD AUTO: 1 % (ref 0–0.5)
INR PPP: 1 (ref 0.8–1.2)
LYMPHOCYTES # BLD AUTO: 2.4 K/UL (ref 1–4.8)
LYMPHOCYTES NFR BLD: 29.6 % (ref 18–48)
MCH RBC QN AUTO: 32.6 PG (ref 27–31)
MCHC RBC AUTO-ENTMCNC: 30 G/DL (ref 32–36)
MCV RBC AUTO: 109 FL (ref 82–98)
MONOCYTES # BLD AUTO: 1 K/UL (ref 0.3–1)
MONOCYTES NFR BLD: 12.2 % (ref 4–15)
NEUTROPHILS # BLD AUTO: 4.3 K/UL (ref 1.8–7.7)
NEUTROPHILS NFR BLD: 53 % (ref 38–73)
NRBC BLD-RTO: 0 /100 WBC
PHOSPHATE SERPL-MCNC: 4 MG/DL (ref 2.7–4.5)
PLATELET # BLD AUTO: 259 K/UL (ref 150–350)
PMV BLD AUTO: 10.8 FL (ref 9.2–12.9)
POTASSIUM SERPL-SCNC: 3.7 MMOL/L (ref 3.5–5.1)
PROTHROMBIN TIME: 10.9 SEC (ref 9–12.5)
RBC # BLD AUTO: 2.27 M/UL (ref 4–5.4)
SODIUM SERPL-SCNC: 140 MMOL/L (ref 136–145)
WBC # BLD AUTO: 8.17 K/UL (ref 3.9–12.7)

## 2020-08-10 PROCEDURE — 99232 PR SUBSEQUENT HOSPITAL CARE,LEVL II: ICD-10-PCS | Mod: ,,, | Performed by: INTERNAL MEDICINE

## 2020-08-10 PROCEDURE — 85730 THROMBOPLASTIN TIME PARTIAL: CPT | Mod: 91

## 2020-08-10 PROCEDURE — 99232 SBSQ HOSP IP/OBS MODERATE 35: CPT | Mod: ,,, | Performed by: INTERNAL MEDICINE

## 2020-08-10 PROCEDURE — 85730 THROMBOPLASTIN TIME PARTIAL: CPT

## 2020-08-10 PROCEDURE — 94761 N-INVAS EAR/PLS OXIMETRY MLT: CPT

## 2020-08-10 PROCEDURE — 36415 COLL VENOUS BLD VENIPUNCTURE: CPT

## 2020-08-10 PROCEDURE — 63600175 PHARM REV CODE 636 W HCPCS: Performed by: INTERNAL MEDICINE

## 2020-08-10 PROCEDURE — 97116 GAIT TRAINING THERAPY: CPT | Mod: CQ

## 2020-08-10 PROCEDURE — 85025 COMPLETE CBC W/AUTO DIFF WBC: CPT

## 2020-08-10 PROCEDURE — 94640 AIRWAY INHALATION TREATMENT: CPT

## 2020-08-10 PROCEDURE — 63600175 PHARM REV CODE 636 W HCPCS: Performed by: NURSE PRACTITIONER

## 2020-08-10 PROCEDURE — 25000003 PHARM REV CODE 250: Performed by: INTERNAL MEDICINE

## 2020-08-10 PROCEDURE — 99291 PR CRITICAL CARE, E/M 30-74 MINUTES: ICD-10-PCS | Mod: S$GLB,,, | Performed by: INTERNAL MEDICINE

## 2020-08-10 PROCEDURE — 27000221 HC OXYGEN, UP TO 24 HOURS

## 2020-08-10 PROCEDURE — 99291 CRITICAL CARE FIRST HOUR: CPT | Mod: S$GLB,,, | Performed by: INTERNAL MEDICINE

## 2020-08-10 PROCEDURE — 99900035 HC TECH TIME PER 15 MIN (STAT)

## 2020-08-10 PROCEDURE — 25000242 PHARM REV CODE 250 ALT 637 W/ HCPCS: Performed by: HOSPITALIST

## 2020-08-10 PROCEDURE — 82550 ASSAY OF CK (CPK): CPT

## 2020-08-10 PROCEDURE — 94799 UNLISTED PULMONARY SVC/PX: CPT

## 2020-08-10 PROCEDURE — 12000002 HC ACUTE/MED SURGE SEMI-PRIVATE ROOM

## 2020-08-10 PROCEDURE — 25000003 PHARM REV CODE 250: Performed by: NURSE PRACTITIONER

## 2020-08-10 PROCEDURE — 85610 PROTHROMBIN TIME: CPT

## 2020-08-10 PROCEDURE — 63600175 PHARM REV CODE 636 W HCPCS: Performed by: HOSPITALIST

## 2020-08-10 PROCEDURE — 25000003 PHARM REV CODE 250: Performed by: HOSPITALIST

## 2020-08-10 PROCEDURE — 80069 RENAL FUNCTION PANEL: CPT

## 2020-08-10 RX ADMIN — HEPARIN SODIUM AND DEXTROSE 8 UNITS/KG/HR: 10000; 5 INJECTION INTRAVENOUS at 05:08

## 2020-08-10 RX ADMIN — DOCUSATE SODIUM 50 MG AND SENNOSIDES 8.6 MG 1 TABLET: 8.6; 5 TABLET, FILM COATED ORAL at 09:08

## 2020-08-10 RX ADMIN — WARFARIN SODIUM 5 MG: 5 TABLET ORAL at 05:08

## 2020-08-10 RX ADMIN — FLUTICASONE FUROATE AND VILANTEROL TRIFENATATE 1 PUFF: 100; 25 POWDER RESPIRATORY (INHALATION) at 07:08

## 2020-08-10 RX ADMIN — VANCOMYCIN HYDROCHLORIDE 1000 MG: 1 INJECTION, POWDER, LYOPHILIZED, FOR SOLUTION INTRAVENOUS at 01:08

## 2020-08-10 RX ADMIN — HYDRALAZINE HYDROCHLORIDE 50 MG: 25 TABLET, FILM COATED ORAL at 05:08

## 2020-08-10 RX ADMIN — POLYETHYLENE GLYCOL 3350 17 G: 17 POWDER, FOR SOLUTION ORAL at 09:08

## 2020-08-10 RX ADMIN — CYANOCOBALAMIN TAB 1000 MCG 2000 MCG: 1000 TAB at 09:08

## 2020-08-10 RX ADMIN — DRONABINOL 2.5 MG: 2.5 CAPSULE ORAL at 08:08

## 2020-08-10 RX ADMIN — HYDRALAZINE HYDROCHLORIDE 50 MG: 25 TABLET, FILM COATED ORAL at 08:08

## 2020-08-10 RX ADMIN — HYDRALAZINE HYDROCHLORIDE 50 MG: 25 TABLET, FILM COATED ORAL at 02:08

## 2020-08-10 RX ADMIN — THERA TABS 1 TABLET: TAB at 09:08

## 2020-08-10 RX ADMIN — MONTELUKAST 10 MG: 10 TABLET, FILM COATED ORAL at 08:08

## 2020-08-10 RX ADMIN — HEPARIN SODIUM AND DEXTROSE 10 UNITS/KG/HR: 10000; 5 INJECTION INTRAVENOUS at 08:08

## 2020-08-10 RX ADMIN — Medication 500 MG: at 08:08

## 2020-08-10 RX ADMIN — IPRATROPIUM BROMIDE AND ALBUTEROL SULFATE 3 ML: .5; 2.5 SOLUTION RESPIRATORY (INHALATION) at 01:08

## 2020-08-10 RX ADMIN — METOPROLOL SUCCINATE 50 MG: 50 TABLET, FILM COATED, EXTENDED RELEASE ORAL at 09:08

## 2020-08-10 RX ADMIN — IPRATROPIUM BROMIDE AND ALBUTEROL SULFATE 3 ML: .5; 2.5 SOLUTION RESPIRATORY (INHALATION) at 12:08

## 2020-08-10 RX ADMIN — PANTOPRAZOLE SODIUM 40 MG: 40 TABLET, DELAYED RELEASE ORAL at 09:08

## 2020-08-10 RX ADMIN — FOLIC ACID 1 MG: 1 TABLET ORAL at 09:08

## 2020-08-10 RX ADMIN — IRON DEXTRAN 100 MG: 50 INJECTION INTRAMUSCULAR; INTRAVENOUS at 09:08

## 2020-08-10 RX ADMIN — DOCUSATE SODIUM 50 MG AND SENNOSIDES 8.6 MG 1 TABLET: 8.6; 5 TABLET, FILM COATED ORAL at 08:08

## 2020-08-10 RX ADMIN — IPRATROPIUM BROMIDE AND ALBUTEROL SULFATE 3 ML: .5; 2.5 SOLUTION RESPIRATORY (INHALATION) at 06:08

## 2020-08-10 RX ADMIN — IPRATROPIUM BROMIDE AND ALBUTEROL SULFATE 3 ML: .5; 2.5 SOLUTION RESPIRATORY (INHALATION) at 07:08

## 2020-08-10 NOTE — PROGRESS NOTES
Nephrology Progress Note    Patient Name: Sammi Abreu  MRN: [id number]    Patient Class: IP- Inpatient   Admission Date: 8/2/2020  Length of Stay: 7 days  Date of Service: 8/10/2020    Attending Physician: Alix Ruth MD  Primary Care Provider: Osmani Villatoro MD    Reason for Consult: luiz/ckd3/hyponatremia/acidosis/rhabdomyalisis/mrsa bacteremia/anemia/htn    Chief Complaint   Patient presents with    Post-op Problem     pain to left lower chest and BLE after surgery on 7/27       SUBJECTIVE:     HPI: 79F Jehova witness was treated in hospital for MRSA bacteremia, had lap rosette 7/13, complicated by PE and DVT, was d/c 7/29 on daptomycin and returned to ER 5 days later with abdominal pain, elevated CPK and LFTs, LUIZ with modest rise in sCr. CT abdomen w/out contrast looked OK. She was given NS and heparin gtt, Dapto was changed with Vanco.    8/6 VSS, no new complains.  8/7 VSS, no new complains. Appreciate ID input.  8/8 some spikes in BP, on 2L NC, UOP 1.5L  8/9 intermittent spikes in BP, better this AM, on 2L NC, UOP 1.8L, c/o swelling  8/10  Unavailable for exam.  Sitting on the commode     Outpatient meds:  Current Facility-Administered Medications on File Prior to Encounter   Medication Dose Route Frequency Provider Last Rate Last Dose    lactated ringers infusion   Intravenous Continuous Gino Reid MD 10 mL/hr at 07/13/20 1308      lidocaine (PF) 10 mg/ml (1%) injection 10 mg  1 mL Intradermal Once Gino Reid MD         Current Outpatient Medications on File Prior to Encounter   Medication Sig Dispense Refill    acetaminophen (TYLENOL) 325 MG tablet Take 2 tablets (650 mg total) by mouth every 6 (six) hours as needed (Do not take with any other Tylenol or acetaminophen containing products).  0    albuterol-ipratropium (DUO-NEB) 2.5 mg-0.5 mg/3 mL nebulizer solution Take 3 mLs by nebulization every 8 (eight) hours. 270 mL 0    apixaban (ELIQUIS) 5 mg Tab Take 1 tablet (5 mg total) by  mouth 2 (two) times daily. 60 tablet 3    ascorbic acid, vitamin C, (VITAMIN C) 100 MG tablet Take 5 tablets (500 mg total) by mouth every evening.      esomeprazole (NEXIUM) 20 MG capsule Take 1 capsule (20 mg total) by mouth before breakfast. 30 capsule 2    fluticasone (FLONASE) 50 mcg/actuation nasal spray 2 sprays (100 mcg total) by Each Nare route once daily. 3 Bottle 3    folic acid (FOLVITE) 1 MG tablet Take 1 tablet (1 mg total) by mouth once daily. 30 tablet 0    metoprolol succinate (TOPROL-XL) 50 MG 24 hr tablet Take 1 tablet (50 mg total) by mouth once daily. 90 tablet 3    montelukast (SINGULAIR) 10 mg tablet Take 1 tablet (10 mg total) by mouth every evening. 90 tablet 3    sodium chloride 0.9% SolP 50 mL with DAPTOmycin 350 mg SolR 1,000 mg Inject 1,000 mg into the vein once daily.      spironolactone (ALDACTONE) 25 MG tablet Take 1 tablet (25 mg total) by mouth once daily. 90 tablet 6    budesonide-formoterol 160-4.5 mcg (SYMBICORT) 160-4.5 mcg/actuation HFAA Inhale 2 puffs into the lungs every 12 (twelve) hours. Controller 3 Inhaler 3    cyanocobalamin, vitamin B-12, 2,500 mcg Lozg Place 2 tablets under the tongue once daily. 60 lozenge 11    diclofenac (VOLTAREN) 25 MG TbEC Take 1 tablet (25 mg total) by mouth 3 (three) times daily as needed. 15 tablet 0    HYDROcodone-acetaminophen (NORCO) 5-325 mg per tablet Take 1 tablet by mouth every 6 (six) hours as needed for Pain. (Patient not taking: Reported on 7/30/2020) 15 tablet 0    lactulose (CHRONULAC) 10 gram/15 mL solution Take 30 mLs (20 g total) by mouth 3 (three) times daily. 2 Teaspoon(s) Oral PRN Every evening. (Patient not taking: Reported on 7/30/2020) 500 mL 3    multivitamin capsule Take 1 capsule by mouth once daily.      ondansetron (ZOFRAN-ODT) 4 MG TbDL Take 1 tablet (4 mg total) by mouth every 8 (eight) hours as needed. 20 tablet 0       Scheduled meds:   albuterol-ipratropium  3 mL Nebulization Q6H    ascorbic acid  (vitamin C)  500 mg Oral QHS    cyanocobalamin  2,000 mcg Oral Daily    dronabinoL  2.5 mg Oral BID    fluticasone furoate-vilanteroL  1 puff Inhalation Daily    folic acid  1 mg Oral Daily    hydrALAZINE  50 mg Oral Q8H    iron dextran IVPB  100 mg Intravenous Daily    metoprolol succinate  50 mg Oral Daily    montelukast  10 mg Oral QHS    multivitamin  1 tablet Oral Daily    pantoprazole  40 mg Oral Daily    polyethylene glycol  17 g Oral Daily    senna-docusate 8.6-50 mg  1 tablet Oral BID    vancomycin (VANCOCIN) IVPB  1,000 mg Intravenous Q24H    warfarin  5 mg Oral Daily       Infusions:   heparin (porcine) in D5W 10 Units/kg/hr (08/10/20 0621)       PRN meds:  acetaminophen, aluminum-magnesium hydroxide-simethicone, benzonatate, bisacodyL, dextrose 50%, dextrose 50%, glucagon (human recombinant), glucose, glucose, heparin (PORCINE), heparin (PORCINE), magnesium oxide, magnesium oxide, melatonin, morphine, ondansetron, potassium chloride, potassium chloride, potassium chloride, sodium chloride 0.9%, Pharmacy to dose Vancomycin consult **AND** vancomycin - pharmacy to dose    Review of Systems:  negative    OBJECTIVE:     Vital Signs and IO (Last 24H):  Temp:  [98.5 °F (36.9 °C)-99.6 °F (37.6 °C)]   Pulse:  [86-98]   Resp:  [17-20]   BP: (114-212)/(64-92)   SpO2:  [93 %-99 %]   I/O last 3 completed shifts:  In: 2561.1 [P.O.:960; I.V.:851.1; IV Piggyback:750]  Out: 1875 [Urine:1875]    Wt Readings from Last 5 Encounters:   08/10/20 108 kg (238 lb 1.6 oz)   07/30/20 99.9 kg (220 lb 3.8 oz)   07/29/20 102.7 kg (226 lb 6.6 oz)   07/13/20 97.5 kg (215 lb)   07/09/20 104.9 kg (231 lb 4.2 oz)     Physical Exam: (8/9)  Constitutional: nad, aao x 3  Heart: rrr, no m/r/g, wwp, + edema  Lungs: ctab, no w/r/r/c, no lb  Abdomen: s/nt/nd, +BS    Body mass index is 42.18 kg/m².    Laboratory:  Recent Labs   Lab 08/08/20  0448 08/09/20  0505 08/10/20  0415    141 140   K 3.7 3.7 3.7   CL 97 97 100   CO2  32* 36* 31*   BUN 10 9 7*   CREATININE 1.7* 1.7* 1.7*   ESTGFRAFRICA 33* 33* 33*   EGFRNONAA 29* 29* 29*   GLU 91 106 131*       Recent Labs   Lab 08/05/20  0116 08/06/20  0643 08/07/20  0428 08/08/20  0448 08/09/20  0505 08/10/20  0415   CALCIUM 8.0* 7.9* 7.9* 8.0* 8.1* 8.1*   ALBUMIN 2.2* 2.0* 2.0* 2.1* 2.1* 2.1*   PHOS 3.7 4.6* 4.1 4.3 4.8* 4.0   MG 2.3 2.0 1.9  --   --   --        Recent Labs   Lab 12/22/17  1141 06/22/18  0848 12/10/18  0945   PTH, Intact 57.0 115.0 H 81.0 H       No results for input(s): POCTGLUCOSE in the last 168 hours.    Recent Labs   Lab 08/03/20  0418 08/04/20  0030 08/05/20  0116   Hemoglobin A1C 5.7 H 5.9 H 5.9 H       Recent Labs   Lab 08/05/20  2042 08/07/20  1145 08/07/20  1928 08/08/20  0448 08/10/20  0415   WBC 10.25 7.67  --  7.94 8.17   HGB 9.0* 7.3* 8.5* 7.7* 7.4*   HCT 29.9* 23.7* 28.1* 25.3* 24.7*    215  --  234 259   * 106*  --  107* 109*   MCHC 30.1* 30.8*  --  30.4* 30.0*   MONO 9.8  1.0  --   --   --  12.2  1.0       Recent Labs   Lab 08/05/20  0116 08/06/20  0643 08/07/20  0428 08/08/20  0448 08/09/20  0505 08/10/20  0415   BILITOT 0.3 0.4 0.4  --   --   --    PROT 5.9* 5.7* 5.7*  --   --   --    ALBUMIN 2.2* 2.0* 2.0* 2.1* 2.1* 2.1*   ALKPHOS 85 89 97  --   --   --    * 177* 157*  --   --   --    * 289* 201*  --   --   --        Recent Labs   Lab 06/07/20  0952  08/02/20  1652 08/06/20  2350 08/07/20 2040   Color, UA Yellow   < > Yellow Brown A Yellow   Appearance, UA Clear   < > Clear Cloudy A Cloudy A   pH, UA 7.0   < > 7.0 >8.0 A >8.0 A   Specific [place], UA 1.015   < > 1.010 1.010 1.010   Protein, UA Negative   < > 2+ A 1+ A Trace A   Glucose, UA Negative   < > Negative Negative Negative   Ketones, UA Negative   < > Negative Negative Negative   Urobilinogen, UA Negative   < > Negative Negative Negative   Bilirubin (UA) Negative   < > Negative Negative Negative   Occult Blood UA Negative   < > 3+ A 3+ A 3+ A   Nitrite, UA Negative   <  > Negative Negative Negative   RBC, UA 0   < > 2 >100 H >100 H   WBC, UA 2   < > 1 48 H 55 H   Bacteria Negative   < > None Moderate A Few A   Hyaline Casts, UA 3 A  --  0 0  --     < > = values in this interval not displayed.       Recent Labs   Lab 07/13/20 1946   POC PH 7.406   POC PCO2 36.7   POC HCO3 23.1 L   POC PO2 70 L   POC SATURATED O2 94 L   POC BE -2   Sample ARTERIAL       Microbiology Results (last 7 days)     Procedure Component Value Units Date/Time    Urine culture [[id number]] Collected: 08/07/20 2040    Order Status: Completed Specimen: Urine Updated: 08/09/20 1031     Urine Culture, Routine No growth    Narrative:      Specimen Source->Urine    Urine culture [[id number]] Collected: 08/06/20 2350    Order Status: Completed Specimen: Urine Updated: 08/08/20 1421     Urine Culture, Routine No significant growth    Narrative:      Specimen Source->Urine    Blood culture x two cultures. Draw prior to antibiotics. [[id number]] Collected: 08/02/20 1633    Order Status: Completed Specimen: Blood Updated: 08/07/20 2322     Blood Culture, Routine No growth after 5 days.    Narrative:      Aerobic and anaerobic    Blood culture x two cultures. Draw prior to antibiotics. [[id number]] Collected: 08/02/20 1633    Order Status: Completed Specimen: Blood Updated: 08/07/20 2322     Blood Culture, Routine No growth after 5 days.    Narrative:      Aerobic and anaerobic    Urine Culture High Risk [[id number]] Collected: 08/07/20 2039    Order Status: Canceled Specimen: Urine, Catheterized     Urine Culture High Risk [[id number]] Collected: 08/06/20 2352    Order Status: Canceled Specimen: Urine, Catheterized           ASSESSMENT/PLAN:     Active Hospital Problems    Diagnosis  POA    *LUIZ (acute kidney injury) [N17.9]  Yes    Hematuria [R31.9]  No    Patient is [place] [Z78.9]  Yes    Acute deep vein thrombosis (DVT) of right upper extremity [I82.621]  Yes     Occlusive thrombus of the right brachial vein  and cephalic vein      Elevated ferritin level [R79.89]  Yes    Hyponatremia [E87.1]  Yes    Debility [R53.81]  Yes    Liver cyst [K76.89]  Yes    Non-traumatic rhabdomyolysis [M62.82]  Yes    Chronic diastolic CHF (congestive heart failure) [I50.32]  Yes    Pulmonary infarct [I26.99]  Yes    Atelectasis [J98.11]  Yes    Moderate protein-calorie malnutrition [E44.0]  Yes    Anemia [D64.9]  Yes    MRSA bacteremia [R78.81]  Yes    Elevated troponin [R79.89]  Yes    Bilateral pulmonary embolism [I26.99]  Yes    COPD (chronic obstructive pulmonary disease) [J44.9]  Yes    Transaminitis [R74.0]  Yes    Morbid obesity [E66.01]  Yes    Pulmonary hypertension [I27.20]  Yes    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        8/3/2020 16:24 8/4/2020 00:30 8/5/2020 01:16 8/6/2020 06:43 8/7/2020 04:28   CPK 42650 (H) >67975 (H) 23503 (H) 77013 (H) 8326 (H)     Anemia of CKD  Jehovah witness  Hypercoagulable state with thrombosis     On IV iron  Heme/onc following    HTN/Edema  Bumped hydralazine to 50mg TID 8/9  D/C bicarb gtt.  Aldactone is on hold for now.    LUIZ/CKD III  Stable     Acute rhabdomyolysis 2/2 daptomycin  CPK is trending down.    MRSA bacteremia  On vancomycin    Hypokalemia  --better    Alkalosis  --better after D/C bicarb gtt.    Thank you for allowing us to participate in the care of your patient  We will follow the patient and provide recommendations as needed.    Amari Sanz MD    [facility]  [address] [place] [address]  OLIVIA Garg [id number] [phone] - tel  [phone] - fax    8/10/2020

## 2020-08-10 NOTE — RESPIRATORY THERAPY
08/10/20 1849   Patient Assessment/Suction   Level of Consciousness (AVPU) alert   Respiratory Effort Unlabored   All Lung Fields Breath Sounds diminished   PRE-TX-O2   O2 Device (Oxygen Therapy) nasal cannula   $ Is the patient on Low Flow Oxygen? Yes   Flow (L/min) 2   SpO2 (!) 94 %   Pulse Oximetry Type Intermittent   $ Pulse Oximetry - Multiple Charge Pulse Oximetry - Multiple   Pulse 82   Resp 16   Aerosol Therapy   $ Aerosol Therapy Charges Aerosol Treatment   Respiratory Treatment Status (SVN) given   Treatment Route (SVN) mask   Patient Position (SVN) HOB elevated   Post Treatment Assessment (SVN) breath sounds unchanged   Signs of Intolerance (SVN) none   Breath Sounds Post-Respiratory Treatment   Throughout All Fields Post-Treatment no change   Post-treatment Heart Rate (beats/min) 82   Post-treatment Resp Rate (breaths/min) 16   Incentive Spirometer   $ Incentive Spirometer Charges done with encouragement   Administration (IS) proper technique demonstrated   Number of Repetitions (IS) 10   Level Incentive Spirometer (mL) 500   Patient Tolerance (IS) good

## 2020-08-10 NOTE — CONSULTS
Food & Nutrition  Education    Diet Education: Coumadin Diet  Learners: Patient      Nutrition Education provided with handouts:   What to Know When Taking Warfarin    Comments:  RD working remotely. Consult for coumadin diet edu. Diet handout added to clinical references to be printed off at discharge.       Follow-Up: yes    Please Re-consult as needed        Thanks!

## 2020-08-10 NOTE — ASSESSMENT & PLAN NOTE
Multifactorial--  Poor nutrition and also history of acute blood loss  Monitor  Hematology following. Follow CBC and transfuse as needed. Patient is Buddhist, receiving IV Iron.

## 2020-08-10 NOTE — CARE UPDATE
08/10/20 0751   Patient Assessment/Suction   Level of Consciousness (AVPU) alert   Respiratory Effort Normal;Unlabored   Expansion/Accessory Muscles/Retractions expansion symmetric;no retractions;no use of accessory muscles   All Lung Fields Breath Sounds diminished   Rhythm/Pattern, Respiratory depth regular;pattern regular;unlabored   Cough Frequency infrequent   Cough Type good;nonproductive   PRE-TX-O2   O2 Device (Oxygen Therapy) nasal cannula   $ Is the patient on Low Flow Oxygen? Yes   Flow (L/min) 1   SpO2 (!) 94 %   Pulse Oximetry Type Intermittent   $ Pulse Oximetry - Multiple Charge Pulse Oximetry - Multiple   Pulse 92   Resp 18   Positioning HOB elevated 30 degrees   Inhaler   $ Inhaler Charges MDI (Metered Dose Inahler) Treatment   Daily Review of Necessity (Inhaler) completed   Respiratory Treatment Status (Inhaler) given   Treatment Route (Inhaler) mouthpiece   Patient Position (Inhaler) HOB elevated   Post Treatment Assessment (Inhaler) breath sounds unchanged   Signs of Intolerance (Inhaler) none   Breath Sounds Post-Respiratory Treatment   Throughout All Fields Post-Treatment All Fields   Throughout All Fields Post-Treatment no change   Post-treatment Heart Rate (beats/min) 92   Post-treatment Resp Rate (breaths/min) 20   Will continue to monitor

## 2020-08-10 NOTE — PT/OT/SLP PROGRESS
Physical Therapy Treatment    Patient Name:  Sammi Abreu   MRN:  [id number]    Recommendations:     Discharge Recommendations:  home health PT   Discharge Equipment Recommendations: none   Barriers to discharge: None    Assessment:     Sammi Abreu is a 79 y.o. female admitted with a medical diagnosis of LUIZ (acute kidney injury).  She presents with the following impairments/functional limitations:  weakness, impaired endurance, impaired functional mobilty, decreased lower extremity function, pain, impaired cardiopulmonary response to activity. Tolerated treatment well today. Pt noted less pain in her L anterior thigh today following activity as compared to prior treatment sessions. Pt experienced mild weakness and SOB following activity.    Rehab Prognosis: Fair; patient would benefit from acute skilled PT services to address these deficits and reach maximum level of function.    Recent Surgery: * No surgery found *      Plan:     During this hospitalization, patient to be seen 6 x/week to address the identified rehab impairments via gait training, therapeutic activities, therapeutic exercises and progress toward the following goals:    · Plan of Care Expires:  (09/05/2020)    Subjective     Chief Complaint: weakness   Patient/Family Comments/goals: to return home  Pain/Comfort:  · Pain Rating 1: 0/10  · Pain Rating Post-Intervention 1: 3/10  · Location - Side 2: Left  · Location - Orientation 2: anterior  · Location 2: thigh  · Pain Addressed 2: Reposition, Nurse notified      Objective:     Communicated with nurse Taylor prior to session.  Patient found supine with bed alarm, oxygen, telemetry, peripheral IV upon PT entry to room.     General Precautions: Standard, fall, contact   Orthopedic Precautions:N/A   Braces: N/A     Functional Mobility:  · Bed Mobility:     · Rolling Left:  contact guard assistance  · Supine to Sit: contact guard assistance  · Transfers:     · Sit to Stand:  contact guard  assistance with rolling walker  · Gait: 100' with rw/O2 and CGA with IV in tow      AM-PAC 6 CLICK MOBILITY          Therapeutic Activities and Exercises:  Transferred bed <-> bedside commode with Min A  Sit to stand using rw CGA  Ambulated in hallway with rw/O2 and IV in tow. Sat in chair and returned to room.       Patient left up in chair with all lines intact, call button in reach, chair alarm on and nurse Claudia notified..    GOALS:   Multidisciplinary Problems     Physical Therapy Goals        Problem: Physical Therapy Goal    Goal Priority Disciplines Outcome Goal Variances Interventions   Physical Therapy Goal     PT, PT/OT Ongoing, Progressing     Description: Goals to be met by: 2020     Patient will increase functional independence with mobility by performin. Supine to sit with Supervision  2. Sit to stand transfer with Supervision  3. Bed to chair transfer with Supervision using Rolling Walker  4. Gait  x 150 feet with Supervision using Rolling Walker.   5. Lower extremity exercise program x20 reps per handout, with independence                     Time Tracking:     PT Received On: 08/10/20  PT Start Time: 1340     PT Stop Time: 1400  PT Total Time (min): 20 min     Billable Minutes: Gait Training 12 and Therapeutic Activity 8    Treatment Type: Treatment  PT/PTA: PTA     PTA Visit Number: 4     Ernestine Zhang PTA  08/10/2020

## 2020-08-10 NOTE — PROGRESS NOTES
[facility] Hospital Medicine  Progress Note    Patient Name: Sammi Abreu  MRN: [id number]  Patient Class: IP- Inpatient   Admission Date: 8/2/2020  Length of Stay: 7 days  Attending Physician: Alix Ruth MD  Primary Care Provider: Osmani Villatoro MD        Subjective:     Principal Problem:LUIZ (acute kidney injury)    HPI:  Sammi Abreu is a 79-year-old female with past medical history significant for arthritis and chronic back pain, fibromyalgia, glaucoma, hyperlipidemia, hypertension, sleep apnea with history of noncompliance with CPAP, morbid obesity, hypertension, GERD, COPD, diabetes type 2, prior DVT, and gallstones [facility] of [facility] x3 days.  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   Patient was discharged from this facility on 07/29/2020 after being treated for bilateral pulmonary embolism.  Patient is currently on Eliquis.  Patient underwent a CT abdomen pelvis while in the emergency department which identified surgical changes but nothing acute.  Patient is noted to have a mild LUIZ.  Her troponin is elevated at 0.41 which is consistent with prior levels.  She will be admitted to the service of hospital Medicine for continued treatment.    Overview/Hospital Course:  Patient monitor closely during hospitalization.  She was placed on continuous telemetry monitoring.  She was noted to have rhabdomyolysis with CPK greater than [id number] and LUIZ.  Nephrology was consulted.  She was noted to have metabolic acidosis initiated on bicarb drip.  Daptomycin was held and ID was consulted.  She was initiated on IV vancomycin.  She was noted have increased right upper extremity swelling.  Right upper extremity ultrasound demonstrated occlusive thrombus of the right brachial vein and cephalic vein. She was initiated on heparin drip and her Eliquis was held.  There was concern for Eliquis failure.  Hematology  "consulted.  PT and OT were consulted for debility.  Patient with ongoing anorexia and poor p.o. intake. Dietary was consulted for protein calorie malnutrition.  It was recommended patient be placed on TPN lipids.  Her renal status was monitored closely and she remain on bicarb drip with plans to start TPN once acute kidney injury resolved if no improvement in her p.o. intake.  Patient was noted to have some hematuria during her stay.  A retroperitoneal ultrasound was ordered.    Interval History:  Coumadin initiated yesterday. Patient remains on heparin drip due to failed oral anticoagulation therapy with history of recently diagnosed bilateral pulmonary emboli lower extremity DVT and now new onset of right upper extremity DVT.  Patient being followed by hematology. Receiving Iron infusions. No hematuria noted.    Review of Systems   Constitutional: Positive for activity change, appetite change and fatigue. Negative for chills, diaphoresis and fever.   HENT: Negative for ear discharge, ear pain and facial swelling.    Eyes: Negative for pain and redness.   Respiratory: Positive for cough and shortness of breath.    Cardiovascular: Positive for leg swelling.   Gastrointestinal: Negative for abdominal distention, abdominal pain, constipation, diarrhea, nausea and vomiting.        Poor appetite- patient reports things taste bad and "smell bad"  and she states she has not been hungry   Endocrine: Negative for polydipsia and polyphagia.   Genitourinary: Negative for difficulty urinating, dysuria, flank pain and hematuria.   Musculoskeletal: Positive for back pain. Negative for neck pain and neck stiffness.   Skin: Negative for color change.   Allergic/Immunologic: Negative for food allergies.   Neurological: Positive for weakness. Negative for seizures, facial asymmetry and speech difficulty.   Psychiatric/Behavioral: Negative for agitation, behavioral problems, confusion, hallucinations and suicidal ideas. "     Objective:     Vital Signs (Most Recent):  Temp: 98.7 °F (37.1 °C) (08/10/20 0310)  Pulse: 98 (08/10/20 0310)  Resp: 20 (08/10/20 0310)  BP: (!) 147/67 (08/10/20 0310)  SpO2: (!) 94 % (08/10/20 0310) Vital Signs (24h Range):  Temp:  [98.7 °F (37.1 °C)-99.6 °F (37.6 °C)] 98.7 °F (37.1 °C)  Pulse:  [86-98] 98  Resp:  [17-20] 20  SpO2:  [93 %-97 %] 94 %  BP: (114-212)/(64-84) 147/67     Weight: 108 kg (238 lb 1.6 oz)  Body mass index is 42.18 kg/m².    Intake/Output Summary (Last 24 hours) at 8/10/2020 0743  Last data filed at 8/10/2020 0621  Gross per 24 hour   Intake 2561.1 ml   Output 1000 ml   Net 1561.1 ml      Physical Exam  Constitutional:       General: She is not in acute distress.     Appearance: Normal appearance.   HENT:      Head: Normocephalic and atraumatic.      Mouth/Throat:      Mouth: Mucous membranes are moist.   Eyes:      General:         Right eye: No discharge.         Left eye: No discharge.      Extraocular Movements: Extraocular movements intact.      Conjunctiva/sclera: Conjunctivae normal.      Pupils: Pupils are equal, round, and reactive to light.   Neck:      Musculoskeletal: Normal range of motion and neck supple. No neck rigidity or muscular tenderness.   Cardiovascular:      Rate and Rhythm: Normal rate and regular rhythm.      Pulses: Normal pulses.      Heart sounds: Murmur present.   Pulmonary:      Effort: No respiratory distress.      Comments: Bilateral breath sounds diminished  Abdominal:      General: Bowel sounds are normal. There is no distension.      Tenderness: There is no abdominal tenderness. There is no guarding.      Comments: Obese   Genitourinary:     Comments: Not examined  Musculoskeletal: Normal range of motion.         General: Swelling present.      Comments: Generalized edema with edema also noted to upper and lower extremities   Skin:     General: Skin is warm and dry.      Capillary Refill: Capillary refill takes less than 2 seconds.   Neurological:       General: No focal deficit present.      Mental Status: She is alert and oriented to person, place, and time. Mental status is at baseline.      Cranial Nerves: No cranial nerve deficit.   Psychiatric:         Mood and Affect: Mood normal.         Behavior: Behavior normal.         Thought Content: Thought content normal.         Judgment: Judgment normal.       Lab Results   Component Value Date    WBC 8.17 08/10/2020    HGB 7.4 (L) 08/10/2020    HCT 24.7 (L) 08/10/2020     (H) 08/10/2020     08/10/2020     CMP  Sodium   Date Value Ref Range Status   08/10/2020 140 136 - 145 mmol/L Final     Potassium   Date Value Ref Range Status   08/10/2020 3.7 3.5 - 5.1 mmol/L Final     Chloride   Date Value Ref Range Status   08/10/2020 100 95 - 110 mmol/L Final     CO2   Date Value Ref Range Status   08/10/2020 31 (H) 23 - 29 mmol/L Final     Glucose   Date Value Ref Range Status   08/10/2020 131 (H) 70 - 110 mg/dL Final     BUN, Bld   Date Value Ref Range Status   08/10/2020 7 (L) 8 - 23 mg/dL Final     Creatinine   Date Value Ref Range Status   08/10/2020 1.7 (H) 0.5 - 1.4 mg/dL Final     Calcium   Date Value Ref Range Status   08/10/2020 8.1 (L) 8.7 - 10.5 mg/dL Final     Total Protein   Date Value Ref Range Status   08/07/2020 5.7 (L) 6.0 - 8.4 g/dL Final     Albumin   Date Value Ref Range Status   08/10/2020 2.1 (L) 3.5 - 5.2 g/dL Final     Total Bilirubin   Date Value Ref Range Status   08/07/2020 0.4 0.1 - 1.0 mg/dL Final     Comment:     For infants and newborns, interpretation of results should be based  on gestational age, weight and in agreement with clinical  observations.  Premature Infant recommended reference ranges:  Up to 24 hours.............<8.0 mg/dL  Up to 48 hours............<12.0 mg/dL  3-5 days..................<15.0 mg/dL  6-29 days.................<15.0 mg/dL       Alkaline Phosphatase   Date Value Ref Range Status   08/07/2020 97 55 - 135 U/L Final     AST   Date Value Ref Range Status    08/07/2020 201 (H) 10 - 40 U/L Final     ALT   Date Value Ref Range Status   08/07/2020 157 (H) 10 - 44 U/L Final     Anion Gap   Date Value Ref Range Status   08/10/2020 9 8 - 16 mmol/L Final     eGFR if    Date Value Ref Range Status   08/10/2020 33 (A) >60 mL/min/1.73 m^2 Final     eGFR if non    Date Value Ref Range Status   08/10/2020 29 (A) >60 mL/min/1.73 m^2 Final     Comment:     Calculation used to obtain the estimated glomerular filtration  rate (eGFR) is the CKD-EPI equation.        Microbiology Results (last 7 days)     Procedure Component Value Units Date/Time    Urine culture [[id number]] Collected: 08/07/20 2040    Order Status: Completed Specimen: Urine Updated: 08/09/20 1031     Urine Culture, Routine No growth    Narrative:      Specimen Source->Urine    Urine culture [[id number]] Collected: 08/06/20 2350    Order Status: Completed Specimen: Urine Updated: 08/08/20 1421     Urine Culture, Routine No significant growth    Narrative:      Specimen Source->Urine    Blood culture x two cultures. Draw prior to antibiotics. [[id number]] Collected: 08/02/20 1633    Order Status: Completed Specimen: Blood Updated: 08/07/20 2322     Blood Culture, Routine No growth after 5 days.    Narrative:      Aerobic and anaerobic    Blood culture x two cultures. Draw prior to antibiotics. [[id number]] Collected: 08/02/20 1633    Order Status: Completed Specimen: Blood Updated: 08/07/20 2322     Blood Culture, Routine No growth after 5 days.    Narrative:      Aerobic and anaerobic    Urine Culture High Risk [[id number]] Collected: 08/07/20 2039    Order Status: Canceled Specimen: Urine, Catheterized     Urine Culture High Risk [[id number]] Collected: 08/06/20 2352    Order Status: Canceled Specimen: Urine, Catheterized           Radiology:  SADIE (07-):  · Hyperdynamic left ventricular systolic function. The estimated ejection fraction is 65 - 70 %.  · Normal appearing left atrial  appendage. No thrombus is present in the appendage. Normal appendage velocities.  · Mild mitral regurgitation.  · Mild tricuspid regurgitation.  · No evidence of endocarditis  · PICC line at the cavoatrial junction    ECHO (07-):  · Concentric left ventricular remodeling.  · Hyperdynamic left ventricular systolic function. The estimated ejection fraction is 76%.  · Grade I (mild) left ventricular diastolic dysfunction consistent with impaired relaxation.    · CXR: Concentric left ventricular remodeling.  · Hyperdynamic left ventricular systolic function. The estimated ejection fraction is 76%.  Grade I (mild) left ventricular diastolic dysfunction consistent with impaired relaxation.    CXR: There is bibasilar atelectasis versus infiltrate similar to 07/27/2020.    CT abdomen and pelvis without contrast:  Moderate basilar atelectasis. Status post cholecystectomy without evidence for complication.    RUQ abdominal US:  Prior cholecystectomy.  Small simple cyst of the left lobe of the liver otherwise negative right upper quadrant ultrasound.    RUE venous doppler scan:  Occlusive thrombus of the right brachial vein and cephalic vein.    KUB:  Possible ileus.  Prior cholecystectomy.  Prior bilateral hip joint replacement.    Renal US: Features suggesting intrinsic renal disease.  No hydronephrosis.    Left humerus x-ray:  Degenerative changes in the humeral head and glenohumeral joint of the left shoulder with otherwise negative left humerus.  PICC line as described.      Assessment/Plan:      * LUIZ (acute kidney injury)  Monitor  Orders as per Nephrology- Patient currently remains on bicarb drip  Trend BMP  Renal dose all medications and avoid any possible nephrotoxic medications              Patient is Restoration  Follow hematology recommendation, on IV Iron for anemia management. Follow CBC.       Hematuria  Hematuria noted-patient currently remains on IV heparin drip for history of bilateral pulmonary  emboli and recurrent DVT     Today- Hgb down to 7.4- Receiving IV Iron infusion as per hematology.       Liver cyst  Noted  Will need outpatient follow-up      Debility  Fall and skin precautions  Continue physical therapy and occupational therapy      Hyponatremia  Resolved for now-monitor for recurrence      Elevated ferritin level  Noted      Acute deep vein thrombosis (DVT) of right upper extremity  Patient developed new right upper extremity DVT despite being on apixaban-  Patient currently remains on IV heparin drip + Coumadin. INR goal 2-3.  Hematology following  Patient will need lifelong anticoagulation  Hematology workup in progress      Non-traumatic rhabdomyolysis  It was felt this is secondary to daptomycin. Trend CPK daily-improving. Follow ID and nephrology recommendations.     Daptomycin was discontinued on admission and patient was placed on IV vancomycin      Anemia  Multifactorial--  Poor nutrition and also history of acute blood loss  Monitor  Hematology following. Follow CBC and transfuse as needed. Patient is Scientologist, receiving IV Iron.        Moderate protein-calorie malnutrition  Anorexia-patient continues report that food smells and tastes bad   On IV TPN.  Monitor volume status closely  Continue p.o. supplement        Atelectasis  Monitor oxygen saturation. Encourage OOB and IS.      Pulmonary infarct  Due to bilateral pulmonary emboli.   Pulmonary following      Chronic diastolic CHF (congestive heart failure)  Patient is identified as having Diastolic heart failure that is Chronic. CHF is currently controlled. Latest ECHO performed and demonstrates-   Results for orders placed during the hospital encounter of 07/13/20   Echo Color Flow Doppler? Yes    Narrative · Concentric left ventricular remodeling.  · Hyperdynamic left ventricular systolic function. The estimated ejection   fraction is 76%.  · Grade I (mild) left ventricular diastolic dysfunction consistent with   impaired  relaxation.        Patient remains on Bicarb drip with generalized edema- Monitor volume status closely    MRSA bacteremia  Previously receiving daptomycin as outpatient-now on IV vancomycin - until 09-  Orders as per infectious disease    Elevated troponin  Noted- chronically elevated    Bilateral pulmonary embolism  Patient previously on Eliquis at home but developed additional DVT while on it-  Patient currently on  IV heparin+ Coumadin bridging. INR goal 2-3. Pulmonology and Hematology following. Fall precautions and bleeding risks reviewed with patient.   Patient will need lifelong anticoagulation      COPD (chronic obstructive pulmonary disease)  Monitor O2 sats, supplement O2 as needed  Orders as per pulmonology        Transaminitis  Monitor-trending downward. Felt possibley related to daptomycin.  Possiblely due to clot burden.  Previously discussed with Dr. Decker. No recommendations at this time.         Morbid obesity  Body mass index is 40.65 kg/m².  General weight loss/lifestyle modification strategies discussed (elicit support from others; identify saboteurs; non-food rewards, etc).        Pulmonary hypertension  Noted  Orders as per pulmonology      Hypertension associated with diabetes  Monitor  Continue current treatment  Titrate medications as needed    Patient has been declining LTAC and skilled nursing facility placement.  Patient prefers home health with home infusion and physical therapy arrangements.    VTE Risk Mitigation (From admission, onward)         Ordered     warfarin (COUMADIN) tablet 5 mg  Daily      08/09/20 1111     heparin 25,000 units in dextrose 5% 250 mL (100 units/mL) infusion HIGH INTENSITY nomogram - [facility]  Continuous     Question:  Heparin Infusion Adjustment (DO NOT MODIFY ANSWER)  Answer:  [email or web address]\Images\Pharmacy\HeparinInfusions\heparin HIGH INTENSITY nomogram for [facility] [id number]    08/03/20 1609     heparin 25,000 units in dextrose 5% (100 units/ml) IV  bolus from bag - ADDITIONAL PRN BOLUS - 60 units/kg  As needed (PRN)     Question:  Heparin Infusion Adjustment (DO NOT MODIFY ANSWER)  Answer:  \\[email or web address]\epic\Images\Pharmacy\HeparinInfusions\heparin HIGH INTENSITY nomogram for [facility] [id number].pdf    08/03/20 1609     heparin 25,000 units in dextrose 5% (100 units/ml) IV bolus from bag - ADDITIONAL PRN BOLUS - 30 units/kg  As needed (PRN)     Question:  Heparin Infusion Adjustment (DO NOT MODIFY ANSWER)  Answer:  \\[email or web address]\epic\Images\Pharmacy\HeparinInfusions\heparin HIGH INTENSITY nomogram for [facility] [id number].pdf    08/03/20 1609     IP VTE HIGH RISK PATIENT  Once      08/02/20 2034     Place sequential compression device  Until discontinued      08/02/20 2034                      Alix Ruht MD  Department of Hospital Medicine   [facility]

## 2020-08-10 NOTE — PLAN OF CARE
Pt in bed resting in bed with eyes closed. Pt alert and oriented. Able to make needs and wants known. No complaints or distress noted at this time. Pt denies feeling short of breathe. No respiratory distress noted. Pt ambulatory.Pt has generalized  edema. Picc line patent and intact. POC reviewed with pt. Pt verbalized understanding.  Bed low, call light in reach, free of falls, safety maintained, VSS, will continue to monitor.

## 2020-08-10 NOTE — PLAN OF CARE
Alert, oriented, appetite is poor, able to transfer to bsc one person min assist with transfers. Cont of b/b. O2 at 2 l n/c in use at all times. Sob with exertion. Lungs diminished tommie lower lobes. Med teaching ongoing. Routine med teaching provided.

## 2020-08-10 NOTE — SUBJECTIVE & OBJECTIVE
"Interval History:  Coumadin initiated yesterday. Patient remains on heparin drip due to failed oral anticoagulation therapy with history of recently diagnosed bilateral pulmonary emboli lower extremity DVT and now new onset of right upper extremity DVT.  Patient being followed by hematology. Receiving Iron infusions. No hematuria noted.    Review of Systems   Constitutional: Positive for activity change, appetite change and fatigue. Negative for chills, diaphoresis and fever.   HENT: Negative for ear discharge, ear pain and facial swelling.    Eyes: Negative for pain and redness.   Respiratory: Positive for cough and shortness of breath.    Cardiovascular: Positive for leg swelling.   Gastrointestinal: Negative for abdominal distention, abdominal pain, constipation, diarrhea, nausea and vomiting.        Poor appetite- patient reports things taste bad and "smell bad"  and she states she has not been hungry   Endocrine: Negative for polydipsia and polyphagia.   Genitourinary: Negative for difficulty urinating, dysuria, flank pain and hematuria.   Musculoskeletal: Positive for back pain. Negative for neck pain and neck stiffness.   Skin: Negative for color change.   Allergic/Immunologic: Negative for food allergies.   Neurological: Positive for weakness. Negative for seizures, facial asymmetry and speech difficulty.   Psychiatric/Behavioral: Negative for agitation, behavioral problems, confusion, hallucinations and suicidal ideas.     Objective:     Vital Signs (Most Recent):  Temp: 98.7 °F (37.1 °C) (08/10/20 0310)  Pulse: 98 (08/10/20 0310)  Resp: 20 (08/10/20 0310)  BP: (!) 147/67 (08/10/20 0310)  SpO2: (!) 94 % (08/10/20 0310) Vital Signs (24h Range):  Temp:  [98.7 °F (37.1 °C)-99.6 °F (37.6 °C)] 98.7 °F (37.1 °C)  Pulse:  [86-98] 98  Resp:  [17-20] 20  SpO2:  [93 %-97 %] 94 %  BP: (114-212)/(64-84) 147/67     Weight: 108 kg (238 lb 1.6 oz)  Body mass index is 42.18 kg/m².    Intake/Output Summary (Last 24 hours) " at 8/10/2020 0743  Last data filed at 8/10/2020 0621  Gross per 24 hour   Intake 2561.1 ml   Output 1000 ml   Net 1561.1 ml      Physical Exam  Constitutional:       General: She is not in acute distress.     Appearance: Normal appearance.   HENT:      Head: Normocephalic and atraumatic.      Mouth/Throat:      Mouth: Mucous membranes are moist.   Eyes:      General:         Right eye: No discharge.         Left eye: No discharge.      Extraocular Movements: Extraocular movements intact.      Conjunctiva/sclera: Conjunctivae normal.      Pupils: Pupils are equal, round, and reactive to light.   Neck:      Musculoskeletal: Normal range of motion and neck supple. No neck rigidity or muscular tenderness.   Cardiovascular:      Rate and Rhythm: Normal rate and regular rhythm.      Pulses: Normal pulses.      Heart sounds: Murmur present.   Pulmonary:      Effort: No respiratory distress.      Comments: Bilateral breath sounds diminished  Abdominal:      General: Bowel sounds are normal. There is no distension.      Tenderness: There is no abdominal tenderness. There is no guarding.      Comments: Obese   Genitourinary:     Comments: Not examined  Musculoskeletal: Normal range of motion.         General: Swelling present.      Comments: Generalized edema with edema also noted to upper and lower extremities   Skin:     General: Skin is warm and dry.      Capillary Refill: Capillary refill takes less than 2 seconds.   Neurological:      General: No focal deficit present.      Mental Status: She is alert and oriented to person, place, and time. Mental status is at baseline.      Cranial Nerves: No cranial nerve deficit.   Psychiatric:         Mood and Affect: Mood normal.         Behavior: Behavior normal.         Thought Content: Thought content normal.         Judgment: Judgment normal.       Lab Results   Component Value Date    WBC 8.17 08/10/2020    HGB 7.4 (L) 08/10/2020    HCT 24.7 (L) 08/10/2020     (H)  08/10/2020     08/10/2020     CMP  Sodium   Date Value Ref Range Status   08/10/2020 140 136 - 145 mmol/L Final     Potassium   Date Value Ref Range Status   08/10/2020 3.7 3.5 - 5.1 mmol/L Final     Chloride   Date Value Ref Range Status   08/10/2020 100 95 - 110 mmol/L Final     CO2   Date Value Ref Range Status   08/10/2020 31 (H) 23 - 29 mmol/L Final     Glucose   Date Value Ref Range Status   08/10/2020 131 (H) 70 - 110 mg/dL Final     BUN, Bld   Date Value Ref Range Status   08/10/2020 7 (L) 8 - 23 mg/dL Final     Creatinine   Date Value Ref Range Status   08/10/2020 1.7 (H) 0.5 - 1.4 mg/dL Final     Calcium   Date Value Ref Range Status   08/10/2020 8.1 (L) 8.7 - 10.5 mg/dL Final     Total Protein   Date Value Ref Range Status   08/07/2020 5.7 (L) 6.0 - 8.4 g/dL Final     Albumin   Date Value Ref Range Status   08/10/2020 2.1 (L) 3.5 - 5.2 g/dL Final     Total Bilirubin   Date Value Ref Range Status   08/07/2020 0.4 0.1 - 1.0 mg/dL Final     Comment:     For infants and newborns, interpretation of results should be based  on gestational age, weight and in agreement with clinical  observations.  Premature Infant recommended reference ranges:  Up to 24 hours.............<8.0 mg/dL  Up to 48 hours............<12.0 mg/dL  3-5 days..................<15.0 mg/dL  6-29 days.................<15.0 mg/dL       Alkaline Phosphatase   Date Value Ref Range Status   08/07/2020 97 55 - 135 U/L Final     AST   Date Value Ref Range Status   08/07/2020 201 (H) 10 - 40 U/L Final     ALT   Date Value Ref Range Status   08/07/2020 157 (H) 10 - 44 U/L Final     Anion Gap   Date Value Ref Range Status   08/10/2020 9 8 - 16 mmol/L Final     eGFR if    Date Value Ref Range Status   08/10/2020 33 (A) >60 mL/min/1.73 m^2 Final     eGFR if non    Date Value Ref Range Status   08/10/2020 29 (A) >60 mL/min/1.73 m^2 Final     Comment:     Calculation used to obtain the estimated glomerular  filtration  rate (eGFR) is the CKD-EPI equation.        Microbiology Results (last 7 days)     Procedure Component Value Units Date/Time    Urine culture [[id number]] Collected: 08/07/20 2040    Order Status: Completed Specimen: Urine Updated: 08/09/20 1031     Urine Culture, Routine No growth    Narrative:      Specimen Source->Urine    Urine culture [[id number]] Collected: 08/06/20 2350    Order Status: Completed Specimen: Urine Updated: 08/08/20 1421     Urine Culture, Routine No significant growth    Narrative:      Specimen Source->Urine    Blood culture x two cultures. Draw prior to antibiotics. [[id number]] Collected: 08/02/20 1633    Order Status: Completed Specimen: Blood Updated: 08/07/20 2322     Blood Culture, Routine No growth after 5 days.    Narrative:      Aerobic and anaerobic    Blood culture x two cultures. Draw prior to antibiotics. [[id number]] Collected: 08/02/20 1633    Order Status: Completed Specimen: Blood Updated: 08/07/20 2322     Blood Culture, Routine No growth after 5 days.    Narrative:      Aerobic and anaerobic    Urine Culture High Risk [[id number]] Collected: 08/07/20 2039    Order Status: Canceled Specimen: Urine, Catheterized     Urine Culture High Risk [[id number]] Collected: 08/06/20 2352    Order Status: Canceled Specimen: Urine, Catheterized           Radiology:  SADIE (07-):  · Hyperdynamic left ventricular systolic function. The estimated ejection fraction is 65 - 70 %.  · Normal appearing left atrial appendage. No thrombus is present in the appendage. Normal appendage velocities.  · Mild mitral regurgitation.  · Mild tricuspid regurgitation.  · No evidence of endocarditis  · PICC line at the cavoatrial junction    ECHO (07-):  · Concentric left ventricular remodeling.  · Hyperdynamic left ventricular systolic function. The estimated ejection fraction is 76%.  · Grade I (mild) left ventricular diastolic dysfunction consistent with impaired relaxation.    · CXR:  Concentric left ventricular remodeling.  · Hyperdynamic left ventricular systolic function. The estimated ejection fraction is 76%.  Grade I (mild) left ventricular diastolic dysfunction consistent with impaired relaxation.    CXR: There is bibasilar atelectasis versus infiltrate similar to 07/27/2020.    CT abdomen and pelvis without contrast:  Moderate basilar atelectasis. Status post cholecystectomy without evidence for complication.    RUQ abdominal US:  Prior cholecystectomy.  Small simple cyst of the left lobe of the liver otherwise negative right upper quadrant ultrasound.    RUE venous doppler scan:  Occlusive thrombus of the right brachial vein and cephalic vein.    KUB:  Possible ileus.  Prior cholecystectomy.  Prior bilateral hip joint replacement.    Renal US: Features suggesting intrinsic renal disease.  No hydronephrosis.    Left humerus x-ray:  Degenerative changes in the humeral head and glenohumeral joint of the left shoulder with otherwise negative left humerus.  PICC line as described.

## 2020-08-10 NOTE — PROGRESS NOTES
Progress Note  PULMONARY    Admit Date: 8/2/2020   08/10/2020  From Dr Suero's consult 8/3-History of Present Illness:    Pt was home 5 days after last admit, she had iv line on right and had left picc line placed.  She did develop mrsa bacteremia.  Pt was on ox at home but was ambulating.  2 days pta she developed weakness, chest tightness, and bilat arm tightness.  Pt represented to er- found to have lft up and creat up. Intake had been poor.  Her chest pain was more tightness like her arms.     Pt presented with months flank/chest pain with massive pe found after laproscopic rosette. Had mrsa bacteremia during stay and developed severe anemia - responded to iron rx, etc... pt [employer].  On eiiquis.  Had pulm htn on echo.   Pt presented with elevated creat to 1.8 from 1.3 bseline. P[t came to er with loer chest pain.   Plan:   dvt on right arm- no picc mentioned on right in epic.  Recurrent clot on rx for no clear reason.  mrsa bacteremia last admit  - source not clear.  teflaro may ppt transminitis - suspect had another pe- heparin drip ordered. Heme to see. Needs id f/u.  F/u lft. Monitor.  Dx not clear.          SUBJECTIVE:     8/4 no new c/o- frustrated with illness.  8/5 feels better, no new c/o  8/6 no new c/o  8/10 no new c/o- had had nausea, decreased taste, preserved sense smell going back months.  Weak/schwartz and feels puffy otherwise.  Having some cough and responds to neb rx.      PFSH and Allergies reviewed.    OBJECTIVE:     Vitals (Most recent):  Vitals:    08/10/20 0310   BP: (!) 147/67   Pulse: 98   Resp: 20   Temp: 98.7 °F (37.1 °C)       Vitals (24 hour range):  Temp:  [98.7 °F (37.1 °C)-99.6 °F (37.6 °C)]   Pulse:  [86-98]   Resp:  [17-20]   BP: (114-212)/(64-84)   SpO2:  [93 %-100 %]       Intake/Output Summary (Last 24 hours) at 8/10/2020 0723  Last data filed at 8/10/2020 0621  Gross per 24 hour   Intake 2561.1 ml   Output 1450 ml   Net 1111.1 ml          Physical Exam:  The patient's  neuro status (alertness,orientation,cognitive function,motor skills,), pharyngeal exam (oral lesions, hygiene, abn dentition,), Neck (jvd,mass,thyroid,nodes in neck and above/below clavicle),RESPIRATORY(symmetry,effort,fremitus,percussion,auscultation),  Cor(rhythm,heart tones including gallops,perfusion,edema)ABD(distention,hepatic&splenomegaly,tenderness,masses), Skin(rash,cyanosis),Psyc(affect,judgement,).  Exam negative except for these pertinent findings:    Swollen arms right>> left, picc left . chest is symmetric, no distress, normal percussion, normal fremitus and good normal breath sounds      Radiographs reviewed: view by direct vision    Results for orders placed during the hospital encounter of 07/13/20   X-Ray Chest 1 View    Narrative EXAMINATION:  XR CHEST 1 VIEW    CLINICAL HISTORY:  dyspnea;    TECHNIQUE:  Single frontal view of the chest was performed.    COMPARISON:  Chest of July 17, 2020.    FINDINGS:  There is chronic elevation of the right hemidiaphragm.  The cardiac size is within normal limits.  Mild atelectasis is seen at the right lung base.  The lung apices are clear.  No pneumothorax is seen.      Impression Mild atelectasis at the right lung base.  Chronic elevation of the right hemidiaphragm.      Electronically signed by: iGno Juarez MD  Date:    07/20/2020  Time:    08:08   ]    Labs     Recent Labs   Lab 08/10/20  0415   WBC 8.17   HGB 7.4*   HCT 24.7*        Recent Labs   Lab 08/10/20  0415      K 3.7      CO2 31*   BUN 7*   CREATININE 1.7*   *   CALCIUM 8.1*   PHOS 4.0   ALBUMIN 2.1*   INR 1.0   CPK 1643*   No results for input(s): PH, PCO2, PO2, HCO3 in the last 24 hours.  Microbiology Results (last 7 days)     Procedure Component Value Units Date/Time    Urine culture [[id number]] Collected: 08/07/20 2040    Order Status: Completed Specimen: Urine Updated: 08/09/20 1031     Urine Culture, Routine No growth    Narrative:      Specimen Source->Urine     Urine culture [[id number]] Collected: 08/06/20 2350    Order Status: Completed Specimen: Urine Updated: 08/08/20 1421     Urine Culture, Routine No significant growth    Narrative:      Specimen Source->Urine    Blood culture x two cultures. Draw prior to antibiotics. [[id number]] Collected: 08/02/20 1633    Order Status: Completed Specimen: Blood Updated: 08/07/20 2322     Blood Culture, Routine No growth after 5 days.    Narrative:      Aerobic and anaerobic    Blood culture x two cultures. Draw prior to antibiotics. [[id number]] Collected: 08/02/20 1633    Order Status: Completed Specimen: Blood Updated: 08/07/20 2322     Blood Culture, Routine No growth after 5 days.    Narrative:      Aerobic and anaerobic    Urine Culture High Risk [[id number]] Collected: 08/07/20 2039    Order Status: Canceled Specimen: Urine, Catheterized     Urine Culture High Risk [[id number]] Collected: 08/06/20 2352    Order Status: Canceled Specimen: Urine, Catheterized           Impression:  Active Hospital Problems    Diagnosis  POA    *LUIZ (acute kidney injury) [N17.9]  Yes    Hematuria [R31.9]  No    Patient is [employer] [Z78.9]  Yes    Acute deep vein thrombosis (DVT) of right upper extremity [I82.621]  Yes     Occlusive thrombus of the right brachial vein and cephalic vein      Elevated ferritin level [R79.89]  Yes    Hyponatremia [E87.1]  Yes    Debility [R53.81]  Yes    Liver cyst [K76.89]  Yes    Non-traumatic rhabdomyolysis [M62.82]  Yes    Chronic diastolic CHF (congestive heart failure) [I50.32]  Yes    Pulmonary infarct [I26.99]  Yes    Atelectasis [J98.11]  Yes    Moderate protein-calorie malnutrition [E44.0]  Yes    Anemia [D64.9]  Yes    MRSA bacteremia [R78.81]  Yes    Elevated troponin [R79.89]  Yes    Bilateral pulmonary embolism [I26.99]  Yes    COPD (chronic obstructive pulmonary disease) [J44.9]  Yes    Transaminitis [R74.0]  Yes    Morbid obesity [E66.01]  Yes    Pulmonary hypertension  [I27.20]  Yes    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               Plan:   8/4 on heparin for dvt right arm, occurred since last admit while on eliquis.  Pt had massive clot with pulm htn suggested on echo.  Pt had mrsa bacteremia after rosette prior to last admit.    cpk 21540 with creat 1.6, consulted renal.  Was on Daptomycin/tefloaro.  Had bad anemia last admit - improved with iron- jehovah witness.    Heme to f/u.    ID saw, vanc was being considered.      May have had another pe- would be at high risk with pulm htn already found.  ivf considered but not with any concerns for mrsa.    8/5  Improving, cpk down,creat stable.  Breathing better, feels better.  No ivc filter. pulm htn worrisome as would contribute to debility.      8/6 I/o  1120/400???? Bun/creat 12/1.9, will order cpk for am  Patient is steadily improving.  She will need follow-up for pulmonary hypertension as it was found on her last echo.  With adequate anticoagulation-good chance pulmonary hypertension will clear.  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  Complex case.  I discussed her with Infectious Disease yesterday      8/10 hgb down to 7.4, creat 1.7,   had had nausea, decreased taste, preserved sense smell going back months.  Weak/schwartz and feels puffy otherwise.    Now transitioning to outpt rx tarting coumadinSunday.  Echo initially had tricuspid regurg max velocity 3.26 m/s on  7/14 echo, follow up echo had 2.2 m/s on 7/22 echo.  Probability of pulm htn low with trv less than 2.9.  She was dx massive pe on 7/14 with echo improvement follow wk.  Ct abd was neg basically on 8/2.            .

## 2020-08-10 NOTE — RESPIRATORY THERAPY
08/09/20 1904   Patient Assessment/Suction   Level of Consciousness (AVPU) alert   Respiratory Effort Unlabored   All Lung Fields Breath Sounds diminished   PRE-TX-O2   O2 Device (Oxygen Therapy) nasal cannula   $ Is the patient on Low Flow Oxygen? Yes   Flow (L/min) 1   SpO2 (!) 94 %   Pulse Oximetry Type Intermittent   $ Pulse Oximetry - Multiple Charge Pulse Oximetry - Multiple   Pulse 89   Resp 19   Aerosol Therapy   $ Aerosol Therapy Charges Aerosol Treatment   Respiratory Treatment Status (SVN) given   Treatment Route (SVN) mask   Patient Position (SVN) HOB elevated   Post Treatment Assessment (SVN) breath sounds unchanged   Signs of Intolerance (SVN) none   Breath Sounds Post-Respiratory Treatment   Throughout All Fields Post-Treatment All Fields   Throughout All Fields Post-Treatment no change   Post-treatment Heart Rate (beats/min) 89   Post-treatment Resp Rate (breaths/min) 18   Incentive Spirometer   $ Incentive Spirometer Charges done with encouragement   Administration (IS) proper technique demonstrated   Number of Repetitions (IS) 10   Level Incentive Spirometer (mL) 500   Patient Tolerance (IS) good

## 2020-08-10 NOTE — PLAN OF CARE
Problem: Physical Therapy Goal  Goal: Physical Therapy Goal  Description: Goals to be met by: 2020     Patient will increase functional independence with mobility by performin. Supine to sit with Supervision  2. Sit to stand transfer with Supervision  3. Bed to chair transfer with Supervision using Rolling Walker  4. Gait  x 150 feet with Supervision using Rolling Walker.   5. Lower extremity exercise program x20 reps per handout, with independence    Outcome: Ongoing, Progressing   Ambulate in hallway with rw/O2 and assistance for safety.
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
## 2020-08-10 NOTE — ASSESSMENT & PLAN NOTE
Patient previously on Eliquis at home but developed additional DVT while on it-  Patient currently on  IV heparin+ Coumadin bridging. INR goal 2-3. Pulmonology and Hematology following. Fall precautions and bleeding risks reviewed with patient.   Patient will need lifelong anticoagulation

## 2020-08-11 LAB
ALBUMIN SERPL BCP-MCNC: 2.4 G/DL (ref 3.5–5.2)
ALBUMIN SERPL BCP-MCNC: 2.4 G/DL (ref 3.5–5.2)
ALP SERPL-CCNC: 143 U/L (ref 55–135)
ALT SERPL W/O P-5'-P-CCNC: 103 U/L (ref 10–44)
ANION GAP SERPL CALC-SCNC: 9 MMOL/L (ref 8–16)
APTT BLDCRRT: 44.1 SEC (ref 21–32)
APTT BLDCRRT: 44.5 SEC (ref 21–32)
AST SERPL-CCNC: 69 U/L (ref 10–40)
BASOPHILS # BLD AUTO: 0.03 K/UL (ref 0–0.2)
BASOPHILS NFR BLD: 0.3 % (ref 0–1.9)
BILIRUB DIRECT SERPL-MCNC: 0.2 MG/DL (ref 0.1–0.3)
BILIRUB SERPL-MCNC: 0.4 MG/DL (ref 0.1–1)
BUN SERPL-MCNC: 6 MG/DL (ref 8–23)
CALCIUM SERPL-MCNC: 8.7 MG/DL (ref 8.7–10.5)
CHLORIDE SERPL-SCNC: 103 MMOL/L (ref 95–110)
CK SERPL-CCNC: 1107 U/L (ref 20–180)
CO2 SERPL-SCNC: 29 MMOL/L (ref 23–29)
CREAT SERPL-MCNC: 1.7 MG/DL (ref 0.5–1.4)
CRP SERPL-MCNC: 43.5 MG/L (ref 0–8.2)
DIFFERENTIAL METHOD: ABNORMAL
EOSINOPHIL # BLD AUTO: 0.5 K/UL (ref 0–0.5)
EOSINOPHIL NFR BLD: 4.7 % (ref 0–8)
ERYTHROCYTE [DISTWIDTH] IN BLOOD BY AUTOMATED COUNT: 14.8 % (ref 11.5–14.5)
EST. GFR  (AFRICAN AMERICAN): 33 ML/MIN/1.73 M^2
EST. GFR  (NON AFRICAN AMERICAN): 29 ML/MIN/1.73 M^2
GLUCOSE SERPL-MCNC: 128 MG/DL (ref 70–110)
HCT VFR BLD AUTO: 27 % (ref 37–48.5)
HGB BLD-MCNC: 8.2 G/DL (ref 12–16)
IMM GRANULOCYTES # BLD AUTO: 0.07 K/UL (ref 0–0.04)
IMM GRANULOCYTES NFR BLD AUTO: 0.7 % (ref 0–0.5)
INR PPP: 1 (ref 0.8–1.2)
LIPASE SERPL-CCNC: 36 U/L (ref 4–60)
LYMPHOCYTES # BLD AUTO: 2.7 K/UL (ref 1–4.8)
LYMPHOCYTES NFR BLD: 28.4 % (ref 18–48)
MCH RBC QN AUTO: 32.8 PG (ref 27–31)
MCHC RBC AUTO-ENTMCNC: 30.4 G/DL (ref 32–36)
MCV RBC AUTO: 108 FL (ref 82–98)
MONOCYTES # BLD AUTO: 1.1 K/UL (ref 0.3–1)
MONOCYTES NFR BLD: 11 % (ref 4–15)
NEUTROPHILS # BLD AUTO: 5.3 K/UL (ref 1.8–7.7)
NEUTROPHILS NFR BLD: 54.9 % (ref 38–73)
NRBC BLD-RTO: 0 /100 WBC
PHOSPHATE SERPL-MCNC: 4.6 MG/DL (ref 2.7–4.5)
PLATELET # BLD AUTO: 272 K/UL (ref 150–350)
PMV BLD AUTO: 9.8 FL (ref 9.2–12.9)
POTASSIUM SERPL-SCNC: 3.9 MMOL/L (ref 3.5–5.1)
PROT SERPL-MCNC: 6.6 G/DL (ref 6–8.4)
PROTHROMBIN TIME: 10.9 SEC (ref 9–12.5)
RBC # BLD AUTO: 2.5 M/UL (ref 4–5.4)
RHEUMATOID FACT SERPL-ACNC: <10 IU/ML (ref 0–15)
SODIUM SERPL-SCNC: 141 MMOL/L (ref 136–145)
VANCOMYCIN TROUGH SERPL-MCNC: 16.1 UG/ML (ref 10–22)
WBC # BLD AUTO: 9.6 K/UL (ref 3.9–12.7)

## 2020-08-11 PROCEDURE — 25000003 PHARM REV CODE 250: Performed by: HOSPITALIST

## 2020-08-11 PROCEDURE — 97116 GAIT TRAINING THERAPY: CPT | Mod: CQ

## 2020-08-11 PROCEDURE — 25000003 PHARM REV CODE 250: Performed by: NURSE PRACTITIONER

## 2020-08-11 PROCEDURE — 94761 N-INVAS EAR/PLS OXIMETRY MLT: CPT

## 2020-08-11 PROCEDURE — 85025 COMPLETE CBC W/AUTO DIFF WBC: CPT

## 2020-08-11 PROCEDURE — 99223 1ST HOSP IP/OBS HIGH 75: CPT | Mod: ,,, | Performed by: INTERNAL MEDICINE

## 2020-08-11 PROCEDURE — 86038 ANTINUCLEAR ANTIBODIES: CPT

## 2020-08-11 PROCEDURE — 94640 AIRWAY INHALATION TREATMENT: CPT

## 2020-08-11 PROCEDURE — 85610 PROTHROMBIN TIME: CPT

## 2020-08-11 PROCEDURE — 86039 ANTINUCLEAR ANTIBODIES (ANA): CPT

## 2020-08-11 PROCEDURE — 63600175 PHARM REV CODE 636 W HCPCS: Performed by: NURSE PRACTITIONER

## 2020-08-11 PROCEDURE — 27000221 HC OXYGEN, UP TO 24 HOURS

## 2020-08-11 PROCEDURE — 99231 PR SUBSEQUENT HOSPITAL CARE,LEVL I: ICD-10-PCS | Mod: ,,, | Performed by: INTERNAL MEDICINE

## 2020-08-11 PROCEDURE — 97535 SELF CARE MNGMENT TRAINING: CPT | Mod: CO

## 2020-08-11 PROCEDURE — 80076 HEPATIC FUNCTION PANEL: CPT

## 2020-08-11 PROCEDURE — 36415 COLL VENOUS BLD VENIPUNCTURE: CPT

## 2020-08-11 PROCEDURE — 83690 ASSAY OF LIPASE: CPT

## 2020-08-11 PROCEDURE — 63600175 PHARM REV CODE 636 W HCPCS: Performed by: HOSPITALIST

## 2020-08-11 PROCEDURE — 86235 NUCLEAR ANTIGEN ANTIBODY: CPT | Mod: 59

## 2020-08-11 PROCEDURE — 94799 UNLISTED PULMONARY SVC/PX: CPT

## 2020-08-11 PROCEDURE — 82550 ASSAY OF CK (CPK): CPT

## 2020-08-11 PROCEDURE — 25000003 PHARM REV CODE 250: Performed by: INTERNAL MEDICINE

## 2020-08-11 PROCEDURE — 80202 ASSAY OF VANCOMYCIN: CPT

## 2020-08-11 PROCEDURE — 63600175 PHARM REV CODE 636 W HCPCS: Performed by: INTERNAL MEDICINE

## 2020-08-11 PROCEDURE — 25000242 PHARM REV CODE 250 ALT 637 W/ HCPCS: Performed by: HOSPITALIST

## 2020-08-11 PROCEDURE — 86431 RHEUMATOID FACTOR QUANT: CPT

## 2020-08-11 PROCEDURE — 86140 C-REACTIVE PROTEIN: CPT

## 2020-08-11 PROCEDURE — 85730 THROMBOPLASTIN TIME PARTIAL: CPT

## 2020-08-11 PROCEDURE — 80069 RENAL FUNCTION PANEL: CPT

## 2020-08-11 PROCEDURE — 99223 PR INITIAL HOSPITAL CARE,LEVL III: ICD-10-PCS | Mod: ,,, | Performed by: INTERNAL MEDICINE

## 2020-08-11 PROCEDURE — 99231 SBSQ HOSP IP/OBS SF/LOW 25: CPT | Mod: ,,, | Performed by: INTERNAL MEDICINE

## 2020-08-11 PROCEDURE — 12000002 HC ACUTE/MED SURGE SEMI-PRIVATE ROOM

## 2020-08-11 PROCEDURE — 83516 IMMUNOASSAY NONANTIBODY: CPT

## 2020-08-11 RX ORDER — HYDRALAZINE HYDROCHLORIDE 20 MG/ML
10 INJECTION INTRAMUSCULAR; INTRAVENOUS ONCE
Status: DISCONTINUED | OUTPATIENT
Start: 2020-08-11 | End: 2020-08-16 | Stop reason: HOSPADM

## 2020-08-11 RX ADMIN — Medication 500 MG: at 09:08

## 2020-08-11 RX ADMIN — DOCUSATE SODIUM 50 MG AND SENNOSIDES 8.6 MG 1 TABLET: 8.6; 5 TABLET, FILM COATED ORAL at 01:08

## 2020-08-11 RX ADMIN — IPRATROPIUM BROMIDE AND ALBUTEROL SULFATE 3 ML: .5; 2.5 SOLUTION RESPIRATORY (INHALATION) at 07:08

## 2020-08-11 RX ADMIN — IRON DEXTRAN 100 MG: 50 INJECTION INTRAMUSCULAR; INTRAVENOUS at 01:08

## 2020-08-11 RX ADMIN — DOCUSATE SODIUM 50 MG AND SENNOSIDES 8.6 MG 1 TABLET: 8.6; 5 TABLET, FILM COATED ORAL at 09:08

## 2020-08-11 RX ADMIN — FLUTICASONE FUROATE AND VILANTEROL TRIFENATATE 1 PUFF: 100; 25 POWDER RESPIRATORY (INHALATION) at 07:08

## 2020-08-11 RX ADMIN — HYDRALAZINE HYDROCHLORIDE 50 MG: 25 TABLET, FILM COATED ORAL at 09:08

## 2020-08-11 RX ADMIN — IPRATROPIUM BROMIDE AND ALBUTEROL SULFATE 3 ML: .5; 2.5 SOLUTION RESPIRATORY (INHALATION) at 06:08

## 2020-08-11 RX ADMIN — WARFARIN SODIUM 5 MG: 5 TABLET ORAL at 04:08

## 2020-08-11 RX ADMIN — FOLIC ACID 1 MG: 1 TABLET ORAL at 01:08

## 2020-08-11 RX ADMIN — METOPROLOL SUCCINATE 50 MG: 50 TABLET, FILM COATED, EXTENDED RELEASE ORAL at 01:08

## 2020-08-11 RX ADMIN — CYANOCOBALAMIN TAB 1000 MCG 2000 MCG: 1000 TAB at 01:08

## 2020-08-11 RX ADMIN — DRONABINOL 2.5 MG: 2.5 CAPSULE ORAL at 09:08

## 2020-08-11 RX ADMIN — MONTELUKAST 10 MG: 10 TABLET, FILM COATED ORAL at 09:08

## 2020-08-11 RX ADMIN — HYDRALAZINE HYDROCHLORIDE 50 MG: 25 TABLET, FILM COATED ORAL at 04:08

## 2020-08-11 RX ADMIN — IPRATROPIUM BROMIDE AND ALBUTEROL SULFATE 3 ML: .5; 2.5 SOLUTION RESPIRATORY (INHALATION) at 12:08

## 2020-08-11 RX ADMIN — HEPARIN SODIUM AND DEXTROSE 10 UNITS/KG/HR: 10000; 5 INJECTION INTRAVENOUS at 04:08

## 2020-08-11 RX ADMIN — DRONABINOL 2.5 MG: 2.5 CAPSULE ORAL at 01:08

## 2020-08-11 RX ADMIN — PANTOPRAZOLE SODIUM 40 MG: 40 TABLET, DELAYED RELEASE ORAL at 01:08

## 2020-08-11 RX ADMIN — VANCOMYCIN HYDROCHLORIDE 1000 MG: 1 INJECTION, POWDER, LYOPHILIZED, FOR SOLUTION INTRAVENOUS at 12:08

## 2020-08-11 RX ADMIN — THERA TABS 1 TABLET: TAB at 01:08

## 2020-08-11 RX ADMIN — IPRATROPIUM BROMIDE AND ALBUTEROL SULFATE 3 ML: .5; 2.5 SOLUTION RESPIRATORY (INHALATION) at 01:08

## 2020-08-11 RX ADMIN — VANCOMYCIN HYDROCHLORIDE 1000 MG: 1 INJECTION, POWDER, LYOPHILIZED, FOR SOLUTION INTRAVENOUS at 11:08

## 2020-08-11 RX ADMIN — HYDRALAZINE HYDROCHLORIDE 50 MG: 25 TABLET, FILM COATED ORAL at 01:08

## 2020-08-11 NOTE — NURSING
Second therapeutic aptt resulted as 44.5 drawn at 0755 this AM. Next aptt will be in AM with morning labs.

## 2020-08-11 NOTE — PROGRESS NOTES
[facility] Medicine  Progress Note    Patient Name: Sammi Abreu  MRN: [id number]  Patient Class: IP- Inpatient   Admission Date: 8/2/2020  Length of Stay: 8 days  Attending Physician: Alix Ruth MD  Primary Care Provider: Osmani Villatoro MD        Subjective:     Principal Problem:LUIZ (acute kidney injury)        HPI:  Sammi Abreu is a 79-year-old female with past medical history significant for arthritis and chronic back pain, fibromyalgia, glaucoma, hyperlipidemia, hypertension, sleep apnea with history of noncompliance with CPAP, morbid obesity, hypertension, GERD, COPD, diabetes type 2, prior DVT, and gallstones who [facility] with complaints of right lower quadrant pain x3 days.  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   Patient was discharged from this facility on 07/29/2020 after being treated for bilateral pulmonary embolism.  Patient is currently on Eliquis.  Patient underwent a CT abdomen pelvis while in the emergency department which identified surgical changes but nothing acute.  Patient is noted to have a mild LUIZ.  Her troponin is elevated at 0.41 which is consistent with prior levels.  She will be admitted to the service of hospital Medicine for continued treatment.    Overview/Hospital Course:  Patient monitor closely during hospitalization.  She was placed on continuous telemetry monitoring.  She was noted to have rhabdomyolysis with CPK greater than 31675 and LUIZ.  Nephrology was consulted.  She was noted to have metabolic acidosis initiated on bicarb drip.  Daptomycin was held and ID was consulted.  She was initiated on IV vancomycin.  She was noted have increased right upper extremity swelling.  Right upper extremity ultrasound demonstrated occlusive thrombus of the right brachial vein and cephalic vein. She was initiated on heparin drip and her Eliquis was held.  There was concern for Eliquis failure.   "Hematology consulted.  PT and OT were consulted for debility.  Patient with ongoing anorexia and poor p.o. intake. Dietary was consulted for protein calorie malnutrition.  It was recommended patient be placed on TPN lipids.  Her renal status was monitored closely and she remain on bicarb drip with plans to start TPN once acute kidney injury resolved if no improvement in her p.o. intake.  Patient was noted to have some hematuria during her stay.  A retroperitoneal ultrasound was ordered.    Interval History:  Patient is scheduled for gastric emptying study today for evaluation of anorexia and decreased oral intake.  Currently receiving heparin infusion with Coumadin bridging.  Patient being followed by hematology. Receiving Iron infusions. No hematuria noted.    Review of Systems   Constitutional: Positive for activity change, appetite change and fatigue. Negative for chills, diaphoresis and fever.   HENT: Negative for ear discharge, ear pain and facial swelling.    Eyes: Negative for pain and redness.   Respiratory: Positive for cough and shortness of breath.    Cardiovascular: Positive for leg swelling.   Gastrointestinal: Negative for abdominal distention, abdominal pain, constipation, diarrhea, nausea and vomiting.        Poor appetite- patient reports things taste bad and "smell bad"  and she states she has not been hungry   Endocrine: Negative for polydipsia and polyphagia.   Genitourinary: Negative for difficulty urinating, dysuria, flank pain and hematuria.   Musculoskeletal: Positive for back pain. Negative for neck pain and neck stiffness.   Skin: Negative for color change.   Allergic/Immunologic: Negative for food allergies.   Neurological: Positive for weakness. Negative for seizures, facial asymmetry and speech difficulty.   Psychiatric/Behavioral: Negative for agitation, behavioral problems, confusion, hallucinations and suicidal ideas.     Objective:     Vital Signs (Most Recent):  Temp: 98.3 °F " (36.8 °C) (08/11/20 0740)  Pulse: 101 (08/11/20 0740)  Resp: 20 (08/11/20 0740)  BP: (!) 142/65 (08/11/20 0740)  SpO2: (!) 94 % (08/11/20 0740) Vital Signs (24h Range):  Temp:  [98.2 °F (36.8 °C)-99.2 °F (37.3 °C)] 98.3 °F (36.8 °C)  Pulse:  [] 101  Resp:  [16-20] 20  SpO2:  [93 %-98 %] 94 %  BP: (142-207)/(57-87) 142/65     Weight: 108 kg (238 lb 1.6 oz)  Body mass index is 42.18 kg/m².    Intake/Output Summary (Last 24 hours) at 8/11/2020 0951  Last data filed at 8/11/2020 0504  Gross per 24 hour   Intake 1811.72 ml   Output 2250 ml   Net -438.28 ml      Physical Exam  Constitutional:       General: She is not in acute distress.     Appearance: Normal appearance.   HENT:      Head: Normocephalic and atraumatic.      Mouth/Throat:      Mouth: Mucous membranes are moist.   Eyes:      General:         Right eye: No discharge.         Left eye: No discharge.      Extraocular Movements: Extraocular movements intact.      Conjunctiva/sclera: Conjunctivae normal.      Pupils: Pupils are equal, round, and reactive to light.   Neck:      Musculoskeletal: Normal range of motion and neck supple. No neck rigidity or muscular tenderness.   Cardiovascular:      Rate and Rhythm: Normal rate and regular rhythm.      Pulses: Normal pulses.      Heart sounds: Murmur present.   Pulmonary:      Effort: No respiratory distress.      Comments: Bilateral breath sounds diminished  Abdominal:      General: Bowel sounds are normal. There is no distension.      Tenderness: There is no abdominal tenderness. There is no guarding.      Comments: Obese   Genitourinary:     Comments: Not examined  Musculoskeletal: Normal range of motion.         General: Swelling present.      Comments: Generalized edema with edema also noted to upper and lower extremities   Skin:     General: Skin is warm and dry.      Capillary Refill: Capillary refill takes less than 2 seconds.   Neurological:      General: No focal deficit present.      Mental Status:  She is alert and oriented to person, place, and time. Mental status is at baseline.      Cranial Nerves: No cranial nerve deficit.   Psychiatric:         Mood and Affect: Mood normal.         Behavior: Behavior normal.         Thought Content: Thought content normal.         Judgment: Judgment normal.       Lab Results   Component Value Date    WBC 9.60 08/11/2020    HGB 8.2 (L) 08/11/2020    HCT 27.0 (L) 08/11/2020     (H) 08/11/2020     08/11/2020     CMP  Sodium   Date Value Ref Range Status   08/11/2020 141 136 - 145 mmol/L Final     Potassium   Date Value Ref Range Status   08/11/2020 3.9 3.5 - 5.1 mmol/L Final     Chloride   Date Value Ref Range Status   08/11/2020 103 95 - 110 mmol/L Final     CO2   Date Value Ref Range Status   08/11/2020 29 23 - 29 mmol/L Final     Glucose   Date Value Ref Range Status   08/11/2020 128 (H) 70 - 110 mg/dL Final     BUN, Bld   Date Value Ref Range Status   08/11/2020 6 (L) 8 - 23 mg/dL Final     Creatinine   Date Value Ref Range Status   08/11/2020 1.7 (H) 0.5 - 1.4 mg/dL Final     Calcium   Date Value Ref Range Status   08/11/2020 8.7 8.7 - 10.5 mg/dL Final     Total Protein   Date Value Ref Range Status   08/11/2020 6.6 6.0 - 8.4 g/dL Final     Albumin   Date Value Ref Range Status   08/11/2020 2.4 (L) 3.5 - 5.2 g/dL Final   08/11/2020 2.4 (L) 3.5 - 5.2 g/dL Final     Total Bilirubin   Date Value Ref Range Status   08/11/2020 0.4 0.1 - 1.0 mg/dL Final     Comment:     For infants and newborns, interpretation of results should be based  on gestational age, weight and in agreement with clinical  observations.  Premature Infant recommended reference ranges:  Up to 24 hours.............<8.0 mg/dL  Up to 48 hours............<12.0 mg/dL  3-5 days..................<15.0 mg/dL  6-29 days.................<15.0 mg/dL       Alkaline Phosphatase   Date Value Ref Range Status   08/11/2020 143 (H) 55 - 135 U/L Final     AST   Date Value Ref Range Status   08/11/2020 69 (H)  10 - 40 U/L Final     ALT   Date Value Ref Range Status   08/11/2020 103 (H) 10 - 44 U/L Final     Anion Gap   Date Value Ref Range Status   08/11/2020 9 8 - 16 mmol/L Final     eGFR if    Date Value Ref Range Status   08/11/2020 33 (A) >60 mL/min/1.73 m^2 Final     eGFR if non    Date Value Ref Range Status   08/11/2020 29 (A) >60 mL/min/1.73 m^2 Final     Comment:     Calculation used to obtain the estimated glomerular filtration  rate (eGFR) is the CKD-EPI equation.        Microbiology Results (last 7 days)     Procedure Component Value Units Date/Time    Urine culture [[id number]] Collected: 08/07/20 2040    Order Status: Completed Specimen: Urine Updated: 08/09/20 1031     Urine Culture, Routine No growth    Narrative:      Specimen Source->Urine    Urine culture [[id number]] Collected: 08/06/20 2350    Order Status: Completed Specimen: Urine Updated: 08/08/20 1421     Urine Culture, Routine No significant growth    Narrative:      Specimen Source->Urine    Blood culture x two cultures. Draw prior to antibiotics. [[id number]] Collected: 08/02/20 1633    Order Status: Completed Specimen: Blood Updated: 08/07/20 2322     Blood Culture, Routine No growth after 5 days.    Narrative:      Aerobic and anaerobic    Blood culture x two cultures. Draw prior to antibiotics. [[id number]] Collected: 08/02/20 1633    Order Status: Completed Specimen: Blood Updated: 08/07/20 2322     Blood Culture, Routine No growth after 5 days.    Narrative:      Aerobic and anaerobic    Urine Culture High Risk [[id number]] Collected: 08/07/20 2039    Order Status: Canceled Specimen: Urine, Catheterized     Urine Culture High Risk [[id number]] Collected: 08/06/20 2352    Order Status: Canceled Specimen: Urine, Catheterized           Radiology:  SADIE (07-):  · Hyperdynamic left ventricular systolic function. The estimated ejection fraction is 65 - 70 %.  · Normal appearing left atrial appendage. No thrombus  is present in the appendage. Normal appendage velocities.  · Mild mitral regurgitation.  · Mild tricuspid regurgitation.  · No evidence of endocarditis  · PICC line at the cavoatrial junction    ECHO (07-):  · Concentric left ventricular remodeling.  · Hyperdynamic left ventricular systolic function. The estimated ejection fraction is 76%.  · Grade I (mild) left ventricular diastolic dysfunction consistent with impaired relaxation.    · CXR: Concentric left ventricular remodeling.  · Hyperdynamic left ventricular systolic function. The estimated ejection fraction is 76%.  Grade I (mild) left ventricular diastolic dysfunction consistent with impaired relaxation.    CXR: There is bibasilar atelectasis versus infiltrate similar to 07/27/2020.    CT abdomen and pelvis without contrast:  Moderate basilar atelectasis. Status post cholecystectomy without evidence for complication.    RUQ abdominal US:  Prior cholecystectomy.  Small simple cyst of the left lobe of the liver otherwise negative right upper quadrant ultrasound.    RUE venous doppler scan:  Occlusive thrombus of the right brachial vein and cephalic vein.    KUB:  Possible ileus.  Prior cholecystectomy.  Prior bilateral hip joint replacement.    Renal US: Features suggesting intrinsic renal disease.  No hydronephrosis.    Left humerus x-ray:  Degenerative changes in the humeral head and glenohumeral joint of the left shoulder with otherwise negative left humerus.  PICC line as described.      Assessment/Plan:      * LUIZ (acute kidney injury)  Monitor  Follow Nephrology recommendation and BMP.              Patient is Restorationist  Follow hematology recommendation, on IV Iron for anemia management. Follow CBC.       Hematuria  Hematuria noted-patient currently remains on IV heparin drip for history of bilateral pulmonary emboli and recurrent DVT     Resolved.  Follow hematology recommendations.      Liver cyst  Noted  Will need outpatient  follow-up      Debility  Fall and skin precautions  Continue physical therapy and occupational therapy      Hyponatremia  Resolved for now-monitor for recurrence      Elevated ferritin level  Noted      Acute deep vein thrombosis (DVT) of right upper extremity  Patient developed new right upper extremity DVT despite being on apixaban-  Patient currently remains on IV heparin drip  Hematology following  Patient will need lifelong anticoagulation  Hematology workup in progress      Non-traumatic rhabdomyolysis  It was felt this is secondary to daptomycin. Trend CPK daily-improving. Follow ID and nephrology recommendations.     Daptomycin was discontinued on admission and patient was placed on IV vancomycin      Anemia  Multifactorial--  Poor nutrition and also history of acute blood loss  Monitor  Hematology following. Follow CBC and transfuse as needed. Patient is [facility], receiving IV Iron.        Moderate protein-calorie malnutrition  Anorexia-patient continues report that food smells and tastes bad   On IV TPN.  Monitor volume status closely  Continue p.o. supplement        Atelectasis  Monitor oxygen saturation. Encourage OOB and IS.      Pulmonary infarct  Due to bilateral pulmonary emboli.   Pulmonary following      Chronic diastolic CHF (congestive heart failure)  Patient is identified as having Diastolic heart failure that is Chronic. CHF is currently controlled. Latest ECHO performed and demonstrates-   Results for orders placed during the hospital encounter of 07/13/20   Echo Color Flow Doppler? Yes    Narrative · Concentric left ventricular remodeling.  · Hyperdynamic left ventricular systolic function. The estimated ejection   fraction is 76%.  · Grade I (mild) left ventricular diastolic dysfunction consistent with   impaired relaxation.        Patient remains on Bicarb drip with generalized edema- Monitor volume status closely    MRSA bacteremia  Previously receiving daptomycin as  outpatient-now on IV vancomycin  Orders as per infectious disease.  Continue intravenous antibiotic therapy until September 2, 2020.    Elevated troponin  Noted- chronically elevated    Bilateral pulmonary embolism  Patient previously on Eliquis at home but developed additional DVT while on it-  Patient currently on  IV heparin+ Coumadin bridging. INR goal 2-3. Pulmonology and Hematology following. Fall precautions and bleeding risks reviewed with patient.   Patient will need lifelong anticoagulation      COPD (chronic obstructive pulmonary disease)  Monitor O2 sats, supplement O2 as needed  Orders as per pulmonology        Transaminitis  Monitor-trending downward. Felt possibley related to daptomycin.  Possiblely due to clot burden.  Follow GI recommendations.        Morbid obesity  Body mass index is 40.65 kg/m².  General weight loss/lifestyle modification strategies discussed (elicit support from others; identify saboteurs; non-food rewards, etc).        Pulmonary hypertension  Noted  Orders as per pulmonology      Hypertension associated with diabetes  Monitor  Continue current treatment  Titrate medications as needed        VTE Risk Mitigation (From admission, onward)         Ordered     warfarin (COUMADIN) tablet 5 mg  Daily      08/09/20 1111     heparin 25,000 units in dextrose 5% 250 mL (100 units/mL) infusion HIGH INTENSITY nomogram - [facility]  Continuous     Question:  Heparin Infusion Adjustment (DO NOT MODIFY ANSWER)  Answer:  \\[email or web address]\epic\Images\Pharmacy\HeparinInfusions\heparin HIGH INTENSITY nomogram for [facility] [id number]    08/03/20 1609     heparin 25,000 units in dextrose 5% (100 units/ml) IV bolus from bag - ADDITIONAL PRN BOLUS - 60 units/kg  As needed (PRN)     Question:  Heparin Infusion Adjustment (DO NOT MODIFY ANSWER)  Answer:  \\[email or web address]\epic\Images\Pharmacy\HeparinInfusions\heparin HIGH INTENSITY nomogram for [facility] [id number]    08/03/20 1609     heparin 25,000 units in dextrose 5% (100  units/ml) IV bolus from bag - ADDITIONAL PRN BOLUS - 30 units/kg  As needed (PRN)     Question:  Heparin Infusion Adjustment (DO NOT MODIFY ANSWER)  Answer:  \\[email or web address]\epic\Images\Pharmacy\HeparinInfusions\heparin HIGH INTENSITY nomogram for [facility] [id number]    08/03/20 1609     IP VTE HIGH RISK PATIENT  Once      08/02/20 2034     Place sequential compression device  Until discontinued      08/02/20 2034                      Alix Ruth MD  Department of Hospital Medicine   [facility]

## 2020-08-11 NOTE — PLAN OF CARE
Problem: Occupational Therapy Goal  Goal: Occupational Therapy Goal  Description: Goals to be met by: 8/28/2020     Patient will increase functional independence with ADLs by performing:    LE Dressing with Stand-by Assistance.  Grooming while standing with Stand-by Assistance. - met  Toileting from toilet with Stand-by Assistance for hygiene and clothing management.   Toilet transfer to toilet with Stand-by Assistance.    Outcome: Ongoing, Progressing, no LOB while standing at sink to brush teeth and set up for seated bath.     Occupational Therapy Assistant Student, Merry Vasques, participated in patient treatment and note. SHAYLA present and supervising throughout. This note has been read and reviewed by supervising occupational therapy assistant. Fay MCGUIRE/ERIK

## 2020-08-11 NOTE — ASSESSMENT & PLAN NOTE
Hematuria noted-patient currently remains on IV heparin drip for history of bilateral pulmonary emboli and recurrent DVT     Resolved.  Follow hematology recommendations.
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
## 2020-08-11 NOTE — PLAN OF CARE
I updated Amaya with PHN- that Dr. Ruth gave a verbal to cancel the LTAC auth request. Sadaf Tamayo, JODI     08/11/20 1147   Discharge Assessment   Assessment Type Discharge Planning Reassessment

## 2020-08-11 NOTE — CONSULTS
CC: anemia    HPI 79 y.o. female who has arthritis, chronic back pain, fibromyalgia, HLD, HTN, sleep CHARLIE with noncompliance with CPAP, morbid obesity, HTN, GERD, COPD, DMII, history bilateral PE (previously on Eliquis but then developed additional DVT now on IV heparin bridging with coumadin) and gallstones who has chronic, persistent macrocytic nutritional anemia ongoing for many months but progressively worsening over the past several weeks without associated GI related blood loss but with weight loss, abdominal pain and anorexia. She does endorse mild left sided upper abdominal pain as well as epigastric pain. She has been admitted with rhabdomyolysis with CPK >40,000 which has improved to 1100. She has also had abnormal LFTs related to rhabdo. She does report nausea and poor appetite as well as metallic taste in mouth. She has tried to eat but nausea progresses. Prior to three months ago appetite was fine she states. Primary team ordered gastric emptying scan which was normal.    Colonoscopy performed in 2014 with removal of one 6 mm polyp, diverticulosis in the SC and AC. Recommended repeat in 5 years.    Medical records reviewed. Additional history supplemented by nursing.     Past Medical History:   Diagnosis Date    ALLERGIC RHINITIS     Anemia     Arthritis     Back pain     Bronchitis     Cataract     OU    Cholelithiasis     COPD (chronic obstructive pulmonary disease)     Degenerative disc disease     Diabetes mellitus     pre diabetic    Diverticulosis     DVT (deep venous thrombosis)     Edema     Encounter for blood transfusion 1979    Fibromyalgia     Glaucoma     Gout     Hx of colonic polyps     Hyperlipidemia     Hypertension     Incontinence     Osteoporosis     Reflux     Refusal of blood transfusions as patient is Caodaism     Sleep apnea     non compliant with CPAP    Vestibulitis of ear      Past Surgical History:   Procedure Laterality Date    ANGIOGRAPHY  OF LOWER EXTREMITY N/A 2019    Procedure: ANGIOGRAM, LOWER EXTREMITY;  Surgeon: Gino Arana MD;  Location: [facility] CATH/EP LAB;  Service: General;  Laterality: N/A;    HIP SURGERY      HYSTERECTOMY      JOINT REPLACEMENT      B total hip    LAPAROSCOPIC CHOLECYSTECTOMY N/A 2020    Procedure: CHOLECYSTECTOMY, LAPAROSCOPIC;  Surgeon: Jomar Mcdonald MD;  Location: [facility];  Service: General;  Laterality: N/A;    TRANSFORAMINAL EPIDURAL INJECTION OF STEROID Left 2020    Procedure: Injection,steroid,epidural,transforaminal approach;  Surgeon: Matt Gilliam MD;  Location: [facility] OR;  Service: Pain Management;  Laterality: Left;  L2-3, L3-4     Social History  Social History     Tobacco Use    Smoking status: Former Smoker     Packs/day: 0.25     Years: 5.00     Pack years: 1.25     Quit date: 1972     Years since quittin.6    Smokeless tobacco: Never Used   Substance Use Topics    Alcohol use: Yes     Alcohol/week: 0.0 standard drinks     Comment: Rarely    Drug use: Yes     Types: Oxycodone, Hydrocodone     Family History   Problem Relation Age of Onset    Hypertension Mother     Diabetes Sister     Glaucoma Neg Hx     Macular degeneration Neg Hx     Retinal detachment Neg Hx        Review of Systems  General ROS: +fatigue, +lethargy, negative for chills, fever or weight loss  Psychological ROS: negative for hallucination, depression or suicidal ideation  Ophthalmic ROS: negative for blurry vision, photophobia or eye pain  ENT ROS: negative for epistaxis, sore throat or rhinorrhea  Respiratory ROS: no cough, shortness of breath, or wheezing  Cardiovascular ROS: no chest pain or dyspnea on exertion  Gastrointestinal ROS: +nausea, +poor appetite, + abdominal pain, +constipation, no blood in stool  Genito-Urinary ROS: no dysuria, trouble voiding, or hematuria  Musculoskeletal ROS: negative for gait disturbance or muscular weakness  Neurological ROS: no syncope or seizures; no  "ataxia  Dermatological ROS: negative for pruritis, rash and jaundice    Physical Examination  BP (!) 183/89 (BP Location: Right arm, Patient Position: Sitting)   Pulse 88   Temp 98.7 °F (37.1 °C) (Oral)   Resp 18   Ht 5' 3" (1.6 m)   Wt 108 kg (238 lb 1.6 oz)   SpO2 95%   Breastfeeding No   BMI 42.18 kg/m²   General appearance: chronically ill appearing female, alert, cooperative, no distress  HENT: Normocephalic, atraumatic, neck symmetrical, no nasal discharge   Eyes: conjunctivae/corneas clear, PERRL, EOM's intact  Lungs: clear to auscultation bilaterally, symmetric chest wall expansion bilaterally  Heart: regular rate and rhythm without rub; no displacement of the PMI   Abdomen: soft, mild tenderness to palpation in LUQ, bowel sounds normoactive; no organomegaly  Extremities: extremities symmetric; no clubbing, cyanosis, or edema  Integument: Skin color, texture, turgor normal; no rashes; hair distrubution normal  Neurologic: Alert and oriented X 3, did not test strength or gait  Psychiatric: no pressured speech; normal affect; no evidence of impaired cognition     Labs:  Lab Results   Component Value Date    WBC 9.60 08/11/2020    HGB 8.2 (L) 08/11/2020    HCT 27.0 (L) 08/11/2020     (H) 08/11/2020     08/11/2020     CMP  Sodium   Date Value Ref Range Status   08/11/2020 141 136 - 145 mmol/L Final     Potassium   Date Value Ref Range Status   08/11/2020 3.9 3.5 - 5.1 mmol/L Final     Chloride   Date Value Ref Range Status   08/11/2020 103 95 - 110 mmol/L Final     CO2   Date Value Ref Range Status   08/11/2020 29 23 - 29 mmol/L Final     Glucose   Date Value Ref Range Status   08/11/2020 128 (H) 70 - 110 mg/dL Final     BUN, Bld   Date Value Ref Range Status   08/11/2020 6 (L) 8 - 23 mg/dL Final     Creatinine   Date Value Ref Range Status   08/11/2020 1.7 (H) 0.5 - 1.4 mg/dL Final     Calcium   Date Value Ref Range Status   08/11/2020 8.7 8.7 - 10.5 mg/dL Final     Total Protein   Date " Value Ref Range Status   08/11/2020 6.6 6.0 - 8.4 g/dL Final     Albumin   Date Value Ref Range Status   08/11/2020 2.4 (L) 3.5 - 5.2 g/dL Final   08/11/2020 2.4 (L) 3.5 - 5.2 g/dL Final     Total Bilirubin   Date Value Ref Range Status   08/11/2020 0.4 0.1 - 1.0 mg/dL Final     Comment:     For infants and newborns, interpretation of results should be based  on gestational age, weight and in agreement with clinical  observations.  Premature Infant recommended reference ranges:  Up to 24 hours.............<8.0 mg/dL  Up to 48 hours............<12.0 mg/dL  3-5 days..................<15.0 mg/dL  6-29 days.................<15.0 mg/dL       Alkaline Phosphatase   Date Value Ref Range Status   08/11/2020 143 (H) 55 - 135 U/L Final     AST   Date Value Ref Range Status   08/11/2020 69 (H) 10 - 40 U/L Final     ALT   Date Value Ref Range Status   08/11/2020 103 (H) 10 - 44 U/L Final     Anion Gap   Date Value Ref Range Status   08/11/2020 9 8 - 16 mmol/L Final     eGFR if    Date Value Ref Range Status   08/11/2020 33 (A) >60 mL/min/1.73 m^2 Final     eGFR if non    Date Value Ref Range Status   08/11/2020 29 (A) >60 mL/min/1.73 m^2 Final     Comment:     Calculation used to obtain the estimated glomerular filtration  rate (eGFR) is the CKD-EPI equation.          Imaging:  NM Gastric Emptying normal   CT A/P:  The liver is unremarkable.  There are postoperative changes of recent cholecystectomy.  There is no biliary dilatation.  There is no abdominal wall or intra-abdominal fluid collection.  There is no free air.     The pancreas, spleen, and adrenal glands are unremarkable.  There is a small defect along the posterior margin of the upper pole of the right kidney which may represent parenchymal scarring or prior partial nephrectomy.     The bowel is nondilated.  The appendix is normal.  There is moderate sigmoid diverticulosis.  There has been hysterectomy.  There is moderate calcification  of the aorta without aneurysm.     There is moderate right and mild left basilar atelectasis there have been bilateral hip arthroplasties.  There is advanced degenerative disc change at the L1-2 and L2-3 levels.     Impression:     Moderate basilar atelectasis.     Status post cholecystectomy without evidence for complication.    US ABD:    Liver: Small measuring 9.2 cm. Homogeneous echotexture. A 1.6 cm cyst is noted in the left lobe.     Gallbladder: The gallbladder is surgically absent.     Biliary system: The common duct is not dilated, measuring 2.9 mm.  No intrahepatic ductal dilatation.     Spleen: Normal in size and echotexture, measuring 8.4 cm.     Miscellaneous: No upper abdominal ascites.     Impression:     Prior cholecystectomy.  Small simple cyst of the left lobe of the liver otherwise negative right upper quadrant ultrasound.     US Retroperitoneal: Features suggesting intrinsic renal disease.  No hydronephrosis.     Independently reviewed    Assessment:   1. Epigastric abdominal pain  2. Macrocytic anemia  3. Anorexia  4. Weight loss  5. Normal gastric emptying study    Plan:  -NPO after midnight  -Plan for EGD tomorrow to evaluate abdominal pain, anorexia, weight loss and anemia  -Anti-emetics as needed  -Protonix 40 mg PO daily  -Further recommendations to follow endoscopy    Marino Simeon MD  [facility]  [address] [facility] [address] [place] [address]  Office: [phone]  Fax: [phone]

## 2020-08-11 NOTE — PROGRESS NOTES
Progress Note  PULMONARY    Admit Date: 8/2/2020 08/11/2020  From Dr Suero's consult 8/3-History of Present Illness:    Pt was home 5 days after last admit, she had iv line on right and had left picc line placed.  She did develop mrsa bacteremia.  Pt was on ox at home but was ambulating.  2 days pta she developed weakness, chest tightness, and bilat arm tightness.  Pt represented to er- found to have lft up and creat up. Intake had been poor.  Her chest pain was more tightness like her arms.     Pt presented with months flank/chest pain with massive pe found after laproscopic rosette. Had mrsa bacteremia during stay and developed severe anemia - responded to iron rx, etc... pt [facility].  On eiiquis.  Had pulm htn on echo.   Pt presented with elevated creat to 1.8 from 1.3 bseline. P[t came to er with loer chest pain.   Plan:   dvt on right arm- no picc mentioned on right in epic.  Recurrent clot on rx for no clear reason.  mrsa bacteremia last admit  - source not clear.  teflaro may ppt transminitis - suspect had another pe- heparin drip ordered. Heme to see. Needs id f/u.  F/u lft. Monitor.  Dx not clear.          SUBJECTIVE:     8/4 no new c/o- frustrated with illness.  8/5 feels better, no new c/o  8/6 no new c/o  8/10 no new c/o- had had nausea, decreased taste, preserved sense smell going back months.  Weak/schwartz and feels puffy otherwise.  Having some cough and responds to neb rx.  8/11 still nausea,metallic taste?    PFSH and Allergies reviewed.    OBJECTIVE:     Vitals (Most recent):  Vitals:    08/11/20 0740   BP: (!) 142/65   Pulse: 101   Resp: 20   Temp: 98.3 °F (36.8 °C)       Vitals (24 hour range):  Temp:  [98.2 °F (36.8 °C)-99.2 °F (37.3 °C)]   Pulse:  []   Resp:  [16-20]   BP: (142-207)/(57-87)   SpO2:  [93 %-98 %]       Intake/Output Summary (Last 24 hours) at 8/11/2020 0804  Last data filed at 8/11/2020 0504  Gross per 24 hour   Intake 1811.72 ml   Output 2250 ml   Net -438.28 ml           Physical Exam:  The patient's neuro status (alertness,orientation,cognitive function,motor skills,), pharyngeal exam (oral lesions, hygiene, abn dentition,), Neck (jvd,mass,thyroid,nodes in neck and above/below clavicle),RESPIRATORY(symmetry,effort,fremitus,percussion,auscultation),  Cor(rhythm,heart tones including gallops,perfusion,edema)ABD(distention,hepatic&splenomegaly,tenderness,masses), Skin(rash,cyanosis),Psyc(affect,judgement,).  Exam negative except for these pertinent findings:    Swollen arms right>> left, picc left . chest is symmetric, no distress, normal percussion, normal fremitus and good normal breath sounds      Radiographs reviewed: view by direct vision    Results for orders placed during the hospital encounter of 07/13/20   X-Ray Chest 1 View    Narrative EXAMINATION:  XR CHEST 1 VIEW    CLINICAL HISTORY:  dyspnea;    TECHNIQUE:  Single frontal view of the chest was performed.    COMPARISON:  Chest of July 17, 2020.    FINDINGS:  There is chronic elevation of the right hemidiaphragm.  The cardiac size is within normal limits.  Mild atelectasis is seen at the right lung base.  The lung apices are clear.  No pneumothorax is seen.      Impression Mild atelectasis at the right lung base.  Chronic elevation of the right hemidiaphragm.      Electronically signed by: Gino Juarez MD  Date:    07/20/2020  Time:    08:08   ]    Labs     Recent Labs   Lab 08/11/20 0226   WBC 9.60   HGB 8.2*   HCT 27.0*        Recent Labs   Lab 08/11/20 0226      K 3.9      CO2 29   BUN 6*   CREATININE 1.7*   *   CALCIUM 8.7   PHOS 4.6*   AST 69*   *   ALKPHOS 143*   BILITOT 0.4   BILIDIR 0.2   PROT 6.6   ALBUMIN 2.4*  2.4*   LIPASE 36   CRP 43.5*   INR 1.0   CPK 1107*   No results for input(s): PH, PCO2, PO2, HCO3 in the last 24 hours.  Microbiology Results (last 7 days)     Procedure Component Value Units Date/Time    Urine culture [[id number]] Collected: 08/07/20 2040     Order Status: Completed Specimen: Urine Updated: 08/09/20 1031     Urine Culture, Routine No growth    Narrative:      Specimen Source->Urine    Urine culture [[id number]] Collected: 08/06/20 2350    Order Status: Completed Specimen: Urine Updated: 08/08/20 1421     Urine Culture, Routine No significant growth    Narrative:      Specimen Source->Urine    Blood culture x two cultures. Draw prior to antibiotics. [[id number]] Collected: 08/02/20 1633    Order Status: Completed Specimen: Blood Updated: 08/07/20 2322     Blood Culture, Routine No growth after 5 days.    Narrative:      Aerobic and anaerobic    Blood culture x two cultures. Draw prior to antibiotics. [[id number]] Collected: 08/02/20 1633    Order Status: Completed Specimen: Blood Updated: 08/07/20 2322     Blood Culture, Routine No growth after 5 days.    Narrative:      Aerobic and anaerobic    Urine Culture High Risk [[id number]] Collected: 08/07/20 2039    Order Status: Canceled Specimen: Urine, Catheterized     Urine Culture High Risk [[id number]] Collected: 08/06/20 2352    Order Status: Canceled Specimen: Urine, Catheterized           Impression:  Active Hospital Problems    Diagnosis  POA    *LUIZ (acute kidney injury) [N17.9]  Yes    Hematuria [R31.9]  No    Patient is Sabianist [Z78.9]  Yes    Acute deep vein thrombosis (DVT) of right upper extremity [I82.621]  Yes     Occlusive thrombus of the right brachial vein and cephalic vein      Elevated ferritin level [R79.89]  Yes    Hyponatremia [E87.1]  Yes    Debility [R53.81]  Yes    Liver cyst [K76.89]  Yes    Non-traumatic rhabdomyolysis [M62.82]  Yes    Chronic diastolic CHF (congestive heart failure) [I50.32]  Yes    Pulmonary infarct [I26.99]  Yes    Atelectasis [J98.11]  Yes    Moderate protein-calorie malnutrition [E44.0]  Yes    Anemia [D64.9]  Yes    MRSA bacteremia [R78.81]  Yes    Elevated troponin [R79.89]  Yes    Bilateral pulmonary embolism [I26.99]  Yes    COPD  (chronic obstructive pulmonary disease) [J44.9]  Yes    Transaminitis [R74.0]  Yes    Morbid obesity [E66.01]  Yes    Pulmonary hypertension [I27.20]  Yes    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               Plan:   8/4 on heparin for dvt right arm, occurred since last admit while on eliquis.  Pt had massive clot with pulm htn suggested on echo.  Pt had mrsa bacteremia after rosette prior to last admit.    cpk 53597 with creat 1.6, consulted renal.  Was on Daptomycin/tefloaro.  Had bad anemia last admit - improved with iron- jehovah witness.    Heme to f/u.    ID saw, vanc was being considered.      May have had another pe- would be at high risk with pulm htn already found.  ivf considered but not with any concerns for mrsa.    8/5  Improving, cpk down,creat stable.  Breathing better, feels better.  No ivc filter. pulm htn worrisome as would contribute to debility.      8/6 I/o  1120/400???? Bun/creat 12/1.9, will order cpk for am  Patient is steadily improving.  She will need follow-up for pulmonary hypertension as it was found on her last echo.  With adequate anticoagulation-good chance pulmonary hypertension will clear.  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  Complex case.  I discussed her with Infectious Disease yesterday      8/10 hgb down to 7.4, creat 1.7,   had had nausea, decreased taste, preserved sense smell going back months.  Weak/schwartz and feels puffy otherwise.    Now transitioning to outpt rx tarting coumadinSunday.  Echo initially had tricuspid regurg max velocity 3.26 m/s on  7/14 echo, follow up echo had 2.2 m/s on 7/22 echo.  Probability of pulm htn low with trv less than 2.9.  She was dx massive pe on 7/14 with echo improvement follow wk.  Ct abd was neg basically on 8/2.      8/11 having gastric emptying study,  Coumadin starting.   Creat 1.7.      ctep not expected to  develop but may develop.  F/u echo in a yr or so (depending on symptoms)       .

## 2020-08-11 NOTE — PLAN OF CARE
Heparin gtt infusing per MD orders at 10 units/kg/hr. Next aptt in AM with morning labs. Gastric emptying test completed today. GI MD consulted for weight loss, abdominal pain and anorexia. Pt states anything she eats just tastes horrible, doesn't necessarily have a decreased appetite. Strict I&O maintained. Up with stand-by assistance. Call light in reach. Will continue to monitor.

## 2020-08-11 NOTE — PT/OT/SLP PROGRESS
Occupational Therapy   Treatment    Name: Sammi Abreu  MRN: [id number]  Admitting Diagnosis:  LUIZ (acute kidney injury)       Recommendations:     Discharge Recommendations: home health PT  Discharge Equipment Recommendations:  none  Barriers to discharge:  Decreased caregiver support    Assessment:     Sammi Abreu is a 79 y.o. female with a medical diagnosis of LUIZ (acute kidney injury).  She presents with increased activity tolerance and endurance with a positive mood. Willing to mobilize and motivated to groom (standing) and bathe at sink (seated). Overall, SBA for ADL's this tx. No LOB but a few notable rest breaks during bathing. Performance deficits affecting function are weakness, impaired endurance, impaired functional mobilty, decreased lower extremity function, impaired cardiopulmonary response to activity, edema.     Rehab Prognosis:  Good; patient would benefit from acute skilled OT services to address these deficits and reach maximum level of function.       Plan:     Patient to be seen 3 x/week to address the above listed problems via self-care/home management, therapeutic activities, therapeutic exercises  · Plan of Care Expires: 08/28/20  · Plan of Care Reviewed with: patient    Subjective     Pain/Comfort:  · Pain Rating 1: (None observed or stated.)    Objective:     Communicated with: RNClara prior to session.  Patient found up in chair with PICC line, telemetry, oxygen upon OT entry to room.    General Precautions: Standard, fall   Orthopedic Precautions:N/A   Braces: N/A     Occupational Performance:     Bed Mobility:      Functional Mobility/Transfers:  · Patient completed Sit <> Stand Transfer with stand by assistance from chair with rolling walker and supervision from BSC while at sink, using armrest and sink for support.  · Functional Mobility: Good, no LOB while ambulating to and from sink with RW.    Activities of Daily Living:  · Grooming: stand by assistance standing at  sink for oral hygiene.  · Bathing: set up while seated on BSC at sink. pt performed sit>stand during lower body cleaning.  · Upper Body Dressing: minimum assistance to don gown 2* picc line, telemetry, O2 mgmt.  · Lower Body Dressing: maximal assistance to don socks 2* no sock aid. Mod I with reacher to doff socks.      [facility] 6 Click ADL: 16    Treatment & Education:  - MCGUIRE ed use of LB dressing AE to conserve energy and increase functional independence. Pt uses sock aid at PLOF.    Patient left up in chair with all lines intact, call button in reach and chair alarm onEducation:      GOALS:   Multidisciplinary Problems     Occupational Therapy Goals        Problem: Occupational Therapy Goal    Goal Priority Disciplines Outcome Interventions   Occupational Therapy Goal     OT, PT/OT Ongoing, Progressing    Description: Goals to be met by: 8/28/2020     Patient will increase functional independence with ADLs by performing:    LE Dressing with Stand-by Assistance.  Grooming while standing with Stand-by Assistance.  Toileting from toilet with Stand-by Assistance for hygiene and clothing management.   Toilet transfer to toilet with Stand-by Assistance.                     Time Tracking:     OT Date of Treatment: 08/11/20  OT Start Time: 1450  OT Stop Time: 1526  OT Total Time (min): 36 min    Billable Minutes:Self Care/Home Management 36 min    AISHA Polo  8/11/2020     ANJUM Blue  Occupational Therapy Assistant Student, Merry Vasques, participated in patient treatment and note. SHAYLA present and supervising throughout. This note has been read and reviewed by supervising occupational therapy assistant. Fay LEONARD

## 2020-08-11 NOTE — CARE UPDATE
08/11/20 0732   Aerosol Therapy   $ Aerosol Therapy Charges Aerosol Treatment   Daily Review of Necessity (SVN) completed   Respiratory Treatment Status (SVN) given   Treatment Route (SVN) mask   Patient Position (SVN) sitting in chair   Post Treatment Assessment (SVN) breath sounds improved   Signs of Intolerance (SVN) none   Breath Sounds Post-Respiratory Treatment   Throughout All Fields Post-Treatment All Fields   Throughout All Fields Post-Treatment aeration increased   Post-treatment Heart Rate (beats/min) 92   Post-treatment Resp Rate (breaths/min) 18   Incentive Spirometer   $ Incentive Spirometer Charges done with encouragement   Incentive Spirometer Predicted Level (mL) 1550   Administration (IS) instruction provided, follow-up   Number of Repetitions (IS) 15   Level Incentive Spirometer (mL) 750   Patient Tolerance (IS) good

## 2020-08-11 NOTE — SUBJECTIVE & OBJECTIVE
"Interval History:  Patient is scheduled for gastric emptying study today for evaluation of anorexia and decreased oral intake.  Currently receiving heparin infusion with Coumadin bridging.  Patient being followed by hematology. Receiving Iron infusions. No hematuria noted.    Review of Systems   Constitutional: Positive for activity change, appetite change and fatigue. Negative for chills, diaphoresis and fever.   HENT: Negative for ear discharge, ear pain and facial swelling.    Eyes: Negative for pain and redness.   Respiratory: Positive for cough and shortness of breath.    Cardiovascular: Positive for leg swelling.   Gastrointestinal: Negative for abdominal distention, abdominal pain, constipation, diarrhea, nausea and vomiting.        Poor appetite- patient reports things taste bad and "smell bad"  and she states she has not been hungry   Endocrine: Negative for polydipsia and polyphagia.   Genitourinary: Negative for difficulty urinating, dysuria, flank pain and hematuria.   Musculoskeletal: Positive for back pain. Negative for neck pain and neck stiffness.   Skin: Negative for color change.   Allergic/Immunologic: Negative for food allergies.   Neurological: Positive for weakness. Negative for seizures, facial asymmetry and speech difficulty.   Psychiatric/Behavioral: Negative for agitation, behavioral problems, confusion, hallucinations and suicidal ideas.     Objective:     Vital Signs (Most Recent):  Temp: 98.3 °F (36.8 °C) (08/11/20 0740)  Pulse: 101 (08/11/20 0740)  Resp: 20 (08/11/20 0740)  BP: (!) 142/65 (08/11/20 0740)  SpO2: (!) 94 % (08/11/20 0740) Vital Signs (24h Range):  Temp:  [98.2 °F (36.8 °C)-99.2 °F (37.3 °C)] 98.3 °F (36.8 °C)  Pulse:  [] 101  Resp:  [16-20] 20  SpO2:  [93 %-98 %] 94 %  BP: (142-207)/(57-87) 142/65     Weight: 108 kg (238 lb 1.6 oz)  Body mass index is 42.18 kg/m².    Intake/Output Summary (Last 24 hours) at 8/11/2020 0728  Last data filed at 8/11/2020 5533  Gross " per 24 hour   Intake 1811.72 ml   Output 2250 ml   Net -438.28 ml      Physical Exam  Constitutional:       General: She is not in acute distress.     Appearance: Normal appearance.   HENT:      Head: Normocephalic and atraumatic.      Mouth/Throat:      Mouth: Mucous membranes are moist.   Eyes:      General:         Right eye: No discharge.         Left eye: No discharge.      Extraocular Movements: Extraocular movements intact.      Conjunctiva/sclera: Conjunctivae normal.      Pupils: Pupils are equal, round, and reactive to light.   Neck:      Musculoskeletal: Normal range of motion and neck supple. No neck rigidity or muscular tenderness.   Cardiovascular:      Rate and Rhythm: Normal rate and regular rhythm.      Pulses: Normal pulses.      Heart sounds: Murmur present.   Pulmonary:      Effort: No respiratory distress.      Comments: Bilateral breath sounds diminished  Abdominal:      General: Bowel sounds are normal. There is no distension.      Tenderness: There is no abdominal tenderness. There is no guarding.      Comments: Obese   Genitourinary:     Comments: Not examined  Musculoskeletal: Normal range of motion.         General: Swelling present.      Comments: Generalized edema with edema also noted to upper and lower extremities   Skin:     General: Skin is warm and dry.      Capillary Refill: Capillary refill takes less than 2 seconds.   Neurological:      General: No focal deficit present.      Mental Status: She is alert and oriented to person, place, and time. Mental status is at baseline.      Cranial Nerves: No cranial nerve deficit.   Psychiatric:         Mood and Affect: Mood normal.         Behavior: Behavior normal.         Thought Content: Thought content normal.         Judgment: Judgment normal.       Lab Results   Component Value Date    WBC 9.60 08/11/2020    HGB 8.2 (L) 08/11/2020    HCT 27.0 (L) 08/11/2020     (H) 08/11/2020     08/11/2020     CMP  Sodium   Date Value  Ref Range Status   08/11/2020 141 136 - 145 mmol/L Final     Potassium   Date Value Ref Range Status   08/11/2020 3.9 3.5 - 5.1 mmol/L Final     Chloride   Date Value Ref Range Status   08/11/2020 103 95 - 110 mmol/L Final     CO2   Date Value Ref Range Status   08/11/2020 29 23 - 29 mmol/L Final     Glucose   Date Value Ref Range Status   08/11/2020 128 (H) 70 - 110 mg/dL Final     BUN, Bld   Date Value Ref Range Status   08/11/2020 6 (L) 8 - 23 mg/dL Final     Creatinine   Date Value Ref Range Status   08/11/2020 1.7 (H) 0.5 - 1.4 mg/dL Final     Calcium   Date Value Ref Range Status   08/11/2020 8.7 8.7 - 10.5 mg/dL Final     Total Protein   Date Value Ref Range Status   08/11/2020 6.6 6.0 - 8.4 g/dL Final     Albumin   Date Value Ref Range Status   08/11/2020 2.4 (L) 3.5 - 5.2 g/dL Final   08/11/2020 2.4 (L) 3.5 - 5.2 g/dL Final     Total Bilirubin   Date Value Ref Range Status   08/11/2020 0.4 0.1 - 1.0 mg/dL Final     Comment:     For infants and newborns, interpretation of results should be based  on gestational age, weight and in agreement with clinical  observations.  Premature Infant recommended reference ranges:  Up to 24 hours.............<8.0 mg/dL  Up to 48 hours............<12.0 mg/dL  3-5 days..................<15.0 mg/dL  6-29 days.................<15.0 mg/dL       Alkaline Phosphatase   Date Value Ref Range Status   08/11/2020 143 (H) 55 - 135 U/L Final     AST   Date Value Ref Range Status   08/11/2020 69 (H) 10 - 40 U/L Final     ALT   Date Value Ref Range Status   08/11/2020 103 (H) 10 - 44 U/L Final     Anion Gap   Date Value Ref Range Status   08/11/2020 9 8 - 16 mmol/L Final     eGFR if    Date Value Ref Range Status   08/11/2020 33 (A) >60 mL/min/1.73 m^2 Final     eGFR if non    Date Value Ref Range Status   08/11/2020 29 (A) >60 mL/min/1.73 m^2 Final     Comment:     Calculation used to obtain the estimated glomerular filtration  rate (eGFR) is the CKD-EPI  equation.        Microbiology Results (last 7 days)     Procedure Component Value Units Date/Time    Urine culture [[id number]] Collected: 08/07/20 2040    Order Status: Completed Specimen: Urine Updated: 08/09/20 1031     Urine Culture, Routine No growth    Narrative:      Specimen Source->Urine    Urine culture [[id number]] Collected: 08/06/20 2350    Order Status: Completed Specimen: Urine Updated: 08/08/20 1421     Urine Culture, Routine No significant growth    Narrative:      Specimen Source->Urine    Blood culture x two cultures. Draw prior to antibiotics. [[id number]] Collected: 08/02/20 1633    Order Status: Completed Specimen: Blood Updated: 08/07/20 2322     Blood Culture, Routine No growth after 5 days.    Narrative:      Aerobic and anaerobic    Blood culture x two cultures. Draw prior to antibiotics. [[id number]] Collected: 08/02/20 1633    Order Status: Completed Specimen: Blood Updated: 08/07/20 2322     Blood Culture, Routine No growth after 5 days.    Narrative:      Aerobic and anaerobic    Urine Culture High Risk [[id number]] Collected: 08/07/20 2039    Order Status: Canceled Specimen: Urine, Catheterized     Urine Culture High Risk [[id number]] Collected: 08/06/20 2352    Order Status: Canceled Specimen: Urine, Catheterized           Radiology:  SADIE (07-):  · Hyperdynamic left ventricular systolic function. The estimated ejection fraction is 65 - 70 %.  · Normal appearing left atrial appendage. No thrombus is present in the appendage. Normal appendage velocities.  · Mild mitral regurgitation.  · Mild tricuspid regurgitation.  · No evidence of endocarditis  · PICC line at the cavoatrial junction    ECHO (07-):  · Concentric left ventricular remodeling.  · Hyperdynamic left ventricular systolic function. The estimated ejection fraction is 76%.  · Grade I (mild) left ventricular diastolic dysfunction consistent with impaired relaxation.    · CXR: Concentric left ventricular  remodeling.  · Hyperdynamic left ventricular systolic function. The estimated ejection fraction is 76%.  Grade I (mild) left ventricular diastolic dysfunction consistent with impaired relaxation.    CXR: There is bibasilar atelectasis versus infiltrate similar to 07/27/2020.    CT abdomen and pelvis without contrast:  Moderate basilar atelectasis. Status post cholecystectomy without evidence for complication.    RUQ abdominal US:  Prior cholecystectomy.  Small simple cyst of the left lobe of the liver otherwise negative right upper quadrant ultrasound.    RUE venous doppler scan:  Occlusive thrombus of the right brachial vein and cephalic vein.    KUB:  Possible ileus.  Prior cholecystectomy.  Prior bilateral hip joint replacement.    Renal US: Features suggesting intrinsic renal disease.  No hydronephrosis.    Left humerus x-ray:  Degenerative changes in the humeral head and glenohumeral joint of the left shoulder with otherwise negative left humerus.  PICC line as described.

## 2020-08-11 NOTE — ASSESSMENT & PLAN NOTE
Monitor-trending downward. Felt possibley related to daptomycin.  Possiblely due to clot burden.  Follow GI recommendations.

## 2020-08-11 NOTE — PROGRESS NOTES
Nephrology Progress Note    Patient Name: Sammi Abreu  MRN: [id number]    Patient Class: IP- Inpatient   Admission Date: 8/2/2020  Length of Stay: 8 days  Date of Service: 8/11/2020    Attending Physician: Alix Ruth MD  Primary Care Provider: Osmani Villatoro MD    Reason for Consult: luiz/ckd3/hyponatremia/acidosis/rhabdomyalisis/mrsa bacteremia/anemia/htn    Chief Complaint   Patient presents with    Post-op Problem     pain to left lower chest and BLE after surgery on 7/27       SUBJECTIVE:     HPI: 79F Jehova witness was treated in hospital for MRSA bacteremia, had lap rosette 7/13, complicated by PE and DVT, was d/c 7/29 on daptomycin and returned to ER 5 days later with abdominal pain, elevated CPK and LFTs, LUIZ with modest rise in sCr. CT abdomen w/out contrast looked OK. She was given NS and heparin gtt, Dapto was changed with Vanco.    8/6 VSS, no new complains.  8/7 VSS, no new complains. Appreciate ID input.  8/8 some spikes in BP, on 2L NC, UOP 1.5L  8/9 intermittent spikes in BP, better this AM, on 2L NC, UOP 1.8L, c/o swelling  8/10  Unavailable for exam.  Sitting on the commode   8/11  At gastric emptying study    Outpatient meds:  Current Facility-Administered Medications on File Prior to Encounter   Medication Dose Route Frequency Provider Last Rate Last Dose    lactated ringers infusion   Intravenous Continuous Gino Reid MD 10 mL/hr at 07/13/20 1308      lidocaine (PF) 10 mg/ml (1%) injection 10 mg  1 mL Intradermal Once Gino Reid MD         Current Outpatient Medications on File Prior to Encounter   Medication Sig Dispense Refill    acetaminophen (TYLENOL) 325 MG tablet Take 2 tablets (650 mg total) by mouth every 6 (six) hours as needed (Do not take with any other Tylenol or acetaminophen containing products).  0    albuterol-ipratropium (DUO-NEB) 2.5 mg-0.5 mg/3 mL nebulizer solution Take 3 mLs by nebulization every 8 (eight) hours. 270 mL 0    apixaban (ELIQUIS) 5 mg  Tab Take 1 tablet (5 mg total) by mouth 2 (two) times daily. 60 tablet 3    ascorbic acid, vitamin C, (VITAMIN C) 100 MG tablet Take 5 tablets (500 mg total) by mouth every evening.      esomeprazole (NEXIUM) 20 MG capsule Take 1 capsule (20 mg total) by mouth before breakfast. 30 capsule 2    fluticasone (FLONASE) 50 mcg/actuation nasal spray 2 sprays (100 mcg total) by Each Nare route once daily. 3 Bottle 3    folic acid (FOLVITE) 1 MG tablet Take 1 tablet (1 mg total) by mouth once daily. 30 tablet 0    metoprolol succinate (TOPROL-XL) 50 MG 24 hr tablet Take 1 tablet (50 mg total) by mouth once daily. 90 tablet 3    montelukast (SINGULAIR) 10 mg tablet Take 1 tablet (10 mg total) by mouth every evening. 90 tablet 3    sodium chloride 0.9% SolP 50 mL with DAPTOmycin 350 mg SolR 1,000 mg Inject 1,000 mg into the vein once daily.      spironolactone (ALDACTONE) 25 MG tablet Take 1 tablet (25 mg total) by mouth once daily. 90 tablet 6    budesonide-formoterol 160-4.5 mcg (SYMBICORT) 160-4.5 mcg/actuation HFAA Inhale 2 puffs into the lungs every 12 (twelve) hours. Controller 3 Inhaler 3    cyanocobalamin, vitamin B-12, 2,500 mcg Lozg Place 2 tablets under the tongue once daily. 60 lozenge 11    diclofenac (VOLTAREN) 25 MG TbEC Take 1 tablet (25 mg total) by mouth 3 (three) times daily as needed. 15 tablet 0    HYDROcodone-acetaminophen (NORCO) 5-325 mg per tablet Take 1 tablet by mouth every 6 (six) hours as needed for Pain. (Patient not taking: Reported on 7/30/2020) 15 tablet 0    lactulose (CHRONULAC) 10 gram/15 mL solution Take 30 mLs (20 g total) by mouth 3 (three) times daily. 2 Teaspoon(s) Oral PRN Every evening. (Patient not taking: Reported on 7/30/2020) 500 mL 3    multivitamin capsule Take 1 capsule by mouth once daily.      ondansetron (ZOFRAN-ODT) 4 MG TbDL Take 1 tablet (4 mg total) by mouth every 8 (eight) hours as needed. 20 tablet 0       Scheduled meds:   albuterol-ipratropium  3 mL  Nebulization Q6H    ascorbic acid (vitamin C)  500 mg Oral QHS    cyanocobalamin  2,000 mcg Oral Daily    dronabinoL  2.5 mg Oral BID    fluticasone furoate-vilanteroL  1 puff Inhalation Daily    folic acid  1 mg Oral Daily    hydrALAZINE  10 mg Intravenous Once    hydrALAZINE  50 mg Oral Q8H    iron dextran IVPB  100 mg Intravenous Daily    metoprolol succinate  50 mg Oral Daily    montelukast  10 mg Oral QHS    multivitamin  1 tablet Oral Daily    pantoprazole  40 mg Oral Daily    polyethylene glycol  17 g Oral Daily    senna-docusate 8.6-50 mg  1 tablet Oral BID    vancomycin (VANCOCIN) IVPB  1,000 mg Intravenous Q24H    warfarin  5 mg Oral Daily       Infusions:   heparin (porcine) in D5W 10 Units/kg/hr (08/10/20 2038)       PRN meds:  acetaminophen, aluminum-magnesium hydroxide-simethicone, benzonatate, bisacodyL, dextrose 50%, dextrose 50%, glucagon (human recombinant), glucose, glucose, heparin (PORCINE), heparin (PORCINE), magnesium oxide, magnesium oxide, melatonin, morphine, ondansetron, potassium chloride, potassium chloride, potassium chloride, sodium chloride 0.9%, Pharmacy to dose Vancomycin consult **AND** vancomycin - pharmacy to dose    Review of Systems:  negative    OBJECTIVE:     Vital Signs and IO (Last 24H):  Temp:  [98.2 °F (36.8 °C)-99.2 °F (37.3 °C)]   Pulse:  []   Resp:  [16-20]   BP: (142-207)/(57-87)   SpO2:  [93 %-98 %]   I/O last 3 completed shifts:  In: 2903.7 [P.O.:1850; I.V.:303.7; IV Piggyback:750]  Out: 2550 [Urine:2550]    Wt Readings from Last 5 Encounters:   08/10/20 108 kg (238 lb 1.6 oz)   07/30/20 99.9 kg (220 lb 3.8 oz)   07/29/20 102.7 kg (226 lb 6.6 oz)   07/13/20 97.5 kg (215 lb)   07/09/20 104.9 kg (231 lb 4.2 oz)     Physical Exam: (8/9)  Constitutional: nad, aao x 3  Heart: rrr, no m/r/g, wwp, + edema  Lungs: ctab, no w/r/r/c, no lb  Abdomen: s/nt/nd, +BS    Body mass index is 42.18 kg/m².    Laboratory:  Recent Labs   Lab 08/09/20  1462  08/10/20  0415 08/11/20 0226    140 141   K 3.7 3.7 3.9   CL 97 100 103   CO2 36* 31* 29   BUN 9 7* 6*   CREATININE 1.7* 1.7* 1.7*   ESTGFRAFRICA 33* 33* 33*   EGFRNONAA 29* 29* 29*    131* 128*       Recent Labs   Lab 08/05/20  0116 08/06/20  0643 08/07/20  0428  08/09/20  0505 08/10/20  0415 08/11/20  0226   CALCIUM 8.0* 7.9* 7.9*   < > 8.1* 8.1* 8.7   ALBUMIN 2.2* 2.0* 2.0*   < > 2.1* 2.1* 2.4*  2.4*   PHOS 3.7 4.6* 4.1   < > 4.8* 4.0 4.6*   MG 2.3 2.0 1.9  --   --   --   --     < > = values in this interval not displayed.       Recent Labs   Lab 12/22/17  1141 06/22/18  0848 12/10/18  0945   PTH, Intact 57.0 115.0 H 81.0 H       No results for input(s): POCTGLUCOSE in the last 168 hours.    Recent Labs   Lab 08/03/20  0418 08/04/20  0030 08/05/20  0116   Hemoglobin A1C 5.7 H 5.9 H 5.9 H       Recent Labs   Lab 08/08/20  0448 08/10/20  0415 08/11/20  0226   WBC 7.94 8.17 9.60   HGB 7.7* 7.4* 8.2*   HCT 25.3* 24.7* 27.0*    259 272   * 109* 108*   MCHC 30.4* 30.0* 30.4*   MONO  --  12.2  1.0 11.0  1.1*       Recent Labs   Lab 08/06/20  0643 08/07/20  0428  08/09/20  0505 08/10/20  0415 08/11/20 0226   BILITOT 0.4 0.4  --   --   --  0.4   BILIDIR  --   --   --   --   --  0.2   PROT 5.7* 5.7*  --   --   --  6.6   ALBUMIN 2.0* 2.0*   < > 2.1* 2.1* 2.4*  2.4*   ALKPHOS 89 97  --   --   --  143*   * 157*  --   --   --  103*   * 201*  --   --   --  69*    < > = values in this interval not displayed.       Recent Labs   Lab 06/07/20  0952  08/02/20  1652 08/06/20  2350 08/07/20 2040   Color, UA Yellow   < > Yellow Brown A Yellow   Appearance, UA Clear   < > Clear Cloudy A Cloudy A   pH, UA 7.0   < > 7.0 >8.0 A >8.0 A   Specific [place], UA 1.015   < > 1.010 1.010 1.010   Protein, UA Negative   < > 2+ A 1+ A Trace A   Glucose, UA Negative   < > Negative Negative Negative   Ketones, UA Negative   < > Negative Negative Negative   Urobilinogen, UA Negative   < > Negative  Negative Negative   Bilirubin (UA) Negative   < > Negative Negative Negative   Occult Blood UA Negative   < > 3+ A 3+ A 3+ A   Nitrite, UA Negative   < > Negative Negative Negative   RBC, UA 0   < > 2 >100 H >100 H   WBC, UA 2   < > 1 48 H 55 H   Bacteria Negative   < > None Moderate A Few A   Hyaline Casts, UA 3 A  --  0 0  --     < > = values in this interval not displayed.       Recent Labs   Lab 07/13/20  1946   POC PH 7.406   POC PCO2 36.7   POC HCO3 23.1 L   POC PO2 70 L   POC SATURATED O2 94 L   POC BE -2   Sample ARTERIAL       Microbiology Results (last 7 days)     Procedure Component Value Units Date/Time    Urine culture [[id number]] Collected: 08/07/20 2040    Order Status: Completed Specimen: Urine Updated: 08/09/20 1031     Urine Culture, Routine No growth    Narrative:      Specimen Source->Urine    Urine culture [[id number]] Collected: 08/06/20 2350    Order Status: Completed Specimen: Urine Updated: 08/08/20 1421     Urine Culture, Routine No significant growth    Narrative:      Specimen Source->Urine    Blood culture x two cultures. Draw prior to antibiotics. [[id number]] Collected: 08/02/20 1633    Order Status: Completed Specimen: Blood Updated: 08/07/20 2322     Blood Culture, Routine No growth after 5 days.    Narrative:      Aerobic and anaerobic    Blood culture x two cultures. Draw prior to antibiotics. [[id number]] Collected: 08/02/20 1633    Order Status: Completed Specimen: Blood Updated: 08/07/20 2322     Blood Culture, Routine No growth after 5 days.    Narrative:      Aerobic and anaerobic    Urine Culture High Risk [[id number]] Collected: 08/07/20 2039    Order Status: Canceled Specimen: Urine, Catheterized     Urine Culture High Risk [[id number]] Collected: 08/06/20 2352    Order Status: Canceled Specimen: Urine, Catheterized           ASSESSMENT/PLAN:     Active Hospital Problems    Diagnosis  POA    *LUIZ (acute kidney injury) [N17.9]  Yes    Hematuria [R31.9]  No    Patient is  Jehovah's witness [Z78.9]  Yes    Acute deep vein thrombosis (DVT) of right upper extremity [I82.621]  Yes     Occlusive thrombus of the right brachial vein and cephalic vein      Elevated ferritin level [R79.89]  Yes    Hyponatremia [E87.1]  Yes    Debility [R53.81]  Yes    Liver cyst [K76.89]  Yes    Non-traumatic rhabdomyolysis [M62.82]  Yes    Chronic diastolic CHF (congestive heart failure) [I50.32]  Yes    Pulmonary infarct [I26.99]  Yes    Atelectasis [J98.11]  Yes    Moderate protein-calorie malnutrition [E44.0]  Yes    Anemia [D64.9]  Yes    MRSA bacteremia [R78.81]  Yes    Elevated troponin [R79.89]  Yes    Bilateral pulmonary embolism [I26.99]  Yes    COPD (chronic obstructive pulmonary disease) [J44.9]  Yes    Transaminitis [R74.0]  Yes    Morbid obesity [E66.01]  Yes    Pulmonary hypertension [I27.20]  Yes    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        8/3/2020 16:24 8/4/2020 00:30 8/5/2020 01:16 8/6/2020 06:43 8/7/2020 04:28   CPK 62190 (H) >83515 (H) 72554 (H) 45377 (H) 8326 (H)     Anemia of CKD  Jehovah witness  Hypercoagulable state with thrombosis     On IV iron  Heme/onc following    HTN/Edema  Bumped hydralazine to 50mg TID 8/9  D/C bicarb gtt.  Aldactone is on hold for now.    LUIZ/CKD III  Stable     Acute rhabdomyolysis 2/2 daptomycin  CPK is trending down.    MRSA bacteremia  On vancomycin    Hypokalemia  --better    Alkalosis  --better after D/C bicarb gtt.    Thank you for allowing us to participate in the care of your patient  We will follow the patient and provide recommendations as needed.    Amari Sanz MD    [facility]  [address] [place] [address]  [place] [address]    [phone] - tel  [phone] - fax    8/11/2020

## 2020-08-11 NOTE — ASSESSMENT & PLAN NOTE
Previously receiving daptomycin as outpatient-now on IV vancomycin  Orders as per infectious disease.  Continue intravenous antibiotic therapy until September 2, 2020.

## 2020-08-11 NOTE — PROGRESS NOTES
[facility]  Adult Nutrition  Progress Note    SUMMARY       Recommendations    Intervention: fat/sodium modified diet and nutrition supplement therapy-commercial beverage, collaboration with care providers, prescription medication appetite stimulant.    Recommendation:   1. Continue regular diet- send double condiments  -add full DM restriction if glucose > 180 mg/dl  2. d/c boost breeze due to high Phos. Send Novasource Renal BID + beneprotein BID  3. Weigh pt weekly, antiemetic regimen as needed, consider appetite stimulant     4. Initiate nutrition support  -Can place NGT for supplemental TF Diabetisource AC @ 20 advancing to 55 ml/hr + flush per MD   (provides 1584 kcal ( 96% EEN), 79 g protein ( 1005 EPN, 1082 ml free water)  OR   -PPN D 5 AA 2.75 @ 75 ml/hr + standard lipids when kidney function improves  (provides 49 g protein (59% EPN), 1004 kcal (60% EEN))     5) continue appetite stimulant     Communication of RD Recs: reviewed with MD (POC, sticky note)     Goals: meet at least >50% meals/supplements at f/u or start PPN  Nutrition Goal Status: goal not met, progressing towards goal    Reason for Assessment    Reason For Assessment: RD follow-up  Diagnosis: LUIZ  Relevant Medical History: Anemia, COPD, DM, diverticulosis, DVT, Edema, gout, HTN, HLD, reflux, osteoporosis  Interdisciplinary Rounds: did not attend    General Information Comments: 80 y/o readmitted in < 2 weeks with LUIZ. Ka and Phos WNL.  Decreased appetite and taste changes ( bland) + nausea x1.5 month (< 2 meals daily). Per son and pt this did not improve s/p LAP rosette or  Last discharge.  Eating 5-10 bites of each meal, drank 1 boost plus last night but 2 unopened on table this morning. Hot food make her most nauseous, reviewed education on tips for taste changes and nausea. Discussed low fat diet with son s/p LAP rosette. Last admit, DM diet was discussed briefly, pt thinks she is pre-diabetic and not interested in carb  "counting but will cut back on added sugar/soda.  8/7/20 Pt still only eating 10 bites of meals r/t nausea. All foods tastes bland and unappetizing. Boost and boost pudding she says, "make her stomach cramp," > 5 shakes unopened on tray table. Educated pt on the importance of supplements at this time, willing to try boost breeze. Appetite stimulant added. PPN initiated per MD but had to be canceled as pt's renal function worsening and volume overloaded.  8/11/20 Pt diet changed to regular, po intake between 25%-50%. No significant weight changes. LBM 8/10/2020.   Nutrition Discharge Planning: cardiac diet/DM diet + boost breeze and supplemental nutrition support above    Nutrition Risk Screen    Nutrition Risk Screen: no indicators present    Nutrition/Diet History    Spiritual, Cultural Beliefs, Zoroastrian Practices, Values that Affect Care: yes  Supplemental Drinks or Food Habits: Boost Breeze   Patient Reported Diet/Restrictions/Preferences: general, son encouraging PO intakes at home  Food Allergies: NKFA  Factors Affecting Nutritional Intake: decreased appetite, altered taste    Anthropometrics    Temp: 98.3 °F (36.8 °C)  Height: 5' 3" (160 cm)  Height (inches): 63 in  Weight Method: Standard Scale  Weight: 108 kg (238 lb 1.6 oz)  Weight (lb): 238.1 lb  Ideal Body Weight (IBW), Female: 115 lb  % Ideal Body Weight, Female (lb): 198.42 %  BMI (Calculated): 42.2  BMI Grade: greater than 40 - morbid obesity    Lab/Procedures/Meds    Pertinent Labs Reviewed: reviewed  Pertinent Labs Comments: BUN 6, Ct 17, , Hgb 8.2, Hct 27.0, Phos 4.6  BMP  Lab Results   Component Value Date     08/11/2020    K 3.9 08/11/2020     08/11/2020    CO2 29 08/11/2020    BUN 6 (L) 08/11/2020    CREATININE 1.7 (H) 08/11/2020    CALCIUM 8.7 08/11/2020    ANIONGAP 9 08/11/2020    ESTGFRAFRICA 33 (A) 08/11/2020    EGFRNONAA 29 (A) 08/11/2020     Lab Results   Component Value Date    BUN 6 (L) 08/11/2020     Lab Results "   Component Value Date    CALCIUM 8.7 08/11/2020    PHOS 4.6 (H) 08/11/2020     Pertinent Medications Reviewed: reviewed  Pertinent Medications Comments: Dronabinol, polyethylene glycol, senna, KCl, Vitamin C, B12, folic acid, MVI, warfarin    Estimated/Assessed Needs  Weight Used For Calorie Calculations: 104 kg (229 lb 4.5 oz)  Energy Calorie Requirements (kcal): 1650 kcal  Energy Need Method: MSJ no AF obesity  Protein Requirements: 0.8-1.0 g protein/kg ( obesity/ LUIZ) =  g protein  Weight Used For Protein Calculations: 104 kg  Fluid Requirements (mL): 1650 ml  Estimated Fluid Requirement Method: RDA Method  CHO Requirement: 185-206 g    Nutrition Prescription Ordered    Current Diet Order: Regular  Oral Nutrition Supplement: Boost Breeze TID     Evaluation of Received Nutrient/Fluid Intake    Energy Calories Required: not meeting needs  Protein Required: not meeting needs  Fluid Required: not meeting needs  Comments: LBM 8.10.2020  % Intake of Estimated Energy Needs: 25 - 50 %  % Meal Intake: 25 - 50 %    Nutrition Risk    Level of Risk/Frequency of Follow-up: 2 x weekly    Assessment and Plan    Moderate chronic condition related malnutrition  R/t decreased appetite and taste changes  As evidenced by  1) PO intakes , 75% EEN 1.5 month  2) mild edema  Intervention: above  continues    Monitor and Evaluation    Food and Nutrient Intake: energy intake, food and beverage intake  Food and Nutrient Adminstration: diet order  Knowledge/Beliefs/Attitudes: food and nutrition knowledge/skill  Anthropometric Measurements: weight, weight change  Biochemical Data, Medical Tests and Procedures: electrolyte and renal panel  Nutrition-Focused Physical Findings: overall appearance     Malnutrition Assessment  Scar: 20  Edema: 2+  Energy Intake (Malnutrition): less than or equal to 75% for greater than or equal to 1 month   NFPE: no wasting seen 8/4/20       Nutrition Follow-Up    RD Follow-up?: Yes

## 2020-08-11 NOTE — PLAN OF CARE
Problem: Oral Intake Inadequate (Acute Kidney Injury/Impairment)  Goal: Optimal Nutrition Intake  Outcome: Ongoing, Progressing     Problem: Malnutrition  Goal: Improved Nutritional Intake  Outcome: Ongoing, Progressing   Recommendations     Intervention: fat/sodium modified diet and nutrition supplement therapy-commercial beverage, collaboration with care providers, prescription medication appetite stimulant.     Recommendation:   1. Continue regular diet- send double condiments  -add full DM restriction if glucose > 180 mg/dl  2. d/c boost breeze due to high Phos. Send Novasource Renal BID + beneprotein BID  3. Weigh pt weekly, antiemetic regimen as needed, consider appetite stimulant     4. Initiate nutrition support  -Can place NGT for supplemental TF Diabetisource AC @ 20 advancing to 55 ml/hr + flush per MD   (provides 1584 kcal ( 96% EEN), 79 g protein ( 1005 EPN, 1082 ml free water)  OR   -PPN D 5 AA 2.75 @ 75 ml/hr + standard lipids when kidney function improves  (provides 49 g protein (59% EPN), 1004 kcal (60% EEN))     5) continue appetite stimulant     Communication of RD Recs: reviewed with MD (POC, sticky note)     Goals: meet at least >50% meals/supplements at f/u or start PPN  Nutrition Goal Status: goal not met, progressing towards goal

## 2020-08-11 NOTE — NURSING
Pt arrived back to her room from gastric emptying test. Ordered food that pt requested from cafeteria. Pt in chair with chair alarm active.

## 2020-08-11 NOTE — PLAN OF CARE
Patient alert and oriented.  Sinus rhythm on cardiac monitor.  Full code.  Heparin drip infusing per MD orders.  New lab draw is 0238 for aPTT.  Call light in reach.  Bed in low/locked position.  Bed alarm set for patient's safety.

## 2020-08-11 NOTE — PT/OT/SLP PROGRESS
Physical Therapy Treatment    Patient Name:  Sammi Abreu   MRN:  [id number]    Recommendations:     Discharge Recommendations:  home health PT   Discharge Equipment Recommendations: none   Barriers to discharge: None    Assessment:     Sammi Abreu is a 79 y.o. female admitted with a medical diagnosis of LUIZ (acute kidney injury).  She presents with the following impairments/functional limitations:  weakness, impaired endurance, impaired functional mobilty, decreased lower extremity function, impaired cardiopulmonary response to activity. Tolerated treatment well today. Reports no pain prior to treatment, but mild L anterior thigh pain following. Ambulated in hallway with rw/O2 and a rest break between trials 2* pt coughing.     Rehab Prognosis: Fair; patient would benefit from acute skilled PT services to address these deficits and reach maximum level of function.    Recent Surgery: * No surgery found *      Plan:     During this hospitalization, patient to be seen 6 x/week to address the identified rehab impairments via gait training, therapeutic activities, therapeutic exercises and progress toward the following goals:    · Plan of Care Expires:  (09/05/2020)    Subjective     Chief Complaint: her food tastes terrible  Patient/Family Comments/goals: to return home   Pain/Comfort:  · Pain Rating 1: 3/10(following PT treatment)  · Location - Side 1: Left  · Location - Orientation 1: anterior  · Location 1: thigh  · Pain Addressed 1: Reposition, Nurse notified      Objective:     Communicated with nurse Elizabeth prior to session.  Patient found up in chair with oxygen, telemetry, peripheral IV(chair alarm) upon PT entry to room.     General Precautions: Standard, fall, contact   Orthopedic Precautions:N/A   Braces: N/A     Functional Mobility:  · Transfers:     · Sit to Stand:  contact guard assistance with rolling walker  · Gait: 100' x 2 with rw/O2 and CGA. Seated rest break between trials      AM-PAC 6  CLICK MOBILITY          Therapeutic Activities and Exercises:  Sit to stand from chair with CGA  Ambulated in hallway with rw/O2 and chair follow. Seated rest break required 2* pt coughing.  Returned to room and sat in chair      Patient left up in chair with all lines intact, call button in reach, chair alarm on and nurse Clara notified..    GOALS:   Multidisciplinary Problems     Physical Therapy Goals        Problem: Physical Therapy Goal    Goal Priority Disciplines Outcome Goal Variances Interventions   Physical Therapy Goal     PT, PT/OT Ongoing, Progressing     Description: Goals to be met by: 2020     Patient will increase functional independence with mobility by performin. Supine to sit with Supervision  2. Sit to stand transfer with Supervision  3. Bed to chair transfer with Supervision using Rolling Walker  4. Gait  x 150 feet with Supervision using Rolling Walker.   5. Lower extremity exercise program x20 reps per handout, with independence                     Time Tracking:     PT Received On: 20  PT Start Time: 1400     PT Stop Time: 1420  PT Total Time (min): 20 min     Billable Minutes: Gait Training 20    Treatment Type: Treatment  PT/PTA: PTA     PTA Visit Number: 5     Ernestine Zhang PTA  2020

## 2020-08-12 ENCOUNTER — ANESTHESIA (OUTPATIENT)
Dept: ENDOSCOPY | Facility: HOSPITAL | Age: 80
DRG: 682 | End: 2020-08-12
Payer: MEDICARE

## 2020-08-12 ENCOUNTER — ANESTHESIA EVENT (OUTPATIENT)
Dept: ENDOSCOPY | Facility: HOSPITAL | Age: 80
DRG: 682 | End: 2020-08-12
Payer: MEDICARE

## 2020-08-12 PROBLEM — R63.0 ANOREXIA: Status: ACTIVE | Noted: 2020-08-12

## 2020-08-12 LAB
ALBUMIN SERPL BCP-MCNC: 2.3 G/DL (ref 3.5–5.2)
ANION GAP SERPL CALC-SCNC: 13 MMOL/L (ref 8–16)
APTT BLDCRRT: 50 SEC (ref 21–32)
BASOPHILS # BLD AUTO: 0.02 K/UL (ref 0–0.2)
BASOPHILS NFR BLD: 0.2 % (ref 0–1.9)
BUN SERPL-MCNC: 6 MG/DL (ref 8–23)
CALCIUM SERPL-MCNC: 8.5 MG/DL (ref 8.7–10.5)
CHLORIDE SERPL-SCNC: 104 MMOL/L (ref 95–110)
CK SERPL-CCNC: 602 U/L (ref 20–180)
CO2 SERPL-SCNC: 26 MMOL/L (ref 23–29)
CREAT SERPL-MCNC: 1.8 MG/DL (ref 0.5–1.4)
DIFFERENTIAL METHOD: ABNORMAL
EOSINOPHIL # BLD AUTO: 0.4 K/UL (ref 0–0.5)
EOSINOPHIL NFR BLD: 4.7 % (ref 0–8)
ERYTHROCYTE [DISTWIDTH] IN BLOOD BY AUTOMATED COUNT: 14.9 % (ref 11.5–14.5)
EST. GFR  (AFRICAN AMERICAN): 31 ML/MIN/1.73 M^2
EST. GFR  (NON AFRICAN AMERICAN): 27 ML/MIN/1.73 M^2
GLUCOSE SERPL-MCNC: 101 MG/DL (ref 70–110)
HCT VFR BLD AUTO: 24.3 % (ref 37–48.5)
HGB BLD-MCNC: 7.3 G/DL (ref 12–16)
IMM GRANULOCYTES # BLD AUTO: 0.1 K/UL (ref 0–0.04)
IMM GRANULOCYTES NFR BLD AUTO: 1.1 % (ref 0–0.5)
INR PPP: 1.1 (ref 0.8–1.2)
LA PPP-IMP: NORMAL
LYMPHOCYTES # BLD AUTO: 2.3 K/UL (ref 1–4.8)
LYMPHOCYTES NFR BLD: 26.1 % (ref 18–48)
MCH RBC QN AUTO: 32.7 PG (ref 27–31)
MCHC RBC AUTO-ENTMCNC: 30 G/DL (ref 32–36)
MCV RBC AUTO: 109 FL (ref 82–98)
MONOCYTES # BLD AUTO: 1.1 K/UL (ref 0.3–1)
MONOCYTES NFR BLD: 12 % (ref 4–15)
NEUTROPHILS # BLD AUTO: 4.9 K/UL (ref 1.8–7.7)
NEUTROPHILS NFR BLD: 55.9 % (ref 38–73)
NRBC BLD-RTO: 0 /100 WBC
PHOSPHATE SERPL-MCNC: 5.1 MG/DL (ref 2.7–4.5)
PLATELET # BLD AUTO: 264 K/UL (ref 150–350)
PMV BLD AUTO: 10.7 FL (ref 9.2–12.9)
POTASSIUM SERPL-SCNC: 3.6 MMOL/L (ref 3.5–5.1)
PROTHROMBIN TIME: 11.6 SEC (ref 9–12.5)
RBC # BLD AUTO: 2.23 M/UL (ref 4–5.4)
SARS-COV-2 RDRP RESP QL NAA+PROBE: NEGATIVE
SODIUM SERPL-SCNC: 143 MMOL/L (ref 136–145)
WBC # BLD AUTO: 8.78 K/UL (ref 3.9–12.7)

## 2020-08-12 PROCEDURE — 36415 COLL VENOUS BLD VENIPUNCTURE: CPT

## 2020-08-12 PROCEDURE — 63600175 PHARM REV CODE 636 W HCPCS: Performed by: INTERNAL MEDICINE

## 2020-08-12 PROCEDURE — 43235 EGD DIAGNOSTIC BRUSH WASH: CPT | Mod: 51,,, | Performed by: INTERNAL MEDICINE

## 2020-08-12 PROCEDURE — 25000242 PHARM REV CODE 250 ALT 637 W/ HCPCS: Performed by: HOSPITALIST

## 2020-08-12 PROCEDURE — 63600175 PHARM REV CODE 636 W HCPCS: Performed by: NURSE ANESTHETIST, CERTIFIED REGISTERED

## 2020-08-12 PROCEDURE — 37000008 HC ANESTHESIA 1ST 15 MINUTES: Performed by: INTERNAL MEDICINE

## 2020-08-12 PROCEDURE — 25000003 PHARM REV CODE 250: Performed by: INTERNAL MEDICINE

## 2020-08-12 PROCEDURE — 94640 AIRWAY INHALATION TREATMENT: CPT

## 2020-08-12 PROCEDURE — 27000221 HC OXYGEN, UP TO 24 HOURS

## 2020-08-12 PROCEDURE — D9220A PRA ANESTHESIA: Mod: CRNA,,, | Performed by: NURSE ANESTHETIST, CERTIFIED REGISTERED

## 2020-08-12 PROCEDURE — 85610 PROTHROMBIN TIME: CPT

## 2020-08-12 PROCEDURE — 25000003 PHARM REV CODE 250: Performed by: NURSE PRACTITIONER

## 2020-08-12 PROCEDURE — U0002 COVID-19 LAB TEST NON-CDC: HCPCS

## 2020-08-12 PROCEDURE — 80069 RENAL FUNCTION PANEL: CPT

## 2020-08-12 PROCEDURE — 12000002 HC ACUTE/MED SURGE SEMI-PRIVATE ROOM

## 2020-08-12 PROCEDURE — 82550 ASSAY OF CK (CPK): CPT

## 2020-08-12 PROCEDURE — 94761 N-INVAS EAR/PLS OXIMETRY MLT: CPT

## 2020-08-12 PROCEDURE — 97530 THERAPEUTIC ACTIVITIES: CPT

## 2020-08-12 PROCEDURE — D9220A PRA ANESTHESIA: ICD-10-PCS | Mod: CRNA,,, | Performed by: NURSE ANESTHETIST, CERTIFIED REGISTERED

## 2020-08-12 PROCEDURE — 25000003 PHARM REV CODE 250: Performed by: HOSPITALIST

## 2020-08-12 PROCEDURE — D9220A PRA ANESTHESIA: Mod: ANES,,, | Performed by: ANESTHESIOLOGY

## 2020-08-12 PROCEDURE — D9220A PRA ANESTHESIA: ICD-10-PCS | Mod: ANES,,, | Performed by: ANESTHESIOLOGY

## 2020-08-12 PROCEDURE — 43235 EGD DIAGNOSTIC BRUSH WASH: CPT | Performed by: INTERNAL MEDICINE

## 2020-08-12 PROCEDURE — 97116 GAIT TRAINING THERAPY: CPT

## 2020-08-12 PROCEDURE — 87338 HPYLORI STOOL AG IA: CPT

## 2020-08-12 PROCEDURE — 85730 THROMBOPLASTIN TIME PARTIAL: CPT

## 2020-08-12 PROCEDURE — 43235 PR EGD, FLEX, DIAGNOSTIC: ICD-10-PCS | Mod: 51,,, | Performed by: INTERNAL MEDICINE

## 2020-08-12 PROCEDURE — 99900035 HC TECH TIME PER 15 MIN (STAT)

## 2020-08-12 PROCEDURE — 94799 UNLISTED PULMONARY SVC/PX: CPT

## 2020-08-12 PROCEDURE — 63600175 PHARM REV CODE 636 W HCPCS: Performed by: HOSPITALIST

## 2020-08-12 PROCEDURE — 85025 COMPLETE CBC W/AUTO DIFF WBC: CPT

## 2020-08-12 RX ORDER — SODIUM CHLORIDE 9 MG/ML
INJECTION, SOLUTION INTRAVENOUS CONTINUOUS
Status: DISCONTINUED | OUTPATIENT
Start: 2020-08-12 | End: 2020-08-16 | Stop reason: HOSPADM

## 2020-08-12 RX ORDER — LIDOCAINE HYDROCHLORIDE 20 MG/ML
INJECTION INTRAVENOUS
Status: DISCONTINUED | OUTPATIENT
Start: 2020-08-12 | End: 2020-08-12

## 2020-08-12 RX ORDER — PROPOFOL 10 MG/ML
VIAL (ML) INTRAVENOUS
Status: DISCONTINUED | OUTPATIENT
Start: 2020-08-12 | End: 2020-08-12

## 2020-08-12 RX ORDER — POLYETHYLENE GLYCOL 3350, SODIUM CHLORIDE, SODIUM BICARBONATE, POTASSIUM CHLORIDE 420; 11.2; 5.72; 1.48 G/4L; G/4L; G/4L; G/4L
4000 POWDER, FOR SOLUTION ORAL ONCE
Status: COMPLETED | OUTPATIENT
Start: 2020-08-12 | End: 2020-08-12

## 2020-08-12 RX ADMIN — IPRATROPIUM BROMIDE AND ALBUTEROL SULFATE 3 ML: .5; 2.5 SOLUTION RESPIRATORY (INHALATION) at 12:08

## 2020-08-12 RX ADMIN — WARFARIN SODIUM 7.5 MG: 2.5 TABLET ORAL at 04:08

## 2020-08-12 RX ADMIN — VANCOMYCIN HYDROCHLORIDE 1000 MG: 1 INJECTION, POWDER, LYOPHILIZED, FOR SOLUTION INTRAVENOUS at 11:08

## 2020-08-12 RX ADMIN — FLUTICASONE FUROATE AND VILANTEROL TRIFENATATE 1 PUFF: 100; 25 POWDER RESPIRATORY (INHALATION) at 06:08

## 2020-08-12 RX ADMIN — LIDOCAINE HYDROCHLORIDE 100 MG: 20 INJECTION, SOLUTION INTRAVENOUS at 11:08

## 2020-08-12 RX ADMIN — PANTOPRAZOLE SODIUM 40 MG: 40 TABLET, DELAYED RELEASE ORAL at 09:08

## 2020-08-12 RX ADMIN — PROPOFOL 20 MG: 10 INJECTION, EMULSION INTRAVENOUS at 11:08

## 2020-08-12 RX ADMIN — IPRATROPIUM BROMIDE AND ALBUTEROL SULFATE 3 ML: .5; 2.5 SOLUTION RESPIRATORY (INHALATION) at 01:08

## 2020-08-12 RX ADMIN — METOPROLOL SUCCINATE 50 MG: 50 TABLET, FILM COATED, EXTENDED RELEASE ORAL at 09:08

## 2020-08-12 RX ADMIN — EPOETIN ALFA-EPBX 20000 UNITS: 10000 INJECTION, SOLUTION INTRAVENOUS; SUBCUTANEOUS at 09:08

## 2020-08-12 RX ADMIN — HYDRALAZINE HYDROCHLORIDE 50 MG: 25 TABLET, FILM COATED ORAL at 09:08

## 2020-08-12 RX ADMIN — SODIUM CHLORIDE: 0.9 INJECTION, SOLUTION INTRAVENOUS at 11:08

## 2020-08-12 RX ADMIN — HYDRALAZINE HYDROCHLORIDE 50 MG: 25 TABLET, FILM COATED ORAL at 12:08

## 2020-08-12 RX ADMIN — IPRATROPIUM BROMIDE AND ALBUTEROL SULFATE 3 ML: .5; 2.5 SOLUTION RESPIRATORY (INHALATION) at 07:08

## 2020-08-12 RX ADMIN — IRON DEXTRAN 100 MG: 50 INJECTION INTRAMUSCULAR; INTRAVENOUS at 09:08

## 2020-08-12 RX ADMIN — POLYETHYLENE GLYCOL-3350, SODIUM CHLORIDE, POTASSIUM CHLORIDE AND SODIUM BICARBONATE 4000 ML: 420; 11.2; 5.72; 1.48 POWDER, FOR SOLUTION ORAL at 04:08

## 2020-08-12 RX ADMIN — HYDRALAZINE HYDROCHLORIDE 50 MG: 25 TABLET, FILM COATED ORAL at 05:08

## 2020-08-12 RX ADMIN — MONTELUKAST 10 MG: 10 TABLET, FILM COATED ORAL at 09:08

## 2020-08-12 RX ADMIN — IPRATROPIUM BROMIDE AND ALBUTEROL SULFATE 3 ML: .5; 2.5 SOLUTION RESPIRATORY (INHALATION) at 06:08

## 2020-08-12 RX ADMIN — Medication 500 MG: at 09:08

## 2020-08-12 RX ADMIN — PROPOFOL 50 MG: 10 INJECTION, EMULSION INTRAVENOUS at 11:08

## 2020-08-12 NOTE — NURSING
Pt arrived back to floor from [facility] at 1235. VSS other than BP which is elevated at 199/81. Scheduled hydralazine given. Pt up in chair at bedside with call light in reach.

## 2020-08-12 NOTE — PLAN OF CARE
Report called to maylin, all questions answered, VSS, NAD, deng po, no complaints of n/v, physician report given to pt, tranported via wheelchair accompanied by RN on 2L NC

## 2020-08-12 NOTE — NURSING
Aptt 50.0 at 0422 this AM with morning labs. Therapeutic, no change to heparin gtt. Next aptt in AM with morning labs.

## 2020-08-12 NOTE — PT/OT/SLP PROGRESS
Physical Therapy Treatment    Patient Name:  Sammi Abreu   MRN:  [id number]    Recommendations:     Discharge Recommendations:  home health PT   Discharge Equipment Recommendations: none   Barriers to discharge: None    Assessment:     Sammi Abreu is a 79 y.o. female admitted with a medical diagnosis of LUIZ (acute kidney injury).  She presents with the following impairments/functional limitations:  weakness, impaired endurance, impaired self care skills, impaired functional mobilty, impaired cardiopulmonary response to activity. Patient is agreeable to participation with PT treatment. She requests to use the BSC. She requires SBA for sit to stand and CGA for step transfer to BS with no AD. Stool sample obtained with RN aware. Patient ambulated to sink with no AD and CGA to wash her hands and brush her teeth. She then ambulated 130' with RW, CGA, and 2LO2 with flexed posture. She returned to chair with RN present and chair alarm armed.     Rehab Prognosis: Good; patient would benefit from acute skilled PT services to address these deficits and reach maximum level of function.    Recent Surgery: Procedure(s) (LRB):  EGD (ESOPHAGOGASTRODUODENOSCOPY) (N/A)      Plan:     During this hospitalization, patient to be seen 6 x/week to address the identified rehab impairments via gait training, therapeutic activities, therapeutic exercises and progress toward the following goals:    · Plan of Care Expires:  09/05/20    Subjective     Chief Complaint: needs to use BSC  Patient/Family Comments/goals: use BSC  Pain/Comfort:  · Pain Rating 1: 0/10      Objective:     Communicated with MIAH Elizabeth prior to session.  Patient found up in chair with oxygen, PICC line, telemetry(chair alarm) upon PT entry to room.     General Precautions: Standard, fall, respiratory, contact   Orthopedic Precautions:N/A   Braces: N/A     Functional Mobility:  · Transfers:     · Sit to Stand:  stand by assistance with no AD  · Gait: 130' RW,  CGA, 2LO2, flexed posture  · Balance: Good      AM-PAC 6 CLICK MOBILITY  Turning over in bed (including adjusting bedclothes, sheets and blankets)?: 4  Sitting down on and standing up from a chair with arms (e.g., wheelchair, bedside commode, etc.): 4  Moving from lying on back to sitting on the side of the bed?: 4  Moving to and from a bed to a chair (including a wheelchair)?: 3  Need to walk in hospital room?: 4  Climbing 3-5 steps with a railing?: 2  Basic Mobility Total Score: 21       Therapeutic Activities and Exercises:   Patient was educated on the importance of OOB activity and functional mobility to negate negative effects of prolonged bed rest during hospitalization, safe transfers and ambulation, and D/C planning    Patient left up in chair with all lines intact, call button in reach, chair alarm on and RN present..    GOALS:   Multidisciplinary Problems     Physical Therapy Goals        Problem: Physical Therapy Goal    Goal Priority Disciplines Outcome Goal Variances Interventions   Physical Therapy Goal     PT, PT/OT Ongoing, Progressing     Description: Goals to be met by: 2020     Patient will increase functional independence with mobility by performin. Supine to sit with Supervision  2. Sit to stand transfer with Supervision  3. Bed to chair transfer with Supervision using Rolling Walker  4. Gait  x 150 feet with Supervision using Rolling Walker.   5. Lower extremity exercise program x20 reps per handout, with independence                     Time Tracking:     PT Received On: 20  PT Start Time: 834     PT Stop Time: 906  PT Total Time (min): 32 min     Billable Minutes: Gait Training 16 and Therapeutic Activity 16    Treatment Type: Treatment  PT/PTA: PT     PTA Visit Number: 0     Zhanna Rose, PT  2020

## 2020-08-12 NOTE — PLAN OF CARE
Problem: Physical Therapy Goal  Goal: Physical Therapy Goal  Description: Goals to be met by: 2020     Patient will increase functional independence with mobility by performin. Supine to sit with Supervision  2. Sit to stand transfer with Supervision  3. Bed to chair transfer with Supervision using Rolling Walker  4. Gait  x 150 feet with Supervision using Rolling Walker.   5. Lower extremity exercise program x20 reps per handout, with independence    Outcome: Ongoing, Progressing

## 2020-08-12 NOTE — TRANSFER OF CARE
"Anesthesia Transfer of Care Note    Patient: Sammi Abreu    Procedure(s) Performed: Procedure(s) (LRB):  EGD (ESOPHAGOGASTRODUODENOSCOPY) (N/A)    Patient location: GI    Anesthesia Type: general    Transport from OR: Transported from OR on room air with adequate spontaneous ventilation    Post pain: adequate analgesia    Post assessment: no apparent anesthetic complications    Post vital signs: stable    Level of consciousness: sedated and responds to stimulation    Nausea/Vomiting: no nausea/vomiting    Complications: none    Transfer of care protocol was followed      Last vitals:   Visit Vitals  BP (!) 182/78   Pulse 98   Temp 37.3 °C (99.1 °F)   Resp 18   Ht 5' 3" (1.6 m)   Wt 106.1 kg (233 lb 14.5 oz)   SpO2 98%   Breastfeeding No   BMI 41.43 kg/m²     "

## 2020-08-12 NOTE — CONSULTS
Coumadin diet education consult completed previously on 8/10. Handout was added to discharge instructions 2/2 RD working remotely.     Continue current diet regimen with consistent intake of foods containing vitamin K.

## 2020-08-12 NOTE — PROGRESS NOTES
Nephrology Progress Note    Patient Name: Sammi Abreu  MRN: [id number]    Patient Class: IP- Inpatient   Admission Date: 8/2/2020  Length of Stay: 9 days  Date of Service: 8/12/2020    Attending Physician: Alix Ruth MD  Primary Care Provider: Osmani Villatoro MD    Reason for Consult: luiz/ckd3/hyponatremia/acidosis/rhabdomyalisis/mrsa bacteremia/anemia/htn    Chief Complaint   Patient presents with    Post-op Problem     pain to left lower chest and BLE after surgery on 7/27       SUBJECTIVE:     HPI: 79F Jehova witness was treated in hospital for MRSA bacteremia, had lap rosette 7/13, complicated by PE and DVT, was d/c 7/29 on daptomycin and returned to ER 5 days later with abdominal pain, elevated CPK and LFTs, LUIZ with modest rise in sCr. CT abdomen w/out contrast looked OK. She was given NS and heparin gtt, Dapto was changed with Vanco.    8/6 VSS, no new complains.  8/7 VSS, no new complains. Appreciate ID input.  8/8 some spikes in BP, on 2L NC, UOP 1.5L  8/9 intermittent spikes in BP, better this AM, on 2L NC, UOP 1.8L, c/o swelling  8/10  Unavailable for exam.  Sitting on the commode   8/11  At gastric emptying study  8/12  GES neg.  Still with difficulty eating.  Food makes her nauseated    Outpatient meds:  Current Facility-Administered Medications on File Prior to Encounter   Medication Dose Route Frequency Provider Last Rate Last Dose    lactated ringers infusion   Intravenous Continuous Gino Reid MD 10 mL/hr at 07/13/20 1308      lidocaine (PF) 10 mg/ml (1%) injection 10 mg  1 mL Intradermal Once Gino Reid MD         Current Outpatient Medications on File Prior to Encounter   Medication Sig Dispense Refill    acetaminophen (TYLENOL) 325 MG tablet Take 2 tablets (650 mg total) by mouth every 6 (six) hours as needed (Do not take with any other Tylenol or acetaminophen containing products).  0    albuterol-ipratropium (DUO-NEB) 2.5 mg-0.5 mg/3 mL nebulizer solution Take 3 mLs by  nebulization every 8 (eight) hours. 270 mL 0    apixaban (ELIQUIS) 5 mg Tab Take 1 tablet (5 mg total) by mouth 2 (two) times daily. 60 tablet 3    ascorbic acid, vitamin C, (VITAMIN C) 100 MG tablet Take 5 tablets (500 mg total) by mouth every evening.      esomeprazole (NEXIUM) 20 MG capsule Take 1 capsule (20 mg total) by mouth before breakfast. 30 capsule 2    fluticasone (FLONASE) 50 mcg/actuation nasal spray 2 sprays (100 mcg total) by Each Nare route once daily. 3 Bottle 3    folic acid (FOLVITE) 1 MG tablet Take 1 tablet (1 mg total) by mouth once daily. 30 tablet 0    metoprolol succinate (TOPROL-XL) 50 MG 24 hr tablet Take 1 tablet (50 mg total) by mouth once daily. 90 tablet 3    montelukast (SINGULAIR) 10 mg tablet Take 1 tablet (10 mg total) by mouth every evening. 90 tablet 3    sodium chloride 0.9% SolP 50 mL with DAPTOmycin 350 mg SolR 1,000 mg Inject 1,000 mg into the vein once daily.      spironolactone (ALDACTONE) 25 MG tablet Take 1 tablet (25 mg total) by mouth once daily. 90 tablet 6    budesonide-formoterol 160-4.5 mcg (SYMBICORT) 160-4.5 mcg/actuation HFAA Inhale 2 puffs into the lungs every 12 (twelve) hours. Controller 3 Inhaler 3    cyanocobalamin, vitamin B-12, 2,500 mcg Lozg Place 2 tablets under the tongue once daily. 60 lozenge 11    diclofenac (VOLTAREN) 25 MG TbEC Take 1 tablet (25 mg total) by mouth 3 (three) times daily as needed. 15 tablet 0    HYDROcodone-acetaminophen (NORCO) 5-325 mg per tablet Take 1 tablet by mouth every 6 (six) hours as needed for Pain. (Patient not taking: Reported on 7/30/2020) 15 tablet 0    lactulose (CHRONULAC) 10 gram/15 mL solution Take 30 mLs (20 g total) by mouth 3 (three) times daily. 2 Teaspoon(s) Oral PRN Every evening. (Patient not taking: Reported on 7/30/2020) 500 mL 3    multivitamin capsule Take 1 capsule by mouth once daily.      ondansetron (ZOFRAN-ODT) 4 MG TbDL Take 1 tablet (4 mg total) by mouth every 8 (eight) hours as  needed. 20 tablet 0       Scheduled meds:   albuterol-ipratropium  3 mL Nebulization Q6H    ascorbic acid (vitamin C)  500 mg Oral QHS    cyanocobalamin  2,000 mcg Oral Daily    fluticasone furoate-vilanteroL  1 puff Inhalation Daily    folic acid  1 mg Oral Daily    hydrALAZINE  10 mg Intravenous Once    hydrALAZINE  50 mg Oral Q8H    metoprolol succinate  50 mg Oral Daily    montelukast  10 mg Oral QHS    multivitamin  1 tablet Oral Daily    pantoprazole  40 mg Oral Daily    peg-electrolyte soln  4,000 mL Oral Once    polyethylene glycol  17 g Oral Daily    senna-docusate 8.6-50 mg  1 tablet Oral BID    vancomycin (VANCOCIN) IVPB  1,000 mg Intravenous Q24H    warfarin  7.5 mg Oral Daily       Infusions:   sodium chloride 0.9%      heparin (porcine) in D5W 10 Units/kg/hr (08/12/20 1249)       PRN meds:  acetaminophen, aluminum-magnesium hydroxide-simethicone, benzonatate, bisacodyL, dextrose 50%, dextrose 50%, glucagon (human recombinant), glucose, glucose, heparin (PORCINE), heparin (PORCINE), magnesium oxide, magnesium oxide, melatonin, morphine, ondansetron, potassium chloride, potassium chloride, potassium chloride, sodium chloride 0.9%, Pharmacy to dose Vancomycin consult **AND** vancomycin - pharmacy to dose    Review of Systems:  negative    OBJECTIVE:     Vital Signs and IO (Last 24H):  Temp:  [96.6 °F (35.9 °C)-99.2 °F (37.3 °C)]   Pulse:  [83-98]   Resp:  [16-20]   BP: (128-199)/(65-83)   SpO2:  [94 %-100 %]   I/O last 3 completed shifts:  In: 3212 [P.O.:1846; I.V.:366; IV Piggyback:1000]  Out: 4550 [Urine:4550]    Wt Readings from Last 5 Encounters:   08/12/20 106.1 kg (233 lb 14.5 oz)   07/30/20 99.9 kg (220 lb 3.8 oz)   07/29/20 102.7 kg (226 lb 6.6 oz)   07/13/20 97.5 kg (215 lb)   07/09/20 104.9 kg (231 lb 4.2 oz)     Physical Exam: (8/9)  Constitutional: nad, aao x 3  Heart: rrr, no m/r/g, wwp, + edema  Lungs: ctab, no w/r/r/c, no lb  Abdomen: s/nt/nd, +BS    Body mass index is  41.43 kg/m².    Laboratory:  Recent Labs   Lab 08/10/20  0415 08/11/20  0226 08/12/20  0422    141 143   K 3.7 3.9 3.6    103 104   CO2 31* 29 26   BUN 7* 6* 6*   CREATININE 1.7* 1.7* 1.8*   ESTGFRAFRICA 33* 33* 31*   EGFRNONAA 29* 29* 27*   * 128* 101       Recent Labs   Lab 08/06/20  0643 08/07/20  0428  08/10/20  0415 08/11/20  0226 08/12/20  0422   CALCIUM 7.9* 7.9*   < > 8.1* 8.7 8.5*   ALBUMIN 2.0* 2.0*   < > 2.1* 2.4*  2.4* 2.3*   PHOS 4.6* 4.1   < > 4.0 4.6* 5.1*   MG 2.0 1.9  --   --   --   --     < > = values in this interval not displayed.       Recent Labs   Lab 12/22/17  1141 06/22/18  0848 12/10/18  0945   PTH, Intact 57.0 115.0 H 81.0 H       No results for input(s): POCTGLUCOSE in the last 168 hours.    Recent Labs   Lab 08/03/20 0418 08/04/20  0030 08/05/20  0116   Hemoglobin A1C 5.7 H 5.9 H 5.9 H       Recent Labs   Lab 08/10/20  0415 08/11/20  0226 08/12/20  0422   WBC 8.17 9.60 8.78   HGB 7.4* 8.2* 7.3*   HCT 24.7* 27.0* 24.3*    272 264   * 108* 109*   MCHC 30.0* 30.4* 30.0*   MONO 12.2  1.0 11.0  1.1* 12.0  1.1*       Recent Labs   Lab 08/06/20 0643 08/07/20 0428  08/10/20  0415 08/11/20 0226 08/12/20 0422   BILITOT 0.4 0.4  --   --  0.4  --    BILIDIR  --   --   --   --  0.2  --    PROT 5.7* 5.7*  --   --  6.6  --    ALBUMIN 2.0* 2.0*   < > 2.1* 2.4*  2.4* 2.3*   ALKPHOS 89 97  --   --  143*  --    * 157*  --   --  103*  --    * 201*  --   --  69*  --     < > = values in this interval not displayed.       Recent Labs   Lab 06/07/20  0952  08/02/20  1652 08/06/20  2350 08/07/20 2040   Color, UA Yellow   < > Yellow Brown A Yellow   Appearance, UA Clear   < > Clear Cloudy A Cloudy A   pH, UA 7.0   < > 7.0 >8.0 A >8.0 A   Specific Buckner, UA 1.015   < > 1.010 1.010 1.010   Protein, UA Negative   < > 2+ A 1+ A Trace A   Glucose, UA Negative   < > Negative Negative Negative   Ketones, UA Negative   < > Negative Negative Negative    Urobilinogen, UA Negative   < > Negative Negative Negative   Bilirubin (UA) Negative   < > Negative Negative Negative   Occult Blood UA Negative   < > 3+ A 3+ A 3+ A   Nitrite, UA Negative   < > Negative Negative Negative   RBC, UA 0   < > 2 >100 H >100 H   WBC, UA 2   < > 1 48 H 55 H   Bacteria Negative   < > None Moderate A Few A   Hyaline Casts, UA 3 A  --  0 0  --     < > = values in this interval not displayed.       Recent Labs   Lab 07/13/20  1946   POC PH 7.406   POC PCO2 36.7   POC HCO3 23.1 L   POC PO2 70 L   POC SATURATED O2 94 L   POC BE -2   Sample ARTERIAL       Microbiology Results (last 7 days)     Procedure Component Value Units Date/Time    Urine culture [[id number]] Collected: 08/07/20 2040    Order Status: Completed Specimen: Urine Updated: 08/09/20 1031     Urine Culture, Routine No growth    Narrative:      Specimen Source->Urine    Urine culture [[id number]] Collected: 08/06/20 2350    Order Status: Completed Specimen: Urine Updated: 08/08/20 1421     Urine Culture, Routine No significant growth    Narrative:      Specimen Source->Urine    Blood culture x two cultures. Draw prior to antibiotics. [[id number]] Collected: 08/02/20 1633    Order Status: Completed Specimen: Blood Updated: 08/07/20 2322     Blood Culture, Routine No growth after 5 days.    Narrative:      Aerobic and anaerobic    Blood culture x two cultures. Draw prior to antibiotics. [[id number]] Collected: 08/02/20 1633    Order Status: Completed Specimen: Blood Updated: 08/07/20 2322     Blood Culture, Routine No growth after 5 days.    Narrative:      Aerobic and anaerobic    Urine Culture High Risk [[id number]] Collected: 08/07/20 2039    Order Status: Canceled Specimen: Urine, Catheterized     Urine Culture High Risk [[id number]] Collected: 08/06/20 2352    Order Status: Canceled Specimen: Urine, Catheterized           ASSESSMENT/PLAN:     Active Hospital Problems    Diagnosis  POA    *LUIZ (acute kidney injury) [N17.9]  Yes     Anorexia [R63.0]  Yes    Hematuria [R31.9]  No    Patient is Religious [Z78.9]  Yes    Acute deep vein thrombosis (DVT) of right upper extremity [I82.621]  Yes     Occlusive thrombus of the right brachial vein and cephalic vein      Elevated ferritin level [R79.89]  Yes    Hyponatremia [E87.1]  Yes    Debility [R53.81]  Yes    Liver cyst [K76.89]  Yes    Non-traumatic rhabdomyolysis [M62.82]  Yes    Chronic diastolic CHF (congestive heart failure) [I50.32]  Yes    Pulmonary infarct [I26.99]  Yes    Atelectasis [J98.11]  Yes    Moderate protein-calorie malnutrition [E44.0]  Yes    Anemia [D64.9]  Yes    MRSA bacteremia [R78.81]  Yes    Elevated troponin [R79.89]  Yes    Bilateral pulmonary embolism [I26.99]  Yes    COPD (chronic obstructive pulmonary disease) [J44.9]  Yes    Transaminitis [R74.0]  Yes    Morbid obesity [E66.01]  Yes    Pulmonary hypertension [I27.20]  Yes    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        8/3/2020 16:24 8/4/2020 00:30 8/5/2020 01:16 8/6/2020 06:43 8/7/2020 04:28   CPK 45948 (H) >08074 (H) 11117 (H) 50328 (H) 8326 (H)     Anemia of CKD  Jehovah witness  Hypercoagulable state with thrombosis     On IV iron  Heme/onc following    HTN/Edema  Bumped hydralazine to 50mg TID 8/9  D/C bicarb gtt.  Aldactone is on hold for now.    LUIZ/CKD III  Stable     Acute rhabdomyolysis 2/2 daptomycin  CPK is trending down.    MRSA bacteremia  On vancomycin    Hypokalemia  --better    Alkalosis  --better after D/C bicarb gtt.    Thank you for allowing us to participate in the care of your patient  We will follow the patient and provide recommendations as needed.    Amari Sanz MD    [facility]  [address] [place] [address]  [place] [address]    [phone] - tel  [phone] - fax    8/12/2020

## 2020-08-12 NOTE — OR NURSING
tx to endo to do Rapid Covid test prior to EGD.  Pt not in our department-on isolation MRSA blood --verbal order to stop Heparin per Dr Nuñez and given to floor nurse Clara

## 2020-08-12 NOTE — ANESTHESIA PREPROCEDURE EVALUATION
2020  Sammi Abreu is a 79 y.o., female.    Pre-op Assessment    I have reviewed the Patient Summary Reports.     I have reviewed the Nursing Notes. I have reviewed the NPO Status.   I have reviewed the Medications.     Review of Systems  Anesthesia Hx:  No problems with previous Anesthesia    Social:  Former Smoker, Social Alcohol Use Smoking Status: Former Smoker - 1.25 pack years  Quit Smokin72  Smokeless Tobacco Status: Never Used  Alcohol use: Yes; 0.0 standard drinks per week  Drug use: Oxycodone, Hydrocodone       Cardiovascular:   Hypertension, poorly controlled CHF hyperlipidemia    Pulmonary:   COPD, moderate Asthma asymptomatic Shortness of breath Sleep Apnea    Renal/:   Chronic Renal Disease    Hepatic/GI:   GERD, poorly controlled Liver Disease,    Musculoskeletal:   Arthritis   Spine Disorders: lumbar Degenerative disease    Neurological:   Neuromuscular Disease,    Endocrine:   Diabetes, poorly controlled, type 2        Physical Exam  General:  Morbid Obesity    Airway/Jaw/Neck:  Airway Findings: Mouth Opening: Normal Tongue: Normal  General Airway Assessment: Adult, Good  Mallampati: II  Improves to II with phonation.  TM Distance: 4-6 cm      Dental:  Dental Findings: In tact   Chest/Lungs:  Chest/Lungs Findings: Clear to auscultation, Normal Respiratory Rate     Heart/Vascular:  Heart Findings: Rate: Normal  Rhythm: Regular Rhythm  Sounds: Normal  Heart murmur: negative       Mental Status:  Mental Status Findings:  Cooperative, Alert and Oriented         Anesthesia Plan  Type of Anesthesia, risks & benefits discussed:  Anesthesia Type:  general  Patient's Preference:   Intra-op Monitoring Plan: standard ASA monitors  Intra-op Monitoring Plan Comments:   Post Op Pain Control Plan:   Post Op Pain Control Plan Comments:   Induction:   IV  Beta Blocker:  Patient is on a  Beta-Blocker and has received one dose within the past 24 hours (No further documentation required).       Informed Consent: Patient understands risks and agrees with Anesthesia plan.  Questions answered. Anesthesia consent signed with patient.  ASA Score: 4     Day of Surgery Review of History & Physical: I have interviewed and examined the patient. I have reviewed the patient's H&P dated:  There are no significant changes.  H&P update referred to the surgeon.  H&P completed by Anesthesiologist.       Ready For Surgery From Anesthesia Perspective.

## 2020-08-12 NOTE — PROGRESS NOTES
[facility] Medicine  Progress Note    Patient Name: Sammi Abreu  MRN: [id number]  Patient Class: IP- Inpatient   Admission Date: 8/2/2020  Length of Stay: 9 days  Attending Physician: Alix Ruth MD  Primary Care Provider: Osmani Villatoro MD        Subjective:     Principal Problem:LUIZ (acute kidney injury)        HPI:  Sammi Abreu is a 79-year-old female with past medical history significant for arthritis and chronic back pain, fibromyalgia, glaucoma, hyperlipidemia, hypertension, sleep apnea with history of noncompliance with CPAP, morbid obesity, hypertension, GERD, COPD, diabetes type 2, prior DVT, and gallstones [facility] x3 [facility].  Of note the patient is also a Cheondoism.   Patient was discharged from this facility on 07/29/2020 after being treated for bilateral pulmonary embolism.  Patient is currently on Eliquis.  Patient underwent a CT abdomen pelvis while in the emergency department which identified surgical changes but nothing acute.  Patient is noted to have a mild LUIZ.  Her troponin is elevated at 0.41 which is consistent with prior levels.  She will be admitted to the service of hospital Medicine for continued treatment.    Overview/Hospital Course:  Patient monitor closely during hospitalization.  She was placed on continuous telemetry monitoring.  She was noted to have rhabdomyolysis with CPK greater than [id number] and LUIZ.  Nephrology was consulted.  She was noted to have metabolic acidosis initiated on bicarb drip.  Daptomycin was held and ID was consulted.  She was initiated on IV vancomycin.  She was noted have increased right upper extremity swelling.  Right upper extremity ultrasound demonstrated occlusive thrombus of the right brachial vein and cephalic vein. She was initiated on heparin drip and her Eliquis was held.  There was concern for Eliquis failure.   "Hematology consulted.  PT and OT were consulted for debility.  Patient with ongoing anorexia and poor p.o. intake. Dietary was consulted for protein calorie malnutrition.  It was recommended patient be placed on TPN lipids.  Her renal status was monitored closely and she remain on bicarb drip with plans to start TPN once acute kidney injury resolved if no improvement in her p.o. intake.  Patient was noted to have some hematuria during her stay.  A retroperitoneal ultrasound was ordered.    Interval History:  Patient is scheduled for EGD today for evaluation of anorexia and abdominal pain.  Gastric emptying study was within normal yesterday.  Currently receiving heparin infusion with Coumadin bridging.  Patient being followed by hematology. Receiving Iron infusions. No hematuria noted.    Review of Systems   Constitutional: Positive for activity change, appetite change and fatigue. Negative for chills, diaphoresis and fever.   HENT: Negative for ear discharge, ear pain and facial swelling.    Eyes: Negative for pain and redness.   Respiratory: Positive for cough and shortness of breath.    Cardiovascular: Positive for leg swelling.   Gastrointestinal: Negative for abdominal distention, abdominal pain, constipation, diarrhea, nausea and vomiting.        Poor appetite- patient reports things taste bad and "smell bad"  and she states she has not been hungry   Endocrine: Negative for polydipsia and polyphagia.   Genitourinary: Negative for difficulty urinating, dysuria, flank pain and hematuria.   Musculoskeletal: Positive for back pain. Negative for neck pain and neck stiffness.   Skin: Negative for color change.   Allergic/Immunologic: Negative for food allergies.   Neurological: Positive for weakness. Negative for seizures, facial asymmetry and speech difficulty.   Psychiatric/Behavioral: Negative for agitation, behavioral problems, confusion, hallucinations and suicidal ideas.     Objective:     Vital Signs (Most " Recent):  Temp: 96.7 °F (35.9 °C) (08/12/20 0728)  Pulse: 95 (08/12/20 0728)  Resp: 18 (08/12/20 0728)  BP: (!) 147/74 (08/12/20 0728)  SpO2: 96 % (08/12/20 0728) Vital Signs (24h Range):  Temp:  [96.6 °F (35.9 °C)-99.2 °F (37.3 °C)] 96.7 °F (35.9 °C)  Pulse:  [83-97] 95  Resp:  [18-19] 18  SpO2:  [94 %-99 %] 96 %  BP: (128-186)/(70-89) 147/74     Weight: 106.1 kg (233 lb 14.5 oz)  Body mass index is 41.43 kg/m².    Intake/Output Summary (Last 24 hours) at 8/12/2020 0850  Last data filed at 8/12/2020 0700  Gross per 24 hour   Intake 1880.32 ml   Output 2300 ml   Net -419.68 ml      Physical Exam  Constitutional:       General: She is not in acute distress.     Appearance: Normal appearance.   HENT:      Head: Normocephalic and atraumatic.      Mouth/Throat:      Mouth: Mucous membranes are moist.   Eyes:      General:         Right eye: No discharge.         Left eye: No discharge.      Extraocular Movements: Extraocular movements intact.      Conjunctiva/sclera: Conjunctivae normal.      Pupils: Pupils are equal, round, and reactive to light.   Neck:      Musculoskeletal: Normal range of motion and neck supple. No neck rigidity or muscular tenderness.   Cardiovascular:      Rate and Rhythm: Normal rate and regular rhythm.      Pulses: Normal pulses.      Heart sounds: Murmur present.   Pulmonary:      Effort: No respiratory distress.      Comments: Bilateral breath sounds diminished  Abdominal:      General: Bowel sounds are normal. There is no distension.      Tenderness: There is no abdominal tenderness. There is no guarding.      Comments: Obese   Genitourinary:     Comments: Not examined  Musculoskeletal: Normal range of motion.         General: Swelling present.      Comments: Generalized edema with edema also noted to upper and lower extremities   Skin:     General: Skin is warm and dry.      Capillary Refill: Capillary refill takes less than 2 seconds.   Neurological:      General: No focal deficit present.       Mental Status: She is alert and oriented to person, place, and time. Mental status is at baseline.      Cranial Nerves: No cranial nerve deficit.   Psychiatric:         Mood and Affect: Mood normal.         Behavior: Behavior normal.         Thought Content: Thought content normal.         Judgment: Judgment normal.       Lab Results   Component Value Date    WBC 8.78 08/12/2020    HGB 7.3 (L) 08/12/2020    HCT 24.3 (L) 08/12/2020     (H) 08/12/2020     08/12/2020     CMP  Sodium   Date Value Ref Range Status   08/12/2020 143 136 - 145 mmol/L Final     Potassium   Date Value Ref Range Status   08/12/2020 3.6 3.5 - 5.1 mmol/L Final     Chloride   Date Value Ref Range Status   08/12/2020 104 95 - 110 mmol/L Final     CO2   Date Value Ref Range Status   08/12/2020 26 23 - 29 mmol/L Final     Glucose   Date Value Ref Range Status   08/12/2020 101 70 - 110 mg/dL Final     BUN, Bld   Date Value Ref Range Status   08/12/2020 6 (L) 8 - 23 mg/dL Final     Creatinine   Date Value Ref Range Status   08/12/2020 1.8 (H) 0.5 - 1.4 mg/dL Final     Calcium   Date Value Ref Range Status   08/12/2020 8.5 (L) 8.7 - 10.5 mg/dL Final     Total Protein   Date Value Ref Range Status   08/11/2020 6.6 6.0 - 8.4 g/dL Final     Albumin   Date Value Ref Range Status   08/12/2020 2.3 (L) 3.5 - 5.2 g/dL Final     Total Bilirubin   Date Value Ref Range Status   08/11/2020 0.4 0.1 - 1.0 mg/dL Final     Comment:     For infants and newborns, interpretation of results should be based  on gestational age, weight and in agreement with clinical  observations.  Premature Infant recommended reference ranges:  Up to 24 hours.............<8.0 mg/dL  Up to 48 hours............<12.0 mg/dL  3-5 days..................<15.0 mg/dL  6-29 days.................<15.0 mg/dL       Alkaline Phosphatase   Date Value Ref Range Status   08/11/2020 143 (H) 55 - 135 U/L Final     AST   Date Value Ref Range Status   08/11/2020 69 (H) 10 - 40 U/L Final      ALT   Date Value Ref Range Status   08/11/2020 103 (H) 10 - 44 U/L Final     Anion Gap   Date Value Ref Range Status   08/12/2020 13 8 - 16 mmol/L Final     eGFR if    Date Value Ref Range Status   08/12/2020 31 (A) >60 mL/min/1.73 m^2 Final     eGFR if non    Date Value Ref Range Status   08/12/2020 27 (A) >60 mL/min/1.73 m^2 Final     Comment:     Calculation used to obtain the estimated glomerular filtration  rate (eGFR) is the CKD-EPI equation.        Microbiology Results (last 7 days)     Procedure Component Value Units Date/Time    Urine culture [[id number]] Collected: 08/07/20 2040    Order Status: Completed Specimen: Urine Updated: 08/09/20 1031     Urine Culture, Routine No growth    Narrative:      Specimen Source->Urine    Urine culture [[id number]] Collected: 08/06/20 2350    Order Status: Completed Specimen: Urine Updated: 08/08/20 1421     Urine Culture, Routine No significant growth    Narrative:      Specimen Source->Urine    Blood culture x two cultures. Draw prior to antibiotics. [[id number]] Collected: 08/02/20 1633    Order Status: Completed Specimen: Blood Updated: 08/07/20 2322     Blood Culture, Routine No growth after 5 days.    Narrative:      Aerobic and anaerobic    Blood culture x two cultures. Draw prior to antibiotics. [[id number]] Collected: 08/02/20 1633    Order Status: Completed Specimen: Blood Updated: 08/07/20 2322     Blood Culture, Routine No growth after 5 days.    Narrative:      Aerobic and anaerobic    Urine Culture High Risk [[id number]] Collected: 08/07/20 2039    Order Status: Canceled Specimen: Urine, Catheterized     Urine Culture High Risk [[id number]] Collected: 08/06/20 2352    Order Status: Canceled Specimen: Urine, Catheterized           Radiology:  SADIE (07-):  · Hyperdynamic left ventricular systolic function. The estimated ejection fraction is 65 - 70 %.  · Normal appearing left atrial appendage. No thrombus is present in the  appendage. Normal appendage velocities.  · Mild mitral regurgitation.  · Mild tricuspid regurgitation.  · No evidence of endocarditis  · PICC line at the cavoatrial junction    ECHO (07-):  · Concentric left ventricular remodeling.  · Hyperdynamic left ventricular systolic function. The estimated ejection fraction is 76%.  · Grade I (mild) left ventricular diastolic dysfunction consistent with impaired relaxation.    · CXR: Concentric left ventricular remodeling.  · Hyperdynamic left ventricular systolic function. The estimated ejection fraction is 76%.  Grade I (mild) left ventricular diastolic dysfunction consistent with impaired relaxation.    CXR: There is bibasilar atelectasis versus infiltrate similar to 07/27/2020.    CT abdomen and pelvis without contrast:  Moderate basilar atelectasis. Status post cholecystectomy without evidence for complication.    RUQ abdominal US:  Prior cholecystectomy.  Small simple cyst of the left lobe of the liver otherwise negative right upper quadrant ultrasound.    RUE venous doppler scan:  Occlusive thrombus of the right brachial vein and cephalic vein.    KUB:  Possible ileus.  Prior cholecystectomy.  Prior bilateral hip joint replacement.    Renal US: Features suggesting intrinsic renal disease.  No hydronephrosis.    Left humerus x-ray:  Degenerative changes in the humeral head and glenohumeral joint of the left shoulder with otherwise negative left humerus.  PICC line as described.    Gastric emptying study:  Normal gastric emptying time for solid substances.      Assessment/Plan:      * LUIZ (acute kidney injury)  Monitor  Follow Nephrology recommendation and BMP.              Anorexia  Gastric emptying study is negative.  Follow GI recommendations and EGD results.  Continue proton pump inhibitor.      Patient is Rastafari  Follow hematology recommendation, on IV Iron for anemia management. Follow CBC.       Hematuria  Hematuria noted-patient currently  remains on IV heparin drip for history of bilateral pulmonary emboli and recurrent DVT     Resolved.  Follow hematology recommendations.      Liver cyst  Noted  Will need outpatient follow-up      Debility  Fall and skin precautions  Continue physical therapy and occupational therapy      Hyponatremia  Resolved for now-monitor for recurrence      Elevated ferritin level  Noted      Acute deep vein thrombosis (DVT) of right upper extremity  Patient developed new right upper extremity DVT despite being on apixaban-  Patient currently remains on IV heparin drip  Hematology following  Patient will need lifelong anticoagulation  Hematology workup in progress      Non-traumatic rhabdomyolysis  It was felt this is secondary to daptomycin. Trend CPK daily-improving. Follow ID and nephrology recommendations.     Daptomycin was discontinued on admission and patient was placed on IV vancomycin      Anemia  Multifactorial--  Poor nutrition and also history of acute blood loss  Monitor  Hematology following. Follow CBC and transfuse as needed. Patient is Amish, receiving IV Iron.        Moderate protein-calorie malnutrition  Anorexia-patient continues report that food smells and tastes bad   On IV TPN.  Monitor volume status closely  Continue p.o. supplement        Atelectasis  Monitor oxygen saturation. Encourage OOB and IS.      Pulmonary infarct  Due to bilateral pulmonary emboli.   Pulmonary following      Chronic diastolic CHF (congestive heart failure)  Patient is identified as having Diastolic heart failure that is Chronic. CHF is currently controlled. Latest ECHO performed and demonstrates-   Results for orders placed during the hospital encounter of 07/13/20   Echo Color Flow Doppler? Yes    Narrative · Concentric left ventricular remodeling.  · Hyperdynamic left ventricular systolic function. The estimated ejection   fraction is 76%.  · Grade I (mild) left ventricular diastolic dysfunction consistent with    impaired relaxation.        Patient remains on Bicarb drip with generalized edema- Monitor volume status closely    MRSA bacteremia  Previously receiving daptomycin as outpatient-now on IV vancomycin  Orders as per infectious disease.  Continue intravenous antibiotic therapy until September 2, 2020.    Elevated troponin  Noted- chronically elevated    Bilateral pulmonary embolism  Patient previously on Eliquis at home but developed additional DVT while on it-  Patient currently on  IV heparin+ Coumadin bridging, increasing Coumadin dose to 7.5 mg daily. INR goal 2-3. Pulmonology and Hematology following. Fall precautions and bleeding risks reviewed with patient.   Patient will need lifelong anticoagulation      COPD (chronic obstructive pulmonary disease)  Monitor O2 sats, supplement O2 as needed  Orders as per pulmonology        Transaminitis  Monitor-trending downward. Felt possibley related to daptomycin.  Possiblely due to clot burden.  Follow GI recommendations.        Morbid obesity  Body mass index is 40.65 kg/m².  General weight loss/lifestyle modification strategies discussed (elicit support from others; identify saboteurs; non-food rewards, etc).        Pulmonary hypertension  Noted  Orders as per pulmonology      Hypertension associated with diabetes  Monitor  Continue current treatment  Titrate medications as needed        VTE Risk Mitigation (From admission, onward)         Ordered     warfarin tablet 7.5 mg  Daily      08/12/20 0847     heparin 25,000 units in dextrose 5% 250 mL (100 units/mL) infusion HIGH INTENSITY nomogram - [facility]  Continuous     Question:  Heparin Infusion Adjustment (DO NOT MODIFY ANSWER)  Answer:  [email or web address]\Images\Pharmacy\HeparinInfusions\heparin HIGH INTENSITY nomogram for [facility] [id number]    08/03/20 1609     heparin 25,000 units in dextrose 5% (100 units/ml) IV bolus from bag - ADDITIONAL PRN BOLUS - 60 units/kg  As needed (PRN)     Question:  Heparin Infusion  Adjustment (DO NOT MODIFY ANSWER)  Answer:  \\[email or web address]\epic\Images\Pharmacy\HeparinInfusions\heparin HIGH INTENSITY nomogram for [facility] [id number].pdf    08/03/20 1609     heparin 25,000 units in dextrose 5% (100 units/ml) IV bolus from bag - ADDITIONAL PRN BOLUS - 30 units/kg  As needed (PRN)     Question:  Heparin Infusion Adjustment (DO NOT MODIFY ANSWER)  Answer:  \\[email or web address]\epic\Images\Pharmacy\HeparinInfusions\heparin HIGH INTENSITY nomogram for [facility] [id number].pdf    08/03/20 1609     IP VTE HIGH RISK PATIENT  Once      08/02/20 2034     Place sequential compression device  Until discontinued      08/02/20 2034                      Alix Ruth MD  Department of Hospital Medicine   [facility]

## 2020-08-12 NOTE — ASSESSMENT & PLAN NOTE
Gastric emptying study is negative.  Follow GI recommendations and EGD results.  Continue proton pump inhibitor.

## 2020-08-12 NOTE — PLAN OF CARE
08/11/20 4631   Patient Assessment/Suction   Level of Consciousness (AVPU) alert   Respiratory Effort Normal;Unlabored   Expansion/Accessory Muscles/Retractions no use of accessory muscles;no retractions;expansion symmetric   All Lung Fields Breath Sounds diminished   Rhythm/Pattern, Respiratory unlabored;pattern regular;depth regular   Cough Frequency infrequent   Cough Type good;nonproductive   PRE-TX-O2   O2 Device (Oxygen Therapy) nasal cannula   $ Is the patient on Low Flow Oxygen? Yes   Flow (L/min) 2   Oxygen Concentration (%) 28   SpO2 95 %   Pulse Oximetry Type Intermittent   $ Pulse Oximetry - Multiple Charge Pulse Oximetry - Multiple   Pulse 83   Resp 18   Aerosol Therapy   $ Aerosol Therapy Charges Aerosol Treatment   Respiratory Treatment Status (SVN) given   Treatment Route (SVN) mask;oxygen   Patient Position (SVN) sitting in chair   Post Treatment Assessment (SVN) breath sounds unchanged   Signs of Intolerance (SVN) none   Breath Sounds Post-Respiratory Treatment   Throughout All Fields Post-Treatment All Fields   Throughout All Fields Post-Treatment no change   Post-treatment Heart Rate (beats/min) 86   Post-treatment Resp Rate (breaths/min) 17   Incentive Spirometer   $ Incentive Spirometer Charges done with encouragement   Administration (IS) instruction provided, follow-up;proper technique demonstrated   Number of Repetitions (IS) 10   Level Incentive Spirometer (mL) 750   Patient Tolerance (IS) good   Ready to Wean/Extubation Screen   FIO2<=50 (chart decimal) 0.28

## 2020-08-12 NOTE — PLAN OF CARE
EGD done today. Plans for colonoscopy in AM, NPO at MN. Heparin gtt infusing per MD orders at 10 units/kg/hr. Next aptt in AM with morning labs. Dr. Nuñez stated she would call in the morning and tell the staff when to stop the heparin gtt. Pt up to chair for majority of the day. Walked the halls with PT. Call light in reach. Will continue to monitor.

## 2020-08-12 NOTE — PLAN OF CARE
08/12/20 0651   Patient Assessment/Suction   Level of Consciousness (AVPU) alert   Respiratory Effort Unlabored   Expansion/Accessory Muscles/Retractions no use of accessory muscles   All Lung Fields Breath Sounds diminished   Rhythm/Pattern, Respiratory no shortness of breath reported   PRE-TX-O2   O2 Device (Oxygen Therapy) nasal cannula   $ Is the patient on Low Flow Oxygen? Yes   Humidification temp set 2   SpO2 99 %   Pulse Oximetry Type Intermittent   $ Pulse Oximetry - Multiple Charge Pulse Oximetry - Multiple   Pulse 97   Resp 19   Aerosol Therapy   $ Aerosol Therapy Charges Aerosol Treatment   Respiratory Treatment Status (SVN) given   Treatment Route (SVN) mask;oxygen   Patient Position (SVN) HOB elevated   Post Treatment Assessment (SVN) breath sounds unchanged   Signs of Intolerance (SVN) none   Breath Sounds Post-Respiratory Treatment   Throughout All Fields Post-Treatment All Fields   Throughout All Fields Post-Treatment no change   Post-treatment Heart Rate (beats/min) 97   Post-treatment Resp Rate (breaths/min) 19

## 2020-08-12 NOTE — ASSESSMENT & PLAN NOTE
Patient previously on Eliquis at home but developed additional DVT while on it-  Patient currently on  IV heparin+ Coumadin bridging, increasing Coumadin dose to 7.5 mg daily. INR goal 2-3. Pulmonology and Hematology following. Fall precautions and bleeding risks reviewed with patient.   Patient will need lifelong anticoagulation

## 2020-08-12 NOTE — PROGRESS NOTES
Pharmacokinetic Assessment Follow Up: IV Vancomycin    Vancomycin serum concentration assessment(s):    The trough level was drawn correctly and can be used to guide therapy at this time. The measurement is within the desired definitive target range of 15 to 20 mcg/mL.    Vancomycin Regimen Plan:    Continue regimen to Vancomycin 1000 mg IV every 24 hours with next serum trough concentration measured at 2230 prior to 3rd dose on 8/13    Drug levels (last 3 results):  Recent Labs   Lab Result Units 08/09/20  2103 08/11/20  2229   Vancomycin, Random ug/mL 17.0  --    Vancomycin-Trough ug/mL  --  16.1       Pharmacy will continue to follow and monitor vancomycin.    Please contact pharmacy at extension [phone] for questions regarding this assessment.    Thank you for the consult,   Carly Stubbs       Patient brief summary:  Sammi Abreu is a 79 y.o. female initiated on antimicrobial therapy with IV Vancomycin for treatment of bacteremia    The patient's current regimen is 1000 mg every 24 hours    Drug Allergies:   Review of patient's allergies indicates:   Allergen Reactions    Cymbalta [duloxetine] Other (See Comments)     Nightmares      Darvon [propoxyphene] Nausea Only and Other (See Comments)     Sweating, slept for 3 days    Atorvastatin Other (See Comments)     Muscle cramps    Naprosyn [naproxen] Nausea Only    Penicillins Rash    Tramadol Nausea Only and Palpitations       Actual Body Weight:   108 kg    Renal Function:   Estimated Creatinine Clearance: 32.6 mL/min (A) (based on SCr of 1.7 mg/dL (H)).,         CBC (last 72 hours):  Recent Labs   Lab Result Units 08/10/20  0415 08/11/20  0226   WBC K/uL 8.17 9.60   Hemoglobin g/dL 7.4* 8.2*   Hematocrit % 24.7* 27.0*   Platelets K/uL 259 272   Gran% % 53.0 54.9   Lymph% % 29.6 28.4   Mono% % 12.2 11.0   Eosinophil% % 4.0 4.7   Basophil% % 0.2 0.3   Differential Method  Automated Automated       Metabolic Panel (last 72 hours):  Recent Labs   Lab Result  Units 08/09/20  0505 08/10/20  0415 08/11/20  0226   Sodium mmol/L 141 140 141   Potassium mmol/L 3.7 3.7 3.9   Chloride mmol/L 97 100 103   CO2 mmol/L 36* 31* 29   Glucose mg/dL 106 131* 128*   BUN, Bld mg/dL 9 7* 6*   Creatinine mg/dL 1.7* 1.7* 1.7*   Albumin g/dL 2.1* 2.1* 2.4*  2.4*   Total Bilirubin mg/dL  --   --  0.4   Alkaline Phosphatase U/L  --   --  143*   AST U/L  --   --  69*   ALT U/L  --   --  103*   Phosphorus mg/dL 4.8* 4.0 4.6*       Vancomycin Administrations:  vancomycin given in the last 96 hours                     vancomycin in dextrose 5 % 1 gram/250 mL IVPB 1,000 mg (mg) 1,000 mg New Bag 08/11/20 0004      Restarted 08/10/20 0207     1,000 mg New Bag  0122    vancomycin in dextrose 5 % 1 gram/250 mL IVPB 1,000 mg (mg) 1,000 mg New Bag 08/08/20 2320                    Microbiologic Results:  Microbiology Results (last 7 days)       Procedure Component Value Units Date/Time    Urine culture [[id number]] Collected: 08/07/20 2040    Order Status: Completed Specimen: Urine Updated: 08/09/20 1031     Urine Culture, Routine No growth    Narrative:      Specimen Source->Urine    Urine culture [[id number]] Collected: 08/06/20 2350    Order Status: Completed Specimen: Urine Updated: 08/08/20 1421     Urine Culture, Routine No significant growth    Narrative:      Specimen Source->Urine    Blood culture x two cultures. Draw prior to antibiotics. [[id number]] Collected: 08/02/20 1633    Order Status: Completed Specimen: Blood Updated: 08/07/20 2322     Blood Culture, Routine No growth after 5 days.    Narrative:      Aerobic and anaerobic    Blood culture x two cultures. Draw prior to antibiotics. [[id number]] Collected: 08/02/20 1633    Order Status: Completed Specimen: Blood Updated: 08/07/20 2322     Blood Culture, Routine No growth after 5 days.    Narrative:      Aerobic and anaerobic    Urine Culture High Risk [[id number]] Collected: 08/07/20 2039    Order Status: Canceled Specimen: Urine,  Catheterized     Urine Culture High Risk [[id number]] Collected: 08/06/20 2352    Order Status: Canceled Specimen: Urine, Catheterized

## 2020-08-12 NOTE — [employer] PATIENT INSTRUCTIONS
Discharge Summary/Instructions after an Endoscopic Procedure  Patient Name: Sammi Abreu  Patient MRN: [id number]  Patient YOB: 1940 Wednesday, August 12, 2020  Sue Nuñez MD  RESTRICTIONS:  During your procedure today, you received medications for sedation.  These   medications may affect your judgment, balance and coordination.  Therefore,   for 24 hours, you have the following restrictions:   - DO NOT drive a car, operate machinery, make legal/financial decisions,   sign important papers or drink alcohol.    ACTIVITY:  Today: no heavy lifting, straining or running due to procedural   sedation/anesthesia.  The following day: return to full activity including work.  DIET:  Eat and drink normally unless instructed otherwise.     TREATMENT FOR COMMON SIDE EFFECTS:  - Mild abdominal pain, nausea, belching, bloating or excessive gas:  rest,   eat lightly and use a heating pad.  - Sore Throat: treat with throat lozenges and/or gargle with warm salt   water.  - Because air was used during the procedure, expelling large amounts of air   from your rectum or belching is normal.  - If a bowel prep was taken, you may not have a bowel movement for 1-3 days.    This is normal.  SYMPTOMS TO WATCH FOR AND REPORT TO YOUR PHYSICIAN:  1. Abdominal pain or bloating, other than gas cramps.  2. Chest pain.  3. Back pain.  4. Signs of infection such as: chills or fever occurring within 24 hours   after the procedure.  5. Rectal bleeding, which would show as bright red, maroon, or black stools.   (A tablespoon of blood from the rectum is not serious, especially if   hemorrhoids are present.)  6. Vomiting.  7. Weakness or dizziness.  GO DIRECTLY TO THE NEAREST EMERGENCY ROOM IF YOU HAVE ANY OF THE FOLLOWING:      Difficulty breathing              Chills and/or fever over 101 F   Persistent vomiting and/or vomiting blood   Severe abdominal pain   Severe chest pain   Black, tarry stools   Bleeding- more than  one tablespoon   Any other symptom or condition that you feel may need urgent attention  Your doctor recommends these additional instructions:  If any biopsies were taken, your doctors clinic will contact you in 1 to 2   weeks with any results.  - [facility] to [facility] for [facility].   -Clear liquid diet  -NPO at midnight  -Prep tonight for colonoscopy tomorrow  -CBC in AM  -Ok to restart Heparin, ok to give Coumadin today  -Consider IV iron if not already given  For questions, problems or results please call your physician - Sue Nuñez MD at Work:  [phone].  [facility], EMERGENCY ROOM PHONE NUMBER: [phone]  IF A COMPLICATION OR EMERGENCY SITUATION ARISES AND YOU ARE UNABLE TO REACH   YOUR PHYSICIAN - GO DIRECTLY TO THE EMERGENCY ROOM.  Sue Nuñez MD  8/12/2020 11:55:46 AM  This report has been verified and signed electronically.  [employer]

## 2020-08-12 NOTE — SUBJECTIVE & OBJECTIVE
"Interval History:  Patient is scheduled for EGD today for evaluation of anorexia and abdominal pain.  Gastric emptying study was within normal yesterday.  Currently receiving heparin infusion with Coumadin bridging.  Patient being followed by hematology. Receiving Iron infusions. No hematuria noted.    Review of Systems   Constitutional: Positive for activity change, appetite change and fatigue. Negative for chills, diaphoresis and fever.   HENT: Negative for ear discharge, ear pain and facial swelling.    Eyes: Negative for pain and redness.   Respiratory: Positive for cough and shortness of breath.    Cardiovascular: Positive for leg swelling.   Gastrointestinal: Negative for abdominal distention, abdominal pain, constipation, diarrhea, nausea and vomiting.        Poor appetite- patient reports things taste bad and "smell bad"  and she states she has not been hungry   Endocrine: Negative for polydipsia and polyphagia.   Genitourinary: Negative for difficulty urinating, dysuria, flank pain and hematuria.   Musculoskeletal: Positive for back pain. Negative for neck pain and neck stiffness.   Skin: Negative for color change.   Allergic/Immunologic: Negative for food allergies.   Neurological: Positive for weakness. Negative for seizures, facial asymmetry and speech difficulty.   Psychiatric/Behavioral: Negative for agitation, behavioral problems, confusion, hallucinations and suicidal ideas.     Objective:     Vital Signs (Most Recent):  Temp: 96.7 °F (35.9 °C) (08/12/20 0728)  Pulse: 95 (08/12/20 0728)  Resp: 18 (08/12/20 0728)  BP: (!) 147/74 (08/12/20 0728)  SpO2: 96 % (08/12/20 0728) Vital Signs (24h Range):  Temp:  [96.6 °F (35.9 °C)-99.2 °F (37.3 °C)] 96.7 °F (35.9 °C)  Pulse:  [83-97] 95  Resp:  [18-19] 18  SpO2:  [94 %-99 %] 96 %  BP: (128-186)/(70-89) 147/74     Weight: 106.1 kg (233 lb 14.5 oz)  Body mass index is 41.43 kg/m².    Intake/Output Summary (Last 24 hours) at 8/12/2020 0850  Last data filed at " 8/12/2020 0700  Gross per 24 hour   Intake 1880.32 ml   Output 2300 ml   Net -419.68 ml      Physical Exam  Constitutional:       General: She is not in acute distress.     Appearance: Normal appearance.   HENT:      Head: Normocephalic and atraumatic.      Mouth/Throat:      Mouth: Mucous membranes are moist.   Eyes:      General:         Right eye: No discharge.         Left eye: No discharge.      Extraocular Movements: Extraocular movements intact.      Conjunctiva/sclera: Conjunctivae normal.      Pupils: Pupils are equal, round, and reactive to light.   Neck:      Musculoskeletal: Normal range of motion and neck supple. No neck rigidity or muscular tenderness.   Cardiovascular:      Rate and Rhythm: Normal rate and regular rhythm.      Pulses: Normal pulses.      Heart sounds: Murmur present.   Pulmonary:      Effort: No respiratory distress.      Comments: Bilateral breath sounds diminished  Abdominal:      General: Bowel sounds are normal. There is no distension.      Tenderness: There is no abdominal tenderness. There is no guarding.      Comments: Obese   Genitourinary:     Comments: Not examined  Musculoskeletal: Normal range of motion.         General: Swelling present.      Comments: Generalized edema with edema also noted to upper and lower extremities   Skin:     General: Skin is warm and dry.      Capillary Refill: Capillary refill takes less than 2 seconds.   Neurological:      General: No focal deficit present.      Mental Status: She is alert and oriented to person, place, and time. Mental status is at baseline.      Cranial Nerves: No cranial nerve deficit.   Psychiatric:         Mood and Affect: Mood normal.         Behavior: Behavior normal.         Thought Content: Thought content normal.         Judgment: Judgment normal.       Lab Results   Component Value Date    WBC 8.78 08/12/2020    HGB 7.3 (L) 08/12/2020    HCT 24.3 (L) 08/12/2020     (H) 08/12/2020     08/12/2020      CMP  Sodium   Date Value Ref Range Status   08/12/2020 143 136 - 145 mmol/L Final     Potassium   Date Value Ref Range Status   08/12/2020 3.6 3.5 - 5.1 mmol/L Final     Chloride   Date Value Ref Range Status   08/12/2020 104 95 - 110 mmol/L Final     CO2   Date Value Ref Range Status   08/12/2020 26 23 - 29 mmol/L Final     Glucose   Date Value Ref Range Status   08/12/2020 101 70 - 110 mg/dL Final     BUN, Bld   Date Value Ref Range Status   08/12/2020 6 (L) 8 - 23 mg/dL Final     Creatinine   Date Value Ref Range Status   08/12/2020 1.8 (H) 0.5 - 1.4 mg/dL Final     Calcium   Date Value Ref Range Status   08/12/2020 8.5 (L) 8.7 - 10.5 mg/dL Final     Total Protein   Date Value Ref Range Status   08/11/2020 6.6 6.0 - 8.4 g/dL Final     Albumin   Date Value Ref Range Status   08/12/2020 2.3 (L) 3.5 - 5.2 g/dL Final     Total Bilirubin   Date Value Ref Range Status   08/11/2020 0.4 0.1 - 1.0 mg/dL Final     Comment:     For infants and newborns, interpretation of results should be based  on gestational age, weight and in agreement with clinical  observations.  Premature Infant recommended reference ranges:  Up to 24 hours.............<8.0 mg/dL  Up to 48 hours............<12.0 mg/dL  3-5 days..................<15.0 mg/dL  6-29 days.................<15.0 mg/dL       Alkaline Phosphatase   Date Value Ref Range Status   08/11/2020 143 (H) 55 - 135 U/L Final     AST   Date Value Ref Range Status   08/11/2020 69 (H) 10 - 40 U/L Final     ALT   Date Value Ref Range Status   08/11/2020 103 (H) 10 - 44 U/L Final     Anion Gap   Date Value Ref Range Status   08/12/2020 13 8 - 16 mmol/L Final     eGFR if    Date Value Ref Range Status   08/12/2020 31 (A) >60 mL/min/1.73 m^2 Final     eGFR if non    Date Value Ref Range Status   08/12/2020 27 (A) >60 mL/min/1.73 m^2 Final     Comment:     Calculation used to obtain the estimated glomerular filtration  rate (eGFR) is the CKD-EPI equation.         Microbiology Results (last 7 days)     Procedure Component Value Units Date/Time    Urine culture [[id number]] Collected: 08/07/20 2040    Order Status: Completed Specimen: Urine Updated: 08/09/20 1031     Urine Culture, Routine No growth    Narrative:      Specimen Source->Urine    Urine culture [[id number]] Collected: 08/06/20 2350    Order Status: Completed Specimen: Urine Updated: 08/08/20 1421     Urine Culture, Routine No significant growth    Narrative:      Specimen Source->Urine    Blood culture x two cultures. Draw prior to antibiotics. [[id number]] Collected: 08/02/20 1633    Order Status: Completed Specimen: Blood Updated: 08/07/20 2322     Blood Culture, Routine No growth after 5 days.    Narrative:      Aerobic and anaerobic    Blood culture x two cultures. Draw prior to antibiotics. [[id number]] Collected: 08/02/20 1633    Order Status: Completed Specimen: Blood Updated: 08/07/20 2322     Blood Culture, Routine No growth after 5 days.    Narrative:      Aerobic and anaerobic    Urine Culture High Risk [[id number]] Collected: 08/07/20 2039    Order Status: Canceled Specimen: Urine, Catheterized     Urine Culture High Risk [[id number]] Collected: 08/06/20 2352    Order Status: Canceled Specimen: Urine, Catheterized           Radiology:  SADIE (07-):  · Hyperdynamic left ventricular systolic function. The estimated ejection fraction is 65 - 70 %.  · Normal appearing left atrial appendage. No thrombus is present in the appendage. Normal appendage velocities.  · Mild mitral regurgitation.  · Mild tricuspid regurgitation.  · No evidence of endocarditis  · PICC line at the cavoatrial junction    ECHO (07-):  · Concentric left ventricular remodeling.  · Hyperdynamic left ventricular systolic function. The estimated ejection fraction is 76%.  · Grade I (mild) left ventricular diastolic dysfunction consistent with impaired relaxation.    · CXR: Concentric left ventricular remodeling.  · Hyperdynamic  left ventricular systolic function. The estimated ejection fraction is 76%.  Grade I (mild) left ventricular diastolic dysfunction consistent with impaired relaxation.    CXR: There is bibasilar atelectasis versus infiltrate similar to 07/27/2020.    CT abdomen and pelvis without contrast:  Moderate basilar atelectasis. Status post cholecystectomy without evidence for complication.    RUQ abdominal US:  Prior cholecystectomy.  Small simple cyst of the left lobe of the liver otherwise negative right upper quadrant ultrasound.    RUE venous doppler scan:  Occlusive thrombus of the right brachial vein and cephalic vein.    KUB:  Possible ileus.  Prior cholecystectomy.  Prior bilateral hip joint replacement.    Renal US: Features suggesting intrinsic renal disease.  No hydronephrosis.    Left humerus x-ray:  Degenerative changes in the humeral head and glenohumeral joint of the left shoulder with otherwise negative left humerus.  PICC line as described.    Gastric emptying study:  Normal gastric emptying time for solid substances.

## 2020-08-13 ENCOUNTER — ANESTHESIA (OUTPATIENT)
Dept: ENDOSCOPY | Facility: HOSPITAL | Age: 80
DRG: 682 | End: 2020-08-13
Payer: MEDICARE

## 2020-08-13 ENCOUNTER — ANESTHESIA EVENT (OUTPATIENT)
Dept: ENDOSCOPY | Facility: HOSPITAL | Age: 80
DRG: 682 | End: 2020-08-13
Payer: MEDICARE

## 2020-08-13 LAB
ALBUMIN SERPL BCP-MCNC: 2.3 G/DL (ref 3.5–5.2)
ANA PATTERN 1: NORMAL
ANA SER QL IF: POSITIVE
ANA TITR SER IF: NORMAL {TITER}
ANION GAP SERPL CALC-SCNC: 10 MMOL/L (ref 8–16)
APTT BLDCRRT: 55.4 SEC (ref 21–32)
BASOPHILS # BLD AUTO: 0.03 K/UL (ref 0–0.2)
BASOPHILS NFR BLD: 0.4 % (ref 0–1.9)
BUN SERPL-MCNC: 5 MG/DL (ref 8–23)
CALCIUM SERPL-MCNC: 8.2 MG/DL (ref 8.7–10.5)
CARDIOLIPIN IGG SER IA-ACNC: <9.4 GPL (ref 0–14.99)
CARDIOLIPIN IGM SER IA-ACNC: <9.4 MPL (ref 0–12.49)
CHLORIDE SERPL-SCNC: 106 MMOL/L (ref 95–110)
CK SERPL-CCNC: 400 U/L (ref 20–180)
CO2 SERPL-SCNC: 27 MMOL/L (ref 23–29)
CREAT SERPL-MCNC: 1.7 MG/DL (ref 0.5–1.4)
DIFFERENTIAL METHOD: ABNORMAL
EOSINOPHIL # BLD AUTO: 0.4 K/UL (ref 0–0.5)
EOSINOPHIL NFR BLD: 4.6 % (ref 0–8)
ERYTHROCYTE [DISTWIDTH] IN BLOOD BY AUTOMATED COUNT: 15.1 % (ref 11.5–14.5)
EST. GFR  (AFRICAN AMERICAN): 33 ML/MIN/1.73 M^2
EST. GFR  (NON AFRICAN AMERICAN): 29 ML/MIN/1.73 M^2
F2 GENE MUT ANL BLD/T: NORMAL
F5 P.R506Q BLD/T QL: NORMAL
GLUCOSE SERPL-MCNC: 97 MG/DL (ref 70–110)
HCT VFR BLD AUTO: 25.1 % (ref 37–48.5)
HGB BLD-MCNC: 7.5 G/DL (ref 12–16)
IMM GRANULOCYTES # BLD AUTO: 0.08 K/UL (ref 0–0.04)
IMM GRANULOCYTES NFR BLD AUTO: 1 % (ref 0–0.5)
INR PPP: 1.2 (ref 0.8–1.2)
LYMPHOCYTES # BLD AUTO: 2.4 K/UL (ref 1–4.8)
LYMPHOCYTES NFR BLD: 29.5 % (ref 18–48)
MCH RBC QN AUTO: 32.8 PG (ref 27–31)
MCHC RBC AUTO-ENTMCNC: 29.9 G/DL (ref 32–36)
MCV RBC AUTO: 110 FL (ref 82–98)
MONOCYTES # BLD AUTO: 1 K/UL (ref 0.3–1)
MONOCYTES NFR BLD: 12 % (ref 4–15)
NEUTROPHILS # BLD AUTO: 4.2 K/UL (ref 1.8–7.7)
NEUTROPHILS NFR BLD: 52.5 % (ref 38–73)
NRBC BLD-RTO: 0 /100 WBC
PHOSPHATE SERPL-MCNC: 4.7 MG/DL (ref 2.7–4.5)
PLATELET # BLD AUTO: 249 K/UL (ref 150–350)
PMV BLD AUTO: 10.4 FL (ref 9.2–12.9)
POTASSIUM SERPL-SCNC: 3.4 MMOL/L (ref 3.5–5.1)
PROTHROMBIN TIME: 13.6 SEC (ref 9–12.5)
RBC # BLD AUTO: 2.29 M/UL (ref 4–5.4)
SMA IGG SER-ACNC: 9.6 U
SODIUM SERPL-SCNC: 143 MMOL/L (ref 136–145)
VANCOMYCIN TROUGH SERPL-MCNC: 17.6 UG/ML (ref 10–22)
WBC # BLD AUTO: 8.06 K/UL (ref 3.9–12.7)

## 2020-08-13 PROCEDURE — 94640 AIRWAY INHALATION TREATMENT: CPT

## 2020-08-13 PROCEDURE — 80069 RENAL FUNCTION PANEL: CPT

## 2020-08-13 PROCEDURE — 45378 DIAGNOSTIC COLONOSCOPY: CPT | Mod: ,,, | Performed by: INTERNAL MEDICINE

## 2020-08-13 PROCEDURE — 85025 COMPLETE CBC W/AUTO DIFF WBC: CPT

## 2020-08-13 PROCEDURE — D9220A PRA ANESTHESIA: ICD-10-PCS | Mod: CRNA,,, | Performed by: NURSE ANESTHETIST, CERTIFIED REGISTERED

## 2020-08-13 PROCEDURE — 63600175 PHARM REV CODE 636 W HCPCS: Performed by: NURSE PRACTITIONER

## 2020-08-13 PROCEDURE — 12000002 HC ACUTE/MED SURGE SEMI-PRIVATE ROOM

## 2020-08-13 PROCEDURE — 25000003 PHARM REV CODE 250: Performed by: INTERNAL MEDICINE

## 2020-08-13 PROCEDURE — 94761 N-INVAS EAR/PLS OXIMETRY MLT: CPT

## 2020-08-13 PROCEDURE — 37000009 HC ANESTHESIA EA ADD 15 MINS: Performed by: INTERNAL MEDICINE

## 2020-08-13 PROCEDURE — 27000221 HC OXYGEN, UP TO 24 HOURS

## 2020-08-13 PROCEDURE — D9220A PRA ANESTHESIA: ICD-10-PCS | Mod: ANES,,, | Performed by: ANESTHESIOLOGY

## 2020-08-13 PROCEDURE — 45378 PR COLONOSCOPY,DIAGNOSTIC: ICD-10-PCS | Mod: ,,, | Performed by: INTERNAL MEDICINE

## 2020-08-13 PROCEDURE — 80202 ASSAY OF VANCOMYCIN: CPT

## 2020-08-13 PROCEDURE — 85730 THROMBOPLASTIN TIME PARTIAL: CPT

## 2020-08-13 PROCEDURE — 37000008 HC ANESTHESIA 1ST 15 MINUTES: Performed by: INTERNAL MEDICINE

## 2020-08-13 PROCEDURE — 63600175 PHARM REV CODE 636 W HCPCS: Performed by: NURSE ANESTHETIST, CERTIFIED REGISTERED

## 2020-08-13 PROCEDURE — 99499 UNLISTED E&M SERVICE: CPT | Mod: ,,, | Performed by: PHYSICIAN ASSISTANT

## 2020-08-13 PROCEDURE — 94799 UNLISTED PULMONARY SVC/PX: CPT

## 2020-08-13 PROCEDURE — 36415 COLL VENOUS BLD VENIPUNCTURE: CPT

## 2020-08-13 PROCEDURE — 25000242 PHARM REV CODE 250 ALT 637 W/ HCPCS: Performed by: HOSPITALIST

## 2020-08-13 PROCEDURE — 99232 PR SUBSEQUENT HOSPITAL CARE,LEVL II: ICD-10-PCS | Mod: ,,, | Performed by: INTERNAL MEDICINE

## 2020-08-13 PROCEDURE — D9220A PRA ANESTHESIA: Mod: ANES,,, | Performed by: ANESTHESIOLOGY

## 2020-08-13 PROCEDURE — D9220A PRA ANESTHESIA: Mod: CRNA,,, | Performed by: NURSE ANESTHETIST, CERTIFIED REGISTERED

## 2020-08-13 PROCEDURE — 45378 DIAGNOSTIC COLONOSCOPY: CPT | Performed by: INTERNAL MEDICINE

## 2020-08-13 PROCEDURE — 99499 RISK ADDL DX/OHS AUDIT: ICD-10-PCS | Mod: ,,, | Performed by: PHYSICIAN ASSISTANT

## 2020-08-13 PROCEDURE — 82550 ASSAY OF CK (CPK): CPT

## 2020-08-13 PROCEDURE — 99232 SBSQ HOSP IP/OBS MODERATE 35: CPT | Mod: ,,, | Performed by: INTERNAL MEDICINE

## 2020-08-13 PROCEDURE — 25000003 PHARM REV CODE 250: Performed by: HOSPITALIST

## 2020-08-13 PROCEDURE — 85610 PROTHROMBIN TIME: CPT

## 2020-08-13 PROCEDURE — 25000003 PHARM REV CODE 250: Performed by: NURSE PRACTITIONER

## 2020-08-13 RX ORDER — PROPOFOL 10 MG/ML
VIAL (ML) INTRAVENOUS
Status: DISCONTINUED | OUTPATIENT
Start: 2020-08-13 | End: 2020-08-13

## 2020-08-13 RX ORDER — SODIUM CHLORIDE 9 MG/ML
INJECTION, SOLUTION INTRAVENOUS CONTINUOUS
Status: DISCONTINUED | OUTPATIENT
Start: 2020-08-13 | End: 2020-08-16 | Stop reason: HOSPADM

## 2020-08-13 RX ADMIN — FLUTICASONE FUROATE AND VILANTEROL TRIFENATATE 1 PUFF: 100; 25 POWDER RESPIRATORY (INHALATION) at 07:08

## 2020-08-13 RX ADMIN — HYDRALAZINE HYDROCHLORIDE 50 MG: 25 TABLET, FILM COATED ORAL at 05:08

## 2020-08-13 RX ADMIN — PROPOFOL 30 MG: 10 INJECTION, EMULSION INTRAVENOUS at 11:08

## 2020-08-13 RX ADMIN — PROPOFOL 120 MG: 10 INJECTION, EMULSION INTRAVENOUS at 11:08

## 2020-08-13 RX ADMIN — Medication 500 MG: at 09:08

## 2020-08-13 RX ADMIN — IPRATROPIUM BROMIDE AND ALBUTEROL SULFATE 3 ML: .5; 2.5 SOLUTION RESPIRATORY (INHALATION) at 07:08

## 2020-08-13 RX ADMIN — METOPROLOL SUCCINATE 50 MG: 50 TABLET, FILM COATED, EXTENDED RELEASE ORAL at 01:08

## 2020-08-13 RX ADMIN — IPRATROPIUM BROMIDE AND ALBUTEROL SULFATE 3 ML: .5; 2.5 SOLUTION RESPIRATORY (INHALATION) at 01:08

## 2020-08-13 RX ADMIN — POTASSIUM CHLORIDE 40 MEQ: 1.5 POWDER, FOR SOLUTION ORAL at 05:08

## 2020-08-13 RX ADMIN — HEPARIN SODIUM AND DEXTROSE 10 UNITS/KG/HR: 10000; 5 INJECTION INTRAVENOUS at 05:08

## 2020-08-13 RX ADMIN — MONTELUKAST 10 MG: 10 TABLET, FILM COATED ORAL at 09:08

## 2020-08-13 RX ADMIN — HYDRALAZINE HYDROCHLORIDE 50 MG: 25 TABLET, FILM COATED ORAL at 09:08

## 2020-08-13 RX ADMIN — POTASSIUM CHLORIDE 40 MEQ: 1.5 POWDER, FOR SOLUTION ORAL at 09:08

## 2020-08-13 RX ADMIN — HYDRALAZINE HYDROCHLORIDE 50 MG: 25 TABLET, FILM COATED ORAL at 01:08

## 2020-08-13 RX ADMIN — WARFARIN SODIUM 7.5 MG: 2.5 TABLET ORAL at 05:08

## 2020-08-13 RX ADMIN — SODIUM CHLORIDE: 0.9 INJECTION, SOLUTION INTRAVENOUS at 11:08

## 2020-08-13 NOTE — PROGRESS NOTES
[facility] Medicine  Progress Note    Patient Name: Sammi Abreu  MRN: [id number]  Patient Class: IP- Inpatient   Admission Date: 8/2/2020  Length of Stay: 10 days  Attending Physician: Alix Ruth MD  Primary Care Provider: Osmani Villatoro MD        Subjective:     Principal Problem:LUIZ (acute kidney injury)        HPI:  Sammi Abreu is a 79-year-old female with past medical history significant for arthritis and chronic back pain, fibromyalgia, glaucoma, hyperlipidemia, hypertension, sleep apnea with history of noncompliance with CPAP, morbid obesity, hypertension, GERD, COPD, diabetes type 2, prior DVT, and gallstones [facility] x3 [facility].  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   Patient was discharged from this facility on 07/29/2020 after being treated for bilateral pulmonary embolism.  Patient is currently on Eliquis.  Patient underwent a CT abdomen pelvis while in the emergency department which identified surgical changes but nothing acute.  Patient is noted to have a mild LUIZ.  Her troponin is elevated at 0.41 which is consistent with prior levels.  She will be admitted to the service of hospital Medicine for continued treatment.    Overview/Hospital Course:  Patient monitor closely during hospitalization.  She was placed on continuous telemetry monitoring.  She was noted to have rhabdomyolysis with CPK greater than 01577 and LUIZ.  Nephrology was consulted.  She was noted to have metabolic acidosis initiated on bicarb drip.  Daptomycin was held and ID was consulted.  She was initiated on IV vancomycin.  She was noted have increased right upper extremity swelling.  Right upper extremity ultrasound demonstrated occlusive thrombus of the right brachial vein and cephalic vein. She was initiated on heparin drip and her Eliquis was held.  There was concern for Eliquis failure.   "Hematology consulted.  PT and OT were consulted for debility.  Patient with ongoing anorexia and poor p.o. intake. Dietary was consulted for protein calorie malnutrition.  It was recommended patient be placed on TPN lipids.  Her renal status was monitored closely and she remain on bicarb drip with plans to start TPN once acute kidney injury resolved if no improvement in her p.o. intake.  Patient was noted to have some hematuria during her stay.  A retroperitoneal ultrasound was ordered.    Interval History:  Patient is scheduled for colonoscopy.  EGD results were unrevealing.  Gastric emptying study was within normal.  Currently receiving heparin infusion with Coumadin bridging.  Patient being followed by hematology. Receiving Iron infusions. No hematuria noted.    Review of Systems   Constitutional: Positive for activity change, appetite change and fatigue. Negative for chills, diaphoresis and fever.   HENT: Negative for ear discharge, ear pain and facial swelling.    Eyes: Negative for pain and redness.   Respiratory: Positive for cough and shortness of breath.    Cardiovascular: Positive for leg swelling.   Gastrointestinal: Negative for abdominal distention, abdominal pain, constipation, diarrhea, nausea and vomiting.        Poor appetite- patient reports things taste bad and "smell bad"  and she states she has not been hungry   Endocrine: Negative for polydipsia and polyphagia.   Genitourinary: Negative for difficulty urinating, dysuria, flank pain and hematuria.   Musculoskeletal: Positive for back pain. Negative for neck pain and neck stiffness.   Skin: Negative for color change.   Allergic/Immunologic: Negative for food allergies.   Neurological: Positive for weakness. Negative for seizures, facial asymmetry and speech difficulty.   Psychiatric/Behavioral: Negative for agitation, behavioral problems, confusion, hallucinations and suicidal ideas.     Objective:     Vital Signs (Most Recent):  Temp: 97.7 °F " (36.5 °C) (08/13/20 0426)  Pulse: 105 (08/13/20 0736)  Resp: 20 (08/13/20 0736)  BP: (!) 148/94 (08/13/20 0426)  SpO2: 99 % (08/13/20 0736) Vital Signs (24h Range):  Temp:  [96.9 °F (36.1 °C)-99.1 °F (37.3 °C)] 97.7 °F (36.5 °C)  Pulse:  [] 105  Resp:  [16-20] 20  SpO2:  [95 %-100 %] 99 %  BP: (141-199)/(65-95) 148/94     Weight: 105.4 kg (232 lb 5.8 oz)  Body mass index is 41.16 kg/m².    Intake/Output Summary (Last 24 hours) at 8/13/2020 0812  Last data filed at 8/12/2020 1835  Gross per 24 hour   Intake 775.33 ml   Output 1100 ml   Net -324.67 ml      Physical Exam  Constitutional:       General: She is not in acute distress.     Appearance: Normal appearance.   HENT:      Head: Normocephalic and atraumatic.      Mouth/Throat:      Mouth: Mucous membranes are moist.   Eyes:      General:         Right eye: No discharge.         Left eye: No discharge.      Extraocular Movements: Extraocular movements intact.      Conjunctiva/sclera: Conjunctivae normal.      Pupils: Pupils are equal, round, and reactive to light.   Neck:      Musculoskeletal: Normal range of motion and neck supple. No neck rigidity or muscular tenderness.   Cardiovascular:      Rate and Rhythm: Normal rate and regular rhythm.      Pulses: Normal pulses.      Heart sounds: Murmur present.   Pulmonary:      Effort: No respiratory distress.      Comments: Bilateral breath sounds diminished  Abdominal:      General: Bowel sounds are normal. There is no distension.      Tenderness: There is no abdominal tenderness. There is no guarding.      Comments: Obese   Genitourinary:     Comments: Not examined  Musculoskeletal: Normal range of motion.         General: Swelling present.      Comments: Generalized edema with edema also noted to upper and lower extremities   Skin:     General: Skin is warm and dry.      Capillary Refill: Capillary refill takes less than 2 seconds.   Neurological:      General: No focal deficit present.      Mental Status:  She is alert and oriented to person, place, and time. Mental status is at baseline.      Cranial Nerves: No cranial nerve deficit.   Psychiatric:         Mood and Affect: Mood normal.         Behavior: Behavior normal.         Thought Content: Thought content normal.         Judgment: Judgment normal.       Lab Results   Component Value Date    WBC 8.06 08/13/2020    HGB 7.5 (L) 08/13/2020    HCT 25.1 (L) 08/13/2020     (H) 08/13/2020     08/13/2020     CMP  Sodium   Date Value Ref Range Status   08/13/2020 143 136 - 145 mmol/L Final     Potassium   Date Value Ref Range Status   08/13/2020 3.4 (L) 3.5 - 5.1 mmol/L Final     Chloride   Date Value Ref Range Status   08/13/2020 106 95 - 110 mmol/L Final     CO2   Date Value Ref Range Status   08/13/2020 27 23 - 29 mmol/L Final     Glucose   Date Value Ref Range Status   08/13/2020 97 70 - 110 mg/dL Final     BUN, Bld   Date Value Ref Range Status   08/13/2020 5 (L) 8 - 23 mg/dL Final     Creatinine   Date Value Ref Range Status   08/13/2020 1.7 (H) 0.5 - 1.4 mg/dL Final     Calcium   Date Value Ref Range Status   08/13/2020 8.2 (L) 8.7 - 10.5 mg/dL Final     Total Protein   Date Value Ref Range Status   08/11/2020 6.6 6.0 - 8.4 g/dL Final     Albumin   Date Value Ref Range Status   08/13/2020 2.3 (L) 3.5 - 5.2 g/dL Final     Total Bilirubin   Date Value Ref Range Status   08/11/2020 0.4 0.1 - 1.0 mg/dL Final     Comment:     For infants and newborns, interpretation of results should be based  on gestational age, weight and in agreement with clinical  observations.  Premature Infant recommended reference ranges:  Up to 24 hours.............<8.0 mg/dL  Up to 48 hours............<12.0 mg/dL  3-5 days..................<15.0 mg/dL  6-29 days.................<15.0 mg/dL       Alkaline Phosphatase   Date Value Ref Range Status   08/11/2020 143 (H) 55 - 135 U/L Final     AST   Date Value Ref Range Status   08/11/2020 69 (H) 10 - 40 U/L Final     ALT   Date Value  Ref Range Status   08/11/2020 103 (H) 10 - 44 U/L Final     Anion Gap   Date Value Ref Range Status   08/13/2020 10 8 - 16 mmol/L Final     eGFR if    Date Value Ref Range Status   08/13/2020 33 (A) >60 mL/min/1.73 m^2 Final     eGFR if non    Date Value Ref Range Status   08/13/2020 29 (A) >60 mL/min/1.73 m^2 Final     Comment:     Calculation used to obtain the estimated glomerular filtration  rate (eGFR) is the CKD-EPI equation.        Microbiology Results (last 7 days)     Procedure Component Value Units Date/Time    Urine culture [[id number]] Collected: 08/07/20 2040    Order Status: Completed Specimen: Urine Updated: 08/09/20 1031     Urine Culture, Routine No growth    Narrative:      Specimen Source->Urine    Urine culture [[id number]] Collected: 08/06/20 2350    Order Status: Completed Specimen: Urine Updated: 08/08/20 1421     Urine Culture, Routine No significant growth    Narrative:      Specimen Source->Urine    Blood culture x two cultures. Draw prior to antibiotics. [[id number]] Collected: 08/02/20 1633    Order Status: Completed Specimen: Blood Updated: 08/07/20 2322     Blood Culture, Routine No growth after 5 days.    Narrative:      Aerobic and anaerobic    Blood culture x two cultures. Draw prior to antibiotics. [[id number]] Collected: 08/02/20 1633    Order Status: Completed Specimen: Blood Updated: 08/07/20 2322     Blood Culture, Routine No growth after 5 days.    Narrative:      Aerobic and anaerobic    Urine Culture High Risk [[id number]] Collected: 08/07/20 2039    Order Status: Canceled Specimen: Urine, Catheterized     Urine Culture High Risk [[id number]] Collected: 08/06/20 2352    Order Status: Canceled Specimen: Urine, Catheterized           Radiology:  SADIE (07-):  · Hyperdynamic left ventricular systolic function. The estimated ejection fraction is 65 - 70 %.  · Normal appearing left atrial appendage. No thrombus is present in the appendage. Normal  appendage velocities.  · Mild mitral regurgitation.  · Mild tricuspid regurgitation.  · No evidence of endocarditis  · PICC line at the cavoatrial junction    ECHO (07-):  · Concentric left ventricular remodeling.  · Hyperdynamic left ventricular systolic function. The estimated ejection fraction is 76%.  · Grade I (mild) left ventricular diastolic dysfunction consistent with impaired relaxation.    · CXR: Concentric left ventricular remodeling.  · Hyperdynamic left ventricular systolic function. The estimated ejection fraction is 76%.  Grade I (mild) left ventricular diastolic dysfunction consistent with impaired relaxation.    CXR: There is bibasilar atelectasis versus infiltrate similar to 07/27/2020.    CT abdomen and pelvis without contrast:  Moderate basilar atelectasis. Status post cholecystectomy without evidence for complication.    RUQ abdominal US:  Prior cholecystectomy.  Small simple cyst of the left lobe of the liver otherwise negative right upper quadrant ultrasound.    RUE venous doppler scan:  Occlusive thrombus of the right brachial vein and cephalic vein.    KUB:  Possible ileus.  Prior cholecystectomy.  Prior bilateral hip joint replacement.    Renal US: Features suggesting intrinsic renal disease.  No hydronephrosis.    Left humerus x-ray:  Degenerative changes in the humeral head and glenohumeral joint of the left shoulder with otherwise negative left humerus.  PICC line as described.    Gastric emptying study:  Normal gastric emptying time for solid substances.    EGD:   - Normal esophagus.                        - Small hiatal hernia.                        - Normal examined duodenum.                        - No specimens collected.                        -History of multifactorial anemia, in part due to                        low iron counts. Patient is a [employer]       Assessment/Plan:      * LUIZ (acute kidney injury)  Monitor  Follow Nephrology recommendation and  BMP.              Anorexia  Gastric emptying study is negative.  Follow GI recommendations and colonoscopy results.  EGD results are negative.  Continue proton pump inhibitor.      Patient is Jehovah's witness  Follow hematology recommendation, on IV Iron for anemia management. Follow CBC.       Hematuria  Hematuria noted-patient currently remains on IV heparin drip for history of bilateral pulmonary emboli and recurrent DVT     Resolved.  Follow hematology recommendations.      Liver cyst  Noted  Will need outpatient follow-up      Debility  Fall and skin precautions  Continue physical therapy and occupational therapy      Hyponatremia  Resolved for now-monitor for recurrence      Elevated ferritin level  Noted      Acute deep vein thrombosis (DVT) of right upper extremity  Patient developed new right upper extremity DVT despite being on apixaban-  Patient currently remains on IV heparin drip  Hematology following  Patient will need lifelong anticoagulation  Hematology workup in progress      Non-traumatic rhabdomyolysis  It was felt this is secondary to daptomycin. Trend CPK daily-improving. Follow ID and nephrology recommendations.     Daptomycin was discontinued on admission and patient was placed on IV vancomycin      Anemia  Multifactorial--  Poor nutrition and also history of acute blood loss  Monitor  Hematology following. Follow CBC and transfuse as needed. Patient is Religion, receiving IV Iron.  EGD is negative.  Follow colonoscopy results.      Moderate protein-calorie malnutrition  Anorexia-patient continues report that food smells and tastes bad   On IV TPN.  Monitor volume status closely  Continue p.o. supplement        Atelectasis  Monitor oxygen saturation. Encourage OOB and IS.      Pulmonary infarct  Due to bilateral pulmonary emboli.   Pulmonary following      Chronic diastolic CHF (congestive heart failure)  Patient is identified as having Diastolic heart failure that is Chronic. CHF  is currently controlled. Latest ECHO performed and demonstrates-   Results for orders placed during the hospital encounter of 07/13/20   Echo Color Flow Doppler? Yes    Narrative · Concentric left ventricular remodeling.  · Hyperdynamic left ventricular systolic function. The estimated ejection   fraction is 76%.  · Grade I (mild) left ventricular diastolic dysfunction consistent with   impaired relaxation.        Patient remains on Bicarb drip with generalized edema- Monitor volume status closely    MRSA bacteremia  Previously receiving daptomycin as outpatient-now on IV vancomycin  Orders as per infectious disease.  Continue intravenous antibiotic therapy until September 2, 2020.    Elevated troponin  Noted- chronically elevated    Bilateral pulmonary embolism  Patient previously on Eliquis at home but developed additional DVT while on it-  Patient currently on  IV heparin+ Coumadin bridging, increasing Coumadin dose to 7.5 mg daily. INR goal 2-3. Pulmonology and Hematology following. Fall precautions and bleeding risks reviewed with patient.   Patient will need lifelong anticoagulation      COPD (chronic obstructive pulmonary disease)  Monitor O2 sats, supplement O2 as needed  Orders as per pulmonology        Transaminitis  Monitor-trending downward. Felt possibley related to daptomycin.  Possiblely due to clot burden.  Follow GI recommendations.        Morbid obesity  Body mass index is 40.65 kg/m².  General weight loss/lifestyle modification strategies discussed (elicit support from others; identify saboteurs; non-food rewards, etc).        Pulmonary hypertension  Noted  Orders as per pulmonology      Hypertension associated with diabetes  Monitor  Continue current treatment  Titrate medications as needed       Discussed with  and , likely DC home in a.m. with home health, home PT and home infusion therapy since patient is refusing skilled nursing facility and LTAC placement.  VTE Risk  Mitigation (From admission, onward)         Ordered     warfarin tablet 7.5 mg  Daily      08/12/20 0847     heparin 25,000 units in dextrose 5% 250 mL (100 units/mL) infusion HIGH INTENSITY nomogram - [facility]  Continuous     Question:  Heparin Infusion Adjustment (DO NOT MODIFY ANSWER)  Answer:  [email or web address]\Images\Pharmacy\HeparinInfusions\heparin HIGH INTENSITY nomogram for [facility] [id number].pdf    08/03/20 1609     heparin 25,000 units in dextrose 5% (100 units/ml) IV bolus from bag - ADDITIONAL PRN BOLUS - 60 units/kg  As needed (PRN)     Question:  Heparin Infusion Adjustment (DO NOT MODIFY ANSWER)  Answer:  [email or web address]\epic\Images\Pharmacy\HeparinInfusions\heparin HIGH INTENSITY nomogram for [facility] [id number].pdf    08/03/20 1609     heparin 25,000 units in dextrose 5% (100 units/ml) IV bolus from bag - ADDITIONAL PRN BOLUS - 30 units/kg  As needed (PRN)     Question:  Heparin Infusion Adjustment (DO NOT MODIFY ANSWER)  Answer:  [email or web address]\epic\Images\Pharmacy\HeparinInfusions\heparin HIGH INTENSITY nomogram for [facility] [id number].pdf    08/03/20 1609     IP VTE HIGH RISK PATIENT  Once      08/02/20 2034     Place sequential compression device  Until discontinued      08/02/20 2034                Discharge Planning   PREM:      Code Status: Full Code   Is the patient medically ready for discharge?:     Reason for patient still in hospital (select all that apply): Patient trending condition  Discharge Plan A: Home Health                  Alix Ruth MD  Department of Hospital Medicine   [facility]

## 2020-08-13 NOTE — NURSING
Pt sbp in 190's. Np notified via secure chat. Orders given to monitor and report if pt becomes symptomatic.   Will continue to monitor.

## 2020-08-13 NOTE — RESPIRATORY THERAPY
08/13/20 0736   Patient Assessment/Suction   Level of Consciousness (AVPU) alert   All Lung Fields Breath Sounds diminished   PRE-TX-O2   O2 Device (Oxygen Therapy) nasal cannula   $ Is the patient on Low Flow Oxygen? Yes   SpO2 99 %   Pulse Oximetry Type Intermittent   $ Pulse Oximetry - Multiple Charge Pulse Oximetry - Multiple   Pulse 105   Resp 20   Inhaler   $ Inhaler Charges MDI (Metered Dose Inahler) Treatment   Respiratory Treatment Status (Inhaler) given   Treatment Route (Inhaler) mouthpiece   Patient Position (Inhaler) HOB elevated   Signs of Intolerance (Inhaler) none   Breath Sounds Post-Respiratory Treatment   Throughout All Fields Post-Treatment All Fields   Throughout All Fields Post-Treatment no change   Post-treatment Heart Rate (beats/min) 90   Post-treatment Resp Rate (breaths/min) 16

## 2020-08-13 NOTE — [employer] PATIENT INSTRUCTIONS
Discharge Summary/Instructions after an Endoscopic Procedure  Patient Name: Sammi Abreu  Patient MRN: [id number]  Patient YOB: 1940 Thursday, August 13, 2020  Sue Nuñez MD  RESTRICTIONS:  During your procedure today, you received medications for sedation.  These   medications may affect your judgment, balance and coordination.  Therefore,   for 24 hours, you have the following restrictions:   - DO NOT drive a car, operate machinery, make legal/financial decisions,   sign important papers or drink alcohol.    ACTIVITY:  Today: no heavy lifting, straining or running due to procedural   sedation/anesthesia.  The following day: return to full activity including work.  DIET:  Eat and drink normally unless instructed otherwise.     TREATMENT FOR COMMON SIDE EFFECTS:  - Mild abdominal pain, nausea, belching, bloating or excessive gas:  rest,   eat lightly and use a heating pad.  - Sore Throat: treat with throat lozenges and/or gargle with warm salt   water.  - Because air was used during the procedure, expelling large amounts of air   from your rectum or belching is normal.  - If a bowel prep was taken, you may not have a bowel movement for 1-3 days.    This is normal.  SYMPTOMS TO WATCH FOR AND REPORT TO YOUR PHYSICIAN:  1. Abdominal pain or bloating, other than gas cramps.  2. Chest pain.  3. Back pain.  4. Signs of infection such as: chills or fever occurring within 24 hours   after the procedure.  5. Rectal bleeding, which would show as bright red, maroon, or black stools.   (A tablespoon of blood from the rectum is not serious, especially if   hemorrhoids are present.)  6. Vomiting.  7. Weakness or dizziness.  GO DIRECTLY TO THE NEAREST EMERGENCY ROOM IF YOU HAVE ANY OF THE FOLLOWING:      Difficulty breathing              Chills and/or fever over 101 F   Persistent vomiting and/or vomiting blood   Severe abdominal pain   Severe chest pain   Black, tarry stools   Bleeding- more than  one tablespoon   Any other symptom or condition that you feel may need urgent attention  Your doctor recommends these additional instructions:  If any biopsies were taken, your doctors clinic will contact you in 1 to 2   weeks with any results.  - Return patient to [facility] for ongoing care.   - Resume regular diet.   - Resume heparin at prior dose today.  Refer to primary physician for   further adjustment of therapy.   - No repeat colonoscopy due to age and the absence of colonic polyps.   -Ok for regular diet from GI perspective  -Will arrange outpatient VCE  -Iron supplementation at discharge  For questions, problems or results please call your physician - Sue Nuñez MD at Work:  [phone].  [facility], EMERGENCY ROOM PHONE NUMBER: [phone]  IF A COMPLICATION OR EMERGENCY SITUATION ARISES AND YOU ARE UNABLE TO REACH   YOUR PHYSICIAN - GO DIRECTLY TO THE EMERGENCY ROOM.  Sue Nuñez MD  8/13/2020 11:37:32 AM  This report has been verified and signed electronically.  [employer]

## 2020-08-13 NOTE — SUBJECTIVE & OBJECTIVE
"Interval History:  Patient is scheduled for colonoscopy.  EGD results were unrevealing.  Gastric emptying study was within normal.  Currently receiving heparin infusion with Coumadin bridging.  Patient being followed by hematology. Receiving Iron infusions. No hematuria noted.    Review of Systems   Constitutional: Positive for activity change, appetite change and fatigue. Negative for chills, diaphoresis and fever.   HENT: Negative for ear discharge, ear pain and facial swelling.    Eyes: Negative for pain and redness.   Respiratory: Positive for cough and shortness of breath.    Cardiovascular: Positive for leg swelling.   Gastrointestinal: Negative for abdominal distention, abdominal pain, constipation, diarrhea, nausea and vomiting.        Poor appetite- patient reports things taste bad and "smell bad"  and she states she has not been hungry   Endocrine: Negative for polydipsia and polyphagia.   Genitourinary: Negative for difficulty urinating, dysuria, flank pain and hematuria.   Musculoskeletal: Positive for back pain. Negative for neck pain and neck stiffness.   Skin: Negative for color change.   Allergic/Immunologic: Negative for food allergies.   Neurological: Positive for weakness. Negative for seizures, facial asymmetry and speech difficulty.   Psychiatric/Behavioral: Negative for agitation, behavioral problems, confusion, hallucinations and suicidal ideas.     Objective:     Vital Signs (Most Recent):  Temp: 97.7 °F (36.5 °C) (08/13/20 0426)  Pulse: 105 (08/13/20 0736)  Resp: 20 (08/13/20 0736)  BP: (!) 148/94 (08/13/20 0426)  SpO2: 99 % (08/13/20 0736) Vital Signs (24h Range):  Temp:  [96.9 °F (36.1 °C)-99.1 °F (37.3 °C)] 97.7 °F (36.5 °C)  Pulse:  [] 105  Resp:  [16-20] 20  SpO2:  [95 %-100 %] 99 %  BP: (141-199)/(65-95) 148/94     Weight: 105.4 kg (232 lb 5.8 oz)  Body mass index is 41.16 kg/m².    Intake/Output Summary (Last 24 hours) at 8/13/2020 0812  Last data filed at 8/12/2020 " 1835  Gross per 24 hour   Intake 775.33 ml   Output 1100 ml   Net -324.67 ml      Physical Exam  Constitutional:       General: She is not in acute distress.     Appearance: Normal appearance.   HENT:      Head: Normocephalic and atraumatic.      Mouth/Throat:      Mouth: Mucous membranes are moist.   Eyes:      General:         Right eye: No discharge.         Left eye: No discharge.      Extraocular Movements: Extraocular movements intact.      Conjunctiva/sclera: Conjunctivae normal.      Pupils: Pupils are equal, round, and reactive to light.   Neck:      Musculoskeletal: Normal range of motion and neck supple. No neck rigidity or muscular tenderness.   Cardiovascular:      Rate and Rhythm: Normal rate and regular rhythm.      Pulses: Normal pulses.      Heart sounds: Murmur present.   Pulmonary:      Effort: No respiratory distress.      Comments: Bilateral breath sounds diminished  Abdominal:      General: Bowel sounds are normal. There is no distension.      Tenderness: There is no abdominal tenderness. There is no guarding.      Comments: Obese   Genitourinary:     Comments: Not examined  Musculoskeletal: Normal range of motion.         General: Swelling present.      Comments: Generalized edema with edema also noted to upper and lower extremities   Skin:     General: Skin is warm and dry.      Capillary Refill: Capillary refill takes less than 2 seconds.   Neurological:      General: No focal deficit present.      Mental Status: She is alert and oriented to person, place, and time. Mental status is at baseline.      Cranial Nerves: No cranial nerve deficit.   Psychiatric:         Mood and Affect: Mood normal.         Behavior: Behavior normal.         Thought Content: Thought content normal.         Judgment: Judgment normal.       Lab Results   Component Value Date    WBC 8.06 08/13/2020    HGB 7.5 (L) 08/13/2020    HCT 25.1 (L) 08/13/2020     (H) 08/13/2020     08/13/2020     CMP  Sodium    Date Value Ref Range Status   08/13/2020 143 136 - 145 mmol/L Final     Potassium   Date Value Ref Range Status   08/13/2020 3.4 (L) 3.5 - 5.1 mmol/L Final     Chloride   Date Value Ref Range Status   08/13/2020 106 95 - 110 mmol/L Final     CO2   Date Value Ref Range Status   08/13/2020 27 23 - 29 mmol/L Final     Glucose   Date Value Ref Range Status   08/13/2020 97 70 - 110 mg/dL Final     BUN, Bld   Date Value Ref Range Status   08/13/2020 5 (L) 8 - 23 mg/dL Final     Creatinine   Date Value Ref Range Status   08/13/2020 1.7 (H) 0.5 - 1.4 mg/dL Final     Calcium   Date Value Ref Range Status   08/13/2020 8.2 (L) 8.7 - 10.5 mg/dL Final     Total Protein   Date Value Ref Range Status   08/11/2020 6.6 6.0 - 8.4 g/dL Final     Albumin   Date Value Ref Range Status   08/13/2020 2.3 (L) 3.5 - 5.2 g/dL Final     Total Bilirubin   Date Value Ref Range Status   08/11/2020 0.4 0.1 - 1.0 mg/dL Final     Comment:     For infants and newborns, interpretation of results should be based  on gestational age, weight and in agreement with clinical  observations.  Premature Infant recommended reference ranges:  Up to 24 hours.............<8.0 mg/dL  Up to 48 hours............<12.0 mg/dL  3-5 days..................<15.0 mg/dL  6-29 days.................<15.0 mg/dL       Alkaline Phosphatase   Date Value Ref Range Status   08/11/2020 143 (H) 55 - 135 U/L Final     AST   Date Value Ref Range Status   08/11/2020 69 (H) 10 - 40 U/L Final     ALT   Date Value Ref Range Status   08/11/2020 103 (H) 10 - 44 U/L Final     Anion Gap   Date Value Ref Range Status   08/13/2020 10 8 - 16 mmol/L Final     eGFR if    Date Value Ref Range Status   08/13/2020 33 (A) >60 mL/min/1.73 m^2 Final     eGFR if non    Date Value Ref Range Status   08/13/2020 29 (A) >60 mL/min/1.73 m^2 Final     Comment:     Calculation used to obtain the estimated glomerular filtration  rate (eGFR) is the CKD-EPI equation.         Microbiology Results (last 7 days)     Procedure Component Value Units Date/Time    Urine culture [[id number]] Collected: 08/07/20 2040    Order Status: Completed Specimen: Urine Updated: 08/09/20 1031     Urine Culture, Routine No growth    Narrative:      Specimen Source->Urine    Urine culture [[id number]] Collected: 08/06/20 2350    Order Status: Completed Specimen: Urine Updated: 08/08/20 1421     Urine Culture, Routine No significant growth    Narrative:      Specimen Source->Urine    Blood culture x two cultures. Draw prior to antibiotics. [[id number]] Collected: 08/02/20 1633    Order Status: Completed Specimen: Blood Updated: 08/07/20 2322     Blood Culture, Routine No growth after 5 days.    Narrative:      Aerobic and anaerobic    Blood culture x two cultures. Draw prior to antibiotics. [[id number]] Collected: 08/02/20 1633    Order Status: Completed Specimen: Blood Updated: 08/07/20 2322     Blood Culture, Routine No growth after 5 days.    Narrative:      Aerobic and anaerobic    Urine Culture High Risk [[id number]] Collected: 08/07/20 2039    Order Status: Canceled Specimen: Urine, Catheterized     Urine Culture High Risk [[id number]] Collected: 08/06/20 2352    Order Status: Canceled Specimen: Urine, Catheterized           Radiology:  SADIE (07-):  · Hyperdynamic left ventricular systolic function. The estimated ejection fraction is 65 - 70 %.  · Normal appearing left atrial appendage. No thrombus is present in the appendage. Normal appendage velocities.  · Mild mitral regurgitation.  · Mild tricuspid regurgitation.  · No evidence of endocarditis  · PICC line at the cavoatrial junction    ECHO (07-):  · Concentric left ventricular remodeling.  · Hyperdynamic left ventricular systolic function. The estimated ejection fraction is 76%.  · Grade I (mild) left ventricular diastolic dysfunction consistent with impaired relaxation.    · CXR: Concentric left ventricular remodeling.  · Hyperdynamic  left ventricular systolic function. The estimated ejection fraction is 76%.  Grade I (mild) left ventricular diastolic dysfunction consistent with impaired relaxation.    CXR: There is bibasilar atelectasis versus infiltrate similar to 07/27/2020.    CT abdomen and pelvis without contrast:  Moderate basilar atelectasis. Status post cholecystectomy without evidence for complication.    RUQ abdominal US:  Prior cholecystectomy.  Small simple cyst of the left lobe of the liver otherwise negative right upper quadrant ultrasound.    RUE venous doppler scan:  Occlusive thrombus of the right brachial vein and cephalic vein.    KUB:  Possible ileus.  Prior cholecystectomy.  Prior bilateral hip joint replacement.    Renal US: Features suggesting intrinsic renal disease.  No hydronephrosis.    Left humerus x-ray:  Degenerative changes in the humeral head and glenohumeral joint of the left shoulder with otherwise negative left humerus.  PICC line as described.    Gastric emptying study:  Normal gastric emptying time for solid substances.    EGD:   - Normal esophagus.                        - Small hiatal hernia.                        - Normal examined duodenum.                        - No specimens collected.                        -History of multifactorial anemia, in part due to                        low iron counts. Patient is a [employer]

## 2020-08-13 NOTE — PLAN OF CARE
Colonoscopy:  -Diverticulosis  -No active bleeding or old blood    Recommendation:  -Ok for regular diet and to discharge home from GI perspective  -Ok to restart anticoagulation today  -Will need iron supplementation at discharge  -Will schedule outpatient VCE, though her anemia is likely in large part due to renal dysfunction and anemia of chronic inflammation    Sue Nuñez MD

## 2020-08-13 NOTE — PROGRESS NOTES
[facility]     Subjective:      PATIENT:   Sammi Abreu  :    1940  MRN:    [id number]  DATE OF VISIT:  2020    Chief Complaint: RUE DVT    Patient ID: Sammi Abreu is a 79 y.o. female with past medical history significant for arthritis and chronic back pain, fibromyalgia, glaucoma, hyperlipidemia, hypertension, sleep apnea with history of noncompliance with CPAP, morbid obesity, hypertension, GERD, COPD, diabetes type 2, prior DVT, and gallstones who presented to the emergency department [facility] with complaints of right lower quadrant pain x3 days.  Of note the patient is also a [employer].   Patient was discharged from this facility on 2020 after being treated for bilateral pulmonary embolism as well as bacteremia MRSA..  Patient also has recent history of cholecystectomy on . Dr. Cuello did consult with Dr. Cuello on 2020.     Interval History: Pt was noted to have rhabdomyolysis with CPK greater than 28015 and LUIZ.  Nephrology was consulted.  She was noted to have metabolic acidosis initiated on bicarb drip.  Daptomycin was held and ID was consulted.  She was initiated on IV vancomycin.  She was noted have increased right upper extremity swelling.  Right upper extremity ultrasound demonstrated occlusive thrombus of the right brachial vein and cephalic vein. Pt had eliquis failure outpatient. Pt has poor caloric intake. Pt is currently receiving heparin infusion with Coumadin bridging. Pt given 5 doses infed 100mg daily with last dose being 2020. Pt received epoetin [place]-epbx 20,000 units on 2020. Pt hematuria has resolved since first consult.       Review of Systems   Constitutional: Positive for activity change, appetite change and fatigue. Negative for chills, fever and unexpected weight change.   HENT: Negative for mouth sores and trouble swallowing.    Eyes: Negative for photophobia and visual disturbance.   Respiratory: Positive  for shortness of breath. Negative for cough, chest tightness, wheezing and stridor.    Cardiovascular: Negative for chest pain.   Gastrointestinal: Negative for abdominal pain, constipation, diarrhea, nausea and vomiting.   Musculoskeletal: Positive for back pain. Negative for arthralgias and myalgias.   Skin: Negative for color change, pallor, rash and wound.   Neurological: Negative for syncope, speech difficulty and weakness.   Hematological: Negative for adenopathy. Does not bruise/bleed easily.   Psychiatric/Behavioral: Negative for agitation, behavioral problems, confusion, decreased concentration and dysphoric mood.      Lifetime Dose Tracking   No doses have been documented on this patient for the following tracked chemicals: doxorubicin, epirubicin, idarubicin, daunorubicin, mitoxantrone, bleomycin, mitomycin, busulfan     Past Medical History:   Diagnosis Date    ALLERGIC RHINITIS     Anemia     Arthritis     Back pain     Bronchitis     Cataract     OU    Cholelithiasis     COPD (chronic obstructive pulmonary disease)     Degenerative disc disease     Diabetes mellitus     pre diabetic    Diverticulosis     DVT (deep venous thrombosis)     Edema     Encounter for blood transfusion 1979    Fibromyalgia     Glaucoma     Gout     Hx of colonic polyps     Hyperlipidemia     Hypertension     Incontinence     Osteoporosis     Reflux     Refusal of blood transfusions as patient is Baptist     Sleep apnea     non compliant with CPAP    Vestibulitis of ear        Family History   Problem Relation Age of Onset    Hypertension Mother     Diabetes Sister     Glaucoma Neg Hx     Macular degeneration Neg Hx     Retinal detachment Neg Hx        Past Surgical History:   Procedure Laterality Date    ANGIOGRAPHY OF LOWER EXTREMITY N/A 7/31/2019    Procedure: ANGIOGRAM, LOWER EXTREMITY;  Surgeon: Gino Arana MD;  Location: [facility] CATH/EP LAB;  Service: General;  Laterality: N/A;     ESOPHAGOGASTRODUODENOSCOPY N/A 2020    Procedure: EGD (ESOPHAGOGASTRODUODENOSCOPY);  Surgeon: Sue Nuñez MD;  Location: [facility];  Service: Endoscopy;  Laterality: N/A;    HIP SURGERY      HYSTERECTOMY      JOINT REPLACEMENT      B total hip    LAPAROSCOPIC CHOLECYSTECTOMY N/A 2020    Procedure: CHOLECYSTECTOMY, LAPAROSCOPIC;  Surgeon: Jomar Mcdonald MD;  [facility];  Service: General;  Laterality: N/A;    TRANSFORAMINAL EPIDURAL INJECTION OF STEROID Left 2020    Procedure: Injection,steroid,epidural,transforaminal approach;  Surgeon: Matt Gilliam MD;  [facility];  Service: Pain Management;  Laterality: Left;  L2-3, L3-4       Social History     Socioeconomic History    Marital status:      Spouse name: Not on file    Number of children: Not on file    Years of education: Not on file    Highest education level: Not on file   Occupational History    Not on file   Social Needs    Financial resource strain: Not on file    Food insecurity     Worry: Not on file     Inability: Not on file    Transportation needs     Medical: Not on file     Non-medical: Not on file   Tobacco Use    Smoking status: Former Smoker     Packs/day: 0.25     Years: 5.00     Pack years: 1.25     Quit date: 1972     Years since quittin.6    Smokeless tobacco: Never Used   Substance and Sexual Activity    Alcohol use: Yes     Alcohol/week: 0.0 standard drinks     Comment: Rarely    Drug use: Yes     Types: Oxycodone, Hydrocodone    Sexual activity: Not on file   Lifestyle    Physical activity     Days per week: Not on file     Minutes per session: Not on file    Stress: Not on file   Relationships    Social connections     Talks on phone: Not on file     Gets together: Not on file     Attends [employer] service: Not on file     Active member of club or organization: Not on file     Attends meetings of clubs or organizations: Not on file     Relationship status: Not  on file   Other Topics Concern    Not on file   Social History Narrative    Not on file       No current facility-administered medications for this visit.   No current outpatient medications on file.    Facility-Administered Medications Ordered in Other Visits:     0.9%  NaCl infusion, , Intravenous, Continuous, Sue Nuñez MD    0.9%  NaCl infusion, , Intravenous, Continuous, Sue Nuñez MD, Last Rate: 100 mL/hr at 08/13/20 1100    acetaminophen tablet 650 mg, 650 mg, Oral, Q4H PRN, GENARO Squires, 650 mg at 08/04/20 2021    albuterol-ipratropium 2.5 mg-0.5 mg/3 mL nebulizer solution 3 mL, 3 mL, Nebulization, Q6H, Hugh Serrano MD, 3 mL at 08/13/20 1331    aluminum-magnesium hydroxide-simethicone 200-200-20 mg/5 mL suspension 30 mL, 30 mL, Oral, QID (AC + HS) PRN, GENARO Villarreal    ascorbic acid (vitamin C) tablet 500 mg, 500 mg, Oral, QHS, GENARO Squires, 500 mg at 08/12/20 2103    benzonatate capsule 100 mg, 100 mg, Oral, TID PRN, Vale Nur, NP, 100 mg at 08/07/20 0635    bisacodyL suppository 10 mg, 10 mg, Rectal, Daily PRN, GENARO Villarreal    cyanocobalamin tablet 2,000 mcg, 2,000 mcg, Oral, Daily, GENARO Squires, Stopped at 08/13/20 0900    dextrose 50% injection 12.5 g, 12.5 g, Intravenous, PRN, GENARO Squires    dextrose 50% injection 25 g, 25 g, Intravenous, PRN, GENARO Squires    fluticasone furoate-vilanteroL 100-25 mcg/dose diskus inhaler 1 puff, 1 puff, Inhalation, Daily, GENARO Squires, 1 puff at 08/13/20 0736    folic acid tablet 1 mg, 1 mg, Oral, Daily, GENARO Squires, Stopped at 08/13/20 0900    glucagon (human recombinant) injection 1 mg, 1 mg, Intramuscular, PRN, GENARO Squires    glucose chewable tablet 16 g, 16 g, Oral, PRN, GENARO Squires    glucose chewable tablet 24 g, 24 g, Oral, PRN, GENARO Squires    heparin 25,000 units in  dextrose 5% (100 units/ml) IV bolus from bag - ADDITIONAL PRN BOLUS - 30 units/kg, 30 Units/kg (Adjusted), Intravenous, PRN, Anabel Haji NP    heparin 25,000 units in dextrose 5% (100 units/ml) IV bolus from bag - ADDITIONAL PRN BOLUS - 60 units/kg, 60 Units/kg (Adjusted), Intravenous, PRN, Anabel Haji NP    heparin 25,000 units in dextrose 5% 250 mL (100 units/mL) infusion HIGH INTENSITY nomogram - [facility], 18 Units/kg/hr (Adjusted), Intravenous, Continuous, Anabel Haji NP, Stopped at 08/13/20 1036    hydrALAZINE injection 10 mg, 10 mg, Intravenous, Once, Jomar Murray NP, Stopped at 08/11/20 0215    hydrALAZINE tablet 50 mg, 50 mg, Oral, Q8H, Barbie Gonzalez MD, 50 mg at 08/13/20 1346    lactated ringers infusion, , Intravenous, Continuous, Gino Reid MD, Last Rate: 10 mL/hr at 07/13/20 1308    lidocaine (PF) 10 mg/ml (1%) injection 10 mg, 1 mL, Intradermal, Once, Gino Reid MD    magnesium oxide tablet 800 mg, 800 mg, Oral, PRN, GENARO Squires    magnesium oxide tablet 800 mg, 800 mg, Oral, PRN, GENARO Squires    melatonin tablet 6 mg, 6 mg, Oral, Nightly PRN, GENARO Squires, 6 mg at 08/05/20 2139    metoprolol succinate (TOPROL-XL) 24 hr tablet 50 mg, 50 mg, Oral, Daily, GENARO Squires, 50 mg at 08/13/20 1346    montelukast tablet 10 mg, 10 mg, Oral, QHS, GENARO Squires, 10 mg at 08/12/20 2103    morphine injection 2 mg, 2 mg, Intravenous, Q6H PRN, Hugh Serrano MD, 2 mg at 08/08/20 1018    multivitamin tablet, 1 tablet, Oral, Daily, GENARO Squires, Stopped at 08/13/20 0900    ondansetron injection 4 mg, 4 mg, Intravenous, Q8H PRN, GENARO Squires    pantoprazole EC tablet 40 mg, 40 mg, Oral, Daily, GENARO Squires, Stopped at 08/13/20 0900    polyethylene glycol packet 17 g, 17 g, Oral, Daily, Jade Velarde MD, Stopped at 08/13/20 0900    potassium chloride packet 40  mEq, 40 mEq, Oral, PRN, Hugh Serrano MD    potassium chloride packet 40 mEq, 40 mEq, Oral, PRN, Hugh Serrano MD    potassium chloride packet 60 mEq, 60 mEq, Oral, PRN, Hugh Serrano MD    senna-docusate 8.6-50 mg per tablet 1 tablet, 1 tablet, Oral, BID, GENARO Squires, Stopped at 08/13/20 0900    sodium chloride 0.9% flush 10 mL, 10 mL, Intravenous, PRN, GENARO Squires    Pharmacy to dose Vancomycin consult, , , Once **AND** vancomycin - pharmacy to dose, , Intravenous, pharmacy to manage frequency, Amarilis Nielsen MD    vancomycin in dextrose 5 % 1 gram/250 mL IVPB 1,000 mg, 1,000 mg, Intravenous, Q24H, Alix Ruth MD, Last Rate: 166.7 mL/hr at 08/12/20 2310, 1,000 mg at 08/12/20 2310    warfarin tablet 7.5 mg, 7.5 mg, Oral, Daily, Alix Ruth MD, 7.5 mg at 08/12/20 1644    Review of patient's allergies indicates:   Allergen Reactions    Cymbalta [duloxetine] Other (See Comments)     Nightmares      Darvon [propoxyphene] Nausea Only and Other (See Comments)     Sweating, slept for 3 days    Atorvastatin Other (See Comments)     Muscle cramps    Naprosyn [naproxen] Nausea Only    Penicillins Rash    Tramadol Nausea Only and Palpitations     All medications and past history have been reviewed.    Objective:      Vitals:  Vital Signs (Most Recent):  Temp: 97.7 °F (36.5 °C) (08/13/20 0426)  Pulse: 105 (08/13/20 0736)  Resp: 20 (08/13/20 0736)  BP: (!) 148/94 (08/13/20 0426)  SpO2: 99 % (08/13/20 0736) Vital Signs (24h Range):  Temp:  [96.9 °F (36.1 °C)-99.1 °F (37.3 °C)] 97.7 °F (36.5 °C)  Pulse:  [] 105  Resp:  [16-20] 20  SpO2:  [95 %-100 %] 99 %  BP: (141-199)/(65-95) 148/94         Weight Readings:  Wt Readings from Last 5 Encounters:   08/13/20 105.4 kg (232 lb 5.8 oz)   07/30/20 99.9 kg (220 lb 3.8 oz)   07/29/20 102.7 kg (226 lb 6.6 oz)   07/13/20 97.5 kg (215 lb)   07/09/20 104.9 kg (231 lb 4.2 oz)      Physical Exam  Constitutional:       General: She is not  in acute distress.     Appearance: Normal appearance. She is ill-appearing.   HENT:      Head: Normocephalic and atraumatic.      Right Ear: External ear normal.      Left Ear: External ear normal.      Nose: Nose normal. No congestion or rhinorrhea.   Eyes:      General:         Right eye: No discharge.         Left eye: No discharge.      Extraocular Movements: Extraocular movements intact.      Conjunctiva/sclera: Conjunctivae normal.      Pupils: Pupils are equal, round, and reactive to light.   Neck:      Musculoskeletal: Normal range of motion and neck supple. No neck rigidity.   Cardiovascular:      Rate and Rhythm: Normal rate and regular rhythm.      Heart sounds: Murmur present.   Pulmonary:      Effort: Pulmonary effort is normal. No respiratory distress.      Breath sounds: Normal breath sounds. No stridor. No wheezing, rhonchi or rales.   Abdominal:      General: Bowel sounds are normal. There is no distension.      Palpations: Abdomen is soft.      Tenderness: There is no abdominal tenderness. There is no guarding.   Musculoskeletal: Normal range of motion.         General: Swelling (generalized swelling of both upper and lower extremities) present.   Lymphadenopathy:      Cervical: No cervical adenopathy.   Skin:     General: Skin is warm and dry.      Coloration: Skin is not pale.      Findings: No erythema or rash.   Neurological:      General: No focal deficit present.      Mental Status: She is alert and oriented to person, place, and time. Mental status is at baseline.      Cranial Nerves: No cranial nerve deficit.   Psychiatric:         Mood and Affect: Mood normal.         Behavior: Behavior normal.         Thought Content: Thought content normal.         Judgment: Judgment normal.          Labs:  WBC   Date Value Ref Range Status   08/13/2020 8.06 3.90 - 12.70 K/uL Final   08/12/2020 8.78 3.90 - 12.70 K/uL Final   08/11/2020 9.60 3.90 - 12.70 K/uL Final     RBC   Date Value Ref Range Status    08/13/2020 2.29 (L) 4.00 - 5.40 M/uL Final   08/12/2020 2.23 (L) 4.00 - 5.40 M/uL Final   08/11/2020 2.50 (L) 4.00 - 5.40 M/uL Final     Hemoglobin   Date Value Ref Range Status   08/13/2020 7.5 (L) 12.0 - 16.0 g/dL Final   08/12/2020 7.3 (L) 12.0 - 16.0 g/dL Final   08/11/2020 8.2 (L) 12.0 - 16.0 g/dL Final     Hematocrit   Date Value Ref Range Status   08/13/2020 25.1 (L) 37.0 - 48.5 % Final   08/12/2020 24.3 (L) 37.0 - 48.5 % Final   08/11/2020 27.0 (L) 37.0 - 48.5 % Final     Mean Corpuscular Volume   Date Value Ref Range Status   08/13/2020 110 (H) 82 - 98 fL Final   08/12/2020 109 (H) 82 - 98 fL Final   08/11/2020 108 (H) 82 - 98 fL Final     Platelets   Date Value Ref Range Status   08/13/2020 249 150 - 350 K/uL Final   08/12/2020 264 150 - 350 K/uL Final   08/11/2020 272 150 - 350 K/uL Final     Mean Corpuscular Hemoglobin   Date Value Ref Range Status   08/13/2020 32.8 (H) 27.0 - 31.0 pg Final   08/12/2020 32.7 (H) 27.0 - 31.0 pg Final   08/11/2020 32.8 (H) 27.0 - 31.0 pg Final     Mean Corpuscular Hemoglobin Conc   Date Value Ref Range Status   08/13/2020 29.9 (L) 32.0 - 36.0 g/dL Final   08/12/2020 30.0 (L) 32.0 - 36.0 g/dL Final   08/11/2020 30.4 (L) 32.0 - 36.0 g/dL Final     RDW   Date Value Ref Range Status   08/13/2020 15.1 (H) 11.5 - 14.5 % Final   08/12/2020 14.9 (H) 11.5 - 14.5 % Final   08/11/2020 14.8 (H) 11.5 - 14.5 % Final     Sodium   Date Value Ref Range Status   08/13/2020 143 136 - 145 mmol/L Final   08/12/2020 143 136 - 145 mmol/L Final   08/11/2020 141 136 - 145 mmol/L Final     Potassium   Date Value Ref Range Status   08/13/2020 3.4 (L) 3.5 - 5.1 mmol/L Final   08/12/2020 3.6 3.5 - 5.1 mmol/L Final   08/11/2020 3.9 3.5 - 5.1 mmol/L Final     Chloride   Date Value Ref Range Status   08/13/2020 106 95 - 110 mmol/L Final   08/12/2020 104 95 - 110 mmol/L Final   08/11/2020 103 95 - 110 mmol/L Final     CO2   Date Value Ref Range Status   08/13/2020 27 23 - 29 mmol/L Final   08/12/2020 26  23 - 29 mmol/L Final   08/11/2020 29 23 - 29 mmol/L Final     BUN, Bld   Date Value Ref Range Status   08/13/2020 5 (L) 8 - 23 mg/dL Final   08/12/2020 6 (L) 8 - 23 mg/dL Final   08/11/2020 6 (L) 8 - 23 mg/dL Final     Creatinine   Date Value Ref Range Status   08/13/2020 1.7 (H) 0.5 - 1.4 mg/dL Final   08/12/2020 1.8 (H) 0.5 - 1.4 mg/dL Final   08/11/2020 1.7 (H) 0.5 - 1.4 mg/dL Final     Glucose   Date Value Ref Range Status   08/13/2020 97 70 - 110 mg/dL Final   08/12/2020 101 70 - 110 mg/dL Final   08/11/2020 128 (H) 70 - 110 mg/dL Final     Calcium   Date Value Ref Range Status   08/13/2020 8.2 (L) 8.7 - 10.5 mg/dL Final   08/12/2020 8.5 (L) 8.7 - 10.5 mg/dL Final   08/11/2020 8.7 8.7 - 10.5 mg/dL Final     Magnesium   Date Value Ref Range Status   08/07/2020 1.9 1.6 - 2.6 mg/dL Final   08/06/2020 2.0 1.6 - 2.6 mg/dL Final   08/05/2020 2.3 1.6 - 2.6 mg/dL Final     Phosphorus   Date Value Ref Range Status   08/13/2020 4.7 (H) 2.7 - 4.5 mg/dL Final   08/12/2020 5.1 (H) 2.7 - 4.5 mg/dL Final   08/11/2020 4.6 (H) 2.7 - 4.5 mg/dL Final     Alkaline Phosphatase   Date Value Ref Range Status   08/11/2020 143 (H) 55 - 135 U/L Final   08/07/2020 97 55 - 135 U/L Final   08/06/2020 89 55 - 135 U/L Final     Total Protein   Date Value Ref Range Status   08/11/2020 6.6 6.0 - 8.4 g/dL Final   08/07/2020 5.7 (L) 6.0 - 8.4 g/dL Final   08/06/2020 5.7 (L) 6.0 - 8.4 g/dL Final     Albumin   Date Value Ref Range Status   08/13/2020 2.3 (L) 3.5 - 5.2 g/dL Final   08/12/2020 2.3 (L) 3.5 - 5.2 g/dL Final   08/11/2020 2.4 (L) 3.5 - 5.2 g/dL Final   08/11/2020 2.4 (L) 3.5 - 5.2 g/dL Final     Total Bilirubin   Date Value Ref Range Status   08/11/2020 0.4 0.1 - 1.0 mg/dL Final     Comment:     For infants and newborns, interpretation of results should be based  on gestational age, weight and in agreement with clinical  observations.  Premature Infant recommended reference ranges:  Up to 24 hours.............<8.0 mg/dL  Up to 48  hours............<12.0 mg/dL  3-5 days..................<15.0 mg/dL  6-29 days.................<15.0 mg/dL     08/07/2020 0.4 0.1 - 1.0 mg/dL Final     Comment:     For infants and newborns, interpretation of results should be based  on gestational age, weight and in agreement with clinical  observations.  Premature Infant recommended reference ranges:  Up to 24 hours.............<8.0 mg/dL  Up to 48 hours............<12.0 mg/dL  3-5 days..................<15.0 mg/dL  6-29 days.................<15.0 mg/dL     08/06/2020 0.4 0.1 - 1.0 mg/dL Final     Comment:     For infants and newborns, interpretation of results should be based  on gestational age, weight and in agreement with clinical  observations.  Premature Infant recommended reference ranges:  Up to 24 hours.............<8.0 mg/dL  Up to 48 hours............<12.0 mg/dL  3-5 days..................<15.0 mg/dL  6-29 days.................<15.0 mg/dL       AST   Date Value Ref Range Status   08/11/2020 69 (H) 10 - 40 U/L Final   08/07/2020 201 (H) 10 - 40 U/L Final   08/06/2020 289 (H) 10 - 40 U/L Final     ALT   Date Value Ref Range Status   08/11/2020 103 (H) 10 - 44 U/L Final   08/07/2020 157 (H) 10 - 44 U/L Final   08/06/2020 177 (H) 10 - 44 U/L Final     TSH   Date Value Ref Range Status   08/04/2020 3.261 0.400 - 4.000 uIU/mL Final   01/16/2020 4.572 (H) 0.400 - 4.000 uIU/mL Final   03/14/2019 3.744 0.400 - 4.000 uIU/mL Final       EGD 08/12/2020  Findings:        The esophagus was normal.        A small hiatal hernia was present.        The examined duodenum was normal.   Impression:          - Normal esophagus.                        - Small hiatal hernia.                        - Normal examined duodenum.                        - No specimens collected.                        -History of multifactorial anemia, in part due to                        low iron counts. Patient is a [employer]     Colonoscopy 08/13/2020  Findings:        Multiple  small-mouthed diverticula were found in the sigmoid colon,        descending colon and ascending colon.        The exam was otherwise without abnormality on direct and        retroflexion views.   Impression:          - Diverticulosis in the sigmoid colon, in the                        descending colon and in the ascending colon.                        - The examination was otherwise normal on direct and                        retroflexion views.                        - No specimens collected.                        -History of multifactorial anemia, in part due to                        low iron.   All lab results and imaging results have been reviewed and discussed with the patient.     Assessment and Plan:      Mixed Anemia  -EGD and Colonoscopy do not show source of bleed. Pt is unwilling to eat and anemia could be partially due to dietary restriction along with decreased renal function.  -Continue vitamin B12 and folate supplementation.    Right occlusive thrombus brachial vein and cephalic vein while patient is on Eliquis treated for pulmonary embolism and lower extremity DVT.  -Hex phos neutralization is positive indicating antiphospholipid syndrome.  -Pt is to be discharged on coumadin once INR is stable and f/u within a week with Dr. Cuello in office. I have sent a staff message to our staff to set up appointment with patient.      [employer]  > not able to receive any blood product  > as above minimize blood drawn    Sincerely,  Mc Helton PA-C    Note is available for collaborating MD; Dr. Cuello for review.    Electronically signed by: Mc Helton PA-C

## 2020-08-13 NOTE — PLAN OF CARE
The pt is accepted by [facility] via [facility]. I will keep them updated on the pts discharge date. Sadaf Tamayo LCSW    Date Status/Notes Created By   · 8/13/2020 12:20:05 PM Note: Yes- I will update you tomorrow. When she goes home I will send discharge orders once received. Thanks !  Sadaf Roni  · 8/13/2020 12:19:21 PM Note: Can you confirm discharge date  Walker [email or web address]@[facility]  · 8/13/2020 12:18:31 PM Accepted  Walker [email or web address]@[facility]  · 8/13/2020 12:15:54 PM New: The pt will be discharging home tomorrow and needs HH and home ABX    Magda with [facility] called and stated that [facility] accepts the pt however she needs new HH orders because there is not a nurse listed, only home Pt. She wanted to make sure that the pt has a suitable and teachable caregiver. I confirmed that the pt was doing home IV ABX at home prior. She also confirmed that the pts medications is changing to Vancy. CM following. Sadaf Tamayo LCSW    ·   ·   ·   Sadaf Roni  ·  · 08/13/20 1220   · Post-Acute Status   · Post-Acute Authorization · Home Health   · Home Health Status · Set-up Complete   ·

## 2020-08-13 NOTE — PLAN OF CARE
Patient aerosol Q6 given via [facility]sk tolerated well sats 97% on 2lpm/home O2, I S done 750-1000ml x 10bpm

## 2020-08-13 NOTE — PLAN OF CARE
Pt in bed resting with eyes closed. Pt alert and oriented. Able to make needs and wants known. No complaints or distress noted at this time. Pt drinking go lightly to prepare for colonoscopy in am. PT tolerating well. Pt denies feeling short of breathe. No respiratory distress noted.  Bed low, call light in reach, free of falls, safety maintained, VSS, will continue to monitor.

## 2020-08-13 NOTE — PLAN OF CARE
I sent the pts HH packet and HH order to [facility], [facility] via [facility] to anticipate pt discharging home tomorrow with home IV ABX. PTS AVS updated.    08/13/20 1217   Post-Acute Status   Post-Acute Authorization Home Health   Home Health Status Referrals Sent    Sadaf Tamayo LCSW

## 2020-08-13 NOTE — ANESTHESIA POSTPROCEDURE EVALUATION
Anesthesia Post Evaluation    Patient: Sammi Abreu    Procedure(s) Performed: Procedure(s) (LRB):  COLONOSCOPY (N/A)    Final Anesthesia Type: general    Patient location during evaluation: PACU  Patient participation: Yes- Able to Participate  Level of consciousness: awake and alert  Post-procedure vital signs: reviewed and stable  Pain management: adequate  Airway patency: patent    PONV status at discharge: No PONV  Anesthetic complications: no      Cardiovascular status: blood pressure returned to baseline and hypertensive  Respiratory status: unassisted  Hydration status: euvolemic  Follow-up not needed.          Vitals Value Taken Time   /76 08/13/20 1135   Temp 36.9 °C (98.4 °F) 08/13/20 1135   Pulse 90 08/13/20 1135   Resp 16 08/13/20 1135   SpO2 95 % 08/13/20 1135         No case tracking events are documented in the log.      Pain/Don Score: Don Score: 8 (8/13/2020 11:35 AM)

## 2020-08-13 NOTE — PT/OT/SLP PROGRESS
Physical Therapy      Patient Name:  Sammi Abreu   MRN:  [id number]    Patient not seen today secondary to Patient unwilling to participate(Had rough night d/t colonoscopy prep.). Will follow-up 8/14/2020.    Ernestine Zhang PTA

## 2020-08-13 NOTE — ASSESSMENT & PLAN NOTE
Gastric emptying study is negative.  Follow GI recommendations and colonoscopy results.  EGD results are negative.  Continue proton pump inhibitor.

## 2020-08-13 NOTE — PLAN OF CARE
May d/c contact isolation for MRSA.  Has had 2 negative cultures 48 hours apart.  Contact Infection Control @ [phone] if questions.

## 2020-08-13 NOTE — ANESTHESIA PREPROCEDURE EVALUATION
2020  Sammi Abreu is a 79 y.o., female.    Pre-op Assessment    I have reviewed the Patient Summary Reports.     I have reviewed the Nursing Notes. I have reviewed the NPO Status.   I have reviewed the Medications.     Review of Systems  Anesthesia Hx:  No problems with previous Anesthesia  Denies Family Hx of Anesthesia complications.   Denies Personal Hx of Anesthesia complications.   Social:  Former Smoker, Social Alcohol Use Smoking Status: Former Smoker - 1.25 pack years  Quit Smokin72  Smokeless Tobacco Status: Never Used  Alcohol use: Yes; 0.0 standard drinks per week  Drug use: Oxycodone, Hydrocodone       Hematology/Oncology:         -- Anemia:   Cardiovascular:   Hypertension, poorly controlled CHF hyperlipidemia ECG has been reviewed.    Pulmonary:   COPD, moderate Asthma asymptomatic Shortness of breath Sleep Apnea    Renal/:   Chronic Renal Disease    Hepatic/GI:   Bowel Prep. GERD, poorly controlled Liver Disease,    Musculoskeletal:   Arthritis   Spine Disorders: lumbar Degenerative disease    Neurological:   Neuromuscular Disease,    Endocrine:   Diabetes, poorly controlled, type 2        Physical Exam  General:  Morbid Obesity    Airway/Jaw/Neck:  Airway Findings: Mouth Opening: Normal Tongue: Normal  General Airway Assessment: Adult, Good  Mallampati: II  Improves to II with phonation.  TM Distance: 4-6 cm      Dental:  Dental Findings: In tact   Chest/Lungs:  Chest/Lungs Findings: Normal Respiratory Rate     Heart/Vascular:  Heart Findings: Rate: Normal  Rhythm: Regular Rhythm  Heart murmur: negative       Mental Status:  Mental Status Findings:  Cooperative, Alert and Oriented         Anesthesia Plan  Type of Anesthesia, risks & benefits discussed:  Anesthesia Type:  general  Patient's Preference:   Intra-op Monitoring Plan: standard ASA monitors  Intra-op  Monitoring Plan Comments:   Post Op Pain Control Plan:   Post Op Pain Control Plan Comments:   Induction:   IV  Beta Blocker:  Patient is on a Beta-Blocker and has received one dose within the past 24 hours (No further documentation required).       Informed Consent: Patient understands risks and agrees with Anesthesia plan.  Questions answered. Anesthesia consent signed with patient.  ASA Score: 3     Day of Surgery Review of History & Physical:    H&P update referred to the surgeon.  H&P completed by Anesthesiologist.       Ready For Surgery From Anesthesia Perspective.

## 2020-08-13 NOTE — PROGRESS NOTES
Progress Note  PULMONARY    Admit Date: 8/2/2020 08/13/2020  From Dr Suero's consult 8/3-History of Present Illness:    Pt was home 5 days after last admit, she had iv line on right and had left picc line placed.  She did develop mrsa bacteremia.  Pt was on ox at home but was ambulating.  2 days pta she developed weakness, chest tightness, and bilat arm tightness.  Pt represented to er- found to have lft up and creat up. Intake had been poor.  Her chest pain was more tightness like her arms.     Pt presented with months flank/chest pain with massive pe found after laproscopic rosette. Had mrsa bacteremia during stay and developed severe anemia - responded to iron rx, etc... pt Religion.  On eiiquis.  Had pulm htn on echo.   Pt presented with elevated creat to 1.8 from 1.3 bseline. P[t came to er with loer chest pain.   Plan:   dvt on right arm- no picc mentioned on right in epic.  Recurrent clot on rx for no clear reason.  mrsa bacteremia last admit  - source not clear.  teflaro may ppt transminitis - suspect had another pe- heparin drip ordered. Heme to see. Needs id f/u.  F/u lft. Monitor.  Dx not clear.          SUBJECTIVE:     8/4 no new c/o- frustrated with illness.  8/5 feels better, no new c/o  8/6 no new c/o  8/10 no new c/o- had had nausea, decreased taste, preserved sense smell going back months.  Weak/schwartz and feels puffy otherwise.  Having some cough and responds to neb rx.  8/11 still nausea,metallic taste?  8/13 no new c/o-post colonoscopy- occ  Cough, no appetite        PFSH and Allergies reviewed.    OBJECTIVE:     Vitals (Most recent):  Vitals:    08/13/20 0736   BP:    Pulse: 105   Resp: 20   Temp:        Vitals (24 hour range):  Temp:  [96.9 °F (36.1 °C)-99.1 °F (37.3 °C)]   Pulse:  []   Resp:  [16-20]   BP: (141-199)/(65-95)   SpO2:  [95 %-100 %]       Intake/Output Summary (Last 24 hours) at 8/13/2020 0750  Last data filed at 8/12/2020 1835  Gross per 24 hour   Intake 775.33 ml    Output 1100 ml   Net -324.67 ml          Physical Exam:  The patient's neuro status (alertness,orientation,cognitive function,motor skills,), pharyngeal exam (oral lesions, hygiene, abn dentition,), Neck (jvd,mass,thyroid,nodes in neck and above/below clavicle),RESPIRATORY(symmetry,effort,fremitus,percussion,auscultation),  Cor(rhythm,heart tones including gallops,perfusion,edema)ABD(distention,hepatic&splenomegaly,tenderness,masses), Skin(rash,cyanosis),Psyc(affect,judgement,).  Exam negative except for these pertinent findings:    Swollen arms . chest is symmetric, no distress, normal percussion, normal fremitus and good normal breath sounds      Radiographs reviewed: view by direct vision    Results for orders placed during the hospital encounter of 07/13/20   X-Ray Chest 1 View    Narrative EXAMINATION:  XR CHEST 1 VIEW    CLINICAL HISTORY:  dyspnea;    TECHNIQUE:  Single frontal view of the chest was performed.    COMPARISON:  Chest of July 17, 2020.    FINDINGS:  There is chronic elevation of the right hemidiaphragm.  The cardiac size is within normal limits.  Mild atelectasis is seen at the right lung base.  The lung apices are clear.  No pneumothorax is seen.      Impression Mild atelectasis at the right lung base.  Chronic elevation of the right hemidiaphragm.      Electronically signed by: Gino Juarez MD  Date:    07/20/2020  Time:    08:08   ]    Labs     Recent Labs   Lab 08/13/20  0540   WBC 8.06   HGB 7.5*   HCT 25.1*        Recent Labs   Lab 08/13/20  0540      K 3.4*      CO2 27   BUN 5*   CREATININE 1.7*   GLU 97   CALCIUM 8.2*   PHOS 4.7*   ALBUMIN 2.3*   INR 1.2   *   No results for input(s): PH, PCO2, PO2, HCO3 in the last 24 hours.  Microbiology Results (last 7 days)     Procedure Component Value Units Date/Time    Urine culture [[id number]] Collected: 08/07/20 2040    Order Status: Completed Specimen: Urine Updated: 08/09/20 1031     Urine Culture, Routine No  growth    Narrative:      Specimen Source->Urine    Urine culture [[id number]] Collected: 08/06/20 2350    Order Status: Completed Specimen: Urine Updated: 08/08/20 1421     Urine Culture, Routine No significant growth    Narrative:      Specimen Source->Urine    Blood culture x two cultures. Draw prior to antibiotics. [[id number]] Collected: 08/02/20 1633    Order Status: Completed Specimen: Blood Updated: 08/07/20 2322     Blood Culture, Routine No growth after 5 days.    Narrative:      Aerobic and anaerobic    Blood culture x two cultures. Draw prior to antibiotics. [[id number]] Collected: 08/02/20 1633    Order Status: Completed Specimen: Blood Updated: 08/07/20 2322     Blood Culture, Routine No growth after 5 days.    Narrative:      Aerobic and anaerobic    Urine Culture High Risk [[id number]] Collected: 08/07/20 2039    Order Status: Canceled Specimen: Urine, Catheterized     Urine Culture High Risk [[id number]] Collected: 08/06/20 2352    Order Status: Canceled Specimen: Urine, Catheterized           Impression:  Active Hospital Problems    Diagnosis  POA    *LUIZ (acute kidney injury) [N17.9]  Yes    Anorexia [R63.0]  Yes    Hematuria [R31.9]  No    Patient is Catholic [Z78.9]  Yes    Acute deep vein thrombosis (DVT) of right upper extremity [I82.621]  Yes     Occlusive thrombus of the right brachial vein and cephalic vein      Elevated ferritin level [R79.89]  Yes    Hyponatremia [E87.1]  Yes    Debility [R53.81]  Yes    Liver cyst [K76.89]  Yes    Non-traumatic rhabdomyolysis [M62.82]  Yes    Chronic diastolic CHF (congestive heart failure) [I50.32]  Yes    Pulmonary infarct [I26.99]  Yes    Atelectasis [J98.11]  Yes    Moderate protein-calorie malnutrition [E44.0]  Yes    Anemia [D64.9]  Yes    MRSA bacteremia [R78.81]  Yes    Elevated troponin [R79.89]  Yes    Bilateral pulmonary embolism [I26.99]  Yes    COPD (chronic obstructive pulmonary disease) [J44.9]  Yes    Transaminitis  [R74.0]  Yes    Morbid obesity [E66.01]  Yes    Pulmonary hypertension [I27.20]  Yes    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               Plan:   8/4 on heparin for dvt right arm, occurred since last admit while on eliquis.  Pt had massive clot with pulm htn suggested on echo.  Pt had mrsa bacteremia after rosette prior to last admit.    cpk 80205 with creat 1.6, consulted renal.  Was on Daptomycin/tefloaro.  Had bad anemia last admit - improved with iron- jehovah witness.    Heme to f/u.    ID saw, vanc was being considered.      May have had another pe- would be at high risk with pulm htn already found.  ivf considered but not with any concerns for mrsa.    8/5  Improving, cpk down,creat stable.  Breathing better, feels better.  No ivc filter. pulm htn worrisome as would contribute to debility.      8/6 I/o  1120/400???? Bun/creat 12/1.9, will order cpk for am  Patient is steadily improving.  She will need follow-up for pulmonary hypertension as it was found on her last echo.  With adequate anticoagulation-good chance pulmonary hypertension will clear.  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  Complex case.  I discussed her with Infectious Disease yesterday      8/10 hgb down to 7.4, creat 1.7,   had had nausea, decreased taste, preserved sense smell going back months.  Weak/schwartz and feels puffy otherwise.    Now transitioning to outpt rx tarting coumadinSunday.  Echo initially had tricuspid regurg max velocity 3.26 m/s on  7/14 echo, follow up echo had 2.2 m/s on 7/22 echo.  Probability of pulm htn low with trv less than 2.9.  She was dx massive pe on 7/14 with echo improvement follow wk.  Ct abd was neg basically on 8/2.      8/11 having gastric emptying study,  Coumadin starting.   Creat 1.7.      ctep not expected to develop but may develop.  F/u echo in a yr or so (depending on  symptoms)     8/13 resp stable.  inr 1.2 - colonoscopy.  Loss appetite lingering. Creat 1.7.  Coughing still likely asthma.  No new recs    .

## 2020-08-13 NOTE — TRANSFER OF CARE
"Anesthesia Transfer of Care Note    Patient: Sammi Abreu    Procedure(s) Performed: Procedure(s) (LRB):  COLONOSCOPY (N/A)    Patient location: GI    Anesthesia Type: general    Transport from OR: Transported from OR on 2-3 L/min O2 by NC with adequate spontaneous ventilation    Post pain: adequate analgesia    Post assessment: no apparent anesthetic complications    Post vital signs: stable    Level of consciousness: awake    Nausea/Vomiting: no nausea/vomiting    Complications: none    Transfer of care protocol was followed      Last vitals:   Visit Vitals  BP (!) 194/84   Pulse 96   Temp 36.9 °C (98.4 °F)   Resp 17   Ht 5' 3" (1.6 m)   Wt 105.4 kg (232 lb 5.8 oz)   SpO2 99%   Breastfeeding No   BMI 41.16 kg/m²     "

## 2020-08-13 NOTE — PT/OT/SLP PROGRESS
Occupational Therapy      Patient Name:  Sammi Abreu   MRN:  [id number]    Patient not seen today secondary to Other (Comment)(colonoscopy). Will follow-up 8/14/2020.    ANJUM Blue  Occupational Therapy Assistant Student, Merry Vasques, participated in patient treatment and note. MCGUIRE present and supervising throughout. This note has been read and reviewed by supervising occupational therapy assistant. Fay MCGUIRE/SHAYLA Duran/ERIK  8/13/2020

## 2020-08-13 NOTE — PROGRESS NOTES
Nephrology Progress Note    Patient Name: Sammi Abreu  MRN: [id number]    Patient Class: IP- Inpatient   Admission Date: 8/2/2020  Length of Stay: 10 days  Date of Service: 8/13/2020    Attending Physician: Alix Ruth MD  Primary Care Provider: Osmani Villatoro MD    Reason for Consult: luiz/ckd3/hyponatremia/acidosis/rhabdomyalisis/mrsa bacteremia/anemia/htn    Chief Complaint   Patient presents with    Post-op Problem     pain to left lower chest and BLE after surgery on 7/27       SUBJECTIVE:     HPI: 79F Jehova witness was treated in hospital for MRSA bacteremia, had lap rosette 7/13, complicated by PE and DVT, was d/c 7/29 on daptomycin and returned to ER 5 days later with abdominal pain, elevated CPK and LFTs, LUIZ with modest rise in sCr. CT abdomen w/out contrast looked OK. She was given NS and heparin gtt, Dapto was changed with Vanco.    8/6 VSS, no new complains.  8/7 VSS, no new complains. Appreciate ID input.  8/8 some spikes in BP, on 2L NC, UOP 1.5L  8/9 intermittent spikes in BP, better this AM, on 2L NC, UOP 1.8L, c/o swelling  8/10  Unavailable for exam.  Sitting on the commode   8/11  At gastric emptying study  8/12  GES neg.  Still with difficulty eating.  Food makes her nauseated  8/13  Tired after colon prep.  No chest pain or sob    Outpatient meds:  Current Facility-Administered Medications on File Prior to Encounter   Medication Dose Route Frequency Provider Last Rate Last Dose    lactated ringers infusion   Intravenous Continuous Gino Reid MD 10 mL/hr at 07/13/20 1308      lidocaine (PF) 10 mg/ml (1%) injection 10 mg  1 mL Intradermal Once Gino Reid MD         Current Outpatient Medications on File Prior to Encounter   Medication Sig Dispense Refill    acetaminophen (TYLENOL) 325 MG tablet Take 2 tablets (650 mg total) by mouth every 6 (six) hours as needed (Do not take with any other Tylenol or acetaminophen containing products).  0    albuterol-ipratropium (DUO-NEB)  2.5 mg-0.5 mg/3 mL nebulizer solution Take 3 mLs by nebulization every 8 (eight) hours. 270 mL 0    apixaban (ELIQUIS) 5 mg Tab Take 1 tablet (5 mg total) by mouth 2 (two) times daily. 60 tablet 3    ascorbic acid, vitamin C, (VITAMIN C) 100 MG tablet Take 5 tablets (500 mg total) by mouth every evening.      esomeprazole (NEXIUM) 20 MG capsule Take 1 capsule (20 mg total) by mouth before breakfast. 30 capsule 2    fluticasone (FLONASE) 50 mcg/actuation nasal spray 2 sprays (100 mcg total) by Each Nare route once daily. 3 Bottle 3    folic acid (FOLVITE) 1 MG tablet Take 1 tablet (1 mg total) by mouth once daily. 30 tablet 0    metoprolol succinate (TOPROL-XL) 50 MG 24 hr tablet Take 1 tablet (50 mg total) by mouth once daily. 90 tablet 3    montelukast (SINGULAIR) 10 mg tablet Take 1 tablet (10 mg total) by mouth every evening. 90 tablet 3    sodium chloride 0.9% SolP 50 mL with DAPTOmycin 350 mg SolR 1,000 mg Inject 1,000 mg into the vein once daily.      spironolactone (ALDACTONE) 25 MG tablet Take 1 tablet (25 mg total) by mouth once daily. 90 tablet 6    budesonide-formoterol 160-4.5 mcg (SYMBICORT) 160-4.5 mcg/actuation HFAA Inhale 2 puffs into the lungs every 12 (twelve) hours. Controller 3 Inhaler 3    cyanocobalamin, vitamin B-12, 2,500 mcg Lozg Place 2 tablets under the tongue once daily. 60 lozenge 11    diclofenac (VOLTAREN) 25 MG TbEC Take 1 tablet (25 mg total) by mouth 3 (three) times daily as needed. 15 tablet 0    HYDROcodone-acetaminophen (NORCO) 5-325 mg per tablet Take 1 tablet by mouth every 6 (six) hours as needed for Pain. (Patient not taking: Reported on 7/30/2020) 15 tablet 0    lactulose (CHRONULAC) 10 gram/15 mL solution Take 30 mLs (20 g total) by mouth 3 (three) times daily. 2 Teaspoon(s) Oral PRN Every evening. (Patient not taking: Reported on 7/30/2020) 500 mL 3    multivitamin capsule Take 1 capsule by mouth once daily.      ondansetron (ZOFRAN-ODT) 4 MG TbDL Take 1  tablet (4 mg total) by mouth every 8 (eight) hours as needed. 20 tablet 0       Scheduled meds:   albuterol-ipratropium  3 mL Nebulization Q6H    ascorbic acid (vitamin C)  500 mg Oral QHS    cyanocobalamin  2,000 mcg Oral Daily    fluticasone furoate-vilanteroL  1 puff Inhalation Daily    folic acid  1 mg Oral Daily    hydrALAZINE  10 mg Intravenous Once    hydrALAZINE  50 mg Oral Q8H    metoprolol succinate  50 mg Oral Daily    montelukast  10 mg Oral QHS    multivitamin  1 tablet Oral Daily    pantoprazole  40 mg Oral Daily    polyethylene glycol  17 g Oral Daily    senna-docusate 8.6-50 mg  1 tablet Oral BID    vancomycin (VANCOCIN) IVPB  1,000 mg Intravenous Q24H    warfarin  7.5 mg Oral Daily       Infusions:   sodium chloride 0.9%      heparin (porcine) in D5W 10 Units/kg/hr (08/13/20 0551)       PRN meds:  acetaminophen, aluminum-magnesium hydroxide-simethicone, benzonatate, bisacodyL, dextrose 50%, dextrose 50%, glucagon (human recombinant), glucose, glucose, heparin (PORCINE), heparin (PORCINE), magnesium oxide, magnesium oxide, melatonin, morphine, ondansetron, potassium chloride, potassium chloride, potassium chloride, sodium chloride 0.9%, Pharmacy to dose Vancomycin consult **AND** vancomycin - pharmacy to dose    Review of Systems:  negative    OBJECTIVE:     Vital Signs and IO (Last 24H):  Temp:  [96.9 °F (36.1 °C)-99.1 °F (37.3 °C)]   Pulse:  []   Resp:  [16-20]   BP: (136-199)/(65-95)   SpO2:  [95 %-100 %]   I/O last 3 completed shifts:  In: 1844.4 [P.O.:1316; I.V.:278.4; IV Piggyback:250]  Out: 2700 [Urine:2700]    Wt Readings from Last 5 Encounters:   08/13/20 105.4 kg (232 lb 5.8 oz)   07/30/20 99.9 kg (220 lb 3.8 oz)   07/29/20 102.7 kg (226 lb 6.6 oz)   07/13/20 97.5 kg (215 lb)   07/09/20 104.9 kg (231 lb 4.2 oz)     Physical Exam: (8/9)  Constitutional: nad, aao x 3  Heart: rrr, no m/r/g, wwp, + edema  Lungs: ctab, no w/r/r/c, no lb  Abdomen: s/nt/nd, +BS    Body mass  index is 41.16 kg/m².    Laboratory:  Recent Labs   Lab 08/11/20 0226 08/12/20 0422 08/13/20  0540    143 143   K 3.9 3.6 3.4*    104 106   CO2 29 26 27   BUN 6* 6* 5*   CREATININE 1.7* 1.8* 1.7*   ESTGFRAFRICA 33* 31* 33*   EGFRNONAA 29* 27* 29*   * 101 97       Recent Labs   Lab 08/07/20 0428 08/11/20 0226 08/12/20 0422 08/13/20  0540   CALCIUM 7.9*   < > 8.7 8.5* 8.2*   ALBUMIN 2.0*   < > 2.4*  2.4* 2.3* 2.3*   PHOS 4.1   < > 4.6* 5.1* 4.7*   MG 1.9  --   --   --   --     < > = values in this interval not displayed.       Recent Labs   Lab 12/22/17  1141 06/22/18  0848 12/10/18  0945   PTH, Intact 57.0 115.0 H 81.0 H       No results for input(s): POCTGLUCOSE in the last 168 hours.    Recent Labs   Lab 08/03/20  0418 08/04/20  0030 08/05/20  0116   Hemoglobin A1C 5.7 H 5.9 H 5.9 H       Recent Labs   Lab 08/11/20 0226 08/12/20 0422 08/13/20  0540   WBC 9.60 8.78 8.06   HGB 8.2* 7.3* 7.5*   HCT 27.0* 24.3* 25.1*    264 249   * 109* 110*   MCHC 30.4* 30.0* 29.9*   MONO 11.0  1.1* 12.0  1.1* 12.0  1.0       Recent Labs   Lab 08/07/20 0428 08/11/20 0226 08/12/20 0422 08/13/20  0540   BILITOT 0.4  --  0.4  --   --    BILIDIR  --   --  0.2  --   --    PROT 5.7*  --  6.6  --   --    ALBUMIN 2.0*   < > 2.4*  2.4* 2.3* 2.3*   ALKPHOS 97  --  143*  --   --    *  --  103*  --   --    *  --  69*  --   --     < > = values in this interval not displayed.       Recent Labs   Lab 06/07/20  0952  08/02/20  1652 08/06/20  2350 08/07/20 2040   Color, UA Yellow   < > Yellow Brown A Yellow   Appearance, UA Clear   < > Clear Cloudy A Cloudy A   pH, UA 7.0   < > 7.0 >8.0 A >8.0 A   Specific [place], UA 1.015   < > 1.010 1.010 1.010   Protein, UA Negative   < > 2+ A 1+ A Trace A   Glucose, UA Negative   < > Negative Negative Negative   Ketones, UA Negative   < > Negative Negative Negative   Urobilinogen, UA Negative   < > Negative Negative Negative   Bilirubin (UA)  Negative   < > Negative Negative Negative   Occult Blood UA Negative   < > 3+ A 3+ A 3+ A   Nitrite, UA Negative   < > Negative Negative Negative   RBC, UA 0   < > 2 >100 H >100 H   WBC, UA 2   < > 1 48 H 55 H   Bacteria Negative   < > None Moderate A Few A   Hyaline Casts, UA 3 A  --  0 0  --     < > = values in this interval not displayed.       Recent Labs   Lab 07/13/20  1946   POC PH 7.406   POC PCO2 36.7   POC HCO3 23.1 L   POC PO2 70 L   POC SATURATED O2 94 L   POC BE -2   Sample ARTERIAL       Microbiology Results (last 7 days)     Procedure Component Value Units Date/Time    Urine culture [[id number]] Collected: 08/07/20 2040    Order Status: Completed Specimen: Urine Updated: 08/09/20 1031     Urine Culture, Routine No growth    Narrative:      Specimen Source->Urine    Urine culture [[id number]] Collected: 08/06/20 2350    Order Status: Completed Specimen: Urine Updated: 08/08/20 1421     Urine Culture, Routine No significant growth    Narrative:      Specimen Source->Urine    Blood culture x two cultures. Draw prior to antibiotics. [[id number]] Collected: 08/02/20 1633    Order Status: Completed Specimen: Blood Updated: 08/07/20 2322     Blood Culture, Routine No growth after 5 days.    Narrative:      Aerobic and anaerobic    Blood culture x two cultures. Draw prior to antibiotics. [[id number]] Collected: 08/02/20 1633    Order Status: Completed Specimen: Blood Updated: 08/07/20 2322     Blood Culture, Routine No growth after 5 days.    Narrative:      Aerobic and anaerobic    Urine Culture High Risk [[id number]] Collected: 08/07/20 2039    Order Status: Canceled Specimen: Urine, Catheterized     Urine Culture High Risk [[id number]] Collected: 08/06/20 2352    Order Status: Canceled Specimen: Urine, Catheterized           ASSESSMENT/PLAN:     Active Hospital Problems    Diagnosis  POA    *LUIZ (acute kidney injury) [N17.9]  Yes    Anorexia [R63.0]  Yes    Hematuria [R31.9]  No    Patient is Lizzette's  Witness [Z78.9]  Yes    Acute deep vein thrombosis (DVT) of right upper extremity [I82.621]  Yes     Occlusive thrombus of the right brachial vein and cephalic vein      Elevated ferritin level [R79.89]  Yes    Hyponatremia [E87.1]  Yes    Debility [R53.81]  Yes    Liver cyst [K76.89]  Yes    Non-traumatic rhabdomyolysis [M62.82]  Yes    Chronic diastolic CHF (congestive heart failure) [I50.32]  Yes    Pulmonary infarct [I26.99]  Yes    Atelectasis [J98.11]  Yes    Moderate protein-calorie malnutrition [E44.0]  Yes    Anemia [D64.9]  Yes    MRSA bacteremia [R78.81]  Yes    Elevated troponin [R79.89]  Yes    Bilateral pulmonary embolism [I26.99]  Yes    COPD (chronic obstructive pulmonary disease) [J44.9]  Yes    Transaminitis [R74.0]  Yes    Morbid obesity [E66.01]  Yes    Pulmonary hypertension [I27.20]  Yes    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        8/3/2020 16:24 8/4/2020 00:30 8/5/2020 01:16 8/6/2020 06:43 8/7/2020 04:28   CPK 35340 (H) >88204 (H) 10462 (H) 56676 (H) 8326 (H)     Anemia of CKD  Jehovah witness  Hypercoagulable state with thrombosis     On IV iron  Heme/onc following    HTN/Edema  Bumped hydralazine to 50mg TID 8/9  D/C bicarb gtt.  Aldactone is on hold for now.    LUIZ/CKD III  Stable     Acute rhabdomyolysis 2/2 daptomycin  CPK is trending down.    MRSA bacteremia  On vancomycin    Hypokalemia  --better    Alkalosis  --better after D/C bicarb gtt.    Thank you for allowing us to participate in the care of your patient  We will follow the patient and provide recommendations as needed.    Amari Sanz MD    [facility]  [address] [place] [address]  [place] [address]    [phone] - tel  [phone] - fax    8/13/2020

## 2020-08-13 NOTE — ASSESSMENT & PLAN NOTE
Multifactorial--  Poor nutrition and also history of acute blood loss  Monitor  Hematology following. Follow CBC and transfuse as needed. Patient is Bahai, receiving IV Iron.  EGD is negative.  Follow colonoscopy results.

## 2020-08-14 DIAGNOSIS — I26.99 BILATERAL PULMONARY EMBOLISM: Primary | ICD-10-CM

## 2020-08-14 LAB
ALBUMIN SERPL BCP-MCNC: 2.3 G/DL (ref 3.5–5.2)
ANION GAP SERPL CALC-SCNC: 9 MMOL/L (ref 8–16)
APTT BLDCRRT: 68.6 SEC (ref 21–32)
BASOPHILS # BLD AUTO: 0.03 K/UL (ref 0–0.2)
BASOPHILS NFR BLD: 0.4 % (ref 0–1.9)
BUN SERPL-MCNC: 6 MG/DL (ref 8–23)
CALCIUM SERPL-MCNC: 8.1 MG/DL (ref 8.7–10.5)
CHLORIDE SERPL-SCNC: 108 MMOL/L (ref 95–110)
CO2 SERPL-SCNC: 24 MMOL/L (ref 23–29)
CREAT SERPL-MCNC: 1.7 MG/DL (ref 0.5–1.4)
DIFFERENTIAL METHOD: ABNORMAL
EOSINOPHIL # BLD AUTO: 0.4 K/UL (ref 0–0.5)
EOSINOPHIL NFR BLD: 4.9 % (ref 0–8)
ERYTHROCYTE [DISTWIDTH] IN BLOOD BY AUTOMATED COUNT: 15.8 % (ref 11.5–14.5)
EST. GFR  (AFRICAN AMERICAN): 33 ML/MIN/1.73 M^2
EST. GFR  (NON AFRICAN AMERICAN): 29 ML/MIN/1.73 M^2
GLUCOSE SERPL-MCNC: 97 MG/DL (ref 70–110)
HCT VFR BLD AUTO: 24.4 % (ref 37–48.5)
HGB BLD-MCNC: 7.3 G/DL (ref 12–16)
IMM GRANULOCYTES # BLD AUTO: 0.1 K/UL (ref 0–0.04)
IMM GRANULOCYTES NFR BLD AUTO: 1.2 % (ref 0–0.5)
INR PPP: 1.6 (ref 0.8–1.2)
LYMPHOCYTES # BLD AUTO: 2.6 K/UL (ref 1–4.8)
LYMPHOCYTES NFR BLD: 30.5 % (ref 18–48)
MCH RBC QN AUTO: 33.5 PG (ref 27–31)
MCHC RBC AUTO-ENTMCNC: 29.9 G/DL (ref 32–36)
MCV RBC AUTO: 112 FL (ref 82–98)
MONOCYTES # BLD AUTO: 1 K/UL (ref 0.3–1)
MONOCYTES NFR BLD: 11.3 % (ref 4–15)
NEUTROPHILS # BLD AUTO: 4.3 K/UL (ref 1.8–7.7)
NEUTROPHILS NFR BLD: 51.7 % (ref 38–73)
NRBC BLD-RTO: 0 /100 WBC
PHOSPHATE SERPL-MCNC: 4.4 MG/DL (ref 2.7–4.5)
PLATELET # BLD AUTO: 223 K/UL (ref 150–350)
PMV BLD AUTO: 10.1 FL (ref 9.2–12.9)
POTASSIUM SERPL-SCNC: 4.2 MMOL/L (ref 3.5–5.1)
PROTHROMBIN TIME: 16.9 SEC (ref 9–12.5)
RBC # BLD AUTO: 2.18 M/UL (ref 4–5.4)
SODIUM SERPL-SCNC: 141 MMOL/L (ref 136–145)
WBC # BLD AUTO: 8.39 K/UL (ref 3.9–12.7)

## 2020-08-14 PROCEDURE — 63600175 PHARM REV CODE 636 W HCPCS: Performed by: INTERNAL MEDICINE

## 2020-08-14 PROCEDURE — 94799 UNLISTED PULMONARY SVC/PX: CPT

## 2020-08-14 PROCEDURE — 36415 COLL VENOUS BLD VENIPUNCTURE: CPT

## 2020-08-14 PROCEDURE — 25000003 PHARM REV CODE 250: Performed by: INTERNAL MEDICINE

## 2020-08-14 PROCEDURE — 25000242 PHARM REV CODE 250 ALT 637 W/ HCPCS: Performed by: HOSPITALIST

## 2020-08-14 PROCEDURE — 85610 PROTHROMBIN TIME: CPT

## 2020-08-14 PROCEDURE — 63600175 PHARM REV CODE 636 W HCPCS: Performed by: NURSE PRACTITIONER

## 2020-08-14 PROCEDURE — 85730 THROMBOPLASTIN TIME PARTIAL: CPT

## 2020-08-14 PROCEDURE — 94640 AIRWAY INHALATION TREATMENT: CPT

## 2020-08-14 PROCEDURE — 25000003 PHARM REV CODE 250: Performed by: NURSE PRACTITIONER

## 2020-08-14 PROCEDURE — 80069 RENAL FUNCTION PANEL: CPT

## 2020-08-14 PROCEDURE — 97116 GAIT TRAINING THERAPY: CPT | Mod: CQ

## 2020-08-14 PROCEDURE — 85025 COMPLETE CBC W/AUTO DIFF WBC: CPT

## 2020-08-14 PROCEDURE — 12000002 HC ACUTE/MED SURGE SEMI-PRIVATE ROOM

## 2020-08-14 RX ADMIN — Medication 500 MG: at 08:08

## 2020-08-14 RX ADMIN — IPRATROPIUM BROMIDE AND ALBUTEROL SULFATE 3 ML: .5; 2.5 SOLUTION RESPIRATORY (INHALATION) at 07:08

## 2020-08-14 RX ADMIN — CYANOCOBALAMIN TAB 1000 MCG 2000 MCG: 1000 TAB at 08:08

## 2020-08-14 RX ADMIN — VANCOMYCIN HYDROCHLORIDE 1000 MG: 1 INJECTION, POWDER, LYOPHILIZED, FOR SOLUTION INTRAVENOUS at 12:08

## 2020-08-14 RX ADMIN — METOPROLOL SUCCINATE 50 MG: 50 TABLET, FILM COATED, EXTENDED RELEASE ORAL at 08:08

## 2020-08-14 RX ADMIN — IPRATROPIUM BROMIDE AND ALBUTEROL SULFATE 3 ML: .5; 2.5 SOLUTION RESPIRATORY (INHALATION) at 01:08

## 2020-08-14 RX ADMIN — FOLIC ACID 1 MG: 1 TABLET ORAL at 08:08

## 2020-08-14 RX ADMIN — IPRATROPIUM BROMIDE AND ALBUTEROL SULFATE 3 ML: .5; 2.5 SOLUTION RESPIRATORY (INHALATION) at 12:08

## 2020-08-14 RX ADMIN — HYDRALAZINE HYDROCHLORIDE 50 MG: 25 TABLET, FILM COATED ORAL at 02:08

## 2020-08-14 RX ADMIN — HEPARIN SODIUM AND DEXTROSE 10 UNITS/KG/HR: 10000; 5 INJECTION INTRAVENOUS at 02:08

## 2020-08-14 RX ADMIN — WARFARIN SODIUM 7.5 MG: 2.5 TABLET ORAL at 04:08

## 2020-08-14 RX ADMIN — PANTOPRAZOLE SODIUM 40 MG: 40 TABLET, DELAYED RELEASE ORAL at 08:08

## 2020-08-14 RX ADMIN — HYDRALAZINE HYDROCHLORIDE 50 MG: 25 TABLET, FILM COATED ORAL at 05:08

## 2020-08-14 RX ADMIN — FLUTICASONE FUROATE AND VILANTEROL TRIFENATATE 1 PUFF: 100; 25 POWDER RESPIRATORY (INHALATION) at 07:08

## 2020-08-14 RX ADMIN — MONTELUKAST 10 MG: 10 TABLET, FILM COATED ORAL at 08:08

## 2020-08-14 RX ADMIN — HYDRALAZINE HYDROCHLORIDE 50 MG: 25 TABLET, FILM COATED ORAL at 08:08

## 2020-08-14 RX ADMIN — DOCUSATE SODIUM 50 MG AND SENNOSIDES 8.6 MG 1 TABLET: 8.6; 5 TABLET, FILM COATED ORAL at 08:08

## 2020-08-14 RX ADMIN — THERA TABS 1 TABLET: TAB at 08:08

## 2020-08-14 NOTE — PROGRESS NOTES
Nephrology Progress Note    Patient Name: Sammi Abreu  MRN: [id number]    Patient Class: IP- Inpatient   Admission Date: 8/2/2020  Length of Stay: 11 days  Date of Service: 8/14/2020    Attending Physician: Alix Ruth MD  Primary Care Provider: Osmani Villatoro MD    Reason for Consult: luiz/ckd3/hyponatremia/acidosis/rhabdomyalisis/mrsa bacteremia/anemia/htn    Chief Complaint   Patient presents with    Post-op Problem     pain to left lower chest and BLE after surgery on 7/27       SUBJECTIVE:     HPI: 79F Lizzette mack was treated in hospital for MRSA bacteremia, had lap rosette 7/13, complicated by PE and DVT, was d/c 7/29 on daptomycin and returned to ER 5 days later with abdominal pain, elevated CPK and LFTs, LUIZ with modest rise in sCr. CT abdomen w/out contrast looked OK. She was given NS and heparin gtt, Dapto was changed with Vanco.    8/6 VSS, no new complains.  8/7 VSS, no new complains. Appreciate ID input.  8/8 some spikes in BP, on 2L NC, UOP 1.5L  8/9 intermittent spikes in BP, better this AM, on 2L NC, UOP 1.8L, c/o swelling  8/10  Unavailable for exam.  Sitting on the commode   8/11  At gastric emptying study  8/12  GES neg.  Still with difficulty eating.  Food makes her nauseated  8/13  Tired after colon prep.  No chest pain or sob  8/14  C/o no taste (extensive w/u neg).  Otherwise she has no complaints    Outpatient meds:  Current Facility-Administered Medications on File Prior to Encounter   Medication Dose Route Frequency Provider Last Rate Last Dose    lactated ringers infusion   Intravenous Continuous Gino Reid MD 10 mL/hr at 07/13/20 1308      lidocaine (PF) 10 mg/ml (1%) injection 10 mg  1 mL Intradermal Once Gino Reid MD         Current Outpatient Medications on File Prior to Encounter   Medication Sig Dispense Refill    acetaminophen (TYLENOL) 325 MG tablet Take 2 tablets (650 mg total) by mouth every 6 (six) hours as needed (Do not take with any other Tylenol or  acetaminophen containing products).  0    albuterol-ipratropium (DUO-NEB) 2.5 mg-0.5 mg/3 mL nebulizer solution Take 3 mLs by nebulization every 8 (eight) hours. 270 mL 0    apixaban (ELIQUIS) 5 mg Tab Take 1 tablet (5 mg total) by mouth 2 (two) times daily. 60 tablet 3    ascorbic acid, vitamin C, (VITAMIN C) 100 MG tablet Take 5 tablets (500 mg total) by mouth every evening.      esomeprazole (NEXIUM) 20 MG capsule Take 1 capsule (20 mg total) by mouth before breakfast. 30 capsule 2    fluticasone (FLONASE) 50 mcg/actuation nasal spray 2 sprays (100 mcg total) by Each Nare route once daily. 3 Bottle 3    folic acid (FOLVITE) 1 MG tablet Take 1 tablet (1 mg total) by mouth once daily. 30 tablet 0    metoprolol succinate (TOPROL-XL) 50 MG 24 hr tablet Take 1 tablet (50 mg total) by mouth once daily. 90 tablet 3    montelukast (SINGULAIR) 10 mg tablet Take 1 tablet (10 mg total) by mouth every evening. 90 tablet 3    sodium chloride 0.9% SolP 50 mL with DAPTOmycin 350 mg SolR 1,000 mg Inject 1,000 mg into the vein once daily.      spironolactone (ALDACTONE) 25 MG tablet Take 1 tablet (25 mg total) by mouth once daily. 90 tablet 6    budesonide-formoterol 160-4.5 mcg (SYMBICORT) 160-4.5 mcg/actuation HFAA Inhale 2 puffs into the lungs every 12 (twelve) hours. Controller 3 Inhaler 3    cyanocobalamin, vitamin B-12, 2,500 mcg Lozg Place 2 tablets under the tongue once daily. 60 lozenge 11    diclofenac (VOLTAREN) 25 MG TbEC Take 1 tablet (25 mg total) by mouth 3 (three) times daily as needed. 15 tablet 0    HYDROcodone-acetaminophen (NORCO) 5-325 mg per tablet Take 1 tablet by mouth every 6 (six) hours as needed for Pain. (Patient not taking: Reported on 7/30/2020) 15 tablet 0    lactulose (CHRONULAC) 10 gram/15 mL solution Take 30 mLs (20 g total) by mouth 3 (three) times daily. 2 Teaspoon(s) Oral PRN Every evening. (Patient not taking: Reported on 7/30/2020) 500 mL 3    multivitamin capsule Take 1  capsule by mouth once daily.      ondansetron (ZOFRAN-ODT) 4 MG TbDL Take 1 tablet (4 mg total) by mouth every 8 (eight) hours as needed. 20 tablet 0       Scheduled meds:   albuterol-ipratropium  3 mL Nebulization Q6H    ascorbic acid (vitamin C)  500 mg Oral QHS    cyanocobalamin  2,000 mcg Oral Daily    fluticasone furoate-vilanteroL  1 puff Inhalation Daily    folic acid  1 mg Oral Daily    hydrALAZINE  10 mg Intravenous Once    hydrALAZINE  50 mg Oral Q8H    metoprolol succinate  50 mg Oral Daily    montelukast  10 mg Oral QHS    multivitamin  1 tablet Oral Daily    pantoprazole  40 mg Oral Daily    polyethylene glycol  17 g Oral Daily    senna-docusate 8.6-50 mg  1 tablet Oral BID    vancomycin (VANCOCIN) IVPB  1,000 mg Intravenous Q24H    warfarin  7.5 mg Oral Daily       Infusions:   sodium chloride 0.9%      sodium chloride 0.9% 100 mL/hr at 08/13/20 1100    heparin (porcine) in D5W 10 Units/kg/hr (08/14/20 0653)       PRN meds:  acetaminophen, aluminum-magnesium hydroxide-simethicone, benzonatate, bisacodyL, dextrose 50%, dextrose 50%, glucagon (human recombinant), glucose, glucose, heparin (PORCINE), heparin (PORCINE), magnesium oxide, magnesium oxide, melatonin, morphine, ondansetron, potassium chloride, potassium chloride, potassium chloride, sodium chloride 0.9%, Pharmacy to dose Vancomycin consult **AND** vancomycin - pharmacy to dose    Review of Systems:  negative    OBJECTIVE:     Vital Signs and IO (Last 24H):  Temp:  [96.6 °F (35.9 °C)-98.8 °F (37.1 °C)]   Pulse:  [84-95]   Resp:  [17-18]   BP: (142-169)/(62-70)   SpO2:  [96 %-100 %]   I/O last 3 completed shifts:  In: 240 [P.O.:240]  Out: 2050 [Urine:2050]    Wt Readings from Last 5 Encounters:   08/14/20 105.4 kg (232 lb 5.8 oz)   07/30/20 99.9 kg (220 lb 3.8 oz)   07/29/20 102.7 kg (226 lb 6.6 oz)   07/13/20 97.5 kg (215 lb)   07/09/20 104.9 kg (231 lb 4.2 oz)     Physical Exam:    Constitutional: nad, aao x 3  Heart: rrr,  no m/r/g, wwp, + edema  Lungs: ctab, no w/r/r/c, no lb  Abdomen: s/nt/nd, +BS    Body mass index is 41.16 kg/m².    Laboratory:  Recent Labs   Lab 08/12/20 0422 08/13/20  0540 08/14/20  0548    143 141   K 3.6 3.4* 4.2    106 108   CO2 26 27 24   BUN 6* 5* 6*   CREATININE 1.8* 1.7* 1.7*   ESTGFRAFRICA 31* 33* 33*   EGFRNONAA 27* 29* 29*    97 97       Recent Labs   Lab 08/12/20  0422 08/13/20  0540 08/14/20  0548   CALCIUM 8.5* 8.2* 8.1*   ALBUMIN 2.3* 2.3* 2.3*   PHOS 5.1* 4.7* 4.4       Recent Labs   Lab 12/22/17  1141 06/22/18  0848 12/10/18  0945   PTH, Intact 57.0 115.0 H 81.0 H       No results for input(s): POCTGLUCOSE in the last 168 hours.    Recent Labs   Lab 08/03/20  0418 08/04/20  0030 08/05/20  0116   Hemoglobin A1C 5.7 H 5.9 H 5.9 H       Recent Labs   Lab 08/12/20 0422 08/13/20  0540 08/14/20  0548   WBC 8.78 8.06 8.39   HGB 7.3* 7.5* 7.3*   HCT 24.3* 25.1* 24.4*    249 223   * 110* 112*   MCHC 30.0* 29.9* 29.9*   MONO 12.0  1.1* 12.0  1.0 11.3  1.0       Recent Labs   Lab 08/11/20  0226 08/12/20 0422 08/13/20  0540 08/14/20  0548   BILITOT 0.4  --   --   --    BILIDIR 0.2  --   --   --    PROT 6.6  --   --   --    ALBUMIN 2.4*  2.4* 2.3* 2.3* 2.3*   ALKPHOS 143*  --   --   --    *  --   --   --    AST 69*  --   --   --        Recent Labs   Lab 06/07/20  0952  08/02/20  1652 08/06/20  2350 08/07/20 2040   Color, UA Yellow   < > Yellow Brown A Yellow   Appearance, UA Clear   < > Clear Cloudy A Cloudy A   pH, UA 7.0   < > 7.0 >8.0 A >8.0 A   Specific [place], UA 1.015   < > 1.010 1.010 1.010   Protein, UA Negative   < > 2+ A 1+ A Trace A   Glucose, UA Negative   < > Negative Negative Negative   Ketones, UA Negative   < > Negative Negative Negative   Urobilinogen, UA Negative   < > Negative Negative Negative   Bilirubin (UA) Negative   < > Negative Negative Negative   Occult Blood UA Negative   < > 3+ A 3+ A 3+ A   Nitrite, UA Negative   < > Negative  Negative Negative   RBC, UA 0   < > 2 >100 H >100 H   WBC, UA 2   < > 1 48 H 55 H   Bacteria Negative   < > None Moderate A Few A   Hyaline Casts, UA 3 A  --  0 0  --     < > = values in this interval not displayed.       Recent Labs   Lab 07/13/20 1946   POC PH 7.406   POC PCO2 36.7   POC HCO3 23.1 L   POC PO2 70 L   POC SATURATED O2 94 L   POC BE -2   Sample ARTERIAL       Microbiology Results (last 7 days)     Procedure Component Value Units Date/Time    Urine culture [[id number]] Collected: 08/07/20 2040    Order Status: Completed Specimen: Urine Updated: 08/09/20 1031     Urine Culture, Routine No growth    Narrative:      Specimen Source->Urine    Urine culture [[id number]] Collected: 08/06/20 2350    Order Status: Completed Specimen: Urine Updated: 08/08/20 1421     Urine Culture, Routine No significant growth    Narrative:      Specimen Source->Urine    Blood culture x two cultures. Draw prior to antibiotics. [[id number]] Collected: 08/02/20 1633    Order Status: Completed Specimen: Blood Updated: 08/07/20 2322     Blood Culture, Routine No growth after 5 days.    Narrative:      Aerobic and anaerobic    Blood culture x two cultures. Draw prior to antibiotics. [[id number]] Collected: 08/02/20 1633    Order Status: Completed Specimen: Blood Updated: 08/07/20 2322     Blood Culture, Routine No growth after 5 days.    Narrative:      Aerobic and anaerobic    Urine Culture High Risk [[id number]] Collected: 08/07/20 2039    Order Status: Canceled Specimen: Urine, Catheterized           ASSESSMENT/PLAN:     Active Hospital Problems    Diagnosis  POA    *LUIZ (acute kidney injury) [N17.9]  Yes    Anorexia [R63.0]  Yes    Hematuria [R31.9]  No    Patient is Scientology [Z78.9]  Yes    Acute deep vein thrombosis (DVT) of right upper extremity [I82.621]  Yes     Occlusive thrombus of the right brachial vein and cephalic vein      Elevated ferritin level [R79.89]  Yes    Hyponatremia [E87.1]  Yes    Debility  [R53.81]  Yes    Liver cyst [K76.89]  Yes    Non-traumatic rhabdomyolysis [M62.82]  Yes    Chronic diastolic CHF (congestive heart failure) [I50.32]  Yes    Pulmonary infarct [I26.99]  Yes    Atelectasis [J98.11]  Yes    Moderate protein-calorie malnutrition [E44.0]  Yes    Anemia [D64.9]  Yes    MRSA bacteremia [R78.81]  Yes    Elevated troponin [R79.89]  Yes    Bilateral pulmonary embolism [I26.99]  Yes    COPD (chronic obstructive pulmonary disease) [J44.9]  Yes    Transaminitis [R74.0]  Yes    Morbid obesity [E66.01]  Yes    Pulmonary hypertension [I27.20]  Yes    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        8/3/2020 16:24 8/4/2020 00:30 8/5/2020 01:16 8/6/2020 06:43 8/7/2020 04:28   CPK 17399 (H) >98968 (H) 20586 (H) 60906 (H) 8326 (H)     Anemia of CKD  Jehovah witness  Hypercoagulable state with thrombosis     On IV iron  Heme/onc following  If she stays through the weekend I recommend decreasing frequency of blood draws    HTN/Edema  Bumped hydralazine to 50mg TID 8/9  D/C bicarb gtt.  Aldactone is on hold for now.    LUIZ/CKD III  Stable     Acute rhabdomyolysis 2/2 daptomycin  CPK is trending down.    MRSA bacteremia  On vancomycin    Hypokalemia  --better    Alkalosis  --better after D/C bicarb gtt.    Thank you for allowing us to participate in the care of your patient  We will follow the patient and provide recommendations as needed.    Amari Sanz MD    [facility]  [address] [place] [address]  OLIVIA Garg [id number]    [phone] - tel  [phone] - fax    8/14/2020

## 2020-08-14 NOTE — PLAN OF CARE
08/13/20 1915   Patient Assessment/Suction   Level of Consciousness (AVPU) alert   Respiratory Effort Normal;Unlabored   Expansion/Accessory Muscles/Retractions expansion symmetric   All Lung Fields Breath Sounds diminished   Rhythm/Pattern, Respiratory pattern regular   Cough Frequency infrequent   Cough Type nonproductive   PRE-TX-O2   O2 Device (Oxygen Therapy) nasal cannula   Humidification temp set 2   SpO2 98 %   Pulse Oximetry Type Continuous   Pulse 88   Resp 18   Aerosol Therapy   $ Aerosol Therapy Charges Aerosol Treatment   Respiratory Treatment Status (SVN) given   Treatment Route (SVN) mask   Patient Position (SVN) HOB elevated   Post Treatment Assessment (SVN) breath sounds unchanged   Signs of Intolerance (SVN) none   Breath Sounds Post-Respiratory Treatment   Post-treatment Heart Rate (beats/min) 90   Post-treatment Resp Rate (breaths/min) 18   Incentive Spirometer   $ Incentive Spirometer Charges done with encouragement   Administration (IS) instruction provided, follow-up   Number of Repetitions (IS) 10   Level Incentive Spirometer (mL) 1000   Patient Tolerance (IS) fair

## 2020-08-14 NOTE — PROGRESS NOTES
[facility] Medicine  Progress Note    Patient Name: Sammi Abreu  MRN: [id number]  Patient Class: IP- Inpatient   Admission Date: 8/2/2020  Length of Stay: 11 days  Attending Physician: Alix Ruth MD  Primary Care Provider: Osmani Villatoro MD        Subjective:     Principal Problem:LUIZ (acute kidney injury)        HPI:  Sammi Abreu is a 79-year-old female with past medical history significant for arthritis and chronic back pain, fibromyalgia, glaucoma, hyperlipidemia, hypertension, sleep apnea with history of noncompliance with CPAP, morbid obesity, hypertension, GERD, COPD, diabetes type 2, prior DVT, and gallstones [facility] pain x3 days.  Of note the patient is also a Bahai.   Patient was discharged from this facility on 07/29/2020 after being treated for bilateral pulmonary embolism.  Patient is currently on Eliquis.  Patient underwent a CT abdomen pelvis while in the emergency department which identified surgical changes but nothing acute.  Patient is noted to have a mild LUIZ.  Her troponin is elevated at 0.41 which is consistent with prior levels.  She will be admitted to the service of hospital Medicine for continued treatment.    Overview/Hospital Course:  Patient monitor closely during hospitalization.  She was placed on continuous telemetry monitoring.  She was noted to have rhabdomyolysis with CPK greater than 14542 and LUIZ.  Nephrology was consulted.  She was noted to have metabolic acidosis initiated on bicarb drip.  Daptomycin was held and ID was consulted.  She was initiated on IV vancomycin.  She was noted have increased right upper extremity swelling.  Right upper extremity ultrasound demonstrated occlusive thrombus of the right brachial vein and cephalic vein. She was initiated on heparin drip and her Eliquis was held.  There was concern for Eliquis failure.   Hematology consulted.  PT and OT were consulted for debility.  Patient with ongoing anorexia and poor p.o. intake. Dietary was consulted for protein calorie malnutrition.  It was recommended patient be placed on TPN lipids.  Her renal status was monitored closely and she remain on bicarb drip with plans to start TPN once acute kidney injury resolved if no improvement in her p.o. intake.  Patient was noted to have some hematuria during her stay.  A retroperitoneal ultrasound was ordered.  Hematuria has resolved.  Patient to follow-up with urologist as outpatient.  Patient also underwent endoscopic evaluation for anemia by GI specialist which has been negative.  Hematology service recommended Coumadin initiation at place of Eliquis.  Presently patient is on heparin-Coumadin bridging, awaiting therapeutic INR.  Patient has declined skilled nursing facility and LTAC placement.  Patient is expected to be discharged tomorrow with home health, home infusion therapy until September 2, 2020 as per recommendations by ID specialist.  Patient to also receive home physical therapy.    Interval History:  No new events noted overnight.  Patient underwent colonoscopy yesterday.  No source of blood loss noted on GI workup.  Patient continues to receive heparin infusion with Coumadin bridging, INR improving to 1.6 today. Patient being followed by hematology. Receiving Iron infusions. No hematuria noted.  Patient continues to have low appetite.    Review of Systems   Constitutional: Positive for activity change, appetite change and fatigue. Negative for chills, diaphoresis and fever.   HENT: Negative for ear discharge, ear pain and facial swelling.    Eyes: Negative for pain and redness.   Respiratory: Positive for cough and shortness of breath.    Cardiovascular: Positive for leg swelling.   Gastrointestinal: Negative for abdominal distention, abdominal pain, constipation, diarrhea, nausea and vomiting.        Poor appetite- patient  "reports things taste bad and "smell bad"  and she states she has not been hungry   Endocrine: Negative for polydipsia and polyphagia.   Genitourinary: Negative for difficulty urinating, dysuria, flank pain and hematuria.   Musculoskeletal: Positive for back pain. Negative for neck pain and neck stiffness.   Skin: Negative for color change.   Allergic/Immunologic: Negative for food allergies.   Neurological: Positive for weakness. Negative for seizures, facial asymmetry and speech difficulty.   Psychiatric/Behavioral: Negative for agitation, behavioral problems, confusion, hallucinations and suicidal ideas.     Objective:     Vital Signs (Most Recent):  Temp: 98.8 °F (37.1 °C) (08/14/20 0727)  Pulse: 95 (08/14/20 0727)  Resp: 18 (08/14/20 0727)  BP: (!) 149/63 (08/14/20 0727)  SpO2: 96 % (08/14/20 0727) Vital Signs (24h Range):  Temp:  [96.6 °F (35.9 °C)-98.8 °F (37.1 °C)] 98.8 °F (37.1 °C)  Pulse:  [85-97] 95  Resp:  [16-18] 18  SpO2:  [94 %-100 %] 96 %  BP: (142-194)/(62-84) 149/63     Weight: 105.4 kg (232 lb 5.8 oz)  Body mass index is 41.16 kg/m².    Intake/Output Summary (Last 24 hours) at 8/14/2020 0836  Last data filed at 8/14/2020 0100  Gross per 24 hour   Intake 240 ml   Output 2050 ml   Net -1810 ml      Physical Exam  Constitutional:       General: She is not in acute distress.     Appearance: Normal appearance.   HENT:      Head: Normocephalic and atraumatic.      Mouth/Throat:      Mouth: Mucous membranes are moist.   Eyes:      General:         Right eye: No discharge.         Left eye: No discharge.      Extraocular Movements: Extraocular movements intact.      Conjunctiva/sclera: Conjunctivae normal.      Pupils: Pupils are equal, round, and reactive to light.   Neck:      Musculoskeletal: Normal range of motion and neck supple. No neck rigidity or muscular tenderness.   Cardiovascular:      Rate and Rhythm: Normal rate and regular rhythm.      Pulses: Normal pulses.      Heart sounds: Murmur " present.   Pulmonary:      Effort: No respiratory distress.      Comments: Bilateral breath sounds diminished  Abdominal:      General: Bowel sounds are normal. There is no distension.      Tenderness: There is no abdominal tenderness. There is no guarding.      Comments: Obese   Genitourinary:     Comments: Not examined  Musculoskeletal: Normal range of motion.         General: Swelling present.      Comments: Generalized edema with edema also noted to upper and lower extremities   Skin:     General: Skin is warm and dry.      Capillary Refill: Capillary refill takes less than 2 seconds.   Neurological:      General: No focal deficit present.      Mental Status: She is alert and oriented to person, place, and time. Mental status is at baseline.      Cranial Nerves: No cranial nerve deficit.   Psychiatric:         Mood and Affect: Mood normal.         Behavior: Behavior normal.         Thought Content: Thought content normal.         Judgment: Judgment normal.       Lab Results   Component Value Date    WBC 8.39 08/14/2020    HGB 7.3 (L) 08/14/2020    HCT 24.4 (L) 08/14/2020     (H) 08/14/2020     08/14/2020     CMP  Sodium   Date Value Ref Range Status   08/14/2020 141 136 - 145 mmol/L Final     Potassium   Date Value Ref Range Status   08/14/2020 4.2 3.5 - 5.1 mmol/L Final     Chloride   Date Value Ref Range Status   08/14/2020 108 95 - 110 mmol/L Final     CO2   Date Value Ref Range Status   08/14/2020 24 23 - 29 mmol/L Final     Glucose   Date Value Ref Range Status   08/14/2020 97 70 - 110 mg/dL Final     BUN, Bld   Date Value Ref Range Status   08/14/2020 6 (L) 8 - 23 mg/dL Final     Creatinine   Date Value Ref Range Status   08/14/2020 1.7 (H) 0.5 - 1.4 mg/dL Final     Calcium   Date Value Ref Range Status   08/14/2020 8.1 (L) 8.7 - 10.5 mg/dL Final     Total Protein   Date Value Ref Range Status   08/11/2020 6.6 6.0 - 8.4 g/dL Final     Albumin   Date Value Ref Range Status   08/14/2020 2.3 (L)  3.5 - 5.2 g/dL Final     Total Bilirubin   Date Value Ref Range Status   08/11/2020 0.4 0.1 - 1.0 mg/dL Final     Comment:     For infants and newborns, interpretation of results should be based  on gestational age, weight and in agreement with clinical  observations.  Premature Infant recommended reference ranges:  Up to 24 hours.............<8.0 mg/dL  Up to 48 hours............<12.0 mg/dL  3-5 days..................<15.0 mg/dL  6-29 days.................<15.0 mg/dL       Alkaline Phosphatase   Date Value Ref Range Status   08/11/2020 143 (H) 55 - 135 U/L Final     AST   Date Value Ref Range Status   08/11/2020 69 (H) 10 - 40 U/L Final     ALT   Date Value Ref Range Status   08/11/2020 103 (H) 10 - 44 U/L Final     Anion Gap   Date Value Ref Range Status   08/14/2020 9 8 - 16 mmol/L Final     eGFR if    Date Value Ref Range Status   08/14/2020 33 (A) >60 mL/min/1.73 m^2 Final     eGFR if non    Date Value Ref Range Status   08/14/2020 29 (A) >60 mL/min/1.73 m^2 Final     Comment:     Calculation used to obtain the estimated glomerular filtration  rate (eGFR) is the CKD-EPI equation.        Microbiology Results (last 7 days)     Procedure Component Value Units Date/Time    Urine culture [[id number]] Collected: 08/07/20 2040    Order Status: Completed Specimen: Urine Updated: 08/09/20 1031     Urine Culture, Routine No growth    Narrative:      Specimen Source->Urine    Urine culture [[id number]] Collected: 08/06/20 2350    Order Status: Completed Specimen: Urine Updated: 08/08/20 1421     Urine Culture, Routine No significant growth    Narrative:      Specimen Source->Urine    Blood culture x two cultures. Draw prior to antibiotics. [[id number]] Collected: 08/02/20 1633    Order Status: Completed Specimen: Blood Updated: 08/07/20 2322     Blood Culture, Routine No growth after 5 days.    Narrative:      Aerobic and anaerobic    Blood culture x two cultures. Draw prior to antibiotics.  [[id number]] Collected: 08/02/20 1633    Order Status: Completed Specimen: Blood Updated: 08/07/20 2322     Blood Culture, Routine No growth after 5 days.    Narrative:      Aerobic and anaerobic    Urine Culture High Risk [[id number]] Collected: 08/07/20 2039    Order Status: Canceled Specimen: Urine, Catheterized           Radiology:  SADIE (07-):  · Hyperdynamic left ventricular systolic function. The estimated ejection fraction is 65 - 70 %.  · Normal appearing left atrial appendage. No thrombus is present in the appendage. Normal appendage velocities.  · Mild mitral regurgitation.  · Mild tricuspid regurgitation.  · No evidence of endocarditis  · PICC line at the cavoatrial junction    ECHO (07-):  · Concentric left ventricular remodeling.  · Hyperdynamic left ventricular systolic function. The estimated ejection fraction is 76%.  · Grade I (mild) left ventricular diastolic dysfunction consistent with impaired relaxation.    · CXR: Concentric left ventricular remodeling.  · Hyperdynamic left ventricular systolic function. The estimated ejection fraction is 76%.  Grade I (mild) left ventricular diastolic dysfunction consistent with impaired relaxation.    CXR: There is bibasilar atelectasis versus infiltrate similar to 07/27/2020.    CT abdomen and pelvis without contrast:  Moderate basilar atelectasis. Status post cholecystectomy without evidence for complication.    RUQ abdominal US:  Prior cholecystectomy.  Small simple cyst of the left lobe of the liver otherwise negative right upper quadrant ultrasound.    RUE venous doppler scan:  Occlusive thrombus of the right brachial vein and cephalic vein.    KUB:  Possible ileus.  Prior cholecystectomy.  Prior bilateral hip joint replacement.    Renal US: Features suggesting intrinsic renal disease.  No hydronephrosis.    Left humerus x-ray:  Degenerative changes in the humeral head and glenohumeral joint of the left shoulder with otherwise negative left  humerus.  PICC line as described.    Gastric emptying study:  Normal gastric emptying time for solid substances.    EGD:   - Normal esophagus.                        - Small hiatal hernia.                        - Normal examined duodenum.                        - No specimens collected.                        -History of multifactorial anemia, in part due to                        low iron counts. Patient is a Shinto     Colonoscopy:   - Diverticulosis in the sigmoid colon, in the                        descending colon and in the ascending colon.                        - The examination was otherwise normal on direct and                        retroflexion views.                        - No specimens collected.                        -History of multifactorial anemia, in part due to                        low iron.       Assessment/Plan:      * LUIZ (acute kidney injury)  Monitor  Follow Nephrology recommendation and BMP.              Anorexia  Gastric emptying study is negative.  Follow GI recommendations and colonoscopy results.  EGD results are negative.  Continue proton pump inhibitor.      Patient is Taoism  Follow hematology recommendation, on IV Iron for anemia management. Follow CBC.       Hematuria  Hematuria noted-patient currently remains on IV heparin drip for history of bilateral pulmonary emboli and recurrent DVT     Resolved.  Follow hematology recommendations.      Liver cyst  Noted  Will need outpatient follow-up      Debility  Fall and skin precautions  Continue physical therapy and occupational therapy      Hyponatremia  Resolved for now-monitor for recurrence      Elevated ferritin level  Noted      Acute deep vein thrombosis (DVT) of right upper extremity  Patient developed new right upper extremity DVT despite being on apixaban-  Patient currently remains on IV heparin drip  Hematology following  Patient will need lifelong anticoagulation  Hematology workup in  progress      Non-traumatic rhabdomyolysis  It was felt this is secondary to daptomycin. Trend CPK daily-improving. Follow ID and nephrology recommendations.     Daptomycin was discontinued on admission and patient was placed on IV vancomycin      Anemia  Multifactorial--  Poor nutrition and also history of acute blood loss  Monitor  Hematology following. Follow CBC and transfuse as needed. Patient is Buddhism, receiving IV Iron.  EGD is negative.  Follow colonoscopy results.      Moderate protein-calorie malnutrition  Anorexia-patient continues report that food smells and tastes bad   On IV TPN.  Monitor volume status closely  Continue p.o. supplement        Atelectasis  Monitor oxygen saturation. Encourage OOB and IS.      Pulmonary infarct  Due to bilateral pulmonary emboli.   Pulmonary following      Chronic diastolic CHF (congestive heart failure)  Patient is identified as having Diastolic heart failure that is Chronic. CHF is currently controlled. Latest ECHO performed and demonstrates-   Results for orders placed during the hospital encounter of 07/13/20   Echo Color Flow Doppler? Yes    Narrative · Concentric left ventricular remodeling.  · Hyperdynamic left ventricular systolic function. The estimated ejection   fraction is 76%.  · Grade I (mild) left ventricular diastolic dysfunction consistent with   impaired relaxation.        Patient remains on Bicarb drip with generalized edema- Monitor volume status closely    MRSA bacteremia  Previously receiving daptomycin as outpatient-now on IV vancomycin  Orders as per infectious disease.  Continue intravenous antibiotic therapy until September 2, 2020.    Elevated troponin  Noted- chronically elevated    Bilateral pulmonary embolism  Patient previously on Eliquis at home but developed additional DVT while on it-  Patient currently on  IV heparin+ Coumadin bridging, increasing Coumadin dose to 7.5 mg daily. INR goal 2-3. Pulmonology and Hematology  following. Fall precautions and bleeding risks reviewed with patient.   Patient will need lifelong anticoagulation      COPD (chronic obstructive pulmonary disease)  Monitor O2 sats, supplement O2 as needed  Orders as per pulmonology        Transaminitis  Monitor-trending downward. Felt possibley related to daptomycin.  Possiblely due to clot burden.  Follow GI recommendations.        Morbid obesity  Body mass index is 40.65 kg/m².  General weight loss/lifestyle modification strategies discussed (elicit support from others; identify saboteurs; non-food rewards, etc).        Pulmonary hypertension  Noted  Orders as per pulmonology      Hypertension associated with diabetes  Monitor  Continue current treatment  Titrate medications as needed        VTE Risk Mitigation (From admission, onward)         Ordered     warfarin tablet 7.5 mg  Daily      08/12/20 0847     heparin 25,000 units in dextrose 5% 250 mL (100 units/mL) infusion HIGH INTENSITY nomogram - [facility]  Continuous     Question:  Heparin Infusion Adjustment (DO NOT MODIFY ANSWER)  Answer:  \\[email or web address]\epic\Images\Pharmacy\HeparinInfusions\heparin HIGH INTENSITY nomogram for [facility] [id number].pdf    08/03/20 1609     heparin 25,000 units in dextrose 5% (100 units/ml) IV bolus from bag - ADDITIONAL PRN BOLUS - 60 units/kg  As needed (PRN)     Question:  Heparin Infusion Adjustment (DO NOT MODIFY ANSWER)  Answer:  \\[email or web address]\epic\Images\Pharmacy\HeparinInfusions\heparin HIGH INTENSITY nomogram for [facility] [id number].pdf    08/03/20 1609     heparin 25,000 units in dextrose 5% (100 units/ml) IV bolus from bag - ADDITIONAL PRN BOLUS - 30 units/kg  As needed (PRN)     Question:  Heparin Infusion Adjustment (DO NOT MODIFY ANSWER)  Answer:  \\[email or web address]\epic\Images\Pharmacy\HeparinInfusions\heparin HIGH INTENSITY nomogram for [facility] [id number].pdf    08/03/20 1609     IP VTE HIGH RISK PATIENT  Once      08/02/20 2034     Place sequential compression device  Until discontinued       08/02/20 2034                Discharge Planning   PREM:  August 15, 2020    Code Status: Full Code   Is the patient medically ready for discharge?:     Reason for patient still in hospital (select all that apply): Patient trending condition  Discharge Plan A: Home Health                  Alix Ruth MD  Department of Hospital Medicine   [facility]

## 2020-08-14 NOTE — PLAN OF CARE
I sent the pts home IV ABC order to [facility], [facility]  and [facility] via [employer]. Sadaf Tamayo, JODI     08/14/20 4029   Post-Acute Status   Post-Acute Authorization Medications   Medication Status Rx fund Referral

## 2020-08-14 NOTE — PLAN OF CARE
I updated Dr. Ruth that N is requesting HH orders with a nurse added and not just therapy. Per Dr. Ruth the pt is not discharging today. I asked him to still add a nurse to the pts HH orders since she is discharging home with IV ABX. I updated kasi villaseñor with N at [phone].   Sad[facility], [facility]     08/14/20 1008   Post-Acute Status   Post-Acute Authorization Home Health

## 2020-08-14 NOTE — CONSULTS
Pharmacokinetic Assessment Follow Up: IV Vancomycin    Vancomycin serum concentration assessment(s):    The trough level was drawn correctly and can be used to guide therapy at this time. The measurement is within the desired definitive target range of 15 to 20 mcg/mL.    Vancomycin Regimen Plan:    Continue regimen to Vancomycin 1000 mg IV every 24 hours with next serum trough concentration measured at 0000 prior to 3rd dose on 08/16    Drug levels (last 3 results):  Recent Labs   Lab Result Units 08/11/20  2229 08/13/20  2321   Vancomycin-Trough ug/mL 16.1 17.6       Pharmacy will continue to follow and monitor vancomycin.    Please contact pharmacy at extension [phone] for questions regarding this assessment.    Thank you for the consult,   Dot Thomas       Patient brief summary:  Sammi Abreu is a 79 y.o. female initiated on antimicrobial therapy with IV Vancomycin for treatment of bacteremia      Drug Allergies:   Review of patient's allergies indicates:   Allergen Reactions    Cymbalta [duloxetine] Other (See Comments)     Nightmares      Darvon [propoxyphene] Nausea Only and Other (See Comments)     Sweating, slept for 3 days    Atorvastatin Other (See Comments)     Muscle cramps    Naprosyn [naproxen] Nausea Only    Penicillins Rash    Tramadol Nausea Only and Palpitations       Actual Body Weight:   105.4 kg    Renal Function:   Estimated Creatinine Clearance: 32.2 mL/min (A) (based on SCr of 1.7 mg/dL (H)).,     Dialysis Method (if applicable):  N/A    CBC (last 72 hours):  Recent Labs   Lab Result Units 08/11/20  0226 08/12/20  0422 08/13/20  0540   WBC K/uL 9.60 8.78 8.06   Hemoglobin g/dL 8.2* 7.3* 7.5*   Hematocrit % 27.0* 24.3* 25.1*   Platelets K/uL 272 264 249   Gran% % 54.9 55.9 52.5   Lymph% % 28.4 26.1 29.5   Mono% % 11.0 12.0 12.0   Eosinophil% % 4.7 4.7 4.6   Basophil% % 0.3 0.2 0.4   Differential Method  Automated Automated Automated       Metabolic Panel (last 72 hours):  Recent  Labs   Lab Result Units 08/11/20  0226 08/12/20  0422 08/13/20  0540   Sodium mmol/L 141 143 143   Potassium mmol/L 3.9 3.6 3.4*   Chloride mmol/L 103 104 106   CO2 mmol/L 29 26 27   Glucose mg/dL 128* 101 97   BUN, Bld mg/dL 6* 6* 5*   Creatinine mg/dL 1.7* 1.8* 1.7*   Albumin g/dL 2.4*  2.4* 2.3* 2.3*   Total Bilirubin mg/dL 0.4  --   --    Alkaline Phosphatase U/L 143*  --   --    AST U/L 69*  --   --    ALT U/L 103*  --   --    Phosphorus mg/dL 4.6* 5.1* 4.7*       Vancomycin Administrations:  vancomycin given in the last 96 hours                     vancomycin in dextrose 5 % 1 gram/250 mL IVPB 1,000 mg (mg) 1,000 mg New Bag 08/14/20 0052     1,000 mg New Bag 08/12/20 2310     1,000 mg New Bag 08/11/20 2330     1,000 mg New Bag  0004                    Microbiologic Results:  Microbiology Results (last 7 days)       Procedure Component Value Units Date/Time    Urine culture [[id number]] Collected: 08/07/20 2040    Order Status: Completed Specimen: Urine Updated: 08/09/20 1031     Urine Culture, Routine No growth    Narrative:      Specimen Source->Urine    Urine culture [[id number]] Collected: 08/06/20 2350    Order Status: Completed Specimen: Urine Updated: 08/08/20 1421     Urine Culture, Routine No significant growth    Narrative:      Specimen Source->Urine    Blood culture x two cultures. Draw prior to antibiotics. [[id number]] Collected: 08/02/20 1633    Order Status: Completed Specimen: Blood Updated: 08/07/20 2322     Blood Culture, Routine No growth after 5 days.    Narrative:      Aerobic and anaerobic    Blood culture x two cultures. Draw prior to antibiotics. [[id number]] Collected: 08/02/20 1633    Order Status: Completed Specimen: Blood Updated: 08/07/20 2322     Blood Culture, Routine No growth after 5 days.    Narrative:      Aerobic and anaerobic    Urine Culture High Risk [[id number]] Collected: 08/07/20 2039    Order Status: Canceled Specimen: Urine, Catheterized

## 2020-08-14 NOTE — PROGRESS NOTES
"[facility]  Adult Nutrition  Progress Note    SUMMARY       Recommendations    Recommendation  1. Continue regular diet- send double condiments  -add full DM restriction if glucose > 180 mg/dl  2. Continue boost breeze TID  3. Weigh pt weekly, antiemetic regimen as needed, consider appetite stimulant     4. Initiate nutrition support if needed  -Can place NGT for supplemental TF Diabetisource AC @ 20 advancing to 55 ml/hr + flush per MD   (provides 1584 kcal ( 96% EEN), 79 g protein ( 1005 EPN, 1082 ml free water)  OR   -PPN D 5 AA 2.75 @ 75 ml/hr + standard lipids when kidney function improves  (provides 49 g protein (59% EPN), 1004 kcal (60% EEN))      Goals: meet at least >50% meals/supplements at f/u or start PPN  Nutrition Goal Status: goal met    Reason for Assessment    Reason For Assessment: RD follow-up  Diagnosis: LUIZ, Macrocytic Anemia  Relevant Medical History: Anemia, COPD, DM, diverticulosis, DVT, Edema, gout, HTN, HLD, reflux, osteoporosis  Interdisciplinary Rounds: did not attend  General Information Comments: 78 y/o readmitted in < 2 weeks with LUIZ. Ka and Phos WNL.  Decreased appetite and taste changes ( bland) + nausea x1.5 month (< 2 meals daily). Per son and pt this did not improve s/p LAP rosette or  Last discharge.  Eating 5-10 bites of each meal, drank 1 boost plus last night but 2 unopened on table this morning. Hot food make her most nauseous, reviewed education on tips for taste changes and nausea. Discussed low fat diet with son s/p LAP rosette. Last admit, DM diet was discussed briefly, pt thinks she is pre-diabetic and not interested in carb counting but will cut back on added sugar/soda.  8/7/20 Pt still only eating 10 bites of meals r/t nausea. All foods tastes bland and unappetizing. Boost and boost pudding she says, "make her stomach cramp," > 5 shakes unopened on tray table. Educated pt on the importance of supplements at this time, willing to try boost breeze. " "Appetite stimulant added. PPN initiated per MD but had to be canceled as pt's renal function worsening and volume overloaded.  8/11/20 Pt diet changed to regular, po intake between 25%-50%. No significant weight changes. LBM 8/10/2020.   Nutrition Discharge Planning: cardiac diet/DM diet + boost breeze and supplemental nutrition support above  8/14/20 Pt has no significant wt changes. PO intake at 50%. Labs are improving.  Nutrition Discharge Planning: cardiac diet/DM diet + boost breeze and supplemental nutrition support above    Nutrition Risk Screen    Nutrition Risk Screen: no indicators present    Nutrition/Diet History    Spiritual, Cultural Beliefs, Church Practices, Values that Affect Care: yes  Spiritual, Cultural Beliefs, Church Practices, Values that Affect Care: yes  Supplemental Drinks or Food Habits: Boost Breeze   Patient Reported Diet/Restrictions/Preferences: general, son encouraging PO intakes at home  Food Allergies: NKFA  Factors Affecting Nutritional Intake: decreased appetite, altered taste    Anthropometrics    Temp: 98.8 °F (37.1 °C)  Height Method: Stated  Height: 5' 3" (160 cm)  Height (inches): 63 in  Weight Method: Stated  Weight: 105.4 kg (232 lb 5.8 oz)  Weight (lb): 232.37 lb  Ideal Body Weight (IBW), Female: 115 lb  % Ideal Body Weight, Female (lb): 202.06 %  BMI (Calculated): 41.2  BMI Grade: greater than 40 - morbid obesity       Lab/Procedures/Meds    Pertinent Labs Reviewed: reviewed  Pertinent Labs Comments: BUN 6, GFR 33  Pertinent Medications Reviewed: reviewed  Pertinent Medications Comments: polyethylene glycol, senna, vitamin c, b12, folic acid, MVI, warfarin    Physical Findings/Assessment         Estimated/Assessed Needs  Weight Used For Calorie Calculations: 104 kg (229 lb 4.5 oz)  Energy Calorie Requirements (kcal): 1650 kcal  Energy Need Method: MSJ no AF obesity  Protein Requirements: 0.8-1.0 g protein/kg ( obesity/ LUIZ) =  g protein  Weight Used For " Protein Calculations: 104 kg  Fluid Requirements (mL): 1650 ml  Estimated Fluid Requirement Method: RDA Method  CHO Requirement: 185-206 g    Nutrition Prescription Ordered    Current Diet Order: Regular  Oral Nutrition Supplement: Boost Breeze    Evaluation of Received Nutrient/Fluid Intake    Energy Calories Required: not meeting needs  Protein Required: not meeting needs  Fluid Required: not meeting needs  Comments: LBM 8.13.2020  % Intake of Estimated Energy Needs: 25 - 50 %  % Meal Intake: 25 - 50 %    Nutrition Risk    Level of Risk/Frequency of Follow-up: 1 x weekly     Assessment and Plan     Nutrition Problem  (Moderate) chronic condition related malnutrition    Related to (etiology):   decreased appetite and taste changes    Signs and Symptoms (as evidenced by):   1) PO intakes , 75% EEN 1.5 month  2) mild edema    Interventions (treatment strategy):  fat/sodium modified diet and nutrition supplement therapy-commercial beverage collaboration with care providers  prescription medication appetite stimulant    Nutrition Diagnosis Status:   Improving         Monitor and Evaluation    Food and Nutrient Intake: energy intake, food and beverage intake  Food and Nutrient Adminstration: diet order  Knowledge/Beliefs/Attitudes: food and nutrition knowledge/skill  Physical Activity and Function: nutrition-related ADLs and IADLs  Anthropometric Measurements: weight  Biochemical Data, Medical Tests and Procedures: electrolyte and renal panel  Nutrition-Focused Physical Findings: overall appearance     Malnutrition Assessment  Scar: 20  Edema: 3+  Energy Intake (Malnutrition): less than or equal to 75% for greater than or equal to 1 month   NFPE: no wasting seen 8/4/20    Nutrition Follow-Up    RD Follow-up?: Yes     Justyna Dey, provisional LDN

## 2020-08-14 NOTE — PLAN OF CARE
Problem: Physical Therapy Goal  Goal: Physical Therapy Goal  Description: Goals to be met by: 2020     Patient will increase functional independence with mobility by performin. Supine to sit with Supervision  2. Sit to stand transfer with Supervision  3. Bed to chair transfer with Supervision using Rolling Walker  4. Gait  x 150 feet with Supervision using Rolling Walker.   5. Lower extremity exercise program x20 reps per handout, with independence    Outcome: Ongoing, Progressing   Ambulate with rw/O2 and assistance for safety.

## 2020-08-14 NOTE — PLAN OF CARE
Recommendation  1. Continue regular diet- send double condiments  -add full DM restriction if glucose > 180 mg/dl  2. Continue boost breeze TID  3. Weigh pt weekly, antiemetic regimen as needed, consider appetite stimulant     4. Initiate nutrition support if needed  -Can place NGT for supplemental TF Diabetisource AC @ 20 advancing to 55 ml/hr + flush per MD   (provides 1584 kcal ( 96% EEN), 79 g protein ( 1005 EPN, 1082 ml free water)  OR   -PPN D 5 AA 2.75 @ 75 ml/hr + standard lipids when kidney function improves  (provides 49 g protein (59% EPN), 1004 kcal (60% EEN))      Goals: meet at least >50% meals/supplements at f/u or start PPN  Nutrition Goal Status: goal met    Justyna Dey, Provisional LDN

## 2020-08-14 NOTE — PLAN OF CARE
POC reviewed with patient, patient verbalized understanding, AAOX4, VSS, no c/o pain or sob, Heparin Drip maintained, IV ABX maintained, activity maintained, tele and safety maintained, patient visualized, appears sleep, eyes closed, respirations even and unlabored, bed in lowest position, side rails up X2, call light and table within reach, no distress noted, will continue to monitor.

## 2020-08-14 NOTE — SUBJECTIVE & OBJECTIVE
"Interval History:  No new events noted overnight.  Patient underwent colonoscopy yesterday.  No source of blood loss noted on GI workup.  Patient continues to receive heparin infusion with Coumadin bridging, INR improving to 1.6 today. Patient being followed by hematology. Receiving Iron infusions. No hematuria noted.  Patient continues to have low appetite.    Review of Systems   Constitutional: Positive for activity change, appetite change and fatigue. Negative for chills, diaphoresis and fever.   HENT: Negative for ear discharge, ear pain and facial swelling.    Eyes: Negative for pain and redness.   Respiratory: Positive for cough and shortness of breath.    Cardiovascular: Positive for leg swelling.   Gastrointestinal: Negative for abdominal distention, abdominal pain, constipation, diarrhea, nausea and vomiting.        Poor appetite- patient reports things taste bad and "smell bad"  and she states she has not been hungry   Endocrine: Negative for polydipsia and polyphagia.   Genitourinary: Negative for difficulty urinating, dysuria, flank pain and hematuria.   Musculoskeletal: Positive for back pain. Negative for neck pain and neck stiffness.   Skin: Negative for color change.   Allergic/Immunologic: Negative for food allergies.   Neurological: Positive for weakness. Negative for seizures, facial asymmetry and speech difficulty.   Psychiatric/Behavioral: Negative for agitation, behavioral problems, confusion, hallucinations and suicidal ideas.     Objective:     Vital Signs (Most Recent):  Temp: 98.8 °F (37.1 °C) (08/14/20 0727)  Pulse: 95 (08/14/20 0727)  Resp: 18 (08/14/20 0727)  BP: (!) 149/63 (08/14/20 0727)  SpO2: 96 % (08/14/20 0727) Vital Signs (24h Range):  Temp:  [96.6 °F (35.9 °C)-98.8 °F (37.1 °C)] 98.8 °F (37.1 °C)  Pulse:  [85-97] 95  Resp:  [16-18] 18  SpO2:  [94 %-100 %] 96 %  BP: (142-194)/(62-84) 149/63     Weight: 105.4 kg (232 lb 5.8 oz)  Body mass index is 41.16 kg/m².    Intake/Output " Summary (Last 24 hours) at 8/14/2020 0836  Last data filed at 8/14/2020 0100  Gross per 24 hour   Intake 240 ml   Output 2050 ml   Net -1810 ml      Physical Exam  Constitutional:       General: She is not in acute distress.     Appearance: Normal appearance.   HENT:      Head: Normocephalic and atraumatic.      Mouth/Throat:      Mouth: Mucous membranes are moist.   Eyes:      General:         Right eye: No discharge.         Left eye: No discharge.      Extraocular Movements: Extraocular movements intact.      Conjunctiva/sclera: Conjunctivae normal.      Pupils: Pupils are equal, round, and reactive to light.   Neck:      Musculoskeletal: Normal range of motion and neck supple. No neck rigidity or muscular tenderness.   Cardiovascular:      Rate and Rhythm: Normal rate and regular rhythm.      Pulses: Normal pulses.      Heart sounds: Murmur present.   Pulmonary:      Effort: No respiratory distress.      Comments: Bilateral breath sounds diminished  Abdominal:      General: Bowel sounds are normal. There is no distension.      Tenderness: There is no abdominal tenderness. There is no guarding.      Comments: Obese   Genitourinary:     Comments: Not examined  Musculoskeletal: Normal range of motion.         General: Swelling present.      Comments: Generalized edema with edema also noted to upper and lower extremities   Skin:     General: Skin is warm and dry.      Capillary Refill: Capillary refill takes less than 2 seconds.   Neurological:      General: No focal deficit present.      Mental Status: She is alert and oriented to person, place, and time. Mental status is at baseline.      Cranial Nerves: No cranial nerve deficit.   Psychiatric:         Mood and Affect: Mood normal.         Behavior: Behavior normal.         Thought Content: Thought content normal.         Judgment: Judgment normal.       Lab Results   Component Value Date    WBC 8.39 08/14/2020    HGB 7.3 (L) 08/14/2020    HCT 24.4 (L) 08/14/2020      (H) 08/14/2020     08/14/2020     CMP  Sodium   Date Value Ref Range Status   08/14/2020 141 136 - 145 mmol/L Final     Potassium   Date Value Ref Range Status   08/14/2020 4.2 3.5 - 5.1 mmol/L Final     Chloride   Date Value Ref Range Status   08/14/2020 108 95 - 110 mmol/L Final     CO2   Date Value Ref Range Status   08/14/2020 24 23 - 29 mmol/L Final     Glucose   Date Value Ref Range Status   08/14/2020 97 70 - 110 mg/dL Final     BUN, Bld   Date Value Ref Range Status   08/14/2020 6 (L) 8 - 23 mg/dL Final     Creatinine   Date Value Ref Range Status   08/14/2020 1.7 (H) 0.5 - 1.4 mg/dL Final     Calcium   Date Value Ref Range Status   08/14/2020 8.1 (L) 8.7 - 10.5 mg/dL Final     Total Protein   Date Value Ref Range Status   08/11/2020 6.6 6.0 - 8.4 g/dL Final     Albumin   Date Value Ref Range Status   08/14/2020 2.3 (L) 3.5 - 5.2 g/dL Final     Total Bilirubin   Date Value Ref Range Status   08/11/2020 0.4 0.1 - 1.0 mg/dL Final     Comment:     For infants and newborns, interpretation of results should be based  on gestational age, weight and in agreement with clinical  observations.  Premature Infant recommended reference ranges:  Up to 24 hours.............<8.0 mg/dL  Up to 48 hours............<12.0 mg/dL  3-5 days..................<15.0 mg/dL  6-29 days.................<15.0 mg/dL       Alkaline Phosphatase   Date Value Ref Range Status   08/11/2020 143 (H) 55 - 135 U/L Final     AST   Date Value Ref Range Status   08/11/2020 69 (H) 10 - 40 U/L Final     ALT   Date Value Ref Range Status   08/11/2020 103 (H) 10 - 44 U/L Final     Anion Gap   Date Value Ref Range Status   08/14/2020 9 8 - 16 mmol/L Final     eGFR if    Date Value Ref Range Status   08/14/2020 33 (A) >60 mL/min/1.73 m^2 Final     eGFR if non    Date Value Ref Range Status   08/14/2020 29 (A) >60 mL/min/1.73 m^2 Final     Comment:     Calculation used to obtain the estimated glomerular  filtration  rate (eGFR) is the CKD-EPI equation.        Microbiology Results (last 7 days)     Procedure Component Value Units Date/Time    Urine culture [[id number]] Collected: 08/07/20 2040    Order Status: Completed Specimen: Urine Updated: 08/09/20 1031     Urine Culture, Routine No growth    Narrative:      Specimen Source->Urine    Urine culture [[id number]] Collected: 08/06/20 2350    Order Status: Completed Specimen: Urine Updated: 08/08/20 1421     Urine Culture, Routine No significant growth    Narrative:      Specimen Source->Urine    Blood culture x two cultures. Draw prior to antibiotics. [[id number]] Collected: 08/02/20 1633    Order Status: Completed Specimen: Blood Updated: 08/07/20 2322     Blood Culture, Routine No growth after 5 days.    Narrative:      Aerobic and anaerobic    Blood culture x two cultures. Draw prior to antibiotics. [[id number]] Collected: 08/02/20 1633    Order Status: Completed Specimen: Blood Updated: 08/07/20 2322     Blood Culture, Routine No growth after 5 days.    Narrative:      Aerobic and anaerobic    Urine Culture High Risk [[id number]] Collected: 08/07/20 2039    Order Status: Canceled Specimen: Urine, Catheterized           Radiology:  SADIE (07-):  · Hyperdynamic left ventricular systolic function. The estimated ejection fraction is 65 - 70 %.  · Normal appearing left atrial appendage. No thrombus is present in the appendage. Normal appendage velocities.  · Mild mitral regurgitation.  · Mild tricuspid regurgitation.  · No evidence of endocarditis  · PICC line at the cavoatrial junction    ECHO (07-):  · Concentric left ventricular remodeling.  · Hyperdynamic left ventricular systolic function. The estimated ejection fraction is 76%.  · Grade I (mild) left ventricular diastolic dysfunction consistent with impaired relaxation.    · CXR: Concentric left ventricular remodeling.  · Hyperdynamic left ventricular systolic function. The estimated ejection fraction is  76%.  Grade I (mild) left ventricular diastolic dysfunction consistent with impaired relaxation.    CXR: There is bibasilar atelectasis versus infiltrate similar to 07/27/2020.    CT abdomen and pelvis without contrast:  Moderate basilar atelectasis. Status post cholecystectomy without evidence for complication.    RUQ abdominal US:  Prior cholecystectomy.  Small simple cyst of the left lobe of the liver otherwise negative right upper quadrant ultrasound.    RUE venous doppler scan:  Occlusive thrombus of the right brachial vein and cephalic vein.    KUB:  Possible ileus.  Prior cholecystectomy.  Prior bilateral hip joint replacement.    Renal US: Features suggesting intrinsic renal disease.  No hydronephrosis.    Left humerus x-ray:  Degenerative changes in the humeral head and glenohumeral joint of the left shoulder with otherwise negative left humerus.  PICC line as described.    Gastric emptying study:  Normal gastric emptying time for solid substances.    EGD:   - Normal esophagus.                        - Small hiatal hernia.                        - Normal examined duodenum.                        - No specimens collected.                        -History of multifactorial anemia, in part due to                        low iron counts. 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                        - The examination was otherwise normal on direct and                        retroflexion views.                        - No specimens collected.                        -History of multifactorial anemia, in part due to                        low iron.

## 2020-08-14 NOTE — PLAN OF CARE
Plan of care reviewed with pt. KYLEOX4 throughout shift. Regular diet. Heparin drip infusing at 10 units/hr at 7.3mL/hr. Continue to Monitor Labs. VSS. Continuous telemetry monitor maintained. Safety maintained. Will continue to monitor. No complaints at this time.

## 2020-08-15 LAB
ALBUMIN SERPL BCP-MCNC: 2.4 G/DL (ref 3.5–5.2)
ANION GAP SERPL CALC-SCNC: 11 MMOL/L (ref 8–16)
APTT BLDCRRT: 59.8 SEC (ref 21–32)
BASOPHILS # BLD AUTO: 0.04 K/UL (ref 0–0.2)
BASOPHILS NFR BLD: 0.5 % (ref 0–1.9)
BUN SERPL-MCNC: 6 MG/DL (ref 8–23)
CALCIUM SERPL-MCNC: 8.3 MG/DL (ref 8.7–10.5)
CHLORIDE SERPL-SCNC: 106 MMOL/L (ref 95–110)
CO2 SERPL-SCNC: 24 MMOL/L (ref 23–29)
CREAT SERPL-MCNC: 1.8 MG/DL (ref 0.5–1.4)
DIFFERENTIAL METHOD: ABNORMAL
EOSINOPHIL # BLD AUTO: 0.4 K/UL (ref 0–0.5)
EOSINOPHIL NFR BLD: 4.9 % (ref 0–8)
ERYTHROCYTE [DISTWIDTH] IN BLOOD BY AUTOMATED COUNT: 15.8 % (ref 11.5–14.5)
EST. GFR  (AFRICAN AMERICAN): 31 ML/MIN/1.73 M^2
EST. GFR  (NON AFRICAN AMERICAN): 27 ML/MIN/1.73 M^2
GLUCOSE SERPL-MCNC: 120 MG/DL (ref 70–110)
HCT VFR BLD AUTO: 24.5 % (ref 37–48.5)
HGB BLD-MCNC: 7.3 G/DL (ref 12–16)
IMM GRANULOCYTES # BLD AUTO: 0.09 K/UL (ref 0–0.04)
IMM GRANULOCYTES NFR BLD AUTO: 1.1 % (ref 0–0.5)
INR PPP: 1.7 (ref 0.8–1.2)
LYMPHOCYTES # BLD AUTO: 2.3 K/UL (ref 1–4.8)
LYMPHOCYTES NFR BLD: 27.9 % (ref 18–48)
MCH RBC QN AUTO: 32.6 PG (ref 27–31)
MCHC RBC AUTO-ENTMCNC: 29.8 G/DL (ref 32–36)
MCV RBC AUTO: 109 FL (ref 82–98)
MONOCYTES # BLD AUTO: 0.9 K/UL (ref 0.3–1)
MONOCYTES NFR BLD: 10.7 % (ref 4–15)
NEUTROPHILS # BLD AUTO: 4.5 K/UL (ref 1.8–7.7)
NEUTROPHILS NFR BLD: 54.9 % (ref 38–73)
NRBC BLD-RTO: 0 /100 WBC
PHOSPHATE SERPL-MCNC: 4.8 MG/DL (ref 2.7–4.5)
PLATELET # BLD AUTO: 228 K/UL (ref 150–350)
PMV BLD AUTO: 9.4 FL (ref 9.2–12.9)
POTASSIUM SERPL-SCNC: 3.8 MMOL/L (ref 3.5–5.1)
PROTHROMBIN TIME: 18.6 SEC (ref 9–12.5)
RBC # BLD AUTO: 2.24 M/UL (ref 4–5.4)
SODIUM SERPL-SCNC: 141 MMOL/L (ref 136–145)
VANCOMYCIN TROUGH SERPL-MCNC: 20.1 UG/ML (ref 10–22)
WBC # BLD AUTO: 8.14 K/UL (ref 3.9–12.7)

## 2020-08-15 PROCEDURE — 94640 AIRWAY INHALATION TREATMENT: CPT

## 2020-08-15 PROCEDURE — 25000003 PHARM REV CODE 250: Performed by: NURSE PRACTITIONER

## 2020-08-15 PROCEDURE — 80069 RENAL FUNCTION PANEL: CPT

## 2020-08-15 PROCEDURE — 85730 THROMBOPLASTIN TIME PARTIAL: CPT

## 2020-08-15 PROCEDURE — 12000002 HC ACUTE/MED SURGE SEMI-PRIVATE ROOM

## 2020-08-15 PROCEDURE — 94799 UNLISTED PULMONARY SVC/PX: CPT

## 2020-08-15 PROCEDURE — 80202 ASSAY OF VANCOMYCIN: CPT

## 2020-08-15 PROCEDURE — 63600175 PHARM REV CODE 636 W HCPCS: Performed by: INTERNAL MEDICINE

## 2020-08-15 PROCEDURE — 85610 PROTHROMBIN TIME: CPT

## 2020-08-15 PROCEDURE — 36415 COLL VENOUS BLD VENIPUNCTURE: CPT

## 2020-08-15 PROCEDURE — 25000242 PHARM REV CODE 250 ALT 637 W/ HCPCS: Performed by: HOSPITALIST

## 2020-08-15 PROCEDURE — 25000003 PHARM REV CODE 250: Performed by: INTERNAL MEDICINE

## 2020-08-15 PROCEDURE — 27000221 HC OXYGEN, UP TO 24 HOURS

## 2020-08-15 PROCEDURE — 94761 N-INVAS EAR/PLS OXIMETRY MLT: CPT

## 2020-08-15 PROCEDURE — 85025 COMPLETE CBC W/AUTO DIFF WBC: CPT

## 2020-08-15 RX ADMIN — IPRATROPIUM BROMIDE AND ALBUTEROL SULFATE 3 ML: .5; 2.5 SOLUTION RESPIRATORY (INHALATION) at 07:08

## 2020-08-15 RX ADMIN — THERA TABS 1 TABLET: TAB at 08:08

## 2020-08-15 RX ADMIN — HYDRALAZINE HYDROCHLORIDE 50 MG: 25 TABLET, FILM COATED ORAL at 06:08

## 2020-08-15 RX ADMIN — FLUTICASONE FUROATE AND VILANTEROL TRIFENATATE 1 PUFF: 100; 25 POWDER RESPIRATORY (INHALATION) at 07:08

## 2020-08-15 RX ADMIN — HYDRALAZINE HYDROCHLORIDE 50 MG: 25 TABLET, FILM COATED ORAL at 08:08

## 2020-08-15 RX ADMIN — VANCOMYCIN HYDROCHLORIDE 1000 MG: 1 INJECTION, POWDER, LYOPHILIZED, FOR SOLUTION INTRAVENOUS at 01:08

## 2020-08-15 RX ADMIN — FOLIC ACID 1 MG: 1 TABLET ORAL at 08:08

## 2020-08-15 RX ADMIN — CYANOCOBALAMIN TAB 1000 MCG 2000 MCG: 1000 TAB at 08:08

## 2020-08-15 RX ADMIN — PANTOPRAZOLE SODIUM 40 MG: 40 TABLET, DELAYED RELEASE ORAL at 08:08

## 2020-08-15 RX ADMIN — HYDRALAZINE HYDROCHLORIDE 50 MG: 25 TABLET, FILM COATED ORAL at 01:08

## 2020-08-15 RX ADMIN — Medication 500 MG: at 08:08

## 2020-08-15 RX ADMIN — METOPROLOL SUCCINATE 50 MG: 50 TABLET, FILM COATED, EXTENDED RELEASE ORAL at 08:08

## 2020-08-15 RX ADMIN — IPRATROPIUM BROMIDE AND ALBUTEROL SULFATE 3 ML: .5; 2.5 SOLUTION RESPIRATORY (INHALATION) at 12:08

## 2020-08-15 RX ADMIN — WARFARIN SODIUM 7.5 MG: 2.5 TABLET ORAL at 04:08

## 2020-08-15 RX ADMIN — IPRATROPIUM BROMIDE AND ALBUTEROL SULFATE 3 ML: .5; 2.5 SOLUTION RESPIRATORY (INHALATION) at 11:08

## 2020-08-15 RX ADMIN — DOCUSATE SODIUM 50 MG AND SENNOSIDES 8.6 MG 1 TABLET: 8.6; 5 TABLET, FILM COATED ORAL at 08:08

## 2020-08-15 RX ADMIN — MONTELUKAST 10 MG: 10 TABLET, FILM COATED ORAL at 08:08

## 2020-08-15 NOTE — PLAN OF CARE
08/14/20 1945   Patient Assessment/Suction   Level of Consciousness (AVPU) alert   Respiratory Effort Normal;Unlabored   Expansion/Accessory Muscles/Retractions expansion symmetric   All Lung Fields Breath Sounds diminished   Rhythm/Pattern, Respiratory pattern regular   Cough Frequency no cough   PRE-TX-O2   O2 Device (Oxygen Therapy) nasal cannula   Humidification temp set 2   SpO2 96 %   Pulse Oximetry Type Intermittent   Pulse 72   Resp 18   Aerosol Therapy   $ Aerosol Therapy Charges Aerosol Treatment   Respiratory Treatment Status (SVN) given   Treatment Route (SVN) mask   Patient Position (SVN) sitting in chair   Post Treatment Assessment (SVN) breath sounds unchanged   Signs of Intolerance (SVN) none   Breath Sounds Post-Respiratory Treatment   Post-treatment Heart Rate (beats/min) 74   Post-treatment Resp Rate (breaths/min) 18   Incentive Spirometer   $ Incentive Spirometer Charges done with encouragement   Administration (IS) mouthpiece   Number of Repetitions (IS) 10   Level Incentive Spirometer (mL) 1000   Patient Tolerance (IS) fair

## 2020-08-15 NOTE — ASSESSMENT & PLAN NOTE
Body mass index is 41.16 kg/m².  General weight loss/lifestyle modification strategies discussed (elicit support from others; identify saboteurs; non-food rewards, etc).

## 2020-08-15 NOTE — ASSESSMENT & PLAN NOTE
Patient is identified as having Diastolic heart failure that is Chronic. CHF is currently controlled. Latest ECHO performed and demonstrates-   Results for orders placed during the hospital encounter of 07/13/20   Echo Color Flow Doppler? Yes    Narrative · Concentric left ventricular remodeling.  · Hyperdynamic left ventricular systolic function. The estimated ejection   fraction is 76%.  · Grade I (mild) left ventricular diastolic dysfunction consistent with   impaired relaxation.        Bicarb drip has been discontinued

## 2020-08-15 NOTE — PLAN OF CARE
POC reviewed with pt, understanding verbalized. Pt sat up in chair most of shift. Heparin gtt infusing per nomogram- INR therapeutic this AM. Next aptt to be drawn with morning labs tomorrow. Up with standby assist to BSC. Strict I/O. SR on tele. Safety maintained. Will monitor.

## 2020-08-15 NOTE — PLAN OF CARE
Reviewed POC with pt, pt verbalized understanding, A&O x4,  oxygen maintained, no c/o sob or pain during shift, Diet maintained, Tele maintained, VTE maintained, no wounds, self turn maintained, PICC maintained,  Visualized pt, safety intact, alarm on, bed in low position, locked, call light and personal items within reach. Will continue to monitor.               Problem: Fall Injury Risk  Goal: Absence of Fall and Fall-Related Injury  Outcome: Ongoing, Progressing     Problem: Adult Inpatient Plan of Care  Goal: Plan of Care Review  Outcome: Ongoing, Progressing  Goal: Patient-Specific Goal (Individualization)  Outcome: Ongoing, Progressing  Goal: Absence of Hospital-Acquired Illness or Injury  Outcome: Ongoing, Progressing  Goal: Optimal Comfort and Wellbeing  Outcome: Ongoing, Progressing  Goal: Readiness for Transition of Care  Outcome: Ongoing, Progressing  Goal: Rounds/Family Conference  Outcome: Ongoing, Progressing     Problem: Diabetes Comorbidity  Goal: Blood Glucose Level Within Desired Range  Outcome: Ongoing, Progressing     Problem: Infection  Goal: Infection Symptom Resolution  Outcome: Ongoing, Progressing     Problem: Bariatric Environmental Safety  Goal: Safety Maintained with Care  Outcome: Ongoing, Progressing     Problem: Electrolyte Imbalance (Acute Kidney Injury/Impairment)  Goal: Serum Electrolyte Balance  Outcome: Ongoing, Progressing     Problem: Fluid Imbalance (Acute Kidney Injury/Impairment)  Goal: Optimal Fluid Balance  Outcome: Ongoing, Progressing     Problem: Hematologic Alteration (Acute Kidney Injury/Impairment)  Goal: Hemoglobin, Hematocrit and Platelets Within Normal Range  Outcome: Ongoing, Progressing

## 2020-08-15 NOTE — PT/OT/SLP PROGRESS
Physical Therapy      Patient Name:  Sammi Abreu   MRN:  [id number]    Patient not seen today secondary to Patient fatigue. Will follow-up 8/16/2020.    Danyelle Verdugo, PTA

## 2020-08-15 NOTE — ASSESSMENT & PLAN NOTE
Multifactorial--  Poor nutrition and also history of acute blood loss  Monitor  Hematology following. Follow CBC and transfuse as needed. Patient is Restoration, receiving IV Iron.  EGD is negative.  Follow colonoscopy results.

## 2020-08-15 NOTE — SUBJECTIVE & OBJECTIVE
"Interval History:  Patient seen and examined.  Reports some bleeding from her nose yesterday.  States it was minimal.  None today.  INR subtherapeutic today.  Explained to her that discharge will be held until INR greater than 2.  She expressed understanding    Review of Systems   Constitutional: Positive for activity change, appetite change and fatigue. Negative for chills, diaphoresis and fever.   HENT: Positive for nosebleeds. Negative for ear discharge, ear pain and facial swelling.    Eyes: Negative for pain and redness.   Respiratory: Positive for cough and shortness of breath.    Cardiovascular: Positive for leg swelling.   Gastrointestinal: Negative for abdominal distention, abdominal pain, constipation, diarrhea, nausea and vomiting.        Poor appetite- patient reports things taste bad and "smell bad"  and she states she has not been hungry   Endocrine: Negative for polydipsia and polyphagia.   Genitourinary: Negative for difficulty urinating, dysuria, flank pain and hematuria.   Musculoskeletal: Positive for back pain. Negative for neck pain and neck stiffness.   Skin: Negative for color change.   Allergic/Immunologic: Negative for food allergies.   Neurological: Positive for weakness. Negative for seizures, facial asymmetry and speech difficulty.   Psychiatric/Behavioral: Negative for agitation, behavioral problems, confusion, hallucinations and suicidal ideas.     Objective:     Vital Signs (Most Recent):  Temp: 98.1 °F (36.7 °C) (08/15/20 1108)  Pulse: 82 (08/15/20 1214)  Resp: 18 (08/15/20 1214)  BP: (!) 166/88 (08/15/20 1108)  SpO2: 97 % (08/15/20 1214) Vital Signs (24h Range):  Temp:  [96.6 °F (35.9 °C)-98.6 °F (37 °C)] 98.1 °F (36.7 °C)  Pulse:  [72-94] 82  Resp:  [17-20] 18  SpO2:  [94 %-98 %] 97 %  BP: (145-196)/(64-88) 166/88     Weight: 105.4 kg (232 lb 5.8 oz)  Body mass index is 41.16 kg/m².    Intake/Output Summary (Last 24 hours) at 8/15/2020 7326  Last data filed at 8/15/2020 " 0600  Gross per 24 hour   Intake 2797.51 ml   Output 1450 ml   Net 1347.51 ml      Physical Exam  Constitutional:       General: She is not in acute distress.     Appearance: Normal appearance.   HENT:      Head: Normocephalic and atraumatic.      Mouth/Throat:      Mouth: Mucous membranes are moist.   Eyes:      General:         Right eye: No discharge.         Left eye: No discharge.      Extraocular Movements: Extraocular movements intact.      Conjunctiva/sclera: Conjunctivae normal.      Pupils: Pupils are equal, round, and reactive to light.   Neck:      Musculoskeletal: Normal range of motion and neck supple. No neck rigidity or muscular tenderness.   Cardiovascular:      Rate and Rhythm: Normal rate and regular rhythm.      Pulses: Normal pulses.      Heart sounds: Murmur present.   Pulmonary:      Effort: No respiratory distress.      Comments: Bilateral breath sounds diminished  Abdominal:      General: Bowel sounds are normal. There is no distension.      Tenderness: There is no abdominal tenderness. There is no guarding.      Comments: Obese   Genitourinary:     Comments: Not examined  Musculoskeletal: Normal range of motion.         General: Swelling present.      Comments: Generalized edema with edema also noted to upper and lower extremities   Skin:     General: Skin is warm and dry.      Capillary Refill: Capillary refill takes less than 2 seconds.   Neurological:      General: No focal deficit present.      Mental Status: She is alert and oriented to person, place, and time. Mental status is at baseline.      Cranial Nerves: No cranial nerve deficit.   Psychiatric:         Mood and Affect: Mood normal.         Behavior: Behavior normal.         Thought Content: Thought content normal.         Judgment: Judgment normal.         Significant Labs: All pertinent labs within the past 24 hours have been reviewed.    Significant Imaging: I have reviewed and interpreted all pertinent imaging  results/findings within the past 24 hours.

## 2020-08-15 NOTE — ASSESSMENT & PLAN NOTE
Anorexia-patient continues report that food smells and tastes bad   Monitor volume status closely  Continue p.o. supplement

## 2020-08-15 NOTE — PROGRESS NOTES
Nephrology Progress Note    Patient Name: Sammi Abreu  MRN: [id number]    Patient Class: IP- Inpatient   Admission Date: 8/2/2020  Length of Stay: 12 days  Date of Service: 8/15/2020    Attending Physician: Laura Wellington MD  Primary Care Provider: Osmani Villatoro MD    Reason for Consult: luiz/ckd3/hyponatremia/acidosis/rhabdomyalisis/mrsa bacteremia/anemia/htn    Chief Complaint   Patient presents with    Post-op Problem     pain to left lower chest and BLE after surgery on 7/27       SUBJECTIVE:     HPI: 79F Lizzette mack was treated in hospital for MRSA bacteremia, had lap rosette 7/13, complicated by PE and DVT, was d/c 7/29 on daptomycin and returned to ER 5 days later with abdominal pain, elevated CPK and LFTs, LUIZ with modest rise in sCr. CT abdomen w/out contrast looked OK. She was given NS and heparin gtt, Dapto was changed with Vanco.    8/6 VSS, no new complains.  8/7 VSS, no new complains. Appreciate ID input.  8/8 some spikes in BP, on 2L NC, UOP 1.5L  8/9 intermittent spikes in BP, better this AM, on 2L NC, UOP 1.8L, c/o swelling  8/10  Unavailable for exam.  Sitting on the commode   8/11  At gastric emptying study  8/12  GES neg.  Still with difficulty eating.  Food makes her nauseated  8/13  Tired after colon prep.  No chest pain or sob  8/14  C/o no taste (extensive w/u neg).  Otherwise she has no complaints  8/15  Renal function w/out much change.  UOP 2L.  Hgb 7.3, pt is JW.  No new complaints.    Outpatient meds:  Current Facility-Administered Medications on File Prior to Encounter   Medication Dose Route Frequency Provider Last Rate Last Dose    lactated ringers infusion   Intravenous Continuous Gino Reid MD 10 mL/hr at 07/13/20 1308      lidocaine (PF) 10 mg/ml (1%) injection 10 mg  1 mL Intradermal Once Gino Reid MD         Current Outpatient Medications on File Prior to Encounter   Medication Sig Dispense Refill    acetaminophen (TYLENOL) 325 MG tablet Take 2 tablets  (650 mg total) by mouth every 6 (six) hours as needed (Do not take with any other Tylenol or acetaminophen containing products).  0    albuterol-ipratropium (DUO-NEB) 2.5 mg-0.5 mg/3 mL nebulizer solution Take 3 mLs by nebulization every 8 (eight) hours. 270 mL 0    apixaban (ELIQUIS) 5 mg Tab Take 1 tablet (5 mg total) by mouth 2 (two) times daily. 60 tablet 3    ascorbic acid, vitamin C, (VITAMIN C) 100 MG tablet Take 5 tablets (500 mg total) by mouth every evening.      esomeprazole (NEXIUM) 20 MG capsule Take 1 capsule (20 mg total) by mouth before breakfast. 30 capsule 2    fluticasone (FLONASE) 50 mcg/actuation nasal spray 2 sprays (100 mcg total) by Each Nare route once daily. 3 Bottle 3    folic acid (FOLVITE) 1 MG tablet Take 1 tablet (1 mg total) by mouth once daily. 30 tablet 0    metoprolol succinate (TOPROL-XL) 50 MG 24 hr tablet Take 1 tablet (50 mg total) by mouth once daily. 90 tablet 3    montelukast (SINGULAIR) 10 mg tablet Take 1 tablet (10 mg total) by mouth every evening. 90 tablet 3    sodium chloride 0.9% SolP 50 mL with DAPTOmycin 350 mg SolR 1,000 mg Inject 1,000 mg into the vein once daily.      spironolactone (ALDACTONE) 25 MG tablet Take 1 tablet (25 mg total) by mouth once daily. 90 tablet 6    budesonide-formoterol 160-4.5 mcg (SYMBICORT) 160-4.5 mcg/actuation HFAA Inhale 2 puffs into the lungs every 12 (twelve) hours. Controller 3 Inhaler 3    cyanocobalamin, vitamin B-12, 2,500 mcg Lozg Place 2 tablets under the tongue once daily. 60 lozenge 11    diclofenac (VOLTAREN) 25 MG TbEC Take 1 tablet (25 mg total) by mouth 3 (three) times daily as needed. 15 tablet 0    HYDROcodone-acetaminophen (NORCO) 5-325 mg per tablet Take 1 tablet by mouth every 6 (six) hours as needed for Pain. (Patient not taking: Reported on 7/30/2020) 15 tablet 0    lactulose (CHRONULAC) 10 gram/15 mL solution Take 30 mLs (20 g total) by mouth 3 (three) times daily. 2 Teaspoon(s) Oral PRN Every  evening. (Patient not taking: Reported on 7/30/2020) 500 mL 3    multivitamin capsule Take 1 capsule by mouth once daily.      ondansetron (ZOFRAN-ODT) 4 MG TbDL Take 1 tablet (4 mg total) by mouth every 8 (eight) hours as needed. 20 tablet 0       Scheduled meds:   albuterol-ipratropium  3 mL Nebulization Q6H    ascorbic acid (vitamin C)  500 mg Oral QHS    cyanocobalamin  2,000 mcg Oral Daily    fluticasone furoate-vilanteroL  1 puff Inhalation Daily    folic acid  1 mg Oral Daily    hydrALAZINE  10 mg Intravenous Once    hydrALAZINE  50 mg Oral Q8H    metoprolol succinate  50 mg Oral Daily    montelukast  10 mg Oral QHS    multivitamin  1 tablet Oral Daily    pantoprazole  40 mg Oral Daily    polyethylene glycol  17 g Oral Daily    senna-docusate 8.6-50 mg  1 tablet Oral BID    vancomycin (VANCOCIN) IVPB  1,000 mg Intravenous Q24H    warfarin  7.5 mg Oral Daily       Infusions:   sodium chloride 0.9%      sodium chloride 0.9% 100 mL/hr at 08/13/20 1100    heparin (porcine) in D5W 10 Units/kg/hr (08/14/20 1456)       PRN meds:  acetaminophen, aluminum-magnesium hydroxide-simethicone, benzonatate, bisacodyL, dextrose 50%, dextrose 50%, glucagon (human recombinant), glucose, glucose, heparin (PORCINE), heparin (PORCINE), magnesium oxide, magnesium oxide, melatonin, morphine, ondansetron, potassium chloride, potassium chloride, potassium chloride, sodium chloride 0.9%, Pharmacy to dose Vancomycin consult **AND** vancomycin - pharmacy to dose    Review of Systems:  negative    OBJECTIVE:     Vital Signs and IO (Last 24H):  Temp:  [96.6 °F (35.9 °C)-98.6 °F (37 °C)]   Pulse:  [72-94]   Resp:  [17-20]   BP: (145-196)/(64-80)   SpO2:  [94 %-98 %]   I/O last 3 completed shifts:  In: 3277.5 [P.O.:1240; I.V.:1287.5; IV Piggyback:750]  Out: 2550 [Urine:2550]    Wt Readings from Last 5 Encounters:   08/14/20 105.4 kg (232 lb 5.8 oz)   07/30/20 99.9 kg (220 lb 3.8 oz)   07/29/20 102.7 kg (226 lb 6.6 oz)    07/13/20 97.5 kg (215 lb)   07/09/20 104.9 kg (231 lb 4.2 oz)     Physical Exam:    Constitutional: nad, aao x 3  Heart: rrr, no m/r/g, wwp, + edema  Lungs: ctab, no w/r/r/c, no lb  Abdomen: s/nt/nd, +BS    Body mass index is 41.16 kg/m².    Laboratory:  Recent Labs   Lab 08/13/20  0540 08/14/20  0548 08/15/20  0454    141 141   K 3.4* 4.2 3.8    108 106   CO2 27 24 24   BUN 5* 6* 6*   CREATININE 1.7* 1.7* 1.8*   ESTGFRAFRICA 33* 33* 31*   EGFRNONAA 29* 29* 27*   GLU 97 97 120*       Recent Labs   Lab 08/13/20 0540 08/14/20  0548 08/15/20  0454   CALCIUM 8.2* 8.1* 8.3*   ALBUMIN 2.3* 2.3* 2.4*   PHOS 4.7* 4.4 4.8*       Recent Labs   Lab 12/22/17  1141 06/22/18  0848 12/10/18  0945   PTH, Intact 57.0 115.0 H 81.0 H       No results for input(s): POCTGLUCOSE in the last 168 hours.    Recent Labs   Lab 08/03/20  0418 08/04/20  0030 08/05/20  0116   Hemoglobin A1C 5.7 H 5.9 H 5.9 H       Recent Labs   Lab 08/13/20  0540 08/14/20  0548 08/15/20  0455   WBC 8.06 8.39 8.14   HGB 7.5* 7.3* 7.3*   HCT 25.1* 24.4* 24.5*    223 228   * 112* 109*   MCHC 29.9* 29.9* 29.8*   MONO 12.0  1.0 11.3  1.0 10.7  0.9       Recent Labs   Lab 08/11/20  0226  08/13/20  0540 08/14/20  0548 08/15/20  0454   BILITOT 0.4  --   --   --   --    BILIDIR 0.2  --   --   --   --    PROT 6.6  --   --   --   --    ALBUMIN 2.4*  2.4*   < > 2.3* 2.3* 2.4*   ALKPHOS 143*  --   --   --   --    *  --   --   --   --    AST 69*  --   --   --   --     < > = values in this interval not displayed.       Recent Labs   Lab 06/07/20  0952  08/02/20  1652 08/06/20  2350 08/07/20 2040   Color, UA Yellow   < > Yellow Brown A Yellow   Appearance, UA Clear   < > Clear Cloudy A Cloudy A   pH, UA 7.0   < > 7.0 >8.0 A >8.0 A   Specific [place], UA 1.015   < > 1.010 1.010 1.010   Protein, UA Negative   < > 2+ A 1+ A Trace A   Glucose, UA Negative   < > Negative Negative Negative   Ketones, UA Negative   < > Negative Negative  Negative   Urobilinogen, UA Negative   < > Negative Negative Negative   Bilirubin (UA) Negative   < > Negative Negative Negative   Occult Blood UA Negative   < > 3+ A 3+ A 3+ A   Nitrite, UA Negative   < > Negative Negative Negative   RBC, UA 0   < > 2 >100 H >100 H   WBC, UA 2   < > 1 48 H 55 H   Bacteria Negative   < > None Moderate A Few A   Hyaline Casts, UA 3 A  --  0 0  --     < > = values in this interval not displayed.       Recent Labs   Lab 07/13/20  1946   POC PH 7.406   POC PCO2 36.7   POC HCO3 23.1 L   POC PO2 70 L   POC SATURATED O2 94 L   POC BE -2   Sample ARTERIAL       Microbiology Results (last 7 days)     Procedure Component Value Units Date/Time    Urine culture [[id number]] Collected: 08/07/20 2040    Order Status: Completed Specimen: Urine Updated: 08/09/20 1031     Urine Culture, Routine No growth    Narrative:      Specimen Source->Urine    Urine culture [[id number]] Collected: 08/06/20 2350    Order Status: Completed Specimen: Urine Updated: 08/08/20 1421     Urine Culture, Routine No significant growth    Narrative:      Specimen Source->Urine          ASSESSMENT/PLAN:     Active Hospital Problems    Diagnosis  POA    *LUIZ (acute kidney injury) [N17.9]  Yes    Anorexia [R63.0]  Yes    Hematuria [R31.9]  No    Patient is Quaker [Z78.9]  Yes    Acute deep vein thrombosis (DVT) of right upper extremity [I82.621]  Yes     Occlusive thrombus of the right brachial vein and cephalic vein      Elevated ferritin level [R79.89]  Yes    Hyponatremia [E87.1]  Yes    Debility [R53.81]  Yes    Liver cyst [K76.89]  Yes    Non-traumatic rhabdomyolysis [M62.82]  Yes    Chronic diastolic CHF (congestive heart failure) [I50.32]  Yes    Pulmonary infarct [I26.99]  Yes    Atelectasis [J98.11]  Yes    Moderate protein-calorie malnutrition [E44.0]  Yes    Anemia [D64.9]  Yes    MRSA bacteremia [R78.81]  Yes    Elevated troponin [R79.89]  Yes    Bilateral pulmonary embolism [I26.99]   Yes    COPD (chronic obstructive pulmonary disease) [J44.9]  Yes    Transaminitis [R74.0]  Yes    Morbid obesity [E66.01]  Yes    Pulmonary hypertension [I27.20]  Yes    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        8/3/2020 16:24 8/4/2020 00:30 8/5/2020 01:16 8/6/2020 06:43 8/7/2020 04:28   CPK 82850 (H) >74151 (H) 20222 (H) 25841 (H) 8326 (H)     Anemia of CKD  [employer]ov[employer] witness  Hypercoagulable state with thrombosis     On IV iron  Heme/onc following  If she stays through the weekend I recommend decreasing frequency of blood draws    HTN/Edema  Bumped hydralazine to 50mg TID 8/9  D/C bicarb gtt.  Aldactone is on hold for now.    LUIZ/CKD III  Stable     Acute rhabdomyolysis 2/2 daptomycin  CPK is trending down.    MRSA bacteremia  On vancomycin    Hypokalemia  --better    Alkalosis  --better after D/C bicarb gtt.    Thank you for allowing us to participate in the care of your patient  We will follow the patient and provide recommendations as needed.    Peg Velazquez NP    [facility]  [address] [place] [address]  OLIVIA Garg [address]    [phone] - tel  [phone] - fax    8/15/2020

## 2020-08-15 NOTE — ASSESSMENT & PLAN NOTE
Patient previously on Eliquis at home but developed additional DVT while on it-  Patient currently on  IV heparin+ Coumadin bridging, continue Coumadin 7.5 mg dose.  Hesitant to increase at this time given nose bleed yesterday.  INR goal 2-3. Pulmonology and Hematology following. Fall precautions and bleeding risks reviewed with patient.   Patient will need lifelong anticoagulation

## 2020-08-15 NOTE — CARE UPDATE
08/15/20 0728   Patient Assessment/Suction   Level of Consciousness (AVPU) alert   Respiratory Effort Normal;Unlabored   All Lung Fields Breath Sounds diminished   Cough Type good;nonproductive   PRE-TX-O2   O2 Device (Oxygen Therapy) nasal cannula   $ Is the patient on Low Flow Oxygen? Yes   Flow (L/min) 2   Oxygen Concentration (%) 28   SpO2 97 %   Pulse Oximetry Type Intermittent   $ Pulse Oximetry - Multiple Charge Pulse Oximetry - Multiple   Pulse 92   Resp 18   Inhaler   $ Inhaler Charges MDI (Metered Dose [place]) Treatment   Respiratory Treatment Status (Inhaler) given   Treatment Route (Inhaler) mouthpiece   Patient Position (Inhaler) [place]'s   Post Treatment Assessment (Inhaler) breath sounds unchanged   Signs of Intolerance (Inhaler) none

## 2020-08-15 NOTE — PROGRESS NOTES
[facility] Hospital Medicine  Progress Note    Patient Name: Sammi Abreu  MRN: [id number]  Patient Class: IP- Inpatient   Admission Date: 8/2/2020  Length of Stay: 12 days  Attending Physician: Laura Wellington MD  Primary Care Provider: Osmani Villatoro MD        Subjective:     Principal Problem:LUIZ (acute kidney injury)        HPI:  Sammi Abreu is a 79-year-old female with past medical history significant for arthritis and chronic back pain, fibromyalgia, glaucoma, hyperlipidemia, hypertension, sleep apnea with history of noncompliance with CPAP, morbid obesity, hypertension, GERD, COPD, diabetes type 2, prior DVT, and gallstones who presented to the emergency department tonight with complaints of right lower quadrant pain x3 days.  Of note the patient is also a [employer].   Patient was discharged from this facility on 07/29/2020 after being treated for bilateral pulmonary embolism.  Patient is currently on Eliquis.  Patient underwent a CT abdomen pelvis while in the emergency department which identified surgical changes but nothing acute.  Patient is noted to have a mild LUIZ.  Her troponin is elevated at 0.41 which is consistent with prior levels.  She will be admitted to the service of hospital Medicine for continued treatment.    Overview/Hospital Course:  Patient monitor closely during hospitalization.  She was placed on continuous telemetry monitoring.  She was noted to have rhabdomyolysis with CPK greater than 62939 and LUIZ.  Nephrology was consulted.  She was noted to have metabolic acidosis initiated on bicarb drip.  Daptomycin was held and ID was consulted.  She was initiated on IV vancomycin.  She was noted have increased right upper extremity swelling.  Right upper extremity ultrasound demonstrated occlusive thrombus of the right brachial vein and cephalic vein. She was initiated on heparin drip and her Eliquis was held.  There was concern for Eliquis failure.   "Hematology consulted.  PT and OT were consulted for debility.  Patient with ongoing anorexia and poor p.o. intake. Dietary was consulted for protein calorie malnutrition.  It was recommended patient be placed on TPN lipids.  Her renal status was monitored closely and she remain on bicarb drip with plans to start TPN once acute kidney injury resolved if no improvement in her p.o. intake.  Patient was noted to have some hematuria during her stay.  A retroperitoneal ultrasound was ordered.  Hematuria has resolved.  Patient to follow-up with urologist as outpatient.  Patient also underwent endoscopic evaluation for anemia by GI specialist which has been negative.  Hematology service recommended Coumadin initiation at place of Eliquis.  Presently patient is on heparin-Coumadin bridging, awaiting therapeutic INR.  Patient has declined skilled nursing facility and LTAC placement.  Patient is expected to be discharged when her INR is 2 or greater with home health, home infusion therapy until September 2, 2020 as per recommendations by ID specialist.  Patient to also receive home physical therapy.    Interval History:  Patient seen and examined.  Reports some bleeding from her nose yesterday.  States it was minimal.  None today.  INR subtherapeutic today.  Explained to her that discharge will be held until INR greater than 2.  She expressed understanding    Review of Systems   Constitutional: Positive for activity change, appetite change and fatigue. Negative for chills, diaphoresis and fever.   HENT: Positive for nosebleeds. Negative for ear discharge, ear pain and facial swelling.    Eyes: Negative for pain and redness.   Respiratory: Positive for cough and shortness of breath.    Cardiovascular: Positive for leg swelling.   Gastrointestinal: Negative for abdominal distention, abdominal pain, constipation, diarrhea, nausea and vomiting.        Poor appetite- patient reports things taste bad and "smell bad"  and she " states she has not been hungry   Endocrine: Negative for polydipsia and polyphagia.   Genitourinary: Negative for difficulty urinating, dysuria, flank pain and hematuria.   Musculoskeletal: Positive for back pain. Negative for neck pain and neck stiffness.   Skin: Negative for color change.   Allergic/Immunologic: Negative for food allergies.   Neurological: Positive for weakness. Negative for seizures, facial asymmetry and speech difficulty.   Psychiatric/Behavioral: Negative for agitation, behavioral problems, confusion, hallucinations and suicidal ideas.     Objective:     Vital Signs (Most Recent):  Temp: 98.1 °F (36.7 °C) (08/15/20 1108)  Pulse: 82 (08/15/20 1214)  Resp: 18 (08/15/20 1214)  BP: (!) 166/88 (08/15/20 1108)  SpO2: 97 % (08/15/20 1214) Vital Signs (24h Range):  Temp:  [96.6 °F (35.9 °C)-98.6 °F (37 °C)] 98.1 °F (36.7 °C)  Pulse:  [72-94] 82  Resp:  [17-20] 18  SpO2:  [94 %-98 %] 97 %  BP: (145-196)/(64-88) 166/88     Weight: 105.4 kg (232 lb 5.8 oz)  Body mass index is 41.16 kg/m².    Intake/Output Summary (Last 24 hours) at 8/15/2020 1249  Last data filed at 8/15/2020 0600  Gross per 24 hour   Intake 2797.51 ml   Output 1450 ml   Net 1347.51 ml      Physical Exam  Constitutional:       General: She is not in acute distress.     Appearance: Normal appearance.   HENT:      Head: Normocephalic and atraumatic.      Mouth/Throat:      Mouth: Mucous membranes are moist.   Eyes:      General:         Right eye: No discharge.         Left eye: No discharge.      Extraocular Movements: Extraocular movements intact.      Conjunctiva/sclera: Conjunctivae normal.      Pupils: Pupils are equal, round, and reactive to light.   Neck:      Musculoskeletal: Normal range of motion and neck supple. No neck rigidity or muscular tenderness.   Cardiovascular:      Rate and Rhythm: Normal rate and regular rhythm.      Pulses: Normal pulses.      Heart sounds: Murmur present.   Pulmonary:      Effort: No respiratory  distress.      Comments: Bilateral breath sounds diminished  Abdominal:      General: Bowel sounds are normal. There is no distension.      Tenderness: There is no abdominal tenderness. There is no guarding.      Comments: Obese   Genitourinary:     Comments: Not examined  Musculoskeletal: Normal range of motion.         General: Swelling present.      Comments: Generalized edema with edema also noted to upper and lower extremities   Skin:     General: Skin is warm and dry.      Capillary Refill: Capillary refill takes less than 2 seconds.   Neurological:      General: No focal deficit present.      Mental Status: She is alert and oriented to person, place, and time. Mental status is at baseline.      Cranial Nerves: No cranial nerve deficit.   Psychiatric:         Mood and Affect: Mood normal.         Behavior: Behavior normal.         Thought Content: Thought content normal.         Judgment: Judgment normal.         Significant Labs: All pertinent labs within the past 24 hours have been reviewed.    Significant Imaging: I have reviewed and interpreted all pertinent imaging results/findings within the past 24 hours.      Assessment/Plan:      * LUIZ (acute kidney injury)  Monitor  Follow Nephrology recommendation and BMP.              Anorexia  Gastric emptying study is negative.  Follow GI recommendations and colonoscopy results.  EGD results are negative.  Continue proton pump inhibitor.      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 Follow CBC.       Hematuria  Hematuria noted-patient currently remains on IV heparin drip for history of bilateral pulmonary emboli and recurrent DVT     Resolved.  Follow hematology recommendations.      Liver cyst  Noted  Will need outpatient follow-up      Debility  Fall and skin precautions  Continue physical therapy and occupational therapy      Hyponatremia  Resolved for now-monitor for recurrence      Elevated ferritin  level  Noted      Acute deep vein thrombosis (DVT) of right upper extremity  Patient developed new right upper extremity DVT despite being on apixaban-  Patient currently remains on IV heparin drip  Hematology following  Patient will need lifelong anticoagulation  Hematology workup in progress      Non-traumatic rhabdomyolysis  It was felt this is secondary to daptomycin. Trend CPK daily-improving. Follow ID and nephrology recommendations.     Daptomycin was discontinued on admission and patient was placed on IV vancomycin      Anemia  Multifactorial--  Poor nutrition and also history of acute blood loss  Monitor  Hematology following. Follow CBC and transfuse as needed. Patient is Caodaism, receiving IV Iron.  EGD is negative.  Follow colonoscopy results.      Moderate protein-calorie malnutrition  Anorexia-patient continues report that food smells and tastes bad   Monitor volume status closely  Continue p.o. supplement        Atelectasis  Monitor oxygen saturation. Encourage OOB and IS.      Pulmonary infarct  Due to bilateral pulmonary emboli.   Pulmonary following      Chronic diastolic CHF (congestive heart failure)  Patient is identified as having Diastolic heart failure that is Chronic. CHF is currently controlled. Latest ECHO performed and demonstrates-   Results for orders placed during the hospital encounter of 07/13/20   Echo Color Flow Doppler? Yes    Narrative · Concentric left ventricular remodeling.  · Hyperdynamic left ventricular systolic function. The estimated ejection   fraction is 76%.  · Grade I (mild) left ventricular diastolic dysfunction consistent with   impaired relaxation.        Bicarb drip has been discontinued    MRSA bacteremia  Previously receiving daptomycin as outpatient-now on IV vancomycin  Orders as per infectious disease.  Continue intravenous antibiotic therapy until September 2, 2020.    Elevated troponin  Noted- chronically elevated    Bilateral pulmonary  embolism  Patient previously on Eliquis at home but developed additional DVT while on it-  Patient currently on  IV heparin+ Coumadin bridging, continue Coumadin 7.5 mg dose.  Hesitant to increase at this time given nose bleed yesterday.  INR goal 2-3. Pulmonology and Hematology following. Fall precautions and bleeding risks reviewed with patient.   Patient will need lifelong anticoagulation      COPD (chronic obstructive pulmonary disease)  Monitor O2 sats, supplement O2 as needed  Orders as per pulmonology        Transaminitis  Monitor-trending downward. Felt possibley related to daptomycin.  Possiblely due to clot burden.  Follow GI recommendations.        Morbid obesity  Body mass index is 41.16 kg/m².  General weight loss/lifestyle modification strategies discussed (elicit support from others; identify saboteurs; non-food rewards, etc).        Pulmonary hypertension  Noted  Orders as per pulmonology      Hypertension associated with diabetes  Monitor  Continue current treatment  Titrate medications as needed        VTE Risk Mitigation (From admission, onward)         Ordered     warfarin tablet 7.5 mg  Daily      08/12/20 0847     heparin 25,000 units in dextrose 5% 250 mL (100 units/mL) infusion HIGH INTENSITY nomogram - [facility]  Continuous     Question:  Heparin Infusion Adjustment (DO NOT MODIFY ANSWER)  Answer:  [email or web address]\Images\Pharmacy\HeparinInfusions\heparin HIGH INTENSITY nomogram for [facility] [id number]    08/03/20 1609     heparin 25,000 units in dextrose 5% (100 units/ml) IV bolus from bag - ADDITIONAL PRN BOLUS - 60 units/kg  As needed (PRN)     Question:  Heparin Infusion Adjustment (DO NOT MODIFY ANSWER)  Answer:  [email or web address]\Images\Pharmacy\HeparinInfusions\heparin HIGH INTENSITY nomogram for [facility] [id number].pdf    08/03/20 1609     heparin 25,000 units in dextrose 5% (100 units/ml) IV bolus from bag - ADDITIONAL PRN BOLUS - 30 units/kg  As needed (PRN)     Question:  Heparin Infusion  Adjustment (DO NOT MODIFY ANSWER)  Answer:  [email or web address]\Images\Pharmacy\HeparinInfusions\heparin HIGH INTENSITY nomogram for [facility] [id number]    08/03/20 1609     IP VTE HIGH RISK PATIENT  Once      08/02/20 2034     Place sequential compression device  Until discontinued      08/02/20 2034                Discharge Planning   PREM:      Code Status: Full Code   Is the patient medically ready for discharge?:     Reason for patient still in hospital (select all that apply): Other (specify) Awaiting therapeutic INR  Discharge Plan A: Home Health                  Laura Wellington MD  Department of Hospital Medicine   [facility]

## 2020-08-16 VITALS
RESPIRATION RATE: 16 BRPM | TEMPERATURE: 98 F | BODY MASS INDEX: 41.18 KG/M2 | DIASTOLIC BLOOD PRESSURE: 66 MMHG | HEIGHT: 63 IN | OXYGEN SATURATION: 95 % | WEIGHT: 232.38 LBS | HEART RATE: 74 BPM | SYSTOLIC BLOOD PRESSURE: 143 MMHG

## 2020-08-16 LAB
ALBUMIN SERPL BCP-MCNC: 2.3 G/DL (ref 3.5–5.2)
ANION GAP SERPL CALC-SCNC: 10 MMOL/L (ref 8–16)
APTT BLDCRRT: 122.7 SEC (ref 21–32)
BASOPHILS # BLD AUTO: 0.03 K/UL (ref 0–0.2)
BASOPHILS NFR BLD: 0.3 % (ref 0–1.9)
BUN SERPL-MCNC: 7 MG/DL (ref 8–23)
CALCIUM SERPL-MCNC: 8.3 MG/DL (ref 8.7–10.5)
CHLORIDE SERPL-SCNC: 106 MMOL/L (ref 95–110)
CO2 SERPL-SCNC: 25 MMOL/L (ref 23–29)
CREAT SERPL-MCNC: 1.9 MG/DL (ref 0.5–1.4)
DIFFERENTIAL METHOD: ABNORMAL
EOSINOPHIL # BLD AUTO: 0.4 K/UL (ref 0–0.5)
EOSINOPHIL NFR BLD: 4.6 % (ref 0–8)
ERYTHROCYTE [DISTWIDTH] IN BLOOD BY AUTOMATED COUNT: 15.9 % (ref 11.5–14.5)
EST. GFR  (AFRICAN AMERICAN): 29 ML/MIN/1.73 M^2
EST. GFR  (NON AFRICAN AMERICAN): 25 ML/MIN/1.73 M^2
GLUCOSE SERPL-MCNC: 122 MG/DL (ref 70–110)
H PYLORI AG STL QL IA: NOT DETECTED
HCT VFR BLD AUTO: 24.4 % (ref 37–48.5)
HGB BLD-MCNC: 7.2 G/DL (ref 12–16)
IMM GRANULOCYTES # BLD AUTO: 0.1 K/UL (ref 0–0.04)
IMM GRANULOCYTES NFR BLD AUTO: 1.1 % (ref 0–0.5)
INR PPP: 2 (ref 0.8–1.2)
LYMPHOCYTES # BLD AUTO: 2.5 K/UL (ref 1–4.8)
LYMPHOCYTES NFR BLD: 28.4 % (ref 18–48)
MCH RBC QN AUTO: 32.6 PG (ref 27–31)
MCHC RBC AUTO-ENTMCNC: 29.5 G/DL (ref 32–36)
MCV RBC AUTO: 110 FL (ref 82–98)
MONOCYTES # BLD AUTO: 0.9 K/UL (ref 0.3–1)
MONOCYTES NFR BLD: 10.3 % (ref 4–15)
NEUTROPHILS # BLD AUTO: 4.8 K/UL (ref 1.8–7.7)
NEUTROPHILS NFR BLD: 55.3 % (ref 38–73)
NRBC BLD-RTO: 0 /100 WBC
PHOSPHATE SERPL-MCNC: 5.5 MG/DL (ref 2.7–4.5)
PLATELET # BLD AUTO: 235 K/UL (ref 150–350)
PMV BLD AUTO: 10.6 FL (ref 9.2–12.9)
POTASSIUM SERPL-SCNC: 3.7 MMOL/L (ref 3.5–5.1)
PROTHROMBIN TIME: 21.4 SEC (ref 9–12.5)
RBC # BLD AUTO: 2.21 M/UL (ref 4–5.4)
SODIUM SERPL-SCNC: 141 MMOL/L (ref 136–145)
WBC # BLD AUTO: 8.74 K/UL (ref 3.9–12.7)

## 2020-08-16 PROCEDURE — 25000003 PHARM REV CODE 250: Performed by: NURSE PRACTITIONER

## 2020-08-16 PROCEDURE — 85025 COMPLETE CBC W/AUTO DIFF WBC: CPT

## 2020-08-16 PROCEDURE — 85730 THROMBOPLASTIN TIME PARTIAL: CPT

## 2020-08-16 PROCEDURE — 94640 AIRWAY INHALATION TREATMENT: CPT

## 2020-08-16 PROCEDURE — 80069 RENAL FUNCTION PANEL: CPT

## 2020-08-16 PROCEDURE — 94761 N-INVAS EAR/PLS OXIMETRY MLT: CPT

## 2020-08-16 PROCEDURE — 25000242 PHARM REV CODE 250 ALT 637 W/ HCPCS: Performed by: HOSPITALIST

## 2020-08-16 PROCEDURE — 25000003 PHARM REV CODE 250: Performed by: INTERNAL MEDICINE

## 2020-08-16 PROCEDURE — 97116 GAIT TRAINING THERAPY: CPT | Mod: CQ

## 2020-08-16 PROCEDURE — 27000221 HC OXYGEN, UP TO 24 HOURS

## 2020-08-16 PROCEDURE — 85610 PROTHROMBIN TIME: CPT

## 2020-08-16 PROCEDURE — 63600175 PHARM REV CODE 636 W HCPCS: Performed by: INTERNAL MEDICINE

## 2020-08-16 PROCEDURE — 36415 COLL VENOUS BLD VENIPUNCTURE: CPT

## 2020-08-16 PROCEDURE — 94799 UNLISTED PULMONARY SVC/PX: CPT

## 2020-08-16 RX ORDER — HYDRALAZINE HYDROCHLORIDE 50 MG/1
50 TABLET, FILM COATED ORAL EVERY 8 HOURS
Qty: 90 TABLET | Refills: 0 | Status: SHIPPED | OUTPATIENT
Start: 2020-08-16 | End: 2020-08-16

## 2020-08-16 RX ORDER — WARFARIN 7.5 MG/1
7.5 TABLET ORAL DAILY
Qty: 30 TABLET | Refills: 0 | Status: SHIPPED | OUTPATIENT
Start: 2020-08-16 | End: 2020-08-16

## 2020-08-16 RX ORDER — CLONIDINE HYDROCHLORIDE 0.1 MG/1
0.1 TABLET ORAL ONCE
Status: COMPLETED | OUTPATIENT
Start: 2020-08-16 | End: 2020-08-16

## 2020-08-16 RX ORDER — VANCOMYCIN HCL IN 5 % DEXTROSE 1G/250ML
1000 PLASTIC BAG, INJECTION (ML) INTRAVENOUS
Start: 2020-08-16 | End: 2020-09-01

## 2020-08-16 RX ORDER — WARFARIN 7.5 MG/1
7.5 TABLET ORAL DAILY
Qty: 30 TABLET | Refills: 1 | Status: SHIPPED | OUTPATIENT
Start: 2020-08-16 | End: 2020-09-09 | Stop reason: SDUPTHER

## 2020-08-16 RX ORDER — HYDRALAZINE HYDROCHLORIDE 50 MG/1
50 TABLET, FILM COATED ORAL EVERY 8 HOURS
Qty: 90 TABLET | Refills: 1 | Status: SHIPPED | OUTPATIENT
Start: 2020-08-16 | End: 2020-11-23 | Stop reason: SDUPTHER

## 2020-08-16 RX ADMIN — CLONIDINE HYDROCHLORIDE 0.1 MG: 0.1 TABLET ORAL at 02:08

## 2020-08-16 RX ADMIN — FLUTICASONE FUROATE AND VILANTEROL TRIFENATATE 1 PUFF: 100; 25 POWDER RESPIRATORY (INHALATION) at 07:08

## 2020-08-16 RX ADMIN — HYDRALAZINE HYDROCHLORIDE 50 MG: 25 TABLET, FILM COATED ORAL at 02:08

## 2020-08-16 RX ADMIN — METOPROLOL SUCCINATE 50 MG: 50 TABLET, FILM COATED, EXTENDED RELEASE ORAL at 08:08

## 2020-08-16 RX ADMIN — FOLIC ACID 1 MG: 1 TABLET ORAL at 08:08

## 2020-08-16 RX ADMIN — CYANOCOBALAMIN TAB 1000 MCG 2000 MCG: 1000 TAB at 08:08

## 2020-08-16 RX ADMIN — HYDRALAZINE HYDROCHLORIDE 50 MG: 25 TABLET, FILM COATED ORAL at 09:08

## 2020-08-16 RX ADMIN — THERA TABS 1 TABLET: TAB at 08:08

## 2020-08-16 RX ADMIN — HEPARIN SODIUM AND DEXTROSE 18 UNITS/KG/HR: 10000; 5 INJECTION INTRAVENOUS at 01:08

## 2020-08-16 RX ADMIN — IPRATROPIUM BROMIDE AND ALBUTEROL SULFATE 3 ML: .5; 2.5 SOLUTION RESPIRATORY (INHALATION) at 07:08

## 2020-08-16 RX ADMIN — VANCOMYCIN HYDROCHLORIDE 1000 MG: 1 INJECTION, POWDER, LYOPHILIZED, FOR SOLUTION INTRAVENOUS at 12:08

## 2020-08-16 RX ADMIN — PANTOPRAZOLE SODIUM 40 MG: 40 TABLET, DELAYED RELEASE ORAL at 08:08

## 2020-08-16 RX ADMIN — IPRATROPIUM BROMIDE AND ALBUTEROL SULFATE 3 ML: .5; 2.5 SOLUTION RESPIRATORY (INHALATION) at 12:08

## 2020-08-16 NOTE — CARE UPDATE
08/16/20 0724   Patient Assessment/Suction   Level of Consciousness (AVPU) alert   Respiratory Effort Normal;Unlabored   All Lung Fields Breath Sounds diminished   Cough Type good;nonproductive   PRE-TX-O2   O2 Device (Oxygen Therapy) nasal cannula   $ Is the patient on Low Flow Oxygen? Yes   Flow (L/min) 2   Oxygen Concentration (%) 28   SpO2 97 %   Pulse Oximetry Type Intermittent   $ Pulse Oximetry - Multiple Charge Pulse Oximetry - Multiple   Pulse 82   Resp 18   Aerosol Therapy   $ Aerosol Therapy Charges Aerosol Treatment   Daily Review of Necessity (SVN) completed   Respiratory Treatment Status (SVN) given   Treatment Route (SVN) mask   Patient Position (SVN) sitting in chair   Post Treatment Assessment (SVN) breath sounds unchanged   Signs of Intolerance (SVN) none   Inhaler   $ Inhaler Charges MDI (Metered Dose Inahler) Treatment   Daily Review of Necessity (Inhaler) completed   Respiratory Treatment Status (Inhaler) given   Treatment Route (Inhaler) mouthpiece   Patient Position (Inhaler) sitting in chair   Post Treatment Assessment (Inhaler) breath sounds unchanged   Signs of Intolerance (Inhaler) none   Breath Sounds Post-Respiratory Treatment   Throughout All Fields Post-Treatment All Fields   Throughout All Fields Post-Treatment no change   Post-treatment Heart Rate (beats/min) 82   Post-treatment Resp Rate (breaths/min) 18   Incentive Spirometer   $ Incentive Spirometer Charges done with encouragement   Administration (IS) instruction provided, follow-up   Number of Repetitions (IS) 10   Level Incentive Spirometer (mL) 1000   Patient Tolerance (IS) fair   Duo Q6, Breo QD, tolerates treatments well, vitals as charted.

## 2020-08-16 NOTE — PLAN OF CARE
08/15/20 1910   Patient Assessment/Suction   Level of Consciousness (AVPU) alert   Respiratory Effort Normal;Unlabored   Expansion/Accessory Muscles/Retractions expansion symmetric   All Lung Fields Breath Sounds diminished   LLL Breath Sounds crackles   RLL Breath Sounds crackles   Rhythm/Pattern, Respiratory pattern regular   Cough Frequency infrequent   Cough Type good;nonproductive   PRE-TX-O2   O2 Device (Oxygen Therapy) nasal cannula   Flow (L/min) 2   SpO2 95 %   Pulse Oximetry Type Intermittent   Pulse 74   Resp 18   Aerosol Therapy   $ Aerosol Therapy Charges Aerosol Treatment   Respiratory Treatment Status (SVN) given   Treatment Route (SVN) mask   Patient Position (SVN) sitting in chair   Post Treatment Assessment (SVN) breath sounds unchanged   Signs of Intolerance (SVN) none   Breath Sounds Post-Respiratory Treatment   Post-treatment Heart Rate (beats/min) 76   Post-treatment Resp Rate (breaths/min) 18

## 2020-08-16 NOTE — DISCHARGE INSTRUCTIONS
Thank you for choosing [facility] for your medical care. The primary doctor who is taking care of you at the time of your discharge is Laura Wellington MD.     You were admitted to the hospital with LUIZ (acute kidney injury).     Please note your discharge instructions, including diet/activity restrictions, follow-up appointments, and medication changes.  If you have any questions about your medical issues, prescriptions, or any other questions, please feel free to contact the [facility] Medicine Dept at [phone] and we will help.    If you are previously with Home health, outpatient PT/OT or under a therapy program, you are cleared to return to those programs.    Please direct all long term medication refills and follow up to your primary care provider, Osmani Villatoro MD. Thank you again for letting us take care of your health care needs.    Please note the following discharge instructions per your discharging physician-  You will receive Vancomycin at home until September 2  Follow up with your PCP, Dr. Cuello with hematology, and Dr. Sosa with nephrology

## 2020-08-16 NOTE — PROGRESS NOTES
Sammi Abreu [id number] is a 79 y.o. female who had been consulted for vancomycin dosing.    Vancomycin level re-scheduled for 8/17 at 0000.      Thank you for allowing us to participate in this patient's care.     Jessy Christine

## 2020-08-16 NOTE — PROGRESS NOTES
Nephrology Progress Note    Patient Name: Sammi Abreu  MRN: [id number]    Patient Class: IP- Inpatient   Admission Date: 8/2/2020  Length of Stay: 13 days  Date of Service: 8/16/2020    Attending Physician: Laura Wellington MD  Primary Care Provider: Osmani Villatoro MD    Reason for Consult: luiz/ckd3/hyponatremia/acidosis/rhabdomyalisis/mrsa bacteremia/anemia/htn    Chief Complaint   Patient presents with    Post-op Problem     pain to left lower chest and BLE after surgery on 7/27       SUBJECTIVE:     HPI: [id number] Lizzette mack was treated in hospital for MRSA bacteremia, had lap rosette 7/13, complicated by PE and DVT, was d/c 7/29 on daptomycin and returned to ER 5 days later with abdominal pain, elevated CPK and LFTs, LUIZ with modest rise in sCr. CT abdomen w/out contrast looked OK. She was given NS and heparin gtt, Dapto was changed with Vanco.    8/6 VSS, no new complains.  8/7 VSS, no new complains. Appreciate ID input.  8/8 some spikes in BP, on 2L NC, UOP 1.5L  8/9 intermittent spikes in BP, better this AM, on 2L NC, UOP 1.8L, c/o swelling  8/10  Unavailable for exam.  Sitting on the commode   8/11  At gastric emptying study  8/12  GES neg.  Still with difficulty eating.  Food makes her nauseated  8/13  Tired after colon prep.  No chest pain or sob  8/14  C/o no taste (extensive w/u neg).  Otherwise she has no complaints  8/15  Renal function w/out much change.  UOP 2L.  Hgb 7.3, pt is JW.  No new complaints.  8/16  Scr remains elevated, but stable. D/W Dr. Wellington, pt can be discharged home today from renal standpoint.  No ACEi's or ARB's, no NSAIDS.  She will need a BMP in a week and f/u as out patient.    Outpatient meds:  Current Facility-Administered Medications on File Prior to Encounter   Medication Dose Route Frequency Provider Last Rate Last Dose    lactated ringers infusion   Intravenous Continuous Gino Reid MD 10 mL/hr at 07/13/20 1308      lidocaine (PF) 10 mg/ml (1%) injection 10 mg   1 mL Intradermal Once Gino Reid MD         Current Outpatient Medications on File Prior to Encounter   Medication Sig Dispense Refill    acetaminophen (TYLENOL) 325 MG tablet Take 2 tablets (650 mg total) by mouth every 6 (six) hours as needed (Do not take with any other Tylenol or acetaminophen containing products).  0    albuterol-ipratropium (DUO-NEB) 2.5 mg-0.5 mg/3 mL nebulizer solution Take 3 mLs by nebulization every 8 (eight) hours. 270 mL 0    apixaban (ELIQUIS) 5 mg Tab Take 1 tablet (5 mg total) by mouth 2 (two) times daily. 60 tablet 3    ascorbic acid, vitamin C, (VITAMIN C) 100 MG tablet Take 5 tablets (500 mg total) by mouth every evening.      esomeprazole (NEXIUM) 20 MG capsule Take 1 capsule (20 mg total) by mouth before breakfast. 30 capsule 2    fluticasone (FLONASE) 50 mcg/actuation nasal spray 2 sprays (100 mcg total) by Each Nare route once daily. 3 Bottle 3    folic acid (FOLVITE) 1 MG tablet Take 1 tablet (1 mg total) by mouth once daily. 30 tablet 0    metoprolol succinate (TOPROL-XL) 50 MG 24 hr tablet Take 1 tablet (50 mg total) by mouth once daily. 90 tablet 3    montelukast (SINGULAIR) 10 mg tablet Take 1 tablet (10 mg total) by mouth every evening. 90 tablet 3    sodium chloride 0.9% SolP 50 mL with DAPTOmycin 350 mg SolR 1,000 mg Inject 1,000 mg into the vein once daily.      spironolactone (ALDACTONE) 25 MG tablet Take 1 tablet (25 mg total) by mouth once daily. 90 tablet 6    budesonide-formoterol 160-4.5 mcg (SYMBICORT) 160-4.5 mcg/actuation HFAA Inhale 2 puffs into the lungs every 12 (twelve) hours. Controller 3 Inhaler 3    cyanocobalamin, vitamin B-12, 2,500 mcg Lozg Place 2 tablets under the tongue once daily. 60 lozenge 11    diclofenac (VOLTAREN) 25 MG TbEC Take 1 tablet (25 mg total) by mouth 3 (three) times daily as needed. 15 tablet 0    HYDROcodone-acetaminophen (NORCO) 5-325 mg per tablet Take 1 tablet by mouth every 6 (six) hours as needed for  Pain. (Patient not taking: Reported on 7/30/2020) 15 tablet 0    lactulose (CHRONULAC) 10 gram/15 mL solution Take 30 mLs (20 g total) by mouth 3 (three) times daily. 2 Teaspoon(s) Oral PRN Every evening. (Patient not taking: Reported on 7/30/2020) 500 mL 3    multivitamin capsule Take 1 capsule by mouth once daily.      ondansetron (ZOFRAN-ODT) 4 MG TbDL Take 1 tablet (4 mg total) by mouth every 8 (eight) hours as needed. 20 tablet 0       Scheduled meds:   albuterol-ipratropium  3 mL Nebulization Q6H    ascorbic acid (vitamin C)  500 mg Oral QHS    cyanocobalamin  2,000 mcg Oral Daily    fluticasone furoate-vilanteroL  1 puff Inhalation Daily    folic acid  1 mg Oral Daily    hydrALAZINE  10 mg Intravenous Once    hydrALAZINE  50 mg Oral Q8H    metoprolol succinate  50 mg Oral Daily    montelukast  10 mg Oral QHS    multivitamin  1 tablet Oral Daily    pantoprazole  40 mg Oral Daily    polyethylene glycol  17 g Oral Daily    senna-docusate 8.6-50 mg  1 tablet Oral BID    vancomycin (VANCOCIN) IVPB  1,000 mg Intravenous Q24H    warfarin  7.5 mg Oral Daily       Infusions:   sodium chloride 0.9%      sodium chloride 0.9% 100 mL/hr at 08/13/20 1100       PRN meds:  acetaminophen, aluminum-magnesium hydroxide-simethicone, benzonatate, bisacodyL, dextrose 50%, dextrose 50%, glucagon (human recombinant), glucose, glucose, magnesium oxide, magnesium oxide, melatonin, morphine, ondansetron, potassium chloride, potassium chloride, potassium chloride, sodium chloride 0.9%, Pharmacy to dose Vancomycin consult **AND** vancomycin - pharmacy to dose    Review of Systems:  negative    OBJECTIVE:     Vital Signs and IO (Last 24H):  Temp:  [97.6 °F (36.4 °C)-98.2 °F (36.8 °C)]   Pulse:  [74-97]   Resp:  [17-18]   BP: (128-195)/(58-88)   SpO2:  [94 %-100 %]   I/O last 3 completed shifts:  In: 5988.2 [P.O.:1240; I.V.:3748.2; IV Piggyback:1000]  Out: 950 [Urine:950]    Wt Readings from Last 5 Encounters:    08/14/20 105.4 kg (232 lb 5.8 oz)   07/30/20 99.9 kg (220 lb 3.8 oz)   07/29/20 102.7 kg (226 lb 6.6 oz)   07/13/20 97.5 kg (215 lb)   07/09/20 104.9 kg (231 lb 4.2 oz)     Physical Exam:    Constitutional: nad, aao x 3  Heart: rrr, no m/r/g, wwp, + edema  Lungs: ctab, no w/r/r/c, no lb  Abdomen: s/nt/nd, +BS    Body mass index is 41.16 kg/m².    Laboratory:  Recent Labs   Lab 08/14/20  0548 08/15/20  0454 08/16/20  0422    141 141   K 4.2 3.8 3.7    106 106   CO2 24 24 25   BUN 6* 6* 7*   CREATININE 1.7* 1.8* 1.9*   ESTGFRAFRICA 33* 31* 29*   EGFRNONAA 29* 27* 25*   GLU 97 120* 122*       Recent Labs   Lab 08/14/20  0548 08/15/20  0454 08/16/20  0422   CALCIUM 8.1* 8.3* 8.3*   ALBUMIN 2.3* 2.4* 2.3*   PHOS 4.4 4.8* 5.5*       Recent Labs   Lab 12/22/17  1141 06/22/18  0848 12/10/18  0945   PTH, Intact 57.0 115.0 H 81.0 H       No results for input(s): POCTGLUCOSE in the last 168 hours.    Recent Labs   Lab 08/03/20  0418 08/04/20  0030 08/05/20  0116   Hemoglobin A1C 5.7 H 5.9 H 5.9 H       Recent Labs   Lab 08/14/20  0548 08/15/20  0455 08/16/20  0423   WBC 8.39 8.14 8.74   HGB 7.3* 7.3* 7.2*   HCT 24.4* 24.5* 24.4*    228 235   * 109* 110*   MCHC 29.9* 29.8* 29.5*   MONO 11.3  1.0 10.7  0.9 10.3  0.9       Recent Labs   Lab 08/11/20  0226  08/14/20  0548 08/15/20  0454 08/16/20  0422   BILITOT 0.4  --   --   --   --    BILIDIR 0.2  --   --   --   --    PROT 6.6  --   --   --   --    ALBUMIN 2.4*  2.4*   < > 2.3* 2.4* 2.3*   ALKPHOS 143*  --   --   --   --    *  --   --   --   --    AST 69*  --   --   --   --     < > = values in this interval not displayed.       Recent Labs   Lab 06/07/20  0952  08/02/20  1652 08/06/20  2350 08/07/20  2040   Color, UA Yellow   < > Yellow Brown A Yellow   Appearance, UA Clear   < > Clear Cloudy A Cloudy A   pH, UA 7.0   < > 7.0 >8.0 A >8.0 A   Specific [place], UA 1.015   < > 1.010 1.010 1.010   Protein, UA Negative   < > 2+ A 1+ A Trace A    Glucose, UA Negative   < > Negative Negative Negative   Ketones, UA Negative   < > Negative Negative Negative   Urobilinogen, UA Negative   < > Negative Negative Negative   Bilirubin (UA) Negative   < > Negative Negative Negative   Occult Blood UA Negative   < > 3+ A 3+ A 3+ A   Nitrite, UA Negative   < > Negative Negative Negative   RBC, UA 0   < > 2 >100 H >100 H   WBC, UA 2   < > 1 48 H 55 H   Bacteria Negative   < > None Moderate A Few A   Hyaline Casts, UA 3 A  --  0 0  --     < > = values in this interval not displayed.       Recent Labs   Lab 07/13/20 1946   POC PH 7.406   POC PCO2 36.7   POC HCO3 23.1 L   POC PO2 70 L   POC SATURATED O2 94 L   POC BE -2   Sample ARTERIAL       Microbiology Results (last 7 days)     Procedure Component Value Units Date/Time    Urine culture [[id number]] Collected: 08/07/20 2040    Order Status: Completed Specimen: Urine Updated: 08/09/20 1031     Urine Culture, Routine No growth    Narrative:      Specimen Source->Urine          ASSESSMENT/PLAN:     Active Hospital Problems    Diagnosis  POA    *LUIZ (acute kidney injury) [N17.9]  Yes    Anorexia [R63.0]  Yes    Hematuria [R31.9]  No    Patient is Alevism [Z78.9]  Yes    Acute deep vein thrombosis (DVT) of right upper extremity [I82.621]  Yes     Occlusive thrombus of the right brachial vein and cephalic vein      Elevated ferritin level [R79.89]  Yes    Hyponatremia [E87.1]  Yes    Debility [R53.81]  Yes    Liver cyst [K76.89]  Yes    Non-traumatic rhabdomyolysis [M62.82]  Yes    Chronic diastolic CHF (congestive heart failure) [I50.32]  Yes    Pulmonary infarct [I26.99]  Yes    Atelectasis [J98.11]  Yes    Moderate protein-calorie malnutrition [E44.0]  Yes    Anemia [D64.9]  Yes    MRSA bacteremia [R78.81]  Yes    Elevated troponin [R79.89]  Yes    Bilateral pulmonary embolism [I26.99]  Yes    COPD (chronic obstructive pulmonary disease) [J44.9]  Yes    Transaminitis [R74.0]  Yes    Morbid  obesity [E66.01]  Yes    Pulmonary hypertension [I27.20]  Yes    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        8/3/2020 16:24 8/4/2020 00:30 8/5/2020 01:16 8/6/2020 06:43 8/7/2020 04:28   CPK 33765 (H) >56596 (H) 54870 (H) 40807 (H) 8326 (H)     Anemia of CKD  Jehovah witness  Hypercoagulable state with thrombosis     On IV iron  Heme/onc following  If she stays through the weekend I recommend decreasing frequency of blood draws    HTN/Edema  Bumped hydralazine to 50mg TID 8/9  D/C bicarb gtt.  Aldactone is on hold for now.    LUIZ/CKD III  Stable     Acute rhabdomyolysis 2/2 daptomycin  CPK is trending down.    MRSA bacteremia  On vancomycin    Hypokalemia  --better    Alkalosis  --better after D/C bicarb gtt.    Thank you for allowing us to participate in the care of your patient  We will follow the patient and provide recommendations as needed.    Peg Velazquez NP    [facility]  [address] [place] [address]  [place] Garg [address]    [phone] - tel  [phone] - fax    8/16/2020

## 2020-08-16 NOTE — PLAN OF CARE
Problem: Physical Therapy Goal  Goal: Physical Therapy Goal  Description: Goals to be met by: 2020     Patient will increase functional independence with mobility by performin. Supine to sit with Supervision  2. Sit to stand transfer with Supervision  3. Bed to chair transfer with Supervision using Rolling Walker  4. Gait  x 150 feet with Supervision using Rolling Walker.   5. Lower extremity exercise program x20 reps per handout, with independence    Outcome: Ongoing, Progressing   Ambulate with rw/O2 and assistance for safety

## 2020-08-16 NOTE — NURSING
Discharge instructions reviewed with pt, understanding verbalized. PICC left in place for outpatient IV abx infusions. Tele box returned. All questions answered. Educated pt on discharge instructions, follow up appointments, and medications- pt v/u. Prescriptions placed in discharge folder. Awaiting pt's ride for transport.

## 2020-08-16 NOTE — DISCHARGE SUMMARY
[facility] Hospital Medicine  Discharge Summary      Patient Name: Sammi Abreu  MRN: [id number]  Admission Date: 8/2/2020  Hospital Length of Stay: 13 days  Discharge Date and Time: 8/16/2020  2:14 PM  Attending Physician: Alisha att. providers found   Discharging Provider: Laura Wellington MD  Primary Care Provider: Osmani Villatoro MD      HPI:   Sammi Abreu is a 79-year-old female with past medical history significant for arthritis and chronic back pain, fibromyalgia, glaucoma, hyperlipidemia, hypertension, sleep apnea with history of noncompliance with CPAP, morbid obesity, hypertension, GERD, COPD, diabetes type 2, prior DVT, and gallstones who presented to the emergency department tonight with complaints of right lower quadrant pain x3 days.  Of note the patient is also a [employer].   Patient was discharged from this facility on 07/29/2020 after being treated for bilateral pulmonary embolism.  Patient is currently on Eliquis.  Patient underwent a CT abdomen pelvis while in the emergency department which identified surgical changes but nothing acute.  Patient is noted to have a mild LUIZ.  Her troponin is elevated at 0.41 which is consistent with prior levels.  She will be admitted to the service of hospital Medicine for continued treatment.    Procedure(s) (LRB):  COLONOSCOPY (N/A)      Hospital Course:   Patient monitor closely during hospitalization.  She was placed on continuous telemetry monitoring.  She was noted to have rhabdomyolysis with CPK greater than 80169 and LUIZ.  Nephrology was consulted.  She was noted to have metabolic acidosis initiated on bicarb drip.  Daptomycin was held and ID was consulted.  She was initiated on IV vancomycin.  She was noted have increased right upper extremity swelling.  Right upper extremity ultrasound demonstrated occlusive thrombus of the right brachial vein and cephalic vein. She was initiated on heparin drip and her Eliquis was held.   There was concern for Eliquis failure.  Hematology consulted.  PT and OT were consulted for debility.  Patient with ongoing anorexia and poor p.o. intake. Dietary was consulted for protein calorie malnutrition.  It was recommended patient be placed on TPN lipids.  Her renal status was monitored closely and she remain on bicarb drip with plans to start TPN once acute kidney injury resolved if no improvement in her p.o. intake.  Patient was noted to have some hematuria during her stay.  A retroperitoneal ultrasound was ordered.  Hematuria has resolved.  Patient to follow-up with urologist as outpatient.  Patient also underwent endoscopic evaluation for anemia by GI specialist which has been negative.  Hematology service recommended Coumadin initiation at place of Eliquis.  Patient underwent heparin-Coumadin bridging, until she achieved therapeutic INR  Patient has declined skilled nursing facility and LTAC placement.  Patient was discharged once her INR was 2 INR  with home health, home infusion therapy until September 2, 2020 as per recommendations by ID specialist.  Patient to also receive home physical therapy.     Consults:   Consults (From admission, onward)        Status Ordering Provider     Inpatient consult to Gastroenterology  Once     Provider:  Pranav Decker MD    Completed TIM COLLIER     Inpatient consult to Hematology  Once     Provider:  (Not yet assigned)    Completed MUSTAPHA BOLAND     Inpatient consult to Infectious Diseases  Once     Provider:  Amarilis Nielsen MD    Completed MUSTAPHA BOLAND     Inpatient consult to Nephrology  Once     Provider:  Dimas Sosa MD    Completed ANTONIO SHANKS     Inpatient consult to Pulmonology  Once     Provider:  (Not yet assigned)    Completed MUSTAPHA BOLAND     Inpatient consult to Registered Dietitian/Nutritionist  Once     Provider:  (Not yet assigned)    Completed MUSTAPHA BOLAND     Inpatient consult to Registered  Dietitian/Nutritionist  Once     Provider:  (Not yet assigned)    Completed ANTONIO SHANKS     Inpatient consult to Social Work/Case Management  Once     Provider:  (Not yet assigned)    Completed MUSTAPHA BOLAND     Inpatient consult to Social Work/Case Management  Once     Provider:  (Not yet assigned)    Acknowledged THEODORE LAUREN     Inpatient consult to Urology  Once     Provider:  (Not yet assigned)    Completed WALE KILPATRICK     Nutrition Services Referral  Once     Provider:  (Not yet assigned)    Completed THEODORE LAUREN     Pharmacy to dose Vancomycin consult  Once     Provider:  (Not yet assigned)    Acknowledged LEONIE PALACIOS          No new Assessment & Plan notes have been filed under this hospital service since the last note was generated.  Service: Hospital Medicine    Final Active Diagnoses:    Diagnosis Date Noted POA    PRINCIPAL PROBLEM:  LUIZ (acute kidney injury) [N17.9] 08/03/2020 Yes    Anorexia [R63.0] 08/12/2020 Yes    Hematuria [R31.9] 08/07/2020 No    Patient is Rastafari [Z78.9] 08/07/2020 Yes    Acute deep vein thrombosis (DVT) of right upper extremity [I82.621] 08/05/2020 Yes    Elevated ferritin level [R79.89] 08/05/2020 Yes    Hyponatremia [E87.1] 08/05/2020 Yes    Debility [R53.81] 08/05/2020 Yes    Liver cyst [K76.89] 08/05/2020 Yes    Non-traumatic rhabdomyolysis [M62.82] 08/04/2020 Yes    Chronic diastolic CHF (congestive heart failure) [I50.32] 08/03/2020 Yes    Pulmonary infarct [I26.99] 08/03/2020 Yes    Atelectasis [J98.11] 08/03/2020 Yes    Moderate protein-calorie malnutrition [E44.0] 08/03/2020 Yes    Anemia [D64.9] 08/03/2020 Yes    MRSA bacteremia [R78.81] 07/22/2020 Yes    Elevated troponin [R79.89] 07/14/2020 Yes    Bilateral pulmonary embolism [I26.99] 07/14/2020 Yes    COPD (chronic obstructive pulmonary disease) [J44.9] 07/13/2020 Yes    Transaminitis [R74.0] 06/07/2020 Yes    Morbid obesity [E66.01] 10/28/2015 Yes     Pulmonary hypertension [I27.20] 04/09/2015 Yes    Hypertension associated with diabetes [E11.59, I10] 07/16/2012 Yes      Problems Resolved During this Admission:       Discharged Condition: good    Disposition: Home or Self Care    Follow Up:  Follow-up Information     [facility].    Specialty: Home Health Services  Why: Home Health  Contact information:  [address]   [address]  [facility] [address]  [phone]             [facility].    Why: Infusion  Contact information:  [address]  [address]  [facility] [address]  [phone]           Fer Cuello MD In 1 week.    Specialty: Hematology and Oncology  Contact information:  [address]   [address]  [facility] [address]  [phone]             Dimas Sosa MD In 1 week.    Specialty: Nephrology  Contact information:  [address] [facility] NEPHROLOGY [facility] [address]  [phone]             Osmani Villatoro MD In 1 week.    Specialty: Family Medicine  Contact information:  [address] [facility] [address]  [phone]                 Patient Instructions:      Referral to Home health   Referral Priority: Routine Referral Type: Home Health   Referral Reason: Specialty Services Required   Requested Specialty: Home Health Services   Number of Visits Requested: 1     Ambulatory referral/consult to Home Health   Standing Status: Future   Referral Priority: Routine Referral Type: Home Health   Referral Reason: Specialty Services Required   Requested Specialty: Home Health Services   Number of Visits Requested: 1     Notify your health care provider if you experience any of the following:  temperature >100.4     Notify your health care provider if you experience any of the following:  difficulty breathing or increased cough     Notify your health care provider if you experience any of the following:  persistent dizziness, light-headedness, or visual disturbances     Notify your health care provider if you experience  any of the following:  increased confusion or weakness     Activity as tolerated       Significant Diagnostic Studies: Labs:   BMP:   Recent Labs   Lab 08/15/20  0454 08/16/20 0422   * 122*    141   K 3.8 3.7    106   CO2 24 25   BUN 6* 7*   CREATININE 1.8* 1.9*   CALCIUM 8.3* 8.3*    and CMP   Recent Labs   Lab 08/15/20  0454 08/16/20  0422    141   K 3.8 3.7    106   CO2 24 25   * 122*   BUN 6* 7*   CREATININE 1.8* 1.9*   CALCIUM 8.3* 8.3*   ALBUMIN 2.4* 2.3*   ANIONGAP 11 10   ESTGFRAFRICA 31* 29*   EGFRNONAA 27* 25*       Pending Diagnostic Studies:     Procedure Component Value Units Date/Time    H. pylori antigen, stool [[id number]] Collected: 08/12/20 0906    Order Status: Sent Lab Status: In process Updated: 08/12/20 0938    Specimen: Stool     H. pylori, IgM, IgG, IgA Ab [[id number]]     Order Status: Sent Lab Status: No result     Specimen: Blood          Medications:  Reconciled Home Medications:      Medication List      START taking these medications    hydrALAZINE 50 MG tablet  Commonly known as: APRESOLINE  Take 1 tablet (50 mg total) by mouth every 8 (eight) hours.     vancomycin in dextrose 5 % 1 gram/250 mL Soln  Inject 250 mLs (1,000 mg total) into the vein as needed (until september 2).     warfarin 7.5 MG tablet  Commonly known as: COUMADIN  Take 1 tablet (7.5 mg total) by mouth Daily.        CONTINUE taking these medications    acetaminophen 325 MG tablet  Commonly known as: TYLENOL  Take 2 tablets (650 mg total) by mouth every 6 (six) hours as needed (Do not take with any other Tylenol or acetaminophen containing products).     albuterol-ipratropium 2.5 mg-0.5 mg/3 mL nebulizer solution  Commonly known as: DUO-NEB  Take 3 mLs by nebulization every 8 (eight) hours.     ascorbic acid (vitamin C) 100 MG tablet  Commonly known as: VITAMIN C  Take 5 tablets (500 mg total) by mouth every evening.     budesonide-formoterol 160-4.5 mcg 160-4.5 mcg/actuation  Hfaa  Commonly known as: SYMBICORT  Inhale 2 puffs into the lungs every 12 (twelve) hours. Controller     cyanocobalamin (vitamin B-12) 2,500 mcg Lozg  Place 2 tablets under the tongue once daily.     diclofenac 25 MG Tbec  Commonly known as: VOLTAREN  Take 1 tablet (25 mg total) by mouth 3 (three) times daily as needed.     esomeprazole 20 MG capsule  Commonly known as: NEXIUM  Take 1 capsule (20 mg total) by mouth before breakfast.     fluticasone propionate 50 mcg/actuation nasal spray  Commonly known as: FLONASE  2 sprays (100 mcg total) by Each Nare route once daily.     folic acid 1 MG tablet  Commonly known as: FOLVITE  Take 1 tablet (1 mg total) by mouth once daily.     metoprolol succinate 50 MG 24 hr tablet  Commonly known as: TOPROL-XL  Take 1 tablet (50 mg total) by mouth once daily.     montelukast 10 mg tablet  Commonly known as: SINGULAIR  Take 1 tablet (10 mg total) by mouth every evening.     multivitamin capsule  Take 1 capsule by mouth once daily.     ondansetron 4 MG Tbdl  Commonly known as: ZOFRAN-ODT  Take 1 tablet (4 mg total) by mouth every 8 (eight) hours as needed.        STOP taking these medications    apixaban 5 mg Tab  Commonly known as: ELIQUIS     HYDROcodone-acetaminophen 5-325 mg per tablet  Commonly known as: NORCO     lactulose 10 gram/15 mL solution  Commonly known as: CHRONULAC     sodium chloride 0.9% SolP 50 mL with DAPTOmycin 350 mg SolR 1,000 mg     spironolactone 25 MG tablet  Commonly known as: ALDACTONE            Indwelling Lines/Drains at time of discharge:   Lines/Drains/Airways     Peripherally Inserted Central Catheter Line            PICC Double Lumen 07/27/20 1233 left basilic 20 days                Time spent on the discharge of patient: 50 minutes  Patient was seen and examined on the date of discharge and determined to be suitable for discharge.         Laura Wellington MD  Department of Hospital Medicine  [facility]

## 2020-08-16 NOTE — PLAN OF CARE
Pt clear for discharge from case management standpoint. Pt discharging home with home health with [facility] and IV Abx with [facility]. SW remains available.    [facility]   [address] [facility] [address]  [place], [place] [address]       08/16/20 1044   Final Note   Assessment Type Final Discharge Note   Anticipated Discharge Disposition Home-Health   Right Care Referral Info   Post Acute Recommendation Home-care   Referral Type Home Care   Facility Name [facility] [address] [address]

## 2020-08-16 NOTE — PLAN OF CARE
Patient alert and oriented.  Sinus rhythm on cardiac monitor.  Up with stand by assist.  Full code.  Monitoring heparin drip per MD orders.  Call light in reach.  Bed alarm set for patient's safety.  Bed in low/locked position.

## 2020-08-16 NOTE — NURSING
Results for MEME SALAS (MRN [id number]) as of 8/16/2020 13:38   Ref. Range 8/16/2020 04:22   Protime Latest Ref Range: 9.0 - 12.5 sec 21.4 (H)   INR Latest Ref Range: 0.8 - 1.2  2.0 (H)   aPTT Latest Ref Range: 21.0 - 32.0 sec 122.7 (H)       Dr. Wellington notified. INR therapeutic. Heparin gtt dc per MD order. Will monitor for s/s of bleeding.

## 2020-08-16 NOTE — PT/OT/SLP PROGRESS
Physical Therapy Treatment    Patient Name:  Sammi Abreu   MRN:  [id number]    Recommendations:     Discharge Recommendations:  home health PT   Discharge Equipment Recommendations: none   Barriers to discharge: None    Assessment:     Sammi Abreu is a 79 y.o. female admitted with a medical diagnosis of LUIZ (acute kidney injury).  She presents with the following impairments/functional limitations:  weakness, impaired endurance, impaired self care skills, impaired functional mobilty, impaired cardiopulmonary response to activity Tolerated treatment well and only complained of L anterior thigh pain following ambulation which has been consistent throughout her therapy treatments. Ambulated 250' with rw/O2 and no rest breaks today.     Rehab Prognosis: Good; patient would benefit from acute skilled PT services to address these deficits and reach maximum level of function.    Recent Surgery: Procedure(s) (LRB):  COLONOSCOPY (N/A) 3 Days Post-Op    Plan:     During this hospitalization, patient to be seen 6 x/week to address the identified rehab impairments via gait training, therapeutic activities, therapeutic exercises and progress toward the following goals:    · Plan of Care Expires:  09/05/20    Subjective     Chief Complaint: L ant thigh pain  Patient/Family Comments/goals: to return home  Pain/Comfort:  · Pain Rating 1: 4/10(following activity)  · Location - Side 1: Left  · Location - Orientation 1: anterior  · Location 1: thigh  · Pain Addressed 1: Reposition, Nurse notified      Objective:     Communicated with nurse Boyle prior to session.  Patient found supine with bed alarm, oxygen, PICC line, telemetry upon PT entry to room.     General Precautions: Standard, fall, respiratory   Orthopedic Precautions:N/A   Braces: N/A     Functional Mobility:  · Bed Mobility:     · Rolling Left:  contact guard assistance  · Supine to Sit: contact guard assistance  · Transfers:     · Sit to Stand:  contact guard  assistance with rolling walker  · Gait: 10' to RR and then 5' to sink. 250' in hallway with rw/O2 and CGA.      AM-PAC 6 CLICK MOBILITY          Therapeutic Activities and Exercises:   transferred EOB and sit to stand CGA.  Ambulated to RR and transferred on/off toilet. Ambulated to sink for hand washing.   Ambulated in hallway with rw/O2 and CGA with no rest breaks (at least 1 previously required)  Returned to room and sat in chair.     Patient left up in chair with all lines intact, call button in reach, chair alarm on and nurse Tamar notified..    GOALS:   Multidisciplinary Problems     Physical Therapy Goals        Problem: Physical Therapy Goal    Goal Priority Disciplines Outcome Goal Variances Interventions   Physical Therapy Goal     PT, PT/OT Ongoing, Progressing     Description: Goals to be met by: 2020     Patient will increase functional independence with mobility by performin. Supine to sit with Supervision  2. Sit to stand transfer with Supervision  3. Bed to chair transfer with Supervision using Rolling Walker  4. Gait  x 150 feet with Supervision using Rolling Walker.   5. Lower extremity exercise program x20 reps per handout, with independence                     Time Tracking:     PT Received On: 20  PT Start Time: 915     PT Stop Time: 932  PT Total Time (min): 17 min     Billable Minutes: Gait Training 17    Treatment Type: Treatment  PT/PTA: PTA     PTA Visit Number: 2     Ernestine Zhang PTA  2020

## 2020-08-16 NOTE — CONSULTS
Pharmacokinetic Assessment Follow Up: IV Vancomycin    Vancomycin serum concentration assessment(s):    The trough level was drawn correctly and can be used to guide therapy at this time. The measurement is within the desired definitive target range of 15 to 20 mcg/mL.    Vancomycin Regimen Plan:    Continue regimen to Vancomycin 1000 mg IV every 24 hours with next serum trough concentration measured at 0000 prior to 3rd dose on 08/18    Drug levels (last 3 results):  Recent Labs   Lab Result Units 08/13/20  2321 08/15/20  2317   Vancomycin-Trough ug/mL 17.6 20.1       Pharmacy will continue to follow and monitor vancomycin.    Please contact pharmacy at extension [phone] for questions regarding this assessment.    Thank you for the consult,   Dot Thomas       Patient brief summary:  Sammi Abreu is a 79 y.o. female initiated on antimicrobial therapy with IV Vancomycin for treatment of bacteremia      Drug Allergies:   Review of patient's allergies indicates:   Allergen Reactions    Cymbalta [duloxetine] Other (See Comments)     Nightmares      Darvon [propoxyphene] Nausea Only and Other (See Comments)     Sweating, slept for 3 days    Atorvastatin Other (See Comments)     Muscle cramps    Naprosyn [naproxen] Nausea Only    Penicillins Rash    Tramadol Nausea Only and Palpitations       Actual Body Weight:   105.4 kg    Renal Function:   Estimated Creatinine Clearance: 30.4 mL/min (A) (based on SCr of 1.8 mg/dL (H)).,     Dialysis Method (if applicable):  N/A    CBC (last 72 hours):  Recent Labs   Lab Result Units 08/13/20  0540 08/14/20  0548 08/15/20  0455   WBC K/uL 8.06 8.39 8.14   Hemoglobin g/dL 7.5* 7.3* 7.3*   Hematocrit % 25.1* 24.4* 24.5*   Platelets K/uL 249 223 228   Gran% % 52.5 51.7 54.9   Lymph% % 29.5 30.5 27.9   Mono% % 12.0 11.3 10.7   Eosinophil% % 4.6 4.9 4.9   Basophil% % 0.4 0.4 0.5   Differential Method  Automated Automated Automated       Metabolic Panel (last 72 hours):  Recent Labs    Lab Result Units 08/13/20  0540 08/14/20  0548 08/15/20  0454   Sodium mmol/L 143 141 141   Potassium mmol/L 3.4* 4.2 3.8   Chloride mmol/L 106 108 106   CO2 mmol/L 27 24 24   Glucose mg/dL 97 97 120*   BUN, Bld mg/dL 5* 6* 6*   Creatinine mg/dL 1.7* 1.7* 1.8*   Albumin g/dL 2.3* 2.3* 2.4*   Phosphorus mg/dL 4.7* 4.4 4.8*       Vancomycin Administrations:  vancomycin given in the last 96 hours                     vancomycin in dextrose 5 % 1 gram/250 mL IVPB 1,000 mg (mg) 1,000 mg New Bag 08/16/20 0020     1,000 mg New Bag 08/15/20 0157     1,000 mg New Bag 08/14/20 0052     1,000 mg New Bag 08/12/20 2310                    Microbiologic Results:  Microbiology Results (last 7 days)       Procedure Component Value Units Date/Time    Urine culture [[id number]] Collected: 08/07/20 2040    Order Status: Completed Specimen: Urine Updated: 08/09/20 1031     Urine Culture, Routine No growth    Narrative:      Specimen Source->Urine

## 2020-08-17 ENCOUNTER — PATIENT OUTREACH (OUTPATIENT)
Dept: ADMINISTRATIVE | Facility: CLINIC | Age: 80
End: 2020-08-17

## 2020-08-17 ENCOUNTER — NURSE TRIAGE (OUTPATIENT)
Dept: ADMINISTRATIVE | Facility: CLINIC | Age: 80
End: 2020-08-17

## 2020-08-17 ENCOUNTER — TELEPHONE (OUTPATIENT)
Dept: MEDSURG UNIT | Facility: HOSPITAL | Age: 80
End: 2020-08-17

## 2020-08-17 NOTE — PLAN OF CARE
Per Amaya with [facility] the auth # for HH and IV ABX is [id number]. Sadaf Tamayo, JODI     08/17/20 0857   Post-Acute Status   Post-Acute Authorization Home Health;Medications   Home Health Status Set-up Complete   Medication Status Set-up Complete

## 2020-08-17 NOTE — TELEPHONE ENCOUNTER
Pt states that her feet and legs are so swollen she can't put shoes on.  Was discharged from hospital with multiple problems including blood clots in lungs. Pt triaged and advised to go to C or ER. No further questions.     Reason for Disposition   SEVERE leg swelling (e.g., swelling extends above knee, entire leg is swollen, weeping fluid)    Additional Information   Negative: Severe difficulty breathing (e.g., struggling for each breath, speaks in single words)   Negative: Looks like a broken bone or dislocated joint (e.g., crooked or deformed)   Negative: Sounds like a life-threatening emergency to the triager   Negative: Chest pain   Negative: Followed a leg injury   Negative: [1] Small area of swelling AND [2] followed an insect bite to the area   Negative: Swelling of one ankle joint   Negative: Swelling of knee is main symptom   Negative: Pregnant   Negative: Postpartum (from 0 to 6 weeks after delivery)   Negative: Difficulty breathing at rest   Negative: Entire foot is cool or blue in comparison to other side   Negative: [1] Can't walk or can barely walk AND [2] new onset   Negative: [1] Difficulty breathing with exertion (e.g., walking) AND [2] new onset or worsening   Negative: [1] Red area or streak AND [2] fever   Negative: [1] Swelling is painful to touch AND [2] fever   Negative: [1] Cast on leg or ankle AND [2] now increased pain   Negative: Patient sounds very sick or weak to the triager    Protocols used: LEG SWELLING AND EDEMA-A-AH

## 2020-08-17 NOTE — PATIENT INSTRUCTIONS
Discharge Instructions for Acute Kidney Injury  You have been diagnosed with acute kidney injury. This means that your kidneys are not working properly. When both kidneys are healthy, they help filter out fluid and waste from the blood and body. Acute kidney injury has many causes. These include urinary blockages, infection, lack of enough blood supply, and medicines that can injure kidneys. In some cases, acute kidney injury is short-term (temporary), lasting several days to a few months. This is because the kidney can repair itself. But acute kidney injury can also result in chronic kidney disease or end stage renal failure. Here are some instructions for you to follow as you recover.  Home care  · Follow any instructions for eating and drinking given to you by your healthcare provider.  ¨ Drink less fluid, if instructed by your healthcare provider.  ¨ Keep a record of everything you eat and drink.  · Measure the amount of urine and stool you have each day.  · Weigh yourself every day, at the same time of day, and in the same kind of clothes. Keep a daily record of your daily weights.  · Take your temperature every day. Keep a record of the results.  · Learn to take your own blood pressure. Keep a record of your results. Ask your healthcare provider when you should seek emergency medical attention. Your provider will tell you which blood pressure reading is dangerous.  · Avoid contact with people who have infections (colds, bronchitis, or skin conditions).  · Practice good personal hygiene. This is especially important if you have a catheter in place when you leave the hospital. Doing so helps keep you safe from infection.  · Take your medicines exactly as directed.  · You may require frequent blood and urine tests to monitor your kidney function.  Follow-up care  Follow up with your healthcare provider, or as advised.  When to seek medical care  Call your healthcare provider right away if you have any of the  following:  · Signs of bladder infection (urinating more often than usual, or burning, pain, bleeding, or hesitancy when you urinate)  · Signs of infection around your catheter (redness, swelling, warmth, or drainage)  · Rapid weight loss or weight gain, such as 3 pounds or more in 24 hours or 6 pounds or more in 7 days  · Fever above 100.4°F (38.0°C) or chills  · Muscle aches  · Night sweats  · Very little or no urine output  · Swelling of your hands, legs, or feet  · Back pain  · Abdominal pain  · Extreme tiredness   Date Last Reviewed: 2/1/2017  © 1919-9139 [employer]. [address] [place] [address], [place] [address]. All rights reserved. This information is not intended as a substitute for professional medical care. Always follow your healthcare professional's instructions.

## 2020-08-18 ENCOUNTER — TELEPHONE (OUTPATIENT)
Dept: MEDSURG UNIT | Facility: HOSPITAL | Age: 80
End: 2020-08-18

## 2020-08-18 PROCEDURE — G0180 PR HOME HEALTH MD CERTIFICATION: ICD-10-PCS | Mod: ,,, | Performed by: FAMILY MEDICINE

## 2020-08-18 PROCEDURE — G0180 MD CERTIFICATION HHA PATIENT: HCPCS | Mod: ,,, | Performed by: FAMILY MEDICINE

## 2020-08-18 NOTE — TELEPHONE ENCOUNTER
Spoke to patient, she stated that she elevated her legs last night which helped decreased the swelling in her legs. Pt denied SOB, CP or any other symptoms. Pt stated HH nurse is coming out today, she will wait to get evaluated by HH nurse. Pt refused ER. Advised if CP, SOB or if swelling worsened to report to ER. Understanding verbalized.

## 2020-08-19 ENCOUNTER — TELEPHONE (OUTPATIENT)
Dept: INFECTIOUS DISEASES | Facility: CLINIC | Age: 80
End: 2020-08-19

## 2020-08-19 ENCOUNTER — TELEPHONE (OUTPATIENT)
Dept: FAMILY MEDICINE | Facility: CLINIC | Age: 80
End: 2020-08-19

## 2020-08-19 ENCOUNTER — DOCUMENT SCAN (OUTPATIENT)
Dept: HOME HEALTH SERVICES | Facility: HOSPITAL | Age: 80
End: 2020-08-19
Payer: MEDICARE

## 2020-08-19 ENCOUNTER — TELEPHONE (OUTPATIENT)
Dept: HEMATOLOGY/ONCOLOGY | Facility: CLINIC | Age: 80
End: 2020-08-19

## 2020-08-19 DIAGNOSIS — I82.621 ACUTE DEEP VEIN THROMBOSIS (DVT) OF RIGHT UPPER EXTREMITY, UNSPECIFIED VEIN: ICD-10-CM

## 2020-08-19 DIAGNOSIS — I82.4Z2 DEEP VEIN THROMBOSIS (DVT) OF DISTAL VEIN OF LEFT LOWER EXTREMITY, UNSPECIFIED CHRONICITY: ICD-10-CM

## 2020-08-19 DIAGNOSIS — Z86.718 HISTORY OF DVT IN ADULTHOOD: Primary | ICD-10-CM

## 2020-08-19 NOTE — TELEPHONE ENCOUNTER
Spoke to Suzie. Informed her Dr Cuello does not follow pt PT/INR for Coumadin. States she will reach out to pt PCP.     ----- Message from Emily Leos sent at 8/19/2020  9:46 AM CDT -----  Suzie with [facility] [phone]    She would like to know if you are following Sammi's PT/INR?  Please call her.  Thank you!

## 2020-08-19 NOTE — TELEPHONE ENCOUNTER
----- Message from [employer] sent at 8/19/2020  9:40 AM CDT -----  [facility] called to discuss if the office is monitoring pt coumadin levels please reach out to them at [phone] Suzie

## 2020-08-19 NOTE — TELEPHONE ENCOUNTER
Was given Dr. Velarde's and Dr. Nielsen's message regarding her labs and what physician should be contacted in regards to her extremity swelling.    GABRIELA Santiago

## 2020-08-19 NOTE — TELEPHONE ENCOUNTER
Wanted you to know that patient is having 2+-3+ to bilateral lower extremities and wanted to know what to do about.  Says her diuretics were stopped due to her IV antibiotics.       GABRIELA Santiago

## 2020-08-19 NOTE — TELEPHONE ENCOUNTER
Her primary care physician or nephrologist is taking care of IV fluids.  Please make sure she has by weekly labs of vancomycin trough, ESR, CRP, CMP, CBC.

## 2020-08-20 ENCOUNTER — OFFICE VISIT (OUTPATIENT)
Dept: HEMATOLOGY/ONCOLOGY | Facility: CLINIC | Age: 80
End: 2020-08-20
Payer: MEDICARE

## 2020-08-20 ENCOUNTER — TELEPHONE (OUTPATIENT)
Dept: HEMATOLOGY/ONCOLOGY | Facility: CLINIC | Age: 80
End: 2020-08-20

## 2020-08-20 VITALS
DIASTOLIC BLOOD PRESSURE: 79 MMHG | OXYGEN SATURATION: 97 % | HEART RATE: 85 BPM | TEMPERATURE: 98 F | RESPIRATION RATE: 17 BRPM | HEIGHT: 63 IN | BODY MASS INDEX: 38.32 KG/M2 | SYSTOLIC BLOOD PRESSURE: 192 MMHG | WEIGHT: 216.25 LBS

## 2020-08-20 DIAGNOSIS — D50.0 ANEMIA DUE TO CHRONIC BLOOD LOSS: ICD-10-CM

## 2020-08-20 DIAGNOSIS — B95.62 MRSA BACTEREMIA: ICD-10-CM

## 2020-08-20 DIAGNOSIS — I26.99 BILATERAL PULMONARY EMBOLISM: Primary | ICD-10-CM

## 2020-08-20 DIAGNOSIS — R78.81 MRSA BACTEREMIA: ICD-10-CM

## 2020-08-20 PROCEDURE — 1159F PR MEDICATION LIST DOCUMENTED IN MEDICAL RECORD: ICD-10-PCS | Mod: S$GLB,,, | Performed by: INTERNAL MEDICINE

## 2020-08-20 PROCEDURE — 1125F PR PAIN SEVERITY QUANTIFIED, PAIN PRESENT: ICD-10-PCS | Mod: S$GLB,,, | Performed by: INTERNAL MEDICINE

## 2020-08-20 PROCEDURE — 1101F PR PT FALLS ASSESS DOC 0-1 FALLS W/OUT INJ PAST YR: ICD-10-PCS | Mod: CPTII,S$GLB,, | Performed by: INTERNAL MEDICINE

## 2020-08-20 PROCEDURE — 1101F PT FALLS ASSESS-DOCD LE1/YR: CPT | Mod: CPTII,S$GLB,, | Performed by: INTERNAL MEDICINE

## 2020-08-20 PROCEDURE — 3077F SYST BP >= 140 MM HG: CPT | Mod: CPTII,S$GLB,, | Performed by: INTERNAL MEDICINE

## 2020-08-20 PROCEDURE — 99999 PR PBB SHADOW E&M-EST. PATIENT-LVL IV: ICD-10-PCS | Mod: PBBFAC,,, | Performed by: INTERNAL MEDICINE

## 2020-08-20 PROCEDURE — 99214 OFFICE O/P EST MOD 30 MIN: CPT | Mod: S$GLB,,, | Performed by: INTERNAL MEDICINE

## 2020-08-20 PROCEDURE — 3078F DIAST BP <80 MM HG: CPT | Mod: CPTII,S$GLB,, | Performed by: INTERNAL MEDICINE

## 2020-08-20 PROCEDURE — 1125F AMNT PAIN NOTED PAIN PRSNT: CPT | Mod: S$GLB,,, | Performed by: INTERNAL MEDICINE

## 2020-08-20 PROCEDURE — 3078F PR MOST RECENT DIASTOLIC BLOOD PRESSURE < 80 MM HG: ICD-10-PCS | Mod: CPTII,S$GLB,, | Performed by: INTERNAL MEDICINE

## 2020-08-20 PROCEDURE — 99999 PR PBB SHADOW E&M-EST. PATIENT-LVL IV: CPT | Mod: PBBFAC,,, | Performed by: INTERNAL MEDICINE

## 2020-08-20 PROCEDURE — 99214 PR OFFICE/OUTPT VISIT, EST, LEVL IV, 30-39 MIN: ICD-10-PCS | Mod: S$GLB,,, | Performed by: INTERNAL MEDICINE

## 2020-08-20 PROCEDURE — 1159F MED LIST DOCD IN RCRD: CPT | Mod: S$GLB,,, | Performed by: INTERNAL MEDICINE

## 2020-08-20 PROCEDURE — 3077F PR MOST RECENT SYSTOLIC BLOOD PRESSURE >= 140 MM HG: ICD-10-PCS | Mod: CPTII,S$GLB,, | Performed by: INTERNAL MEDICINE

## 2020-08-20 NOTE — PROGRESS NOTES
[facility]     Subjective:      PATIENT:   Sammi Abreu  :    1940  MRN:    [id number]  DATE OF VISIT:  2020    Chief Complaint: RUE DVT    Patient ID: Sammi Abreu is a 79 y.o. female with past medical history significant for arthritis and chronic back pain, fibromyalgia, glaucoma, hyperlipidemia, hypertension, sleep apnea with history of noncompliance with CPAP, morbid obesity, hypertension, GERD, COPD, diabetes type 2, prior DVT, and gallstones who presented to the emergency department [facility] with complaints of right lower quadrant pain x3 days.  Of note the patient is also a Rastafarian.   Patient was discharged from this facility on 2020 after being treated for bilateral pulmonary embolism as well as bacteremia MRSA..  Patient also has recent history of cholecystectomy on .     Interval History: Pt was noted to have rhabdomyolysis with CPK greater than 23410 and LUIZ.  Nephrology was consulted.  She was noted to have metabolic acidosis initiated on bicarb drip.  Daptomycin was held and ID was consulted.  She was initiated on IV vancomycin.  She was noted have increased right upper extremity swelling.  Right upper extremity ultrasound demonstrated occlusive thrombus of the right brachial vein and cephalic vein. Pt had eliquis failure outpatient. Pt has poor caloric intake. Pt is currently receiving heparin infusion with Coumadin bridging. Pt given 5 doses infed 100mg daily with last dose being 2020. Pt received epoetin [place]-epbx 20,000 units on 2020. Pt hematuria has resolved since first consult.        Today 2020 follow-up visit after discharge from hospital.  Patient is currently on IV antibiotics.  PICC line left side upper extremities.  Patient continue taking Coumadin and follow-up with Coumadin clinic as of right now.     Review of Systems   Constitutional: Positive for activity change, appetite change and fatigue. Negative for  chills, fever and unexpected weight change.   HENT: Negative for mouth sores and trouble swallowing.    Eyes: Negative for photophobia and visual disturbance.   Respiratory: Positive for shortness of breath. Negative for cough, chest tightness, wheezing and stridor.    Cardiovascular: Negative for chest pain.   Gastrointestinal: Negative for abdominal pain, constipation, diarrhea, nausea and vomiting.   Musculoskeletal: Positive for back pain. Negative for arthralgias and myalgias.   Skin: Negative for color change, pallor, rash and wound.   Neurological: Negative for syncope, speech difficulty and weakness.   Hematological: Negative for adenopathy. Does not bruise/bleed easily.   Psychiatric/Behavioral: Negative for agitation, behavioral problems, confusion, decreased concentration and dysphoric mood.      Lifetime Dose Tracking   No doses have been documented on this patient for the following tracked chemicals: doxorubicin, epirubicin, idarubicin, daunorubicin, mitoxantrone, bleomycin, mitomycin, busulfan     Past Medical History:   Diagnosis Date    ALLERGIC RHINITIS     Anemia     Arthritis     Back pain     Bronchitis     Cataract     OU    Cholelithiasis     COPD (chronic obstructive pulmonary disease)     Degenerative disc disease     Diabetes mellitus     pre diabetic    Diverticulosis     DVT (deep venous thrombosis)     Edema     Encounter for blood transfusion 1979    Fibromyalgia     Glaucoma     Gout     Hx of colonic polyps     Hyperlipidemia     Hypertension     Incontinence     Osteoporosis     Reflux     Refusal of blood transfusions as patient is Gnosticism     Sleep apnea     non compliant with CPAP    Vestibulitis of ear        Family History   Problem Relation Age of Onset    Hypertension Mother     Diabetes Sister     Glaucoma Neg Hx     Macular degeneration Neg Hx     Retinal detachment Neg Hx        Past Surgical History:   Procedure Laterality Date     ANGIOGRAPHY OF LOWER EXTREMITY N/A 2019    Procedure: ANGIOGRAM, LOWER EXTREMITY;  Surgeon: Gino Arana MD;  Location: [facility] CATH/EP LAB;  Service: General;  Laterality: N/A;    COLONOSCOPY N/A 2020    Procedure: COLONOSCOPY;  Surgeon: Sue Nuñez MD;  Location: [facility] [place] ENDO;  Service: Endoscopy;  Laterality: N/A;    ESOPHAGOGASTRODUODENOSCOPY N/A 2020    Procedure: EGD (ESOPHAGOGASTRODUODENOSCOPY);  Surgeon: Sue Nuñez MD;  Location: [facility] [place] ENDO;  Service: Endoscopy;  Laterality: N/A;    HIP SURGERY      HYSTERECTOMY      JOINT REPLACEMENT      B total hip    LAPAROSCOPIC CHOLECYSTECTOMY N/A 2020    Procedure: CHOLECYSTECTOMY, LAPAROSCOPIC;  Surgeon: Jomar Mcdonald MD;  Location: [facility] OR;  Service: General;  Laterality: N/A;    TRANSFORAMINAL EPIDURAL INJECTION OF STEROID Left 2020    Procedure: Injection,steroid,epidural,transforaminal approach;  Surgeon: Matt Gilliam MD;  [facility];  Service: Pain Management;  Laterality: Left;  L2-3, L3-4       Social History     Socioeconomic History    Marital status:      Spouse name: Not on file    Number of children: Not on file    Years of education: Not on file    Highest education level: Not on file   Occupational History    Not on file   Social Needs    Financial resource strain: Not on file    Food insecurity     Worry: Not on file     Inability: Not on file    Transportation needs     Medical: Not on file     Non-medical: Not on file   Tobacco Use    Smoking status: Former Smoker     Packs/day: 0.25     Years: 5.00     Pack years: 1.25     Quit date: 1972     Years since quittin.6    Smokeless tobacco: Never Used   Substance and Sexual Activity    Alcohol use: Yes     Alcohol/week: 0.0 standard drinks     Comment: Rarely    Drug use: Yes     Types: Oxycodone, Hydrocodone    Sexual activity: Not on file   Lifestyle    Physical activity     Days per week: Not on file     Minutes per  session: Not on file    Stress: Not on file   Relationships    Social connections     Talks on phone: Not on file     Gets together: Not on file     Attends Faith service: Not on file     Active member of club or organization: Not on file     Attends meetings of clubs or organizations: Not on file     Relationship status: Not on file   Other Topics Concern    Not on file   Social History Narrative    Not on file         Current Outpatient Medications:     acetaminophen (TYLENOL) 325 MG tablet, Take 2 tablets (650 mg total) by mouth every 6 (six) hours as needed (Do not take with any other Tylenol or acetaminophen containing products)., Disp: , Rfl: 0    albuterol-ipratropium (DUO-NEB) 2.5 mg-0.5 mg/3 mL nebulizer solution, Take 3 mLs by nebulization every 8 (eight) hours., Disp: 270 mL, Rfl: 0    ascorbic acid, vitamin C, (VITAMIN C) 100 MG tablet, Take 5 tablets (500 mg total) by mouth every evening., Disp: , Rfl:     budesonide-formoterol 160-4.5 mcg (SYMBICORT) 160-4.5 mcg/actuation HFAA, Inhale 2 puffs into the lungs every 12 (twelve) hours. Controller, Disp: 3 Inhaler, Rfl: 3    cyanocobalamin, vitamin B-12, 2,500 mcg Lozg, Place 2 tablets under the tongue once daily., Disp: 60 lozenge, Rfl: 11    diclofenac (VOLTAREN) 25 MG TbEC, Take 1 tablet (25 mg total) by mouth 3 (three) times daily as needed. (Patient not taking: Reported on 8/17/2020), Disp: 15 tablet, Rfl: 0    esomeprazole (NEXIUM) 20 MG capsule, Take 1 capsule (20 mg total) by mouth before breakfast. (Patient not taking: Reported on 8/17/2020), Disp: 30 capsule, Rfl: 2    fluticasone (FLONASE) 50 mcg/actuation nasal spray, 2 sprays (100 mcg total) by Each Nare route once daily., Disp: 3 Bottle, Rfl: 3    folic acid (FOLVITE) 1 MG tablet, Take 1 tablet (1 mg total) by mouth once daily., Disp: 30 tablet, Rfl: 0    hydrALAZINE (APRESOLINE) 50 MG tablet, Take 1 tablet (50 mg total) by mouth every 8 (eight) hours., Disp: 90 tablet, Rfl:  1    metoprolol succinate (TOPROL-XL) 50 MG 24 hr tablet, Take 1 tablet (50 mg total) by mouth once daily., Disp: 90 tablet, Rfl: 3    montelukast (SINGULAIR) 10 mg tablet, Take 1 tablet (10 mg total) by mouth every evening., Disp: 90 tablet, Rfl: 3    multivitamin capsule, Take 1 capsule by mouth once daily., Disp: , Rfl:     ondansetron (ZOFRAN-ODT) 4 MG TbDL, Take 1 tablet (4 mg total) by mouth every 8 (eight) hours as needed., Disp: 20 tablet, Rfl: 0    vancomycin HCl in 5 % dextrose (VANCOMYCIN IN DEXTROSE 5 %) 1 gram/250 mL Soln, Inject 250 mLs (1,000 mg total) into the vein as needed (until september 2)., Disp: , Rfl:     warfarin (COUMADIN) 7.5 MG tablet, Take 1 tablet (7.5 mg total) by mouth Daily., Disp: 30 tablet, Rfl: 1  No current facility-administered medications for this visit.     Facility-Administered Medications Ordered in Other Visits:     lactated ringers infusion, , Intravenous, Continuous, Gino Reid MD, Last Rate: 10 mL/hr at 07/13/20 1308    lidocaine (PF) 10 mg/ml (1%) injection 10 mg, 1 mL, Intradermal, Once, Gino Reid MD    Review of patient's allergies indicates:   Allergen Reactions    Cymbalta [duloxetine] Other (See Comments)     Nightmares      Darvon [propoxyphene] Nausea Only and Other (See Comments)     Sweating, slept for 3 days    Atorvastatin Other (See Comments)     Muscle cramps    Naprosyn [naproxen] Nausea Only    Penicillins Rash    Tramadol Nausea Only and Palpitations     All medications and past history have been reviewed.    Objective:      Vitals:  Vital Signs (Most Recent):  Temp: 97.7 °F (36.5 °C) (08/13/20 0426)  Pulse: 105 (08/13/20 0736)  Resp: 20 (08/13/20 0736)  BP: (!) 148/94 (08/13/20 0426)  SpO2: 99 % (08/13/20 0736) Vital Signs (24h Range):  Temp:  [96.9 °F (36.1 °C)-99.1 °F (37.3 °C)] 97.7 °F (36.5 °C)  Pulse:  [] 105  Resp:  [16-20] 20  SpO2:  [95 %-100 %] 99 %  BP: (141-199)/(65-95) 148/94         Weight Readings:  Wt  Readings from Last 5 Encounters:   08/14/20 105.4 kg (232 lb 5.8 oz)   07/30/20 99.9 kg (220 lb 3.8 oz)   07/29/20 102.7 kg (226 lb 6.6 oz)   07/13/20 97.5 kg (215 lb)   07/09/20 104.9 kg (231 lb 4.2 oz)      Physical Exam  Constitutional:       General: She is not in acute distress.     Appearance: Normal appearance. She is ill-appearing.   HENT:      Head: Normocephalic and atraumatic.      Right Ear: External ear normal.      Left Ear: External ear normal.      Nose: Nose normal. No congestion or rhinorrhea.   Eyes:      General:         Right eye: No discharge.         Left eye: No discharge.      Extraocular Movements: Extraocular movements intact.      Conjunctiva/sclera: Conjunctivae normal.      Pupils: Pupils are equal, round, and reactive to light.   Neck:      Musculoskeletal: Normal range of motion and neck supple. No neck rigidity.   Cardiovascular:      Rate and Rhythm: Normal rate and regular rhythm.      Heart sounds: Murmur present.   Pulmonary:      Effort: Pulmonary effort is normal. No respiratory distress.      Breath sounds: Normal breath sounds. No stridor. No wheezing, rhonchi or rales.   Abdominal:      General: Bowel sounds are normal. There is no distension.      Palpations: Abdomen is soft.      Tenderness: There is no abdominal tenderness. There is no guarding.   Musculoskeletal: Normal range of motion.         General: Swelling (generalized swelling of both upper and lower extremities) present.   Lymphadenopathy:      Cervical: No cervical adenopathy.   Skin:     General: Skin is warm and dry.      Coloration: Skin is not pale.      Findings: No erythema or rash.   Neurological:      General: No focal deficit present.      Mental Status: She is alert and oriented to person, place, and time. Mental status is at baseline.      Cranial Nerves: No cranial nerve deficit.   Psychiatric:         Mood and Affect: Mood normal.         Behavior: Behavior normal.         Thought Content: Thought  content normal.         Judgment: Judgment normal.          Labs:  WBC   Date Value Ref Range Status   08/16/2020 8.74 3.90 - 12.70 K/uL Final   08/15/2020 8.14 3.90 - 12.70 K/uL Final   08/14/2020 8.39 3.90 - 12.70 K/uL Final     RBC   Date Value Ref Range Status   08/16/2020 2.21 (L) 4.00 - 5.40 M/uL Final   08/15/2020 2.24 (L) 4.00 - 5.40 M/uL Final   08/14/2020 2.18 (L) 4.00 - 5.40 M/uL Final     Hemoglobin   Date Value Ref Range Status   08/16/2020 7.2 (L) 12.0 - 16.0 g/dL Final   08/15/2020 7.3 (L) 12.0 - 16.0 g/dL Final   08/14/2020 7.3 (L) 12.0 - 16.0 g/dL Final     Hematocrit   Date Value Ref Range Status   08/16/2020 24.4 (L) 37.0 - 48.5 % Final   08/15/2020 24.5 (L) 37.0 - 48.5 % Final   08/14/2020 24.4 (L) 37.0 - 48.5 % Final     Mean Corpuscular Volume   Date Value Ref Range Status   08/16/2020 110 (H) 82 - 98 fL Final   08/15/2020 109 (H) 82 - 98 fL Final   08/14/2020 112 (H) 82 - 98 fL Final     Platelets   Date Value Ref Range Status   08/16/2020 235 150 - 350 K/uL Final   08/15/2020 228 150 - 350 K/uL Final   08/14/2020 223 150 - 350 K/uL Final     Mean Corpuscular Hemoglobin   Date Value Ref Range Status   08/16/2020 32.6 (H) 27.0 - 31.0 pg Final   08/15/2020 32.6 (H) 27.0 - 31.0 pg Final   08/14/2020 33.5 (H) 27.0 - 31.0 pg Final     Mean Corpuscular Hemoglobin Conc   Date Value Ref Range Status   08/16/2020 29.5 (L) 32.0 - 36.0 g/dL Final   08/15/2020 29.8 (L) 32.0 - 36.0 g/dL Final   08/14/2020 29.9 (L) 32.0 - 36.0 g/dL Final     RDW   Date Value Ref Range Status   08/16/2020 15.9 (H) 11.5 - 14.5 % Final   08/15/2020 15.8 (H) 11.5 - 14.5 % Final   08/14/2020 15.8 (H) 11.5 - 14.5 % Final     Sodium   Date Value Ref Range Status   08/16/2020 141 136 - 145 mmol/L Final   08/15/2020 141 136 - 145 mmol/L Final   08/14/2020 141 136 - 145 mmol/L Final     Potassium   Date Value Ref Range Status   08/16/2020 3.7 3.5 - 5.1 mmol/L Final   08/15/2020 3.8 3.5 - 5.1 mmol/L Final   08/14/2020 4.2 3.5 - 5.1  mmol/L Final     Chloride   Date Value Ref Range Status   08/16/2020 106 95 - 110 mmol/L Final   08/15/2020 106 95 - 110 mmol/L Final   08/14/2020 108 95 - 110 mmol/L Final     CO2   Date Value Ref Range Status   08/16/2020 25 23 - 29 mmol/L Final   08/15/2020 24 23 - 29 mmol/L Final   08/14/2020 24 23 - 29 mmol/L Final     BUN, Bld   Date Value Ref Range Status   08/16/2020 7 (L) 8 - 23 mg/dL Final   08/15/2020 6 (L) 8 - 23 mg/dL Final   08/14/2020 6 (L) 8 - 23 mg/dL Final     Creatinine   Date Value Ref Range Status   08/16/2020 1.9 (H) 0.5 - 1.4 mg/dL Final   08/15/2020 1.8 (H) 0.5 - 1.4 mg/dL Final   08/14/2020 1.7 (H) 0.5 - 1.4 mg/dL Final     Glucose   Date Value Ref Range Status   08/16/2020 122 (H) 70 - 110 mg/dL Final   08/15/2020 120 (H) 70 - 110 mg/dL Final   08/14/2020 97 70 - 110 mg/dL Final     Calcium   Date Value Ref Range Status   08/16/2020 8.3 (L) 8.7 - 10.5 mg/dL Final   08/15/2020 8.3 (L) 8.7 - 10.5 mg/dL Final   08/14/2020 8.1 (L) 8.7 - 10.5 mg/dL Final     Magnesium   Date Value Ref Range Status   08/07/2020 1.9 1.6 - 2.6 mg/dL Final   08/06/2020 2.0 1.6 - 2.6 mg/dL Final   08/05/2020 2.3 1.6 - 2.6 mg/dL Final     Phosphorus   Date Value Ref Range Status   08/16/2020 5.5 (H) 2.7 - 4.5 mg/dL Final   08/15/2020 4.8 (H) 2.7 - 4.5 mg/dL Final   08/14/2020 4.4 2.7 - 4.5 mg/dL Final     Alkaline Phosphatase   Date Value Ref Range Status   08/11/2020 143 (H) 55 - 135 U/L Final   08/07/2020 97 55 - 135 U/L Final   08/06/2020 89 55 - 135 U/L Final     Total Protein   Date Value Ref Range Status   08/11/2020 6.6 6.0 - 8.4 g/dL Final   08/07/2020 5.7 (L) 6.0 - 8.4 g/dL Final   08/06/2020 5.7 (L) 6.0 - 8.4 g/dL Final     Albumin   Date Value Ref Range Status   08/16/2020 2.3 (L) 3.5 - 5.2 g/dL Final   08/15/2020 2.4 (L) 3.5 - 5.2 g/dL Final   08/14/2020 2.3 (L) 3.5 - 5.2 g/dL Final     Total Bilirubin   Date Value Ref Range Status   08/11/2020 0.4 0.1 - 1.0 mg/dL Final     Comment:     For infants and  newborns, interpretation of results should be based  on gestational age, weight and in agreement with clinical  observations.  Premature Infant recommended reference ranges:  Up to 24 hours.............<8.0 mg/dL  Up to 48 hours............<12.0 mg/dL  3-5 days..................<15.0 mg/dL  6-29 days.................<15.0 mg/dL     08/07/2020 0.4 0.1 - 1.0 mg/dL Final     Comment:     For infants and newborns, interpretation of results should be based  on gestational age, weight and in agreement with clinical  observations.  Premature Infant recommended reference ranges:  Up to 24 hours.............<8.0 mg/dL  Up to 48 hours............<12.0 mg/dL  3-5 days..................<15.0 mg/dL  6-29 days.................<15.0 mg/dL     08/06/2020 0.4 0.1 - 1.0 mg/dL Final     Comment:     For infants and newborns, interpretation of results should be based  on gestational age, weight and in agreement with clinical  observations.  Premature Infant recommended reference ranges:  Up to 24 hours.............<8.0 mg/dL  Up to 48 hours............<12.0 mg/dL  3-5 days..................<15.0 mg/dL  6-29 days.................<15.0 mg/dL       AST   Date Value Ref Range Status   08/11/2020 69 (H) 10 - 40 U/L Final   08/07/2020 201 (H) 10 - 40 U/L Final   08/06/2020 289 (H) 10 - 40 U/L Final     ALT   Date Value Ref Range Status   08/11/2020 103 (H) 10 - 44 U/L Final   08/07/2020 157 (H) 10 - 44 U/L Final   08/06/2020 177 (H) 10 - 44 U/L Final     TSH   Date Value Ref Range Status   08/04/2020 3.261 0.400 - 4.000 uIU/mL Final   01/16/2020 4.572 (H) 0.400 - 4.000 uIU/mL Final   03/14/2019 3.744 0.400 - 4.000 uIU/mL Final       EGD 08/12/2020  Findings:        The esophagus was normal.        A small hiatal hernia was present.        The examined duodenum was normal.   Impression:          - Normal esophagus.                        - Small hiatal hernia.                        - Normal examined duodenum.                        - No  specimens collected.                        -History of multifactorial anemia, in part due to                        low iron counts. Patient is a Cheondoism     Colonoscopy 08/13/2020  Findings:        Multiple small-mouthed diverticula were found in the sigmoid colon,        descending colon and ascending colon.        The exam was otherwise without abnormality on direct and        retroflexion views.   Impression:          - Diverticulosis in the sigmoid colon, in the                        descending colon and in the ascending colon.                        - The examination was otherwise normal on direct and                        retroflexion views.                        - No specimens collected.                        -History of multifactorial anemia, in part due to                        low iron.   All lab results and imaging results have been reviewed and discussed with the patient.     Assessment and Plan:      Mixed Anemia  -EGD and Colonoscopy do not show source of bleed. Pt is unwilling to eat and anemia could be partially due to dietary restriction along with decreased renal function.  -Continue vitamin B12 and folate supplementation.    Right occlusive thrombus brachial vein and cephalic vein while patient is on Eliquis treated for pulmonary embolism and lower extremity DVT.  Extensive thrombosis with Hex phos neutralization is positive   > continue Coumadin follow-up Coumadin clinic.  Follow-up primary care physician.  Patient will likely require lifelong anticoagulation.  > Jehovah Witness  > RTC after completion of IV antibiotics.  > CBC CMP iron panel ferritin     Bacteremia bacteremia outpatient IV antibiotics  > continue ongoing antibiotics per protocol

## 2020-08-20 NOTE — TELEPHONE ENCOUNTER
Scheduled pt appt. Pt requests reminder slips placed in mail.    ----- Message from Fer Cuello MD sent at 8/20/2020  1:10 PM CDT -----  RTC 3 weeks with blood work.  CBC CMP iron ferritin

## 2020-08-21 ENCOUNTER — TELEPHONE (OUTPATIENT)
Dept: INFECTIOUS DISEASES | Facility: CLINIC | Age: 80
End: 2020-08-21

## 2020-08-21 NOTE — TELEPHONE ENCOUNTER
----- Message from Maricruz Villaseñor MA sent at 8/21/2020 12:24 PM CDT -----    ----- Message -----  From: Amarilis Nielsen MD  Sent: 8/21/2020  12:08 PM CDT  To: Maricruz Villaseñor MA    Please notify her HH to send the labs to dr. Velarde   ----- Message -----  From: Bettye Soriano  Sent: 8/21/2020   8:31 AM CDT  To: Amarilis Nielsen MD    Labs 8/20/20

## 2020-08-21 NOTE — TELEPHONE ENCOUNTER
Please let know home health/Lab that:     INR results go to primary care physician, Dr Villatoro.   I am happy to follow other labs: CMP. CBC diff, ESR, CRP and vancomycin trough while on Vanc iv

## 2020-08-21 NOTE — TELEPHONE ENCOUNTER
Last INR sent to Coumadin clinic for management. She has been under  coumadin [facility] Coumadin 7.5 daily except Friday 3.5. Plan INR monthly.

## 2020-08-24 ENCOUNTER — TELEPHONE (OUTPATIENT)
Dept: FAMILY MEDICINE | Facility: CLINIC | Age: 80
End: 2020-08-24

## 2020-08-26 ENCOUNTER — TELEPHONE (OUTPATIENT)
Dept: GASTROENTEROLOGY | Facility: CLINIC | Age: 80
End: 2020-08-26

## 2020-08-26 DIAGNOSIS — D50.9 IRON DEFICIENCY ANEMIA, UNSPECIFIED IRON DEFICIENCY ANEMIA TYPE: Primary | ICD-10-CM

## 2020-08-26 DIAGNOSIS — Z01.818 PREOP TESTING: ICD-10-CM

## 2020-08-26 NOTE — TELEPHONE ENCOUNTER
Call placed to MsCarly Abreu in regards to scheduling her VCE with Dr. Nuñez. I advised her that Dr. Nuñez did her colon and EGD while she was in the hospital, and now she wants to do a video capsule to look for any bleeding. She verbalized understanding. Offered her 9/9 at 0700. She stated that would be fine. She stated she would  instructions for the capsule on Sept 3rd after seeing Dr. Villatoro if she remembers. I advised her I put a note on her appt for them to remind her to stop by our office. She verbalized understanding. No further issues noted.

## 2020-08-26 NOTE — TELEPHONE ENCOUNTER
----- Message from Sue Nuñez MD sent at 8/14/2020  4:23 PM CDT -----  NOt sure if I sent this yesterday, needs outpatient VCE for BRANDON but still inpatient     Thanks

## 2020-08-28 ENCOUNTER — TELEPHONE (OUTPATIENT)
Dept: FAMILY MEDICINE | Facility: CLINIC | Age: 80
End: 2020-08-28

## 2020-08-28 NOTE — TELEPHONE ENCOUNTER
----- Message from Elisha Alaniz sent at 8/27/2020 10:17 AM CDT -----  Type: Needs lab orders sent    Who Called:  Sol ([facility])  Best Call Back Number: [phone]  Additional Information:  Requesting lab order be faxed to [phone] (patient is having labs for infectious disease)please send or call back if any questions.

## 2020-08-31 ENCOUNTER — TELEPHONE (OUTPATIENT)
Dept: FAMILY MEDICINE | Facility: CLINIC | Age: 80
End: 2020-08-31

## 2020-08-31 NOTE — TELEPHONE ENCOUNTER
Advised Sol with [facility], BMP, CBC and Hep C antibody ordered. Added FLP. Understanding verbalized.

## 2020-08-31 NOTE — TELEPHONE ENCOUNTER
----- Message from Preeti Aaron sent at 8/31/2020  8:32 AM CDT -----  Contact: Sol  Type: Needs Medical Advice  Who Called:  [facility]  Symptoms (please be specific):    How long has patient had these symptoms:   Pharmacy name and phone #:    Best Call Back Number:   Additional Information: requesting a call back now stated at the lobby now waiting for orders for the patient

## 2020-09-01 ENCOUNTER — OFFICE VISIT (OUTPATIENT)
Dept: INFECTIOUS DISEASES | Facility: CLINIC | Age: 80
End: 2020-09-01
Payer: MEDICARE

## 2020-09-01 VITALS
HEART RATE: 91 BPM | HEIGHT: 63 IN | WEIGHT: 212 LBS | SYSTOLIC BLOOD PRESSURE: 157 MMHG | DIASTOLIC BLOOD PRESSURE: 84 MMHG | BODY MASS INDEX: 37.56 KG/M2 | OXYGEN SATURATION: 97 %

## 2020-09-01 DIAGNOSIS — B95.62 MRSA BACTEREMIA: Primary | ICD-10-CM

## 2020-09-01 DIAGNOSIS — R78.81 MRSA BACTEREMIA: Primary | ICD-10-CM

## 2020-09-01 DIAGNOSIS — M47.16 OSTEOARTHRITIS OF LUMBAR SPINE WITH MYELOPATHY: ICD-10-CM

## 2020-09-01 PROCEDURE — 99215 OFFICE O/P EST HI 40 MIN: CPT | Mod: S$GLB,,, | Performed by: INTERNAL MEDICINE

## 2020-09-01 PROCEDURE — 3077F PR MOST RECENT SYSTOLIC BLOOD PRESSURE >= 140 MM HG: ICD-10-PCS | Mod: S$GLB,,, | Performed by: INTERNAL MEDICINE

## 2020-09-01 PROCEDURE — 1111F DSCHRG MED/CURRENT MED MERGE: CPT | Mod: S$GLB,,, | Performed by: INTERNAL MEDICINE

## 2020-09-01 PROCEDURE — 1125F AMNT PAIN NOTED PAIN PRSNT: CPT | Mod: S$GLB,,, | Performed by: INTERNAL MEDICINE

## 2020-09-01 PROCEDURE — 1101F PT FALLS ASSESS-DOCD LE1/YR: CPT | Mod: S$GLB,,, | Performed by: INTERNAL MEDICINE

## 2020-09-01 PROCEDURE — 3079F PR MOST RECENT DIASTOLIC BLOOD PRESSURE 80-89 MM HG: ICD-10-PCS | Mod: S$GLB,,, | Performed by: INTERNAL MEDICINE

## 2020-09-01 PROCEDURE — 1159F MED LIST DOCD IN RCRD: CPT | Mod: S$GLB,,, | Performed by: INTERNAL MEDICINE

## 2020-09-01 PROCEDURE — 3079F DIAST BP 80-89 MM HG: CPT | Mod: S$GLB,,, | Performed by: INTERNAL MEDICINE

## 2020-09-01 PROCEDURE — 1111F PR DISCHARGE MEDS RECONCILED W/ CURRENT OUTPATIENT MED LIST: ICD-10-PCS | Mod: S$GLB,,, | Performed by: INTERNAL MEDICINE

## 2020-09-01 PROCEDURE — 1159F PR MEDICATION LIST DOCUMENTED IN MEDICAL RECORD: ICD-10-PCS | Mod: S$GLB,,, | Performed by: INTERNAL MEDICINE

## 2020-09-01 PROCEDURE — 1101F PR PT FALLS ASSESS DOC 0-1 FALLS W/OUT INJ PAST YR: ICD-10-PCS | Mod: S$GLB,,, | Performed by: INTERNAL MEDICINE

## 2020-09-01 PROCEDURE — 1125F PR PAIN SEVERITY QUANTIFIED, PAIN PRESENT: ICD-10-PCS | Mod: S$GLB,,, | Performed by: INTERNAL MEDICINE

## 2020-09-01 PROCEDURE — 99215 PR OFFICE/OUTPT VISIT, EST, LEVL V, 40-54 MIN: ICD-10-PCS | Mod: S$GLB,,, | Performed by: INTERNAL MEDICINE

## 2020-09-01 PROCEDURE — 3077F SYST BP >= 140 MM HG: CPT | Mod: S$GLB,,, | Performed by: INTERNAL MEDICINE

## 2020-09-01 RX ORDER — DOXYCYCLINE 100 MG/1
100 CAPSULE ORAL EVERY 12 HOURS
Qty: 42 CAPSULE | Refills: 0 | Status: SHIPPED | OUTPATIENT
Start: 2020-09-01 | End: 2020-09-22

## 2020-09-01 RX ORDER — DOXYCYCLINE 100 MG/1
100 CAPSULE ORAL EVERY 12 HOURS
Qty: 42 CAPSULE | Refills: 0 | Status: SHIPPED | OUTPATIENT
Start: 2020-09-01 | End: 2020-09-01 | Stop reason: SDUPTHER

## 2020-09-01 NOTE — PROGRESS NOTES
Subjective:       Patient ID: Sammi Abreu is a 79 y.o. female.    Chief Complaint:: Hospital Follow Up    Post Hospital follow up 09/01/2020.  Sammi Abreu is a   79 y.o. AAF with PMHx  obesity, DM 2, HTN, DLP, CHARLIE, noncompliance with CPAP, COPD, GERD, DJD, fibromyalgia, BL hip replacement, Jehovah witness, allergic to penicillin, anemia,  central canal narrowing ( severe L3-L4, moderate L1-L2, L4-L5) Patient has not been feeling well  this year. She had various complains like nausea, low appetite, multiple kinds of abdominal pains, multiple kinds of back pains and left thigh pain.     -- On 06/02/2020--ER visit for abdominal pain//RtLQ pain  ---On 06/07--06/11/2020 --hospitalized at [facility] for abdominal pain again;(different, multiple kinds of abdominal and back pains) diagnosed with constipation,  transaminitis, cholelithiasis without Cholecystitis. She was offered cholecystectomy as outpatient. She was advised to take gabapentin for lower back pain radiating to her legs  -- On 06/30/2020- TYLER, but that did not help her back pains. She was not doing much activity. Some friends were helping her with daily activities. She felt miserable with abdominal pain and decided to give a try to Cholecystectomy. Her friends called and helped make arrangements for cholecystectomy.   --On 07/13/2020 she had laparoscopic cholecystectomy. Patient was hypoxemic post op and was not discharged, instead she was direct admitted to [facility]  -- on 07/13---07/29/2020 she was hospitalized and diagnosed with hypoxemia, BL PE, left popliteal DVT. While in hospital she had fever. Bcx showed MRSA bacteremia of uncertain source (07/18/2020)  I saw her at that time. ECHO  Ruled out endocarditis. MRI spine ro discitis osteomyelitis. I could not pinpoint the source of bacteremia( TYLER, recent antecubital IV site).  She was discharged to home on 07/29 on daptomycin 1000 mg daily.  She return to the emergency room 08/02/2020 with  complaints of right lower quadrant pain.  A CT scan of the abdomen which was performed which did not reveal any acute abnormalities.  The AST and ALT were very elevated. She had LUIZ due to dehydration; rhabdomyolysis,  (potentially from daptomycin) Patient was hydrated. She saw nephrologist. Dapto was switched to vanc.    Patient comes in today using a walker. She still feels tired, and nauseous. The tiredness could due to recent PE and deconditioning from low back pain and long hospitalization.   She is followed by Gi for nausea, low appetitie and anemia. A videoendoscopy  Is scheduled for the near future.    Patient has completed at least 6 weeks of MRSA coverage. She has no fever no sweating no signs and symptoms of systemic infection. No localized signs of infection. No skin lesions, no oint effusions; SHe has the same arthritic pain of lower back left leg she had over the past 4 months. She is working with PT and following with pain med    Most recent CRP ==3.6==  WNL      Review of patient's allergies indicates:   Allergen Reactions    Cymbalta [duloxetine] Other (See Comments)     Nightmares      Darvon [propoxyphene] Nausea Only and Other (See Comments)     Sweating, slept for 3 days    Atorvastatin Other (See Comments)     Muscle cramps    Naprosyn [naproxen] Nausea Only    Penicillins Rash    Tramadol Nausea Only and Palpitations     Past Medical History:   Diagnosis Date    ALLERGIC RHINITIS     Anemia     Arthritis     Back pain     Bronchitis     Cataract     OU    Cholelithiasis     COPD (chronic obstructive pulmonary disease)     Degenerative disc disease     Diabetes mellitus     pre diabetic    Diverticulosis     DVT (deep venous thrombosis)     Edema     Encounter for blood transfusion 1979    Fibromyalgia     Glaucoma     Gout     Hx of colonic polyps     Hyperlipidemia     Hypertension     Incontinence     Osteoporosis     Reflux     Refusal of blood transfusions as  patient is Alevism     Sleep apnea     non compliant with CPAP    Vestibulitis of ear      Past Surgical History:   Procedure Laterality Date    ANGIOGRAPHY OF LOWER EXTREMITY N/A 2019    Procedure: ANGIOGRAM, LOWER EXTREMITY;  Surgeon: Gino Arana MD;  Location[facility]ATH/EP LAB;  Service: General;  Laterality: N/A;    COLONOSCOPY N/A 2020    Procedure: COLONOSCOPY;  Surgeon: Sue Nuñez MD;  Location: [facility] ENDO;  Service: Endoscopy;  Laterality: N/A;    ESOPHAGOGASTRODUODENOSCOPY N/A 2020    Procedure: EGD (ESOPHAGOGASTRODUODENOSCOPY);  Surgeon: Sue Nuñez MD;  Location: [facility] ENDO;  Service: Endoscopy;  Laterality: N/A;    HIP SURGERY      HYSTERECTOMY      JOINT REPLACEMENT      B total hip    LAPAROSCOPIC CHOLECYSTECTOMY N/A 2020    Procedure: CHOLECYSTECTOMY, LAPAROSCOPIC;  Surgeon: Jomar Mcdonald MD;  Location: [facility] OR;  Service: General;  Laterality: N/A;    TRANSFORAMINAL EPIDURAL INJECTION OF STEROID Left 2020    Procedure: Injection,steroid,epidural,transforaminal approach;  Surgeon: Matt Gilliam MD;  Location: [facility] OR;  Service: Pain Management;  Laterality: Left;  L2-3, L3-4     Social History     Tobacco Use    Smoking status: Former Smoker     Packs/day: 0.25     Years: 5.00     Pack years: 1.25     Quit date: 1972     Years since quittin.6    Smokeless tobacco: Never Used   Substance Use Topics    Alcohol use: Yes     Alcohol/week: 0.0 standard drinks     Comment: Rarely     Social History     Occupational History    Not on file     Family History   Problem Relation Age of Onset    Hypertension Mother     Diabetes Sister     Glaucoma Neg Hx     Macular degeneration Neg Hx     Retinal detachment Neg Hx        Review of Systems   Constitutional: Negative for activity change, appetite change, chills, fatigue and fever.   HENT: Negative for congestion, dental problem, ear discharge, ear pain, hearing loss, mouth sores,  "postnasal drip, rhinorrhea, sinus pressure, sinus pain, sneezing, sore throat, tinnitus, trouble swallowing and voice change.    Eyes: Negative for photophobia, discharge and redness.   Respiratory: Negative for cough, chest tightness, shortness of breath, wheezing and stridor.    Cardiovascular: Negative for chest pain and leg swelling.   Gastrointestinal: Negative for abdominal distention, abdominal pain, anal bleeding, blood in stool, constipation, diarrhea, nausea, rectal pain and vomiting.   Genitourinary: Negative for difficulty urinating, dysuria, flank pain, frequency, genital sores, hematuria and urgency.   Musculoskeletal: Negative for arthralgias, back pain, joint swelling, myalgias, neck pain and neck stiffness.   Skin: Negative for color change, rash and wound.   Allergic/Immunologic: Negative for immunocompromised state.   Neurological: Negative for dizziness, seizures and facial asymmetry.   Hematological: Negative for adenopathy. Does not bruise/bleed easily.   Psychiatric/Behavioral: Negative for agitation, confusion, decreased concentration and dysphoric mood. The patient is not nervous/anxious.           Objective:      Blood pressure (!) 157/84, pulse 91, height 5' 3" (1.6 m), weight 96.2 kg (212 lb), SpO2 97 %.  Body mass index is 37.55 kg/m².  Physical Exam  Constitutional:       General: She is not in acute distress.     Appearance: Normal appearance. She is obese. She is not diaphoretic.   HENT:      Head: Atraumatic.      Right Ear: External ear normal.      Left Ear: External ear normal.      Nose: Nose normal.      Mouth/Throat:      Mouth: Mucous membranes are moist.   Eyes:      General: No scleral icterus.     Extraocular Movements: Extraocular movements intact.      Conjunctiva/sclera: Conjunctivae normal.      Pupils: Pupils are equal, round, and reactive to light.   Neck:      Musculoskeletal: Normal range of motion and neck supple.      Vascular: No JVD.   Cardiovascular:      Rate " and Rhythm: Normal rate and regular rhythm.      Heart sounds: Normal heart sounds.   Pulmonary:      Effort: Pulmonary effort is normal.      Breath sounds: Normal breath sounds. No wheezing or rales.   Chest:      Chest wall: No tenderness.   Abdominal:      General: Bowel sounds are normal. There is no distension.      Palpations: Abdomen is soft. There is no mass.      Tenderness: There is no abdominal tenderness. There is no guarding or rebound.   Musculoskeletal: Normal range of motion.         General: No swelling or tenderness.      Comments: Uses walker   Lymphadenopathy:      Cervical: No cervical adenopathy.   Skin:     General: Skin is warm and dry.      Findings: No erythema or rash.   Neurological:      Mental Status: She is alert and oriented to person, place, and time. Mental status is at baseline.      Cranial Nerves: No cranial nerve deficit.   Psychiatric:         Behavior: Behavior normal.         Thought Content: Thought content normal.         VAD:     Assessment:       1. MRSA bacteremia    2. Osteoarthritis of lumbar spine with myelopathy           Plan:       MRSA bacteremia  Comments:  07/18/2020  completed 6 weeks of MRSA IV coverage till 09/01/2020  Orders:  -     doxycycline (VIBRAMYCIN) 100 MG Cap; Take 1 capsule (100 mg total) by mouth every 12 (twelve) hours. for 21 days      Osteoarthritis of lumbar spine with myelopathy  Dc PICC  Dc Iv VAnc  DOxy x 2-3 weeks, although I feel we may do without it. Dw patient , She agreed    Follow up if symptoms worsen or fail to improve.

## 2020-09-02 ENCOUNTER — TELEPHONE (OUTPATIENT)
Dept: GASTROENTEROLOGY | Facility: CLINIC | Age: 80
End: 2020-09-02

## 2020-09-02 NOTE — TELEPHONE ENCOUNTER
----- Message from Megha Sher sent at 9/2/2020  3:50 PM CDT -----  Regarding: advice  Contact: patient  Type: Needs Medical Advice  Who Called:  patient  Symptoms (please be specific):    How long has patient had these symptoms:    Pharmacy name and phone #:    Best Call Back Number: [phone] (home)   Additional Information: Patient spoke to nurse today and was told she did not needed to  instructions for procedure. Patient would prefer to have them in writing and will be stopping by tomorrow to . Please call patient to advice. Thanks!

## 2020-09-02 NOTE — TELEPHONE ENCOUNTER
Returned call to Ms. Brady, I advised her that her instructions are at the  or when she can pick them up tomorrow. She verbalized understnaidng. No further issues noted.

## 2020-09-03 ENCOUNTER — OFFICE VISIT (OUTPATIENT)
Dept: FAMILY MEDICINE | Facility: CLINIC | Age: 80
End: 2020-09-03
Payer: MEDICARE

## 2020-09-03 ENCOUNTER — TELEPHONE (OUTPATIENT)
Dept: FAMILY MEDICINE | Facility: CLINIC | Age: 80
End: 2020-09-03

## 2020-09-03 VITALS
SYSTOLIC BLOOD PRESSURE: 140 MMHG | HEART RATE: 63 BPM | WEIGHT: 212.31 LBS | RESPIRATION RATE: 16 BRPM | HEIGHT: 63 IN | DIASTOLIC BLOOD PRESSURE: 50 MMHG | TEMPERATURE: 98 F | OXYGEN SATURATION: 98 % | BODY MASS INDEX: 37.62 KG/M2

## 2020-09-03 DIAGNOSIS — D62 ACUTE BLOOD LOSS ANEMIA: ICD-10-CM

## 2020-09-03 DIAGNOSIS — D53.1 MEGALOBLASTIC RED BLOOD CELLS: ICD-10-CM

## 2020-09-03 DIAGNOSIS — D72.829 LEUKOCYTOSIS, UNSPECIFIED TYPE: ICD-10-CM

## 2020-09-03 DIAGNOSIS — Z96.649 STATUS POST HIP REPLACEMENT, UNSPECIFIED LATERALITY: ICD-10-CM

## 2020-09-03 DIAGNOSIS — E66.01 SEVERE OBESITY (BMI 35.0-39.9) WITH COMORBIDITY: ICD-10-CM

## 2020-09-03 DIAGNOSIS — R60.0 PERIPHERAL EDEMA: ICD-10-CM

## 2020-09-03 DIAGNOSIS — G47.33 OBSTRUCTIVE SLEEP APNEA SYNDROME: ICD-10-CM

## 2020-09-03 DIAGNOSIS — N17.9 AKI (ACUTE KIDNEY INJURY): ICD-10-CM

## 2020-09-03 DIAGNOSIS — T82.7XXD BACTEREMIA ASSOCIATED WITH INTRAVASCULAR LINE, SUBSEQUENT ENCOUNTER: ICD-10-CM

## 2020-09-03 DIAGNOSIS — I26.99 BILATERAL PULMONARY EMBOLISM: ICD-10-CM

## 2020-09-03 DIAGNOSIS — R60.0 EDEMA OF EXTREMITIES: Primary | ICD-10-CM

## 2020-09-03 DIAGNOSIS — B95.62 MRSA BACTEREMIA: ICD-10-CM

## 2020-09-03 DIAGNOSIS — R78.81 MRSA BACTEREMIA: ICD-10-CM

## 2020-09-03 DIAGNOSIS — I82.621 ACUTE DEEP VEIN THROMBOSIS (DVT) OF RIGHT UPPER EXTREMITY, UNSPECIFIED VEIN: ICD-10-CM

## 2020-09-03 DIAGNOSIS — R78.81 BACTEREMIA ASSOCIATED WITH INTRAVASCULAR LINE, SUBSEQUENT ENCOUNTER: ICD-10-CM

## 2020-09-03 DIAGNOSIS — M25.552 LEFT HIP PAIN: ICD-10-CM

## 2020-09-03 DIAGNOSIS — I26.99 PULMONARY EMBOLISM, UNSPECIFIED CHRONICITY, UNSPECIFIED PULMONARY EMBOLISM TYPE, UNSPECIFIED WHETHER ACUTE COR PULMONALE PRESENT: ICD-10-CM

## 2020-09-03 DIAGNOSIS — I10 HTN (HYPERTENSION), BENIGN: ICD-10-CM

## 2020-09-03 PROCEDURE — 3078F PR MOST RECENT DIASTOLIC BLOOD PRESSURE < 80 MM HG: ICD-10-PCS | Mod: CPTII,S$GLB,, | Performed by: FAMILY MEDICINE

## 2020-09-03 PROCEDURE — 99499 RISK ADDL DX/OHS AUDIT: ICD-10-PCS | Mod: S$GLB,,, | Performed by: FAMILY MEDICINE

## 2020-09-03 PROCEDURE — 1125F PR PAIN SEVERITY QUANTIFIED, PAIN PRESENT: ICD-10-PCS | Mod: S$GLB,,, | Performed by: FAMILY MEDICINE

## 2020-09-03 PROCEDURE — 3078F DIAST BP <80 MM HG: CPT | Mod: CPTII,S$GLB,, | Performed by: FAMILY MEDICINE

## 2020-09-03 PROCEDURE — 3077F SYST BP >= 140 MM HG: CPT | Mod: CPTII,S$GLB,, | Performed by: FAMILY MEDICINE

## 2020-09-03 PROCEDURE — 99999 PR PBB SHADOW E&M-EST. PATIENT-LVL V: CPT | Mod: PBBFAC,,, | Performed by: FAMILY MEDICINE

## 2020-09-03 PROCEDURE — 99214 OFFICE O/P EST MOD 30 MIN: CPT | Mod: S$GLB,,, | Performed by: FAMILY MEDICINE

## 2020-09-03 PROCEDURE — 1159F MED LIST DOCD IN RCRD: CPT | Mod: S$GLB,,, | Performed by: FAMILY MEDICINE

## 2020-09-03 PROCEDURE — 99499 UNLISTED E&M SERVICE: CPT | Mod: S$GLB,,, | Performed by: FAMILY MEDICINE

## 2020-09-03 PROCEDURE — 1125F AMNT PAIN NOTED PAIN PRSNT: CPT | Mod: S$GLB,,, | Performed by: FAMILY MEDICINE

## 2020-09-03 PROCEDURE — 99999 PR PBB SHADOW E&M-EST. PATIENT-LVL V: ICD-10-PCS | Mod: PBBFAC,,, | Performed by: FAMILY MEDICINE

## 2020-09-03 PROCEDURE — 99214 PR OFFICE/OUTPT VISIT, EST, LEVL IV, 30-39 MIN: ICD-10-PCS | Mod: S$GLB,,, | Performed by: FAMILY MEDICINE

## 2020-09-03 PROCEDURE — 1101F PT FALLS ASSESS-DOCD LE1/YR: CPT | Mod: CPTII,S$GLB,, | Performed by: FAMILY MEDICINE

## 2020-09-03 PROCEDURE — 1101F PR PT FALLS ASSESS DOC 0-1 FALLS W/OUT INJ PAST YR: ICD-10-PCS | Mod: CPTII,S$GLB,, | Performed by: FAMILY MEDICINE

## 2020-09-03 PROCEDURE — 3077F PR MOST RECENT SYSTOLIC BLOOD PRESSURE >= 140 MM HG: ICD-10-PCS | Mod: CPTII,S$GLB,, | Performed by: FAMILY MEDICINE

## 2020-09-03 PROCEDURE — 1159F PR MEDICATION LIST DOCUMENTED IN MEDICAL RECORD: ICD-10-PCS | Mod: S$GLB,,, | Performed by: FAMILY MEDICINE

## 2020-09-03 RX ORDER — POTASSIUM CHLORIDE 20 MEQ/1
20 TABLET, EXTENDED RELEASE ORAL DAILY PRN
Qty: 30 TABLET | Refills: 2 | Status: SHIPPED | OUTPATIENT
Start: 2020-09-03 | End: 2020-09-03 | Stop reason: SDUPTHER

## 2020-09-03 RX ORDER — POTASSIUM CHLORIDE 20 MEQ/1
20 TABLET, EXTENDED RELEASE ORAL DAILY PRN
Qty: 30 TABLET | Refills: 2 | Status: SHIPPED | OUTPATIENT
Start: 2020-09-03 | End: 2020-09-17 | Stop reason: SINTOL

## 2020-09-03 RX ORDER — FUROSEMIDE 20 MG/1
20 TABLET ORAL DAILY PRN
Qty: 30 TABLET | Refills: 11 | Status: SHIPPED | OUTPATIENT
Start: 2020-09-03 | End: 2020-09-03 | Stop reason: SDUPTHER

## 2020-09-03 RX ORDER — FUROSEMIDE 20 MG/1
20 TABLET ORAL DAILY PRN
Qty: 30 TABLET | Refills: 11 | Status: SHIPPED | OUTPATIENT
Start: 2020-09-03 | End: 2020-12-30 | Stop reason: SDUPTHER

## 2020-09-03 NOTE — PATIENT INSTRUCTIONS
Controlling High Blood Pressure  High blood pressure (hypertension) is often called the silent killer. This is because many people who have it dont know it. High blood pressure is defined as 140/90 mm Hg or higher. Know your blood pressure and remember to check it regularly. Doing so can save your life. Here are some things you can do to help control your blood pressure.    Choose heart-healthy foods  · Select low-salt, low-fat foods. Limit sodium intake to 2,400 mg per day or the amount suggested by your healthcare provider.  · Limit canned, dried, cured, packaged, and fast foods. These can contain a lot of salt.  · Eat 8 to 10 servings of fruits and vegetables every day.  · Choose lean meats, fish, or chicken.  · Eat whole-grain pasta, brown rice, and beans.  · Eat 2 to 3 servings of low-fat or fat-free dairy products.  · Ask your doctor about the DASH eating plan. This plan helps reduce blood pressure.  · When you go to a restaurant, ask that your meal be prepared with no added salt.  Maintain a healthy weight  · Ask your healthcare provider how many calories to eat a day. Then stick to that number.  · Ask your healthcare provider what weight range is healthiest for you. If you are overweight, a weight loss of only 3% to 5% of your body weight can help lower blood pressure. Generally, a good weight loss goal is to lose 10% of your body weight in a year.  · Limit snacks and sweets.  · Get regular exercise.  Get up and get active  · Choose activities you enjoy. Find ones you can do with friends or family. This includes bicycling, dancing, walking, and jogging.  · Park farther away from building entrances.  · Use stairs instead of the elevator.  · When you can, walk or bike instead of driving.  · [place] leaves, garden, or do household repairs.  · Be active at a moderate to vigorous level of physical activity for at least 40 minutes for a minimum of 3 to 4 days a week.   Manage stress  · Make time to relax and enjoy  life. Find time to laugh.  · Communicate your concerns with your loved ones and your healthcare provider.  · Visit with family and friends, and keep up with hobbies.  Limit alcohol and quit smoking  · Men should have no more than 2 drinks per day.  · Women should have no more than 1 drink per day.  · Talk with your healthcare provider about quitting smoking. Smoking significantly increases your risk for heart disease and stroke. Ask your healthcare provider about community smoking cessation programs and other options.  Medicines  If lifestyle changes arent enough, your healthcare provider may prescribe high blood pressure medicine. Take all medicines as prescribed. If you have any questions about your medicines, ask your healthcare provider before stopping or changing them.   Date Last Reviewed: 4/27/2016 © 2000-2017 [employer]. [address] [place] [address], [place] [address]. All rights reserved. This information is not intended as a substitute for professional medical care. Always follow your healthcare professional's instructions.        Weight Management: Getting Started  Healthy bodies come in all shapes and sizes. Not all bodies are made to be thin. For some people, a healthy weight is higher than the average weight listed on weight charts. Your healthcare provider can help you decide on a healthy weight for you.    Reasons to lose weight  Losing weight can help with some health problems, such as high blood pressure, heart disease, diabetes, sleep apnea, and arthritis. You may also feel more energy.  Set your long-term goal  Your goal doesn't even have to be a specific weight. You may decide on a fitness goal (such as being able to walk 10 miles a week), or a health goal (such as lowering your blood pressure). Choose a goal that is measurable and reasonable, so you know when you've reached it. A goal of reaching a BMI of less than 25 is not always reasonable (or possible).   Make an action  plan  Habits dont change overnight. Setting your goals too high can leave you feeling discouraged if you cant reach them. Be realistic. Choose one or two small changes you can make now. Set an action plan for how you are going to make these changes. When you can stick to this plan, keep making a few more small changes. Taking small steps will help you stay on the path to success.  Track your progress  Write down your goals. Then, keep a daily record of your progress. Write down what you eat and how active you are. This record lets you look back on how much youve done. It may also help when youre feeling frustrated. Reward yourself for success. Even if you dont reach every goal, give yourself credit for what you do get done.  Get support  Encouragement from others can help make losing weight easier. Ask your family members and friends for support. They may even want to join you. Also look to your healthcare provider, registered dietitian, and  for help. Your local hospital can give you more information about nutrition, exercise, and weight loss.  Date Last Reviewed: 1/31/2016  © 9934-2013 [employer]. [address] [place] [address], [place] [address]. All rights reserved. This information is not intended as a substitute for professional medical care. Always follow your healthcare professional's instructions.

## 2020-09-06 ENCOUNTER — LAB VISIT (OUTPATIENT)
Dept: PRIMARY CARE CLINIC | Facility: CLINIC | Age: 80
End: 2020-09-06
Payer: MEDICARE

## 2020-09-06 DIAGNOSIS — Z01.818 PREOP TESTING: ICD-10-CM

## 2020-09-06 PROCEDURE — U0003 INFECTIOUS AGENT DETECTION BY NUCLEIC ACID (DNA OR RNA); SEVERE ACUTE RESPIRATORY SYNDROME CORONAVIRUS 2 (SARS-COV-2) (CORONAVIRUS DISEASE [COVID-19]), AMPLIFIED PROBE TECHNIQUE, MAKING USE OF HIGH THROUGHPUT TECHNOLOGIES AS DESCRIBED BY CMS-2020-01-R: HCPCS

## 2020-09-07 LAB — SARS-COV-2 RNA RESP QL NAA+PROBE: NOT DETECTED

## 2020-09-08 ENCOUNTER — DOCUMENT SCAN (OUTPATIENT)
Dept: HOME HEALTH SERVICES | Facility: HOSPITAL | Age: 80
End: 2020-09-08
Payer: MEDICARE

## 2020-09-08 RX ORDER — WARFARIN 7.5 MG/1
7.5 TABLET ORAL DAILY
Qty: 30 TABLET | Refills: 1 | Status: CANCELLED | OUTPATIENT
Start: 2020-09-08 | End: 2021-09-08

## 2020-09-08 NOTE — TELEPHONE ENCOUNTER
----- Message from Amari Wise sent at 9/8/2020 11:58 AM CDT -----  Type:  RX Refill Request    Who Called:  Sol ([facility])  Refill or New Rx:  warfarin (COUMADIN) 7.5 MG tablet  RX Name and Strength:  As ordered  How is the patient currently taking it? (ex. 1XDay):  As ordered  Is this a 30 day or 90 day RX:  90  Preferred Pharmacy with phone number:    [facility] [address], [place] - [address]  [address] [facility] [facility] [address]  Phone: [phone] Fax: [phone]  Local or Mail Order:  Local  Ordering Provider:  Dr. Shauna Greene Call Back Number:  [phone]  Additional Information:  HAMIDA

## 2020-09-09 ENCOUNTER — HOSPITAL ENCOUNTER (OUTPATIENT)
Facility: HOSPITAL | Age: 80
Discharge: HOME | End: 2020-09-09
Attending: INTERNAL MEDICINE | Admitting: INTERNAL MEDICINE
Payer: MEDICARE

## 2020-09-09 VITALS
DIASTOLIC BLOOD PRESSURE: 74 MMHG | HEIGHT: 63 IN | WEIGHT: 210 LBS | TEMPERATURE: 97 F | HEART RATE: 58 BPM | RESPIRATION RATE: 20 BRPM | SYSTOLIC BLOOD PRESSURE: 189 MMHG | BODY MASS INDEX: 37.21 KG/M2

## 2020-09-09 DIAGNOSIS — D50.9 IRON DEFICIENCY ANEMIA: ICD-10-CM

## 2020-09-09 PROCEDURE — 91110 GI TRC IMG INTRAL ESOPH-ILE: CPT | Mod: 26,,, | Performed by: INTERNAL MEDICINE

## 2020-09-09 PROCEDURE — 25000003 PHARM REV CODE 250: Performed by: INTERNAL MEDICINE

## 2020-09-09 PROCEDURE — 91110 GI TRC IMG INTRAL ESOPH-ILE: CPT | Performed by: INTERNAL MEDICINE

## 2020-09-09 PROCEDURE — 91110 PR GI TRACT CAPSULE ENDOSCOPY: ICD-10-PCS | Mod: 26,,, | Performed by: INTERNAL MEDICINE

## 2020-09-09 RX ORDER — DEXTROMETHORPHAN/PSEUDOEPHED 2.5-7.5/.8
40 DROPS ORAL ONCE
Status: COMPLETED | OUTPATIENT
Start: 2020-09-09 | End: 2020-09-09

## 2020-09-09 RX ADMIN — SIMETHICONE 40 MG: 20 SUSPENSION/ DROPS ORAL at 07:09

## 2020-09-09 NOTE — DISCHARGE INSTRUCTIONS
Capsule Discharge Instructions     You have just swallowed a capsule endoscope.  This contains information about what to expect over the next 8 hours.  Please call our office if you have severe or persistent abdominal or chest pain, fever, difficulty swallowing or if you have any questions.  Our phone number is [phone].    Time Capsule ingested:_______________    · You may drink clear liquids (water, apple juice) 4 hours after swallowing the capsule.  · You may eat a light meal 6 hours after swallowing the capsule.  Medications may be resumed at 6 hours after swallowing the capsule.  · Do not exercise, avoid heavy lifting.  You may walk, sit and lay down.  You can drive a car.  You may return to work, if you work allows avoiding unsuitable environments and /or physical movements.  · Avoid going near MRI machines and radio transmitters.  You may use a computer, cell phone, radio or stereo.  · Do not stand directly next to another person undergoing capsule endoscopy.  · Try not to touch the recorder or the sensor array leads.  Do not remove the leads before 8 hours.  · Avoid getting the data recorder or sensor array leads wet.  · You may loosen the belt to allow yourself to go to the bathroom.  Do not take the belt off until the 8 hours have passed.  · Observe the LED light on the data recorder at least every 15 minutes.  If the light stops blinking, document the time and call our office.  · Return the unit to the hospital at the completion of 8 hour time frame.    May have clear liquids at ______________    May have light snack and medications at ______________    May remove the unit at ______________    Return the unit to Registration Desk at the completion of the study.    Post Capsule Instructions     This information is what to expect over the next 2 days.  Please call us or your doctor if you have severe or persistent abdominal or chest pain, fever, difficulty swallowing, or if you have any questions.   Our phone number is [phone].    · Pain:  Pain is uncommon following capsule endoscopy.  Should you feel sharp or persistent pain, please call your doctor's office.  · Nausea:  Nausea is also very uncommon and should it occur, please notify your doctor's office  · Diet:  You may eat and drink with no restrictions 8 hours after ingesting capsule.  · Activities:  Following the exam you may resume normal activities, including exercise.  · Medications:  You may resume your medications 6 hours after ingesting the capsule.  Do NOT make up doses you have missed, just resume your normal dosage.  · Further Testing:  Until the capsule passes, further testing which includes any type of MRI should be avoided.  If you have any type of MRI examination scheduled in the next 3 days, it should be postponed.  · The Capsule:  The capsule passes naturally in a bowel movement typically in about 24 hours.  Most likely, you will be unaware of its passage.  It does not need to be retrieved and can safely be flushed down the toilet.  Occasionally the capsule light will still be flashing when it passes.  Should you be concerned that the capsule didn't pass, in the absence of symptoms; an abdominal x-ray can be obtained after 7 days to confirm its passage.  · The capsule will make a beeping noise when finished.  It will shut off automatically.

## 2020-09-10 ENCOUNTER — PATIENT OUTREACH (OUTPATIENT)
Dept: ADMINISTRATIVE | Facility: OTHER | Age: 80
End: 2020-09-10

## 2020-09-10 ENCOUNTER — EXTERNAL HOME HEALTH (OUTPATIENT)
Dept: HOME HEALTH SERVICES | Facility: HOSPITAL | Age: 80
End: 2020-09-10
Payer: MEDICARE

## 2020-09-10 NOTE — TELEPHONE ENCOUNTER
Under coumadin clinic.   2.0-3.0   TTR:  0.0 % (1.4 wk)   INR used for dosing:  3.5High  (9/8/2020)   Warfarin maintenance plan:  Hold 09/08 then 3.75 mg (7.5 mg x 0.5) every Mon, Wed, Fri; 7.5 mg (7.5 mg x 1) all other days   Weekly warfarin total:  41.25 mg   Plan last modified:  Lara Liu, PharmD (9/9/2020)   Next INR check:  9/15/2020

## 2020-09-10 NOTE — PROGRESS NOTES
9/11/2020    Sammi Jeet Brady  Office note    Chief Complaint   Patient presents with    Knee Pain    Asthma    Congestive Heart Failure    Deep Vein Thrombosis       HPI:  9/11/2020- IN office with friend, ER visit 9/9/20 for pain right thigh DVT, Pain left anterior thigh seen in ER dc home. 10 on 0-10 scale; has previously seen vascular surgeon Dr. Alegria; no improvement with OTC tylenol. Needing new cardiologist.   No SOB, no cough, no chest tightness,   Has CPAP at home, not currently using. Has nebulizer at home using as needed.  Used symbicort in hospital but not using at home.       Dr [facility] consult:  History of Present Illness:  Pt had laparoscopic rosette yesterday with post op low ox, cta with central pe.  Pt had prior h/o dvt- was on xarelto in 2015 - occurred with elective left hip replacement - left groin dvt.  Hinduism.    Pt seen by me last yr with h/o wheezes and cough and sob, also osas- non compliant.  Pt has had episodes of sob but vague - some sob with no cough/wheezes. Vague left leg swelling.  Pt had back problems needing injections in past- last injections est 5 yrs ago.   Pt developed back pain, worse movement ppt eval pcp/pain doc- injection set up.  She also developed left leg pain - upper thigh- occurred last couple months.  She was having eval with vascular doc for right leg with min right leg symptoms.  She had procedure with improved cirulation right.   Pt had bilat u/s legs in May, and right u/s in June -- both neg for dvt.    Pt developed sob- episodic with one episode of impending doom occurring 2 wks pta.    Pt had had some schwartz since severe episode 2 wks ago.    Pt had had severe pain in chest , then left leg, then sob - had eval at er , pcp, pain doc, then [facility]- biliary dz found and surg deferred til after back injection (injection was not effective for pain). Pt had lap rosette yesterday.  No recent cough/wheezes, no compliant with cpap.    Plan:She will need  follow-up for pulmonary hypertension as it was found on her last echo. With adequate anticoagulation-good chance pulmonary hypertension will clear. 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 Complex case.   CTEP not expected to develop but may develop.  F/u echo in a yr or so (depending on symptoms)        4/22/2019- had dx 2009 sleep apnea, got cpap but could not use-not clear how severe sleep.  Pt wore off and on 2 yrs with bronchitis ppt non compliance.  Had severe bronchitis with hemoptysis ppt stopping cpap.   H/o intermittent wheezes, worse night, ongoing seasonally, sister with asthma, pt onset- 2010.  Had normal pft 9/11/17.   Uses albuterol daily and nocturnal arousal daily later.  Sleeps on right side as left side breathing worsens.  Good sense smell.    Sinus problems with drainage and itchy eyes.  occ sinus infection- zpak    The chief compliant  problem is new to me   PFSH:  Past Medical History:   Diagnosis Date    ALLERGIC RHINITIS     Anemia     Arthritis     Back pain     Bronchitis     Cataract     OU    Cholelithiasis     COPD (chronic obstructive pulmonary disease)     Degenerative disc disease     Diabetes mellitus     pre diabetic    Diverticulosis     DVT (deep venous thrombosis)     Edema     Encounter for blood transfusion 1979    Fibromyalgia     Glaucoma     Gout     Hx of colonic polyps     Hyperlipidemia     Hypertension     Incontinence     Osteoporosis     Reflux     Refusal of blood transfusions as patient is Hoahaoism     Sleep apnea     non compliant with CPAP    Vestibulitis of ear          Past Surgical History:   Procedure Laterality Date    ANGIOGRAPHY OF LOWER EXTREMITY N/A 7/31/2019    Procedure: ANGIOGRAM, LOWER EXTREMITY;  Surgeon: Gino Arana MD;  Location: [facility] CATH/EP LAB;  Service: General;  Laterality: N/A;    COLONOSCOPY N/A 8/13/2020    Procedure: COLONOSCOPY;  Surgeon: Sue Nuñez MD;   Location: [facility] ENDO;  Service: Endoscopy;  Laterality: N/A;    ESOPHAGOGASTRODUODENOSCOPY N/A 2020    Procedure: EGD (ESOPHAGOGASTRODUODENOSCOPY);  Surgeon: Sue Nuñez MD;  Location: [facility] ENDO;  Service: Endoscopy;  Laterality: N/A;    HIP SURGERY      HYSTERECTOMY      INTRALUMINAL GASTROINTESTINAL TRACT IMAGING VIA CAPSULE N/A 2020    Procedure: IMAGING PROCEDURE, GI TRACT, INTRALUMINAL, VIA CAPSULE;  Surgeon: Sue Nuñez MD;  Location: [facility] ENDO;  Service: Endoscopy;  Laterality: N/A;    JOINT REPLACEMENT      B total hip    LAPAROSCOPIC CHOLECYSTECTOMY N/A 2020    Procedure: CHOLECYSTECTOMY, LAPAROSCOPIC;  Surgeon: Jomar Mcdonald MD;  Location: [facility] OR;  Service: General;  Laterality: N/A;    TRANSFORAMINAL EPIDURAL INJECTION OF STEROID Left 2020    Procedure: Injection,steroid,epidural,transforaminal approach;  Surgeon: Matt Gilliam MD;  Location: [facility] OR;  Service: Pain Management;  Laterality: Left;  L2-3, L3-4     Social History     Tobacco Use    Smoking status: Former Smoker     Packs/day: 0.25     Years: 5.00     Pack years: 1.25     Quit date: 1972     Years since quittin.7    Smokeless tobacco: Never Used   Substance Use Topics    Alcohol use: Yes     Alcohol/week: 0.0 standard drinks     Comment: Rarely    Drug use: Yes     Types: Oxycodone, Hydrocodone     Family History   Problem Relation Age of Onset    Hypertension Mother     Diabetes Sister     Cancer Sister     Stomach cancer Sister     Hypertension Sister     Bipolar disorder Sister     Peripheral vascular disease Sister     Stroke Father     Hypertension Father     Hypertension Brother     Stroke Brother     Peripheral vascular disease Brother     Hypertension Son     Obesity Son     Early death Son 48    Arthritis Son     Glaucoma Neg Hx     Macular degeneration Neg Hx     Retinal detachment Neg Hx      Review of patient's allergies indicates:   Allergen Reactions     Daptomycin Other (See Comments)     Kidney damage     Cymbalta [duloxetine] Other (See Comments)     Nightmares      Darvon [propoxyphene] Nausea Only and Other (See Comments)     Sweating, slept for 3 days    Atorvastatin Other (See Comments)     Muscle cramps    Naprosyn [naproxen] Nausea Only    Penicillins Rash    Tramadol Nausea Only and Palpitations       Performance Status:The patient's activity level is functions out of house.      Review of Systems:  a review of eleven systems covering constitutional, Eye, HEENT, Psych, Respiratory, Cardiac, GI, , Musculoskeletal, Endocrine, Dermatologic was negative except for pertinent findings as listed ABOVE and below:  pertinent positive as above, rest is good       Exam:Comprehensive exam done. Temp 97.8 °F (36.6 °C) (Temporal)   Resp 16   Wt 96.2 kg (212 lb 3.1 oz)   SpO2 98% Comment: room air  BMI 37.59 kg/m²   Exam included Vitals as listed, and patient's appearance and affect and alertness and mood, oral exam for yeast and hygiene and pharynx lesions and Mallapatti (M) score, neck with inspection for jvd and masses and thyroid abnormalities and lymph nodes (supraclavicular and infraclavicular nodes and axillary also examined and noted if abn), chest exam included symmetry and effort and fremitus and percussion and auscultation, cardiac exam included rhythm and gallops and murmur and rubs and jvd and edema, abdominal exam for mass and hepatosplenomegaly and tenderness and hernias and bowel sounds, Musculoskeletal exam with muscle tone and posture and mobility/gait and  strength, and skin for rashes and cyanosis and pallor and turgor, extremity for clubbing.  Findings were normal except for pertinent findings listed below:  M4, chest is symmetric, no distress, normal percussion, normal fremitus and good normal breath sounds      Radiographs (ct chest and cxr) reviewed: view by direct vision  - 3/6/18 ct chest mild prominent bronchi with calcified  aorta  Transthoracic echo (TTE) limited 7/22/20   Grade I (mild) left ventricular diastolic dysfunction consistent with impaired relaxation       Labs reviewed       Ct chest 3/6/18 calcified aorta, bronchiectasis mild lower lungs      Results for MEME SALAS (MRN [id number]) as of 4/22/2019 11:03   Ref. Range 12/22/2017 11:41 6/22/2018 08:48   Eosinophil% Latest Ref Range: 0.0 - 8.0 % 3.7 3.2   Eos # Latest Ref Range: 0.0 - 0.5 K/uL 0.2 0.2     PFT results reviewed 9/11/17 wnl.    I spent over 30 mins of time with the patient. Reviewed results of the recently ordered labs, tests and studies; made directives with regards to the results. Over half of this time was spent couseling and coordinating care.     I have explained all of the above in detail and the patient understands all of the current recommendation(s). I have answered all of their questions to the best of my ability and to their complete satisfaction.   The patient is to continue with the current management plan.    Plan:  Clinical impression is apparently straight forward and impression with management as below.    Meme was seen today for knee pain, asthma, congestive heart failure and deep vein thrombosis.    Diagnoses and all orders for this visit:    Pulmonary hypertension    Chronic diastolic CHF (congestive heart failure)  -     Ambulatory referral/consult to Cardiology; Future    Bronchiectasis without complication- seen ct chest 3/6/18  -     fluticasone-salmeterol 230-21 mcg/dose (ADVAIR HFA) 230-21 mcg/actuation HFAA inhaler; Inhale 2 puffs into the lungs 2 (two) times daily. Controller    Chronic sinus complaints  -     montelukast (SINGULAIR) 10 mg tablet; Take 1 tablet (10 mg total) by mouth every evening.  -     fluticasone propionate (FLONASE) 50 mcg/actuation nasal spray; 2 sprays (100 mcg total) by Each Nostril route once daily.    Moderate persistent asthma without complication  -     montelukast (SINGULAIR) 10 mg tablet; Take 1  tablet (10 mg total) by mouth every evening.  -     fluticasone-salmeterol 230-21 mcg/dose (ADVAIR HFA) 230-21 mcg/actuation HFAA inhaler; Inhale 2 puffs into the lungs 2 (two) times daily. Controller    Obstructive sleep apnea syndrome  -     CPAP/BIPAP SUPPLIES        Follow up in about 3 months (around 12/11/2020), or if symptoms worsen or fail to improve.    Discussed with patient above for education the following:      Patient Instructions   Pulmonary hypertension is due to your heart failure and the blood clots, will repeat Echo test in one year. Expect the pulmonary hypertension to resolve after being on optimal blood thinner medications    Recommend contacting the Vascular doctor who worked with previously to see about removal of blood clot in thigh that is causing you so much pain  If you need referral contact clinic and one will be sent    Referral to new cardiologist sent through computer system. If you do not hear from office in week contact pulmonary office and staff with assist.     Use CPAP nightly, will order new machine at next appointment.     Continue current dose of blood thinner, coumadin clinic is responsible for adjusting dosage.

## 2020-09-10 NOTE — PROGRESS NOTES
Chart was reviewed for overdue Proactive [facility] Encounters (OLAYINKA)  topics  Updates were requested from care everywhere  Health Maintenance has been updated  LINKS immunization registry triggered   aching

## 2020-09-11 ENCOUNTER — OFFICE VISIT (OUTPATIENT)
Dept: PULMONOLOGY | Facility: CLINIC | Age: 80
End: 2020-09-11
Payer: MEDICARE

## 2020-09-11 VITALS
OXYGEN SATURATION: 98 % | TEMPERATURE: 98 F | RESPIRATION RATE: 16 BRPM | WEIGHT: 212.19 LBS | BODY MASS INDEX: 37.59 KG/M2

## 2020-09-11 DIAGNOSIS — I27.20 PULMONARY HYPERTENSION: Primary | ICD-10-CM

## 2020-09-11 DIAGNOSIS — G47.33 OBSTRUCTIVE SLEEP APNEA SYNDROME: ICD-10-CM

## 2020-09-11 DIAGNOSIS — I50.32 CHRONIC DIASTOLIC CHF (CONGESTIVE HEART FAILURE): ICD-10-CM

## 2020-09-11 DIAGNOSIS — J47.9 BRONCHIECTASIS WITHOUT COMPLICATION: ICD-10-CM

## 2020-09-11 DIAGNOSIS — R09.89 CHRONIC SINUS COMPLAINTS: ICD-10-CM

## 2020-09-11 DIAGNOSIS — J45.40 MODERATE PERSISTENT ASTHMA WITHOUT COMPLICATION: ICD-10-CM

## 2020-09-11 PROCEDURE — 1101F PR PT FALLS ASSESS DOC 0-1 FALLS W/OUT INJ PAST YR: ICD-10-PCS | Mod: CPTII,S$GLB,, | Performed by: NURSE PRACTITIONER

## 2020-09-11 PROCEDURE — 1159F PR MEDICATION LIST DOCUMENTED IN MEDICAL RECORD: ICD-10-PCS | Mod: S$GLB,,, | Performed by: NURSE PRACTITIONER

## 2020-09-11 PROCEDURE — 1125F AMNT PAIN NOTED PAIN PRSNT: CPT | Mod: S$GLB,,, | Performed by: NURSE PRACTITIONER

## 2020-09-11 PROCEDURE — 1125F PR PAIN SEVERITY QUANTIFIED, PAIN PRESENT: ICD-10-PCS | Mod: S$GLB,,, | Performed by: NURSE PRACTITIONER

## 2020-09-11 PROCEDURE — 99214 OFFICE O/P EST MOD 30 MIN: CPT | Mod: S$GLB,,, | Performed by: NURSE PRACTITIONER

## 2020-09-11 PROCEDURE — 1101F PT FALLS ASSESS-DOCD LE1/YR: CPT | Mod: CPTII,S$GLB,, | Performed by: NURSE PRACTITIONER

## 2020-09-11 PROCEDURE — 99214 PR OFFICE/OUTPT VISIT, EST, LEVL IV, 30-39 MIN: ICD-10-PCS | Mod: S$GLB,,, | Performed by: NURSE PRACTITIONER

## 2020-09-11 PROCEDURE — 99999 PR PBB SHADOW E&M-EST. PATIENT-LVL IV: CPT | Mod: PBBFAC,,, | Performed by: NURSE PRACTITIONER

## 2020-09-11 PROCEDURE — 99999 PR PBB SHADOW E&M-EST. PATIENT-LVL IV: ICD-10-PCS | Mod: PBBFAC,,, | Performed by: NURSE PRACTITIONER

## 2020-09-11 PROCEDURE — 1159F MED LIST DOCD IN RCRD: CPT | Mod: S$GLB,,, | Performed by: NURSE PRACTITIONER

## 2020-09-11 RX ORDER — FLUTICASONE PROPIONATE 50 MCG
2 SPRAY, SUSPENSION (ML) NASAL DAILY
Qty: 48 G | Refills: 3 | Status: SHIPPED | OUTPATIENT
Start: 2020-09-11 | End: 2022-10-25 | Stop reason: SDUPTHER

## 2020-09-11 RX ORDER — FLUTICASONE PROPIONATE AND SALMETEROL XINAFOATE 230; 21 UG/1; UG/1
2 AEROSOL, METERED RESPIRATORY (INHALATION) 2 TIMES DAILY
Qty: 12 G | Refills: 11 | Status: SHIPPED | OUTPATIENT
Start: 2020-09-11 | End: 2020-12-14

## 2020-09-11 RX ORDER — MONTELUKAST SODIUM 10 MG/1
10 TABLET ORAL NIGHTLY
Qty: 90 TABLET | Refills: 3 | Status: SHIPPED | OUTPATIENT
Start: 2020-09-11 | End: 2021-09-24 | Stop reason: SDUPTHER

## 2020-09-11 NOTE — PATIENT INSTRUCTIONS
Pulmonary hypertension is due to your heart failure and the blood clots, will repeat Echo test in one year. Expect the pulmonary hypertension to resolve after being on optimal blood thinner medications    Recommend contacting the Vascular doctor who worked with previously to see about removal of blood clot in thigh that is causing you so much pain  If you need referral contact clinic and one will be sent    Referral to new cardiologist sent through computer system. If you do not hear from office in week contact pulmonary office and staff with assist.     Use CPAP nightly, will order new machine at next appointment.     Continue current dose of blood thinner, coumadin clinic is responsible for adjusting dosage.

## 2020-09-14 ENCOUNTER — TELEPHONE (OUTPATIENT)
Dept: GASTROENTEROLOGY | Facility: CLINIC | Age: 80
End: 2020-09-14

## 2020-09-14 NOTE — TELEPHONE ENCOUNTER
Call placed to Ms. Brady, I advised her that Dr. Nuñez has not sent me her results yet, but I will send her a message to let her know she is requesting her results. She verbalized understanding. No further issues noted.

## 2020-09-14 NOTE — TELEPHONE ENCOUNTER
----- Message from Elisha Alaniz sent at 9/14/2020 11:19 AM CDT -----  Type:  Test Results    Who Called:  Patient  Name of Test (Lab/Mammo/Etc):  Endo  Date of Test: 09/09/20  Ordering Provider: Dr. Sue Nuñez  Where the test was performed: [facility]  Best Call Back Number:  [phone]  Additional Information:

## 2020-09-16 ENCOUNTER — LAB VISIT (OUTPATIENT)
Dept: LAB | Facility: HOSPITAL | Age: 80
End: 2020-09-16
Attending: PHYSICIAN ASSISTANT
Payer: MEDICARE

## 2020-09-16 ENCOUNTER — OFFICE VISIT (OUTPATIENT)
Dept: FAMILY MEDICINE | Facility: CLINIC | Age: 80
End: 2020-09-16
Payer: MEDICARE

## 2020-09-16 ENCOUNTER — TELEPHONE (OUTPATIENT)
Dept: FAMILY MEDICINE | Facility: CLINIC | Age: 80
End: 2020-09-16

## 2020-09-16 VITALS
BODY MASS INDEX: 37.62 KG/M2 | TEMPERATURE: 97 F | WEIGHT: 212.31 LBS | DIASTOLIC BLOOD PRESSURE: 72 MMHG | OXYGEN SATURATION: 99 % | SYSTOLIC BLOOD PRESSURE: 138 MMHG | HEART RATE: 70 BPM | HEIGHT: 63 IN | RESPIRATION RATE: 17 BRPM

## 2020-09-16 DIAGNOSIS — R06.02 SHORTNESS OF BREATH: ICD-10-CM

## 2020-09-16 DIAGNOSIS — M79.652 LEFT THIGH PAIN: ICD-10-CM

## 2020-09-16 DIAGNOSIS — N18.2 TYPE 2 DIABETES MELLITUS WITH STAGE 2 CHRONIC KIDNEY DISEASE, WITHOUT LONG-TERM CURRENT USE OF INSULIN: ICD-10-CM

## 2020-09-16 DIAGNOSIS — M51.36 DDD (DEGENERATIVE DISC DISEASE), LUMBAR: ICD-10-CM

## 2020-09-16 DIAGNOSIS — M51.34 DDD (DEGENERATIVE DISC DISEASE), THORACIC: ICD-10-CM

## 2020-09-16 DIAGNOSIS — I15.2 HYPERTENSION ASSOCIATED WITH DIABETES: ICD-10-CM

## 2020-09-16 DIAGNOSIS — M79.652 LEFT THIGH PAIN: Primary | ICD-10-CM

## 2020-09-16 DIAGNOSIS — I27.20 PULMONARY HYPERTENSION: ICD-10-CM

## 2020-09-16 DIAGNOSIS — E87.1 HYPONATREMIA: ICD-10-CM

## 2020-09-16 DIAGNOSIS — E11.22 TYPE 2 DIABETES MELLITUS WITH STAGE 2 CHRONIC KIDNEY DISEASE, WITHOUT LONG-TERM CURRENT USE OF INSULIN: ICD-10-CM

## 2020-09-16 DIAGNOSIS — J44.9 CHRONIC OBSTRUCTIVE PULMONARY DISEASE, UNSPECIFIED COPD TYPE: ICD-10-CM

## 2020-09-16 DIAGNOSIS — D64.9 ANEMIA, UNSPECIFIED TYPE: ICD-10-CM

## 2020-09-16 DIAGNOSIS — M62.82 NON-TRAUMATIC RHABDOMYOLYSIS: ICD-10-CM

## 2020-09-16 DIAGNOSIS — E11.59 HYPERTENSION ASSOCIATED WITH DIABETES: ICD-10-CM

## 2020-09-16 DIAGNOSIS — E11.69 HYPERLIPIDEMIA ASSOCIATED WITH TYPE 2 DIABETES MELLITUS: ICD-10-CM

## 2020-09-16 DIAGNOSIS — E78.5 HYPERLIPIDEMIA ASSOCIATED WITH TYPE 2 DIABETES MELLITUS: ICD-10-CM

## 2020-09-16 LAB
BASOPHILS # BLD AUTO: 0.02 K/UL (ref 0–0.2)
BASOPHILS NFR BLD: 0.3 % (ref 0–1.9)
DIFFERENTIAL METHOD: ABNORMAL
EOSINOPHIL # BLD AUTO: 0.4 K/UL (ref 0–0.5)
EOSINOPHIL NFR BLD: 4.6 % (ref 0–8)
ERYTHROCYTE [DISTWIDTH] IN BLOOD BY AUTOMATED COUNT: 14.5 % (ref 11.5–14.5)
HCT VFR BLD AUTO: 32.9 % (ref 37–48.5)
HGB BLD-MCNC: 10.2 G/DL (ref 12–16)
LYMPHOCYTES # BLD AUTO: 2.4 K/UL (ref 1–4.8)
LYMPHOCYTES NFR BLD: 31.4 % (ref 18–48)
MCH RBC QN AUTO: 32.6 PG (ref 27–31)
MCHC RBC AUTO-ENTMCNC: 31 G/DL (ref 32–36)
MCV RBC AUTO: 105 FL (ref 82–98)
MONOCYTES # BLD AUTO: 0.8 K/UL (ref 0.3–1)
MONOCYTES NFR BLD: 10.6 % (ref 4–15)
NEUTROPHILS # BLD AUTO: 4 K/UL (ref 1.8–7.7)
NEUTROPHILS NFR BLD: 53.1 % (ref 38–73)
PLATELET # BLD AUTO: 220 K/UL (ref 150–350)
PMV BLD AUTO: 10.4 FL (ref 9.2–12.9)
RBC # BLD AUTO: 3.13 M/UL (ref 4–5.4)
WBC # BLD AUTO: 7.55 K/UL (ref 3.9–12.7)

## 2020-09-16 PROCEDURE — 82550 ASSAY OF CK (CPK): CPT

## 2020-09-16 PROCEDURE — 1159F PR MEDICATION LIST DOCUMENTED IN MEDICAL RECORD: ICD-10-PCS | Mod: S$GLB,,, | Performed by: PHYSICIAN ASSISTANT

## 2020-09-16 PROCEDURE — 99214 PR OFFICE/OUTPT VISIT, EST, LEVL IV, 30-39 MIN: ICD-10-PCS | Mod: S$GLB,,, | Performed by: PHYSICIAN ASSISTANT

## 2020-09-16 PROCEDURE — 80053 COMPREHEN METABOLIC PANEL: CPT

## 2020-09-16 PROCEDURE — 99999 PR PBB SHADOW E&M-EST. PATIENT-LVL V: ICD-10-PCS | Mod: PBBFAC,,, | Performed by: PHYSICIAN ASSISTANT

## 2020-09-16 PROCEDURE — 99999 PR PBB SHADOW E&M-EST. PATIENT-LVL V: CPT | Mod: PBBFAC,,, | Performed by: PHYSICIAN ASSISTANT

## 2020-09-16 PROCEDURE — 3075F PR MOST RECENT SYSTOLIC BLOOD PRESS GE 130-139MM HG: ICD-10-PCS | Mod: CPTII,S$GLB,, | Performed by: PHYSICIAN ASSISTANT

## 2020-09-16 PROCEDURE — 36415 COLL VENOUS BLD VENIPUNCTURE: CPT | Mod: PO

## 2020-09-16 PROCEDURE — 1101F PT FALLS ASSESS-DOCD LE1/YR: CPT | Mod: CPTII,S$GLB,, | Performed by: PHYSICIAN ASSISTANT

## 2020-09-16 PROCEDURE — 85025 COMPLETE CBC W/AUTO DIFF WBC: CPT | Mod: PO

## 2020-09-16 PROCEDURE — 99214 OFFICE O/P EST MOD 30 MIN: CPT | Mod: S$GLB,,, | Performed by: PHYSICIAN ASSISTANT

## 2020-09-16 PROCEDURE — 83880 ASSAY OF NATRIURETIC PEPTIDE: CPT

## 2020-09-16 PROCEDURE — 3078F PR MOST RECENT DIASTOLIC BLOOD PRESSURE < 80 MM HG: ICD-10-PCS | Mod: CPTII,S$GLB,, | Performed by: PHYSICIAN ASSISTANT

## 2020-09-16 PROCEDURE — 1125F AMNT PAIN NOTED PAIN PRSNT: CPT | Mod: S$GLB,,, | Performed by: PHYSICIAN ASSISTANT

## 2020-09-16 PROCEDURE — 1125F PR PAIN SEVERITY QUANTIFIED, PAIN PRESENT: ICD-10-PCS | Mod: S$GLB,,, | Performed by: PHYSICIAN ASSISTANT

## 2020-09-16 PROCEDURE — 1101F PR PT FALLS ASSESS DOC 0-1 FALLS W/OUT INJ PAST YR: ICD-10-PCS | Mod: CPTII,S$GLB,, | Performed by: PHYSICIAN ASSISTANT

## 2020-09-16 PROCEDURE — 3075F SYST BP GE 130 - 139MM HG: CPT | Mod: CPTII,S$GLB,, | Performed by: PHYSICIAN ASSISTANT

## 2020-09-16 PROCEDURE — 3078F DIAST BP <80 MM HG: CPT | Mod: CPTII,S$GLB,, | Performed by: PHYSICIAN ASSISTANT

## 2020-09-16 PROCEDURE — 1159F MED LIST DOCD IN RCRD: CPT | Mod: S$GLB,,, | Performed by: PHYSICIAN ASSISTANT

## 2020-09-16 NOTE — TELEPHONE ENCOUNTER
----- Message from Debbie Morton sent at 9/15/2020 11:34 AM CDT -----  Regarding: [facility]  Contact: [facility]  Type: Needs Medical Advice  Who Called:  patient  Symptoms (please be specific):  na   How long has patient had these symptoms:  blu  Pharmacy name and phone #:  blu  Best Call Back Number: [phone]  Additional Information: [facility] reports patient was in ER and is still experiencing some shortness of breath and is trying to get patient to be seen sooner than October.  Please call to advise and schedule.  Thanks!

## 2020-09-16 NOTE — TELEPHONE ENCOUNTER
Pt scheduled today Wednesday, 09/16/2020 at 1500 with DOMENICO Courtney PA-C for [facility]. Pt verbalized understanding.

## 2020-09-17 ENCOUNTER — TELEPHONE (OUTPATIENT)
Dept: FAMILY MEDICINE | Facility: CLINIC | Age: 80
End: 2020-09-17

## 2020-09-17 DIAGNOSIS — T84.011D FAILURE OF LEFT TOTAL HIP ARTHROPLASTY, SUBSEQUENT ENCOUNTER: Primary | ICD-10-CM

## 2020-09-17 LAB
ALBUMIN SERPL BCP-MCNC: 3.5 G/DL (ref 3.5–5.2)
ALP SERPL-CCNC: 64 U/L (ref 55–135)
ALT SERPL W/O P-5'-P-CCNC: 19 U/L (ref 10–44)
ANION GAP SERPL CALC-SCNC: 11 MMOL/L (ref 8–16)
AST SERPL-CCNC: 20 U/L (ref 10–40)
BILIRUB SERPL-MCNC: 0.3 MG/DL (ref 0.1–1)
BNP SERPL-MCNC: 134 PG/ML (ref 0–99)
BUN SERPL-MCNC: 30 MG/DL (ref 8–23)
CALCIUM SERPL-MCNC: 9 MG/DL (ref 8.7–10.5)
CHLORIDE SERPL-SCNC: 107 MMOL/L (ref 95–110)
CK SERPL-CCNC: 72 U/L (ref 20–180)
CO2 SERPL-SCNC: 24 MMOL/L (ref 23–29)
CREAT SERPL-MCNC: 1.7 MG/DL (ref 0.5–1.4)
EST. GFR  (AFRICAN AMERICAN): 33.1 ML/MIN/1.73 M^2
EST. GFR  (NON AFRICAN AMERICAN): 28.7 ML/MIN/1.73 M^2
GLUCOSE SERPL-MCNC: 107 MG/DL (ref 70–110)
POTASSIUM SERPL-SCNC: 5.6 MMOL/L (ref 3.5–5.1)
PROT SERPL-MCNC: 7.2 G/DL (ref 6–8.4)
SODIUM SERPL-SCNC: 142 MMOL/L (ref 136–145)

## 2020-09-17 RX ORDER — HYDROCODONE BITARTRATE AND ACETAMINOPHEN 5; 325 MG/1; MG/1
1 TABLET ORAL EVERY 8 HOURS PRN
Qty: 30 TABLET | Refills: 0 | Status: SHIPPED | OUTPATIENT
Start: 2020-09-17 | End: 2020-10-19 | Stop reason: SDUPTHER

## 2020-09-17 NOTE — TELEPHONE ENCOUNTER
Patient has hyperkalemia. Stop Potassium supplement. Use Miralax tonight. Repeat BMP next Tuesday. Norco due to arthroplasty loose?This has been fully explained to the patient, who indicates understanding.  Please Notify [facility].

## 2020-09-18 NOTE — TELEPHONE ENCOUNTER
----- Message from Delmi You sent at 9/18/2020  9:17 AM CDT -----  Regarding: return call to Radha  Contact: Suzie perez/ [facility] care  Type:  Patient Returning Call    Who Called:  Suzie  Who Left Message for Patient:  Radha  Does the patient know what this is regarding?:  labs  Best Call Back Number:  [phone]  Additional Information:  Pls call Suzie back for further

## 2020-09-18 NOTE — [employer] PATIENT INSTRUCTIONS
Discharge Summary/Instructions after an Endoscopic Procedure  Patient Name: Sammi Abreu  Patient MRN: [id number]  Patient YOB: 1940 Wednesday, September 9, 2020  Sue Nuñez MD  RESTRICTIONS:  During your procedure today, you received medications for sedation.  These   medications may affect your judgment, balance and coordination.  Therefore,   for 24 hours, you have the following restrictions:   - DO NOT drive a car, operate machinery, make legal/financial decisions,   sign important papers or drink alcohol.    ACTIVITY:  Today: no heavy lifting, straining or running due to procedural   sedation/anesthesia.  The following day: return to full activity including work.  DIET:  Eat and drink normally unless instructed otherwise.     TREATMENT FOR COMMON SIDE EFFECTS:  - Mild abdominal pain, nausea, belching, bloating or excessive gas:  rest,   eat lightly and use a heating pad.  - Sore Throat: treat with throat lozenges and/or gargle with warm salt   water.  - Because air was used during the procedure, expelling large amounts of air   from your rectum or belching is normal.  - If a bowel prep was taken, you may not have a bowel movement for 1-3 days.    This is normal.  SYMPTOMS TO WATCH FOR AND REPORT TO YOUR PHYSICIAN:  1. Abdominal pain or bloating, other than gas cramps.  2. Chest pain.  3. Back pain.  4. Signs of infection such as: chills or fever occurring within 24 hours   after the procedure.  5. Rectal bleeding, which would show as bright red, maroon, or black stools.   (A tablespoon of blood from the rectum is not serious, especially if   hemorrhoids are present.)  6. Vomiting.  7. Weakness or dizziness.  GO DIRECTLY TO THE NEAREST EMERGENCY ROOM IF YOU HAVE ANY OF THE FOLLOWING:      Difficulty breathing              Chills and/or fever over 101 F   Persistent vomiting and/or vomiting blood   Severe abdominal pain   Severe chest pain   Black, tarry stools   Bleeding- more than  one tablespoon   Any other symptom or condition that you feel may need urgent attention  Your doctor recommends these additional instructions:  If any biopsies were taken, your doctors clinic will contact you in 1 to 2   weeks with any results.  -Repeat blood counts with iron studies in 2-4 weeks  -Iron supplementation, may consider IV iron in future  For questions, problems or results please call your physician - Sue Nuñez MD at Work:  [phone].  [facility], EMERGENCY ROOM PHONE NUMBER: [phone]  IF A COMPLICATION OR EMERGENCY SITUATION ARISES AND YOU ARE UNABLE TO REACH   YOUR PHYSICIAN - GO DIRECTLY TO THE EMERGENCY ROOM.  Sue Nuñez MD  9/18/2020 12:25:58 PM  This report has been verified and signed electronically.  [employer]

## 2020-09-21 NOTE — PROGRESS NOTES
Subjective:       Patient ID: Sammi Abreu is a 79 y.o. female.    Chief Complaint: Follow-up (hospital ) and Shortness of Breath    Ms. Abreu is a 79 year old female with DM, HTN, DDD, and anemia. She presents for ED follow up. The patient was recently seen in the ED for severe left thigh pain. She was evaluated for a DVT; no DVT was seen at recent ED visit. The patient continues to have severe left thigh pain. This causes her to have to walk with a walker. She is unable to walk even a few feet with out pain or stand for more than a few minutes. The patient also complains of shortness of breath due to the pain. The patient also has chronic anemia. The patient has had several hospitalizations and medical complications since June including cholecystectomy, sepsis, DVT, PE, rhabdomyalysis, The patient developed pulmonary hypertension. The patient is has seen pulmonology but needs follow up with cardiology.     Review of patient's allergies indicates:   Allergen Reactions    Daptomycin Other (See Comments)     Kidney damage     Cymbalta [duloxetine] Other (See Comments)     Nightmares      Darvon [propoxyphene] Nausea Only and Other (See Comments)     Sweating, slept for 3 days    Atorvastatin Other (See Comments)     Muscle cramps    Naprosyn [naproxen] Nausea Only    Penicillins Rash    Tramadol Nausea Only and Palpitations         Current Outpatient Medications:     acetaminophen (TYLENOL) 325 MG tablet, Take 2 tablets (650 mg total) by mouth every 6 (six) hours as needed (Do not take with any other Tylenol or acetaminophen containing products)., Disp: , Rfl: 0    ascorbic acid, vitamin C, (VITAMIN C) 100 MG tablet, Take 5 tablets (500 mg total) by mouth every evening., Disp: , Rfl:     cyanocobalamin, vitamin B-12, 2,500 mcg Lozg, Place 2 tablets under the tongue once daily., Disp: 60 lozenge, Rfl: 11    doxycycline (VIBRAMYCIN) 100 MG Cap, Take 1 capsule (100 mg total) by mouth every 12 (twelve)  hours. for 21 days, Disp: 42 capsule, Rfl: 0    esomeprazole (NEXIUM) 20 MG capsule, Take 1 capsule (20 mg total) by mouth before breakfast., Disp: 30 capsule, Rfl: 2    fluticasone propionate (FLONASE) 50 mcg/actuation nasal spray, 2 sprays (100 mcg total) by Each Nostril route once daily., Disp: 48 g, Rfl: 3    fluticasone-salmeterol 230-21 mcg/dose (ADVAIR HFA) 230-21 mcg/actuation HFAA inhaler, Inhale 2 puffs into the lungs 2 (two) times daily. Controller, Disp: 12 g, Rfl: 11    furosemide (LASIX) 20 MG tablet, Take 1 tablet (20 mg total) by mouth daily as needed (edema)., Disp: 30 tablet, Rfl: 11    hydrALAZINE (APRESOLINE) 50 MG tablet, Take 1 tablet (50 mg total) by mouth every 8 (eight) hours., Disp: 90 tablet, Rfl: 1    metoprolol succinate (TOPROL-XL) 50 MG 24 hr tablet, Take 1 tablet (50 mg total) by mouth once daily., Disp: 90 tablet, Rfl: 3    montelukast (SINGULAIR) 10 mg tablet, Take 1 tablet (10 mg total) by mouth every evening., Disp: 90 tablet, Rfl: 3    multivitamin capsule, Take 1 capsule by mouth once daily., Disp: , Rfl:     ondansetron (ZOFRAN-ODT) 4 MG TbDL, Take 1 tablet (4 mg total) by mouth every 8 (eight) hours as needed., Disp: 20 tablet, Rfl: 0    albuterol-ipratropium (DUO-NEB) 2.5 mg-0.5 mg/3 mL nebulizer solution, Take 3 mLs by nebulization every 8 (eight) hours., Disp: 270 mL, Rfl: 0    budesonide-formoterol 160-4.5 mcg (SYMBICORT) 160-4.5 mcg/actuation HFAA, Inhale 2 puffs into the lungs every 12 (twelve) hours. Controller, Disp: 3 Inhaler, Rfl: 3    folic acid (FOLVITE) 1 MG tablet, Take 1 tablet (1 mg total) by mouth once daily., Disp: 30 tablet, Rfl: 0    HYDROcodone-acetaminophen (NORCO) 5-325 mg per tablet, Take 1 tablet by mouth every 8 (eight) hours as needed for Pain., Disp: 30 tablet, Rfl: 0    warfarin (COUMADIN) 5 MG tablet, Tulio.e 1-1.5 Tablets QPM as instructed by coumadin clinic, Disp: 90 tablet, Rfl: 3  No current facility-administered medications for  this visit.     Facility-Administered Medications Ordered in Other Visits:     lactated ringers infusion, , Intravenous, Continuous, Gino Reid MD, Last Rate: 10 mL/hr at 07/13/20 1308    lidocaine (PF) 10 mg/ml (1%) injection 10 mg, 1 mL, Intradermal, Once, Gino Reid MD    Lab Results   Component Value Date    WBC 7.55 09/16/2020    HGB 10.2 (L) 09/16/2020    HCT 32.9 (L) 09/16/2020     09/16/2020    CHOL 194 01/16/2020    TRIG 98 01/16/2020    HDL 40 01/16/2020    ALT 19 09/16/2020    AST 20 09/16/2020     09/16/2020    K 5.6 (H) 09/16/2020     09/16/2020    CREATININE 1.7 (H) 09/16/2020    BUN 30 (H) 09/16/2020    CO2 24 09/16/2020    TSH 3.261 08/04/2020    INR 3.1 09/15/2020    HGBA1C 5.9 (H) 08/05/2020       Review of Systems   Constitutional: Positive for activity change. Negative for appetite change and fever.   HENT: Negative for postnasal drip, rhinorrhea and sinus pressure.    Eyes: Negative for visual disturbance.   Respiratory: Positive for shortness of breath. Negative for cough.    Cardiovascular: Negative for chest pain.   Gastrointestinal: Negative for abdominal distention and abdominal pain.   Genitourinary: Negative for difficulty urinating and dysuria.   Musculoskeletal: Positive for arthralgias, gait problem and myalgias.   Neurological: Negative for headaches.   Hematological: Negative for adenopathy.   Psychiatric/Behavioral: Positive for dysphoric mood. The patient is nervous/anxious.        Objective:      Physical Exam  Constitutional:       Appearance: Normal appearance.      Comments: Patient appears in pain   Cardiovascular:      Rate and Rhythm: Normal rate and regular rhythm.      Heart sounds: Normal heart sounds.   Pulmonary:      Effort: Pulmonary effort is normal.      Breath sounds: Normal breath sounds. No wheezing.   Abdominal:      General: Bowel sounds are normal. There is no distension.      Palpations: Abdomen is soft.      Tenderness:  There is no abdominal tenderness.   Musculoskeletal:         General: Tenderness present.      Comments: Left thing tenderness  Pain in proximal left lower extremity with rising from sitting to standing  Ambulating with walker   Skin:     Findings: No erythema.   Neurological:      Mental Status: She is alert and oriented to person, place, and time.   Psychiatric:      Comments: Patient tearful when discussing recent health issues and pain         Assessment:       1. Left thigh pain    2. Type 2 diabetes mellitus with stage 2 chronic kidney disease, without long-term current use of insulin    3. Chronic obstructive pulmonary disease, unspecified COPD type    4. DDD (degenerative disc disease), lumbar    5. DDD (degenerative disc disease), thoracic    6. Hypertension associated with diabetes    7. Hyperlipidemia associated with type 2 diabetes mellitus    8. Hyponatremia    9. Pulmonary hypertension    10. Non-traumatic rhabdomyolysis    11. Shortness of breath    12. Anemia, unspecified type        Plan:       Sammi was seen today for follow-up and shortness of breath.    Diagnoses and all orders for this visit:    Left thigh pain  -     CK; Future    Type 2 diabetes mellitus with stage 2 chronic kidney disease, without long-term current use of insulin  controlled  Chronic obstructive pulmonary disease, unspecified COPD type  Continue follow up with pulmonology  Continue current medication  DDD (degenerative disc disease), lumbar    DDD (degenerative disc disease), thoracic    Hypertension associated with diabetes  Controlled  Low salt diet  Continue current medication  Hyperlipidemia associated with type 2 diabetes mellitus  High fiber diet  Hyponatremia  Recheck labs  Pulmonary hypertension  -     Ambulatory referral/consult to Cardiology; Future    Non-traumatic rhabdomyolysis  -     CK; Future    Shortness of breath  -     Comprehensive metabolic panel; Future  -     CBC auto differential; Future  -     Brain  Natriuretic Peptide; Future  -     Ambulatory referral/consult to Cardiology; Future    Anemia, unspecified type  -     CBC auto differential; Future

## 2020-09-22 ENCOUNTER — TELEPHONE (OUTPATIENT)
Dept: GASTROENTEROLOGY | Facility: CLINIC | Age: 80
End: 2020-09-22

## 2020-09-22 NOTE — TELEPHONE ENCOUNTER
----- Message from Selina Gilbert sent at 9/22/2020  2:07 PM CDT -----  Regarding: results  Contact: pt  Type:  Test Results    Who Called:  pt  Name of Test (Lab/Mammo/Etc):  ENDO Scope   Date of Test:  9/9/20  Ordering Provider:    [facility] the [facility] performed[facility]  Best Call Back Number:  [phone]  Additional Information: Patient called back she states the results are not on her portal.

## 2020-09-22 NOTE — TELEPHONE ENCOUNTER
----- Message from Danisha Balbuena sent at 9/22/2020  1:43 PM CDT -----  Regarding: Results  Type: Needs Medical Advice  Who Called:  Pt  Best Call Back Number: [phone]  Additional Information: patient is requesting to speak with office in regards to results on a test she had done 13 days ago

## 2020-09-22 NOTE — TELEPHONE ENCOUNTER
Call placed to Ms. Brady, Advised her per Dr. Nuñez of capsule results Please let her know her video capsule shows small leaky blood vessels (angioectasias) throughout her small intestine that are likely contributing to her low blood counts. She should have her blood counts and iron levels checked in the next 2-4 weeks. I advised her per Dr. Nuñez that at this time there is no further treatment for her. She is going to watch her blood count to make sure it continues to go up. She verbalized understanding. No further issues noted.

## 2020-09-23 ENCOUNTER — TELEPHONE (OUTPATIENT)
Dept: FAMILY MEDICINE | Facility: CLINIC | Age: 80
End: 2020-09-23

## 2020-09-23 NOTE — TELEPHONE ENCOUNTER
----- Message from Arelis Baig sent at 9/23/2020  2:12 PM CDT -----  Contact: Suzie with concern dara Larsen with concern care # [phone] , checking the status of lab report that was faxed over on 9/22/2020.

## 2020-09-24 ENCOUNTER — OFFICE VISIT (OUTPATIENT)
Dept: ORTHOPEDICS | Facility: CLINIC | Age: 80
End: 2020-09-24
Payer: MEDICARE

## 2020-09-24 ENCOUNTER — LAB VISIT (OUTPATIENT)
Dept: LAB | Facility: HOSPITAL | Age: 80
End: 2020-09-24
Attending: ORTHOPAEDIC SURGERY
Payer: MEDICARE

## 2020-09-24 ENCOUNTER — TELEPHONE (OUTPATIENT)
Dept: FAMILY MEDICINE | Facility: CLINIC | Age: 80
End: 2020-09-24

## 2020-09-24 VITALS — DIASTOLIC BLOOD PRESSURE: 60 MMHG | BODY MASS INDEX: 37.38 KG/M2 | SYSTOLIC BLOOD PRESSURE: 165 MMHG | WEIGHT: 211 LBS

## 2020-09-24 DIAGNOSIS — R89.9 ABNORMAL LABORATORY TEST: ICD-10-CM

## 2020-09-24 DIAGNOSIS — T84.84XA PAINFUL ORTHOPAEDIC HARDWARE: ICD-10-CM

## 2020-09-24 DIAGNOSIS — Z96.642 HISTORY OF TOTAL HIP ARTHROPLASTY, LEFT: ICD-10-CM

## 2020-09-24 DIAGNOSIS — M54.16 LUMBAR RADICULITIS: ICD-10-CM

## 2020-09-24 DIAGNOSIS — M25.552 LEFT HIP PAIN: ICD-10-CM

## 2020-09-24 DIAGNOSIS — Z96.642 HISTORY OF TOTAL HIP ARTHROPLASTY, LEFT: Primary | ICD-10-CM

## 2020-09-24 LAB
CRP SERPL-MCNC: 1.6 MG/L (ref 0–8.2)
ERYTHROCYTE [SEDIMENTATION RATE] IN BLOOD BY WESTERGREN METHOD: 59 MM/HR (ref 0–20)

## 2020-09-24 PROCEDURE — 85651 RBC SED RATE NONAUTOMATED: CPT

## 2020-09-24 PROCEDURE — 3078F PR MOST RECENT DIASTOLIC BLOOD PRESSURE < 80 MM HG: ICD-10-PCS | Mod: S$GLB,,, | Performed by: ORTHOPAEDIC SURGERY

## 2020-09-24 PROCEDURE — 3078F DIAST BP <80 MM HG: CPT | Mod: S$GLB,,, | Performed by: ORTHOPAEDIC SURGERY

## 2020-09-24 PROCEDURE — 99213 OFFICE O/P EST LOW 20 MIN: CPT | Mod: S$GLB,,, | Performed by: ORTHOPAEDIC SURGERY

## 2020-09-24 PROCEDURE — 3077F PR MOST RECENT SYSTOLIC BLOOD PRESSURE >= 140 MM HG: ICD-10-PCS | Mod: S$GLB,,, | Performed by: ORTHOPAEDIC SURGERY

## 2020-09-24 PROCEDURE — 1101F PR PT FALLS ASSESS DOC 0-1 FALLS W/OUT INJ PAST YR: ICD-10-PCS | Mod: S$GLB,,, | Performed by: ORTHOPAEDIC SURGERY

## 2020-09-24 PROCEDURE — 1101F PT FALLS ASSESS-DOCD LE1/YR: CPT | Mod: S$GLB,,, | Performed by: ORTHOPAEDIC SURGERY

## 2020-09-24 PROCEDURE — 99213 PR OFFICE/OUTPT VISIT, EST, LEVL III, 20-29 MIN: ICD-10-PCS | Mod: S$GLB,,, | Performed by: ORTHOPAEDIC SURGERY

## 2020-09-24 PROCEDURE — 3077F SYST BP >= 140 MM HG: CPT | Mod: S$GLB,,, | Performed by: ORTHOPAEDIC SURGERY

## 2020-09-24 PROCEDURE — 1125F PR PAIN SEVERITY QUANTIFIED, PAIN PRESENT: ICD-10-PCS | Mod: S$GLB,,, | Performed by: ORTHOPAEDIC SURGERY

## 2020-09-24 PROCEDURE — 1159F PR MEDICATION LIST DOCUMENTED IN MEDICAL RECORD: ICD-10-PCS | Mod: S$GLB,,, | Performed by: ORTHOPAEDIC SURGERY

## 2020-09-24 PROCEDURE — 86140 C-REACTIVE PROTEIN: CPT

## 2020-09-24 PROCEDURE — 36415 COLL VENOUS BLD VENIPUNCTURE: CPT

## 2020-09-24 PROCEDURE — 1159F MED LIST DOCD IN RCRD: CPT | Mod: S$GLB,,, | Performed by: ORTHOPAEDIC SURGERY

## 2020-09-24 PROCEDURE — 1125F AMNT PAIN NOTED PAIN PRSNT: CPT | Mod: S$GLB,,, | Performed by: ORTHOPAEDIC SURGERY

## 2020-09-24 NOTE — PROGRESS NOTES
[facility]    Subjective:     Chief Complaint:   Chief Complaint   Patient presents with    Left Hip - Pain     Left hip/thigh pain off and on for 2 months but it got a lot worse 2 weeks ago.  Left femur x ray done 9-9 at [facility]. She states she currently has blood clots in left calf,lungs and right upper arm       Past Medical History:   Diagnosis Date    ALLERGIC RHINITIS     Anemia     Arthritis     Back pain     Bronchitis     Cataract     OU    Cholelithiasis     COPD (chronic obstructive pulmonary disease)     Degenerative disc disease     Diabetes mellitus     pre diabetic    Diverticulosis     DVT (deep venous thrombosis)     Edema     Encounter for blood transfusion 1979    Fibromyalgia     Glaucoma     Gout     Hx of colonic polyps     Hyperlipidemia     Hypertension     Incontinence     Osteoporosis     Reflux     Refusal of blood transfusions as patient is Quaker     Sleep apnea     non compliant with CPAP    Vestibulitis of ear        Past Surgical History:   Procedure Laterality Date    ANGIOGRAPHY OF LOWER EXTREMITY N/A 7/31/2019    Procedure: ANGIOGRAM, LOWER EXTREMITY;  Surgeon: Gino Arana MD;  Location: [facility] CATH/EP LAB;  Service: General;  Laterality: N/A;    COLONOSCOPY N/A 8/13/2020    Procedure: COLONOSCOPY;  Surgeon: Sue Nuñez MD;  Location: [facility];  Service: Endoscopy;  Laterality: N/A;    ESOPHAGOGASTRODUODENOSCOPY N/A 8/12/2020    Procedure: EGD (ESOPHAGOGASTRODUODENOSCOPY);  Surgeon: Sue Nuñez MD;  Location: [facility];  Service: Endoscopy;  Laterality: N/A;    HIP SURGERY      HYSTERECTOMY      INTRALUMINAL GASTROINTESTINAL TRACT IMAGING VIA CAPSULE N/A 9/9/2020    Procedure: IMAGING PROCEDURE, GI TRACT, INTRALUMINAL, VIA CAPSULE;  Surgeon: Sue Nuñez MD;  Location: [facility] ENDO;  Service: Endoscopy;  Laterality: N/A;    JOINT REPLACEMENT      B total hip    LAPAROSCOPIC CHOLECYSTECTOMY N/A  7/13/2020    Procedure: CHOLECYSTECTOMY, LAPAROSCOPIC;  Surgeon: Jomar Mcdonald MD;  Location: [facility] OR;  Service: General;  Laterality: N/A;    TRANSFORAMINAL EPIDURAL INJECTION OF STEROID Left 6/30/2020    Procedure: Injection,steroid,epidural,transforaminal approach;  Surgeon: Matt Gilliam MD;  Location[facility] OR;  Service: Pain Management;  Laterality: Left;  L2-3, L3-4       Current Outpatient Medications   Medication Sig    acetaminophen (TYLENOL) 325 MG tablet Take 2 tablets (650 mg total) by mouth every 6 (six) hours as needed (Do not take with any other Tylenol or acetaminophen containing products).    ascorbic acid, vitamin C, (VITAMIN C) 100 MG tablet Take 5 tablets (500 mg total) by mouth every evening.    cyanocobalamin, vitamin B-12, 2,500 mcg Lozg Place 2 tablets under the tongue once daily.    esomeprazole (NEXIUM) 20 MG capsule Take 1 capsule (20 mg total) by mouth before breakfast.    fluticasone propionate (FLONASE) 50 mcg/actuation nasal spray 2 sprays (100 mcg total) by Each Nostril route once daily.    fluticasone-salmeterol 230-21 mcg/dose (ADVAIR HFA) 230-21 mcg/actuation HFAA inhaler Inhale 2 puffs into the lungs 2 (two) times daily. Controller    furosemide (LASIX) 20 MG tablet Take 1 tablet (20 mg total) by mouth daily as needed (edema).    hydrALAZINE (APRESOLINE) 50 MG tablet Take 1 tablet (50 mg total) by mouth every 8 (eight) hours.    HYDROcodone-acetaminophen (NORCO) 5-325 mg per tablet Take 1 tablet by mouth every 8 (eight) hours as needed for Pain.    metoprolol succinate (TOPROL-XL) 50 MG 24 hr tablet Take 1 tablet (50 mg total) by mouth once daily.    montelukast (SINGULAIR) 10 mg tablet Take 1 tablet (10 mg total) by mouth every evening.    multivitamin capsule Take 1 capsule by mouth once daily.    ondansetron (ZOFRAN-ODT) 4 MG TbDL Take 1 tablet (4 mg total) by mouth every 8 (eight) hours as needed.    warfarin (COUMADIN) 5 MG tablet Tulio.e 1-1.5 Tablets QPM as  instructed by coumadin clinic    albuterol-ipratropium (DUO-NEB) 2.5 mg-0.5 mg/3 mL nebulizer solution Take 3 mLs by nebulization every 8 (eight) hours.    budesonide-formoterol 160-4.5 mcg (SYMBICORT) 160-4.5 mcg/actuation HFAA Inhale 2 puffs into the lungs every 12 (twelve) hours. Controller    folic acid (FOLVITE) 1 MG tablet Take 1 tablet (1 mg total) by mouth once daily.     No current facility-administered medications for this visit.      Facility-Administered Medications Ordered in Other Visits   Medication    lactated ringers infusion    lidocaine (PF) 10 mg/ml (1%) injection 10 mg       Review of patient's allergies indicates:   Allergen Reactions    Daptomycin Other (See Comments)     Kidney damage     Cymbalta [duloxetine] Other (See Comments)     Nightmares      Darvon [propoxyphene] Nausea Only and Other (See Comments)     Sweating, slept for 3 days    Atorvastatin Other (See Comments)     Muscle cramps    Naprosyn [naproxen] Nausea Only    Penicillins Rash    Tramadol Nausea Only and Palpitations       Family History   Problem Relation Age of Onset    Hypertension Mother     Diabetes Sister     Cancer Sister     Stomach cancer Sister     Hypertension Sister     Bipolar disorder Sister     Peripheral vascular disease Sister     Stroke Father     Hypertension Father     Hypertension Brother     Stroke Brother     Peripheral vascular disease Brother     Hypertension Son     Obesity Son     Early death Son 48    Arthritis Son     Glaucoma Neg Hx     Macular degeneration Neg Hx     Retinal detachment Neg Hx        Social History     Socioeconomic History    Marital status:      Spouse name: Not on file    Number of children: Not on file    Years of education: Not on file    Highest education level: Not on file   Occupational History    Not on file   Social Needs    Financial resource strain: Not on file    Food insecurity     Worry: Not on file     Inability: Not  on file    Transportation needs     Medical: Not on file     Non-medical: Not on file   Tobacco Use    Smoking status: Former Smoker     Packs/day: 0.25     Years: 5.00     Pack years: 1.25     Quit date: 1972     Years since quittin.7    Smokeless tobacco: Never Used   Substance and Sexual Activity    Alcohol use: Yes     Alcohol/week: 0.0 standard drinks     Comment: Rarely    Drug use: Yes     Types: Oxycodone, Hydrocodone    Sexual activity: Not on file   Lifestyle    Physical activity     Days per week: Not on file     Minutes per session: Not on file    Stress: Not on file   Relationships    Social connections     Talks on phone: Not on file     Gets together: Not on file     Attends Yarsani service: Not on file     Active member of club or organization: Not on file     Attends meetings of clubs or organizations: Not on file     Relationship status: Not on file   Other Topics Concern    Not on file   Social History Narrative    Not on file       History of present illness:  Patient comes in today for the left hip.  She has a complicated history.  Several months ago she developed severe leg pain.  She was seen and admitted to the hospital.  She subsequently went on to become bacteremic and also had a cholecystectomy.  She has now got severe groin and leg pain.  She also developed DVTs.  She is on Coumadin.  Her complaint to me today severe groin and leg pain.  She is status post left total hip approximately 5 years ago      Review of Systems:    Constitution: Negative for chills, fever, and sweats.  Negative for unexplained weight loss.    HENT:  Negative for headaches and blurry vision.    Cardiovascular:Negative for chest pain or irregular heart beat. Negative for hypertension.    Respiratory:  Negative for cough and shortness of breath.    Gastrointestinal: Negative for abdominal pain, heartburn, melena, nausea, and vomitting.    Genitourinary:  Negative bladder incontinence and  dysuria.    Musculoskeletal:  See HPI for details.     Neurological: Negative for numbness.    Psychiatric/Behavioral: Negative for depression.  The patient is not nervous/anxious.      Endocrine: Negative for polyuria    Hematologic/Lymphatic: Negative for bleeding problem.  Does not bruise/bleed easily.    Skin: Negative for poor would healing and rash    Objective:      Physical Examination:    Vital Signs:    Vitals:    09/24/20 1409   BP: (!) 165/60       Body mass index is 37.38 kg/m².    This a well-developed, well nourished patient in no acute distress.  They are alert and oriented and cooperative to examination.        .  Patient has pain with motion of the left hip.  She is tender along the left thigh.  Leg lengths are symmetric.  She has significant discomfort to motion of the left hip.  Pertinent New Results:    XRAY Report / Interpretation:   AP and lateral the left hip demonstrates a left total hip to be in ideal position.  No evidence of radiolucencies.    Assessment/Plan:      This woman with left hip groin pain following bacteremia.  I am concerned she has a septic hip.  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  This could be a significant problem requiring removal of the implant.  I have ordered a CRP to see if this is trending up or down.  I am going to be in contact with the primary care doctor regarding getting her off her Coumadin for a period of time so we can do hip aspirate.  All this is discussed with her in great detail.  I will see her back in a week      This note was created using Dragon voice recognition software that occasionally misinterpreted phrases or words.

## 2020-09-26 DIAGNOSIS — M25.552 LEFT HIP PAIN: ICD-10-CM

## 2020-09-26 DIAGNOSIS — R78.81 MRSA BACTEREMIA: ICD-10-CM

## 2020-09-26 DIAGNOSIS — T84.011D FAILURE OF LEFT TOTAL HIP ARTHROPLASTY, SUBSEQUENT ENCOUNTER: Primary | ICD-10-CM

## 2020-09-26 DIAGNOSIS — T82.7XXD BACTEREMIA ASSOCIATED WITH INTRAVASCULAR LINE, SUBSEQUENT ENCOUNTER: ICD-10-CM

## 2020-09-26 DIAGNOSIS — R78.81 BACTEREMIA ASSOCIATED WITH INTRAVASCULAR LINE, SUBSEQUENT ENCOUNTER: ICD-10-CM

## 2020-09-26 DIAGNOSIS — B95.62 MRSA BACTEREMIA: ICD-10-CM

## 2020-09-26 NOTE — PROGRESS NOTES
Bacteremia.  Case discussed with Dr Solis. Left hip Ct guided aspiration pending. May needs removal of the left hardware. Case consulted w/ radiology/ ID/ Hematology/ Ortho. Patient has high risk for complications. Presently improved clinical, and inflammatory markers.    Patient understands and agrees with future aspiration. She has left hip severe pain. Denies fevers,nor chills.

## 2020-09-28 ENCOUNTER — TELEPHONE (OUTPATIENT)
Dept: HEMATOLOGY/ONCOLOGY | Facility: CLINIC | Age: 80
End: 2020-09-28

## 2020-09-28 ENCOUNTER — PATIENT MESSAGE (OUTPATIENT)
Dept: HEMATOLOGY/ONCOLOGY | Facility: CLINIC | Age: 80
End: 2020-09-28

## 2020-09-28 NOTE — TELEPHONE ENCOUNTER
Per [place] LPN, pt is to f/up with Dr Cuello this week c labs. This nurse made appts and LM with appt info. Encouraged call back to confirm. Also sent portal message.

## 2020-09-29 ENCOUNTER — LAB VISIT (OUTPATIENT)
Dept: LAB | Facility: HOSPITAL | Age: 80
End: 2020-09-29
Attending: INTERNAL MEDICINE
Payer: MEDICARE

## 2020-09-29 ENCOUNTER — OFFICE VISIT (OUTPATIENT)
Dept: SURGERY | Facility: CLINIC | Age: 80
End: 2020-09-29
Payer: MEDICARE

## 2020-09-29 VITALS — WEIGHT: 211 LBS | BODY MASS INDEX: 37.37 KG/M2

## 2020-09-29 DIAGNOSIS — Z98.890 POST-OPERATIVE STATE: Primary | ICD-10-CM

## 2020-09-29 DIAGNOSIS — I26.99 BILATERAL PULMONARY EMBOLISM: ICD-10-CM

## 2020-09-29 DIAGNOSIS — B95.62 MRSA BACTEREMIA: ICD-10-CM

## 2020-09-29 DIAGNOSIS — D50.0 ANEMIA DUE TO CHRONIC BLOOD LOSS: ICD-10-CM

## 2020-09-29 DIAGNOSIS — R78.81 MRSA BACTEREMIA: ICD-10-CM

## 2020-09-29 LAB
ALBUMIN SERPL BCP-MCNC: 3.3 G/DL (ref 3.5–5.2)
ALP SERPL-CCNC: 55 U/L (ref 55–135)
ALT SERPL W/O P-5'-P-CCNC: 16 U/L (ref 10–44)
ANION GAP SERPL CALC-SCNC: 7 MMOL/L (ref 8–16)
AST SERPL-CCNC: 20 U/L (ref 10–40)
BASOPHILS # BLD AUTO: 0.02 K/UL (ref 0–0.2)
BASOPHILS NFR BLD: 0.3 % (ref 0–1.9)
BILIRUB SERPL-MCNC: 0.3 MG/DL (ref 0.1–1)
BUN SERPL-MCNC: 19 MG/DL (ref 8–23)
CALCIUM SERPL-MCNC: 8.9 MG/DL (ref 8.7–10.5)
CHLORIDE SERPL-SCNC: 107 MMOL/L (ref 95–110)
CO2 SERPL-SCNC: 26 MMOL/L (ref 23–29)
CREAT SERPL-MCNC: 1.5 MG/DL (ref 0.5–1.4)
DIFFERENTIAL METHOD: ABNORMAL
EOSINOPHIL # BLD AUTO: 0.3 K/UL (ref 0–0.5)
EOSINOPHIL NFR BLD: 4.1 % (ref 0–8)
ERYTHROCYTE [DISTWIDTH] IN BLOOD BY AUTOMATED COUNT: 14.4 % (ref 11.5–14.5)
EST. GFR  (AFRICAN AMERICAN): 38 ML/MIN/1.73 M^2
EST. GFR  (NON AFRICAN AMERICAN): 33 ML/MIN/1.73 M^2
FERRITIN SERPL-MCNC: 731 NG/ML (ref 20–300)
GLUCOSE SERPL-MCNC: 115 MG/DL (ref 70–110)
HCT VFR BLD AUTO: 32 % (ref 37–48.5)
HGB BLD-MCNC: 10.2 G/DL (ref 12–16)
IMM GRANULOCYTES # BLD AUTO: 0.03 K/UL (ref 0–0.04)
IMM GRANULOCYTES NFR BLD AUTO: 0.5 % (ref 0–0.5)
LYMPHOCYTES # BLD AUTO: 1.8 K/UL (ref 1–4.8)
LYMPHOCYTES NFR BLD: 27.7 % (ref 18–48)
MCH RBC QN AUTO: 33.1 PG (ref 27–31)
MCHC RBC AUTO-ENTMCNC: 31.9 G/DL (ref 32–36)
MCV RBC AUTO: 104 FL (ref 82–98)
MONOCYTES # BLD AUTO: 0.6 K/UL (ref 0.3–1)
MONOCYTES NFR BLD: 9.7 % (ref 4–15)
NEUTROPHILS # BLD AUTO: 3.8 K/UL (ref 1.8–7.7)
NEUTROPHILS NFR BLD: 57.7 % (ref 38–73)
NRBC BLD-RTO: 0 /100 WBC
PLATELET # BLD AUTO: 190 K/UL (ref 150–350)
PMV BLD AUTO: 10.3 FL (ref 9.2–12.9)
POTASSIUM SERPL-SCNC: 4.5 MMOL/L (ref 3.5–5.1)
PROT SERPL-MCNC: 6.8 G/DL (ref 6–8.4)
RBC # BLD AUTO: 3.08 M/UL (ref 4–5.4)
SODIUM SERPL-SCNC: 140 MMOL/L (ref 136–145)
WBC # BLD AUTO: 6.6 K/UL (ref 3.9–12.7)

## 2020-09-29 PROCEDURE — 85025 COMPLETE CBC W/AUTO DIFF WBC: CPT

## 2020-09-29 PROCEDURE — 36415 COLL VENOUS BLD VENIPUNCTURE: CPT

## 2020-09-29 PROCEDURE — 99999 PR PBB SHADOW E&M-EST. PATIENT-LVL III: ICD-10-PCS | Mod: PBBFAC,,, | Performed by: SURGERY

## 2020-09-29 PROCEDURE — 99999 PR PBB SHADOW E&M-EST. PATIENT-LVL III: CPT | Mod: PBBFAC,,, | Performed by: SURGERY

## 2020-09-29 PROCEDURE — 82728 ASSAY OF FERRITIN: CPT

## 2020-09-29 PROCEDURE — 80053 COMPREHEN METABOLIC PANEL: CPT

## 2020-09-29 PROCEDURE — 99024 POSTOP FOLLOW-UP VISIT: CPT | Mod: S$GLB,,, | Performed by: SURGERY

## 2020-09-29 PROCEDURE — 83540 ASSAY OF IRON: CPT

## 2020-09-29 PROCEDURE — 99024 PR POST-OP FOLLOW-UP VISIT: ICD-10-PCS | Mod: S$GLB,,, | Performed by: SURGERY

## 2020-09-29 NOTE — PROGRESS NOTES
POST-OP NOTE    PROCEDURE:  Laparoscopic cholecystectomy    COMPLAINTS: None.    EXAM: Incision well approximated. No drainage. No infection.      IMPRESSION:  Component 2mo ago   Final Pathologic Diagnosis Gallbladder (cholecystectomy):   - Chronic cholecystitis with cholelithiasis          PLAN: FU as needed.

## 2020-09-30 ENCOUNTER — TELEPHONE (OUTPATIENT)
Dept: FAMILY MEDICINE | Facility: CLINIC | Age: 80
End: 2020-09-30

## 2020-09-30 ENCOUNTER — DOCUMENT SCAN (OUTPATIENT)
Dept: HOME HEALTH SERVICES | Facility: HOSPITAL | Age: 80
End: 2020-09-30
Payer: MEDICARE

## 2020-09-30 LAB
IRON SERPL-MCNC: 51 UG/DL (ref 30–160)
SATURATED IRON: 22 % (ref 20–50)
TOTAL IRON BINDING CAPACITY: 237 UG/DL (ref 250–450)
TRANSFERRIN SERPL-MCNC: 160 MG/DL (ref 200–375)

## 2020-09-30 NOTE — TELEPHONE ENCOUNTER
----- Message from Osmani Villatoro MD sent at 9/26/2020 12:39 PM CDT -----  Please call Radiology nurse for Ct guided aspiration.

## 2020-09-30 NOTE — TELEPHONE ENCOUNTER
This nurse spoke w/patient regarding scheduling CT guided apiration. Patient stated she could not schedule at this time r/t hematologist having her on Coumadin. She has appointment w/hematology on Thursday & will call this office after said visit to schedule CT.

## 2020-10-01 ENCOUNTER — TELEPHONE (OUTPATIENT)
Dept: HEMATOLOGY/ONCOLOGY | Facility: CLINIC | Age: 80
End: 2020-10-01

## 2020-10-01 ENCOUNTER — OFFICE VISIT (OUTPATIENT)
Dept: HEMATOLOGY/ONCOLOGY | Facility: CLINIC | Age: 80
End: 2020-10-01
Payer: MEDICARE

## 2020-10-01 ENCOUNTER — OFFICE VISIT (OUTPATIENT)
Dept: ORTHOPEDICS | Facility: CLINIC | Age: 80
End: 2020-10-01
Payer: MEDICARE

## 2020-10-01 VITALS
BODY MASS INDEX: 38.45 KG/M2 | DIASTOLIC BLOOD PRESSURE: 70 MMHG | WEIGHT: 217 LBS | SYSTOLIC BLOOD PRESSURE: 120 MMHG | HEIGHT: 63 IN | HEART RATE: 87 BPM

## 2020-10-01 VITALS
SYSTOLIC BLOOD PRESSURE: 180 MMHG | WEIGHT: 217.38 LBS | DIASTOLIC BLOOD PRESSURE: 72 MMHG | TEMPERATURE: 98 F | BODY MASS INDEX: 38.51 KG/M2 | HEART RATE: 68 BPM | OXYGEN SATURATION: 99 %

## 2020-10-01 DIAGNOSIS — T84.84XA PAINFUL ORTHOPAEDIC HARDWARE: ICD-10-CM

## 2020-10-01 DIAGNOSIS — I26.99 PULMONARY INFARCT: ICD-10-CM

## 2020-10-01 DIAGNOSIS — I82.4Z2 DEEP VEIN THROMBOSIS (DVT) OF DISTAL VEIN OF LEFT LOWER EXTREMITY, UNSPECIFIED CHRONICITY: ICD-10-CM

## 2020-10-01 DIAGNOSIS — I82.4Z2 DEEP VEIN THROMBOSIS (DVT) OF DISTAL VEIN OF LEFT LOWER EXTREMITY, UNSPECIFIED CHRONICITY: Primary | ICD-10-CM

## 2020-10-01 DIAGNOSIS — Z96.642 HISTORY OF TOTAL HIP ARTHROPLASTY, LEFT: Primary | ICD-10-CM

## 2020-10-01 PROBLEM — E86.0 DEHYDRATION: Status: RESOLVED | Noted: 2020-06-07 | Resolved: 2020-10-01

## 2020-10-01 PROBLEM — Z01.818 PREOP TESTING: Status: RESOLVED | Noted: 2020-07-13 | Resolved: 2020-10-01

## 2020-10-01 PROBLEM — R74.01 TRANSAMINITIS: Status: RESOLVED | Noted: 2020-06-07 | Resolved: 2020-10-01

## 2020-10-01 PROBLEM — K81.0 ACUTE CHOLECYSTITIS: Status: RESOLVED | Noted: 2020-06-07 | Resolved: 2020-10-01

## 2020-10-01 PROBLEM — R94.31 NONSPECIFIC ABNORMAL ELECTROCARDIOGRAM (ECG) (EKG): Status: RESOLVED | Noted: 2019-03-21 | Resolved: 2020-10-01

## 2020-10-01 PROBLEM — R09.89 CHRONIC SINUS COMPLAINTS: Status: RESOLVED | Noted: 2019-04-22 | Resolved: 2020-10-01

## 2020-10-01 PROBLEM — R73.03 PREDIABETES: Status: RESOLVED | Noted: 2019-06-27 | Resolved: 2020-10-01

## 2020-10-01 PROBLEM — R79.89 ELEVATED TROPONIN: Status: RESOLVED | Noted: 2020-07-14 | Resolved: 2020-10-01

## 2020-10-01 PROBLEM — R07.9 CHEST PAIN: Status: RESOLVED | Noted: 2020-08-02 | Resolved: 2020-10-01

## 2020-10-01 PROBLEM — J45.31 MILD PERSISTENT ASTHMA WITH ACUTE EXACERBATION: Status: RESOLVED | Noted: 2019-04-22 | Resolved: 2020-10-01

## 2020-10-01 PROBLEM — J47.9 BRONCHIECTASIS WITHOUT COMPLICATION: Status: RESOLVED | Noted: 2019-04-22 | Resolved: 2020-10-01

## 2020-10-01 PROBLEM — D64.9 ANEMIA: Status: RESOLVED | Noted: 2020-08-03 | Resolved: 2020-10-01

## 2020-10-01 PROCEDURE — 1159F MED LIST DOCD IN RCRD: CPT | Mod: S$GLB,,, | Performed by: ORTHOPAEDIC SURGERY

## 2020-10-01 PROCEDURE — 3078F PR MOST RECENT DIASTOLIC BLOOD PRESSURE < 80 MM HG: ICD-10-PCS | Mod: S$GLB,,, | Performed by: ORTHOPAEDIC SURGERY

## 2020-10-01 PROCEDURE — 1101F PT FALLS ASSESS-DOCD LE1/YR: CPT | Mod: CPTII,S$GLB,, | Performed by: INTERNAL MEDICINE

## 2020-10-01 PROCEDURE — 1159F MED LIST DOCD IN RCRD: CPT | Mod: S$GLB,,, | Performed by: INTERNAL MEDICINE

## 2020-10-01 PROCEDURE — 99213 PR OFFICE/OUTPT VISIT, EST, LEVL III, 20-29 MIN: ICD-10-PCS | Mod: S$GLB,,, | Performed by: ORTHOPAEDIC SURGERY

## 2020-10-01 PROCEDURE — 99215 PR OFFICE/OUTPT VISIT, EST, LEVL V, 40-54 MIN: ICD-10-PCS | Mod: S$GLB,,, | Performed by: INTERNAL MEDICINE

## 2020-10-01 PROCEDURE — 3078F DIAST BP <80 MM HG: CPT | Mod: S$GLB,,, | Performed by: ORTHOPAEDIC SURGERY

## 2020-10-01 PROCEDURE — 1125F PR PAIN SEVERITY QUANTIFIED, PAIN PRESENT: ICD-10-PCS | Mod: S$GLB,,, | Performed by: INTERNAL MEDICINE

## 2020-10-01 PROCEDURE — 99499 UNLISTED E&M SERVICE: CPT | Mod: S$GLB,,, | Performed by: INTERNAL MEDICINE

## 2020-10-01 PROCEDURE — 1101F PT FALLS ASSESS-DOCD LE1/YR: CPT | Mod: S$GLB,,, | Performed by: ORTHOPAEDIC SURGERY

## 2020-10-01 PROCEDURE — 3074F PR MOST RECENT SYSTOLIC BLOOD PRESSURE < 130 MM HG: ICD-10-PCS | Mod: CPTII,S$GLB,, | Performed by: INTERNAL MEDICINE

## 2020-10-01 PROCEDURE — 3078F DIAST BP <80 MM HG: CPT | Mod: CPTII,S$GLB,, | Performed by: INTERNAL MEDICINE

## 2020-10-01 PROCEDURE — 1159F PR MEDICATION LIST DOCUMENTED IN MEDICAL RECORD: ICD-10-PCS | Mod: S$GLB,,, | Performed by: INTERNAL MEDICINE

## 2020-10-01 PROCEDURE — 99999 PR PBB SHADOW E&M-EST. PATIENT-LVL III: ICD-10-PCS | Mod: PBBFAC,,, | Performed by: INTERNAL MEDICINE

## 2020-10-01 PROCEDURE — 99499 RISK ADDL DX/OHS AUDIT: ICD-10-PCS | Mod: S$GLB,,, | Performed by: INTERNAL MEDICINE

## 2020-10-01 PROCEDURE — 1125F PR PAIN SEVERITY QUANTIFIED, PAIN PRESENT: ICD-10-PCS | Mod: S$GLB,,, | Performed by: ORTHOPAEDIC SURGERY

## 2020-10-01 PROCEDURE — 3078F PR MOST RECENT DIASTOLIC BLOOD PRESSURE < 80 MM HG: ICD-10-PCS | Mod: CPTII,S$GLB,, | Performed by: INTERNAL MEDICINE

## 2020-10-01 PROCEDURE — 3074F PR MOST RECENT SYSTOLIC BLOOD PRESSURE < 130 MM HG: ICD-10-PCS | Mod: S$GLB,,, | Performed by: ORTHOPAEDIC SURGERY

## 2020-10-01 PROCEDURE — 99999 PR PBB SHADOW E&M-EST. PATIENT-LVL III: CPT | Mod: PBBFAC,,, | Performed by: INTERNAL MEDICINE

## 2020-10-01 PROCEDURE — 3074F SYST BP LT 130 MM HG: CPT | Mod: S$GLB,,, | Performed by: ORTHOPAEDIC SURGERY

## 2020-10-01 PROCEDURE — 1101F PR PT FALLS ASSESS DOC 0-1 FALLS W/OUT INJ PAST YR: ICD-10-PCS | Mod: CPTII,S$GLB,, | Performed by: INTERNAL MEDICINE

## 2020-10-01 PROCEDURE — 1101F PR PT FALLS ASSESS DOC 0-1 FALLS W/OUT INJ PAST YR: ICD-10-PCS | Mod: S$GLB,,, | Performed by: ORTHOPAEDIC SURGERY

## 2020-10-01 PROCEDURE — 1159F PR MEDICATION LIST DOCUMENTED IN MEDICAL RECORD: ICD-10-PCS | Mod: S$GLB,,, | Performed by: ORTHOPAEDIC SURGERY

## 2020-10-01 PROCEDURE — 99213 OFFICE O/P EST LOW 20 MIN: CPT | Mod: S$GLB,,, | Performed by: ORTHOPAEDIC SURGERY

## 2020-10-01 PROCEDURE — 99215 OFFICE O/P EST HI 40 MIN: CPT | Mod: S$GLB,,, | Performed by: INTERNAL MEDICINE

## 2020-10-01 PROCEDURE — 3074F SYST BP LT 130 MM HG: CPT | Mod: CPTII,S$GLB,, | Performed by: INTERNAL MEDICINE

## 2020-10-01 PROCEDURE — 1125F AMNT PAIN NOTED PAIN PRSNT: CPT | Mod: S$GLB,,, | Performed by: INTERNAL MEDICINE

## 2020-10-01 PROCEDURE — 1125F AMNT PAIN NOTED PAIN PRSNT: CPT | Mod: S$GLB,,, | Performed by: ORTHOPAEDIC SURGERY

## 2020-10-01 RX ORDER — ENOXAPARIN SODIUM 100 MG/ML
90 INJECTION SUBCUTANEOUS 2 TIMES DAILY
Qty: 54 ML | Refills: 0 | Status: SHIPPED | OUTPATIENT
Start: 2020-10-01 | End: 2020-10-01 | Stop reason: SDUPTHER

## 2020-10-01 RX ORDER — ENOXAPARIN SODIUM 100 MG/ML
INJECTION SUBCUTANEOUS
Status: CANCELLED | OUTPATIENT
Start: 2020-10-01

## 2020-10-01 NOTE — TELEPHONE ENCOUNTER
Megha from [facility] requesting 100mg Lovenox syringes so pt does not have to self-inject twice for 90mg of medication. Message sent to Dr Cuello for clarification.

## 2020-10-01 NOTE — TELEPHONE ENCOUNTER
----- Message from Megha Sher sent at 10/1/2020 12:45 PM CDT -----  Regarding: Pharmacy  Contact: Megha with [facility] in [place]  Type:  Pharmacy Calling to Clarify an RX  Name of Caller:  Megha  Pharmacy Name:  [facility]  Prescription Name:  Lovenox 80mg  What do they need to clarify?:  Dosage  Best Call Back Number:  [phone]  Additional Information:  Megha is would like to change dosage to 100 mg due to pt would have to stick twice for 80mg Please call Megha. Thank you

## 2020-10-01 NOTE — PROGRESS NOTES
Subjective:       Patient ID: Sammi Abreu is a 79 y.o. female.    Chief Complaint: Follow-up    Sammi Abreu is a 79-year-old female with past medical history significant for arthritis and chronic back pain, fibromyalgia, glaucoma, hyperlipidemia, hypertension, sleep apnea with history of noncompliance with CPAP, morbid obesity, hypertension, GERD, COPD, diabetes type 2, prior DVT, and gallstones who presented to the emergency department [facility] with complaints of right lower quadrant pain x3 days.  Of note the patient is also a Yazidism.   Patient was discharged from this facility on 07/29/2020 after being treated for bilateral pulmonary embolism.  Patient is currently on Eliquis.  Patient underwent a CT abdomen pelvis while in the emergency department which identified surgical changes but nothing acute.  Patient is noted to have a mild LUIZ.  Her troponin is elevated at 0.41 which is consistent with prior levels.  She will be admitted to the service of hospital Medicine for continued treatment.     Procedure(s) (LRB):  COLONOSCOPY (N/A)       Hospital Course: Aug Hospital discharge/ Bacteremia, AKD, Rhabdomyoblastics, PE and rt arm thrombosis.  Patient monitor closely during hospitalization.  She was placed on continuous telemetry monitoring.  She was noted to have rhabdomyolysis with CPK greater than 17298 and LUIZ.  Nephrology was consulted.  She was noted to have metabolic acidosis initiated on bicarb drip.  Daptomycin was held and ID was consulted.  She was initiated on IV vancomycin.  She was noted have increased right upper extremity swelling.  Right upper extremity ultrasound demonstrated occlusive thrombus of the right brachial vein and cephalic vein. She was initiated on heparin drip and her Eliquis was held.  There was concern for Eliquis failure.  Hematology consulted.  PT and OT were consulted for debility.  Patient with ongoing anorexia and poor p.o. intake. Dietary was consulted  for protein calorie malnutrition.  It was recommended patient be placed on TPN lipids.  Her renal status was monitored closely and she remain on bicarb drip with plans to start TPN once acute kidney injury resolved if no improvement in her p.o. intake.  Patient was noted to have some hematuria during her stay.  A retroperitoneal ultrasound was ordered.  Hematuria has resolved.  Patient to follow-up with urologist as outpatient.  Patient also underwent endoscopic evaluation for anemia by GI specialist which has been negative.  Hematology service recommended Coumadin initiation at place of Eliquis.  Patient underwent heparin-Coumadin bridging, until she achieved therapeutic INR  Patient has declined skilled nursing facility and LTAC placement    Follow-up  Associated symptoms include arthralgias, chest pain, fatigue, joint swelling, numbness and weakness.     Review of Systems   Constitutional: Positive for activity change, appetite change and fatigue.   HENT: Positive for hearing loss.    Respiratory: Positive for chest tightness and shortness of breath.    Cardiovascular: Positive for chest pain and leg swelling.   Gastrointestinal: Positive for abdominal distention.   Endocrine: Positive for polyuria.   Genitourinary: Positive for frequency and urgency.   Musculoskeletal: Positive for arthralgias, back pain and joint swelling.        Severe left hip pain   Neurological: Positive for dizziness, tremors, weakness, light-headedness and numbness.   Hematological: Bruises/bleeds easily.   Psychiatric/Behavioral: Positive for confusion and decreased concentration. The patient is nervous/anxious.        Patient Active Problem List   Diagnosis    DDD (degenerative disc disease), lumbar    Arthritis of hip    Lumbosacral spondylosis without myelopathy    Hypertension associated with diabetes    Hyperlipidemia associated with type 2 diabetes mellitus    Arthritis    Degenerative arthritis of lumbar spine    Shoulder  pain    S/P hip replacement    Left hip pain    Breast mass    Glaucoma suspect    Peripheral edema    Diastolic dysfunction, left ventricle    Pulmonary hypertension    GERD (gastroesophageal reflux disease)    Morbid obesity    Excessive daytime sleepiness    Nonspecific abnormal electrocardiogram (ECG) (EKG)    Chronic seasonal allergic rhinitis due to pollen    Chronic sinus complaints    Mild persistent asthma with acute exacerbation    Calcification of aorta    Obstructive sleep apnea syndrome    Bronchiectasis without complication- seen ct chest 3/6/18    Prediabetes    questionable Acute cholecystitis    Transaminitis    Dehydration    Diabetic neuropathy associated with type 2 diabetes mellitus    Lumbar radiculitis    Preop testing    Class 2 severe obesity with serious comorbidity in adult    Status post laparoscopic cholecystectomy    Type 2 diabetes mellitus with diabetic neuropathy, without long-term current use of insulin    COPD (chronic obstructive pulmonary disease)    CHARLIE (obstructive sleep apnea)    Chronic back pain    History of DVT in adulthood    Bilateral pulmonary embolism    Deep vein thrombosis (DVT) of left lower extremity    Elevated troponin    History of obstructive sleep apnea    Acute blood loss anemia    Type 2 diabetes mellitus with stage 2 chronic kidney disease    MRSA bacteremia    DDD (degenerative disc disease), thoracic    Chest pain    LUIZ (acute kidney injury)    Elevated brain natriuretic peptide (BNP) level    Chronic diastolic CHF (congestive heart failure)    Pulmonary infarct    Atelectasis    Moderate protein-calorie malnutrition    Anemia    Non-traumatic rhabdomyolysis    Acute deep vein thrombosis (DVT) of right upper extremity    Elevated ferritin level    Hyponatremia    Debility    Liver cyst    Hematuria    Patient is Mu-ism    Anorexia    Iron deficiency anemia    Moderate persistent asthma  without complication       Objective:      Physical Exam  Vitals signs and nursing note reviewed.   Constitutional:       Appearance: She is well-developed. She is obese. She is ill-appearing and toxic-appearing.   Neck:      Thyroid: No thyromegaly.   Cardiovascular:      Rate and Rhythm: Normal rate and regular rhythm.      Pulses: Decreased pulses.           Carotid pulses are 1+ on the right side and 1+ on the left side.       Radial pulses are 1+ on the right side and 1+ on the left side.        Femoral pulses are 1+ on the right side and 1+ on the left side.       Popliteal pulses are 1+ on the right side and 1+ on the left side.        Dorsalis pedis pulses are 1+ on the right side and 1+ on the left side.        Posterior tibial pulses are 1+ on the right side and 1+ on the left side.      Heart sounds: Heart sounds are distant.   Pulmonary:      Effort: Pulmonary effort is normal.      Breath sounds: Normal breath sounds.   Chest:      Breasts: Breasts are symmetrical.         Right: No inverted nipple, mass, nipple discharge, skin change or tenderness.         Left: No inverted nipple, mass, nipple discharge, skin change or tenderness.   Abdominal:      General: Bowel sounds are normal. There is no distension.      Palpations: Abdomen is soft.      Tenderness: There is no abdominal tenderness.   Genitourinary:     Exam position: Supine.      Labia:         Right: No rash, tenderness, lesion or injury.         Left: No rash, tenderness, lesion or injury.       Vagina: Normal. No vaginal discharge.      Cervix: No cervical motion tenderness, discharge or friability.      Adnexa:         Right: No mass, tenderness or fullness.          Left: No mass, tenderness or fullness.     Musculoskeletal:      Right lower le+ Pitting Edema present.      Left lower le+ Pitting Edema present.   Skin:     General: Skin is warm and dry.   Neurological:      Mental Status: She is alert and oriented to person, place,  and time.         Lab Results   Component Value Date    WBC 6.60 09/29/2020    HGB 10.2 (L) 09/29/2020    HCT 32.0 (L) 09/29/2020     09/29/2020    CHOL 194 01/16/2020    TRIG 98 01/16/2020    HDL 40 01/16/2020    ALT 16 09/29/2020    AST 20 09/29/2020     09/29/2020    K 4.5 09/29/2020     09/29/2020    CREATININE 1.5 (H) 09/29/2020    BUN 19 09/29/2020    CO2 26 09/29/2020    TSH 3.261 08/04/2020    INR 1.6 09/29/2020    HGBA1C 5.9 (H) 08/05/2020     The 10-year ASCVD risk score (Jatinder GUZMAN Jr., et al., 2013) is: 28.3%    Values used to calculate the score:      Age: 79 years      Sex: Female      Is Non- : Yes      Diabetic: Yes      Tobacco smoker: No      Systolic Blood Pressure: 120 mmHg      Is BP treated: Yes      HDL Cholesterol: 40 mg/dL      Total Cholesterol: 194 mg/dL    Assessment:       1. Edema of extremities    2. Acute deep vein thrombosis (DVT) of right upper extremity, unspecified vein    3. Leukocytosis, unspecified type    4. HTN (hypertension), benign    5. Megaloblastic red blood cells    6. Severe obesity (BMI 35.0-39.9) with comorbidity    7. Pulmonary embolism, unspecified chronicity, unspecified pulmonary embolism type, unspecified whether acute cor pulmonale present    8. Bacteremia associated with intravascular line, subsequent encounter    9. MRSA bacteremia    10. LUIZ (acute kidney injury)    11. Acute blood loss anemia        Plan:       Edema of extremities  -     Discontinue: furosemide (LASIX) 20 MG tablet; Take 1 tablet (20 mg total) by mouth daily as needed (edema).  Dispense: 30 tablet; Refill: 11  -     Discontinue: potassium chloride SA (K-DUR,KLOR-CON) 20 MEQ tablet; Take 1 tablet (20 mEq total) by mouth daily as needed.  Dispense: 30 tablet; Refill: 2  -     Basic metabolic panel; Future; Expected date: 09/03/2020  -     Discontinue: furosemide (LASIX) 20 MG tablet; Take 1 tablet (20 mg total) by mouth daily as needed (edema).   Dispense: 30 tablet; Refill: 11  -     Discontinue: potassium chloride SA (K-DUR,KLOR-CON) 20 MEQ tablet; Take 1 tablet (20 mEq total) by mouth daily as needed.  Dispense: 30 tablet; Refill: 2  -     Discontinue: potassium chloride SA (K-DUR,KLOR-CON) 20 MEQ tablet; Take 1 tablet (20 mEq total) by mouth daily as needed.  Dispense: 30 tablet; Refill: 2  -     Discontinue: furosemide (LASIX) 20 MG tablet; Take 1 tablet (20 mg total) by mouth daily as needed (edema).  Dispense: 30 tablet; Refill: 11  -     Discontinue: potassium chloride SA (K-DUR,KLOR-CON) 20 MEQ tablet; Take 1 tablet (20 mEq total) by mouth daily as needed.  Dispense: 30 tablet; Refill: 2  -     furosemide (LASIX) 20 MG tablet; Take 1 tablet (20 mg total) by mouth daily as needed (edema).  Dispense: 30 tablet; Refill: 11     Case discussed with Dr. Solis.  Patient will need a CT-guided arthrocentesis.  This interview the patient has severe bacteremia and is possible that she having a an infection of on her hardware.   Patient readiness: acceptance and barriers:readiness, environmental, household issues, occupational issues and poor sleep habits    During the course of the visit the patient was educated and counseled about the following:     Diabetes:  Discussed general issues about diabetes pathophysiology and management.  Hypertension:   Medication: no change.  Obesity:   General weight loss/lifestyle modification strategies discussed (elicit support from others; identify saboteurs; non-food rewards, etc).    Goals: Diabetes: Maintain Hemoglobin A1C below 7, Hypertension: Reduce Blood Pressure and Obesity: Reduce calorie intake and BMI    Did patient meet goals/outcomes: No    The following self management tools provided: declined    Patient Instructions (the written plan) was given to the patient/family.     Time spent with patient: 45 minutes    Barriers to medications present (yes )    Adverse reactions to current medications (yes)    Over the  counter medications reviewed (Yes)        30-45-minute visit.  Fifteen minutes spent counseling patient on diet, exercise, and weight loss.  This has been fully explained to the patient, who indicates understanding.    Discussed health maintenance guidelines appropriate for age.

## 2020-10-01 NOTE — PROGRESS NOTES
HPI    79 years old female Jehovah Witness.  Extensive pulmonary embolism upper extremity DVT failure of Eliquis currently on Coumadin and follow by Coumadin clinic.  Patient also had a bacteremia septic joint require IV antibiotics.  Treatment team contact hematology in assess of anticoagulation in the setting need of hip aspiration of fluid in near future.    Denies chest pain shortness breath.  Denies abdominal pain nausea vomiting or diarrhea.        Lifetime Dose Tracking   No doses have been documented on this patient for the following tracked chemicals: doxorubicin, epirubicin, idarubicin, daunorubicin, mitoxantrone, bleomycin, mitomycin, busulfan     Past Medical History:   Diagnosis Date    ALLERGIC RHINITIS     Anemia     Arthritis     Back pain     Bronchitis     Cataract     OU    Cholelithiasis     COPD (chronic obstructive pulmonary disease)     Degenerative disc disease     Diabetes mellitus     pre diabetic    Diverticulosis     DVT (deep venous thrombosis)     Edema     Encounter for blood transfusion 1979    Fibromyalgia     Glaucoma     Gout     Hx of colonic polyps     Hyperlipidemia     Hypertension     Incontinence     Osteoporosis     Reflux     Refusal of blood transfusions as patient is Jew     Sleep apnea     non compliant with CPAP    Vestibulitis of ear        Family History   Problem Relation Age of Onset    Hypertension Mother     Diabetes Sister     Cancer Sister     Stomach cancer Sister     Hypertension Sister     Bipolar disorder Sister     Peripheral vascular disease Sister     Stroke Father     Hypertension Father     Hypertension Brother     Stroke Brother     Peripheral vascular disease Brother     Hypertension Son     Obesity Son     Early death Son 48    Arthritis Son     Glaucoma Neg Hx     Macular degeneration Neg Hx     Retinal detachment Neg Hx        Past Surgical History:   Procedure Laterality Date     ANGIOGRAPHY OF LOWER EXTREMITY N/A 2019    Procedure: ANGIOGRAM, LOWER EXTREMITY;  Surgeon: Gino Arana MD;  Location: [facility] CATH/EP LAB;  Service: General;  Laterality: N/A;    COLONOSCOPY N/A 2020    Procedure: COLONOSCOPY;  Surgeon: Sue Nuñez MD;  Location: [facility] ENDO;  Service: Endoscopy;  Laterality: N/A;    ESOPHAGOGASTRODUODENOSCOPY N/A 2020    Procedure: EGD (ESOPHAGOGASTRODUODENOSCOPY);  Surgeon: Sue Nuñez MD;  Location: [facility] ENDO;  Service: Endoscopy;  Laterality: N/A;    HIP SURGERY      HYSTERECTOMY      INTRALUMINAL GASTROINTESTINAL TRACT IMAGING VIA CAPSULE N/A 2020    Procedure: IMAGING PROCEDURE, GI TRACT, INTRALUMINAL, VIA CAPSULE;  Surgeon: Sue Nuñez MD;  Location: [facility] ENDO;  Service: Endoscopy;  Laterality: N/A;    JOINT REPLACEMENT      B total hip    LAPAROSCOPIC CHOLECYSTECTOMY N/A 2020    Procedure: CHOLECYSTECTOMY, LAPAROSCOPIC;  Surgeon: Jomar Mcdonald MD;  Location: [facility] OR;  Service: General;  Laterality: N/A;    TRANSFORAMINAL EPIDURAL INJECTION OF STEROID Left 2020    Procedure: Injection,steroid,epidural,transforaminal approach;  Surgeon: Matt Gilliam MD;  Location: [facility] OR;  Service: Pain Management;  Laterality: Left;  L2-3, L3-4       Social History     Socioeconomic History    Marital status:      Spouse name: Not on file    Number of children: Not on file    Years of education: Not on file    Highest education level: Not on file   Occupational History    Not on file   Social Needs    Financial resource strain: Not on file    Food insecurity     Worry: Not on file     Inability: Not on file    Transportation needs     Medical: Not on file     Non-medical: Not on file   Tobacco Use    Smoking status: Former Smoker     Packs/day: 0.25     Years: 5.00     Pack years: 1.25     Quit date: 1972     Years since quittin.7    Smokeless tobacco: Never Used   Substance and Sexual Activity     Alcohol use: Yes     Alcohol/week: 0.0 standard drinks     Comment: Rarely    Drug use: Yes     Types: Oxycodone, Hydrocodone    Sexual activity: Not on file   Lifestyle    Physical activity     Days per week: Not on file     Minutes per session: Not on file    Stress: Not on file   Relationships    Social connections     Talks on phone: Not on file     Gets together: Not on file     Attends Spiritism service: Not on file     Active member of club or organization: Not on file     Attends meetings of clubs or organizations: Not on file     Relationship status: Not on file   Other Topics Concern    Not on file   Social History Narrative    Not on file         Current Outpatient Medications:     acetaminophen (TYLENOL) 325 MG tablet, Take 2 tablets (650 mg total) by mouth every 6 (six) hours as needed (Do not take with any other Tylenol or acetaminophen containing products)., Disp: , Rfl: 0    albuterol-ipratropium (DUO-NEB) 2.5 mg-0.5 mg/3 mL nebulizer solution, Take 3 mLs by nebulization every 8 (eight) hours., Disp: 270 mL, Rfl: 0    ascorbic acid, vitamin C, (VITAMIN C) 100 MG tablet, Take 5 tablets (500 mg total) by mouth every evening., Disp: , Rfl:     budesonide-formoterol 160-4.5 mcg (SYMBICORT) 160-4.5 mcg/actuation HFAA, Inhale 2 puffs into the lungs every 12 (twelve) hours. Controller, Disp: 3 Inhaler, Rfl: 3    cyanocobalamin, vitamin B-12, 2,500 mcg Lozg, Place 2 tablets under the tongue once daily., Disp: 60 lozenge, Rfl: 11    esomeprazole (NEXIUM) 20 MG capsule, Take 1 capsule (20 mg total) by mouth before breakfast., Disp: 30 capsule, Rfl: 2    fluticasone propionate (FLONASE) 50 mcg/actuation nasal spray, 2 sprays (100 mcg total) by Each Nostril route once daily., Disp: 48 g, Rfl: 3    fluticasone-salmeterol 230-21 mcg/dose (ADVAIR HFA) 230-21 mcg/actuation HFAA inhaler, Inhale 2 puffs into the lungs 2 (two) times daily. Controller, Disp: 12 g, Rfl: 11    folic acid (FOLVITE) 1 MG  tablet, Take 1 tablet (1 mg total) by mouth once daily., Disp: 30 tablet, Rfl: 0    furosemide (LASIX) 20 MG tablet, Take 1 tablet (20 mg total) by mouth daily as needed (edema)., Disp: 30 tablet, Rfl: 11    hydrALAZINE (APRESOLINE) 50 MG tablet, Take 1 tablet (50 mg total) by mouth every 8 (eight) hours., Disp: 90 tablet, Rfl: 1    HYDROcodone-acetaminophen (NORCO) 5-325 mg per tablet, Take 1 tablet by mouth every 8 (eight) hours as needed for Pain., Disp: 30 tablet, Rfl: 0    metoprolol succinate (TOPROL-XL) 50 MG 24 hr tablet, Take 1 tablet (50 mg total) by mouth once daily., Disp: 90 tablet, Rfl: 3    montelukast (SINGULAIR) 10 mg tablet, Take 1 tablet (10 mg total) by mouth every evening., Disp: 90 tablet, Rfl: 3    multivitamin capsule, Take 1 capsule by mouth once daily., Disp: , Rfl:     warfarin (COUMADIN) 5 MG tablet, Tulio.e 1-1.5 Tablets QPM as instructed by coumadin clinic, Disp: 90 tablet, Rfl: 3    ondansetron (ZOFRAN-ODT) 4 MG TbDL, Take 1 tablet (4 mg total) by mouth every 8 (eight) hours as needed. (Patient not taking: Reported on 10/1/2020), Disp: 20 tablet, Rfl: 0  No current facility-administered medications for this visit.     Facility-Administered Medications Ordered in Other Visits:     lactated ringers infusion, , Intravenous, Continuous, Gino Reid MD, Last Rate: 10 mL/hr at 07/13/20 1308    lidocaine (PF) 10 mg/ml (1%) injection 10 mg, 1 mL, Intradermal, Once, Gino Reid MD    Review of patient's allergies indicates:   Allergen Reactions    Daptomycin Other (See Comments)     Kidney damage     Cymbalta [duloxetine] Other (See Comments)     Nightmares      Darvon [propoxyphene] Nausea Only and Other (See Comments)     Sweating, slept for 3 days    Atorvastatin Other (See Comments)     Muscle cramps    Naprosyn [naproxen] Nausea Only    Penicillins Rash    Tramadol Nausea Only and Palpitations     All medications and past history have been  reviewed.    Objective:      Vitals:  Vital Signs (Most Recent):  Temp: 97.7 °F (36.5 °C) (08/13/20 0426)  Pulse: 105 (08/13/20 0736)  Resp: 20 (08/13/20 0736)  BP: (!) 148/94 (08/13/20 0426)  SpO2: 99 % (08/13/20 0736) Vital Signs (24h Range):  Temp:  [96.9 °F (36.1 °C)-99.1 °F (37.3 °C)] 97.7 °F (36.5 °C)  Pulse:  [] 105  Resp:  [16-20] 20  SpO2:  [95 %-100 %] 99 %  BP: (141-199)/(65-95) 148/94       Awake alert no acute distress  Normocephalic atraumatic pupils equal round reactive  Soft nontender nondistended bowel sounds x4  Normal rate  Normal respiratory of  Nonfocal cranial nerves 2-12 grossly intact sensorimotor grossly intact  No rash no lesions   deferred     CMP  Sodium   Date Value Ref Range Status   09/29/2020 140 136 - 145 mmol/L Final     Potassium   Date Value Ref Range Status   09/29/2020 4.5 3.5 - 5.1 mmol/L Final     Chloride   Date Value Ref Range Status   09/29/2020 107 95 - 110 mmol/L Final     CO2   Date Value Ref Range Status   09/29/2020 26 23 - 29 mmol/L Final     Glucose   Date Value Ref Range Status   09/29/2020 115 (H) 70 - 110 mg/dL Final     BUN, Bld   Date Value Ref Range Status   09/29/2020 19 8 - 23 mg/dL Final     Creatinine   Date Value Ref Range Status   09/29/2020 1.5 (H) 0.5 - 1.4 mg/dL Final     Calcium   Date Value Ref Range Status   09/29/2020 8.9 8.7 - 10.5 mg/dL Final     Total Protein   Date Value Ref Range Status   09/29/2020 6.8 6.0 - 8.4 g/dL Final     Albumin   Date Value Ref Range Status   09/29/2020 3.3 (L) 3.5 - 5.2 g/dL Final     Total Bilirubin   Date Value Ref Range Status   09/29/2020 0.3 0.1 - 1.0 mg/dL Final     Comment:     For infants and newborns, interpretation of results should be based  on gestational age, weight and in agreement with clinical  observations.  Premature Infant recommended reference ranges:  Up to 24 hours.............<8.0 mg/dL  Up to 48 hours............<12.0 mg/dL  3-5 days..................<15.0 mg/dL  6-29  days.................<15.0 mg/dL       Alkaline Phosphatase   Date Value Ref Range Status   09/29/2020 55 55 - 135 U/L Final     AST   Date Value Ref Range Status   09/29/2020 20 10 - 40 U/L Final     ALT   Date Value Ref Range Status   09/29/2020 16 10 - 44 U/L Final     Anion Gap   Date Value Ref Range Status   09/29/2020 7 (L) 8 - 16 mmol/L Final     eGFR if    Date Value Ref Range Status   09/29/2020 38 (A) >60 mL/min/1.73 m^2 Final     eGFR if non    Date Value Ref Range Status   09/29/2020 33 (A) >60 mL/min/1.73 m^2 Final     Comment:     Calculation used to obtain the estimated glomerular filtration  rate (eGFR) is the CKD-EPI equation.        Lab Results   Component Value Date    WBC 6.60 09/29/2020    HGB 10.2 (L) 09/29/2020    HCT 32.0 (L) 09/29/2020     (H) 09/29/2020     09/29/2020         EGD 08/12/2020  Findings:        The esophagus was normal.        A small hiatal hernia was present.        The examined duodenum was normal.   Impression:          - Normal esophagus.                        - Small hiatal hernia.                        - Normal examined duodenum.                        - No specimens collected.                        -History of multifactorial anemia, in part due to                        low iron counts. Patient is a Nond[employer]ational     Colonoscopy 08/13/2020  Findings:        Multiple small-mouthed diverticula were found in the sigmoid colon,        descending colon and ascending colon.        The exam was otherwise without abnormality on direct and        retroflexion views.   Impression:          - Diverticulosis in the sigmoid colon, in the                        descending colon and in the ascending colon.                        - The examination was otherwise normal on direct and                        retroflexion views.                        - No specimens collected.                        -History of multifactorial anemia, in  part due to                        low iron.   All lab results and imaging results have been reviewed and discussed with the patient.     Assessment and Plan:      Anemia  > Continue vitamin B12 and folate supplementation.  > patient is Jehovah Witness - no blood product    Extensive right occlusive thrombus brachial vein and cephalic vein while patient is on Eliquis treated for pulmonary embolism and lower extremity DVT.  Extensive thrombosis with Hex phos neutralization is positive   > long-term anticoagulation patient was on Coumadin and follow psych Coumadin clinic.  Recently patient require septic joint aspiration.  Have to switch medication to Lovenox 1 milligram/kilogram b.i.d. starting 1 week prior to the procedure.  Patient will discontinue Coumadin while on Lovenox.  Lovenox can be held 24 hr prior to the procedures.  An should resume after completion of the procedure.  > I have spoken to the infusion clinic once the patient received medication infusion clinic staff will teach injection.  > Jehovah Witness

## 2020-10-01 NOTE — PROGRESS NOTES
[facility]    Subjective:     Chief Complaint:   Chief Complaint   Patient presents with    Left Hip - Pain     Left hip pain x a while. States that her pain is about the same. She is here to f/u with labs        Past Medical History:   Diagnosis Date    ALLERGIC RHINITIS     Anemia     Arthritis     Back pain     Bronchitis     Cataract     OU    Cholelithiasis     COPD (chronic obstructive pulmonary disease)     Degenerative disc disease     Diabetes mellitus     pre diabetic    Diverticulosis     DVT (deep venous thrombosis)     Edema     Encounter for blood transfusion 1979    Fibromyalgia     Glaucoma     Gout     Hx of colonic polyps     Hyperlipidemia     Hypertension     Incontinence     Osteoporosis     Reflux     Refusal of blood transfusions as patient is Zoroastrianism     Sleep apnea     non compliant with CPAP    Vestibulitis of ear        Past Surgical History:   Procedure Laterality Date    ANGIOGRAPHY OF LOWER EXTREMITY N/A 7/31/2019    Procedure: ANGIOGRAM, LOWER EXTREMITY;  Surgeon: Gino Arana MD;  Location: [facility] CATH/EP LAB;  Service: General;  Laterality: N/A;    COLONOSCOPY N/A 8/13/2020    Procedure: COLONOSCOPY;  Surgeon: Sue Nuñez MD;  Location: [facility];  Service: Endoscopy;  Laterality: N/A;    ESOPHAGOGASTRODUODENOSCOPY N/A 8/12/2020    Procedure: EGD (ESOPHAGOGASTRODUODENOSCOPY);  Surgeon: Sue Nuñez MD;  Location: [facility];  Service: Endoscopy;  Laterality: N/A;    HIP SURGERY      HYSTERECTOMY      INTRALUMINAL GASTROINTESTINAL TRACT IMAGING VIA CAPSULE N/A 9/9/2020    Procedure: IMAGING PROCEDURE, GI TRACT, INTRALUMINAL, VIA CAPSULE;  Surgeon: Sue Nuñez MD;  Location: [facility];  Service: Endoscopy;  Laterality: N/A;    JOINT REPLACEMENT      B total hip    LAPAROSCOPIC CHOLECYSTECTOMY N/A 7/13/2020    Procedure: CHOLECYSTECTOMY, LAPAROSCOPIC;  Surgeon: Jomar Mcdonald MD;  Location: [facility] OR;   Service: General;  Laterality: N/A;    TRANSFORAMINAL EPIDURAL INJECTION OF STEROID Left 6/30/2020    Procedure: Injection,steroid,epidural,transforaminal approach;  Surgeon: Matt Gilliam MD;  [facility]  Service: Pain Management;  Laterality: Left;  L2-3, L3-4       Current Outpatient Medications   Medication Sig    acetaminophen (TYLENOL) 325 MG tablet Take 2 tablets (650 mg total) by mouth every 6 (six) hours as needed (Do not take with any other Tylenol or acetaminophen containing products).    albuterol-ipratropium (DUO-NEB) 2.5 mg-0.5 mg/3 mL nebulizer solution Take 3 mLs by nebulization every 8 (eight) hours.    ascorbic acid, vitamin C, (VITAMIN C) 100 MG tablet Take 5 tablets (500 mg total) by mouth every evening.    budesonide-formoterol 160-4.5 mcg (SYMBICORT) 160-4.5 mcg/actuation HFAA Inhale 2 puffs into the lungs every 12 (twelve) hours. Controller    cyanocobalamin, vitamin B-12, 2,500 mcg Lozg Place 2 tablets under the tongue once daily.    esomeprazole (NEXIUM) 20 MG capsule Take 1 capsule (20 mg total) by mouth before breakfast.    fluticasone propionate (FLONASE) 50 mcg/actuation nasal spray 2 sprays (100 mcg total) by Each Nostril route once daily.    fluticasone-salmeterol 230-21 mcg/dose (ADVAIR HFA) 230-21 mcg/actuation HFAA inhaler Inhale 2 puffs into the lungs 2 (two) times daily. Controller    folic acid (FOLVITE) 1 MG tablet Take 1 tablet (1 mg total) by mouth once daily.    furosemide (LASIX) 20 MG tablet Take 1 tablet (20 mg total) by mouth daily as needed (edema).    hydrALAZINE (APRESOLINE) 50 MG tablet Take 1 tablet (50 mg total) by mouth every 8 (eight) hours.    HYDROcodone-acetaminophen (NORCO) 5-325 mg per tablet Take 1 tablet by mouth every 8 (eight) hours as needed for Pain.    metoprolol succinate (TOPROL-XL) 50 MG 24 hr tablet Take 1 tablet (50 mg total) by mouth once daily.    montelukast (SINGULAIR) 10 mg tablet Take 1 tablet (10 mg total) by mouth  every evening.    multivitamin capsule Take 1 capsule by mouth once daily.    ondansetron (ZOFRAN-ODT) 4 MG TbDL Take 1 tablet (4 mg total) by mouth every 8 (eight) hours as needed.    warfarin (COUMADIN) 5 MG tablet Tulio.e 1-1.5 Tablets QPM as instructed by coumadin clinic     No current facility-administered medications for this visit.      Facility-Administered Medications Ordered in Other Visits   Medication    lactated ringers infusion    lidocaine (PF) 10 mg/ml (1%) injection 10 mg       Review of patient's allergies indicates:   Allergen Reactions    Daptomycin Other (See Comments)     Kidney damage     Cymbalta [duloxetine] Other (See Comments)     Nightmares      Darvon [propoxyphene] Nausea Only and Other (See Comments)     Sweating, slept for 3 days    Atorvastatin Other (See Comments)     Muscle cramps    Naprosyn [naproxen] Nausea Only    Penicillins Rash    Tramadol Nausea Only and Palpitations       Family History   Problem Relation Age of Onset    Hypertension Mother     Diabetes Sister     Cancer Sister     Stomach cancer Sister     Hypertension Sister     Bipolar disorder Sister     Peripheral vascular disease Sister     Stroke Father     Hypertension Father     Hypertension Brother     Stroke Brother     Peripheral vascular disease Brother     Hypertension Son     Obesity Son     Early death Son 48    Arthritis Son     Glaucoma Neg Hx     Macular degeneration Neg Hx     Retinal detachment Neg Hx        Social History     Socioeconomic History    Marital status:      Spouse name: Not on file    Number of children: Not on file    Years of education: Not on file    Highest education level: Not on file   Occupational History    Not on file   Social Needs    Financial resource strain: Not on file    Food insecurity     Worry: Not on file     Inability: Not on file    Transportation needs     Medical: Not on file     Non-medical: Not on file   Tobacco Use     Smoking status: Former Smoker     Packs/day: 0.25     Years: 5.00     Pack years: 1.25     Quit date: 1972     Years since quittin.7    Smokeless tobacco: Never Used   Substance and Sexual Activity    Alcohol use: Yes     Alcohol/week: 0.0 standard drinks     Comment: Rarely    Drug use: Yes     Types: Oxycodone, Hydrocodone    Sexual activity: Not on file   Lifestyle    Physical activity     Days per week: Not on file     Minutes per session: Not on file    Stress: Not on file   Relationships    Social connections     Talks on phone: Not on file     Gets together: Not on file     Attends Zoroastrianism service: Not on file     Active member of club or organization: Not on file     Attends meetings of clubs or organizations: Not on file     Relationship status: Not on file   Other Topics Concern    Not on file   Social History Narrative    Not on file       History of present illness:  Patient returns for her left hip.  Her pain is about the same.      Review of Systems:    Constitution: Negative for chills, fever, and sweats.  Negative for unexplained weight loss.    HENT:  Negative for headaches and blurry vision.    Cardiovascular:Negative for chest pain or irregular heart beat. Negative for hypertension.    Respiratory:  Negative for cough and shortness of breath.    Gastrointestinal: Negative for abdominal pain, heartburn, melena, nausea, and vomitting.    Genitourinary:  Negative bladder incontinence and dysuria.    Musculoskeletal:  See HPI for details.     Neurological: Negative for numbness.    Psychiatric/Behavioral: Negative for depression.  The patient is not nervous/anxious.      Endocrine: Negative for polyuria    Hematologic/Lymphatic: Negative for bleeding problem.  Does not bruise/bleed easily.    Skin: Negative for poor would healing and rash    Objective:      Physical Examination:    Vital Signs:    Vitals:    10/01/20 1035   BP: 120/70   Pulse: 87       Body mass index is 38.44  kg/m².    This a well-developed, well nourished patient in no acute distress.  They are alert and oriented and cooperative to examination.        Patient has pain with internal-external rotation of the left hip  Pertinent New Results:    XRAY Report / Interpretation:       Assessment/Plan:      Her lab work is reviewed.  Her CRP is normal but her sed rate is elevated.  Really we need fluid from the hip.  The next step is to aspirate the hip.  I will see her back after that is been done.      This note was created using Dragon voice recognition software that occasionally misinterpreted phrases or words.

## 2020-10-02 ENCOUNTER — DOCUMENT SCAN (OUTPATIENT)
Dept: HOME HEALTH SERVICES | Facility: HOSPITAL | Age: 80
End: 2020-10-02
Payer: MEDICARE

## 2020-10-02 ENCOUNTER — TELEPHONE (OUTPATIENT)
Dept: FAMILY MEDICINE | Facility: CLINIC | Age: 80
End: 2020-10-02

## 2020-10-02 RX ORDER — ENOXAPARIN SODIUM 100 MG/ML
80 INJECTION SUBCUTANEOUS 2 TIMES DAILY
Qty: 48 ML | Refills: 0 | Status: SHIPPED | OUTPATIENT
Start: 2020-10-02 | End: 2020-11-01

## 2020-10-02 NOTE — TELEPHONE ENCOUNTER
----- Message from Arelis Baig sent at 10/2/2020  4:02 PM CDT -----  Contact: MEME SALAS [[id number]]  Patient is requesting a call back from the nurse stated in regards to scheduling a test.    Please call the patient upon request at phone number [phone].

## 2020-10-02 NOTE — TELEPHONE ENCOUNTER
I have spoken with Ms. Abreu.  There was in her left hip pain and therefore the dose of Norco was increased.  I understand her frustration .  I will follow her approval from Radiology.  I will send a message to patient advocate.  The procedure you of the left hip was canceled due to lack of approval by her insurance.  This was a scheduling department problem that should you have  for seen this before he was scheduled.This has been fully explained to the patient, who indicates understanding.

## 2020-10-02 NOTE — TELEPHONE ENCOUNTER
----- Message from Selina Gilbert sent at 10/2/2020  9:12 AM CDT -----  Regarding: Medication clazrification  Contact: [facility]  medcation clarification for enoxaparin (LOVENOX) 80 mg/0.8 mL [facility] pharmacy is asking for clarification on there dosage.  [phone]

## 2020-10-02 NOTE — TELEPHONE ENCOUNTER
Informed Clara at [facility] pharmacy that Dr Cuello has not responded to refill request yet. Teams message sent to Dr Cuello for clarification.

## 2020-10-02 NOTE — TELEPHONE ENCOUNTER
Patient states that she was calling to make Dr. Villatoro aware Dr. Rocha has to get approval from insurance before any treatment can be determined. Patient expresses frustration as she states she is in pain. Patient does have pain medication but states it only does so much & she wants a resolution.

## 2020-10-05 ENCOUNTER — TELEPHONE (OUTPATIENT)
Dept: FAMILY MEDICINE | Facility: CLINIC | Age: 80
End: 2020-10-05

## 2020-10-05 ENCOUNTER — TELEPHONE (OUTPATIENT)
Dept: HEMATOLOGY/ONCOLOGY | Facility: CLINIC | Age: 80
End: 2020-10-05

## 2020-10-05 ENCOUNTER — TELEPHONE (OUTPATIENT)
Dept: GASTROENTEROLOGY | Facility: CLINIC | Age: 80
End: 2020-10-05

## 2020-10-05 NOTE — TELEPHONE ENCOUNTER
----- Message from Jatinder Werner sent at 10/5/2020 10:38 AM CDT -----  Regarding: Advice  Contact: patient  Patient want to speak with a nurse regarding upcoming appointment and whats going on with her health, patient is confuse on why she coming to appointment please call back at [phone] (home)     Case number [id number]

## 2020-10-05 NOTE — TELEPHONE ENCOUNTER
----- Message from Sue Nuñez MD sent at 10/4/2020  8:47 AM CDT -----  Please let patient know her iron and blood counts have improved.

## 2020-10-05 NOTE — TELEPHONE ENCOUNTER
Informed pt this nurse spoke to Dr Cuello to clarify that pt is not to begin Lovenox until she has a definitive procedure date. Once she knows the date of her hip aspiration, she is to call the office back to let Dr Cuello know so he can tell her when to begin the Lovenox. Pt verbalized understanding and confirmed f/up appts for this week.     ----- Message from Payam Coffey sent at 10/5/2020 10:27 AM CDT -----  Regarding: Pt [phone]  The patient wants to know the status of the new script that's supposed to take the place of coumadin.    She wants to know why she has to do labs and have a visit with you on 10/6 and 10/8/20    Thank you

## 2020-10-05 NOTE — TELEPHONE ENCOUNTER
----- Message from Victorino Bauer sent at 10/5/2020  3:11 PM CDT -----  Regarding: pt advice  Contact: pt  Type:  Patient Returning Call    Who Called:  pt  Does the patient know what this is regarding?:  please follow up with pt regarding medication she received to have procedure scheduled. Wants info on when to start  Best Call Back Number:    Additional Information:  Thank you

## 2020-10-06 ENCOUNTER — LAB VISIT (OUTPATIENT)
Dept: LAB | Facility: HOSPITAL | Age: 80
End: 2020-10-06
Attending: INTERNAL MEDICINE
Payer: MEDICARE

## 2020-10-06 DIAGNOSIS — I26.99 BILATERAL PULMONARY EMBOLISM: ICD-10-CM

## 2020-10-06 LAB
ALBUMIN SERPL BCP-MCNC: 3.4 G/DL (ref 3.5–5.2)
ALP SERPL-CCNC: 55 U/L (ref 55–135)
ALT SERPL W/O P-5'-P-CCNC: 16 U/L (ref 10–44)
ANION GAP SERPL CALC-SCNC: 7 MMOL/L (ref 8–16)
AST SERPL-CCNC: 19 U/L (ref 10–40)
BASOPHILS # BLD AUTO: 0.02 K/UL (ref 0–0.2)
BASOPHILS NFR BLD: 0.3 % (ref 0–1.9)
BILIRUB SERPL-MCNC: 0.3 MG/DL (ref 0.1–1)
BUN SERPL-MCNC: 19 MG/DL (ref 8–23)
CALCIUM SERPL-MCNC: 9.2 MG/DL (ref 8.7–10.5)
CHLORIDE SERPL-SCNC: 106 MMOL/L (ref 95–110)
CO2 SERPL-SCNC: 27 MMOL/L (ref 23–29)
CREAT SERPL-MCNC: 1.7 MG/DL (ref 0.5–1.4)
DIFFERENTIAL METHOD: ABNORMAL
EOSINOPHIL # BLD AUTO: 0.2 K/UL (ref 0–0.5)
EOSINOPHIL NFR BLD: 2.8 % (ref 0–8)
ERYTHROCYTE [DISTWIDTH] IN BLOOD BY AUTOMATED COUNT: 14.6 % (ref 11.5–14.5)
EST. GFR  (AFRICAN AMERICAN): 33 ML/MIN/1.73 M^2
EST. GFR  (NON AFRICAN AMERICAN): 29 ML/MIN/1.73 M^2
GLUCOSE SERPL-MCNC: 96 MG/DL (ref 70–110)
HCT VFR BLD AUTO: 33.9 % (ref 37–48.5)
HGB BLD-MCNC: 10.4 G/DL (ref 12–16)
IMM GRANULOCYTES # BLD AUTO: 0.02 K/UL (ref 0–0.04)
IMM GRANULOCYTES NFR BLD AUTO: 0.3 % (ref 0–0.5)
LYMPHOCYTES # BLD AUTO: 2 K/UL (ref 1–4.8)
LYMPHOCYTES NFR BLD: 29.6 % (ref 18–48)
MCH RBC QN AUTO: 32.1 PG (ref 27–31)
MCHC RBC AUTO-ENTMCNC: 30.7 G/DL (ref 32–36)
MCV RBC AUTO: 105 FL (ref 82–98)
MONOCYTES # BLD AUTO: 0.8 K/UL (ref 0.3–1)
MONOCYTES NFR BLD: 11.2 % (ref 4–15)
NEUTROPHILS # BLD AUTO: 3.8 K/UL (ref 1.8–7.7)
NEUTROPHILS NFR BLD: 55.8 % (ref 38–73)
NRBC BLD-RTO: 0 /100 WBC
PLATELET # BLD AUTO: 229 K/UL (ref 150–350)
PMV BLD AUTO: 10.5 FL (ref 9.2–12.9)
POTASSIUM SERPL-SCNC: 4.6 MMOL/L (ref 3.5–5.1)
PROT SERPL-MCNC: 7.1 G/DL (ref 6–8.4)
RBC # BLD AUTO: 3.24 M/UL (ref 4–5.4)
SODIUM SERPL-SCNC: 140 MMOL/L (ref 136–145)
WBC # BLD AUTO: 6.86 K/UL (ref 3.9–12.7)

## 2020-10-06 PROCEDURE — 80053 COMPREHEN METABOLIC PANEL: CPT

## 2020-10-06 PROCEDURE — 36415 COLL VENOUS BLD VENIPUNCTURE: CPT

## 2020-10-06 PROCEDURE — 85025 COMPLETE CBC W/AUTO DIFF WBC: CPT

## 2020-10-08 ENCOUNTER — DOCUMENT SCAN (OUTPATIENT)
Dept: HOME HEALTH SERVICES | Facility: HOSPITAL | Age: 80
End: 2020-10-08
Payer: MEDICARE

## 2020-10-08 ENCOUNTER — OFFICE VISIT (OUTPATIENT)
Dept: HEMATOLOGY/ONCOLOGY | Facility: CLINIC | Age: 80
End: 2020-10-08
Payer: MEDICARE

## 2020-10-08 ENCOUNTER — PES CALL (OUTPATIENT)
Dept: ADMINISTRATIVE | Facility: CLINIC | Age: 80
End: 2020-10-08

## 2020-10-08 VITALS
HEART RATE: 66 BPM | TEMPERATURE: 97 F | SYSTOLIC BLOOD PRESSURE: 210 MMHG | RESPIRATION RATE: 18 BRPM | DIASTOLIC BLOOD PRESSURE: 84 MMHG | BODY MASS INDEX: 38.31 KG/M2 | OXYGEN SATURATION: 100 % | WEIGHT: 216.25 LBS

## 2020-10-08 DIAGNOSIS — I82.621 ACUTE DEEP VEIN THROMBOSIS (DVT) OF RIGHT UPPER EXTREMITY, UNSPECIFIED VEIN: ICD-10-CM

## 2020-10-08 DIAGNOSIS — I26.99 BILATERAL PULMONARY EMBOLISM: ICD-10-CM

## 2020-10-08 DIAGNOSIS — N18.32 CHRONIC KIDNEY DISEASE (CKD) STAGE G3B/A3, MODERATELY DECREASED GLOMERULAR FILTRATION RATE (GFR) BETWEEN 30-44 ML/MIN/1.73 SQUARE METER AND ALBUMINURIA CREATININE RATIO GREATER THAN 300 MG/G: ICD-10-CM

## 2020-10-08 PROCEDURE — 3079F DIAST BP 80-89 MM HG: CPT | Mod: CPTII,S$GLB,, | Performed by: INTERNAL MEDICINE

## 2020-10-08 PROCEDURE — 1159F PR MEDICATION LIST DOCUMENTED IN MEDICAL RECORD: ICD-10-PCS | Mod: S$GLB,,, | Performed by: INTERNAL MEDICINE

## 2020-10-08 PROCEDURE — 99214 PR OFFICE/OUTPT VISIT, EST, LEVL IV, 30-39 MIN: ICD-10-PCS | Mod: S$GLB,,, | Performed by: INTERNAL MEDICINE

## 2020-10-08 PROCEDURE — 3079F PR MOST RECENT DIASTOLIC BLOOD PRESSURE 80-89 MM HG: ICD-10-PCS | Mod: CPTII,S$GLB,, | Performed by: INTERNAL MEDICINE

## 2020-10-08 PROCEDURE — 99999 PR PBB SHADOW E&M-EST. PATIENT-LVL IV: CPT | Mod: PBBFAC,,, | Performed by: INTERNAL MEDICINE

## 2020-10-08 PROCEDURE — 1101F PT FALLS ASSESS-DOCD LE1/YR: CPT | Mod: CPTII,S$GLB,, | Performed by: INTERNAL MEDICINE

## 2020-10-08 PROCEDURE — 1101F PR PT FALLS ASSESS DOC 0-1 FALLS W/OUT INJ PAST YR: ICD-10-PCS | Mod: CPTII,S$GLB,, | Performed by: INTERNAL MEDICINE

## 2020-10-08 PROCEDURE — 1125F AMNT PAIN NOTED PAIN PRSNT: CPT | Mod: S$GLB,,, | Performed by: INTERNAL MEDICINE

## 2020-10-08 PROCEDURE — 1159F MED LIST DOCD IN RCRD: CPT | Mod: S$GLB,,, | Performed by: INTERNAL MEDICINE

## 2020-10-08 PROCEDURE — 1125F PR PAIN SEVERITY QUANTIFIED, PAIN PRESENT: ICD-10-PCS | Mod: S$GLB,,, | Performed by: INTERNAL MEDICINE

## 2020-10-08 PROCEDURE — 99214 OFFICE O/P EST MOD 30 MIN: CPT | Mod: S$GLB,,, | Performed by: INTERNAL MEDICINE

## 2020-10-08 PROCEDURE — 99999 PR PBB SHADOW E&M-EST. PATIENT-LVL IV: ICD-10-PCS | Mod: PBBFAC,,, | Performed by: INTERNAL MEDICINE

## 2020-10-08 PROCEDURE — 3077F SYST BP >= 140 MM HG: CPT | Mod: CPTII,S$GLB,, | Performed by: INTERNAL MEDICINE

## 2020-10-08 PROCEDURE — 3077F PR MOST RECENT SYSTOLIC BLOOD PRESSURE >= 140 MM HG: ICD-10-PCS | Mod: CPTII,S$GLB,, | Performed by: INTERNAL MEDICINE

## 2020-10-08 NOTE — Clinical Note
Start Lovenox and Discontinue Coumadin .  Follow-up with blood work including  CMP and anti heparin Xa study on Monday.  And have patient follow-up with Dr. Hernandez on Tuesday for result and dose adjustment based on level and renal function

## 2020-10-08 NOTE — PROGRESS NOTES
HPI    79 years old female Jehovah Witness.  Extensive pulmonary embolism upper extremity DVT failure of Eliquis currently on Coumadin and follow by Coumadin clinic.  Patient also had a bacteremia septic joint require IV antibiotics.  Treatment team contact hematology in assess of anticoagulation in the setting need of hip aspiration of fluid in near future.    Denies chest pain shortness breath.  Denies abdominal pain nausea vomiting or diarrhea.        Lifetime Dose Tracking   No doses have been documented on this patient for the following tracked chemicals: doxorubicin, epirubicin, idarubicin, daunorubicin, mitoxantrone, bleomycin, mitomycin, busulfan     Past Medical History:   Diagnosis Date    ALLERGIC RHINITIS     Anemia     Arthritis     Back pain     Bronchitis     Cataract     OU    Cholelithiasis     COPD (chronic obstructive pulmonary disease)     Degenerative disc disease     Diabetes mellitus     pre diabetic    Diverticulosis     DVT (deep venous thrombosis)     Edema     Encounter for blood transfusion 1979    Fibromyalgia     Glaucoma     Gout     Hx of colonic polyps     Hyperlipidemia     Hypertension     Incontinence     Osteoporosis     Reflux     Refusal of blood transfusions as patient is Gnosticism     Sleep apnea     non compliant with CPAP    Vestibulitis of ear        Family History   Problem Relation Age of Onset    Hypertension Mother     Diabetes Sister     Cancer Sister     Stomach cancer Sister     Hypertension Sister     Bipolar disorder Sister     Peripheral vascular disease Sister     Stroke Father     Hypertension Father     Hypertension Brother     Stroke Brother     Peripheral vascular disease Brother     Hypertension Son     Obesity Son     Early death Son 48    Arthritis Son     Glaucoma Neg Hx     Macular degeneration Neg Hx     Retinal detachment Neg Hx        Past Surgical History:   Procedure Laterality Date     ANGIOGRAPHY OF LOWER EXTREMITY N/A 2019    Procedure: ANGIOGRAM, LOWER EXTREMITY;  Surgeon: Gino Arana MD;  Location: [facility] CATH/EP LAB;  Service: General;  Laterality: N/A;    COLONOSCOPY N/A 2020    Procedure: COLONOSCOPY;  Surgeon: Sue Nuñez MD;  Location: [facility] ENDO;  Service: Endoscopy;  Laterality: N/A;    ESOPHAGOGASTRODUODENOSCOPY N/A 2020    Procedure: EGD (ESOPHAGOGASTRODUODENOSCOPY);  Surgeon: Sue Nuñez MD;  Location: [facility] ENDO;  Service: Endoscopy;  Laterality: N/A;    HIP SURGERY      HYSTERECTOMY      INTRALUMINAL GASTROINTESTINAL TRACT IMAGING VIA CAPSULE N/A 2020    Procedure: IMAGING PROCEDURE, GI TRACT, INTRALUMINAL, VIA CAPSULE;  Surgeon: Sue Nuñez MD;  Location: [facility] ENDO;  Service: Endoscopy;  Laterality: N/A;    JOINT REPLACEMENT      B total hip    LAPAROSCOPIC CHOLECYSTECTOMY N/A 2020    Procedure: CHOLECYSTECTOMY, LAPAROSCOPIC;  Surgeon: Jomar Mcdonald MD;  Location: [facility] OR;  Service: General;  Laterality: N/A;    TRANSFORAMINAL EPIDURAL INJECTION OF STEROID Left 2020    Procedure: Injection,steroid,epidural,transforaminal approach;  Surgeon: Matt Gilliam MD;  Location: [facility] OR;  Service: Pain Management;  Laterality: Left;  L2-3, L3-4       Social History     Socioeconomic History    Marital status:      Spouse name: Not on file    Number of children: Not on file    Years of education: Not on file    Highest education level: Not on file   Occupational History    Not on file   Social Needs    Financial resource strain: Not on file    Food insecurity     Worry: Not on file     Inability: Not on file    Transportation needs     Medical: Not on file     Non-medical: Not on file   Tobacco Use    Smoking status: Former Smoker     Packs/day: 0.25     Years: 5.00     Pack years: 1.25     Quit date: 1972     Years since quittin.7    Smokeless tobacco: Never Used   Substance and Sexual Activity     Alcohol use: Yes     Alcohol/week: 0.0 standard drinks     Comment: Rarely    Drug use: Yes     Types: Oxycodone, Hydrocodone    Sexual activity: Not on file   Lifestyle    Physical activity     Days per week: Not on file     Minutes per session: Not on file    Stress: Not on file   Relationships    Social connections     Talks on phone: Not on file     Gets together: Not on file     Attends Confucianism service: Not on file     Active member of club or organization: Not on file     Attends meetings of clubs or organizations: Not on file     Relationship status: Not on file   Other Topics Concern    Not on file   Social History Narrative    Not on file         Current Outpatient Medications:     acetaminophen (TYLENOL) 325 MG tablet, Take 2 tablets (650 mg total) by mouth every 6 (six) hours as needed (Do not take with any other Tylenol or acetaminophen containing products)., Disp: , Rfl: 0    albuterol-ipratropium (DUO-NEB) 2.5 mg-0.5 mg/3 mL nebulizer solution, Take 3 mLs by nebulization every 8 (eight) hours., Disp: 270 mL, Rfl: 0    ascorbic acid, vitamin C, (VITAMIN C) 100 MG tablet, Take 5 tablets (500 mg total) by mouth every evening., Disp: , Rfl:     budesonide-formoterol 160-4.5 mcg (SYMBICORT) 160-4.5 mcg/actuation HFAA, Inhale 2 puffs into the lungs every 12 (twelve) hours. Controller, Disp: 3 Inhaler, Rfl: 3    cyanocobalamin, vitamin B-12, 2,500 mcg Lozg, Place 2 tablets under the tongue once daily., Disp: 60 lozenge, Rfl: 11    enoxaparin (LOVENOX) 80 mg/0.8 mL Syrg, Inject 0.8 mLs (80 mg total) into the skin 2 (two) times a day., Disp: 48 mL, Rfl: 0    esomeprazole (NEXIUM) 20 MG capsule, Take 1 capsule (20 mg total) by mouth before breakfast., Disp: 30 capsule, Rfl: 2    fluticasone propionate (FLONASE) 50 mcg/actuation nasal spray, 2 sprays (100 mcg total) by Each Nostril route once daily., Disp: 48 g, Rfl: 3    fluticasone-salmeterol 230-21 mcg/dose (ADVAIR HFA) 230-21 mcg/actuation  HFAA inhaler, Inhale 2 puffs into the lungs 2 (two) times daily. Controller, Disp: 12 g, Rfl: 11    folic acid (FOLVITE) 1 MG tablet, Take 1 tablet (1 mg total) by mouth once daily., Disp: 30 tablet, Rfl: 0    furosemide (LASIX) 20 MG tablet, Take 1 tablet (20 mg total) by mouth daily as needed (edema)., Disp: 30 tablet, Rfl: 11    hydrALAZINE (APRESOLINE) 50 MG tablet, Take 1 tablet (50 mg total) by mouth every 8 (eight) hours., Disp: 90 tablet, Rfl: 1    HYDROcodone-acetaminophen (NORCO) 5-325 mg per tablet, Take 1 tablet by mouth every 8 (eight) hours as needed for Pain., Disp: 30 tablet, Rfl: 0    metoprolol succinate (TOPROL-XL) 50 MG 24 hr tablet, Take 1 tablet (50 mg total) by mouth once daily., Disp: 90 tablet, Rfl: 3    montelukast (SINGULAIR) 10 mg tablet, Take 1 tablet (10 mg total) by mouth every evening., Disp: 90 tablet, Rfl: 3    multivitamin capsule, Take 1 capsule by mouth once daily., Disp: , Rfl:     ondansetron (ZOFRAN-ODT) 4 MG TbDL, Take 1 tablet (4 mg total) by mouth every 8 (eight) hours as needed., Disp: 20 tablet, Rfl: 0    warfarin (COUMADIN) 5 MG tablet, Tulio.e 1-1.5 Tablets QPM as instructed by coumadin clinic, Disp: 90 tablet, Rfl: 3  No current facility-administered medications for this visit.     Facility-Administered Medications Ordered in Other Visits:     lactated ringers infusion, , Intravenous, Continuous, Gino Reid MD, Last Rate: 10 mL/hr at 07/13/20 1308    lidocaine (PF) 10 mg/ml (1%) injection 10 mg, 1 mL, Intradermal, Once, Gino Reid MD    Review of patient's allergies indicates:   Allergen Reactions    Daptomycin Other (See Comments)     Kidney damage     Cymbalta [duloxetine] Other (See Comments)     Nightmares      Darvon [propoxyphene] Nausea Only and Other (See Comments)     Sweating, slept for 3 days    Atorvastatin Other (See Comments)     Muscle cramps    Naprosyn [naproxen] Nausea Only    Penicillins Rash    Tramadol Nausea Only  and Palpitations     All medications and past history have been reviewed.    Objective:      Vitals:  Vital Signs (Most Recent):  Temp: 97.7 °F (36.5 °C) (08/13/20 0426)  Pulse: 105 (08/13/20 0736)  Resp: 20 (08/13/20 0736)  BP: (!) 148/94 (08/13/20 0426)  SpO2: 99 % (08/13/20 0736) Vital Signs (24h Range):  Temp:  [96.9 °F (36.1 °C)-99.1 °F (37.3 °C)] 97.7 °F (36.5 °C)  Pulse:  [] 105  Resp:  [16-20] 20  SpO2:  [95 %-100 %] 99 %  BP: (141-199)/(65-95) 148/94       Awake alert no acute distress  Normocephalic atraumatic pupils equal round reactive  Soft nontender nondistended bowel sounds x4  Normal rate  Normal respiratory of  Nonfocal cranial nerves 2-12 grossly intact sensorimotor grossly intact  No rash no lesions   deferred     CMP  Sodium   Date Value Ref Range Status   10/06/2020 140 136 - 145 mmol/L Final     Potassium   Date Value Ref Range Status   10/06/2020 4.6 3.5 - 5.1 mmol/L Final     Chloride   Date Value Ref Range Status   10/06/2020 106 95 - 110 mmol/L Final     CO2   Date Value Ref Range Status   10/06/2020 27 23 - 29 mmol/L Final     Glucose   Date Value Ref Range Status   10/06/2020 96 70 - 110 mg/dL Final     BUN, Bld   Date Value Ref Range Status   10/06/2020 19 8 - 23 mg/dL Final     Creatinine   Date Value Ref Range Status   10/06/2020 1.7 (H) 0.5 - 1.4 mg/dL Final     Calcium   Date Value Ref Range Status   10/06/2020 9.2 8.7 - 10.5 mg/dL Final     Total Protein   Date Value Ref Range Status   10/06/2020 7.1 6.0 - 8.4 g/dL Final     Albumin   Date Value Ref Range Status   10/06/2020 3.4 (L) 3.5 - 5.2 g/dL Final     Total Bilirubin   Date Value Ref Range Status   10/06/2020 0.3 0.1 - 1.0 mg/dL Final     Comment:     For infants and newborns, interpretation of results should be based  on gestational age, weight and in agreement with clinical  observations.  Premature Infant recommended reference ranges:  Up to 24 hours.............<8.0 mg/dL  Up to 48 hours............<12.0 mg/dL  3-5  days..................<15.0 mg/dL  6-29 days.................<15.0 mg/dL       Alkaline Phosphatase   Date Value Ref Range Status   10/06/2020 55 55 - 135 U/L Final     AST   Date Value Ref Range Status   10/06/2020 19 10 - 40 U/L Final     ALT   Date Value Ref Range Status   10/06/2020 16 10 - 44 U/L Final     Anion Gap   Date Value Ref Range Status   10/06/2020 7 (L) 8 - 16 mmol/L Final     eGFR if    Date Value Ref Range Status   10/06/2020 33 (A) >60 mL/min/1.73 m^2 Final     eGFR if non    Date Value Ref Range Status   10/06/2020 29 (A) >60 mL/min/1.73 m^2 Final     Comment:     Calculation used to obtain the estimated glomerular filtration  rate (eGFR) is the CKD-EPI equation.        Lab Results   Component Value Date    WBC 6.86 10/06/2020    HGB 10.4 (L) 10/06/2020    HCT 33.9 (L) 10/06/2020     (H) 10/06/2020     10/06/2020         EGD 08/12/2020  Findings:        The esophagus was normal.        A small hiatal hernia was present.        The examined duodenum was normal.   Impression:          - Normal esophagus.                        - Small hiatal hernia.                        - Normal examined duodenum.                        - No specimens collected.                        -History of multifactorial anemia, in part due to                        low iron counts. Patient is a [employer]     Colonoscopy 08/13/2020  Findings:        Multiple small-mouthed diverticula were found in the sigmoid colon,        descending colon and ascending colon.        The exam was otherwise without abnormality on direct and        retroflexion views.   Impression:          - Diverticulosis in the sigmoid colon, in the                        descending colon and in the ascending colon.                        - The examination was otherwise normal on direct and                        retroflexion views.                        - No specimens collected.                         -History of multifactorial anemia, in part due to                        low iron.   All lab results and imaging results have been reviewed and discussed with the patient.     Assessment and Plan:      Anemia  > Continue vitamin B12 and folate supplementation.  > patient is Jehovah Witness - no blood product    Extensive right occlusive thrombus brachial vein and cephalic vein while patient is on Eliquis treated for pulmonary embolism and lower extremity DVT.  Extensive thrombosis with Hex phos neutralization is positive     > planned for long-term anticoagulation.  Patient is currently on Coumadin and follow Coumadin clinic.  Recently patient require septic joint aspiration.  Team has reach out to me for management anticoagulation prior to procedure.  > Most recent renal function 33% for her age.  Her body weight is 98.1 kg.    > given above 80 mg Lovenox b.i.d. in transition out of Coumadin for pending procedures.  > check anti heparin Xa on Monday.  Follow-up with Dr. Hernandez on Tuesday   > for any bleeding emergency patient is instructed to go to the nearest emergency room for treatment evaluation.  > patient will discontinue Lovenox 12-24 hours prior to the procedures deferred to IR.  And patient may resume Lovenox 12 hours after the procedure and bridge back to Coumadin.      [] Hypertension  > advised patient to follow up with primary care physician to manage her blood pressure.

## 2020-10-09 ENCOUNTER — TELEPHONE (OUTPATIENT)
Dept: ORTHOPEDICS | Facility: CLINIC | Age: 80
End: 2020-10-09

## 2020-10-09 ENCOUNTER — TELEPHONE (OUTPATIENT)
Dept: FAMILY MEDICINE | Facility: CLINIC | Age: 80
End: 2020-10-09

## 2020-10-09 ENCOUNTER — TELEPHONE (OUTPATIENT)
Dept: HEMATOLOGY/ONCOLOGY | Facility: CLINIC | Age: 80
End: 2020-10-09

## 2020-10-09 ENCOUNTER — PATIENT MESSAGE (OUTPATIENT)
Dept: HEMATOLOGY/ONCOLOGY | Facility: CLINIC | Age: 80
End: 2020-10-09

## 2020-10-09 DIAGNOSIS — I82.621 ACUTE DEEP VEIN THROMBOSIS (DVT) OF RIGHT UPPER EXTREMITY, UNSPECIFIED VEIN: Primary | ICD-10-CM

## 2020-10-09 NOTE — TELEPHONE ENCOUNTER
Spoke to Mayra at Dr Solis's office. She states she has no information on when pt will have her hip aspiration. She will speak to Dr Solis when he returns to the clinic on Tuesday.

## 2020-10-09 NOTE — TELEPHONE ENCOUNTER
Informed pt of need for lab appt to be early Monday morning. Pt should have lab work done between 8a-9a. Pt verbalized understanding.

## 2020-10-09 NOTE — TELEPHONE ENCOUNTER
Spoke to pt, confirmed tx plan for the upcoming week.Pt requesting portal message. Portal message sent with all follow up information and when to take Lovenox for the next week.     ----- Message from Megha Sher sent at 10/9/2020  2:59 PM CDT -----  Regarding: advice  Contact: patient  Type: Needs Medical Advice  Who Called:  patient  Symptoms (please be specific):    How long has patient had these symptoms:    Pharmacy name and phone #:    Best Call Back Number: [phone] (home)   Additional Information: Patient states the injection is for Thursday. She's not starting Lovenox until Sunday is it okay to have the injection on Thursday. Please call patient to advise. Thanks!

## 2020-10-09 NOTE — TELEPHONE ENCOUNTER
Vadim from Dr. Cuello's office called, they need to know when she is going to get her hip aspirated as they need to put her on Lovenox before. I will speak with  Tuesday and get back with her. [phone]

## 2020-10-11 ENCOUNTER — LAB VISIT (OUTPATIENT)
Dept: LAB | Facility: HOSPITAL | Age: 80
End: 2020-10-11
Attending: INTERNAL MEDICINE
Payer: MEDICARE

## 2020-10-11 DIAGNOSIS — I82.621 ACUTE DEEP VEIN THROMBOSIS (DVT) OF RIGHT UPPER EXTREMITY, UNSPECIFIED VEIN: ICD-10-CM

## 2020-10-11 DIAGNOSIS — I26.99 BILATERAL PULMONARY EMBOLISM: ICD-10-CM

## 2020-10-11 DIAGNOSIS — N18.32 CHRONIC KIDNEY DISEASE (CKD) STAGE G3B/A3, MODERATELY DECREASED GLOMERULAR FILTRATION RATE (GFR) BETWEEN 30-44 ML/MIN/1.73 SQUARE METER AND ALBUMINURIA CREATININE RATIO GREATER THAN 300 MG/G: ICD-10-CM

## 2020-10-11 LAB
ALBUMIN SERPL BCP-MCNC: 3.2 G/DL (ref 3.5–5.2)
ALP SERPL-CCNC: 51 U/L (ref 55–135)
ALT SERPL W/O P-5'-P-CCNC: 15 U/L (ref 10–44)
ANION GAP SERPL CALC-SCNC: 11 MMOL/L (ref 8–16)
AST SERPL-CCNC: 17 U/L (ref 10–40)
BILIRUB SERPL-MCNC: 0.4 MG/DL (ref 0.1–1)
BUN SERPL-MCNC: 22 MG/DL (ref 8–23)
CALCIUM SERPL-MCNC: 8.9 MG/DL (ref 8.7–10.5)
CHLORIDE SERPL-SCNC: 108 MMOL/L (ref 95–110)
CO2 SERPL-SCNC: 23 MMOL/L (ref 23–29)
CREAT SERPL-MCNC: 1.6 MG/DL (ref 0.5–1.4)
EST. GFR  (AFRICAN AMERICAN): 36 ML/MIN/1.73 M^2
EST. GFR  (NON AFRICAN AMERICAN): 31 ML/MIN/1.73 M^2
FACT X PPP CHRO-ACNC: 1.16 IU/ML (ref 0.3–0.7)
GLUCOSE SERPL-MCNC: 104 MG/DL (ref 70–110)
POTASSIUM SERPL-SCNC: 4.4 MMOL/L (ref 3.5–5.1)
PROT SERPL-MCNC: 6.9 G/DL (ref 6–8.4)
SODIUM SERPL-SCNC: 142 MMOL/L (ref 136–145)

## 2020-10-11 PROCEDURE — 36415 COLL VENOUS BLD VENIPUNCTURE: CPT

## 2020-10-11 PROCEDURE — 85520 HEPARIN ASSAY: CPT

## 2020-10-11 PROCEDURE — 80053 COMPREHEN METABOLIC PANEL: CPT

## 2020-10-12 ENCOUNTER — TELEPHONE (OUTPATIENT)
Dept: HEMATOLOGY/ONCOLOGY | Facility: CLINIC | Age: 80
End: 2020-10-12

## 2020-10-12 ENCOUNTER — TELEPHONE (OUTPATIENT)
Dept: FAMILY MEDICINE | Facility: CLINIC | Age: 80
End: 2020-10-12

## 2020-10-12 DIAGNOSIS — R78.81 BACTEREMIA: Primary | ICD-10-CM

## 2020-10-12 NOTE — TELEPHONE ENCOUNTER
Results finalized. Pt had labs done on Sunday.     ----- Message from Fer Cuello MD sent at 10/9/2020  2:18 PM CDT -----  Start Lovenox and Discontinue Coumadin .  Follow-up with blood work including  CMP and anti heparin Xa study on Monday.  And have patient follow-up with Dr. Hernandez on Tuesday for result and dose adjustment based on level and renal function

## 2020-10-12 NOTE — TELEPHONE ENCOUNTER
----- Message from Victorino Bauer sent at 10/12/2020  3:37 PM CDT -----  Regarding: Pt advice  Contact: pt  Type:  Patient Returning Call    Who Called:  pt  Does the patient know what this is regarding?:  please follow up with pt stating procedure on 15th. Stating needs COVID test done as soon as possible.  Best Call Back Number:    Additional Information:  Thank you

## 2020-10-13 ENCOUNTER — OFFICE VISIT (OUTPATIENT)
Dept: HEMATOLOGY/ONCOLOGY | Facility: CLINIC | Age: 80
End: 2020-10-13
Payer: MEDICARE

## 2020-10-13 ENCOUNTER — TELEPHONE (OUTPATIENT)
Dept: FAMILY MEDICINE | Facility: CLINIC | Age: 80
End: 2020-10-13

## 2020-10-13 VITALS
SYSTOLIC BLOOD PRESSURE: 174 MMHG | OXYGEN SATURATION: 100 % | DIASTOLIC BLOOD PRESSURE: 74 MMHG | HEART RATE: 58 BPM | WEIGHT: 216.94 LBS | RESPIRATION RATE: 18 BRPM | BODY MASS INDEX: 38.43 KG/M2 | TEMPERATURE: 98 F

## 2020-10-13 DIAGNOSIS — T84.011D FAILURE OF LEFT TOTAL HIP ARTHROPLASTY, SUBSEQUENT ENCOUNTER: Primary | ICD-10-CM

## 2020-10-13 DIAGNOSIS — Z86.718 HISTORY OF DVT (DEEP VEIN THROMBOSIS): Primary | ICD-10-CM

## 2020-10-13 DIAGNOSIS — I10 ELEVATED BLOOD PRESSURE READING IN OFFICE WITH DIAGNOSIS OF HYPERTENSION: ICD-10-CM

## 2020-10-13 DIAGNOSIS — Z79.01 ANTICOAGULATED: ICD-10-CM

## 2020-10-13 PROCEDURE — 3077F PR MOST RECENT SYSTOLIC BLOOD PRESSURE >= 140 MM HG: ICD-10-PCS | Mod: CPTII,S$GLB,, | Performed by: INTERNAL MEDICINE

## 2020-10-13 PROCEDURE — 99214 PR OFFICE/OUTPT VISIT, EST, LEVL IV, 30-39 MIN: ICD-10-PCS | Mod: S$GLB,,, | Performed by: INTERNAL MEDICINE

## 2020-10-13 PROCEDURE — 1125F AMNT PAIN NOTED PAIN PRSNT: CPT | Mod: S$GLB,,, | Performed by: INTERNAL MEDICINE

## 2020-10-13 PROCEDURE — 99999 PR PBB SHADOW E&M-EST. PATIENT-LVL V: ICD-10-PCS | Mod: PBBFAC,,, | Performed by: INTERNAL MEDICINE

## 2020-10-13 PROCEDURE — 1159F MED LIST DOCD IN RCRD: CPT | Mod: S$GLB,,, | Performed by: INTERNAL MEDICINE

## 2020-10-13 PROCEDURE — 1125F PR PAIN SEVERITY QUANTIFIED, PAIN PRESENT: ICD-10-PCS | Mod: S$GLB,,, | Performed by: INTERNAL MEDICINE

## 2020-10-13 PROCEDURE — 3078F DIAST BP <80 MM HG: CPT | Mod: CPTII,S$GLB,, | Performed by: INTERNAL MEDICINE

## 2020-10-13 PROCEDURE — 3077F SYST BP >= 140 MM HG: CPT | Mod: CPTII,S$GLB,, | Performed by: INTERNAL MEDICINE

## 2020-10-13 PROCEDURE — 1159F PR MEDICATION LIST DOCUMENTED IN MEDICAL RECORD: ICD-10-PCS | Mod: S$GLB,,, | Performed by: INTERNAL MEDICINE

## 2020-10-13 PROCEDURE — 99999 PR PBB SHADOW E&M-EST. PATIENT-LVL V: CPT | Mod: PBBFAC,,, | Performed by: INTERNAL MEDICINE

## 2020-10-13 PROCEDURE — 3078F PR MOST RECENT DIASTOLIC BLOOD PRESSURE < 80 MM HG: ICD-10-PCS | Mod: CPTII,S$GLB,, | Performed by: INTERNAL MEDICINE

## 2020-10-13 PROCEDURE — 1101F PR PT FALLS ASSESS DOC 0-1 FALLS W/OUT INJ PAST YR: ICD-10-PCS | Mod: CPTII,S$GLB,, | Performed by: INTERNAL MEDICINE

## 2020-10-13 PROCEDURE — 3288F PR FALLS RISK ASSESSMENT DOCUMENTED: ICD-10-PCS | Mod: CPTII,S$GLB,, | Performed by: INTERNAL MEDICINE

## 2020-10-13 PROCEDURE — 99214 OFFICE O/P EST MOD 30 MIN: CPT | Mod: S$GLB,,, | Performed by: INTERNAL MEDICINE

## 2020-10-13 PROCEDURE — 1101F PT FALLS ASSESS-DOCD LE1/YR: CPT | Mod: CPTII,S$GLB,, | Performed by: INTERNAL MEDICINE

## 2020-10-13 PROCEDURE — 3288F FALL RISK ASSESSMENT DOCD: CPT | Mod: CPTII,S$GLB,, | Performed by: INTERNAL MEDICINE

## 2020-10-13 NOTE — PROGRESS NOTES
HPI    79 years old female Jehovah Witness.  Extensive pulmonary embolism upper extremity DVT failure of Eliquis currently on Coumadin and follow by Coumadin clinic.  Patient also had a bacteremia septic joint require IV antibiotics.  Treatment team contact hematology in assess of anticoagulation in the setting need of hip aspiration of fluid in near future. Pt here to assess if dosage of lovenox is currently therapeutic  She is on antihypertensive medications     Denies chest pain shortness breath.  Denies abdominal pain nausea vomiting or diarrhea.   10 point complete ROS negative except as mentioned     Past Medical History:   Diagnosis Date    ALLERGIC RHINITIS     Anemia     Arthritis     Back pain     Bronchitis     Cataract     OU    Cholelithiasis     COPD (chronic obstructive pulmonary disease)     Degenerative disc disease     Diabetes mellitus     pre diabetic    Diverticulosis     DVT (deep venous thrombosis)     Edema     Encounter for blood transfusion 1979    Fibromyalgia     Glaucoma     Gout     Hx of colonic polyps     Hyperlipidemia     Hypertension     Incontinence     Osteoporosis     Reflux     Refusal of blood transfusions as patient is Pentecostalism     Sleep apnea     non compliant with CPAP    Vestibulitis of ear        Family History   Problem Relation Age of Onset    Hypertension Mother     Diabetes Sister     Cancer Sister     Stomach cancer Sister     Hypertension Sister     Bipolar disorder Sister     Peripheral vascular disease Sister     Stroke Father     Hypertension Father     Hypertension Brother     Stroke Brother     Peripheral vascular disease Brother     Hypertension Son     Obesity Son     Early death Son 48    Arthritis Son     Glaucoma Neg Hx     Macular degeneration Neg Hx     Retinal detachment Neg Hx        Past Surgical History:   Procedure Laterality Date    ANGIOGRAPHY OF LOWER EXTREMITY N/A 7/31/2019    Procedure:  ANGIOGRAM, LOWER EXTREMITY;  Surgeon: Gino Arana MD;  Location: [facility] CATH/EP LAB;  Service: General;  Laterality: N/A;    COLONOSCOPY N/A 2020    Procedure: COLONOSCOPY;  Surgeon: Sue Nuñez MD;  Location: [facility] ENDO;  Service: Endoscopy;  Laterality: N/A;    ESOPHAGOGASTRODUODENOSCOPY N/A 2020    Procedure: EGD (ESOPHAGOGASTRODUODENOSCOPY);  Surgeon: Sue Nuñez MD;  Location: [facility] ENDO;  Service: Endoscopy;  Laterality: N/A;    HIP SURGERY      HYSTERECTOMY      INTRALUMINAL GASTROINTESTINAL TRACT IMAGING VIA CAPSULE N/A 2020    Procedure: IMAGING PROCEDURE, GI TRACT, INTRALUMINAL, VIA CAPSULE;  Surgeon: Sue Nuñez MD;  Location: [facility] ENDO;  Service: Endoscopy;  Laterality: N/A;    JOINT REPLACEMENT      B total hip    LAPAROSCOPIC CHOLECYSTECTOMY N/A 2020    Procedure: CHOLECYSTECTOMY, LAPAROSCOPIC;  Surgeon: Jomar Mcdonald MD;  Location: [facility] OR;  Service: General;  Laterality: N/A;    TRANSFORAMINAL EPIDURAL INJECTION OF STEROID Left 2020    Procedure: Injection,steroid,epidural,transforaminal approach;  Surgeon: Matt Gilliam MD;  Location: [facility] OR;  Service: Pain Management;  Laterality: Left;  L2-3, L3-4       Social History     Socioeconomic History    Marital status:      Spouse name: Not on file    Number of children: Not on file    Years of education: Not on file    Highest education level: Not on file   Occupational History    Not on file   Social Needs    Financial resource strain: Not on file    Food insecurity     Worry: Not on file     Inability: Not on file    Transportation needs     Medical: Not on file     Non-medical: Not on file   Tobacco Use    Smoking status: Former Smoker     Packs/day: 0.25     Years: 5.00     Pack years: 1.25     Quit date: 1972     Years since quittin.8    Smokeless tobacco: Never Used   Substance and Sexual Activity    Alcohol use: Yes     Alcohol/week: 0.0 standard drinks      Comment: Rarely    Drug use: Yes     Types: Oxycodone, Hydrocodone    Sexual activity: Not on file   Lifestyle    Physical activity     Days per week: Not on file     Minutes per session: Not on file    Stress: Not on file   Relationships    Social connections     Talks on phone: Not on file     Gets together: Not on file     Attends [employer]ish service: Not on file     Active member of club or organization: Not on file     Attends meetings of clubs or organizations: Not on file     Relationship status: Not on file   Other Topics Concern    Not on file   Social History Narrative    Not on file         Current Outpatient Medications:     acetaminophen (TYLENOL) 325 MG tablet, Take 2 tablets (650 mg total) by mouth every 6 (six) hours as needed (Do not take with any other Tylenol or acetaminophen containing products)., Disp: , Rfl: 0    ascorbic acid, vitamin C, (VITAMIN C) 100 MG tablet, Take 5 tablets (500 mg total) by mouth every evening., Disp: , Rfl:     cyanocobalamin, vitamin B-12, 2,500 mcg Lozg, Place 2 tablets under the tongue once daily., Disp: 60 lozenge, Rfl: 11    enoxaparin (LOVENOX) 80 mg/0.8 mL Syrg, Inject 0.8 mLs (80 mg total) into the skin 2 (two) times a day., Disp: 48 mL, Rfl: 0    esomeprazole (NEXIUM) 20 MG capsule, Take 1 capsule (20 mg total) by mouth before breakfast., Disp: 30 capsule, Rfl: 2    fluticasone propionate (FLONASE) 50 mcg/actuation nasal spray, 2 sprays (100 mcg total) by Each Nostril route once daily., Disp: 48 g, Rfl: 3    fluticasone-salmeterol 230-21 mcg/dose (ADVAIR HFA) 230-21 mcg/actuation HFAA inhaler, Inhale 2 puffs into the lungs 2 (two) times daily. Controller, Disp: 12 g, Rfl: 11    folic acid (FOLVITE) 1 MG tablet, Take 1 tablet (1 mg total) by mouth once daily., Disp: 30 tablet, Rfl: 0    furosemide (LASIX) 20 MG tablet, Take 1 tablet (20 mg total) by mouth daily as needed (edema)., Disp: 30 tablet, Rfl: 11    hydrALAZINE (APRESOLINE) 50 MG tablet,  Take 1 tablet (50 mg total) by mouth every 8 (eight) hours., Disp: 90 tablet, Rfl: 1    HYDROcodone-acetaminophen (NORCO) 5-325 mg per tablet, Take 1 tablet by mouth every 8 (eight) hours as needed for Pain., Disp: 30 tablet, Rfl: 0    metoprolol succinate (TOPROL-XL) 50 MG 24 hr tablet, Take 1 tablet (50 mg total) by mouth once daily., Disp: 90 tablet, Rfl: 3    montelukast (SINGULAIR) 10 mg tablet, Take 1 tablet (10 mg total) by mouth every evening., Disp: 90 tablet, Rfl: 3    multivitamin capsule, Take 1 capsule by mouth once daily., Disp: , Rfl:     ondansetron (ZOFRAN-ODT) 4 MG TbDL, Take 1 tablet (4 mg total) by mouth every 8 (eight) hours as needed., Disp: 20 tablet, Rfl: 0    albuterol-ipratropium (DUO-NEB) 2.5 mg-0.5 mg/3 mL nebulizer solution, Take 3 mLs by nebulization every 8 (eight) hours., Disp: 270 mL, Rfl: 0    budesonide-formoterol 160-4.5 mcg (SYMBICORT) 160-4.5 mcg/actuation HFAA, Inhale 2 puffs into the lungs every 12 (twelve) hours. Controller, Disp: 3 Inhaler, Rfl: 3    warfarin (COUMADIN) 5 MG tablet, Tulio.e 1-1.5 Tablets QPM as instructed by coumadin clinic (Patient not taking: Reported on 10/13/2020), Disp: 90 tablet, Rfl: 3  No current facility-administered medications for this visit.     Facility-Administered Medications Ordered in Other Visits:     lactated ringers infusion, , Intravenous, Continuous, Gino Reid MD, Last Rate: 10 mL/hr at 07/13/20 1308    lidocaine (PF) 10 mg/ml (1%) injection 10 mg, 1 mL, Intradermal, Once, Gino Reid MD    Review of patient's allergies indicates:   Allergen Reactions    Daptomycin Other (See Comments)     Kidney damage     Cymbalta [duloxetine] Other (See Comments)     Nightmares      Darvon [propoxyphene] Nausea Only and Other (See Comments)     Sweating, slept for 3 days    Atorvastatin Other (See Comments)     Muscle cramps    Naprosyn [naproxen] Nausea Only    Penicillins Rash    Tramadol Nausea Only and Palpitations      All medications and past history have been reviewed.    Objective:      Vitals:  Vital Signs (Most Recent):  Temp: 97.7 °F (36.5 °C) (08/13/20 0426)  Pulse: 105 (08/13/20 0736)  Resp: 20 (08/13/20 0736)  BP: (!) 148/94 (08/13/20 0426)  SpO2: 99 % (08/13/20 0736) Vital Signs (24h Range):  Temp:  [96.9 °F (36.1 °C)-99.1 °F (37.3 °C)] 97.7 °F (36.5 °C)  Pulse:  [] 105  Resp:  [16-20] 20  SpO2:  [95 %-100 %] 99 %  BP: (141-199)/(65-95) 148/94       Awake alert no acute distress  Normocephalic atraumatic pupils equal round reactive  Soft nontender nondistended bowel sounds x4  Normal rate  Normal respiratory of  Nonfocal cranial nerves 2-12 grossly intact sensorimotor grossly intact  No rash no lesions   deferred     CMP  Sodium   Date Value Ref Range Status   10/11/2020 142 136 - 145 mmol/L Final     Potassium   Date Value Ref Range Status   10/11/2020 4.4 3.5 - 5.1 mmol/L Final     Chloride   Date Value Ref Range Status   10/11/2020 108 95 - 110 mmol/L Final     CO2   Date Value Ref Range Status   10/11/2020 23 23 - 29 mmol/L Final     Glucose   Date Value Ref Range Status   10/11/2020 104 70 - 110 mg/dL Final     BUN, Bld   Date Value Ref Range Status   10/11/2020 22 8 - 23 mg/dL Final     Creatinine   Date Value Ref Range Status   10/11/2020 1.6 (H) 0.5 - 1.4 mg/dL Final     Calcium   Date Value Ref Range Status   10/11/2020 8.9 8.7 - 10.5 mg/dL Final     Total Protein   Date Value Ref Range Status   10/11/2020 6.9 6.0 - 8.4 g/dL Final     Albumin   Date Value Ref Range Status   10/11/2020 3.2 (L) 3.5 - 5.2 g/dL Final     Total Bilirubin   Date Value Ref Range Status   10/11/2020 0.4 0.1 - 1.0 mg/dL Final     Comment:     For infants and newborns, interpretation of results should be based  on gestational age, weight and in agreement with clinical  observations.  Premature Infant recommended reference ranges:  Up to 24 hours.............<8.0 mg/dL  Up to 48 hours............<12.0 mg/dL  3-5  days..................<15.0 mg/dL  6-29 days.................<15.0 mg/dL       Alkaline Phosphatase   Date Value Ref Range Status   10/11/2020 51 (L) 55 - 135 U/L Final     AST   Date Value Ref Range Status   10/11/2020 17 10 - 40 U/L Final     ALT   Date Value Ref Range Status   10/11/2020 15 10 - 44 U/L Final     Anion Gap   Date Value Ref Range Status   10/11/2020 11 8 - 16 mmol/L Final     eGFR if    Date Value Ref Range Status   10/11/2020 36 (A) >60 mL/min/1.73 m^2 Final     eGFR if non    Date Value Ref Range Status   10/11/2020 31 (A) >60 mL/min/1.73 m^2 Final     Comment:     Calculation used to obtain the estimated glomerular filtration  rate (eGFR) is the CKD-EPI equation.        Lab Results   Component Value Date    WBC 6.86 10/06/2020    HGB 10.4 (L) 10/06/2020    HCT 33.9 (L) 10/06/2020     (H) 10/06/2020     10/06/2020         EGD 08/12/2020  Findings:        The esophagus was normal.        A small hiatal hernia was present.        The examined duodenum was normal.   Impression:          - Normal esophagus.                        - Small hiatal hernia.                        - Normal examined duodenum.                        - No specimens collected.                        -History of multifactorial anemia, in part due to                        low iron counts. Patient is a Faith     Colonoscopy 08/13/2020  Findings:        Multiple small-mouthed diverticula were found in the sigmoid colon,        descending colon and ascending colon.        The exam was otherwise without abnormality on direct and        retroflexion views.   Impression:          - Diverticulosis in the sigmoid colon, in the                        descending colon and in the ascending colon.                        - The examination was otherwise normal on direct and                        retroflexion views.                        - No specimens collected.                         -History of multifactorial anemia, in part due to                        low iron.   All lab results and imaging results have been reviewed and discussed with the patient.     X-ray Femur Ap/lat Left    Result Date: 9/9/2020  EXAMINATION: XR FEMUR 2 VIEW LEFT CLINICAL HISTORY: Pain in left leg TECHNIQUE: AP and lateral views of the left femur were performed. COMPARISON: None} FINDINGS: Left hip total arthroplasty with no periprosthetic fracture or abnormal lucency to suggest loosening.  Partially imaged right hip arthroplasty hardware.  No malalignment.  Mild medial compartment degenerative changes at the left knee.  No left knee joint effusion. Electronically signed by: Christiano Villela Date:    09/09/2020 Time:    17:04    Us Lower Extremity Veins Left    Result Date: 9/9/2020  EXAMINATION: US LOWER EXTREMITY VEINS LEFT CLINICAL HISTORY: Leg pain; TECHNIQUE: Duplex and color flow Doppler evaluation and graded compression of the left lower extremity veins was performed. COMPARISON: 07/21/2020 FINDINGS: Left thigh veins: The common femoral, femoral, popliteal, upper greater saphenous, and deep femoral veins are patent and free of thrombus. The veins are normally compressible and have normal phasic flow and augmentation response. Left calf veins: The visualized calf veins are patent. Contralateral CFV: The contralateral (right) common femoral vein is patent and free of thrombus. Miscellaneous: None     No evidence of deep venous thrombosis in the left lower extremity. Electronically signed by: Shannan Rachel MD Date:    09/09/2020 Time:    17:51      Assessment and Plan:        Problem List Items Addressed This Visit        Psychiatric    Patient is Oriental orthodox      Other Visit Diagnoses     History of DVT (deep vein thrombosis)    -  Primary    Relevant Orders    Anti-Xa Heparin Monitoring (Completed)    CBC auto differential    CMP (Completed)    Anticoagulated        Elevated blood pressure reading in  office with diagnosis of hypertension              Anemia  > Continue vitamin B12 and folate supplementation.  > patient is Jehovah Witness - no blood products accepted due to Quaker preferences     Extensive right occlusive thrombus brachial vein and cephalic vein while patient is on Eliquis treated for pulmonary embolism and lower extremity DVT.  Extensive thrombosis with Hex phos neutralization is positive     > decrease dose     Decrease to 70 mg   Lovenox b.i.d. in transition out of Coumadin for pending procedures.  > check anti heparin Xa on Monday.  Follow-up with Dr. Hernandez on Tuesday   > for any bleeding emergency patient is instructed to go to the nearest emergency room for treatment evaluation.  > patient will discontinue Lovenox 12-24 hours prior to the procedures deferred to IR.  And patient may resume Lovenox 12 hours after the procedure and bridge back to Coumadin.      [] Hypertension  > advised patient to follow up with primary care physician to manage her blood pressure.      Checked out in the room by nurse before pt left the clinic

## 2020-10-13 NOTE — TELEPHONE ENCOUNTER
----- Message from Megha Sher sent at 10/13/2020 11:01 AM CDT -----  Regarding: advice  Contact: Sol with Home Care  Type: Needs Medical Advice  Who Called:  Sol  Symptoms (please be specific):    How long has patient had these symptoms:    Pharmacy name and phone #:    Best Call Back Number: [phone] (home)   Additional Information: Sol states she needs verification of a procedure to take fluid off of her left hip being schedule and radiology is stating it is not schedule. Sol states she re certifying for weekly home care Please call patient to advice. Thanks!

## 2020-10-13 NOTE — TELEPHONE ENCOUNTER
"This nurse spoke to Jessica who is a nurse w/[facility]. She confirmed patient is scheduled for CT guided aspiration on Thursday 10/15/20 @10:00. Nurse states that they will get in contact w/patient to provide "pre-op" instruction & confirm appointment. Will provide Sol  nurse the same.   "

## 2020-10-15 ENCOUNTER — TELEPHONE (OUTPATIENT)
Dept: HEMATOLOGY/ONCOLOGY | Facility: CLINIC | Age: 80
End: 2020-10-15

## 2020-10-15 ENCOUNTER — HOSPITAL ENCOUNTER (OUTPATIENT)
Dept: RADIOLOGY | Facility: HOSPITAL | Age: 80
Discharge: HOME | End: 2020-10-15
Attending: RADIOLOGY | Admitting: RADIOLOGY
Payer: MEDICARE

## 2020-10-15 ENCOUNTER — HOSPITAL ENCOUNTER (OUTPATIENT)
Dept: RADIOLOGY | Facility: HOSPITAL | Age: 80
Discharge: HOME | End: 2020-10-15
Attending: FAMILY MEDICINE
Payer: MEDICARE

## 2020-10-15 ENCOUNTER — HOSPITAL ENCOUNTER (OUTPATIENT)
Facility: HOSPITAL | Age: 80
Discharge: HOME | End: 2020-10-15
Attending: RADIOLOGY | Admitting: RADIOLOGY
Payer: MEDICARE

## 2020-10-15 ENCOUNTER — DOCUMENT SCAN (OUTPATIENT)
Dept: HOME HEALTH SERVICES | Facility: HOSPITAL | Age: 80
End: 2020-10-15
Payer: MEDICARE

## 2020-10-15 VITALS
BODY MASS INDEX: 37.39 KG/M2 | RESPIRATION RATE: 16 BRPM | TEMPERATURE: 97 F | HEIGHT: 63 IN | DIASTOLIC BLOOD PRESSURE: 81 MMHG | WEIGHT: 211 LBS | SYSTOLIC BLOOD PRESSURE: 191 MMHG | OXYGEN SATURATION: 100 % | HEART RATE: 64 BPM

## 2020-10-15 DIAGNOSIS — R78.81 MRSA BACTEREMIA: ICD-10-CM

## 2020-10-15 DIAGNOSIS — T82.7XXD BACTEREMIA ASSOCIATED WITH INTRAVASCULAR LINE, SUBSEQUENT ENCOUNTER: ICD-10-CM

## 2020-10-15 DIAGNOSIS — M25.552 LEFT HIP PAIN: ICD-10-CM

## 2020-10-15 DIAGNOSIS — B95.62 MRSA BACTEREMIA: ICD-10-CM

## 2020-10-15 DIAGNOSIS — T84.011D FAILURE OF LEFT TOTAL HIP ARTHROPLASTY, SUBSEQUENT ENCOUNTER: ICD-10-CM

## 2020-10-15 DIAGNOSIS — R78.81 BACTEREMIA ASSOCIATED WITH INTRAVASCULAR LINE, SUBSEQUENT ENCOUNTER: ICD-10-CM

## 2020-10-15 DIAGNOSIS — T84.011D FAILURE OF LEFT TOTAL HIP ARTHROPLASTY, SUBSEQUENT ENCOUNTER: Primary | ICD-10-CM

## 2020-10-15 PROBLEM — T84.011A FAILURE OF LEFT TOTAL HIP ARTHROPLASTY: Status: ACTIVE | Noted: 2020-10-15

## 2020-10-15 LAB
GRAM STN SPEC: NORMAL
GRAM STN SPEC: NORMAL

## 2020-10-15 PROCEDURE — 87070 CULTURE OTHR SPECIMN AEROBIC: CPT

## 2020-10-15 PROCEDURE — 20610 DRAIN/INJ JOINT/BURSA W/O US: CPT | Mod: LT,,, | Performed by: RADIOLOGY

## 2020-10-15 PROCEDURE — 77002 NEEDLE LOCALIZATION BY XRAY: CPT | Mod: 26,LT,, | Performed by: RADIOLOGY

## 2020-10-15 PROCEDURE — 99900103 DSU ONLY-NO CHARGE-INITIAL HR (STAT)

## 2020-10-15 PROCEDURE — 20610 FL ASPIRATION INJECTION MAJOR JOINT LEFT W FLUORO: ICD-10-PCS | Mod: LT,,, | Performed by: RADIOLOGY

## 2020-10-15 PROCEDURE — 77002 NEEDLE LOCALIZATION BY XRAY: CPT

## 2020-10-15 PROCEDURE — 87206 SMEAR FLUORESCENT/ACID STAI: CPT

## 2020-10-15 PROCEDURE — 87205 SMEAR GRAM STAIN: CPT

## 2020-10-15 PROCEDURE — 87116 MYCOBACTERIA CULTURE: CPT

## 2020-10-15 PROCEDURE — 77002 FL ASPIRATION INJECTION MAJOR JOINT LEFT W FLUORO: ICD-10-PCS | Mod: 26,LT,, | Performed by: RADIOLOGY

## 2020-10-15 PROCEDURE — 99900104 DSU ONLY-NO CHARGE-EA ADD'L HR (STAT)

## 2020-10-15 PROCEDURE — 87075 CULTR BACTERIA EXCEPT BLOOD: CPT

## 2020-10-15 PROCEDURE — 87102 FUNGUS ISOLATION CULTURE: CPT

## 2020-10-15 PROCEDURE — 25000003 PHARM REV CODE 250: Performed by: RADIOLOGY

## 2020-10-15 RX ORDER — FENTANYL CITRATE 50 UG/ML
25 INJECTION, SOLUTION INTRAMUSCULAR; INTRAVENOUS
Status: DISCONTINUED | OUTPATIENT
Start: 2020-10-15 | End: 2020-10-16 | Stop reason: HOSPADM

## 2020-10-15 RX ORDER — LIDOCAINE HYDROCHLORIDE 10 MG/ML
1 INJECTION INFILTRATION; PERINEURAL ONCE
Status: COMPLETED | OUTPATIENT
Start: 2020-10-15 | End: 2020-10-15

## 2020-10-15 RX ORDER — MIDAZOLAM HYDROCHLORIDE 1 MG/ML
1 INJECTION INTRAMUSCULAR; INTRAVENOUS
Status: DISCONTINUED | OUTPATIENT
Start: 2020-10-15 | End: 2020-10-16 | Stop reason: HOSPADM

## 2020-10-15 RX ORDER — LIDOCAINE HYDROCHLORIDE 10 MG/ML
1 INJECTION INFILTRATION; PERINEURAL ONCE
Status: DISCONTINUED | OUTPATIENT
Start: 2020-10-15 | End: 2020-10-16 | Stop reason: HOSPADM

## 2020-10-15 RX ADMIN — LIDOCAINE HYDROCHLORIDE 5 ML: 10 INJECTION, SOLUTION EPIDURAL; INFILTRATION; INTRACAUDAL; PERINEURAL at 11:10

## 2020-10-15 NOTE — TELEPHONE ENCOUNTER
Reiterated Lovenox schedule c pt per Dr Cuello instructions: Pt is to restart Lovenox tonight at 7pm, and resume BID Lovenox at 7am and 7pm, starting tomorrow. Pt is to call Coumadin clinic tomorrow to guide her on how much Coumadin to take and when. Pt verbalized understanding.     ----- Message from Treva Solomon sent at 10/15/2020 12:37 PM CDT -----  Regarding: Pt Advice (Medications)  Contact: Patient  Type: Needs Medical Advice  Who Called:  Patient    Best Call Back Number: [phone]  Additional Information: Patient called in reference to enoxaparin (LOVENOX) 80 mg/0.8 mL Syrg and warfarin (COUMADIN) 5 MG tablet. Patient stated she needs to speak to a nurse as soon as possible to see when to start her medications again.

## 2020-10-15 NOTE — PLAN OF CARE
Pt to radiology with Briseyda pascual  Pt friend is at her side  Change in procedure explained to the pt and her friend

## 2020-10-15 NOTE — OP NOTE
Interventional Radiology Procedure Note    Procedure: left hip aspiration    Pre procedure diagnosis: left hip pain    Post procedure diagnosis: same    : MD Juana    Specimens removed: 3 cc wash left hip    Estimated Blood Loss: 0 ml     Complications: none     Findings: no fluid obtained with aspiration, wash of needle from joint sent for sample

## 2020-10-15 NOTE — H&P
[facility]  History & Physical - Short Stay  Interventional Radiology    SUBJECTIVE:     Chief Complaint/Reason for Admission: left hip pain    History of Present Illness:  Sammi Abreu is a 79 y.o. female with a history of ;left hip pain    OBJECTIVE:       Physical Exam:  Awake, alert and oriented   In no acute distress  Pe,  Eomi  No labored breathing   Moves extremities spontaneously    ASSESSMENT/PLAN:   Left hip pain  Patient will undergo left hip aspiration        Sedation Plan: lidcoaine

## 2020-10-17 PROCEDURE — G0179 PR HOME HEALTH MD RECERTIFICATION: ICD-10-PCS | Mod: ,,, | Performed by: FAMILY MEDICINE

## 2020-10-17 PROCEDURE — G0179 MD RECERTIFICATION HHA PT: HCPCS | Mod: ,,, | Performed by: FAMILY MEDICINE

## 2020-10-19 ENCOUNTER — LAB VISIT (OUTPATIENT)
Dept: LAB | Facility: HOSPITAL | Age: 80
End: 2020-10-19
Attending: INTERNAL MEDICINE
Payer: MEDICARE

## 2020-10-19 ENCOUNTER — OFFICE VISIT (OUTPATIENT)
Dept: FAMILY MEDICINE | Facility: CLINIC | Age: 80
End: 2020-10-19
Payer: MEDICARE

## 2020-10-19 VITALS
OXYGEN SATURATION: 99 % | HEIGHT: 63 IN | RESPIRATION RATE: 16 BRPM | BODY MASS INDEX: 38.79 KG/M2 | SYSTOLIC BLOOD PRESSURE: 160 MMHG | WEIGHT: 218.94 LBS | HEART RATE: 64 BPM | DIASTOLIC BLOOD PRESSURE: 70 MMHG | TEMPERATURE: 97 F

## 2020-10-19 DIAGNOSIS — Z86.718 HISTORY OF DVT (DEEP VEIN THROMBOSIS): ICD-10-CM

## 2020-10-19 DIAGNOSIS — M48.062 SPINAL STENOSIS OF LUMBAR REGION WITH NEUROGENIC CLAUDICATION: Primary | ICD-10-CM

## 2020-10-19 DIAGNOSIS — T84.011D FAILURE OF LEFT TOTAL HIP ARTHROPLASTY, SUBSEQUENT ENCOUNTER: ICD-10-CM

## 2020-10-19 LAB
ALBUMIN SERPL BCP-MCNC: 3.2 G/DL (ref 3.5–5.2)
ALP SERPL-CCNC: 55 U/L (ref 55–135)
ALT SERPL W/O P-5'-P-CCNC: 24 U/L (ref 10–44)
ANION GAP SERPL CALC-SCNC: 9 MMOL/L (ref 8–16)
AST SERPL-CCNC: 22 U/L (ref 10–40)
BACTERIA SPEC AEROBE CULT: NO GROWTH
BASOPHILS # BLD AUTO: 0.01 K/UL (ref 0–0.2)
BASOPHILS NFR BLD: 0.2 % (ref 0–1.9)
BILIRUB SERPL-MCNC: 0.2 MG/DL (ref 0.1–1)
BUN SERPL-MCNC: 23 MG/DL (ref 8–23)
CALCIUM SERPL-MCNC: 8.6 MG/DL (ref 8.7–10.5)
CHLORIDE SERPL-SCNC: 105 MMOL/L (ref 95–110)
CO2 SERPL-SCNC: 26 MMOL/L (ref 23–29)
CREAT SERPL-MCNC: 1.4 MG/DL (ref 0.5–1.4)
DIFFERENTIAL METHOD: ABNORMAL
EOSINOPHIL # BLD AUTO: 0.2 K/UL (ref 0–0.5)
EOSINOPHIL NFR BLD: 3.1 % (ref 0–8)
ERYTHROCYTE [DISTWIDTH] IN BLOOD BY AUTOMATED COUNT: 14.8 % (ref 11.5–14.5)
EST. GFR  (AFRICAN AMERICAN): 42 ML/MIN/1.73 M^2
EST. GFR  (NON AFRICAN AMERICAN): 36 ML/MIN/1.73 M^2
FACT X PPP CHRO-ACNC: 1.05 IU/ML (ref 0.3–0.7)
GLUCOSE SERPL-MCNC: 116 MG/DL (ref 70–110)
HCT VFR BLD AUTO: 33.3 % (ref 37–48.5)
HGB BLD-MCNC: 10.2 G/DL (ref 12–16)
IMM GRANULOCYTES # BLD AUTO: 0.02 K/UL (ref 0–0.04)
IMM GRANULOCYTES NFR BLD AUTO: 0.3 % (ref 0–0.5)
LYMPHOCYTES # BLD AUTO: 2 K/UL (ref 1–4.8)
LYMPHOCYTES NFR BLD: 34.9 % (ref 18–48)
MCH RBC QN AUTO: 32.6 PG (ref 27–31)
MCHC RBC AUTO-ENTMCNC: 30.6 G/DL (ref 32–36)
MCV RBC AUTO: 106 FL (ref 82–98)
MONOCYTES # BLD AUTO: 0.6 K/UL (ref 0.3–1)
MONOCYTES NFR BLD: 9.9 % (ref 4–15)
NEUTROPHILS # BLD AUTO: 3 K/UL (ref 1.8–7.7)
NEUTROPHILS NFR BLD: 51.6 % (ref 38–73)
NRBC BLD-RTO: 0 /100 WBC
PLATELET # BLD AUTO: 198 K/UL (ref 150–350)
PMV BLD AUTO: 10.7 FL (ref 9.2–12.9)
POTASSIUM SERPL-SCNC: 4.7 MMOL/L (ref 3.5–5.1)
PROT SERPL-MCNC: 6.7 G/DL (ref 6–8.4)
RBC # BLD AUTO: 3.13 M/UL (ref 4–5.4)
SODIUM SERPL-SCNC: 140 MMOL/L (ref 136–145)
WBC # BLD AUTO: 5.84 K/UL (ref 3.9–12.7)

## 2020-10-19 PROCEDURE — 1101F PT FALLS ASSESS-DOCD LE1/YR: CPT | Mod: CPTII,S$GLB,, | Performed by: PHYSICIAN ASSISTANT

## 2020-10-19 PROCEDURE — 3078F DIAST BP <80 MM HG: CPT | Mod: CPTII,S$GLB,, | Performed by: PHYSICIAN ASSISTANT

## 2020-10-19 PROCEDURE — 1159F PR MEDICATION LIST DOCUMENTED IN MEDICAL RECORD: ICD-10-PCS | Mod: S$GLB,,, | Performed by: PHYSICIAN ASSISTANT

## 2020-10-19 PROCEDURE — 99214 OFFICE O/P EST MOD 30 MIN: CPT | Mod: S$GLB,,, | Performed by: PHYSICIAN ASSISTANT

## 2020-10-19 PROCEDURE — 1125F AMNT PAIN NOTED PAIN PRSNT: CPT | Mod: S$GLB,,, | Performed by: PHYSICIAN ASSISTANT

## 2020-10-19 PROCEDURE — 1125F PR PAIN SEVERITY QUANTIFIED, PAIN PRESENT: ICD-10-PCS | Mod: S$GLB,,, | Performed by: PHYSICIAN ASSISTANT

## 2020-10-19 PROCEDURE — 1159F MED LIST DOCD IN RCRD: CPT | Mod: S$GLB,,, | Performed by: PHYSICIAN ASSISTANT

## 2020-10-19 PROCEDURE — 3078F PR MOST RECENT DIASTOLIC BLOOD PRESSURE < 80 MM HG: ICD-10-PCS | Mod: CPTII,S$GLB,, | Performed by: PHYSICIAN ASSISTANT

## 2020-10-19 PROCEDURE — 99499 RISK ADDL DX/OHS AUDIT: ICD-10-PCS | Mod: S$GLB,,, | Performed by: PHYSICIAN ASSISTANT

## 2020-10-19 PROCEDURE — 99999 PR PBB SHADOW E&M-EST. PATIENT-LVL V: ICD-10-PCS | Mod: PBBFAC,,, | Performed by: PHYSICIAN ASSISTANT

## 2020-10-19 PROCEDURE — 99999 PR PBB SHADOW E&M-EST. PATIENT-LVL V: CPT | Mod: PBBFAC,,, | Performed by: PHYSICIAN ASSISTANT

## 2020-10-19 PROCEDURE — 36415 COLL VENOUS BLD VENIPUNCTURE: CPT

## 2020-10-19 PROCEDURE — 85520 HEPARIN ASSAY: CPT

## 2020-10-19 PROCEDURE — 99499 UNLISTED E&M SERVICE: CPT | Mod: S$GLB,,, | Performed by: PHYSICIAN ASSISTANT

## 2020-10-19 PROCEDURE — 3077F PR MOST RECENT SYSTOLIC BLOOD PRESSURE >= 140 MM HG: ICD-10-PCS | Mod: CPTII,S$GLB,, | Performed by: PHYSICIAN ASSISTANT

## 2020-10-19 PROCEDURE — 80053 COMPREHEN METABOLIC PANEL: CPT

## 2020-10-19 PROCEDURE — 3077F SYST BP >= 140 MM HG: CPT | Mod: CPTII,S$GLB,, | Performed by: PHYSICIAN ASSISTANT

## 2020-10-19 PROCEDURE — 85025 COMPLETE CBC W/AUTO DIFF WBC: CPT

## 2020-10-19 PROCEDURE — 1101F PR PT FALLS ASSESS DOC 0-1 FALLS W/OUT INJ PAST YR: ICD-10-PCS | Mod: CPTII,S$GLB,, | Performed by: PHYSICIAN ASSISTANT

## 2020-10-19 PROCEDURE — 99214 PR OFFICE/OUTPT VISIT, EST, LEVL IV, 30-39 MIN: ICD-10-PCS | Mod: S$GLB,,, | Performed by: PHYSICIAN ASSISTANT

## 2020-10-19 RX ORDER — HYDROCODONE BITARTRATE AND ACETAMINOPHEN 5; 325 MG/1; MG/1
1 TABLET ORAL EVERY 8 HOURS PRN
Qty: 90 TABLET | Refills: 0 | Status: SHIPPED | OUTPATIENT
Start: 2020-10-19 | End: 2021-04-09 | Stop reason: SDUPTHER

## 2020-10-19 RX ORDER — TOPIRAMATE 25 MG/1
TABLET ORAL
Qty: 60 TABLET | Refills: 11 | Status: SHIPPED | OUTPATIENT
Start: 2020-10-19 | End: 2020-12-30

## 2020-10-19 NOTE — PROGRESS NOTES
Patient has chronic pain involving the lumbar spine.  History of trauma? No.  Onset: Approximately 7 years ago.  Frequency: persistent.  Progression since onset: worsening.  Pain quality: 9/10.  Current treatment: Norco, Neurontin (Gabapentin)  Improvement on current treatment: mild  Treatments tried: Norco, Neurontin (Gabapentin); Therapy:No Therapy; Injections: Epidural injection (Dr Gilliam) - with no improvement  Associated symptoms: no other symptoms  Previous imaging: MRI scan and X-ray  Drug screen? No  Pain contract? Yes  Psychiatric disorders: Depression, Anxiety / nervousness and Change in sleep patterns  Substance abuse history:  None  Social History: None  Back Pain: Patient presents for presents evaluation of low back problems.  Symptoms have been present for several years and include pain in left thigh (boring in character; 9/10 in severity). Initial inciting event: none. Symptoms are worst: mid-day. Alleviating factors identifiable by patient are bending backwards and standing. Exacerbating factors identifiable by patient are bending backwards, sitting, standing and walking. Treatments so far initiated by patient: epidural w/ partial results. Previous lower back problems: for several years.. Previous workup: abnormal x rays, and MRI. Left hip arthrocenthesis is normal.. Previous treatments: Gabapentin with mental fogginess, and visual halucinations..  I have checked the patient's  prior to prescribing opioids.    Patient with poor ROM, Weak left thigh. No palpable mass. paresthesias L5, S1 radiculitis.  Spinal stenosis  Patient to consider Ablation, Tens unit and neuro stimulator.  Plan  Spinal stenosis of lumbar region with neurogenic claudication  -     Ambulatory referral/consult to Neurosurgery; Future; Expected date: 10/26/2020  -     topiramate (TOPAMAX) 25 MG tablet; 1 tab at night for 5 days then 2 tab at HS.  Dispense: 60 tablet; Refill: 11    Failure of left total hip arthroplasty, subsequent  encounter  -     HYDROcodone-acetaminophen (NORCO) 5-325 mg per tablet; Take 1 tablet by mouth every 8 (eight) hours as needed for Pain. Medical necessary for greater than 7 days for chronic pain.  Dispense: 90 tablet; Refill: 0

## 2020-10-19 NOTE — PROGRESS NOTES
Subjective:       Patient ID: Sammi Abreu is a 79 y.o. female.    Chief Complaint: Follow-up (6 week f/u )    Sammi Abreu is a 78 yo F pt w/ DM, HTN, DDD, and anemia that returns to clinic today complaining of continued severe L thigh pain. She describes the pain as a burning sensation that radiates from her groin down to her lateral and medial thigh. The pain comes and goes and sometimes becomes a sore feeling. Spinal injections have been attempted by Dr. Gilliam w/ pain management w/ little to no relief of symptoms. She has also been prescribed norco for this complaint and she states that the medication makes her feel groggy and relaxed but it does not relieve the pain. She has also tried gabapentin for the pain with no relief. She states that walking aggravates her symptoms. Fluid aspiration of the hip joint shows no signs of infectious pathology.    Review of patient's allergies indicates:   Allergen Reactions    Daptomycin Other (See Comments)     Kidney damage     Cymbalta [duloxetine] Other (See Comments)     Nightmares      Darvon [propoxyphene] Nausea Only and Other (See Comments)     Sweating, slept for 3 days    Atorvastatin Other (See Comments)     Muscle cramps    Naprosyn [naproxen] Nausea Only    Penicillins Rash    Tramadol Nausea Only and Palpitations         Current Outpatient Medications:     acetaminophen (TYLENOL) 325 MG tablet, Take 2 tablets (650 mg total) by mouth every 6 (six) hours as needed (Do not take with any other Tylenol or acetaminophen containing products)., Disp: , Rfl: 0    ascorbic acid, vitamin C, (VITAMIN C) 100 MG tablet, Take 5 tablets (500 mg total) by mouth every evening., Disp: , Rfl:     cyanocobalamin, vitamin B-12, 2,500 mcg Lozg, Place 2 tablets under the tongue once daily., Disp: 60 lozenge, Rfl: 11    enoxaparin (LOVENOX) 80 mg/0.8 mL Syrg, Inject 0.8 mLs (80 mg total) into the skin 2 (two) times a day., Disp: 48 mL, Rfl: 0    esomeprazole (NEXIUM) 20  MG capsule, Take 1 capsule (20 mg total) by mouth before breakfast., Disp: 30 capsule, Rfl: 2    fluticasone propionate (FLONASE) 50 mcg/actuation nasal spray, 2 sprays (100 mcg total) by Each Nostril route once daily., Disp: 48 g, Rfl: 3    fluticasone-salmeterol 230-21 mcg/dose (ADVAIR HFA) 230-21 mcg/actuation HFAA inhaler, Inhale 2 puffs into the lungs 2 (two) times daily. Controller, Disp: 12 g, Rfl: 11    furosemide (LASIX) 20 MG tablet, Take 1 tablet (20 mg total) by mouth daily as needed (edema)., Disp: 30 tablet, Rfl: 11    hydrALAZINE (APRESOLINE) 50 MG tablet, Take 1 tablet (50 mg total) by mouth every 8 (eight) hours., Disp: 90 tablet, Rfl: 1    HYDROcodone-acetaminophen (NORCO) 5-325 mg per tablet, Take 1 tablet by mouth every 8 (eight) hours as needed for Pain., Disp: 30 tablet, Rfl: 0    metoprolol succinate (TOPROL-XL) 50 MG 24 hr tablet, Take 1 tablet (50 mg total) by mouth once daily., Disp: 90 tablet, Rfl: 3    montelukast (SINGULAIR) 10 mg tablet, Take 1 tablet (10 mg total) by mouth every evening., Disp: 90 tablet, Rfl: 3    multivitamin capsule, Take 1 capsule by mouth once daily., Disp: , Rfl:     ondansetron (ZOFRAN-ODT) 4 MG TbDL, Take 1 tablet (4 mg total) by mouth every 8 (eight) hours as needed., Disp: 20 tablet, Rfl: 0    albuterol-ipratropium (DUO-NEB) 2.5 mg-0.5 mg/3 mL nebulizer solution, Take 3 mLs by nebulization every 8 (eight) hours., Disp: 270 mL, Rfl: 0    budesonide-formoterol 160-4.5 mcg (SYMBICORT) 160-4.5 mcg/actuation HFAA, Inhale 2 puffs into the lungs every 12 (twelve) hours. Controller, Disp: 3 Inhaler, Rfl: 3    folic acid (FOLVITE) 1 MG tablet, Take 1 tablet (1 mg total) by mouth once daily., Disp: 30 tablet, Rfl: 0    warfarin (COUMADIN) 5 MG tablet, Tulio.e 1-1.5 Tablets QPM as instructed by coumadin clinic (Patient not taking: Reported on 10/13/2020), Disp: 90 tablet, Rfl: 3  No current facility-administered medications for this visit.      Facility-Administered Medications Ordered in Other Visits:     lactated ringers infusion, , Intravenous, Continuous, Gino Reid MD, Last Rate: 10 mL/hr at 07/13/20 1308    lidocaine (PF) 10 mg/ml (1%) injection 10 mg, 1 mL, Intradermal, Once, Gino Reid MD    Lab Results   Component Value Date    WBC 5.84 10/19/2020    HGB 10.2 (L) 10/19/2020    HCT 33.3 (L) 10/19/2020     10/19/2020    CHOL 194 01/16/2020    TRIG 98 01/16/2020    HDL 40 01/16/2020    ALT 24 10/19/2020    AST 22 10/19/2020     10/19/2020    K 4.7 10/19/2020     10/19/2020    CREATININE 1.4 10/19/2020    BUN 23 10/19/2020    CO2 26 10/19/2020    TSH 3.261 08/04/2020    INR 0.9 10/15/2020    HGBA1C 5.9 (H) 08/05/2020       Review of Systems   Constitutional: Negative for activity change, appetite change, chills, fatigue, fever and unexpected weight change.   HENT: Negative for congestion, ear pain, postnasal drip, rhinorrhea, sinus pressure, sinus pain, sneezing, sore throat, tinnitus and trouble swallowing.    Eyes: Negative for pain and visual disturbance.   Respiratory: Negative for cough, shortness of breath and wheezing.    Cardiovascular: Positive for leg swelling. Negative for chest pain and palpitations.   Gastrointestinal: Negative for abdominal distention, abdominal pain, blood in stool, constipation, diarrhea, nausea and vomiting.   Endocrine: Negative for cold intolerance and heat intolerance.   Genitourinary: Negative for difficulty urinating and dysuria.   Musculoskeletal: Negative for joint swelling and neck pain.        Burning, sore pain on medial and lateral L thigh   Skin: Negative for color change, rash and wound.   Neurological: Negative for dizziness, syncope, weakness, light-headedness, numbness and headaches.   Hematological: Negative for adenopathy.   Psychiatric/Behavioral: The patient is not nervous/anxious.        Objective:      Physical Exam  Vitals signs reviewed.    Constitutional:       General: She is not in acute distress.     Appearance: Normal appearance. She is obese. She is not ill-appearing, toxic-appearing or diaphoretic.   HENT:      Head: Normocephalic and atraumatic.   Eyes:      General: No scleral icterus.     Conjunctiva/sclera: Conjunctivae normal.      Pupils: Pupils are equal, round, and reactive to light.   Neck:      Musculoskeletal: Normal range of motion. No muscular tenderness.   Cardiovascular:      Rate and Rhythm: Normal rate and regular rhythm.      Heart sounds: Normal heart sounds. No murmur. No gallop.    Pulmonary:      Effort: Pulmonary effort is normal.      Breath sounds: Normal breath sounds. No wheezing or rhonchi.   Abdominal:      General: Bowel sounds are normal. There is no distension.      Palpations: Abdomen is soft.   Musculoskeletal: Normal range of motion.         General: Tenderness present. No swelling, deformity or signs of injury.      Right lower leg: Edema present.      Left lower leg: Edema present.      Comments: Pt seated in wheelchair     Lymphadenopathy:      Cervical: No cervical adenopathy.   Skin:     General: Skin is warm and dry.      Coloration: Skin is not jaundiced.   Neurological:      Mental Status: She is alert and oriented to person, place, and time.   Psychiatric:         Behavior: Behavior normal.         Assessment:       1. Spinal stenosis of lumbar region with neurogenic claudication    2. Failure of left total hip arthroplasty, subsequent encounter        Plan:       Sammi was seen today for follow-up.    Diagnoses and all orders for this visit:    Spinal stenosis of lumbar region with neurogenic claudication  -     Ambulatory referral/consult to Neurosurgery; Future  -     topiramate (TOPAMAX) 25 MG tablet; 1 tab at night for 5 days then 2 tab at HS.    Failure of left total hip arthroplasty, subsequent encounter  -     HYDROcodone-acetaminophen (NORCO) 5-325 mg per tablet; Take 1 tablet by mouth  every 8 (eight) hours as needed for Pain. Medical necessary for greater than 7 days for chronic pain.

## 2020-10-19 NOTE — PATIENT INSTRUCTIONS

## 2020-10-20 ENCOUNTER — TELEPHONE (OUTPATIENT)
Dept: NEUROSURGERY | Facility: CLINIC | Age: 80
End: 2020-10-20

## 2020-10-20 NOTE — TELEPHONE ENCOUNTER
----- Message from Jessica Blank LPN sent at 10/19/2020  4:34 PM CDT -----    ----- Message -----  From: Denise Mcclendon  Sent: 10/19/2020   4:28 PM CDT  To: [facility] Staff    Pt was seen today by jordin lara and needs an appt for Spinal stenosis of lumbar region with neurogenic claudication   Please call her @ [phone]  Any questions, please respond to staff box  Thanks !

## 2020-10-21 LAB — BACTERIA SPEC ANAEROBE CULT: NORMAL

## 2020-10-21 NOTE — PROGRESS NOTES
HPI    79 years old female Jehovah Witness.  Extensive pulmonary embolism upper extremity DVT failure of Eliquis currently on Coumadin and follow by Coumadin clinic.  Patient also had a bacteremia septic joint require IV antibiotics.  Treatment team contact hematology in assess of anticoagulation in the setting need of hip aspiration of fluid in near future.    Patient completed hip aspiration on 10/15/2020.  No fluid was collected.  Left hip joint aspiration was without production of fluid.    Denies chest pain shortness breath.  Denies abdominal pain nausea vomiting or diarrhea.        Lifetime Dose Tracking   No doses have been documented on this patient for the following tracked chemicals: doxorubicin, epirubicin, idarubicin, daunorubicin, mitoxantrone, bleomycin, mitomycin, busulfan     Past Medical History:   Diagnosis Date    ALLERGIC RHINITIS     Anemia     Arthritis     Back pain     Bronchitis     Cataract     OU    Cholelithiasis     COPD (chronic obstructive pulmonary disease)     Degenerative disc disease     Diabetes mellitus     pre diabetic    Diverticulosis     DVT (deep venous thrombosis)     Edema     Encounter for blood transfusion 1979    Fibromyalgia     Glaucoma     Gout     Hx of colonic polyps     Hyperlipidemia     Hypertension     Incontinence     Osteoporosis     Reflux     Refusal of blood transfusions as patient is Sikhism     Sleep apnea     non compliant with CPAP    Vestibulitis of ear        Family History   Problem Relation Age of Onset    Hypertension Mother     Diabetes Sister     Cancer Sister     Stomach cancer Sister     Hypertension Sister     Bipolar disorder Sister     Peripheral vascular disease Sister     Stroke Father     Hypertension Father     Hypertension Brother     Stroke Brother     Peripheral vascular disease Brother     Hypertension Son     Obesity Son     Early death Son 48    Arthritis Son     Glaucoma Neg Hx      Macular degeneration Neg Hx     Retinal detachment Neg Hx        Past Surgical History:   Procedure Laterality Date    ANGIOGRAPHY OF LOWER EXTREMITY N/A 7/31/2019    Procedure: ANGIOGRAM, LOWER EXTREMITY;  Surgeon: Gino Arana MD;  Location[facility] CATH[facility]EP LAB;  Service: General;  Laterality: N/A;    ASPIRATION OF SOFT TISSUE Left 10/15/2020    Procedure: ASPIRATION, SOFT TISSUE;  Surgeon: Dosc Diagnostic Provider;  Location: [facility] OR;  Service: General;  Laterality: Left;    COLONOSCOPY N/A 8/13/2020    Procedure: COLONOSCOPY;  Surgeon: Sue Nuñez MD;  Location: [facility] ENDO;  Service: Endoscopy;  Laterality: N/A;    ESOPHAGOGASTRODUODENOSCOPY N/A 8/12/2020    Procedure: EGD (ESOPHAGOGASTRODUODENOSCOPY);  Surgeon: Sue Nuñez MD;  Location: [facility] ENDO;  Service: Endoscopy;  Laterality: N/A;    HIP SURGERY      HYSTERECTOMY      INTRALUMINAL GASTROINTESTINAL TRACT IMAGING VIA CAPSULE N/A 9/9/2020    Procedure: IMAGING PROCEDURE, GI TRACT, INTRALUMINAL, VIA CAPSULE;  Surgeon: Sue Nuñez MD;  Location: [facility] ENDO;  Service: Endoscopy;  Laterality: N/A;    JOINT REPLACEMENT      B total hip    LAPAROSCOPIC CHOLECYSTECTOMY N/A 7/13/2020    Procedure: CHOLECYSTECTOMY, LAPAROSCOPIC;  Surgeon: Jomar Mcdonald MD;  Location: [facility] OR;  Service: General;  Laterality: N/A;    TRANSFORAMINAL EPIDURAL INJECTION OF STEROID Left 6/30/2020    Procedure: Injection,steroid,epidural,transforaminal approach;  Surgeon: Matt Gilliam MD;  Location: [facility] OR;  Service: Pain Management;  Laterality: Left;  L2-3, L3-4       Social History     Socioeconomic History    Marital status:      Spouse name: Not on file    Number of children: Not on file    Years of education: Not on file    Highest education level: Not on file   Occupational History    Not on file   Social Needs    Financial resource strain: Not on file    Food insecurity     Worry: Not on file     Inability: Not on file     Transportation needs     Medical: Not on file     Non-medical: Not on file   Tobacco Use    Smoking status: Former Smoker     Packs/day: 0.25     Years: 5.00     Pack years: 1.25     Quit date: 1972     Years since quittin.8    Smokeless tobacco: Never Used   Substance and Sexual Activity    Alcohol use: Yes     Alcohol/week: 0.0 standard drinks     Comment: Rarely    Drug use: Not Currently    Sexual activity: Not on file   Lifestyle    Physical activity     Days per week: Not on file     Minutes per session: Not on file    Stress: Not on file   Relationships    Social connections     Talks on phone: Not on file     Gets together: Not on file     Attends Gnosticist service: Not on file     Active member of club or organization: Not on file     Attends meetings of clubs or organizations: Not on file     Relationship status: Not on file   Other Topics Concern    Not on file   Social History Narrative    Not on file         Current Outpatient Medications:     acetaminophen (TYLENOL) 325 MG tablet, Take 2 tablets (650 mg total) by mouth every 6 (six) hours as needed (Do not take with any other Tylenol or acetaminophen containing products)., Disp: , Rfl: 0    albuterol-ipratropium (DUO-NEB) 2.5 mg-0.5 mg/3 mL nebulizer solution, Take 3 mLs by nebulization every 8 (eight) hours., Disp: 270 mL, Rfl: 0    ascorbic acid, vitamin C, (VITAMIN C) 100 MG tablet, Take 5 tablets (500 mg total) by mouth every evening., Disp: , Rfl:     budesonide-formoterol 160-4.5 mcg (SYMBICORT) 160-4.5 mcg/actuation HFAA, Inhale 2 puffs into the lungs every 12 (twelve) hours. Controller, Disp: 3 Inhaler, Rfl: 3    cyanocobalamin, vitamin B-12, 2,500 mcg Lozg, Place 2 tablets under the tongue once daily., Disp: 60 lozenge, Rfl: 11    enoxaparin (LOVENOX) 80 mg/0.8 mL Syrg, Inject 0.8 mLs (80 mg total) into the skin 2 (two) times a day., Disp: 48 mL, Rfl: 0    esomeprazole (NEXIUM) 20 MG capsule, Take 1 capsule (20 mg  total) by mouth before breakfast., Disp: 30 capsule, Rfl: 2    fluticasone propionate (FLONASE) 50 mcg/actuation nasal spray, 2 sprays (100 mcg total) by Each Nostril route once daily., Disp: 48 g, Rfl: 3    fluticasone-salmeterol 230-21 mcg/dose (ADVAIR HFA) 230-21 mcg/actuation HFAA inhaler, Inhale 2 puffs into the lungs 2 (two) times daily. Controller, Disp: 12 g, Rfl: 11    folic acid (FOLVITE) 1 MG tablet, Take 1 tablet (1 mg total) by mouth once daily., Disp: 30 tablet, Rfl: 0    furosemide (LASIX) 20 MG tablet, Take 1 tablet (20 mg total) by mouth daily as needed (edema)., Disp: 30 tablet, Rfl: 11    hydrALAZINE (APRESOLINE) 50 MG tablet, Take 1 tablet (50 mg total) by mouth every 8 (eight) hours., Disp: 90 tablet, Rfl: 1    HYDROcodone-acetaminophen (NORCO) 5-325 mg per tablet, Take 1 tablet by mouth every 8 (eight) hours as needed for Pain. Medical necessary for greater than 7 days for chronic pain., Disp: 90 tablet, Rfl: 0    metoprolol succinate (TOPROL-XL) 50 MG 24 hr tablet, Take 1 tablet (50 mg total) by mouth once daily., Disp: 90 tablet, Rfl: 3    montelukast (SINGULAIR) 10 mg tablet, Take 1 tablet (10 mg total) by mouth every evening., Disp: 90 tablet, Rfl: 3    multivitamin capsule, Take 1 capsule by mouth once daily., Disp: , Rfl:     ondansetron (ZOFRAN-ODT) 4 MG TbDL, Take 1 tablet (4 mg total) by mouth every 8 (eight) hours as needed., Disp: 20 tablet, Rfl: 0    topiramate (TOPAMAX) 25 MG tablet, 1 tab at night for 5 days then 2 tab at HS., Disp: 60 tablet, Rfl: 11    warfarin (COUMADIN) 5 MG tablet, Tulio.e 1-1.5 Tablets QPM as instructed by coumadin clinic (Patient not taking: Reported on 10/13/2020), Disp: 90 tablet, Rfl: 3  No current facility-administered medications for this visit.     Facility-Administered Medications Ordered in Other Visits:     lactated ringers infusion, , Intravenous, Continuous, Gino Reid MD, Last Rate: 10 mL/hr at 07/13/20 1308    lidocaine (PF)  10 mg/ml (1%) injection 10 mg, 1 mL, Intradermal, Once, Gino Reid MD    Review of patient's allergies indicates:   Allergen Reactions    Daptomycin Other (See Comments)     Kidney damage     Cymbalta [duloxetine] Other (See Comments)     Nightmares      Darvon [propoxyphene] Nausea Only and Other (See Comments)     Sweating, slept for 3 days    Atorvastatin Other (See Comments)     Muscle cramps    Naprosyn [naproxen] Nausea Only    Penicillins Rash    Tramadol Nausea Only and Palpitations     All medications and past history have been reviewed.    Objective:      Vitals:  Vital Signs (Most Recent):  Temp: 97.7 °F (36.5 °C) (08/13/20 0426)  Pulse: 105 (08/13/20 0736)  Resp: 20 (08/13/20 0736)  BP: (!) 148/94 (08/13/20 0426)  SpO2: 99 % (08/13/20 0736) Vital Signs (24h Range):  Temp:  [96.9 °F (36.1 °C)-99.1 °F (37.3 °C)] 97.7 °F (36.5 °C)  Pulse:  [] 105  Resp:  [16-20] 20  SpO2:  [95 %-100 %] 99 %  BP: (141-199)/(65-95) 148/94       Awake alert no acute distress  Normocephalic atraumatic pupils equal round reactive  Soft nontender nondistended bowel sounds x4  Normal rate  Normal respiratory of  Nonfocal cranial nerves 2-12 grossly intact sensorimotor grossly intact  No rash no lesions   deferred     CMP  Sodium   Date Value Ref Range Status   10/19/2020 140 136 - 145 mmol/L Final     Potassium   Date Value Ref Range Status   10/19/2020 4.7 3.5 - 5.1 mmol/L Final     Chloride   Date Value Ref Range Status   10/19/2020 105 95 - 110 mmol/L Final     CO2   Date Value Ref Range Status   10/19/2020 26 23 - 29 mmol/L Final     Glucose   Date Value Ref Range Status   10/19/2020 116 (H) 70 - 110 mg/dL Final     BUN, Bld   Date Value Ref Range Status   10/19/2020 23 8 - 23 mg/dL Final     Creatinine   Date Value Ref Range Status   10/19/2020 1.4 0.5 - 1.4 mg/dL Final     Calcium   Date Value Ref Range Status   10/19/2020 8.6 (L) 8.7 - 10.5 mg/dL Final     Total Protein   Date Value Ref Range Status    10/19/2020 6.7 6.0 - 8.4 g/dL Final     Albumin   Date Value Ref Range Status   10/19/2020 3.2 (L) 3.5 - 5.2 g/dL Final     Total Bilirubin   Date Value Ref Range Status   10/19/2020 0.2 0.1 - 1.0 mg/dL Final     Comment:     For infants and newborns, interpretation of results should be based  on gestational age, weight and in agreement with clinical  observations.  Premature Infant recommended reference ranges:  Up to 24 hours.............<8.0 mg/dL  Up to 48 hours............<12.0 mg/dL  3-5 days..................<15.0 mg/dL  6-29 days.................<15.0 mg/dL       Alkaline Phosphatase   Date Value Ref Range Status   10/19/2020 55 55 - 135 U/L Final     AST   Date Value Ref Range Status   10/19/2020 22 10 - 40 U/L Final     ALT   Date Value Ref Range Status   10/19/2020 24 10 - 44 U/L Final     Anion Gap   Date Value Ref Range Status   10/19/2020 9 8 - 16 mmol/L Final     eGFR if    Date Value Ref Range Status   10/19/2020 42 (A) >60 mL/min/1.73 m^2 Final     eGFR if non    Date Value Ref Range Status   10/19/2020 36 (A) >60 mL/min/1.73 m^2 Final     Comment:     Calculation used to obtain the estimated glomerular filtration  rate (eGFR) is the CKD-EPI equation.        Lab Results   Component Value Date    WBC 5.84 10/19/2020    HGB 10.2 (L) 10/19/2020    HCT 33.3 (L) 10/19/2020     (H) 10/19/2020     10/19/2020         EGD 08/12/2020  Findings:        The esophagus was normal.        A small hiatal hernia was present.        The examined duodenum was normal.   Impression:          - Normal esophagus.                        - Small hiatal hernia.                        - Normal examined duodenum.                        - No specimens collected.                        -History of multifactorial anemia, in part due to                        low iron counts. Patient is a [facility]     Colonoscopy 08/13/2020  Findings:        Multiple small-mouthed  diverticula were found in the sigmoid colon,        descending colon and ascending colon.        The exam was otherwise without abnormality on direct and        retroflexion views.   Impression:          - Diverticulosis in the sigmoid colon, in the                        descending colon and in the ascending colon.                        - The examination was otherwise normal on direct and                        retroflexion views.                        - No specimens collected.                        -History of multifactorial anemia, in part due to                        low iron.   All lab results and imaging results have been reviewed and discussed with the patient.     Assessment and Plan:      Anemia  > Continue vitamin B12 and folate supplementation.  > patient is Jehovah Witness - no blood product    Extensive right occlusive thrombus brachial vein and cephalic vein while patient is on Eliquis treated for pulmonary embolism and lower extremity DVT.  Extensive thrombosis with Hex phos neutralization is positive     > planned for long-term anticoagulation.  Recent need of Lovenox for right hip aspiration.  Procedure completed on 10/15/2020 without complications.  Patient may transition back to warfarin with coumadin clinic.   >  I have asked patient to start Coumadin 5 mg daily.  INR checked every other day.  Once INR is therapeutic patient may d/c Lovenox.  Coumadin level will be followed by Coumadin clinic.  > for any bleeding emergency patient is instructed to go to the nearest emergency room for treatment evaluation.    [] Hypertension  > advised patient to follow up with primary care physician to manage her blood pressure.

## 2020-10-22 ENCOUNTER — OFFICE VISIT (OUTPATIENT)
Dept: HEMATOLOGY/ONCOLOGY | Facility: CLINIC | Age: 80
End: 2020-10-22
Payer: MEDICARE

## 2020-10-22 VITALS
RESPIRATION RATE: 18 BRPM | DIASTOLIC BLOOD PRESSURE: 65 MMHG | TEMPERATURE: 99 F | WEIGHT: 215.63 LBS | HEART RATE: 62 BPM | BODY MASS INDEX: 38.19 KG/M2 | SYSTOLIC BLOOD PRESSURE: 147 MMHG | OXYGEN SATURATION: 100 %

## 2020-10-22 DIAGNOSIS — I26.99 PULMONARY EMBOLISM, UNSPECIFIED CHRONICITY, UNSPECIFIED PULMONARY EMBOLISM TYPE, UNSPECIFIED WHETHER ACUTE COR PULMONALE PRESENT: ICD-10-CM

## 2020-10-22 DIAGNOSIS — Z86.718 HISTORY OF DVT IN ADULTHOOD: Primary | ICD-10-CM

## 2020-10-22 DIAGNOSIS — Z79.01 CURRENT USE OF LONG TERM ANTICOAGULATION: ICD-10-CM

## 2020-10-22 PROCEDURE — 1125F PR PAIN SEVERITY QUANTIFIED, PAIN PRESENT: ICD-10-PCS | Mod: S$GLB,,, | Performed by: INTERNAL MEDICINE

## 2020-10-22 PROCEDURE — 99999 PR PBB SHADOW E&M-EST. PATIENT-LVL IV: CPT | Mod: PBBFAC,,, | Performed by: INTERNAL MEDICINE

## 2020-10-22 PROCEDURE — 1101F PR PT FALLS ASSESS DOC 0-1 FALLS W/OUT INJ PAST YR: ICD-10-PCS | Mod: CPTII,S$GLB,, | Performed by: INTERNAL MEDICINE

## 2020-10-22 PROCEDURE — 99214 OFFICE O/P EST MOD 30 MIN: CPT | Mod: S$GLB,,, | Performed by: INTERNAL MEDICINE

## 2020-10-22 PROCEDURE — 1159F MED LIST DOCD IN RCRD: CPT | Mod: S$GLB,,, | Performed by: INTERNAL MEDICINE

## 2020-10-22 PROCEDURE — 3077F PR MOST RECENT SYSTOLIC BLOOD PRESSURE >= 140 MM HG: ICD-10-PCS | Mod: CPTII,S$GLB,, | Performed by: INTERNAL MEDICINE

## 2020-10-22 PROCEDURE — 1101F PT FALLS ASSESS-DOCD LE1/YR: CPT | Mod: CPTII,S$GLB,, | Performed by: INTERNAL MEDICINE

## 2020-10-22 PROCEDURE — 1125F AMNT PAIN NOTED PAIN PRSNT: CPT | Mod: S$GLB,,, | Performed by: INTERNAL MEDICINE

## 2020-10-22 PROCEDURE — 99999 PR PBB SHADOW E&M-EST. PATIENT-LVL IV: ICD-10-PCS | Mod: PBBFAC,,, | Performed by: INTERNAL MEDICINE

## 2020-10-22 PROCEDURE — 3078F DIAST BP <80 MM HG: CPT | Mod: CPTII,S$GLB,, | Performed by: INTERNAL MEDICINE

## 2020-10-22 PROCEDURE — 99214 PR OFFICE/OUTPT VISIT, EST, LEVL IV, 30-39 MIN: ICD-10-PCS | Mod: S$GLB,,, | Performed by: INTERNAL MEDICINE

## 2020-10-22 PROCEDURE — 3077F SYST BP >= 140 MM HG: CPT | Mod: CPTII,S$GLB,, | Performed by: INTERNAL MEDICINE

## 2020-10-22 PROCEDURE — 3078F PR MOST RECENT DIASTOLIC BLOOD PRESSURE < 80 MM HG: ICD-10-PCS | Mod: CPTII,S$GLB,, | Performed by: INTERNAL MEDICINE

## 2020-10-22 PROCEDURE — 1159F PR MEDICATION LIST DOCUMENTED IN MEDICAL RECORD: ICD-10-PCS | Mod: S$GLB,,, | Performed by: INTERNAL MEDICINE

## 2020-10-22 RX ORDER — DOXYCYCLINE HYCLATE 100 MG
100 TABLET ORAL EVERY 12 HOURS
Status: ON HOLD | COMMUNITY
Start: 2020-10-22 | End: 2020-11-03 | Stop reason: HOSPADM

## 2020-10-22 NOTE — Clinical Note
Resume Coumadin concurrently with Lovenox.  Once Coumadin is therapeutic will discontinue Lovenox.  Patient will need to have INR checked every other day with Coumadin clinic.  I will see patient 1 month from now.  I have ordered INR for this coming Monday

## 2020-10-23 ENCOUNTER — TELEPHONE (OUTPATIENT)
Dept: HEMATOLOGY/ONCOLOGY | Facility: CLINIC | Age: 80
End: 2020-10-23

## 2020-10-23 NOTE — TELEPHONE ENCOUNTER
Spoke c Sol from [facility]. Confirmed pt needs to have INR drawn on Monday per Dr Cuello and will need to f/up with Coumadin Clinic for directions. Sol verbalized understanding, and will draw labs on Monday.     ----- Message from Jocelyn Fleming sent at 10/23/2020 11:29 AM CDT -----  Regarding: Advice  Contact: Sol with [facility]  Type: Needs Medical Advice    Who Called:  Sol with [facility]    Best Call Back Number: [phone]    Additional Information: her PT is 11.3 and her INR is 0.9. She is taking Lovenox 70mg 2x's a day and started coumadin 5mg last night. Please call back to verify if PT and INR needs to done again on Monday. Or if the coumadin clinic Is going to follow up or will Dr grover.

## 2020-10-26 ENCOUNTER — TELEPHONE (OUTPATIENT)
Dept: FAMILY MEDICINE | Facility: CLINIC | Age: 80
End: 2020-10-26

## 2020-10-26 ENCOUNTER — DOCUMENT SCAN (OUTPATIENT)
Dept: HOME HEALTH SERVICES | Facility: HOSPITAL | Age: 80
End: 2020-10-26

## 2020-10-26 NOTE — TELEPHONE ENCOUNTER
----- Message from Nikki Jordan, Patient Care Assistant sent at 10/26/2020 10:47 AM CDT -----  Regarding: advice  Contact: [facility] home health  Type: Needs Medical Advice  Who Called:  [facility]  Symptoms (please be specific):  shortness of breath after walking   How long has patient had these symptoms:  Thursday   Pharmacy name and phone #:    Best Call Back Number:    Additional Information: Please call [facility] to advice. Thanks!

## 2020-10-26 NOTE — TELEPHONE ENCOUNTER
Sol states that patient respiratory rate increased significantly w/ambulation however lung sounds clear & oxygen saturation 100%. This nurse suggested patient utilize neb tx & forwarded concerns to Dr. Villatoro.

## 2020-10-28 ENCOUNTER — TELEPHONE (OUTPATIENT)
Dept: HEMATOLOGY/ONCOLOGY | Facility: CLINIC | Age: 80
End: 2020-10-28

## 2020-10-28 ENCOUNTER — DOCUMENT SCAN (OUTPATIENT)
Dept: HOME HEALTH SERVICES | Facility: HOSPITAL | Age: 80
End: 2020-10-28
Payer: MEDICARE

## 2020-10-28 NOTE — TELEPHONE ENCOUNTER
Jaelyn nurse for [facility] called needing orders for BMP. She stated that pt last Thursday c/o sob with exertion - O2 sats @ rest was % and then ambulation was % and rest was % 2 min to recovery. Nurse states that lungs are clear. Pt has hx of DVT. Jaelyn was requesting orders for CBC. Dr Cuello to be notified of status for orders.     Per Dr Cuello pt to continue coumadin therapy. Dr Cuello gave vo for CBC and CMP. Coumadin clinic to control PT/INR for transition from Lovenox to Coumadin.

## 2020-10-30 ENCOUNTER — TELEPHONE (OUTPATIENT)
Dept: CARDIOLOGY | Facility: CLINIC | Age: 80
End: 2020-10-30

## 2020-10-30 NOTE — TELEPHONE ENCOUNTER
----- Message from Prisca Childers sent at 10/30/2020 11:58 AM CDT -----  Regarding: reschedule appt  Please call patient to get schedule [phone]

## 2020-11-01 ENCOUNTER — DOCUMENT SCAN (OUTPATIENT)
Dept: HOME HEALTH SERVICES | Facility: HOSPITAL | Age: 80
End: 2020-11-01
Payer: MEDICARE

## 2020-11-01 ENCOUNTER — HOSPITAL ENCOUNTER (INPATIENT)
Facility: HOSPITAL | Age: 80
LOS: 1 days | Discharge: HOME HEALTH | DRG: 194 | End: 2020-11-03
Attending: EMERGENCY MEDICINE | Admitting: INTERNAL MEDICINE
Payer: MEDICARE

## 2020-11-01 DIAGNOSIS — R07.9 CHEST PAIN: ICD-10-CM

## 2020-11-01 DIAGNOSIS — J18.9 PNEUMONIA DUE TO INFECTIOUS ORGANISM, UNSPECIFIED LATERALITY, UNSPECIFIED PART OF LUNG: Primary | ICD-10-CM

## 2020-11-01 PROBLEM — N17.9 ACUTE RENAL FAILURE SUPERIMPOSED ON STAGE 3B CHRONIC KIDNEY DISEASE: Status: ACTIVE | Noted: 2020-11-01

## 2020-11-01 PROBLEM — N18.32 ACUTE RENAL FAILURE SUPERIMPOSED ON STAGE 3B CHRONIC KIDNEY DISEASE: Status: ACTIVE | Noted: 2020-11-01

## 2020-11-01 PROBLEM — Z86.711 HISTORY OF PULMONARY EMBOLISM: Status: ACTIVE | Noted: 2020-11-01

## 2020-11-01 LAB
ALBUMIN SERPL BCP-MCNC: 4.1 G/DL (ref 3.5–5.2)
ALP SERPL-CCNC: 58 U/L (ref 55–135)
ALT SERPL W/O P-5'-P-CCNC: 20 U/L (ref 10–44)
ANION GAP SERPL CALC-SCNC: 12 MMOL/L (ref 8–16)
AST SERPL-CCNC: 21 U/L (ref 10–40)
BASOPHILS # BLD AUTO: 0.03 K/UL (ref 0–0.2)
BASOPHILS NFR BLD: 0.3 % (ref 0–1.9)
BILIRUB SERPL-MCNC: 0.6 MG/DL (ref 0.1–1)
BNP SERPL-MCNC: 105 PG/ML (ref 0–99)
BUN SERPL-MCNC: 34 MG/DL (ref 8–23)
CALCIUM SERPL-MCNC: 9.6 MG/DL (ref 8.7–10.5)
CHLORIDE SERPL-SCNC: 104 MMOL/L (ref 95–110)
CO2 SERPL-SCNC: 23 MMOL/L (ref 23–29)
CREAT SERPL-MCNC: 1.8 MG/DL (ref 0.5–1.4)
DIFFERENTIAL METHOD: ABNORMAL
EOSINOPHIL # BLD AUTO: 0.2 K/UL (ref 0–0.5)
EOSINOPHIL NFR BLD: 2.4 % (ref 0–8)
ERYTHROCYTE [DISTWIDTH] IN BLOOD BY AUTOMATED COUNT: 14.6 % (ref 11.5–14.5)
EST. GFR  (AFRICAN AMERICAN): 30.9 ML/MIN/1.73 M^2
EST. GFR  (NON AFRICAN AMERICAN): 26.8 ML/MIN/1.73 M^2
GLUCOSE SERPL-MCNC: 128 MG/DL (ref 70–110)
HCT VFR BLD AUTO: 38.1 % (ref 37–48.5)
HGB BLD-MCNC: 12.2 G/DL (ref 12–16)
IMM GRANULOCYTES # BLD AUTO: 0.03 K/UL (ref 0–0.04)
IMM GRANULOCYTES NFR BLD AUTO: 0.3 % (ref 0–0.5)
INR PPP: 2
LACTATE SERPL-SCNC: 1.5 MMOL/L (ref 0.5–1.9)
LYMPHOCYTES # BLD AUTO: 3.4 K/UL (ref 1–4.8)
LYMPHOCYTES NFR BLD: 39.4 % (ref 18–48)
MCH RBC QN AUTO: 32.4 PG (ref 27–31)
MCHC RBC AUTO-ENTMCNC: 32 G/DL (ref 32–36)
MCV RBC AUTO: 101 FL (ref 82–98)
MONOCYTES # BLD AUTO: 0.8 K/UL (ref 0.3–1)
MONOCYTES NFR BLD: 9.5 % (ref 4–15)
NEUTROPHILS # BLD AUTO: 4.2 K/UL (ref 1.8–7.7)
NEUTROPHILS NFR BLD: 48.1 % (ref 38–73)
NRBC BLD-RTO: 0 /100 WBC
PLATELET # BLD AUTO: 241 K/UL (ref 150–350)
PMV BLD AUTO: 10.8 FL (ref 9.2–12.9)
POTASSIUM SERPL-SCNC: 4.8 MMOL/L (ref 3.5–5.1)
PROCALCITONIN SERPL IA-MCNC: <0.05 NG/ML (ref 0–0.5)
PROT SERPL-MCNC: 7.7 G/DL (ref 6–8.4)
PROTHROMBIN TIME: 21.9 SEC (ref 10.6–14.8)
RBC # BLD AUTO: 3.76 M/UL (ref 4–5.4)
SARS-COV-2 RDRP RESP QL NAA+PROBE: NEGATIVE
SODIUM SERPL-SCNC: 139 MMOL/L (ref 136–145)
TROPONIN I SERPL DL<=0.01 NG/ML-MCNC: <0.03 NG/ML
WBC # BLD AUTO: 8.73 K/UL (ref 3.9–12.7)

## 2020-11-01 PROCEDURE — 93010 ELECTROCARDIOGRAM REPORT: CPT | Mod: ,,, | Performed by: INTERNAL MEDICINE

## 2020-11-01 PROCEDURE — 83880 ASSAY OF NATRIURETIC PEPTIDE: CPT

## 2020-11-01 PROCEDURE — 84484 ASSAY OF TROPONIN QUANT: CPT

## 2020-11-01 PROCEDURE — 36415 COLL VENOUS BLD VENIPUNCTURE: CPT

## 2020-11-01 PROCEDURE — 63600175 PHARM REV CODE 636 W HCPCS: Performed by: EMERGENCY MEDICINE

## 2020-11-01 PROCEDURE — 25000003 PHARM REV CODE 250: Performed by: EMERGENCY MEDICINE

## 2020-11-01 PROCEDURE — G0378 HOSPITAL OBSERVATION PER HR: HCPCS

## 2020-11-01 PROCEDURE — U0002 COVID-19 LAB TEST NON-CDC: HCPCS

## 2020-11-01 PROCEDURE — 93010 EKG 12-LEAD: ICD-10-PCS | Mod: ,,, | Performed by: INTERNAL MEDICINE

## 2020-11-01 PROCEDURE — 99285 EMERGENCY DEPT VISIT HI MDM: CPT | Mod: 25

## 2020-11-01 PROCEDURE — 83605 ASSAY OF LACTIC ACID: CPT

## 2020-11-01 PROCEDURE — 84145 PROCALCITONIN (PCT): CPT

## 2020-11-01 PROCEDURE — 93005 ELECTROCARDIOGRAM TRACING: CPT | Performed by: INTERNAL MEDICINE

## 2020-11-01 PROCEDURE — 25000003 PHARM REV CODE 250: Performed by: INTERNAL MEDICINE

## 2020-11-01 PROCEDURE — 85610 PROTHROMBIN TIME: CPT

## 2020-11-01 PROCEDURE — 85025 COMPLETE CBC W/AUTO DIFF WBC: CPT

## 2020-11-01 PROCEDURE — 96374 THER/PROPH/DIAG INJ IV PUSH: CPT

## 2020-11-01 PROCEDURE — 80053 COMPREHEN METABOLIC PANEL: CPT

## 2020-11-01 PROCEDURE — 87040 BLOOD CULTURE FOR BACTERIA: CPT

## 2020-11-01 RX ORDER — FLUTICASONE PROPIONATE 50 MCG
2 SPRAY, SUSPENSION (ML) NASAL DAILY
Status: DISCONTINUED | OUTPATIENT
Start: 2020-11-02 | End: 2020-11-03 | Stop reason: HOSPADM

## 2020-11-01 RX ORDER — LEVOFLOXACIN 5 MG/ML
500 INJECTION, SOLUTION INTRAVENOUS
Status: COMPLETED | OUTPATIENT
Start: 2020-11-01 | End: 2020-11-01

## 2020-11-01 RX ORDER — ONDANSETRON 2 MG/ML
4 INJECTION INTRAMUSCULAR; INTRAVENOUS EVERY 8 HOURS PRN
Status: DISCONTINUED | OUTPATIENT
Start: 2020-11-01 | End: 2020-11-03 | Stop reason: HOSPADM

## 2020-11-01 RX ORDER — WARFARIN SODIUM 5 MG/1
5 TABLET ORAL DAILY
Status: DISCONTINUED | OUTPATIENT
Start: 2020-11-02 | End: 2020-11-03 | Stop reason: HOSPADM

## 2020-11-01 RX ORDER — ENOXAPARIN SODIUM 100 MG/ML
1 INJECTION SUBCUTANEOUS
Status: DISCONTINUED | OUTPATIENT
Start: 2020-11-01 | End: 2020-11-01

## 2020-11-01 RX ORDER — ACETAMINOPHEN 325 MG/1
650 TABLET ORAL EVERY 4 HOURS PRN
Status: DISCONTINUED | OUTPATIENT
Start: 2020-11-01 | End: 2020-11-03 | Stop reason: HOSPADM

## 2020-11-01 RX ORDER — MORPHINE SULFATE 4 MG/ML
4 INJECTION, SOLUTION INTRAMUSCULAR; INTRAVENOUS
Status: COMPLETED | OUTPATIENT
Start: 2020-11-01 | End: 2020-11-01

## 2020-11-01 RX ORDER — METOPROLOL SUCCINATE 50 MG/1
50 TABLET, EXTENDED RELEASE ORAL DAILY
Status: DISCONTINUED | OUTPATIENT
Start: 2020-11-02 | End: 2020-11-02

## 2020-11-01 RX ORDER — IPRATROPIUM BROMIDE AND ALBUTEROL SULFATE 2.5; .5 MG/3ML; MG/3ML
3 SOLUTION RESPIRATORY (INHALATION) EVERY 8 HOURS
Status: DISCONTINUED | OUTPATIENT
Start: 2020-11-01 | End: 2020-11-02

## 2020-11-01 RX ORDER — HYDRALAZINE HYDROCHLORIDE 25 MG/1
50 TABLET, FILM COATED ORAL EVERY 8 HOURS
Status: DISCONTINUED | OUTPATIENT
Start: 2020-11-01 | End: 2020-11-03 | Stop reason: HOSPADM

## 2020-11-01 RX ORDER — SODIUM CHLORIDE 9 MG/ML
INJECTION, SOLUTION INTRAVENOUS CONTINUOUS
Status: DISCONTINUED | OUTPATIENT
Start: 2020-11-01 | End: 2020-11-02

## 2020-11-01 RX ORDER — MONTELUKAST SODIUM 10 MG/1
10 TABLET ORAL NIGHTLY
Status: DISCONTINUED | OUTPATIENT
Start: 2020-11-01 | End: 2020-11-03 | Stop reason: HOSPADM

## 2020-11-01 RX ORDER — FLUTICASONE FUROATE AND VILANTEROL 100; 25 UG/1; UG/1
1 POWDER RESPIRATORY (INHALATION) DAILY
Status: DISCONTINUED | OUTPATIENT
Start: 2020-11-02 | End: 2020-11-03 | Stop reason: HOSPADM

## 2020-11-01 RX ORDER — LEVOFLOXACIN 750 MG/1
750 TABLET ORAL
Status: DISCONTINUED | OUTPATIENT
Start: 2020-11-03 | End: 2020-11-03 | Stop reason: HOSPADM

## 2020-11-01 RX ORDER — FOLIC ACID 1 MG/1
1 TABLET ORAL DAILY
Status: DISCONTINUED | OUTPATIENT
Start: 2020-11-02 | End: 2020-11-03 | Stop reason: HOSPADM

## 2020-11-01 RX ORDER — ACETAMINOPHEN 325 MG/1
650 TABLET ORAL EVERY 8 HOURS PRN
Status: DISCONTINUED | OUTPATIENT
Start: 2020-11-01 | End: 2020-11-03 | Stop reason: HOSPADM

## 2020-11-01 RX ADMIN — LEVOFLOXACIN 500 MG: 500 INJECTION, SOLUTION INTRAVENOUS at 10:11

## 2020-11-01 RX ADMIN — MONTELUKAST 10 MG: 10 TABLET, FILM COATED ORAL at 11:11

## 2020-11-01 RX ADMIN — MORPHINE SULFATE 4 MG: 4 INJECTION INTRAVENOUS at 06:11

## 2020-11-01 RX ADMIN — NITROGLYCERIN 1 INCH: 20 OINTMENT TOPICAL at 06:11

## 2020-11-01 RX ADMIN — SODIUM CHLORIDE: 0.9 INJECTION, SOLUTION INTRAVENOUS at 11:11

## 2020-11-01 NOTE — ED TRIAGE NOTES
Pt states she was diagnosed with blood clots in July in her leg and chest and began having worsening chest pain and SOB for the past week.

## 2020-11-02 ENCOUNTER — DOCUMENT SCAN (OUTPATIENT)
Dept: HOME HEALTH SERVICES | Facility: HOSPITAL | Age: 80
End: 2020-11-02
Payer: MEDICARE

## 2020-11-02 ENCOUNTER — TELEPHONE (OUTPATIENT)
Dept: FAMILY MEDICINE | Facility: CLINIC | Age: 80
End: 2020-11-02

## 2020-11-02 ENCOUNTER — EXTERNAL HOME HEALTH (OUTPATIENT)
Dept: HOME HEALTH SERVICES | Facility: HOSPITAL | Age: 80
End: 2020-11-02
Payer: MEDICARE

## 2020-11-02 LAB
ANION GAP SERPL CALC-SCNC: 9 MMOL/L (ref 8–16)
BASOPHILS # BLD AUTO: 0.02 K/UL (ref 0–0.2)
BASOPHILS NFR BLD: 0.3 % (ref 0–1.9)
BUN SERPL-MCNC: 29 MG/DL (ref 8–23)
CALCIUM SERPL-MCNC: 8.8 MG/DL (ref 8.7–10.5)
CHLORIDE SERPL-SCNC: 110 MMOL/L (ref 95–110)
CO2 SERPL-SCNC: 22 MMOL/L (ref 23–29)
CREAT SERPL-MCNC: 1.4 MG/DL (ref 0.5–1.4)
DIFFERENTIAL METHOD: ABNORMAL
EOSINOPHIL # BLD AUTO: 0.2 K/UL (ref 0–0.5)
EOSINOPHIL NFR BLD: 2.8 % (ref 0–8)
ERYTHROCYTE [DISTWIDTH] IN BLOOD BY AUTOMATED COUNT: 14.4 % (ref 11.5–14.5)
EST. GFR  (AFRICAN AMERICAN): 41.8 ML/MIN/1.73 M^2
EST. GFR  (NON AFRICAN AMERICAN): 36.3 ML/MIN/1.73 M^2
GLUCOSE SERPL-MCNC: 102 MG/DL (ref 70–110)
HCT VFR BLD AUTO: 32.9 % (ref 37–48.5)
HGB BLD-MCNC: 10.4 G/DL (ref 12–16)
IMM GRANULOCYTES # BLD AUTO: 0.02 K/UL (ref 0–0.04)
IMM GRANULOCYTES NFR BLD AUTO: 0.3 % (ref 0–0.5)
INR PPP: 2.5
LYMPHOCYTES # BLD AUTO: 2.3 K/UL (ref 1–4.8)
LYMPHOCYTES NFR BLD: 29.8 % (ref 18–48)
MAGNESIUM SERPL-MCNC: 1.9 MG/DL (ref 1.6–2.6)
MCH RBC QN AUTO: 32.7 PG (ref 27–31)
MCHC RBC AUTO-ENTMCNC: 31.6 G/DL (ref 32–36)
MCV RBC AUTO: 104 FL (ref 82–98)
MONOCYTES # BLD AUTO: 0.9 K/UL (ref 0.3–1)
MONOCYTES NFR BLD: 11.4 % (ref 4–15)
NEUTROPHILS # BLD AUTO: 4.2 K/UL (ref 1.8–7.7)
NEUTROPHILS NFR BLD: 55.4 % (ref 38–73)
NRBC BLD-RTO: 0 /100 WBC
PLATELET # BLD AUTO: 199 K/UL (ref 150–350)
PMV BLD AUTO: 10.6 FL (ref 9.2–12.9)
POTASSIUM SERPL-SCNC: 4.1 MMOL/L (ref 3.5–5.1)
PROTHROMBIN TIME: 26 SEC (ref 10.6–14.8)
RBC # BLD AUTO: 3.18 M/UL (ref 4–5.4)
SODIUM SERPL-SCNC: 141 MMOL/L (ref 136–145)
TROPONIN I SERPL DL<=0.01 NG/ML-MCNC: <0.03 NG/ML
WBC # BLD AUTO: 7.61 K/UL (ref 3.9–12.7)

## 2020-11-02 PROCEDURE — 25000003 PHARM REV CODE 250: Performed by: INTERNAL MEDICINE

## 2020-11-02 PROCEDURE — 25000242 PHARM REV CODE 250 ALT 637 W/ HCPCS

## 2020-11-02 PROCEDURE — 84484 ASSAY OF TROPONIN QUANT: CPT | Mod: 91

## 2020-11-02 PROCEDURE — 36415 COLL VENOUS BLD VENIPUNCTURE: CPT

## 2020-11-02 PROCEDURE — 94640 AIRWAY INHALATION TREATMENT: CPT

## 2020-11-02 PROCEDURE — 83735 ASSAY OF MAGNESIUM: CPT

## 2020-11-02 PROCEDURE — 80048 BASIC METABOLIC PNL TOTAL CA: CPT

## 2020-11-02 PROCEDURE — 93005 ELECTROCARDIOGRAM TRACING: CPT | Performed by: INTERNAL MEDICINE

## 2020-11-02 PROCEDURE — 85610 PROTHROMBIN TIME: CPT

## 2020-11-02 PROCEDURE — G0378 HOSPITAL OBSERVATION PER HR: HCPCS

## 2020-11-02 PROCEDURE — 93010 ELECTROCARDIOGRAM REPORT: CPT | Mod: ,,, | Performed by: INTERNAL MEDICINE

## 2020-11-02 PROCEDURE — 63600175 PHARM REV CODE 636 W HCPCS: Performed by: INTERNAL MEDICINE

## 2020-11-02 PROCEDURE — 25000242 PHARM REV CODE 250 ALT 637 W/ HCPCS: Performed by: INTERNAL MEDICINE

## 2020-11-02 PROCEDURE — 63600175 PHARM REV CODE 636 W HCPCS

## 2020-11-02 PROCEDURE — 99900035 HC TECH TIME PER 15 MIN (STAT)

## 2020-11-02 PROCEDURE — 85025 COMPLETE CBC W/AUTO DIFF WBC: CPT

## 2020-11-02 PROCEDURE — 94761 N-INVAS EAR/PLS OXIMETRY MLT: CPT

## 2020-11-02 PROCEDURE — 93010 EKG 12-LEAD: ICD-10-PCS | Mod: ,,, | Performed by: INTERNAL MEDICINE

## 2020-11-02 RX ORDER — MORPHINE SULFATE 2 MG/ML
INJECTION, SOLUTION INTRAMUSCULAR; INTRAVENOUS
Status: COMPLETED
Start: 2020-11-02 | End: 2020-11-02

## 2020-11-02 RX ORDER — HYDROCODONE BITARTRATE AND ACETAMINOPHEN 5; 325 MG/1; MG/1
1 TABLET ORAL EVERY 6 HOURS PRN
Status: DISCONTINUED | OUTPATIENT
Start: 2020-11-02 | End: 2020-11-03 | Stop reason: HOSPADM

## 2020-11-02 RX ORDER — NITROGLYCERIN 0.4 MG/1
TABLET SUBLINGUAL
Status: COMPLETED
Start: 2020-11-02 | End: 2020-11-02

## 2020-11-02 RX ORDER — NITROGLYCERIN 0.4 MG/1
0.4 TABLET SUBLINGUAL EVERY 5 MIN PRN
Status: DISCONTINUED | OUTPATIENT
Start: 2020-11-02 | End: 2020-11-03 | Stop reason: HOSPADM

## 2020-11-02 RX ORDER — IBUPROFEN 400 MG/1
800 TABLET ORAL ONCE
Status: DISCONTINUED | OUTPATIENT
Start: 2020-11-02 | End: 2020-11-03 | Stop reason: HOSPADM

## 2020-11-02 RX ORDER — AMLODIPINE BESYLATE 5 MG/1
5 TABLET ORAL DAILY
Status: DISCONTINUED | OUTPATIENT
Start: 2020-11-02 | End: 2020-11-03 | Stop reason: HOSPADM

## 2020-11-02 RX ORDER — IPRATROPIUM BROMIDE AND ALBUTEROL SULFATE 2.5; .5 MG/3ML; MG/3ML
3 SOLUTION RESPIRATORY (INHALATION) EVERY 8 HOURS
Status: DISCONTINUED | OUTPATIENT
Start: 2020-11-02 | End: 2020-11-03 | Stop reason: HOSPADM

## 2020-11-02 RX ORDER — MORPHINE SULFATE 2 MG/ML
2 INJECTION, SOLUTION INTRAMUSCULAR; INTRAVENOUS ONCE
Status: COMPLETED | OUTPATIENT
Start: 2020-11-02 | End: 2020-11-02

## 2020-11-02 RX ORDER — METOPROLOL SUCCINATE 50 MG/1
100 TABLET, EXTENDED RELEASE ORAL DAILY
Status: DISCONTINUED | OUTPATIENT
Start: 2020-11-02 | End: 2020-11-03 | Stop reason: HOSPADM

## 2020-11-02 RX ADMIN — NITROGLYCERIN 0.4 MG: 0.4 TABLET SUBLINGUAL at 12:11

## 2020-11-02 RX ADMIN — HYDRALAZINE HYDROCHLORIDE 50 MG: 25 TABLET, FILM COATED ORAL at 05:11

## 2020-11-02 RX ADMIN — AMLODIPINE BESYLATE 5 MG: 5 TABLET ORAL at 04:11

## 2020-11-02 RX ADMIN — HYDROCODONE BITARTRATE AND ACETAMINOPHEN 1 TABLET: 5; 325 TABLET ORAL at 04:11

## 2020-11-02 RX ADMIN — IPRATROPIUM BROMIDE AND ALBUTEROL SULFATE 3 ML: .5; 3 SOLUTION RESPIRATORY (INHALATION) at 12:11

## 2020-11-02 RX ADMIN — METOPROLOL SUCCINATE 100 MG: 50 TABLET, FILM COATED, EXTENDED RELEASE ORAL at 04:11

## 2020-11-02 RX ADMIN — IPRATROPIUM BROMIDE AND ALBUTEROL SULFATE 3 ML: .5; 3 SOLUTION RESPIRATORY (INHALATION) at 08:11

## 2020-11-02 RX ADMIN — HYDRALAZINE HYDROCHLORIDE 50 MG: 25 TABLET, FILM COATED ORAL at 02:11

## 2020-11-02 RX ADMIN — NITROGLYCERIN 0.4 MG: 0.4 TABLET, ORALLY DISINTEGRATING SUBLINGUAL at 12:11

## 2020-11-02 RX ADMIN — HYDRALAZINE HYDROCHLORIDE 50 MG: 25 TABLET, FILM COATED ORAL at 10:11

## 2020-11-02 RX ADMIN — ACETAMINOPHEN 650 MG: 325 TABLET, FILM COATED ORAL at 03:11

## 2020-11-02 RX ADMIN — IPRATROPIUM BROMIDE AND ALBUTEROL SULFATE 3 ML: .5; 3 SOLUTION RESPIRATORY (INHALATION) at 03:11

## 2020-11-02 RX ADMIN — MORPHINE SULFATE 2 MG: 2 INJECTION, SOLUTION INTRAMUSCULAR; INTRAVENOUS at 12:11

## 2020-11-02 RX ADMIN — HYDROCODONE BITARTRATE AND ACETAMINOPHEN 1 TABLET: 5; 325 TABLET ORAL at 05:11

## 2020-11-02 RX ADMIN — FLUTICASONE FUROATE AND VILANTEROL TRIFENATATE 1 PUFF: 100; 25 POWDER RESPIRATORY (INHALATION) at 08:11

## 2020-11-02 RX ADMIN — MONTELUKAST 10 MG: 10 TABLET, FILM COATED ORAL at 09:11

## 2020-11-02 RX ADMIN — FOLIC ACID 1 MG: 1 TABLET ORAL at 08:11

## 2020-11-02 NOTE — TELEPHONE ENCOUNTER
----- Message from An Carl sent at 11/2/2020  9:48 AM CST -----  Regarding: advice  Contact: nurse  Type: Needs Medical Advice  Who Called:  [facility]   Symptoms (please be specific):    How long has patient had these symptoms:    Pharmacy name and phone #:    Best Call Back Number: [phone]  Additional Information: [facility] nurse states the patient was admitted at [facility] for shortness of breath yesterday

## 2020-11-02 NOTE — ED NOTES
Pt states she is starting to feel better after medication administration. Updated on plan of care. Call light in reach.

## 2020-11-02 NOTE — RESPIRATORY THERAPY
11/02/20 0054   Patient Assessment/Suction   Respiratory Effort Unlabored   All Lung Fields Breath Sounds clear   Rhythm/Pattern, Respiratory pattern regular   PRE-TX-O2   O2 Device (Oxygen Therapy) room air   SpO2 96 %   Pulse 70   Resp 16   Aerosol Therapy   $ Aerosol Therapy Charges Aerosol Treatment   Respiratory Treatment Status (SVN) given   Treatment Route (SVN) mask   Patient Position (SVN) HOB elevated   Post Treatment Assessment (SVN) increased aeration   Signs of Intolerance (SVN) none   Breath Sounds Post-Respiratory Treatment   Throughout All Fields Post-Treatment All Fields   Throughout All Fields Post-Treatment wheezes, expiratory   Post-treatment Heart Rate (beats/min) 70   Post-treatment Resp Rate (breaths/min) 16   Respiratory Evaluation   $ Care Plan Tech Time 15 min   Evaluation For New Orders   Admitting Diagnosis pneumonia

## 2020-11-02 NOTE — PLAN OF CARE
Initial discharge planning assessment completed at bedside with patient. Voices currently receiving home health per [facility] and desires to continue with their services at discharge. PCP: Dr. Villatoro. Pharmacy: [facility] on [address]. Case management following.      11/02/20 1700   Discharge Assessment   Assessment Type Discharge Planning Assessment   Confirmed/corrected address and phone number on facesheet? Yes   Assessment information obtained from? Patient   Expected Length of Stay (days) 2   Communicated expected length of stay with patient/caregiver yes   Prior to hospitilization cognitive status: Alert/Oriented   Prior to hospitalization functional status: Assistive Equipment   Current cognitive status: Alert/Oriented   Current Functional Status: Assistive Equipment   Lives With alone   Able to Return to Prior Arrangements yes   Is patient able to care for self after discharge? Yes   Patient currently being followed by outpatient case management? No   Patient currently receives any other outside agency services? Yes   Name and contact number of agency or person providing outside services [facility]   Is it the patient/care giver preference to resume care with the current outside agency? Yes   Equipment Currently Used at Home bedside commode;cane, straight;oxygen;walker, rolling;wheelchair   Part D Coverage PHN   Do you have any problems affording any of your prescribed medications? No   Is the patient taking medications as prescribed? yes   Does the patient have transportation home? Yes   Transportation Anticipated family or friend will provide   Discharge Plan A Home Health   Discharge Plan B Home Health   DME Needed Upon Discharge  none   Patient/Family in Agreement with Plan yes

## 2020-11-02 NOTE — NURSING
Patient admitted to room 1210 via stretcher from ER.  She was able to ambulate from bed to bathroom with assist, tolerated well.  Friend at bedside.

## 2020-11-02 NOTE — HPI
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    She was in her usual state of health until about one week ago when she started to experience extreme fatigue associated with exertional dyspnea.  Today she noticed right-sided chest pressure which is worse with exertion prompting her to come to the ED. Symptoms are moderate in nature.  She denies associated fever, chills, cough or lightheadedness/dizziness.  She endorses chronic bilateral lower extremity edema.  She recently finished a course of antibiotics (doxycycline) prescribed by nephrologist  Of note she was recently on enoxaparin bridge to accommodate left hip aspiration.  She has restarted her warfarin about 4 days ago and INR tonight is 2.0.    In the ED:  Hemodynamically stable.  Labs notable for LUIZ on CKD stage 3. Initial troponin within normal limits. CT chest non-contrast with 7 mm GGO in the right upper lobe. Lung scan with low probability for pulmonary embolism.     Rest of the 10 point review of systems is negative except as mentioned above.

## 2020-11-02 NOTE — PROGRESS NOTES
"[facility] Medicine Progress Note  Patient Name: Sammi Abreu MRN: [id number]   Patient Class: OP- Observation  Length of Stay: 0   Admission Date: 11/1/2020  5:54 PM Attending Physician: Zainab Isaac MD   Primary Care Provider: Osmani Villatoro MD Face-to-Face encounter date: 11/02/2020   Chief Complaint: Shortness of Breath    Assessment & Plan:   Sammi Abreu is a 79 y.o. female admitted for    Atypical pneumonia  History of DVT/PE currently on Coumadin  CKD stage 3  Gastroesophageal reflux disease  Hypertension uncontrolled    Plan  Continue levofloxacin  Holding warfarin today will restart back tomorrow.  If symptoms improving.  She can be discharged home on oral antibiotics.  Stopped fluids  Adjusted metoprolol 50mg to 100mg XL  Also started Norvasc      Core measures:  - Code status: full code   - Consultants:   - Diet: Regular  - DVT PPx:  Warfarin  - lines: PIV      Discharge Planning:   No mobility needs.     Subjective:    Interval History   Reports cough and shortness of breath.  Cough associated with chest pain.  Chest pain gets worse when she breathes in.    Denies palpitations, abdominal pain, nausea/vomiting.   No concerns/issues overnight reported by the patient or the nursing staff.  Reviewed the labs and discussed the plan of care.   No family present at bedside.     Review of Systems   All other Review of Systems were found to be negative expect for that mentioned already in HPI.   Objective:   Physical Exam  /72   Pulse 64   Temp 97.7 °F (36.5 °C) (Oral)   Resp 16   Ht 5' 3" (1.6 m)   Wt 97.2 kg (214 lb 4.6 oz)   SpO2 100%   Breastfeeding No   BMI 37.96 kg/m²   Vitals reviewed.    Constitutional: No distress.   HENT: Atraumatic.   Cardiovascular: Normal rate, regular rhythm and normal heart sounds.   Pulmonary/Chest: Effort normal. Clear to auscultation bilaterally. No wheezes.   Abdominal: Soft. Bowel sounds are normal. Exhibits no distension " and no mass. No tenderness  Neurological: Alert.   Skin: Skin is warm and dry.     Following labs were Reviewed   Recent Labs   Lab 11/01/20 1805 11/02/20 0523   WBC 8.73 7.61   HGB 12.2 10.4*   HCT 38.1 32.9*    199   CALCIUM 9.6 8.8   ALBUMIN 4.1  --    PROT 7.7  --     141   K 4.8 4.1   CO2 23 22*    110   BUN 34* 29*   CREATININE 1.8* 1.4   ALKPHOS 58  --    ALT 20  --    AST 21  --    BILITOT 0.6  --      No results found for: POCTGLUCOSE     All labs within the past 24 hours have been reviewed  Microbiology Results (last 7 days)     Procedure Component Value Units Date/Time    Blood culture [[id number]] Collected: 11/01/20 2156    Order Status: Completed Specimen: Blood from Peripheral, Antecubital, Left Updated: 11/02/20 0517     Blood Culture, Routine No Growth to date    Blood culture [[id number]] Collected: 11/01/20 2140    Order Status: Completed Specimen: Blood from Peripheral, Antecubital, Left Updated: 11/02/20 0517     Blood Culture, Routine No Growth to date    Culture, Respiratory with Gram Stain [[id number]]     Order Status: No result Specimen: Respiratory         NM Lung Scan Perfusion Particulate   Final Result      CT Chest Without Contrast   Final Result      X-Ray Chest AP Portable   Final Result      No acute cardiopulmonary abnormality.         Electronically signed by: Beti Mercado MD   Date:    11/01/2020   Time:    18:19          Inpatient medications  Scheduled Meds:   albuterol-ipratropium  3 mL Nebulization Q8H    fluticasone furoate-vilanteroL  1 puff Inhalation Daily    fluticasone propionate  2 spray Each Nostril Daily    folic acid  1 mg Oral Daily    hydrALAZINE  50 mg Oral Q8H    [START ON 11/3/2020] levoFLOXacin  750 mg Oral Q48H    metoprolol succinate  50 mg Oral Daily    montelukast  10 mg Oral QHS    warfarin  5 mg Oral Daily     Continuous Infusions:   sodium chloride 0.9% 75 mL/hr at 11/01/20 2344     PRN Meds:.acetaminophen, acetaminophen,  HYDROcodone-acetaminophen, nitroglycerin, ondansetron    Above encounter included review of the medical records, interviewing and examining the patient face-to-face, discussion with family and other health care providers, ordering and interpreting lab/test results and formulating a plan of care.     Medical Decision Making:    [] Low Complexity  [x] Moderate Complexity  [] High Complexity    Zainab Isaac  [facility] Hospitalist  11/02/2020

## 2020-11-02 NOTE — PLAN OF CARE
11/02/20 0822   Patient Assessment/Suction   Level of Consciousness (AVPU) alert   Respiratory Effort Unlabored;Normal   All Lung Fields Breath Sounds clear   PRE-TX-O2   O2 Device (Oxygen Therapy) room air   SpO2 98 %   Pulse Oximetry Type Intermittent   $ Pulse Oximetry - Multiple Charge Pulse Oximetry - Multiple   Pulse (!) 58   Resp 18   Aerosol Therapy   $ Aerosol Therapy Charges Aerosol Treatment   Daily Review of Necessity (SVN) completed   Respiratory Treatment Status (SVN) given   Treatment Route (SVN) mask;PEP device, non-vibratory   Patient Position (SVN) HOB elevated   Post Treatment Assessment (SVN) breath sounds improved   Signs of Intolerance (SVN) none   Inhaler   $ Inhaler Charges MDI (Metered Dose Inahler) Treatment   Daily Review of Necessity (Inhaler) completed   Respiratory Treatment Status (Inhaler) given;mouth rinsed post treatment   Treatment Route (Inhaler) mouthpiece   Patient Position (Inhaler) HOB elevated   Post Treatment Assessment (Inhaler) breath sounds improved   Signs of Intolerance (Inhaler) none   Respiratory Evaluation   $ Care Plan Tech Time 15 min

## 2020-11-02 NOTE — ED PROVIDER NOTES
Encounter Date: 11/1/2020       History     Chief Complaint   Patient presents with    Shortness of Breath     Patient presents complaining of chest discomfort.  Patient having mostly right-sided chest discomfort that is been ongoing for the last few days.  Patient recently stopped Coumadin and started Lovenox shots only in anticipation of a procedure on her left hip.  Patient has history of pulmonary embolisms.  Patient states her pain is more right-sided it is worse with inspiration.  She denies fever.  Patient has a history of cholecystectomy.        Review of patient's allergies indicates:   Allergen Reactions    Daptomycin Other (See Comments)     Kidney damage     Cymbalta [duloxetine] Other (See Comments)     Nightmares      Darvon [propoxyphene] Nausea Only and Other (See Comments)     Sweating, slept for 3 days    Atorvastatin Other (See Comments)     Muscle cramps    Naprosyn [naproxen] Nausea Only    Penicillins Rash    Tramadol Nausea Only and Palpitations     Past Medical History:   Diagnosis Date    ALLERGIC RHINITIS     Anemia     Arthritis     Back pain     Bronchitis     Cataract     OU    Cholelithiasis     COPD (chronic obstructive pulmonary disease)     Degenerative disc disease     Diabetes mellitus     pre diabetic    Diverticulosis     DVT (deep venous thrombosis)     Edema     Encounter for blood transfusion 1979    Fibromyalgia     Glaucoma     Gout     Hx of colonic polyps     Hyperlipidemia     Hypertension     Incontinence     Osteoporosis     Reflux     Refusal of blood transfusions as patient is Religious     Sleep apnea     non compliant with CPAP    Vestibulitis of ear      Past Surgical History:   Procedure Laterality Date    ANGIOGRAPHY OF LOWER EXTREMITY N/A 7/31/2019    Procedure: ANGIOGRAM, LOWER EXTREMITY;  Surgeon: Gino Arana MD;  Location: [facility] CATH/EP LAB;  Service: General;  Laterality: N/A;    ASPIRATION OF SOFT TISSUE Left  10/15/2020    Procedure: ASPIRATION, SOFT TISSUE;  Surgeon: Raul Diagnostic Provider;  Location: [facility] OR;  Service: General;  Laterality: Left;    COLONOSCOPY N/A 2020    Procedure: COLONOSCOPY;  Surgeon: Sue Nuñez MD;  Location: [facility] ENDO;  Service: Endoscopy;  Laterality: N/A;    ESOPHAGOGASTRODUODENOSCOPY N/A 2020    Procedure: EGD (ESOPHAGOGASTRODUODENOSCOPY);  Surgeon: Sue Nuñez MD;  Location: [facility] ENDO;  Service: Endoscopy;  Laterality: N/A;    HIP SURGERY      HYSTERECTOMY      INTRALUMINAL GASTROINTESTINAL TRACT IMAGING VIA CAPSULE N/A 2020    Procedure: IMAGING PROCEDURE, GI TRACT, INTRALUMINAL, VIA CAPSULE;  Surgeon: Sue Nuñez MD;  Location: [facility] ENDO;  Service: Endoscopy;  Laterality: N/A;    JOINT REPLACEMENT      B total hip    LAPAROSCOPIC CHOLECYSTECTOMY N/A 2020    Procedure: CHOLECYSTECTOMY, LAPAROSCOPIC;  Surgeon: Jomar Mcdonald MD;  Location: [facility] OR;  Service: General;  Laterality: N/A;    TRANSFORAMINAL EPIDURAL INJECTION OF STEROID Left 2020    Procedure: Injection,steroid,epidural,transforaminal approach;  Surgeon: Matt Gilliam MD;  Location: [facility] OR;  Service: Pain Management;  Laterality: Left;  L2-3, L3-4     Family History   Problem Relation Age of Onset    Hypertension Mother     Diabetes Sister     Cancer Sister     Stomach cancer Sister     Hypertension Sister     Bipolar disorder Sister     Peripheral vascular disease Sister     Stroke Father     Hypertension Father     Hypertension Brother     Stroke Brother     Peripheral vascular disease Brother     Hypertension Son     Obesity Son     Early death Son 48    Arthritis Son     Glaucoma Neg Hx     Macular degeneration Neg Hx     Retinal detachment Neg Hx      Social History     Tobacco Use    Smoking status: Former Smoker     Packs/day: 0.25     Years: 5.00     Pack years: 1.25     Quit date: 1972     Years since quittin.8    Smokeless tobacco:  Never Used   Substance Use Topics    Alcohol use: Yes     Alcohol/week: 0.0 standard drinks     Comment: Rarely    Drug use: Not Currently     Review of Systems   Cardiovascular: Positive for chest pain.   All other systems reviewed and are negative.      Physical Exam     Initial Vitals [11/01/20 1800]   BP Pulse Resp Temp SpO2   (!) 223/99 90 (!) 24 97.7 °F (36.5 °C) 98 %      MAP       --         Physical Exam    Nursing note and vitals reviewed.  Constitutional: She appears well-developed and well-nourished.   HENT:   Head: Normocephalic and atraumatic.   Mouth/Throat: Oropharynx is clear and moist.   Eyes: EOM are normal. Pupils are equal, round, and reactive to light.   Neck: Normal range of motion. Neck supple.   Cardiovascular: Normal rate, regular rhythm, normal heart sounds and intact distal pulses.   Pulmonary/Chest: Breath sounds normal. No respiratory distress.   Abdominal: Soft.   Musculoskeletal: Normal range of motion.   Neurological: She is alert and oriented to person, place, and time. She has normal strength.   Skin: Skin is warm and dry. Capillary refill takes less than 2 seconds.   Psychiatric: She has a normal mood and affect. Her behavior is normal. Judgment and thought content normal.         ED Course   Procedures  Labs Reviewed   CBC W/ AUTO DIFFERENTIAL - Abnormal; Notable for the following components:       Result Value    RBC 3.76 (*)      (*)     MCH 32.4 (*)     RDW 14.6 (*)     All other components within normal limits   COMPREHENSIVE METABOLIC PANEL - Abnormal; Notable for the following components:    Glucose 128 (*)     BUN 34 (*)     Creatinine 1.8 (*)     eGFR if  30.9 (*)     eGFR if non  26.8 (*)     All other components within normal limits   B-TYPE NATRIURETIC PEPTIDE - Abnormal; Notable for the following components:     (*)     All other components within normal limits   PROTIME-INR - Abnormal; Notable for the following  components:    PT 21.9 (*)     All other components within normal limits   CULTURE, BLOOD   CULTURE, BLOOD   TROPONIN I   SARS-COV-2 RNA AMPLIFICATION, QUAL   LACTIC ACID, PLASMA   PROCALCITONIN          Imaging Results          CT Chest Without Contrast (Final result)  Result time 11/01/20 19:18:11   Procedure changed from CTA Chest Non-Coronary (PE Study)     Final result by Beti Mercado MD (11/01/20 19:18:11)                 Narrative:    CMS MANDATED QUALITY DATA - CT RADIATION - 436    All CT scans at this facility utilize dose modulation, iterative  reconstruction, and/or weight based dosing when appropriate to reduce  radiation dose to as low as reasonably achievable.        Reason: Chest pain, history of pulmonary emboli    TECHNIQUE: CT thorax without contrast    COMPARISON: CTA chest dated 7/14/2020    CT THORAX:  There is a 7 mm peripheral groundglass opacity within the posterior  medial right upper lobe. There is atelectasis within the right middle  lobe. There are reticular nodular opacities within the right lung  base. The remainder the lungs are clear. The trachea and bronchi are  normal.    The heart and great vessels are normal. There is no mediastinal or  hilar adenopathy.    The visualized portion of the upper abdomen is unremarkable. There are  degenerative changes of the spine.    IMPRESSION:  7 mm pleural-based groundglass opacity in the right upper lobe with  minimal reticular nodular opacities in the right lung base and  atelectasis in the right middle lobe. Findings are nonspecific and may  be secondary to infectious or inflammatory process    Electronically Signed by Beti Mercado M.D. on 11/1/2020 7:34 PM                             X-Ray Chest AP Portable (Final result)  Result time 11/01/20 18:19:44    Final result by Beti Mercado MD (11/01/20 18:19:44)                 Impression:      No acute cardiopulmonary abnormality.      Electronically signed by: Beti Mercado  MD  Date:    11/01/2020  Time:    18:19             Narrative:    EXAMINATION:  XR CHEST AP PORTABLE    CLINICAL HISTORY:  shortness of breath;    FINDINGS:  Portable chest at 18:03 hours is compared to 08/02/2020 shows normal cardiomediastinal silhouette. There is elevation the right hemidiaphragm.    Lungs are clear. Pulmonary vasculature is normal. No acute osseous abnormality.                                 Medical Decision Making:   Initial Assessment:   Patient appears anxious  Differential Diagnosis:   Considerations include acute coronary syndrome, pulmonary embolism, pneumonia, pneumothorax, electrolyte abnormalities, dehydration, musculoskeletal pain, anxiety, mass  Independently Interpreted Test(s):   I have ordered and independently interpreted EKG Reading(s) - see summary below       <> Summary of EKG Reading(s): EKG is normal sinus rate and rhythm, no ST changes  Clinical Tests:   Lab Tests: Reviewed and Ordered  Radiological Study: Ordered and Reviewed  Medical Tests: Reviewed and Ordered  ED Management:  Patient's initial screening EKG, troponin are normal.  BNP is slightly elevated.  CT of the chest without contrast reveals an inflammatory process on the right side where she is having discomfort.  Lung scan is pending at this time.  Patient's INR is 2.0 which makes pulmonary embolism less likely.  Patient will be admitted to the hospital for further cardiac evaluation and treatment and evaluation of the right sided inflammatory process.  Will cover patient with Levaquin.  Blood cultures have been sent.  Lactate and procalcitonin are pending.  Patient remains clinically stable.                             Clinical Impression:       ICD-10-CM ICD-9-CM   1. Pneumonia due to infectious organism, unspecified laterality, unspecified part of lung  J18.9 486   2. Chest pain  R07.9 786.50                          ED Disposition Condition    Admit                             Dima Daugherty MD  11/01/20  2017

## 2020-11-02 NOTE — PLAN OF CARE
Medicare Outpatient Observation Notice was signed, explained and given to patient/caregiver on 11/02/2020 at 8:38am     answered all questions

## 2020-11-02 NOTE — SUBJECTIVE & OBJECTIVE
Past Medical History:   Diagnosis Date    ALLERGIC RHINITIS     Anemia     Arthritis     Back pain     Bronchitis     Cataract     OU    Cholelithiasis     COPD (chronic obstructive pulmonary disease)     Degenerative disc disease     Diabetes mellitus     pre diabetic    Diverticulosis     DVT (deep venous thrombosis)     Edema     Encounter for blood transfusion 1979    Fibromyalgia     Glaucoma     Gout     Hx of colonic polyps     Hyperlipidemia     Hypertension     Incontinence     Osteoporosis     Reflux     Refusal of blood transfusions as patient is Muslim     Sleep apnea     non compliant with CPAP    Vestibulitis of ear        Past Surgical History:   Procedure Laterality Date    ANGIOGRAPHY OF LOWER EXTREMITY N/A 7/31/2019    Procedure: ANGIOGRAM, LOWER EXTREMITY;  Surgeon: Gino Arana MD;  Location: [facility] CATH/EP LAB;  Service: General;  Laterality: N/A;    ASPIRATION OF SOFT TISSUE Left 10/15/2020    Procedure: ASPIRATION, SOFT TISSUE;  Surgeon: [facility];  Location: [facility] OR;  Service: General;  Laterality: Left;    COLONOSCOPY N/A 8/13/2020    Procedure: COLONOSCOPY;  Surgeon: Sue Nuñez MD;  Location: [facility] ENDO;  Service: Endoscopy;  Laterality: N/A;    ESOPHAGOGASTRODUODENOSCOPY N/A 8/12/2020    Procedure: EGD (ESOPHAGOGASTRODUODENOSCOPY);  Surgeon: Sue Nuñez MD;  Location: [facility] ENDO;  Service: Endoscopy;  Laterality: N/A;    HIP SURGERY      HYSTERECTOMY      INTRALUMINAL GASTROINTESTINAL TRACT IMAGING VIA CAPSULE N/A 9/9/2020    Procedure: IMAGING PROCEDURE, GI TRACT, INTRALUMINAL, VIA CAPSULE;  Surgeon: Sue Nuñez MD;  Location: [facility] ENDO;  Service: Endoscopy;  Laterality: N/A;    JOINT REPLACEMENT      B total hip    LAPAROSCOPIC CHOLECYSTECTOMY N/A 7/13/2020    Procedure: CHOLECYSTECTOMY, LAPAROSCOPIC;  Surgeon: Jomar Mcdonald MD;  Location: [facility] OR;  Service: General;  Laterality: N/A;     TRANSFORAMINAL EPIDURAL INJECTION OF STEROID Left 6/30/2020    Procedure: Injection,steroid,epidural,transforaminal approach;  Surgeon: Matt Gilliam MD;  Location: [facility] OR;  Service: Pain Management;  Laterality: Left;  L2-3, L3-4       Review of patient's allergies indicates:   Allergen Reactions    Daptomycin Other (See Comments)     Kidney damage     Cymbalta [duloxetine] Other (See Comments)     Nightmares      Darvon [propoxyphene] Nausea Only and Other (See Comments)     Sweating, slept for 3 days    Atorvastatin Other (See Comments)     Muscle cramps    Naprosyn [naproxen] Nausea Only    Penicillins Rash    Tramadol Nausea Only and Palpitations       Current Facility-Administered Medications on File Prior to Encounter   Medication    lactated ringers infusion    lidocaine (PF) 10 mg/ml (1%) injection 10 mg     Current Outpatient Medications on File Prior to Encounter   Medication Sig    albuterol-ipratropium (DUO-NEB) 2.5 mg-0.5 mg/3 mL nebulizer solution Take 3 mLs by nebulization every 8 (eight) hours.    doxycycline (VIBRA-TABS) 100 MG tablet Take 100 mg by mouth every 12 (twelve) hours.     esomeprazole (NEXIUM) 20 MG capsule Take 1 capsule (20 mg total) by mouth before breakfast.    fluticasone-salmeterol 230-21 mcg/dose (ADVAIR HFA) 230-21 mcg/actuation HFAA inhaler Inhale 2 puffs into the lungs 2 (two) times daily. Controller    furosemide (LASIX) 20 MG tablet Take 1 tablet (20 mg total) by mouth daily as needed (edema).    HYDROcodone-acetaminophen (NORCO) 5-325 mg per tablet Take 1 tablet by mouth every 8 (eight) hours as needed for Pain. Medical necessary for greater than 7 days for chronic pain.    metoprolol succinate (TOPROL-XL) 50 MG 24 hr tablet Take 1 tablet (50 mg total) by mouth once daily.    montelukast (SINGULAIR) 10 mg tablet Take 1 tablet (10 mg total) by mouth every evening.    multivitamin capsule Take 1 capsule by mouth once daily.    topiramate (TOPAMAX) 25 MG  tablet 1 tab at night for 5 days then 2 tab at HS.    warfarin (COUMADIN) 5 MG tablet Tulio.e 1-1.5 Tablets QPM as instructed by coumadin clinic    acetaminophen (TYLENOL) 325 MG tablet Take 2 tablets (650 mg total) by mouth every 6 (six) hours as needed (Do not take with any other Tylenol or acetaminophen containing products).    ascorbic acid, vitamin C, (VITAMIN C) 100 MG tablet Take 5 tablets (500 mg total) by mouth every evening.    budesonide-formoterol 160-4.5 mcg (SYMBICORT) 160-4.5 mcg/actuation HFAA Inhale 2 puffs into the lungs every 12 (twelve) hours. Controller    cyanocobalamin, vitamin B-12, 2,500 mcg Lozg Place 2 tablets under the tongue once daily.    fluticasone propionate (FLONASE) 50 mcg/actuation nasal spray 2 sprays (100 mcg total) by Each Nostril route once daily.    folic acid (FOLVITE) 1 MG tablet Take 1 tablet (1 mg total) by mouth once daily.    hydrALAZINE (APRESOLINE) 50 MG tablet Take 1 tablet (50 mg total) by mouth every 8 (eight) hours.    ondansetron (ZOFRAN-ODT) 4 MG TbDL Take 1 tablet (4 mg total) by mouth every 8 (eight) hours as needed.    [DISCONTINUED] enoxaparin (LOVENOX) 80 mg/0.8 mL Syrg Inject 0.8 mLs (80 mg total) into the skin 2 (two) times a day.     Family History     Problem Relation (Age of Onset)    Arthritis Son    Bipolar disorder Sister    Cancer Sister    Diabetes Sister    Early death Son (48)    Hypertension Mother, Sister, Father, Brother, Son    Obesity Son    Peripheral vascular disease Sister, Brother    Stomach cancer Sister    Stroke Father, Brother        Tobacco Use    Smoking status: Former Smoker     Packs/day: 0.25     Years: 5.00     Pack years: 1.25     Quit date: 1972     Years since quittin.8    Smokeless tobacco: Never Used   Substance and Sexual Activity    Alcohol use: Yes     Alcohol/week: 0.0 standard drinks     Comment: Rarely    Drug use: Not Currently    Sexual activity: Not on file       Objective:     Vital Signs  (Most Recent):  Temp: 97.7 °F (36.5 °C) (11/01/20 1800)  Pulse: 71 (11/01/20 2159)  Resp: 20 (11/01/20 2159)  BP: (!) 164/68 (11/01/20 2001)  SpO2: 97 % (11/01/20 2159) Vital Signs (24h Range):  Temp:  [97.7 °F (36.5 °C)] 97.7 °F (36.5 °C)  Pulse:  [66-90] 71  Resp:  [16-31] 20  SpO2:  [97 %-99 %] 97 %  BP: (164-223)/(68-99) 164/68     Weight: 96.2 kg (212 lb)  Body mass index is 37.55 kg/m².    Physical Exam  Vitals signs and nursing note reviewed.   Constitutional:       General: She is not in acute distress.     Appearance: She is well-developed. She is obese.   HENT:      Head: Normocephalic and atraumatic.   Eyes:      General: No scleral icterus.     Conjunctiva/sclera: Conjunctivae normal.   Neck:      Musculoskeletal: Neck supple.      Thyroid: No thyromegaly.   Cardiovascular:      Rate and Rhythm: Normal rate and regular rhythm.      Heart sounds: Normal heart sounds. No murmur.   Pulmonary:      Effort: Pulmonary effort is normal. No respiratory distress.      Breath sounds: Normal breath sounds.   Abdominal:      General: Bowel sounds are normal.      Palpations: Abdomen is soft.      Tenderness: There is no abdominal tenderness.   Musculoskeletal:         General: No deformity.      Right lower leg: Edema present.      Left lower leg: Edema present.   Skin:     General: Skin is warm and dry.      Capillary Refill: Capillary refill takes less than 2 seconds.      Findings: No erythema.   Neurological:      General: No focal deficit present.      Mental Status: She is alert and oriented to person, place, and time.   Psychiatric:         Mood and Affect: Mood normal.         Behavior: Behavior normal.             Significant Labs:   CBC:   Recent Labs   Lab 11/01/20  1805   WBC 8.73   HGB 12.2   HCT 38.1        CMP:   Recent Labs   Lab 11/01/20  1805      K 4.8      CO2 23   *   BUN 34*   CREATININE 1.8*   CALCIUM 9.6   PROT 7.7   ALBUMIN 4.1   BILITOT 0.6   ALKPHOS 58   AST 21    ALT 20   ANIONGAP 12   EGFRNONAA 26.8*     Cardiac Markers:   Recent Labs   Lab 11/01/20  1805   *     Troponin:   Recent Labs   Lab 11/01/20  1805   TROPONINI <0.030       Significant Imaging: I have reviewed all pertinent imaging results/findings within the past 24 hours.     Imaging Results          NM Lung Scan Perfusion Particulate (Final result)  Result time 11/01/20 20:19:00   Procedure changed from NM Lung Scan Ventilation Perfusion     Final result by Matt Gaffney MD (11/01/20 20:19:00)                 Narrative:    EXAM DESCRIPTION: NM LUNG SCAN PERFUSION 11/1/2020 9:29 PM CST    CLINICAL HISTORY: 79 years, Female, PE suspected, high pretest prob    COMPARISON: Recent chest x-ray performed 11/1/2020, recent CT scan of the chest without contrast performed 11/1/2020.    FINDINGS:    Following intravenous administration of 4.8 millicuries of technetium 99-m MAA, perfusion images were acquired in the standard six projections. No inhalation study was performed due to Covid 19 and the medial and unable to sterilize inhalation/gas tubing. Correlation is made with the chest radiograph and CT scan date 11/1/2020.    There is small focal area of filling defect within the lateral right lung apex matching the area of minimal groundglass opacity within the right upper lobe. No additional focal areas of wedge filling defect are identified.    IMPRESSION:    LOW PROBABILITY FOR PULMONARY EMBOLISM.    Electronically signed by:  Matt Gaffney MD  11/1/2020 9:32 PM CST Workstation: [id number]                             CT Chest Without Contrast (Final result)  Result time 11/01/20 19:18:11   Procedure changed from CTA Chest Non-Coronary (PE Study)     Final result by Beti Mercado MD (11/01/20 19:18:11)                 Narrative:    CMS MANDATED QUALITY DATA - CT RADIATION - 436    All CT scans at this facility utilize dose modulation, iterative  reconstruction, and/or weight based dosing when appropriate  to reduce  radiation dose to as low as reasonably achievable.        Reason: Chest pain, history of pulmonary emboli    TECHNIQUE: CT thorax without contrast    COMPARISON: CTA chest dated 7/14/2020    CT THORAX:  There is a 7 mm peripheral groundglass opacity within the posterior  medial right upper lobe. There is atelectasis within the right middle  lobe. There are reticular nodular opacities within the right lung  base. The remainder the lungs are clear. The trachea and bronchi are  normal.    The heart and great vessels are normal. There is no mediastinal or  hilar adenopathy.    The visualized portion of the upper abdomen is unremarkable. There are  degenerative changes of the spine.    IMPRESSION:  7 mm pleural-based groundglass opacity in the right upper lobe with  minimal reticular nodular opacities in the right lung base and  atelectasis in the right middle lobe. Findings are nonspecific and may  be secondary to infectious or inflammatory process    Electronically Signed by Beti Mercado M.D. on 11/1/2020 7:34 PM                             X-Ray Chest AP Portable (Final result)  Result time 11/01/20 18:19:44    Final result by Beti Mercado MD (11/01/20 18:19:44)                 Impression:      No acute cardiopulmonary abnormality.      Electronically signed by: Beti Mercado MD  Date:    11/01/2020  Time:    18:19             Narrative:    EXAMINATION:  XR CHEST AP PORTABLE    CLINICAL HISTORY:  shortness of breath;    FINDINGS:  Portable chest at 18:03 hours is compared to 08/02/2020 shows normal cardiomediastinal silhouette. There is elevation the right hemidiaphragm.    Lungs are clear. Pulmonary vasculature is normal. No acute osseous abnormality.                                EKG: Independently interpreted. Normal sinus rhythm. No acute ST-T wave changes

## 2020-11-03 ENCOUNTER — DOCUMENT SCAN (OUTPATIENT)
Dept: HOME HEALTH SERVICES | Facility: HOSPITAL | Age: 80
End: 2020-11-03
Payer: MEDICARE

## 2020-11-03 ENCOUNTER — TELEPHONE (OUTPATIENT)
Dept: CARDIOLOGY | Facility: CLINIC | Age: 80
End: 2020-11-03

## 2020-11-03 VITALS
RESPIRATION RATE: 18 BRPM | WEIGHT: 214.31 LBS | TEMPERATURE: 97 F | HEART RATE: 53 BPM | HEIGHT: 63 IN | SYSTOLIC BLOOD PRESSURE: 147 MMHG | OXYGEN SATURATION: 98 % | DIASTOLIC BLOOD PRESSURE: 67 MMHG | BODY MASS INDEX: 37.97 KG/M2

## 2020-11-03 LAB
ANION GAP SERPL CALC-SCNC: 10 MMOL/L (ref 8–16)
BASOPHILS # BLD AUTO: 0.02 K/UL (ref 0–0.2)
BASOPHILS NFR BLD: 0.3 % (ref 0–1.9)
BUN SERPL-MCNC: 23 MG/DL (ref 8–23)
CALCIUM SERPL-MCNC: 8.8 MG/DL (ref 8.7–10.5)
CHLORIDE SERPL-SCNC: 109 MMOL/L (ref 95–110)
CO2 SERPL-SCNC: 21 MMOL/L (ref 23–29)
CREAT SERPL-MCNC: 1.2 MG/DL (ref 0.5–1.4)
DIFFERENTIAL METHOD: ABNORMAL
EOSINOPHIL # BLD AUTO: 0.3 K/UL (ref 0–0.5)
EOSINOPHIL NFR BLD: 4.9 % (ref 0–8)
ERYTHROCYTE [DISTWIDTH] IN BLOOD BY AUTOMATED COUNT: 14.6 % (ref 11.5–14.5)
EST. GFR  (AFRICAN AMERICAN): 50.4 ML/MIN/1.73 M^2
EST. GFR  (NON AFRICAN AMERICAN): 43.7 ML/MIN/1.73 M^2
GLUCOSE SERPL-MCNC: 97 MG/DL (ref 70–110)
HCT VFR BLD AUTO: 33.4 % (ref 37–48.5)
HGB BLD-MCNC: 10.2 G/DL (ref 12–16)
IMM GRANULOCYTES # BLD AUTO: 0.01 K/UL (ref 0–0.04)
IMM GRANULOCYTES NFR BLD AUTO: 0.2 % (ref 0–0.5)
INR PPP: 2.3
LYMPHOCYTES # BLD AUTO: 1.9 K/UL (ref 1–4.8)
LYMPHOCYTES NFR BLD: 32.8 % (ref 18–48)
MAGNESIUM SERPL-MCNC: 1.9 MG/DL (ref 1.6–2.6)
MCH RBC QN AUTO: 31.9 PG (ref 27–31)
MCHC RBC AUTO-ENTMCNC: 30.5 G/DL (ref 32–36)
MCV RBC AUTO: 104 FL (ref 82–98)
MONOCYTES # BLD AUTO: 0.6 K/UL (ref 0.3–1)
MONOCYTES NFR BLD: 10.7 % (ref 4–15)
NEUTROPHILS # BLD AUTO: 3 K/UL (ref 1.8–7.7)
NEUTROPHILS NFR BLD: 51.1 % (ref 38–73)
NRBC BLD-RTO: 0 /100 WBC
PLATELET # BLD AUTO: 204 K/UL (ref 150–350)
PMV BLD AUTO: 10.8 FL (ref 9.2–12.9)
POTASSIUM SERPL-SCNC: 4.3 MMOL/L (ref 3.5–5.1)
PROTHROMBIN TIME: 24.6 SEC (ref 10.6–14.8)
RBC # BLD AUTO: 3.2 M/UL (ref 4–5.4)
SODIUM SERPL-SCNC: 140 MMOL/L (ref 136–145)
WBC # BLD AUTO: 5.88 K/UL (ref 3.9–12.7)

## 2020-11-03 PROCEDURE — 90662 IIV NO PRSV INCREASED AG IM: CPT | Performed by: FAMILY MEDICINE

## 2020-11-03 PROCEDURE — 80048 BASIC METABOLIC PNL TOTAL CA: CPT

## 2020-11-03 PROCEDURE — G0008 ADMIN INFLUENZA VIRUS VAC: HCPCS | Performed by: FAMILY MEDICINE

## 2020-11-03 PROCEDURE — 94640 AIRWAY INHALATION TREATMENT: CPT

## 2020-11-03 PROCEDURE — 25000003 PHARM REV CODE 250: Performed by: INTERNAL MEDICINE

## 2020-11-03 PROCEDURE — 85610 PROTHROMBIN TIME: CPT

## 2020-11-03 PROCEDURE — 36415 COLL VENOUS BLD VENIPUNCTURE: CPT

## 2020-11-03 PROCEDURE — 99900035 HC TECH TIME PER 15 MIN (STAT)

## 2020-11-03 PROCEDURE — 94761 N-INVAS EAR/PLS OXIMETRY MLT: CPT

## 2020-11-03 PROCEDURE — 25000242 PHARM REV CODE 250 ALT 637 W/ HCPCS: Performed by: INTERNAL MEDICINE

## 2020-11-03 PROCEDURE — 12000002 HC ACUTE/MED SURGE SEMI-PRIVATE ROOM

## 2020-11-03 PROCEDURE — 83735 ASSAY OF MAGNESIUM: CPT

## 2020-11-03 PROCEDURE — 27000221 HC OXYGEN, UP TO 24 HOURS

## 2020-11-03 PROCEDURE — 85025 COMPLETE CBC W/AUTO DIFF WBC: CPT

## 2020-11-03 PROCEDURE — 90471 IMMUNIZATION ADMIN: CPT | Performed by: FAMILY MEDICINE

## 2020-11-03 PROCEDURE — 63600175 PHARM REV CODE 636 W HCPCS: Performed by: FAMILY MEDICINE

## 2020-11-03 RX ORDER — AMLODIPINE BESYLATE 5 MG/1
5 TABLET ORAL DAILY
Qty: 30 TABLET | Refills: 0 | Status: SHIPPED | OUTPATIENT
Start: 2020-11-04 | End: 2020-11-23

## 2020-11-03 RX ORDER — LEVOFLOXACIN 750 MG/1
750 TABLET ORAL
Qty: 3 TABLET | Refills: 0 | Status: SHIPPED | OUTPATIENT
Start: 2020-11-03 | End: 2020-11-09

## 2020-11-03 RX ADMIN — FLUTICASONE FUROATE AND VILANTEROL TRIFENATATE 1 PUFF: 100; 25 POWDER RESPIRATORY (INHALATION) at 08:11

## 2020-11-03 RX ADMIN — METOPROLOL SUCCINATE 100 MG: 50 TABLET, FILM COATED, EXTENDED RELEASE ORAL at 08:11

## 2020-11-03 RX ADMIN — FOLIC ACID 1 MG: 1 TABLET ORAL at 08:11

## 2020-11-03 RX ADMIN — AMLODIPINE BESYLATE 5 MG: 5 TABLET ORAL at 08:11

## 2020-11-03 RX ADMIN — HYDRALAZINE HYDROCHLORIDE 50 MG: 25 TABLET, FILM COATED ORAL at 05:11

## 2020-11-03 RX ADMIN — IPRATROPIUM BROMIDE AND ALBUTEROL SULFATE 3 ML: .5; 3 SOLUTION RESPIRATORY (INHALATION) at 08:11

## 2020-11-03 RX ADMIN — IPRATROPIUM BROMIDE AND ALBUTEROL SULFATE 3 ML: .5; 3 SOLUTION RESPIRATORY (INHALATION) at 12:11

## 2020-11-03 RX ADMIN — INFLUENZA A VIRUS A/MICHIGAN/45/2015 X-275 (H1N1) ANTIGEN (FORMALDEHYDE INACTIVATED), INFLUENZA A VIRUS A/SINGAPORE/INFIMH-16-0019/2016 IVR-186 (H3N2) ANTIGEN (FORMALDEHYDE INACTIVATED), INFLUENZA B VIRUS B/PHUKET/3073/2013 ANTIGEN (FORMALDEHYDE INACTIVATED), AND INFLUENZA B VIRUS B/MARYLAND/15/2016 BX-69A ANTIGEN (FORMALDEHYDE INACTIVATED) 0.7 ML: 60; 60; 60; 60 INJECTION, SUSPENSION INTRAMUSCULAR at 12:11

## 2020-11-03 RX ADMIN — HYDROCODONE BITARTRATE AND ACETAMINOPHEN 1 TABLET: 5; 325 TABLET ORAL at 05:11

## 2020-11-03 NOTE — PLAN OF CARE
11/03/20 0816   Patient Assessment/Suction   Level of Consciousness (AVPU) alert   Respiratory Effort Unlabored   All Lung Fields Breath Sounds clear   PRE-TX-O2   O2 Device (Oxygen Therapy) nasal cannula   $ Is the patient on Low Flow Oxygen? Yes   Flow (L/min) 1   SpO2 99 %   Pulse Oximetry Type Intermittent   $ Pulse Oximetry - Multiple Charge Pulse Oximetry - Multiple   Pulse (!) 53   Resp 16   Aerosol Therapy   $ Aerosol Therapy Charges Aerosol Treatment   Daily Review of Necessity (SVN) completed   Respiratory Treatment Status (SVN) given   Treatment Route (SVN) mask   Patient Position (SVN) HOB elevated   Post Treatment Assessment (SVN) breath sounds unchanged   Signs of Intolerance (SVN) none   Inhaler   $ Inhaler Charges MDI (Metered Dose Inahler) Treatment   Daily Review of Necessity (Inhaler) completed   Respiratory Treatment Status (Inhaler) given   Treatment Route (Inhaler) mouthpiece   Patient Position (Inhaler) HOB elevated   Post Treatment Assessment (Inhaler) breath sounds unchanged   Signs of Intolerance (Inhaler) none   Respiratory Evaluation   $ Care Plan Tech Time 15 min

## 2020-11-03 NOTE — TELEPHONE ENCOUNTER
----- Message from Oli Williamson sent at 11/3/2020 11:27 AM CST -----  Regarding: np hosp fu  Has appt in December with elton but [facility] is calling to try to get her sooner appt after d/c hospitalist   Dr adriane pastrana would like date soon     DX SOB copd chest discomfort     Please call pt to schedule [phone]

## 2020-11-03 NOTE — TELEPHONE ENCOUNTER
Patient 79-year-old female with history of viral pneumonia.  Patient has negative blood cultures.  Normal white blood cell count.  She has improved dyspnea.  Chest x-ray with ground-glass pattern, suggested a viral etiology.  Levaquin was discontinue due to no indication on viral illness.  Patient should avoid Levaquin due to problems of Coumadin and also Chronic kidney disease disease.  Patient was informed.  Continue present treatment breathing treatments hydration.  Home health for monitoring and also INR in the morning.  I agree with Coumadin clinic follow-up with present med and Coumadin regimen.  Appreciate hospitalist service.

## 2020-11-03 NOTE — NURSING
Pt discharged home per MD order in [facility] VSS. Discharge instructions reviewed with pt, verbalized understanding, no concerns noted. Pt off unit via wheelchair, tolerated well.     Cynthia Matthews  11/03/2020  1:44 PM

## 2020-11-03 NOTE — DISCHARGE SUMMARY
[facility] Medicine  Discharge Summary      Patient Name: Sammi Abreu  MRN: [id number]  Admission Date: 11/1/2020  Hospital Length of Stay: 1 days  Discharge Date and Time: 11/3/2020  1:45 PM  Attending Physician: No att. 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    She was in her usual state of health until about one week ago when she started to experience extreme fatigue associated with exertional dyspnea.  Today she noticed right-sided chest pressure which is worse with exertion prompting her to come to the ED. Symptoms are moderate in nature.  She denies associated fever, chills, cough or lightheadedness/dizziness.  She endorses chronic bilateral lower extremity edema.  She recently finished a course of antibiotics (doxycycline) prescribed by nephrologist  Of note she was recently on enoxaparin bridge to accommodate left hip aspiration.  She has restarted her warfarin about 4 days ago and INR tonight is 2.0.    In the ED:  Hemodynamically stable.  Labs notable for LUIZ on CKD stage 3. Initial troponin within normal limits. CT chest non-contrast with 7 mm GGO in the right upper lobe. Lung scan with low probability for pulmonary embolism.     Rest of the 10 point review of systems is negative except as mentioned above.      * No surgery found *      Hospital Course:   Patient was admitted for right-sided chest pain found to have pneumonia.  Patient was started on IV antibiotics.  Warfarin was held due to interaction with levofloxacin.  Patient's symptoms improved during hospitalization.  Blood cultures remain no growth to date during hospitalization.  Nursing ambulated  patient and patient was breathing comfortably on room air saturating %.  Patient was stable discharge to home with continued home health and instructions to follow-up with the PCP for repeat chest x-ray for clearance of pneumonia.  Patient has home health nurse that checks on patient's INR.  Instructed to hold warfarin until patient has recheck INR and completes levofloxacin at discharge.    General: Patient resting comfortably in no acute distress. Appears as stated age. Calm. Obese  Eyes: EOM intact. No conjunctivae injection. No scleral icterus.  ENT: Hearing grossly intact. No discharge from ears. No nasal discharge.   CVS: RRR.  Lungs: CTA BL, no wheezing or crackles. Good breath sounds. No accessory muscle use. No acute respiratory distress  Neuro: AOx3. GCS 15. Cranial nerves grossly intact. Moves all extremities equally. Follows commands. Responds appropriately      Consults:   Consults (From admission, onward)        Status Ordering Provider     Inpatient consult to Hospitalist  Once     Provider:  Deejay Vincent MD    Acknowledged DEEJAY VINCENT          No new Assessment & Plan notes have been filed under this hospital service since the last note was generated.  Service: Hospital Medicine    Final Active Diagnoses:    Diagnosis Date Noted POA    PRINCIPAL PROBLEM:  Atypical pneumonia [J18.9] 11/01/2020 Yes    Acute renal failure superimposed on stage 3b chronic kidney disease [N17.9, N18.32] 11/01/2020 Yes    Pneumonia due to infectious organism [J18.9] 11/01/2020 Yes    History of pulmonary embolism [Z86.711] 11/01/2020 Yes    CHARLIE (obstructive sleep apnea) [G47.33] 07/13/2020 Yes    GERD (gastroesophageal reflux disease) [K21.9] 04/09/2015 Yes    Diastolic dysfunction, left ventricle [I51.9] 04/09/2015 Yes      Problems Resolved During this Admission:       Discharged Condition: fair    Disposition: [facility]-Health [facility] [place]    Follow Up:  Follow-up Information     Osmani Villatoro MD In 2  weeks.    Specialty: Family Medicine  Why: For check up s/p hospital discharge, Repeat chest X-ray  Contact information:  [address] [facility] [id number] [phone]             [facility].    Specialty: Emergency Medicine  Why: As needed  Contact information:  [address]  [facility] [address] [phone]  Additional information:  1st floor           Jerardo Lara MD.    Specialties: Cardiology, Cardiovascular Disease  Why: Office will call to set an appointment with patient  Contact information:  [address]  [address]  [facility] [address]  [phone]                 Patient Instructions:      Ambulatory referral/consult to Home Health   Standing Status: Future   Referral Priority: Routine Referral Type: Home Health   Referral Reason: Specialty Services Required   Requested Specialty: Home Health Services   Number of Visits Requested: 1     Diet Cardiac     Notify your health care provider if you experience any of the following:  temperature >100.4     Notify your health care provider if you experience any of the following:  persistent nausea and vomiting or diarrhea     Notify your health care provider if you experience any of the following:  severe uncontrolled pain     Notify your health care provider if you experience any of the following:  redness, tenderness, or signs of infection (pain, swelling, redness, odor or green/yellow discharge around incision site)     Notify your health care provider if you experience any of the following:  difficulty breathing or increased cough     Notify your health care provider if you experience any of the following:  severe persistent headache     Notify your health care provider if you experience any of the following:  worsening rash     Notify your health care provider if you experience any of the following:  persistent dizziness, light-headedness, or visual disturbances     Notify your health care provider if you experience any of the  following:  increased confusion or weakness     Activity as tolerated       Significant Diagnostic Studies: Labs:   CMP   Recent Labs   Lab 11/01/20  1805 11/02/20 0523 11/03/20 0519    141 140   K 4.8 4.1 4.3    110 109   CO2 23 22* 21*   * 102 97   BUN 34* 29* 23   CREATININE 1.8* 1.4 1.2   CALCIUM 9.6 8.8 8.8   PROT 7.7  --   --    ALBUMIN 4.1  --   --    BILITOT 0.6  --   --    ALKPHOS 58  --   --    AST 21  --   --    ALT 20  --   --    ANIONGAP 12 9 10   ESTGFRAFRICA 30.9* 41.8* 50.4*   EGFRNONAA 26.8* 36.3* 43.7*    and CBC   Recent Labs   Lab 11/01/20  1805 11/02/20 0523 11/03/20 0519   WBC 8.73 7.61 5.88   HGB 12.2 10.4* 10.2*   HCT 38.1 32.9* 33.4*    199 204       Pending Diagnostic Studies:     None         Medications:  Reconciled Home Medications:      Medication List      START taking these medications    amLODIPine 5 MG tablet  Commonly known as: NORVASC  Take 1 tablet (5 mg total) by mouth once daily.  Start taking on: November 4, 2020     levoFLOXacin 750 MG tablet  Commonly known as: LEVAQUIN  Take 1 tablet (750 mg total) by mouth every 48 hours. for 6 days        CONTINUE taking these medications    acetaminophen 325 MG tablet  Commonly known as: TYLENOL  Take 2 tablets (650 mg total) by mouth every 6 (six) hours as needed (Do not take with any other Tylenol or acetaminophen containing products).     ADVAIR -21 mcg/actuation Hfaa inhaler  Generic drug: fluticasone-salmeterol 230-21 mcg/dose  Inhale 2 puffs into the lungs 2 (two) times daily. Controller     albuterol-ipratropium 2.5 mg-0.5 mg/3 mL nebulizer solution  Commonly known as: DUO-NEB  Take 3 mLs by nebulization every 8 (eight) hours.     ascorbic acid (vitamin C) 100 MG tablet  Commonly known as: VITAMIN C  Take 5 tablets (500 mg total) by mouth every evening.     budesonide-formoterol 160-4.5 mcg 160-4.5 mcg/actuation Hfaa  Commonly known as: SYMBICORT  Inhale 2 puffs into the lungs every 12 (twelve)  hours. Controller     cyanocobalamin (vitamin B-12) 2,500 mcg Lozg  Place 2 tablets under the tongue once daily.     esomeprazole 20 MG capsule  Commonly known as: NEXIUM  Take 1 capsule (20 mg total) by mouth before breakfast.     fluticasone propionate 50 mcg/actuation nasal spray  Commonly known as: FLONASE  2 sprays (100 mcg total) by Each Nostril route once daily.     folic acid 1 MG tablet  Commonly known as: FOLVITE  Take 1 tablet (1 mg total) by mouth once daily.     furosemide 20 MG tablet  Commonly known as: LASIX  Take 1 tablet (20 mg total) by mouth daily as needed (edema).     hydrALAZINE 50 MG tablet  Commonly known as: APRESOLINE  Take 1 tablet (50 mg total) by mouth every 8 (eight) hours.     HYDROcodone-acetaminophen 5-325 mg per tablet  Commonly known as: NORCO  Take 1 tablet by mouth every 8 (eight) hours as needed for Pain. Medical necessary for greater than 7 days for chronic pain.     metoprolol succinate 50 MG 24 hr tablet  Commonly known as: TOPROL-XL  Take 1 tablet (50 mg total) by mouth once daily.     montelukast 10 mg tablet  Commonly known as: SINGULAIR  Take 1 tablet (10 mg total) by mouth every evening.     multivitamin capsule  Take 1 capsule by mouth once daily.     ondansetron 4 MG Tbdl  Commonly known as: ZOFRAN-ODT  Take 1 tablet (4 mg total) by mouth every 8 (eight) hours as needed.     topiramate 25 MG tablet  Commonly known as: TOPAMAX  1 tab at night for 5 days then 2 tab at HS.     warfarin 5 MG tablet  Commonly known as: COUMADIN  Tulio.e 1-1.5 Tablets QPM as instructed by coumadin clinic        STOP taking these medications    doxycycline 100 MG tablet  Commonly known as: VIBRA-TABS            Indwelling Lines/Drains at time of discharge:   Lines/Drains/Airways     None                 Time spent on the discharge of patient: 35 minutes  Patient was seen and examined on the date of discharge and determined to be suitable for discharge.         Jerardo Simmons MD  Department  of Hospital Medicine  [facility]  Date of service: 11/03/2020

## 2020-11-03 NOTE — HOSPITAL COURSE
Patient was admitted for right-sided chest pain found to have pneumonia.  Patient was started on IV antibiotics.  Warfarin was held due to interaction with levofloxacin.  Patient's symptoms improved during hospitalization.  Blood cultures remain no growth to date during hospitalization.  Nursing ambulated patient and patient was breathing comfortably on room air saturating %.  Patient was stable discharge to home with continued home health and instructions to follow-up with the PCP for repeat chest x-ray for clearance of pneumonia.  Patient has home health nurse that checks on patient's INR.  Instructed to hold warfarin until patient has recheck INR and completes levofloxacin at discharge.    General: Patient resting comfortably in no acute distress. Appears as stated age. Calm. Obese  Eyes: EOM intact. No conjunctivae injection. No scleral icterus.  ENT: Hearing grossly intact. No discharge from ears. No nasal discharge.   CVS: RRR.  Lungs: CTA BL, no wheezing or crackles. Good breath sounds. No accessory muscle use. No acute respiratory distress  Neuro: AOx3. GCS 15. Cranial nerves grossly intact. Moves all extremities equally. Follows commands. Responds appropriately

## 2020-11-03 NOTE — PLAN OF CARE
Problem: Fall Injury Risk  Goal: Absence of Fall and Fall-Related Injury  Outcome: Met     Problem: Adult Inpatient Plan of Care  Goal: Plan of Care Review  Outcome: Met  Goal: Patient-Specific Goal (Individualization)  Outcome: Met  Goal: Absence of Hospital-Acquired Illness or Injury  Outcome: Met  Goal: Optimal Comfort and Wellbeing  Outcome: Met  Goal: Readiness for Transition of Care  Outcome: Met  Goal: Rounds/Family Conference  Outcome: Met     Problem: Diabetes Comorbidity  Goal: Blood Glucose Level Within Desired Range  Outcome: Met     Problem: Electrolyte Imbalance (Acute Kidney Injury/Impairment)  Goal: Serum Electrolyte Balance  Outcome: Met     Problem: Fluid Imbalance (Acute Kidney Injury/Impairment)  Goal: Optimal Fluid Balance  Outcome: Met     Problem: Hematologic Alteration (Acute Kidney Injury/Impairment)  Goal: Hemoglobin, Hematocrit and Platelets Within Normal Range  Outcome: Met     Problem: Oral Intake Inadequate (Acute Kidney Injury/Impairment)  Goal: Optimal Nutrition Intake  Outcome: Met     Problem: Renal Function Impairment (Acute Kidney Injury/Impairment)  Goal: Effective Renal Function  Outcome: Met     Problem: Fluid Imbalance (Pneumonia)  Goal: Fluid Balance  Outcome: Met     Problem: Infection (Pneumonia)  Goal: Resolution of Infection Signs/Symptoms  Outcome: Met     Problem: Respiratory Compromise (Pneumonia)  Goal: Effective Oxygenation and Ventilation  Outcome: Met

## 2020-11-03 NOTE — PLAN OF CARE
Discussed with MD. Patient had a missed appointment with Dr. Lara (Cardio). Request to assist with facilitating an appointment sooner than December. Call placed to Dr. Laar's office; spoke with [facility]  - soonest is Dec 9th. Medical information given to secure earlier appt. This info has been sent to the nurses in the office who can help set appointment and will see if any taking new patients can see sooner. The office will call the patient to set the appointment. MD updated. Met with patient at bedside with above information and verbalizes understanding.     D/C orders received. Order to resume home health sent to N via [employer] and instructed patient verbalizes currently receiving [facility] services with [facility].      11/03/20 1219   Discharge Reassessment   Assessment Type Discharge Planning Reassessment   Anticipated Discharge Disposition Home-Health   Post-Acute Status   Post-Acute Authorization Home Health   Home Health Status Referrals Sent  (For resumption with [facility])

## 2020-11-03 NOTE — PHYSICIAN QUERY
PT Name: Sammi Abreu  MR #: [id number]     Documentation Clarification      CDS/: Jonah Contreras               Contact information: [phone]    This form is a permanent document in the medical record.     Query Date: November 3, 2020    By submitting this query, we are merely seeking further clarification of documentation. Please utilize your independent clinical judgment when addressing the question(s) below.    The Medical Record reflects the following:    Supporting Clinical Findings     p/w SOB, rt-sided chest discomfort.../99 on presentation, afebrile, normal WBC, CXR atypical PNA  PMHx: PE, CHARLIE, COPD, HTN, DM, Congregational  ED Dx: PNA...pulmonary embolism less likely.     11/1 Dr. Allen - Atypical pneumonia    Labs notable for LUIZ on CKD stage 3.  Hx COPD    11/2 Dr. 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 Allen's diagnoses of LUIZ on CKD3b and/or COPD.     PROVIDER USE:         Luiz on CKD  No copd

## 2020-11-04 ENCOUNTER — TELEPHONE (OUTPATIENT)
Dept: CARDIOLOGY | Facility: CLINIC | Age: 80
End: 2020-11-04

## 2020-11-04 ENCOUNTER — TELEPHONE (OUTPATIENT)
Dept: FAMILY MEDICINE | Facility: CLINIC | Age: 80
End: 2020-11-04

## 2020-11-04 NOTE — TELEPHONE ENCOUNTER
Patient changed mind as to rescheduling. States she wants to keep appointment fo the 23rd of Nov. WALTER Gonzalez

## 2020-11-04 NOTE — TELEPHONE ENCOUNTER
----- Message from Clara  sent at 2020  2:38 PM CST -----  Regardin week appt  Contact: pt/nurse  Type: Needs Medical Advice    Who Called:      Best Call Back Number:     Additional Information: Requesting a call back from Nurse, regarding pt needs a 2 week hospital fu,please call back with advice

## 2020-11-04 NOTE — TELEPHONE ENCOUNTER
----- Message from An Carl sent at 11/4/2020  2:19 PM CST -----  Regarding: advice  Contact: representative  Type: Needs Medical Advice  Who Called:  [employer] representative-Elida  Symptoms (please be specific):    How long has patient had these symptoms:    Pharmacy name and phone #:    Best Call Back Number: [phone]  Additional Information: [employer] representative states the patient need clarification for rx antibiotic and coumadin.     .

## 2020-11-04 NOTE — TELEPHONE ENCOUNTER
----- Message from Shikha Arreaga MA sent at 11/4/2020  2:50 PM CST -----  Type:  Sooner Apoointment Request    Caller is requesting a sooner appointment.      Name of Caller:  Sammi  When is the first available appointment?  January 19  Symptoms:    Best Call Back Number:  [phone]  Additional Information:  patient had an appointment on October 28 that was canceled, she then had to go into the hospital to be treated.  She is trying to reschedule her October appointment but the first available is January. I did add her to the wait list but patient is requesting to see if there is a sooner appointment.

## 2020-11-05 ENCOUNTER — TELEPHONE (OUTPATIENT)
Dept: HEMATOLOGY/ONCOLOGY | Facility: CLINIC | Age: 80
End: 2020-11-05

## 2020-11-05 NOTE — TELEPHONE ENCOUNTER
Spoke to Sol and she reports pt was just d/c from the hospital with PNA. Blood work faxed over.     ----- Message from Lesli Dutta sent at 11/5/2020 11:33 AM CST -----  Regarding: orders  Contact: [phone]  [place] [facility] gnurse calling to have orders faxed over to her for the pt's appt for labs. Nurse is going to draw them herself to save the pt a trip. Please contact.  Fax [facility]  Fax [phone]

## 2020-11-06 LAB
BACTERIA BLD CULT: NORMAL
BACTERIA BLD CULT: NORMAL

## 2020-11-10 ENCOUNTER — PATIENT OUTREACH (OUTPATIENT)
Dept: ADMINISTRATIVE | Facility: OTHER | Age: 80
End: 2020-11-10

## 2020-11-10 NOTE — PROGRESS NOTES
Chart was reviewed for overdue Proactive [facility] Encounters (OLAYINKA)  topics  Updates were requested from care everywhere  Health Maintenance has been updated  [place] immunization registry triggered

## 2020-11-11 ENCOUNTER — OFFICE VISIT (OUTPATIENT)
Dept: NEUROSURGERY | Facility: CLINIC | Age: 80
End: 2020-11-11
Payer: MEDICARE

## 2020-11-11 VITALS
HEIGHT: 63 IN | SYSTOLIC BLOOD PRESSURE: 152 MMHG | WEIGHT: 214.31 LBS | HEART RATE: 62 BPM | BODY MASS INDEX: 37.97 KG/M2 | DIASTOLIC BLOOD PRESSURE: 63 MMHG

## 2020-11-11 DIAGNOSIS — M48.062 LUMBAR STENOSIS WITH NEUROGENIC CLAUDICATION: ICD-10-CM

## 2020-11-11 DIAGNOSIS — M51.36 DDD (DEGENERATIVE DISC DISEASE), LUMBAR: Primary | ICD-10-CM

## 2020-11-11 DIAGNOSIS — M47.819 FACET ARTHROPATHY OF SPINE: ICD-10-CM

## 2020-11-11 DIAGNOSIS — M43.14 SPONDYLOLISTHESIS OF THORACIC REGION: ICD-10-CM

## 2020-11-11 PROCEDURE — 99204 OFFICE O/P NEW MOD 45 MIN: CPT | Mod: S$GLB,,, | Performed by: STUDENT IN AN ORGANIZED HEALTH CARE EDUCATION/TRAINING PROGRAM

## 2020-11-11 PROCEDURE — 3078F DIAST BP <80 MM HG: CPT | Mod: CPTII,S$GLB,, | Performed by: STUDENT IN AN ORGANIZED HEALTH CARE EDUCATION/TRAINING PROGRAM

## 2020-11-11 PROCEDURE — 1101F PR PT FALLS ASSESS DOC 0-1 FALLS W/OUT INJ PAST YR: ICD-10-PCS | Mod: CPTII,S$GLB,, | Performed by: STUDENT IN AN ORGANIZED HEALTH CARE EDUCATION/TRAINING PROGRAM

## 2020-11-11 PROCEDURE — 99204 PR OFFICE/OUTPT VISIT, NEW, LEVL IV, 45-59 MIN: ICD-10-PCS | Mod: S$GLB,,, | Performed by: STUDENT IN AN ORGANIZED HEALTH CARE EDUCATION/TRAINING PROGRAM

## 2020-11-11 PROCEDURE — 3077F PR MOST RECENT SYSTOLIC BLOOD PRESSURE >= 140 MM HG: ICD-10-PCS | Mod: CPTII,S$GLB,, | Performed by: STUDENT IN AN ORGANIZED HEALTH CARE EDUCATION/TRAINING PROGRAM

## 2020-11-11 PROCEDURE — 1159F MED LIST DOCD IN RCRD: CPT | Mod: S$GLB,,, | Performed by: STUDENT IN AN ORGANIZED HEALTH CARE EDUCATION/TRAINING PROGRAM

## 2020-11-11 PROCEDURE — 1125F AMNT PAIN NOTED PAIN PRSNT: CPT | Mod: S$GLB,,, | Performed by: STUDENT IN AN ORGANIZED HEALTH CARE EDUCATION/TRAINING PROGRAM

## 2020-11-11 PROCEDURE — 1125F PR PAIN SEVERITY QUANTIFIED, PAIN PRESENT: ICD-10-PCS | Mod: S$GLB,,, | Performed by: STUDENT IN AN ORGANIZED HEALTH CARE EDUCATION/TRAINING PROGRAM

## 2020-11-11 PROCEDURE — 3078F PR MOST RECENT DIASTOLIC BLOOD PRESSURE < 80 MM HG: ICD-10-PCS | Mod: CPTII,S$GLB,, | Performed by: STUDENT IN AN ORGANIZED HEALTH CARE EDUCATION/TRAINING PROGRAM

## 2020-11-11 PROCEDURE — 99499 UNLISTED E&M SERVICE: CPT | Mod: S$GLB,,, | Performed by: STUDENT IN AN ORGANIZED HEALTH CARE EDUCATION/TRAINING PROGRAM

## 2020-11-11 PROCEDURE — 3077F SYST BP >= 140 MM HG: CPT | Mod: CPTII,S$GLB,, | Performed by: STUDENT IN AN ORGANIZED HEALTH CARE EDUCATION/TRAINING PROGRAM

## 2020-11-11 PROCEDURE — 99499 RISK ADDL DX/OHS AUDIT: ICD-10-PCS | Mod: S$GLB,,, | Performed by: STUDENT IN AN ORGANIZED HEALTH CARE EDUCATION/TRAINING PROGRAM

## 2020-11-11 PROCEDURE — 1101F PT FALLS ASSESS-DOCD LE1/YR: CPT | Mod: CPTII,S$GLB,, | Performed by: STUDENT IN AN ORGANIZED HEALTH CARE EDUCATION/TRAINING PROGRAM

## 2020-11-11 PROCEDURE — 1159F PR MEDICATION LIST DOCUMENTED IN MEDICAL RECORD: ICD-10-PCS | Mod: S$GLB,,, | Performed by: STUDENT IN AN ORGANIZED HEALTH CARE EDUCATION/TRAINING PROGRAM

## 2020-11-11 NOTE — PROGRESS NOTES
[facility] - Neurosurgery  Clinic Consult     Consult Requested By: Osmani Villatoro MD  PCP: Osmani Villatoro MD    SUBJECTIVE:     Chief Complaint:   Chief Complaint   Patient presents with    Back Pain     Patient reports low back pain that started 6-7 monthes ago; patient reports back problems over the years; pain 5/10       History of Present Illness:  Sammi Abreu is a 79 y.o. female with HTN, HLD, DM, COPD and recent DVT/PE on coumadin and pneumonia who presents for evaluation of left anterior thigh pain. Patient reports onset in June 2020. She states she has been to multiple providers and feels as though she has been misdiagnosed. She states she was evaluated by orthopedics to rule out infected hip hardware as a source of her pain. She went to the ED multiple times with this complaint. She reports being diagnosed with gall stones. She initially refused surgery, but did proceed with cholecystectomy. She unfortunately developed DVTs and acute PE post operatively and was hospitalized. She also reports recent hospitalization for pneumonia.     She has a history of back problems for several years. She states injections helped in the past. She reports onset of left anterior thigh pain in June 2020. She started using a walker because she was having difficulty walking. Even with the walker, she can only walk very short distances without significant leg pain. She also has breathing difficulties now after PE and pneumonia. She states she would like to discuss treatment options, but is unsure if she would want to go through with surgery given her recent history.     Pertinent and recent history, provider evaluations, imaging and data reviewed in EPIC        Past Medical History:   Diagnosis Date    ALLERGIC RHINITIS     Anemia     Arthritis     Back pain     Bronchitis     Cataract     OU    Cholelithiasis     COPD (chronic obstructive pulmonary disease)     Degenerative disc disease     Diabetes mellitus      pre diabetic    Diverticulosis     DVT (deep venous thrombosis)     Edema     Encounter for blood transfusion 1979    Fibromyalgia     Glaucoma     Gout     Hx of colonic polyps     Hyperlipidemia     Hypertension     Incontinence     Osteoporosis     Reflux     Refusal of blood transfusions as patient is Protestant     Sleep apnea     non compliant with CPAP    Vestibulitis of ear      Past Surgical History:   Procedure Laterality Date    ANGIOGRAPHY OF LOWER EXTREMITY N/A 7/31/2019    Procedure: ANGIOGRAM, LOWER EXTREMITY;  Surgeon: Gino Arana MD;  Location: [facility] CATH/EP LAB;  Service: General;  Laterality: N/A;    ASPIRATION OF SOFT TISSUE Left 10/15/2020    Procedure: ASPIRATION, SOFT TISSUE;  Surgeon: Dosc Diagnostic Provider;  Location: [facility] OR;  Service: General;  Laterality: Left;    COLONOSCOPY N/A 8/13/2020    Procedure: COLONOSCOPY;  Surgeon: Sue Nuñez MD;  Location: [facility] ENDO;  Service: Endoscopy;  Laterality: N/A;    ESOPHAGOGASTRODUODENOSCOPY N/A 8/12/2020    Procedure: EGD (ESOPHAGOGASTRODUODENOSCOPY);  Surgeon: Sue Nuñez MD;  Location: [facility] ENDO;  Service: Endoscopy;  Laterality: N/A;    HIP SURGERY      HYSTERECTOMY      INTRALUMINAL GASTROINTESTINAL TRACT IMAGING VIA CAPSULE N/A 9/9/2020    Procedure: IMAGING PROCEDURE, GI TRACT, INTRALUMINAL, VIA CAPSULE;  Surgeon: Sue Nuñez MD;  Location: [facility] ENDO;  Service: Endoscopy;  Laterality: N/A;    JOINT REPLACEMENT      B total hip    LAPAROSCOPIC CHOLECYSTECTOMY N/A 7/13/2020    Procedure: CHOLECYSTECTOMY, LAPAROSCOPIC;  Surgeon: Jomar Mcdonald MD;  Location: [facility] OR;  Service: General;  Laterality: N/A;    TRANSFORAMINAL EPIDURAL INJECTION OF STEROID Left 6/30/2020    Procedure: Injection,steroid,epidural,transforaminal approach;  Surgeon: Matt Gilliam MD;  Location: [facility] OR;  Service: Pain Management;  Laterality: Left;  L2-3, L3-4     Family History   Problem Relation Age of  Onset    Hypertension Mother     Diabetes Sister     Cancer Sister     Stomach cancer Sister     Hypertension Sister     Bipolar disorder Sister     Peripheral vascular disease Sister     Stroke Father     Hypertension Father     Hypertension Brother     Stroke Brother     Peripheral vascular disease Brother     Hypertension Son     Obesity Son     Early death Son 48    Arthritis Son     Glaucoma Neg Hx     Macular degeneration Neg Hx     Retinal detachment Neg Hx      Social History     Tobacco Use    Smoking status: Former Smoker     Packs/day: 0.25     Years: 5.00     Pack years: 1.25     Quit date: 1972     Years since quittin.8    Smokeless tobacco: Never Used   Substance Use Topics    Alcohol use: Yes     Alcohol/week: 0.0 standard drinks     Comment: Rarely    Drug use: Not Currently      Review of patient's allergies indicates:   Allergen Reactions    Daptomycin Other (See Comments)     Kidney damage     Cymbalta [duloxetine] Other (See Comments)     Nightmares      Darvon [propoxyphene] Nausea Only and Other (See Comments)     Sweating, slept for 3 days    Atorvastatin Other (See Comments)     Muscle cramps    Naprosyn [naproxen] Nausea Only    Penicillins Rash    Tramadol Nausea Only and Palpitations       Current Outpatient Medications:     acetaminophen (TYLENOL) 325 MG tablet, Take 2 tablets (650 mg total) by mouth every 6 (six) hours as needed (Do not take with any other Tylenol or acetaminophen containing products)., Disp: , Rfl: 0    amLODIPine (NORVASC) 5 MG tablet, Take 1 tablet (5 mg total) by mouth once daily., Disp: 30 tablet, Rfl: 0    ascorbic acid, vitamin C, (VITAMIN C) 100 MG tablet, Take 5 tablets (500 mg total) by mouth every evening., Disp: , Rfl:     cyanocobalamin, vitamin B-12, 2,500 mcg Lozg, Place 2 tablets under the tongue once daily., Disp: 60 lozenge, Rfl: 11    esomeprazole (NEXIUM) 20 MG capsule, Take 1 capsule (20 mg total) by mouth  before breakfast., Disp: 30 capsule, Rfl: 2    fluticasone propionate (FLONASE) 50 mcg/actuation nasal spray, 2 sprays (100 mcg total) by Each Nostril route once daily., Disp: 48 g, Rfl: 3    fluticasone-salmeterol 230-21 mcg/dose (ADVAIR HFA) 230-21 mcg/actuation HFAA inhaler, Inhale 2 puffs into the lungs 2 (two) times daily. Controller, Disp: 12 g, Rfl: 11    furosemide (LASIX) 20 MG tablet, Take 1 tablet (20 mg total) by mouth daily as needed (edema)., Disp: 30 tablet, Rfl: 11    hydrALAZINE (APRESOLINE) 50 MG tablet, Take 1 tablet (50 mg total) by mouth every 8 (eight) hours., Disp: 90 tablet, Rfl: 1    HYDROcodone-acetaminophen (NORCO) 5-325 mg per tablet, Take 1 tablet by mouth every 8 (eight) hours as needed for Pain. Medical necessary for greater than 7 days for chronic pain., Disp: 90 tablet, Rfl: 0    metoprolol succinate (TOPROL-XL) 50 MG 24 hr tablet, Take 1 tablet (50 mg total) by mouth once daily., Disp: 90 tablet, Rfl: 3    montelukast (SINGULAIR) 10 mg tablet, Take 1 tablet (10 mg total) by mouth every evening., Disp: 90 tablet, Rfl: 3    multivitamin capsule, Take 1 capsule by mouth once daily., Disp: , Rfl:     ondansetron (ZOFRAN-ODT) 4 MG TbDL, Take 1 tablet (4 mg total) by mouth every 8 (eight) hours as needed., Disp: 20 tablet, Rfl: 0    topiramate (TOPAMAX) 25 MG tablet, 1 tab at night for 5 days then 2 tab at HS., Disp: 60 tablet, Rfl: 11    warfarin (COUMADIN) 5 MG tablet, Tulio.e 1-1.5 Tablets QPM as instructed by coumadin clinic, Disp: 90 tablet, Rfl: 3    albuterol-ipratropium (DUO-NEB) 2.5 mg-0.5 mg/3 mL nebulizer solution, Take 3 mLs by nebulization every 8 (eight) hours., Disp: 270 mL, Rfl: 0    budesonide-formoterol 160-4.5 mcg (SYMBICORT) 160-4.5 mcg/actuation HFAA, Inhale 2 puffs into the lungs every 12 (twelve) hours. Controller, Disp: 3 Inhaler, Rfl: 3    folic acid (FOLVITE) 1 MG tablet, Take 1 tablet (1 mg total) by mouth once daily., Disp: 30 tablet, Rfl: 0  No  "current facility-administered medications for this visit.     Facility-Administered Medications Ordered in Other Visits:     lactated ringers infusion, , Intravenous, Continuous, Gino Reid MD, Last Rate: 10 mL/hr at 07/13/20 1308    lidocaine (PF) 10 mg/ml (1%) injection 10 mg, 1 mL, Intradermal, Once, Gino Reid MD    Review of Systems:   Constitutional: no fever, chills or night sweats. No changes in weight   Eyes: no visual changes   ENT: no nasal congestion or sore throat   Respiratory: no cough +SOB    Cardiovascular: no chest pain or palpitations   Gastrointestinal: no nausea or vomiting   Genitourinary: no hematuria or dysuria   Integument/Breast: no rash or pruritis   Hematologic/Lymphatic: +anticoagulation   Musculoskeletal: +back pain, leg pain   Neurological: no seizures or tremors   Behavioral/Psych: no auditory or visual hallucinations   Endocrine: no heat or cold intolerance         OBJECTIVE:     Vital Signs (Most Recent):  Pulse: 62 (11/11/20 1326)  BP: (!) 152/63 (11/11/20 1326)  Estimated body mass index is 37.96 kg/m² as calculated from the following:    Height as of this encounter: 5' 3" (1.6 m).    Weight as of this encounter: 97.2 kg (214 lb 4.6 oz).    Physical Exam:   General: well developed, well nourished, no distress.   Neurologic: Alert and oriented. Thought content appropriate. GCS 15.   Language: No aphasia  Speech: No dysarthria  Head: normocephalic, atraumatic  Eyes: EOMI.  Neck: trachea midline, no JVD   Cardiovascular: LE edema  Pulmonary: normal respirations, no signs of respiratory distress  Abdomen: non-distended  Sensory: intact to light touch throughout  Skin: Skin is warm, dry and intact.    Motor Strength: Moves all extremities spontaneously with good tone. No abnormal movements seen.     Strength  Deltoids Triceps Biceps Wrist Extension Wrist Flexion Hand  Interossei   Upper: R 5/5 5/5 5/5 5/5 5/5 5/5 5/5    L 5/5 5/5 5/5 5/5 5/5 5/5 5/5     Iliopsoas " Quadriceps Knee  Flexion Tibialis  anterior Gastro- cnemius EHL    Lower: R 5/5 5/5 5/5 5/5 5/5 5/5     L 5/5 5/5 5/5 5/5 5/5 5/5      DTR's: 0 LE   Clonus: absent  Gait: forward leaning, using walker         Diagnostic Results:  I have independently reviewed the following imaging:  MRI: Reviewed  r   7/20 Thoracic and Lumbar   No deformity or cord compression, significant thoracic stenosis on the thoracic MRI    Regarding the lumbar MRI there is extensive lumbar degeneration throughout the thoracolumbar junction and entire lumbar spine, variable degrees of disc osteophyte complexes facet hypertrophy, no severe central stenosis  Except, most notable level At L3-4 there is facet arthropathy, ligamentum buckling disc osteophyte complex leading to moderate- severe circumferential compression of thecal sac, similar at L4-5 with less severe stenosis    Impression:     1. There is extensive multilevel degenerative disc and facet disease.  There is no definite change in the appearance of the lumbar spine compared to the prior MRI dated 06/07/2020.  There is no fracture.  There are no findings to suggest discitis or osteomyelitis.  There is however some degree of disc space narrowing, disc bulge with osteophytic ridging with a without disc protrusion in addition to facet joint arthropathy contributing to multilevel spinal canal, lateral recess and foraminal stenosis.  These findings are discussed in detail by level above.  2. There is extensive facet joint arthropathy most apparent at the L4-5 level where there is also periarticular edema and mild enhancement        Electronically signed by: Dario Olson MD  Date:                                            07/23/2020  ASSESSMENT/PLAN:     DDD (degenerative disc disease), lumbar    Lumbar stenosis with neurogenic claudication    Facet arthropathy of spine    Spondylolisthesis of thoracic region        Sammi Abreu is a 79 y.o. female  Presents for evaluation of  back and left leg pain.  He has extensive spinal degenerative arthritis throughout her thoracic and lumbar spine  At L4-5 there is facet arthropathy with moderate to severe circumferential stenosis  She has series comorbidities pulmonary, following cholecystectomy this year developed a PE and is anticoagulated on Coumadin  Is a BMI of 38, significant musculoskeletal pain  She has limited mobility uses a rolling walker  Reviewed her imaging.  She is very high risk, uncertain benefit from a focal lumbar decompression due to pain sypmtoms and extensive degeneration  She has had surgery and complications this year and not interested in discussing any more surgery or risk  Very poor surgical and recovery candidate  Has been thoroughly counseled  Will return to pain mg, PT , and rolling walker and ADL support    Patient verbalized understanding of plan. Encouraged to call with any questions or concerns.     This note was partially dictated using voice recognition software, so please excuse any errors that were not corrected.

## 2020-11-11 NOTE — LETTER
November 11, 2020      Osmani Villatoor MD  [address]  [place] [address]           [place]  [place] [address]  Phone: [phone]          Patient: Sammi Abreu   MR Number: [id number]   YOB: 1940   Date of Visit: 11/11/2020       Dear Dr. Osmani Villatoro:    Thank you for referring Sammi Abreu to me for evaluation. Attached you will find relevant portions of my assessment and plan of care.    If you have questions, please do not hesitate to call me. I look forward to following Sammi Abreu along with you.    Sincerely,    Mars Hidalgo MD    Enclosure  CC:  No Recipients    If you would like to receive this communication electronically, please contact [email or web address] or [phone] to request more information on [employer] access.    For providers and/or their staff who would like to refer a patient to [facility], please contact us through our one-stop-shop provider referral line, Trav[facility] Love, at [phone].    If you feel you have received this communication in error or would no longer like to receive these types of communications, please e-mail [email or web address]

## 2020-11-12 ENCOUNTER — OFFICE VISIT (OUTPATIENT)
Dept: HEMATOLOGY/ONCOLOGY | Facility: CLINIC | Age: 80
End: 2020-11-12
Payer: MEDICARE

## 2020-11-12 ENCOUNTER — DOCUMENT SCAN (OUTPATIENT)
Dept: HOME HEALTH SERVICES | Facility: HOSPITAL | Age: 80
End: 2020-11-12
Payer: MEDICARE

## 2020-11-12 VITALS
DIASTOLIC BLOOD PRESSURE: 67 MMHG | HEART RATE: 67 BPM | SYSTOLIC BLOOD PRESSURE: 159 MMHG | HEIGHT: 63 IN | OXYGEN SATURATION: 99 % | BODY MASS INDEX: 37.34 KG/M2 | TEMPERATURE: 98 F | WEIGHT: 210.75 LBS | RESPIRATION RATE: 18 BRPM

## 2020-11-12 DIAGNOSIS — I82.621 ACUTE DEEP VEIN THROMBOSIS (DVT) OF BRACHIAL VEIN OF RIGHT UPPER EXTREMITY: Primary | ICD-10-CM

## 2020-11-12 PROCEDURE — 99999 PR PBB SHADOW E&M-EST. PATIENT-LVL IV: CPT | Mod: PBBFAC,,, | Performed by: INTERNAL MEDICINE

## 2020-11-12 PROCEDURE — 99999 PR PBB SHADOW E&M-EST. PATIENT-LVL IV: ICD-10-PCS | Mod: PBBFAC,,, | Performed by: INTERNAL MEDICINE

## 2020-11-12 PROCEDURE — 3078F DIAST BP <80 MM HG: CPT | Mod: CPTII,S$GLB,, | Performed by: INTERNAL MEDICINE

## 2020-11-12 PROCEDURE — 3077F SYST BP >= 140 MM HG: CPT | Mod: CPTII,S$GLB,, | Performed by: INTERNAL MEDICINE

## 2020-11-12 PROCEDURE — 1125F PR PAIN SEVERITY QUANTIFIED, PAIN PRESENT: ICD-10-PCS | Mod: S$GLB,,, | Performed by: INTERNAL MEDICINE

## 2020-11-12 PROCEDURE — 3288F FALL RISK ASSESSMENT DOCD: CPT | Mod: CPTII,S$GLB,, | Performed by: INTERNAL MEDICINE

## 2020-11-12 PROCEDURE — 1101F PT FALLS ASSESS-DOCD LE1/YR: CPT | Mod: CPTII,S$GLB,, | Performed by: INTERNAL MEDICINE

## 2020-11-12 PROCEDURE — 1101F PR PT FALLS ASSESS DOC 0-1 FALLS W/OUT INJ PAST YR: ICD-10-PCS | Mod: CPTII,S$GLB,, | Performed by: INTERNAL MEDICINE

## 2020-11-12 PROCEDURE — 99213 PR OFFICE/OUTPT VISIT, EST, LEVL III, 20-29 MIN: ICD-10-PCS | Mod: S$GLB,,, | Performed by: INTERNAL MEDICINE

## 2020-11-12 PROCEDURE — 1125F AMNT PAIN NOTED PAIN PRSNT: CPT | Mod: S$GLB,,, | Performed by: INTERNAL MEDICINE

## 2020-11-12 PROCEDURE — 1159F MED LIST DOCD IN RCRD: CPT | Mod: S$GLB,,, | Performed by: INTERNAL MEDICINE

## 2020-11-12 PROCEDURE — 1159F PR MEDICATION LIST DOCUMENTED IN MEDICAL RECORD: ICD-10-PCS | Mod: S$GLB,,, | Performed by: INTERNAL MEDICINE

## 2020-11-12 PROCEDURE — 1157F PR ADVANCE CARE PLAN OR EQUIV PRESENT IN MEDICAL RECORD: ICD-10-PCS | Mod: S$GLB,,, | Performed by: INTERNAL MEDICINE

## 2020-11-12 PROCEDURE — 3078F PR MOST RECENT DIASTOLIC BLOOD PRESSURE < 80 MM HG: ICD-10-PCS | Mod: CPTII,S$GLB,, | Performed by: INTERNAL MEDICINE

## 2020-11-12 PROCEDURE — 3077F PR MOST RECENT SYSTOLIC BLOOD PRESSURE >= 140 MM HG: ICD-10-PCS | Mod: CPTII,S$GLB,, | Performed by: INTERNAL MEDICINE

## 2020-11-12 PROCEDURE — 99213 OFFICE O/P EST LOW 20 MIN: CPT | Mod: S$GLB,,, | Performed by: INTERNAL MEDICINE

## 2020-11-12 PROCEDURE — 3288F PR FALLS RISK ASSESSMENT DOCUMENTED: ICD-10-PCS | Mod: CPTII,S$GLB,, | Performed by: INTERNAL MEDICINE

## 2020-11-12 PROCEDURE — 1157F ADVNC CARE PLAN IN RCRD: CPT | Mod: S$GLB,,, | Performed by: INTERNAL MEDICINE

## 2020-11-12 NOTE — PROGRESS NOTES
HPI    From initial consultation. This is 79 years old female Jehovah Witness.  Extensive pulmonary embolism upper extremity DVT failure of Eliquis currently on Coumadin and follow by Coumadin clinic.  Patient also had a bacteremia septic joint require IV antibiotics.  Now patient has been on Coumadin follows the Coumadin Clinic.      Interval history  Recently patient had a hip aspiration require Lovenox temporarily.  Now patient has been transition back to Coumadin follows the Coumadin Clinic.  No acute complaints on this visit.  Patient denies chest pain shortness breath.  Patient denies any abdominal pain nausea vomiting or diarrhea.  No fever no chills.        Lifetime Dose Tracking   No doses have been documented on this patient for the following tracked chemicals: doxorubicin, epirubicin, idarubicin, daunorubicin, mitoxantrone, bleomycin, mitomycin, busulfan     Past Medical History:   Diagnosis Date    ALLERGIC RHINITIS     Anemia     Arthritis     Back pain     Bronchitis     Cataract     OU    Cholelithiasis     COPD (chronic obstructive pulmonary disease)     Degenerative disc disease     Diabetes mellitus     pre diabetic    Diverticulosis     DVT (deep venous thrombosis)     Edema     Encounter for blood transfusion 1979    Fibromyalgia     Glaucoma     Gout     Hx of colonic polyps     Hyperlipidemia     Hypertension     Incontinence     Osteoporosis     Reflux     Refusal of blood transfusions as patient is Islam     Sleep apnea     non compliant with CPAP    Vestibulitis of ear        Family History   Problem Relation Age of Onset    Hypertension Mother     Diabetes Sister     Cancer Sister     Stomach cancer Sister     Hypertension Sister     Bipolar disorder Sister     Peripheral vascular disease Sister     Stroke Father     Hypertension Father     Hypertension Brother     Stroke Brother     Peripheral vascular disease Brother     Hypertension Son      Obesity Son     Early death Son 48    Arthritis Son     Glaucoma Neg Hx     Macular degeneration Neg Hx     Retinal detachment Neg Hx        Past Surgical History:   Procedure Laterality Date    ANGIOGRAPHY OF LOWER EXTREMITY N/A 7/31/2019    Procedure: ANGIOGRAM, LOWER EXTREMITY;  Surgeon: Gino Arana MD;  Location[facility] CATH/EP LAB;  Service: General;  Laterality: N/A;    ASPIRATION OF SOFT TISSUE Left 10/15/2020    Procedure: ASPIRATION, SOFT TISSUE;  Surgeon: Dosc Diagnostic Provider;  Location: [facility] OR;  Service: General;  Laterality: Left;    COLONOSCOPY N/A 8/13/2020    Procedure: COLONOSCOPY;  Surgeon: Sue Nñuez MD;  Location: [facility] ENDO;  Service: Endoscopy;  Laterality: N/A;    ESOPHAGOGASTRODUODENOSCOPY N/A 8/12/2020    Procedure: EGD (ESOPHAGOGASTRODUODENOSCOPY);  Surgeon: Sue Nuñez MD;  Location: [facility] ENDO;  Service: Endoscopy;  Laterality: N/A;    HIP SURGERY      HYSTERECTOMY      INTRALUMINAL GASTROINTESTINAL TRACT IMAGING VIA CAPSULE N/A 9/9/2020    Procedure: IMAGING PROCEDURE, GI TRACT, INTRALUMINAL, VIA CAPSULE;  Surgeon: Sue Nuñez MD;  Location: [facility] ENDO;  Service: Endoscopy;  Laterality: N/A;    JOINT REPLACEMENT      B total hip    LAPAROSCOPIC CHOLECYSTECTOMY N/A 7/13/2020    Procedure: CHOLECYSTECTOMY, LAPAROSCOPIC;  Surgeon: Jomar Mcdonald MD;  Location: [facility] OR;  Service: General;  Laterality: N/A;    TRANSFORAMINAL EPIDURAL INJECTION OF STEROID Left 6/30/2020    Procedure: Injection,steroid,epidural,transforaminal approach;  Surgeon: Matt Gilliam MD;  Location: [facility] OR;  Service: Pain Management;  Laterality: Left;  L2-3, L3-4       Social History     Socioeconomic History    Marital status:      Spouse name: Not on file    Number of children: Not on file    Years of education: Not on file    Highest education level: Not on file   Occupational History    Not on file   Social Needs    Financial resource strain: Not on file     Food insecurity     Worry: Not on file     Inability: Not on file    Transportation needs     Medical: Not on file     Non-medical: Not on file   Tobacco Use    Smoking status: Former Smoker     Packs/day: 0.25     Years: 5.00     Pack years: 1.25     Quit date: 1972     Years since quittin.8    Smokeless tobacco: Never Used   Substance and Sexual Activity    Alcohol use: Yes     Alcohol/week: 0.0 standard drinks     Comment: Rarely    Drug use: Not Currently    Sexual activity: Not on file   Lifestyle    Physical activity     Days per week: Not on file     Minutes per session: Not on file    Stress: Not on file   Relationships    Social connections     Talks on phone: Not on file     Gets together: Not on file     Attends Rastafarian service: Not on file     Active member of club or organization: Not on file     Attends meetings of clubs or organizations: Not on file     Relationship status: Not on file   Other Topics Concern    Not on file   Social History Narrative    Not on file         Current Outpatient Medications:     acetaminophen (TYLENOL) 325 MG tablet, Take 2 tablets (650 mg total) by mouth every 6 (six) hours as needed (Do not take with any other Tylenol or acetaminophen containing products)., Disp: , Rfl: 0    albuterol-ipratropium (DUO-NEB) 2.5 mg-0.5 mg/3 mL nebulizer solution, Take 3 mLs by nebulization every 8 (eight) hours., Disp: 270 mL, Rfl: 0    amLODIPine (NORVASC) 5 MG tablet, Take 1 tablet (5 mg total) by mouth once daily., Disp: 30 tablet, Rfl: 0    ascorbic acid, vitamin C, (VITAMIN C) 100 MG tablet, Take 5 tablets (500 mg total) by mouth every evening., Disp: , Rfl:     budesonide-formoterol 160-4.5 mcg (SYMBICORT) 160-4.5 mcg/actuation HFAA, Inhale 2 puffs into the lungs every 12 (twelve) hours. Controller, Disp: 3 Inhaler, Rfl: 3    cyanocobalamin, vitamin B-12, 2,500 mcg Lozg, Place 2 tablets under the tongue once daily., Disp: 60 lozenge, Rfl: 11     esomeprazole (NEXIUM) 20 MG capsule, Take 1 capsule (20 mg total) by mouth before breakfast., Disp: 30 capsule, Rfl: 2    fluticasone propionate (FLONASE) 50 mcg/actuation nasal spray, 2 sprays (100 mcg total) by Each Nostril route once daily., Disp: 48 g, Rfl: 3    fluticasone-salmeterol 230-21 mcg/dose (ADVAIR HFA) 230-21 mcg/actuation HFAA inhaler, Inhale 2 puffs into the lungs 2 (two) times daily. Controller, Disp: 12 g, Rfl: 11    folic acid (FOLVITE) 1 MG tablet, Take 1 tablet (1 mg total) by mouth once daily., Disp: 30 tablet, Rfl: 0    furosemide (LASIX) 20 MG tablet, Take 1 tablet (20 mg total) by mouth daily as needed (edema)., Disp: 30 tablet, Rfl: 11    hydrALAZINE (APRESOLINE) 50 MG tablet, Take 1 tablet (50 mg total) by mouth every 8 (eight) hours., Disp: 90 tablet, Rfl: 1    HYDROcodone-acetaminophen (NORCO) 5-325 mg per tablet, Take 1 tablet by mouth every 8 (eight) hours as needed for Pain. Medical necessary for greater than 7 days for chronic pain., Disp: 90 tablet, Rfl: 0    metoprolol succinate (TOPROL-XL) 50 MG 24 hr tablet, Take 1 tablet (50 mg total) by mouth once daily., Disp: 90 tablet, Rfl: 3    montelukast (SINGULAIR) 10 mg tablet, Take 1 tablet (10 mg total) by mouth every evening., Disp: 90 tablet, Rfl: 3    multivitamin capsule, Take 1 capsule by mouth once daily., Disp: , Rfl:     ondansetron (ZOFRAN-ODT) 4 MG TbDL, Take 1 tablet (4 mg total) by mouth every 8 (eight) hours as needed., Disp: 20 tablet, Rfl: 0    topiramate (TOPAMAX) 25 MG tablet, 1 tab at night for 5 days then 2 tab at HS., Disp: 60 tablet, Rfl: 11    warfarin (COUMADIN) 5 MG tablet, Tulio.e 1-1.5 Tablets QPM as instructed by coumadin clinic, Disp: 90 tablet, Rfl: 3  No current facility-administered medications for this visit.     Facility-Administered Medications Ordered in Other Visits:     lactated ringers infusion, , Intravenous, Continuous, Gino Reid MD, Last Rate: 10 mL/hr at 07/13/20 0637     lidocaine (PF) 10 mg/ml (1%) injection 10 mg, 1 mL, Intradermal, Once, Gino Reid MD    Review of patient's allergies indicates:   Allergen Reactions    Daptomycin Other (See Comments)     Kidney damage     Cymbalta [duloxetine] Other (See Comments)     Nightmares      Darvon [propoxyphene] Nausea Only and Other (See Comments)     Sweating, slept for 3 days    Atorvastatin Other (See Comments)     Muscle cramps    Naprosyn [naproxen] Nausea Only    Penicillins Rash    Tramadol Nausea Only and Palpitations     All medications and past history have been reviewed.    Objective:      Vitals:  Vital Signs (Most Recent):  Temp: 97.7 °F (36.5 °C) (08/13/20 0426)  Pulse: 105 (08/13/20 0736)  Resp: 20 (08/13/20 0736)  BP: (!) 148/94 (08/13/20 0426)  SpO2: 99 % (08/13/20 0736) Vital Signs (24h Range):  Temp:  [96.9 °F (36.1 °C)-99.1 °F (37.3 °C)] 97.7 °F (36.5 °C)  Pulse:  [] 105  Resp:  [16-20] 20  SpO2:  [95 %-100 %] 99 %  BP: (141-199)/(65-95) 148/94       Awake alert no acute distress  Normocephalic atraumatic pupils equal round reactive  Soft nontender nondistended bowel sounds x4  Normal rate  Normal respiratory of  Nonfocal cranial nerves 2-12 grossly intact sensorimotor grossly intact  No rash no lesions   deferred     CMP  Sodium   Date Value Ref Range Status   11/03/2020 140 136 - 145 mmol/L Final     Potassium   Date Value Ref Range Status   11/03/2020 4.3 3.5 - 5.1 mmol/L Final     Chloride   Date Value Ref Range Status   11/03/2020 109 95 - 110 mmol/L Final     CO2   Date Value Ref Range Status   11/03/2020 21 (L) 23 - 29 mmol/L Final     Glucose   Date Value Ref Range Status   11/03/2020 97 70 - 110 mg/dL Final     BUN   Date Value Ref Range Status   11/03/2020 23 8 - 23 mg/dL Final     Creatinine   Date Value Ref Range Status   11/03/2020 1.2 0.5 - 1.4 mg/dL Final     Calcium   Date Value Ref Range Status   11/03/2020 8.8 8.7 - 10.5 mg/dL Final     Total Protein   Date Value Ref Range  Status   11/01/2020 7.7 6.0 - 8.4 g/dL Final     Albumin   Date Value Ref Range Status   11/01/2020 4.1 3.5 - 5.2 g/dL Final     Total Bilirubin   Date Value Ref Range Status   11/01/2020 0.6 0.1 - 1.0 mg/dL Final     Comment:     For infants and newborns, interpretation of results should be based  on gestational age, weight and in agreement with clinical  observations.  Premature Infant recommended reference ranges:  Up to 24 hours.............<8.0 mg/dL  Up to 48 hours............<12.0 mg/dL  3-5 days..................<15.0 mg/dL  6-29 days.................<15.0 mg/dL       Alkaline Phosphatase   Date Value Ref Range Status   11/01/2020 58 55 - 135 U/L Final     AST   Date Value Ref Range Status   11/01/2020 21 10 - 40 U/L Final     ALT   Date Value Ref Range Status   11/01/2020 20 10 - 44 U/L Final     Anion Gap   Date Value Ref Range Status   11/03/2020 10 8 - 16 mmol/L Final     eGFR if    Date Value Ref Range Status   11/03/2020 50.4 (A) >60 mL/min/1.73 m^2 Final     eGFR if non    Date Value Ref Range Status   11/03/2020 43.7 (A) >60 mL/min/1.73 m^2 Final     Comment:     Calculation used to obtain the estimated glomerular filtration  rate (eGFR) is the CKD-EPI equation.        Lab Results   Component Value Date    WBC 5.88 11/03/2020    HGB 10.2 (L) 11/03/2020    HCT 33.4 (L) 11/03/2020     (H) 11/03/2020     11/03/2020         EGD 08/12/2020  Findings:        The esophagus was normal.        A small hiatal hernia was present.        The examined duodenum was normal.   Impression:          - Normal esophagus.                        - Small hiatal hernia.                        - Normal examined duodenum.                        - No specimens collected.                        -History of multifactorial anemia, in part due to                        low iron counts. Patient is a [employer]     Colonoscopy 08/13/2020  Findings:        Multiple small-mouthed  diverticula were found in the sigmoid colon,        descending colon and ascending colon.        The exam was otherwise without abnormality on direct and        retroflexion views.   Impression:          - Diverticulosis in the sigmoid colon, in the                        descending colon and in the ascending colon.                        - The examination was otherwise normal on direct and                        retroflexion views.                        - No specimens collected.                        -History of multifactorial anemia, in part due to                        low iron.   All lab results and imaging results have been reviewed and discussed with the patient.     Assessment and Plan:      Anemia    > Continue vitamin B12 and folate supplementation.  > patient is Jehovah Witness - no blood product    Extensive right occlusive thrombus brachial vein and cephalic vein while patient is on Eliquis treated for pulmonary embolism and lower extremity DVT.  Extensive thrombosis with Hex phos neutralization is positive     > planned for long-term anticoagulation. ( Recent need of Lovenox for right hip aspiration.  Procedure completed on 10/15/2020 without complications. ) Patient transition back to warfarin with coumadin clinic.   > for any bleeding emergency patient is instructed to go to the nearest emergency room for treatment evaluation.  > no further recommend from hem/  Follow hem clinic PRN     [] Hypertension  > advised patient to follow up with primary care physician to manage her blood pressure.

## 2020-11-13 DIAGNOSIS — J45.31 MILD PERSISTENT ASTHMA WITH ACUTE EXACERBATION: ICD-10-CM

## 2020-11-13 RX ORDER — ALBUTEROL SULFATE 0.83 MG/ML
2.5 SOLUTION RESPIRATORY (INHALATION) EVERY 6 HOURS PRN
Qty: 120 EACH | Refills: 11 | Status: SHIPPED | OUTPATIENT
Start: 2020-11-13 | End: 2022-10-25 | Stop reason: SDUPTHER

## 2020-11-16 LAB — FUNGUS SPEC CULT: NORMAL

## 2020-11-21 ENCOUNTER — DOCUMENT SCAN (OUTPATIENT)
Dept: HOME HEALTH SERVICES | Facility: HOSPITAL | Age: 80
End: 2020-11-21
Payer: MEDICARE

## 2020-11-23 ENCOUNTER — LAB VISIT (OUTPATIENT)
Dept: LAB | Facility: HOSPITAL | Age: 80
End: 2020-11-23
Attending: PHYSICIAN ASSISTANT
Payer: MEDICARE

## 2020-11-23 ENCOUNTER — OFFICE VISIT (OUTPATIENT)
Dept: FAMILY MEDICINE | Facility: CLINIC | Age: 80
End: 2020-11-23
Payer: MEDICARE

## 2020-11-23 VITALS
WEIGHT: 217.13 LBS | HEIGHT: 63 IN | OXYGEN SATURATION: 98 % | HEART RATE: 60 BPM | BODY MASS INDEX: 38.47 KG/M2 | RESPIRATION RATE: 16 BRPM | TEMPERATURE: 97 F | DIASTOLIC BLOOD PRESSURE: 66 MMHG | SYSTOLIC BLOOD PRESSURE: 134 MMHG

## 2020-11-23 DIAGNOSIS — M79.89 BILATERAL SWELLING OF FEET: ICD-10-CM

## 2020-11-23 DIAGNOSIS — E11.69 HYPERLIPIDEMIA ASSOCIATED WITH TYPE 2 DIABETES MELLITUS: ICD-10-CM

## 2020-11-23 DIAGNOSIS — J45.40 MODERATE PERSISTENT ASTHMA WITHOUT COMPLICATION: ICD-10-CM

## 2020-11-23 DIAGNOSIS — I15.2 HYPERTENSION ASSOCIATED WITH DIABETES: ICD-10-CM

## 2020-11-23 DIAGNOSIS — E11.22 TYPE 2 DIABETES MELLITUS WITH STAGE 2 CHRONIC KIDNEY DISEASE, WITHOUT LONG-TERM CURRENT USE OF INSULIN: Primary | ICD-10-CM

## 2020-11-23 DIAGNOSIS — J44.9 CHRONIC OBSTRUCTIVE PULMONARY DISEASE, UNSPECIFIED COPD TYPE: ICD-10-CM

## 2020-11-23 DIAGNOSIS — E11.59 HYPERTENSION ASSOCIATED WITH DIABETES: ICD-10-CM

## 2020-11-23 DIAGNOSIS — E78.5 HYPERLIPIDEMIA ASSOCIATED WITH TYPE 2 DIABETES MELLITUS: ICD-10-CM

## 2020-11-23 DIAGNOSIS — I50.32 CHRONIC DIASTOLIC CHF (CONGESTIVE HEART FAILURE): ICD-10-CM

## 2020-11-23 DIAGNOSIS — R06.02 SHORTNESS OF BREATH ON EXERTION: ICD-10-CM

## 2020-11-23 DIAGNOSIS — I27.20 PULMONARY HYPERTENSION: ICD-10-CM

## 2020-11-23 DIAGNOSIS — D50.9 IRON DEFICIENCY ANEMIA, UNSPECIFIED IRON DEFICIENCY ANEMIA TYPE: ICD-10-CM

## 2020-11-23 DIAGNOSIS — M54.16 LUMBAR RADICULITIS: ICD-10-CM

## 2020-11-23 DIAGNOSIS — R53.81 DEBILITY: ICD-10-CM

## 2020-11-23 DIAGNOSIS — N18.2 TYPE 2 DIABETES MELLITUS WITH STAGE 2 CHRONIC KIDNEY DISEASE, WITHOUT LONG-TERM CURRENT USE OF INSULIN: Primary | ICD-10-CM

## 2020-11-23 DIAGNOSIS — G47.33 OSA (OBSTRUCTIVE SLEEP APNEA): ICD-10-CM

## 2020-11-23 DIAGNOSIS — M51.36 DDD (DEGENERATIVE DISC DISEASE), LUMBAR: ICD-10-CM

## 2020-11-23 DIAGNOSIS — M51.34 DDD (DEGENERATIVE DISC DISEASE), THORACIC: ICD-10-CM

## 2020-11-23 LAB
ALBUMIN SERPL BCP-MCNC: 3.5 G/DL (ref 3.5–5.2)
ALP SERPL-CCNC: 51 U/L (ref 55–135)
ALT SERPL W/O P-5'-P-CCNC: 17 U/L (ref 10–44)
ANION GAP SERPL CALC-SCNC: 9 MMOL/L (ref 8–16)
AST SERPL-CCNC: 19 U/L (ref 10–40)
BASOPHILS # BLD AUTO: 0.01 K/UL (ref 0–0.2)
BASOPHILS NFR BLD: 0.2 % (ref 0–1.9)
BILIRUB SERPL-MCNC: 0.2 MG/DL (ref 0.1–1)
BUN SERPL-MCNC: 27 MG/DL (ref 8–23)
CALCIUM SERPL-MCNC: 9.3 MG/DL (ref 8.7–10.5)
CHLORIDE SERPL-SCNC: 111 MMOL/L (ref 95–110)
CO2 SERPL-SCNC: 22 MMOL/L (ref 23–29)
CREAT SERPL-MCNC: 1.5 MG/DL (ref 0.5–1.4)
DIFFERENTIAL METHOD: ABNORMAL
EOSINOPHIL # BLD AUTO: 0.2 K/UL (ref 0–0.5)
EOSINOPHIL NFR BLD: 3.3 % (ref 0–8)
ERYTHROCYTE [DISTWIDTH] IN BLOOD BY AUTOMATED COUNT: 14.7 % (ref 11.5–14.5)
EST. GFR  (AFRICAN AMERICAN): 38.5 ML/MIN/1.73 M^2
EST. GFR  (NON AFRICAN AMERICAN): 33.4 ML/MIN/1.73 M^2
GLUCOSE SERPL-MCNC: 101 MG/DL (ref 70–110)
HCT VFR BLD AUTO: 35.9 % (ref 37–48.5)
HGB BLD-MCNC: 10.8 G/DL (ref 12–16)
IMM GRANULOCYTES # BLD AUTO: 0.01 K/UL (ref 0–0.04)
IMM GRANULOCYTES NFR BLD AUTO: 0.2 % (ref 0–0.5)
LYMPHOCYTES # BLD AUTO: 1.9 K/UL (ref 1–4.8)
LYMPHOCYTES NFR BLD: 32.6 % (ref 18–48)
MCH RBC QN AUTO: 32.1 PG (ref 27–31)
MCHC RBC AUTO-ENTMCNC: 30.1 G/DL (ref 32–36)
MCV RBC AUTO: 107 FL (ref 82–98)
MONOCYTES # BLD AUTO: 0.6 K/UL (ref 0.3–1)
MONOCYTES NFR BLD: 10.6 % (ref 4–15)
NEUTROPHILS # BLD AUTO: 3 K/UL (ref 1.8–7.7)
NEUTROPHILS NFR BLD: 53.1 % (ref 38–73)
NRBC BLD-RTO: 0 /100 WBC
PLATELET # BLD AUTO: 253 K/UL (ref 150–350)
PMV BLD AUTO: 10.7 FL (ref 9.2–12.9)
POTASSIUM SERPL-SCNC: 4.4 MMOL/L (ref 3.5–5.1)
PROT SERPL-MCNC: 7.1 G/DL (ref 6–8.4)
RBC # BLD AUTO: 3.36 M/UL (ref 4–5.4)
SODIUM SERPL-SCNC: 142 MMOL/L (ref 136–145)
WBC # BLD AUTO: 5.73 K/UL (ref 3.9–12.7)

## 2020-11-23 PROCEDURE — 85025 COMPLETE CBC W/AUTO DIFF WBC: CPT

## 2020-11-23 PROCEDURE — 1101F PR PT FALLS ASSESS DOC 0-1 FALLS W/OUT INJ PAST YR: ICD-10-PCS | Mod: CPTII,S$GLB,, | Performed by: PHYSICIAN ASSISTANT

## 2020-11-23 PROCEDURE — 3075F SYST BP GE 130 - 139MM HG: CPT | Mod: CPTII,S$GLB,, | Performed by: PHYSICIAN ASSISTANT

## 2020-11-23 PROCEDURE — 99999 PR PBB SHADOW E&M-EST. PATIENT-LVL V: ICD-10-PCS | Mod: PBBFAC,,, | Performed by: PHYSICIAN ASSISTANT

## 2020-11-23 PROCEDURE — 99999 PR PBB SHADOW E&M-EST. PATIENT-LVL V: CPT | Mod: PBBFAC,,, | Performed by: PHYSICIAN ASSISTANT

## 2020-11-23 PROCEDURE — 3075F PR MOST RECENT SYSTOLIC BLOOD PRESS GE 130-139MM HG: ICD-10-PCS | Mod: CPTII,S$GLB,, | Performed by: PHYSICIAN ASSISTANT

## 2020-11-23 PROCEDURE — 3288F PR FALLS RISK ASSESSMENT DOCUMENTED: ICD-10-PCS | Mod: CPTII,S$GLB,, | Performed by: PHYSICIAN ASSISTANT

## 2020-11-23 PROCEDURE — 1101F PT FALLS ASSESS-DOCD LE1/YR: CPT | Mod: CPTII,S$GLB,, | Performed by: PHYSICIAN ASSISTANT

## 2020-11-23 PROCEDURE — 99214 PR OFFICE/OUTPT VISIT, EST, LEVL IV, 30-39 MIN: ICD-10-PCS | Mod: S$GLB,,, | Performed by: PHYSICIAN ASSISTANT

## 2020-11-23 PROCEDURE — 80053 COMPREHEN METABOLIC PANEL: CPT

## 2020-11-23 PROCEDURE — 3288F FALL RISK ASSESSMENT DOCD: CPT | Mod: CPTII,S$GLB,, | Performed by: PHYSICIAN ASSISTANT

## 2020-11-23 PROCEDURE — 1157F ADVNC CARE PLAN IN RCRD: CPT | Mod: S$GLB,,, | Performed by: PHYSICIAN ASSISTANT

## 2020-11-23 PROCEDURE — 3078F DIAST BP <80 MM HG: CPT | Mod: CPTII,S$GLB,, | Performed by: PHYSICIAN ASSISTANT

## 2020-11-23 PROCEDURE — 99214 OFFICE O/P EST MOD 30 MIN: CPT | Mod: S$GLB,,, | Performed by: PHYSICIAN ASSISTANT

## 2020-11-23 PROCEDURE — 3078F PR MOST RECENT DIASTOLIC BLOOD PRESSURE < 80 MM HG: ICD-10-PCS | Mod: CPTII,S$GLB,, | Performed by: PHYSICIAN ASSISTANT

## 2020-11-23 PROCEDURE — 1125F AMNT PAIN NOTED PAIN PRSNT: CPT | Mod: S$GLB,,, | Performed by: PHYSICIAN ASSISTANT

## 2020-11-23 PROCEDURE — 1159F MED LIST DOCD IN RCRD: CPT | Mod: S$GLB,,, | Performed by: PHYSICIAN ASSISTANT

## 2020-11-23 PROCEDURE — 1157F PR ADVANCE CARE PLAN OR EQUIV PRESENT IN MEDICAL RECORD: ICD-10-PCS | Mod: S$GLB,,, | Performed by: PHYSICIAN ASSISTANT

## 2020-11-23 PROCEDURE — 83880 ASSAY OF NATRIURETIC PEPTIDE: CPT

## 2020-11-23 PROCEDURE — 1159F PR MEDICATION LIST DOCUMENTED IN MEDICAL RECORD: ICD-10-PCS | Mod: S$GLB,,, | Performed by: PHYSICIAN ASSISTANT

## 2020-11-23 PROCEDURE — 36415 COLL VENOUS BLD VENIPUNCTURE: CPT | Mod: PO

## 2020-11-23 PROCEDURE — 1125F PR PAIN SEVERITY QUANTIFIED, PAIN PRESENT: ICD-10-PCS | Mod: S$GLB,,, | Performed by: PHYSICIAN ASSISTANT

## 2020-11-23 RX ORDER — TERAZOSIN 1 MG/1
1 CAPSULE ORAL NIGHTLY
Qty: 30 CAPSULE | Refills: 0 | Status: SHIPPED | OUTPATIENT
Start: 2020-11-23 | End: 2020-12-18 | Stop reason: SDUPTHER

## 2020-11-23 RX ORDER — INSULIN PUMP SYRINGE, 3 ML
EACH MISCELLANEOUS
Qty: 1 EACH | Refills: 0 | Status: SHIPPED | OUTPATIENT
Start: 2020-11-23 | End: 2020-11-24 | Stop reason: SDUPTHER

## 2020-11-23 RX ORDER — HYDRALAZINE HYDROCHLORIDE 50 MG/1
50 TABLET, FILM COATED ORAL EVERY 8 HOURS
Qty: 90 TABLET | Refills: 1 | Status: SHIPPED | OUTPATIENT
Start: 2020-11-23 | End: 2021-02-26

## 2020-11-23 RX ORDER — LANCETS
EACH MISCELLANEOUS
Qty: 100 EACH | Refills: 0 | Status: SHIPPED | OUTPATIENT
Start: 2020-11-23 | End: 2020-11-24 | Stop reason: SDUPTHER

## 2020-11-23 RX ORDER — DICLOFENAC SODIUM 10 MG/G
2 GEL TOPICAL DAILY
Qty: 100 G | Refills: 2 | Status: SHIPPED | OUTPATIENT
Start: 2020-11-23 | End: 2023-11-06

## 2020-11-23 RX ORDER — AMLODIPINE BESYLATE 5 MG/1
5 TABLET ORAL DAILY
Qty: 30 TABLET | Refills: 0 | Status: CANCELLED | OUTPATIENT
Start: 2020-11-23 | End: 2020-12-23

## 2020-11-23 NOTE — PROGRESS NOTES
Subjective:       Patient ID: Sammi Abreu is a 79 y.o. female.    Chief Complaint: Follow-up (hospital )    Sammi Abreu is a 78 yo female who presents for hospital visit follow up. She is followed by pulmonology, Dr. Suero, cardiology, Dr. Lara.  She was admitted on 11/1/2020 for right-sided chest pain, fatigue, and HILL and was found to have RUL opacities on chest CT consistent with pneumonia. She was treated with IV abx and prescribed levofloxacin. She was instructed to d/c coumadin until she completed antibiotics and could recheck INR. She reports not taking outpatient antibiotics due to being told she needed to continue her coumadin by the pharmacist. Since discahrge, she has continued to be fatigued but the HILL has improved. She also reports chronic pain. Her pain and fatigue have made it difficult for her to ambulate or do things for herself. She is unable to cook or clean without becoming fatigued, and has difficulty going from one room to the other without needing to rest.The patient also reports bilateral leg swelling that has recently worsened. She is still taking her diuretic. The swelling does not improve with elevation. Patient is scheduled to follow up with cardiology, Dr. Laar next week. She will also follow up with pulmonology next month.       Review of Systems   Constitutional: Positive for activity change and fatigue.   HENT: Negative for rhinorrhea and sneezing.    Eyes: Negative for pain, discharge and visual disturbance.   Respiratory: Positive for shortness of breath. Negative for wheezing.    Cardiovascular: Positive for leg swelling.   Gastrointestinal: Negative for constipation and diarrhea.   Genitourinary: Negative for bladder incontinence, frequency and urgency.   Musculoskeletal: Positive for arthralgias, back pain and gait problem. Negative for joint deformity.   Neurological: Negative for dizziness.   Psychiatric/Behavioral: The patient is not nervous/anxious.           Objective:      Physical Exam  Vitals signs reviewed.   Constitutional:       Appearance: Normal appearance. She is obese.      Comments: Patient seated in wheelchair.    HENT:      Head: Normocephalic and atraumatic.   Cardiovascular:      Rate and Rhythm: Normal rate and regular rhythm.      Pulses: Normal pulses.      Heart sounds: Normal heart sounds. No murmur. No friction rub. No gallop.    Pulmonary:      Effort: Pulmonary effort is normal. No respiratory distress.      Breath sounds: No wheezing, rhonchi or rales.   Abdominal:      General: Bowel sounds are normal.      Palpations: Abdomen is soft.   Musculoskeletal:         General: No deformity or signs of injury.      Right lower leg: Edema present.      Left lower leg: Edema present.   Skin:     General: Skin is warm.      Coloration: Skin is not jaundiced.      Findings: No bruising or erythema.   Neurological:      Mental Status: She is alert and oriented to person, place, and time.   Psychiatric:         Mood and Affect: Mood normal.         Behavior: Behavior normal.         Assessment:       1. Type 2 diabetes mellitus with stage 2 chronic kidney disease, without long-term current use of insulin    2. Pulmonary hypertension    3. Lumbar radiculitis    4. Iron deficiency anemia, unspecified iron deficiency anemia type    5. DDD (degenerative disc disease), thoracic    6. DDD (degenerative disc disease), lumbar    7. Chronic obstructive pulmonary disease, unspecified COPD type    8. Hypertension associated with diabetes    9. Hyperlipidemia associated with type 2 diabetes mellitus    10. Debility    11. Shortness of breath on exertion    12. CHARLIE (obstructive sleep apnea)    13. Moderate persistent asthma without complication    14. Chronic diastolic CHF (congestive heart failure)    15. Bilateral swelling of feet        Plan:   Sammi was seen today for follow-up.    Diagnoses and all orders for this visit:    Type 2 diabetes mellitus with stage 2  chronic kidney disease, without long-term current use of insulin  -     blood-glucose meter kit; To check BG 1 times daily, to use with insurance preferred meter  -     lancets Misc; To check BG 1 times daily, to use with insurance preferred meter  -     blood sugar diagnostic Strp; To check BG 1 times daily, to use with insurance preferred meter    Pulmonary hypertension  Follow up with cardiology and pulmonology   Lumbar radiculitis  -     diclofenac sodium (VOLTAREN) 1 % Gel; Apply 2 g topically once daily.  -     Ambulatory referral/consult to Pain Clinic; Future    Iron deficiency anemia, unspecified iron deficiency anemia type  -     CBC Auto Differential; Future    DDD (degenerative disc disease), thoracic  -     diclofenac sodium (VOLTAREN) 1 % Gel; Apply 2 g topically once daily.  -     WALKER FOR HOME USE  -     Ambulatory referral/consult to Physical/Occupational Therapy; Future  -     Ambulatory referral/consult to Pain Clinic; Future    DDD (degenerative disc disease), lumbar  -     diclofenac sodium (VOLTAREN) 1 % Gel; Apply 2 g topically once daily.  -     WALKER FOR HOME USE  -     Ambulatory referral/consult to Physical/Occupational Therapy; Future  -     Ambulatory referral/consult to Pain Clinic; Future    Chronic obstructive pulmonary disease, unspecified COPD type    Hypertension associated with diabetes  -     Comprehensive Metabolic Panel; Future    Hyperlipidemia associated with type 2 diabetes mellitus  High fiber diet  Continue current medication  Debility  -     WALKER FOR HOME USE  -     Ambulatory referral/consult to Physical/Occupational Therapy; Future    Shortness of breath on exertion  Chronic. Follow up with pulmonology and cardiology  CHARLIE (obstructive sleep apnea)  Continue CPAP use  Moderate persistent asthma without complication  Follow up with pulmonology  Chronic diastolic CHF (congestive heart failure)  Follow up with cardiology, Dr. Lara  Bilateral swelling of feet  -      Comprehensive Metabolic Panel; Future  -     BNP; Future    Other orders  -     hydrALAZINE (APRESOLINE) 50 MG tablet; Take 1 tablet (50 mg total) by mouth every 8 (eight) hours.  -     Cancel: amLODIPine (NORVASC) 5 MG tablet; Take 1 tablet (5 mg total) by mouth once daily.  -     terazosin (HYTRIN) 1 MG capsule; Take 1 capsule (1 mg total) by mouth every evening.

## 2020-11-23 NOTE — PROGRESS NOTES
Subjective:       Patient ID: Sammi Abreu is a 79 y.o. female.    Chief Complaint: Follow-up (hospital )    HPI  Review of patient's allergies indicates:   Allergen Reactions    Daptomycin Other (See Comments)     Kidney damage     Cymbalta [duloxetine] Other (See Comments)     Nightmares      Darvon [propoxyphene] Nausea Only and Other (See Comments)     Sweating, slept for 3 days    Atorvastatin Other (See Comments)     Muscle cramps    Naprosyn [naproxen] Nausea Only    Penicillins Rash    Tramadol Nausea Only and Palpitations         Current Outpatient Medications:     acetaminophen (TYLENOL) 325 MG tablet, Take 2 tablets (650 mg total) by mouth every 6 (six) hours as needed (Do not take with any other Tylenol or acetaminophen containing products)., Disp: , Rfl: 0    albuterol (PROVENTIL) 2.5 mg /3 mL (0.083 %) nebulizer solution, Take 3 mLs (2.5 mg total) by nebulization every 6 (six) hours as needed., Disp: 120 each, Rfl: 11    ascorbic acid, vitamin C, (VITAMIN C) 100 MG tablet, Take 5 tablets (500 mg total) by mouth every evening., Disp: , Rfl:     cyanocobalamin, vitamin B-12, 2,500 mcg Lozg, Place 2 tablets under the tongue once daily., Disp: 60 lozenge, Rfl: 11    esomeprazole (NEXIUM) 20 MG capsule, Take 1 capsule (20 mg total) by mouth before breakfast., Disp: 30 capsule, Rfl: 2    fluticasone propionate (FLONASE) 50 mcg/actuation nasal spray, 2 sprays (100 mcg total) by Each Nostril route once daily., Disp: 48 g, Rfl: 3    fluticasone-salmeterol 230-21 mcg/dose (ADVAIR HFA) 230-21 mcg/actuation HFAA inhaler, Inhale 2 puffs into the lungs 2 (two) times daily. Controller, Disp: 12 g, Rfl: 11    furosemide (LASIX) 20 MG tablet, Take 1 tablet (20 mg total) by mouth daily as needed (edema)., Disp: 30 tablet, Rfl: 11    hydrALAZINE (APRESOLINE) 50 MG tablet, Take 1 tablet (50 mg total) by mouth every 8 (eight) hours., Disp: 90 tablet, Rfl: 1    HYDROcodone-acetaminophen (NORCO) 5-325  mg per tablet, Take 1 tablet by mouth every 8 (eight) hours as needed for Pain. Medical necessary for greater than 7 days for chronic pain., Disp: 90 tablet, Rfl: 0    metoprolol succinate (TOPROL-XL) 50 MG 24 hr tablet, Take 1 tablet (50 mg total) by mouth once daily., Disp: 90 tablet, Rfl: 3    montelukast (SINGULAIR) 10 mg tablet, Take 1 tablet (10 mg total) by mouth every evening., Disp: 90 tablet, Rfl: 3    multivitamin capsule, Take 1 capsule by mouth once daily., Disp: , Rfl:     ondansetron (ZOFRAN-ODT) 4 MG TbDL, Take 1 tablet (4 mg total) by mouth every 8 (eight) hours as needed., Disp: 20 tablet, Rfl: 0    topiramate (TOPAMAX) 25 MG tablet, 1 tab at night for 5 days then 2 tab at HS., Disp: 60 tablet, Rfl: 11    warfarin (COUMADIN) 5 MG tablet, Tulio.e 1-1.5 Tablets QPM as instructed by coumadin clinic, Disp: 90 tablet, Rfl: 3    albuterol-ipratropium (DUO-NEB) 2.5 mg-0.5 mg/3 mL nebulizer solution, Take 3 mLs by nebulization every 8 (eight) hours., Disp: 270 mL, Rfl: 0    blood sugar diagnostic Strp, To check BG 1 times daily, to use with insurance preferred meter, Disp: 100 each, Rfl: 0    blood-glucose meter kit, To check BG 1 times daily, to use with insurance preferred meter, Disp: 1 each, Rfl: 0    budesonide-formoterol 160-4.5 mcg (SYMBICORT) 160-4.5 mcg/actuation HFAA, Inhale 2 puffs into the lungs every 12 (twelve) hours. Controller, Disp: 3 Inhaler, Rfl: 3    diclofenac sodium (VOLTAREN) 1 % Gel, Apply 2 g topically once daily., Disp: 100 g, Rfl: 2    folic acid (FOLVITE) 1 MG tablet, Take 1 tablet (1 mg total) by mouth once daily., Disp: 30 tablet, Rfl: 0    lancets Misc, To check BG 1 times daily, to use with insurance preferred meter, Disp: 100 each, Rfl: 0    terazosin (HYTRIN) 1 MG capsule, Take 1 capsule (1 mg total) by mouth every evening., Disp: 30 capsule, Rfl: 0  No current facility-administered medications for this visit.     Facility-Administered Medications Ordered in  Other Visits:     lactated ringers infusion, , Intravenous, Continuous, Gino Reid MD, Last Rate: 10 mL/hr at 07/13/20 1308    lidocaine (PF) 10 mg/ml (1%) injection 10 mg, 1 mL, Intradermal, Once, Gino Reid MD    Lab Results   Component Value Date    WBC 5.88 11/03/2020    HGB 10.2 (L) 11/03/2020    HCT 33.4 (L) 11/03/2020     11/03/2020    CHOL 194 01/16/2020    TRIG 98 01/16/2020    HDL 40 01/16/2020    ALT 20 11/01/2020    AST 21 11/01/2020     11/03/2020    K 4.3 11/03/2020     11/03/2020    CREATININE 1.2 11/03/2020    BUN 23 11/03/2020    CO2 21 (L) 11/03/2020    TSH 3.261 08/04/2020    INR 1.9 (A) 11/23/2020    HGBA1C 5.9 (H) 08/05/2020       Review of Systems    Objective:      Physical Exam    Assessment:       1. Type 2 diabetes mellitus with stage 2 chronic kidney disease, without long-term current use of insulin    2. Pulmonary hypertension    3. Lumbar radiculitis    4. Iron deficiency anemia, unspecified iron deficiency anemia type    5. DDD (degenerative disc disease), thoracic    6. DDD (degenerative disc disease), lumbar    7. Chronic obstructive pulmonary disease, unspecified COPD type    8. Hypertension associated with diabetes    9. Hyperlipidemia associated with type 2 diabetes mellitus    10. Debility    11. Shortness of breath on exertion    12. CHARLIE (obstructive sleep apnea)    13. Moderate persistent asthma without complication    14. Chronic diastolic CHF (congestive heart failure)    15. Bilateral swelling of feet        Plan:       ***

## 2020-11-24 DIAGNOSIS — N18.2 TYPE 2 DIABETES MELLITUS WITH STAGE 2 CHRONIC KIDNEY DISEASE, WITHOUT LONG-TERM CURRENT USE OF INSULIN: ICD-10-CM

## 2020-11-24 DIAGNOSIS — E11.22 TYPE 2 DIABETES MELLITUS WITH STAGE 2 CHRONIC KIDNEY DISEASE, WITHOUT LONG-TERM CURRENT USE OF INSULIN: ICD-10-CM

## 2020-11-24 LAB — BNP SERPL-MCNC: 110 PG/ML (ref 0–99)

## 2020-11-24 RX ORDER — LANCETS
EACH MISCELLANEOUS
Qty: 100 EACH | Refills: 0 | Status: SHIPPED | OUTPATIENT
Start: 2020-11-24 | End: 2022-01-18 | Stop reason: SDUPTHER

## 2020-11-24 RX ORDER — INSULIN PUMP SYRINGE, 3 ML
EACH MISCELLANEOUS
Qty: 1 EACH | Refills: 0 | Status: SHIPPED | OUTPATIENT
Start: 2020-11-24 | End: 2022-01-18 | Stop reason: SDUPTHER

## 2020-11-27 ENCOUNTER — TELEPHONE (OUTPATIENT)
Dept: FAMILY MEDICINE | Facility: CLINIC | Age: 80
End: 2020-11-27

## 2020-11-27 ENCOUNTER — TELEPHONE (OUTPATIENT)
Dept: PULMONOLOGY | Facility: CLINIC | Age: 80
End: 2020-11-27

## 2020-11-27 DIAGNOSIS — J44.9 CHRONIC OBSTRUCTIVE PULMONARY DISEASE, UNSPECIFIED COPD TYPE: Primary | ICD-10-CM

## 2020-11-27 NOTE — TELEPHONE ENCOUNTER
----- Message from Victorino Bauer sent at 11/27/2020  2:23 PM CST -----  Regarding: pt advice  Contact: pt  Type:  Patient Returning Call    Who Called:  pt  Does the patient know what this is regarding?:  send [facility] a request for glucose medication  Best Call Back Number:    Additional Information:  Please follow up. Thank you

## 2020-11-27 NOTE — TELEPHONE ENCOUNTER
Spoke with patient and she stated she needs new tubing and hoses for her nebulizer machine sent to [facility], let her know I would send a message to Shae and we would get that sent over.     ----- Message from Victorino Bauer sent at 11/27/2020  2:24 PM CST -----  Regarding: refill follow up  Contact: pt  Type:  Patient Returning Call    Who Called:  pt  Does the patient know what this is regarding?:  needs nebulizer refill sent to [facility]  Best Call Back Number:    Additional Information:  Thank you

## 2020-11-30 ENCOUNTER — OFFICE VISIT (OUTPATIENT)
Dept: CARDIOLOGY | Facility: CLINIC | Age: 80
End: 2020-11-30
Payer: MEDICARE

## 2020-11-30 VITALS
DIASTOLIC BLOOD PRESSURE: 72 MMHG | WEIGHT: 215 LBS | HEART RATE: 76 BPM | HEIGHT: 63 IN | OXYGEN SATURATION: 95 % | SYSTOLIC BLOOD PRESSURE: 132 MMHG | BODY MASS INDEX: 38.09 KG/M2

## 2020-11-30 DIAGNOSIS — E11.59 HYPERTENSION ASSOCIATED WITH DIABETES: ICD-10-CM

## 2020-11-30 DIAGNOSIS — I82.4Z2 DEEP VEIN THROMBOSIS (DVT) OF DISTAL VEIN OF LEFT LOWER EXTREMITY, UNSPECIFIED CHRONICITY: ICD-10-CM

## 2020-11-30 DIAGNOSIS — I50.30 HEART FAILURE WITH PRESERVED EJECTION FRACTION, UNSPECIFIED HF CHRONICITY: ICD-10-CM

## 2020-11-30 DIAGNOSIS — E78.5 HYPERLIPIDEMIA ASSOCIATED WITH TYPE 2 DIABETES MELLITUS: Primary | ICD-10-CM

## 2020-11-30 DIAGNOSIS — M79.605 LEG PAIN, ANTERIOR, LEFT: ICD-10-CM

## 2020-11-30 DIAGNOSIS — G62.9 NEUROPATHY: ICD-10-CM

## 2020-11-30 DIAGNOSIS — I15.2 HYPERTENSION ASSOCIATED WITH DIABETES: ICD-10-CM

## 2020-11-30 DIAGNOSIS — N18.31 STAGE 3A CHRONIC KIDNEY DISEASE: ICD-10-CM

## 2020-11-30 DIAGNOSIS — R60.0 BILATERAL LEG EDEMA: ICD-10-CM

## 2020-11-30 DIAGNOSIS — I26.99 BILATERAL PULMONARY EMBOLISM: ICD-10-CM

## 2020-11-30 DIAGNOSIS — E11.69 HYPERLIPIDEMIA ASSOCIATED WITH TYPE 2 DIABETES MELLITUS: Primary | ICD-10-CM

## 2020-11-30 PROCEDURE — 3078F PR MOST RECENT DIASTOLIC BLOOD PRESSURE < 80 MM HG: ICD-10-PCS | Mod: CPTII,S$GLB,, | Performed by: SPECIALIST

## 2020-11-30 PROCEDURE — 3288F FALL RISK ASSESSMENT DOCD: CPT | Mod: CPTII,S$GLB,, | Performed by: SPECIALIST

## 2020-11-30 PROCEDURE — 99214 PR OFFICE/OUTPT VISIT, EST, LEVL IV, 30-39 MIN: ICD-10-PCS | Mod: S$GLB,,, | Performed by: SPECIALIST

## 2020-11-30 PROCEDURE — 3288F PR FALLS RISK ASSESSMENT DOCUMENTED: ICD-10-PCS | Mod: CPTII,S$GLB,, | Performed by: SPECIALIST

## 2020-11-30 PROCEDURE — 1159F MED LIST DOCD IN RCRD: CPT | Mod: S$GLB,,, | Performed by: SPECIALIST

## 2020-11-30 PROCEDURE — 1101F PR PT FALLS ASSESS DOC 0-1 FALLS W/OUT INJ PAST YR: ICD-10-PCS | Mod: CPTII,S$GLB,, | Performed by: SPECIALIST

## 2020-11-30 PROCEDURE — 3075F PR MOST RECENT SYSTOLIC BLOOD PRESS GE 130-139MM HG: ICD-10-PCS | Mod: CPTII,S$GLB,, | Performed by: SPECIALIST

## 2020-11-30 PROCEDURE — 1101F PT FALLS ASSESS-DOCD LE1/YR: CPT | Mod: CPTII,S$GLB,, | Performed by: SPECIALIST

## 2020-11-30 PROCEDURE — 99214 OFFICE O/P EST MOD 30 MIN: CPT | Mod: S$GLB,,, | Performed by: SPECIALIST

## 2020-11-30 PROCEDURE — 1157F PR ADVANCE CARE PLAN OR EQUIV PRESENT IN MEDICAL RECORD: ICD-10-PCS | Mod: S$GLB,,, | Performed by: SPECIALIST

## 2020-11-30 PROCEDURE — 1159F PR MEDICATION LIST DOCUMENTED IN MEDICAL RECORD: ICD-10-PCS | Mod: S$GLB,,, | Performed by: SPECIALIST

## 2020-11-30 PROCEDURE — 3075F SYST BP GE 130 - 139MM HG: CPT | Mod: CPTII,S$GLB,, | Performed by: SPECIALIST

## 2020-11-30 PROCEDURE — 3078F DIAST BP <80 MM HG: CPT | Mod: CPTII,S$GLB,, | Performed by: SPECIALIST

## 2020-11-30 PROCEDURE — 1157F ADVNC CARE PLAN IN RCRD: CPT | Mod: S$GLB,,, | Performed by: SPECIALIST

## 2020-11-30 NOTE — PROGRESS NOTES
Subjective:    Patient ID:  Sammi Abreu is a 80 y.o. female who presents for   Hospital Follow Up and Deep Vein Thrombosis    July 2020   may  Sob     7/20  dvt and  Big PE   + hi  bp   Hi bp x 20 years      Pre dm   No  smoike no  Ca   Basically the patient had DVT and PE back in July and she has not felt well since in.  She feels weak she is not having chest pain  She has long history of swelling in the leg and amlodipine was recently changed to Hytrin but she still taking Apresoline in moderate doses  She is concerned as to whether she has heart failure  Reason V/Q scan showed resolution of the large clots within lungs and with ultrasound of the leg was negative.  She is on Coumadin but INR sometimes gets less than 2  She is not eating Green is because of the Coumadin       Review of patient's allergies indicates:   Allergen Reactions    Daptomycin Other (See Comments)     Kidney damage     Cymbalta [duloxetine] Other (See Comments)     Nightmares      Darvon [propoxyphene] Nausea Only and Other (See Comments)     Sweating, slept for 3 days    Atorvastatin Other (See Comments)     Muscle cramps    Naprosyn [naproxen] Nausea Only    Penicillins Rash    Tramadol Nausea Only and Palpitations       Past Medical History:   Diagnosis Date    ALLERGIC RHINITIS     Anemia     Arthritis     Back pain     Bronchitis     Cataract     OU    Cholelithiasis     COPD (chronic obstructive pulmonary disease)     Degenerative disc disease     Diabetes mellitus     pre diabetic    Diverticulosis     DVT (deep venous thrombosis)     Edema     Encounter for blood transfusion 1979    Fibromyalgia     Glaucoma     Gout     Hx of colonic polyps     Hyperlipidemia     Hypertension     Incontinence     Osteoporosis     Reflux     Refusal of blood transfusions as patient is [employer]     Sleep apnea     non compliant with CPAP    Vestibulitis of ear      Past Surgical History:   Procedure  Laterality Date    ANGIOGRAPHY OF LOWER EXTREMITY N/A 2019    Procedure: ANGIOGRAM, LOWER EXTREMITY;  Surgeon: Gino Arana MD;  Location: [facility] CATH/EP LAB;  Service: General;  Laterality: N/A;    ASPIRATION OF SOFT TISSUE Left 10/15/2020    Procedure: ASPIRATION, SOFT TISSUE;  Surgeon: Dosc Diagnostic Provider;  Location: [facility] OR;  Service: General;  Laterality: Left;    COLONOSCOPY N/A 2020    Procedure: COLONOSCOPY;  Surgeon: Sue Nuñez MD;  Location: [facility] ENDO;  Service: Endoscopy;  Laterality: N/A;    ESOPHAGOGASTRODUODENOSCOPY N/A 2020    Procedure: EGD (ESOPHAGOGASTRODUODENOSCOPY);  Surgeon: Sue Nuñez MD;  Location: [facility] ENDO;  Service: Endoscopy;  Laterality: N/A;    HIP SURGERY      HYSTERECTOMY      INTRALUMINAL GASTROINTESTINAL TRACT IMAGING VIA CAPSULE N/A 2020    Procedure: IMAGING PROCEDURE, GI TRACT, INTRALUMINAL, VIA CAPSULE;  Surgeon: Sue Nuñez MD;  Location: [facility] ENDO;  Service: Endoscopy;  Laterality: N/A;    JOINT REPLACEMENT      B total hip    LAPAROSCOPIC CHOLECYSTECTOMY N/A 2020    Procedure: CHOLECYSTECTOMY, LAPAROSCOPIC;  Surgeon: Jomar Mcdonald MD;  Location: [facility] OR;  Service: General;  Laterality: N/A;    TRANSFORAMINAL EPIDURAL INJECTION OF STEROID Left 2020    Procedure: Injection,steroid,epidural,transforaminal approach;  Surgeon: Matt Gilliam MD;  Location: [facility] OR;  Service: Pain Management;  Laterality: Left;  L2-3, L3-4     Social History     Tobacco Use    Smoking status: Former Smoker     Packs/day: 0.25     Years: 5.00     Pack years: 1.25     Quit date: 1972     Years since quittin.9    Smokeless tobacco: Never Used   Substance Use Topics    Alcohol use: Yes     Alcohol/week: 0.0 standard drinks     Comment: Rarely    Drug use: Not Currently        Review of Systems     Review of Systems   Constitution: Negative for weight loss.   HENT: Negative.            Lost taste    8 covid test neg     Eyes: Negative.    Cardiovascular: Positive for irregular heartbeat and leg swelling.         Chronic leg swelling    Respiratory: Positive for shortness of breath and snoring.         + genaro    Musculoskeletal: Positive for arthritis.   Gastrointestinal: Positive for heartburn. Negative for abdominal pain, hemorrhoids and melena.        Loss blood samll int    Genitourinary: Positive for frequency.        + renal stones and  uti      180/70  150/80   Concerned about fluid and hf  + hf p ef + due to  meds      recent vq ok ,  No  dvt    l leg   Needs to  Lose  Weight      Objective:        Vitals:    11/30/20 1448   BP: 132/72   Pulse: 76       Lab Results   Component Value Date    WBC 5.73 11/23/2020    HGB 10.8 (L) 11/23/2020    HCT 35.9 (L) 11/23/2020     11/23/2020    CHOL 194 01/16/2020    TRIG 98 01/16/2020    HDL 40 01/16/2020    ALT 17 11/23/2020    AST 19 11/23/2020     11/23/2020    K 4.4 11/23/2020     (H) 11/23/2020    CREATININE 1.5 (H) 11/23/2020    BUN 27 (H) 11/23/2020    CO2 22 (L) 11/23/2020    TSH 3.261 08/04/2020    INR 1.9 (A) 11/23/2020    HGBA1C 5.9 (H) 08/05/2020        ECHOCARDIOGRAM RESULTS  Results for orders placed during the hospital encounter of 07/13/20   Transesophageal echo (SADIE)    Narrative · Hyperdynamic left ventricular systolic function. The estimated ejection   fraction is 65 - 70 %.  · Normal appearing left atrial appendage. No thrombus is present in the   appendage. Normal appendage velocities.  · Mild mitral regurgitation.  · Mild tricuspid regurgitation.  · No evidence of endocarditis  · PICC line at the cavoatrial junction          CURRENT/PREVIOUS VISIT EKG  Results for orders placed or performed during the hospital encounter of 11/01/20   EKG 12-lead    Collection Time: 11/02/20 12:20 PM    Narrative    Test Reason : R07.9,    Vent. Rate : 067 BPM     Atrial Rate : 067 BPM     P-R Int : 142 ms          QRS Dur : 076 ms      QT Int : 442 ms       P-R-T  Axes : 054 006 031 degrees     QTc Int : 467 ms    Normal sinus rhythm with sinus arrhythmia  Nonspecific ST and T wave abnormality  When compared with ECG of 01-NOV-2020 18:01,  No significant change was found  Confirmed by Porfirio Maza MD ([id number]) on 11/10/2020 8:26:20 AM    Referred By: YAIMA   SELF           Confirmed By:Porfirio Maza MD     Results for orders placed during the hospital encounter of 07/13/20   Echo Color Flow Doppler? Yes    Narrative · Concentric left ventricular remodeling.  · Hyperdynamic left ventricular systolic function. The estimated ejection   fraction is 76%.  · Grade I (mild) left ventricular diastolic dysfunction consistent with   impaired relaxation.        No results found for this or any previous visit.    PHYSICAL EXAM    Physical Exam blood pressure is 120/80 standing  Head neck no carotid bruit  Lungs are clear  Cardiac sounds normal no S3 P2 is normal  Abdomen obese  Extremities reveal of +2 chronic edema I cannot palpate the pulses    Medication List with Changes/Refills   Current Medications    ACETAMINOPHEN (TYLENOL) 325 MG TABLET    Take 2 tablets (650 mg total) by mouth every 6 (six) hours as needed (Do not take with any other Tylenol or acetaminophen containing products).    ALBUTEROL (PROVENTIL) 2.5 MG /3 ML (0.083 %) NEBULIZER SOLUTION    Take 3 mLs (2.5 mg total) by nebulization every 6 (six) hours as needed.    ALBUTEROL-IPRATROPIUM (DUO-NEB) 2.5 MG-0.5 MG/3 ML NEBULIZER SOLUTION    Take 3 mLs by nebulization every 8 (eight) hours.    ASCORBIC ACID, VITAMIN C, (VITAMIN C) 100 MG TABLET    Take 5 tablets (500 mg total) by mouth every evening.    BLOOD SUGAR DIAGNOSTIC STRP    To check BG 1 times daily, to use with insurance preferred meter    BLOOD-GLUCOSE METER KIT    To check BG 1 times daily, to use with insurance preferred meter    BUDESONIDE-FORMOTEROL 160-4.5 MCG (SYMBICORT) 160-4.5 MCG/ACTUATION HFAA    Inhale 2 puffs into the lungs every 12 (twelve) hours.  Controller    CYANOCOBALAMIN, VITAMIN B-12, 2,500 MCG LOZG    Place 2 tablets under the tongue once daily.    DICLOFENAC SODIUM (VOLTAREN) 1 % GEL    Apply 2 g topically once daily.    ESOMEPRAZOLE (NEXIUM) 20 MG CAPSULE    Take 1 capsule (20 mg total) by mouth before breakfast.    FLUTICASONE PROPIONATE (FLONASE) 50 MCG/ACTUATION NASAL SPRAY    2 sprays (100 mcg total) by Each Nostril route once daily.    FLUTICASONE-SALMETEROL 230-21 MCG/DOSE (ADVAIR HFA) 230-21 MCG/ACTUATION HFAA INHALER    Inhale 2 puffs into the lungs 2 (two) times daily. Controller    FOLIC ACID (FOLVITE) 1 MG TABLET    Take 1 tablet (1 mg total) by mouth once daily.    FUROSEMIDE (LASIX) 20 MG TABLET    Take 1 tablet (20 mg total) by mouth daily as needed (edema).    HYDRALAZINE (APRESOLINE) 50 MG TABLET    Take 1 tablet (50 mg total) by mouth every 8 (eight) hours.    HYDROCODONE-ACETAMINOPHEN (NORCO) 5-325 MG PER TABLET    Take 1 tablet by mouth every 8 (eight) hours as needed for Pain. Medical necessary for greater than 7 days for chronic pain.    LANCETS MISC    To check BG 1 times daily, to use with insurance preferred meter    METOPROLOL SUCCINATE (TOPROL-XL) 50 MG 24 HR TABLET    Take 1 tablet (50 mg total) by mouth once daily.    MONTELUKAST (SINGULAIR) 10 MG TABLET    Take 1 tablet (10 mg total) by mouth every evening.    MULTIVITAMIN CAPSULE    Take 1 capsule by mouth once daily.    ONDANSETRON (ZOFRAN-ODT) 4 MG TBDL    Take 1 tablet (4 mg total) by mouth every 8 (eight) hours as needed.    TERAZOSIN (HYTRIN) 1 MG CAPSULE    Take 1 capsule (1 mg total) by mouth every evening.    TOPIRAMATE (TOPAMAX) 25 MG TABLET    1 tab at night for 5 days then 2 tab at HS.    WARFARIN (COUMADIN) 5 MG TABLET    Tulio.e 1-1.5 Tablets QPM as instructed by coumadin clinic           Assessment:     chronic kidney disease, history of DVT in P E, chronic edema  Heart failure preserved ejection fraction  Chronic edema  Possibly partially secondary to  medicine including hydralazine  Chronic pain in the left thigh-possible may be meralgia paresthetica due to entrapment of the left femoral nerve of parentheses gabapentin could not tolerate  No diagnosis found.     Plan:   Will get ABIs the legs make sure there is no vascular insufficiency  Check echo echo for pulmonary hypertension  Repeat lab  Return to clinic in several months stay on the same medicine is now-if she continues to be weak may switch off of Apresoline and try losartan monitoring renal function    Problem List Items Addressed This Visit     None           No follow-ups on file.

## 2020-11-30 NOTE — LETTER
November 30, 2020      Sadaf Courtney PA-C  [address]  [place] [address]           [facility] [place]  [address]  [place] [address]  Phone: [phone]  Fax: [phone]          Patient: Sammi Abreu   MR Number: [id number]   YOB: 1940   Date of Visit: 11/30/2020       Dear Sadaf Courtney:    Thank you for referring Sammi Abreu to me for evaluation. Attached you will find relevant portions of my assessment and plan of care.    If you have questions, please do not hesitate to call me. I look forward to following Sammi Abreu along with you.    Sincerely,    Jerardo Lara MD    Enclosure  CC:  No Recipients    If you would like to receive this communication electronically, please contact [email or web address] or [phone] to request more information on [employer] access.    For providers and/or their staff who would like to refer a patient to [facility], please contact us through our one-stop-shop provider referral line, [facility], at [phone].    If you feel you have received this communication in error or would no longer like to receive these types of communications, please e-mail [email or web address]

## 2020-12-13 ENCOUNTER — PATIENT OUTREACH (OUTPATIENT)
Dept: ADMINISTRATIVE | Facility: OTHER | Age: 80
End: 2020-12-13

## 2020-12-14 ENCOUNTER — HOSPITAL ENCOUNTER (OUTPATIENT)
Dept: RADIOLOGY | Facility: HOSPITAL | Age: 80
Discharge: HOME | End: 2020-12-14
Attending: NURSE PRACTITIONER
Payer: MEDICARE

## 2020-12-14 ENCOUNTER — OFFICE VISIT (OUTPATIENT)
Dept: PULMONOLOGY | Facility: CLINIC | Age: 80
End: 2020-12-14
Payer: MEDICARE

## 2020-12-14 ENCOUNTER — TELEPHONE (OUTPATIENT)
Dept: PULMONOLOGY | Facility: CLINIC | Age: 80
End: 2020-12-14

## 2020-12-14 VITALS
HEART RATE: 70 BPM | HEIGHT: 63 IN | SYSTOLIC BLOOD PRESSURE: 165 MMHG | WEIGHT: 213.63 LBS | BODY MASS INDEX: 37.85 KG/M2 | OXYGEN SATURATION: 98 % | DIASTOLIC BLOOD PRESSURE: 73 MMHG

## 2020-12-14 DIAGNOSIS — G47.33 OBSTRUCTIVE SLEEP APNEA SYNDROME: ICD-10-CM

## 2020-12-14 DIAGNOSIS — J44.9 CHRONIC OBSTRUCTIVE PULMONARY DISEASE, UNSPECIFIED COPD TYPE: Primary | ICD-10-CM

## 2020-12-14 DIAGNOSIS — J18.9 PNEUMONIA OF RIGHT UPPER LOBE DUE TO INFECTIOUS ORGANISM: ICD-10-CM

## 2020-12-14 PROCEDURE — 71046 XR CHEST PA AND LATERAL: ICD-10-PCS | Mod: 26,,, | Performed by: RADIOLOGY

## 2020-12-14 PROCEDURE — 3078F DIAST BP <80 MM HG: CPT | Mod: CPTII,S$GLB,, | Performed by: NURSE PRACTITIONER

## 2020-12-14 PROCEDURE — 1125F AMNT PAIN NOTED PAIN PRSNT: CPT | Mod: S$GLB,,, | Performed by: NURSE PRACTITIONER

## 2020-12-14 PROCEDURE — 3078F PR MOST RECENT DIASTOLIC BLOOD PRESSURE < 80 MM HG: ICD-10-PCS | Mod: CPTII,S$GLB,, | Performed by: NURSE PRACTITIONER

## 2020-12-14 PROCEDURE — 99213 PR OFFICE/OUTPT VISIT, EST, LEVL III, 20-29 MIN: ICD-10-PCS | Mod: S$GLB,,, | Performed by: NURSE PRACTITIONER

## 2020-12-14 PROCEDURE — 71046 X-RAY EXAM CHEST 2 VIEWS: CPT | Mod: TC,FY

## 2020-12-14 PROCEDURE — 3077F PR MOST RECENT SYSTOLIC BLOOD PRESSURE >= 140 MM HG: ICD-10-PCS | Mod: CPTII,S$GLB,, | Performed by: NURSE PRACTITIONER

## 2020-12-14 PROCEDURE — 1157F ADVNC CARE PLAN IN RCRD: CPT | Mod: S$GLB,,, | Performed by: NURSE PRACTITIONER

## 2020-12-14 PROCEDURE — 3077F SYST BP >= 140 MM HG: CPT | Mod: CPTII,S$GLB,, | Performed by: NURSE PRACTITIONER

## 2020-12-14 PROCEDURE — 1159F PR MEDICATION LIST DOCUMENTED IN MEDICAL RECORD: ICD-10-PCS | Mod: S$GLB,,, | Performed by: NURSE PRACTITIONER

## 2020-12-14 PROCEDURE — 99999 PR PBB SHADOW E&M-EST. PATIENT-LVL V: CPT | Mod: PBBFAC,,, | Performed by: NURSE PRACTITIONER

## 2020-12-14 PROCEDURE — 99499 RISK ADDL DX/OHS AUDIT: ICD-10-PCS | Mod: S$GLB,,, | Performed by: NURSE PRACTITIONER

## 2020-12-14 PROCEDURE — 71046 X-RAY EXAM CHEST 2 VIEWS: CPT | Mod: 26,,, | Performed by: RADIOLOGY

## 2020-12-14 PROCEDURE — 99999 PR PBB SHADOW E&M-EST. PATIENT-LVL V: ICD-10-PCS | Mod: PBBFAC,,, | Performed by: NURSE PRACTITIONER

## 2020-12-14 PROCEDURE — 1125F PR PAIN SEVERITY QUANTIFIED, PAIN PRESENT: ICD-10-PCS | Mod: S$GLB,,, | Performed by: NURSE PRACTITIONER

## 2020-12-14 PROCEDURE — 1157F PR ADVANCE CARE PLAN OR EQUIV PRESENT IN MEDICAL RECORD: ICD-10-PCS | Mod: S$GLB,,, | Performed by: NURSE PRACTITIONER

## 2020-12-14 PROCEDURE — 3288F PR FALLS RISK ASSESSMENT DOCUMENTED: ICD-10-PCS | Mod: CPTII,S$GLB,, | Performed by: NURSE PRACTITIONER

## 2020-12-14 PROCEDURE — 1101F PT FALLS ASSESS-DOCD LE1/YR: CPT | Mod: CPTII,S$GLB,, | Performed by: NURSE PRACTITIONER

## 2020-12-14 PROCEDURE — 1159F MED LIST DOCD IN RCRD: CPT | Mod: S$GLB,,, | Performed by: NURSE PRACTITIONER

## 2020-12-14 PROCEDURE — 99213 OFFICE O/P EST LOW 20 MIN: CPT | Mod: S$GLB,,, | Performed by: NURSE PRACTITIONER

## 2020-12-14 PROCEDURE — 3288F FALL RISK ASSESSMENT DOCD: CPT | Mod: CPTII,S$GLB,, | Performed by: NURSE PRACTITIONER

## 2020-12-14 PROCEDURE — 99499 UNLISTED E&M SERVICE: CPT | Mod: S$GLB,,, | Performed by: NURSE PRACTITIONER

## 2020-12-14 PROCEDURE — 1101F PR PT FALLS ASSESS DOC 0-1 FALLS W/OUT INJ PAST YR: ICD-10-PCS | Mod: CPTII,S$GLB,, | Performed by: NURSE PRACTITIONER

## 2020-12-14 RX ORDER — MOMETASONE FUROATE AND FORMOTEROL FUMARATE DIHYDRATE 200; 5 UG/1; UG/1
2 AEROSOL RESPIRATORY (INHALATION) 2 TIMES DAILY
Qty: 13 G | Refills: 11 | Status: SHIPPED | OUTPATIENT
Start: 2020-12-14 | End: 2021-03-19

## 2020-12-14 NOTE — PROGRESS NOTES
12/14/2020    Sammi Jeet Brady  Office note    Chief Complaint   Patient presents with    Follow-up     3m f/u       HPI:  12/14/2020- currently not using oxygen, first started July 2020, wants returned to DME company due to high cost.   Not able to afford Advair inhaler,   Complaint of wheeze- improved after starting advair daily, worse at night. Using nebulizer 2x daily. Associated with fatigue. Not currently wearing CPAP.   No SOB    9/11/2020- IN office with friend, ER visit 9/9/20 for pain right thigh DVT, Pain left anterior thigh seen in ER dc home. 10 on 0-10 scale; has previously seen vascular surgeon Dr. Alegria; no improvement with OTC tylenol. Needing new cardiologist.   No SOB, no cough, no chest tightness,   Has CPAP at home, not currently using. Has nebulizer at home using as needed.  Used symbicort in hospital but not using at home.       Dr Suero [facility] consult:  History of Present Illness:  Pt had laparoscopic rosette yesterday with post op low ox, cta with central pe.  Pt had prior h/o dvt- was on xarelto in 2015 - occurred with elective left hip replacement - left groin dvt.  Spiritism.    Pt seen by me last yr with h/o wheezes and cough and sob, also osas- non compliant.  Pt has had episodes of sob but vague - some sob with no cough/wheezes. Vague left leg swelling.  Pt had back problems needing injections in past- last injections est 5 yrs ago.   Pt developed back pain, worse movement ppt eval pcp/pain doc- injection set up.  She also developed left leg pain - upper thigh- occurred last couple months.  She was having eval with vascular doc for right leg with min right leg symptoms.  She had procedure with improved cirulation right.   Pt had bilat u/s legs in May, and right u/s in June -- both neg for dvt.    Pt developed sob- episodic with one episode of impending doom occurring 2 wks pta.    Pt had had some schwartz since severe episode 2 wks ago.    Pt had had severe pain in chest , then  left leg, then sob - had eval at er , pcp, pain doc, then sm- biliary dz found and surg deferred til after back injection (injection was not effective for pain). Pt had lap rosette yesterday.  No recent cough/wheezes, no compliant with cpap.    Plan:She will need follow-up for pulmonary hypertension as it was found on her last echo. With adequate anticoagulation-good chance pulmonary hypertension will clear. 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 Complex case.   CTEP not expected to develop but may develop.  F/u echo in a yr or so (depending on symptoms)        4/22/2019- had dx 2009 sleep apnea, got cpap but could not use-not clear how severe sleep.  Pt wore off and on 2 yrs with bronchitis ppt non compliance.  Had severe bronchitis with hemoptysis ppt stopping cpap.   H/o intermittent wheezes, worse night, ongoing seasonally, sister with asthma, pt onset- 2010.  Had normal pft 9/11/17.   Uses albuterol daily and nocturnal arousal daily later.  Sleeps on right side as left side breathing worsens.  Good sense smell.    Sinus problems with drainage and itchy eyes.  occ sinus infection- zpak    The chief compliant  problem is new to me   PFSH:  Past Medical History:   Diagnosis Date    ALLERGIC RHINITIS     Anemia     Arthritis     Back pain     Bronchitis     Cataract     OU    Cholelithiasis     COPD (chronic obstructive pulmonary disease)     Degenerative disc disease     Diabetes mellitus     pre diabetic    Diverticulosis     DVT (deep venous thrombosis)     Edema     Encounter for blood transfusion 1979    Fibromyalgia     Glaucoma     Gout     Hx of colonic polyps     Hyperlipidemia     Hypertension     Incontinence     Osteoporosis     Reflux     Refusal of blood transfusions as patient is Bahai     Sleep apnea     non compliant with CPAP    Vestibulitis of ear          Past Surgical History:   Procedure Laterality Date    ANGIOGRAPHY  OF LOWER EXTREMITY N/A 2019    Procedure: ANGIOGRAM, LOWER EXTREMITY;  Surgeon: Gino Arana MD;  Location: [facility] CATH/EP LAB;  Service: General;  Laterality: N/A;    ASPIRATION OF SOFT TISSUE Left 10/15/2020    Procedure: ASPIRATION, SOFT TISSUE;  Surgeon: Dosc Diagnostic Provider;  Location: [facility] OR;  Service: General;  Laterality: Left;    COLONOSCOPY N/A 2020    Procedure: COLONOSCOPY;  Surgeon: Sue Nuñez MD;  Location: [facility] ENDO;  Service: Endoscopy;  Laterality: N/A;    ESOPHAGOGASTRODUODENOSCOPY N/A 2020    Procedure: EGD (ESOPHAGOGASTRODUODENOSCOPY);  Surgeon: Sue Nuñez MD;  Location: [facility] ENDO;  Service: Endoscopy;  Laterality: N/A;    HIP SURGERY      HYSTERECTOMY      INTRALUMINAL GASTROINTESTINAL TRACT IMAGING VIA CAPSULE N/A 2020    Procedure: IMAGING PROCEDURE, GI TRACT, INTRALUMINAL, VIA CAPSULE;  Surgeon: Sue Nuñez MD;  Location: [facility] ENDO;  Service: Endoscopy;  Laterality: N/A;    JOINT REPLACEMENT      B total hip    LAPAROSCOPIC CHOLECYSTECTOMY N/A 2020    Procedure: CHOLECYSTECTOMY, LAPAROSCOPIC;  Surgeon: Jomar Mcdonald MD;  Location: [facility] OR;  Service: General;  Laterality: N/A;    TRANSFORAMINAL EPIDURAL INJECTION OF STEROID Left 2020    Procedure: Injection,steroid,epidural,transforaminal approach;  Surgeon: Matt Gilliam MD;  Location: [facility] OR;  Service: Pain Management;  Laterality: Left;  L2-3, L3-4     Social History     Tobacco Use    Smoking status: Former Smoker     Packs/day: 0.25     Years: 5.00     Pack years: 1.25     Quit date: 1972     Years since quittin.9    Smokeless tobacco: Never Used   Substance Use Topics    Alcohol use: Yes     Alcohol/week: 0.0 standard drinks     Comment: Rarely    Drug use: Not Currently     Family History   Problem Relation Age of Onset    Hypertension Mother     Diabetes Sister     Cancer Sister     Stomach cancer Sister     Hypertension Sister     Bipolar  "disorder Sister     Peripheral vascular disease Sister     Stroke Father     Hypertension Father     Hypertension Brother     Stroke Brother     Peripheral vascular disease Brother     Hypertension Son     Obesity Son     Early death Son 48    Arthritis Son     Glaucoma Neg Hx     Macular degeneration Neg Hx     Retinal detachment Neg Hx      Review of patient's allergies indicates:   Allergen Reactions    Daptomycin Other (See Comments)     Kidney damage     Cymbalta [duloxetine] Other (See Comments)     Nightmares      Darvon [propoxyphene] Nausea Only and Other (See Comments)     Sweating, slept for 3 days    Atorvastatin Other (See Comments)     Muscle cramps    Naprosyn [naproxen] Nausea Only    Penicillins Rash    Tramadol Nausea Only and Palpitations       Performance Status:The patient's activity level is functions out of house.      Review of Systems:  a review of eleven systems covering constitutional, Eye, HEENT, Psych, Respiratory, Cardiac, GI, , Musculoskeletal, Endocrine, Dermatologic was negative except for pertinent findings as listed ABOVE and below:  pertinent positive as above, rest is good       Exam:Comprehensive exam done. BP (!) 165/73 (BP Location: Left arm, Patient Position: Sitting, BP Method: Medium (Automatic))   Pulse 70   Ht 5' 3" (1.6 m)   Wt 96.9 kg (213 lb 10 oz)   SpO2 98% Comment: on room air at rest  BMI 37.84 kg/m²   Exam included Vitals as listed, and patient's appearance and affect and alertness and mood, oral exam for yeast and hygiene and pharynx lesions and Mallapatti (M) score, neck with inspection for jvd and masses and thyroid abnormalities and lymph nodes (supraclavicular and infraclavicular nodes and axillary also examined and noted if abn), chest exam included symmetry and effort and fremitus and percussion and auscultation, cardiac exam included rhythm and gallops and murmur and rubs and jvd and edema, abdominal exam for mass and " hepatosplenomegaly and tenderness and hernias and bowel sounds, Musculoskeletal exam with muscle tone and posture and mobility/gait and  strength, and skin for rashes and cyanosis and pallor and turgor, extremity for clubbing.  Findings were normal except for pertinent findings listed below:  M4, chest is symmetric, no distress, normal percussion, normal fremitus and diminished breath sounds right upper lung      Radiographs (ct chest and cxr) reviewed: view by direct vision  - 3/6/18 ct chest mild prominent bronchi with calcified aorta    CT Chest Without Contrast 11/01/20   7 mm pleural-based groundglass opacity in the right upper lobe with  minimal reticular nodular opacities in the right lung base and  atelectasis in the right middle lobe. Findings are nonspecific and may  be secondary to infectious or inflammatory process    X-Ray Chest AP Portable 11/01/2020   There is elevation the right hemidiaphragm.     Lungs are clear. Pulmonary vasculature is normal. No acute osseous abnormality.     NM Lung Scan Perfusion Particulate 11/01/20   There is small focal area of filling defect within the lateral right lung apex matching the area of minimal groundglass opacity within the right upper lobe. No additional focal areas of wedge filling defect are identified.     Transthoracic echo (TTE) limited 7/22/20   Grade I (mild) left ventricular diastolic dysfunction consistent with impaired relaxation       Labs reviewed       Ct chest 3/6/18 calcified aorta, bronchiectasis mild lower lungs  Lab Results   Component Value Date    WBC 5.73 11/23/2020    RBC 3.36 (L) 11/23/2020    HGB 10.8 (L) 11/23/2020    HCT 35.9 (L) 11/23/2020     (H) 11/23/2020    MCH 32.1 (H) 11/23/2020    MCHC 30.1 (L) 11/23/2020    RDW 14.7 (H) 11/23/2020     11/23/2020    MPV 10.7 11/23/2020    GRAN 3.0 11/23/2020    GRAN 53.1 11/23/2020    LYMPH 1.9 11/23/2020    LYMPH 32.6 11/23/2020    MONO 0.6 11/23/2020    MONO 10.6 11/23/2020     EOS 0.2 11/23/2020    BASO 0.01 11/23/2020    EOSINOPHIL 3.3 11/23/2020    BASOPHIL 0.2 11/23/2020         Results for MEME SALAS (MRN [id number]) as of 4/22/2019 11:03   Ref. Range 12/22/2017 11:41 6/22/2018 08:48   Eosinophil% Latest Ref Range: 0.0 - 8.0 % 3.7 3.2   Eos # Latest Ref Range: 0.0 - 0.5 K/uL 0.2 0.2     PFT results reviewed 9/11/17 wnl.    Plan:  Clinical impression is apparently straight forward and impression with management as below.    Meme was seen today for follow-up.    Diagnoses and all orders for this visit:    Chronic obstructive pulmonary disease, unspecified COPD type  -     mometasone-formoterol (DULERA) 200-5 mcg/actuation inhaler; Inhale 2 puffs into the lungs 2 (two) times daily. Controller    Pneumonia of right upper lobe due to infectious organism  -     X-Ray Chest PA And Lateral; Future    Obstructive sleep apnea syndrome     - wear CPAP nightly    Follow up in about 3 months (around 3/14/2021), or if symptoms worsen or fail to improve.    Discussed with patient above for education the following:      Patient Instructions   Starting new medication Dulera, use 1 -2 puffs twice daily to control wheeze    Ordered Chest x-ray to evaluate pneumonia seen previously    Wear CPAP nightly for sleep apnea, this will improve sleep and improve energy level    Sending order to [facility] to discontinue supplemental oxygen.

## 2020-12-14 NOTE — PATIENT INSTRUCTIONS
Starting new medication Dulera, use 1 -2 puffs twice daily to control wheeze    Ordered Chest x-ray to evaluate pneumonia seen previously    Wear CPAP nightly for sleep apnea, this will improve sleep and improve energy level    [facility] discontin[facility].

## 2020-12-15 ENCOUNTER — TELEPHONE (OUTPATIENT)
Dept: PULMONOLOGY | Facility: CLINIC | Age: 80
End: 2020-12-15

## 2020-12-15 NOTE — TELEPHONE ENCOUNTER
Patient notified.      ----- Message from Shae Ragland NP sent at 12/15/2020  1:16 PM CST -----  Pneumonia is resolved. Chest x-ray is clear

## 2020-12-16 PROCEDURE — G0179 MD RECERTIFICATION HHA PT: HCPCS | Mod: ,,, | Performed by: FAMILY MEDICINE

## 2020-12-16 PROCEDURE — G0179 PR HOME HEALTH MD RECERTIFICATION: ICD-10-PCS | Mod: ,,, | Performed by: FAMILY MEDICINE

## 2020-12-17 LAB
ACID FAST MOD KINY STN SPEC: NORMAL
MYCOBACTERIUM SPEC QL CULT: NORMAL

## 2020-12-18 NOTE — TELEPHONE ENCOUNTER
----- Message from Christy Olsen sent at 12/18/2020 12:06 PM CST -----  Contact: Pt  Rx Refill Request  Who Called: Pt  Refill or New Rx: Refill  Rx Name & Strength: terazosin (HYTRIN) 1 MG capsule  How is the patient currently taking it (1x daily) as directed:   Is this a 30 day or 90 day Rx:   Preferred Pharmacy:   [facility] [id number] [place] [address]  Phone: [phone] Fax: [phone]  Best Call Back #: [phone]  Additional Info:

## 2020-12-20 RX ORDER — TERAZOSIN 1 MG/1
1 CAPSULE ORAL NIGHTLY
Qty: 90 CAPSULE | Refills: 5 | Status: SHIPPED | OUTPATIENT
Start: 2020-12-20 | End: 2021-02-22 | Stop reason: ALTCHOICE

## 2020-12-22 ENCOUNTER — TELEPHONE (OUTPATIENT)
Dept: FAMILY MEDICINE | Facility: CLINIC | Age: 80
End: 2020-12-22

## 2020-12-22 DIAGNOSIS — M48.062 SPINAL STENOSIS OF LUMBAR REGION WITH NEUROGENIC CLAUDICATION: ICD-10-CM

## 2020-12-22 NOTE — TELEPHONE ENCOUNTER
Patient reports experiencing burning sensation when she urinates. Also reports flank pain, and a burning sensation in her stomach when her bladder is full. Denies fever. Denies noticing any blood in urine. Appointment scheduled for 12-. Patient agreed to appointment date, time, and location.           ----- Message from Amari Wise sent at 12/22/2020  2:16 PM CST -----  Type:  Sooner Apoointment Request    Caller is requesting a sooner appointment.  Caller declined first available appointment listed below.  Caller will not accept being placed on the waitlist and is requesting a message be sent to doctor.    Name of Caller:  Patient  When is the first available appointment?  1/20  Symptoms:  potential kidney infection  Best Call Back Number:  [phone]  Additional Information:  NA

## 2020-12-22 NOTE — TELEPHONE ENCOUNTER
----- Message from Christy Olsen sent at 12/22/2020  9:59 AM CST -----  Contact: Sol with [facility]  Sol calling states pt is having pain in left thigh chronically, but is now in her right thigh. States it is a burning pain. Is on 25 mg of Topirmate as of October and is on two tablets at bed time, and is requesting to be on Rx in the morning along with bedtime dose. Please call Sol with any questions at [phone]

## 2020-12-28 ENCOUNTER — TELEPHONE (OUTPATIENT)
Dept: PULMONOLOGY | Facility: CLINIC | Age: 80
End: 2020-12-28

## 2020-12-28 ENCOUNTER — TELEPHONE (OUTPATIENT)
Dept: PAIN MEDICINE | Facility: CLINIC | Age: 80
End: 2020-12-28

## 2020-12-28 ENCOUNTER — OFFICE VISIT (OUTPATIENT)
Dept: PAIN MEDICINE | Facility: CLINIC | Age: 80
End: 2020-12-28
Payer: MEDICARE

## 2020-12-28 VITALS
BODY MASS INDEX: 37.74 KG/M2 | HEART RATE: 66 BPM | DIASTOLIC BLOOD PRESSURE: 69 MMHG | WEIGHT: 213 LBS | HEIGHT: 63 IN | SYSTOLIC BLOOD PRESSURE: 184 MMHG

## 2020-12-28 DIAGNOSIS — M54.16 LUMBAR RADICULITIS: Primary | ICD-10-CM

## 2020-12-28 DIAGNOSIS — M47.896 OTHER SPONDYLOSIS, LUMBAR REGION: ICD-10-CM

## 2020-12-28 DIAGNOSIS — M54.16 LUMBAR RADICULOPATHY: Primary | ICD-10-CM

## 2020-12-28 DIAGNOSIS — G47.30 SLEEP APNEA, UNSPECIFIED TYPE: Primary | ICD-10-CM

## 2020-12-28 DIAGNOSIS — M51.36 DDD (DEGENERATIVE DISC DISEASE), LUMBAR: ICD-10-CM

## 2020-12-28 DIAGNOSIS — Z01.818 PREOP TESTING: ICD-10-CM

## 2020-12-28 PROCEDURE — 1125F AMNT PAIN NOTED PAIN PRSNT: CPT | Mod: S$GLB,,, | Performed by: ANESTHESIOLOGY

## 2020-12-28 PROCEDURE — 1101F PT FALLS ASSESS-DOCD LE1/YR: CPT | Mod: CPTII,S$GLB,, | Performed by: ANESTHESIOLOGY

## 2020-12-28 PROCEDURE — 1159F MED LIST DOCD IN RCRD: CPT | Mod: S$GLB,,, | Performed by: ANESTHESIOLOGY

## 2020-12-28 PROCEDURE — 99214 OFFICE O/P EST MOD 30 MIN: CPT | Mod: S$GLB,,, | Performed by: ANESTHESIOLOGY

## 2020-12-28 PROCEDURE — 1157F ADVNC CARE PLAN IN RCRD: CPT | Mod: S$GLB,,, | Performed by: ANESTHESIOLOGY

## 2020-12-28 PROCEDURE — 3077F PR MOST RECENT SYSTOLIC BLOOD PRESSURE >= 140 MM HG: ICD-10-PCS | Mod: CPTII,S$GLB,, | Performed by: ANESTHESIOLOGY

## 2020-12-28 PROCEDURE — 1101F PR PT FALLS ASSESS DOC 0-1 FALLS W/OUT INJ PAST YR: ICD-10-PCS | Mod: CPTII,S$GLB,, | Performed by: ANESTHESIOLOGY

## 2020-12-28 PROCEDURE — 3078F DIAST BP <80 MM HG: CPT | Mod: CPTII,S$GLB,, | Performed by: ANESTHESIOLOGY

## 2020-12-28 PROCEDURE — 3288F PR FALLS RISK ASSESSMENT DOCUMENTED: ICD-10-PCS | Mod: CPTII,S$GLB,, | Performed by: ANESTHESIOLOGY

## 2020-12-28 PROCEDURE — 1159F PR MEDICATION LIST DOCUMENTED IN MEDICAL RECORD: ICD-10-PCS | Mod: S$GLB,,, | Performed by: ANESTHESIOLOGY

## 2020-12-28 PROCEDURE — 1157F PR ADVANCE CARE PLAN OR EQUIV PRESENT IN MEDICAL RECORD: ICD-10-PCS | Mod: S$GLB,,, | Performed by: ANESTHESIOLOGY

## 2020-12-28 PROCEDURE — 99999 PR PBB SHADOW E&M-EST. PATIENT-LVL IV: CPT | Mod: PBBFAC,,, | Performed by: ANESTHESIOLOGY

## 2020-12-28 PROCEDURE — 3288F FALL RISK ASSESSMENT DOCD: CPT | Mod: CPTII,S$GLB,, | Performed by: ANESTHESIOLOGY

## 2020-12-28 PROCEDURE — 3077F SYST BP >= 140 MM HG: CPT | Mod: CPTII,S$GLB,, | Performed by: ANESTHESIOLOGY

## 2020-12-28 PROCEDURE — 99999 PR PBB SHADOW E&M-EST. PATIENT-LVL IV: ICD-10-PCS | Mod: PBBFAC,,, | Performed by: ANESTHESIOLOGY

## 2020-12-28 PROCEDURE — 1125F PR PAIN SEVERITY QUANTIFIED, PAIN PRESENT: ICD-10-PCS | Mod: S$GLB,,, | Performed by: ANESTHESIOLOGY

## 2020-12-28 PROCEDURE — 99214 PR OFFICE/OUTPT VISIT, EST, LEVL IV, 30-39 MIN: ICD-10-PCS | Mod: S$GLB,,, | Performed by: ANESTHESIOLOGY

## 2020-12-28 PROCEDURE — 3078F PR MOST RECENT DIASTOLIC BLOOD PRESSURE < 80 MM HG: ICD-10-PCS | Mod: CPTII,S$GLB,, | Performed by: ANESTHESIOLOGY

## 2020-12-28 RX ORDER — GABAPENTIN 300 MG/1
300 CAPSULE ORAL 2 TIMES DAILY
COMMUNITY
Start: 2020-12-21 | End: 2021-06-14 | Stop reason: ALTCHOICE

## 2020-12-28 NOTE — PROGRESS NOTES
This note was completed with dictation software and grammatical errors may exist.    REF PROV:No ref. provider found  PCP: Osmani Villatoro MD     CC:  Low back and left leg pain      INTERVAL HISTORY: Ms. Abreu is known patient to our clinic.  She was last seen in May [address].  She presented last time with chronic left hip pain.  She's had short lived relief of left hip injections in underwent left hip replacement surgery.  She also has history of lower back and radiating left leg pain.  She underwent lumbar TYLER in the past with moderate benefit.  Last procedure was done in June 2020.  She is unsure of recent relief from the procedure given recent medical condition.  She underwent a laparoscopic gallbladder surgery.  This is complicated by bacteremia as well as DVT.  She is currently on blood thinners.  Pain is recurred in her low back and left anterior thigh.  She wishes to have repeat procedure.  She denies any weakness.  No bowel bladder changes.  She rates her pain 8/10 today.  PRIOR HPI: The patient is a 72-year-old woman with a history of diabetes, hypertension, GERD, right hip replacement, sleep apnea and chronic low back pain with radiation into the left hip, thigh and buttocks who presents in referral from Dr. Villatoro. She has seen my colleague, Dr. Montes in the past and was most recently treated with a left hip injection that provided about 90% relief for about 3 months.  She was most recently seen by orthopedics for possible left hip replacement but concerns about blood transfusion caused her delay her possible left hip surgery.  She presents today with recurrence of her left hip and groin pain.  She rates the pain 8/10.      Pain intervention history:The patient has seen Dr. Zuniga in the past and recently consulted with nurse practitioner Clara Lofton and complaining of left thoracic pain as well and had discussed a possible thoracic and lumbar spine MRIs but apparently had declined these. She has a  "history of right hip replacement.  She has not been evaluated for her left knee pain by Dr. Guzmán.  She is status post left L2/3 and left L3/4 TFESI on 5/2/12 greater than 60% relief of her back and left leg pain. She is status post left hip injection on 6/6/12 and 11/2012 with greater than 90% relief. Has taken Gabapentin in the past did not work. Cymbalta caused nightmares. Right L2 and L3 transforaminal epidural steroid injection on 12/18/12 with 90% relief.   Left hip injection 4/29/2013 provided 90% relief of hip pain that lasted 3 months.  -  s/p repeat left hip injection on 10/4/2013 and 1/10/14 and states having about 80-90% relief of her hip for 3 months  - s/p repeat left hip injection on 5/9/14 with 80% x 3 months  - s/p repeat right L2 and L3 TFESI on 6/5/14 with 75% relief of her back pain  - s/p repeat left hip injection on 10/3/14 and reports about 50% relief so far.     ROS:She reports back pain, stomach pain and seizures. Balance of review of systems is negative.    Medical, surgical, family and social history reviewed elsewhere in record.    Medications/Allergies: See med card    .  Vitals:    12/28/20 1255   BP: (!) 184/69   Pulse: 66   Weight: 96.6 kg (213 lb)   Height: 5' 3" (1.6 m)   PainSc:   8   PainLoc: Back         Physical exam:  Gen: A and O x3, tearful, well-groomed, accompanied by friend, in wheelchair  Skin: No rashes or obvious lesions  HEENT: PERRLA  CVS: Regular rate and rhythm, normal S1 and S2, no murmurs.  Resp: Clear to auscultation bilaterally, no wheezes or rales.  Musculoskeletal: Positive crepitus over the right knee.  Full range of motion.    Neuro:  Upper extremities: 5/5 strength bilaterally   Lower extremities: 5/5 strength bilaterally  Reflexes: Patellar 2+, Achilles 2+.  Sensory:  Intact and symmetrical to light touch and pinprick in L2-S1 dermatomes bilaterally.    Lumbar spine:  Lumbar spine: ROM is unable to be examined as patient is in severe pain with placing " weight on foot.     Oren's test increased pain right low back / hip  Supine straight leg raise is negative  Internal and external rotation of the hip causes Severe Left hip pain.  Myofascial exam: Tenderness to palpation across Left hip, groin region.      Imaging:  MRI thoracic spine 2/22/12: There is a note of a synovial cyst on the left at T. 10/11 3 mm without foraminal stenosis.  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    Lumbar spine MRI 5/8/09: Annular disc bulge at L2/3 with left posterior lateral disc protrusion left greater than right foraminal narrowing.  Degenerative facet changes at L3/4 with superimposed right posterior lateral disc protrusion with moderate canal and moderate right greater than left foraminal narrowing.  L4/5 there is moderate annular disc bulge with facet changes and ligamentous hypertrophy causing mild to moderate canal stenosis with moderate right greater than left foraminal narrowing.    Lumbar spine MRI: 4/10/12: No major changes from previous MRI other than slightly progressed disc bulging at L3/4.    Assessment:  Patient presents with   1. Lumbar radiculitis     2. DDD (degenerative disc disease), lumbar     3. Other spondylosis, lumbar region           Plan:  1. I have stressed the importance of physical activity and exercise to improve overall health  2. Reviewed pertinent imaging and records with patient  3. I think that the patient's back pain and radicular leg symptoms are due to degenerative disc disease and have recommended a lumbar epidural steroid injection to the repeat Left L2-3, L3-4 level(s).  4. Will contact her primary care our Hematology Oncology for approval to hold her blood thinners given her recent DVT in July 2020.  5.  May also consider spinal cord stimulator if above is not helpful.  6.  Follow-up after the procedure

## 2020-12-28 NOTE — TELEPHONE ENCOUNTER
Spoke with patient and she stated she called [facility] to get new supplies for her CPAP and they stated they no longer carry any of the supplies for her CPAP that she would need a new CPAP machine ordered. Advised patient I would send a message to the provider to order a new CPAP machine.     ----- Message from Jatinder Werner sent at 12/28/2020  3:15 PM CST -----  Regarding: new rx  Contact: Patient  Patient want to speak with a nurse regarding new cpap machine please call back at [phone] (home)     Case number [id number]

## 2020-12-29 ENCOUNTER — TELEPHONE (OUTPATIENT)
Dept: FAMILY MEDICINE | Facility: CLINIC | Age: 80
End: 2020-12-29

## 2020-12-29 ENCOUNTER — TELEPHONE (OUTPATIENT)
Dept: PULMONOLOGY | Facility: CLINIC | Age: 80
End: 2020-12-29

## 2020-12-29 DIAGNOSIS — R30.0 DYSURIA: Primary | ICD-10-CM

## 2020-12-29 NOTE — TELEPHONE ENCOUNTER
----- Message from Selina Gilbert sent at 12/29/2020 10:27 AM CST -----  Contact: Sol with [facility]  Type: Needs Medical Advice  Who Called:  Nurse Sol with [facility]   Symptoms (please be specific):  uti and bladder infection unable to hold her urine  How long has patient had these symptoms:  #for the past few days  Pharmacy name and phone #:  - [address];  Best Call Back Number: [phone]  Additional Information:Sol is still in  the home with the patient please call back to give orders.   Call placed to pod.

## 2020-12-30 ENCOUNTER — PATIENT MESSAGE (OUTPATIENT)
Dept: FAMILY MEDICINE | Facility: CLINIC | Age: 80
End: 2020-12-30

## 2020-12-30 DIAGNOSIS — R60.0 EDEMA OF EXTREMITIES: ICD-10-CM

## 2020-12-30 RX ORDER — TOPIRAMATE 50 MG/1
TABLET, FILM COATED ORAL
Qty: 60 TABLET | Refills: 2 | Status: SHIPPED | OUTPATIENT
Start: 2020-12-30 | End: 2021-10-22 | Stop reason: ALTCHOICE

## 2020-12-30 RX ORDER — FUROSEMIDE 20 MG/1
20 TABLET ORAL DAILY PRN
Qty: 90 TABLET | Refills: 3 | OUTPATIENT
Start: 2020-12-30 | End: 2021-12-30

## 2020-12-30 RX ORDER — FUROSEMIDE 20 MG/1
20 TABLET ORAL DAILY PRN
Qty: 90 TABLET | Refills: 4 | Status: SHIPPED | OUTPATIENT
Start: 2020-12-30 | End: 2021-02-09

## 2020-12-30 NOTE — TELEPHONE ENCOUNTER
Pt wants to know if she can change her script to x90 with 3 refills. Pt experiences back pain and is unable to drive often so it is hard for her to go out often to get medication

## 2021-01-06 ENCOUNTER — TELEPHONE (OUTPATIENT)
Dept: FAMILY MEDICINE | Facility: CLINIC | Age: 81
End: 2021-01-06

## 2021-01-06 ENCOUNTER — HOSPITAL ENCOUNTER (OUTPATIENT)
Dept: CARDIOLOGY | Facility: CLINIC | Age: 81
Discharge: HOME | End: 2021-01-06
Attending: SPECIALIST
Payer: MEDICARE

## 2021-01-06 VITALS — HEIGHT: 63 IN | BODY MASS INDEX: 37.73 KG/M2 | WEIGHT: 212.94 LBS

## 2021-01-06 VITALS — HEIGHT: 63 IN | WEIGHT: 212.94 LBS | BODY MASS INDEX: 37.73 KG/M2

## 2021-01-06 DIAGNOSIS — E78.5 HYPERLIPIDEMIA ASSOCIATED WITH TYPE 2 DIABETES MELLITUS: ICD-10-CM

## 2021-01-06 DIAGNOSIS — E11.69 HYPERLIPIDEMIA ASSOCIATED WITH TYPE 2 DIABETES MELLITUS: ICD-10-CM

## 2021-01-06 DIAGNOSIS — G47.33 OSA (OBSTRUCTIVE SLEEP APNEA): Primary | ICD-10-CM

## 2021-01-06 DIAGNOSIS — E11.59 HYPERTENSION ASSOCIATED WITH DIABETES: ICD-10-CM

## 2021-01-06 DIAGNOSIS — I15.2 HYPERTENSION ASSOCIATED WITH DIABETES: ICD-10-CM

## 2021-01-06 DIAGNOSIS — I26.99 BILATERAL PULMONARY EMBOLISM: ICD-10-CM

## 2021-01-06 PROCEDURE — 93922 UPR/L XTREMITY ART 2 LEVELS: CPT | Mod: S$GLB,,, | Performed by: SPECIALIST

## 2021-01-06 PROCEDURE — 93922 ANKLE BRACHIAL INDICES (ABI): ICD-10-PCS | Mod: S$GLB,,, | Performed by: SPECIALIST

## 2021-01-06 PROCEDURE — 93306 TTE W/DOPPLER COMPLETE: CPT | Mod: S$GLB,,, | Performed by: SPECIALIST

## 2021-01-06 PROCEDURE — 93306 ECHO (CUPID ONLY): ICD-10-PCS | Mod: S$GLB,,, | Performed by: SPECIALIST

## 2021-01-08 DIAGNOSIS — Z79.01 ANTICOAGULATED ON COUMADIN: Primary | ICD-10-CM

## 2021-01-09 ENCOUNTER — LAB VISIT (OUTPATIENT)
Dept: PRIMARY CARE CLINIC | Facility: CLINIC | Age: 81
End: 2021-01-09
Payer: MEDICARE

## 2021-01-09 DIAGNOSIS — Z01.818 PREOP TESTING: ICD-10-CM

## 2021-01-09 PROCEDURE — U0003 INFECTIOUS AGENT DETECTION BY NUCLEIC ACID (DNA OR RNA); SEVERE ACUTE RESPIRATORY SYNDROME CORONAVIRUS 2 (SARS-COV-2) (CORONAVIRUS DISEASE [COVID-19]), AMPLIFIED PROBE TECHNIQUE, MAKING USE OF HIGH THROUGHPUT TECHNOLOGIES AS DESCRIBED BY CMS-2020-01-R: HCPCS

## 2021-01-10 LAB — SARS-COV-2 RNA RESP QL NAA+PROBE: NOT DETECTED

## 2021-01-11 ENCOUNTER — DOCUMENT SCAN (OUTPATIENT)
Dept: HOME HEALTH SERVICES | Facility: HOSPITAL | Age: 81
End: 2021-01-11
Payer: MEDICARE

## 2021-01-12 ENCOUNTER — HOSPITAL ENCOUNTER (OUTPATIENT)
Facility: AMBULARY SURGERY CENTER | Age: 81
Discharge: HOME | End: 2021-01-12
Attending: ANESTHESIOLOGY | Admitting: ANESTHESIOLOGY
Payer: MEDICARE

## 2021-01-12 ENCOUNTER — LAB VISIT (OUTPATIENT)
Dept: LAB | Facility: HOSPITAL | Age: 81
End: 2021-01-12
Attending: ANESTHESIOLOGY
Payer: MEDICARE

## 2021-01-12 DIAGNOSIS — Z79.01 ANTICOAGULATED ON COUMADIN: ICD-10-CM

## 2021-01-12 DIAGNOSIS — M54.16 LUMBAR RADICULITIS: Primary | ICD-10-CM

## 2021-01-12 LAB
INR PPP: 1 (ref 0.8–1.2)
POCT GLUCOSE: 90 MG/DL (ref 70–110)
PROTHROMBIN TIME: 10.6 SEC (ref 9–12.5)

## 2021-01-12 PROCEDURE — 64484 NJX AA&/STRD TFRM EPI L/S EA: CPT | Mod: LT,,, | Performed by: ANESTHESIOLOGY

## 2021-01-12 PROCEDURE — 85610 PROTHROMBIN TIME: CPT

## 2021-01-12 PROCEDURE — 64483 NJX AA&/STRD TFRM EPI L/S 1: CPT | Mod: LT,,, | Performed by: ANESTHESIOLOGY

## 2021-01-12 PROCEDURE — 36415 COLL VENOUS BLD VENIPUNCTURE: CPT

## 2021-01-12 PROCEDURE — 64484 PRA INJECT ANES/STEROID FORAMEN LUMBAR/SACRAL W IMG GUIDE ,EA ADD LEVEL: ICD-10-PCS | Mod: LT,,, | Performed by: ANESTHESIOLOGY

## 2021-01-12 PROCEDURE — 64483 NJX AA&/STRD TFRM EPI L/S 1: CPT | Performed by: ANESTHESIOLOGY

## 2021-01-12 PROCEDURE — 64483 PR EPIDURAL INJ, ANES/STEROID, TRANSFORAMINAL, LUMB/SACR, SNGL LEVL: ICD-10-PCS | Mod: LT,,, | Performed by: ANESTHESIOLOGY

## 2021-01-12 PROCEDURE — 64484 NJX AA&/STRD TFRM EPI L/S EA: CPT | Performed by: ANESTHESIOLOGY

## 2021-01-12 RX ORDER — SODIUM CHLORIDE, SODIUM LACTATE, POTASSIUM CHLORIDE, CALCIUM CHLORIDE 600; 310; 30; 20 MG/100ML; MG/100ML; MG/100ML; MG/100ML
INJECTION, SOLUTION INTRAVENOUS ONCE AS NEEDED
Status: COMPLETED | OUTPATIENT
Start: 2021-01-12 | End: 2021-01-12

## 2021-01-12 RX ORDER — FENTANYL CITRATE 50 UG/ML
INJECTION, SOLUTION INTRAMUSCULAR; INTRAVENOUS
Status: DISCONTINUED | OUTPATIENT
Start: 2021-01-12 | End: 2021-01-12 | Stop reason: HOSPADM

## 2021-01-12 RX ORDER — MIDAZOLAM HYDROCHLORIDE 1 MG/ML
INJECTION INTRAMUSCULAR; INTRAVENOUS
Status: DISCONTINUED | OUTPATIENT
Start: 2021-01-12 | End: 2021-01-12 | Stop reason: HOSPADM

## 2021-01-12 RX ORDER — BUPIVACAINE HYDROCHLORIDE 2.5 MG/ML
INJECTION, SOLUTION EPIDURAL; INFILTRATION; INTRACAUDAL
Status: DISCONTINUED | OUTPATIENT
Start: 2021-01-12 | End: 2021-01-12 | Stop reason: HOSPADM

## 2021-01-12 RX ORDER — LIDOCAINE HYDROCHLORIDE 10 MG/ML
INJECTION, SOLUTION EPIDURAL; INFILTRATION; INTRACAUDAL; PERINEURAL
Status: DISCONTINUED | OUTPATIENT
Start: 2021-01-12 | End: 2021-01-12 | Stop reason: HOSPADM

## 2021-01-12 RX ORDER — DEXAMETHASONE SODIUM PHOSPHATE 10 MG/ML
INJECTION INTRAMUSCULAR; INTRAVENOUS
Status: DISCONTINUED | OUTPATIENT
Start: 2021-01-12 | End: 2021-01-12 | Stop reason: HOSPADM

## 2021-01-12 RX ADMIN — SODIUM CHLORIDE, SODIUM LACTATE, POTASSIUM CHLORIDE, CALCIUM CHLORIDE: 600; 310; 30; 20 INJECTION, SOLUTION INTRAVENOUS at 01:01

## 2021-01-13 ENCOUNTER — DOCUMENT SCAN (OUTPATIENT)
Dept: HOME HEALTH SERVICES | Facility: HOSPITAL | Age: 81
End: 2021-01-13
Payer: MEDICARE

## 2021-01-13 ENCOUNTER — EXTERNAL HOME HEALTH (OUTPATIENT)
Dept: HOME HEALTH SERVICES | Facility: HOSPITAL | Age: 81
End: 2021-01-13
Payer: MEDICARE

## 2021-01-13 VITALS
HEIGHT: 63 IN | BODY MASS INDEX: 37.73 KG/M2 | TEMPERATURE: 98 F | SYSTOLIC BLOOD PRESSURE: 182 MMHG | WEIGHT: 212.94 LBS | HEART RATE: 56 BPM | OXYGEN SATURATION: 99 % | RESPIRATION RATE: 18 BRPM | DIASTOLIC BLOOD PRESSURE: 77 MMHG

## 2021-01-15 DIAGNOSIS — G47.33 OBSTRUCTIVE SLEEP APNEA: Primary | ICD-10-CM

## 2021-01-18 ENCOUNTER — DOCUMENT SCAN (OUTPATIENT)
Dept: HOME HEALTH SERVICES | Facility: HOSPITAL | Age: 81
End: 2021-01-18
Payer: MEDICARE

## 2021-01-19 ENCOUNTER — TELEPHONE (OUTPATIENT)
Dept: PULMONOLOGY | Facility: CLINIC | Age: 81
End: 2021-01-19

## 2021-01-22 ENCOUNTER — TELEPHONE (OUTPATIENT)
Dept: FAMILY MEDICINE | Facility: CLINIC | Age: 81
End: 2021-01-22

## 2021-01-22 LAB
AORTIC ROOT ANNULUS: 2.6 CM
AORTIC VALVE CUSP SEPERATION: 1.6 CM
AV INDEX (PROSTH): 0.9
AV MEAN GRADIENT: 4 MMHG
AV PEAK GRADIENT: 6 MMHG
AV VALVE AREA: 3.11 CM2
AV VELOCITY RATIO: 0.84
BSA FOR ECHO PROCEDURE: 2.07 M2
CV ECHO LV RWT: 0.64 CM
DOP CALC AO PEAK VEL: 1.25 M/S
DOP CALC AO VTI: 32.2 CM
DOP CALC LVOT AREA: 3.5 CM2
DOP CALC LVOT DIAMETER: 2.1 CM
DOP CALC LVOT PEAK VEL: 1.05 M/S
DOP CALC LVOT STROKE VOLUME: 100.05 CM3
DOP CALCLVOT PEAK VEL VTI: 28.9 CM
E WAVE DECELERATION TIME: 253 MS
E/A RATIO: 1.07
ECHO LV POSTERIOR WALL: 1.36 CM (ref 0.6–1.1)
FRACTIONAL SHORTENING: 38 % (ref 28–44)
INTERVENTRICULAR SEPTUM: 1.36 CM (ref 0.6–1.1)
IVRT: 69 MS
LA MAJOR: 4.1 CM
LEFT ATRIUM SIZE: 4.3 CM
LEFT INTERNAL DIMENSION IN SYSTOLE: 2.63 CM (ref 2.1–4)
LEFT VENTRICLE MASS INDEX: 111 G/M2
LEFT VENTRICULAR INTERNAL DIMENSION IN DIASTOLE: 4.27 CM (ref 3.5–6)
LEFT VENTRICULAR MASS: 219.98 G
MV PEAK A VEL: 0.98 M/S
MV PEAK E VEL: 1.05 M/S
PV PEAK VELOCITY: 1.13 M/S
RA PRESSURE: 3 MMHG
RIGHT VENTRICULAR END-DIASTOLIC DIMENSION: 1.87 CM

## 2021-01-25 ENCOUNTER — EXTERNAL HOME HEALTH (OUTPATIENT)
Dept: HOME HEALTH SERVICES | Facility: HOSPITAL | Age: 81
End: 2021-01-25

## 2021-01-25 ENCOUNTER — DOCUMENT SCAN (OUTPATIENT)
Dept: HOME HEALTH SERVICES | Facility: HOSPITAL | Age: 81
End: 2021-01-25
Payer: MEDICARE

## 2021-01-27 ENCOUNTER — DOCUMENT SCAN (OUTPATIENT)
Dept: HOME HEALTH SERVICES | Facility: HOSPITAL | Age: 81
End: 2021-01-27
Payer: MEDICARE

## 2021-01-28 ENCOUNTER — TELEPHONE (OUTPATIENT)
Dept: PAIN MEDICINE | Facility: CLINIC | Age: 81
End: 2021-01-28

## 2021-01-28 RX ORDER — TIZANIDINE 4 MG/1
4 TABLET ORAL 3 TIMES DAILY PRN
Qty: 90 TABLET | Refills: 1 | Status: SHIPPED | OUTPATIENT
Start: 2021-01-28 | End: 2021-02-27

## 2021-02-02 ENCOUNTER — DOCUMENT SCAN (OUTPATIENT)
Dept: HOME HEALTH SERVICES | Facility: HOSPITAL | Age: 81
End: 2021-02-02
Payer: MEDICARE

## 2021-02-03 ENCOUNTER — TELEPHONE (OUTPATIENT)
Dept: CARDIOLOGY | Facility: CLINIC | Age: 81
End: 2021-02-03

## 2021-02-04 LAB
LEFT ABI: 1.04
LEFT ARM BP: 193 MMHG
LEFT CALF BP: 200 MMHG
LEFT DORSALIS PEDIS: 200 MMHG
LEFT POSTERIOR TIBIAL: 200 MMHG
LEFT UPPER LEG BP: 200 MMHG
RIGHT ABI: 1.04
RIGHT ARM BP: 190 MMHG
RIGHT CALF BP: 200 MMHG
RIGHT DORSALIS PEDIS: 200 MMHG
RIGHT POSTERIOR TIBIAL: 200 MMHG
RIGHT UPPER LEG BP: 200 MMHG

## 2021-02-08 ENCOUNTER — PATIENT OUTREACH (OUTPATIENT)
Dept: ADMINISTRATIVE | Facility: OTHER | Age: 81
End: 2021-02-08

## 2021-02-08 ENCOUNTER — OFFICE VISIT (OUTPATIENT)
Dept: PAIN MEDICINE | Facility: CLINIC | Age: 81
End: 2021-02-08
Payer: MEDICARE

## 2021-02-08 VITALS
BODY MASS INDEX: 37.55 KG/M2 | WEIGHT: 212 LBS | DIASTOLIC BLOOD PRESSURE: 59 MMHG | SYSTOLIC BLOOD PRESSURE: 144 MMHG | HEART RATE: 56 BPM

## 2021-02-08 DIAGNOSIS — M70.61 GREATER TROCHANTERIC BURSITIS OF RIGHT HIP: ICD-10-CM

## 2021-02-08 DIAGNOSIS — M79.18 MYOFASCIAL PAIN: ICD-10-CM

## 2021-02-08 DIAGNOSIS — M47.896 OTHER SPONDYLOSIS, LUMBAR REGION: ICD-10-CM

## 2021-02-08 DIAGNOSIS — M54.16 LUMBAR RADICULITIS: Primary | ICD-10-CM

## 2021-02-08 PROCEDURE — 1125F AMNT PAIN NOTED PAIN PRSNT: CPT | Mod: S$GLB,,, | Performed by: PHYSICIAN ASSISTANT

## 2021-02-08 PROCEDURE — 1125F PR PAIN SEVERITY QUANTIFIED, PAIN PRESENT: ICD-10-PCS | Mod: S$GLB,,, | Performed by: PHYSICIAN ASSISTANT

## 2021-02-08 PROCEDURE — 99214 PR OFFICE/OUTPT VISIT, EST, LEVL IV, 30-39 MIN: ICD-10-PCS | Mod: S$GLB,,, | Performed by: PHYSICIAN ASSISTANT

## 2021-02-08 PROCEDURE — 99214 OFFICE O/P EST MOD 30 MIN: CPT | Mod: S$GLB,,, | Performed by: PHYSICIAN ASSISTANT

## 2021-02-08 PROCEDURE — 1157F PR ADVANCE CARE PLAN OR EQUIV PRESENT IN MEDICAL RECORD: ICD-10-PCS | Mod: S$GLB,,, | Performed by: PHYSICIAN ASSISTANT

## 2021-02-08 PROCEDURE — 3288F FALL RISK ASSESSMENT DOCD: CPT | Mod: CPTII,S$GLB,, | Performed by: PHYSICIAN ASSISTANT

## 2021-02-08 PROCEDURE — 3078F DIAST BP <80 MM HG: CPT | Mod: CPTII,S$GLB,, | Performed by: PHYSICIAN ASSISTANT

## 2021-02-08 PROCEDURE — 3077F PR MOST RECENT SYSTOLIC BLOOD PRESSURE >= 140 MM HG: ICD-10-PCS | Mod: CPTII,S$GLB,, | Performed by: PHYSICIAN ASSISTANT

## 2021-02-08 PROCEDURE — 1101F PT FALLS ASSESS-DOCD LE1/YR: CPT | Mod: CPTII,S$GLB,, | Performed by: PHYSICIAN ASSISTANT

## 2021-02-08 PROCEDURE — 1159F MED LIST DOCD IN RCRD: CPT | Mod: S$GLB,,, | Performed by: PHYSICIAN ASSISTANT

## 2021-02-08 PROCEDURE — 1101F PR PT FALLS ASSESS DOC 0-1 FALLS W/OUT INJ PAST YR: ICD-10-PCS | Mod: CPTII,S$GLB,, | Performed by: PHYSICIAN ASSISTANT

## 2021-02-08 PROCEDURE — 3288F PR FALLS RISK ASSESSMENT DOCUMENTED: ICD-10-PCS | Mod: CPTII,S$GLB,, | Performed by: PHYSICIAN ASSISTANT

## 2021-02-08 PROCEDURE — 3077F SYST BP >= 140 MM HG: CPT | Mod: CPTII,S$GLB,, | Performed by: PHYSICIAN ASSISTANT

## 2021-02-08 PROCEDURE — 1159F PR MEDICATION LIST DOCUMENTED IN MEDICAL RECORD: ICD-10-PCS | Mod: S$GLB,,, | Performed by: PHYSICIAN ASSISTANT

## 2021-02-08 PROCEDURE — 1157F ADVNC CARE PLAN IN RCRD: CPT | Mod: S$GLB,,, | Performed by: PHYSICIAN ASSISTANT

## 2021-02-08 PROCEDURE — 3078F PR MOST RECENT DIASTOLIC BLOOD PRESSURE < 80 MM HG: ICD-10-PCS | Mod: CPTII,S$GLB,, | Performed by: PHYSICIAN ASSISTANT

## 2021-02-08 PROCEDURE — 99999 PR PBB SHADOW E&M-EST. PATIENT-LVL IV: CPT | Mod: PBBFAC,,, | Performed by: PHYSICIAN ASSISTANT

## 2021-02-08 PROCEDURE — 99999 PR PBB SHADOW E&M-EST. PATIENT-LVL IV: ICD-10-PCS | Mod: PBBFAC,,, | Performed by: PHYSICIAN ASSISTANT

## 2021-02-08 RX ORDER — OXYBUTYNIN CHLORIDE 5 MG/1
5 TABLET ORAL 2 TIMES DAILY
COMMUNITY
Start: 2021-01-21 | End: 2021-04-19

## 2021-02-09 ENCOUNTER — TELEPHONE (OUTPATIENT)
Dept: FAMILY MEDICINE | Facility: CLINIC | Age: 81
End: 2021-02-09

## 2021-02-09 DIAGNOSIS — I27.20 PULMONARY HYPERTENSION: ICD-10-CM

## 2021-02-09 DIAGNOSIS — R60.0 EDEMA OF EXTREMITIES: Primary | ICD-10-CM

## 2021-02-09 RX ORDER — FUROSEMIDE 20 MG/1
TABLET ORAL
Qty: 40 TABLET | Refills: 11 | Status: SHIPPED | OUTPATIENT
Start: 2021-02-09 | End: 2021-04-09

## 2021-02-09 RX ORDER — POTASSIUM CHLORIDE 20 MEQ/1
TABLET, EXTENDED RELEASE ORAL
Qty: 10 TABLET | Refills: 3 | Status: SHIPPED | OUTPATIENT
Start: 2021-02-09 | End: 2021-04-09

## 2021-02-10 ENCOUNTER — IMMUNIZATION (OUTPATIENT)
Dept: PRIMARY CARE CLINIC | Facility: CLINIC | Age: 81
End: 2021-02-10
Payer: MEDICARE

## 2021-02-10 DIAGNOSIS — Z23 NEED FOR VACCINATION: Primary | ICD-10-CM

## 2021-02-10 PROCEDURE — 0001A COVID-19, MRNA, LNP-S, PF, 30 MCG/0.3 ML DOSE VACCINE: ICD-10-PCS | Mod: CV19,S$GLB,, | Performed by: FAMILY MEDICINE

## 2021-02-10 PROCEDURE — 0001A COVID-19, MRNA, LNP-S, PF, 30 MCG/0.3 ML DOSE VACCINE: CPT | Mod: CV19,S$GLB,, | Performed by: FAMILY MEDICINE

## 2021-02-10 PROCEDURE — 91300 COVID-19, MRNA, LNP-S, PF, 30 MCG/0.3 ML DOSE VACCINE: CPT | Mod: S$GLB,,, | Performed by: FAMILY MEDICINE

## 2021-02-10 PROCEDURE — 91300 COVID-19, MRNA, LNP-S, PF, 30 MCG/0.3 ML DOSE VACCINE: ICD-10-PCS | Mod: S$GLB,,, | Performed by: FAMILY MEDICINE

## 2021-02-12 ENCOUNTER — DOCUMENT SCAN (OUTPATIENT)
Dept: HOME HEALTH SERVICES | Facility: HOSPITAL | Age: 81
End: 2021-02-12
Payer: MEDICARE

## 2021-02-17 ENCOUNTER — LAB VISIT (OUTPATIENT)
Dept: LAB | Facility: HOSPITAL | Age: 81
End: 2021-02-17
Attending: ANESTHESIOLOGY
Payer: MEDICARE

## 2021-02-17 DIAGNOSIS — I26.99 BILATERAL PULMONARY EMBOLISM: ICD-10-CM

## 2021-02-17 LAB
INR PPP: 2.1 (ref 0.8–1.2)
PROTHROMBIN TIME: 21.1 SEC (ref 9–12.5)

## 2021-02-17 PROCEDURE — 36415 COLL VENOUS BLD VENIPUNCTURE: CPT

## 2021-02-17 PROCEDURE — 85610 PROTHROMBIN TIME: CPT

## 2021-02-19 ENCOUNTER — TELEPHONE (OUTPATIENT)
Dept: FAMILY MEDICINE | Facility: CLINIC | Age: 81
End: 2021-02-19

## 2021-02-19 DIAGNOSIS — M51.36 DDD (DEGENERATIVE DISC DISEASE), LUMBAR: Primary | ICD-10-CM

## 2021-02-19 RX ORDER — METHYLPREDNISOLONE 4 MG/1
TABLET ORAL
Qty: 1 PACKAGE | Refills: 0 | Status: SHIPPED | OUTPATIENT
Start: 2021-02-19 | End: 2021-03-12

## 2021-02-22 ENCOUNTER — TELEPHONE (OUTPATIENT)
Dept: FAMILY MEDICINE | Facility: CLINIC | Age: 81
End: 2021-02-22

## 2021-02-22 ENCOUNTER — OFFICE VISIT (OUTPATIENT)
Dept: CARDIOLOGY | Facility: CLINIC | Age: 81
End: 2021-02-22
Payer: MEDICARE

## 2021-02-22 VITALS
OXYGEN SATURATION: 100 % | HEIGHT: 63 IN | SYSTOLIC BLOOD PRESSURE: 140 MMHG | BODY MASS INDEX: 39.51 KG/M2 | WEIGHT: 223 LBS | HEART RATE: 46 BPM | DIASTOLIC BLOOD PRESSURE: 80 MMHG

## 2021-02-22 DIAGNOSIS — I50.32 CHRONIC DIASTOLIC CHF (CONGESTIVE HEART FAILURE): ICD-10-CM

## 2021-02-22 DIAGNOSIS — I27.20 PULMONARY HYPERTENSION: ICD-10-CM

## 2021-02-22 DIAGNOSIS — E11.59 HYPERTENSION ASSOCIATED WITH DIABETES: ICD-10-CM

## 2021-02-22 DIAGNOSIS — I82.4Z2 DEEP VEIN THROMBOSIS (DVT) OF DISTAL VEIN OF LEFT LOWER EXTREMITY, UNSPECIFIED CHRONICITY: ICD-10-CM

## 2021-02-22 DIAGNOSIS — I15.2 HYPERTENSION ASSOCIATED WITH DIABETES: ICD-10-CM

## 2021-02-22 DIAGNOSIS — R60.0 EDEMA OF EXTREMITIES: ICD-10-CM

## 2021-02-22 PROCEDURE — 99499 UNLISTED E&M SERVICE: CPT | Mod: S$GLB,,, | Performed by: SPECIALIST

## 2021-02-22 PROCEDURE — 1101F PR PT FALLS ASSESS DOC 0-1 FALLS W/OUT INJ PAST YR: ICD-10-PCS | Mod: CPTII,S$GLB,, | Performed by: SPECIALIST

## 2021-02-22 PROCEDURE — 3079F PR MOST RECENT DIASTOLIC BLOOD PRESSURE 80-89 MM HG: ICD-10-PCS | Mod: CPTII,S$GLB,, | Performed by: SPECIALIST

## 2021-02-22 PROCEDURE — 3288F FALL RISK ASSESSMENT DOCD: CPT | Mod: CPTII,S$GLB,, | Performed by: SPECIALIST

## 2021-02-22 PROCEDURE — 3077F PR MOST RECENT SYSTOLIC BLOOD PRESSURE >= 140 MM HG: ICD-10-PCS | Mod: CPTII,S$GLB,, | Performed by: SPECIALIST

## 2021-02-22 PROCEDURE — 1101F PT FALLS ASSESS-DOCD LE1/YR: CPT | Mod: CPTII,S$GLB,, | Performed by: SPECIALIST

## 2021-02-22 PROCEDURE — 99499 RISK ADDL DX/OHS AUDIT: ICD-10-PCS | Mod: S$GLB,,, | Performed by: SPECIALIST

## 2021-02-22 PROCEDURE — 1159F MED LIST DOCD IN RCRD: CPT | Mod: S$GLB,,, | Performed by: SPECIALIST

## 2021-02-22 PROCEDURE — 1157F PR ADVANCE CARE PLAN OR EQUIV PRESENT IN MEDICAL RECORD: ICD-10-PCS | Mod: S$GLB,,, | Performed by: SPECIALIST

## 2021-02-22 PROCEDURE — 99214 PR OFFICE/OUTPT VISIT, EST, LEVL IV, 30-39 MIN: ICD-10-PCS | Mod: S$GLB,,, | Performed by: SPECIALIST

## 2021-02-22 PROCEDURE — 3288F PR FALLS RISK ASSESSMENT DOCUMENTED: ICD-10-PCS | Mod: CPTII,S$GLB,, | Performed by: SPECIALIST

## 2021-02-22 PROCEDURE — 99214 OFFICE O/P EST MOD 30 MIN: CPT | Mod: S$GLB,,, | Performed by: SPECIALIST

## 2021-02-22 PROCEDURE — 3079F DIAST BP 80-89 MM HG: CPT | Mod: CPTII,S$GLB,, | Performed by: SPECIALIST

## 2021-02-22 PROCEDURE — 1157F ADVNC CARE PLAN IN RCRD: CPT | Mod: S$GLB,,, | Performed by: SPECIALIST

## 2021-02-22 PROCEDURE — 1159F PR MEDICATION LIST DOCUMENTED IN MEDICAL RECORD: ICD-10-PCS | Mod: S$GLB,,, | Performed by: SPECIALIST

## 2021-02-22 PROCEDURE — 3077F SYST BP >= 140 MM HG: CPT | Mod: CPTII,S$GLB,, | Performed by: SPECIALIST

## 2021-02-22 RX ORDER — LOSARTAN POTASSIUM 25 MG/1
12.5 TABLET ORAL DAILY
Qty: 45 TABLET | Refills: 3 | Status: SHIPPED | OUTPATIENT
Start: 2021-02-22 | End: 2021-04-09 | Stop reason: SDUPTHER

## 2021-02-26 RX ORDER — HYDRALAZINE HYDROCHLORIDE 50 MG/1
50 TABLET, FILM COATED ORAL EVERY 8 HOURS
Qty: 270 TABLET | Refills: 3 | OUTPATIENT
Start: 2021-02-26 | End: 2022-02-26

## 2021-03-01 ENCOUNTER — DOCUMENT SCAN (OUTPATIENT)
Dept: HOME HEALTH SERVICES | Facility: HOSPITAL | Age: 81
End: 2021-03-01
Payer: MEDICARE

## 2021-03-02 ENCOUNTER — LAB VISIT (OUTPATIENT)
Dept: LAB | Facility: HOSPITAL | Age: 81
End: 2021-03-02
Attending: INTERNAL MEDICINE
Payer: MEDICARE

## 2021-03-02 DIAGNOSIS — Z86.718 HISTORY OF DVT IN ADULTHOOD: ICD-10-CM

## 2021-03-02 DIAGNOSIS — Z79.01 CURRENT USE OF LONG TERM ANTICOAGULATION: ICD-10-CM

## 2021-03-02 DIAGNOSIS — I26.99 PULMONARY EMBOLISM, UNSPECIFIED CHRONICITY, UNSPECIFIED PULMONARY EMBOLISM TYPE, UNSPECIFIED WHETHER ACUTE COR PULMONALE PRESENT: ICD-10-CM

## 2021-03-02 LAB
INR PPP: 2.2 (ref 0.8–1.2)
PROTHROMBIN TIME: 21.9 SEC (ref 9–12.5)

## 2021-03-02 PROCEDURE — 85610 PROTHROMBIN TIME: CPT

## 2021-03-02 PROCEDURE — 36415 COLL VENOUS BLD VENIPUNCTURE: CPT

## 2021-03-03 ENCOUNTER — IMMUNIZATION (OUTPATIENT)
Dept: PRIMARY CARE CLINIC | Facility: CLINIC | Age: 81
End: 2021-03-03
Payer: MEDICARE

## 2021-03-03 DIAGNOSIS — Z23 NEED FOR VACCINATION: Primary | ICD-10-CM

## 2021-03-03 PROCEDURE — 0002A COVID-19, MRNA, LNP-S, PF, 30 MCG/0.3 ML DOSE VACCINE: CPT | Mod: CV19,S$GLB,, | Performed by: FAMILY MEDICINE

## 2021-03-03 PROCEDURE — 0002A COVID-19, MRNA, LNP-S, PF, 30 MCG/0.3 ML DOSE VACCINE: ICD-10-PCS | Mod: CV19,S$GLB,, | Performed by: FAMILY MEDICINE

## 2021-03-03 PROCEDURE — 91300 COVID-19, MRNA, LNP-S, PF, 30 MCG/0.3 ML DOSE VACCINE: CPT | Mod: S$GLB,,, | Performed by: FAMILY MEDICINE

## 2021-03-03 PROCEDURE — 91300 COVID-19, MRNA, LNP-S, PF, 30 MCG/0.3 ML DOSE VACCINE: ICD-10-PCS | Mod: S$GLB,,, | Performed by: FAMILY MEDICINE

## 2021-03-15 ENCOUNTER — PATIENT OUTREACH (OUTPATIENT)
Dept: ADMINISTRATIVE | Facility: OTHER | Age: 81
End: 2021-03-15

## 2021-03-19 ENCOUNTER — OFFICE VISIT (OUTPATIENT)
Dept: PULMONOLOGY | Facility: CLINIC | Age: 81
End: 2021-03-19
Payer: MEDICARE

## 2021-03-19 VITALS
HEART RATE: 49 BPM | SYSTOLIC BLOOD PRESSURE: 151 MMHG | WEIGHT: 222 LBS | DIASTOLIC BLOOD PRESSURE: 60 MMHG | OXYGEN SATURATION: 99 % | HEIGHT: 63 IN | BODY MASS INDEX: 39.34 KG/M2

## 2021-03-19 DIAGNOSIS — G47.33 OBSTRUCTIVE SLEEP APNEA SYNDROME: ICD-10-CM

## 2021-03-19 DIAGNOSIS — I27.20 PULMONARY HYPERTENSION: ICD-10-CM

## 2021-03-19 DIAGNOSIS — J45.40 MODERATE PERSISTENT ASTHMA WITHOUT COMPLICATION: Primary | ICD-10-CM

## 2021-03-19 PROCEDURE — 1159F PR MEDICATION LIST DOCUMENTED IN MEDICAL RECORD: ICD-10-PCS | Mod: S$GLB,,, | Performed by: NURSE PRACTITIONER

## 2021-03-19 PROCEDURE — 3078F DIAST BP <80 MM HG: CPT | Mod: CPTII,S$GLB,, | Performed by: NURSE PRACTITIONER

## 2021-03-19 PROCEDURE — 3077F PR MOST RECENT SYSTOLIC BLOOD PRESSURE >= 140 MM HG: ICD-10-PCS | Mod: CPTII,S$GLB,, | Performed by: NURSE PRACTITIONER

## 2021-03-19 PROCEDURE — 1157F PR ADVANCE CARE PLAN OR EQUIV PRESENT IN MEDICAL RECORD: ICD-10-PCS | Mod: S$GLB,,, | Performed by: NURSE PRACTITIONER

## 2021-03-19 PROCEDURE — 3077F SYST BP >= 140 MM HG: CPT | Mod: CPTII,S$GLB,, | Performed by: NURSE PRACTITIONER

## 2021-03-19 PROCEDURE — 1126F AMNT PAIN NOTED NONE PRSNT: CPT | Mod: S$GLB,,, | Performed by: NURSE PRACTITIONER

## 2021-03-19 PROCEDURE — 3288F FALL RISK ASSESSMENT DOCD: CPT | Mod: CPTII,S$GLB,, | Performed by: NURSE PRACTITIONER

## 2021-03-19 PROCEDURE — 99499 UNLISTED E&M SERVICE: CPT | Mod: S$GLB,,, | Performed by: NURSE PRACTITIONER

## 2021-03-19 PROCEDURE — 3078F PR MOST RECENT DIASTOLIC BLOOD PRESSURE < 80 MM HG: ICD-10-PCS | Mod: CPTII,S$GLB,, | Performed by: NURSE PRACTITIONER

## 2021-03-19 PROCEDURE — 99499 RISK ADDL DX/OHS AUDIT: ICD-10-PCS | Mod: S$GLB,,, | Performed by: NURSE PRACTITIONER

## 2021-03-19 PROCEDURE — 3288F PR FALLS RISK ASSESSMENT DOCUMENTED: ICD-10-PCS | Mod: CPTII,S$GLB,, | Performed by: NURSE PRACTITIONER

## 2021-03-19 PROCEDURE — 99214 OFFICE O/P EST MOD 30 MIN: CPT | Mod: S$GLB,,, | Performed by: NURSE PRACTITIONER

## 2021-03-19 PROCEDURE — 1159F MED LIST DOCD IN RCRD: CPT | Mod: S$GLB,,, | Performed by: NURSE PRACTITIONER

## 2021-03-19 PROCEDURE — 99999 PR PBB SHADOW E&M-EST. PATIENT-LVL V: CPT | Mod: PBBFAC,,, | Performed by: NURSE PRACTITIONER

## 2021-03-19 PROCEDURE — 1157F ADVNC CARE PLAN IN RCRD: CPT | Mod: S$GLB,,, | Performed by: NURSE PRACTITIONER

## 2021-03-19 PROCEDURE — 1101F PT FALLS ASSESS-DOCD LE1/YR: CPT | Mod: CPTII,S$GLB,, | Performed by: NURSE PRACTITIONER

## 2021-03-19 PROCEDURE — 99214 PR OFFICE/OUTPT VISIT, EST, LEVL IV, 30-39 MIN: ICD-10-PCS | Mod: S$GLB,,, | Performed by: NURSE PRACTITIONER

## 2021-03-19 PROCEDURE — 1101F PR PT FALLS ASSESS DOC 0-1 FALLS W/OUT INJ PAST YR: ICD-10-PCS | Mod: CPTII,S$GLB,, | Performed by: NURSE PRACTITIONER

## 2021-03-19 PROCEDURE — 1126F PR PAIN SEVERITY QUANTIFIED, NO PAIN PRESENT: ICD-10-PCS | Mod: S$GLB,,, | Performed by: NURSE PRACTITIONER

## 2021-03-19 PROCEDURE — 99999 PR PBB SHADOW E&M-EST. PATIENT-LVL V: ICD-10-PCS | Mod: PBBFAC,,, | Performed by: NURSE PRACTITIONER

## 2021-03-19 RX ORDER — FLUTICASONE PROPIONATE AND SALMETEROL XINAFOATE 230; 21 UG/1; UG/1
2 AEROSOL, METERED RESPIRATORY (INHALATION) 2 TIMES DAILY
Qty: 36 G | Refills: 3 | Status: SHIPPED | OUTPATIENT
Start: 2021-03-19 | End: 2022-10-25 | Stop reason: SDUPTHER

## 2021-03-23 ENCOUNTER — LAB VISIT (OUTPATIENT)
Dept: LAB | Facility: HOSPITAL | Age: 81
End: 2021-03-23
Attending: INTERNAL MEDICINE
Payer: MEDICARE

## 2021-03-23 DIAGNOSIS — Z79.01 LONG TERM CURRENT USE OF ANTICOAGULANT: ICD-10-CM

## 2021-03-23 LAB
INR PPP: 3.4 (ref 0.8–1.2)
PROTHROMBIN TIME: 33 SEC (ref 9–12.5)

## 2021-03-23 PROCEDURE — 85610 PROTHROMBIN TIME: CPT | Performed by: FAMILY MEDICINE

## 2021-03-29 ENCOUNTER — TELEPHONE (OUTPATIENT)
Dept: FAMILY MEDICINE | Facility: CLINIC | Age: 81
End: 2021-03-29

## 2021-03-29 ENCOUNTER — DOCUMENT SCAN (OUTPATIENT)
Dept: HOME HEALTH SERVICES | Facility: HOSPITAL | Age: 81
End: 2021-03-29
Payer: MEDICARE

## 2021-03-29 DIAGNOSIS — E11.22 TYPE 2 DIABETES MELLITUS WITH STAGE 2 CHRONIC KIDNEY DISEASE, WITHOUT LONG-TERM CURRENT USE OF INSULIN: Primary | ICD-10-CM

## 2021-03-29 DIAGNOSIS — Z78.0 ASYMPTOMATIC MENOPAUSAL STATE: ICD-10-CM

## 2021-03-29 DIAGNOSIS — E11.69 HYPERLIPIDEMIA ASSOCIATED WITH TYPE 2 DIABETES MELLITUS: ICD-10-CM

## 2021-03-29 DIAGNOSIS — E78.5 HYPERLIPIDEMIA ASSOCIATED WITH TYPE 2 DIABETES MELLITUS: ICD-10-CM

## 2021-03-29 DIAGNOSIS — M85.80 OSTEOPENIA AFTER MENOPAUSE: ICD-10-CM

## 2021-03-29 DIAGNOSIS — Z78.0 OSTEOPENIA AFTER MENOPAUSE: ICD-10-CM

## 2021-03-29 DIAGNOSIS — N18.2 TYPE 2 DIABETES MELLITUS WITH STAGE 2 CHRONIC KIDNEY DISEASE, WITHOUT LONG-TERM CURRENT USE OF INSULIN: Primary | ICD-10-CM

## 2021-04-06 ENCOUNTER — HOSPITAL ENCOUNTER (OUTPATIENT)
Dept: RADIOLOGY | Facility: CLINIC | Age: 81
Discharge: HOME | End: 2021-04-06
Attending: FAMILY MEDICINE
Payer: MEDICARE

## 2021-04-06 DIAGNOSIS — Z78.0 OSTEOPENIA AFTER MENOPAUSE: ICD-10-CM

## 2021-04-06 DIAGNOSIS — Z78.0 ASYMPTOMATIC MENOPAUSAL STATE: ICD-10-CM

## 2021-04-06 DIAGNOSIS — M85.80 OSTEOPENIA AFTER MENOPAUSE: ICD-10-CM

## 2021-04-06 PROCEDURE — 77081 DXA BONE DENSITY APPENDICULR: CPT | Mod: TC,PO

## 2021-04-06 PROCEDURE — 77080 DXA BONE DENSITY AXIAL: CPT | Mod: 26,,, | Performed by: RADIOLOGY

## 2021-04-06 PROCEDURE — 77081 DXA BONE DENSITY APPENDICULR: CPT | Mod: 26,59,, | Performed by: RADIOLOGY

## 2021-04-06 PROCEDURE — 77080 DEXA BONE DENSITY SPINE HIP: ICD-10-PCS | Mod: 26,,, | Performed by: RADIOLOGY

## 2021-04-06 PROCEDURE — 77080 DXA BONE DENSITY AXIAL: CPT | Mod: TC,PO

## 2021-04-06 PROCEDURE — 77081 DEXA BONE DENSITY APPENDICULAR SKELETON: ICD-10-PCS | Mod: 26,59,, | Performed by: RADIOLOGY

## 2021-04-09 ENCOUNTER — OFFICE VISIT (OUTPATIENT)
Dept: FAMILY MEDICINE | Facility: CLINIC | Age: 81
End: 2021-04-09
Payer: MEDICARE

## 2021-04-09 ENCOUNTER — HOSPITAL ENCOUNTER (OUTPATIENT)
Dept: RADIOLOGY | Facility: CLINIC | Age: 81
Discharge: HOME | End: 2021-04-09
Attending: FAMILY MEDICINE
Payer: MEDICARE

## 2021-04-09 VITALS
SYSTOLIC BLOOD PRESSURE: 140 MMHG | HEIGHT: 63 IN | BODY MASS INDEX: 38.95 KG/M2 | DIASTOLIC BLOOD PRESSURE: 66 MMHG | HEART RATE: 65 BPM | WEIGHT: 219.81 LBS | OXYGEN SATURATION: 97 % | TEMPERATURE: 98 F | RESPIRATION RATE: 12 BRPM

## 2021-04-09 DIAGNOSIS — R60.0 EDEMA OF EXTREMITIES: ICD-10-CM

## 2021-04-09 DIAGNOSIS — Z01.810 PRE-OPERATIVE CARDIOVASCULAR EXAMINATION, SYMPTOMATIC CHF: Primary | ICD-10-CM

## 2021-04-09 DIAGNOSIS — J44.9 CHRONIC OBSTRUCTIVE PULMONARY DISEASE, UNSPECIFIED COPD TYPE: ICD-10-CM

## 2021-04-09 DIAGNOSIS — I27.20 PULMONARY HYPERTENSION: ICD-10-CM

## 2021-04-09 DIAGNOSIS — I50.9 PRE-OPERATIVE CARDIOVASCULAR EXAMINATION, SYMPTOMATIC CHF: Primary | ICD-10-CM

## 2021-04-09 DIAGNOSIS — N25.81 SECONDARY HYPERPARATHYROIDISM OF RENAL ORIGIN: ICD-10-CM

## 2021-04-09 DIAGNOSIS — R52 PAIN, UNSPECIFIED: ICD-10-CM

## 2021-04-09 DIAGNOSIS — I50.30 DIASTOLIC CHF WITH PRESERVED LEFT VENTRICULAR FUNCTION, NYHA CLASS 2: ICD-10-CM

## 2021-04-09 DIAGNOSIS — T84.011D FAILURE OF LEFT TOTAL HIP ARTHROPLASTY, SUBSEQUENT ENCOUNTER: ICD-10-CM

## 2021-04-09 DIAGNOSIS — N18.31 STAGE 3A CHRONIC KIDNEY DISEASE: ICD-10-CM

## 2021-04-09 DIAGNOSIS — M33.13 DERMATOMYOSITIS: ICD-10-CM

## 2021-04-09 DIAGNOSIS — D75.829 HEPARIN INDUCED THROMBOCYTOPENIA (HIT): ICD-10-CM

## 2021-04-09 PROBLEM — I26.99 BILATERAL PULMONARY EMBOLISM: Status: RESOLVED | Noted: 2020-07-14 | Resolved: 2021-04-09

## 2021-04-09 PROBLEM — M33.90 DERMATOMYOSITIS: Status: ACTIVE | Noted: 2021-04-09

## 2021-04-09 PROCEDURE — 1159F MED LIST DOCD IN RCRD: CPT | Mod: S$GLB,,, | Performed by: FAMILY MEDICINE

## 2021-04-09 PROCEDURE — 1157F ADVNC CARE PLAN IN RCRD: CPT | Mod: S$GLB,,, | Performed by: FAMILY MEDICINE

## 2021-04-09 PROCEDURE — 99499 RISK ADDL DX/OHS AUDIT: ICD-10-PCS | Mod: S$GLB,,, | Performed by: FAMILY MEDICINE

## 2021-04-09 PROCEDURE — 99999 PR PBB SHADOW E&M-EST. PATIENT-LVL V: ICD-10-PCS | Mod: PBBFAC,,, | Performed by: FAMILY MEDICINE

## 2021-04-09 PROCEDURE — 71046 XR CHEST PA AND LATERAL: ICD-10-PCS | Mod: 26,,, | Performed by: RADIOLOGY

## 2021-04-09 PROCEDURE — 99999 PR PBB SHADOW E&M-EST. PATIENT-LVL V: CPT | Mod: PBBFAC,,, | Performed by: FAMILY MEDICINE

## 2021-04-09 PROCEDURE — 1125F PR PAIN SEVERITY QUANTIFIED, PAIN PRESENT: ICD-10-PCS | Mod: S$GLB,,, | Performed by: FAMILY MEDICINE

## 2021-04-09 PROCEDURE — 1159F PR MEDICATION LIST DOCUMENTED IN MEDICAL RECORD: ICD-10-PCS | Mod: S$GLB,,, | Performed by: FAMILY MEDICINE

## 2021-04-09 PROCEDURE — 3077F SYST BP >= 140 MM HG: CPT | Mod: CPTII,S$GLB,, | Performed by: FAMILY MEDICINE

## 2021-04-09 PROCEDURE — 3288F FALL RISK ASSESSMENT DOCD: CPT | Mod: CPTII,S$GLB,, | Performed by: FAMILY MEDICINE

## 2021-04-09 PROCEDURE — 3078F PR MOST RECENT DIASTOLIC BLOOD PRESSURE < 80 MM HG: ICD-10-PCS | Mod: CPTII,S$GLB,, | Performed by: FAMILY MEDICINE

## 2021-04-09 PROCEDURE — 99499 UNLISTED E&M SERVICE: CPT | Mod: S$GLB,,, | Performed by: FAMILY MEDICINE

## 2021-04-09 PROCEDURE — 82570 ASSAY OF URINE CREATININE: CPT | Performed by: FAMILY MEDICINE

## 2021-04-09 PROCEDURE — 1101F PR PT FALLS ASSESS DOC 0-1 FALLS W/OUT INJ PAST YR: ICD-10-PCS | Mod: CPTII,S$GLB,, | Performed by: FAMILY MEDICINE

## 2021-04-09 PROCEDURE — 71046 X-RAY EXAM CHEST 2 VIEWS: CPT | Mod: 26,,, | Performed by: RADIOLOGY

## 2021-04-09 PROCEDURE — 3288F PR FALLS RISK ASSESSMENT DOCUMENTED: ICD-10-PCS | Mod: CPTII,S$GLB,, | Performed by: FAMILY MEDICINE

## 2021-04-09 PROCEDURE — 3077F PR MOST RECENT SYSTOLIC BLOOD PRESSURE >= 140 MM HG: ICD-10-PCS | Mod: CPTII,S$GLB,, | Performed by: FAMILY MEDICINE

## 2021-04-09 PROCEDURE — 1157F PR ADVANCE CARE PLAN OR EQUIV PRESENT IN MEDICAL RECORD: ICD-10-PCS | Mod: S$GLB,,, | Performed by: FAMILY MEDICINE

## 2021-04-09 PROCEDURE — 71046 X-RAY EXAM CHEST 2 VIEWS: CPT | Mod: TC,FY,PO

## 2021-04-09 PROCEDURE — 1125F AMNT PAIN NOTED PAIN PRSNT: CPT | Mod: S$GLB,,, | Performed by: FAMILY MEDICINE

## 2021-04-09 PROCEDURE — 82043 UR ALBUMIN QUANTITATIVE: CPT | Performed by: FAMILY MEDICINE

## 2021-04-09 PROCEDURE — 3078F DIAST BP <80 MM HG: CPT | Mod: CPTII,S$GLB,, | Performed by: FAMILY MEDICINE

## 2021-04-09 PROCEDURE — 99214 PR OFFICE/OUTPT VISIT, EST, LEVL IV, 30-39 MIN: ICD-10-PCS | Mod: S$GLB,,, | Performed by: FAMILY MEDICINE

## 2021-04-09 PROCEDURE — 99214 OFFICE O/P EST MOD 30 MIN: CPT | Mod: S$GLB,,, | Performed by: FAMILY MEDICINE

## 2021-04-09 PROCEDURE — 1101F PT FALLS ASSESS-DOCD LE1/YR: CPT | Mod: CPTII,S$GLB,, | Performed by: FAMILY MEDICINE

## 2021-04-09 RX ORDER — POTASSIUM CHLORIDE 20 MEQ/1
20 TABLET, EXTENDED RELEASE ORAL 2 TIMES DAILY
Qty: 60 TABLET | Refills: 3 | Status: SHIPPED | OUTPATIENT
Start: 2021-04-09 | End: 2022-10-25 | Stop reason: SDUPTHER

## 2021-04-09 RX ORDER — FUROSEMIDE 20 MG/1
TABLET ORAL
Qty: 60 TABLET | Refills: 11 | Status: SHIPPED | OUTPATIENT
Start: 2021-04-09 | End: 2021-10-18

## 2021-04-09 RX ORDER — LOSARTAN POTASSIUM 50 MG/1
50 TABLET ORAL DAILY
Qty: 90 TABLET | Refills: 3 | Status: SHIPPED | OUTPATIENT
Start: 2021-04-09 | End: 2022-04-17 | Stop reason: SDUPTHER

## 2021-04-09 RX ORDER — TIZANIDINE 4 MG/1
4 TABLET ORAL EVERY 6 HOURS PRN
Qty: 15 TABLET | Refills: 3 | Status: SHIPPED | OUTPATIENT
Start: 2021-04-09 | End: 2021-04-19

## 2021-04-09 RX ORDER — HYDROCODONE BITARTRATE AND ACETAMINOPHEN 5; 325 MG/1; MG/1
1 TABLET ORAL EVERY 8 HOURS PRN
Qty: 90 TABLET | Refills: 0 | Status: SHIPPED | OUTPATIENT
Start: 2021-04-09 | End: 2021-04-13 | Stop reason: SDUPTHER

## 2021-04-10 LAB
ALBUMIN/CREAT UR: 4.6 UG/MG (ref 0–30)
CREAT UR-MCNC: 130 MG/DL (ref 15–325)
MICROALBUMIN UR DL<=1MG/L-MCNC: 6 UG/ML

## 2021-04-13 DIAGNOSIS — T84.011D FAILURE OF LEFT TOTAL HIP ARTHROPLASTY, SUBSEQUENT ENCOUNTER: ICD-10-CM

## 2021-04-14 RX ORDER — HYDROCODONE BITARTRATE AND ACETAMINOPHEN 5; 325 MG/1; MG/1
1 TABLET ORAL EVERY 8 HOURS PRN
Qty: 45 TABLET | Refills: 0 | Status: SHIPPED | OUTPATIENT
Start: 2021-04-14 | End: 2022-08-31 | Stop reason: ALTCHOICE

## 2021-04-19 ENCOUNTER — OFFICE VISIT (OUTPATIENT)
Dept: CARDIOLOGY | Facility: CLINIC | Age: 81
End: 2021-04-19
Payer: MEDICARE

## 2021-04-19 VITALS
BODY MASS INDEX: 38.98 KG/M2 | DIASTOLIC BLOOD PRESSURE: 70 MMHG | OXYGEN SATURATION: 100 % | SYSTOLIC BLOOD PRESSURE: 120 MMHG | HEART RATE: 65 BPM | HEIGHT: 63 IN | WEIGHT: 220 LBS

## 2021-04-19 DIAGNOSIS — R60.0 EDEMA OF EXTREMITIES: ICD-10-CM

## 2021-04-19 DIAGNOSIS — N18.31 STAGE 3A CHRONIC KIDNEY DISEASE: ICD-10-CM

## 2021-04-19 DIAGNOSIS — I27.20 PULMONARY HYPERTENSION: ICD-10-CM

## 2021-04-19 DIAGNOSIS — I10 HYPERTENSION, UNSPECIFIED TYPE: Primary | ICD-10-CM

## 2021-04-19 PROCEDURE — 1101F PT FALLS ASSESS-DOCD LE1/YR: CPT | Mod: CPTII,S$GLB,, | Performed by: SPECIALIST

## 2021-04-19 PROCEDURE — 1157F ADVNC CARE PLAN IN RCRD: CPT | Mod: S$GLB,,, | Performed by: SPECIALIST

## 2021-04-19 PROCEDURE — 99214 PR OFFICE/OUTPT VISIT, EST, LEVL IV, 30-39 MIN: ICD-10-PCS | Mod: S$GLB,,, | Performed by: SPECIALIST

## 2021-04-19 PROCEDURE — 1101F PR PT FALLS ASSESS DOC 0-1 FALLS W/OUT INJ PAST YR: ICD-10-PCS | Mod: CPTII,S$GLB,, | Performed by: SPECIALIST

## 2021-04-19 PROCEDURE — 1157F PR ADVANCE CARE PLAN OR EQUIV PRESENT IN MEDICAL RECORD: ICD-10-PCS | Mod: S$GLB,,, | Performed by: SPECIALIST

## 2021-04-19 PROCEDURE — 3288F FALL RISK ASSESSMENT DOCD: CPT | Mod: CPTII,S$GLB,, | Performed by: SPECIALIST

## 2021-04-19 PROCEDURE — 3288F PR FALLS RISK ASSESSMENT DOCUMENTED: ICD-10-PCS | Mod: CPTII,S$GLB,, | Performed by: SPECIALIST

## 2021-04-19 PROCEDURE — 1159F PR MEDICATION LIST DOCUMENTED IN MEDICAL RECORD: ICD-10-PCS | Mod: S$GLB,,, | Performed by: SPECIALIST

## 2021-04-19 PROCEDURE — 99214 OFFICE O/P EST MOD 30 MIN: CPT | Mod: S$GLB,,, | Performed by: SPECIALIST

## 2021-04-19 PROCEDURE — 1159F MED LIST DOCD IN RCRD: CPT | Mod: S$GLB,,, | Performed by: SPECIALIST

## 2021-05-19 ENCOUNTER — LAB VISIT (OUTPATIENT)
Dept: LAB | Facility: HOSPITAL | Age: 81
End: 2021-05-19
Attending: FAMILY MEDICINE
Payer: MEDICARE

## 2021-05-19 DIAGNOSIS — I27.20 PULMONARY HYPERTENSION: ICD-10-CM

## 2021-05-19 LAB
ANION GAP SERPL CALC-SCNC: 9 MMOL/L (ref 8–16)
BUN SERPL-MCNC: 23 MG/DL (ref 8–23)
CALCIUM SERPL-MCNC: 9.1 MG/DL (ref 8.7–10.5)
CHLORIDE SERPL-SCNC: 106 MMOL/L (ref 95–110)
CO2 SERPL-SCNC: 26 MMOL/L (ref 23–29)
CREAT SERPL-MCNC: 1.3 MG/DL (ref 0.5–1.4)
EST. GFR  (AFRICAN AMERICAN): 45.4 ML/MIN/1.73 M^2
EST. GFR  (NON AFRICAN AMERICAN): 39.4 ML/MIN/1.73 M^2
GLUCOSE SERPL-MCNC: 102 MG/DL (ref 70–110)
POTASSIUM SERPL-SCNC: 4.9 MMOL/L (ref 3.5–5.1)
SODIUM SERPL-SCNC: 141 MMOL/L (ref 136–145)

## 2021-05-19 PROCEDURE — 36415 COLL VENOUS BLD VENIPUNCTURE: CPT | Mod: PO | Performed by: FAMILY MEDICINE

## 2021-05-19 PROCEDURE — 80048 BASIC METABOLIC PNL TOTAL CA: CPT | Performed by: FAMILY MEDICINE

## 2021-05-21 ENCOUNTER — TELEPHONE (OUTPATIENT)
Dept: FAMILY MEDICINE | Facility: CLINIC | Age: 81
End: 2021-05-21

## 2021-05-21 ENCOUNTER — HOSPITAL ENCOUNTER (OUTPATIENT)
Dept: RADIOLOGY | Facility: HOSPITAL | Age: 81
Discharge: HOME | End: 2021-05-21
Attending: PHYSICIAN ASSISTANT
Payer: MEDICARE

## 2021-05-21 ENCOUNTER — OFFICE VISIT (OUTPATIENT)
Dept: FAMILY MEDICINE | Facility: CLINIC | Age: 81
End: 2021-05-21
Payer: MEDICARE

## 2021-05-21 VITALS
HEART RATE: 59 BPM | SYSTOLIC BLOOD PRESSURE: 148 MMHG | OXYGEN SATURATION: 98 % | WEIGHT: 222.44 LBS | HEIGHT: 63 IN | TEMPERATURE: 99 F | DIASTOLIC BLOOD PRESSURE: 68 MMHG | BODY MASS INDEX: 39.41 KG/M2

## 2021-05-21 DIAGNOSIS — M51.36 DDD (DEGENERATIVE DISC DISEASE), LUMBAR: ICD-10-CM

## 2021-05-21 DIAGNOSIS — M54.9 DORSALGIA, UNSPECIFIED: ICD-10-CM

## 2021-05-21 DIAGNOSIS — E11.59 HYPERTENSION ASSOCIATED WITH DIABETES: Primary | ICD-10-CM

## 2021-05-21 DIAGNOSIS — R10.9 ABDOMINAL PAIN, UNSPECIFIED ABDOMINAL LOCATION: ICD-10-CM

## 2021-05-21 DIAGNOSIS — N39.41 URGE INCONTINENCE OF URINE: ICD-10-CM

## 2021-05-21 DIAGNOSIS — M54.31 BILATERAL SCIATICA: ICD-10-CM

## 2021-05-21 DIAGNOSIS — M54.32 BILATERAL SCIATICA: ICD-10-CM

## 2021-05-21 DIAGNOSIS — I15.2 HYPERTENSION ASSOCIATED WITH DIABETES: Primary | ICD-10-CM

## 2021-05-21 PROCEDURE — 99214 OFFICE O/P EST MOD 30 MIN: CPT | Mod: 25,S$GLB,, | Performed by: PHYSICIAN ASSISTANT

## 2021-05-21 PROCEDURE — 72220 X-RAY EXAM SACRUM TAILBONE: CPT | Mod: 26,,, | Performed by: RADIOLOGY

## 2021-05-21 PROCEDURE — 3077F PR MOST RECENT SYSTOLIC BLOOD PRESSURE >= 140 MM HG: ICD-10-PCS | Mod: CPTII,S$GLB,, | Performed by: PHYSICIAN ASSISTANT

## 2021-05-21 PROCEDURE — 96372 THER/PROPH/DIAG INJ SC/IM: CPT | Mod: S$GLB,,, | Performed by: PHYSICIAN ASSISTANT

## 2021-05-21 PROCEDURE — 72110 X-RAY EXAM L-2 SPINE 4/>VWS: CPT | Mod: TC,FY

## 2021-05-21 PROCEDURE — 99999 PR PBB SHADOW E&M-EST. PATIENT-LVL V: ICD-10-PCS | Mod: PBBFAC,,, | Performed by: PHYSICIAN ASSISTANT

## 2021-05-21 PROCEDURE — 1101F PR PT FALLS ASSESS DOC 0-1 FALLS W/OUT INJ PAST YR: ICD-10-PCS | Mod: CPTII,S$GLB,, | Performed by: PHYSICIAN ASSISTANT

## 2021-05-21 PROCEDURE — 3288F FALL RISK ASSESSMENT DOCD: CPT | Mod: CPTII,S$GLB,, | Performed by: PHYSICIAN ASSISTANT

## 2021-05-21 PROCEDURE — 99214 PR OFFICE/OUTPT VISIT, EST, LEVL IV, 30-39 MIN: ICD-10-PCS | Mod: 25,S$GLB,, | Performed by: PHYSICIAN ASSISTANT

## 2021-05-21 PROCEDURE — 72110 X-RAY EXAM L-2 SPINE 4/>VWS: CPT | Mod: 26,,, | Performed by: RADIOLOGY

## 2021-05-21 PROCEDURE — 1101F PT FALLS ASSESS-DOCD LE1/YR: CPT | Mod: CPTII,S$GLB,, | Performed by: PHYSICIAN ASSISTANT

## 2021-05-21 PROCEDURE — 72220 XR SACRUM AND COCCYX: ICD-10-PCS | Mod: 26,,, | Performed by: RADIOLOGY

## 2021-05-21 PROCEDURE — 3288F PR FALLS RISK ASSESSMENT DOCUMENTED: ICD-10-PCS | Mod: CPTII,S$GLB,, | Performed by: PHYSICIAN ASSISTANT

## 2021-05-21 PROCEDURE — 96372 PR INJECTION,THERAP/PROPH/DIAG2ST, IM OR SUBCUT: ICD-10-PCS | Mod: S$GLB,,, | Performed by: PHYSICIAN ASSISTANT

## 2021-05-21 PROCEDURE — 1125F AMNT PAIN NOTED PAIN PRSNT: CPT | Mod: S$GLB,,, | Performed by: PHYSICIAN ASSISTANT

## 2021-05-21 PROCEDURE — 72110 XR LUMBAR SPINE COMPLETE 5 VIEW: ICD-10-PCS | Mod: 26,,, | Performed by: RADIOLOGY

## 2021-05-21 PROCEDURE — 74176 CT ABD & PELVIS W/O CONTRAST: CPT | Mod: 26,,, | Performed by: RADIOLOGY

## 2021-05-21 PROCEDURE — 3078F DIAST BP <80 MM HG: CPT | Mod: CPTII,S$GLB,, | Performed by: PHYSICIAN ASSISTANT

## 2021-05-21 PROCEDURE — 99999 PR PBB SHADOW E&M-EST. PATIENT-LVL V: CPT | Mod: PBBFAC,,, | Performed by: PHYSICIAN ASSISTANT

## 2021-05-21 PROCEDURE — 1159F MED LIST DOCD IN RCRD: CPT | Mod: S$GLB,,, | Performed by: PHYSICIAN ASSISTANT

## 2021-05-21 PROCEDURE — 1157F ADVNC CARE PLAN IN RCRD: CPT | Mod: S$GLB,,, | Performed by: PHYSICIAN ASSISTANT

## 2021-05-21 PROCEDURE — 72220 X-RAY EXAM SACRUM TAILBONE: CPT | Mod: TC,FY

## 2021-05-21 PROCEDURE — 3078F PR MOST RECENT DIASTOLIC BLOOD PRESSURE < 80 MM HG: ICD-10-PCS | Mod: CPTII,S$GLB,, | Performed by: PHYSICIAN ASSISTANT

## 2021-05-21 PROCEDURE — 74176 CT ABDOMEN PELVIS WITHOUT CONTRAST: ICD-10-PCS | Mod: 26,,, | Performed by: RADIOLOGY

## 2021-05-21 PROCEDURE — 1159F PR MEDICATION LIST DOCUMENTED IN MEDICAL RECORD: ICD-10-PCS | Mod: S$GLB,,, | Performed by: PHYSICIAN ASSISTANT

## 2021-05-21 PROCEDURE — 1125F PR PAIN SEVERITY QUANTIFIED, PAIN PRESENT: ICD-10-PCS | Mod: S$GLB,,, | Performed by: PHYSICIAN ASSISTANT

## 2021-05-21 PROCEDURE — 3077F SYST BP >= 140 MM HG: CPT | Mod: CPTII,S$GLB,, | Performed by: PHYSICIAN ASSISTANT

## 2021-05-21 PROCEDURE — 1157F PR ADVANCE CARE PLAN OR EQUIV PRESENT IN MEDICAL RECORD: ICD-10-PCS | Mod: S$GLB,,, | Performed by: PHYSICIAN ASSISTANT

## 2021-05-21 PROCEDURE — 74176 CT ABD & PELVIS W/O CONTRAST: CPT | Mod: TC

## 2021-05-21 RX ORDER — DEXAMETHASONE SODIUM PHOSPHATE 4 MG/ML
8 INJECTION, SOLUTION INTRA-ARTICULAR; INTRALESIONAL; INTRAMUSCULAR; INTRAVENOUS; SOFT TISSUE ONCE
Status: COMPLETED | OUTPATIENT
Start: 2021-05-21 | End: 2021-05-21

## 2021-05-21 RX ORDER — TERAZOSIN 1 MG/1
CAPSULE ORAL
COMMUNITY
Start: 2021-03-19 | End: 2022-04-26 | Stop reason: ALTCHOICE

## 2021-05-21 RX ORDER — HYDRALAZINE HYDROCHLORIDE 100 MG/1
100 TABLET, FILM COATED ORAL EVERY 12 HOURS
Qty: 60 TABLET | Refills: 0 | Status: SHIPPED | OUTPATIENT
Start: 2021-05-21 | End: 2021-06-21

## 2021-05-21 RX ADMIN — DEXAMETHASONE SODIUM PHOSPHATE 8 MG: 4 INJECTION, SOLUTION INTRA-ARTICULAR; INTRALESIONAL; INTRAMUSCULAR; INTRAVENOUS; SOFT TISSUE at 03:05

## 2021-05-24 ENCOUNTER — TELEPHONE (OUTPATIENT)
Dept: FAMILY MEDICINE | Facility: CLINIC | Age: 81
End: 2021-05-24

## 2021-05-24 DIAGNOSIS — M79.604 PAIN IN BOTH LOWER EXTREMITIES: Primary | ICD-10-CM

## 2021-05-24 DIAGNOSIS — M79.605 PAIN IN BOTH LOWER EXTREMITIES: Primary | ICD-10-CM

## 2021-05-25 ENCOUNTER — TELEPHONE (OUTPATIENT)
Dept: FAMILY MEDICINE | Facility: CLINIC | Age: 81
End: 2021-05-25

## 2021-05-27 RX ORDER — METOPROLOL SUCCINATE 50 MG/1
TABLET, EXTENDED RELEASE ORAL
Qty: 90 TABLET | Refills: 3 | Status: SHIPPED | OUTPATIENT
Start: 2021-05-27 | End: 2022-04-26 | Stop reason: SDUPTHER

## 2021-06-03 ENCOUNTER — TELEPHONE (OUTPATIENT)
Dept: FAMILY MEDICINE | Facility: CLINIC | Age: 81
End: 2021-06-03

## 2021-06-09 ENCOUNTER — PATIENT OUTREACH (OUTPATIENT)
Dept: ADMINISTRATIVE | Facility: OTHER | Age: 81
End: 2021-06-09

## 2021-06-10 ENCOUNTER — OFFICE VISIT (OUTPATIENT)
Dept: UROGYNECOLOGY | Facility: CLINIC | Age: 81
End: 2021-06-10
Payer: MEDICARE

## 2021-06-10 VITALS — WEIGHT: 222.44 LBS | BODY MASS INDEX: 39.41 KG/M2 | HEIGHT: 63 IN

## 2021-06-10 DIAGNOSIS — N81.84 PELVIC MUSCLE WASTING: Primary | ICD-10-CM

## 2021-06-10 DIAGNOSIS — N39.41 URGE INCONTINENCE OF URINE: ICD-10-CM

## 2021-06-10 PROCEDURE — 1126F PR PAIN SEVERITY QUANTIFIED, NO PAIN PRESENT: ICD-10-PCS | Mod: S$GLB,,, | Performed by: OBSTETRICS & GYNECOLOGY

## 2021-06-10 PROCEDURE — 1101F PR PT FALLS ASSESS DOC 0-1 FALLS W/OUT INJ PAST YR: ICD-10-PCS | Mod: CPTII,S$GLB,, | Performed by: OBSTETRICS & GYNECOLOGY

## 2021-06-10 PROCEDURE — 99999 PR PBB SHADOW E&M-EST. PATIENT-LVL V: ICD-10-PCS | Mod: PBBFAC,,, | Performed by: OBSTETRICS & GYNECOLOGY

## 2021-06-10 PROCEDURE — 1159F PR MEDICATION LIST DOCUMENTED IN MEDICAL RECORD: ICD-10-PCS | Mod: S$GLB,,, | Performed by: OBSTETRICS & GYNECOLOGY

## 2021-06-10 PROCEDURE — 1157F PR ADVANCE CARE PLAN OR EQUIV PRESENT IN MEDICAL RECORD: ICD-10-PCS | Mod: S$GLB,,, | Performed by: OBSTETRICS & GYNECOLOGY

## 2021-06-10 PROCEDURE — 1126F AMNT PAIN NOTED NONE PRSNT: CPT | Mod: S$GLB,,, | Performed by: OBSTETRICS & GYNECOLOGY

## 2021-06-10 PROCEDURE — 3288F FALL RISK ASSESSMENT DOCD: CPT | Mod: CPTII,S$GLB,, | Performed by: OBSTETRICS & GYNECOLOGY

## 2021-06-10 PROCEDURE — 3288F PR FALLS RISK ASSESSMENT DOCUMENTED: ICD-10-PCS | Mod: CPTII,S$GLB,, | Performed by: OBSTETRICS & GYNECOLOGY

## 2021-06-10 PROCEDURE — 1101F PT FALLS ASSESS-DOCD LE1/YR: CPT | Mod: CPTII,S$GLB,, | Performed by: OBSTETRICS & GYNECOLOGY

## 2021-06-10 PROCEDURE — 1159F MED LIST DOCD IN RCRD: CPT | Mod: S$GLB,,, | Performed by: OBSTETRICS & GYNECOLOGY

## 2021-06-10 PROCEDURE — 1157F ADVNC CARE PLAN IN RCRD: CPT | Mod: S$GLB,,, | Performed by: OBSTETRICS & GYNECOLOGY

## 2021-06-10 PROCEDURE — 99999 PR PBB SHADOW E&M-EST. PATIENT-LVL V: CPT | Mod: PBBFAC,,, | Performed by: OBSTETRICS & GYNECOLOGY

## 2021-06-10 PROCEDURE — 99204 OFFICE O/P NEW MOD 45 MIN: CPT | Mod: S$GLB,,, | Performed by: OBSTETRICS & GYNECOLOGY

## 2021-06-10 PROCEDURE — 99204 PR OFFICE/OUTPT VISIT, NEW, LEVL IV, 45-59 MIN: ICD-10-PCS | Mod: S$GLB,,, | Performed by: OBSTETRICS & GYNECOLOGY

## 2021-06-10 RX ORDER — FLUCONAZOLE 150 MG/1
150 TABLET ORAL DAILY
Qty: 1 TABLET | Refills: 0 | Status: SHIPPED | OUTPATIENT
Start: 2021-06-10 | End: 2021-06-11

## 2021-06-10 RX ORDER — VIBEGRON 75 MG/1
1 TABLET, FILM COATED ORAL DAILY
Qty: 30 TABLET | Refills: 3 | Status: SHIPPED | OUTPATIENT
Start: 2021-06-10 | End: 2022-08-31 | Stop reason: ALTCHOICE

## 2021-06-11 ENCOUNTER — TELEPHONE (OUTPATIENT)
Dept: UROGYNECOLOGY | Facility: CLINIC | Age: 81
End: 2021-06-11

## 2021-06-14 ENCOUNTER — OFFICE VISIT (OUTPATIENT)
Dept: PHYSICAL MEDICINE AND REHAB | Facility: CLINIC | Age: 81
End: 2021-06-14
Payer: MEDICARE

## 2021-06-14 VITALS — BODY MASS INDEX: 39.34 KG/M2 | RESPIRATION RATE: 22 BRPM | HEIGHT: 63 IN | WEIGHT: 222 LBS

## 2021-06-14 DIAGNOSIS — Z74.09 IMPAIRED FUNCTIONAL MOBILITY AND ACTIVITY TOLERANCE: ICD-10-CM

## 2021-06-14 DIAGNOSIS — M54.42 CHRONIC BILATERAL LOW BACK PAIN WITH BILATERAL SCIATICA: ICD-10-CM

## 2021-06-14 DIAGNOSIS — M79.604 PAIN IN BOTH LOWER EXTREMITIES: ICD-10-CM

## 2021-06-14 DIAGNOSIS — M79.605 PAIN IN BOTH LOWER EXTREMITIES: ICD-10-CM

## 2021-06-14 DIAGNOSIS — M51.36 DDD (DEGENERATIVE DISC DISEASE), LUMBAR: ICD-10-CM

## 2021-06-14 DIAGNOSIS — M54.41 CHRONIC BILATERAL LOW BACK PAIN WITH BILATERAL SCIATICA: ICD-10-CM

## 2021-06-14 DIAGNOSIS — G89.29 CHRONIC BILATERAL LOW BACK PAIN WITH BILATERAL SCIATICA: ICD-10-CM

## 2021-06-14 DIAGNOSIS — R10.32 BILATERAL GROIN PAIN: ICD-10-CM

## 2021-06-14 DIAGNOSIS — R10.31 BILATERAL GROIN PAIN: ICD-10-CM

## 2021-06-14 DIAGNOSIS — M48.062 SPINAL STENOSIS OF LUMBAR REGION WITH NEUROGENIC CLAUDICATION: Primary | ICD-10-CM

## 2021-06-14 PROCEDURE — 99999 PR PBB SHADOW E&M-EST. PATIENT-LVL IV: ICD-10-PCS | Mod: PBBFAC,,, | Performed by: PHYSICAL MEDICINE & REHABILITATION

## 2021-06-14 PROCEDURE — 99214 OFFICE O/P EST MOD 30 MIN: CPT | Mod: S$GLB,,, | Performed by: PHYSICAL MEDICINE & REHABILITATION

## 2021-06-14 PROCEDURE — 3288F FALL RISK ASSESSMENT DOCD: CPT | Mod: CPTII,S$GLB,, | Performed by: PHYSICAL MEDICINE & REHABILITATION

## 2021-06-14 PROCEDURE — 1157F ADVNC CARE PLAN IN RCRD: CPT | Mod: S$GLB,,, | Performed by: PHYSICAL MEDICINE & REHABILITATION

## 2021-06-14 PROCEDURE — 1159F PR MEDICATION LIST DOCUMENTED IN MEDICAL RECORD: ICD-10-PCS | Mod: S$GLB,,, | Performed by: PHYSICAL MEDICINE & REHABILITATION

## 2021-06-14 PROCEDURE — 1159F MED LIST DOCD IN RCRD: CPT | Mod: S$GLB,,, | Performed by: PHYSICAL MEDICINE & REHABILITATION

## 2021-06-14 PROCEDURE — 1157F PR ADVANCE CARE PLAN OR EQUIV PRESENT IN MEDICAL RECORD: ICD-10-PCS | Mod: S$GLB,,, | Performed by: PHYSICAL MEDICINE & REHABILITATION

## 2021-06-14 PROCEDURE — 1101F PT FALLS ASSESS-DOCD LE1/YR: CPT | Mod: CPTII,S$GLB,, | Performed by: PHYSICAL MEDICINE & REHABILITATION

## 2021-06-14 PROCEDURE — 99214 PR OFFICE/OUTPT VISIT, EST, LEVL IV, 30-39 MIN: ICD-10-PCS | Mod: S$GLB,,, | Performed by: PHYSICAL MEDICINE & REHABILITATION

## 2021-06-14 PROCEDURE — 99999 PR PBB SHADOW E&M-EST. PATIENT-LVL IV: CPT | Mod: PBBFAC,,, | Performed by: PHYSICAL MEDICINE & REHABILITATION

## 2021-06-14 PROCEDURE — 3288F PR FALLS RISK ASSESSMENT DOCUMENTED: ICD-10-PCS | Mod: CPTII,S$GLB,, | Performed by: PHYSICAL MEDICINE & REHABILITATION

## 2021-06-14 PROCEDURE — 1101F PR PT FALLS ASSESS DOC 0-1 FALLS W/OUT INJ PAST YR: ICD-10-PCS | Mod: CPTII,S$GLB,, | Performed by: PHYSICAL MEDICINE & REHABILITATION

## 2021-06-14 PROCEDURE — 1125F PR PAIN SEVERITY QUANTIFIED, PAIN PRESENT: ICD-10-PCS | Mod: S$GLB,,, | Performed by: PHYSICAL MEDICINE & REHABILITATION

## 2021-06-14 PROCEDURE — 1125F AMNT PAIN NOTED PAIN PRSNT: CPT | Mod: S$GLB,,, | Performed by: PHYSICAL MEDICINE & REHABILITATION

## 2021-06-14 RX ORDER — PREGABALIN 50 MG/1
CAPSULE ORAL
Qty: 35 CAPSULE | Refills: 0 | Status: SHIPPED | OUTPATIENT
Start: 2021-06-14 | End: 2022-08-31 | Stop reason: ALTCHOICE

## 2021-06-17 ENCOUNTER — TELEPHONE (OUTPATIENT)
Dept: FAMILY MEDICINE | Facility: CLINIC | Age: 81
End: 2021-06-17

## 2021-06-18 ENCOUNTER — OFFICE VISIT (OUTPATIENT)
Dept: FAMILY MEDICINE | Facility: CLINIC | Age: 81
End: 2021-06-18
Payer: MEDICARE

## 2021-06-18 VITALS
SYSTOLIC BLOOD PRESSURE: 130 MMHG | RESPIRATION RATE: 16 BRPM | BODY MASS INDEX: 39.84 KG/M2 | OXYGEN SATURATION: 99 % | WEIGHT: 224.88 LBS | DIASTOLIC BLOOD PRESSURE: 68 MMHG | HEIGHT: 63 IN | HEART RATE: 62 BPM | TEMPERATURE: 98 F

## 2021-06-18 DIAGNOSIS — L30.9 DERMATITIS: ICD-10-CM

## 2021-06-18 DIAGNOSIS — M54.41 CHRONIC BILATERAL LOW BACK PAIN WITH BILATERAL SCIATICA: ICD-10-CM

## 2021-06-18 DIAGNOSIS — I15.2 HYPERTENSION ASSOCIATED WITH DIABETES: ICD-10-CM

## 2021-06-18 DIAGNOSIS — M54.42 CHRONIC BILATERAL LOW BACK PAIN WITH BILATERAL SCIATICA: ICD-10-CM

## 2021-06-18 DIAGNOSIS — N18.2 TYPE 2 DIABETES MELLITUS WITH STAGE 2 CHRONIC KIDNEY DISEASE, WITHOUT LONG-TERM CURRENT USE OF INSULIN: Primary | ICD-10-CM

## 2021-06-18 DIAGNOSIS — D64.9 ANEMIA, UNSPECIFIED TYPE: ICD-10-CM

## 2021-06-18 DIAGNOSIS — E11.22 TYPE 2 DIABETES MELLITUS WITH STAGE 2 CHRONIC KIDNEY DISEASE, WITHOUT LONG-TERM CURRENT USE OF INSULIN: Primary | ICD-10-CM

## 2021-06-18 DIAGNOSIS — E11.59 HYPERTENSION ASSOCIATED WITH DIABETES: ICD-10-CM

## 2021-06-18 DIAGNOSIS — G89.29 CHRONIC BILATERAL LOW BACK PAIN WITH BILATERAL SCIATICA: ICD-10-CM

## 2021-06-18 DIAGNOSIS — E11.69 HYPERLIPIDEMIA ASSOCIATED WITH TYPE 2 DIABETES MELLITUS: ICD-10-CM

## 2021-06-18 DIAGNOSIS — E78.5 HYPERLIPIDEMIA ASSOCIATED WITH TYPE 2 DIABETES MELLITUS: ICD-10-CM

## 2021-06-18 PROCEDURE — 99214 OFFICE O/P EST MOD 30 MIN: CPT | Mod: S$GLB,,, | Performed by: PHYSICIAN ASSISTANT

## 2021-06-18 PROCEDURE — 1157F ADVNC CARE PLAN IN RCRD: CPT | Mod: S$GLB,,, | Performed by: PHYSICIAN ASSISTANT

## 2021-06-18 PROCEDURE — 1125F PR PAIN SEVERITY QUANTIFIED, PAIN PRESENT: ICD-10-PCS | Mod: S$GLB,,, | Performed by: PHYSICIAN ASSISTANT

## 2021-06-18 PROCEDURE — 1125F AMNT PAIN NOTED PAIN PRSNT: CPT | Mod: S$GLB,,, | Performed by: PHYSICIAN ASSISTANT

## 2021-06-18 PROCEDURE — 1159F MED LIST DOCD IN RCRD: CPT | Mod: S$GLB,,, | Performed by: PHYSICIAN ASSISTANT

## 2021-06-18 PROCEDURE — 99999 PR PBB SHADOW E&M-EST. PATIENT-LVL V: ICD-10-PCS | Mod: PBBFAC,,, | Performed by: PHYSICIAN ASSISTANT

## 2021-06-18 PROCEDURE — 3075F SYST BP GE 130 - 139MM HG: CPT | Mod: CPTII,S$GLB,, | Performed by: PHYSICIAN ASSISTANT

## 2021-06-18 PROCEDURE — 3288F FALL RISK ASSESSMENT DOCD: CPT | Mod: CPTII,S$GLB,, | Performed by: PHYSICIAN ASSISTANT

## 2021-06-18 PROCEDURE — 3078F DIAST BP <80 MM HG: CPT | Mod: CPTII,S$GLB,, | Performed by: PHYSICIAN ASSISTANT

## 2021-06-18 PROCEDURE — 1157F PR ADVANCE CARE PLAN OR EQUIV PRESENT IN MEDICAL RECORD: ICD-10-PCS | Mod: S$GLB,,, | Performed by: PHYSICIAN ASSISTANT

## 2021-06-18 PROCEDURE — 99999 PR PBB SHADOW E&M-EST. PATIENT-LVL V: CPT | Mod: PBBFAC,,, | Performed by: PHYSICIAN ASSISTANT

## 2021-06-18 PROCEDURE — 1101F PR PT FALLS ASSESS DOC 0-1 FALLS W/OUT INJ PAST YR: ICD-10-PCS | Mod: CPTII,S$GLB,, | Performed by: PHYSICIAN ASSISTANT

## 2021-06-18 PROCEDURE — 99214 PR OFFICE/OUTPT VISIT, EST, LEVL IV, 30-39 MIN: ICD-10-PCS | Mod: S$GLB,,, | Performed by: PHYSICIAN ASSISTANT

## 2021-06-18 PROCEDURE — 1159F PR MEDICATION LIST DOCUMENTED IN MEDICAL RECORD: ICD-10-PCS | Mod: S$GLB,,, | Performed by: PHYSICIAN ASSISTANT

## 2021-06-18 PROCEDURE — 3075F PR MOST RECENT SYSTOLIC BLOOD PRESS GE 130-139MM HG: ICD-10-PCS | Mod: CPTII,S$GLB,, | Performed by: PHYSICIAN ASSISTANT

## 2021-06-18 PROCEDURE — 3078F PR MOST RECENT DIASTOLIC BLOOD PRESSURE < 80 MM HG: ICD-10-PCS | Mod: CPTII,S$GLB,, | Performed by: PHYSICIAN ASSISTANT

## 2021-06-18 PROCEDURE — 1101F PT FALLS ASSESS-DOCD LE1/YR: CPT | Mod: CPTII,S$GLB,, | Performed by: PHYSICIAN ASSISTANT

## 2021-06-18 PROCEDURE — 3288F PR FALLS RISK ASSESSMENT DOCUMENTED: ICD-10-PCS | Mod: CPTII,S$GLB,, | Performed by: PHYSICIAN ASSISTANT

## 2021-06-18 RX ORDER — TRIAMCINOLONE ACETONIDE 1 MG/G
OINTMENT TOPICAL 2 TIMES DAILY
Qty: 80 G | Refills: 0 | Status: SHIPPED | OUTPATIENT
Start: 2021-06-18 | End: 2021-10-22 | Stop reason: SDUPTHER

## 2021-07-07 DIAGNOSIS — I82.621 ACUTE DEEP VEIN THROMBOSIS (DVT) OF RIGHT UPPER EXTREMITY, UNSPECIFIED VEIN: ICD-10-CM

## 2021-07-07 RX ORDER — WARFARIN SODIUM 5 MG/1
TABLET ORAL
Qty: 120 TABLET | Refills: 3 | Status: SHIPPED | OUTPATIENT
Start: 2021-07-07 | End: 2022-04-29 | Stop reason: SDUPTHER

## 2021-07-13 ENCOUNTER — LAB VISIT (OUTPATIENT)
Dept: LAB | Facility: HOSPITAL | Age: 81
End: 2021-07-13
Attending: INTERNAL MEDICINE
Payer: MEDICARE

## 2021-07-13 DIAGNOSIS — D63.1 ANEMIA IN CHRONIC RENAL DISEASE: ICD-10-CM

## 2021-07-13 DIAGNOSIS — R94.4 ABNORMAL KIDNEY FUNCTION STUDY: Primary | ICD-10-CM

## 2021-07-13 DIAGNOSIS — N18.9 ANEMIA IN CHRONIC RENAL DISEASE: ICD-10-CM

## 2021-07-13 LAB
ALBUMIN SERPL BCP-MCNC: 3.3 G/DL (ref 3.5–5.2)
ANION GAP SERPL CALC-SCNC: 9 MMOL/L (ref 8–16)
BASOPHILS # BLD AUTO: 0.02 K/UL (ref 0–0.2)
BASOPHILS NFR BLD: 0.4 % (ref 0–1.9)
BILIRUB UR QL STRIP: NEGATIVE
BUN SERPL-MCNC: 23 MG/DL (ref 8–23)
CALCIUM SERPL-MCNC: 9.1 MG/DL (ref 8.7–10.5)
CHLORIDE SERPL-SCNC: 107 MMOL/L (ref 95–110)
CLARITY UR: CLEAR
CO2 SERPL-SCNC: 24 MMOL/L (ref 23–29)
COLOR UR: YELLOW
CREAT SERPL-MCNC: 1.2 MG/DL (ref 0.5–1.4)
CREAT UR-MCNC: 138.6 MG/DL (ref 15–325)
DIFFERENTIAL METHOD: ABNORMAL
EOSINOPHIL # BLD AUTO: 0.1 K/UL (ref 0–0.5)
EOSINOPHIL NFR BLD: 2.6 % (ref 0–8)
ERYTHROCYTE [DISTWIDTH] IN BLOOD BY AUTOMATED COUNT: 12.9 % (ref 11.5–14.5)
EST. GFR  (AFRICAN AMERICAN): 50 ML/MIN/1.73 M^2
EST. GFR  (NON AFRICAN AMERICAN): 43 ML/MIN/1.73 M^2
FERRITIN SERPL-MCNC: 521 NG/ML (ref 20–300)
GLUCOSE SERPL-MCNC: 96 MG/DL (ref 70–110)
GLUCOSE UR QL STRIP: NEGATIVE
HCT VFR BLD AUTO: 36.7 % (ref 37–48.5)
HGB BLD-MCNC: 11.5 G/DL (ref 12–16)
HGB UR QL STRIP: NEGATIVE
IMM GRANULOCYTES # BLD AUTO: 0.01 K/UL (ref 0–0.04)
IMM GRANULOCYTES NFR BLD AUTO: 0.2 % (ref 0–0.5)
KETONES UR QL STRIP: NEGATIVE
LEUKOCYTE ESTERASE UR QL STRIP: NEGATIVE
LYMPHOCYTES # BLD AUTO: 1.9 K/UL (ref 1–4.8)
LYMPHOCYTES NFR BLD: 37.9 % (ref 18–48)
MCH RBC QN AUTO: 33 PG (ref 27–31)
MCHC RBC AUTO-ENTMCNC: 31.3 G/DL (ref 32–36)
MCV RBC AUTO: 105 FL (ref 82–98)
MONOCYTES # BLD AUTO: 0.4 K/UL (ref 0.3–1)
MONOCYTES NFR BLD: 8.3 % (ref 4–15)
NEUTROPHILS # BLD AUTO: 2.5 K/UL (ref 1.8–7.7)
NEUTROPHILS NFR BLD: 50.6 % (ref 38–73)
NITRITE UR QL STRIP: NEGATIVE
NRBC BLD-RTO: 0 /100 WBC
PH UR STRIP: 6 [PH] (ref 5–8)
PHOSPHATE SERPL-MCNC: 3.7 MG/DL (ref 2.7–4.5)
PLATELET # BLD AUTO: 229 K/UL (ref 150–450)
PMV BLD AUTO: 10.6 FL (ref 9.2–12.9)
POTASSIUM SERPL-SCNC: 5 MMOL/L (ref 3.5–5.1)
PROT UR QL STRIP: NEGATIVE
PROT UR-MCNC: 9 MG/DL (ref 0–15)
PROT/CREAT UR: 0.06 MG/G{CREAT} (ref 0–0.2)
RBC # BLD AUTO: 3.49 M/UL (ref 4–5.4)
SODIUM SERPL-SCNC: 140 MMOL/L (ref 136–145)
SP GR UR STRIP: 1.02 (ref 1–1.03)
URN SPEC COLLECT METH UR: NORMAL
UROBILINOGEN UR STRIP-ACNC: NEGATIVE EU/DL
WBC # BLD AUTO: 4.93 K/UL (ref 3.9–12.7)

## 2021-07-13 PROCEDURE — 84156 ASSAY OF PROTEIN URINE: CPT | Performed by: INTERNAL MEDICINE

## 2021-07-13 PROCEDURE — 80069 RENAL FUNCTION PANEL: CPT | Performed by: INTERNAL MEDICINE

## 2021-07-13 PROCEDURE — 85025 COMPLETE CBC W/AUTO DIFF WBC: CPT | Performed by: INTERNAL MEDICINE

## 2021-07-13 PROCEDURE — 81003 URINALYSIS AUTO W/O SCOPE: CPT | Performed by: INTERNAL MEDICINE

## 2021-07-13 PROCEDURE — 83540 ASSAY OF IRON: CPT | Performed by: INTERNAL MEDICINE

## 2021-07-13 PROCEDURE — 82728 ASSAY OF FERRITIN: CPT | Performed by: INTERNAL MEDICINE

## 2021-07-13 PROCEDURE — 36415 COLL VENOUS BLD VENIPUNCTURE: CPT | Performed by: INTERNAL MEDICINE

## 2021-07-14 LAB
IRON SERPL-MCNC: 89 UG/DL (ref 30–160)
SATURATED IRON: 34 % (ref 20–50)
TOTAL IRON BINDING CAPACITY: 260 UG/DL (ref 250–450)
TRANSFERRIN SERPL-MCNC: 176 MG/DL (ref 200–375)

## 2021-09-24 DIAGNOSIS — R09.89 CHRONIC SINUS COMPLAINTS: ICD-10-CM

## 2021-09-24 DIAGNOSIS — J45.40 MODERATE PERSISTENT ASTHMA WITHOUT COMPLICATION: ICD-10-CM

## 2021-09-24 RX ORDER — MONTELUKAST SODIUM 10 MG/1
10 TABLET ORAL NIGHTLY
Qty: 90 TABLET | Refills: 3 | Status: SHIPPED | OUTPATIENT
Start: 2021-09-24 | End: 2022-10-25 | Stop reason: SDUPTHER

## 2021-10-06 ENCOUNTER — IMMUNIZATION (OUTPATIENT)
Dept: PRIMARY CARE CLINIC | Facility: CLINIC | Age: 81
End: 2021-10-06
Payer: MEDICARE

## 2021-10-06 DIAGNOSIS — Z23 NEED FOR VACCINATION: Primary | ICD-10-CM

## 2021-10-06 PROCEDURE — 91300 COVID-19, MRNA, LNP-S, PF, 30 MCG/0.3 ML DOSE VACCINE: CPT | Mod: S$GLB,,, | Performed by: FAMILY MEDICINE

## 2021-10-06 PROCEDURE — 0003A COVID-19, MRNA, LNP-S, PF, 30 MCG/0.3 ML DOSE VACCINE: CPT | Mod: S$GLB,,, | Performed by: FAMILY MEDICINE

## 2021-10-06 PROCEDURE — 91300 COVID-19, MRNA, LNP-S, PF, 30 MCG/0.3 ML DOSE VACCINE: ICD-10-PCS | Mod: S$GLB,,, | Performed by: FAMILY MEDICINE

## 2021-10-06 PROCEDURE — 0003A COVID-19, MRNA, LNP-S, PF, 30 MCG/0.3 ML DOSE VACCINE: ICD-10-PCS | Mod: S$GLB,,, | Performed by: FAMILY MEDICINE

## 2021-10-07 ENCOUNTER — PES CALL (OUTPATIENT)
Dept: ADMINISTRATIVE | Facility: CLINIC | Age: 81
End: 2021-10-07

## 2021-10-15 ENCOUNTER — LAB VISIT (OUTPATIENT)
Dept: LAB | Facility: HOSPITAL | Age: 81
End: 2021-10-15
Attending: PHYSICIAN ASSISTANT
Payer: MEDICARE

## 2021-10-15 DIAGNOSIS — I15.2 HYPERTENSION ASSOCIATED WITH DIABETES: ICD-10-CM

## 2021-10-15 DIAGNOSIS — E11.59 HYPERTENSION ASSOCIATED WITH DIABETES: ICD-10-CM

## 2021-10-15 DIAGNOSIS — E78.5 HYPERLIPIDEMIA ASSOCIATED WITH TYPE 2 DIABETES MELLITUS: ICD-10-CM

## 2021-10-15 DIAGNOSIS — N18.2 TYPE 2 DIABETES MELLITUS WITH STAGE 2 CHRONIC KIDNEY DISEASE, WITHOUT LONG-TERM CURRENT USE OF INSULIN: ICD-10-CM

## 2021-10-15 DIAGNOSIS — E11.22 TYPE 2 DIABETES MELLITUS WITH STAGE 2 CHRONIC KIDNEY DISEASE, WITHOUT LONG-TERM CURRENT USE OF INSULIN: ICD-10-CM

## 2021-10-15 DIAGNOSIS — E11.69 HYPERLIPIDEMIA ASSOCIATED WITH TYPE 2 DIABETES MELLITUS: ICD-10-CM

## 2021-10-15 DIAGNOSIS — D64.9 ANEMIA, UNSPECIFIED TYPE: ICD-10-CM

## 2021-10-15 LAB
ALBUMIN SERPL BCP-MCNC: 3.5 G/DL (ref 3.5–5.2)
ALP SERPL-CCNC: 66 U/L (ref 55–135)
ALT SERPL W/O P-5'-P-CCNC: 14 U/L (ref 10–44)
ANION GAP SERPL CALC-SCNC: 10 MMOL/L (ref 8–16)
AST SERPL-CCNC: 17 U/L (ref 10–40)
BASOPHILS # BLD AUTO: 0.02 K/UL (ref 0–0.2)
BASOPHILS NFR BLD: 0.3 % (ref 0–1.9)
BILIRUB SERPL-MCNC: 0.5 MG/DL (ref 0.1–1)
BUN SERPL-MCNC: 20 MG/DL (ref 8–23)
CALCIUM SERPL-MCNC: 9.3 MG/DL (ref 8.7–10.5)
CHLORIDE SERPL-SCNC: 109 MMOL/L (ref 95–110)
CHOLEST SERPL-MCNC: 200 MG/DL (ref 120–199)
CHOLEST/HDLC SERPL: 4.7 {RATIO} (ref 2–5)
CO2 SERPL-SCNC: 24 MMOL/L (ref 23–29)
CREAT SERPL-MCNC: 1.2 MG/DL (ref 0.5–1.4)
DIFFERENTIAL METHOD: ABNORMAL
EOSINOPHIL # BLD AUTO: 0.1 K/UL (ref 0–0.5)
EOSINOPHIL NFR BLD: 2.4 % (ref 0–8)
ERYTHROCYTE [DISTWIDTH] IN BLOOD BY AUTOMATED COUNT: 13.1 % (ref 11.5–14.5)
EST. GFR  (AFRICAN AMERICAN): 50 ML/MIN/1.73 M^2
EST. GFR  (NON AFRICAN AMERICAN): 43.4 ML/MIN/1.73 M^2
ESTIMATED AVG GLUCOSE: 111 MG/DL (ref 68–131)
GLUCOSE SERPL-MCNC: 103 MG/DL (ref 70–110)
HBA1C MFR BLD: 5.5 % (ref 4–5.6)
HCT VFR BLD AUTO: 38.1 % (ref 37–48.5)
HDLC SERPL-MCNC: 43 MG/DL (ref 40–75)
HDLC SERPL: 21.5 % (ref 20–50)
HGB BLD-MCNC: 12.2 G/DL (ref 12–16)
IMM GRANULOCYTES # BLD AUTO: 0.02 K/UL (ref 0–0.04)
IMM GRANULOCYTES NFR BLD AUTO: 0.3 % (ref 0–0.5)
LDLC SERPL CALC-MCNC: 131.8 MG/DL (ref 63–159)
LYMPHOCYTES # BLD AUTO: 2.2 K/UL (ref 1–4.8)
LYMPHOCYTES NFR BLD: 36.3 % (ref 18–48)
MCH RBC QN AUTO: 33.3 PG (ref 27–31)
MCHC RBC AUTO-ENTMCNC: 32 G/DL (ref 32–36)
MCV RBC AUTO: 104 FL (ref 82–98)
MONOCYTES # BLD AUTO: 0.6 K/UL (ref 0.3–1)
MONOCYTES NFR BLD: 9.3 % (ref 4–15)
NEUTROPHILS # BLD AUTO: 3 K/UL (ref 1.8–7.7)
NEUTROPHILS NFR BLD: 51.4 % (ref 38–73)
NONHDLC SERPL-MCNC: 157 MG/DL
NRBC BLD-RTO: 0 /100 WBC
PLATELET # BLD AUTO: 215 K/UL (ref 150–450)
PMV BLD AUTO: 11.3 FL (ref 9.2–12.9)
POTASSIUM SERPL-SCNC: 4.7 MMOL/L (ref 3.5–5.1)
PROT SERPL-MCNC: 7.2 G/DL (ref 6–8.4)
RBC # BLD AUTO: 3.66 M/UL (ref 4–5.4)
SODIUM SERPL-SCNC: 143 MMOL/L (ref 136–145)
TRIGL SERPL-MCNC: 126 MG/DL (ref 30–150)
WBC # BLD AUTO: 5.92 K/UL (ref 3.9–12.7)

## 2021-10-15 PROCEDURE — 36415 COLL VENOUS BLD VENIPUNCTURE: CPT | Mod: PO | Performed by: PHYSICIAN ASSISTANT

## 2021-10-15 PROCEDURE — 85025 COMPLETE CBC W/AUTO DIFF WBC: CPT | Performed by: PHYSICIAN ASSISTANT

## 2021-10-15 PROCEDURE — 80053 COMPREHEN METABOLIC PANEL: CPT | Performed by: PHYSICIAN ASSISTANT

## 2021-10-15 PROCEDURE — 80061 LIPID PANEL: CPT | Performed by: PHYSICIAN ASSISTANT

## 2021-10-15 PROCEDURE — 83036 HEMOGLOBIN GLYCOSYLATED A1C: CPT | Performed by: PHYSICIAN ASSISTANT

## 2021-10-18 ENCOUNTER — OFFICE VISIT (OUTPATIENT)
Dept: CARDIOLOGY | Facility: CLINIC | Age: 81
End: 2021-10-18
Payer: MEDICARE

## 2021-10-18 VITALS
WEIGHT: 226 LBS | HEIGHT: 63 IN | HEART RATE: 57 BPM | DIASTOLIC BLOOD PRESSURE: 70 MMHG | OXYGEN SATURATION: 100 % | BODY MASS INDEX: 40.04 KG/M2 | SYSTOLIC BLOOD PRESSURE: 140 MMHG

## 2021-10-18 DIAGNOSIS — R60.0 EDEMA OF EXTREMITIES: ICD-10-CM

## 2021-10-18 DIAGNOSIS — E66.01 MORBID OBESITY: ICD-10-CM

## 2021-10-18 DIAGNOSIS — D64.9 ANEMIA, UNSPECIFIED TYPE: ICD-10-CM

## 2021-10-18 DIAGNOSIS — Z86.718 HISTORY OF DVT IN ADULTHOOD: ICD-10-CM

## 2021-10-18 DIAGNOSIS — D62 ACUTE BLOOD LOSS ANEMIA: ICD-10-CM

## 2021-10-18 DIAGNOSIS — G47.33 OBSTRUCTIVE SLEEP APNEA SYNDROME: ICD-10-CM

## 2021-10-18 DIAGNOSIS — I27.20 PULMONARY HYPERTENSION: ICD-10-CM

## 2021-10-18 DIAGNOSIS — R53.82 CHRONIC FATIGUE: ICD-10-CM

## 2021-10-18 DIAGNOSIS — G47.33 OSA (OBSTRUCTIVE SLEEP APNEA): ICD-10-CM

## 2021-10-18 DIAGNOSIS — Z86.711 HISTORY OF PULMONARY EMBOLISM: ICD-10-CM

## 2021-10-18 DIAGNOSIS — I82.4Z2 DEEP VEIN THROMBOSIS (DVT) OF DISTAL VEIN OF LEFT LOWER EXTREMITY, UNSPECIFIED CHRONICITY: Primary | ICD-10-CM

## 2021-10-18 DIAGNOSIS — M19.90 ARTHRITIS: ICD-10-CM

## 2021-10-18 PROCEDURE — 99215 OFFICE O/P EST HI 40 MIN: CPT | Mod: S$GLB,,, | Performed by: SPECIALIST

## 2021-10-18 PROCEDURE — 3077F SYST BP >= 140 MM HG: CPT | Mod: CPTII,S$GLB,, | Performed by: SPECIALIST

## 2021-10-18 PROCEDURE — 1159F MED LIST DOCD IN RCRD: CPT | Mod: CPTII,S$GLB,, | Performed by: SPECIALIST

## 2021-10-18 PROCEDURE — 1157F ADVNC CARE PLAN IN RCRD: CPT | Mod: CPTII,S$GLB,, | Performed by: SPECIALIST

## 2021-10-18 PROCEDURE — 1159F PR MEDICATION LIST DOCUMENTED IN MEDICAL RECORD: ICD-10-PCS | Mod: CPTII,S$GLB,, | Performed by: SPECIALIST

## 2021-10-18 PROCEDURE — 1126F PR PAIN SEVERITY QUANTIFIED, NO PAIN PRESENT: ICD-10-PCS | Mod: CPTII,S$GLB,, | Performed by: SPECIALIST

## 2021-10-18 PROCEDURE — 1126F AMNT PAIN NOTED NONE PRSNT: CPT | Mod: CPTII,S$GLB,, | Performed by: SPECIALIST

## 2021-10-18 PROCEDURE — 3078F PR MOST RECENT DIASTOLIC BLOOD PRESSURE < 80 MM HG: ICD-10-PCS | Mod: CPTII,S$GLB,, | Performed by: SPECIALIST

## 2021-10-18 PROCEDURE — 1101F PT FALLS ASSESS-DOCD LE1/YR: CPT | Mod: CPTII,S$GLB,, | Performed by: SPECIALIST

## 2021-10-18 PROCEDURE — 1101F PR PT FALLS ASSESS DOC 0-1 FALLS W/OUT INJ PAST YR: ICD-10-PCS | Mod: CPTII,S$GLB,, | Performed by: SPECIALIST

## 2021-10-18 PROCEDURE — 1160F PR REVIEW ALL MEDS BY PRESCRIBER/CLIN PHARMACIST DOCUMENTED: ICD-10-PCS | Mod: CPTII,S$GLB,, | Performed by: SPECIALIST

## 2021-10-18 PROCEDURE — 1160F RVW MEDS BY RX/DR IN RCRD: CPT | Mod: CPTII,S$GLB,, | Performed by: SPECIALIST

## 2021-10-18 PROCEDURE — 3078F DIAST BP <80 MM HG: CPT | Mod: CPTII,S$GLB,, | Performed by: SPECIALIST

## 2021-10-18 PROCEDURE — 3288F FALL RISK ASSESSMENT DOCD: CPT | Mod: CPTII,S$GLB,, | Performed by: SPECIALIST

## 2021-10-18 PROCEDURE — 3288F PR FALLS RISK ASSESSMENT DOCUMENTED: ICD-10-PCS | Mod: CPTII,S$GLB,, | Performed by: SPECIALIST

## 2021-10-18 PROCEDURE — 99215 PR OFFICE/OUTPT VISIT, EST, LEVL V, 40-54 MIN: ICD-10-PCS | Mod: S$GLB,,, | Performed by: SPECIALIST

## 2021-10-18 PROCEDURE — 3077F PR MOST RECENT SYSTOLIC BLOOD PRESSURE >= 140 MM HG: ICD-10-PCS | Mod: CPTII,S$GLB,, | Performed by: SPECIALIST

## 2021-10-18 PROCEDURE — 1157F PR ADVANCE CARE PLAN OR EQUIV PRESENT IN MEDICAL RECORD: ICD-10-PCS | Mod: CPTII,S$GLB,, | Performed by: SPECIALIST

## 2021-10-18 RX ORDER — HYDRALAZINE HYDROCHLORIDE 100 MG/1
100 TABLET, FILM COATED ORAL EVERY 12 HOURS
Qty: 180 TABLET | Refills: 2 | Status: SHIPPED | OUTPATIENT
Start: 2021-10-18 | End: 2022-10-25 | Stop reason: SDUPTHER

## 2021-10-18 RX ORDER — FUROSEMIDE 20 MG/1
TABLET ORAL
Qty: 60 TABLET | Refills: 11 | Status: SHIPPED | OUTPATIENT
Start: 2021-10-18 | End: 2022-08-31

## 2021-10-19 ENCOUNTER — PATIENT OUTREACH (OUTPATIENT)
Dept: ADMINISTRATIVE | Facility: OTHER | Age: 81
End: 2021-10-19

## 2021-10-19 ENCOUNTER — OFFICE VISIT (OUTPATIENT)
Dept: OPTOMETRY | Facility: CLINIC | Age: 81
End: 2021-10-19
Payer: MEDICARE

## 2021-10-19 DIAGNOSIS — E11.36 DIABETIC CATARACT: ICD-10-CM

## 2021-10-19 DIAGNOSIS — E11.9 DIABETES MELLITUS TYPE 2 WITHOUT RETINOPATHY: Primary | ICD-10-CM

## 2021-10-19 DIAGNOSIS — H40.013 OPEN ANGLE WITH BORDERLINE FINDINGS OF BOTH EYES: ICD-10-CM

## 2021-10-19 DIAGNOSIS — H26.9 CORTICAL CATARACT: ICD-10-CM

## 2021-10-19 DIAGNOSIS — H52.7 REFRACTIVE ERROR: ICD-10-CM

## 2021-10-19 DIAGNOSIS — H25.13 NUCLEAR SCLEROSIS, BILATERAL: ICD-10-CM

## 2021-10-19 PROCEDURE — 3288F FALL RISK ASSESSMENT DOCD: CPT | Mod: CPTII,S$GLB,, | Performed by: OPTOMETRIST

## 2021-10-19 PROCEDURE — 1160F RVW MEDS BY RX/DR IN RCRD: CPT | Mod: CPTII,S$GLB,, | Performed by: OPTOMETRIST

## 2021-10-19 PROCEDURE — 3288F PR FALLS RISK ASSESSMENT DOCUMENTED: ICD-10-PCS | Mod: CPTII,S$GLB,, | Performed by: OPTOMETRIST

## 2021-10-19 PROCEDURE — 99999 PR PBB SHADOW E&M-EST. PATIENT-LVL II: ICD-10-PCS | Mod: PBBFAC,,, | Performed by: OPTOMETRIST

## 2021-10-19 PROCEDURE — 2023F DILAT RTA XM W/O RTNOPTHY: CPT | Mod: CPTII,S$GLB,, | Performed by: OPTOMETRIST

## 2021-10-19 PROCEDURE — 1126F AMNT PAIN NOTED NONE PRSNT: CPT | Mod: CPTII,S$GLB,, | Performed by: OPTOMETRIST

## 2021-10-19 PROCEDURE — 99499 UNLISTED E&M SERVICE: CPT | Mod: S$GLB,,, | Performed by: OPTOMETRIST

## 2021-10-19 PROCEDURE — 1101F PR PT FALLS ASSESS DOC 0-1 FALLS W/OUT INJ PAST YR: ICD-10-PCS | Mod: CPTII,S$GLB,, | Performed by: OPTOMETRIST

## 2021-10-19 PROCEDURE — 1159F PR MEDICATION LIST DOCUMENTED IN MEDICAL RECORD: ICD-10-PCS | Mod: CPTII,S$GLB,, | Performed by: OPTOMETRIST

## 2021-10-19 PROCEDURE — 92004 PR EYE EXAM, NEW PATIENT,COMPREHESV: ICD-10-PCS | Mod: S$GLB,,, | Performed by: OPTOMETRIST

## 2021-10-19 PROCEDURE — 1160F PR REVIEW ALL MEDS BY PRESCRIBER/CLIN PHARMACIST DOCUMENTED: ICD-10-PCS | Mod: CPTII,S$GLB,, | Performed by: OPTOMETRIST

## 2021-10-19 PROCEDURE — 99999 PR PBB SHADOW E&M-EST. PATIENT-LVL II: CPT | Mod: PBBFAC,,, | Performed by: OPTOMETRIST

## 2021-10-19 PROCEDURE — 1101F PT FALLS ASSESS-DOCD LE1/YR: CPT | Mod: CPTII,S$GLB,, | Performed by: OPTOMETRIST

## 2021-10-19 PROCEDURE — 1126F PR PAIN SEVERITY QUANTIFIED, NO PAIN PRESENT: ICD-10-PCS | Mod: CPTII,S$GLB,, | Performed by: OPTOMETRIST

## 2021-10-19 PROCEDURE — 1157F ADVNC CARE PLAN IN RCRD: CPT | Mod: CPTII,S$GLB,, | Performed by: OPTOMETRIST

## 2021-10-19 PROCEDURE — 1159F MED LIST DOCD IN RCRD: CPT | Mod: CPTII,S$GLB,, | Performed by: OPTOMETRIST

## 2021-10-19 PROCEDURE — 92004 COMPRE OPH EXAM NEW PT 1/>: CPT | Mod: S$GLB,,, | Performed by: OPTOMETRIST

## 2021-10-19 PROCEDURE — 2023F PR DILATED RETINAL EXAM W/O EVID OF RETINOPATHY: ICD-10-PCS | Mod: CPTII,S$GLB,, | Performed by: OPTOMETRIST

## 2021-10-19 PROCEDURE — 99499 RISK ADDL DX/OHS AUDIT: ICD-10-PCS | Mod: S$GLB,,, | Performed by: OPTOMETRIST

## 2021-10-19 PROCEDURE — 1157F PR ADVANCE CARE PLAN OR EQUIV PRESENT IN MEDICAL RECORD: ICD-10-PCS | Mod: CPTII,S$GLB,, | Performed by: OPTOMETRIST

## 2021-10-22 ENCOUNTER — OFFICE VISIT (OUTPATIENT)
Dept: FAMILY MEDICINE | Facility: CLINIC | Age: 81
End: 2021-10-22
Payer: MEDICARE

## 2021-10-22 VITALS
SYSTOLIC BLOOD PRESSURE: 148 MMHG | OXYGEN SATURATION: 99 % | RESPIRATION RATE: 16 BRPM | WEIGHT: 227.06 LBS | HEIGHT: 63 IN | HEART RATE: 76 BPM | BODY MASS INDEX: 40.23 KG/M2 | DIASTOLIC BLOOD PRESSURE: 70 MMHG | TEMPERATURE: 98 F

## 2021-10-22 DIAGNOSIS — E11.59 HYPERTENSION ASSOCIATED WITH DIABETES: ICD-10-CM

## 2021-10-22 DIAGNOSIS — T84.011D FAILURE OF LEFT TOTAL HIP ARTHROPLASTY, SUBSEQUENT ENCOUNTER: ICD-10-CM

## 2021-10-22 DIAGNOSIS — E78.5 HYPERLIPIDEMIA ASSOCIATED WITH TYPE 2 DIABETES MELLITUS: ICD-10-CM

## 2021-10-22 DIAGNOSIS — E11.22 TYPE 2 DIABETES MELLITUS WITH STAGE 2 CHRONIC KIDNEY DISEASE, WITHOUT LONG-TERM CURRENT USE OF INSULIN: Primary | ICD-10-CM

## 2021-10-22 DIAGNOSIS — Z23 NEED FOR IMMUNIZATION AGAINST INFLUENZA: ICD-10-CM

## 2021-10-22 DIAGNOSIS — L30.9 DERMATITIS: ICD-10-CM

## 2021-10-22 DIAGNOSIS — N18.2 TYPE 2 DIABETES MELLITUS WITH STAGE 2 CHRONIC KIDNEY DISEASE, WITHOUT LONG-TERM CURRENT USE OF INSULIN: Primary | ICD-10-CM

## 2021-10-22 DIAGNOSIS — J43.2 CENTRILOBULAR EMPHYSEMA: ICD-10-CM

## 2021-10-22 DIAGNOSIS — E11.69 HYPERLIPIDEMIA ASSOCIATED WITH TYPE 2 DIABETES MELLITUS: ICD-10-CM

## 2021-10-22 DIAGNOSIS — K29.70 GASTRITIS, PRESENCE OF BLEEDING UNSPECIFIED, UNSPECIFIED CHRONICITY, UNSPECIFIED GASTRITIS TYPE: ICD-10-CM

## 2021-10-22 DIAGNOSIS — I15.2 HYPERTENSION ASSOCIATED WITH DIABETES: ICD-10-CM

## 2021-10-22 PROCEDURE — 99499 RISK ADDL DX/OHS AUDIT: ICD-10-PCS | Mod: S$GLB,,, | Performed by: FAMILY MEDICINE

## 2021-10-22 PROCEDURE — 1101F PT FALLS ASSESS-DOCD LE1/YR: CPT | Mod: CPTII,S$GLB,, | Performed by: FAMILY MEDICINE

## 2021-10-22 PROCEDURE — 1101F PR PT FALLS ASSESS DOC 0-1 FALLS W/OUT INJ PAST YR: ICD-10-PCS | Mod: CPTII,S$GLB,, | Performed by: FAMILY MEDICINE

## 2021-10-22 PROCEDURE — 1159F MED LIST DOCD IN RCRD: CPT | Mod: CPTII,S$GLB,, | Performed by: FAMILY MEDICINE

## 2021-10-22 PROCEDURE — 1157F PR ADVANCE CARE PLAN OR EQUIV PRESENT IN MEDICAL RECORD: ICD-10-PCS | Mod: CPTII,S$GLB,, | Performed by: FAMILY MEDICINE

## 2021-10-22 PROCEDURE — 99999 PR PBB SHADOW E&M-EST. PATIENT-LVL V: CPT | Mod: PBBFAC,,, | Performed by: FAMILY MEDICINE

## 2021-10-22 PROCEDURE — 3288F PR FALLS RISK ASSESSMENT DOCUMENTED: ICD-10-PCS | Mod: CPTII,S$GLB,, | Performed by: FAMILY MEDICINE

## 2021-10-22 PROCEDURE — 99214 PR OFFICE/OUTPT VISIT, EST, LEVL IV, 30-39 MIN: ICD-10-PCS | Mod: 25,S$GLB,, | Performed by: FAMILY MEDICINE

## 2021-10-22 PROCEDURE — 1159F PR MEDICATION LIST DOCUMENTED IN MEDICAL RECORD: ICD-10-PCS | Mod: CPTII,S$GLB,, | Performed by: FAMILY MEDICINE

## 2021-10-22 PROCEDURE — G0008 FLU VACCINE - QUADRIVALENT - ADJUVANTED: ICD-10-PCS | Mod: S$GLB,,, | Performed by: FAMILY MEDICINE

## 2021-10-22 PROCEDURE — 99999 PR PBB SHADOW E&M-EST. PATIENT-LVL V: ICD-10-PCS | Mod: PBBFAC,,, | Performed by: FAMILY MEDICINE

## 2021-10-22 PROCEDURE — 3077F PR MOST RECENT SYSTOLIC BLOOD PRESSURE >= 140 MM HG: ICD-10-PCS | Mod: CPTII,S$GLB,, | Performed by: FAMILY MEDICINE

## 2021-10-22 PROCEDURE — 1125F AMNT PAIN NOTED PAIN PRSNT: CPT | Mod: CPTII,S$GLB,, | Performed by: FAMILY MEDICINE

## 2021-10-22 PROCEDURE — 99214 OFFICE O/P EST MOD 30 MIN: CPT | Mod: 25,S$GLB,, | Performed by: FAMILY MEDICINE

## 2021-10-22 PROCEDURE — 90694 FLU VACCINE - QUADRIVALENT - ADJUVANTED: ICD-10-PCS | Mod: S$GLB,,, | Performed by: FAMILY MEDICINE

## 2021-10-22 PROCEDURE — 1125F PR PAIN SEVERITY QUANTIFIED, PAIN PRESENT: ICD-10-PCS | Mod: CPTII,S$GLB,, | Performed by: FAMILY MEDICINE

## 2021-10-22 PROCEDURE — 3078F DIAST BP <80 MM HG: CPT | Mod: CPTII,S$GLB,, | Performed by: FAMILY MEDICINE

## 2021-10-22 PROCEDURE — G0008 ADMIN INFLUENZA VIRUS VAC: HCPCS | Mod: S$GLB,,, | Performed by: FAMILY MEDICINE

## 2021-10-22 PROCEDURE — 90694 VACC AIIV4 NO PRSRV 0.5ML IM: CPT | Mod: S$GLB,,, | Performed by: FAMILY MEDICINE

## 2021-10-22 PROCEDURE — 99499 UNLISTED E&M SERVICE: CPT | Mod: S$GLB,,, | Performed by: FAMILY MEDICINE

## 2021-10-22 PROCEDURE — 3288F FALL RISK ASSESSMENT DOCD: CPT | Mod: CPTII,S$GLB,, | Performed by: FAMILY MEDICINE

## 2021-10-22 PROCEDURE — 3077F SYST BP >= 140 MM HG: CPT | Mod: CPTII,S$GLB,, | Performed by: FAMILY MEDICINE

## 2021-10-22 PROCEDURE — 3078F PR MOST RECENT DIASTOLIC BLOOD PRESSURE < 80 MM HG: ICD-10-PCS | Mod: CPTII,S$GLB,, | Performed by: FAMILY MEDICINE

## 2021-10-22 PROCEDURE — 1157F ADVNC CARE PLAN IN RCRD: CPT | Mod: CPTII,S$GLB,, | Performed by: FAMILY MEDICINE

## 2021-10-22 RX ORDER — PANCRELIPASE 60000; 12000; 38000 [USP'U]/1; [USP'U]/1; [USP'U]/1
1 CAPSULE, DELAYED RELEASE PELLETS ORAL
Qty: 90 CAPSULE | Refills: 11 | Status: SHIPPED | OUTPATIENT
Start: 2021-10-22 | End: 2022-04-26 | Stop reason: ALTCHOICE

## 2021-10-22 RX ORDER — TRIAMCINOLONE ACETONIDE 1 MG/G
OINTMENT TOPICAL 2 TIMES DAILY
Qty: 80 G | Refills: 11 | Status: SHIPPED | OUTPATIENT
Start: 2021-10-22

## 2021-10-22 RX ORDER — HYOSCYAMINE SULFATE 0.125 MG
125 TABLET ORAL EVERY 4 HOURS PRN
Qty: 20 TABLET | Refills: 1 | Status: SHIPPED | OUTPATIENT
Start: 2021-10-22 | End: 2022-04-26 | Stop reason: ALTCHOICE

## 2021-11-08 ENCOUNTER — CLINICAL SUPPORT (OUTPATIENT)
Dept: OPHTHALMOLOGY | Facility: CLINIC | Age: 81
End: 2021-11-08
Payer: MEDICARE

## 2021-11-08 DIAGNOSIS — H40.013 OPEN ANGLE WITH BORDERLINE FINDINGS OF BOTH EYES: ICD-10-CM

## 2021-11-12 ENCOUNTER — PATIENT MESSAGE (OUTPATIENT)
Dept: OPTOMETRY | Facility: CLINIC | Age: 81
End: 2021-11-12
Payer: MEDICARE

## 2021-12-22 ENCOUNTER — PES CALL (OUTPATIENT)
Dept: ADMINISTRATIVE | Facility: CLINIC | Age: 81
End: 2021-12-22
Payer: MEDICARE

## 2022-01-12 ENCOUNTER — TELEPHONE (OUTPATIENT)
Dept: FAMILY MEDICINE | Facility: CLINIC | Age: 82
End: 2022-01-12
Payer: MEDICARE

## 2022-01-18 ENCOUNTER — LAB VISIT (OUTPATIENT)
Dept: LAB | Facility: HOSPITAL | Age: 82
End: 2022-01-18
Attending: INTERNAL MEDICINE
Payer: MEDICARE

## 2022-01-18 DIAGNOSIS — N18.30 CHRONIC KIDNEY DISEASE, STAGE III (MODERATE): Primary | ICD-10-CM

## 2022-01-18 DIAGNOSIS — E11.22 TYPE 2 DIABETES MELLITUS WITH STAGE 2 CHRONIC KIDNEY DISEASE, WITHOUT LONG-TERM CURRENT USE OF INSULIN: ICD-10-CM

## 2022-01-18 DIAGNOSIS — N18.2 TYPE 2 DIABETES MELLITUS WITH STAGE 2 CHRONIC KIDNEY DISEASE, WITHOUT LONG-TERM CURRENT USE OF INSULIN: ICD-10-CM

## 2022-01-18 LAB
ALBUMIN SERPL BCP-MCNC: 3.3 G/DL (ref 3.5–5.2)
ANION GAP SERPL CALC-SCNC: 11 MMOL/L (ref 8–16)
BACTERIA #/AREA URNS HPF: NORMAL /HPF
BASOPHILS # BLD AUTO: 0.02 K/UL (ref 0–0.2)
BASOPHILS NFR BLD: 0.3 % (ref 0–1.9)
BILIRUB UR QL STRIP: NEGATIVE
BUN SERPL-MCNC: 24 MG/DL (ref 8–23)
CALCIUM SERPL-MCNC: 9.3 MG/DL (ref 8.7–10.5)
CHLORIDE SERPL-SCNC: 106 MMOL/L (ref 95–110)
CLARITY UR: CLEAR
CO2 SERPL-SCNC: 26 MMOL/L (ref 23–29)
COLOR UR: YELLOW
CREAT SERPL-MCNC: 1.2 MG/DL (ref 0.5–1.4)
CREAT UR-MCNC: 151.6 MG/DL (ref 15–325)
DIFFERENTIAL METHOD: ABNORMAL
EOSINOPHIL # BLD AUTO: 0.1 K/UL (ref 0–0.5)
EOSINOPHIL NFR BLD: 2 % (ref 0–8)
ERYTHROCYTE [DISTWIDTH] IN BLOOD BY AUTOMATED COUNT: 12.9 % (ref 11.5–14.5)
EST. GFR  (AFRICAN AMERICAN): 50 ML/MIN/1.73 M^2
EST. GFR  (NON AFRICAN AMERICAN): 43 ML/MIN/1.73 M^2
FERRITIN SERPL-MCNC: 350 NG/ML (ref 20–300)
GLUCOSE SERPL-MCNC: 106 MG/DL (ref 70–110)
GLUCOSE UR QL STRIP: NEGATIVE
HCT VFR BLD AUTO: 38.2 % (ref 37–48.5)
HGB BLD-MCNC: 12 G/DL (ref 12–16)
HGB UR QL STRIP: NEGATIVE
IMM GRANULOCYTES # BLD AUTO: 0.01 K/UL (ref 0–0.04)
IMM GRANULOCYTES NFR BLD AUTO: 0.2 % (ref 0–0.5)
IRON SERPL-MCNC: 61 UG/DL (ref 30–160)
KETONES UR QL STRIP: NEGATIVE
LEUKOCYTE ESTERASE UR QL STRIP: ABNORMAL
LYMPHOCYTES # BLD AUTO: 2.1 K/UL (ref 1–4.8)
LYMPHOCYTES NFR BLD: 32.1 % (ref 18–48)
MCH RBC QN AUTO: 32.5 PG (ref 27–31)
MCHC RBC AUTO-ENTMCNC: 31.4 G/DL (ref 32–36)
MCV RBC AUTO: 104 FL (ref 82–98)
MICROSCOPIC COMMENT: NORMAL
MONOCYTES # BLD AUTO: 0.6 K/UL (ref 0.3–1)
MONOCYTES NFR BLD: 9.6 % (ref 4–15)
NEUTROPHILS # BLD AUTO: 3.6 K/UL (ref 1.8–7.7)
NEUTROPHILS NFR BLD: 55.8 % (ref 38–73)
NITRITE UR QL STRIP: NEGATIVE
NRBC BLD-RTO: 0 /100 WBC
PH UR STRIP: 6 [PH] (ref 5–8)
PHOSPHATE SERPL-MCNC: 3.7 MG/DL (ref 2.7–4.5)
PLATELET # BLD AUTO: 197 K/UL (ref 150–450)
PMV BLD AUTO: 11.3 FL (ref 9.2–12.9)
POTASSIUM SERPL-SCNC: 4.9 MMOL/L (ref 3.5–5.1)
PROT UR QL STRIP: NEGATIVE
PROT UR-MCNC: 12 MG/DL (ref 0–15)
PROT/CREAT UR: 0.08 MG/G{CREAT} (ref 0–0.2)
RBC # BLD AUTO: 3.69 M/UL (ref 4–5.4)
SATURATED IRON: 24 % (ref 20–50)
SODIUM SERPL-SCNC: 143 MMOL/L (ref 136–145)
SP GR UR STRIP: 1.01 (ref 1–1.03)
SQUAMOUS #/AREA URNS HPF: 5 /HPF
TOTAL IRON BINDING CAPACITY: 258 UG/DL (ref 250–450)
TRANSFERRIN SERPL-MCNC: 174 MG/DL (ref 200–375)
URN SPEC COLLECT METH UR: ABNORMAL
UROBILINOGEN UR STRIP-ACNC: NEGATIVE EU/DL
WBC # BLD AUTO: 6.45 K/UL (ref 3.9–12.7)
WBC #/AREA URNS HPF: 1 /HPF (ref 0–5)

## 2022-01-18 PROCEDURE — 36415 COLL VENOUS BLD VENIPUNCTURE: CPT | Performed by: INTERNAL MEDICINE

## 2022-01-18 PROCEDURE — 84466 ASSAY OF TRANSFERRIN: CPT | Performed by: INTERNAL MEDICINE

## 2022-01-18 PROCEDURE — 80069 RENAL FUNCTION PANEL: CPT | Performed by: INTERNAL MEDICINE

## 2022-01-18 PROCEDURE — 82570 ASSAY OF URINE CREATININE: CPT | Performed by: INTERNAL MEDICINE

## 2022-01-18 PROCEDURE — 82728 ASSAY OF FERRITIN: CPT | Performed by: INTERNAL MEDICINE

## 2022-01-18 PROCEDURE — 85025 COMPLETE CBC W/AUTO DIFF WBC: CPT | Performed by: INTERNAL MEDICINE

## 2022-01-18 PROCEDURE — 81000 URINALYSIS NONAUTO W/SCOPE: CPT | Performed by: INTERNAL MEDICINE

## 2022-01-18 RX ORDER — INSULIN PUMP SYRINGE, 3 ML
EACH MISCELLANEOUS
Qty: 1 EACH | Refills: 0 | OUTPATIENT
Start: 2022-01-18 | End: 2022-10-25 | Stop reason: SDUPTHER

## 2022-01-18 RX ORDER — LANCETS
EACH MISCELLANEOUS
Qty: 100 EACH | Refills: 0 | OUTPATIENT
Start: 2022-01-18

## 2022-01-18 NOTE — TELEPHONE ENCOUNTER
No new care gaps identified.  Powered by [employer]. Reference number: [id number].   1/18/2022 5:47:05 PM CST

## 2022-01-27 ENCOUNTER — LAB VISIT (OUTPATIENT)
Dept: LAB | Facility: HOSPITAL | Age: 82
End: 2022-01-27
Attending: INTERNAL MEDICINE
Payer: MEDICARE

## 2022-01-27 DIAGNOSIS — M54.50 LOWER BACK PAIN: Primary | ICD-10-CM

## 2022-01-27 LAB
BILIRUB UR QL STRIP: NEGATIVE
CLARITY UR: CLEAR
COLOR UR: YELLOW
GLUCOSE UR QL STRIP: NEGATIVE
HGB UR QL STRIP: NEGATIVE
KETONES UR QL STRIP: NEGATIVE
LEUKOCYTE ESTERASE UR QL STRIP: NEGATIVE
NITRITE UR QL STRIP: NEGATIVE
PH UR STRIP: 6 [PH] (ref 5–8)
PROT UR QL STRIP: NEGATIVE
SP GR UR STRIP: 1.02 (ref 1–1.03)
URN SPEC COLLECT METH UR: NORMAL
UROBILINOGEN UR STRIP-ACNC: NEGATIVE EU/DL

## 2022-01-27 PROCEDURE — 87086 URINE CULTURE/COLONY COUNT: CPT | Performed by: INTERNAL MEDICINE

## 2022-01-27 PROCEDURE — 81003 URINALYSIS AUTO W/O SCOPE: CPT | Performed by: INTERNAL MEDICINE

## 2022-01-28 LAB — BACTERIA UR CULT: NO GROWTH

## 2022-02-23 DIAGNOSIS — D84.9 IMMUNOSUPPRESSED STATUS: ICD-10-CM

## 2022-03-11 ENCOUNTER — PES CALL (OUTPATIENT)
Dept: ADMINISTRATIVE | Facility: CLINIC | Age: 82
End: 2022-03-11
Payer: MEDICARE

## 2022-04-18 ENCOUNTER — LAB VISIT (OUTPATIENT)
Dept: LAB | Facility: HOSPITAL | Age: 82
End: 2022-04-18
Attending: FAMILY MEDICINE
Payer: MEDICARE

## 2022-04-18 DIAGNOSIS — E11.22 TYPE 2 DIABETES MELLITUS WITH STAGE 2 CHRONIC KIDNEY DISEASE, WITHOUT LONG-TERM CURRENT USE OF INSULIN: ICD-10-CM

## 2022-04-18 DIAGNOSIS — E11.69 HYPERLIPIDEMIA ASSOCIATED WITH TYPE 2 DIABETES MELLITUS: ICD-10-CM

## 2022-04-18 DIAGNOSIS — E78.5 HYPERLIPIDEMIA ASSOCIATED WITH TYPE 2 DIABETES MELLITUS: ICD-10-CM

## 2022-04-18 DIAGNOSIS — T84.011D FAILURE OF LEFT TOTAL HIP ARTHROPLASTY, SUBSEQUENT ENCOUNTER: ICD-10-CM

## 2022-04-18 DIAGNOSIS — E11.59 HYPERTENSION ASSOCIATED WITH DIABETES: ICD-10-CM

## 2022-04-18 DIAGNOSIS — I15.2 HYPERTENSION ASSOCIATED WITH DIABETES: ICD-10-CM

## 2022-04-18 DIAGNOSIS — N18.2 TYPE 2 DIABETES MELLITUS WITH STAGE 2 CHRONIC KIDNEY DISEASE, WITHOUT LONG-TERM CURRENT USE OF INSULIN: ICD-10-CM

## 2022-04-18 DIAGNOSIS — K29.70 GASTRITIS, PRESENCE OF BLEEDING UNSPECIFIED, UNSPECIFIED CHRONICITY, UNSPECIFIED GASTRITIS TYPE: ICD-10-CM

## 2022-04-18 LAB
ALBUMIN SERPL BCP-MCNC: 3.3 G/DL (ref 3.5–5.2)
ALP SERPL-CCNC: 62 U/L (ref 55–135)
ALT SERPL W/O P-5'-P-CCNC: 15 U/L (ref 10–44)
ANION GAP SERPL CALC-SCNC: 8 MMOL/L (ref 8–16)
AST SERPL-CCNC: 16 U/L (ref 10–40)
BASOPHILS # BLD AUTO: 0.02 K/UL (ref 0–0.2)
BASOPHILS NFR BLD: 0.4 % (ref 0–1.9)
BILIRUB SERPL-MCNC: 0.5 MG/DL (ref 0.1–1)
BUN SERPL-MCNC: 33 MG/DL (ref 8–23)
CALCIUM SERPL-MCNC: 9 MG/DL (ref 8.7–10.5)
CHLORIDE SERPL-SCNC: 106 MMOL/L (ref 95–110)
CHOLEST SERPL-MCNC: 201 MG/DL (ref 120–199)
CHOLEST/HDLC SERPL: 4.9 {RATIO} (ref 2–5)
CO2 SERPL-SCNC: 26 MMOL/L (ref 23–29)
CREAT SERPL-MCNC: 1.3 MG/DL (ref 0.5–1.4)
DIFFERENTIAL METHOD: ABNORMAL
EOSINOPHIL # BLD AUTO: 0.1 K/UL (ref 0–0.5)
EOSINOPHIL NFR BLD: 2.5 % (ref 0–8)
ERYTHROCYTE [DISTWIDTH] IN BLOOD BY AUTOMATED COUNT: 13.2 % (ref 11.5–14.5)
EST. GFR  (AFRICAN AMERICAN): 45.1 ML/MIN/1.73 M^2
EST. GFR  (NON AFRICAN AMERICAN): 39.1 ML/MIN/1.73 M^2
ESTIMATED AVG GLUCOSE: 120 MG/DL (ref 68–131)
GLUCOSE SERPL-MCNC: 106 MG/DL (ref 70–110)
HBA1C MFR BLD: 5.8 % (ref 4–5.6)
HCT VFR BLD AUTO: 39.5 % (ref 37–48.5)
HDLC SERPL-MCNC: 41 MG/DL (ref 40–75)
HDLC SERPL: 20.4 % (ref 20–50)
HGB BLD-MCNC: 12.2 G/DL (ref 12–16)
IMM GRANULOCYTES # BLD AUTO: 0.01 K/UL (ref 0–0.04)
IMM GRANULOCYTES NFR BLD AUTO: 0.2 % (ref 0–0.5)
LDLC SERPL CALC-MCNC: 136.6 MG/DL (ref 63–159)
LIPASE SERPL-CCNC: 28 U/L (ref 4–60)
LYMPHOCYTES # BLD AUTO: 1.7 K/UL (ref 1–4.8)
LYMPHOCYTES NFR BLD: 32.4 % (ref 18–48)
MCH RBC QN AUTO: 32.6 PG (ref 27–31)
MCHC RBC AUTO-ENTMCNC: 30.9 G/DL (ref 32–36)
MCV RBC AUTO: 106 FL (ref 82–98)
MONOCYTES # BLD AUTO: 0.5 K/UL (ref 0.3–1)
MONOCYTES NFR BLD: 10.2 % (ref 4–15)
NEUTROPHILS # BLD AUTO: 2.9 K/UL (ref 1.8–7.7)
NEUTROPHILS NFR BLD: 54.3 % (ref 38–73)
NONHDLC SERPL-MCNC: 160 MG/DL
NRBC BLD-RTO: 0 /100 WBC
PLATELET # BLD AUTO: 197 K/UL (ref 150–450)
PMV BLD AUTO: 11.6 FL (ref 9.2–12.9)
POTASSIUM SERPL-SCNC: 4.8 MMOL/L (ref 3.5–5.1)
PROT SERPL-MCNC: 6.9 G/DL (ref 6–8.4)
RBC # BLD AUTO: 3.74 M/UL (ref 4–5.4)
SODIUM SERPL-SCNC: 140 MMOL/L (ref 136–145)
TRIGL SERPL-MCNC: 117 MG/DL (ref 30–150)
URATE SERPL-MCNC: 9.2 MG/DL (ref 2.4–5.7)
WBC # BLD AUTO: 5.27 K/UL (ref 3.9–12.7)

## 2022-04-18 PROCEDURE — 80061 LIPID PANEL: CPT | Performed by: FAMILY MEDICINE

## 2022-04-18 PROCEDURE — 36415 COLL VENOUS BLD VENIPUNCTURE: CPT | Mod: PO | Performed by: FAMILY MEDICINE

## 2022-04-18 PROCEDURE — 85025 COMPLETE CBC W/AUTO DIFF WBC: CPT | Performed by: FAMILY MEDICINE

## 2022-04-18 PROCEDURE — 83036 HEMOGLOBIN GLYCOSYLATED A1C: CPT | Performed by: FAMILY MEDICINE

## 2022-04-18 PROCEDURE — 83690 ASSAY OF LIPASE: CPT | Performed by: FAMILY MEDICINE

## 2022-04-18 PROCEDURE — 80053 COMPREHEN METABOLIC PANEL: CPT | Performed by: FAMILY MEDICINE

## 2022-04-18 PROCEDURE — 84550 ASSAY OF BLOOD/URIC ACID: CPT | Performed by: FAMILY MEDICINE

## 2022-04-26 ENCOUNTER — OFFICE VISIT (OUTPATIENT)
Dept: FAMILY MEDICINE | Facility: CLINIC | Age: 82
End: 2022-04-26
Payer: MEDICARE

## 2022-04-26 VITALS
OXYGEN SATURATION: 95 % | HEIGHT: 63 IN | RESPIRATION RATE: 16 BRPM | DIASTOLIC BLOOD PRESSURE: 60 MMHG | SYSTOLIC BLOOD PRESSURE: 144 MMHG | BODY MASS INDEX: 40.71 KG/M2 | WEIGHT: 229.75 LBS | TEMPERATURE: 98 F | HEART RATE: 66 BPM

## 2022-04-26 DIAGNOSIS — E66.01 MORBID OBESITY: ICD-10-CM

## 2022-04-26 DIAGNOSIS — E11.22 TYPE 2 DIABETES MELLITUS WITH STAGE 2 CHRONIC KIDNEY DISEASE, WITHOUT LONG-TERM CURRENT USE OF INSULIN: Primary | ICD-10-CM

## 2022-04-26 DIAGNOSIS — I82.4Z2 DEEP VEIN THROMBOSIS (DVT) OF DISTAL VEIN OF LEFT LOWER EXTREMITY, UNSPECIFIED CHRONICITY: ICD-10-CM

## 2022-04-26 DIAGNOSIS — I10 HTN, GOAL BELOW 130/80: ICD-10-CM

## 2022-04-26 DIAGNOSIS — J43.2 CENTRILOBULAR EMPHYSEMA: ICD-10-CM

## 2022-04-26 DIAGNOSIS — E11.59 HYPERTENSION ASSOCIATED WITH DIABETES: ICD-10-CM

## 2022-04-26 DIAGNOSIS — I15.2 HYPERTENSION ASSOCIATED WITH DIABETES: ICD-10-CM

## 2022-04-26 DIAGNOSIS — M33.13 DERMATOMYOSITIS: ICD-10-CM

## 2022-04-26 DIAGNOSIS — D75.829 HEPARIN INDUCED THROMBOCYTOPENIA (HIT): ICD-10-CM

## 2022-04-26 DIAGNOSIS — T84.011D FAILURE OF LEFT TOTAL HIP ARTHROPLASTY, SUBSEQUENT ENCOUNTER: ICD-10-CM

## 2022-04-26 DIAGNOSIS — E11.69 HYPERLIPIDEMIA ASSOCIATED WITH TYPE 2 DIABETES MELLITUS: ICD-10-CM

## 2022-04-26 DIAGNOSIS — E78.5 HYPERLIPIDEMIA ASSOCIATED WITH TYPE 2 DIABETES MELLITUS: ICD-10-CM

## 2022-04-26 DIAGNOSIS — N18.31 STAGE 3A CHRONIC KIDNEY DISEASE: ICD-10-CM

## 2022-04-26 DIAGNOSIS — N25.81 SECONDARY HYPERPARATHYROIDISM OF RENAL ORIGIN: ICD-10-CM

## 2022-04-26 DIAGNOSIS — N18.2 TYPE 2 DIABETES MELLITUS WITH STAGE 2 CHRONIC KIDNEY DISEASE, WITHOUT LONG-TERM CURRENT USE OF INSULIN: Primary | ICD-10-CM

## 2022-04-26 PROCEDURE — 1159F PR MEDICATION LIST DOCUMENTED IN MEDICAL RECORD: ICD-10-PCS | Mod: CPTII,S$GLB,, | Performed by: FAMILY MEDICINE

## 2022-04-26 PROCEDURE — 3072F LOW RISK FOR RETINOPATHY: CPT | Mod: CPTII,S$GLB,, | Performed by: FAMILY MEDICINE

## 2022-04-26 PROCEDURE — 1159F MED LIST DOCD IN RCRD: CPT | Mod: CPTII,S$GLB,, | Performed by: FAMILY MEDICINE

## 2022-04-26 PROCEDURE — 1160F PR REVIEW ALL MEDS BY PRESCRIBER/CLIN PHARMACIST DOCUMENTED: ICD-10-PCS | Mod: CPTII,S$GLB,, | Performed by: FAMILY MEDICINE

## 2022-04-26 PROCEDURE — 99999 PR PBB SHADOW E&M-EST. PATIENT-LVL V: ICD-10-PCS | Mod: PBBFAC,,, | Performed by: FAMILY MEDICINE

## 2022-04-26 PROCEDURE — 99214 OFFICE O/P EST MOD 30 MIN: CPT | Mod: S$GLB,,, | Performed by: FAMILY MEDICINE

## 2022-04-26 PROCEDURE — 3288F FALL RISK ASSESSMENT DOCD: CPT | Mod: CPTII,S$GLB,, | Performed by: FAMILY MEDICINE

## 2022-04-26 PROCEDURE — 1160F RVW MEDS BY RX/DR IN RCRD: CPT | Mod: CPTII,S$GLB,, | Performed by: FAMILY MEDICINE

## 2022-04-26 PROCEDURE — 1101F PT FALLS ASSESS-DOCD LE1/YR: CPT | Mod: CPTII,S$GLB,, | Performed by: FAMILY MEDICINE

## 2022-04-26 PROCEDURE — 3078F PR MOST RECENT DIASTOLIC BLOOD PRESSURE < 80 MM HG: ICD-10-PCS | Mod: CPTII,S$GLB,, | Performed by: FAMILY MEDICINE

## 2022-04-26 PROCEDURE — 3288F PR FALLS RISK ASSESSMENT DOCUMENTED: ICD-10-PCS | Mod: CPTII,S$GLB,, | Performed by: FAMILY MEDICINE

## 2022-04-26 PROCEDURE — 99999 PR PBB SHADOW E&M-EST. PATIENT-LVL V: CPT | Mod: PBBFAC,,, | Performed by: FAMILY MEDICINE

## 2022-04-26 PROCEDURE — 99499 RISK ADDL DX/OHS AUDIT: ICD-10-PCS | Mod: S$GLB,,, | Performed by: FAMILY MEDICINE

## 2022-04-26 PROCEDURE — 3078F DIAST BP <80 MM HG: CPT | Mod: CPTII,S$GLB,, | Performed by: FAMILY MEDICINE

## 2022-04-26 PROCEDURE — 1126F PR PAIN SEVERITY QUANTIFIED, NO PAIN PRESENT: ICD-10-PCS | Mod: CPTII,S$GLB,, | Performed by: FAMILY MEDICINE

## 2022-04-26 PROCEDURE — 3072F PR LOW RISK FOR RETINOPATHY: ICD-10-PCS | Mod: CPTII,S$GLB,, | Performed by: FAMILY MEDICINE

## 2022-04-26 PROCEDURE — 1101F PR PT FALLS ASSESS DOC 0-1 FALLS W/OUT INJ PAST YR: ICD-10-PCS | Mod: CPTII,S$GLB,, | Performed by: FAMILY MEDICINE

## 2022-04-26 PROCEDURE — 1157F PR ADVANCE CARE PLAN OR EQUIV PRESENT IN MEDICAL RECORD: ICD-10-PCS | Mod: CPTII,S$GLB,, | Performed by: FAMILY MEDICINE

## 2022-04-26 PROCEDURE — 3077F PR MOST RECENT SYSTOLIC BLOOD PRESSURE >= 140 MM HG: ICD-10-PCS | Mod: CPTII,S$GLB,, | Performed by: FAMILY MEDICINE

## 2022-04-26 PROCEDURE — 99499 UNLISTED E&M SERVICE: CPT | Mod: S$GLB,,, | Performed by: FAMILY MEDICINE

## 2022-04-26 PROCEDURE — 3077F SYST BP >= 140 MM HG: CPT | Mod: CPTII,S$GLB,, | Performed by: FAMILY MEDICINE

## 2022-04-26 PROCEDURE — 1157F ADVNC CARE PLAN IN RCRD: CPT | Mod: CPTII,S$GLB,, | Performed by: FAMILY MEDICINE

## 2022-04-26 PROCEDURE — 1126F AMNT PAIN NOTED NONE PRSNT: CPT | Mod: CPTII,S$GLB,, | Performed by: FAMILY MEDICINE

## 2022-04-26 PROCEDURE — 99214 PR OFFICE/OUTPT VISIT, EST, LEVL IV, 30-39 MIN: ICD-10-PCS | Mod: S$GLB,,, | Performed by: FAMILY MEDICINE

## 2022-04-26 RX ORDER — METOPROLOL SUCCINATE 50 MG/1
50 TABLET, EXTENDED RELEASE ORAL DAILY
Qty: 90 TABLET | Refills: 3 | Status: SHIPPED | OUTPATIENT
Start: 2022-04-26 | End: 2022-10-25

## 2022-04-26 RX ORDER — PSYLLIUM SEED
PACKET (EA) ORAL
Refills: 0 | COMMUNITY
Start: 2022-04-26

## 2022-04-26 RX ORDER — FOLIC ACID 1 MG/1
1 TABLET ORAL DAILY
Qty: 30 TABLET | Refills: 0 | Status: SHIPPED | OUTPATIENT
Start: 2022-04-26 | End: 2022-05-23

## 2022-04-26 RX ORDER — HYDROGEN PEROXIDE 3 %
20 SOLUTION, NON-ORAL MISCELLANEOUS
Qty: 60 CAPSULE | Refills: 3 | Status: SHIPPED | OUTPATIENT
Start: 2022-04-26 | End: 2022-08-25

## 2022-04-29 ENCOUNTER — OFFICE VISIT (OUTPATIENT)
Dept: CARDIOLOGY | Facility: CLINIC | Age: 82
End: 2022-04-29
Payer: MEDICARE

## 2022-04-29 VITALS
BODY MASS INDEX: 40.75 KG/M2 | SYSTOLIC BLOOD PRESSURE: 130 MMHG | HEART RATE: 52 BPM | WEIGHT: 230 LBS | DIASTOLIC BLOOD PRESSURE: 70 MMHG | OXYGEN SATURATION: 98 % | HEIGHT: 63 IN

## 2022-04-29 DIAGNOSIS — I27.20 PULMONARY HYPERTENSION: ICD-10-CM

## 2022-04-29 DIAGNOSIS — I82.621 ACUTE DEEP VEIN THROMBOSIS (DVT) OF RIGHT UPPER EXTREMITY, UNSPECIFIED VEIN: ICD-10-CM

## 2022-04-29 DIAGNOSIS — R60.0 EDEMA OF EXTREMITIES: ICD-10-CM

## 2022-04-29 PROCEDURE — 99499 UNLISTED E&M SERVICE: CPT | Mod: S$GLB,,, | Performed by: SPECIALIST

## 2022-04-29 PROCEDURE — 1101F PT FALLS ASSESS-DOCD LE1/YR: CPT | Mod: CPTII,S$GLB,, | Performed by: SPECIALIST

## 2022-04-29 PROCEDURE — 1157F PR ADVANCE CARE PLAN OR EQUIV PRESENT IN MEDICAL RECORD: ICD-10-PCS | Mod: CPTII,S$GLB,, | Performed by: SPECIALIST

## 2022-04-29 PROCEDURE — 3072F PR LOW RISK FOR RETINOPATHY: ICD-10-PCS | Mod: CPTII,S$GLB,, | Performed by: SPECIALIST

## 2022-04-29 PROCEDURE — 1126F PR PAIN SEVERITY QUANTIFIED, NO PAIN PRESENT: ICD-10-PCS | Mod: CPTII,S$GLB,, | Performed by: SPECIALIST

## 2022-04-29 PROCEDURE — 3075F SYST BP GE 130 - 139MM HG: CPT | Mod: CPTII,S$GLB,, | Performed by: SPECIALIST

## 2022-04-29 PROCEDURE — 1101F PR PT FALLS ASSESS DOC 0-1 FALLS W/OUT INJ PAST YR: ICD-10-PCS | Mod: CPTII,S$GLB,, | Performed by: SPECIALIST

## 2022-04-29 PROCEDURE — 3288F PR FALLS RISK ASSESSMENT DOCUMENTED: ICD-10-PCS | Mod: CPTII,S$GLB,, | Performed by: SPECIALIST

## 2022-04-29 PROCEDURE — 1126F AMNT PAIN NOTED NONE PRSNT: CPT | Mod: CPTII,S$GLB,, | Performed by: SPECIALIST

## 2022-04-29 PROCEDURE — 1160F RVW MEDS BY RX/DR IN RCRD: CPT | Mod: CPTII,S$GLB,, | Performed by: SPECIALIST

## 2022-04-29 PROCEDURE — 3075F PR MOST RECENT SYSTOLIC BLOOD PRESS GE 130-139MM HG: ICD-10-PCS | Mod: CPTII,S$GLB,, | Performed by: SPECIALIST

## 2022-04-29 PROCEDURE — 1159F MED LIST DOCD IN RCRD: CPT | Mod: CPTII,S$GLB,, | Performed by: SPECIALIST

## 2022-04-29 PROCEDURE — 99214 OFFICE O/P EST MOD 30 MIN: CPT | Mod: S$GLB,,, | Performed by: SPECIALIST

## 2022-04-29 PROCEDURE — 1160F PR REVIEW ALL MEDS BY PRESCRIBER/CLIN PHARMACIST DOCUMENTED: ICD-10-PCS | Mod: CPTII,S$GLB,, | Performed by: SPECIALIST

## 2022-04-29 PROCEDURE — 99499 RISK ADDL DX/OHS AUDIT: ICD-10-PCS | Mod: ,,, | Performed by: SPECIALIST

## 2022-04-29 PROCEDURE — 1157F ADVNC CARE PLAN IN RCRD: CPT | Mod: CPTII,S$GLB,, | Performed by: SPECIALIST

## 2022-04-29 PROCEDURE — 3078F PR MOST RECENT DIASTOLIC BLOOD PRESSURE < 80 MM HG: ICD-10-PCS | Mod: CPTII,S$GLB,, | Performed by: SPECIALIST

## 2022-04-29 PROCEDURE — 3288F FALL RISK ASSESSMENT DOCD: CPT | Mod: CPTII,S$GLB,, | Performed by: SPECIALIST

## 2022-04-29 PROCEDURE — 3072F LOW RISK FOR RETINOPATHY: CPT | Mod: CPTII,S$GLB,, | Performed by: SPECIALIST

## 2022-04-29 PROCEDURE — 99214 PR OFFICE/OUTPT VISIT, EST, LEVL IV, 30-39 MIN: ICD-10-PCS | Mod: S$GLB,,, | Performed by: SPECIALIST

## 2022-04-29 PROCEDURE — 1159F PR MEDICATION LIST DOCUMENTED IN MEDICAL RECORD: ICD-10-PCS | Mod: CPTII,S$GLB,, | Performed by: SPECIALIST

## 2022-04-29 PROCEDURE — 3078F DIAST BP <80 MM HG: CPT | Mod: CPTII,S$GLB,, | Performed by: SPECIALIST

## 2022-04-29 RX ORDER — WARFARIN SODIUM 5 MG/1
TABLET ORAL
Qty: 120 TABLET | Refills: 0 | OUTPATIENT
Start: 2022-04-29

## 2022-04-29 NOTE — PROGRESS NOTES
Subjective:    Patient ID:  Sammi Abreu is a 81 y.o. female who presents for   Congestive Heart Failure, Edema, Shortness of Breath (With exertion), and Pulmonary HTN    1) dv   Bilateral pulmonary emboli with extensive clot burden and involvement of main pulmonary arteries.  The patient's nurse, Anil was called at the time of dictation.  Additionally, a message was sent via secure chat to the hospitalist taking care of the patient.t/ pe  7/20    2) acid reflux and cough   3) uses still with  Edema-  On  Lasix   4)   Patient does not want to take Aldactone  She is basically feeling well      Review of patient's allergies indicates:   Allergen Reactions    Daptomycin Other (See Comments)     Kidney damage     Cymbalta [duloxetine] Other (See Comments)     Nightmares      Darvon [propoxyphene] Nausea Only and Other (See Comments)     Sweating, slept for 3 days    Atorvastatin Other (See Comments)     Muscle cramps    Naprosyn [naproxen] Nausea Only    Penicillins Rash    Tramadol Nausea Only and Palpitations       Past Medical History:   Diagnosis Date    ALLERGIC RHINITIS     Anemia     Arthritis     Back pain     Bronchitis     Cataract     OU    CHF (congestive heart failure)     Cholelithiasis     COPD (chronic obstructive pulmonary disease)     Degenerative disc disease     Diabetes mellitus     pre diabetic    Diverticulosis     DVT (deep venous thrombosis)     Edema     Encounter for blood transfusion 1979    Fibromyalgia     Glaucoma     Gout     Hx of colonic polyps     Hyperlipidemia     Hypertension     Incontinence     Osteoporosis     Reflux     Refusal of blood transfusions as patient is Mu-ism     Sleep apnea     non compliant with CPAP    Vestibulitis of ear      Past Surgical History:   Procedure Laterality Date    ANGIOGRAPHY OF LOWER EXTREMITY N/A 7/31/2019    Procedure: ANGIOGRAM, LOWER EXTREMITY;  Surgeon: Gino Arana MD;  Location: [facility]  CATH/EP LAB;  Service: General;  Laterality: N/A;    ASPIRATION OF SOFT TISSUE Left 10/15/2020    Procedure: ASPIRATION, SOFT TISSUE;  Surgeon: Dosc Diagnostic Provider;  Location: [facility] OR;  Service: General;  Laterality: Left;    COLONOSCOPY N/A 2020    Procedure: COLONOSCOPY;  Surgeon: Sue Nuñez MD;  Location: [facility] ENDO;  Service: Endoscopy;  Laterality: N/A;    ESOPHAGOGASTRODUODENOSCOPY N/A 2020    Procedure: EGD (ESOPHAGOGASTRODUODENOSCOPY);  Surgeon: Sue Nuñez MD;  Location: [facility] ENDO;  Service: Endoscopy;  Laterality: N/A;    HIP SURGERY      HYSTERECTOMY      INTRALUMINAL GASTROINTESTINAL TRACT IMAGING VIA CAPSULE N/A 2020    Procedure: IMAGING PROCEDURE, GI TRACT, INTRALUMINAL, VIA CAPSULE;  Surgeon: Sue Nuñez MD;  Location: [facility] ENDO;  Service: Endoscopy;  Laterality: N/A;    JOINT REPLACEMENT      B total hip    LAPAROSCOPIC CHOLECYSTECTOMY N/A 2020    Procedure: CHOLECYSTECTOMY, LAPAROSCOPIC;  Surgeon: Jomar Mcdonald MD;  Location[facility] OR;  Service: General;  Laterality: N/A;    TRANSFORAMINAL EPIDURAL INJECTION OF STEROID Left 2020    Procedure: Injection,steroid,epidural,transforaminal approach;  Surgeon: Matt Gilliam MD;  Location: [facility] OR;  Service: Pain Management;  Laterality: Left;  L2-3, L3-4    TRANSFORAMINAL EPIDURAL INJECTION OF STEROID Left 2021    Procedure: Injection,steroid,epidural,transforaminal approach;  Surgeon: Matt Gilliam MD;  Location: [facility] OR;  Service: Pain Management;  Laterality: Left;  L2-3, L3-4     Social History     Tobacco Use    Smoking status: Former Smoker     Packs/day: 0.25     Years: 5.00     Pack years: 1.25     Quit date: 1972     Years since quittin.3    Smokeless tobacco: Never Used   Substance Use Topics    Alcohol use: Yes     Alcohol/week: 0.0 standard drinks     Comment: Rarely    Drug use: Not Currently        Review of Systems     ROS        Objective:        Vitals:     04/29/22 1320   BP: 130/70   Pulse: (!) 52       Lab Results   Component Value Date    WBC 5.27 04/18/2022    HGB 12.2 04/18/2022    HCT 39.5 04/18/2022     04/18/2022    CHOL 201 (H) 04/18/2022    TRIG 117 04/18/2022    HDL 41 04/18/2022    ALT 15 04/18/2022    AST 16 04/18/2022     04/18/2022    K 4.8 04/18/2022     04/18/2022    CREATININE 1.3 04/18/2022    BUN 33 (H) 04/18/2022    CO2 26 04/18/2022    TSH 3.261 08/04/2020    INR 2.7 04/21/2022    HGBA1C 5.8 (H) 04/18/2022        ECHOCARDIOGRAM RESULTS  Results for orders placed during the hospital encounter of 01/06/21    Echo Color Flow Doppler? Yes    Interpretation Summary  · Mild concentric hypertrophy and normal systolic function. The estimated ejection fraction is 69%  · Indeterminate left ventricular diastolic function with elevated left atrial pressure.  · Atrial fibrillation not observed.  · Normal right ventricular size with normal right ventricular systolic function.  · Mild tricuspid regurgitation.  · Normal central venous pressure (3 mmHg).  · Indeterminate diastolic function mean left atrial pressure is approximately 12, L wave present on mitral inflow suggesting elevation left ventricle end-diastolic pressure      CURRENT/PREVIOUS VISIT EKG  Results for orders placed or performed during the hospital encounter of 11/01/20   EKG 12-lead    Collection Time: 11/02/20 12:20 PM    Narrative    Test Reason : R07.9,    Vent. Rate : 067 BPM     Atrial Rate : 067 BPM     P-R Int : 142 ms          QRS Dur : 076 ms      QT Int : 442 ms       P-R-T Axes : 054 006 031 degrees     QTc Int : 467 ms    Normal sinus rhythm with sinus arrhythmia  Nonspecific ST and T wave abnormality  When compared with ECG of 01-NOV-2020 18:01,  No significant change was found  Confirmed by Porfirio Maza MD ([id number]) on 11/10/2020 8:26:20 AM    Referred By: [email or web address]   SELF           Confirmed By:Porfirio Maza MD     Results for orders placed during the hospital  encounter of 01/06/21    Echo Color Flow Doppler? Yes    Interpretation Summary  · Mild concentric hypertrophy and normal systolic function. The estimated ejection fraction is 69%  · Indeterminate left ventricular diastolic function with elevated left atrial pressure.  · Atrial fibrillation not observed.  · Normal right ventricular size with normal right ventricular systolic function.  · Mild tricuspid regurgitation.  · Normal central venous pressure (3 mmHg).  · Indeterminate diastolic function mean left atrial pressure is approximately 12, L wave present on mitral inflow suggesting elevation left ventricle end-diastolic pressure    No results found for this or any previous visit.      PHYSICAL EXAM    Physical Exam blood pressure is 140/80  Lungs are clear  Cardiac regular\  Abdomen obese  Extremities +1 +2 edema    Medication List with Changes/Refills   Current Medications    ACETAMINOPHEN (TYLENOL) 325 MG TABLET    Take 2 tablets (650 mg total) by mouth every 6 (six) hours as needed (Do not take with any other Tylenol or acetaminophen containing products).    ALBUTEROL (PROVENTIL) 2.5 MG /3 ML (0.083 %) NEBULIZER SOLUTION    Take 3 mLs (2.5 mg total) by nebulization every 6 (six) hours as needed.    ALBUTEROL-IPRATROPIUM (DUO-NEB) 2.5 MG-0.5 MG/3 ML NEBULIZER SOLUTION    Take 3 mLs by nebulization every 8 (eight) hours.    ASCORBIC ACID, VITAMIN C, (VITAMIN C) 100 MG TABLET    Take 5 tablets (500 mg total) by mouth every evening.    BLOOD SUGAR DIAGNOSTIC STRP    To check BG 1 times daily, to use with insurance preferred meter    BLOOD-GLUCOSE METER KIT    To check BG 1 times daily, to use with insurance preferred meter    CYANOCOBALAMIN, VITAMIN B-12, 2,500 MCG LOZG    Place 2 tablets under the tongue once daily.    DICLOFENAC SODIUM (VOLTAREN) 1 % GEL    Apply 2 g topically once daily.    ESOMEPRAZOLE (NEXIUM) 20 MG CAPSULE    Take 1 capsule (20 mg total) by mouth 2 (two) times daily before meals.     FLUTICASONE PROPIONATE (FLONASE) 50 MCG/ACTUATION NASAL SPRAY    2 sprays (100 mcg total) by Each Nostril route once daily.    FLUTICASONE-SALMETEROL 230-21 MCG/DOSE (ADVAIR HFA) 230-21 MCG/ACTUATION HFAA INHALER    Inhale 2 puffs into the lungs 2 (two) times daily. Controller    FOLIC ACID (FOLVITE) 1 MG TABLET    Take 1 tablet (1 mg total) by mouth once daily.    FUROSEMIDE (LASIX) 20 MG TABLET    1 tablet p.o. b.i.d. Monday, wed, and Friday then 1 tablet p.o. daily the rest of the days    HYDRALAZINE (APRESOLINE) 100 MG TABLET    Take 1 tablet (100 mg total) by mouth every 12 (twelve) hours.    HYDROCODONE-ACETAMINOPHEN (NORCO) 5-325 MG PER TABLET    Take 1 tablet by mouth every 8 (eight) hours as needed for Pain. Medical necessary for greater than 7 days for chronic pain.    LANCETS MISC    To check BG 1 times daily, to use with insurance preferred meter    LOSARTAN (COZAAR) 50 MG TABLET    Take 1 tablet by mouth once daily    METAMUCIL, SUGAR, POWD    1 tabs oral daily    METOPROLOL SUCCINATE (TOPROL-XL) 50 MG 24 HR TABLET    Take 1 tablet (50 mg total) by mouth once daily.    MONTELUKAST (SINGULAIR) 10 MG TABLET    Take 1 tablet (10 mg total) by mouth every evening.    MULTIVITAMIN CAPSULE    Take 1 capsule by mouth once daily.    POTASSIUM CHLORIDE SA (K-DUR,KLOR-CON) 20 MEQ TABLET    Take 1 tablet (20 mEq total) by mouth 2 (two) times daily.    PREGABALIN (LYRICA) 50 MG CAPSULE    Take 1 capsule (50 mg total) by mouth every evening for 7 days, THEN 1 capsule (50 mg total) 2 (two) times daily for 14 days.    TRIAMCINOLONE ACETONIDE 0.1% (KENALOG) 0.1 % OINTMENT    Apply topically 2 (two) times daily.    VIBEGRON (GEMTESA) 75 MG TAB    Take 1 tablet by mouth once daily.    WARFARIN (COUMADIN) 5 MG TABLET    Tulio.e 1-1.5 Tablets QPM as instructed by coumadin clinic; #120 pills = 3 month supply           Assessment:    tired,   Eats na   Doesn't want aldactone  Wants to stay with current regiimen   Plan:   Stay  on low-sodium diet; consider Aldactone therapy    Problem List Items Addressed This Visit    None          No follow-ups on file.

## 2022-05-02 NOTE — PROGRESS NOTES
[facility]     Subjective:    Chief Complaint:   Chief Complaint   Patient presents with    Follow-up       History of Present Illness:  81 y.o. female presents for multiple issues.         I reviewed the patients chart dating back for the past few appointments. See above.    The following portions of the patient's history were reviewed and updated as appropriate: allergies, current medications, past family history, past medical history, past social history, past surgical history and problem list.    She denies chest pain upon exertion, dyspnea, nausea, vomiting, diaphoresis, and syncope. No pleuritic chest pain, unilateral leg swelling, calf tenderness, or calf pain.      Past Medical History:   Diagnosis Date    ALLERGIC RHINITIS     Anemia     Arthritis     Back pain     Bronchitis     Cataract     OU    CHF (congestive heart failure)     Cholelithiasis     COPD (chronic obstructive pulmonary disease)     Degenerative disc disease     Diabetes mellitus     pre diabetic    Diverticulosis     DVT (deep venous thrombosis)     Edema     Encounter for blood transfusion 1979    Fibromyalgia     Glaucoma     Gout     Hx of colonic polyps     Hyperlipidemia     Hypertension     Incontinence     Osteoporosis     Reflux     Refusal of blood transfusions as patient is [place]     Sleep apnea     non compliant with CPAP    Vestibulitis of ear        Past Surgical History:   Procedure Laterality Date    ANGIOGRAPHY OF LOWER EXTREMITY N/A 7/31/2019    Procedure: ANGIOGRAM, LOWER EXTREMITY;  Surgeon: Gino Arana MD;  Location: [facility] CATH/EP LAB;  Service: General;  Laterality: N/A;    ASPIRATION OF SOFT TISSUE Left 10/15/2020    Procedure: ASPIRATION, SOFT TISSUE;  Surgeon: Raul Diagnostic Provider;  Location: [facility] OR;  Service: General;  Laterality: Left;    COLONOSCOPY N/A 8/13/2020    Procedure: COLONOSCOPY;  Surgeon: Sue Nuñez MD;  Location: [facility] ENDO;   Service: Endoscopy;  Laterality: N/A;    ESOPHAGOGASTRODUODENOSCOPY N/A 2020    Procedure: EGD (ESOPHAGOGASTRODUODENOSCOPY);  Surgeon: Sue Nuñez MD;  Location: [facility] ENDO;  Service: Endoscopy;  Laterality: N/A;    HIP SURGERY      HYSTERECTOMY      INTRALUMINAL GASTROINTESTINAL TRACT IMAGING VIA CAPSULE N/A 2020    Procedure: IMAGING PROCEDURE, GI TRACT, INTRALUMINAL, VIA CAPSULE;  Surgeon: Sue Nuñez MD;  Location: [facility] ENDO;  Service: Endoscopy;  Laterality: N/A;    JOINT REPLACEMENT      B total hip    LAPAROSCOPIC CHOLECYSTECTOMY N/A 2020    Procedure: CHOLECYSTECTOMY, LAPAROSCOPIC;  Surgeon: Jomar Mcdonald MD;  Location: [facility] OR;  Service: General;  Laterality: N/A;    TRANSFORAMINAL EPIDURAL INJECTION OF STEROID Left 2020    Procedure: Injection,steroid,epidural,transforaminal approach;  Surgeon: Matt Gilliam MD;  Location: [facility] OR;  Service: Pain Management;  Laterality: Left;  L2-3, L3-4    TRANSFORAMINAL EPIDURAL INJECTION OF STEROID Left 2021    Procedure: Injection,steroid,epidural,transforaminal approach;  Surgeon: Matt Gilliam MD;  Location: [facility] OR;  Service: Pain Management;  Laterality: Left;  L2-3, L3-4       Social History  Social History     Tobacco Use    Smoking status: Former Smoker     Packs/day: 0.25     Years: 5.00     Pack years: 1.25     Quit date: 1972     Years since quittin.3    Smokeless tobacco: Never Used   Substance Use Topics    Alcohol use: Yes     Alcohol/week: 0.0 standard drinks     Comment: Rarely    Drug use: Not Currently       Family History   Problem Relation Age of Onset    Hypertension Mother     Diabetes Sister     Cancer Sister     Stomach cancer Sister     Hypertension Sister     Bipolar disorder Sister     Peripheral vascular disease Sister     Stroke Father     Hypertension Father     Hypertension Brother     Stroke Brother     Peripheral vascular disease Brother     Hypertension Son      "Obesity Son     Early death Son 48    Arthritis Son     Glaucoma Neg Hx     Macular degeneration Neg Hx     Retinal detachment Neg Hx      Review of patient's allergies indicates:   Allergen Reactions    Daptomycin Other (See Comments)     Kidney damage     Cymbalta [duloxetine] Other (See Comments)     Nightmares      Darvon [propoxyphene] Nausea Only and Other (See Comments)     Sweating, slept for 3 days    Atorvastatin Other (See Comments)     Muscle cramps    Naprosyn [naproxen] Nausea Only    Penicillins Rash    Tramadol Nausea Only and Palpitations       Review of Systems [Negative unless checked off]    General ROS: []fever, []chills, []weight loss, [x]malaise/fatigue.  ENT ROS: []congestion, []rhinorrhea,  []sore throat, [x]neck pain, [x]hearing loss.  Ophthalmic ROS:[]blurry vision, [] double vision, []photophobia, []eye pain.  Respiratory ROS: []cough, []pleuritic chest pain, []shortness of breath, []wheezing.  CVS ROS:[]chest pain, []dyspnea on exertion, []palpitations, []orthopnea, []leg swelling, []PND.   GI ROS: []nausea, []vomiting, [] epigastric pain, []abd pain, []diarrhea, []constipation, []blood/melena in stool.   Urology ROS:[]dysuria, []frequency, []flank pain,[] trouble voiding, [] hematuria.   MSK ROS: [x]myalgias, [x]joint pain, [x]muscular weakness,  []back pain, [] falls.   Derm ROS: []pruritis, []rash, []jaundice.  Neurological:[]dizziness,[]numbness,[]loss of consciousness, []weakness  []headaches.   Psych ROS: []hallucinations, []depression, []anxiety, []suicidal ideation.    Physical Examination  BP (!) 144/60   Pulse 66   Temp 97.8 °F (36.6 °C) (Oral)   Resp 16   Ht 5' 3" (1.6 m)   Wt 104.2 kg (229 lb 11.5 oz)   SpO2 95%   BMI 40.69 kg/m²   Wt Readings from Last 3 Encounters:   04/29/22 104.3 kg (230 lb)   04/26/22 104.2 kg (229 lb 11.5 oz)   10/22/21 103 kg (227 lb 1.2 oz)     BP Readings from Last 3 Encounters:   04/29/22 130/70   04/26/22 (!) 144/60   10/22/21 " "(!) 148/70     Estimated body mass index is 40.69 kg/m² as calculated from the following:    Height as of this encounter: 5' 3" (1.6 m).    Weight as of this encounter: 104.2 kg (229 lb 11.5 oz).     General appearance: alert, cooperative, no distress  Eyes: pupils equal and reactive, extraocular eye movements intact   Ears: bilateral TM's and external ear canals normal   Nose: normal and patent, no erythema, discharge or polyps   Sinuses: Normal paranasal sinuses without tenderness   Throat: mucous membranes moist, pharynx normal without lesions   Neck: no thyromegaly, trachea midline  Lungs: clear to auscultation, no wheezes, rales or rhonchi, symmetric air entry, no dullness to percussion bilaterally.  Heart: normal rate, regular rhythm, normal S1, S2, no murmurs, rubs, clicks or gallops, no displacement of the PMI.  Abdomen: soft, nontender, nondistended, no masses or organomegaly No rigidity, rebound, or guarding.   Back: full range of motion, no tenderness, palpable spasm or pain on motion   Extremities: peripheral pulses normal, no pedal edema, no clubbing or cyanosis, no pedal edema noted, venous stasis dermatitis noted   Feet: warm, good capillary refill.  Neurological:alert, oriented, normal speech, no focal findings or movement disorder noted   Psychiatric: alert, oriented to person, place, and time  Integument: normal coloration and turgor, no rashes, no suspicious skin lesions noted.    Data reviewed    Previous medical records reviewed and summarized above in HPI.    274}    Laboratory    I have reviewed old labs below:      274}  Lab Results   Component Value Date    WBC 5.27 04/18/2022    HGB 12.2 04/18/2022    HCT 39.5 04/18/2022     (H) 04/18/2022     04/18/2022     04/18/2022    K 4.8 04/18/2022     04/18/2022    CALCIUM 9.0 04/18/2022    PHOS 3.7 01/18/2022    CO2 26 04/18/2022     04/18/2022    BUN 33 (H) 04/18/2022    CREATININE 1.3 04/18/2022    ANIONGAP 8 " 04/18/2022    ESTGFRAFRICA 45.1 (A) 04/18/2022    EGFRNONAA 39.1 (A) 04/18/2022    PROT 6.9 04/18/2022    ALBUMIN 3.3 (L) 04/18/2022    BILITOT 0.5 04/18/2022    ALKPHOS 62 04/18/2022    ALT 15 04/18/2022    AST 16 04/18/2022    INR 2.7 04/21/2022    CHOL 201 (H) 04/18/2022    TRIG 117 04/18/2022    HDL 41 04/18/2022    LDLCALC 136.6 04/18/2022    TSH 3.261 08/04/2020    HGBA1C 5.8 (H) 04/18/2022       Imaging/Tracing: I have reviewed the pertinent results/findings and my personal findings are below:     274}  DM.Stable and controlled. Continue current treatment plan as previously prescribed.       Assessment/[id number]}    Sammi Abreu is a 81 y.o. female who presents to clinic with:    1. Type 2 diabetes mellitus with stage 2 chronic kidney disease, without long-term current use of insulin    2. Hyperlipidemia associated with type 2 diabetes mellitus    3. Hypertension associated with diabetes     The patient's BMI has been recorded in the chart. The patient has been provided educational materials regarding the benefits of attaining and maintaining a normal weight. We will continue to address and follow this issue during follow up visits.      Diagnostic impression remarks       1. Type 2 diabetes mellitus with stage 2 chronic kidney disease, without long-term current use of insulin    2. Hyperlipidemia associated with type 2 diabetes mellitus  - Comprehensive Metabolic Panel; Future  - Lipid Panel; Future    3. Hypertension associated with diabetes  - METAMUCIL, SUGAR, Powd; 1 tabs oral daily; Refill: 0  - metoprolol succinate (TOPROL-XL) 50 MG 24 hr tablet; Take 1 tablet (50 mg total) by mouth once daily.  Dispense: 90 tablet; Refill: 3  - folic acid (FOLVITE) 1 MG tablet; Take 1 tablet (1 mg total) by mouth once daily.  Dispense: 30 tablet; Refill: 0      Medication Monitoring: In today's visit, monitoring for drug toxicity was accomplished. Proper use of medications was discussed.     Counseling: Counseling  included discussion regarding imaging findings, diagnosis, possibilities, treatment options, medications, risks, and benefits. She had many questions regarding the options and long-term effects. All questions were answered. She expressed understanding after counseling regarding the diagnosis and recommendations. She was capable and demonstrated competence with understanding of these options. Shared decision making was performed resulting in her choosing the current treatment plan.     She was counseled about the importance of healthy dietary habits as well as routine physical activity and exercise for better health outcomes. I also discussed the importance of cancer screening.     I also discussed the importance of close follow up to discuss labs, change or modify her medications if needed, monitor side effects, and further evaluation of medical problems.     Additional workup planned: see labs ordered below.    See below for labs and meds ordered with associated diagnosis      Medication List with Changes/Refills   New Medications    METAMUCIL, SUGAR, POWD    1 tabs oral daily   Current Medications    ACETAMINOPHEN (TYLENOL) 325 MG TABLET    Take 2 tablets (650 mg total) by mouth every 6 (six) hours as needed (Do not take with any other Tylenol or acetaminophen containing products).    ALBUTEROL (PROVENTIL) 2.5 MG /3 ML (0.083 %) NEBULIZER SOLUTION    Take 3 mLs (2.5 mg total) by nebulization every 6 (six) hours as needed.    ALBUTEROL-IPRATROPIUM (DUO-NEB) 2.5 MG-0.5 MG/3 ML NEBULIZER SOLUTION    Take 3 mLs by nebulization every 8 (eight) hours.    ASCORBIC ACID, VITAMIN C, (VITAMIN C) 100 MG TABLET    Take 5 tablets (500 mg total) by mouth every evening.    BLOOD SUGAR DIAGNOSTIC STRP    To check BG 1 times daily, to use with insurance preferred meter    BLOOD-GLUCOSE METER KIT    To check BG 1 times daily, to use with insurance preferred meter    CYANOCOBALAMIN, VITAMIN B-12, 2,500 MCG LOZG    Place 2 tablets  under the tongue once daily.    DICLOFENAC SODIUM (VOLTAREN) 1 % GEL    Apply 2 g topically once daily.    FLUTICASONE PROPIONATE (FLONASE) 50 MCG/ACTUATION NASAL SPRAY    2 sprays (100 mcg total) by Each Nostril route once daily.    FLUTICASONE-SALMETEROL 230-21 MCG/DOSE (ADVAIR HFA) 230-21 MCG/ACTUATION HFAA INHALER    Inhale 2 puffs into the lungs 2 (two) times daily. Controller    FUROSEMIDE (LASIX) 20 MG TABLET    1 tablet p.o. b.i.d. Monday, wed, and Friday then 1 tablet p.o. daily the rest of the days    HYDRALAZINE (APRESOLINE) 100 MG TABLET    Take 1 tablet (100 mg total) by mouth every 12 (twelve) hours.    HYDROCODONE-ACETAMINOPHEN (NORCO) 5-325 MG PER TABLET    Take 1 tablet by mouth every 8 (eight) hours as needed for Pain. Medical necessary for greater than 7 days for chronic pain.    LANCETS MISC    To check BG 1 times daily, to use with insurance preferred meter    LOSARTAN (COZAAR) 50 MG TABLET    Take 1 tablet by mouth once daily    MONTELUKAST (SINGULAIR) 10 MG TABLET    Take 1 tablet (10 mg total) by mouth every evening.    MULTIVITAMIN CAPSULE    Take 1 capsule by mouth once daily.    POTASSIUM CHLORIDE SA (K-DUR,KLOR-CON) 20 MEQ TABLET    Take 1 tablet (20 mEq total) by mouth 2 (two) times daily.    PREGABALIN (LYRICA) 50 MG CAPSULE    Take 1 capsule (50 mg total) by mouth every evening for 7 days, THEN 1 capsule (50 mg total) 2 (two) times daily for 14 days.    TRIAMCINOLONE ACETONIDE 0.1% (KENALOG) 0.1 % OINTMENT    Apply topically 2 (two) times daily.    VIBEGRON (GEMTESA) 75 MG TAB    Take 1 tablet by mouth once daily.   Changed and/or Refilled Medications    Modified Medication Previous Medication    ESOMEPRAZOLE (NEXIUM) 20 MG CAPSULE esomeprazole (NEXIUM) 20 MG capsule       Take 1 capsule (20 mg total) by mouth 2 (two) times daily before meals.    Take 1 capsule (20 mg total) by mouth before breakfast.    FOLIC ACID (FOLVITE) 1 MG TABLET folic acid (FOLVITE) 1 MG tablet       Take 1  tablet (1 mg total) by mouth once daily.    Take 1 tablet (1 mg total) by mouth once daily.    METOPROLOL SUCCINATE (TOPROL-XL) 50 MG 24 HR TABLET metoprolol succinate (TOPROL-XL) 50 MG 24 hr tablet       Take 1 tablet (50 mg total) by mouth once daily.    Take 1 tablet by mouth once daily    WARFARIN (COUMADIN) 5 MG TABLET warfarin (COUMADIN) 5 MG tablet       Tulio.e 1-1.5 Tablets QPM as instructed by coumadin clinic; #120 pills = 3 month supply    Tulio.e 1-1.5 Tablets QPM as instructed by coumadin clinic; #120 pills = 3 month supply   Discontinued Medications    HYOSCYAMINE (ANASPAZ,LEVSIN) 0.125 MG TAB    Take 1 tablet (125 mcg total) by mouth every 4 (four) hours as needed (pain/bloated).    LIPASE-PROTEASE-AMYLASE 12,000-38,000-60,000 UNITS (CREON) CPDR    Take 1 capsule by mouth 3 (three) times daily with meals.    ONDANSETRON (ZOFRAN-ODT) 4 MG TBDL    Take 1 tablet (4 mg total) by mouth every 8 (eight) hours as needed.    TERAZOSIN (HYTRIN) 1 MG CAPSULE         Modified Medications    Modified Medication Previous Medication    ESOMEPRAZOLE (NEXIUM) 20 MG CAPSULE esomeprazole (NEXIUM) 20 MG capsule       Take 1 capsule (20 mg total) by mouth 2 (two) times daily before meals.    Take 1 capsule (20 mg total) by mouth before breakfast.    FOLIC ACID (FOLVITE) 1 MG TABLET folic acid (FOLVITE) 1 MG tablet       Take 1 tablet (1 mg total) by mouth once daily.    Take 1 tablet (1 mg total) by mouth once daily.    METOPROLOL SUCCINATE (TOPROL-XL) 50 MG 24 HR TABLET metoprolol succinate (TOPROL-XL) 50 MG 24 hr tablet       Take 1 tablet (50 mg total) by mouth once daily.    Take 1 tablet by mouth once daily    WARFARIN (COUMADIN) 5 MG TABLET warfarin (COUMADIN) 5 MG tablet       Tulio.e 1-1.5 Tablets QPM as instructed by coumadin clinic; #120 pills = 3 month supply    Tulio.e 1-1.5 Tablets QPM as instructed by coumadin clinic; #120 pills = 3 month supply       Follow up in about 6 months (around 10/26/2022). for further  "workup and reassessment if labs and tests obtained are stable or sooner as needed. She was instructed to call the clinic or go to the emergency department if her symptoms do not improve, worsens, or if new symptoms develop. Patient knows to call any time if an emergency arises. Shared decision making occurred and she verbalized understanding in agreement with this plan.     Documentation entered by me for this encounter may have been done in part using speech-recognition technology. Although I have made an effort to ensure accuracy, "sound like" errors may exist and should be interpreted in context.       Osmani Villatoro MD     Discussed health maintenance guidelines appropriate for age.    "

## 2022-05-23 DIAGNOSIS — I15.2 HYPERTENSION ASSOCIATED WITH DIABETES: ICD-10-CM

## 2022-05-23 DIAGNOSIS — E11.59 HYPERTENSION ASSOCIATED WITH DIABETES: ICD-10-CM

## 2022-05-23 RX ORDER — FOLIC ACID 1 MG/1
TABLET ORAL
Qty: 30 TABLET | Refills: 11 | Status: SHIPPED | OUTPATIENT
Start: 2022-05-23 | End: 2022-10-25 | Stop reason: SDUPTHER

## 2022-05-31 NOTE — PROGRESS NOTES
Pharmacokinetic Assessment Follow Up: IV Vancomycin    Vancomycin serum concentration assessment(s):    The random level was drawn correctly and can be used to guide therapy at this time. The measurement is within the desired definitive target range of 15 to 20 mcg/mL.    Vancomycin Regimen Plan:    Change regimen to Vancomycin 1000 mg IV every 24 hours with next serum trough concentration measured at 2230 prior to 3rd dose on 8/11    Drug levels (last 3 results):  Recent Labs   Lab Result Units 08/07/20  1929 08/08/20  2100 08/09/20  2103   Vancomycin, Random ug/mL 15.6 14.7 17.0       Pharmacy will continue to follow and monitor vancomycin.    Please contact pharmacy at extension [phone] for questions regarding this assessment.    Thank you for the consult,   Carly Stubbs       Patient brief summary:  Sammi Abreu is a 79 y.o. female initiated on antimicrobial therapy with IV Vancomycin for treatment of bacteremia    The patient's current regimen is pulse dosing.  Last dose 1000 mg    Drug Allergies:   Review of patient's allergies indicates:   Allergen Reactions    Cymbalta [duloxetine] Other (See Comments)     Nightmares      Darvon [propoxyphene] Nausea Only and Other (See Comments)     Sweating, slept for 3 days    Atorvastatin Other (See Comments)     Muscle cramps    Naprosyn [naproxen] Nausea Only    Penicillins Rash    Tramadol Nausea Only and Palpitations       Actual Body Weight:   107.5 kg    Renal Function:   Estimated Creatinine Clearance: 32.6 mL/min (A) (based on SCr of 1.7 mg/dL (H)).,       CBC (last 72 hours):  Recent Labs   Lab Result Units 08/07/20  1145 08/07/20  1928 08/08/20  0448   WBC K/uL 7.67  --  7.94   Hemoglobin g/dL 7.3* 8.5* 7.7*   Hematocrit % 23.7* 28.1* 25.3*   Platelets K/uL 215  --  234       Metabolic Panel (last 72 hours):  Recent Labs   Lab Result Units 08/06/20  2350 08/07/20  0428 08/07/20  2040 08/08/20  0448 08/09/20  0505   Sodium mmol/L  --  138  --  139 141    Potassium mmol/L  --  3.7  --  3.7 3.7   Chloride mmol/L  --  96  --  97 97   CO2 mmol/L  --  34*  --  32* 36*   Glucose mg/dL  --  99  --  91 106   Glucose, UA  Negative  --  Negative  --   --    BUN, Bld mg/dL  --  12  --  10 9   Creatinine mg/dL  --  1.8*  --  1.7* 1.7*   Albumin g/dL  --  2.0*  --  2.1* 2.1*   Total Bilirubin mg/dL  --  0.4  --   --   --    Alkaline Phosphatase U/L  --  97  --   --   --    AST U/L  --  201*  --   --   --    ALT U/L  --  157*  --   --   --    Magnesium mg/dL  --  1.9  --   --   --    Phosphorus mg/dL  --  4.1  --  4.3 4.8*       Vancomycin Administrations:  vancomycin given in the last 96 hours                     vancomycin in dextrose 5 % 1 gram/250 mL IVPB 1,000 mg (mg) 1,000 mg New Bag 08/08/20 2320    vancomycin in dextrose 5 % 1 gram/250 mL IVPB 1,000 mg (mg) 1,000 mg New Bag 08/07/20 2251    vancomycin in dextrose 5 % 1 gram/250 mL IVPB 1,000 mg (mg) 1,000 mg New Bag 08/06/20 2300                    Microbiologic Results:  Microbiology Results (last 7 days)       Procedure Component Value Units Date/Time    Urine culture [[id number]] Collected: 08/07/20 2040    Order Status: Completed Specimen: Urine Updated: 08/09/20 1031     Urine Culture, Routine No growth    Narrative:      Specimen Source->Urine    Urine culture [[id number]] Collected: 08/06/20 2350    Order Status: Completed Specimen: Urine Updated: 08/08/20 1421     Urine Culture, Routine No significant growth    Narrative:      Specimen Source->Urine    Blood culture x two cultures. Draw prior to antibiotics. [[id number]] Collected: 08/02/20 1633    Order Status: Completed Specimen: Blood Updated: 08/07/20 2322     Blood Culture, Routine No growth after 5 days.    Narrative:      Aerobic and anaerobic    Blood culture x two cultures. Draw prior to antibiotics. [[id number]] Collected: 08/02/20 1633    Order Status: Completed Specimen: Blood Updated: 08/07/20 2322     Blood Culture, Routine No growth after 5 days.     Narrative:      Aerobic and anaerobic    Urine Culture High Risk [[id number]] Collected: 08/07/20 2039    Order Status: Canceled Specimen: Urine, Catheterized     Urine Culture High Risk [[id number]] Collected: 08/06/20 2352    Order Status: Canceled Specimen: Urine, Catheterized            unk

## 2022-06-26 NOTE — PROGRESS NOTES
Patient, Sammi Abreu (MRN #[id number]), presented with a recorded BMI of 40.74 kg/m^2 consistent with the definition of morbid obesity (ICD-10 E66.01). The patient's morbid obesity was monitored, evaluated, addressed and/or treated. This addendum to the medical record is made on 06/26/2022.

## 2022-07-18 ENCOUNTER — LAB VISIT (OUTPATIENT)
Dept: LAB | Facility: HOSPITAL | Age: 82
End: 2022-07-18
Attending: INTERNAL MEDICINE
Payer: MEDICARE

## 2022-07-18 DIAGNOSIS — N18.9 ANEMIA OF CHRONIC RENAL FAILURE: ICD-10-CM

## 2022-07-18 DIAGNOSIS — N18.30 CHRONIC KIDNEY DISEASE, STAGE III (MODERATE): Primary | ICD-10-CM

## 2022-07-18 DIAGNOSIS — D63.1 ANEMIA OF CHRONIC RENAL FAILURE: ICD-10-CM

## 2022-07-18 LAB
ALBUMIN SERPL BCP-MCNC: 3.3 G/DL (ref 3.5–5.2)
ANION GAP SERPL CALC-SCNC: 8 MMOL/L (ref 8–16)
BASOPHILS # BLD AUTO: 0.02 K/UL (ref 0–0.2)
BASOPHILS NFR BLD: 0.3 % (ref 0–1.9)
BILIRUB UR QL STRIP: NEGATIVE
BUN SERPL-MCNC: 25 MG/DL (ref 8–23)
CALCIUM SERPL-MCNC: 8.8 MG/DL (ref 8.7–10.5)
CHLORIDE SERPL-SCNC: 106 MMOL/L (ref 95–110)
CLARITY UR: CLEAR
CO2 SERPL-SCNC: 25 MMOL/L (ref 23–29)
COLOR UR: YELLOW
CREAT SERPL-MCNC: 1.3 MG/DL (ref 0.5–1.4)
CREAT UR-MCNC: 181.5 MG/DL (ref 15–325)
DIFFERENTIAL METHOD: ABNORMAL
EOSINOPHIL # BLD AUTO: 0.1 K/UL (ref 0–0.5)
EOSINOPHIL NFR BLD: 2.1 % (ref 0–8)
ERYTHROCYTE [DISTWIDTH] IN BLOOD BY AUTOMATED COUNT: 13.2 % (ref 11.5–14.5)
EST. GFR  (AFRICAN AMERICAN): 45 ML/MIN/1.73 M^2
EST. GFR  (NON AFRICAN AMERICAN): 39 ML/MIN/1.73 M^2
FERRITIN SERPL-MCNC: 352 NG/ML (ref 20–300)
GLUCOSE SERPL-MCNC: 108 MG/DL (ref 70–110)
GLUCOSE UR QL STRIP: NEGATIVE
HCT VFR BLD AUTO: 38 % (ref 37–48.5)
HGB BLD-MCNC: 12.5 G/DL (ref 12–16)
HGB UR QL STRIP: NEGATIVE
IMM GRANULOCYTES # BLD AUTO: 0.02 K/UL (ref 0–0.04)
IMM GRANULOCYTES NFR BLD AUTO: 0.3 % (ref 0–0.5)
IRON SERPL-MCNC: 61 UG/DL (ref 30–160)
KETONES UR QL STRIP: NEGATIVE
LEUKOCYTE ESTERASE UR QL STRIP: NEGATIVE
LYMPHOCYTES # BLD AUTO: 2.1 K/UL (ref 1–4.8)
LYMPHOCYTES NFR BLD: 32.8 % (ref 18–48)
MCH RBC QN AUTO: 33.7 PG (ref 27–31)
MCHC RBC AUTO-ENTMCNC: 32.9 G/DL (ref 32–36)
MCV RBC AUTO: 102 FL (ref 82–98)
MONOCYTES # BLD AUTO: 0.6 K/UL (ref 0.3–1)
MONOCYTES NFR BLD: 8.9 % (ref 4–15)
NEUTROPHILS # BLD AUTO: 3.5 K/UL (ref 1.8–7.7)
NEUTROPHILS NFR BLD: 55.6 % (ref 38–73)
NITRITE UR QL STRIP: NEGATIVE
NRBC BLD-RTO: 0 /100 WBC
PH UR STRIP: 6 [PH] (ref 5–8)
PHOSPHATE SERPL-MCNC: 3.7 MG/DL (ref 2.7–4.5)
PLATELET # BLD AUTO: 196 K/UL (ref 150–450)
PMV BLD AUTO: 10.9 FL (ref 9.2–12.9)
POTASSIUM SERPL-SCNC: 4.1 MMOL/L (ref 3.5–5.1)
PROT UR QL STRIP: NEGATIVE
PROT UR-MCNC: 14 MG/DL (ref 0–15)
PROT/CREAT UR: 0.08 MG/G{CREAT} (ref 0–0.2)
RBC # BLD AUTO: 3.71 M/UL (ref 4–5.4)
SATURATED IRON: 23 % (ref 20–50)
SODIUM SERPL-SCNC: 139 MMOL/L (ref 136–145)
SP GR UR STRIP: 1.02 (ref 1–1.03)
TOTAL IRON BINDING CAPACITY: 266 UG/DL (ref 250–450)
TRANSFERRIN SERPL-MCNC: 180 MG/DL (ref 200–375)
URN SPEC COLLECT METH UR: NORMAL
UROBILINOGEN UR STRIP-ACNC: NEGATIVE EU/DL
WBC # BLD AUTO: 6.31 K/UL (ref 3.9–12.7)

## 2022-07-18 PROCEDURE — 80069 RENAL FUNCTION PANEL: CPT | Performed by: INTERNAL MEDICINE

## 2022-07-18 PROCEDURE — 85025 COMPLETE CBC W/AUTO DIFF WBC: CPT | Performed by: INTERNAL MEDICINE

## 2022-07-18 PROCEDURE — 84466 ASSAY OF TRANSFERRIN: CPT | Performed by: INTERNAL MEDICINE

## 2022-07-18 PROCEDURE — 82728 ASSAY OF FERRITIN: CPT | Performed by: INTERNAL MEDICINE

## 2022-07-18 PROCEDURE — 84156 ASSAY OF PROTEIN URINE: CPT | Performed by: INTERNAL MEDICINE

## 2022-07-18 PROCEDURE — 81003 URINALYSIS AUTO W/O SCOPE: CPT | Performed by: INTERNAL MEDICINE

## 2022-07-18 PROCEDURE — 36415 COLL VENOUS BLD VENIPUNCTURE: CPT | Performed by: INTERNAL MEDICINE

## 2022-07-19 ENCOUNTER — IMMUNIZATION (OUTPATIENT)
Dept: PRIMARY CARE CLINIC | Facility: CLINIC | Age: 82
End: 2022-07-19
Payer: MEDICARE

## 2022-07-19 DIAGNOSIS — Z23 NEED FOR VACCINATION: Primary | ICD-10-CM

## 2022-07-19 PROCEDURE — 91305 COVID-19, MRNA, LNP-S, PF, 30 MCG/0.3 ML DOSE VACCINE (PFIZER): CPT | Mod: S$GLB,,, | Performed by: FAMILY MEDICINE

## 2022-07-19 PROCEDURE — 0054A COVID-19, MRNA, LNP-S, PF, 30 MCG/0.3 ML DOSE VACCINE (PFIZER): CPT | Mod: S$GLB,,, | Performed by: FAMILY MEDICINE

## 2022-07-19 PROCEDURE — 0054A COVID-19, MRNA, LNP-S, PF, 30 MCG/0.3 ML DOSE VACCINE (PFIZER): ICD-10-PCS | Mod: S$GLB,,, | Performed by: FAMILY MEDICINE

## 2022-07-19 PROCEDURE — 91305 COVID-19, MRNA, LNP-S, PF, 30 MCG/0.3 ML DOSE VACCINE (PFIZER): ICD-10-PCS | Mod: S$GLB,,, | Performed by: FAMILY MEDICINE

## 2022-08-04 DIAGNOSIS — I82.621 ACUTE DEEP VEIN THROMBOSIS (DVT) OF RIGHT UPPER EXTREMITY, UNSPECIFIED VEIN: ICD-10-CM

## 2022-08-04 RX ORDER — WARFARIN SODIUM 5 MG/1
TABLET ORAL
Qty: 120 TABLET | Refills: 11 | Status: SHIPPED | OUTPATIENT
Start: 2022-08-04 | End: 2023-10-30 | Stop reason: SDUPTHER

## 2022-08-08 ENCOUNTER — LAB VISIT (OUTPATIENT)
Dept: LAB | Facility: HOSPITAL | Age: 82
End: 2022-08-08
Attending: INTERNAL MEDICINE
Payer: MEDICARE

## 2022-08-08 DIAGNOSIS — K25.5 GASTRIC ULCER WITH PERFORATION: ICD-10-CM

## 2022-08-08 DIAGNOSIS — N18.30 CHRONIC KIDNEY DISEASE, STAGE III (MODERATE): Primary | ICD-10-CM

## 2022-08-08 LAB
ANION GAP SERPL CALC-SCNC: 9 MMOL/L (ref 8–16)
BUN SERPL-MCNC: 33 MG/DL (ref 8–23)
CALCIUM SERPL-MCNC: 8.7 MG/DL (ref 8.7–10.5)
CHLORIDE SERPL-SCNC: 107 MMOL/L (ref 95–110)
CK SERPL-CCNC: 227 U/L (ref 20–180)
CO2 SERPL-SCNC: 27 MMOL/L (ref 23–29)
CREAT SERPL-MCNC: 1.5 MG/DL (ref 0.5–1.4)
EST. GFR  (NO RACE VARIABLE): 35 ML/MIN/1.73 M^2
GLUCOSE SERPL-MCNC: 109 MG/DL (ref 70–110)
MAGNESIUM SERPL-MCNC: 2.1 MG/DL (ref 1.6–2.6)
POTASSIUM SERPL-SCNC: 4.5 MMOL/L (ref 3.5–5.1)
SODIUM SERPL-SCNC: 143 MMOL/L (ref 136–145)

## 2022-08-08 PROCEDURE — 80048 BASIC METABOLIC PNL TOTAL CA: CPT | Performed by: INTERNAL MEDICINE

## 2022-08-08 PROCEDURE — 36415 COLL VENOUS BLD VENIPUNCTURE: CPT | Performed by: INTERNAL MEDICINE

## 2022-08-08 PROCEDURE — 83735 ASSAY OF MAGNESIUM: CPT | Performed by: INTERNAL MEDICINE

## 2022-08-08 PROCEDURE — 82550 ASSAY OF CK (CPK): CPT | Performed by: INTERNAL MEDICINE

## 2022-08-25 RX ORDER — HYDROGEN PEROXIDE 3 %
20 SOLUTION, NON-ORAL MISCELLANEOUS
Qty: 180 CAPSULE | Refills: 3 | OUTPATIENT
Start: 2022-08-25 | End: 2023-08-25

## 2022-08-25 RX ORDER — HYDROGEN PEROXIDE 3 %
SOLUTION, NON-ORAL MISCELLANEOUS
Qty: 180 CAPSULE | Refills: 3 | Status: SHIPPED | OUTPATIENT
Start: 2022-08-25 | End: 2023-09-27

## 2022-08-25 NOTE — TELEPHONE ENCOUNTER
No new care gaps identified.  [facility] Embedded Care Gaps. Reference number: [id number]. 8/25/2022   10:05:34 AM JOIT

## 2022-08-25 NOTE — TELEPHONE ENCOUNTER
No new care gaps identified.  [facility] Embedded Care Gaps. Reference number: [id number]. 8/25/2022   12:04:43 PM CDT

## 2022-08-25 NOTE — ASSESSMENT & PLAN NOTE
August 25, 2022      Houston Winters  [address] [facility] [place] [address]        To Whom It May Concern:    Houston Winters was seen on 08/25/22. Please excuse him until he sees orthopedics due to injury.        Sincerely,        ELMIRA Teixeira CNP     Repeat liver function test and re-evaluation by surgeon tomorrow and possible knee decision for cholecystectomy  If obstructive pattern, patient going to need MRCP  No significant medications on the list to cause elevated liver enzymes    Plan   Continue Rocephin  Feed and keep NPO after midnight  IV antiemetics and pain medication as needed

## 2022-08-25 NOTE — TELEPHONE ENCOUNTER
----- Message from Laura Moreno sent at 8/25/2022 10:15 AM CDT -----  Contact: self  Type:  RX Refill Request        Who Called:  patient  Refill or New Rx:  refill  RX Name and Strength:  esomeprazole (NEXIUM) 20 MG capsule  How is the patient currently taking it? (ex. 1XDay):  as directed  Is this a 30 day or 90 day RX:  30d  Preferred Pharmacy with phone number:    [facility] [address] [facility] [facility] [facility] [address] [facility]  [address] [place] [address]  [place] [address]  Phone: [phone] Fax: [phone]       Local or Mail Order:  local  Ordering Provider:  Shauna Greene Call Back Number:  [phone]   Additional Information:  The patient would like to have a 90d prescription is possible. Please call back.

## 2022-08-25 NOTE — TELEPHONE ENCOUNTER
Refill Routing Note   Medication(s) are not appropriate for processing by [facility] for the following reason(s):      - Drug-Disease Interaction (esomeprazole and Liver cyst)    [facility] action(s):  Defer Medication-related problems identified: Drug-disease interaction     Medication Therapy Plan: FOV;  Medication reconciliation completed: No     Appointments  past 12m or future 3m with PCP    Date Provider   Last Visit   4/26/2022 Osmani Villatoro MD   Next Visit   8/25/2022 Osmani Villatoro MD   ED visits in past 90 days: 0        Note composed:1:31 PM 08/25/2022

## 2022-08-31 ENCOUNTER — OFFICE VISIT (OUTPATIENT)
Dept: FAMILY MEDICINE | Facility: CLINIC | Age: 82
End: 2022-08-31
Payer: MEDICARE

## 2022-08-31 VITALS
DIASTOLIC BLOOD PRESSURE: 70 MMHG | TEMPERATURE: 98 F | WEIGHT: 225.75 LBS | BODY MASS INDEX: 40 KG/M2 | OXYGEN SATURATION: 95 % | HEART RATE: 65 BPM | SYSTOLIC BLOOD PRESSURE: 120 MMHG | HEIGHT: 63 IN

## 2022-08-31 DIAGNOSIS — N18.31 STAGE 3A CHRONIC KIDNEY DISEASE: ICD-10-CM

## 2022-08-31 DIAGNOSIS — J47.9 BRONCHIECTASIS WITHOUT COMPLICATION: ICD-10-CM

## 2022-08-31 DIAGNOSIS — J44.9 CHRONIC OBSTRUCTIVE PULMONARY DISEASE, UNSPECIFIED COPD TYPE: ICD-10-CM

## 2022-08-31 DIAGNOSIS — Z79.01 LONG TERM CURRENT USE OF ANTICOAGULANT: ICD-10-CM

## 2022-08-31 DIAGNOSIS — R26.9 ABNORMALITY OF GAIT AND MOBILITY: ICD-10-CM

## 2022-08-31 DIAGNOSIS — Z00.00 ENCOUNTER FOR PREVENTIVE HEALTH EXAMINATION: Primary | ICD-10-CM

## 2022-08-31 DIAGNOSIS — E66.01 SEVERE OBESITY (BMI 35.0-39.9) WITH COMORBIDITY: ICD-10-CM

## 2022-08-31 DIAGNOSIS — I70.0 CALCIFICATION OF AORTA: ICD-10-CM

## 2022-08-31 DIAGNOSIS — E74.39 GLUCOSE INTOLERANCE: ICD-10-CM

## 2022-08-31 DIAGNOSIS — Z86.711 HISTORY OF PULMONARY EMBOLISM: ICD-10-CM

## 2022-08-31 DIAGNOSIS — I50.30 DIASTOLIC CHF WITH PRESERVED LEFT VENTRICULAR FUNCTION, NYHA CLASS 2: ICD-10-CM

## 2022-08-31 DIAGNOSIS — I27.20 PULMONARY HYPERTENSION: ICD-10-CM

## 2022-08-31 DIAGNOSIS — J43.2 CENTRILOBULAR EMPHYSEMA: ICD-10-CM

## 2022-08-31 DIAGNOSIS — N25.81 SECONDARY HYPERPARATHYROIDISM OF RENAL ORIGIN: ICD-10-CM

## 2022-08-31 DIAGNOSIS — M33.13 DERMATOMYOSITIS: ICD-10-CM

## 2022-08-31 PROBLEM — T84.011A FAILURE OF LEFT TOTAL HIP ARTHROPLASTY: Status: RESOLVED | Noted: 2020-10-15 | Resolved: 2022-08-31

## 2022-08-31 PROCEDURE — 3288F PR FALLS RISK ASSESSMENT DOCUMENTED: ICD-10-PCS | Mod: CPTII,S$GLB,, | Performed by: NURSE PRACTITIONER

## 2022-08-31 PROCEDURE — 1101F PT FALLS ASSESS-DOCD LE1/YR: CPT | Mod: CPTII,S$GLB,, | Performed by: NURSE PRACTITIONER

## 2022-08-31 PROCEDURE — 1125F AMNT PAIN NOTED PAIN PRSNT: CPT | Mod: CPTII,S$GLB,, | Performed by: NURSE PRACTITIONER

## 2022-08-31 PROCEDURE — 1101F PR PT FALLS ASSESS DOC 0-1 FALLS W/OUT INJ PAST YR: ICD-10-PCS | Mod: CPTII,S$GLB,, | Performed by: NURSE PRACTITIONER

## 2022-08-31 PROCEDURE — 1160F PR REVIEW ALL MEDS BY PRESCRIBER/CLIN PHARMACIST DOCUMENTED: ICD-10-PCS | Mod: CPTII,S$GLB,, | Performed by: NURSE PRACTITIONER

## 2022-08-31 PROCEDURE — 99499 UNLISTED E&M SERVICE: CPT | Mod: S$GLB,,, | Performed by: NURSE PRACTITIONER

## 2022-08-31 PROCEDURE — 3072F LOW RISK FOR RETINOPATHY: CPT | Mod: CPTII,S$GLB,, | Performed by: NURSE PRACTITIONER

## 2022-08-31 PROCEDURE — 1170F PR FUNCTIONAL STATUS ASSESSED: ICD-10-PCS | Mod: CPTII,S$GLB,, | Performed by: NURSE PRACTITIONER

## 2022-08-31 PROCEDURE — 99499 RISK ADDL DX/OHS AUDIT: ICD-10-PCS | Mod: S$GLB,,, | Performed by: NURSE PRACTITIONER

## 2022-08-31 PROCEDURE — G0439 PR MEDICARE ANNUAL WELLNESS SUBSEQUENT VISIT: ICD-10-PCS | Mod: S$GLB,,, | Performed by: NURSE PRACTITIONER

## 2022-08-31 PROCEDURE — 3288F FALL RISK ASSESSMENT DOCD: CPT | Mod: CPTII,S$GLB,, | Performed by: NURSE PRACTITIONER

## 2022-08-31 PROCEDURE — 3072F PR LOW RISK FOR RETINOPATHY: ICD-10-PCS | Mod: CPTII,S$GLB,, | Performed by: NURSE PRACTITIONER

## 2022-08-31 PROCEDURE — 3078F PR MOST RECENT DIASTOLIC BLOOD PRESSURE < 80 MM HG: ICD-10-PCS | Mod: CPTII,S$GLB,, | Performed by: NURSE PRACTITIONER

## 2022-08-31 PROCEDURE — 1157F PR ADVANCE CARE PLAN OR EQUIV PRESENT IN MEDICAL RECORD: ICD-10-PCS | Mod: CPTII,S$GLB,, | Performed by: NURSE PRACTITIONER

## 2022-08-31 PROCEDURE — 3074F SYST BP LT 130 MM HG: CPT | Mod: CPTII,S$GLB,, | Performed by: NURSE PRACTITIONER

## 2022-08-31 PROCEDURE — 1125F PR PAIN SEVERITY QUANTIFIED, PAIN PRESENT: ICD-10-PCS | Mod: CPTII,S$GLB,, | Performed by: NURSE PRACTITIONER

## 2022-08-31 PROCEDURE — 1157F ADVNC CARE PLAN IN RCRD: CPT | Mod: CPTII,S$GLB,, | Performed by: NURSE PRACTITIONER

## 2022-08-31 PROCEDURE — 99999 PR PBB SHADOW E&M-EST. PATIENT-LVL V: CPT | Mod: PBBFAC,,, | Performed by: NURSE PRACTITIONER

## 2022-08-31 PROCEDURE — G0439 PPPS, SUBSEQ VISIT: HCPCS | Mod: S$GLB,,, | Performed by: NURSE PRACTITIONER

## 2022-08-31 PROCEDURE — 1159F MED LIST DOCD IN RCRD: CPT | Mod: CPTII,S$GLB,, | Performed by: NURSE PRACTITIONER

## 2022-08-31 PROCEDURE — 3078F DIAST BP <80 MM HG: CPT | Mod: CPTII,S$GLB,, | Performed by: NURSE PRACTITIONER

## 2022-08-31 PROCEDURE — 3074F PR MOST RECENT SYSTOLIC BLOOD PRESSURE < 130 MM HG: ICD-10-PCS | Mod: CPTII,S$GLB,, | Performed by: NURSE PRACTITIONER

## 2022-08-31 PROCEDURE — 1160F RVW MEDS BY RX/DR IN RCRD: CPT | Mod: CPTII,S$GLB,, | Performed by: NURSE PRACTITIONER

## 2022-08-31 PROCEDURE — 1170F FXNL STATUS ASSESSED: CPT | Mod: CPTII,S$GLB,, | Performed by: NURSE PRACTITIONER

## 2022-08-31 PROCEDURE — 1159F PR MEDICATION LIST DOCUMENTED IN MEDICAL RECORD: ICD-10-PCS | Mod: CPTII,S$GLB,, | Performed by: NURSE PRACTITIONER

## 2022-08-31 PROCEDURE — 99999 PR PBB SHADOW E&M-EST. PATIENT-LVL V: ICD-10-PCS | Mod: PBBFAC,,, | Performed by: NURSE PRACTITIONER

## 2022-08-31 RX ORDER — FUROSEMIDE 40 MG/1
40 TABLET ORAL DAILY
COMMUNITY
Start: 2022-07-28 | End: 2023-05-30 | Stop reason: SDUPTHER

## 2022-08-31 NOTE — PATIENT INSTRUCTIONS
Counseling and Referral of Other Preventative  (Italic type indicates deductible and co-insurance are waived)    Patient Name: Sammi Abreu  Today's Date: 8/31/2022    Health Maintenance         Date Due Completion Date    Shingles Vaccine (1 of 2) 07/06/2016 5/11/2016    Diabetes Urine Screening 04/09/2022 4/9/2021    Foot Exam 06/18/2022 6/18/2021    Override on 7/8/2019: Done    Influenza Vaccine (1) 09/01/2022 10/22/2021    Hemoglobin A1c 10/18/2022 4/18/2022    Eye Exam 10/19/2022 10/19/2021    Lipid Panel 04/18/2023 4/18/2022    DEXA Scan 04/06/2024 4/6/2021    TETANUS VACCINE 06/22/2025 6/22/2015          No orders of the defined types were placed in this encounter.    The following information is provided to all patients.  This information is to help you find resources for any of the problems found today that may be affecting your health:                Living healthy guide: www.[facility].[place].gov      Understanding Diabetes: www.diabetes.org      Eating healthy: www.cdc.gov/healthyweight      CDC home safety checklist: www.cdc.gov/steadi/patient.html      Agency on Aging: www.goea.[place].gov      Alcoholics anonymous (AA): www.aa.org      Physical Activity: www.gia.nih.gov/hp7iofj      Tobacco use: www.quitwithusla.org

## 2022-08-31 NOTE — PROGRESS NOTES
"  Sammi Abreu presented for a  Medicare AWV and comprehensive Health Risk Assessment today. The following components were reviewed and updated:    Medical history  Family History  Social history  Allergies and Current Medications  Health Risk Assessment  Health Maintenance  Care Team         ** See Completed Assessments for Annual Wellness Visit within the encounter summary.**         The following assessments were completed:  Living Situation  CAGE  Depression Screening  Timed Get Up and Go  Whisper Test  Cognitive Function Screening  Nutrition Screening  ADL Screening  PAQ Screening        Vitals:    08/31/22 1401   BP: 120/70   Pulse: 65   Temp: 98.1 °F (36.7 °C)   SpO2: 95%   Weight: 102.4 kg (225 lb 12 oz)   Height: 5' 3" (1.6 m)     Body mass index is 39.99 kg/m².  Physical Exam  Vitals reviewed.   Constitutional:       Appearance: Normal appearance.   Eyes:      Pupils: Pupils are equal, round, and reactive to light.   Pulmonary:      Effort: Pulmonary effort is normal. No respiratory distress.   Musculoskeletal:      Comments: Walks with a cane    Neurological:      Mental Status: She is alert and oriented to person, place, and time.   Psychiatric:         Mood and Affect: Mood normal.         Behavior: Behavior normal.         Thought Content: Thought content normal.         Judgment: Judgment normal.   -             Diagnoses and health risks identified today and associated recommendations/orders:    1. Encounter for preventive health examination  - health maintenance addressed refuses foot exam because states not a diabetic    2. Stage 3a chronic kidney disease  - avoid all anti-inflammatories and stay well hydrated    3. Bronchiectasis without complication  - The current medical regimen is effective;  continue present plan and medications.    4. Chronic obstructive pulmonary disease, unspecified COPD type  - The current medical regimen is effective;  continue present plan and medications.    5. " Centrilobular emphysema  - The current medical regimen is effective;  continue present plan and medications.    6. Calcification of aorta  - stable on blood pressure medications and monitor cholesterol closely    7. Diastolic CHF with preserved left ventricular function, NYHA class 2  - The current medical regimen is effective;  continue present plan and medications.    8. Pulmonary hypertension  - stable, observe, asymptomatic  - followed by cardiology    9. Dermatomyositis  - stable     10. Severe obesity (BMI 35.0-39.9) with comorbidity  - The patient is asked to make an attempt to improve diet and exercise patterns to aid in medical management of this problem.    11. Secondary hyperparathyroidism of renal origin  - stable, observe, asymptomatic    12. History of pulmonary embolism  - on Coumadin    13. Long term current use of anticoagulant  - on Coumadin    14. Abnormality of gait and mobility  - walks with a cane       Provided Sammi with a 5-10 year written screening schedule and personal prevention plan. Recommendations were developed using the USPSTF age appropriate recommendations. Education, counseling, and referrals were provided as needed. After Visit Summary printed and given to patient which includes a list of additional screenings\tests needed.    No follow-ups on file.    Karthik Weston, FNP-C  I offered to discuss advanced care planning, including how to pick a person who would make decisions for you if you were unable to make them for yourself, called a health care power of , and what kind of decisions you might make such as use of life sustaining treatments such as ventilators and tube feeding when faced with a life limiting illness recorded on a living will that they will need to know. (How you want to be cared for as you near the end of your natural life)     X Patient is interested in learning more about how to make advanced directives.  I provided them paperwork and offered to  discuss this with them.

## 2022-09-01 PROBLEM — N18.2 TYPE 2 DIABETES MELLITUS WITH STAGE 2 CHRONIC KIDNEY DISEASE: Status: RESOLVED | Noted: 2020-07-22 | Resolved: 2022-09-01

## 2022-09-01 PROBLEM — E11.22 TYPE 2 DIABETES MELLITUS WITH STAGE 2 CHRONIC KIDNEY DISEASE: Status: RESOLVED | Noted: 2020-07-22 | Resolved: 2022-09-01

## 2022-09-01 PROBLEM — E11.40 TYPE 2 DIABETES MELLITUS WITH DIABETIC NEUROPATHY, WITHOUT LONG-TERM CURRENT USE OF INSULIN: Status: RESOLVED | Noted: 2020-07-13 | Resolved: 2022-09-01

## 2022-09-01 PROBLEM — E74.39 GLUCOSE INTOLERANCE: Status: ACTIVE | Noted: 2022-09-01

## 2022-09-01 PROBLEM — N25.81 SECONDARY HYPERPARATHYROIDISM OF RENAL ORIGIN: Status: RESOLVED | Noted: 2021-04-09 | Resolved: 2022-09-01

## 2022-10-18 ENCOUNTER — LAB VISIT (OUTPATIENT)
Dept: LAB | Facility: HOSPITAL | Age: 82
End: 2022-10-18
Attending: FAMILY MEDICINE
Payer: MEDICARE

## 2022-10-18 DIAGNOSIS — E78.5 HYPERLIPIDEMIA ASSOCIATED WITH TYPE 2 DIABETES MELLITUS: ICD-10-CM

## 2022-10-18 DIAGNOSIS — E11.69 HYPERLIPIDEMIA ASSOCIATED WITH TYPE 2 DIABETES MELLITUS: ICD-10-CM

## 2022-10-18 LAB
ALBUMIN SERPL BCP-MCNC: 3.5 G/DL (ref 3.5–5.2)
ALP SERPL-CCNC: 65 U/L (ref 55–135)
ALT SERPL W/O P-5'-P-CCNC: 12 U/L (ref 10–44)
ANION GAP SERPL CALC-SCNC: 6 MMOL/L (ref 8–16)
AST SERPL-CCNC: 15 U/L (ref 10–40)
BILIRUB SERPL-MCNC: 0.5 MG/DL (ref 0.1–1)
BUN SERPL-MCNC: 30 MG/DL (ref 8–23)
CALCIUM SERPL-MCNC: 8.9 MG/DL (ref 8.7–10.5)
CHLORIDE SERPL-SCNC: 107 MMOL/L (ref 95–110)
CHOLEST SERPL-MCNC: 196 MG/DL (ref 120–199)
CHOLEST/HDLC SERPL: 4.7 {RATIO} (ref 2–5)
CO2 SERPL-SCNC: 29 MMOL/L (ref 23–29)
CREAT SERPL-MCNC: 1.6 MG/DL (ref 0.5–1.4)
EST. GFR  (NO RACE VARIABLE): 32.6 ML/MIN/1.73 M^2
GLUCOSE SERPL-MCNC: 113 MG/DL (ref 70–110)
HDLC SERPL-MCNC: 42 MG/DL (ref 40–75)
HDLC SERPL: 21.4 % (ref 20–50)
LDLC SERPL CALC-MCNC: 126 MG/DL (ref 63–159)
NONHDLC SERPL-MCNC: 154 MG/DL
POTASSIUM SERPL-SCNC: 4.1 MMOL/L (ref 3.5–5.1)
PROT SERPL-MCNC: 7 G/DL (ref 6–8.4)
SODIUM SERPL-SCNC: 142 MMOL/L (ref 136–145)
TRIGL SERPL-MCNC: 140 MG/DL (ref 30–150)

## 2022-10-18 PROCEDURE — 80053 COMPREHEN METABOLIC PANEL: CPT | Performed by: FAMILY MEDICINE

## 2022-10-18 PROCEDURE — 36415 COLL VENOUS BLD VENIPUNCTURE: CPT | Mod: PO | Performed by: FAMILY MEDICINE

## 2022-10-18 PROCEDURE — 80061 LIPID PANEL: CPT | Performed by: FAMILY MEDICINE

## 2022-10-25 ENCOUNTER — OFFICE VISIT (OUTPATIENT)
Dept: FAMILY MEDICINE | Facility: CLINIC | Age: 82
End: 2022-10-25
Payer: MEDICARE

## 2022-10-25 VITALS
DIASTOLIC BLOOD PRESSURE: 58 MMHG | HEART RATE: 56 BPM | TEMPERATURE: 99 F | OXYGEN SATURATION: 96 % | WEIGHT: 226.44 LBS | SYSTOLIC BLOOD PRESSURE: 122 MMHG | BODY MASS INDEX: 40.12 KG/M2 | HEIGHT: 63 IN

## 2022-10-25 DIAGNOSIS — J45.31 MILD PERSISTENT ASTHMA WITH ACUTE EXACERBATION: ICD-10-CM

## 2022-10-25 DIAGNOSIS — Z23 NEED FOR STREPTOCOCCUS PNEUMONIAE AND INFLUENZA VACCINATION: ICD-10-CM

## 2022-10-25 DIAGNOSIS — R60.0 EDEMA OF EXTREMITIES: ICD-10-CM

## 2022-10-25 DIAGNOSIS — R09.89 CHRONIC SINUS COMPLAINTS: ICD-10-CM

## 2022-10-25 DIAGNOSIS — I15.2 HYPERTENSION ASSOCIATED WITH DIABETES: ICD-10-CM

## 2022-10-25 DIAGNOSIS — R73.9 HYPERGLYCEMIA: Primary | ICD-10-CM

## 2022-10-25 DIAGNOSIS — J45.40 MODERATE PERSISTENT ASTHMA WITHOUT COMPLICATION: ICD-10-CM

## 2022-10-25 DIAGNOSIS — N18.2 TYPE 2 DIABETES MELLITUS WITH STAGE 2 CHRONIC KIDNEY DISEASE, WITHOUT LONG-TERM CURRENT USE OF INSULIN: ICD-10-CM

## 2022-10-25 DIAGNOSIS — I27.20 PULMONARY HYPERTENSION: ICD-10-CM

## 2022-10-25 DIAGNOSIS — E11.59 HYPERTENSION ASSOCIATED WITH DIABETES: ICD-10-CM

## 2022-10-25 DIAGNOSIS — E11.22 TYPE 2 DIABETES MELLITUS WITH STAGE 2 CHRONIC KIDNEY DISEASE, WITHOUT LONG-TERM CURRENT USE OF INSULIN: ICD-10-CM

## 2022-10-25 PROCEDURE — 99214 OFFICE O/P EST MOD 30 MIN: CPT | Mod: S$GLB,,, | Performed by: FAMILY MEDICINE

## 2022-10-25 PROCEDURE — 1159F MED LIST DOCD IN RCRD: CPT | Mod: CPTII,S$GLB,, | Performed by: FAMILY MEDICINE

## 2022-10-25 PROCEDURE — 3078F DIAST BP <80 MM HG: CPT | Mod: CPTII,S$GLB,, | Performed by: FAMILY MEDICINE

## 2022-10-25 PROCEDURE — 1101F PR PT FALLS ASSESS DOC 0-1 FALLS W/OUT INJ PAST YR: ICD-10-PCS | Mod: CPTII,S$GLB,, | Performed by: FAMILY MEDICINE

## 2022-10-25 PROCEDURE — 3078F PR MOST RECENT DIASTOLIC BLOOD PRESSURE < 80 MM HG: ICD-10-PCS | Mod: CPTII,S$GLB,, | Performed by: FAMILY MEDICINE

## 2022-10-25 PROCEDURE — G0008 ADMIN INFLUENZA VIRUS VAC: HCPCS | Mod: S$GLB,,, | Performed by: FAMILY MEDICINE

## 2022-10-25 PROCEDURE — 90677 PCV20 VACCINE IM: CPT | Mod: S$GLB,,, | Performed by: FAMILY MEDICINE

## 2022-10-25 PROCEDURE — 90694 FLU VACCINE - QUADRIVALENT - ADJUVANTED: ICD-10-PCS | Mod: S$GLB,,, | Performed by: FAMILY MEDICINE

## 2022-10-25 PROCEDURE — 1157F PR ADVANCE CARE PLAN OR EQUIV PRESENT IN MEDICAL RECORD: ICD-10-PCS | Mod: CPTII,S$GLB,, | Performed by: FAMILY MEDICINE

## 2022-10-25 PROCEDURE — 90677 PNEUMOCOCCAL CONJUGATE VACCINE 20-VALENT: ICD-10-PCS | Mod: S$GLB,,, | Performed by: FAMILY MEDICINE

## 2022-10-25 PROCEDURE — 90694 VACC AIIV4 NO PRSRV 0.5ML IM: CPT | Mod: S$GLB,,, | Performed by: FAMILY MEDICINE

## 2022-10-25 PROCEDURE — 99999 PR PBB SHADOW E&M-EST. PATIENT-LVL III: CPT | Mod: PBBFAC,,, | Performed by: FAMILY MEDICINE

## 2022-10-25 PROCEDURE — 1157F ADVNC CARE PLAN IN RCRD: CPT | Mod: CPTII,S$GLB,, | Performed by: FAMILY MEDICINE

## 2022-10-25 PROCEDURE — 1159F PR MEDICATION LIST DOCUMENTED IN MEDICAL RECORD: ICD-10-PCS | Mod: CPTII,S$GLB,, | Performed by: FAMILY MEDICINE

## 2022-10-25 PROCEDURE — 3074F SYST BP LT 130 MM HG: CPT | Mod: CPTII,S$GLB,, | Performed by: FAMILY MEDICINE

## 2022-10-25 PROCEDURE — 1160F PR REVIEW ALL MEDS BY PRESCRIBER/CLIN PHARMACIST DOCUMENTED: ICD-10-PCS | Mod: CPTII,S$GLB,, | Performed by: FAMILY MEDICINE

## 2022-10-25 PROCEDURE — G0009 PNEUMOCOCCAL CONJUGATE VACCINE 20-VALENT: ICD-10-PCS | Mod: S$GLB,,, | Performed by: FAMILY MEDICINE

## 2022-10-25 PROCEDURE — 1125F PR PAIN SEVERITY QUANTIFIED, PAIN PRESENT: ICD-10-PCS | Mod: CPTII,S$GLB,, | Performed by: FAMILY MEDICINE

## 2022-10-25 PROCEDURE — 1160F RVW MEDS BY RX/DR IN RCRD: CPT | Mod: CPTII,S$GLB,, | Performed by: FAMILY MEDICINE

## 2022-10-25 PROCEDURE — 1101F PT FALLS ASSESS-DOCD LE1/YR: CPT | Mod: CPTII,S$GLB,, | Performed by: FAMILY MEDICINE

## 2022-10-25 PROCEDURE — G0008 FLU VACCINE - QUADRIVALENT - ADJUVANTED: ICD-10-PCS | Mod: S$GLB,,, | Performed by: FAMILY MEDICINE

## 2022-10-25 PROCEDURE — 99214 PR OFFICE/OUTPT VISIT, EST, LEVL IV, 30-39 MIN: ICD-10-PCS | Mod: S$GLB,,, | Performed by: FAMILY MEDICINE

## 2022-10-25 PROCEDURE — 3074F PR MOST RECENT SYSTOLIC BLOOD PRESSURE < 130 MM HG: ICD-10-PCS | Mod: CPTII,S$GLB,, | Performed by: FAMILY MEDICINE

## 2022-10-25 PROCEDURE — G0009 ADMIN PNEUMOCOCCAL VACCINE: HCPCS | Mod: S$GLB,,, | Performed by: FAMILY MEDICINE

## 2022-10-25 PROCEDURE — 1125F AMNT PAIN NOTED PAIN PRSNT: CPT | Mod: CPTII,S$GLB,, | Performed by: FAMILY MEDICINE

## 2022-10-25 PROCEDURE — 99999 PR PBB SHADOW E&M-EST. PATIENT-LVL III: ICD-10-PCS | Mod: PBBFAC,,, | Performed by: FAMILY MEDICINE

## 2022-10-25 PROCEDURE — 3288F PR FALLS RISK ASSESSMENT DOCUMENTED: ICD-10-PCS | Mod: CPTII,S$GLB,, | Performed by: FAMILY MEDICINE

## 2022-10-25 PROCEDURE — 3288F FALL RISK ASSESSMENT DOCD: CPT | Mod: CPTII,S$GLB,, | Performed by: FAMILY MEDICINE

## 2022-10-25 RX ORDER — INSULIN PUMP SYRINGE, 3 ML
EACH MISCELLANEOUS
Qty: 1 EACH | Refills: 0 | Status: SHIPPED | OUTPATIENT
Start: 2022-10-25 | End: 2024-03-08

## 2022-10-25 RX ORDER — ALBUTEROL SULFATE 0.83 MG/ML
2.5 SOLUTION RESPIRATORY (INHALATION) EVERY 6 HOURS PRN
Qty: 120 EACH | Refills: 11 | Status: SHIPPED | OUTPATIENT
Start: 2022-10-25 | End: 2023-10-25

## 2022-10-25 RX ORDER — POTASSIUM CHLORIDE 20 MEQ/1
20 TABLET, EXTENDED RELEASE ORAL 2 TIMES DAILY
Qty: 60 TABLET | Refills: 3 | Status: SHIPPED | OUTPATIENT
Start: 2022-10-25 | End: 2023-11-06 | Stop reason: SDUPTHER

## 2022-10-25 RX ORDER — FLUTICASONE PROPIONATE 50 MCG
2 SPRAY, SUSPENSION (ML) NASAL DAILY
Qty: 48 G | Refills: 3 | Status: SHIPPED | OUTPATIENT
Start: 2022-10-25 | End: 2023-11-30 | Stop reason: SDUPTHER

## 2022-10-25 RX ORDER — HYDRALAZINE HYDROCHLORIDE 100 MG/1
100 TABLET, FILM COATED ORAL EVERY 12 HOURS
Qty: 180 TABLET | Refills: 4 | Status: SHIPPED | OUTPATIENT
Start: 2022-10-25 | End: 2023-11-06 | Stop reason: SDUPTHER

## 2022-10-25 RX ORDER — LANOLIN ALCOHOL/MO/W.PET/CERES
400 CREAM (GRAM) TOPICAL 2 TIMES DAILY
Qty: 60 TABLET | Refills: 3 | Status: SHIPPED | OUTPATIENT
Start: 2022-10-25 | End: 2023-02-21

## 2022-10-25 RX ORDER — FOLIC ACID 1 MG/1
1000 TABLET ORAL DAILY
Qty: 90 TABLET | Refills: 3 | Status: SHIPPED | OUTPATIENT
Start: 2022-10-25 | End: 2023-11-06 | Stop reason: SDUPTHER

## 2022-10-25 RX ORDER — MONTELUKAST SODIUM 10 MG/1
10 TABLET ORAL NIGHTLY
Qty: 90 TABLET | Refills: 3 | Status: SHIPPED | OUTPATIENT
Start: 2022-10-25 | End: 2023-05-30

## 2022-10-25 RX ORDER — METOPROLOL SUCCINATE 100 MG/1
100 TABLET, EXTENDED RELEASE ORAL DAILY
Qty: 180 TABLET | Refills: 3 | Status: SHIPPED | OUTPATIENT
Start: 2022-10-25 | End: 2023-11-06 | Stop reason: SDUPTHER

## 2022-10-25 RX ORDER — FLUTICASONE PROPIONATE AND SALMETEROL XINAFOATE 230; 21 UG/1; UG/1
2 AEROSOL, METERED RESPIRATORY (INHALATION) 2 TIMES DAILY
Qty: 36 G | Refills: 3 | Status: SHIPPED | OUTPATIENT
Start: 2022-10-25 | End: 2023-05-30

## 2022-10-27 ENCOUNTER — LAB VISIT (OUTPATIENT)
Dept: LAB | Facility: HOSPITAL | Age: 82
End: 2022-10-27
Attending: INTERNAL MEDICINE
Payer: MEDICARE

## 2022-10-27 DIAGNOSIS — E66.3 SEVERELY OVERWEIGHT: Primary | ICD-10-CM

## 2022-10-27 LAB
ALBUMIN SERPL BCP-MCNC: 3.2 G/DL (ref 3.5–5.2)
ANION GAP SERPL CALC-SCNC: 9 MMOL/L (ref 8–16)
BASOPHILS # BLD AUTO: 0.02 K/UL (ref 0–0.2)
BASOPHILS NFR BLD: 0.4 % (ref 0–1.9)
BILIRUB UR QL STRIP: NEGATIVE
BUN SERPL-MCNC: 25 MG/DL (ref 8–23)
CALCIUM SERPL-MCNC: 8.9 MG/DL (ref 8.7–10.5)
CHLORIDE SERPL-SCNC: 107 MMOL/L (ref 95–110)
CLARITY UR: CLEAR
CO2 SERPL-SCNC: 28 MMOL/L (ref 23–29)
COLOR UR: YELLOW
CREAT SERPL-MCNC: 1.3 MG/DL (ref 0.5–1.4)
CREAT UR-MCNC: 231.6 MG/DL (ref 15–325)
DIFFERENTIAL METHOD: ABNORMAL
EOSINOPHIL # BLD AUTO: 0.1 K/UL (ref 0–0.5)
EOSINOPHIL NFR BLD: 2.5 % (ref 0–8)
ERYTHROCYTE [DISTWIDTH] IN BLOOD BY AUTOMATED COUNT: 12.9 % (ref 11.5–14.5)
EST. GFR  (NO RACE VARIABLE): 42 ML/MIN/1.73 M^2
GLUCOSE SERPL-MCNC: 114 MG/DL (ref 70–110)
GLUCOSE UR QL STRIP: NEGATIVE
HCT VFR BLD AUTO: 37.5 % (ref 37–48.5)
HGB BLD-MCNC: 11.9 G/DL (ref 12–16)
HGB UR QL STRIP: NEGATIVE
IMM GRANULOCYTES # BLD AUTO: 0.02 K/UL (ref 0–0.04)
IMM GRANULOCYTES NFR BLD AUTO: 0.4 % (ref 0–0.5)
KETONES UR QL STRIP: NEGATIVE
LEUKOCYTE ESTERASE UR QL STRIP: NEGATIVE
LYMPHOCYTES # BLD AUTO: 1.9 K/UL (ref 1–4.8)
LYMPHOCYTES NFR BLD: 34.1 % (ref 18–48)
MAGNESIUM SERPL-MCNC: 2.2 MG/DL (ref 1.6–2.6)
MCH RBC QN AUTO: 33 PG (ref 27–31)
MCHC RBC AUTO-ENTMCNC: 31.7 G/DL (ref 32–36)
MCV RBC AUTO: 104 FL (ref 82–98)
MONOCYTES # BLD AUTO: 0.6 K/UL (ref 0.3–1)
MONOCYTES NFR BLD: 11.3 % (ref 4–15)
NEUTROPHILS # BLD AUTO: 2.9 K/UL (ref 1.8–7.7)
NEUTROPHILS NFR BLD: 51.3 % (ref 38–73)
NITRITE UR QL STRIP: NEGATIVE
NRBC BLD-RTO: 0 /100 WBC
PH UR STRIP: 6 [PH] (ref 5–8)
PHOSPHATE SERPL-MCNC: 3.4 MG/DL (ref 2.7–4.5)
PLATELET # BLD AUTO: 183 K/UL (ref 150–450)
PMV BLD AUTO: 10.8 FL (ref 9.2–12.9)
POTASSIUM SERPL-SCNC: 4.1 MMOL/L (ref 3.5–5.1)
PROT UR QL STRIP: NEGATIVE
PROT UR-MCNC: 22 MG/DL (ref 0–15)
PROT/CREAT UR: 0.09 MG/G{CREAT} (ref 0–0.2)
PTH-INTACT SERPL-MCNC: 163.4 PG/ML (ref 9–77)
RBC # BLD AUTO: 3.61 M/UL (ref 4–5.4)
SODIUM SERPL-SCNC: 144 MMOL/L (ref 136–145)
SP GR UR STRIP: 1.02 (ref 1–1.03)
URN SPEC COLLECT METH UR: NORMAL
UROBILINOGEN UR STRIP-ACNC: NEGATIVE EU/DL
WBC # BLD AUTO: 5.58 K/UL (ref 3.9–12.7)

## 2022-10-27 PROCEDURE — 83970 ASSAY OF PARATHORMONE: CPT | Performed by: INTERNAL MEDICINE

## 2022-10-27 PROCEDURE — 82570 ASSAY OF URINE CREATININE: CPT | Performed by: INTERNAL MEDICINE

## 2022-10-27 PROCEDURE — 83735 ASSAY OF MAGNESIUM: CPT | Performed by: INTERNAL MEDICINE

## 2022-10-27 PROCEDURE — 81003 URINALYSIS AUTO W/O SCOPE: CPT | Performed by: INTERNAL MEDICINE

## 2022-10-27 PROCEDURE — 36415 COLL VENOUS BLD VENIPUNCTURE: CPT | Performed by: INTERNAL MEDICINE

## 2022-10-27 PROCEDURE — 85025 COMPLETE CBC W/AUTO DIFF WBC: CPT | Performed by: INTERNAL MEDICINE

## 2022-10-27 PROCEDURE — 80069 RENAL FUNCTION PANEL: CPT | Performed by: INTERNAL MEDICINE

## 2022-10-28 NOTE — SUBJECTIVE & OBJECTIVE
Interval History: Tender at umbilicus.    Medications:  Continuous Infusions:  Scheduled Meds:   albuterol-ipratropium  3 mL Nebulization Q4H WAKE    bisacodyL  5 mg Oral Daily    budesonide  0.5 mg Nebulization Q12H    docusate sodium  100 mg Oral Daily    fluticasone propionate  2 spray Each Nostril Daily    metoprolol succinate  50 mg Oral Daily    montelukast  10 mg Oral QHS    neomycin-bacitracin-polymyxin   Topical (Top) Daily    pantoprazole  40 mg Oral Daily    polyethylene glycol  17 g Oral Daily    rivaroxaban  15 mg Oral BID WM     PRN Meds:acetaminophen, dextrose 50%, dextrose 50%, glucagon (human recombinant), glucose, glucose, HYDROcodone-acetaminophen, insulin aspart U-100, melatonin, ondansetron, simethicone, sodium chloride 0.9%     Review of patient's allergies indicates:   Allergen Reactions    Cymbalta [duloxetine] Other (See Comments)     Nightmares      Darvon [propoxyphene] Nausea Only and Other (See Comments)     Sweating, slept for 3 days    Atorvastatin Other (See Comments)     Muscle cramps    Naprosyn [naproxen] Nausea Only    Penicillins Rash    Tramadol Nausea Only and Palpitations     Objective:     Vital Signs (Most Recent):  Temp: 98.4 °F (36.9 °C) (07/16/20 1533)  Pulse: 83 (07/16/20 1533)  Resp: 16 (07/16/20 1533)  BP: (!) 176/68 (07/16/20 1533)  SpO2: (!) 92 % (07/16/20 1533) Vital Signs (24h Range):  Temp:  [98.1 °F (36.7 °C)-98.9 °F (37.2 °C)] 98.4 °F (36.9 °C)  Pulse:  [73-85] 83  Resp:  [16-18] 16  SpO2:  [90 %-100 %] 92 %  BP: (140-181)/(64-76) 176/68     Weight: 103.8 kg (228 lb 14.4 oz)  Body mass index is 40.55 kg/m².    Intake/Output - Last 3 Shifts       07/14 0700 - 07/15 0659 07/15 0700 - 07/16 0659 07/16 0700 - 07/17 0659    I.V. (mL/kg) 0 (0)      IV Piggyback 28.1      Total Intake(mL/kg) 28.1 (0.3)      Urine (mL/kg/hr)  400 (0.2)     Stool  0     Total Output  400     Net +28.1 -400            Urine Occurrence 5 x 2 x     Stool Occurrence  0 x            Physical Exam  Constitutional:       General: She is not in acute distress.  HENT:      Head: Normocephalic and atraumatic.   Eyes:      General: No scleral icterus.  Cardiovascular:      Rate and Rhythm: Normal rate and regular rhythm.   Pulmonary:      Effort: Pulmonary effort is normal. No respiratory distress.      Breath sounds: Normal breath sounds. No wheezing or rales.   Abdominal:      General: Bowel sounds are normal. There is no distension.      Palpations: Abdomen is soft.      Tenderness: There is abdominal tenderness.      Comments: Tenderness at umbilical incision as expected. No erythema.   Neurological:      Mental Status: She is alert and oriented to person, place, and time.   Psychiatric:         Behavior: Behavior normal.         Significant Labs:  CBC:   Recent Labs   Lab 07/15/20  0158   WBC 15.59*   RBC 3.31*   HGB 11.2*   HCT 34.8*   *   *   MCH 33.8*   MCHC 32.2     BMP:   Recent Labs   Lab 07/15/20  0158   *      K 4.1      CO2 22*   BUN 27*   CREATININE 1.3   CALCIUM 8.6*   MG 1.8          Yes

## 2022-11-13 NOTE — PROGRESS NOTES
[facility]     Subjective:    Chief Complaint:   No chief complaint on file.      History of Present Illness:  81 y.o. female presents for multiple issues.         I reviewed the patients chart dating back for the past few appointments. See above.    The following portions of the patient's history were reviewed and updated as appropriate: allergies, current medications, past family history, past medical history, past social history, past surgical history and problem list.    She denies chest pain upon exertion, dyspnea, nausea, vomiting, diaphoresis, and syncope. No pleuritic chest pain, unilateral leg swelling, calf tenderness, or calf pain.      Past Medical History:   Diagnosis Date    ALLERGIC RHINITIS     Anemia     Arthritis     Back pain     Bronchitis     Cataract     OU    CHF (congestive heart failure)     Cholelithiasis     COPD (chronic obstructive pulmonary disease)     Degenerative disc disease     Diabetes mellitus     pre diabetic    Diverticulosis     DVT (deep venous thrombosis)     Edema     Encounter for blood transfusion 1979    Fibromyalgia     Glaucoma     Gout     Hx of colonic polyps     Hyperlipidemia     Hypertension     Incontinence     Osteoporosis     Pulmonary embolism     Reflux     Refusal of blood transfusions as patient is Worship     Sleep apnea     non compliant with CPAP    Vestibulitis of ear        Past Surgical History:   Procedure Laterality Date    ANGIOGRAPHY OF LOWER EXTREMITY N/A 7/31/2019    Procedure: ANGIOGRAM, LOWER EXTREMITY;  Surgeon: Gino Arana MD;  Location: [facility] CATH/EP LAB;  Service: General;  Laterality: N/A;    ASPIRATION OF SOFT TISSUE Left 10/15/2020    Procedure: ASPIRATION, SOFT TISSUE;  Surgeon: Raul Diagnostic Provider;  Location: [facility] OR;  Service: General;  Laterality: Left;    COLONOSCOPY N/A 8/13/2020    Procedure: COLONOSCOPY;  Surgeon: Sue Nuñez MD;  Location: [facility] ENDO;  Service: Endoscopy;  Laterality: N/A;     ESOPHAGOGASTRODUODENOSCOPY N/A 2020    Procedure: EGD (ESOPHAGOGASTRODUODENOSCOPY);  Surgeon: Sue Nuñez MD;  Location: [facility] ENDO;  Service: Endoscopy;  Laterality: N/A;    HIP SURGERY      HYSTERECTOMY      INTRALUMINAL GASTROINTESTINAL TRACT IMAGING VIA CAPSULE N/A 2020    Procedure: IMAGING PROCEDURE, GI TRACT, INTRALUMINAL, VIA CAPSULE;  Surgeon: Sue Nuñez MD;  Location: [facility] ENDO;  Service: Endoscopy;  Laterality: N/A;    JOINT REPLACEMENT      B total hip    LAPAROSCOPIC CHOLECYSTECTOMY N/A 2020    Procedure: CHOLECYSTECTOMY, LAPAROSCOPIC;  Surgeon: Jomar Mcdonald MD;  Location: [facility] OR;  Service: General;  Laterality: N/A;    TRANSFORAMINAL EPIDURAL INJECTION OF STEROID Left 2020    Procedure: Injection,steroid,epidural,transforaminal approach;  Surgeon: Matt Gilliam MD;  Location: [facility] OR;  Service: Pain Management;  Laterality: Left;  L2-3, L3-4    TRANSFORAMINAL EPIDURAL INJECTION OF STEROID Left 2021    Procedure: Injection,steroid,epidural,transforaminal approach;  Surgeon: Matt Gilliam MD;  Location: [facility] OR;  Service: Pain Management;  Laterality: Left;  L2-3, L3-4       Social History  Social History     Tobacco Use    Smoking status: Former     Packs/day: 0.25     Years: 5.00     Pack years: 1.25     Types: Cigarettes     Quit date: 1972     Years since quittin.8    Smokeless tobacco: Never   Substance Use Topics    Alcohol use: Yes     Alcohol/week: 0.0 standard drinks     Comment: Rarely    Drug use: Not Currently       Family History   Problem Relation Age of Onset    Hypertension Mother     Stroke Father     Hypertension Father     Diabetes Sister     Cancer Sister     Stomach cancer Sister     Hypertension Sister     Bipolar disorder Sister     Peripheral vascular disease Sister     Hypertension Brother     Stroke Brother     Peripheral vascular disease Brother     Hypertension Son     Obesity Son     Early death Son 48    Arthritis Son      "Glaucoma Neg Hx     Macular degeneration Neg Hx     Retinal detachment Neg Hx      Review of patient's allergies indicates:   Allergen Reactions    Daptomycin Other (See Comments)     Kidney damage     Cymbalta [duloxetine] Other (See Comments)     Nightmares      Darvon [propoxyphene] Nausea Only and Other (See Comments)     Sweating, slept for 3 days    Atorvastatin Other (See Comments)     Muscle cramps    Naprosyn [naproxen] Nausea Only    Penicillins Rash    Tramadol Nausea Only and Palpitations       Review of Systems [Negative unless checked off]    General ROS: []fever, []chills, []weight loss, [x]malaise/fatigue.  ENT ROS: []congestion, []rhinorrhea,  []sore throat, [x]neck pain, [x]hearing loss.  Ophthalmic ROS:[]blurry vision, [] double vision, []photophobia, []eye pain.  Respiratory ROS: []cough, []pleuritic chest pain, []shortness of breath, []wheezing.  CVS ROS:[]chest pain, []dyspnea on exertion, []palpitations, []orthopnea, []leg swelling, []PND.   GI ROS: []nausea, []vomiting, [] epigastric pain, []abd pain, []diarrhea, []constipation, []blood/melena in stool.   Urology ROS:[]dysuria, []frequency, []flank pain,[] trouble voiding, [] hematuria.   MSK ROS: [x]myalgias, [x]joint pain, [x]muscular weakness,  []back pain, [] falls.   Derm ROS: []pruritis, []rash, []jaundice.  Neurological:[]dizziness,[]numbness,[]loss of consciousness, []weakness  []headaches.   Psych ROS: []hallucinations, []depression, []anxiety, []suicidal ideation.    Physical Examination  BP (!) 122/58 (BP Location: Right arm, Patient Position: Sitting, BP Method: Medium (Manual))   Pulse (!) 56   Temp 98.7 °F (37.1 °C) (Oral)   Ht 5' 3" (1.6 m)   Wt 102.7 kg (226 lb 6.6 oz)   SpO2 96%   BMI 40.11 kg/m²   Wt Readings from Last 3 Encounters:   10/25/22 102.7 kg (226 lb 6.6 oz)   08/31/22 102.4 kg (225 lb 12 oz)   04/29/22 104.3 kg (230 lb)     BP Readings from Last 3 Encounters:   10/25/22 (!) 122/58   08/31/22 120/70 " "  04/29/22 130/70     Estimated body mass index is 40.11 kg/m² as calculated from the following:    Height as of this encounter: 5' 3" (1.6 m).    Weight as of this encounter: 102.7 kg (226 lb 6.6 oz).     General appearance: alert, cooperative, no distress  Eyes: pupils equal and reactive, extraocular eye movements intact   Ears: bilateral TM's and external ear canals normal   Nose: normal and patent, no erythema, discharge or polyps   Sinuses: Normal paranasal sinuses without tenderness   Throat: mucous membranes moist, pharynx normal without lesions   Neck: no thyromegaly, trachea midline  Lungs: clear to auscultation, no wheezes, rales or rhonchi, symmetric air entry, no dullness to percussion bilaterally.  Heart: normal rate, regular rhythm, normal S1, S2, no murmurs, rubs, clicks or gallops, no displacement of the PMI.  Abdomen: soft, nontender, nondistended, no masses or organomegaly No rigidity, rebound, or guarding.   Back: full range of motion, no tenderness, palpable spasm or pain on motion   Extremities: peripheral pulses normal, no pedal edema, no clubbing or cyanosis, no pedal edema noted, venous stasis dermatitis noted   Feet: warm, good capillary refill.  Neurological:alert, oriented, normal speech, no focal findings or movement disorder noted   Psychiatric: alert, oriented to person, place, and time  Integument: normal coloration and turgor, no rashes, no suspicious skin lesions noted.    Data reviewed    Previous medical records reviewed and summarized above in HPI.    274}    Laboratory    I have reviewed old labs below:      274}  Lab Results   Component Value Date    WBC 5.58 10/27/2022    HGB 11.9 (L) 10/27/2022    HCT 37.5 10/27/2022     (H) 10/27/2022     10/27/2022     10/27/2022    K 4.1 10/27/2022     10/27/2022    CALCIUM 8.9 10/27/2022    PHOS 3.4 10/27/2022    CO2 28 10/27/2022     (H) 10/27/2022    BUN 25 (H) 10/27/2022    CREATININE 1.3 10/27/2022    " ANIONGAP 9 10/27/2022    ESTGFRAFRICA 45 (A) 07/18/2022    EGFRNONAA 39 (A) 07/18/2022    PROT 7.0 10/18/2022    ALBUMIN 3.2 (L) 10/27/2022    BILITOT 0.5 10/18/2022    ALKPHOS 65 10/18/2022    ALT 12 10/18/2022    AST 15 10/18/2022    INR 1.9 11/03/2022    CHOL 196 10/18/2022    TRIG 140 10/18/2022    HDL 42 10/18/2022    LDLCALC 126.0 10/18/2022    TSH 3.261 08/04/2020    HGBA1C 5.8 (H) 04/18/2022       Imaging/Tracing: I have reviewed the pertinent results/findings and my personal findings are below:     274}  DM.Stable and controlled. Continue current treatment plan as previously prescribed.       Assessment/Grim655}    Sammi Abreu is a 81 y.o. female who presents to clinic with:    1. Hyperglycemia    2. Hypertension associated with diabetes    3. Moderate persistent asthma without complication    4. Mild persistent asthma with acute exacerbation    5. Edema of extremities    6. Pulmonary hypertension    7. Chronic sinus complaints    8. Type 2 diabetes mellitus with stage 2 chronic kidney disease, without long-term current use of insulin    9. Need for Streptococcus pneumoniae and influenza vaccination     The patient's BMI has been recorded in the chart. The patient has been provided educational materials regarding the benefits of attaining and maintaining a normal weight. We will continue to address and follow this issue during follow up visits.      Diagnostic impression remarks       1. Hypertension associated with diabetes  - metoprolol succinate (TOPROL-XL) 100 MG 24 hr tablet; Take 1 tablet (100 mg total) by mouth once daily.  Dispense: 180 tablet; Refill: 3  - hydrALAZINE (APRESOLINE) 100 MG tablet; Take 1 tablet (100 mg total) by mouth every 12 (twelve) hours.  Dispense: 180 tablet; Refill: 4  - folic acid (FOLVITE) 1 MG tablet; Take 1 tablet (1,000 mcg total) by mouth once daily.  Dispense: 90 tablet; Refill: 3  - magnesium oxide (MAG-OX) 400 mg (241.3 mg magnesium) tablet; Take 1 tablet (400  mg total) by mouth 2 (two) times daily.  Dispense: 60 tablet; Refill: 3    2. Moderate persistent asthma without complication  - fluticasone-salmeterol 230-21 mcg/dose (ADVAIR HFA) 230-21 mcg/actuation HFAA inhaler; Inhale 2 puffs into the lungs 2 (two) times daily. Controller  Dispense: 36 g; Refill: 3  - montelukast (SINGULAIR) 10 mg tablet; Take 1 tablet (10 mg total) by mouth every evening.  Dispense: 90 tablet; Refill: 3    3. Mild persistent asthma with acute exacerbation  - albuterol (PROVENTIL) 2.5 mg /3 mL (0.083 %) nebulizer solution; Take 3 mLs (2.5 mg total) by nebulization every 6 (six) hours as needed.  Dispense: 120 each; Refill: 11    4. Edema of extremities  - potassium chloride SA (K-DUR,KLOR-CON) 20 MEQ tablet; Take 1 tablet (20 mEq total) by mouth 2 (two) times daily.  Dispense: 60 tablet; Refill: 3    5. Pulmonary hypertension  - potassium chloride SA (K-DUR,KLOR-CON) 20 MEQ tablet; Take 1 tablet (20 mEq total) by mouth 2 (two) times daily.  Dispense: 60 tablet; Refill: 3  - Comprehensive metabolic panel; Future  - B-TYPE NATRIURETIC PEPTIDE; Future  - Magnesium; Future    6. Chronic sinus complaints  - montelukast (SINGULAIR) 10 mg tablet; Take 1 tablet (10 mg total) by mouth every evening.  Dispense: 90 tablet; Refill: 3  - fluticasone propionate (FLONASE) 50 mcg/actuation nasal spray; 2 sprays (100 mcg total) by Each Nostril route once daily.  Dispense: 48 g; Refill: 3    7. Type 2 diabetes mellitus with stage 2 chronic kidney disease, without long-term current use of insulin  - blood sugar diagnostic Strp; To check BG 1 times daily, to use with insurance preferred meter  Dispense: 100 each; Refill: 0  - blood-glucose meter kit; To check BG 1 times daily, to use with insurance preferred meter  Dispense: 1 each; Refill: 0    8. Hyperglycemia  - Hemoglobin A1c; Future    9. Need for Streptococcus pneumoniae and influenza vaccination  - (In Office Administered) Pneumococcal Conjugate Vaccine (20  Valent) (IM)      Medication Monitoring: In today's visit, monitoring for drug toxicity was accomplished. Proper use of medications was discussed.     Counseling: Counseling included discussion regarding imaging findings, diagnosis, possibilities, treatment options, medications, risks, and benefits. She had many questions regarding the options and long-term effects. All questions were answered. She expressed understanding after counseling regarding the diagnosis and recommendations. She was capable and demonstrated competence with understanding of these options. Shared decision making was performed resulting in her choosing the current treatment plan.     She was counseled about the importance of healthy dietary habits as well as routine physical activity and exercise for better health outcomes. I also discussed the importance of cancer screening.     I also discussed the importance of close follow up to discuss labs, change or modify her medications if needed, monitor side effects, and further evaluation of medical problems.     Additional workup planned: see labs ordered below.    See below for labs and meds ordered with associated diagnosis      Medication List with Changes/Refills   New Medications    MAGNESIUM OXIDE (MAG-OX) 400 MG (241.3 MG MAGNESIUM) TABLET    Take 1 tablet (400 mg total) by mouth 2 (two) times daily.   Current Medications    ACETAMINOPHEN (TYLENOL) 325 MG TABLET    Take 2 tablets (650 mg total) by mouth every 6 (six) hours as needed (Do not take with any other Tylenol or acetaminophen containing products).    DICLOFENAC SODIUM (VOLTAREN) 1 % GEL    Apply 2 g topically once daily.    ESOMEPRAZOLE (NEXIUM) 20 MG CAPSULE    TAKE 1 CAPSULE BY MOUTH TWICE DAILY BEFORE MEAL(S)    FUROSEMIDE (LASIX) 40 MG TABLET    Take 40 mg by mouth once daily.    LANCETS MISC    To check BG 1 times daily, to use with insurance preferred meter    LOSARTAN (COZAAR) 50 MG TABLET    Take 1 tablet by mouth once daily     METAMUCIL, SUGAR, POWD    1 tabs oral daily    TRIAMCINOLONE ACETONIDE 0.1% (KENALOG) 0.1 % OINTMENT    Apply topically 2 (two) times daily.    WARFARIN (COUMADIN) 5 MG TABLET    TAKE 1 TO 1 & 1/2 (ONE & ONE-HALF) TABLETS BY MOUTH ONCE DAILY IN THE EVENING AS DIRECTED BY  COUMADIN  CLINIC   Changed and/or Refilled Medications    Modified Medication Previous Medication    ALBUTEROL (PROVENTIL) 2.5 MG /3 ML (0.083 %) NEBULIZER SOLUTION albuterol (PROVENTIL) 2.5 mg /3 mL (0.083 %) nebulizer solution       Take 3 mLs (2.5 mg total) by nebulization every 6 (six) hours as needed.    Take 3 mLs (2.5 mg total) by nebulization every 6 (six) hours as needed.    BLOOD SUGAR DIAGNOSTIC STRP blood sugar diagnostic Strp       To check BG 1 times daily, to use with insurance preferred meter    To check BG 1 times daily, to use with insurance preferred meter    BLOOD-GLUCOSE METER KIT blood-glucose meter kit       To check BG 1 times daily, to use with insurance preferred meter    To check BG 1 times daily, to use with insurance preferred meter    FLUTICASONE PROPIONATE (FLONASE) 50 MCG/ACTUATION NASAL SPRAY fluticasone propionate (FLONASE) 50 mcg/actuation nasal spray       2 sprays (100 mcg total) by Each Nostril route once daily.    2 sprays (100 mcg total) by Each Nostril route once daily.    FLUTICASONE-SALMETEROL 230-21 MCG/DOSE (ADVAIR HFA) 230-21 MCG/ACTUATION HFAA INHALER fluticasone-salmeterol 230-21 mcg/dose (ADVAIR HFA) 230-21 mcg/actuation HFAA inhaler       Inhale 2 puffs into the lungs 2 (two) times daily. Controller    Inhale 2 puffs into the lungs 2 (two) times daily. Controller    FOLIC ACID (FOLVITE) 1 MG TABLET folic acid (FOLVITE) 1 MG tablet       Take 1 tablet (1,000 mcg total) by mouth once daily.    Take 1 tablet by mouth once daily    HYDRALAZINE (APRESOLINE) 100 MG TABLET hydrALAZINE (APRESOLINE) 100 MG tablet       Take 1 tablet (100 mg total) by mouth every 12 (twelve) hours.    Take 1 tablet (100 mg  total) by mouth every 12 (twelve) hours.    METOPROLOL SUCCINATE (TOPROL-XL) 100 MG 24 HR TABLET metoprolol succinate (TOPROL-XL) 50 MG 24 hr tablet       Take 1 tablet (100 mg total) by mouth once daily.    Take 1 tablet (50 mg total) by mouth once daily.    MONTELUKAST (SINGULAIR) 10 MG TABLET montelukast (SINGULAIR) 10 mg tablet       Take 1 tablet (10 mg total) by mouth every evening.    Take 1 tablet (10 mg total) by mouth every evening.    POTASSIUM CHLORIDE SA (K-DUR,KLOR-CON) 20 MEQ TABLET potassium chloride SA (K-DUR,KLOR-CON) 20 MEQ tablet       Take 1 tablet (20 mEq total) by mouth 2 (two) times daily.    Take 1 tablet (20 mEq total) by mouth 2 (two) times daily.   Discontinued Medications    ALBUTEROL-IPRATROPIUM (DUO-NEB) 2.5 MG-0.5 MG/3 ML NEBULIZER SOLUTION    Take 3 mLs by nebulization every 8 (eight) hours.     Modified Medications    Modified Medication Previous Medication    ALBUTEROL (PROVENTIL) 2.5 MG /3 ML (0.083 %) NEBULIZER SOLUTION albuterol (PROVENTIL) 2.5 mg /3 mL (0.083 %) nebulizer solution       Take 3 mLs (2.5 mg total) by nebulization every 6 (six) hours as needed.    Take 3 mLs (2.5 mg total) by nebulization every 6 (six) hours as needed.    BLOOD SUGAR DIAGNOSTIC STRP blood sugar diagnostic Strp       To check BG 1 times daily, to use with insurance preferred meter    To check BG 1 times daily, to use with insurance preferred meter    BLOOD-GLUCOSE METER KIT blood-glucose meter kit       To check BG 1 times daily, to use with insurance preferred meter    To check BG 1 times daily, to use with insurance preferred meter    FLUTICASONE PROPIONATE (FLONASE) 50 MCG/ACTUATION NASAL SPRAY fluticasone propionate (FLONASE) 50 mcg/actuation nasal spray       2 sprays (100 mcg total) by Each Nostril route once daily.    2 sprays (100 mcg total) by Each Nostril route once daily.    FLUTICASONE-SALMETEROL 230-21 MCG/DOSE (ADVAIR HFA) 230-21 MCG/ACTUATION HFAA INHALER fluticasone-salmeterol  "230-21 mcg/dose (ADVAIR HFA) 230-21 mcg/actuation HFAA inhaler       Inhale 2 puffs into the lungs 2 (two) times daily. Controller    Inhale 2 puffs into the lungs 2 (two) times daily. Controller    FOLIC ACID (FOLVITE) 1 MG TABLET folic acid (FOLVITE) 1 MG tablet       Take 1 tablet (1,000 mcg total) by mouth once daily.    Take 1 tablet by mouth once daily    HYDRALAZINE (APRESOLINE) 100 MG TABLET hydrALAZINE (APRESOLINE) 100 MG tablet       Take 1 tablet (100 mg total) by mouth every 12 (twelve) hours.    Take 1 tablet (100 mg total) by mouth every 12 (twelve) hours.    METOPROLOL SUCCINATE (TOPROL-XL) 100 MG 24 HR TABLET metoprolol succinate (TOPROL-XL) 50 MG 24 hr tablet       Take 1 tablet (100 mg total) by mouth once daily.    Take 1 tablet (50 mg total) by mouth once daily.    MONTELUKAST (SINGULAIR) 10 MG TABLET montelukast (SINGULAIR) 10 mg tablet       Take 1 tablet (10 mg total) by mouth every evening.    Take 1 tablet (10 mg total) by mouth every evening.    POTASSIUM CHLORIDE SA (K-DUR,KLOR-CON) 20 MEQ TABLET potassium chloride SA (K-DUR,KLOR-CON) 20 MEQ tablet       Take 1 tablet (20 mEq total) by mouth 2 (two) times daily.    Take 1 tablet (20 mEq total) by mouth 2 (two) times daily.       Follow up in about 4 months (around 2/25/2023) for appointment with Dr Villatoro in 6 months, labs before next visit. for further workup and reassessment if labs and tests obtained are stable or sooner as needed. She was instructed to call the clinic or go to the emergency department if her symptoms do not improve, worsens, or if new symptoms develop. Patient knows to call any time if an emergency arises. Shared decision making occurred and she verbalized understanding in agreement with this plan.     Documentation entered by me for this encounter may have been done in part using speech-recognition technology. Although I have made an effort to ensure accuracy, "sound like" errors may exist and should be interpreted in " context.       Osmani Villatoro MD     Discussed health maintenance guidelines appropriate for age.  Discussed health maintenance guidelines appropriate for age.

## 2023-02-24 ENCOUNTER — OFFICE VISIT (OUTPATIENT)
Dept: FAMILY MEDICINE | Facility: CLINIC | Age: 83
End: 2023-02-24
Payer: MEDICARE

## 2023-02-24 VITALS
DIASTOLIC BLOOD PRESSURE: 60 MMHG | RESPIRATION RATE: 16 BRPM | OXYGEN SATURATION: 99 % | BODY MASS INDEX: 39.14 KG/M2 | HEIGHT: 63 IN | HEART RATE: 52 BPM | WEIGHT: 220.88 LBS | TEMPERATURE: 98 F | SYSTOLIC BLOOD PRESSURE: 118 MMHG

## 2023-02-24 DIAGNOSIS — E78.5 HYPERLIPIDEMIA ASSOCIATED WITH TYPE 2 DIABETES MELLITUS: ICD-10-CM

## 2023-02-24 DIAGNOSIS — N18.2 TYPE 2 DIABETES MELLITUS WITH STAGE 2 CHRONIC KIDNEY DISEASE, WITHOUT LONG-TERM CURRENT USE OF INSULIN: ICD-10-CM

## 2023-02-24 DIAGNOSIS — E66.9 OBESITY (BMI 35.0-39.9 WITHOUT COMORBIDITY): ICD-10-CM

## 2023-02-24 DIAGNOSIS — J45.40 MODERATE PERSISTENT ASTHMA WITHOUT COMPLICATION: ICD-10-CM

## 2023-02-24 DIAGNOSIS — I15.2 HYPERTENSION ASSOCIATED WITH DIABETES: Primary | ICD-10-CM

## 2023-02-24 DIAGNOSIS — E11.69 HYPERLIPIDEMIA ASSOCIATED WITH TYPE 2 DIABETES MELLITUS: ICD-10-CM

## 2023-02-24 DIAGNOSIS — N18.31 STAGE 3A CHRONIC KIDNEY DISEASE: ICD-10-CM

## 2023-02-24 DIAGNOSIS — E11.22 TYPE 2 DIABETES MELLITUS WITH STAGE 2 CHRONIC KIDNEY DISEASE, WITHOUT LONG-TERM CURRENT USE OF INSULIN: ICD-10-CM

## 2023-02-24 DIAGNOSIS — E11.59 HYPERTENSION ASSOCIATED WITH DIABETES: Primary | ICD-10-CM

## 2023-02-24 PROCEDURE — 99214 OFFICE O/P EST MOD 30 MIN: CPT | Mod: S$GLB,,, | Performed by: NURSE PRACTITIONER

## 2023-02-24 PROCEDURE — 1101F PT FALLS ASSESS-DOCD LE1/YR: CPT | Mod: CPTII,S$GLB,, | Performed by: NURSE PRACTITIONER

## 2023-02-24 PROCEDURE — 1159F PR MEDICATION LIST DOCUMENTED IN MEDICAL RECORD: ICD-10-PCS | Mod: CPTII,S$GLB,, | Performed by: NURSE PRACTITIONER

## 2023-02-24 PROCEDURE — 1125F AMNT PAIN NOTED PAIN PRSNT: CPT | Mod: CPTII,S$GLB,, | Performed by: NURSE PRACTITIONER

## 2023-02-24 PROCEDURE — 1159F MED LIST DOCD IN RCRD: CPT | Mod: CPTII,S$GLB,, | Performed by: NURSE PRACTITIONER

## 2023-02-24 PROCEDURE — 1160F RVW MEDS BY RX/DR IN RCRD: CPT | Mod: CPTII,S$GLB,, | Performed by: NURSE PRACTITIONER

## 2023-02-24 PROCEDURE — 1101F PR PT FALLS ASSESS DOC 0-1 FALLS W/OUT INJ PAST YR: ICD-10-PCS | Mod: CPTII,S$GLB,, | Performed by: NURSE PRACTITIONER

## 2023-02-24 PROCEDURE — 1160F PR REVIEW ALL MEDS BY PRESCRIBER/CLIN PHARMACIST DOCUMENTED: ICD-10-PCS | Mod: CPTII,S$GLB,, | Performed by: NURSE PRACTITIONER

## 2023-02-24 PROCEDURE — 99214 PR OFFICE/OUTPT VISIT, EST, LEVL IV, 30-39 MIN: ICD-10-PCS | Mod: S$GLB,,, | Performed by: NURSE PRACTITIONER

## 2023-02-24 PROCEDURE — 99499 UNLISTED E&M SERVICE: CPT | Mod: S$GLB,,, | Performed by: NURSE PRACTITIONER

## 2023-02-24 PROCEDURE — 3288F PR FALLS RISK ASSESSMENT DOCUMENTED: ICD-10-PCS | Mod: CPTII,S$GLB,, | Performed by: NURSE PRACTITIONER

## 2023-02-24 PROCEDURE — 99999 PR PBB SHADOW E&M-EST. PATIENT-LVL V: CPT | Mod: PBBFAC,,, | Performed by: NURSE PRACTITIONER

## 2023-02-24 PROCEDURE — 99999 PR PBB SHADOW E&M-EST. PATIENT-LVL V: ICD-10-PCS | Mod: PBBFAC,,, | Performed by: NURSE PRACTITIONER

## 2023-02-24 PROCEDURE — 1125F PR PAIN SEVERITY QUANTIFIED, PAIN PRESENT: ICD-10-PCS | Mod: CPTII,S$GLB,, | Performed by: NURSE PRACTITIONER

## 2023-02-24 PROCEDURE — 3288F FALL RISK ASSESSMENT DOCD: CPT | Mod: CPTII,S$GLB,, | Performed by: NURSE PRACTITIONER

## 2023-02-24 PROCEDURE — 3074F SYST BP LT 130 MM HG: CPT | Mod: CPTII,S$GLB,, | Performed by: NURSE PRACTITIONER

## 2023-02-24 PROCEDURE — 99499 RISK ADDL DX/OHS AUDIT: ICD-10-PCS | Mod: S$GLB,,, | Performed by: NURSE PRACTITIONER

## 2023-02-24 PROCEDURE — 3078F PR MOST RECENT DIASTOLIC BLOOD PRESSURE < 80 MM HG: ICD-10-PCS | Mod: CPTII,S$GLB,, | Performed by: NURSE PRACTITIONER

## 2023-02-24 PROCEDURE — 3074F PR MOST RECENT SYSTOLIC BLOOD PRESSURE < 130 MM HG: ICD-10-PCS | Mod: CPTII,S$GLB,, | Performed by: NURSE PRACTITIONER

## 2023-02-24 PROCEDURE — 3078F DIAST BP <80 MM HG: CPT | Mod: CPTII,S$GLB,, | Performed by: NURSE PRACTITIONER

## 2023-02-24 PROCEDURE — 1157F PR ADVANCE CARE PLAN OR EQUIV PRESENT IN MEDICAL RECORD: ICD-10-PCS | Mod: CPTII,S$GLB,, | Performed by: NURSE PRACTITIONER

## 2023-02-24 PROCEDURE — 1157F ADVNC CARE PLAN IN RCRD: CPT | Mod: CPTII,S$GLB,, | Performed by: NURSE PRACTITIONER

## 2023-02-24 NOTE — PROGRESS NOTES
Subjective:       Patient ID: Sammi Abreu is a 82 y.o. female.    Chief Complaint: Follow-up    HPI    Patient presnets today for follow up visit. Last visit with PCP- on 10/25/22    2/23/23 coumadin clinic: Acute deep vein thrombosis (DVT) of right upper extremity -goal 2-3    11/3/22 Nephrology-: Chronic kidney disease, stage 3 unspecified  Past Medical History:   Diagnosis Date    ALLERGIC RHINITIS     Anemia     Arthritis     Back pain     Bronchitis     Cataract     OU    CHF (congestive heart failure)     Cholelithiasis     COPD (chronic obstructive pulmonary disease)     Degenerative disc disease     Diabetes mellitus     pre diabetic    Diverticulosis     DVT (deep venous thrombosis)     Edema     Encounter for blood transfusion 1979    Fibromyalgia     Glaucoma     Gout     Hx of colonic polyps     Hyperlipidemia     Hypertension     Incontinence     Osteoporosis     Pulmonary embolism     Reflux     Refusal of blood transfusions as patient is Religious     Sleep apnea     non compliant with CPAP    Vestibulitis of ear        Review of patient's allergies indicates:   Allergen Reactions    Daptomycin Other (See Comments)     Kidney damage     Cymbalta [duloxetine] Other (See Comments)     Nightmares      Darvon [propoxyphene] Nausea Only and Other (See Comments)     Sweating, slept for 3 days    Atorvastatin Other (See Comments)     Muscle cramps    Naprosyn [naproxen] Nausea Only    Penicillins Rash    Tramadol Nausea Only and Palpitations         Current Outpatient Medications:     acetaminophen (TYLENOL) 325 MG tablet, Take 2 tablets (650 mg total) by mouth every 6 (six) hours as needed (Do not take with any other Tylenol or acetaminophen containing products)., Disp: , Rfl: 0    albuterol (PROVENTIL) 2.5 mg /3 mL (0.083 %) nebulizer solution, Take 3 mLs (2.5 mg total) by nebulization every 6 (six) hours as needed., Disp: 120 each, Rfl: 11    blood sugar diagnostic Strp, To  check BG 1 times daily, to use with insurance preferred meter, Disp: 100 each, Rfl: 0    blood-glucose meter kit, To check BG 1 times daily, to use with insurance preferred meter, Disp: 1 each, Rfl: 0    diclofenac sodium (VOLTAREN) 1 % Gel, Apply 2 g topically once daily., Disp: 100 g, Rfl: 2    esomeprazole (NEXIUM) 20 MG capsule, TAKE 1 CAPSULE BY MOUTH TWICE DAILY BEFORE MEAL(S), Disp: 180 capsule, Rfl: 3    fluticasone propionate (FLONASE) 50 mcg/actuation nasal spray, 2 sprays (100 mcg total) by Each Nostril route once daily., Disp: 48 g, Rfl: 3    fluticasone-salmeterol 230-21 mcg/dose (ADVAIR HFA) 230-21 mcg/actuation HFAA inhaler, Inhale 2 puffs into the lungs 2 (two) times daily. Controller, Disp: 36 g, Rfl: 3    folic acid (FOLVITE) 1 MG tablet, Take 1 tablet (1,000 mcg total) by mouth once daily., Disp: 90 tablet, Rfl: 3    furosemide (LASIX) 40 MG tablet, Take 40 mg by mouth once daily., Disp: , Rfl:     hydrALAZINE (APRESOLINE) 100 MG tablet, Take 1 tablet (100 mg total) by mouth every 12 (twelve) hours., Disp: 180 tablet, Rfl: 4    lancets Misc, To check BG 1 times daily, to use with insurance preferred meter, Disp: 100 each, Rfl: 0    losartan (COZAAR) 50 MG tablet, Take 1 tablet by mouth once daily, Disp: 90 tablet, Rfl: 3    magnesium oxide (MAG-OX) 400 mg (241.3 mg magnesium) tablet, Take 1 tablet by mouth twice daily, Disp: 60 tablet, Rfl: 3    METAMUCIL, SUGAR, Powd, 1 tabs oral daily, Disp: , Rfl: 0    metoprolol succinate (TOPROL-XL) 100 MG 24 hr tablet, Take 1 tablet (100 mg total) by mouth once daily., Disp: 180 tablet, Rfl: 3    montelukast (SINGULAIR) 10 mg tablet, Take 1 tablet (10 mg total) by mouth every evening., Disp: 90 tablet, Rfl: 3    potassium chloride SA (K-DUR,KLOR-CON) 20 MEQ tablet, Take 1 tablet (20 mEq total) by mouth 2 (two) times daily., Disp: 60 tablet, Rfl: 3    triamcinolone acetonide 0.1% (KENALOG) 0.1 % ointment, Apply topically 2 (two) times daily., Disp: 80 g, Rfl:  "11    warfarin (COUMADIN) 5 MG tablet, TAKE 1 TO 1 & 1/2 (ONE & ONE-HALF) TABLETS BY MOUTH ONCE DAILY IN THE EVENING AS DIRECTED BY  COUMADIN  CLINIC, Disp: 120 tablet, Rfl: 11  No current facility-administered medications for this visit.    Facility-Administered Medications Ordered in Other Visits:     lactated ringers infusion, , Intravenous, Continuous, Gino Reid MD, Last Rate: 10 mL/hr at 07/13/20 1308, New Bag at 07/13/20 1405    lidocaine (PF) 10 mg/ml (1%) injection 10 mg, 1 mL, Intradermal, Once, Gino Reid MD    Review of Systems   Constitutional:  Negative for unexpected weight change.   HENT:  Negative for trouble swallowing.    Eyes:  Negative for visual disturbance.   Respiratory:  Negative for shortness of breath.    Cardiovascular:  Negative for chest pain, palpitations and leg swelling.   Gastrointestinal:  Negative for blood in stool.   Genitourinary:  Negative for hematuria.   Skin:  Negative for rash.   Allergic/Immunologic: Negative for immunocompromised state.   Neurological:  Negative for headaches.   Hematological:  Does not bruise/bleed easily.   Psychiatric/Behavioral:  Negative for agitation. The patient is not nervous/anxious.      Objective:      /60 (BP Location: Right arm, Patient Position: Sitting, BP Method: Medium (Manual))   Pulse (!) 52   Temp 98.2 °F (36.8 °C) (Oral)   Resp 16   Ht 5' 3" (1.6 m)   Wt 100.2 kg (220 lb 14.4 oz)   SpO2 99%   BMI 39.13 kg/m²   Physical Exam  Constitutional:       Appearance: She is well-developed.   HENT:      Head: Normocephalic.   Cardiovascular:      Rate and Rhythm: Normal rate and regular rhythm.      Heart sounds: Normal heart sounds.   Pulmonary:      Effort: Pulmonary effort is normal.   Musculoskeletal:         General: Normal range of motion.   Skin:     General: Skin is warm and dry.   Neurological:      Mental Status: She is alert and oriented to person, place, and time.   Psychiatric:         Behavior: " Behavior normal.         Thought Content: Thought content normal.         Judgment: Judgment normal.       Assessment:       1. Hypertension associated with diabetes    2. Type 2 diabetes mellitus with stage 2 chronic kidney disease, without long-term current use of insulin    3. Moderate persistent asthma without complication    4. Hyperlipidemia associated with type 2 diabetes mellitus    5. Stage 3a chronic kidney disease    6. Obesity (BMI 35.0-39.9 without comorbidity)        Plan:       Hypertension associated with diabetes  Stable, continue medication  Low sodium diet  BP Readings from Last 3 Encounters:   02/24/23 118/60   10/25/22 (!) 122/58   08/31/22 120/70      Type 2 diabetes mellitus with stage 2 chronic kidney disease, without long-term current use of insulin  Stable, continue medication  Follow the ADA  Hemoglobin A1C   Date Value Ref Range Status   04/18/2022 5.8 (H) 4.0 - 5.6 % Final     Comment:     ADA Screening Guidelines:  5.7-6.4%  Consistent with prediabetes  >or=6.5%  Consistent with diabetes    High levels of fetal hemoglobin interfere with the HbA1C  assay. Heterozygous hemoglobin variants (HbS, HgC, etc)do  not significantly interfere with this assay.   However, presence of multiple variants may affect accuracy.     10/15/2021 5.5 4.0 - 5.6 % Final     Comment:     ADA Screening Guidelines:  5.7-6.4%  Consistent with prediabetes  >or=6.5%  Consistent with diabetes    High levels of fetal hemoglobin interfere with the HbA1C  assay. Heterozygous hemoglobin variants (HbS, HgC, etc)do  not significantly interfere with this assay.   However, presence of multiple variants may affect accuracy.     04/06/2021 5.8 (H) 4.0 - 5.6 % Final     Comment:     ADA Screening Guidelines:  5.7-6.4%  Consistent with prediabetes  >or=6.5%  Consistent with diabetes    High levels of fetal hemoglobin interfere with the HbA1C  assay. Heterozygous hemoglobin variants (HbS, HgC, etc)do  not significantly interfere  "with this assay.   However, presence of multiple variants may affect accuracy.        Moderate persistent asthma without complication  Stable, resp inhaler  Hyperlipidemia associated with type 2 diabetes mellitus  Stable, continue medication  Stage 3a chronic kidney disease  Stable and chronic.  Will continue to monitor q3-6 months and control chronic conditions as optimally as possible to preserve function.   Obesity (BMI 35.0-39.9 without comorbidity)  Counseled patient on his ideal body weight, health consequences of being obese and current recommendations including weekly exercise and a heart healthy diet.  Current BMI is:Estimated body mass index is 39.13 kg/m² as calculated from the following:    Height as of this encounter: 5' 3" (1.6 m).    Weight as of this encounter: 100.2 kg (220 lb 14.4 oz)..  Patient is aware that ideal BMI < 25 or Weight in (lb) to have BMI = 25: 140.8.           Patient readiness: acceptance and barriers:none    During the course of the visit the patient was educated and counseled about the following:     Diabetes:  Addressed ADA diet.  Suggested low cholesterol diet.  Encouraged aerobic exercise.  Hypertension:   Dietary sodium restriction.  Regular aerobic exercise.  Obesity:   General weight loss/lifestyle modification strategies discussed (elicit support from others; identify saboteurs; non-food rewards, etc).  Regular aerobic exercise program discussed.    Goals: Diabetes: Maintain Hemoglobin A1C below 7, Hypertension: Reduce Blood Pressure, and Obesity: Reduce calorie intake and BMI    Did patient meet goals/outcomes: Yes    The following self management tools provided: declined    Patient Instructions (the written plan) was given to the patient/family.     Time spent with patient: 15 minutes    Barriers to medications present (no )    Adverse reactions to current medications (no)    Over the counter medications reviewed (Yes)          "

## 2023-03-23 ENCOUNTER — PES CALL (OUTPATIENT)
Dept: ADMINISTRATIVE | Facility: CLINIC | Age: 83
End: 2023-03-23
Payer: MEDICARE

## 2023-04-18 ENCOUNTER — LAB VISIT (OUTPATIENT)
Dept: LAB | Facility: HOSPITAL | Age: 83
End: 2023-04-18
Attending: FAMILY MEDICINE
Payer: MEDICARE

## 2023-04-18 DIAGNOSIS — I27.20 PULMONARY HYPERTENSION: ICD-10-CM

## 2023-04-18 DIAGNOSIS — R73.9 HYPERGLYCEMIA: ICD-10-CM

## 2023-04-18 LAB
ALBUMIN SERPL BCP-MCNC: 3.4 G/DL (ref 3.5–5.2)
ALP SERPL-CCNC: 69 U/L (ref 55–135)
ALT SERPL W/O P-5'-P-CCNC: 12 U/L (ref 10–44)
ANION GAP SERPL CALC-SCNC: 8 MMOL/L (ref 8–16)
AST SERPL-CCNC: 15 U/L (ref 10–40)
BILIRUB SERPL-MCNC: 0.5 MG/DL (ref 0.1–1)
BNP SERPL-MCNC: 358 PG/ML (ref 0–99)
BUN SERPL-MCNC: 29 MG/DL (ref 8–23)
CALCIUM SERPL-MCNC: 8.8 MG/DL (ref 8.7–10.5)
CHLORIDE SERPL-SCNC: 108 MMOL/L (ref 95–110)
CO2 SERPL-SCNC: 26 MMOL/L (ref 23–29)
CREAT SERPL-MCNC: 1.2 MG/DL (ref 0.5–1.4)
EST. GFR  (NO RACE VARIABLE): 45.8 ML/MIN/1.73 M^2
ESTIMATED AVG GLUCOSE: 114 MG/DL (ref 68–131)
GLUCOSE SERPL-MCNC: 112 MG/DL (ref 70–110)
HBA1C MFR BLD: 5.6 % (ref 4–5.6)
MAGNESIUM SERPL-MCNC: 2.2 MG/DL (ref 1.6–2.6)
POTASSIUM SERPL-SCNC: 4.7 MMOL/L (ref 3.5–5.1)
PROT SERPL-MCNC: 6.7 G/DL (ref 6–8.4)
SODIUM SERPL-SCNC: 142 MMOL/L (ref 136–145)

## 2023-04-18 PROCEDURE — 36415 COLL VENOUS BLD VENIPUNCTURE: CPT | Mod: PO | Performed by: FAMILY MEDICINE

## 2023-04-18 PROCEDURE — 83036 HEMOGLOBIN GLYCOSYLATED A1C: CPT | Performed by: FAMILY MEDICINE

## 2023-04-18 PROCEDURE — 80053 COMPREHEN METABOLIC PANEL: CPT | Performed by: FAMILY MEDICINE

## 2023-04-18 PROCEDURE — 83880 ASSAY OF NATRIURETIC PEPTIDE: CPT | Performed by: FAMILY MEDICINE

## 2023-04-18 PROCEDURE — 83735 ASSAY OF MAGNESIUM: CPT | Performed by: FAMILY MEDICINE

## 2023-04-21 ENCOUNTER — OFFICE VISIT (OUTPATIENT)
Dept: FAMILY MEDICINE | Facility: CLINIC | Age: 83
End: 2023-04-21
Payer: MEDICARE

## 2023-04-21 VITALS
BODY MASS INDEX: 39.21 KG/M2 | WEIGHT: 221.31 LBS | DIASTOLIC BLOOD PRESSURE: 50 MMHG | OXYGEN SATURATION: 97 % | TEMPERATURE: 99 F | HEART RATE: 52 BPM | SYSTOLIC BLOOD PRESSURE: 112 MMHG | HEIGHT: 63 IN

## 2023-04-21 DIAGNOSIS — I15.2 HYPERTENSION ASSOCIATED WITH DIABETES: ICD-10-CM

## 2023-04-21 DIAGNOSIS — N18.2 TYPE 2 DIABETES MELLITUS WITH STAGE 2 CHRONIC KIDNEY DISEASE, WITHOUT LONG-TERM CURRENT USE OF INSULIN: ICD-10-CM

## 2023-04-21 DIAGNOSIS — D68.69 SECONDARY HYPERCOAGULABLE STATE: ICD-10-CM

## 2023-04-21 DIAGNOSIS — R10.10 PAIN OF UPPER ABDOMEN: ICD-10-CM

## 2023-04-21 DIAGNOSIS — E11.22 TYPE 2 DIABETES MELLITUS WITH STAGE 2 CHRONIC KIDNEY DISEASE, WITHOUT LONG-TERM CURRENT USE OF INSULIN: ICD-10-CM

## 2023-04-21 DIAGNOSIS — Z79.01 LONG TERM CURRENT USE OF ANTICOAGULANT: ICD-10-CM

## 2023-04-21 DIAGNOSIS — M17.0 PRIMARY OSTEOARTHRITIS OF BOTH KNEES: ICD-10-CM

## 2023-04-21 DIAGNOSIS — J44.89 COPD WITH ASTHMA: Primary | ICD-10-CM

## 2023-04-21 DIAGNOSIS — E11.59 HYPERTENSION ASSOCIATED WITH DIABETES: ICD-10-CM

## 2023-04-21 PROCEDURE — 3078F PR MOST RECENT DIASTOLIC BLOOD PRESSURE < 80 MM HG: ICD-10-PCS | Mod: CPTII,S$GLB,, | Performed by: FAMILY MEDICINE

## 2023-04-21 PROCEDURE — 1101F PR PT FALLS ASSESS DOC 0-1 FALLS W/OUT INJ PAST YR: ICD-10-PCS | Mod: CPTII,S$GLB,, | Performed by: FAMILY MEDICINE

## 2023-04-21 PROCEDURE — 1101F PT FALLS ASSESS-DOCD LE1/YR: CPT | Mod: CPTII,S$GLB,, | Performed by: FAMILY MEDICINE

## 2023-04-21 PROCEDURE — 1157F ADVNC CARE PLAN IN RCRD: CPT | Mod: CPTII,S$GLB,, | Performed by: FAMILY MEDICINE

## 2023-04-21 PROCEDURE — 99214 OFFICE O/P EST MOD 30 MIN: CPT | Mod: S$GLB,,, | Performed by: FAMILY MEDICINE

## 2023-04-21 PROCEDURE — 1126F PR PAIN SEVERITY QUANTIFIED, NO PAIN PRESENT: ICD-10-PCS | Mod: CPTII,S$GLB,, | Performed by: FAMILY MEDICINE

## 2023-04-21 PROCEDURE — 3288F PR FALLS RISK ASSESSMENT DOCUMENTED: ICD-10-PCS | Mod: CPTII,S$GLB,, | Performed by: FAMILY MEDICINE

## 2023-04-21 PROCEDURE — 1157F PR ADVANCE CARE PLAN OR EQUIV PRESENT IN MEDICAL RECORD: ICD-10-PCS | Mod: CPTII,S$GLB,, | Performed by: FAMILY MEDICINE

## 2023-04-21 PROCEDURE — 3288F FALL RISK ASSESSMENT DOCD: CPT | Mod: CPTII,S$GLB,, | Performed by: FAMILY MEDICINE

## 2023-04-21 PROCEDURE — 3074F PR MOST RECENT SYSTOLIC BLOOD PRESSURE < 130 MM HG: ICD-10-PCS | Mod: CPTII,S$GLB,, | Performed by: FAMILY MEDICINE

## 2023-04-21 PROCEDURE — 99214 PR OFFICE/OUTPT VISIT, EST, LEVL IV, 30-39 MIN: ICD-10-PCS | Mod: S$GLB,,, | Performed by: FAMILY MEDICINE

## 2023-04-21 PROCEDURE — 1159F MED LIST DOCD IN RCRD: CPT | Mod: CPTII,S$GLB,, | Performed by: FAMILY MEDICINE

## 2023-04-21 PROCEDURE — 99999 PR PBB SHADOW E&M-EST. PATIENT-LVL V: CPT | Mod: PBBFAC,,, | Performed by: FAMILY MEDICINE

## 2023-04-21 PROCEDURE — 1160F PR REVIEW ALL MEDS BY PRESCRIBER/CLIN PHARMACIST DOCUMENTED: ICD-10-PCS | Mod: CPTII,S$GLB,, | Performed by: FAMILY MEDICINE

## 2023-04-21 PROCEDURE — 3078F DIAST BP <80 MM HG: CPT | Mod: CPTII,S$GLB,, | Performed by: FAMILY MEDICINE

## 2023-04-21 PROCEDURE — 1126F AMNT PAIN NOTED NONE PRSNT: CPT | Mod: CPTII,S$GLB,, | Performed by: FAMILY MEDICINE

## 2023-04-21 PROCEDURE — 1159F PR MEDICATION LIST DOCUMENTED IN MEDICAL RECORD: ICD-10-PCS | Mod: CPTII,S$GLB,, | Performed by: FAMILY MEDICINE

## 2023-04-21 PROCEDURE — 99499 UNLISTED E&M SERVICE: CPT | Mod: S$GLB,,, | Performed by: FAMILY MEDICINE

## 2023-04-21 PROCEDURE — 3074F SYST BP LT 130 MM HG: CPT | Mod: CPTII,S$GLB,, | Performed by: FAMILY MEDICINE

## 2023-04-21 PROCEDURE — 99999 PR PBB SHADOW E&M-EST. PATIENT-LVL V: ICD-10-PCS | Mod: PBBFAC,,, | Performed by: FAMILY MEDICINE

## 2023-04-21 PROCEDURE — 99499 RISK ADDL DX/OHS AUDIT: ICD-10-PCS | Mod: S$GLB,,, | Performed by: FAMILY MEDICINE

## 2023-04-21 PROCEDURE — 1160F RVW MEDS BY RX/DR IN RCRD: CPT | Mod: CPTII,S$GLB,, | Performed by: FAMILY MEDICINE

## 2023-04-21 RX ORDER — BUDESONIDE 0.5 MG/2ML
0.5 INHALANT ORAL DAILY
Qty: 60 ML | Refills: 3 | Status: SHIPPED | OUTPATIENT
Start: 2023-04-21 | End: 2023-11-06 | Stop reason: SDUPTHER

## 2023-04-21 NOTE — PATIENT INSTRUCTIONS
DASH diet for Hypertension and Healthy Eating  Provided by the [facility]    Healthy Lifestyle   Nutrition and healthy eating  The DASH diet emphasizes portion size, eating a variety of foods and getting the right amount of nutrients. Discover how DASH can improve your health and lower your blood pressure.  By [facility] Staff   DASH stands for Dietary Approaches to Stop Hypertension. The DASH diet is a lifelong approach to healthy eating that's designed to help treat or prevent high blood pressure (hypertension). The DASH diet encourages you to reduce the sodium in your diet and eat a variety of foods rich in nutrients that help lower blood pressure, such as potassium, calcium and magnesium.  By following the DASH diet, you may be able to reduce your blood pressure by a few points in just two weeks. Over time, your systolic blood pressure could drop by eight to 14 points, which can make a significant difference in your health risks.  Because the DASH diet is a healthy way of eating, it offers health benefits besides just lowering blood pressure. The DASH diet is also in line with dietary recommendations to prevent osteoporosis, cancer, heart disease, stroke and diabetes.  The DASH diet emphasizes vegetables, fruits and low-fat dairy foods -- and moderate amounts of whole grains, fish, poultry and nuts.  In addition to the standard DASH diet, there is also a lower sodium version of the diet. You can choose the version of the diet that meets your health needs:  Standard DASH diet. You can consume up to 2,300 milligrams (mg) of sodium a day.   Lower sodium DASH diet. You can consume up to 1,500 mg of sodium a day.  Both versions of the DASH diet aim to reduce the amount of sodium in your diet compared with what you might get in a typical American diet, which can amount to a whopping 3,400 mg of sodium a day or more.  The standard DASH diet meets the recommendation from the Dietary Guidelines for Americans to keep  "daily sodium intake to less than 2,300 mg a day.  The American Heart Association recommends 1,500 mg a day of sodium as an upper limit for all adults. If you aren't sure what sodium level is right for you, talk to your doctor.  Both versions of the DASH diet include lots of whole grains, fruits, vegetables and low-fat dairy products. The DASH diet also includes some fish, poultry and legumes, and encourages a small amount of nuts and seeds a few times a week.   You can eat red meat, sweets and fats in small amounts. The DASH diet is low in saturated fat, cholesterol and total fat.  Here's a look at the recommended servings from each food group for the 2,301-nenulvj-n-day DASH diet.  Grains: 6 to 8 servings a day  Grains include bread, cereal, rice and pasta. Examples of one serving of grains include 1 slice whole-wheat bread, 1 ounce dry cereal, or 1/2 cup cooked cereal, rice or pasta.  Focus on whole grains because they have more fiber and nutrients than do refined grains. For instance, use brown rice instead of white rice, whole-wheat pasta instead of regular pasta and whole-grain bread instead of white bread. Look for products labeled "100 percent whole grain" or "100 percent whole wheat."   Grains are naturally low in fat. Keep them this way by avoiding butter, cream and cheese sauces.  Vegetables: 4 to 5 servings a day  Tomatoes, carrots, broccoli, sweet potatoes, greens and other vegetables are full of fiber, vitamins, and such minerals as potassium and magnesium. Examples of one serving include 1 cup raw leafy green vegetables or 1/2 cup cut-up raw or cooked vegetables.  Don't think of vegetables only as side dishes -- a hearty blend of vegetables served over brown rice or whole-wheat noodles can serve as the main dish for a meal.   Fresh and frozen vegetables are both good choices. When buying frozen and canned vegetables, choose those labeled as low sodium or without added salt.   To increase the number of " servings you fit in daily, be creative. In a stir-worthy, for instance, cut the amount of meat in half and double up on the vegetables.  Fruits: 4 to 5 servings a day  Many fruits need little preparation to become a healthy part of a meal or snack. Like vegetables, they're packed with fiber, potassium and magnesium and are typically low in fat -- coconuts are an exception. Examples of one serving include one medium fruit, 1/2 cup fresh, frozen or canned fruit, or 4 ounces of juice.  Have a piece of fruit with meals and one as a snack, then round out your day with a dessert of fresh fruits topped with a dollop of low-fat yogurt.   Leave on edible peels whenever possible. The peels of apples, pears and most fruits with pits add interesting texture to recipes and contain healthy nutrients and fiber.   Remember that citrus fruits and juices, such as grapefruit, can interact with certain medications, so check with your doctor or pharmacist to see if they're OK for you.   If you choose canned fruit or juice, make sure no sugar is added.  Dairy: 2 to 3 servings a day  Milk, yogurt, cheese and other dairy products are major sources of calcium, vitamin D and protein. But the key is to make sure that you choose dairy products that are low fat or fat-free because otherwise they can be a major source of fat -- and most of it is saturated. Examples of one serving include 1 cup skim or 1 percent milk, 1 cup low fat yogurt, or 1 1/2 ounces part-skim cheese.  Low-fat or fat-free frozen yogurt can help you boost the amount of dairy products you eat while offering a sweet treat. Add fruit for a healthy twist.   If you have trouble digesting dairy products, choose lactose-free products or consider taking an over-the-counter product that contains the enzyme lactase, which can reduce or prevent the symptoms of lactose intolerance.   Go easy on regular and even fat-free cheeses because they are typically high in sodium.  Lean meat, poultry  and fish: 6 servings or fewer a day  Meat can be a rich source of protein, B vitamins, iron and zinc. Choose lean varieties and aim for no more than 6 ounces a day. Cutting back on your meat portion will allow room for more vegetables.  Trim away skin and fat from poultry and meat and then bake, broil, grill or roast instead of frying in fat.   Eat heart-healthy fish, such as salmon, herring and tuna. These types of fish are high in omega-3 fatty acids, which can help lower your total cholesterol.  Nuts, seeds and legumes: 4 to 5 servings a week  Almonds, sunflower seeds, kidney beans, peas, lentils and other foods in this family are good sources of magnesium, potassium and protein. They're also full of fiber and phytochemicals, which are plant compounds that may protect against some cancers and cardiovascular disease.  Serving sizes are small and are intended to be consumed only a few times a week because these foods are high in calories. Examples of one serving include 1/3 cup nuts, 2 tablespoons seeds, or 1/2 cup cooked beans or peas.   Nuts sometimes get a bad rap because of their fat content, but they contain healthy types of fat -- monounsaturated fat and omega-3 fatty acids. They're high in calories, however, so eat them in moderation. Try adding them to stir-fries, salads or cereals.   Soybean-based products, such as tofu and tempeh, can be a good alternative to meat because they contain all of the amino acids your body needs to make a complete protein, just like meat.  Fats and oils: 2 to 3 servings a day  Fat helps your body absorb essential vitamins and helps your body's immune system. But too much fat increases your risk of heart disease, diabetes and obesity. The DASH diet strives for a healthy balance by limiting total fat to less than 30 percent of daily calories from fat, with a focus on the healthier monounsaturated fats.  Examples of one serving include 1 teaspoon soft margarine, 1 tablespoon  mayonnaise or 2 tablespoons salad dressing.  Saturated fat and trans fat are the main dietary culprits in increasing your risk of coronary artery disease. DASH helps keep your daily saturated fat to less than 6 percent of your total calories by limiting use of meat, butter, cheese, whole milk, cream and eggs in your diet, along with foods made from lard, solid shortenings, and palm and coconut oils.   Avoid trans fat, commonly found in such processed foods as crackers, baked goods and fried items.   Read food labels on margarine and salad dressing so that you can choose those that are lowest in saturated fat and free of trans fat.  Sweets: 5 servings or fewer a week  You don't have to banish sweets entirely while following the DASH diet -- just go easy on them. Examples of one serving include 1 tablespoon sugar, jelly or jam, 1/2 cup sorbet, or 1 cup lemonade.  When you eat sweets, choose those that are fat-free or low-fat, such as sorbets, fruit ices, jelly beans, hard candy, antonio crackers or low-fat cookies.   Artificial sweeteners such as aspartame (NutraSweet, Equal) and sucralose (Splenda) may help satisfy your sweet tooth while sparing the sugar. But remember that you still must use them sensibly. It's OK to swap a diet cola for a regular cola, but not in place of a more nutritious beverage such as low-fat milk or even plain water.   Cut back on added sugar, which has no nutritional value but can pack on calories.  Drinking too much alcohol can increase blood pressure. The Dietary Guidelines for Americans recommends that men limit alcohol to no more than two drinks a day and women to one or less.  The DASH diet doesn't address caffeine consumption. The influence of caffeine on blood pressure remains unclear. But caffeine can cause your blood pressure to rise at least temporarily. If you already have high blood pressure or if you think caffeine is affecting your blood pressure, talk to your doctor about your  "caffeine consumption.  While the DASH diet is not a weight-loss program, you may indeed lose unwanted pounds because it can help guide you toward healthier food choices.  The DASH diet generally includes about 2,000 calories a day. If you're trying to lose weight, you may need to eat fewer calories. You may also need to adjust your serving goals based on your individual circumstances -- something your health care team can help you decide.  The foods at the core of the DASH diet are naturally low in sodium. So just by following the DASH diet, you're likely to reduce your sodium intake. You also reduce sodium further by:  Using sodium-free spices or flavorings with your food instead of salt   Not adding salt when cooking rice, pasta or hot cereal   Rinsing canned foods to remove some of the sodium   Buying foods labeled "no salt added," "sodium-free," "low sodium" or "very low sodium"  One teaspoon of table salt has 2,325 mg of sodium. When you read food labels, you may be surprised at just how much sodium some processed foods contain. Even low-fat soups, canned vegetables, ready-to-eat cereals and sliced turkey from the local deli -- foods you may have considered healthy -- often have lots of sodium.  You may notice a difference in taste when you choose low-sodium food and beverages. If things seem too bland, gradually introduce low-sodium foods and cut back on table salt until you reach your sodium goal. That'll give your palate time to adjust.  Using salt-free seasoning blends or herbs and spices may also ease the transition. It can take several weeks for your taste buds to get used to less salty foods.  Try these strategies to get started on the DASH diet:   Change gradually. If you now eat only one or two servings of fruits or vegetables a day, try to add a serving at lunch and one at dinner. Rather than switching to all whole grains, start by making one or two of your grain servings whole grains. Increasing " fruits, vegetables and whole grains gradually can also help prevent bloating or diarrhea that may occur if you aren't used to eating a diet with lots of fiber. You can also try over-the-counter products to help reduce gas from beans and vegetables.   Reward successes and forgive slip-ups. Reward yourself with a nonfood treat for your accomplishments -- rent a movie, purchase a book or get together with a friend. Everyone slips, especially when learning something new. Remember that changing your lifestyle is a long-term process. Find out what triggered your setback and then just  where you left off with the DASH diet.   Add physical activity. To boost your blood pressure lowering efforts even more, consider increasing your physical activity in addition to following the DASH diet. Combining both the DASH diet and physical activity makes it more likely that you'll reduce your blood pressure.   Get support if you need it. If you're having trouble sticking to your diet, talk to your doctor or dietitian about it. You might get some tips that will help you stick to the DASH diet.  Remember, healthy eating isn't an all-or-nothing proposition. What's most important is that, on average, you eat healthier foods with plenty of variety -- both to keep your diet nutritious and to avoid boredom or extremes. And with the DASH diet, you can have both.

## 2023-04-26 NOTE — PROGRESS NOTES
.[facility]     Subjective:    Chief Complaint:   Chief Complaint   Patient presents with    Follow-up       History of Present Illness:  82 y.o. female presents for multiple issues.   She has been treated with Warfarin due to chronic DVT, due to PVD and recurrence .  Lab Results   Component Value Date    INR 2.2 04/13/2023    INR 2.8 03/30/2023    INR 1.6 03/23/2023     Encounter Diagnoses   Name Primary?    COPD with asthma Yes    Pain of upper abdomen     Type 2 diabetes mellitus with stage 2 chronic kidney disease, without long-term current use of insulin     Hypertension associated with diabetes      Asthma in remission  Chronic epigastric pain associated with sternal strain, neg hx of MI, nor GERD , nor AAA, nor hernia.  HT.Stable and controlled. Continue current treatment plan as previously prescribed.   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 And PAD.   Lab Results   Component Value Date    HGBA1C 5.6 04/18/2023   No home testing, no hypoglycemia. Minimal exercises due to OA knees.  Obesity Not able to tolerate Semaglutide . Recommended dietitian but declined. Recommended bariatric medicine but declined. Declined Contrave due to high price.            I reviewed the patients chart dating back for the past few appointments. See above.    The following portions of the patient's history were reviewed and updated as appropriate: allergies, current medications, past family history, past medical history, past social history, past surgical history and problem list.    She denies chest pain upon exertion, dyspnea, nausea, vomiting, diaphoresis, and syncope. No pleuritic chest pain, unilateral leg swelling, calf tenderness, or calf pain.      Past Medical History:   Diagnosis Date    ALLERGIC RHINITIS     Anemia     Arthritis     Back pain     Bronchitis     Cataract     OU    CHF (congestive heart failure)     Cholelithiasis     COPD (chronic obstructive pulmonary disease)     Degenerative disc disease      Diabetes mellitus     pre diabetic    Diverticulosis     DVT (deep venous thrombosis)     Edema     Encounter for blood transfusion 1979    Fibromyalgia     Glaucoma     Gout     Hx of colonic polyps     Hyperlipidemia     Hypertension     Incontinence     Osteoporosis     Pulmonary embolism     Reflux     Refusal of blood transfusions as patient is [employer]     Sleep apnea     non compliant with CPAP    Vestibulitis of ear        Past Surgical History:   Procedure Laterality Date    ANGIOGRAPHY OF LOWER EXTREMITY N/A 7/31/2019    Procedure: ANGIOGRAM, LOWER EXTREMITY;  Surgeon: Gino Arana MD;  Location: [facility] CATH/EP LAB;  Service: General;  Laterality: N/A;    ASPIRATION OF SOFT TISSUE Left 10/15/2020    Procedure: ASPIRATION, SOFT TISSUE;  Surgeon: [facility];  Location: [facility] OR;  Service: General;  Laterality: Left;    COLONOSCOPY N/A 8/13/2020    Procedure: COLONOSCOPY;  Surgeon: Sue Nuñez MD;  Location: [facility] ENDO;  Service: Endoscopy;  Laterality: N/A;    ESOPHAGOGASTRODUODENOSCOPY N/A 8/12/2020    Procedure: EGD (ESOPHAGOGASTRODUODENOSCOPY);  Surgeon: Sue Nuñez MD;  Location: [facility] ENDO;  Service: Endoscopy;  Laterality: N/A;    HIP SURGERY      HYSTERECTOMY      INTRALUMINAL GASTROINTESTINAL TRACT IMAGING VIA CAPSULE N/A 9/9/2020    Procedure: IMAGING PROCEDURE, GI TRACT, INTRALUMINAL, VIA CAPSULE;  Surgeon: Sue Nuñez MD;  Location: [facility] ENDO;  Service: Endoscopy;  Laterality: N/A;    JOINT REPLACEMENT      B total hip    LAPAROSCOPIC CHOLECYSTECTOMY N/A 7/13/2020    Procedure: CHOLECYSTECTOMY, LAPAROSCOPIC;  Surgeon: Jomar Mcdonald MD;  Location: [facility] OR;  Service: General;  Laterality: N/A;    TRANSFORAMINAL EPIDURAL INJECTION OF STEROID Left 6/30/2020    Procedure: Injection,steroid,epidural,transforaminal approach;  Surgeon: Matt Gilliam MD;  Location: [facility] OR;  Service: Pain Management;  Laterality: Left;  L2-3, L3-4    TRANSFORAMINAL EPIDURAL  INJECTION OF STEROID Left 2021    Procedure: Injection,steroid,epidural,transforaminal approach;  Surgeon: Matt Gilliam MD;  Location: [facility];  Service: Pain Management;  Laterality: Left;  L2-3, L3-4       Social History  Social History     Tobacco Use    Smoking status: Former     Packs/day: 0.25     Years: 5.00     Pack years: 1.25     Types: Cigarettes     Quit date: 1972     Years since quittin.3    Smokeless tobacco: Never   Substance Use Topics    Alcohol use: Yes     Alcohol/week: 0.0 standard drinks     Comment: Rarely    Drug use: Not Currently       Family History   Problem Relation Age of Onset    Hypertension Mother     Stroke Father     Hypertension Father     Diabetes Sister     Cancer Sister     Stomach cancer Sister     Hypertension Sister     Bipolar disorder Sister     Peripheral vascular disease Sister     Hypertension Brother     Stroke Brother     Peripheral vascular disease Brother     Hypertension Son     Obesity Son     Early death Son 48    Arthritis Son     Glaucoma Neg Hx     Macular degeneration Neg Hx     Retinal detachment Neg Hx      Review of patient's allergies indicates:   Allergen Reactions    Daptomycin Other (See Comments)     Kidney damage     Cymbalta [duloxetine] Other (See Comments)     Nightmares      Darvon [propoxyphene] Nausea Only and Other (See Comments)     Sweating, slept for 3 days    Atorvastatin Other (See Comments)     Muscle cramps    Naprosyn [naproxen] Nausea Only    Penicillins Rash    Tramadol Nausea Only and Palpitations       Review of Systems [Negative unless checked off]    General ROS: []fever, []chills, []weight loss, [x]malaise/fatigue.  ENT ROS: []congestion, []rhinorrhea,  []sore throat, []neck pain, []hearing loss.  Ophthalmic ROS:[]blurry vision, [] double vision, []photophobia, []eye pain.  Respiratory ROS: []cough, [x]pleuritic chest pain, [x]shortness of breath, []wheezing.  CVS ROS:[]chest pain, []dyspnea on exertion,  "[]palpitations, []orthopnea, [x]leg swelling, []PND.   GI ROS: []nausea, []vomiting, [] epigastric pain, []abd pain, []diarrhea, []constipation, []blood/melena in stool.   Urology ROS:[]dysuria, []frequency, []flank pain,[] trouble voiding, [] hematuria.   MSK ROS: [x]myalgias, []joint pain, []muscular weakness,  []back pain, [] falls.   Derm ROS: []pruritis, []rash, []jaundice.  Neurological:[]dizziness,[]numbness,[]loss of consciousness, []weakness  []headaches.   Psych ROS: []hallucinations, []depression, []anxiety, []suicidal ideation.    Physical Examination  BP (!) 112/50 (BP Location: Right arm, Patient Position: Sitting, BP Method: Large (Manual))   Pulse (!) 52   Temp 98.8 °F (37.1 °C) (Oral)   Ht 5' 3" (1.6 m)   Wt 100.4 kg (221 lb 5.5 oz)   SpO2 97%   BMI 39.21 kg/m²   Wt Readings from Last 3 Encounters:   04/21/23 100.4 kg (221 lb 5.5 oz)   02/24/23 100.2 kg (220 lb 14.4 oz)   10/25/22 102.7 kg (226 lb 6.6 oz)     BP Readings from Last 3 Encounters:   04/21/23 (!) 112/50   02/24/23 118/60   10/25/22 (!) 122/58     Estimated body mass index is 39.21 kg/m² as calculated from the following:    Height as of this encounter: 5' 3" (1.6 m).    Weight as of this encounter: 100.4 kg (221 lb 5.5 oz).     General appearance: alert, cooperative, no distress  Eyes: pupils equal and reactive, extraocular eye movements intact   Ears: bilateral TM's and external ear canals normal   Nose: normal and patent, no erythema, discharge or polyps   Sinuses: Normal paranasal sinuses without tenderness   Throat: mucous membranes moist, pharynx normal without lesions   Neck: no thyromegaly, trachea midline  Lungs: clear to auscultation, no wheezes, rales or rhonchi, symmetric air entry, no dullness to percussion bilaterally.  Heart: normal rate, regular rhythm, normal S1, S2, no murmurs, rubs, clicks or gallops, no displacement of the PMI.  Abdomen: soft, nontender, nondistended, no masses or organomegaly No rigidity, " rebound, or guarding.   Back: full range of motion, no tenderness, palpable spasm or pain on motion   Extremities: peripheral pulses normal, mild pedal edema, no clubbing or cyanosis   Feet: warm, good capillary refill.Decreased monofilament in boyh feet.  Neurological:alert, oriented, normal speech, no focal findings or movement disorder noted   Psychiatric: alert, oriented to person, place, and time  Integument: normal coloration and turgor, no rashes, no suspicious skin lesions noted.    Data reviewed    Previous medical records reviewed and summarized above in HPI. 274}    Laboratory    I have reviewed old labs below:   274}  Lab Results   Component Value Date    WBC 5.58 10/27/2022    HGB 11.9 (L) 10/27/2022    HCT 37.5 10/27/2022     (H) 10/27/2022     10/27/2022     04/18/2023    K 4.7 04/18/2023     04/18/2023    CALCIUM 8.8 04/18/2023    PHOS 3.4 10/27/2022    CO2 26 04/18/2023     (H) 04/18/2023    BUN 29 (H) 04/18/2023    CREATININE 1.2 04/18/2023    ANIONGAP 8 04/18/2023    ESTGFRAFRICA 45 (A) 07/18/2022    EGFRNONAA 39 (A) 07/18/2022    PROT 6.7 04/18/2023    ALBUMIN 3.4 (L) 04/18/2023    BILITOT 0.5 04/18/2023    ALKPHOS 69 04/18/2023    ALT 12 04/18/2023    AST 15 04/18/2023    INR 2.2 04/13/2023    CHOL 196 10/18/2022    TRIG 140 10/18/2022    HDL 42 10/18/2022    LDLCALC 126.0 10/18/2022    TSH 3.261 08/04/2020    HGBA1C 5.6 04/18/2023       Imaging/Tracing: I have reviewed the pertinent results/findings and my personal findings are below:  274}    Assessment/Plan     274}    Sammi Abreu is a 82 y.o. female who presents to clinic with:    1. COPD with asthma    2. Pain of upper abdomen    3. Type 2 diabetes mellitus with stage 2 chronic kidney disease, without long-term current use of insulin    4. Hypertension associated with diabetes         Diagnostic impression remarks       1. COPD with asthma  - budesonide (PULMICORT) 0.5 mg/2 mL nebulizer solution; Take  2 mLs (0.5 mg total) by nebulization once daily. Controller  Dispense: 60 mL; Refill: 3    2. Pain of upper abdomen  - Celiac Disease Panel; Future  - Basic Metabolic Panel; Future    3. Type 2 diabetes mellitus with stage 2 chronic kidney disease, without long-term current use of insulin  - Hemoglobin A1c; Future    4. Hypertension associated with diabetes  - B-TYPE NATRIURETIC PEPTIDE; Future      Medication Monitoring: In today's visit, monitoring for drug toxicity was accomplished. Proper use of medications was discussed.     Counseling: Counseling included discussion regarding imaging findings, diagnosis, possibilities, treatment options, medications, risks, and benefits. She had many questions regarding the options and long-term effects. All questions were answered. She expressed understanding after counseling regarding the diagnosis and recommendations. She was capable and demonstrated competence with understanding of these options. Shared decision making was performed resulting in her choosing the current treatment plan.     She was counseled about the importance of healthy dietary habits as well as routine physical activity and exercise for better health outcomes. I also discussed the importance of cancer screening.     I also discussed the importance of close follow up to discuss labs, change or modify her medications if needed, monitor side effects, and further evaluation of medical problems.     Additional workup planned: see labs ordered below.    See below for labs and meds ordered with associated diagnosis      Medication List with Changes/Refills   New Medications    BUDESONIDE (PULMICORT) 0.5 MG/2 ML NEBULIZER SOLUTION    Take 2 mLs (0.5 mg total) by nebulization once daily. Controller   Current Medications    ACETAMINOPHEN (TYLENOL) 325 MG TABLET    Take 2 tablets (650 mg total) by mouth every 6 (six) hours as needed (Do not take with any other Tylenol or acetaminophen containing products).     ALBUTEROL (PROVENTIL) 2.5 MG /3 ML (0.083 %) NEBULIZER SOLUTION    Take 3 mLs (2.5 mg total) by nebulization every 6 (six) hours as needed.    BLOOD SUGAR DIAGNOSTIC STRP    To check BG 1 times daily, to use with insurance preferred meter    BLOOD-GLUCOSE METER KIT    To check BG 1 times daily, to use with insurance preferred meter    DICLOFENAC SODIUM (VOLTAREN) 1 % GEL    Apply 2 g topically once daily.    ESOMEPRAZOLE (NEXIUM) 20 MG CAPSULE    TAKE 1 CAPSULE BY MOUTH TWICE DAILY BEFORE MEAL(S)    FLUTICASONE PROPIONATE (FLONASE) 50 MCG/ACTUATION NASAL SPRAY    2 sprays (100 mcg total) by Each Nostril route once daily.    FLUTICASONE-SALMETEROL 230-21 MCG/DOSE (ADVAIR HFA) 230-21 MCG/ACTUATION HFAA INHALER    Inhale 2 puffs into the lungs 2 (two) times daily. Controller    FOLIC ACID (FOLVITE) 1 MG TABLET    Take 1 tablet (1,000 mcg total) by mouth once daily.    FUROSEMIDE (LASIX) 40 MG TABLET    Take 40 mg by mouth once daily.    HYDRALAZINE (APRESOLINE) 100 MG TABLET    Take 1 tablet (100 mg total) by mouth every 12 (twelve) hours.    LANCETS MISC    To check BG 1 times daily, to use with insurance preferred meter    LOSARTAN (COZAAR) 50 MG TABLET    Take 1 tablet by mouth once daily    MAGNESIUM OXIDE (MAG-OX) 400 MG (241.3 MG MAGNESIUM) TABLET    Take 1 tablet by mouth twice daily    METAMUCIL, SUGAR, POWD    1 tabs oral daily    METOPROLOL SUCCINATE (TOPROL-XL) 100 MG 24 HR TABLET    Take 1 tablet (100 mg total) by mouth once daily.    MONTELUKAST (SINGULAIR) 10 MG TABLET    Take 1 tablet (10 mg total) by mouth every evening.    POTASSIUM CHLORIDE SA (K-DUR,KLOR-CON) 20 MEQ TABLET    Take 1 tablet (20 mEq total) by mouth 2 (two) times daily.    TRIAMCINOLONE ACETONIDE 0.1% (KENALOG) 0.1 % OINTMENT    Apply topically 2 (two) times daily.    WARFARIN (COUMADIN) 5 MG TABLET    TAKE 1 TO 1 & 1/2 (ONE & ONE-HALF) TABLETS BY MOUTH ONCE DAILY IN THE EVENING AS DIRECTED BY  [facility]  CLINIC     Modified Medications  "   No medications on file       Follow up in about 6 months (around 10/21/2023) for labs before next visit. for further workup and reassessment if labs and tests obtained are stable or sooner as needed. She was instructed to call the clinic or go to the emergency department if her symptoms do not improve, worsens, or if new symptoms develop. Patient knows to call any time if an emergency arises. Shared decision making occurred and she verbalized understanding in agreement with this plan.     Documentation entered by me for this encounter may have been done in part using speech-recognition technology. Although I have made an effort to ensure accuracy, "sound like" errors may exist and should be interpreted in context.       Osmani Villatoro MD     Discussed health maintenance guidelines appropriate for age.    "

## 2023-05-05 ENCOUNTER — PES CALL (OUTPATIENT)
Dept: ADMINISTRATIVE | Facility: CLINIC | Age: 83
End: 2023-05-05
Payer: MEDICARE

## 2023-05-19 NOTE — PROGRESS NOTES
"BP (!) 112/50 (BP Location: Right arm, Patient Position: Sitting, BP Method: Large (Manual))   Pulse (!) 52   Temp 98.8 °F (37.1 °C) (Oral)   Ht 5' 3" (1.6 m)   Wt 100.4 kg (221 lb 5.5 oz)   SpO2 97%   BMI 39.21 kg/m²   HT controlled.  Current Outpatient Medications on File Prior to Visit   Medication Sig Dispense Refill       0       11       0       0       2       3       3       3              4       0    losartan (COZAAR) 50 MG tablet Take 1 tablet by mouth once daily 90 tablet 3    magnesium oxide (MAG-OX) 400 mg (241.3 mg magnesium) tablet Take 1 tablet by mouth twice daily 60 tablet 3     "

## 2023-05-19 NOTE — PROGRESS NOTES
Encounter Diagnoses   Name Primary?    COPD with asthma Yes    Pain of upper abdomen     Type 2 diabetes mellitus with stage 2 chronic kidney disease, without long-term current use of insulin     Hypertension associated with diabetes     BMI 39.0-39.9,adult     Long term current use of anticoagulant     Primary osteoarthritis of both knees     Secondary hypercoagulable state      No polyuria, polydipsia, blurry vision, chest pain, dyspnea or claudication.  No foot burning, numbness or pain. Taking medication compliantly without noted sided effects. Follows diet fairly well. Home glucose monitoring in the range of 110.    Lab Results   Component Value Date    HGBA1C 5.6 04/18/2023         Chemistry        Component Value Date/Time     04/18/2023 1259    K 4.7 04/18/2023 1259     04/18/2023 1259    CO2 26 04/18/2023 1259    BUN 29 (H) 04/18/2023 1259    CREATININE 1.2 04/18/2023 1259     (H) 04/18/2023 1259        Component Value Date/Time    CALCIUM 8.8 04/18/2023 1259    ALKPHOS 69 04/18/2023 1259    AST 15 04/18/2023 1259    ALT 12 04/18/2023 1259    BILITOT 0.5 04/18/2023 1259    ESTGFRAFRICA 45 (A) 07/18/2022 0945    EGFRNONAA 39 (A) 07/18/2022 0945

## 2023-05-25 NOTE — PROGRESS NOTES
"Estimated body mass index is 39.21 kg/m² as calculated from the following:    Height as of this encounter: 5' 3" (1.6 m).    Weight as of this encounter: 100.4 kg (221 lb 5.5 oz).  The patient's BMI has been recorded in the chart. The patient has been provided educational materials regarding the benefits of attaining and maintaining a normal weight. We will continue to address and follow this issue during follow up visits.    "

## 2023-05-25 NOTE — PROGRESS NOTES
"Vitals:    04/21/23 0823   BP: (!) 112/50   Pulse: (!) 52   Temp: 98.8 °F (37.1 °C)     Estimated body mass index is 39.21 kg/m² as calculated from the following:    Height as of this encounter: 5' 3" (1.6 m).    Weight as of this encounter: 100.4 kg (221 lb 5.5 oz).    The patient's BMI has been recorded in the chart. The patient has been provided educational materials regarding the benefits of attaining and maintaining a normal weight. We will continue to address and follow this issue during follow up visits.    "

## 2023-05-30 ENCOUNTER — OFFICE VISIT (OUTPATIENT)
Dept: PULMONOLOGY | Facility: CLINIC | Age: 83
End: 2023-05-30
Payer: MEDICARE

## 2023-05-30 ENCOUNTER — LAB VISIT (OUTPATIENT)
Dept: LAB | Facility: HOSPITAL | Age: 83
End: 2023-05-30
Attending: INTERNAL MEDICINE
Payer: MEDICARE

## 2023-05-30 VITALS
SYSTOLIC BLOOD PRESSURE: 158 MMHG | HEIGHT: 63 IN | BODY MASS INDEX: 39.46 KG/M2 | WEIGHT: 222.69 LBS | DIASTOLIC BLOOD PRESSURE: 71 MMHG | OXYGEN SATURATION: 98 % | HEART RATE: 58 BPM

## 2023-05-30 DIAGNOSIS — R60.0 PERIPHERAL EDEMA: Primary | ICD-10-CM

## 2023-05-30 DIAGNOSIS — N18.30 CHRONIC KIDNEY DISEASE, STAGE III (MODERATE): Primary | ICD-10-CM

## 2023-05-30 DIAGNOSIS — J44.89 ASTHMA-COPD OVERLAP SYNDROME: ICD-10-CM

## 2023-05-30 DIAGNOSIS — I50.32 CHRONIC DIASTOLIC CHF (CONGESTIVE HEART FAILURE): ICD-10-CM

## 2023-05-30 DIAGNOSIS — R09.89 CHRONIC SINUS COMPLAINTS: ICD-10-CM

## 2023-05-30 DIAGNOSIS — J44.9 CHRONIC OBSTRUCTIVE PULMONARY DISEASE, UNSPECIFIED COPD TYPE: ICD-10-CM

## 2023-05-30 DIAGNOSIS — J45.40 MODERATE PERSISTENT ASTHMA WITHOUT COMPLICATION: ICD-10-CM

## 2023-05-30 LAB
25(OH)D3+25(OH)D2 SERPL-MCNC: 23 NG/ML (ref 30–96)
ALBUMIN SERPL BCP-MCNC: 3.5 G/DL (ref 3.5–5.2)
ALP SERPL-CCNC: 59 U/L (ref 55–135)
ALT SERPL W/O P-5'-P-CCNC: 15 U/L (ref 10–44)
ANION GAP SERPL CALC-SCNC: 10 MMOL/L (ref 8–16)
AST SERPL-CCNC: 17 U/L (ref 10–40)
BASOPHILS # BLD AUTO: 0.02 K/UL (ref 0–0.2)
BASOPHILS NFR BLD: 0.4 % (ref 0–1.9)
BILIRUB SERPL-MCNC: 0.5 MG/DL (ref 0.1–1)
BUN SERPL-MCNC: 28 MG/DL (ref 8–23)
CALCIUM SERPL-MCNC: 8.6 MG/DL (ref 8.7–10.5)
CHLORIDE SERPL-SCNC: 106 MMOL/L (ref 95–110)
CO2 SERPL-SCNC: 27 MMOL/L (ref 23–29)
CREAT SERPL-MCNC: 1.3 MG/DL (ref 0.5–1.4)
DIFFERENTIAL METHOD: ABNORMAL
EOSINOPHIL # BLD AUTO: 0.1 K/UL (ref 0–0.5)
EOSINOPHIL NFR BLD: 2.3 % (ref 0–8)
ERYTHROCYTE [DISTWIDTH] IN BLOOD BY AUTOMATED COUNT: 13.2 % (ref 11.5–14.5)
EST. GFR  (NO RACE VARIABLE): 42 ML/MIN/1.73 M^2
FERRITIN SERPL-MCNC: 296 NG/ML (ref 20–300)
GLUCOSE SERPL-MCNC: 105 MG/DL (ref 70–110)
HCT VFR BLD AUTO: 37.8 % (ref 37–48.5)
HGB BLD-MCNC: 11.9 G/DL (ref 12–16)
IMM GRANULOCYTES # BLD AUTO: 0.01 K/UL (ref 0–0.04)
IMM GRANULOCYTES NFR BLD AUTO: 0.2 % (ref 0–0.5)
IRON SERPL-MCNC: 93 UG/DL (ref 30–160)
LYMPHOCYTES # BLD AUTO: 2 K/UL (ref 1–4.8)
LYMPHOCYTES NFR BLD: 35.8 % (ref 18–48)
MCH RBC QN AUTO: 32.4 PG (ref 27–31)
MCHC RBC AUTO-ENTMCNC: 31.5 G/DL (ref 32–36)
MCV RBC AUTO: 103 FL (ref 82–98)
MONOCYTES # BLD AUTO: 0.6 K/UL (ref 0.3–1)
MONOCYTES NFR BLD: 10.2 % (ref 4–15)
NEUTROPHILS # BLD AUTO: 2.9 K/UL (ref 1.8–7.7)
NEUTROPHILS NFR BLD: 51.1 % (ref 38–73)
NRBC BLD-RTO: 0 /100 WBC
PLATELET # BLD AUTO: 191 K/UL (ref 150–450)
PMV BLD AUTO: 11 FL (ref 9.2–12.9)
POTASSIUM SERPL-SCNC: 4 MMOL/L (ref 3.5–5.1)
PROT SERPL-MCNC: 6.9 G/DL (ref 6–8.4)
PTH-INTACT SERPL-MCNC: 213.4 PG/ML (ref 9–77)
RBC # BLD AUTO: 3.67 M/UL (ref 4–5.4)
SATURATED IRON: 35 % (ref 20–50)
SODIUM SERPL-SCNC: 143 MMOL/L (ref 136–145)
TOTAL IRON BINDING CAPACITY: 266 UG/DL (ref 250–450)
TRANSFERRIN SERPL-MCNC: 180 MG/DL (ref 200–375)
WBC # BLD AUTO: 5.61 K/UL (ref 3.9–12.7)

## 2023-05-30 PROCEDURE — 3078F PR MOST RECENT DIASTOLIC BLOOD PRESSURE < 80 MM HG: ICD-10-PCS | Mod: CPTII,S$GLB,, | Performed by: INTERNAL MEDICINE

## 2023-05-30 PROCEDURE — 83970 ASSAY OF PARATHORMONE: CPT | Performed by: INTERNAL MEDICINE

## 2023-05-30 PROCEDURE — 84466 ASSAY OF TRANSFERRIN: CPT | Performed by: INTERNAL MEDICINE

## 2023-05-30 PROCEDURE — 1159F MED LIST DOCD IN RCRD: CPT | Mod: CPTII,S$GLB,, | Performed by: INTERNAL MEDICINE

## 2023-05-30 PROCEDURE — 80053 COMPREHEN METABOLIC PANEL: CPT | Performed by: INTERNAL MEDICINE

## 2023-05-30 PROCEDURE — 82306 VITAMIN D 25 HYDROXY: CPT | Performed by: INTERNAL MEDICINE

## 2023-05-30 PROCEDURE — 3078F DIAST BP <80 MM HG: CPT | Mod: CPTII,S$GLB,, | Performed by: INTERNAL MEDICINE

## 2023-05-30 PROCEDURE — 99499 RISK ADDL DX/OHS AUDIT: ICD-10-PCS | Mod: S$GLB,,, | Performed by: INTERNAL MEDICINE

## 2023-05-30 PROCEDURE — 1101F PT FALLS ASSESS-DOCD LE1/YR: CPT | Mod: CPTII,S$GLB,, | Performed by: INTERNAL MEDICINE

## 2023-05-30 PROCEDURE — 99214 OFFICE O/P EST MOD 30 MIN: CPT | Mod: S$GLB,,, | Performed by: INTERNAL MEDICINE

## 2023-05-30 PROCEDURE — 1157F PR ADVANCE CARE PLAN OR EQUIV PRESENT IN MEDICAL RECORD: ICD-10-PCS | Mod: CPTII,S$GLB,, | Performed by: INTERNAL MEDICINE

## 2023-05-30 PROCEDURE — 99499 UNLISTED E&M SERVICE: CPT | Mod: S$GLB,,, | Performed by: INTERNAL MEDICINE

## 2023-05-30 PROCEDURE — 1159F PR MEDICATION LIST DOCUMENTED IN MEDICAL RECORD: ICD-10-PCS | Mod: CPTII,S$GLB,, | Performed by: INTERNAL MEDICINE

## 2023-05-30 PROCEDURE — 36415 COLL VENOUS BLD VENIPUNCTURE: CPT | Performed by: INTERNAL MEDICINE

## 2023-05-30 PROCEDURE — 1157F ADVNC CARE PLAN IN RCRD: CPT | Mod: CPTII,S$GLB,, | Performed by: INTERNAL MEDICINE

## 2023-05-30 PROCEDURE — 99999 PR PBB SHADOW E&M-EST. PATIENT-LVL III: CPT | Mod: PBBFAC,,, | Performed by: INTERNAL MEDICINE

## 2023-05-30 PROCEDURE — 3077F SYST BP >= 140 MM HG: CPT | Mod: CPTII,S$GLB,, | Performed by: INTERNAL MEDICINE

## 2023-05-30 PROCEDURE — 99999 PR PBB SHADOW E&M-EST. PATIENT-LVL III: ICD-10-PCS | Mod: PBBFAC,,, | Performed by: INTERNAL MEDICINE

## 2023-05-30 PROCEDURE — 1125F PR PAIN SEVERITY QUANTIFIED, PAIN PRESENT: ICD-10-PCS | Mod: CPTII,S$GLB,, | Performed by: INTERNAL MEDICINE

## 2023-05-30 PROCEDURE — 3288F PR FALLS RISK ASSESSMENT DOCUMENTED: ICD-10-PCS | Mod: CPTII,S$GLB,, | Performed by: INTERNAL MEDICINE

## 2023-05-30 PROCEDURE — 99214 PR OFFICE/OUTPT VISIT, EST, LEVL IV, 30-39 MIN: ICD-10-PCS | Mod: S$GLB,,, | Performed by: INTERNAL MEDICINE

## 2023-05-30 PROCEDURE — 85025 COMPLETE CBC W/AUTO DIFF WBC: CPT | Performed by: INTERNAL MEDICINE

## 2023-05-30 PROCEDURE — 3288F FALL RISK ASSESSMENT DOCD: CPT | Mod: CPTII,S$GLB,, | Performed by: INTERNAL MEDICINE

## 2023-05-30 PROCEDURE — 1125F AMNT PAIN NOTED PAIN PRSNT: CPT | Mod: CPTII,S$GLB,, | Performed by: INTERNAL MEDICINE

## 2023-05-30 PROCEDURE — 3077F PR MOST RECENT SYSTOLIC BLOOD PRESSURE >= 140 MM HG: ICD-10-PCS | Mod: CPTII,S$GLB,, | Performed by: INTERNAL MEDICINE

## 2023-05-30 PROCEDURE — 1101F PR PT FALLS ASSESS DOC 0-1 FALLS W/OUT INJ PAST YR: ICD-10-PCS | Mod: CPTII,S$GLB,, | Performed by: INTERNAL MEDICINE

## 2023-05-30 PROCEDURE — 82728 ASSAY OF FERRITIN: CPT | Performed by: INTERNAL MEDICINE

## 2023-05-30 RX ORDER — FUROSEMIDE 40 MG/1
40 TABLET ORAL 2 TIMES DAILY PRN
Qty: 180 TABLET | Refills: 3 | Status: SHIPPED | OUTPATIENT
Start: 2023-05-30

## 2023-05-30 RX ORDER — FLUTICASONE PROPIONATE AND SALMETEROL 500; 50 UG/1; UG/1
1 POWDER RESPIRATORY (INHALATION) 2 TIMES DAILY
Qty: 180 EACH | Refills: 3 | Status: SHIPPED | OUTPATIENT
Start: 2023-05-30 | End: 2023-11-06 | Stop reason: CLARIF

## 2023-05-30 RX ORDER — FUROSEMIDE 40 MG/1
40 TABLET ORAL 2 TIMES DAILY PRN
Qty: 60 TABLET | Refills: 11 | Status: SHIPPED | OUTPATIENT
Start: 2023-05-30 | End: 2023-05-30 | Stop reason: SDUPTHER

## 2023-05-30 RX ORDER — PREDNISONE 20 MG/1
TABLET ORAL
Qty: 21 TABLET | Refills: 1 | Status: SHIPPED | OUTPATIENT
Start: 2023-05-30 | End: 2023-11-30 | Stop reason: SDUPTHER

## 2023-05-30 RX ORDER — FLUTICASONE PROPIONATE AND SALMETEROL 500; 50 UG/1; UG/1
1 POWDER RESPIRATORY (INHALATION) 2 TIMES DAILY
Qty: 60 EACH | Refills: 11 | Status: SHIPPED | OUTPATIENT
Start: 2023-05-30 | End: 2023-05-30 | Stop reason: SDUPTHER

## 2023-05-30 RX ORDER — ALBUTEROL SULFATE 90 UG/1
AEROSOL, METERED RESPIRATORY (INHALATION)
Qty: 54 G | Refills: 3 | Status: SHIPPED | OUTPATIENT
Start: 2023-05-30

## 2023-05-30 RX ORDER — ALBUTEROL SULFATE 90 UG/1
AEROSOL, METERED RESPIRATORY (INHALATION)
Qty: 18 G | Refills: 11 | Status: SHIPPED | OUTPATIENT
Start: 2023-05-30 | End: 2023-05-30 | Stop reason: SDUPTHER

## 2023-05-30 NOTE — PATIENT INSTRUCTIONS
Excess edema may cause some short breath.  Should be fine to decrease edema if excessive -- and note if breathing improves.  You had some diastolic heart failure.  Chest xray 4/2021 viewed and suggested some fluid in lungs.     Will check oxygen sleeping and arrange oxygen if qualifies    You have copd and asthma-- use advair 500 at bedtime-- may use twice daily if breathing poorly.    Use prednisone one daily for 3 days if cough or wheezes badly.      Use albuterol as needed.    If above advair not reasonable-- could try nebulizer medications again??

## 2023-05-30 NOTE — PROGRESS NOTES
5/30/2023    Sammi Abreu  Office note    Chief Complaint   Patient presents with    Asthma     LOV 3/19/2021       HPI:    5/30/2023 having breathing worsening at night with wheezes, notes some schwartz walking about.    Singulair not used reg as not clear helps    Copay on advair was up so tried neb budesonide.  -- advair was 125/m or 3 m?    Still on coumadin from pe.   Not using cpap.  Ahi was 39 in 2008.      From [facility]:  3/19/2021- wearing CPAP most nights, wakes up in morning with facemask off. Nose was getting sore. Tried to adjust humidity on her own.   Noticed when she wears CPAP she wakes up less during night to go to bathroom. States feeling less tired over all after wearing CPAP.   Depressed due to living with chronic pain.   Wheeze- chronic complaint, daily, worse at night and when allergies are bothering her, feeling fluid in left lung when laying on left side. Associated with non productive cough. Has to prop head up when laying down. Improves with albuterol rescue and albuterol nebulizer before bed.   No SOB, feels her stamina is decreasing with time, has to stop when walking due to fatigue and pain not SOB  Patient Instructions   Recommend to keep wearing CPAP every night even if you wake up and take it off half way through the night, still put it on every day    Will reorder Advair daily for Asthma    Echo of heart did not measure pulmonary hypertension this time. Will continue to monitor.     12/14/2020- currently not using oxygen, first started July 2020, wants returned to DME company due to high cost.   Not able to afford Advair inhaler,   Complaint of wheeze- improved after starting advair daily, worse at night. Using nebulizer 2x daily. Associated with fatigue. Not currently wearing CPAP.   No SOB    9/11/2020- IN office with friend, ER visit 9/9/20 for pain right thigh DVT, Pain left anterior thigh seen in ER dc home. 10 on 0-10 scale; has previously seen vascular surgeon Dr. Alegria; no  improvement with OTC tylenol. Needing new cardiologist.   No SOB, no cough, no chest tightness,   Has CPAP at home, not currently using. Has nebulizer at home using as needed.  Used symbicort in hospital but not using at home.       Dr Se[facility] consult:  History of Present Illness:  Pt had laparoscopic rosette yesterday with post op low ox, cta with central pe.  Pt had prior h/o dvt- was on xarelto in 2015 - occurred with elective left hip replacement - left groin dvt.  Shinto.    Pt seen by me last yr with h/o wheezes and cough and sob, also osas- non compliant.  Pt has had episodes of sob but vague - some sob with no cough/wheezes. Vague left leg swelling.  Pt had back problems needing injections in past- last injections est 5 yrs ago.   Pt developed back pain, worse movement ppt eval pcp/pain doc- injection set up.  She also developed left leg pain - upper thigh- occurred last couple months.  She was having eval with vascular doc for right leg with min right leg symptoms.  She had procedure with improved cirulation right.   Pt had bilat u/s legs in May, and right u/s in June -- both neg for dvt.    Pt developed sob- episodic with one episode of impending doom occurring 2 wks pta.    Pt had had some schwartz since severe episode 2 wks ago.    Pt had had severe pain in chest , then left leg, then sob - had eval at er , pcp, pain doc, then [facility]- biliary dz found and surg deferred til after back injection (injection was not effective for pain). Pt had lap rosette yesterday.  No recent cough/wheezes, no compliant with cpap.    Plan:She will need follow-up for pulmonary hypertension as it was found on her last echo. With adequate anticoagulation-good chance pulmonary hypertension will clear. 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 Complex case.   CTEP not expected to develop but may develop.  F/u echo in a yr or so (depending on symptoms)        4/22/2019- had dx 2009 sleep  apnea, got cpap but could not use-not clear how severe sleep.  Pt wore off and on 2 yrs with bronchitis ppt non compliance.  Had severe bronchitis with hemoptysis ppt stopping cpap.   H/o intermittent wheezes, worse night, ongoing seasonally, sister with asthma, pt onset- 2010.  Had normal pft 9/11/17.   Uses albuterol daily and nocturnal arousal daily later.  Sleeps on right side as left side breathing worsens.  Good sense smell.    Sinus problems with drainage and itchy eyes.  occ sinus infection- zpak    The chief compliant  problem varies with instablilty at time    PFSH:  Past Medical History:   Diagnosis Date    ALLERGIC RHINITIS     Anemia     Arthritis     Back pain     Bronchitis     Cataract     OU    CHF (congestive heart failure)     Cholelithiasis     COPD (chronic obstructive pulmonary disease)     Degenerative disc disease     Diabetes mellitus     pre diabetic    Diverticulosis     DVT (deep venous thrombosis)     Edema     Encounter for blood transfusion 1979    Fibromyalgia     Glaucoma     Gout     Hx of colonic polyps     Hyperlipidemia     Hypertension     Incontinence     Osteoporosis     Pulmonary embolism     Reflux     Refusal of blood transfusions as patient is Buddhism     Sleep apnea     non compliant with CPAP    Vestibulitis of ear          Past Surgical History:   Procedure Laterality Date    ANGIOGRAPHY OF LOWER EXTREMITY N/A 7/31/2019    Procedure: ANGIOGRAM, LOWER EXTREMITY;  Surgeon: Gino Arana MD;  Location: [facility] CATH/EP LAB;  Service: General;  Laterality: N/A;    ASPIRATION OF SOFT TISSUE Left 10/15/2020    Procedure: ASPIRATION, SOFT TISSUE;  Surgeon: Raul Diagnostic Provider;  Location: [facility] OR;  Service: General;  Laterality: Left;    COLONOSCOPY N/A 8/13/2020    Procedure: COLONOSCOPY;  Surgeon: Sue Nuñez MD;  Location: [facility] ENDO;  Service: Endoscopy;  Laterality: N/A;    ESOPHAGOGASTRODUODENOSCOPY N/A 8/12/2020    Procedure: EGD  (ESOPHAGOGASTRODUODENOSCOPY);  Surgeon: Sue Nuñez MD;  Location: [facility] ENDO;  Service: Endoscopy;  Laterality: N/A;    HIP SURGERY      HYSTERECTOMY      INTRALUMINAL GASTROINTESTINAL TRACT IMAGING VIA CAPSULE N/A 2020    Procedure: IMAGING PROCEDURE, GI TRACT, INTRALUMINAL, VIA CAPSULE;  Surgeon: Sue Nuñez MD;  Location: [facility] ENDO;  Service: Endoscopy;  Laterality: N/A;    JOINT REPLACEMENT      B total hip    LAPAROSCOPIC CHOLECYSTECTOMY N/A 2020    Procedure: CHOLECYSTECTOMY, LAPAROSCOPIC;  Surgeon: Jomar Mcdonald MD;  Location: [facility] OR;  Service: General;  Laterality: N/A;    TRANSFORAMINAL EPIDURAL INJECTION OF STEROID Left 2020    Procedure: Injection,steroid,epidural,transforaminal approach;  Surgeon: Matt Gilliam MD;  Location: [facility] OR;  Service: Pain Management;  Laterality: Left;  L2-3, L3-4    TRANSFORAMINAL EPIDURAL INJECTION OF STEROID Left 2021    Procedure: Injection,steroid,epidural,transforaminal approach;  Surgeon: Matt Gilliam MD;  Location: [facility] OR;  Service: Pain Management;  Laterality: Left;  L2-3, L3-4     Social History     Tobacco Use    Smoking status: Former     Packs/day: 0.25     Years: 5.00     Pack years: 1.25     Types: Cigarettes     Quit date: 1972     Years since quittin.4    Smokeless tobacco: Never   Substance Use Topics    Alcohol use: Yes     Alcohol/week: 0.0 standard drinks     Comment: Rarely    Drug use: Not Currently     Family History   Problem Relation Age of Onset    Hypertension Mother     Stroke Father     Hypertension Father     Diabetes Sister     Cancer Sister     Stomach cancer Sister     Hypertension Sister     Bipolar disorder Sister     Peripheral vascular disease Sister     Hypertension Brother     Stroke Brother     Peripheral vascular disease Brother     Hypertension Son     Obesity Son     Early death Son 48    Arthritis Son     Glaucoma Neg Hx     Macular degeneration Neg Hx     Retinal detachment Neg Hx   "    Review of patient's allergies indicates:   Allergen Reactions    Daptomycin Other (See Comments)     Kidney damage     Cymbalta [duloxetine] Other (See Comments)     Nightmares      Darvon [propoxyphene] Nausea Only and Other (See Comments)     Sweating, slept for 3 days    Atorvastatin Other (See Comments)     Muscle cramps    Naprosyn [naproxen] Nausea Only    Penicillins Rash    Tramadol Nausea Only and Palpitations       Performance Status:The patient's activity level is functions out of house.      Review of Systems:  a review of eleven systems covering constitutional, Eye, HEENT, Psych, Respiratory, Cardiac, GI, , Musculoskeletal, Endocrine, Dermatologic was negative except for pertinent findings as listed ABOVE and below:  Wheeze, cough, pertinent positive as above, rest is good       Exam:Comprehensive exam done. BP (!) 158/71 (BP Location: Left arm, Patient Position: Sitting, BP Method: Medium (Automatic))   Pulse (!) 58   Ht 5' 3" (1.6 m)   Wt 101 kg (222 lb 10.6 oz)   SpO2 98% Comment: on room air at rest  BMI 39.44 kg/m²   Exam included Vitals as listed, and patient's appearance and affect and alertness and mood, oral exam for yeast and hygiene and pharynx lesions and Mallapatti (M) score, neck with inspection for jvd and masses and thyroid abnormalities and lymph nodes (supraclavicular and infraclavicular nodes and axillary also examined and noted if abn), chest exam included symmetry and effort and fremitus and percussion and auscultation, cardiac exam included rhythm and gallops and murmur and rubs and jvd and edema, abdominal exam for mass and hepatosplenomegaly and tenderness and hernias and bowel sounds, Musculoskeletal exam with muscle tone and posture and mobility/gait and  strength, and skin for rashes and cyanosis and pallor and turgor, extremity for clubbing.  Findings were normal except for pertinent findings listed below:  M4, chest is symmetric, no distress, normal " percussion, normal fremitus and Breath sounds clear      Radiographs (ct chest and cxr) reviewed: view by direct vision  - 3/6/18 ct chest mild prominent bronchi with calcified aorta  X-Ray Chest PA And Lateral 12/14/2020 Normal.       CT Chest Without Contrast 11/01/20   7 mm pleural-based groundglass opacity in the right upper lobe with  minimal reticular nodular opacities in the right lung base and  atelectasis in the right middle lobe. Findings are nonspecific and may  be secondary to infectious or inflammatory process    X-Ray Chest AP Portable 11/01/2020   There is elevation the right hemidiaphragm.     Lungs are clear. Pulmonary vasculature is normal. No acute osseous abnormality.     NM Lung Scan Perfusion Particulate 11/01/20   There is small focal area of filling defect within the lateral right lung apex matching the area of minimal groundglass opacity within the right upper lobe. No additional focal areas of wedge filling defect are identified.     Transthoracic echo (TTE) limited 7/22/20   Grade I (mild) left ventricular diastolic dysfunction consistent with impaired relaxation   Transthoracic echo (TTE) complete 1/6/21   Indeterminate left ventricular diastolic function with elevated left atrial pressure.       Ct chest 3/6/18 calcified aorta, bronchiectasis mild lower lungs    Labs reviewed       Lab Results   Component Value Date    WBC 5.61 05/30/2023    RBC 3.67 (L) 05/30/2023    HGB 11.9 (L) 05/30/2023    HCT 37.8 05/30/2023     (H) 05/30/2023    MCH 32.4 (H) 05/30/2023    MCHC 31.5 (L) 05/30/2023    RDW 13.2 05/30/2023     05/30/2023    MPV 11.0 05/30/2023    GRAN 2.9 05/30/2023    GRAN 51.1 05/30/2023    LYMPH 2.0 05/30/2023    LYMPH 35.8 05/30/2023    MONO 0.6 05/30/2023    MONO 10.2 05/30/2023    EOS 0.1 05/30/2023    BASO 0.02 05/30/2023    EOSINOPHIL 2.3 05/30/2023    BASOPHIL 0.4 05/30/2023         Results for MEME SALAS (MRN [id number]) as of 4/22/2019 11:03   Ref. Range  12/22/2017 11:41 6/22/2018 08:48   Eosinophil% Latest Ref Range: 0.0 - 8.0 % 3.7 3.2   Eos # Latest Ref Range: 0.0 - 0.5 K/uL 0.2 0.2     PFT results reviewed 9/11/17 wnl.    Plan:  Clinical impression is apparently straight forward and impression with management as below.    Sammi was seen today for asthma.    Diagnoses and all orders for this visit:    Peripheral edema  -     Discontinue: furosemide (LASIX) 40 MG tablet; Take 1 tablet (40 mg total) by mouth 2 (two) times daily as needed (edema).  -     furosemide (LASIX) 40 MG tablet; Take 1 tablet (40 mg total) by mouth 2 (two) times daily as needed (edema).    Moderate persistent asthma without complication  -     Discontinue: albuterol (PROVENTIL/VENTOLIN HFA) 90 mcg/actuation inhaler; 2 puffs every 4 hours as needed for cough, wheeze, or shortness of breath  -     Discontinue: fluticasone-salmeterol diskus inhaler 500-50 mcg; Inhale 1 puff into the lungs 2 (two) times daily.  -     predniSONE (DELTASONE) 20 MG tablet; Take one daily for 3 days and may repeat for shortness of breath.  -     fluticasone-salmeterol diskus inhaler 500-50 mcg; Inhale 1 puff into the lungs 2 (two) times daily.  -     albuterol (PROVENTIL/VENTOLIN HFA) 90 mcg/actuation inhaler; 2 puffs every 4 hours as needed for cough, wheeze, or shortness of breath    Chronic sinus complaints    Chronic diastolic CHF (congestive heart failure)  -     Discontinue: furosemide (LASIX) 40 MG tablet; Take 1 tablet (40 mg total) by mouth 2 (two) times daily as needed (edema).  -     furosemide (LASIX) 40 MG tablet; Take 1 tablet (40 mg total) by mouth 2 (two) times daily as needed (edema).    Chronic obstructive pulmonary disease, unspecified COPD type    Asthma-COPD overlap syndrome  -     Discontinue: albuterol (PROVENTIL/VENTOLIN HFA) 90 mcg/actuation inhaler; 2 puffs every 4 hours as needed for cough, wheeze, or shortness of breath  -     PULSE OXIMETRY OVERNIGHT  -     albuterol (PROVENTIL/VENTOLIN  HFA) 90 mcg/actuation inhaler; 2 puffs every 4 hours as needed for cough, wheeze, or shortness of breath       - wear CPAP nightly    Follow up in about 6 months (around 11/30/2023), or if symptoms worsen or fail to improve.    Discussed with patient above for education the following:      Patient Instructions   Excess edema may cause some short breath.  Should be fine to decrease edema if excessive -- and note if breathing improves.  You had some diastolic heart failure.  Chest xray 4/2021 viewed and suggested some fluid in lungs.     Will check oxygen sleeping and arrange oxygen if qualifies    You have copd and asthma-- use advair 500 at bedtime-- may use twice daily if breathing poorly.    Use prednisone one daily for 3 days if cough or wheezes badly.      Use albuterol as needed.    If above advair not reasonable-- could try nebulizer medications again??

## 2023-06-07 ENCOUNTER — PES CALL (OUTPATIENT)
Dept: ADMINISTRATIVE | Facility: CLINIC | Age: 83
End: 2023-06-07
Payer: MEDICARE

## 2023-06-16 NOTE — H&P
[facility] hospital Medicine  History & Physical    Patient Name: Sammi Abreu  MRN: [id number]  Admission Date: 11/1/2020  Attending Physician: Gilberto Allen MD  Primary Care Provider: Osmani Villatoro MD         Patient information was obtained from patient, past medical records and ER records.     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    She was in her usual state of health until about one week ago when she started to experience extreme fatigue associated with exertional dyspnea.  Today she noticed right-sided chest pressure which is worse with exertion prompting her to come to the ED. Symptoms are moderate in nature.  She denies associated fever, chills, cough or lightheadedness/dizziness.  She endorses chronic bilateral lower extremity edema.  She recently finished a course of antibiotics (doxycycline) prescribed by nephrologist  Of note she was recently on enoxaparin bridge to accommodate left hip aspiration.  She has restarted her warfarin about 4 days ago and INR tonight is 2.0.    In the ED:  Hemodynamically stable.  Labs notable for LUIZ on CKD stage 3. Initial troponin within normal limits. CT chest non-contrast with 7 mm GGO in the right upper lobe. Lung scan with low probability for pulmonary embolism.     Rest of the 10 point review of systems is negative except as mentioned above.      Past Medical History:   Diagnosis Date    ALLERGIC RHINITIS     Anemia     Arthritis     Back pain     Bronchitis     Cataract     OU    Cholelithiasis     COPD (chronic obstructive pulmonary disease)     Degenerative disc disease      Reception has attempted to reach patient several times.    2 phone calls  Letter mailed    Patient has not yet scheduled appointment.      Diabetes mellitus     pre diabetic    Diverticulosis     DVT (deep venous thrombosis)     Edema     Encounter for blood transfusion 1979    Fibromyalgia     Glaucoma     Gout     Hx of colonic polyps     Hyperlipidemia     Hypertension     Incontinence     Osteoporosis     Reflux     Refusal of blood transfusions as patient is [employer]     Sleep apnea     non compliant with CPAP    Vestibulitis of ear        Past Surgical History:   Procedure Laterality Date    ANGIOGRAPHY OF LOWER EXTREMITY N/A 7/31/2019    Procedure: ANGIOGRAM, LOWER EXTREMITY;  Surgeon: Gino Arana MD;  Location: [facility] CATH/EP LAB;  Service: General;  Laterality: N/A;    ASPIRATION OF SOFT TISSUE Left 10/15/2020    Procedure: ASPIRATION, SOFT TISSUE;  Surgeon: [facility] Diagnostic Provider;  Location: [facility] OR;  Service: General;  Laterality: Left;    COLONOSCOPY N/A 8/13/2020    Procedure: COLONOSCOPY;  Surgeon: Sue Nuñez MD;  Location: [facility] ENDO;  Service: Endoscopy;  Laterality: N/A;    ESOPHAGOGASTRODUODENOSCOPY N/A 8/12/2020    Procedure: EGD (ESOPHAGOGASTRODUODENOSCOPY);  Surgeon: Sue Nuñez MD;  Location: [facility] ENDO;  Service: Endoscopy;  Laterality: N/A;    HIP SURGERY      HYSTERECTOMY      INTRALUMINAL GASTROINTESTINAL TRACT IMAGING VIA CAPSULE N/A 9/9/2020    Procedure: IMAGING PROCEDURE, GI TRACT, INTRALUMINAL, VIA CAPSULE;  Surgeon: Sue Nuñez MD;  Location: [facility] ENDO;  Service: Endoscopy;  Laterality: N/A;    JOINT REPLACEMENT      B total hip    LAPAROSCOPIC CHOLECYSTECTOMY N/A 7/13/2020    Procedure: CHOLECYSTECTOMY, LAPAROSCOPIC;  Surgeon: Jomar Mcdonald MD;  Location: [facility] OR;  Service: General;  Laterality: N/A;    TRANSFORAMINAL EPIDURAL INJECTION OF STEROID Left 6/30/2020    Procedure: Injection,steroid,epidural,transforaminal approach;  Surgeon: Matt Gilliam MD;  Location: [facility] OR;  Service: Pain Management;  Laterality: Left;  L2-3, L3-4       Review of patient's  allergies indicates:   Allergen Reactions    Daptomycin Other (See Comments)     Kidney damage     Cymbalta [duloxetine] Other (See Comments)     Nightmares      Darvon [propoxyphene] Nausea Only and Other (See Comments)     Sweating, slept for 3 days    Atorvastatin Other (See Comments)     Muscle cramps    Naprosyn [naproxen] Nausea Only    Penicillins Rash    Tramadol Nausea Only and Palpitations       Current Facility-Administered Medications on File Prior to Encounter   Medication    lactated ringers infusion    lidocaine (PF) 10 mg/ml (1%) injection 10 mg     Current Outpatient Medications on File Prior to Encounter   Medication Sig    albuterol-ipratropium (DUO-NEB) 2.5 mg-0.5 mg/3 mL nebulizer solution Take 3 mLs by nebulization every 8 (eight) hours.    doxycycline (VIBRA-TABS) 100 MG tablet Take 100 mg by mouth every 12 (twelve) hours.     esomeprazole (NEXIUM) 20 MG capsule Take 1 capsule (20 mg total) by mouth before breakfast.    fluticasone-salmeterol 230-21 mcg/dose (ADVAIR HFA) 230-21 mcg/actuation HFAA inhaler Inhale 2 puffs into the lungs 2 (two) times daily. Controller    furosemide (LASIX) 20 MG tablet Take 1 tablet (20 mg total) by mouth daily as needed (edema).    HYDROcodone-acetaminophen (NORCO) 5-325 mg per tablet Take 1 tablet by mouth every 8 (eight) hours as needed for Pain. Medical necessary for greater than 7 days for chronic pain.    metoprolol succinate (TOPROL-XL) 50 MG 24 hr tablet Take 1 tablet (50 mg total) by mouth once daily.    montelukast (SINGULAIR) 10 mg tablet Take 1 tablet (10 mg total) by mouth every evening.    multivitamin capsule Take 1 capsule by mouth once daily.    topiramate (TOPAMAX) 25 MG tablet 1 tab at night for 5 days then 2 tab at HS.    warfarin (COUMADIN) 5 MG tablet Tulio.e 1-1.5 Tablets QPM as instructed by coumadin clinic    acetaminophen (TYLENOL) 325 MG tablet Take 2 tablets (650 mg total) by mouth every 6 (six) hours as needed (Do  not take with any other Tylenol or acetaminophen containing products).    ascorbic acid, vitamin C, (VITAMIN C) 100 MG tablet Take 5 tablets (500 mg total) by mouth every evening.    budesonide-formoterol 160-4.5 mcg (SYMBICORT) 160-4.5 mcg/actuation HFAA Inhale 2 puffs into the lungs every 12 (twelve) hours. Controller    cyanocobalamin, vitamin B-12, 2,500 mcg Lozg Place 2 tablets under the tongue once daily.    fluticasone propionate (FLONASE) 50 mcg/actuation nasal spray 2 sprays (100 mcg total) by Each Nostril route once daily.    folic acid (FOLVITE) 1 MG tablet Take 1 tablet (1 mg total) by mouth once daily.    hydrALAZINE (APRESOLINE) 50 MG tablet Take 1 tablet (50 mg total) by mouth every 8 (eight) hours.    ondansetron (ZOFRAN-ODT) 4 MG TbDL Take 1 tablet (4 mg total) by mouth every 8 (eight) hours as needed.    [DISCONTINUED] enoxaparin (LOVENOX) 80 mg/0.8 mL Syrg Inject 0.8 mLs (80 mg total) into the skin 2 (two) times a day.     Family History     Problem Relation (Age of Onset)    Arthritis Son    Bipolar disorder Sister    Cancer Sister    Diabetes Sister    Early death Son (48)    Hypertension Mother, Sister, Father, Brother, Son    Obesity Son    Peripheral vascular disease Sister, Brother    Stomach cancer Sister    Stroke Father, Brother        Tobacco Use    Smoking status: Former Smoker     Packs/day: 0.25     Years: 5.00     Pack years: 1.25     Quit date: 1972     Years since quittin.8    Smokeless tobacco: Never Used   Substance and Sexual Activity    Alcohol use: Yes     Alcohol/week: 0.0 standard drinks     Comment: Rarely    Drug use: Not Currently    Sexual activity: Not on file       Objective:     Vital Signs (Most Recent):  Temp: 97.7 °F (36.5 °C) (20)  Pulse: 71 (20)  Resp: 20 (20)  BP: (!) 164/68 (20)  SpO2: 97 % (20) Vital Signs (24h Range):  Temp:  [97.7 °F (36.5 °C)] 97.7 °F (36.5 °C)  Pulse:  [66-90]  71  Resp:  [16-31] 20  SpO2:  [97 %-99 %] 97 %  BP: (164-223)/(68-99) 164/68     Weight: 96.2 kg (212 lb)  Body mass index is 37.55 kg/m².    Physical Exam  Vitals signs and nursing note reviewed.   Constitutional:       General: She is not in acute distress.     Appearance: She is well-developed. She is obese.   HENT:      Head: Normocephalic and atraumatic.   Eyes:      General: No scleral icterus.     Conjunctiva/sclera: Conjunctivae normal.   Neck:      Musculoskeletal: Neck supple.      Thyroid: No thyromegaly.   Cardiovascular:      Rate and Rhythm: Normal rate and regular rhythm.      Heart sounds: Normal heart sounds. No murmur.   Pulmonary:      Effort: Pulmonary effort is normal. No respiratory distress.      Breath sounds: Normal breath sounds.   Abdominal:      General: Bowel sounds are normal.      Palpations: Abdomen is soft.      Tenderness: There is no abdominal tenderness.   Musculoskeletal:         General: No deformity.      Right lower leg: Edema present.      Left lower leg: Edema present.   Skin:     General: Skin is warm and dry.      Capillary Refill: Capillary refill takes less than 2 seconds.      Findings: No erythema.   Neurological:      General: No focal deficit present.      Mental Status: She is alert and oriented to person, place, and time.   Psychiatric:         Mood and Affect: Mood normal.         Behavior: Behavior normal.             Significant Labs:   CBC:   Recent Labs   Lab 11/01/20  1805   WBC 8.73   HGB 12.2   HCT 38.1        CMP:   Recent Labs   Lab 11/01/20  1805      K 4.8      CO2 23   *   BUN 34*   CREATININE 1.8*   CALCIUM 9.6   PROT 7.7   ALBUMIN 4.1   BILITOT 0.6   ALKPHOS 58   AST 21   ALT 20   ANIONGAP 12   EGFRNONAA 26.8*     Cardiac Markers:   Recent Labs   Lab 11/01/20  1805   *     Troponin:   Recent Labs   Lab 11/01/20  1805   TROPONINI <0.030       Significant Imaging: I have reviewed all pertinent imaging results/findings  within the past 24 hours.     Imaging Results          NM Lung Scan Perfusion Particulate (Final result)  Result time 11/01/20 20:19:00   Procedure changed from NM Lung Scan Ventilation Perfusion     Final result by Matt Gaffney MD (11/01/20 20:19:00)                 Narrative:    EXAM DESCRIPTION: NM LUNG SCAN PERFUSION 11/1/2020 9:29 PM CST    CLINICAL HISTORY: 79 years, Female, PE suspected, high pretest prob    COMPARISON: Recent chest x-ray performed 11/1/2020, recent CT scan of the chest without contrast performed 11/1/2020.    FINDINGS:    Following intravenous administration of 4.8 millicuries of technetium 99-m MAA, perfusion images were acquired in the standard six projections. No inhalation study was performed due to Covid 19 and the medial and unable to sterilize inhalation/gas tubing. Correlation is made with the chest radiograph and CT scan date 11/1/2020.    There is small focal area of filling defect within the lateral right lung apex matching the area of minimal groundglass opacity within the right upper lobe. No additional focal areas of wedge filling defect are identified.    IMPRESSION:    LOW PROBABILITY FOR PULMONARY EMBOLISM.    Electronically signed by:  aMtt Gaffney MD  11/1/2020 9:32 PM CST Workstation: [id number]                             CT Chest Without Contrast (Final result)  Result time 11/01/20 19:18:11   Procedure changed from CTA Chest Non-Coronary (PE Study)     Final result by Beti Mercado MD (11/01/20 19:18:11)                 Narrative:    CMS MANDATED QUALITY DATA - CT RADIATION - 436    All CT scans at this facility utilize dose modulation, iterative  reconstruction, and/or weight based dosing when appropriate to reduce  radiation dose to as low as reasonably achievable.        Reason: Chest pain, history of pulmonary emboli    TECHNIQUE: CT thorax without contrast    COMPARISON: CTA chest dated 7/14/2020    CT THORAX:  There is a 7 mm peripheral groundglass  opacity within the posterior  medial right upper lobe. There is atelectasis within the right middle  lobe. There are reticular nodular opacities within the right lung  base. The remainder the lungs are clear. The trachea and bronchi are  normal.    The heart and great vessels are normal. There is no mediastinal or  hilar adenopathy.    The visualized portion of the upper abdomen is unremarkable. There are  degenerative changes of the spine.    IMPRESSION:  7 mm pleural-based groundglass opacity in the right upper lobe with  minimal reticular nodular opacities in the right lung base and  atelectasis in the right middle lobe. Findings are nonspecific and may  be secondary to infectious or inflammatory process    Electronically Signed by Beti Mercado M.D. on 11/1/2020 7:34 PM                             X-Ray Chest AP Portable (Final result)  Result time 11/01/20 18:19:44    Final result by Beti Mercado MD (11/01/20 18:19:44)                 Impression:      No acute cardiopulmonary abnormality.      Electronically signed by: Beti Mercado MD  Date:    11/01/2020  Time:    18:19             Narrative:    EXAMINATION:  XR CHEST AP PORTABLE    CLINICAL HISTORY:  shortness of breath;    FINDINGS:  Portable chest at 18:03 hours is compared to 08/02/2020 shows normal cardiomediastinal silhouette. There is elevation the right hemidiaphragm.    Lungs are clear. Pulmonary vasculature is normal. No acute osseous abnormality.                                EKG: Independently interpreted. Normal sinus rhythm. No acute ST-T wave changes      Assessment/Plan:         Active Hospital Problems    Diagnosis  POA    *Atypical pneumonia [J18.9]  Yes    Acute renal failure superimposed on stage 3b chronic kidney disease [N17.9, N18.32]  Yes    Pneumonia due to infectious organism [J18.9]  Yes    History of pulmonary embolism [Z86.711]  Yes    CHARLIE (obstructive sleep apnea) [G47.33]  Yes    GERD (gastroesophageal reflux  disease) [K21.9]  Yes    Diastolic dysfunction, left ventricle [I51.9]  Yes      Resolved Hospital Problems   No resolved problems to display.       Plan:  Right sided pleuritic chest pain and dyspnea - imaging with GGO - ?atypical pneumonia  Check procalcitonin. Follow blood cultures. Obtain sputum cx if able   Low suspicion for PE; INR 2 on admission and V/Q scan with low probability   Start empiric levofloxacin; renal dose  Continue home scheduled nebulizers and inhalers  IV fluids for LUIZ on CKD stage 3  Trend troponin; reviewed echo from July 2020 - Normal LVEF with grade 1 diastolic dysfunction   Continue daily warfarin and monitor daily INR/PT      VTE Risk Mitigation (From admission, onward)         Ordered     warfarin (COUMADIN) tablet 5 mg  Daily      11/01/20 2206     IP VTE HIGH RISK PATIENT  Once      11/01/20 2206     Place sequential compression device  Until discontinued      11/01/20 2106                   Gilberto Allen MD  Department of Hospital Medicine   [facility]

## 2023-06-22 ENCOUNTER — PES CALL (OUTPATIENT)
Dept: ADMINISTRATIVE | Facility: CLINIC | Age: 83
End: 2023-06-22
Payer: MEDICARE

## 2023-06-22 ENCOUNTER — TELEPHONE (OUTPATIENT)
Dept: PULMONOLOGY | Facility: CLINIC | Age: 83
End: 2023-06-22
Payer: MEDICARE

## 2023-06-22 NOTE — TELEPHONE ENCOUNTER
Reached out to [facility]. They did not see order. They will outsource order now. I called patient and advised.   ----- Message from Nikki Jordan, Patient Care Assistant sent at 6/22/2023 10:09 AM CDT -----  Regarding: advice  Contact: pt  Type: Needs Medical Advice    Who Called:  pt     Best Call Back Number: [phone] (home)     Additional Information: pt states she would like a callback regarding orders for a test. Please call pt to advise. Thanks!

## 2023-06-24 DIAGNOSIS — I15.2 HYPERTENSION ASSOCIATED WITH DIABETES: ICD-10-CM

## 2023-06-24 DIAGNOSIS — E11.59 HYPERTENSION ASSOCIATED WITH DIABETES: ICD-10-CM

## 2023-06-26 NOTE — TELEPHONE ENCOUNTER
Refill Routing Note   Refill Routing Note   Medication(s) are not appropriate for processing by [facility] for the following reason(s):      Medication outside of protocol    [facility] action(s):  Route None identified        Medication reconciliation completed: No     Appointments  past 12m or future 3m with PCP    Date Provider   Last Visit   4/21/2023 Osmani Villatoro MD   Next Visit   11/6/2023 Osmani Villatoro MD   ED visits in past 90 days: 0        Note composed:9:30 AM 06/26/2023

## 2023-06-28 ENCOUNTER — TELEPHONE (OUTPATIENT)
Dept: PULMONOLOGY | Facility: CLINIC | Age: 83
End: 2023-06-28
Payer: MEDICARE

## 2023-06-28 NOTE — TELEPHONE ENCOUNTER
Spoke to patient.  Informed her that I'd look into the order.  The order was faxed to [facility] to reach out to patient to get this scheduled ASAP.

## 2023-06-28 NOTE — TELEPHONE ENCOUNTER
----- Message from Roberta Uribe sent at 6/28/2023  9:50 AM CDT -----  Contact: self  Type: Needs Medical Advice  Who Called:  Patient    Best Call Back Number: [phone]    Additional Information: Pt states she would like to speak with office regarding test results from an overnight  test.Please call back

## 2023-07-02 RX ORDER — LANOLIN ALCOHOL/MO/W.PET/CERES
CREAM (GRAM) TOPICAL
Qty: 60 TABLET | Refills: 0 | Status: SHIPPED | OUTPATIENT
Start: 2023-07-02 | End: 2023-07-27 | Stop reason: SDUPTHER

## 2023-07-04 ENCOUNTER — PATIENT MESSAGE (OUTPATIENT)
Dept: PULMONOLOGY | Facility: CLINIC | Age: 83
End: 2023-07-04
Payer: MEDICARE

## 2023-07-04 RX ORDER — LOSARTAN POTASSIUM 50 MG/1
TABLET ORAL
Qty: 90 TABLET | Refills: 0 | Status: SHIPPED | OUTPATIENT
Start: 2023-07-04 | End: 2023-09-27

## 2023-07-04 NOTE — TELEPHONE ENCOUNTER
Refill Routing Note   Medication(s) are not appropriate for processing by [facility] for the following reason(s):      Required vitals abnormal    [facility] action(s):  Defer None identified            Appointments  past 12m or future 3m with PCP    Date Provider   Last Visit   4/21/2023 Osmani Villatoro MD   Next Visit   11/6/2023 Osmani Villatoro MD   ED visits in past 90 days: 0        Note composed:4:39 PM 07/04/2023

## 2023-07-04 NOTE — TELEPHONE ENCOUNTER
No care due was identified.  [facility] Embedded Care Due Messages. Reference number: [id number].   7/04/2023 4:27:22 PM CDT

## 2023-07-05 ENCOUNTER — TELEPHONE (OUTPATIENT)
Dept: PULMONOLOGY | Facility: CLINIC | Age: 83
End: 2023-07-05
Payer: MEDICARE

## 2023-07-05 NOTE — TELEPHONE ENCOUNTER
----- Message from Gabe Suero MD sent at 7/3/2023  5:11 PM CDT -----  Nocturnal ox sat recording had low oxygen.  Looked like sleep apnea -- I called but no answer.  She would be best treated with cpap but ox could be ordered if she directs and understands .

## 2023-07-12 ENCOUNTER — TELEPHONE (OUTPATIENT)
Dept: PULMONOLOGY | Facility: CLINIC | Age: 83
End: 2023-07-12
Payer: MEDICARE

## 2023-07-27 DIAGNOSIS — I15.2 HYPERTENSION ASSOCIATED WITH DIABETES: ICD-10-CM

## 2023-07-27 DIAGNOSIS — E11.59 HYPERTENSION ASSOCIATED WITH DIABETES: ICD-10-CM

## 2023-07-27 RX ORDER — LANOLIN ALCOHOL/MO/W.PET/CERES
CREAM (GRAM) TOPICAL
Qty: 60 TABLET | Refills: 0 | Status: SHIPPED | OUTPATIENT
Start: 2023-07-27 | End: 2023-08-23

## 2023-07-27 NOTE — TELEPHONE ENCOUNTER
Refill Routing Note   Medication(s) are not appropriate for processing by [facility] for the following reason(s):      Medication outside of protocol    [facility] action(s):  Route Care Due:  None identified              Appointments  past 12m or future 3m with PCP    Date Provider   Last Visit   4/21/2023 Osmani Villatoro MD   Next Visit   11/6/2023 Osmani Villatoro MD   ED visits in past 90 days: 0        Note composed:8:10 AM 07/27/2023

## 2023-08-07 ENCOUNTER — TELEPHONE (OUTPATIENT)
Dept: PULMONOLOGY | Facility: CLINIC | Age: 83
End: 2023-08-07
Payer: MEDICARE

## 2023-08-07 NOTE — TELEPHONE ENCOUNTER
----- Message from Gabe Suero MD sent at 8/7/2023  1:09 PM CDT -----  Nocturnal ox sat recording had low oxygen.  Looked like sleep apnea -- I called but no answer.  She would be best treated with cpap but ox could be ordered if she directs and understands .  low sat 76, oxy desat index 44!sat below 89 fo 44 min--- the above is from note last month. No changes.

## 2023-08-07 NOTE — TELEPHONE ENCOUNTER
Spoke to patient.  Informed her of the results.  She stated that she did restart the CPAP after she listened to your message you left for her.  Verbalized understanding.

## 2023-08-14 ENCOUNTER — OFFICE VISIT (OUTPATIENT)
Dept: HOME HEALTH SERVICES | Facility: CLINIC | Age: 83
End: 2023-08-14
Payer: MEDICARE

## 2023-08-14 VITALS
SYSTOLIC BLOOD PRESSURE: 158 MMHG | OXYGEN SATURATION: 97 % | WEIGHT: 220 LBS | DIASTOLIC BLOOD PRESSURE: 74 MMHG | BODY MASS INDEX: 38.97 KG/M2 | HEART RATE: 56 BPM

## 2023-08-14 DIAGNOSIS — E78.5 HYPERLIPIDEMIA ASSOCIATED WITH TYPE 2 DIABETES MELLITUS: ICD-10-CM

## 2023-08-14 DIAGNOSIS — K21.9 GASTROESOPHAGEAL REFLUX DISEASE, UNSPECIFIED WHETHER ESOPHAGITIS PRESENT: ICD-10-CM

## 2023-08-14 DIAGNOSIS — M19.90 ARTHRITIS: ICD-10-CM

## 2023-08-14 DIAGNOSIS — N18.31 TYPE 2 DIABETES MELLITUS WITH STAGE 3A CHRONIC KIDNEY DISEASE, WITHOUT LONG-TERM CURRENT USE OF INSULIN: ICD-10-CM

## 2023-08-14 DIAGNOSIS — G47.33 OSA (OBSTRUCTIVE SLEEP APNEA): ICD-10-CM

## 2023-08-14 DIAGNOSIS — I15.2 HYPERTENSION ASSOCIATED WITH DIABETES: ICD-10-CM

## 2023-08-14 DIAGNOSIS — E66.01 CLASS 2 SEVERE OBESITY WITH SERIOUS COMORBIDITY AND BODY MASS INDEX (BMI) OF 39.0 TO 39.9 IN ADULT, UNSPECIFIED OBESITY TYPE: ICD-10-CM

## 2023-08-14 DIAGNOSIS — N25.81 HYPERPARATHYROIDISM, SECONDARY RENAL: ICD-10-CM

## 2023-08-14 DIAGNOSIS — D68.69 SECONDARY HYPERCOAGULABLE STATE: ICD-10-CM

## 2023-08-14 DIAGNOSIS — E11.69 HYPERLIPIDEMIA ASSOCIATED WITH TYPE 2 DIABETES MELLITUS: ICD-10-CM

## 2023-08-14 DIAGNOSIS — I50.32 CHRONIC DIASTOLIC CHF (CONGESTIVE HEART FAILURE): ICD-10-CM

## 2023-08-14 DIAGNOSIS — E11.59 HYPERTENSION ASSOCIATED WITH DIABETES: ICD-10-CM

## 2023-08-14 DIAGNOSIS — J44.89 ASTHMA-COPD OVERLAP SYNDROME: ICD-10-CM

## 2023-08-14 DIAGNOSIS — E11.22 TYPE 2 DIABETES MELLITUS WITH STAGE 3A CHRONIC KIDNEY DISEASE, WITHOUT LONG-TERM CURRENT USE OF INSULIN: ICD-10-CM

## 2023-08-14 DIAGNOSIS — Z00.00 ENCOUNTER FOR PREVENTIVE HEALTH EXAMINATION: Primary | ICD-10-CM

## 2023-08-14 DIAGNOSIS — I70.0 CALCIFICATION OF AORTA: ICD-10-CM

## 2023-08-14 DIAGNOSIS — M51.36 DDD (DEGENERATIVE DISC DISEASE), LUMBAR: ICD-10-CM

## 2023-08-14 PROBLEM — N18.30 STAGE 3 CHRONIC KIDNEY DISEASE: Status: ACTIVE | Noted: 2020-11-01

## 2023-08-14 PROBLEM — N17.9 AKI (ACUTE KIDNEY INJURY): Status: RESOLVED | Noted: 2020-08-03 | Resolved: 2023-08-14

## 2023-08-14 PROBLEM — R31.9 HEMATURIA: Status: RESOLVED | Noted: 2020-08-07 | Resolved: 2023-08-14

## 2023-08-14 PROBLEM — E87.1 HYPONATREMIA: Status: RESOLVED | Noted: 2020-08-05 | Resolved: 2023-08-14

## 2023-08-14 PROBLEM — J98.11 ATELECTASIS: Status: RESOLVED | Noted: 2020-08-03 | Resolved: 2023-08-14

## 2023-08-14 PROBLEM — R79.89 ELEVATED BRAIN NATRIURETIC PEPTIDE (BNP) LEVEL: Status: RESOLVED | Noted: 2020-08-03 | Resolved: 2023-08-14

## 2023-08-14 PROBLEM — B95.62 MRSA BACTEREMIA: Status: RESOLVED | Noted: 2020-07-22 | Resolved: 2023-08-14

## 2023-08-14 PROBLEM — I82.621 ACUTE DEEP VEIN THROMBOSIS (DVT) OF RIGHT UPPER EXTREMITY: Status: RESOLVED | Noted: 2020-08-05 | Resolved: 2023-08-14

## 2023-08-14 PROBLEM — J18.9 ATYPICAL PNEUMONIA: Status: RESOLVED | Noted: 2020-11-01 | Resolved: 2023-08-14

## 2023-08-14 PROBLEM — M62.82 NON-TRAUMATIC RHABDOMYOLYSIS: Status: RESOLVED | Noted: 2020-08-04 | Resolved: 2023-08-14

## 2023-08-14 PROBLEM — M33.13 DERMATOMYOSITIS: Status: RESOLVED | Noted: 2021-04-09 | Resolved: 2023-08-14

## 2023-08-14 PROBLEM — R78.81 MRSA BACTEREMIA: Status: RESOLVED | Noted: 2020-07-22 | Resolved: 2023-08-14

## 2023-08-14 PROBLEM — E44.0 MODERATE PROTEIN-CALORIE MALNUTRITION: Status: RESOLVED | Noted: 2020-08-03 | Resolved: 2023-08-14

## 2023-08-14 PROBLEM — J18.9 PNEUMONIA DUE TO INFECTIOUS ORGANISM: Status: RESOLVED | Noted: 2020-11-01 | Resolved: 2023-08-14

## 2023-08-14 PROBLEM — M33.90 DERMATOMYOSITIS: Status: RESOLVED | Noted: 2021-04-09 | Resolved: 2023-08-14

## 2023-08-14 PROBLEM — D75.829 HEPARIN INDUCED THROMBOCYTOPENIA (HIT): Status: RESOLVED | Noted: 2021-04-09 | Resolved: 2023-08-14

## 2023-08-14 PROBLEM — N18.30 TYPE 2 DIABETES MELLITUS WITH STAGE 3 CHRONIC KIDNEY DISEASE: Status: ACTIVE | Noted: 2020-07-22

## 2023-08-14 PROCEDURE — 3078F PR MOST RECENT DIASTOLIC BLOOD PRESSURE < 80 MM HG: ICD-10-PCS | Mod: CPTII,S$GLB,, | Performed by: NURSE PRACTITIONER

## 2023-08-14 PROCEDURE — 1126F PR PAIN SEVERITY QUANTIFIED, NO PAIN PRESENT: ICD-10-PCS | Mod: CPTII,S$GLB,, | Performed by: NURSE PRACTITIONER

## 2023-08-14 PROCEDURE — 3077F PR MOST RECENT SYSTOLIC BLOOD PRESSURE >= 140 MM HG: ICD-10-PCS | Mod: CPTII,S$GLB,, | Performed by: NURSE PRACTITIONER

## 2023-08-14 PROCEDURE — 1101F PT FALLS ASSESS-DOCD LE1/YR: CPT | Mod: CPTII,S$GLB,, | Performed by: NURSE PRACTITIONER

## 2023-08-14 PROCEDURE — 3288F PR FALLS RISK ASSESSMENT DOCUMENTED: ICD-10-PCS | Mod: CPTII,S$GLB,, | Performed by: NURSE PRACTITIONER

## 2023-08-14 PROCEDURE — 1170F FXNL STATUS ASSESSED: CPT | Mod: CPTII,S$GLB,, | Performed by: NURSE PRACTITIONER

## 2023-08-14 PROCEDURE — 1157F ADVNC CARE PLAN IN RCRD: CPT | Mod: CPTII,S$GLB,, | Performed by: NURSE PRACTITIONER

## 2023-08-14 PROCEDURE — 1170F PR FUNCTIONAL STATUS ASSESSED: ICD-10-PCS | Mod: CPTII,S$GLB,, | Performed by: NURSE PRACTITIONER

## 2023-08-14 PROCEDURE — G0439 PPPS, SUBSEQ VISIT: HCPCS | Mod: S$GLB,,, | Performed by: NURSE PRACTITIONER

## 2023-08-14 PROCEDURE — G0439 PR MEDICARE ANNUAL WELLNESS SUBSEQUENT VISIT: ICD-10-PCS | Mod: S$GLB,,, | Performed by: NURSE PRACTITIONER

## 2023-08-14 PROCEDURE — 1101F PR PT FALLS ASSESS DOC 0-1 FALLS W/OUT INJ PAST YR: ICD-10-PCS | Mod: CPTII,S$GLB,, | Performed by: NURSE PRACTITIONER

## 2023-08-14 PROCEDURE — 3078F DIAST BP <80 MM HG: CPT | Mod: CPTII,S$GLB,, | Performed by: NURSE PRACTITIONER

## 2023-08-14 PROCEDURE — 3077F SYST BP >= 140 MM HG: CPT | Mod: CPTII,S$GLB,, | Performed by: NURSE PRACTITIONER

## 2023-08-14 PROCEDURE — 1157F PR ADVANCE CARE PLAN OR EQUIV PRESENT IN MEDICAL RECORD: ICD-10-PCS | Mod: CPTII,S$GLB,, | Performed by: NURSE PRACTITIONER

## 2023-08-14 PROCEDURE — 1126F AMNT PAIN NOTED NONE PRSNT: CPT | Mod: CPTII,S$GLB,, | Performed by: NURSE PRACTITIONER

## 2023-08-14 PROCEDURE — 3288F FALL RISK ASSESSMENT DOCD: CPT | Mod: CPTII,S$GLB,, | Performed by: NURSE PRACTITIONER

## 2023-08-14 NOTE — PROGRESS NOTES
Sammi Abreu presented for a  Medicare AWV and comprehensive Health Risk Assessment today. The following components were reviewed and updated:    Medical history  Family History  Social history  Allergies and Current Medications  Health Risk Assessment  Health Maintenance  Care Team         ** See Completed Assessments for Annual Wellness Visit within the encounter summary.**         The following assessments were completed:  Living Situation  CAGE  Depression Screening  Timed Get Up and Go  Whisper Test  Cognitive Function Screening    Nutrition Screening  ADL Screening  PAQ Screening    Review for Opioid Screening: Patient does not have rx for Opioids.  Review for Substance Use Disorders: Patient does not use substance.      Vitals:    08/14/23 1211   BP: (!) 158/74   Pulse: (!) 56   SpO2: 97%   Weight: 99.8 kg (220 lb)     Body mass index is 38.97 kg/m².  Physical Exam  Vitals reviewed.   HENT:      Head: Normocephalic.   Eyes:      Pupils: Pupils are equal, round, and reactive to light.   Cardiovascular:      Rate and Rhythm: Normal rate and regular rhythm.      Heart sounds: Normal heart sounds.   Pulmonary:      Effort: Pulmonary effort is normal.      Breath sounds: Normal breath sounds.   Abdominal:      General: Bowel sounds are normal.      Palpations: Abdomen is soft.   Musculoskeletal:         General: Normal range of motion.      Cervical back: Normal range of motion.   Skin:     General: Skin is warm and dry.   Neurological:      Mental Status: She is alert and oriented to person, place, and time.   Psychiatric:         Behavior: Behavior normal.               Diagnoses and health risks identified today and associated recommendations/orders:    1. Encounter for preventive health examination  - Above assessments completed. Preventive measures and health maintenance reviewed with patient.  -discussed overdue 2nd shingles    2. Type 2 diabetes mellitus with stage 3a chronic kidney disease, without  long-term current use of insulin  Stable, followed by PCP  -A1C 5.6, no medications  -encouraged yearly eye exams  -ckd stable, followed by Dr. Sanz  -encouraged hydration and avoidance of NSAIDs    3. Hyperparathyroidism, secondary renal  Stable, followed by Nephrology  -on calcitriol    4. Secondary hypercoagulable state  Stable, followed by PCP  -on warfarin  -hx of PE and DVT    5. Asthma-COPD overlap syndrome  Stable, followed by Pulmonology  -inhalers, nebs    6. Calcification of aorta  Stable, followed by PCP  -declined statin, continue low fat/low chol diet    7. Chronic diastolic CHF (congestive heart failure)  Stable, followed by PCP  -lasix     8. Class 2 severe obesity with serious comorbidity and body mass index (BMI) of 39.0 to 39.9 in adult, unspecified obesity type  -Discussed weight reduction strategies, including following ADA/heart healthy/low chol diet, reducing portion sizes, caloric intake and snacking.  -Discussed importance of engaging in physical activity at least 5x/week for a minimum of 30 min/day.    9. Hyperlipidemia associated with type 2 diabetes mellitus  Stable, followed by PCP  -continue low chol/low fat diet    10. Hypertension associated with diabetes  Stable, followed by PCP  -on metoprolol, losartan, hydralazine    11. Gastroesophageal reflux disease, unspecified whether esophagitis present  Stable, followed by PCP  -on PPI    12. CHARLIE (obstructive sleep apnea)  -cpap used nightly    13. DDD (degenerative disc disease), lumbar  Stable, followed by PCP  -tylenol    14. Arthritis  -right knee, tylenol and voltaren prn      Provided Sammi with a 5-10 year written screening schedule and personal prevention plan. Recommendations were developed using the USPSTF age appropriate recommendations. Education, counseling, and referrals were provided as needed. After Visit Summary printed and given to patient which includes a list of additional screenings\tests needed.    Follow up in about  1 year (around 8/14/2024) for your next annual wellness visit.    Karime Cerda NP      I offered to discuss advanced care planning, including how to pick a person who would make decisions for you if you were unable to make them for yourself, called a health care power of , and what kind of decisions you might make such as use of life sustaining treatments such as ventilators and tube feeding when faced with a life limiting illness recorded on a living will that they will need to know. (How you want to be cared for as you near the end of your natural life)     X  Patient has advanced directives on file, which we reviewed, and they do not wish to make changes.

## 2023-08-14 NOTE — PATIENT INSTRUCTIONS
Counseling and Referral of Other Preventative  (Italic type indicates deductible and co-insurance are waived)    Patient Name: Sammi Abreu  Today's Date: 8/14/2023    Health Maintenance       Date Due Completion Date    COVID-19 Vaccine (5 - Pfizer series) 09/13/2022 7/19/2022    Shingles Vaccine (3 of 3) 06/14/2023 4/19/2023    Influenza Vaccine (1) 09/01/2023 10/25/2022    DEXA Scan 04/06/2024 4/6/2021    TETANUS VACCINE 06/22/2025 6/22/2015    Lipid Panel 10/18/2027 10/18/2022        No orders of the defined types were placed in this encounter.    The following information is provided to all patients.  This information is to help you find resources for any of the problems found today that may be affecting your health:                Living healthy guide: www.[facility].[place].gov      Understanding Diabetes: www.diabetes.org      Eating healthy: www.cdc.gov/healthyweight      [facility] home safety checklist: www.cdc.gov/steadi/patient.html      Agency on Aging: www.goea.[place].[facility]      Alcoholics anonymous (AA): www.aa.org      Physical Activity: www.gia.nih.gov/ub7vchy      Tobacco use: www.quitwithusla.org

## 2023-09-26 NOTE — TELEPHONE ENCOUNTER
No care due was identified.  [facility] Embedded Care Due Messages. Reference number: [id number].   9/26/2023 9:22:28 AM CDT

## 2023-09-26 NOTE — TELEPHONE ENCOUNTER
Refill Routing Note   Medication(s) are not appropriate for processing by [facility] for the following reason(s):      Required vitals abnormal    [facility] action(s):  Defer Care Due:  None identified              Appointments  past 12m or future 3m with PCP    Date Provider   Last Visit   4/21/2023 Osmani Villatoro MD   Next Visit   11/6/2023 Osmani Villatoro MD   ED visits in past 90 days: 0        Note composed:12:49 PM 09/26/2023

## 2023-09-27 RX ORDER — HYDROGEN PEROXIDE 3 %
SOLUTION, NON-ORAL MISCELLANEOUS
Qty: 180 CAPSULE | Refills: 1 | Status: SHIPPED | OUTPATIENT
Start: 2023-09-27 | End: 2024-03-26

## 2023-09-27 RX ORDER — LOSARTAN POTASSIUM 50 MG/1
TABLET ORAL
Qty: 90 TABLET | Refills: 1 | Status: SHIPPED | OUTPATIENT
Start: 2023-09-27 | End: 2024-04-02

## 2023-09-27 NOTE — TELEPHONE ENCOUNTER
No care due was identified.  [facility] Embedded Care Due Messages. Reference number: [id number].   9/27/2023 3:33:19 PM CDT

## 2023-09-27 NOTE — TELEPHONE ENCOUNTER
Refill Routing Note   Medication(s) are not appropriate for processing by [facility] for the following reason(s):      Drug-disease interaction    [facility] action(s):  Defer Care Due:  None identified     Medication Therapy Plan: Drug-Disease: esomeprazole and Liver cyst    Pharmacist review requested: Yes     Appointments  past 12m or future 3m with PCP    Date Provider   Last Visit   4/21/2023 Osmani Villatoro MD   Next Visit   11/6/2023 Osmani Villatoro MD   ED visits in past 90 days: 0        Note composed:3:43 PM 09/27/2023

## 2023-09-27 NOTE — TELEPHONE ENCOUNTER
Refill Decision Note   Sammi Abreu  is requesting a refill authorization.  Brief Assessment and Rationale for Refill:  Approve     Medication Therapy Plan:         Pharmacist review requested: Yes   Extended chart review required: Yes   Comments:     Note composed:6:25 PM 09/27/2023

## 2023-10-27 DIAGNOSIS — I82.621 ACUTE DEEP VEIN THROMBOSIS (DVT) OF RIGHT UPPER EXTREMITY, UNSPECIFIED VEIN: ICD-10-CM

## 2023-10-30 ENCOUNTER — LAB VISIT (OUTPATIENT)
Dept: LAB | Facility: HOSPITAL | Age: 83
End: 2023-10-30
Attending: FAMILY MEDICINE
Payer: MEDICARE

## 2023-10-30 DIAGNOSIS — N18.2 TYPE 2 DIABETES MELLITUS WITH STAGE 2 CHRONIC KIDNEY DISEASE, WITHOUT LONG-TERM CURRENT USE OF INSULIN: ICD-10-CM

## 2023-10-30 DIAGNOSIS — E11.59 HYPERTENSION ASSOCIATED WITH DIABETES: ICD-10-CM

## 2023-10-30 DIAGNOSIS — R10.10 PAIN OF UPPER ABDOMEN: ICD-10-CM

## 2023-10-30 DIAGNOSIS — E11.22 TYPE 2 DIABETES MELLITUS WITH STAGE 2 CHRONIC KIDNEY DISEASE, WITHOUT LONG-TERM CURRENT USE OF INSULIN: ICD-10-CM

## 2023-10-30 DIAGNOSIS — I15.2 HYPERTENSION ASSOCIATED WITH DIABETES: ICD-10-CM

## 2023-10-30 LAB
ANION GAP SERPL CALC-SCNC: 13 MMOL/L (ref 8–16)
BUN SERPL-MCNC: 27 MG/DL (ref 8–23)
CALCIUM SERPL-MCNC: 8.9 MG/DL (ref 8.7–10.5)
CHLORIDE SERPL-SCNC: 105 MMOL/L (ref 95–110)
CO2 SERPL-SCNC: 23 MMOL/L (ref 23–29)
CREAT SERPL-MCNC: 1.4 MG/DL (ref 0.5–1.4)
EST. GFR  (NO RACE VARIABLE): 38 ML/MIN/1.73 M^2
ESTIMATED AVG GLUCOSE: 114 MG/DL (ref 68–131)
GLUCOSE SERPL-MCNC: 99 MG/DL (ref 70–110)
HBA1C MFR BLD: 5.6 % (ref 4–5.6)
POTASSIUM SERPL-SCNC: 4.4 MMOL/L (ref 3.5–5.1)
SODIUM SERPL-SCNC: 141 MMOL/L (ref 136–145)

## 2023-10-30 PROCEDURE — 86364 TISS TRNSGLTMNASE EA IG CLAS: CPT | Performed by: FAMILY MEDICINE

## 2023-10-30 PROCEDURE — 80048 BASIC METABOLIC PNL TOTAL CA: CPT | Performed by: FAMILY MEDICINE

## 2023-10-30 PROCEDURE — 83880 ASSAY OF NATRIURETIC PEPTIDE: CPT | Performed by: FAMILY MEDICINE

## 2023-10-30 PROCEDURE — 83036 HEMOGLOBIN GLYCOSYLATED A1C: CPT | Performed by: FAMILY MEDICINE

## 2023-10-30 PROCEDURE — 36415 COLL VENOUS BLD VENIPUNCTURE: CPT | Mod: PO | Performed by: FAMILY MEDICINE

## 2023-10-30 RX ORDER — WARFARIN SODIUM 5 MG/1
TABLET ORAL
Qty: 120 TABLET | Refills: 0 | Status: SHIPPED | OUTPATIENT
Start: 2023-10-30 | End: 2024-01-17

## 2023-10-31 LAB — BNP SERPL-MCNC: 190 PG/ML (ref 0–99)

## 2023-11-03 LAB
GLIADIN PEPTIDE IGA SER-ACNC: 5.3 U/ML
GLIADIN PEPTIDE IGG SER-ACNC: 0.8 U/ML
IGA SERPL-MCNC: 300 MG/DL (ref 70–400)
TTG IGA SER-ACNC: 1.2 U/ML
TTG IGG SER-ACNC: <0.6 U/ML

## 2023-11-06 ENCOUNTER — OFFICE VISIT (OUTPATIENT)
Dept: FAMILY MEDICINE | Facility: CLINIC | Age: 83
End: 2023-11-06
Payer: MEDICARE

## 2023-11-06 VITALS
SYSTOLIC BLOOD PRESSURE: 116 MMHG | HEART RATE: 56 BPM | TEMPERATURE: 98 F | DIASTOLIC BLOOD PRESSURE: 60 MMHG | OXYGEN SATURATION: 97 % | HEIGHT: 63 IN | WEIGHT: 227.31 LBS | BODY MASS INDEX: 40.28 KG/M2

## 2023-11-06 DIAGNOSIS — N18.32 STAGE 3B CHRONIC KIDNEY DISEASE: ICD-10-CM

## 2023-11-06 DIAGNOSIS — I27.20 PULMONARY HYPERTENSION: ICD-10-CM

## 2023-11-06 DIAGNOSIS — I50.32 CHRONIC DIASTOLIC CHF (CONGESTIVE HEART FAILURE): ICD-10-CM

## 2023-11-06 DIAGNOSIS — J44.89 COPD WITH ASTHMA: ICD-10-CM

## 2023-11-06 DIAGNOSIS — M51.36 DDD (DEGENERATIVE DISC DISEASE), LUMBAR: ICD-10-CM

## 2023-11-06 DIAGNOSIS — E11.59 HYPERTENSION ASSOCIATED WITH DIABETES: ICD-10-CM

## 2023-11-06 DIAGNOSIS — R60.0 EDEMA OF EXTREMITIES: ICD-10-CM

## 2023-11-06 DIAGNOSIS — E21.1 HYPERPARATHYROIDISM , SECONDARY, NON-RENAL: ICD-10-CM

## 2023-11-06 DIAGNOSIS — I15.2 HYPERTENSION ASSOCIATED WITH DIABETES: ICD-10-CM

## 2023-11-06 DIAGNOSIS — E78.5 DYSLIPIDEMIA: ICD-10-CM

## 2023-11-06 DIAGNOSIS — R73.03 PREDIABETES: ICD-10-CM

## 2023-11-06 DIAGNOSIS — G47.33 OSA ON CPAP: ICD-10-CM

## 2023-11-06 DIAGNOSIS — I89.0 LYMPHEDEMA OF EXTREMITY: Primary | ICD-10-CM

## 2023-11-06 PROCEDURE — 3288F PR FALLS RISK ASSESSMENT DOCUMENTED: ICD-10-PCS | Mod: CPTII,S$GLB,, | Performed by: FAMILY MEDICINE

## 2023-11-06 PROCEDURE — 90694 VACC AIIV4 NO PRSRV 0.5ML IM: CPT | Mod: S$GLB,,, | Performed by: FAMILY MEDICINE

## 2023-11-06 PROCEDURE — 1125F AMNT PAIN NOTED PAIN PRSNT: CPT | Mod: CPTII,S$GLB,, | Performed by: FAMILY MEDICINE

## 2023-11-06 PROCEDURE — 99215 OFFICE O/P EST HI 40 MIN: CPT | Mod: S$GLB,,, | Performed by: FAMILY MEDICINE

## 2023-11-06 PROCEDURE — 99999 PR PBB SHADOW E&M-EST. PATIENT-LVL V: ICD-10-PCS | Mod: PBBFAC,,, | Performed by: FAMILY MEDICINE

## 2023-11-06 PROCEDURE — 1160F RVW MEDS BY RX/DR IN RCRD: CPT | Mod: CPTII,S$GLB,, | Performed by: FAMILY MEDICINE

## 2023-11-06 PROCEDURE — 1157F PR ADVANCE CARE PLAN OR EQUIV PRESENT IN MEDICAL RECORD: ICD-10-PCS | Mod: CPTII,S$GLB,, | Performed by: FAMILY MEDICINE

## 2023-11-06 PROCEDURE — 1157F ADVNC CARE PLAN IN RCRD: CPT | Mod: CPTII,S$GLB,, | Performed by: FAMILY MEDICINE

## 2023-11-06 PROCEDURE — 90694 FLU VACCINE - QUADRIVALENT - ADJUVANTED: ICD-10-PCS | Mod: S$GLB,,, | Performed by: FAMILY MEDICINE

## 2023-11-06 PROCEDURE — G0008 FLU VACCINE - QUADRIVALENT - ADJUVANTED: ICD-10-PCS | Mod: S$GLB,,, | Performed by: FAMILY MEDICINE

## 2023-11-06 PROCEDURE — 3074F PR MOST RECENT SYSTOLIC BLOOD PRESSURE < 130 MM HG: ICD-10-PCS | Mod: CPTII,S$GLB,, | Performed by: FAMILY MEDICINE

## 2023-11-06 PROCEDURE — 1101F PT FALLS ASSESS-DOCD LE1/YR: CPT | Mod: CPTII,S$GLB,, | Performed by: FAMILY MEDICINE

## 2023-11-06 PROCEDURE — 99215 PR OFFICE/OUTPT VISIT, EST, LEVL V, 40-54 MIN: ICD-10-PCS | Mod: S$GLB,,, | Performed by: FAMILY MEDICINE

## 2023-11-06 PROCEDURE — 3078F PR MOST RECENT DIASTOLIC BLOOD PRESSURE < 80 MM HG: ICD-10-PCS | Mod: CPTII,S$GLB,, | Performed by: FAMILY MEDICINE

## 2023-11-06 PROCEDURE — 1125F PR PAIN SEVERITY QUANTIFIED, PAIN PRESENT: ICD-10-PCS | Mod: CPTII,S$GLB,, | Performed by: FAMILY MEDICINE

## 2023-11-06 PROCEDURE — 1160F PR REVIEW ALL MEDS BY PRESCRIBER/CLIN PHARMACIST DOCUMENTED: ICD-10-PCS | Mod: CPTII,S$GLB,, | Performed by: FAMILY MEDICINE

## 2023-11-06 PROCEDURE — 99999 PR PBB SHADOW E&M-EST. PATIENT-LVL V: CPT | Mod: PBBFAC,,, | Performed by: FAMILY MEDICINE

## 2023-11-06 PROCEDURE — 1101F PR PT FALLS ASSESS DOC 0-1 FALLS W/OUT INJ PAST YR: ICD-10-PCS | Mod: CPTII,S$GLB,, | Performed by: FAMILY MEDICINE

## 2023-11-06 PROCEDURE — 3078F DIAST BP <80 MM HG: CPT | Mod: CPTII,S$GLB,, | Performed by: FAMILY MEDICINE

## 2023-11-06 PROCEDURE — 3074F SYST BP LT 130 MM HG: CPT | Mod: CPTII,S$GLB,, | Performed by: FAMILY MEDICINE

## 2023-11-06 PROCEDURE — 1159F MED LIST DOCD IN RCRD: CPT | Mod: CPTII,S$GLB,, | Performed by: FAMILY MEDICINE

## 2023-11-06 PROCEDURE — 3288F FALL RISK ASSESSMENT DOCD: CPT | Mod: CPTII,S$GLB,, | Performed by: FAMILY MEDICINE

## 2023-11-06 PROCEDURE — G0008 ADMIN INFLUENZA VIRUS VAC: HCPCS | Mod: S$GLB,,, | Performed by: FAMILY MEDICINE

## 2023-11-06 PROCEDURE — 1159F PR MEDICATION LIST DOCUMENTED IN MEDICAL RECORD: ICD-10-PCS | Mod: CPTII,S$GLB,, | Performed by: FAMILY MEDICINE

## 2023-11-06 RX ORDER — CALCITRIOL 0.25 UG/1
CAPSULE ORAL
COMMUNITY
Start: 2023-09-13 | End: 2023-11-06 | Stop reason: SDUPTHER

## 2023-11-06 RX ORDER — LANOLIN ALCOHOL/MO/W.PET/CERES
1 CREAM (GRAM) TOPICAL 2 TIMES DAILY
Qty: 60 TABLET | Refills: 3 | Status: SHIPPED | OUTPATIENT
Start: 2023-11-06 | End: 2023-11-06 | Stop reason: SDUPTHER

## 2023-11-06 RX ORDER — CALCITRIOL 0.25 UG/1
0.25 CAPSULE ORAL DAILY
Qty: 90 CAPSULE | Refills: 3 | Status: SHIPPED | OUTPATIENT
Start: 2023-11-06

## 2023-11-06 RX ORDER — HYDRALAZINE HYDROCHLORIDE 100 MG/1
100 TABLET, FILM COATED ORAL EVERY 12 HOURS
Qty: 180 TABLET | Refills: 4 | Status: SHIPPED | OUTPATIENT
Start: 2023-11-06

## 2023-11-06 RX ORDER — BUDESONIDE 0.5 MG/2ML
0.5 INHALANT ORAL DAILY
Qty: 60 ML | Refills: 3 | Status: SHIPPED | OUTPATIENT
Start: 2023-11-06 | End: 2023-11-30 | Stop reason: SDUPTHER

## 2023-11-06 RX ORDER — POTASSIUM CHLORIDE 20 MEQ/1
20 TABLET, EXTENDED RELEASE ORAL 2 TIMES DAILY
Qty: 60 TABLET | Refills: 3 | Status: SHIPPED | OUTPATIENT
Start: 2023-11-06

## 2023-11-06 RX ORDER — EZETIMIBE 10 MG/1
10 TABLET ORAL DAILY
Qty: 90 TABLET | Refills: 3 | Status: SHIPPED | OUTPATIENT
Start: 2023-11-06 | End: 2024-11-05

## 2023-11-06 RX ORDER — FOLIC ACID 1 MG/1
1000 TABLET ORAL DAILY
Qty: 90 TABLET | Refills: 3 | Status: SHIPPED | OUTPATIENT
Start: 2023-11-06

## 2023-11-06 RX ORDER — DICLOFENAC SODIUM 30 MG/G
GEL TOPICAL
Qty: 300 G | Refills: 1 | Status: SHIPPED | OUTPATIENT
Start: 2023-11-06 | End: 2024-03-08

## 2023-11-06 RX ORDER — METOPROLOL SUCCINATE 100 MG/1
100 TABLET, EXTENDED RELEASE ORAL DAILY
Qty: 180 TABLET | Refills: 3 | Status: SHIPPED | OUTPATIENT
Start: 2023-11-06

## 2023-11-06 RX ORDER — LANOLIN ALCOHOL/MO/W.PET/CERES
1 CREAM (GRAM) TOPICAL 2 TIMES DAILY
Qty: 180 TABLET | Refills: 3 | Status: SHIPPED | OUTPATIENT
Start: 2023-11-06

## 2023-11-06 NOTE — PROGRESS NOTES
[facility]     Subjective:    Chief Complaint:   Chief Complaint   Patient presents with    Follow-up     Blood pressure meds       History of Present Illness:  82 y.o. female presents for multiple issues.   HT.Patient denies any exertional chest pain, dyspnea, palpitations, syncope, orthopnea, edema or paroxysmal nocturnal dyspnea.  Lymphedema.Edema: Patient complains of edema. The location of the edema is lower leg(s)  bilateral but more rt than left .  The edema has been moderate and diffuse.  Onset of symptoms was several years ago, stable since that time. The edema is present in the morning. The patient states the problem is long-standing.  The swelling has been aggravated by dependency of involved area, relieved by diuretics, elevation of involved area, and been associated with diagnosis of heart failure, venous insufficiency, and weight gain. Cardiac risk factors include advanced age (older than 55 for men, 65 for women), diabetes mellitus, dyslipidemia, hypertension, microalbuminuria, obesity (BMI >= 30 kg/m2), and sedentary lifestyle.  [place] Sleepiness Scale Questionnaire:She has been intermitent use of CPAP    0 = would never doze or sleep.  1 = slight chance of dozing or sleeping  2 = moderate chance of dozing or sleeping  3 = high chance of dozing or sleeping     Sitting and reading 3  Watching TV 3  Sitting inactive in a public place 1  Being a passenger in a motor vehicle for an hour or more 3  Lying down in the afternoon 3  Sitting and talking to someone 2  Sitting quietly after lunch (no alcohol) 3  Stopped for a few minutes in traffic while driving 0    Total score:  18/24  There have been some probable medication, dietary, CPAPand lifestyle compliance issues here. I have discussed with her the great importance of following the treatment plan exactly as directed in order to achieve a good medical outcome.    .            I reviewed the patients chart dating back for the past few  appointments. See above.    The following portions of the patient's history were reviewed and updated as appropriate: allergies, current medications, past family history, past medical history, past social history, past surgical history and problem list.    She denies chest pain upon exertion, dyspnea, nausea, vomiting, diaphoresis, and syncope. No pleuritic chest pain, unilateral leg swelling, calf tenderness, or calf pain.      Past Medical History:   Diagnosis Date    Acute deep vein thrombosis (DVT) of right upper extremity 8/5/2020    Occlusive thrombus of the right brachial vein and cephalic vein    LUIZ (acute kidney injury) 8/3/2020    ALLERGIC RHINITIS     Anemia     Arthritis     Atelectasis 8/3/2020    Atypical pneumonia 11/1/2020    Back pain     Bronchitis     Cataract     OU    CHF (congestive heart failure)     Cholelithiasis     COPD (chronic obstructive pulmonary disease)     Degenerative disc disease     Diabetes mellitus     pre diabetic    Diverticulosis     DVT (deep venous thrombosis)     Edema     Elevated brain natriuretic peptide (BNP) level 8/3/2020    Encounter for blood transfusion 1979    Fibromyalgia     Glaucoma     Gout     Hematuria 8/7/2020    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    Hx of colonic polyps     Hyperlipidemia     Hypertension     Hyponatremia 8/5/2020    Incontinence     MRSA bacteremia 7/22/2020    Non-traumatic rhabdomyolysis 8/4/2020    Osteoporosis     Pneumonia due to infectious organism 11/1/2020    Pulmonary embolism     Reflux     Refusal of blood transfusions as patient is [place]     Sleep apnea     non compliant with CPAP    Vestibulitis of ear        Past Surgical History:   Procedure Laterality Date    ANGIOGRAPHY OF LOWER EXTREMITY N/A 7/31/2019    Procedure: ANGIOGRAM, LOWER EXTREMITY;  Surgeon: Gino Arana MD;  Location: [facility] CATH/EP LAB;  Service: General;  Laterality: N/A;    ASPIRATION OF SOFT TISSUE Left 10/15/2020     Procedure: ASPIRATION, SOFT TISSUE;  Surgeon: [facility];  Location: [facility] [place] OR;  Service: General;  Laterality: Left;    COLONOSCOPY N/A 2020    Procedure: COLONOSCOPY;  Surgeon: Sue Nuñez MD;  Location: [facility] [place] ENDO;  Service: Endoscopy;  Laterality: N/A;    ESOPHAGOGASTRODUODENOSCOPY N/A 2020    Procedure: EGD (ESOPHAGOGASTRODUODENOSCOPY);  Surgeon: Sue Nuñez MD;  Location: [facility] [place] ENDO;  Service: Endoscopy;  Laterality: N/A;    HIP SURGERY      HYSTERECTOMY      INTRALUMINAL GASTROINTESTINAL TRACT IMAGING VIA CAPSULE N/A 2020    Procedure: IMAGING PROCEDURE, GI TRACT, INTRALUMINAL, VIA CAPSULE;  Surgeon: Sue Nuñez MD;  Location: [facility] [place] ENDO;  Service: Endoscopy;  Laterality: N/A;    JOINT REPLACEMENT      B total hip    LAPAROSCOPIC CHOLECYSTECTOMY N/A 2020    Procedure: CHOLECYSTECTOMY, LAPAROSCOPIC;  Surgeon: Jomar Mcdonald MD;  Location: [facility] OR;  Service: General;  Laterality: N/A;    TRANSFORAMINAL EPIDURAL INJECTION OF STEROID Left 2020    Procedure: Injection,steroid,epidural,transforaminal approach;  Surgeon: Matt Gilliam MD;  Location: [facility] OR;  Service: Pain Management;  Laterality: Left;  L2-3, L3-4    TRANSFORAMINAL EPIDURAL INJECTION OF STEROID Left 2021    Procedure: Injection,steroid,epidural,transforaminal approach;  Surgeon: Matt Gilliam MD;  Location: [facility] OR;  Service: Pain Management;  Laterality: Left;  L2-3, L3-4       Social History  Social History     Tobacco Use    Smoking status: Former     Current packs/day: 0.00     Average packs/day: 0.3 packs/day for 5.0 years (1.3 ttl pk-yrs)     Types: Cigarettes     Start date: 1967     Quit date: 1972     Years since quittin.8    Smokeless tobacco: Never   Substance Use Topics    Alcohol use: Yes     Alcohol/week: 0.0 standard drinks of alcohol     Comment: Rarely    Drug use: Not Currently       Family History   Problem Relation Age of Onset    Hypertension Mother      Stroke Father     Hypertension Father     Diabetes Sister     Cancer Sister     Stomach cancer Sister     Hypertension Sister     Bipolar disorder Sister     Peripheral vascular disease Sister     Hypertension Brother     Stroke Brother     Peripheral vascular disease Brother     Hypertension Son     Obesity Son     Early death Son 48    Arthritis Son     Glaucoma Neg Hx     Macular degeneration Neg Hx     Retinal detachment Neg Hx      Review of patient's allergies indicates:   Allergen Reactions    Daptomycin Other (See Comments)     Kidney damage     Cymbalta [duloxetine] Other (See Comments)     Nightmares      Darvon [propoxyphene] Nausea Only and Other (See Comments)     Sweating, slept for 3 days    Atorvastatin Other (See Comments)     Muscle cramps    Naprosyn [naproxen] Nausea Only    Penicillins Rash    Tramadol Nausea Only and Palpitations       Review of Systems [Negative unless checked off]    General ROS: []fever, []chills, []weight loss, [x]malaise/fatigue.  ENT ROS: []congestion, []rhinorrhea,  []sore throat, [x]neck pain, [x]hearing loss.  Ophthalmic ROS:[]blurry vision, [] double vision, []photophobia, []eye pain.  Respiratory ROS: []cough, []pleuritic chest pain, []shortness of breath, []wheezing.  CVS ROS:[]chest pain, []dyspnea on exertion, []palpitations, []orthopnea, []leg swelling, []PND.   GI ROS: []nausea, []vomiting, [] epigastric pain, []abd pain, []diarrhea, []constipation, []blood/melena in stool.   Urology ROS:[]dysuria, []frequency, []flank pain,[] trouble voiding, [] hematuria.   MSK ROS: [x]myalgias, [x]joint pain, [x]muscular weakness,  []back pain, [] falls.   Derm ROS: []pruritis, []rash, []jaundice.  Neurological:[]dizziness,[]numbness,[]loss of consciousness, []weakness  []headaches.   Psych ROS: []hallucinations, []depression, []anxiety, []suicidal ideation.    Physical Examination  /60 (BP Location: Left arm, Patient Position: Sitting, BP Method: Large (Manual))    "Pulse (!) 56   Temp 97.6 °F (36.4 °C) (Oral)   Ht 5' 3" (1.6 m)   Wt 103.1 kg (227 lb 4.7 oz)   SpO2 97%   BMI 40.26 kg/m²   Wt Readings from Last 3 Encounters:   11/06/23 103.1 kg (227 lb 4.7 oz)   08/14/23 99.8 kg (220 lb)   05/30/23 101 kg (222 lb 10.6 oz)     BP Readings from Last 3 Encounters:   11/06/23 116/60   08/14/23 (!) 158/74   05/30/23 (!) 158/71     Estimated body mass index is 40.26 kg/m² as calculated from the following:    Height as of this encounter: 5' 3" (1.6 m).    Weight as of this encounter: 103.1 kg (227 lb 4.7 oz).     General appearance: alert, cooperative, no distress  Eyes: pupils equal and reactive, extraocular eye movements intact   Ears: bilateral TM's and external ear canals normal   Nose: normal and patent, no erythema, discharge or polyps   Sinuses: Normal paranasal sinuses without tenderness   Throat: mucous membranes moist, pharynx normal without lesions   Neck: no thyromegaly, trachea midline  Lungs: clear to auscultation, no wheezes, rales or rhonchi, symmetric air entry, no dullness to percussion bilaterally.  Heart: normal rate, regular rhythm, normal S1, S2, no murmurs, rubs, clicks or gallops, no displacement of the PMI.  Abdomen: soft, nontender, nondistended, no masses or organomegaly No rigidity, rebound, or guarding.   Back: full range of motion, no tenderness, palpable spasm or pain on motion   Extremities: peripheral pulses normal, no pedal edema, no clubbing or cyanosis, no pedal edema noted, venous stasis dermatitis noted   Feet: warm, good capillary refill.  Neurological:alert, oriented, normal speech, no focal findings or movement disorder noted   Psychiatric: alert, oriented to person, place, and time  Integument: normal coloration and turgor, no rashes, no suspicious skin lesions noted.    Data reviewed    Previous medical records reviewed and summarized above in HPI.    274}    Laboratory    I have reviewed old labs below:      274}  Lab Results   Component " Value Date    WBC 5.61 05/30/2023    HGB 11.9 (L) 05/30/2023    HCT 37.8 05/30/2023     (H) 05/30/2023     05/30/2023     10/30/2023    K 4.4 10/30/2023     10/30/2023    CALCIUM 8.9 10/30/2023    PHOS 3.4 10/27/2022    CO2 23 10/30/2023    GLU 99 10/30/2023    BUN 27 (H) 10/30/2023    CREATININE 1.4 10/30/2023    ANIONGAP 13 10/30/2023    ESTGFRAFRICA 45 (A) 07/18/2022    EGFRNONAA 39 (A) 07/18/2022    PROT 6.9 05/30/2023    ALBUMIN 3.5 05/30/2023    BILITOT 0.5 05/30/2023    ALKPHOS 59 05/30/2023    ALT 15 05/30/2023    AST 17 05/30/2023    INR 2.5 10/26/2023    CHOL 196 10/18/2022    TRIG 140 10/18/2022    HDL 42 10/18/2022    LDLCALC 126.0 10/18/2022    TSH 3.261 08/04/2020    HGBA1C 5.6 10/30/2023       Imaging/Tracing: I have reviewed the pertinent results/findings and my personal findings are below:     274}  DM.Stable and controlled. Continue current treatment plan as previously prescribed.       Assessment/Awmx516}    Sammi Abreu is a 82 y.o. female who presents to clinic with:    1. Lymphedema of extremity    2. Hypertension associated with diabetes    3. CHARLIE on CPAP    4. Edema of extremities    5. Pulmonary hypertension    6. COPD with asthma    7. DDD (degenerative disc disease), lumbar    8. Stage 3b chronic kidney disease    9. Hyperparathyroidism , secondary, non-renal    10. Dyslipidemia    11. Prediabetes    12. Chronic diastolic CHF (congestive heart failure)     The patient's BMI has been recorded in the chart. The patient has been provided educational materials regarding the benefits of attaining and maintaining a normal weight. We will continue to address and follow this issue during follow up visits.      Diagnostic impression remarks       1. Hypertension associated with diabetes  - metoprolol succinate (TOPROL-XL) 100 MG 24 hr tablet; Take 1 tablet (100 mg total) by mouth once daily.  Dispense: 180 tablet; Refill: 3  - magnesium oxide (MAG-OX) 400 mg (241.3  mg magnesium) tablet; Take 1 tablet (400 mg total) by mouth 2 (two) times daily.  Dispense: 180 tablet; Refill: 3  - hydrALAZINE (APRESOLINE) 100 MG tablet; Take 1 tablet (100 mg total) by mouth every 12 (twelve) hours.  Dispense: 180 tablet; Refill: 4  - folic acid (FOLVITE) 1 MG tablet; Take 1 tablet (1,000 mcg total) by mouth once daily.  Dispense: 90 tablet; Refill: 3  - Comprehensive metabolic panel; Future  - Microalbumin/creatinine urine ratio; Future  - Hemoglobin A1c; Future  - Ambulatory referral/consult to Cardiology; Future    2. Lymphedema of extremity    3. CHARLIE on CPAP  - CPAP/BIPAP SUPPLIES  - Ambulatory referral/consult to ENT; Future    4. Edema of extremities  - Ambulatory referral/consult to Physical/Occupational Therapy; Future  - potassium chloride SA (K-DUR,KLOR-CON) 20 MEQ tablet; Take 1 tablet (20 mEq total) by mouth 2 (two) times daily.  Dispense: 60 tablet; Refill: 3    5. Pulmonary hypertension  - potassium chloride SA (K-DUR,KLOR-CON) 20 MEQ tablet; Take 1 tablet (20 mEq total) by mouth 2 (two) times daily.  Dispense: 60 tablet; Refill: 3  - Ambulatory referral/consult to ENT; Future  - Ambulatory referral/consult to Cardiology; Future    6. COPD with asthma  - budesonide (PULMICORT) 0.5 mg/2 mL nebulizer solution; Take 2 mLs (0.5 mg total) by nebulization once daily. Controller  Dispense: 60 mL; Refill: 3    7. DDD (degenerative disc disease), lumbar  - diclofenac sodium (SOLARAZE) 3 % gel; 1 application bid prn  Dispense: 300 g; Refill: 1    8. Stage 3b chronic kidney disease  - calcitRIOL (ROCALTROL) 0.25 MCG Cap; Take 1 capsule (0.25 mcg total) by mouth once daily.  Dispense: 90 capsule; Refill: 3    9. Hyperparathyroidism , secondary, non-renal  - calcitRIOL (ROCALTROL) 0.25 MCG Cap; Take 1 capsule (0.25 mcg total) by mouth once daily.  Dispense: 90 capsule; Refill: 3    10. Dyslipidemia  - ezetimibe (ZETIA) 10 mg tablet; Take 1 tablet (10 mg total) by mouth once daily.  Dispense: 90  tablet; Refill: 3    11. Prediabetes  - Comprehensive metabolic panel; Future  - Microalbumin/creatinine urine ratio; Future  - Hemoglobin A1c; Future    12. Chronic diastolic CHF (congestive heart failure)  - Ambulatory referral/consult to Cardiology; Future      Medication Monitoring: In today's visit, monitoring for drug toxicity was accomplished. Proper use of medications was discussed.     Counseling: Counseling included discussion regarding imaging findings, diagnosis, possibilities, treatment options, medications, risks, and benefits. She had many questions regarding the options and long-term effects. All questions were answered. She expressed understanding after counseling regarding the diagnosis and recommendations. She was capable and demonstrated competence with understanding of these options. Shared decision making was performed resulting in her choosing the current treatment plan.     She was counseled about the importance of healthy dietary habits as well as routine physical activity and exercise for better health outcomes. I also discussed the importance of cancer screening.     I also discussed the importance of close follow up to discuss labs, change or modify her medications if needed, monitor side effects, and further evaluation of medical problems.     Additional workup planned: see labs ordered below.    See below for labs and meds ordered with associated diagnosis      Medication List with Changes/Refills   New Medications    DICLOFENAC SODIUM (SOLARAZE) 3 % GEL    1 application bid prn    EZETIMIBE (ZETIA) 10 MG TABLET    Take 1 tablet (10 mg total) by mouth once daily.   Current Medications    ACETAMINOPHEN (TYLENOL) 325 MG TABLET    Take 2 tablets (650 mg total) by mouth every 6 (six) hours as needed (Do not take with any other Tylenol or acetaminophen containing products).    ALBUTEROL (PROVENTIL) 2.5 MG /3 ML (0.083 %) NEBULIZER SOLUTION    Take 3 mLs (2.5 mg total) by nebulization every 6  (six) hours as needed.    ALBUTEROL (PROVENTIL/VENTOLIN HFA) 90 MCG/ACTUATION INHALER    2 puffs every 4 hours as needed for cough, wheeze, or shortness of breath    BLOOD SUGAR DIAGNOSTIC STRP    To check BG 1 times daily, to use with insurance preferred meter    BLOOD-GLUCOSE METER KIT    To check BG 1 times daily, to use with insurance preferred meter    ESOMEPRAZOLE (NEXIUM) 20 MG CAPSULE    TAKE 1 CAPSULE BY MOUTH TWICE DAILY BEFORE MEAL(S)    FLUTICASONE PROPIONATE (FLONASE) 50 MCG/ACTUATION NASAL SPRAY    2 sprays (100 mcg total) by Each Nostril route once daily.    FUROSEMIDE (LASIX) 40 MG TABLET    Take 1 tablet (40 mg total) by mouth 2 (two) times daily as needed (edema).    LANCETS MISC    To check BG 1 times daily, to use with insurance preferred meter    LOSARTAN (COZAAR) 50 MG TABLET    Take 1 tablet by mouth once daily    METAMUCIL, SUGAR, POWD    1 tabs oral daily    PREDNISONE (DELTASONE) 20 MG TABLET    Take one daily for 3 days and may repeat for shortness of breath.    TRIAMCINOLONE ACETONIDE 0.1% (KENALOG) 0.1 % OINTMENT    Apply topically 2 (two) times daily.    WARFARIN (COUMADIN) 5 MG TABLET    TAKE 1 TO 1 & 1/2 (ONE & ONE-HALF) TABLETS BY MOUTH IN THE EVENING AS DIRECTED BY  THE  C[facility]MADIN  CLINIC   Changed and/or Refilled Medications    Modified Medication Previous Medication    BUDESONIDE (PULMICORT) 0.5 MG/2 ML NEBULIZER SOLUTION budesonide (PULMICORT) 0.5 mg/2 mL nebulizer solution       Take 2 mLs (0.5 mg total) by nebulization once daily. Controller    Take 2 mLs (0.5 mg total) by nebulization once daily. Controller    CALCITRIOL (ROCALTROL) 0.25 MCG CAP calcitRIOL (ROCALTROL) 0.25 MCG Cap       Take 1 capsule (0.25 mcg total) by mouth once daily.    Take by mouth.    FOLIC ACID (FOLVITE) 1 MG TABLET folic acid (FOLVITE) 1 MG tablet       Take 1 tablet (1,000 mcg total) by mouth once daily.    Take 1 tablet (1,000 mcg total) by mouth once daily.    HYDRALAZINE (APRESOLINE) 100 MG TABLET  hydrALAZINE (APRESOLINE) 100 MG tablet       Take 1 tablet (100 mg total) by mouth every 12 (twelve) hours.    Take 1 tablet (100 mg total) by mouth every 12 (twelve) hours.    MAGNESIUM OXIDE (MAG-OX) 400 MG (241.3 MG MAGNESIUM) TABLET magnesium oxide (MAG-OX) 400 mg (241.3 mg magnesium) tablet       Take 1 tablet (400 mg total) by mouth 2 (two) times daily.    Take 1 tablet by mouth twice daily    METOPROLOL SUCCINATE (TOPROL-XL) 100 MG 24 HR TABLET metoprolol succinate (TOPROL-XL) 100 MG 24 hr tablet       Take 1 tablet (100 mg total) by mouth once daily.    Take 1 tablet (100 mg total) by mouth once daily.    POTASSIUM CHLORIDE SA (K-DUR,KLOR-CON) 20 MEQ TABLET potassium chloride SA (K-DUR,KLOR-CON) 20 MEQ tablet       Take 1 tablet (20 mEq total) by mouth 2 (two) times daily.    Take 1 tablet (20 mEq total) by mouth 2 (two) times daily.   Discontinued Medications    DICLOFENAC SODIUM (VOLTAREN) 1 % GEL    Apply 2 g topically once daily.    FLUTICASONE-SALMETEROL DISKUS INHALER 500-50 MCG    Inhale 1 puff into the lungs 2 (two) times daily.     Modified Medications    Modified Medication Previous Medication    BUDESONIDE (PULMICORT) 0.5 MG/2 ML NEBULIZER SOLUTION budesonide (PULMICORT) 0.5 mg/2 mL nebulizer solution       Take 2 mLs (0.5 mg total) by nebulization once daily. Controller    Take 2 mLs (0.5 mg total) by nebulization once daily. Controller    CALCITRIOL (ROCALTROL) 0.25 MCG CAP calcitRIOL (ROCALTROL) 0.25 MCG Cap       Take 1 capsule (0.25 mcg total) by mouth once daily.    Take by mouth.    FOLIC ACID (FOLVITE) 1 MG TABLET folic acid (FOLVITE) 1 MG tablet       Take 1 tablet (1,000 mcg total) by mouth once daily.    Take 1 tablet (1,000 mcg total) by mouth once daily.    HYDRALAZINE (APRESOLINE) 100 MG TABLET hydrALAZINE (APRESOLINE) 100 MG tablet       Take 1 tablet (100 mg total) by mouth every 12 (twelve) hours.    Take 1 tablet (100 mg total) by mouth every 12 (twelve) hours.    MAGNESIUM OXIDE  "(MAG-OX) 400 MG (241.3 MG MAGNESIUM) TABLET magnesium oxide (MAG-OX) 400 mg (241.3 mg magnesium) tablet       Take 1 tablet (400 mg total) by mouth 2 (two) times daily.    Take 1 tablet by mouth twice daily    METOPROLOL SUCCINATE (TOPROL-XL) 100 MG 24 HR TABLET metoprolol succinate (TOPROL-XL) 100 MG 24 hr tablet       Take 1 tablet (100 mg total) by mouth once daily.    Take 1 tablet (100 mg total) by mouth once daily.    POTASSIUM CHLORIDE SA (K-DUR,KLOR-CON) 20 MEQ TABLET potassium chloride SA (K-DUR,KLOR-CON) 20 MEQ tablet       Take 1 tablet (20 mEq total) by mouth 2 (two) times daily.    Take 1 tablet (20 mEq total) by mouth 2 (two) times daily.       Follow up in about 4 months (around 3/6/2024) for labs before next visit. for further workup and reassessment if labs and tests obtained are stable or sooner as needed. She was instructed to call the clinic or go to the emergency department if her symptoms do not improve, worsens, or if new symptoms develop. Patient knows to call any time if an emergency arises. Shared decision making occurred and she verbalized understanding in agreement with this plan.     Documentation entered by me for this encounter may have been done in part using speech-recognition technology. Although I have made an effort to ensure accuracy, "sound like" errors may exist and should be interpreted in context.       Osmani Villatoro MD     Discussed health maintenance guidelines appropriate for age.  Discussed health maintenance guidelines appropriate for age.    "

## 2023-11-06 NOTE — PATIENT INSTRUCTIONS
DASH diet for Hypertension and Healthy Eating  Provided by the [facility]    Healthy Lifestyle   Nutrition and healthy eating  The DASH diet emphasizes portion size, eating a variety of foods and getting the right amount of nutrients. Discover how DASH can improve your health and lower your blood pressure.  By [facility] Staff   DASH stands for Dietary Approaches to Stop Hypertension. The DASH diet is a lifelong approach to healthy eating that's designed to help treat or prevent high blood pressure (hypertension). The DASH diet encourages you to reduce the sodium in your diet and eat a variety of foods rich in nutrients that help lower blood pressure, such as potassium, calcium and magnesium.  By following the DASH diet, you may be able to reduce your blood pressure by a few points in just two weeks. Over time, your systolic blood pressure could drop by eight to 14 points, which can make a significant difference in your health risks.  Because the DASH diet is a healthy way of eating, it offers health benefits besides just lowering blood pressure. The DASH diet is also in line with dietary recommendations to prevent osteoporosis, cancer, heart disease, stroke and diabetes.  The DASH diet emphasizes vegetables, fruits and low-fat dairy foods -- and moderate amounts of whole grains, fish, poultry and nuts.  In addition to the standard DASH diet, there is also a lower sodium version of the diet. You can choose the version of the diet that meets your health needs:  Standard DASH diet. You can consume up to 2,300 milligrams (mg) of sodium a day.   Lower sodium DASH diet. You can consume up to 1,500 mg of sodium a day.  Both versions of the DASH diet aim to reduce the amount of sodium in your diet compared with what you might get in a typical American diet, which can amount to a whopping 3,400 mg of sodium a day or more.  The standard DASH diet meets the recommendation from the Dietary Guidelines for Americans to keep  "daily sodium intake to less than 2,300 mg a day.  The American Heart Association recommends 1,500 mg a day of sodium as an upper limit for all adults. If you aren't sure what sodium level is right for you, talk to your doctor.  Both versions of the DASH diet include lots of whole grains, fruits, vegetables and low-fat dairy products. The DASH diet also includes some fish, poultry and legumes, and encourages a small amount of nuts and seeds a few times a week.   You can eat red meat, sweets and fats in small amounts. The DASH diet is low in saturated fat, cholesterol and total fat.  Here's a look at the recommended servings from each food group for the 2,[id number]-day DASH diet.  Grains: 6 to 8 servings a day  Grains include bread, cereal, rice and pasta. Examples of one serving of grains include 1 slice whole-wheat bread, 1 ounce dry cereal, or 1/2 cup cooked cereal, rice or pasta.  Focus on whole grains because they have more fiber and nutrients than do refined grains. For instance, use brown rice instead of white rice, whole-wheat pasta instead of regular pasta and whole-grain bread instead of white bread. Look for products labeled "100 percent whole grain" or "100 percent whole wheat."   Grains are naturally low in fat. Keep them this way by avoiding butter, cream and cheese sauces.  Vegetables: 4 to 5 servings a day  Tomatoes, carrots, broccoli, sweet potatoes, greens and other vegetables are full of fiber, vitamins, and such minerals as potassium and magnesium. Examples of one serving include 1 cup raw leafy green vegetables or 1/2 cup cut-up raw or cooked vegetables.  Don't think of vegetables only as side dishes -- a hearty blend of vegetables served over brown rice or whole-wheat noodles can serve as the main dish for a meal.   Fresh and frozen vegetables are both good choices. When buying frozen and canned vegetables, choose those labeled as low sodium or without added salt.   To increase the number of " servings you fit in daily, be creative. In a stir-worthy, for instance, cut the amount of meat in half and double up on the vegetables.  Fruits: 4 to 5 servings a day  Many fruits need little preparation to become a healthy part of a meal or snack. Like vegetables, they're packed with fiber, potassium and magnesium and are typically low in fat -- coconuts are an exception. Examples of one serving include one medium fruit, 1/2 cup fresh, frozen or canned fruit, or 4 ounces of juice.  Have a piece of fruit with meals and one as a snack, then round out your day with a dessert of fresh fruits topped with a dollop of low-fat yogurt.   Leave on edible peels whenever possible. The peels of apples, pears and most fruits with pits add interesting texture to recipes and contain healthy nutrients and fiber.   Remember that citrus fruits and juices, such as grapefruit, can interact with certain medications, so check with your doctor or pharmacist to see if they're OK for you.   If you choose canned fruit or juice, make sure no sugar is added.  Dairy: 2 to 3 servings a day  Milk, yogurt, cheese and other dairy products are major sources of calcium, vitamin D and protein. But the key is to make sure that you choose dairy products that are low fat or fat-free because otherwise they can be a major source of fat -- and most of it is saturated. Examples of one serving include 1 cup skim or 1 percent milk, 1 cup low fat yogurt, or 1 1/2 ounces part-skim cheese.  Low-fat or fat-free frozen yogurt can help you boost the amount of dairy products you eat while offering a sweet treat. Add fruit for a healthy twist.   If you have trouble digesting dairy products, choose lactose-free products or consider taking an over-the-counter product that contains the enzyme lactase, which can reduce or prevent the symptoms of lactose intolerance.   Go easy on regular and even fat-free cheeses because they are typically high in sodium.  Lean meat, poultry  and fish: 6 servings or fewer a day  Meat can be a rich source of protein, B vitamins, iron and zinc. Choose lean varieties and aim for no more than 6 ounces a day. Cutting back on your meat portion will allow room for more vegetables.  Trim away skin and fat from poultry and meat and then bake, broil, grill or roast instead of frying in fat.   Eat heart-healthy fish, such as salmon, herring and tuna. These types of fish are high in omega-3 fatty acids, which can help lower your total cholesterol.  Nuts, seeds and legumes: 4 to 5 servings a week  Almonds, sunflower seeds, kidney beans, peas, lentils and other foods in this family are good sources of magnesium, potassium and protein. They're also full of fiber and phytochemicals, which are plant compounds that may protect against some cancers and cardiovascular disease.  Serving sizes are small and are intended to be consumed only a few times a week because these foods are high in calories. Examples of one serving include 1/3 cup nuts, 2 tablespoons seeds, or 1/2 cup cooked beans or peas.   Nuts sometimes get a bad rap because of their fat content, but they contain healthy types of fat -- monounsaturated fat and omega-3 fatty acids. They're high in calories, however, so eat them in moderation. Try adding them to stir-fries, salads or cereals.   Soybean-based products, such as tofu and tempeh, can be a good alternative to meat because they contain all of the amino acids your body needs to make a complete protein, just like meat.  Fats and oils: 2 to 3 servings a day  Fat helps your body absorb essential vitamins and helps your body's immune system. But too much fat increases your risk of heart disease, diabetes and obesity. The DASH diet strives for a healthy balance by limiting total fat to less than 30 percent of daily calories from fat, with a focus on the healthier monounsaturated fats.  Examples of one serving include 1 teaspoon soft margarine, 1 tablespoon  mayonnaise or 2 tablespoons salad dressing.  Saturated fat and trans fat are the main dietary culprits in increasing your risk of coronary artery disease. DASH helps keep your daily saturated fat to less than 6 percent of your total calories by limiting use of meat, butter, cheese, whole milk, cream and eggs in your diet, along with foods made from lard, solid shortenings, and palm and coconut oils.   Avoid trans fat, commonly found in such processed foods as crackers, baked goods and fried items.   Read food labels on margarine and salad dressing so that you can choose those that are lowest in saturated fat and free of trans fat.  Sweets: 5 servings or fewer a week  You don't have to banish sweets entirely while following the DASH diet -- just go easy on them. Examples of one serving include 1 tablespoon sugar, jelly or jam, 1/2 cup sorbet, or 1 cup lemonade.  When you eat sweets, choose those that are fat-free or low-fat, such as sorbets, fruit ices, jelly beans, hard candy, antonio crackers or low-fat cookies.   Artificial sweeteners such as aspartame (NutraSweet, Equal) and sucralose (Splenda) may help satisfy your sweet tooth while sparing the sugar. But remember that you still must use them sensibly. It's OK to swap a diet cola for a regular cola, but not in place of a more nutritious beverage such as low-fat milk or even plain water.   Cut back on added sugar, which has no nutritional value but can pack on calories.  Drinking too much alcohol can increase blood pressure. The Dietary Guidelines for Americans recommends that men limit alcohol to no more than two drinks a day and women to one or less.  The DASH diet doesn't address caffeine consumption. The influence of caffeine on blood pressure remains unclear. But caffeine can cause your blood pressure to rise at least temporarily. If you already have high blood pressure or if you think caffeine is affecting your blood pressure, talk to your doctor about your  "caffeine consumption.  While the DASH diet is not a weight-loss program, you may indeed lose unwanted pounds because it can help guide you toward healthier food choices.  The DASH diet generally includes about 2,000 calories a day. If you're trying to lose weight, you may need to eat fewer calories. You may also need to adjust your serving goals based on your individual circumstances -- something your health care team can help you decide.  The foods at the core of the DASH diet are naturally low in sodium. So just by following the DASH diet, you're likely to reduce your sodium intake. You also reduce sodium further by:  Using sodium-free spices or flavorings with your food instead of salt   Not adding salt when cooking rice, pasta or hot cereal   Rinsing canned foods to remove some of the sodium   Buying foods labeled "no salt added," "sodium-free," "low sodium" or "very low sodium"  One teaspoon of table salt has 2,325 mg of sodium. When you read food labels, you may be surprised at just how much sodium some processed foods contain. Even low-fat soups, canned vegetables, ready-to-eat cereals and sliced turkey from the local deli -- foods you may have considered healthy -- often have lots of sodium.  You may notice a difference in taste when you choose low-sodium food and beverages. If things seem too bland, gradually introduce low-sodium foods and cut back on table salt until you reach your sodium goal. That'll give your palate time to adjust.  Using salt-free seasoning blends or herbs and spices may also ease the transition. It can take several weeks for your taste buds to get used to less salty foods.  Try these strategies to get started on the DASH diet:   Change gradually. If you now eat only one or two servings of fruits or vegetables a day, try to add a serving at lunch and one at dinner. Rather than switching to all whole grains, start by making one or two of your grain servings whole grains. Increasing " fruits, vegetables and whole grains gradually can also help prevent bloating or diarrhea that may occur if you aren't used to eating a diet with lots of fiber. You can also try over-the-counter products to help reduce gas from beans and vegetables.   Reward successes and forgive slip-ups. Reward yourself with a nonfood treat for your accomplishments -- rent a movie, purchase a book or get together with a friend. Everyone slips, especially when learning something new. Remember that changing your lifestyle is a long-term process. Find out what triggered your setback and then just  where you left off with the DASH diet.   Add physical activity. To boost your blood pressure lowering efforts even more, consider increasing your physical activity in addition to following the DASH diet. Combining both the DASH diet and physical activity makes it more likely that you'll reduce your blood pressure.   Get support if you need it. If you're having trouble sticking to your diet, talk to your doctor or dietitian about it. You might get some tips that will help you stick to the DASH diet.  Remember, healthy eating isn't an all-or-nothing proposition. What's most important is that, on average, you eat healthier foods with plenty of variety -- both to keep your diet nutritious and to avoid boredom or extremes. And with the DASH diet, you can have both.

## 2023-11-09 ENCOUNTER — TELEPHONE (OUTPATIENT)
Dept: FAMILY MEDICINE | Facility: CLINIC | Age: 83
End: 2023-11-09
Payer: MEDICARE

## 2023-11-09 NOTE — TELEPHONE ENCOUNTER
----- Message from Brett Gloria sent at 11/9/2023  3:47 PM CST -----  Type: Needs Medical Advice  Who Called:  pt  Best Call Back Number: [phone]  Additional Information: pt states she got the flu shot Monday and now she is running a low grade fever of 99.7, body aches, stuffy, and weak, she would like to speak with the doctor or nurse, pl call bk and advise thanks

## 2023-11-09 NOTE — TELEPHONE ENCOUNTER
Spoke to pt states she is experiencing nasal drainage, fatigue, weakness, body aches, chills, mild sore throat,  temp. Pt unable to do E-visit. Scheduled Urgent appt

## 2023-11-10 ENCOUNTER — HOSPITAL ENCOUNTER (EMERGENCY)
Facility: HOSPITAL | Age: 83
Discharge: HOME | End: 2023-11-10
Attending: EMERGENCY MEDICINE
Payer: MEDICARE

## 2023-11-10 VITALS
RESPIRATION RATE: 18 BRPM | OXYGEN SATURATION: 97 % | DIASTOLIC BLOOD PRESSURE: 74 MMHG | BODY MASS INDEX: 40.22 KG/M2 | SYSTOLIC BLOOD PRESSURE: 181 MMHG | HEART RATE: 58 BPM | WEIGHT: 227 LBS | TEMPERATURE: 98 F | HEIGHT: 63 IN

## 2023-11-10 DIAGNOSIS — E86.0 DEHYDRATION: ICD-10-CM

## 2023-11-10 DIAGNOSIS — U07.1 COVID-19 VIRUS DETECTED: ICD-10-CM

## 2023-11-10 DIAGNOSIS — R06.02 SHORTNESS OF BREATH: ICD-10-CM

## 2023-11-10 DIAGNOSIS — U07.1 COVID-19: Primary | ICD-10-CM

## 2023-11-10 LAB
ALBUMIN SERPL BCP-MCNC: 3.4 G/DL (ref 3.5–5.2)
ALP SERPL-CCNC: 62 U/L (ref 55–135)
ALT SERPL W/O P-5'-P-CCNC: 17 U/L (ref 10–44)
ANION GAP SERPL CALC-SCNC: 11 MMOL/L (ref 8–16)
AST SERPL-CCNC: 23 U/L (ref 10–40)
BASOPHILS # BLD AUTO: 0.02 K/UL (ref 0–0.2)
BASOPHILS NFR BLD: 0.4 % (ref 0–1.9)
BILIRUB SERPL-MCNC: 0.5 MG/DL (ref 0.1–1)
BILIRUB UR QL STRIP: NEGATIVE
BNP SERPL-MCNC: 71 PG/ML (ref 0–99)
BUN SERPL-MCNC: 31 MG/DL (ref 8–23)
CALCIUM SERPL-MCNC: 8.5 MG/DL (ref 8.7–10.5)
CHLORIDE SERPL-SCNC: 101 MMOL/L (ref 95–110)
CLARITY UR: CLEAR
CO2 SERPL-SCNC: 24 MMOL/L (ref 23–29)
COLOR UR: YELLOW
CREAT SERPL-MCNC: 2 MG/DL (ref 0.5–1.4)
DIFFERENTIAL METHOD: ABNORMAL
EOSINOPHIL # BLD AUTO: 0.1 K/UL (ref 0–0.5)
EOSINOPHIL NFR BLD: 1 % (ref 0–8)
ERYTHROCYTE [DISTWIDTH] IN BLOOD BY AUTOMATED COUNT: 13.2 % (ref 11.5–14.5)
EST. GFR  (NO RACE VARIABLE): 25 ML/MIN/1.73 M^2
GLUCOSE SERPL-MCNC: 118 MG/DL (ref 70–110)
GLUCOSE UR QL STRIP: NEGATIVE
HCT VFR BLD AUTO: 41.7 % (ref 37–48.5)
HGB BLD-MCNC: 12.8 G/DL (ref 12–16)
HGB UR QL STRIP: NEGATIVE
IMM GRANULOCYTES # BLD AUTO: 0.03 K/UL (ref 0–0.04)
IMM GRANULOCYTES NFR BLD AUTO: 0.6 % (ref 0–0.5)
INFLUENZA A, MOLECULAR: NEGATIVE
INFLUENZA B, MOLECULAR: NEGATIVE
KETONES UR QL STRIP: NEGATIVE
LEUKOCYTE ESTERASE UR QL STRIP: NEGATIVE
LYMPHOCYTES # BLD AUTO: 2.4 K/UL (ref 1–4.8)
LYMPHOCYTES NFR BLD: 47.5 % (ref 18–48)
MCH RBC QN AUTO: 31.2 PG (ref 27–31)
MCHC RBC AUTO-ENTMCNC: 30.7 G/DL (ref 32–36)
MCV RBC AUTO: 102 FL (ref 82–98)
MONOCYTES # BLD AUTO: 0.9 K/UL (ref 0.3–1)
MONOCYTES NFR BLD: 16.9 % (ref 4–15)
NEUTROPHILS # BLD AUTO: 1.7 K/UL (ref 1.8–7.7)
NEUTROPHILS NFR BLD: 33.6 % (ref 38–73)
NITRITE UR QL STRIP: NEGATIVE
NRBC BLD-RTO: 0 /100 WBC
PH UR STRIP: 6 [PH] (ref 5–8)
PLATELET # BLD AUTO: 174 K/UL (ref 150–450)
PMV BLD AUTO: 11.3 FL (ref 9.2–12.9)
POTASSIUM SERPL-SCNC: 4 MMOL/L (ref 3.5–5.1)
PROT SERPL-MCNC: 7.2 G/DL (ref 6–8.4)
PROT UR QL STRIP: ABNORMAL
RBC # BLD AUTO: 4.1 M/UL (ref 4–5.4)
SARS-COV-2 RDRP RESP QL NAA+PROBE: POSITIVE
SODIUM SERPL-SCNC: 136 MMOL/L (ref 136–145)
SP GR UR STRIP: 1.02 (ref 1–1.03)
SPECIMEN SOURCE: NORMAL
TROPONIN I SERPL DL<=0.01 NG/ML-MCNC: 0.01 NG/ML (ref 0–0.03)
URN SPEC COLLECT METH UR: ABNORMAL
UROBILINOGEN UR STRIP-ACNC: NEGATIVE EU/DL
WBC # BLD AUTO: 5.1 K/UL (ref 3.9–12.7)

## 2023-11-10 PROCEDURE — 36415 COLL VENOUS BLD VENIPUNCTURE: CPT | Performed by: PHYSICIAN ASSISTANT

## 2023-11-10 PROCEDURE — 93010 ELECTROCARDIOGRAM REPORT: CPT | Mod: ,,, | Performed by: GENERAL PRACTICE

## 2023-11-10 PROCEDURE — 84484 ASSAY OF TROPONIN QUANT: CPT | Performed by: PHYSICIAN ASSISTANT

## 2023-11-10 PROCEDURE — 80053 COMPREHEN METABOLIC PANEL: CPT | Performed by: PHYSICIAN ASSISTANT

## 2023-11-10 PROCEDURE — 87502 INFLUENZA DNA AMP PROBE: CPT | Performed by: PHYSICIAN ASSISTANT

## 2023-11-10 PROCEDURE — 81003 URINALYSIS AUTO W/O SCOPE: CPT | Performed by: PHYSICIAN ASSISTANT

## 2023-11-10 PROCEDURE — 93005 ELECTROCARDIOGRAM TRACING: CPT

## 2023-11-10 PROCEDURE — 99285 EMERGENCY DEPT VISIT HI MDM: CPT | Mod: 25

## 2023-11-10 PROCEDURE — 25000003 PHARM REV CODE 250: Performed by: PHYSICIAN ASSISTANT

## 2023-11-10 PROCEDURE — 85025 COMPLETE CBC W/AUTO DIFF WBC: CPT | Performed by: PHYSICIAN ASSISTANT

## 2023-11-10 PROCEDURE — 96360 HYDRATION IV INFUSION INIT: CPT

## 2023-11-10 PROCEDURE — 83880 ASSAY OF NATRIURETIC PEPTIDE: CPT | Performed by: PHYSICIAN ASSISTANT

## 2023-11-10 PROCEDURE — 93010 EKG 12-LEAD: ICD-10-PCS | Mod: ,,, | Performed by: GENERAL PRACTICE

## 2023-11-10 PROCEDURE — U0002 COVID-19 LAB TEST NON-CDC: HCPCS | Performed by: PHYSICIAN ASSISTANT

## 2023-11-10 RX ORDER — DIPHENHYDRAMINE HCL 25 MG
25 CAPSULE ORAL
Status: COMPLETED | OUTPATIENT
Start: 2023-11-10 | End: 2023-11-10

## 2023-11-10 RX ORDER — FAMOTIDINE 20 MG/1
20 TABLET, FILM COATED ORAL
Status: COMPLETED | OUTPATIENT
Start: 2023-11-10 | End: 2023-11-10

## 2023-11-10 RX ADMIN — FAMOTIDINE 20 MG: 20 TABLET, FILM COATED ORAL at 10:11

## 2023-11-10 RX ADMIN — DIPHENHYDRAMINE HYDROCHLORIDE 25 MG: 25 CAPSULE ORAL at 10:11

## 2023-11-10 RX ADMIN — SODIUM CHLORIDE 500 ML: 9 INJECTION, SOLUTION INTRAVENOUS at 11:11

## 2023-11-10 NOTE — DISCHARGE INSTRUCTIONS
Take tylenol as needed. You can also use nasal spray. Drink plenty of fluid. Hold your lasix for 2 days. Call and schedule a follow up with your primary care provider next week. For worsening symptoms, chest pain, shortness of breath, increased abdominal pain, high grade fever, stroke or stroke like symptoms, immediately go to the nearest Emergency Room or call 911 as soon as possible.

## 2023-11-10 NOTE — ED PROVIDER NOTES
Encounter Date: 11/10/2023       History     Chief Complaint   Patient presents with    Shortness of Breath    Rash     Patient states she got the flu shot Monday and began feeling bad Tuesday, with SOB she began having a rash on her inner thighs yesterday     Patient is an 82 year old female who presents with shortness of breath for four days. She has PMH significant for anemia, bronchitis, CHF, COPD, hypertension, hyperlipidemia and PE. She states she got the flu shot on Monday and then symptoms started the following day. She reports associated cough and congestion. She reports fever of 101F. No N/V. Does reports diarrhea this AM. States mild tightness in the chest that started four days ago. She has been using her inhaler with no improvement.     The history is provided by the patient.     Review of patient's allergies indicates:   Allergen Reactions    Daptomycin Other (See Comments)     Kidney damage     Cymbalta [duloxetine] Other (See Comments)     Nightmares      Darvon [propoxyphene] Nausea Only and Other (See Comments)     Sweating, slept for 3 days    Atorvastatin Other (See Comments)     Muscle cramps    Naprosyn [naproxen] Nausea Only    Penicillins Rash    Tramadol Nausea Only and Palpitations     Past Medical History:   Diagnosis Date    Acute deep vein thrombosis (DVT) of right upper extremity 8/5/2020    Occlusive thrombus of the right brachial vein and cephalic vein    LUIZ (acute kidney injury) 8/3/2020    ALLERGIC RHINITIS     Anemia     Arthritis     Atelectasis 8/3/2020    Atypical pneumonia 11/1/2020    Back pain     Bronchitis     Cataract     OU    CHF (congestive heart failure)     Cholelithiasis     COPD (chronic obstructive pulmonary disease)     Degenerative disc disease     Diabetes mellitus     pre diabetic    Diverticulosis     DVT (deep venous thrombosis)     Edema     Elevated brain natriuretic peptide (BNP) level 8/3/2020    Encounter for blood transfusion 1979    Fibromyalgia      Glaucoma     Gout     Hematuria 8/7/2020    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    Hx of colonic polyps     Hyperlipidemia     Hypertension     Hyponatremia 8/5/2020    Incontinence     MRSA bacteremia 7/22/2020    Non-traumatic rhabdomyolysis 8/4/2020    Osteoporosis     Pneumonia due to infectious organism 11/1/2020    Pulmonary embolism     Reflux     Refusal of blood transfusions as patient is [employer]     Sleep apnea     non compliant with CPAP    Vestibulitis of ear      Past Surgical History:   Procedure Laterality Date    ANGIOGRAPHY OF LOWER EXTREMITY N/A 7/31/2019    Procedure: ANGIOGRAM, LOWER EXTREMITY;  Surgeon: Gino Arana MD;  Location: [facility] CATH/EP LAB;  Service: General;  Laterality: N/A;    ASPIRATION OF SOFT TISSUE Left 10/15/2020    Procedure: ASPIRATION, SOFT TISSUE;  Surgeon: [facility];  Location: [facility] OR;  Service: General;  Laterality: Left;    COLONOSCOPY N/A 8/13/2020    Procedure: COLONOSCOPY;  Surgeon: Sue Nuñez MD;  Location: [facility] ENDO;  Service: Endoscopy;  Laterality: N/A;    ESOPHAGOGASTRODUODENOSCOPY N/A 8/12/2020    Procedure: EGD (ESOPHAGOGASTRODUODENOSCOPY);  Surgeon: Sue Nuñez MD;  Location: [facility] ENDO;  Service: Endoscopy;  Laterality: N/A;    HIP SURGERY      HYSTERECTOMY      INTRALUMINAL GASTROINTESTINAL TRACT IMAGING VIA CAPSULE N/A 9/9/2020    Procedure: IMAGING PROCEDURE, GI TRACT, INTRALUMINAL, VIA CAPSULE;  Surgeon: Sue Nuñez MD;  Location: [facility] ENDO;  Service: Endoscopy;  Laterality: N/A;    JOINT REPLACEMENT      B total hip    LAPAROSCOPIC CHOLECYSTECTOMY N/A 7/13/2020    Procedure: CHOLECYSTECTOMY, LAPAROSCOPIC;  Surgeon: Jomar Mcdonald MD;  Location: [facility] OR;  Service: General;  Laterality: N/A;    TRANSFORAMINAL EPIDURAL INJECTION OF STEROID Left 6/30/2020    Procedure: Injection,steroid,epidural,transforaminal approach;  Surgeon: Matt Gilliam MD;  Location: [facility] OR;  Service: Pain Management;   Laterality: Left;  L2-3, L3-4    TRANSFORAMINAL EPIDURAL INJECTION OF STEROID Left 2021    Procedure: Injection,steroid,epidural,transforaminal approach;  Surgeon: Matt Gilliam MD;  [facility];  Service: Pain Management;  Laterality: Left;  L2-3, L3-4     Family History   Problem Relation Age of Onset    Hypertension Mother     Stroke Father     Hypertension Father     Diabetes Sister     Cancer Sister     Stomach cancer Sister     Hypertension Sister     Bipolar disorder Sister     Peripheral vascular disease Sister     Hypertension Brother     Stroke Brother     Peripheral vascular disease Brother     Hypertension Son     Obesity Son     Early death Son 48    Arthritis Son     Glaucoma Neg Hx     Macular degeneration Neg Hx     Retinal detachment Neg Hx      Social History     Tobacco Use    Smoking status: Former     Current packs/day: 0.00     Average packs/day: 0.3 packs/day for 5.0 years (1.3 ttl pk-yrs)     Types: Cigarettes     Start date: 1967     Quit date: 1972     Years since quittin.8    Smokeless tobacco: Never   Substance Use Topics    Alcohol use: Yes     Alcohol/week: 0.0 standard drinks of alcohol     Comment: Rarely    Drug use: Not Currently     Review of Systems   Constitutional:  Positive for chills. Negative for activity change, appetite change and fever.   HENT:  Positive for congestion and rhinorrhea. Negative for sore throat.    Eyes:  Negative for redness and visual disturbance.   Respiratory:  Positive for cough and shortness of breath. Negative for chest tightness.    Cardiovascular:  Negative for chest pain, palpitations and leg swelling.   Gastrointestinal:  Positive for diarrhea and nausea. Negative for abdominal pain and vomiting.   Genitourinary:  Negative for dysuria and frequency.   Musculoskeletal:  Negative for back pain, neck pain and neck stiffness.   Skin:  Negative for rash.   Neurological:  Negative for dizziness, syncope, numbness and headaches.        Physical Exam     Initial Vitals [11/10/23 0917]   BP Pulse Resp Temp SpO2   (!) 121/58 72 (!) 22 97.8 °F (36.6 °C) 98 %      MAP       --         Physical Exam    Nursing note and vitals reviewed.  Constitutional: Vital signs are normal. She appears well-developed and well-nourished. She is cooperative.  Non-toxic appearance. She does not have a sickly appearance.   HENT:   Head: Normocephalic and atraumatic.   Right Ear: External ear normal.   Left Ear: External ear normal.   Nose: Nose normal.   Mouth/Throat: Uvula is midline and oropharynx is clear and moist.   Eyes: Conjunctivae and lids are normal.   Neck: Neck supple.   Normal range of motion.   Full passive range of motion without pain.     Cardiovascular:  Normal rate, regular rhythm and normal heart sounds.     Exam reveals no gallop and no friction rub.       No murmur heard.  Pulmonary/Chest: Breath sounds normal. She has no wheezes. She has no rhonchi. She has no rales.   Abdominal: Abdomen is soft. There is no abdominal tenderness. There is no rebound and no guarding.   Musculoskeletal:      Cervical back: Full passive range of motion without pain, normal range of motion and neck supple.     Neurological: She is alert and oriented to person, place, and time.   Skin: Skin is warm, dry and intact. No rash noted.         ED Course   Procedures  Labs Reviewed   CBC W/ AUTO DIFFERENTIAL - Abnormal; Notable for the following components:       Result Value     (*)     MCH 31.2 (*)     MCHC 30.7 (*)     Immature Granulocytes 0.6 (*)     Gran # (ANC) 1.7 (*)     Gran % 33.6 (*)     Mono % 16.9 (*)     All other components within normal limits   COMPREHENSIVE METABOLIC PANEL - Abnormal; Notable for the following components:    Glucose 118 (*)     BUN 31 (*)     Creatinine 2.0 (*)     Calcium 8.5 (*)     Albumin 3.4 (*)     eGFR 25 (*)     All other components within normal limits   SARS-COV-2 RNA AMPLIFICATION, QUAL - Abnormal; Notable for the  following components:    SARS-CoV-2 RNA, Amplification, Qual Positive (*)     All other components within normal limits    Narrative:     SCOVM  critical result(s) called and verbal readback obtained from   IKER MOY RN by KFA1 11/10/2023 10:37   URINALYSIS, REFLEX TO URINE CULTURE - Abnormal; Notable for the following components:    Protein, UA Trace (*)     All other components within normal limits    Narrative:     Specimen Source->Urine   INFLUENZA A & B BY MOLECULAR   TROPONIN I   B-TYPE NATRIURETIC PEPTIDE          Imaging Results              X-Ray Chest 1 View (Final result)  Result time 11/10/23 10:12:17      Final result by Jomar Walls MD (11/10/23 10:12:17)                   Impression:      No acute process.  Low lung volumes.      Electronically signed by: Jomar Walls MD  Date:    11/10/2023  Time:    10:12               Narrative:    EXAMINATION:  XR CHEST 1 VIEW    CLINICAL HISTORY:  shortness of breath;    TECHNIQUE:  Single frontal view of the chest was performed.    COMPARISON:  04/09/2021    FINDINGS:  Lung volumes are moderately low.  There is no consolidation, edema, or pleural effusion.  The heart size is normal.                                       Medications   diphenhydrAMINE capsule 25 mg (25 mg Oral Given 11/10/23 1005)   famotidine tablet 20 mg (20 mg Oral Given 11/10/23 1005)   sodium chloride 0.9% bolus 500 mL 500 mL (0 mLs Intravenous Stopped 11/10/23 1238)     Medical Decision Making  Urgent evaluation of a well appearing 82-year-old female who presents with cough and congestion.  She reports feeling mildly short of breath.. Vital signs are stable. Clear and equal breath sounds bilaterally. Oxygen saturation is stable. I doubt pneumonia. No sign of otitis media.  She reports mild nausea and diarrhea.  She is a soft nontender abdomen.  I doubt acute intra-abdominal process.  X-ray shows no acute abnormalities.. Patient is noted to be COVID positive.  EKG shows no  acute changes.  Troponin is normal.  BNP is normal.  Low suspicion for ACS.  She is not tachycardic or hypoxic.  I doubt pulmonary embolism.  She is noted to be mildly dehydrated.  She was given IV fluids in the ER.  Recommend she hold her Lasix for the next 2 days.  She is 4 days out from onset of symptoms.  She has multiple medication interactions that exclude her from Paxlovid.  Symptomatic treatment at home. Discussed results with patient. Return precautions given. Based on my clinical evaluation, I do not appreciate any immediate, emergent, or life threatening condition or etiology that warrants additional workup today and feel that the patient can be discharged with close follow up care.  Patient is to follow up with their primary care provider. All questions answered.        Amount and/or Complexity of Data Reviewed  Labs: ordered.  Radiology: ordered.    Risk  OTC drugs.                               Clinical Impression:   Final diagnoses:  [R06.02] Shortness of breath  [U07.1] COVID-19 (Primary)  [E86.0] Dehydration        ED Disposition Condition    Discharge Stable          ED Prescriptions    None       Follow-up Information       Follow up With Specialties Details Why Contact Info Additional Information    Osmani Villatoro MD Family Medicine   [address] [place] [address]  [place] [address]  [phone]       [facility] -  Emergency Medicine  As needed [address] [place] [address] [address] [place] [address] 1st floor             Cortney Markham PA-C  11/10/23 9283

## 2023-11-13 ENCOUNTER — PATIENT OUTREACH (OUTPATIENT)
Dept: EMERGENCY MEDICINE | Facility: HOSPITAL | Age: 83
End: 2023-11-13

## 2023-11-14 ENCOUNTER — TELEPHONE (OUTPATIENT)
Dept: FAMILY MEDICINE | Facility: CLINIC | Age: 83
End: 2023-11-14
Payer: MEDICARE

## 2023-11-14 NOTE — PROGRESS NOTES
Unable to reach pt after 2 attempts. Encounter will be closed.    Caterina Carr  ED Navigator  [phone]

## 2023-11-14 NOTE — TELEPHONE ENCOUNTER
----- Message from Caterina Carr, Patient Care Assistant sent at 11/14/2023 11:08 AM CST -----  Regarding: ER F/U appt.  Good morning, pt was in the ER recently and is needing to schedule a follow-up appointment. This patient is a part of the [facility] quality work for the system and is a patient with two or more chronic conditions, which requires a Post ED 7 day appt. If someone can give her a call to schedule, it would be appreciated. Thanks in advance :)

## 2023-11-14 NOTE — PROGRESS NOTES
Caterina Carr, Patient Care Assistant  ED Navigator  Emergency Department    Project: [facility] ED Navigator  Role: Community Health Worker    Date: 2023  Patient Name: Sammi Abreu  MRN: [id number]  PCP: Osmani Villatoro MD    Assessment:     Sammi Abreu is a 82 y.o. female who has presented to ED for shortness of breath and a rash. Patient has visited the ED 1 times in the past 3 months. Patient did not contact PCP.     ED Navigator Initial Assessment    ED Navigator Enrollment Documentation  Consent to Services  Contact  Transportation  Insurance Coverage  Specialist Appointment  PCP Follow Up Appointment  Medications  Psychological  Food  Communication/Education  Other Financial Concerns  Other Social Barriers/Concerns  Primary Barrier  Additional Documentation: Pt would like to schedule with PCP and also expressed her concerns of rescheduling therapy. The following was sent to Dr. Villatoro staff: Good morning, pt was in the ER recently and is needing to schedule a follow-up appointment. This patient is a part of the [employer] quality work for the system and is a patient with two or more chronic conditions, which requires a Post ED 7 day appt. If someone can give her a call to schedule, it would be appreciated. Thanks in advance :) The following was sent to [facility]: Good morning, pt expressed her concerns of rescheduling, as she was sick when she was supposed to attend. If someone can please call her to advise, it would be appreciated :) pt will be contacted as necessary.    Caterina Carr  ED Navigator  [phone]            Social History     Socioeconomic History    Marital status:    Tobacco Use    Smoking status: Former     Current packs/day: 0.00     Average packs/day: 0.3 packs/day for 5.0 years (1.3 ttl pk-yrs)     Types: Cigarettes     Start date: 1967     Quit date: 1972     Years since quittin.8    Smokeless tobacco:  Never   Substance and Sexual Activity    Alcohol use: Yes     Alcohol/week: 0.0 standard drinks of alcohol     Comment: Rarely    Drug use: Not Currently    Sexual activity: Not Currently     Social Determinants of Health     Financial Resource Strain: Low Risk  (8/14/2023)    Overall Financial Resource Strain (CARDIA)     Difficulty of Paying Living Expenses: Not hard at all   Food Insecurity: No Food Insecurity (8/14/2023)    Hunger Vital Sign     Worried About Running Out of Food in the Last Year: Never true     Ran Out of Food in the Last Year: Never true   Transportation Needs: No Transportation Needs (8/14/2023)    PRAPARE - Transportation     Lack of Transportation (Medical): No     Lack of Transportation (Non-Medical): No   Physical Activity: Inactive (8/14/2023)    Exercise Vital Sign     Days of Exercise per Week: 0 days     Minutes of Exercise per Session: 0 min   Stress: No Stress Concern Present (8/14/2023)    [employer] - Occupational Stress Questionnaire     Feeling of Stress : Not at all   Social Connections: Unknown (8/14/2023)    Social Connection and Isolation Panel [NHANES]     Frequency of Communication with Friends and Family: More than three times a week     Frequency of Social Gatherings with Friends and Family: More than three times a week     Attends Catholic Services: Never     Active Member of Clubs or Organizations: No     Attends Club or Organization Meetings: Never   Recent Concern: Social Connections - Socially Isolated (8/14/2023)    Social Connection and Isolation Panel [NHANES]     Frequency of Communication with Friends and Family: More than three times a week     Frequency of Social Gatherings with Friends and Family: More than three times a week     Attends Catholic Services: Never     Active Member of Clubs or Organizations: No     Attends Club or Organization Meetings: Never     Marital Status:    Housing Stability: Unknown (8/14/2023)     Housing Stability Vital Sign     Unable to Pay for Housing in the Last Year: No     Unstable Housing in the Last Year: No       Plan:     Pt would like to schedule with PCP and also expressed her concerns of rescheduling therapy. The following was sent to Dr. Villatoro staff: Good morning, pt was in the ER recently and is needing to schedule a follow-up appointment. This patient is a part of the [facility] quality work for the system and is a patient with two or more chronic conditions, which requires a Post ED 7 day appt. If someone can give her a call to schedule, it would be appreciated. Thanks in advance :) The following was sent to [facility]: Good morning, pt expressed her concerns of rescheduling, as she was sick when she was supposed to attend. If someone can please call her to advise, it would be appreciated :) pt will be contacted as necessary.    Caterina Carr  ED Navigator  [phone]

## 2023-11-14 NOTE — PROGRESS NOTES
Pt was scheduled with Semaj Milligan NP for 11/20 at 10:30 a.m. Pt will be reminded on 11/17.    Caterina Carr  ED Navigator  [phone]

## 2023-11-17 ENCOUNTER — PATIENT OUTREACH (OUTPATIENT)
Dept: EMERGENCY MEDICINE | Facility: HOSPITAL | Age: 83
End: 2023-11-17

## 2023-11-17 NOTE — PROGRESS NOTES
Pt was reminded about and will be attending her appointment on Monday (11/20) with Semaj Milligan NP at 10:30 a.m.    Caterina Carr  ED Navigator  [phone]

## 2023-11-20 ENCOUNTER — LAB VISIT (OUTPATIENT)
Dept: LAB | Facility: HOSPITAL | Age: 83
End: 2023-11-20
Attending: NURSE PRACTITIONER
Payer: MEDICARE

## 2023-11-20 ENCOUNTER — OFFICE VISIT (OUTPATIENT)
Dept: FAMILY MEDICINE | Facility: CLINIC | Age: 83
End: 2023-11-20
Payer: MEDICARE

## 2023-11-20 VITALS
BODY MASS INDEX: 40.39 KG/M2 | WEIGHT: 227.94 LBS | DIASTOLIC BLOOD PRESSURE: 58 MMHG | SYSTOLIC BLOOD PRESSURE: 142 MMHG | HEART RATE: 66 BPM | TEMPERATURE: 99 F | HEIGHT: 63 IN | OXYGEN SATURATION: 97 %

## 2023-11-20 DIAGNOSIS — E11.59 HYPERTENSION ASSOCIATED WITH DIABETES: ICD-10-CM

## 2023-11-20 DIAGNOSIS — E11.69 HYPERLIPIDEMIA ASSOCIATED WITH TYPE 2 DIABETES MELLITUS: ICD-10-CM

## 2023-11-20 DIAGNOSIS — E78.5 HYPERLIPIDEMIA ASSOCIATED WITH TYPE 2 DIABETES MELLITUS: ICD-10-CM

## 2023-11-20 DIAGNOSIS — Z86.711 HISTORY OF PULMONARY EMBOLISM: ICD-10-CM

## 2023-11-20 DIAGNOSIS — R05.9 COUGH, UNSPECIFIED TYPE: ICD-10-CM

## 2023-11-20 DIAGNOSIS — E66.01 OBESITY, MORBID, BMI 40.0-49.9: ICD-10-CM

## 2023-11-20 DIAGNOSIS — R05.9 COUGH, UNSPECIFIED TYPE: Primary | ICD-10-CM

## 2023-11-20 DIAGNOSIS — I50.32 CHRONIC DIASTOLIC CHF (CONGESTIVE HEART FAILURE): ICD-10-CM

## 2023-11-20 DIAGNOSIS — N18.32 STAGE 3B CHRONIC KIDNEY DISEASE: ICD-10-CM

## 2023-11-20 DIAGNOSIS — I15.2 HYPERTENSION ASSOCIATED WITH DIABETES: ICD-10-CM

## 2023-11-20 LAB
ALBUMIN SERPL BCP-MCNC: 3.3 G/DL (ref 3.5–5.2)
ALP SERPL-CCNC: 58 U/L (ref 55–135)
ALT SERPL W/O P-5'-P-CCNC: 12 U/L (ref 10–44)
ANION GAP SERPL CALC-SCNC: 8 MMOL/L (ref 8–16)
AST SERPL-CCNC: 14 U/L (ref 10–40)
BILIRUB SERPL-MCNC: 0.5 MG/DL (ref 0.1–1)
BUN SERPL-MCNC: 29 MG/DL (ref 8–23)
CALCIUM SERPL-MCNC: 9 MG/DL (ref 8.7–10.5)
CHLORIDE SERPL-SCNC: 108 MMOL/L (ref 95–110)
CO2 SERPL-SCNC: 26 MMOL/L (ref 23–29)
CREAT SERPL-MCNC: 1.3 MG/DL (ref 0.5–1.4)
EST. GFR  (NO RACE VARIABLE): 41.6 ML/MIN/1.73 M^2
GLUCOSE SERPL-MCNC: 93 MG/DL (ref 70–110)
POTASSIUM SERPL-SCNC: 4.3 MMOL/L (ref 3.5–5.1)
PROT SERPL-MCNC: 6.8 G/DL (ref 6–8.4)
SODIUM SERPL-SCNC: 142 MMOL/L (ref 136–145)

## 2023-11-20 PROCEDURE — 1159F MED LIST DOCD IN RCRD: CPT | Mod: CPTII,S$GLB,, | Performed by: NURSE PRACTITIONER

## 2023-11-20 PROCEDURE — 99214 OFFICE O/P EST MOD 30 MIN: CPT | Mod: 25,S$GLB,, | Performed by: NURSE PRACTITIONER

## 2023-11-20 PROCEDURE — 1157F ADVNC CARE PLAN IN RCRD: CPT | Mod: CPTII,S$GLB,, | Performed by: NURSE PRACTITIONER

## 2023-11-20 PROCEDURE — 3077F PR MOST RECENT SYSTOLIC BLOOD PRESSURE >= 140 MM HG: ICD-10-PCS | Mod: CPTII,S$GLB,, | Performed by: NURSE PRACTITIONER

## 2023-11-20 PROCEDURE — 94640 AIRWAY INHALATION TREATMENT: CPT | Mod: S$GLB,,, | Performed by: NURSE PRACTITIONER

## 2023-11-20 PROCEDURE — 99999 PR PBB SHADOW E&M-EST. PATIENT-LVL III: ICD-10-PCS | Mod: PBBFAC,,, | Performed by: NURSE PRACTITIONER

## 2023-11-20 PROCEDURE — 94640 PR INHAL RX, AIRWAY OBST/DX SPUTUM INDUCT: ICD-10-PCS | Mod: S$GLB,,, | Performed by: NURSE PRACTITIONER

## 2023-11-20 PROCEDURE — 1160F PR REVIEW ALL MEDS BY PRESCRIBER/CLIN PHARMACIST DOCUMENTED: ICD-10-PCS | Mod: CPTII,S$GLB,, | Performed by: NURSE PRACTITIONER

## 2023-11-20 PROCEDURE — 1157F PR ADVANCE CARE PLAN OR EQUIV PRESENT IN MEDICAL RECORD: ICD-10-PCS | Mod: CPTII,S$GLB,, | Performed by: NURSE PRACTITIONER

## 2023-11-20 PROCEDURE — 36415 COLL VENOUS BLD VENIPUNCTURE: CPT | Mod: PO | Performed by: NURSE PRACTITIONER

## 2023-11-20 PROCEDURE — 96372 THER/PROPH/DIAG INJ SC/IM: CPT | Mod: 59,S$GLB,, | Performed by: NURSE PRACTITIONER

## 2023-11-20 PROCEDURE — 99999 PR PBB SHADOW E&M-EST. PATIENT-LVL III: CPT | Mod: PBBFAC,,, | Performed by: NURSE PRACTITIONER

## 2023-11-20 PROCEDURE — 80053 COMPREHEN METABOLIC PANEL: CPT | Performed by: NURSE PRACTITIONER

## 2023-11-20 PROCEDURE — 3078F PR MOST RECENT DIASTOLIC BLOOD PRESSURE < 80 MM HG: ICD-10-PCS | Mod: CPTII,S$GLB,, | Performed by: NURSE PRACTITIONER

## 2023-11-20 PROCEDURE — 1160F RVW MEDS BY RX/DR IN RCRD: CPT | Mod: CPTII,S$GLB,, | Performed by: NURSE PRACTITIONER

## 2023-11-20 PROCEDURE — 3078F DIAST BP <80 MM HG: CPT | Mod: CPTII,S$GLB,, | Performed by: NURSE PRACTITIONER

## 2023-11-20 PROCEDURE — 1125F PR PAIN SEVERITY QUANTIFIED, PAIN PRESENT: ICD-10-PCS | Mod: CPTII,S$GLB,, | Performed by: NURSE PRACTITIONER

## 2023-11-20 PROCEDURE — 96372 PR INJECTION,THERAP/PROPH/DIAG2ST, IM OR SUBCUT: ICD-10-PCS | Mod: 59,S$GLB,, | Performed by: NURSE PRACTITIONER

## 2023-11-20 PROCEDURE — 3077F SYST BP >= 140 MM HG: CPT | Mod: CPTII,S$GLB,, | Performed by: NURSE PRACTITIONER

## 2023-11-20 PROCEDURE — 99214 PR OFFICE/OUTPT VISIT, EST, LEVL IV, 30-39 MIN: ICD-10-PCS | Mod: 25,S$GLB,, | Performed by: NURSE PRACTITIONER

## 2023-11-20 PROCEDURE — 1125F AMNT PAIN NOTED PAIN PRSNT: CPT | Mod: CPTII,S$GLB,, | Performed by: NURSE PRACTITIONER

## 2023-11-20 PROCEDURE — 1159F PR MEDICATION LIST DOCUMENTED IN MEDICAL RECORD: ICD-10-PCS | Mod: CPTII,S$GLB,, | Performed by: NURSE PRACTITIONER

## 2023-11-20 RX ORDER — IPRATROPIUM BROMIDE AND ALBUTEROL SULFATE 2.5; .5 MG/3ML; MG/3ML
3 SOLUTION RESPIRATORY (INHALATION)
Status: COMPLETED | OUTPATIENT
Start: 2023-11-20 | End: 2023-11-20

## 2023-11-20 RX ORDER — BENZONATATE 200 MG/1
200 CAPSULE ORAL 3 TIMES DAILY PRN
Qty: 20 CAPSULE | Refills: 1 | Status: SHIPPED | OUTPATIENT
Start: 2023-11-20 | End: 2023-12-03

## 2023-11-20 RX ORDER — DEXAMETHASONE SODIUM PHOSPHATE 4 MG/ML
4 INJECTION, SOLUTION INTRA-ARTICULAR; INTRALESIONAL; INTRAMUSCULAR; INTRAVENOUS; SOFT TISSUE
Status: COMPLETED | OUTPATIENT
Start: 2023-11-20 | End: 2023-11-20

## 2023-11-20 RX ORDER — PROMETHAZINE HYDROCHLORIDE AND DEXTROMETHORPHAN HYDROBROMIDE 6.25; 15 MG/5ML; MG/5ML
5 SYRUP ORAL NIGHTLY PRN
Qty: 118 ML | Refills: 1 | Status: SHIPPED | OUTPATIENT
Start: 2023-11-20 | End: 2024-01-06

## 2023-11-20 RX ORDER — PROMETHAZINE HYDROCHLORIDE AND DEXTROMETHORPHAN HYDROBROMIDE 6.25; 15 MG/5ML; MG/5ML
5 SYRUP ORAL 4 TIMES DAILY PRN
Qty: 118 ML | Refills: 1 | Status: SHIPPED | OUTPATIENT
Start: 2023-11-20 | End: 2023-11-20 | Stop reason: SDUPTHER

## 2023-11-20 RX ADMIN — IPRATROPIUM BROMIDE AND ALBUTEROL SULFATE 3 ML: 2.5; .5 SOLUTION RESPIRATORY (INHALATION) at 11:11

## 2023-11-20 RX ADMIN — DEXAMETHASONE SODIUM PHOSPHATE 4 MG: 4 INJECTION, SOLUTION INTRA-ARTICULAR; INTRALESIONAL; INTRAMUSCULAR; INTRAVENOUS; SOFT TISSUE at 11:11

## 2023-11-20 NOTE — PROGRESS NOTES
Subjective:       Patient ID: Sammi Abreu is a 82 y.o. female.    Chief Complaint: Follow-up    HPI    Patient presents today for follow up visit. Patient was seen in ER on 11/10/23 for COVID infection. Patient has SOB and a rash.MH significant for anemia, bronchitis, CHF, COPD, hypertension, hyperlipidemia and PE. X-ray shows no acute abnormalities.. Patient is noted to be COVID positive.  EKG shows no acute changes.  Troponin is normal.  BNP is normal. She is noted to be mildly dehydrated.  She was given IV fluids in the ER.  Recommend she hold her Lasix for the next 2 days.  She is 4 days out from onset of symptoms. She has multiple medication interactions that exclude her from Paxlovid.  Symptomatic treatment at home.   Past Medical History:   Diagnosis Date    Acute deep vein thrombosis (DVT) of right upper extremity 8/5/2020    Occlusive thrombus of the right brachial vein and cephalic vein    LUIZ (acute kidney injury) 8/3/2020    ALLERGIC RHINITIS     Anemia     Arthritis     Atelectasis 8/3/2020    Atypical pneumonia 11/1/2020    Back pain     Bronchitis     Cataract     OU    CHF (congestive heart failure)     Cholelithiasis     COPD (chronic obstructive pulmonary disease)     Degenerative disc disease     Diabetes mellitus     pre diabetic    Diverticulosis     DVT (deep venous thrombosis)     Edema     Elevated brain natriuretic peptide (BNP) level 8/3/2020    Encounter for blood transfusion 1979    Fibromyalgia     Glaucoma     Gout     Hematuria 8/7/2020    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    Hx of colonic polyps     Hyperlipidemia     Hypertension     Hyponatremia 8/5/2020    Incontinence     MRSA bacteremia 7/22/2020    Non-traumatic rhabdomyolysis 8/4/2020    Osteoporosis     Pneumonia due to infectious organism 11/1/2020    Pulmonary embolism     Reflux     Refusal of blood transfusions as patient is Yarsanism     Sleep apnea     non compliant with CPAP    Vestibulitis  of ear        Review of patient's allergies indicates:   Allergen Reactions    Daptomycin Other (See Comments)     Kidney damage     Cymbalta [duloxetine] Other (See Comments)     Nightmares      Darvon [propoxyphene] Nausea Only and Other (See Comments)     Sweating, slept for 3 days    Atorvastatin Other (See Comments)     Muscle cramps    Naprosyn [naproxen] Nausea Only    Penicillins Rash    Tramadol Nausea Only and Palpitations         Current Outpatient Medications:     acetaminophen (TYLENOL) 325 MG tablet, Take 2 tablets (650 mg total) by mouth every 6 (six) hours as needed (Do not take with any other Tylenol or acetaminophen containing products)., Disp: , Rfl: 0    albuterol (PROVENTIL/VENTOLIN HFA) 90 mcg/actuation inhaler, 2 puffs every 4 hours as needed for cough, wheeze, or shortness of breath, Disp: 54 g, Rfl: 3    blood sugar diagnostic Strp, To check BG 1 times daily, to use with insurance preferred meter, Disp: 100 each, Rfl: 0    budesonide (PULMICORT) 0.5 mg/2 mL nebulizer solution, Take 2 mLs (0.5 mg total) by nebulization once daily. Controller, Disp: 60 mL, Rfl: 3    calcitRIOL (ROCALTROL) 0.25 MCG Cap, Take 1 capsule (0.25 mcg total) by mouth once daily., Disp: 90 capsule, Rfl: 3    esomeprazole (NEXIUM) 20 MG capsule, TAKE 1 CAPSULE BY MOUTH TWICE DAILY BEFORE MEAL(S), Disp: 180 capsule, Rfl: 1    fluticasone propionate (FLONASE) 50 mcg/actuation nasal spray, 2 sprays (100 mcg total) by Each Nostril route once daily., Disp: 48 g, Rfl: 3    folic acid (FOLVITE) 1 MG tablet, Take 1 tablet (1,000 mcg total) by mouth once daily., Disp: 90 tablet, Rfl: 3    furosemide (LASIX) 40 MG tablet, Take 1 tablet (40 mg total) by mouth 2 (two) times daily as needed (edema)., Disp: 180 tablet, Rfl: 3    hydrALAZINE (APRESOLINE) 100 MG tablet, Take 1 tablet (100 mg total) by mouth every 12 (twelve) hours., Disp: 180 tablet, Rfl: 4    lancets Misc, To check BG 1 times daily, to use with insurance preferred  meter, Disp: 100 each, Rfl: 0    losartan (COZAAR) 50 MG tablet, Take 1 tablet by mouth once daily, Disp: 90 tablet, Rfl: 1    magnesium oxide (MAG-OX) 400 mg (241.3 mg magnesium) tablet, Take 1 tablet (400 mg total) by mouth 2 (two) times daily., Disp: 180 tablet, Rfl: 3    METAMUCIL, SUGAR, Powd, 1 tabs oral daily, Disp: , Rfl: 0    metoprolol succinate (TOPROL-XL) 100 MG 24 hr tablet, Take 1 tablet (100 mg total) by mouth once daily., Disp: 180 tablet, Rfl: 3    potassium chloride SA (K-DUR,KLOR-CON) 20 MEQ tablet, Take 1 tablet (20 mEq total) by mouth 2 (two) times daily., Disp: 60 tablet, Rfl: 3    predniSONE (DELTASONE) 20 MG tablet, Take one daily for 3 days and may repeat for shortness of breath., Disp: 21 tablet, Rfl: 1    triamcinolone acetonide 0.1% (KENALOG) 0.1 % ointment, Apply topically 2 (two) times daily., Disp: 80 g, Rfl: 11    warfarin (COUMADIN) 5 MG tablet, TAKE 1 TO 1 & 1/2 (ONE & ONE-HALF) TABLETS BY MOUTH IN THE EVENING AS DIRECTED BY  THE  COUMADIN  CLINIC, Disp: 120 tablet, Rfl: 0    benzonatate (TESSALON) 200 MG capsule, Take 1 capsule (200 mg total) by mouth 3 (three) times daily as needed for Cough., Disp: 20 capsule, Rfl: 1    blood-glucose meter kit, To check BG 1 times daily, to use with insurance preferred meter, Disp: 1 each, Rfl: 0    diclofenac sodium (SOLARAZE) 3 % gel, 1 application bid prn (Patient not taking: Reported on 11/20/2023), Disp: 300 g, Rfl: 1    ezetimibe (ZETIA) 10 mg tablet, Take 1 tablet (10 mg total) by mouth once daily. (Patient not taking: Reported on 11/20/2023), Disp: 90 tablet, Rfl: 3    promethazine-dextromethorphan (PROMETHAZINE-DM) 6.25-15 mg/5 mL Syrp, Take 5 mLs by mouth nightly as needed (cough)., Disp: 118 mL, Rfl: 1  No current facility-administered medications for this visit.    Facility-Administered Medications Ordered in Other Visits:     lactated ringers infusion, , Intravenous, Continuous, Gino Reid MD, Last Rate: 10 mL/hr at 07/13/20  "1308, New Bag at 07/13/20 1405    lidocaine (PF) 10 mg/ml (1%) injection 10 mg, 1 mL, Intradermal, Once, Gino Reid MD    Review of Systems   Constitutional:  Negative for unexpected weight change.   HENT:  Negative for trouble swallowing.    Eyes:  Negative for visual disturbance.   Respiratory:  Negative for shortness of breath.    Cardiovascular:  Negative for chest pain, palpitations and leg swelling.   Gastrointestinal:  Negative for blood in stool.   Genitourinary:  Negative for hematuria.   Skin:  Negative for rash.   Allergic/Immunologic: Negative for immunocompromised state.   Neurological:  Negative for headaches.   Hematological:  Does not bruise/bleed easily.   Psychiatric/Behavioral:  Negative for agitation. The patient is not nervous/anxious.        Objective:      BP (!) 142/58 (BP Location: Left arm, Patient Position: Sitting, BP Method: Large (Manual))   Pulse 66   Temp 98.5 °F (36.9 °C) (Oral)   Ht 5' 3" (1.6 m)   Wt 103.4 kg (227 lb 15.3 oz)   SpO2 97%   BMI 40.38 kg/m²   Physical Exam  Constitutional:       Appearance: She is well-developed. She is obese. She is ill-appearing.   HENT:      Head: Normocephalic.   Cardiovascular:      Rate and Rhythm: Normal rate and regular rhythm.      Heart sounds: Normal heart sounds.   Pulmonary:      Effort: Pulmonary effort is normal.   Musculoskeletal:         General: Normal range of motion.   Skin:     General: Skin is warm and dry.   Neurological:      Mental Status: She is alert and oriented to person, place, and time.   Psychiatric:         Behavior: Behavior normal.         Thought Content: Thought content normal.         Judgment: Judgment normal.         Assessment:       1. Cough, unspecified type    2. Chronic diastolic CHF (congestive heart failure)    3. Hyperlipidemia associated with type 2 diabetes mellitus    4. Hypertension associated with diabetes    5. History of pulmonary embolism    6. Stage 3b chronic kidney disease    7. " "Obesity, morbid, BMI 40.0-49.9        Plan:       Cough, unspecified type  -     COMPREHENSIVE METABOLIC PANEL; Future; Expected date: 11/20/2023  - -     albuterol-ipratropium 2.5 mg-0.5 mg/3 mL nebulizer solution 3 mL  -     dexAMETHasone injection 4 mg  -     promethazine-dextromethorphan (PROMETHAZINE-DM) 6.25-15 mg/5 mL Syrp; Take 5 mLs by mouth nightly as needed (cough).  Dispense: 118 mL; Refill: 1  -     benzonatate (TESSALON) 200 MG capsule; Take 1 capsule (200 mg total) by mouth 3 (three) times daily as needed for Cough.  Dispense: 20 capsule; Refill: 1    Chronic diastolic CHF (congestive heart failure)  Stable, continue management  On lasix  Hyperlipidemia associated with type 2 diabetes mellitus  Stable, on zetia  Hypertension associated with diabetes  Stable, continue management  Low sodium diet  History of pulmonary embolism  Stable, on warfarin  Stage 3b chronic kidney disease  Stable and chronic.  Will continue to monitor q3-6 months and control chronic conditions as optimally as possible to preserve function.   Obesity, morbid, BMI 40.0-49.9  Counseled patient on his ideal body weight, health consequences of being obese and current recommendations including weekly exercise and a heart healthy diet.  Current BMI is:Estimated body mass index is 40.38 kg/m² as calculated from the following:    Height as of this encounter: 5' 3" (1.6 m).    Weight as of this encounter: 103.4 kg (227 lb 15.3 oz)..  Patient is aware that ideal BMI < 25 or Weight in (lb) to have BMI = 25: 140.8.           Patient readiness: acceptance and barriers:none    During the course of the visit the patient was educated and counseled about the following:     Diabetes:  Discussed general issues about diabetes pathophysiology and management.  Addressed ADA diet.  Encouraged aerobic exercise.  Discussed foot care.  Hypertension:   Dietary sodium restriction.  Regular aerobic exercise.  Obesity:   General weight loss/lifestyle " modification strategies discussed (elicit support from others; identify saboteurs; non-food rewards, etc).  Regular aerobic exercise program discussed.    Goals: Diabetes: Maintain Hemoglobin A1C below 7, Hypertension: Reduce Blood Pressure, and Obesity: Reduce calorie intake and BMI    Did patient meet goals/outcomes: Yes    The following self management tools provided: declined    Patient Instructions (the written plan) was given to the patient/family.     Time spent with patient: 30 minutes    Barriers to medications present (no )    Adverse reactions to current medications (no)    Over the counter medications reviewed (Yes)

## 2023-11-21 ENCOUNTER — OFFICE VISIT (OUTPATIENT)
Dept: CARDIOLOGY | Facility: CLINIC | Age: 83
End: 2023-11-21
Payer: MEDICARE

## 2023-11-21 VITALS
HEART RATE: 68 BPM | OXYGEN SATURATION: 99 % | BODY MASS INDEX: 40.57 KG/M2 | HEIGHT: 63 IN | RESPIRATION RATE: 16 BRPM | DIASTOLIC BLOOD PRESSURE: 70 MMHG | WEIGHT: 229 LBS | SYSTOLIC BLOOD PRESSURE: 140 MMHG

## 2023-11-21 DIAGNOSIS — I70.0 CALCIFICATION OF AORTA: Primary | ICD-10-CM

## 2023-11-21 DIAGNOSIS — I50.32 CHRONIC DIASTOLIC CHF (CONGESTIVE HEART FAILURE): ICD-10-CM

## 2023-11-21 DIAGNOSIS — I27.20 PULMONARY HYPERTENSION: ICD-10-CM

## 2023-11-21 DIAGNOSIS — J44.89 ASTHMA-COPD OVERLAP SYNDROME: ICD-10-CM

## 2023-11-21 DIAGNOSIS — I15.2 HYPERTENSION ASSOCIATED WITH DIABETES: ICD-10-CM

## 2023-11-21 DIAGNOSIS — E11.59 HYPERTENSION ASSOCIATED WITH DIABETES: ICD-10-CM

## 2023-11-21 PROCEDURE — 99214 PR OFFICE/OUTPT VISIT, EST, LEVL IV, 30-39 MIN: ICD-10-PCS | Mod: S$GLB,,, | Performed by: INTERNAL MEDICINE

## 2023-11-21 PROCEDURE — 99999 PR PBB SHADOW E&M-EST. PATIENT-LVL V: CPT | Mod: PBBFAC,,, | Performed by: INTERNAL MEDICINE

## 2023-11-21 PROCEDURE — 1160F RVW MEDS BY RX/DR IN RCRD: CPT | Mod: CPTII,S$GLB,, | Performed by: INTERNAL MEDICINE

## 2023-11-21 PROCEDURE — 1159F PR MEDICATION LIST DOCUMENTED IN MEDICAL RECORD: ICD-10-PCS | Mod: CPTII,S$GLB,, | Performed by: INTERNAL MEDICINE

## 2023-11-21 PROCEDURE — 1126F AMNT PAIN NOTED NONE PRSNT: CPT | Mod: CPTII,S$GLB,, | Performed by: INTERNAL MEDICINE

## 2023-11-21 PROCEDURE — 1159F MED LIST DOCD IN RCRD: CPT | Mod: CPTII,S$GLB,, | Performed by: INTERNAL MEDICINE

## 2023-11-21 PROCEDURE — 3077F SYST BP >= 140 MM HG: CPT | Mod: CPTII,S$GLB,, | Performed by: INTERNAL MEDICINE

## 2023-11-21 PROCEDURE — 99214 OFFICE O/P EST MOD 30 MIN: CPT | Mod: S$GLB,,, | Performed by: INTERNAL MEDICINE

## 2023-11-21 PROCEDURE — 99999 PR PBB SHADOW E&M-EST. PATIENT-LVL V: ICD-10-PCS | Mod: PBBFAC,,, | Performed by: INTERNAL MEDICINE

## 2023-11-21 PROCEDURE — 1126F PR PAIN SEVERITY QUANTIFIED, NO PAIN PRESENT: ICD-10-PCS | Mod: CPTII,S$GLB,, | Performed by: INTERNAL MEDICINE

## 2023-11-21 PROCEDURE — 1160F PR REVIEW ALL MEDS BY PRESCRIBER/CLIN PHARMACIST DOCUMENTED: ICD-10-PCS | Mod: CPTII,S$GLB,, | Performed by: INTERNAL MEDICINE

## 2023-11-21 PROCEDURE — 3288F FALL RISK ASSESSMENT DOCD: CPT | Mod: CPTII,S$GLB,, | Performed by: INTERNAL MEDICINE

## 2023-11-21 PROCEDURE — 1157F ADVNC CARE PLAN IN RCRD: CPT | Mod: CPTII,S$GLB,, | Performed by: INTERNAL MEDICINE

## 2023-11-21 PROCEDURE — 1157F PR ADVANCE CARE PLAN OR EQUIV PRESENT IN MEDICAL RECORD: ICD-10-PCS | Mod: CPTII,S$GLB,, | Performed by: INTERNAL MEDICINE

## 2023-11-21 PROCEDURE — 1101F PR PT FALLS ASSESS DOC 0-1 FALLS W/OUT INJ PAST YR: ICD-10-PCS | Mod: CPTII,S$GLB,, | Performed by: INTERNAL MEDICINE

## 2023-11-21 PROCEDURE — 3288F PR FALLS RISK ASSESSMENT DOCUMENTED: ICD-10-PCS | Mod: CPTII,S$GLB,, | Performed by: INTERNAL MEDICINE

## 2023-11-21 PROCEDURE — 1101F PT FALLS ASSESS-DOCD LE1/YR: CPT | Mod: CPTII,S$GLB,, | Performed by: INTERNAL MEDICINE

## 2023-11-21 PROCEDURE — 3078F PR MOST RECENT DIASTOLIC BLOOD PRESSURE < 80 MM HG: ICD-10-PCS | Mod: CPTII,S$GLB,, | Performed by: INTERNAL MEDICINE

## 2023-11-21 PROCEDURE — 3077F PR MOST RECENT SYSTOLIC BLOOD PRESSURE >= 140 MM HG: ICD-10-PCS | Mod: CPTII,S$GLB,, | Performed by: INTERNAL MEDICINE

## 2023-11-21 PROCEDURE — 3078F DIAST BP <80 MM HG: CPT | Mod: CPTII,S$GLB,, | Performed by: INTERNAL MEDICINE

## 2023-11-21 NOTE — PROGRESS NOTES
Subjective:    Patient ID:  Sammi Abreu is a 82 y.o. 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  She had developed COVID infection manifest on November 10th tested positive and was using diuretic on a daily basis.  She did have some prerenal azotemia with elevated creatinine of 2.0 and subsequently this has been modified and her repeat blood test on 11/20/2023 shows normalization of her creatinine down to 1.3 to her baseline.  She does have some chronic nonpitting edema in lower extremities.  And she has BMI of 40.57 kilograms/meter squared.    No chest discomfort some cough and congestion has persistent she has some associated tightness with the cough          Review of patient's allergies indicates:   Allergen Reactions    Daptomycin Other (See Comments)     Kidney damage     Cymbalta [duloxetine] Other (See Comments)     Nightmares      Darvon [propoxyphene] Nausea Only and Other (See Comments)     Sweating, slept for 3 days    Atorvastatin Other (See Comments)     Muscle cramps    Naprosyn [naproxen] Nausea Only    Penicillins Rash    Tramadol Nausea Only and Palpitations       Past Medical History:   Diagnosis Date    Acute deep vein thrombosis (DVT) of right upper extremity 8/5/2020    Occlusive thrombus of the right brachial vein and cephalic vein    LUIZ (acute kidney injury) 8/3/2020    ALLERGIC RHINITIS     Anemia     Arthritis     Atelectasis 8/3/2020    Atypical pneumonia 11/1/2020    Back pain     Bronchitis     Cataract     OU    CHF (congestive heart failure)     Cholelithiasis     COPD (chronic obstructive pulmonary disease)     Degenerative disc disease     Diabetes mellitus     pre diabetic    Diverticulosis     DVT (deep venous thrombosis)     Edema     Elevated brain natriuretic peptide (BNP) level 8/3/2020     Encounter for blood transfusion 1979    Fibromyalgia     Glaucoma     Gout     Hematuria 8/7/2020    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    Hx of colonic polyps     Hyperlipidemia     Hypertension     Hyponatremia 8/5/2020    Incontinence     MRSA bacteremia 7/22/2020    Non-traumatic rhabdomyolysis 8/4/2020    Osteoporosis     Pneumonia due to infectious organism 11/1/2020    Pulmonary embolism     Reflux     Refusal of blood transfusions as patient is Mandaen     Sleep apnea     non compliant with CPAP    Vestibulitis of ear      Past Surgical History:   Procedure Laterality Date    ANGIOGRAPHY OF LOWER EXTREMITY N/A 7/31/2019    Procedure: ANGIOGRAM, LOWER EXTREMITY;  Surgeon: Gino Arana MD;  Location: [facility] CATH/EP LAB;  Service: General;  Laterality: N/A;    ASPIRATION OF SOFT TISSUE Left 10/15/2020    Procedure: ASPIRATION, SOFT TISSUE;  Surgeon: [facility] Diagnostic Provider;  Location: [facility] OR;  Service: General;  Laterality: Left;    COLONOSCOPY N/A 8/13/2020    Procedure: COLONOSCOPY;  Surgeon: Sue Nuñez MD;  Location: [facility] ENDO;  Service: Endoscopy;  Laterality: N/A;    ESOPHAGOGASTRODUODENOSCOPY N/A 8/12/2020    Procedure: EGD (ESOPHAGOGASTRODUODENOSCOPY);  Surgeon: Sue Nuñez MD;  Location: [facility] ENDO;  Service: Endoscopy;  Laterality: N/A;    HIP SURGERY      HYSTERECTOMY      INTRALUMINAL GASTROINTESTINAL TRACT IMAGING VIA CAPSULE N/A 9/9/2020    Procedure: IMAGING PROCEDURE, GI TRACT, INTRALUMINAL, VIA CAPSULE;  Surgeon: Sue Nuñez MD;  Location: [facility] ENDO;  Service: Endoscopy;  Laterality: N/A;    JOINT REPLACEMENT      B total hip    LAPAROSCOPIC CHOLECYSTECTOMY N/A 7/13/2020    Procedure: CHOLECYSTECTOMY, LAPAROSCOPIC;  Surgeon: Jomar Mcdonald MD;  Location: [facility] OR;  Service: General;  Laterality: N/A;    TRANSFORAMINAL EPIDURAL INJECTION OF STEROID Left 6/30/2020    Procedure: Injection,steroid,epidural,transforaminal approach;  Surgeon: Matt SMITH  MD Mane;  Location: [facility] OR;  Service: Pain Management;  Laterality: Left;  L2-3, L3-4    TRANSFORAMINAL EPIDURAL INJECTION OF STEROID Left 2021    Procedure: Injection,steroid,epidural,transforaminal approach;  Surgeon: Matt Gilliam MD;  Location: [facility] OR;  Service: Pain Management;  Laterality: Left;  L2-3, L3-4     Social History     Tobacco Use    Smoking status: Former     Current packs/day: 0.00     Average packs/day: 0.3 packs/day for 5.0 years (1.3 ttl pk-yrs)     Types: Cigarettes     Start date: 1967     Quit date: 1972     Years since quittin.9    Smokeless tobacco: Never   Substance Use Topics    Alcohol use: Yes     Alcohol/week: 0.0 standard drinks of alcohol     Comment: Rarely    Drug use: Not Currently        Review of Systems:    As noted in HPI in addition      REVIEW OF SYSTEMS  CARDIOVASCULAR: No recent chest pain, palpitations, arm, neck, or jaw pain  Fatigue and tiredness is noted  RESPIRATORY: No recent fever, some cough congestion had scant sputum production now improved.  She was prescribed a mucolytic agents  : No blood in the urine  GI: No Nausea, vomiting, constipation, diarrhea, blood, or reflux.  MUSCULOSKELETAL: No myalgias  NEURO: No lightheadedness or dizziness  EYES: No Double vision, blurry, vision or headache              Objective        Vitals:    23 0957   BP: (!) 140/70   Pulse: 68   Resp: 16       LIPIDS - LAST 2   Lab Results   Component Value Date    CHOL 196 10/18/2022    CHOL 201 (H) 2022    HDL 42 10/18/2022    HDL 41 2022    LDLCALC 126.0 10/18/2022    LDLCALC 136.6 2022    TRIG 140 10/18/2022    TRIG 117 2022    CHOLHDL 21.4 10/18/2022    CHOLHDL 20.4 2022       CBC - LAST 2  Lab Results   Component Value Date    WBC 5.10 11/10/2023    WBC 5.61 2023    RBC 4.10 11/10/2023    RBC 3.67 (L) 2023    HGB 12.8 11/10/2023    HGB 11.9 (L) 2023    HCT 41.7 11/10/2023    HCT 37.8 2023     (H)  11/10/2023     (H) 05/30/2023    MCH 31.2 (H) 11/10/2023    MCH 32.4 (H) 05/30/2023    MCHC 30.7 (L) 11/10/2023    MCHC 31.5 (L) 05/30/2023    RDW 13.2 11/10/2023    RDW 13.2 05/30/2023     11/10/2023     05/30/2023    MPV 11.3 11/10/2023    MPV 11.0 05/30/2023    GRAN 1.7 (L) 11/10/2023    GRAN 33.6 (L) 11/10/2023    LYMPH 2.4 11/10/2023    LYMPH 47.5 11/10/2023    MONO 0.9 11/10/2023    MONO 16.9 (H) 11/10/2023    BASO 0.02 11/10/2023    BASO 0.02 05/30/2023    NRBC 0 11/10/2023    NRBC 0 05/30/2023       CHEMISTRY & LIVER FUNCTION - LAST 2  Lab Results   Component Value Date     11/20/2023     11/10/2023    K 4.3 11/20/2023    K 4.0 11/10/2023     11/20/2023     11/10/2023    CO2 26 11/20/2023    CO2 24 11/10/2023    ANIONGAP 8 11/20/2023    ANIONGAP 11 11/10/2023    BUN 29 (H) 11/20/2023    BUN 31 (H) 11/10/2023    CREATININE 1.3 11/20/2023    CREATININE 2.0 (H) 11/10/2023    GLU 93 11/20/2023     (H) 11/10/2023    CALCIUM 9.0 11/20/2023    CALCIUM 8.5 (L) 11/10/2023    PH 7.406 07/13/2020    MG 2.2 04/18/2023    MG 2.2 10/27/2022    ALBUMIN 3.3 (L) 11/20/2023    ALBUMIN 3.4 (L) 11/10/2023    PROT 6.8 11/20/2023    PROT 7.2 11/10/2023    ALKPHOS 58 11/20/2023    ALKPHOS 62 11/10/2023    ALT 12 11/20/2023    ALT 17 11/10/2023    AST 14 11/20/2023    AST 23 11/10/2023    BILITOT 0.5 11/20/2023    BILITOT 0.5 11/10/2023        CARDIAC PROFILE - LAST 2  Lab Results   Component Value Date    BNP 71 11/10/2023     (H) 10/30/2023     (H) 08/08/2022     04/09/2021    TROPONINI 0.008 11/10/2023    TROPONINI <0.030 11/02/2020        COAGULATION - LAST 2  Lab Results   Component Value Date    LABPT 24.6 (H) 11/03/2020    LABPT 26.0 (H) 11/02/2020    INR 2.7 11/16/2023    INR 1.5 11/09/2023    APTT 122.7 (H) 08/16/2020    APTT 59.8 (H) 08/15/2020       ENDOCRINE & PSA - LAST 2  Lab Results   Component Value Date    HGBA1C 5.6 10/30/2023    HGBA1C 5.6  04/18/2023    TSH 3.261 08/04/2020    TSH 4.572 (H) 01/16/2020    PROCAL <0.05 11/01/2020    PROCAL 0.07 08/03/2020        ECHOCARDIOGRAM RESULTS  Results for orders placed during the hospital encounter of 01/06/21    Echo Color Flow Doppler? Yes    Interpretation Summary  · Mild concentric hypertrophy and normal systolic function. The estimated ejection fraction is 69%  · Indeterminate left ventricular diastolic function with elevated left atrial pressure.  · Atrial fibrillation not observed.  · Normal right ventricular size with normal right ventricular systolic function.  · Mild tricuspid regurgitation.  · Normal central venous pressure (3 mmHg).  · Indeterminate diastolic function mean left atrial pressure is approximately 12, L wave present on mitral inflow suggesting elevation left ventricle end-diastolic pressure      CURRENT/PREVIOUS VISIT EKG  Results for orders placed or performed during the hospital encounter of 11/10/23   EKG 12-lead    Collection Time: 11/10/23  9:42 AM    Narrative    Test Reason : R06.02,    Vent. Rate : 065 BPM     Atrial Rate : 065 BPM     P-R Int : 120 ms          QRS Dur : 076 ms      QT Int : 442 ms       P-R-T Axes : -80 015 053 degrees     QTc Int : 459 ms    Unusual P axis and short MN, probable junctional rhythm  Abnormal ECG  When compared with ECG of 02-NOV-2020 12:20,  Junctional rhythm has replaced Sinus rhythm  Confirmed by Blaise HICKS, Miguel ZAVALA ([id number]) on 11/19/2023 2:36:05 PM    Referred By: [email or web address]   SELF           Confirmed By:Miguel Welsh MD     No valid procedures specified.   No results found for this or any previous visit.    No valid procedures specified.    PHYSICAL EXAM  CONSTITUTIONAL: Well built, well nourished in no apparent distress  NECK: no carotid bruit, no JVD  LUNGS:  Intermittently scattered rhonchi   CHEST WALL: no tenderness  HEART: regular rate and rhythm, S1, S2 normal, no murmur, click, rub or gallop   ABDOMEN: soft, non-tender; bowel sounds  normal; no masses,  no organomegaly  EXTREMITIES: Extremities normal,no calf tenderness noted, nonpitting edema noted  NEURO: AAO X 3    I HAVE REVIEWED :    The vital signs, nurses notes, and all the pertinent radiology and labs.        Current Outpatient Medications   Medication Instructions    acetaminophen (TYLENOL) 650 mg, Oral, Every 6 hours PRN    albuterol (PROVENTIL/VENTOLIN HFA) 90 mcg/actuation inhaler 2 puffs every 4 hours as needed for cough, wheeze, or shortness of breath    benzonatate (TESSALON) 200 mg, Oral, 3 times daily PRN    blood sugar diagnostic Strp To check BG 1 times daily, to use with insurance preferred meter    blood-glucose meter kit To check BG 1 times daily, to use with insurance preferred meter    budesonide (PULMICORT) 0.5 mg, Nebulization, Daily, Controller    calcitRIOL (ROCALTROL) 0.25 mcg, Oral, Daily    diclofenac sodium (SOLARAZE) 3 % gel 1 application bid prn    esomeprazole (NEXIUM) 20 MG capsule TAKE 1 CAPSULE BY MOUTH TWICE DAILY BEFORE MEAL(S)    ezetimibe (ZETIA) 10 mg, Oral, Daily    fluticasone propionate (FLONASE) 100 mcg, Each Nostril, Daily    folic acid (FOLVITE) 1,000 mcg, Oral, Daily    furosemide (LASIX) 40 mg, Oral, 2 times daily PRN    hydrALAZINE (APRESOLINE) 100 mg, Oral, Every 12 hours    lancets Misc To check BG 1 times daily, to use with insurance preferred meter    losartan (COZAAR) 50 MG tablet Take 1 tablet by mouth once daily    magnesium oxide (MAG-OX) 400 mg, Oral, 2 times daily    METAMUCIL, SUGAR, Powd 1 tabs oral daily    metoprolol succinate (TOPROL-XL) 100 mg, Oral, Daily    potassium chloride SA (K-DUR,KLOR-CON) 20 MEQ tablet 20 mEq, Oral, 2 times daily    predniSONE (DELTASONE) 20 MG tablet Take one daily for 3 days and may repeat for shortness of breath.    promethazine-dextromethorphan (PROMETHAZINE-DM) 6.25-15 mg/5 mL Syrp 5 mLs, Oral, Nightly PRN    triamcinolone acetonide 0.1% (KENALOG) 0.1 % ointment Topical (Top), 2 times daily     warfarin (COUMADIN) 5 MG tablet TAKE 1 TO 1 & 1/2 (ONE & ONE-HALF) TABLETS BY MOUTH IN THE EVENING AS DIRECTED BY  THE  [facility]          Assessment & Plan     Hypertension associated with diabetes  Her blood pressure is borderline at this time.  Although she is going through recovery from COVID with persistent residual cough noted.  Continue on hydralazine 100 mg twice a day, she is on Lasix 40 mg once a day.  She is also on losartan 50 mg once a day this could be increase it to twice a day.  And continue on Toprol- mg daily and potassium to go with Lasix    Chronic diastolic CHF (congestive heart failure)  Patient is identified as having Diastolic (HFpEF) heart failure that is Chronic. CHF is currently controlled. Latest ECHO performed and demonstrates- Results for orders placed during the hospital encounter of 01/06/21    Echo Color Flow Doppler? Yes    Interpretation Summary  · Mild concentric hypertrophy and normal systolic function. The estimated ejection fraction is 69%  · Indeterminate left ventricular diastolic function with elevated left atrial pressure.  · Atrial fibrillation not observed.  · Normal right ventricular size with normal right ventricular systolic function.  · Mild tricuspid regurgitation.  · Normal central venous pressure (3 mmHg).  · Indeterminate diastolic function mean left atrial pressure is approximately 12, L wave present on mitral inflow suggesting elevation left ventricle end-diastolic pressure  . Continue Beta Blocker, ACE/ARB, Furosemide, and Nitrate/Vasodilator and monitor clinical status closely. Monitor on telemetry. Patient is on CHF pathway.  Monitor strict Is&Os and daily weights.  Place on fluid restriction of 2 L. Cardiology has been consulted. Continue to stress to patient importance of self efficacy and  on diet for CHF. Last BNP reviewed- and noted below @ 71      Calcification of aorta  Continue on Zetia    Asthma-COPD overlap syndrome  Recommend to  follow-up with primary and Pulmonary          Follow up in about 6 months (around 5/21/2024).

## 2023-11-21 NOTE — ASSESSMENT & PLAN NOTE
Her blood pressure is borderline at this time.  Although she is going through recovery from COVID with persistent residual cough noted.  Continue on hydralazine 100 mg twice a day, she is on Lasix 40 mg once a day.  She is also on losartan 50 mg once a day this could be increase it to twice a day.  And continue on Toprol- mg daily and potassium to go with Lasix

## 2023-11-21 NOTE — ASSESSMENT & PLAN NOTE
Patient is identified as having Diastolic (HFpEF) heart failure that is Chronic. CHF is currently controlled. Latest ECHO performed and demonstrates- Results for orders placed during the hospital encounter of 01/06/21    Echo Color Flow Doppler? Yes    Interpretation Summary  · Mild concentric hypertrophy and normal systolic function. The estimated ejection fraction is 69%  · Indeterminate left ventricular diastolic function with elevated left atrial pressure.  · Atrial fibrillation not observed.  · Normal right ventricular size with normal right ventricular systolic function.  · Mild tricuspid regurgitation.  · Normal central venous pressure (3 mmHg).  · Indeterminate diastolic function mean left atrial pressure is approximately 12, L wave present on mitral inflow suggesting elevation left ventricle end-diastolic pressure  . Continue Beta Blocker, ACE/ARB, Furosemide, and Nitrate/Vasodilator and monitor clinical status closely. Monitor on telemetry. Patient is on CHF pathway.  Monitor strict Is&Os and daily weights.  Place on fluid restriction of 2 L. Cardiology has been consulted. Continue to stress to patient importance of self efficacy and  on diet for CHF. Last BNP reviewed- and noted below @ 71

## 2023-11-22 ENCOUNTER — TELEPHONE (OUTPATIENT)
Dept: FAMILY MEDICINE | Facility: CLINIC | Age: 83
End: 2023-11-22
Payer: MEDICARE

## 2023-11-22 NOTE — TELEPHONE ENCOUNTER
----- Message from Semaj Milligan NP sent at 11/20/2023 10:02 PM CST -----  Please inform patient that the A1c has returned to her normal range-Please avoid or minimize all NSAIDS (ibuprofen, motrin, aleve, indocin, naprosyn) to minimize the risk to your kidneys.

## 2023-11-22 NOTE — TELEPHONE ENCOUNTER
Call placed to patient. No answer received. Left message requesting return call to office.  Also sent [facility] portal message to patient for notification at this time.

## 2023-11-24 ENCOUNTER — TELEPHONE (OUTPATIENT)
Dept: FAMILY MEDICINE | Facility: CLINIC | Age: 83
End: 2023-11-24
Payer: MEDICARE

## 2023-11-24 DIAGNOSIS — R05.9 COUGH, UNSPECIFIED TYPE: Primary | ICD-10-CM

## 2023-11-24 NOTE — TELEPHONE ENCOUNTER
Spoke with patient   She is requesting nebulizer supplies  Order pended      ----- Message from Lara Rojas sent at 11/24/2023  1:42 PM CST -----  Contact: patient  Type:  Needs Medical Advice    Who Called: Patient     Would the patient rather a call back or a response via [facility]? Call    Best Call Back Number: [phone] (home)     Additional Information: Patient needs an order for nebulizer supplies called in. Please advise

## 2023-11-28 ENCOUNTER — LAB VISIT (OUTPATIENT)
Dept: LAB | Facility: HOSPITAL | Age: 83
End: 2023-11-28
Attending: INTERNAL MEDICINE
Payer: MEDICARE

## 2023-11-28 DIAGNOSIS — E55.9 AVITAMINOSIS D: Primary | ICD-10-CM

## 2023-11-28 LAB
25(OH)D3+25(OH)D2 SERPL-MCNC: 20 NG/ML (ref 30–96)
ALBUMIN SERPL BCP-MCNC: 3.2 G/DL (ref 3.5–5.2)
ANION GAP SERPL CALC-SCNC: 9 MMOL/L (ref 8–16)
BASOPHILS # BLD AUTO: 0.02 K/UL (ref 0–0.2)
BASOPHILS NFR BLD: 0.3 % (ref 0–1.9)
BILIRUB UR QL STRIP: NEGATIVE
BUN SERPL-MCNC: 26 MG/DL (ref 8–23)
CALCIUM SERPL-MCNC: 9 MG/DL (ref 8.7–10.5)
CHLORIDE SERPL-SCNC: 108 MMOL/L (ref 95–110)
CLARITY UR: CLEAR
CO2 SERPL-SCNC: 26 MMOL/L (ref 23–29)
COLOR UR: YELLOW
CREAT SERPL-MCNC: 1.4 MG/DL (ref 0.5–1.4)
CREAT UR-MCNC: 228.9 MG/DL (ref 15–325)
DIFFERENTIAL METHOD BLD: ABNORMAL
EOSINOPHIL # BLD AUTO: 0.1 K/UL (ref 0–0.5)
EOSINOPHIL NFR BLD: 2.1 % (ref 0–8)
ERYTHROCYTE [DISTWIDTH] IN BLOOD BY AUTOMATED COUNT: 13.5 % (ref 11.5–14.5)
EST. GFR  (NO RACE VARIABLE): 38 ML/MIN/1.73 M^2
FERRITIN SERPL-MCNC: 371 NG/ML (ref 20–300)
GLUCOSE SERPL-MCNC: 107 MG/DL (ref 70–110)
GLUCOSE UR QL STRIP: NEGATIVE
HCT VFR BLD AUTO: 35.6 % (ref 37–48.5)
HGB BLD-MCNC: 11.1 G/DL (ref 12–16)
HGB UR QL STRIP: NEGATIVE
IMM GRANULOCYTES # BLD AUTO: 0.01 K/UL (ref 0–0.04)
IMM GRANULOCYTES NFR BLD AUTO: 0.2 % (ref 0–0.5)
IRON SERPL-MCNC: 72 UG/DL (ref 30–160)
KETONES UR QL STRIP: NEGATIVE
LEUKOCYTE ESTERASE UR QL STRIP: NEGATIVE
LYMPHOCYTES # BLD AUTO: 1.8 K/UL (ref 1–4.8)
LYMPHOCYTES NFR BLD: 28.7 % (ref 18–48)
MCH RBC QN AUTO: 32.7 PG (ref 27–31)
MCHC RBC AUTO-ENTMCNC: 31.2 G/DL (ref 32–36)
MCV RBC AUTO: 105 FL (ref 82–98)
MONOCYTES # BLD AUTO: 0.7 K/UL (ref 0.3–1)
MONOCYTES NFR BLD: 11.6 % (ref 4–15)
NEUTROPHILS # BLD AUTO: 3.6 K/UL (ref 1.8–7.7)
NEUTROPHILS NFR BLD: 57.1 % (ref 38–73)
NITRITE UR QL STRIP: NEGATIVE
NRBC BLD-RTO: 0 /100 WBC
PH UR STRIP: 6 [PH] (ref 5–8)
PHOSPHATE SERPL-MCNC: 3.4 MG/DL (ref 2.7–4.5)
PLATELET # BLD AUTO: 204 K/UL (ref 150–450)
PMV BLD AUTO: 11 FL (ref 9.2–12.9)
POTASSIUM SERPL-SCNC: 4.6 MMOL/L (ref 3.5–5.1)
PROT UR QL STRIP: ABNORMAL
PROT UR-MCNC: 20 MG/DL (ref 0–15)
PROT/CREAT UR: 0.09 MG/G{CREAT} (ref 0–0.2)
PTH-INTACT SERPL-MCNC: 128.5 PG/ML (ref 9–77)
RBC # BLD AUTO: 3.39 M/UL (ref 4–5.4)
SATURATED IRON: 32 % (ref 20–50)
SODIUM SERPL-SCNC: 143 MMOL/L (ref 136–145)
SP GR UR STRIP: 1.02 (ref 1–1.03)
TOTAL IRON BINDING CAPACITY: 225 UG/DL (ref 250–450)
TRANSFERRIN SERPL-MCNC: 161 MG/DL (ref 200–375)
URN SPEC COLLECT METH UR: ABNORMAL
UROBILINOGEN UR STRIP-ACNC: NEGATIVE EU/DL
WBC # BLD AUTO: 6.23 K/UL (ref 3.9–12.7)

## 2023-11-28 PROCEDURE — 83970 ASSAY OF PARATHORMONE: CPT | Performed by: INTERNAL MEDICINE

## 2023-11-28 PROCEDURE — 85025 COMPLETE CBC W/AUTO DIFF WBC: CPT | Performed by: INTERNAL MEDICINE

## 2023-11-28 PROCEDURE — 84156 ASSAY OF PROTEIN URINE: CPT | Performed by: INTERNAL MEDICINE

## 2023-11-28 PROCEDURE — 82306 VITAMIN D 25 HYDROXY: CPT | Performed by: INTERNAL MEDICINE

## 2023-11-28 PROCEDURE — 80069 RENAL FUNCTION PANEL: CPT | Performed by: INTERNAL MEDICINE

## 2023-11-28 PROCEDURE — 83540 ASSAY OF IRON: CPT | Performed by: INTERNAL MEDICINE

## 2023-11-28 PROCEDURE — 82728 ASSAY OF FERRITIN: CPT | Performed by: INTERNAL MEDICINE

## 2023-11-28 PROCEDURE — 81003 URINALYSIS AUTO W/O SCOPE: CPT | Performed by: INTERNAL MEDICINE

## 2023-11-28 PROCEDURE — 36415 COLL VENOUS BLD VENIPUNCTURE: CPT | Performed by: INTERNAL MEDICINE

## 2023-11-28 NOTE — TELEPHONE ENCOUNTER
Order for nebulizer supplies placed by ASPEN Milligan.  [facility] Medical Necessity Form, and order for nebulizer supplies faxed to [facility] at this time. Electronic fax confirmation received. Call placed to patient. No answer received. Left message requesting return call to office. Also sent [facility] portal message to patient for notification at this time.

## 2023-11-30 ENCOUNTER — OFFICE VISIT (OUTPATIENT)
Dept: PULMONOLOGY | Facility: CLINIC | Age: 83
End: 2023-11-30
Payer: MEDICARE

## 2023-11-30 ENCOUNTER — TELEPHONE (OUTPATIENT)
Dept: FAMILY MEDICINE | Facility: CLINIC | Age: 83
End: 2023-11-30
Payer: MEDICARE

## 2023-11-30 VITALS
WEIGHT: 227.94 LBS | DIASTOLIC BLOOD PRESSURE: 57 MMHG | HEIGHT: 63 IN | SYSTOLIC BLOOD PRESSURE: 153 MMHG | BODY MASS INDEX: 40.39 KG/M2 | HEART RATE: 53 BPM | OXYGEN SATURATION: 99 %

## 2023-11-30 DIAGNOSIS — J44.89 COPD WITH ASTHMA: ICD-10-CM

## 2023-11-30 DIAGNOSIS — R09.89 CHRONIC SINUS COMPLAINTS: ICD-10-CM

## 2023-11-30 DIAGNOSIS — J45.50 SEVERE PERSISTENT ASTHMA WITHOUT COMPLICATION: Primary | ICD-10-CM

## 2023-11-30 PROCEDURE — 3078F PR MOST RECENT DIASTOLIC BLOOD PRESSURE < 80 MM HG: ICD-10-PCS | Mod: CPTII,S$GLB,, | Performed by: INTERNAL MEDICINE

## 2023-11-30 PROCEDURE — 99999 PR PBB SHADOW E&M-EST. PATIENT-LVL III: CPT | Mod: PBBFAC,,, | Performed by: INTERNAL MEDICINE

## 2023-11-30 PROCEDURE — 3077F SYST BP >= 140 MM HG: CPT | Mod: CPTII,S$GLB,, | Performed by: INTERNAL MEDICINE

## 2023-11-30 PROCEDURE — 99213 PR OFFICE/OUTPT VISIT, EST, LEVL III, 20-29 MIN: ICD-10-PCS | Mod: S$GLB,,, | Performed by: INTERNAL MEDICINE

## 2023-11-30 PROCEDURE — 1101F PR PT FALLS ASSESS DOC 0-1 FALLS W/OUT INJ PAST YR: ICD-10-PCS | Mod: CPTII,S$GLB,, | Performed by: INTERNAL MEDICINE

## 2023-11-30 PROCEDURE — 99999 PR PBB SHADOW E&M-EST. PATIENT-LVL III: ICD-10-PCS | Mod: PBBFAC,,, | Performed by: INTERNAL MEDICINE

## 2023-11-30 PROCEDURE — 3078F DIAST BP <80 MM HG: CPT | Mod: CPTII,S$GLB,, | Performed by: INTERNAL MEDICINE

## 2023-11-30 PROCEDURE — 1101F PT FALLS ASSESS-DOCD LE1/YR: CPT | Mod: CPTII,S$GLB,, | Performed by: INTERNAL MEDICINE

## 2023-11-30 PROCEDURE — 3288F FALL RISK ASSESSMENT DOCD: CPT | Mod: CPTII,S$GLB,, | Performed by: INTERNAL MEDICINE

## 2023-11-30 PROCEDURE — 3288F PR FALLS RISK ASSESSMENT DOCUMENTED: ICD-10-PCS | Mod: CPTII,S$GLB,, | Performed by: INTERNAL MEDICINE

## 2023-11-30 PROCEDURE — 1157F PR ADVANCE CARE PLAN OR EQUIV PRESENT IN MEDICAL RECORD: ICD-10-PCS | Mod: CPTII,S$GLB,, | Performed by: INTERNAL MEDICINE

## 2023-11-30 PROCEDURE — 1157F ADVNC CARE PLAN IN RCRD: CPT | Mod: CPTII,S$GLB,, | Performed by: INTERNAL MEDICINE

## 2023-11-30 PROCEDURE — 3077F PR MOST RECENT SYSTOLIC BLOOD PRESSURE >= 140 MM HG: ICD-10-PCS | Mod: CPTII,S$GLB,, | Performed by: INTERNAL MEDICINE

## 2023-11-30 PROCEDURE — 99213 OFFICE O/P EST LOW 20 MIN: CPT | Mod: S$GLB,,, | Performed by: INTERNAL MEDICINE

## 2023-11-30 RX ORDER — BUDESONIDE 0.5 MG/2ML
0.5 INHALANT ORAL 2 TIMES DAILY
Qty: 60 ML | Refills: 3 | Status: SHIPPED | OUTPATIENT
Start: 2023-11-30 | End: 2024-11-29

## 2023-11-30 RX ORDER — PREDNISONE 20 MG/1
TABLET ORAL
Qty: 21 TABLET | Refills: 1 | Status: SHIPPED | OUTPATIENT
Start: 2023-11-30

## 2023-11-30 RX ORDER — FLUTICASONE FUROATE, UMECLIDINIUM BROMIDE AND VILANTEROL TRIFENATATE 200; 62.5; 25 UG/1; UG/1; UG/1
1 POWDER RESPIRATORY (INHALATION) DAILY
Qty: 60 EACH | Refills: 11 | Status: SHIPPED | OUTPATIENT
Start: 2023-11-30

## 2023-11-30 RX ORDER — FLUTICASONE PROPIONATE 50 MCG
2 SPRAY, SUSPENSION (ML) NASAL DAILY
Qty: 48 G | Refills: 3 | Status: SHIPPED | OUTPATIENT
Start: 2023-11-30

## 2023-11-30 NOTE — PROGRESS NOTES
11/30/2023    Sammi Abreu  Office note    Chief Complaint   Patient presents with    Follow-up       HPI:  11/30/2023 had covid for 10+ days sign ill earlier in month-- seen in er and was rx supportive as drug interactions with paxlovid were concern.   Breathing ok day but night wheezes even before covid.      Uses budesonide daily once daily.   No recent prednisone -- uses albuterol daily.        5/30/2023 having breathing worsening at night with wheezes, notes some schwartz walking about.    Singulair not used reg as not clear helps    Copay on advair was up so tried neb budesonide.  -- advair was 125/m or 3 m?    Still on coumadin from pe.   Not using cpap.  Ahi was 39 in 2008.    Patient Instructions   Excess edema may cause some short breath.  Should be fine to decrease edema if excessive -- and note if breathing improves.  You had some diastolic heart failure.  Chest xray 4/2021 viewed and suggested some fluid in lungs.     Will check oxygen sleeping and arrange oxygen if qualifies    You have copd and asthma-- use advair 500 at bedtime-- may use twice daily if breathing poorly.    Use prednisone one daily for 3 days if cough or wheezes badly.      Use albuterol as needed.    If above advair not reasonable-- could try nebulizer medications again??  From [facility]:  3/19/2021- wearing CPAP most nights, wakes up in morning with facemask off. Nose was getting sore. Tried to adjust humidity on her own.   Noticed when she wears CPAP she wakes up less during night to go to bathroom. States feeling less tired over all after wearing CPAP.   Depressed due to living with chronic pain.   Wheeze- chronic complaint, daily, worse at night and when allergies are bothering her, feeling fluid in left lung when laying on left side. Associated with non productive cough. Has to prop head up when laying down. Improves with albuterol rescue and albuterol nebulizer before bed.   No SOB, feels her stamina is decreasing with time,  has to stop when walking due to fatigue and pain not SOB  Patient Instructions   Recommend to keep wearing CPAP every night even if you wake up and take it off half way through the night, still put it on every day    Will reorder Advair daily for Asthma    Echo of heart did not measure pulmonary hypertension this time. Will continue to monitor.     12/14/2020- currently not using oxygen, first started July 2020, wants returned to DME company due to high cost.   Not able to afford Advair inhaler,   Complaint of wheeze- improved after starting advair daily, worse at night. Using nebulizer 2x daily. Associated with fatigue. Not currently wearing CPAP.   No SOB    9/11/2020- IN office with friend, ER visit 9/9/20 for pain right thigh DVT, Pain left anterior thigh seen in ER dc home. 10 on 0-10 scale; has previously seen vascular surgeon Dr. Alegria; no improvement with OTC tylenol. Needing new cardiologist.   No SOB, no cough, no chest tightness,   Has CPAP at home, not currently using. Has nebulizer at home using as needed.  Used symbicort in hospital but not using at home.       Dr [facility] consult:  History of Present Illness:  Pt had laparoscopic rosette yesterday with post op low ox, cta with central pe.  Pt had prior h/o dvt- was on xarelto in 2015 - occurred with elective left hip replacement - left groin dvt.  Uatsdin.    Pt seen by me last yr with h/o wheezes and cough and sob, also osas- non compliant.  Pt has had episodes of sob but vague - some sob with no cough/wheezes. Vague left leg swelling.  Pt had back problems needing injections in past- last injections est 5 yrs ago.   Pt developed back pain, worse movement ppt eval pcp/pain doc- injection set up.  She also developed left leg pain - upper thigh- occurred last couple months.  She was having eval with vascular doc for right leg with min right leg symptoms.  She had procedure with improved cirulation right.   Pt had bilat u/s legs in May,  and right u/s in June -- both neg for dvt.    Pt developed sob- episodic with one episode of impending doom occurring 2 wks pta.    Pt had had some schwartz since severe episode 2 wks ago.    Pt had had severe pain in chest , then left leg, then sob - had eval at er , pcp, pain doc, then [facility]- biliary dz found and surg deferred til after back injection (injection was not effective for pain). Pt had lap rosette yesterday.  No recent cough/wheezes, no compliant with cpap.    Plan:She will need follow-up for pulmonary hypertension as it was found on her last echo. With adequate anticoagulation-good chance pulmonary hypertension will clear. 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 Complex case.   CTEP not expected to develop but may develop.  F/u echo in a yr or so (depending on symptoms)        4/22/2019- had dx 2009 sleep apnea, got cpap but could not use-not clear how severe sleep.  Pt wore off and on 2 yrs with bronchitis ppt non compliance.  Had severe bronchitis with hemoptysis ppt stopping cpap.   H/o intermittent wheezes, worse night, ongoing seasonally, sister with asthma, pt onset- 2010.  Had normal pft 9/11/17.   Uses albuterol daily and nocturnal arousal daily later.  Sleeps on right side as left side breathing worsens.  Good sense smell.    Sinus problems with drainage and itchy eyes.  occ sinus infection- zpak    The chief compliant  problem varies with instablilty at time    PFSH:  Past Medical History:   Diagnosis Date    Acute deep vein thrombosis (DVT) of right upper extremity 8/5/2020    Occlusive thrombus of the right brachial vein and cephalic vein    LUIZ (acute kidney injury) 8/3/2020    ALLERGIC RHINITIS     Anemia     Arthritis     Atelectasis 8/3/2020    Atypical pneumonia 11/1/2020    Back pain     Bronchitis     Cataract     OU    CHF (congestive heart failure)     Cholelithiasis     COPD (chronic obstructive pulmonary disease)     Degenerative disc disease      Diabetes mellitus     pre diabetic    Diverticulosis     DVT (deep venous thrombosis)     Edema     Elevated brain natriuretic peptide (BNP) level 8/3/2020    Encounter for blood transfusion 1979    Fibromyalgia     Glaucoma     Gout     Hematuria 8/7/2020    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    Hx of colonic polyps     Hyperlipidemia     Hypertension     Hyponatremia 8/5/2020    Incontinence     MRSA bacteremia 7/22/2020    Non-traumatic rhabdomyolysis 8/4/2020    Osteoporosis     Pneumonia due to infectious organism 11/1/2020    Pulmonary embolism     Reflux     Refusal of blood transfusions as patient is Tenriism     Sleep apnea     non compliant with CPAP    Vestibulitis of ear          Past Surgical History:   Procedure Laterality Date    ANGIOGRAPHY OF LOWER EXTREMITY N/A 7/31/2019    Procedure: ANGIOGRAM, LOWER EXTREMITY;  Surgeon: Gino Arana MD;  Location: [facility] CATH/EP LAB;  Service: General;  Laterality: N/A;    ASPIRATION OF SOFT TISSUE Left 10/15/2020    Procedure: ASPIRATION, SOFT TISSUE;  Surgeon: Raul Diagnostic Provider;  Location: [facility] OR;  Service: General;  Laterality: Left;    COLONOSCOPY N/A 8/13/2020    Procedure: COLONOSCOPY;  Surgeon: Sue Nuñez MD;  Location: [facility];  Service: Endoscopy;  Laterality: N/A;    ESOPHAGOGASTRODUODENOSCOPY N/A 8/12/2020    Procedure: EGD (ESOPHAGOGASTRODUODENOSCOPY);  Surgeon: Sue Nuñez MD;  Location: [facility];  Service: Endoscopy;  Laterality: N/A;    HIP SURGERY      HYSTERECTOMY      INTRALUMINAL GASTROINTESTINAL TRACT IMAGING VIA CAPSULE N/A 9/9/2020    Procedure: IMAGING PROCEDURE, GI TRACT, INTRALUMINAL, VIA CAPSULE;  Surgeon: Sue Nuñez MD;  Location: [facility] ENDO;  Service: Endoscopy;  Laterality: N/A;    JOINT REPLACEMENT      B total hip    LAPAROSCOPIC CHOLECYSTECTOMY N/A 7/13/2020    Procedure: CHOLECYSTECTOMY, LAPAROSCOPIC;  Surgeon: Jomar cMdonald MD;  Location: [facility] OR;  Service: General;   Laterality: N/A;    TRANSFORAMINAL EPIDURAL INJECTION OF STEROID Left 2020    Procedure: Injection,steroid,epidural,transforaminal approach;  Surgeon: Matt Gilliam MD;  Location: [facility] OR;  Service: Pain Management;  Laterality: Left;  L2-3, L3-4    TRANSFORAMINAL EPIDURAL INJECTION OF STEROID Left 2021    Procedure: Injection,steroid,epidural,transforaminal approach;  Surgeon: Matt Gilliam MD;  Location: [facility] OR;  Service: Pain Management;  Laterality: Left;  L2-3, L3-4     Social History     Tobacco Use    Smoking status: Former     Current packs/day: 0.00     Average packs/day: 0.3 packs/day for 5.0 years (1.3 ttl pk-yrs)     Types: Cigarettes     Start date: 1967     Quit date: 1972     Years since quittin.9    Smokeless tobacco: Never   Substance Use Topics    Alcohol use: Yes     Alcohol/week: 0.0 standard drinks of alcohol     Comment: Rarely    Drug use: Not Currently     Family History   Problem Relation Age of Onset    Hypertension Mother     Stroke Father     Hypertension Father     Diabetes Sister     Cancer Sister     Stomach cancer Sister     Hypertension Sister     Bipolar disorder Sister     Peripheral vascular disease Sister     Hypertension Brother     Stroke Brother     Peripheral vascular disease Brother     Hypertension Son     Obesity Son     Early death Son 48    Arthritis Son     Glaucoma Neg Hx     Macular degeneration Neg Hx     Retinal detachment Neg Hx      Review of patient's allergies indicates:   Allergen Reactions    Daptomycin Other (See Comments)     Kidney damage     Cymbalta [duloxetine] Other (See Comments)     Nightmares      Darvon [propoxyphene] Nausea Only and Other (See Comments)     Sweating, slept for 3 days    Atorvastatin Other (See Comments)     Muscle cramps    Naprosyn [naproxen] Nausea Only    Penicillins Rash    Tramadol Nausea Only and Palpitations       Performance Status:The patient's activity level is functions out of house.      Review of  "Systems:  a review of eleven systems covering constitutional, Eye, HEENT, Psych, Respiratory, Cardiac, GI, , Musculoskeletal, Endocrine, Dermatologic was negative except for pertinent findings as listed ABOVE and below:  Wheeze, cough, pertinent positive as above, rest is good       Exam:Comprehensive exam done. BP (!) 153/57 (BP Location: Right arm, Patient Position: Sitting, BP Method: Large (Automatic))   Pulse (!) 53   Ht 5' 3" (1.6 m)   Wt 103.4 kg (227 lb 15.3 oz)   SpO2 99% Comment: on room air at rest  BMI 40.38 kg/m²   Exam included Vitals as listed, and patient's appearance and affect and alertness and mood, oral exam for yeast and hygiene and pharynx lesions and Mallapatti (M) score, neck with inspection for jvd and masses and thyroid abnormalities and lymph nodes (supraclavicular and infraclavicular nodes and axillary also examined and noted if abn), chest exam included symmetry and effort and fremitus and percussion and auscultation, cardiac exam included rhythm and gallops and murmur and rubs and jvd and edema, abdominal exam for mass and hepatosplenomegaly and tenderness and hernias and bowel sounds, Musculoskeletal exam with muscle tone and posture and mobility/gait and  strength, and skin for rashes and cyanosis and pallor and turgor, extremity for clubbing.  Findings were normal except for pertinent findings listed below:  M4, chest is symmetric, no distress, normal percussion, normal fremitus and Breath sounds clear      Radiographs (ct chest and cxr) reviewed: view by direct vision  - 3/6/18 ct chest mild prominent bronchi with calcified aorta  X-Ray Chest PA And Lateral 12/14/2020 Normal.       CT Chest Without Contrast 11/01/20   7 mm pleural-based groundglass opacity in the right upper lobe with  minimal reticular nodular opacities in the right lung base and  atelectasis in the right middle lobe. Findings are nonspecific and may  be secondary to infectious or inflammatory " process    X-Ray Chest AP Portable 11/01/2020   There is elevation the right hemidiaphragm.     Lungs are clear. Pulmonary vasculature is normal. No acute osseous abnormality.     NM Lung Scan Perfusion Particulate 11/01/20   There is small focal area of filling defect within the lateral right lung apex matching the area of minimal groundglass opacity within the right upper lobe. No additional focal areas of wedge filling defect are identified.     Transthoracic echo (TTE) limited 7/22/20   Grade I (mild) left ventricular diastolic dysfunction consistent with impaired relaxation   Transthoracic echo (TTE) complete 1/6/21   Indeterminate left ventricular diastolic function with elevated left atrial pressure.       Ct chest 3/6/18 calcified aorta, bronchiectasis mild lower lungs    Labs reviewed       Lab Results   Component Value Date    WBC 6.23 11/28/2023    RBC 3.39 (L) 11/28/2023    HGB 11.1 (L) 11/28/2023    HCT 35.6 (L) 11/28/2023     (H) 11/28/2023    MCH 32.7 (H) 11/28/2023    MCHC 31.2 (L) 11/28/2023    RDW 13.5 11/28/2023     11/28/2023    MPV 11.0 11/28/2023    GRAN 3.6 11/28/2023    GRAN 57.1 11/28/2023    LYMPH 1.8 11/28/2023    LYMPH 28.7 11/28/2023    MONO 0.7 11/28/2023    MONO 11.6 11/28/2023    EOS 0.1 11/28/2023    BASO 0.02 11/28/2023    EOSINOPHIL 2.1 11/28/2023    BASOPHIL 0.3 11/28/2023         Results for MEME SALAS (MRN [id number]) as of 4/22/2019 11:03   Ref. Range 12/22/2017 11:41 6/22/2018 08:48   Eosinophil% Latest Ref Range: 0.0 - 8.0 % 3.7 3.2   Eos # Latest Ref Range: 0.0 - 0.5 K/uL 0.2 0.2     PFT results reviewed 9/11/17 wnl.    Plan:  Clinical impression is apparently straight forward and impression with management as below.    Meme was seen today for follow-up.    Diagnoses and all orders for this visit:    Severe persistent asthma without complication  -     predniSONE (DELTASONE) 20 MG tablet; Take one daily for 3 days and may repeat for shortness of  breath.  -     budesonide (PULMICORT) 0.5 mg/2 mL nebulizer solution; Take 2 mLs (0.5 mg total) by nebulization 2 (two) times daily. Controller  -     fluticasone-umeclidin-vilanter (TRELEGY ELLIPTA) 200-62.5-25 mcg inhaler; Inhale 1 puff into the lungs once daily.    Chronic sinus complaints  -     fluticasone propionate (FLONASE) 50 mcg/actuation nasal spray; 2 sprays (100 mcg total) by Each Nostril route once daily.    COPD with asthma  -     budesonide (PULMICORT) 0.5 mg/2 mL nebulizer solution; Take 2 mLs (0.5 mg total) by nebulization 2 (two) times daily. Controller         - wear CPAP nightly    Follow up in about 1 year (around 11/30/2024), or if symptoms worsen or fail to improve.    Discussed with patient above for education the following:      Patient Instructions   Trelegy program to cover medication after 600$ out of pocket    Sampled trelegy today-- will ask staff to give info on trelegy program....    Use budesonide twice daily, may use prednisone daily for 3 days -- repeat as needed.    Use prednisone if needed for bad wheezes or bad breathing.....

## 2023-11-30 NOTE — PATIENT INSTRUCTIONS
Trelegy program to cover medication after 600$ out of pocket    Sampled trelegy today-- will ask staff to give info on trelegy program....    Use budesonide twice daily, may use prednisone daily for 3 days -- repeat as needed.    Use prednisone if needed for bad wheezes or bad breathing.....

## 2023-11-30 NOTE — TELEPHONE ENCOUNTER
Christi Parr Staff     ----- Message from Christi Parr sent at 11/30/2023 12:05 PM CST -----  Contact: self  Type:  Needs Medical Advice    Who Called: self  Symptoms (please be specific): pt would like to speak to dr or nurse concerning [facility] and her rx for supplies.   Would the patient rather a call back or a response via [facility]? call  Best Call Back Number: [phone] (home)     Additional Information: please advise and thank you.

## 2023-12-08 ENCOUNTER — PATIENT MESSAGE (OUTPATIENT)
Dept: ADMINISTRATIVE | Facility: HOSPITAL | Age: 83
End: 2023-12-08
Payer: MEDICARE

## 2023-12-15 ENCOUNTER — TELEPHONE (OUTPATIENT)
Dept: FAMILY MEDICINE | Facility: CLINIC | Age: 83
End: 2023-12-15
Payer: MEDICARE

## 2023-12-15 NOTE — TELEPHONE ENCOUNTER
ENT referral: OV cancelled; spoke with patient. She doesn't want to schedule at this time. Portal message sent with scheduling information.

## 2024-01-09 ENCOUNTER — TELEPHONE (OUTPATIENT)
Dept: PRIMARY CARE CLINIC | Facility: CLINIC | Age: 84
End: 2024-01-09
Payer: MEDICARE

## 2024-01-09 NOTE — TELEPHONE ENCOUNTER
----- Message from Christi Parr sent at 1/9/2024  2:24 PM CST -----  Contact: self  Type:  Needs Medical Advice    Who Called: self  Symptoms (please be specific): pt needs to speak to dr or nurse regarding a request from [facility] that was sent. Pt needs to know the status so she can get her  procedure done. Please call pt for more info.  Would the patient rather a call back or a response via [facility]? call  Best Call Back Number: [phone]  Additional Information: please advise and thank you.

## 2024-01-12 DIAGNOSIS — Z86.718 HISTORY OF DVT IN ADULTHOOD: Primary | ICD-10-CM

## 2024-01-17 ENCOUNTER — TELEPHONE (OUTPATIENT)
Dept: FAMILY MEDICINE | Facility: CLINIC | Age: 84
End: 2024-01-17
Payer: MEDICARE

## 2024-01-17 ENCOUNTER — TELEPHONE (OUTPATIENT)
Dept: PRIMARY CARE CLINIC | Facility: CLINIC | Age: 84
End: 2024-01-17
Payer: MEDICARE

## 2024-01-17 DIAGNOSIS — I82.621 ACUTE DEEP VEIN THROMBOSIS (DVT) OF RIGHT UPPER EXTREMITY, UNSPECIFIED VEIN: ICD-10-CM

## 2024-01-17 RX ORDER — WARFARIN SODIUM 5 MG/1
TABLET ORAL
Qty: 120 TABLET | Refills: 0 | Status: SHIPPED | OUTPATIENT
Start: 2024-01-17 | End: 2024-04-08 | Stop reason: SDUPTHER

## 2024-01-17 NOTE — TELEPHONE ENCOUNTER
----- Message from Sadaf Lazaro sent at 1/17/2024 11:49 AM CST -----  Contact: patient  Type:  Needs Medical Advice    Who Called: patient     Would the patient rather a call back or a response via [facility]? Call     Best Call Back Number: [phone] (home)      Additional Information: Patient would like to speak with the nurse in regards to a consult about a surgery that [facility] has been sending and they have not got anything back and patient stated that she has been calling but nobody will call her back and she is trying to get the surgery on her mouth done.     Please call to advise

## 2024-01-17 NOTE — TELEPHONE ENCOUNTER
Refill Routing Note   Medication(s) are not appropriate for processing by [facility] for the following reason(s):        Outside of protocol    [facility] action(s):  Route               Appointments  past 12m or future 3m with PCP    Date Provider   Last Visit   11/6/2023 Osmani Villatoro MD   Next Visit   3/7/2024 Osmani Villatoro MD   ED visits in past 90 days: 1        Note composed:10:17 AM 01/17/2024

## 2024-02-29 ENCOUNTER — LAB VISIT (OUTPATIENT)
Dept: LAB | Facility: HOSPITAL | Age: 84
End: 2024-02-29
Attending: FAMILY MEDICINE
Payer: MEDICARE

## 2024-02-29 DIAGNOSIS — R73.03 PREDIABETES: ICD-10-CM

## 2024-02-29 DIAGNOSIS — I15.2 HYPERTENSION ASSOCIATED WITH DIABETES: ICD-10-CM

## 2024-02-29 DIAGNOSIS — E11.59 HYPERTENSION ASSOCIATED WITH DIABETES: ICD-10-CM

## 2024-02-29 LAB
ALBUMIN SERPL BCP-MCNC: 3.6 G/DL (ref 3.5–5.2)
ALP SERPL-CCNC: 62 U/L (ref 55–135)
ALT SERPL W/O P-5'-P-CCNC: 16 U/L (ref 10–44)
ANION GAP SERPL CALC-SCNC: 7 MMOL/L (ref 8–16)
AST SERPL-CCNC: 18 U/L (ref 10–40)
BILIRUB SERPL-MCNC: 0.5 MG/DL (ref 0.1–1)
BUN SERPL-MCNC: 31 MG/DL (ref 8–23)
CALCIUM SERPL-MCNC: 9.2 MG/DL (ref 8.7–10.5)
CHLORIDE SERPL-SCNC: 107 MMOL/L (ref 95–110)
CO2 SERPL-SCNC: 26 MMOL/L (ref 23–29)
CREAT SERPL-MCNC: 1.5 MG/DL (ref 0.5–1.4)
EST. GFR  (NO RACE VARIABLE): 34.8 ML/MIN/1.73 M^2
ESTIMATED AVG GLUCOSE: 117 MG/DL (ref 68–131)
GLUCOSE SERPL-MCNC: 107 MG/DL (ref 70–110)
HBA1C MFR BLD: 5.7 % (ref 4–5.6)
POTASSIUM SERPL-SCNC: 4.7 MMOL/L (ref 3.5–5.1)
PROT SERPL-MCNC: 7.1 G/DL (ref 6–8.4)
SODIUM SERPL-SCNC: 140 MMOL/L (ref 136–145)

## 2024-02-29 PROCEDURE — 80053 COMPREHEN METABOLIC PANEL: CPT | Performed by: FAMILY MEDICINE

## 2024-02-29 PROCEDURE — 36415 COLL VENOUS BLD VENIPUNCTURE: CPT | Mod: PO | Performed by: FAMILY MEDICINE

## 2024-02-29 PROCEDURE — 83036 HEMOGLOBIN GLYCOSYLATED A1C: CPT | Performed by: FAMILY MEDICINE

## 2024-03-07 ENCOUNTER — OFFICE VISIT (OUTPATIENT)
Dept: PRIMARY CARE CLINIC | Facility: CLINIC | Age: 84
End: 2024-03-07
Payer: MEDICARE

## 2024-03-07 ENCOUNTER — TELEPHONE (OUTPATIENT)
Dept: PHARMACY | Facility: CLINIC | Age: 84
End: 2024-03-07
Payer: MEDICARE

## 2024-03-07 VITALS
TEMPERATURE: 98 F | WEIGHT: 231.06 LBS | OXYGEN SATURATION: 99 % | SYSTOLIC BLOOD PRESSURE: 138 MMHG | DIASTOLIC BLOOD PRESSURE: 64 MMHG | BODY MASS INDEX: 40.94 KG/M2 | HEART RATE: 64 BPM | HEIGHT: 63 IN

## 2024-03-07 DIAGNOSIS — E11.69 HYPERLIPIDEMIA ASSOCIATED WITH TYPE 2 DIABETES MELLITUS: ICD-10-CM

## 2024-03-07 DIAGNOSIS — E78.5 HYPERLIPIDEMIA ASSOCIATED WITH TYPE 2 DIABETES MELLITUS: ICD-10-CM

## 2024-03-07 DIAGNOSIS — E66.01 SEVERE OBESITY (BMI >= 40): ICD-10-CM

## 2024-03-07 DIAGNOSIS — J45.50 SEVERE PERSISTENT ASTHMA WITHOUT COMPLICATION: ICD-10-CM

## 2024-03-07 DIAGNOSIS — M17.11 PRIMARY OSTEOARTHRITIS OF RIGHT KNEE: Primary | ICD-10-CM

## 2024-03-07 DIAGNOSIS — R73.03 PREDIABETES: ICD-10-CM

## 2024-03-07 DIAGNOSIS — J44.9 CHRONIC OBSTRUCTIVE PULMONARY DISEASE, UNSPECIFIED COPD TYPE: ICD-10-CM

## 2024-03-07 DIAGNOSIS — I27.20 PULMONARY HYPERTENSION: ICD-10-CM

## 2024-03-07 DIAGNOSIS — N25.81 HYPERPARATHYROIDISM, SECONDARY RENAL: ICD-10-CM

## 2024-03-07 DIAGNOSIS — N18.32 STAGE 3B CHRONIC KIDNEY DISEASE: ICD-10-CM

## 2024-03-07 PROBLEM — D68.69 SECONDARY HYPERCOAGULABLE STATE: Status: RESOLVED | Noted: 2023-04-21 | Resolved: 2024-03-07

## 2024-03-07 PROCEDURE — 99214 OFFICE O/P EST MOD 30 MIN: CPT | Mod: S$GLB,,, | Performed by: FAMILY MEDICINE

## 2024-03-07 PROCEDURE — 99999 PR PBB SHADOW E&M-EST. PATIENT-LVL V: CPT | Mod: PBBFAC,,, | Performed by: FAMILY MEDICINE

## 2024-03-07 RX ORDER — ERGOCALCIFEROL 1.25 MG/1
50000 CAPSULE ORAL
COMMUNITY
Start: 2024-02-25

## 2024-03-07 NOTE — LETTER
March 7, 2024    Sammi Abreu  [address] [address]             [facility] - Pharmacy Assistance  [address]  [place] [address]  Fax: [phone]   Dear Ms. Abreu,    My Name is Alisa Singh. I am a Pharmacy Technician reaching out on behalf of [facility]s Pharmacy Patient Assistance Team after receiving a referral from your provider inquiring about assistance with your medications. I tried to call you on 3/7/2024 but was unable to reach you. Our goal is to assist qualified patients with financial assistance for their medications to better help you achieve your health goals!    Please note that enrollment and eligibility into available support will require the following documents:    Proof of household Income( such as social security statement, 1099 form, pension statement or 3 consecutive pay stubs  Copy of all insurance cards (front and back)  Print out from your insurance or pharmacy showing how much you have spent on prescriptions this year  Signed and dated HIPAA /Patient Information Forms       Please reach out to my phone number below if you are still in need of assistance with your medications. We will attempt to reach out to you through [employer] or via phone call again in 5 business days. We look forward to hearing from you soon!    Thank you for choosing [facility] for your healthcare needs    Sincerely  Alisa Francisco  Pharmacy Patient Assistance  [address]  [address]  [place] [address]  Phone: [phone]  Fax: [phone]  Email: [email or web address]

## 2024-03-07 NOTE — PROGRESS NOTES
SUBJECTIVE:  Sammi Abreu is a 83 y.o. female who sustained a right knee injury several year(s) ago. Mechanism of injury: sprain. Immediate symptoms: immediate pain, immediate swelling, inability to bear weight directly after injury, no deformity was noted by the patient. Symptoms have been recurrent since that time. Prior history of related problems: previous knee injury , previous hip injury , previous low back problems .  Lumbosacral spondylolysis with myelopathy, generalized osteoarthritis, status post right hip replacement, diabetic neuropathy DJD of both thoracic lumbar spine  Cardiac risk factors are obesity BMI over 40 (morbid obesity) type 2 diabetes with stage III kidney disease, hypertension associated with diabetes, chronic diastolic congestive heart failure, COPD/asthma  Cardiovascular ROS: positive for - dyspnea on exertion  negative for - chest pain, irregular heartbeat, loss of consciousness, paroxysmal nocturnal dyspnea, or shortness of breath  Past Surgical History:   Procedure Laterality Date    ANGIOGRAPHY OF LOWER EXTREMITY N/A 7/31/2019    Procedure: ANGIOGRAM, LOWER EXTREMITY;  Surgeon: Gino Arana MD;  Location: [facility] CATH/EP LAB;  Service: General;  Laterality: N/A;    ASPIRATION OF SOFT TISSUE Left 10/15/2020    Procedure: ASPIRATION, SOFT TISSUE;  Surgeon: [facility] Diagnostic Provider;  Location: [facility] OR;  Service: General;  Laterality: Left;    COLONOSCOPY N/A 8/13/2020    Procedure: COLONOSCOPY;  Surgeon: Sue Nuñez MD;  Location: [facility] ENDO;  Service: Endoscopy;  Laterality: N/A;    ESOPHAGOGASTRODUODENOSCOPY N/A 8/12/2020    Procedure: EGD (ESOPHAGOGASTRODUODENOSCOPY);  Surgeon: uSe Nuñez MD;  Location: [facility] ENDO;  Service: Endoscopy;  Laterality: N/A;    HIP SURGERY      HYSTERECTOMY      INTRALUMINAL GASTROINTESTINAL TRACT IMAGING VIA CAPSULE N/A 9/9/2020    Procedure: IMAGING PROCEDURE, GI TRACT, INTRALUMINAL, VIA CAPSULE;  Surgeon: Sue Nuñez MD;   Location: [facility] ENDO;  Service: Endoscopy;  Laterality: N/A;    JOINT REPLACEMENT      B total hip    LAPAROSCOPIC CHOLECYSTECTOMY N/A 7/13/2020    Procedure: CHOLECYSTECTOMY, LAPAROSCOPIC;  Surgeon: Jomar Mcdonald MD;  Location: [facility] OR;  Service: General;  Laterality: N/A;    TRANSFORAMINAL EPIDURAL INJECTION OF STEROID Left 6/30/2020    Procedure: Injection,steroid,epidural,transforaminal approach;  Surgeon: Matt Gilliam MD;  Location: [facility] OR;  Service: Pain Management;  Laterality: Left;  L2-3, L3-4    TRANSFORAMINAL EPIDURAL INJECTION OF STEROID Left 1/12/2021    Procedure: Injection,steroid,epidural,transforaminal approach;  Surgeon: Matt Gilliam MD;  Location: [facility] OR;  Service: Pain Management;  Laterality: Left;  L2-3, L3-4     Vitals:    03/07/24 1043   BP: 138/64   Pulse: 64   Temp: 98 °F (36.7 °C)       OBJECTIVE:  Vital signs as noted above.  Appearance: alert, well appearing, and in no distress, oriented to person, place, and time, obese, and acyanotic, in no respiratory distress.  Knee exam: effusion, reduced range of motion, exam limited by acuity of pain, positive drawer sign, negative pivot-shift, collateral ligaments intact, negative Guerda sign, negative Apley's sign, negative Lachman sign, patellar tenderness, normal ipsilateral hip exam, normal ipsilateral foot and ankle exam, normal contralateral knee exam.  X-ray: shows DJD changes, likely chronic.  .lastim  ASSESSMENT:  Knee underlying chronic DJD likely  Lab Results   Component Value Date    WBC 6.23 11/28/2023    HGB 11.1 (L) 11/28/2023    HCT 35.6 (L) 11/28/2023     11/28/2023    CHOL 196 10/18/2022    TRIG 140 10/18/2022    HDL 42 10/18/2022    ALT 16 02/29/2024    AST 18 02/29/2024     02/29/2024    K 4.7 02/29/2024     02/29/2024    CREATININE 1.5 (H) 02/29/2024    BUN 31 (H) 02/29/2024    CO2 26 02/29/2024    TSH 3.261 08/04/2020    INR 2.2 03/07/2024    HGBA1C 5.7 (H) 02/29/2024     Sammi was seen today for  follow-up.    Diagnoses and all orders for this visit:    Primary osteoarthritis of right knee  -     Ambulatory referral/consult to Orthopedics; Future  Consider Synvisc.  Continue cane use.  Not a candidate for neither Potts inhibitors nor nonsteroidal.  She has not a candidate for steroid injections.  Poor results with PT  Chronic obstructive pulmonary disease, unspecified COPD type  -     Ambulatory referral/consult to Pharmacy Assistance; Future  COPD remission she needs assistant with Trelegy.  Hyperlipidemia associated with type 2 diabetes mellitus..  Lab Results   Component Value Date    HGBA1C 5.7 (H) 02/29/2024       -     Lipid panel; Future  -     Hemoglobin A1c; Future    Prediabetes  -     Lipid panel; Future  -     Hemoglobin A1c; Future    Stable and controlled. Continue current treatment plan as previously prescribed with your PCP.        I have reviewed the provider's instructions with the patient, answering all questions to her satisfaction.      PLAN:  rest the injured area as much as practical, referral to Orthopedics for this injury  See orders for this visit as documented in the electronic medical record.  Discussed health maintenance guidelines appropriate for age.

## 2024-03-07 NOTE — LETTER
March 14, 2024    Sammi Abreu  [address] [facility] [address]             [facility] - Pharmacy Assistance  [address]  [place] [address]  Fax: [phone]   Dear Ms. Abreu    My Name is Alisa Singh. I am a Pharmacy Technician reaching out on behalf of [facility]s Pharmacy Patient Assistance Team after receiving a referral from your provider, Dr. Villatoro, inquiring about assistance with your medications on 03/07/2024. We have tried to reach you via phone, MyChart, and a mailed letter to begin the pharmacy assistance process. Unfortunately, weve been unable to reach you. Please note that unless we hear back from you this will be our final attempt to reach you regarding your assistance.    Please reach out to my phone number below if you are still in need of assistance with your medications. We look forward to hearing from you soon!    Thank you for choosing [facility] for your healthcare needs.    Alisa Singh   Phone: [phone]  Fax: [phone]  Email: [email or web address]

## 2024-03-07 NOTE — PATIENT INSTRUCTIONS
Patient Education       Type 2 Diabetes Discharge Instructions   About this topic   Type 2 diabetes is the most common type of diabetes. If you have this illness, your body does not make enough insulin or does not correctly use the insulin it does make. When you eat, your body breaks down all sugars and starches into glucose. Your body needs insulin to use glucose for energy. The insulin takes the glucose from your blood into your cells. If you do not have enough insulin, or your body does not use the insulin well, the glucose or sugar stays in your blood instead of going into your cells. This causes your blood sugar levels to be too high. Over time, this can damage your heart, nerves, eyes, kidneys, and other organs.     What care is needed at home?   Learn how to take care of your diabetes.  Ask your doctor what you need to do when you go home. Make sure you ask questions if you do not understand what the doctor says. This way you will know what you need to do.  Based on what diabetes drugs you take, you might need to check your blood sugar regularly at home. But not everyone with type 2 diabetes needs to do this. If you need to, your doctor will teach you how to check your blood sugar. Ask what the goal numbers are for your blood sugar. Keep a list of your blood sugar levels. This will help you learn what causes high or low readings and help you manage your diabetes.     Take your diabetes drugs as directed. Ask what to do if you miss a dose of your diabetes drugs.  Learn when, what, and how much to eat.  Be active. Walk, garden, or do something active for 30 minutes or more on most days of the week. This will also help you control your weight. Ask your doctor for proper food and exercise programs to follow.  Check your feet often. Look for blisters or sores. Always wear socks and shoes. Never walk barefoot, especially outdoors. Take special care around the pool and at the beach, as these surfaces may be  extremely hot and burn your feet. Report any problems with your feet to your doctor.  Wear a medical ID.  Limit or avoid beer, wine, and mixed drinks (alcohol).  If you smoke, try to quit. Your doctor or nurse can help.  Talk with your doctor about if you need to check your blood sugar at home.  If you are overweight, ask your doctor if you would be a good candidate for bariatric surgery as this can reverse diabetes in some cases.  What follow-up care is needed?   Your doctor may ask you to make visits to the office to check on your progress. Be sure to keep these visits.  What drugs may be needed?   Diabetes drugs will help control your blood sugar. You may have more than one diabetes drug. Your doctor may order drugs for you to take by mouth or insulin as a shot. You will be trained on how to give insulin shots, if needed. Talk to your doctor about your diabetes drugs and what you need to do when you go home.  Will physical activity be limited?   Being active can lower blood sugar and blood pressure. It can also help control weight. Be sure to check with your doctor before starting any exercise program.  Try to walk, bike, or swim every day. Start with 5 to 10 minutes each day. Work up to about 30 minutes most days.  Drink lots of water during workouts.  What changes to diet are needed?   Eating a healthy diet is important. This means you need to eat regularly throughout the day. You need to include a variety of foods such as fruits and vegetables, whole grains, nonfat dairy products, and lean meats. Do not eat too much food at one time and do not skip meals. Limit foods high in sugar like sweets, desserts, and fruit juices. Ask your doctor about what kind of diet is right for you.  What problems could happen?   Heart, kidney, and nerve problems  Foot problems. Sores and infection may also happen.  Eye problems  Dangerously high blood sugar levels requiring emergency treatment  Severe infections  Talk with your  doctor often. Other drugs or care may be needed to treat or prevent these problems.  When do I need to call the doctor?   You have signs of high blood sugar like you:  Are more thirsty  Are urinating more often  Have new shortness of breath  Have belly pain, an upset stomach, or are throwing up.  Are more tired than normal  Have a very dry mouth or a fruity breath odor  Signs of infection. These include a fever of 100.4°F (38°C) or higher, chills, or a wound that will not heal.  Blurred vision  Chest pain  Swelling in your legs  Change in color and odor of your feet  Blood sugar that remains high and does not respond to treatment  Helpful tips   Talk to your doctor about getting a flu shot and pneumonia shot.  Teach Back: Helping You Understand   The Teach Back Method helps you understand the information we are giving you. After you talk with the staff, tell them in your own words what you learned. This helps to make sure the staff has described each thing clearly. It also helps to explain things that may have been confusing. Before going home, make sure you can do these:  I can tell you about my condition.  I can tell you what I need to do to control my blood sugar.  I can tell you what I will do if I have signs of high blood sugar.  Where can I learn more?   Center for Disease Control and Prevention  https://www.cdc.gov/diabetes/basics/type2.html   International Diabetes Foundation  https://www.idf.org/aboutdiabetes/type-2-diabetes.html   UpToDate  https://www.[employer].com/contents/type-2-diabetes-overview-beyond-the-basics   Last Reviewed Date   2021-05-04  Consumer Information Use and Disclaimer   This information is not specific medical advice and does not replace information you receive from your health care provider. This is only a brief summary of general information. It does NOT include all information about conditions, illnesses, injuries, tests, procedures, treatments, therapies, discharge instructions or  life-style choices that may apply to you. You must talk with your health care provider for complete information about your health and treatment options. This information should not be used to decide whether or not to accept your health care providers advice, instructions or recommendations. Only your health care provider has the knowledge and training to provide advice that is right for you.  Copyright   Copyright © 2021 [employer] and its affiliates and/or licensors. All rights reserved.

## 2024-03-07 NOTE — TELEPHONE ENCOUNTER
I have reached out to Sammi Abreu to inform her of the [employer] application process for Trelegy and whats required to apply.  Sammi Abreu did not answer. I left a voicemail and mailed a letter introducing her to the pharmacy patient assistance program. I will follow up in 5 business days.

## 2024-03-07 NOTE — LETTER
March 14, 2024    Sammi Abreu  [address] [address] [facility] [address]             [facility] - Pharmacy Assistance  [address]  [place] [address]  Fax: [phone] Dear Sammi Abreu         My name is Alisa Singh and I work with the Pharmacy Patient Assistance Program here at [facility].  We received a referral from your provider, inquiring about assistance with your medication. I tried to contact you to discuss your assistance options, sorry I missed you. If you are still in need of assistance, please reach out to my phone number listed below.       Please be advised enrollment in The Pharmacy Patient Assistance Program will require the following documentation.             Proof of household income (such as tax return, social security award letter, pension/group home statements)   Copy of Insurance (front and back) Cards   Printout from your pharmacy or insurance that shows how much money you have spent on prescription -pays this (2024) year   Signed and dated HIPAA /Patient Information Forms             Thank you for choosing [facility] for your healthcare needs      Sincerely  Alisa Singh   Pharmacy Patient Assistance  [address] [facility]  [address]  [place] [address]  Phone: [phone]  Fax: [phone]  Email: [email or web address]

## 2024-03-08 ENCOUNTER — OFFICE VISIT (OUTPATIENT)
Dept: ORTHOPEDICS | Facility: CLINIC | Age: 84
End: 2024-03-08
Payer: MEDICARE

## 2024-03-08 VITALS — WEIGHT: 231 LBS | HEIGHT: 63 IN | BODY MASS INDEX: 40.93 KG/M2

## 2024-03-08 DIAGNOSIS — M17.11 PRIMARY OSTEOARTHRITIS OF RIGHT KNEE: Primary | ICD-10-CM

## 2024-03-08 PROCEDURE — 20610 DRAIN/INJ JOINT/BURSA W/O US: CPT | Mod: RT,S$GLB,, | Performed by: PHYSICIAN ASSISTANT

## 2024-03-08 PROCEDURE — 99203 OFFICE O/P NEW LOW 30 MIN: CPT | Mod: 25,S$GLB,, | Performed by: PHYSICIAN ASSISTANT

## 2024-03-08 RX ORDER — DICLOFENAC SODIUM 10 MG/G
2 GEL TOPICAL 2 TIMES DAILY
Qty: 50 G | Refills: 2 | Status: SHIPPED | OUTPATIENT
Start: 2024-03-08

## 2024-03-08 RX ORDER — TRIAMCINOLONE ACETONIDE 40 MG/ML
40 INJECTION, SUSPENSION INTRA-ARTICULAR; INTRAMUSCULAR
Status: DISCONTINUED | OUTPATIENT
Start: 2024-03-08 | End: 2024-03-08 | Stop reason: HOSPADM

## 2024-03-08 RX ADMIN — TRIAMCINOLONE ACETONIDE 40 MG: 40 INJECTION, SUSPENSION INTRA-ARTICULAR; INTRAMUSCULAR at 10:03

## 2024-03-08 NOTE — PROGRESS NOTES
[facility] [place] ORTHOPEDICS  [address]  [place] [address]  Phone: [phone]   [phone]    Patient's PCP: Osmani Villatoro MD  Referring Provider: Dr. Osmani Villatoro    Subjective:      Chief Complaint:   Chief Complaint   Patient presents with    Right Knee - Pain     Right knee pain worse in last 2 weeks, c/o swelling, popping and occasionally gives way       Past Medical History:   Diagnosis Date    Acute deep vein thrombosis (DVT) of right upper extremity 8/5/2020    Occlusive thrombus of the right brachial vein and cephalic vein    LUIZ (acute kidney injury) 8/3/2020    ALLERGIC RHINITIS     Anemia     Arthritis     Atelectasis 8/3/2020    Atypical pneumonia 11/1/2020    Back pain     Bronchitis     Cataract     OU    CHF (congestive heart failure)     Cholelithiasis     COPD (chronic obstructive pulmonary disease)     Degenerative disc disease     Diabetes mellitus     pre diabetic    Diverticulosis     DVT (deep venous thrombosis)     Edema     Elevated brain natriuretic peptide (BNP) level 8/3/2020    Encounter for blood transfusion 1979    Fibromyalgia     Glaucoma     Gout     Hematuria 8/7/2020    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    Hx of colonic polyps     Hyperlipidemia     Hypertension     Hyponatremia 8/5/2020    Incontinence     MRSA bacteremia 7/22/2020    Non-traumatic rhabdomyolysis 8/4/2020    Osteoporosis     Pneumonia due to infectious organism 11/1/2020    Pulmonary embolism     Reflux     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     Sleep apnea     non compliant with CPAP    Vestibulitis of ear        Past Surgical History:   Procedure Laterality Date    ANGIOGRAPHY OF LOWER EXTREMITY N/A 7/31/2019    Procedure: ANGIOGRAM, LOWER EXTREMITY;  Surgeon: Gino Arana MD;  Location: [facility] CATH/EP LAB;  Service: General;  Laterality: N/A;    ASPIRATION OF SOFT TISSUE Left 10/15/2020    Procedure: ASPIRATION, SOFT TISSUE;  Surgeon: Raul  Diagnostic Provider;  Location: [facility] OR;  Service: General;  Laterality: Left;    COLONOSCOPY N/A 8/13/2020    Procedure: COLONOSCOPY;  Surgeon: Sue Nuñez MD;  Location: [facility] ENDO;  Service: Endoscopy;  Laterality: N/A;    ESOPHAGOGASTRODUODENOSCOPY N/A 8/12/2020    Procedure: EGD (ESOPHAGOGASTRODUODENOSCOPY);  Surgeon: Sue Nuñez MD;  Location: [facility] ENDO;  Service: Endoscopy;  Laterality: N/A;    HIP SURGERY      HYSTERECTOMY      INTRALUMINAL GASTROINTESTINAL TRACT IMAGING VIA CAPSULE N/A 9/9/2020    Procedure: IMAGING PROCEDURE, GI TRACT, INTRALUMINAL, VIA CAPSULE;  Surgeon: Sue Nuñez MD;  Location: [facility] ENDO;  Service: Endoscopy;  Laterality: N/A;    JOINT REPLACEMENT      B total hip    LAPAROSCOPIC CHOLECYSTECTOMY N/A 7/13/2020    Procedure: CHOLECYSTECTOMY, LAPAROSCOPIC;  Surgeon: Jomar Mcdonald MD;  Location: [facility] OR;  Service: General;  Laterality: N/A;    TRANSFORAMINAL EPIDURAL INJECTION OF STEROID Left 6/30/2020    Procedure: Injection,steroid,epidural,transforaminal approach;  Surgeon: Matt Gilliam MD;  Location: [facility] OR;  Service: Pain Management;  Laterality: Left;  L2-3, L3-4    TRANSFORAMINAL EPIDURAL INJECTION OF STEROID Left 1/12/2021    Procedure: Injection,steroid,epidural,transforaminal approach;  Surgeon: Matt Gilliam MD;  Location: [facility] OR;  Service: Pain Management;  Laterality: Left;  L2-3, L3-4       Current Outpatient Medications   Medication Sig    acetaminophen (TYLENOL) 325 MG tablet Take 2 tablets (650 mg total) by mouth every 6 (six) hours as needed (Do not take with any other Tylenol or acetaminophen containing products).    albuterol (PROVENTIL/VENTOLIN HFA) 90 mcg/actuation inhaler 2 puffs every 4 hours as needed for cough, wheeze, or shortness of breath    blood sugar diagnostic Strp To check BG 1 times daily, to use with insurance preferred meter    blood-glucose meter kit To check BG 1 times daily, to use with insurance preferred meter    budesonide  (PULMICORT) 0.5 mg/2 mL nebulizer solution Take 2 mLs (0.5 mg total) by nebulization 2 (two) times daily. Controller    calcitRIOL (ROCALTROL) 0.25 MCG Cap Take 1 capsule (0.25 mcg total) by mouth once daily.    ergocalciferol (ERGOCALCIFEROL) 50,000 unit Cap Take 50,000 Units by mouth every 7 days.    esomeprazole (NEXIUM) 20 MG capsule TAKE 1 CAPSULE BY MOUTH TWICE DAILY BEFORE MEAL(S)    ezetimibe (ZETIA) 10 mg tablet Take 1 tablet (10 mg total) by mouth once daily.    fluticasone propionate (FLONASE) 50 mcg/actuation nasal spray 2 sprays (100 mcg total) by Each Nostril route once daily.    fluticasone-umeclidin-vilanter (TRELEGY ELLIPTA) 200-62.5-25 mcg inhaler Inhale 1 puff into the lungs once daily.    folic acid (FOLVITE) 1 MG tablet Take 1 tablet (1,000 mcg total) by mouth once daily.    furosemide (LASIX) 40 MG tablet Take 1 tablet (40 mg total) by mouth 2 (two) times daily as needed (edema). (Patient taking differently: Take 40 mg by mouth once daily.)    hydrALAZINE (APRESOLINE) 100 MG tablet Take 1 tablet (100 mg total) by mouth every 12 (twelve) hours.    lancets Misc To check BG 1 times daily, to use with insurance preferred meter    losartan (COZAAR) 50 MG tablet Take 1 tablet by mouth once daily    magnesium oxide (MAG-OX) 400 mg (241.3 mg magnesium) tablet Take 1 tablet (400 mg total) by mouth 2 (two) times daily.    METAMUCIL, SUGAR, Powd 1 tabs oral daily    metoprolol succinate (TOPROL-XL) 100 MG 24 hr tablet Take 1 tablet (100 mg total) by mouth once daily.    potassium chloride SA (K-DUR,KLOR-CON) 20 MEQ tablet Take 1 tablet (20 mEq total) by mouth 2 (two) times daily.    predniSONE (DELTASONE) 20 MG tablet Take one daily for 3 days and may repeat for shortness of breath.    triamcinolone acetonide 0.1% (KENALOG) 0.1 % ointment Apply topically 2 (two) times daily.    warfarin (COUMADIN) 5 MG tablet TAKE 1 TO 1 & 1/2 (ONE & ONE-HALF) TABLETS BY MOUTH IN THE EVENING AS DIRECTED BY  THE  COUMADIN   CLINIC    diclofenac sodium (VOLTAREN) 1 % Gel Apply 2 g topically 2 (two) times daily.     No current facility-administered medications for this visit.     Facility-Administered Medications Ordered in Other Visits   Medication    lactated ringers infusion    lidocaine (PF) 10 mg/ml (1%) injection 10 mg       Review of patient's allergies indicates:   Allergen Reactions    Daptomycin Other (See Comments)     Kidney damage     Cymbalta [duloxetine] Other (See Comments)     Nightmares      Darvon [propoxyphene] Nausea Only and Other (See Comments)     Sweating, slept for 3 days    Atorvastatin Other (See Comments)     Muscle cramps    Naprosyn [naproxen] Nausea Only    Penicillins Rash    Tramadol Nausea Only and Palpitations       Family History   Problem Relation Age of Onset    Hypertension Mother     Stroke Father     Hypertension Father     Diabetes Sister     Cancer Sister     Stomach cancer Sister     Hypertension Sister     Bipolar disorder Sister     Peripheral vascular disease Sister     Hypertension Brother     Stroke Brother     Peripheral vascular disease Brother     Hypertension Son     Obesity Son     Early death Son 48    Arthritis Son     Glaucoma Neg Hx     Macular degeneration Neg Hx     Retinal detachment Neg Hx        Social History     Socioeconomic History    Marital status:    Tobacco Use    Smoking status: Former     Current packs/day: 0.00     Average packs/day: 0.3 packs/day for 5.0 years (1.3 ttl pk-yrs)     Types: Cigarettes     Start date: 1967     Quit date: 1972     Years since quittin.2    Smokeless tobacco: Never   Substance and Sexual Activity    Alcohol use: Yes     Alcohol/week: 0.0 standard drinks of alcohol     Comment: Rarely    Drug use: Not Currently    Sexual activity: Not Currently     Social Determinants of Health     Financial Resource Strain: Low Risk  (2023)    Overall Financial Resource Strain (CARDIA)     Difficulty of Paying Living Expenses:  Not hard at all   Food Insecurity: No Food Insecurity (8/14/2023)    Hunger Vital Sign     Worried About Running Out of Food in the Last Year: Never true     Ran Out of Food in the Last Year: Never true   Transportation Needs: No Transportation Needs (8/14/2023)    PRAPARE - Transportation     Lack of Transportation (Medical): No     Lack of Transportation (Non-Medical): No   Physical Activity: Inactive (8/14/2023)    Exercise Vital Sign     Days of Exercise per Week: 0 days     Minutes of Exercise per Session: 0 min   Stress: No Stress Concern Present (8/14/2023)    [employer] - Occupational Stress Questionnaire     Feeling of Stress : Not at all   Social Connections: Socially Isolated (8/14/2023)    Social Connection and Isolation Panel [NHANES]     Frequency of Communication with Friends and Family: More than three times a week     Frequency of Social Gatherings with Friends and Family: More than three times a week     Attends Mormonism Services: Never     Active Member of Clubs or Organizations: No     Attends Club or Organization Meetings: Never     Marital Status:    Housing Stability: Unknown (8/14/2023)    Housing Stability Vital Sign     Unable to Pay for Housing in the Last Year: No     Unstable Housing in the Last Year: No       Prior to meeting with the patient I reviewed the medical chart in [facility]. This included reviewing the previous progress notes from our office, review of the patient's last appointment with their primary care provider, review of any visits to the emergency room, and review of any pain management appointments or procedures.    History of present illness:  83-year-old female, presents to clinic today for evaluation complaints of right knee pain.  She has history of osteoarthritis in the right knee, it has been managed by her primary care provider.  However, most recently he was uncomfortable with giving her a repeat steroid injection in the right  knee and she is referred to us for additional treatment recommendations.  She complains of lateral right knee pain and swelling.      Review of Systems:    Constitutional: Negative for chills, fever and weight loss.   HENT: Negative for congestion.    Eyes: Negative for discharge and redness.   Respiratory: Negative for cough and shortness of breath.    Cardiovascular: Negative for chest pain.   Gastrointestinal: Negative for nausea and vomiting.   Musculoskeletal: See HPI.   Skin: Negative for rash.   Neurological: Negative for headaches.   Endo/Heme/Allergies: Does not bruise/bleed easily.   Psychiatric/Behavioral: The patient is not nervous/anxious.    All other systems reviewed and are negative.       Objective:      Physical Examination:    Vital Signs:  There were no vitals filed for this visit.    Body mass index is 40.92 kg/m².    This a well-developed, well nourished patient in no acute distress.  They are alert and oriented and cooperative to examination.     Examination of the right knee, skin is dry and intact, no erythema or ecchymosis, no signs symptoms of infection, no effusion, range of motion 0-110 degrees.  Stable to anterior-posterior varus and valgus stress.  Calf soft nontender, straight leg raise negative.  Tenderness primarily over the lateral aspect of the right knee.      Pertinent New Results:        XRAY Report / Interpretation:   AP lateral sunrise views of the right knee taken today in the office demonstrate fairly advanced lateral compartment disease of the right knee with lateral compartment collapse and weight-bearing surface changes, osteophytosis.  Patellofemoral joint arthritis as well.          Assessment:       1. Primary osteoarthritis of right knee      Plan:     Primary osteoarthritis of right knee  -     X-Ray Knee 3 View Right; Future; Expected date: 03/08/2024  -     Ambulatory referral/consult to Orthopedics  -     Large Joint Aspiration/Injection: R knee  -     diclofenac  sodium (VOLTAREN) 1 % Gel; Apply 2 g topically 2 (two) times daily.  Dispense: 50 g; Refill: 2        Follow up for Tues/Thurs w/ Dr Solis - 6 week f/u - right knee pain, injected 3/8/24.    83-year-old female, right knee pain, osteoarthritis on x-ray.  She can not take oral anti-inflammatories because of her increased kidney function, she follows up with nephrology and her primary care.  She also takes Coumadin.  She does take acetaminophen which does help for her pain.  She was previously prescribed diclofenac topical however the 3% is not covered by her provider.  I wrote her a new prescription for 1% diclofenac, she continues to take acetaminophen, injected the right knee and we spent a great deal of time talking about right total knee arthroplasty.  We would like to see her back in 6 weeks just this to see how she is doing.  If she is managing well after the intra-articular steroid injection then she can simply follow up every 3 months for repeat injections.    [unfilled]  ANUJ Barrera PA-C    This note was created using [employer] voice recognition software that occasionally misinterprets words or phrases.

## 2024-03-08 NOTE — PROCEDURES
Large Joint Aspiration/Injection: R knee    Date/Time: 3/8/2024 10:30 AM    Performed by: Cyrus Acosta PA  Authorized by: Cyrus Acosta PA    Consent Done?:  Yes (Verbal)  Indications:  Pain  Site marked: the procedure site was marked    Timeout: prior to procedure the correct patient, procedure, and site was verified    Prep: patient was prepped and draped in usual sterile fashion      Local anesthesia used?: Yes    Local anesthetic:  Lidocaine 1% without epinephrine    Details:  Needle Size:  25 G  Ultrasonic Guidance for needle placement?: No    Approach:  Anterolateral  Location:  Knee  Site:  R knee  Medications:  40 mg triamcinolone acetonide 40 mg/mL  Patient tolerance:  Patient tolerated the procedure well with no immediate complications

## 2024-03-11 NOTE — TELEPHONE ENCOUNTER
----- Message from Alana Boykin sent at 8/7/2023  3:55 PM CDT -----  Regarding: Patient Returning Call  Contact: patient at [phone]  Type:  Patient Returning Call    Who Called:  patient at [phone]    Who Left Message for Patient:  Amarilis Mao LPN   Does the patient know what this is regarding?:  yes    Additional Information:  Please call and advise. Thank you         English

## 2024-03-14 NOTE — TELEPHONE ENCOUNTER
Sammi Abreu has not returned calls or messages regarding support for her Trelegy after multiple attempts to reach her over 10 business days. A final letter has been mailed to the patient to reach back out to Pharmacy Patient Assistance to initiate this process. Please instruct the patient to reach out to Alisa Singh   @ [phone] or [email or web address] if she is still in need of assistance.

## 2024-03-14 NOTE — TELEPHONE ENCOUNTER
Sammi Jeet Brady was scheduled on 03/07/2024 to provide the following documentation to initiate the application process.          Proof of household Income( such as social security statement, 1099 form, pension statement or 3 consecutive pay stubs  Copy of all Insurance cards( front and back)  Printout from your Insurance or Pharmacy that shows how much you have spent on prescriptions this year  Signed and dated HIPAA /Patient Information Forms          As of today, the documents have not been received.  Please instruct the patient to email requested documentation to [email or web address]  or reach out to Alisa Singh [phone] with any follow-up questions.       Pharmacy Patient Assistance  [address] [place] [address]  [id number] [place] [address]  Fax: [phone]  Email: [email or web address]

## 2024-03-14 NOTE — TELEPHONE ENCOUNTER
A 2nd attempt has been made to establish contact with Sammi Abreu  via MY CHART. The final contact attempt will be made in 5 business days

## 2024-03-23 NOTE — TELEPHONE ENCOUNTER
Care Due:                  Date            Visit Type   Department     Provider  --------------------------------------------------------------------------------                                EP -                              PRIMARY  Last Visit: 03-      CARE ([facility])   None Found     Osmani Villatoro                              EP -                              PRIMARY  Next Visit: 09-      CARE ([facility])   None Found     Osmani Villatoro                                                            Last  Test          Frequency    Reason                     Performed    Due Date  --------------------------------------------------------------------------------    Lipid Panel.  12 months..  ezetimibe................  10-   10-    Mg Level....  12 months..  magnesium................  04- 04-    [employer] Embedded Care Due Messages. Reference number: [id number].   3/23/2024 10:09:47 AM CDT

## 2024-03-25 NOTE — TELEPHONE ENCOUNTER
Refill Routing Note   Medication(s) are not appropriate for processing by [facility] for the following reason(s):        Outside of protocol    [facility] action(s):  Route   Requires labs : Yes      Medication Therapy Plan: TOTAL DAILY DOSE EXCEEDS MAINTENANCE DOSING FOR PPI      Appointments  past 12m or future 3m with PCP    Date Provider   Last Visit   3/7/2024 Osmani Villatoro MD   Next Visit   9/9/2024 Osmani Villatoro MD   ED visits in past 90 days: 0        Note composed:7:46 AM 03/25/2024

## 2024-03-26 RX ORDER — HYDROGEN PEROXIDE 3 %
SOLUTION, NON-ORAL MISCELLANEOUS
Qty: 180 CAPSULE | Refills: 3 | Status: SHIPPED | OUTPATIENT
Start: 2024-03-26

## 2024-03-28 ENCOUNTER — PATIENT MESSAGE (OUTPATIENT)
Dept: FAMILY MEDICINE | Facility: CLINIC | Age: 84
End: 2024-03-28
Payer: MEDICARE

## 2024-04-01 NOTE — TELEPHONE ENCOUNTER
No care due was identified.  [facility] Embedded Care Due Messages. Reference number: [id number].   4/01/2024 9:58:26 AM CDT

## 2024-04-02 RX ORDER — LOSARTAN POTASSIUM 50 MG/1
TABLET ORAL
Qty: 90 TABLET | Refills: 3 | Status: SHIPPED | OUTPATIENT
Start: 2024-04-02

## 2024-04-02 NOTE — TELEPHONE ENCOUNTER
Refill Routing Note   Medication(s) are not appropriate for processing by [facility] for the following reason(s):        Required labs abnormal    [facility] action(s):  Defer             Pharmacist review requested: Yes     Appointments  past 12m or future 3m with PCP    Date Provider   Last Visit   3/7/2024 Osmani Villatoro MD   Next Visit   9/9/2024 Osmani Villatoro MD   ED visits in past 90 days: 0        Note composed:1:16 PM 04/02/2024

## 2024-04-02 NOTE — TELEPHONE ENCOUNTER
Refill Decision Note   Sammikevin Abreu  is requesting a refill authorization.  Brief Assessment and Rationale for Refill:  Approve     Medication Therapy Plan:  No dose adjustment recommended per renal fxn.        Pharmacist review requested: Yes   Extended chart review required: Yes   Comments:     Note composed:1:32 PM 04/02/2024

## 2024-04-08 DIAGNOSIS — I82.621 ACUTE DEEP VEIN THROMBOSIS (DVT) OF RIGHT UPPER EXTREMITY, UNSPECIFIED VEIN: ICD-10-CM

## 2024-04-08 RX ORDER — WARFARIN SODIUM 5 MG/1
TABLET ORAL
Qty: 120 TABLET | Refills: 3 | Status: SHIPPED | OUTPATIENT
Start: 2024-04-08

## 2024-04-08 NOTE — TELEPHONE ENCOUNTER
----- Message from Joe Gloria sent at 4/8/2024  1:28 PM CDT -----  Regarding: advice  Contact: patient  Type:  RX Refill Request    Who Called:  Patient   Refill or New Rx:  refill  RX Name and Strength:  warfarin (COUMADIN) 5 MG tablet 120 tablet 0 1/17/2024 -   Sig: TAKE 1 TO 1 & 1/2 (ONE & ONE-HALF) TABLETS BY MOUTH IN THE EVENING AS DIRECTED BY  THE  COUMADIN  CLINIC   Sent to pharmacy as: warfarin (COUMADIN) 5 MG tablet   E-Prescribing Status: Receipt confirmed by pharmacy (1/17/2024 11:59 AM CST)       How is the patient currently taking it? (ex. 1XDay):  see above   Is this a 30 day or 90 day RX:  see above  Preferred Pharmacy with phone number:    [facility] [address] [place], [place] - [address]  [address] [address]  Phone: [phone] Fax: [phone]    Local or Mail Order:  local   Ordering Provider:  Dr Shauna Greene Call Back Number:  [phone]  Additional Information:  Pt stated that she would need top contact office to get her Rx refilled. Please call to advice. Thanks

## 2024-04-18 ENCOUNTER — TELEPHONE (OUTPATIENT)
Dept: PULMONOLOGY | Facility: CLINIC | Age: 84
End: 2024-04-18
Payer: MEDICARE

## 2024-05-15 DIAGNOSIS — Z78.0 MENOPAUSE: ICD-10-CM

## 2024-05-31 ENCOUNTER — LAB VISIT (OUTPATIENT)
Dept: LAB | Facility: HOSPITAL | Age: 84
End: 2024-05-31
Attending: INTERNAL MEDICINE
Payer: MEDICARE

## 2024-05-31 DIAGNOSIS — N18.30 CHRONIC KIDNEY DISEASE, STAGE III (MODERATE): Primary | ICD-10-CM

## 2024-05-31 DIAGNOSIS — N18.9 CHRONIC KIDNEY DISEASE, UNSPECIFIED: ICD-10-CM

## 2024-05-31 DIAGNOSIS — E55.9 AVITAMINOSIS D: ICD-10-CM

## 2024-05-31 DIAGNOSIS — N25.81 SECONDARY HYPERPARATHYROIDISM OF RENAL ORIGIN: ICD-10-CM

## 2024-05-31 LAB
25(OH)D3+25(OH)D2 SERPL-MCNC: 45 NG/ML (ref 30–96)
ALBUMIN SERPL BCP-MCNC: 3.4 G/DL (ref 3.5–5.2)
ANION GAP SERPL CALC-SCNC: 8 MMOL/L (ref 8–16)
BILIRUB UR QL STRIP: NEGATIVE
BUN SERPL-MCNC: 27 MG/DL (ref 8–23)
CALCIUM SERPL-MCNC: 9.1 MG/DL (ref 8.7–10.5)
CHLORIDE SERPL-SCNC: 107 MMOL/L (ref 95–110)
CLARITY UR: CLEAR
CO2 SERPL-SCNC: 26 MMOL/L (ref 23–29)
COLOR UR: YELLOW
CREAT SERPL-MCNC: 1.5 MG/DL (ref 0.5–1.4)
CREAT UR-MCNC: 233.1 MG/DL (ref 15–325)
ERYTHROCYTE [DISTWIDTH] IN BLOOD BY AUTOMATED COUNT: 12.9 % (ref 11.5–14.5)
EST. GFR  (NO RACE VARIABLE): 35 ML/MIN/1.73 M^2
FERRITIN SERPL-MCNC: 375 NG/ML (ref 20–300)
GLUCOSE SERPL-MCNC: 115 MG/DL (ref 70–110)
GLUCOSE UR QL STRIP: NEGATIVE
HCT VFR BLD AUTO: 39.9 % (ref 37–48.5)
HGB BLD-MCNC: 12.4 G/DL (ref 12–16)
HGB UR QL STRIP: NEGATIVE
IRON SERPL-MCNC: 94 UG/DL (ref 30–160)
KETONES UR QL STRIP: NEGATIVE
LEUKOCYTE ESTERASE UR QL STRIP: NEGATIVE
MCH RBC QN AUTO: 32.5 PG (ref 27–31)
MCHC RBC AUTO-ENTMCNC: 31.1 G/DL (ref 32–36)
MCV RBC AUTO: 105 FL (ref 82–98)
NITRITE UR QL STRIP: NEGATIVE
PH UR STRIP: 6 [PH] (ref 5–8)
PHOSPHATE SERPL-MCNC: 3.6 MG/DL (ref 2.7–4.5)
PLATELET # BLD AUTO: 196 K/UL (ref 150–450)
PMV BLD AUTO: 11 FL (ref 9.2–12.9)
POTASSIUM SERPL-SCNC: 4.4 MMOL/L (ref 3.5–5.1)
PROT UR QL STRIP: ABNORMAL
PROT UR-MCNC: 14 MG/DL (ref 0–15)
PROT/CREAT UR: 0.06 MG/G{CREAT} (ref 0–0.2)
PTH-INTACT SERPL-MCNC: 203.2 PG/ML (ref 9–77)
RBC # BLD AUTO: 3.82 M/UL (ref 4–5.4)
SATURATED IRON: 38 % (ref 20–50)
SODIUM SERPL-SCNC: 141 MMOL/L (ref 136–145)
SP GR UR STRIP: 1.02 (ref 1–1.03)
TOTAL IRON BINDING CAPACITY: 249 UG/DL (ref 250–450)
TRANSFERRIN SERPL-MCNC: 178 MG/DL (ref 200–375)
URN SPEC COLLECT METH UR: ABNORMAL
UROBILINOGEN UR STRIP-ACNC: NEGATIVE EU/DL
WBC # BLD AUTO: 5.79 K/UL (ref 3.9–12.7)

## 2024-05-31 PROCEDURE — 81003 URINALYSIS AUTO W/O SCOPE: CPT | Performed by: INTERNAL MEDICINE

## 2024-05-31 PROCEDURE — 83540 ASSAY OF IRON: CPT | Performed by: INTERNAL MEDICINE

## 2024-05-31 PROCEDURE — 85027 COMPLETE CBC AUTOMATED: CPT | Performed by: INTERNAL MEDICINE

## 2024-05-31 PROCEDURE — 82306 VITAMIN D 25 HYDROXY: CPT | Performed by: INTERNAL MEDICINE

## 2024-05-31 PROCEDURE — 80069 RENAL FUNCTION PANEL: CPT | Performed by: INTERNAL MEDICINE

## 2024-05-31 PROCEDURE — 82570 ASSAY OF URINE CREATININE: CPT | Performed by: INTERNAL MEDICINE

## 2024-05-31 PROCEDURE — 83970 ASSAY OF PARATHORMONE: CPT | Performed by: INTERNAL MEDICINE

## 2024-05-31 PROCEDURE — 82728 ASSAY OF FERRITIN: CPT | Performed by: INTERNAL MEDICINE

## 2024-05-31 PROCEDURE — 36415 COLL VENOUS BLD VENIPUNCTURE: CPT | Performed by: INTERNAL MEDICINE

## 2024-08-30 DIAGNOSIS — J45.40 MODERATE PERSISTENT ASTHMA WITHOUT COMPLICATION: ICD-10-CM

## 2024-08-30 DIAGNOSIS — J44.89 ASTHMA-COPD OVERLAP SYNDROME: ICD-10-CM

## 2024-08-30 DIAGNOSIS — J45.50 SEVERE PERSISTENT ASTHMA WITHOUT COMPLICATION: ICD-10-CM

## 2024-09-03 RX ORDER — ALBUTEROL SULFATE 90 UG/1
INHALANT RESPIRATORY (INHALATION)
Qty: 18 G | Refills: 3 | Status: SHIPPED | OUTPATIENT
Start: 2024-09-03

## 2024-09-03 RX ORDER — FLUTICASONE FUROATE, UMECLIDINIUM BROMIDE AND VILANTEROL TRIFENATATE 200; 62.5; 25 UG/1; UG/1; UG/1
1 POWDER RESPIRATORY (INHALATION) DAILY
Qty: 60 EACH | Refills: 3 | Status: SHIPPED | OUTPATIENT
Start: 2024-09-03

## 2024-09-04 DIAGNOSIS — J45.50 SEVERE PERSISTENT ASTHMA WITHOUT COMPLICATION: ICD-10-CM

## 2024-09-04 RX ORDER — PREDNISONE 20 MG/1
TABLET ORAL
Qty: 21 TABLET | Refills: 1 | Status: SHIPPED | OUTPATIENT
Start: 2024-09-04

## 2024-09-09 ENCOUNTER — OFFICE VISIT (OUTPATIENT)
Dept: PRIMARY CARE CLINIC | Facility: CLINIC | Age: 84
End: 2024-09-09
Payer: MEDICARE

## 2024-09-09 VITALS
OXYGEN SATURATION: 99 % | BODY MASS INDEX: 40.54 KG/M2 | WEIGHT: 228.81 LBS | DIASTOLIC BLOOD PRESSURE: 62 MMHG | HEIGHT: 63 IN | SYSTOLIC BLOOD PRESSURE: 134 MMHG | HEART RATE: 55 BPM | TEMPERATURE: 98 F

## 2024-09-09 DIAGNOSIS — R79.9 ABNORMAL FINDING OF BLOOD CHEMISTRY, UNSPECIFIED: ICD-10-CM

## 2024-09-09 DIAGNOSIS — G47.33 OBSTRUCTIVE SLEEP APNEA SYNDROME: ICD-10-CM

## 2024-09-09 DIAGNOSIS — J44.9 CHRONIC OBSTRUCTIVE PULMONARY DISEASE, UNSPECIFIED COPD TYPE: ICD-10-CM

## 2024-09-09 DIAGNOSIS — R60.0 PERIPHERAL EDEMA: Primary | ICD-10-CM

## 2024-09-09 DIAGNOSIS — M17.5 OTHER SECONDARY OSTEOARTHRITIS OF RIGHT KNEE: ICD-10-CM

## 2024-09-09 PROCEDURE — 1123F ACP DISCUSS/DSCN MKR DOCD: CPT | Mod: CPTII,S$GLB,, | Performed by: FAMILY MEDICINE

## 2024-09-09 PROCEDURE — 1159F MED LIST DOCD IN RCRD: CPT | Mod: CPTII,S$GLB,, | Performed by: FAMILY MEDICINE

## 2024-09-09 PROCEDURE — 3288F FALL RISK ASSESSMENT DOCD: CPT | Mod: CPTII,S$GLB,, | Performed by: FAMILY MEDICINE

## 2024-09-09 PROCEDURE — 3078F DIAST BP <80 MM HG: CPT | Mod: CPTII,S$GLB,, | Performed by: FAMILY MEDICINE

## 2024-09-09 PROCEDURE — 99999 PR PBB SHADOW E&M-EST. PATIENT-LVL V: CPT | Mod: PBBFAC,,, | Performed by: FAMILY MEDICINE

## 2024-09-09 PROCEDURE — 1101F PT FALLS ASSESS-DOCD LE1/YR: CPT | Mod: CPTII,S$GLB,, | Performed by: FAMILY MEDICINE

## 2024-09-09 PROCEDURE — G2211 COMPLEX E/M VISIT ADD ON: HCPCS | Mod: S$GLB,,, | Performed by: FAMILY MEDICINE

## 2024-09-09 PROCEDURE — 1125F AMNT PAIN NOTED PAIN PRSNT: CPT | Mod: CPTII,S$GLB,, | Performed by: FAMILY MEDICINE

## 2024-09-09 PROCEDURE — 99214 OFFICE O/P EST MOD 30 MIN: CPT | Mod: S$GLB,,, | Performed by: FAMILY MEDICINE

## 2024-09-09 PROCEDURE — 3075F SYST BP GE 130 - 139MM HG: CPT | Mod: CPTII,S$GLB,, | Performed by: FAMILY MEDICINE

## 2024-09-09 RX ORDER — IPRATROPIUM BROMIDE AND ALBUTEROL SULFATE 2.5; .5 MG/3ML; MG/3ML
3 SOLUTION RESPIRATORY (INHALATION)
Qty: 75 ML | Refills: 0 | Status: SHIPPED | OUTPATIENT
Start: 2024-09-09 | End: 2025-09-09

## 2024-09-09 RX ORDER — IPRATROPIUM BROMIDE 21 UG/1
2 SPRAY, METERED NASAL 2 TIMES DAILY
Qty: 30 ML | Refills: 3 | Status: SHIPPED | OUTPATIENT
Start: 2024-09-09

## 2024-09-09 NOTE — PATIENT INSTRUCTIONS
DASH diet for Hypertension and Healthy Eating  Provided by the [facility]    Healthy Lifestyle   Nutrition and healthy eating  The DASH diet emphasizes portion size, eating a variety of foods and getting the right amount of nutrients. Discover how DASH can improve your health and lower your blood pressure.  By [facility] Staff   DASH stands for Dietary Approaches to Stop Hypertension. The DASH diet is a lifelong approach to healthy eating that's designed to help treat or prevent high blood pressure (hypertension). The DASH diet encourages you to reduce the sodium in your diet and eat a variety of foods rich in nutrients that help lower blood pressure, such as potassium, calcium and magnesium.  By following the DASH diet, you may be able to reduce your blood pressure by a few points in just two weeks. Over time, your systolic blood pressure could drop by eight to 14 points, which can make a significant difference in your health risks.  Because the DASH diet is a healthy way of eating, it offers health benefits besides just lowering blood pressure. The DASH diet is also in line with dietary recommendations to prevent osteoporosis, cancer, heart disease, stroke and diabetes.  The DASH diet emphasizes vegetables, fruits and low-fat dairy foods -- and moderate amounts of whole grains, fish, poultry and nuts.  In addition to the standard DASH diet, there is also a lower sodium version of the diet. You can choose the version of the diet that meets your health needs:  Standard DASH diet. You can consume up to 2,300 milligrams (mg) of sodium a day.   Lower sodium DASH diet. You can consume up to 1,500 mg of sodium a day.  Both versions of the DASH diet aim to reduce the amount of sodium in your diet compared with what you might get in a typical American diet, which can amount to a whopping 3,400 mg of sodium a day or more.  The standard DASH diet meets the recommendation from the Dietary Guidelines for Americans to keep  "daily sodium intake to less than 2,300 mg a day.  The American Heart Association recommends 1,500 mg a day of sodium as an upper limit for all adults. If you aren't sure what sodium level is right for you, talk to your doctor.  Both versions of the DASH diet include lots of whole grains, fruits, vegetables and low-fat dairy products. The DASH diet also includes some fish, poultry and legumes, and encourages a small amount of nuts and seeds a few times a week.   You can eat red meat, sweets and fats in small amounts. The DASH diet is low in saturated fat, cholesterol and total fat.  Here's a look at the recommended servings from each food group for the 2,522-[id number]-day DASH diet.  Grains: 6 to 8 servings a day  Grains include bread, cereal, rice and pasta. Examples of one serving of grains include 1 slice whole-wheat bread, 1 ounce dry cereal, or 1/2 cup cooked cereal, rice or pasta.  Focus on whole grains because they have more fiber and nutrients than do refined grains. For instance, use brown rice instead of white rice, whole-wheat pasta instead of regular pasta and whole-grain bread instead of white bread. Look for products labeled "100 percent whole grain" or "100 percent whole wheat."   Grains are naturally low in fat. Keep them this way by avoiding butter, cream and cheese sauces.  Vegetables: 4 to 5 servings a day  Tomatoes, carrots, broccoli, sweet potatoes, greens and other vegetables are full of fiber, vitamins, and such minerals as potassium and magnesium. Examples of one serving include 1 cup raw leafy green vegetables or 1/2 cup cut-up raw or cooked vegetables.  Don't think of vegetables only as side dishes -- a hearty blend of vegetables served over brown rice or whole-wheat noodles can serve as the main dish for a meal.   Fresh and frozen vegetables are both good choices. When buying frozen and canned vegetables, choose those labeled as low sodium or without added salt.   To increase the number of " servings you fit in daily, be creative. In a stir-worthy, for instance, cut the amount of meat in half and double up on the vegetables.  Fruits: 4 to 5 servings a day  Many fruits need little preparation to become a healthy part of a meal or snack. Like vegetables, they're packed with fiber, potassium and magnesium and are typically low in fat -- coconuts are an exception. Examples of one serving include one medium fruit, 1/2 cup fresh, frozen or canned fruit, or 4 ounces of juice.  Have a piece of fruit with meals and one as a snack, then round out your day with a dessert of fresh fruits topped with a dollop of low-fat yogurt.   Leave on edible peels whenever possible. The peels of apples, pears and most fruits with pits add interesting texture to recipes and contain healthy nutrients and fiber.   Remember that citrus fruits and juices, such as grapefruit, can interact with certain medications, so check with your doctor or pharmacist to see if they're OK for you.   If you choose canned fruit or juice, make sure no sugar is added.  Dairy: 2 to 3 servings a day  Milk, yogurt, cheese and other dairy products are major sources of calcium, vitamin D and protein. But the key is to make sure that you choose dairy products that are low fat or fat-free because otherwise they can be a major source of fat -- and most of it is saturated. Examples of one serving include 1 cup skim or 1 percent milk, 1 cup low fat yogurt, or 1 1/2 ounces part-skim cheese.  Low-fat or fat-free frozen yogurt can help you boost the amount of dairy products you eat while offering a sweet treat. Add fruit for a healthy twist.   If you have trouble digesting dairy products, choose lactose-free products or consider taking an over-the-counter product that contains the enzyme lactase, which can reduce or prevent the symptoms of lactose intolerance.   Go easy on regular and even fat-free cheeses because they are typically high in sodium.  Lean meat, poultry  and fish: 6 servings or fewer a day  Meat can be a rich source of protein, B vitamins, iron and zinc. Choose lean varieties and aim for no more than 6 ounces a day. Cutting back on your meat portion will allow room for more vegetables.  Trim away skin and fat from poultry and meat and then bake, broil, grill or roast instead of frying in fat.   Eat heart-healthy fish, such as salmon, herring and tuna. These types of fish are high in omega-3 fatty acids, which can help lower your total cholesterol.  Nuts, seeds and legumes: 4 to 5 servings a week  Almonds, sunflower seeds, kidney beans, peas, lentils and other foods in this family are good sources of magnesium, potassium and protein. They're also full of fiber and phytochemicals, which are plant compounds that may protect against some cancers and cardiovascular disease.  Serving sizes are small and are intended to be consumed only a few times a week because these foods are high in calories. Examples of one serving include 1/3 cup nuts, 2 tablespoons seeds, or 1/2 cup cooked beans or peas.   Nuts sometimes get a bad rap because of their fat content, but they contain healthy types of fat -- monounsaturated fat and omega-3 fatty acids. They're high in calories, however, so eat them in moderation. Try adding them to stir-fries, salads or cereals.   Soybean-based products, such as tofu and tempeh, can be a good alternative to meat because they contain all of the amino acids your body needs to make a complete protein, just like meat.  Fats and oils: 2 to 3 servings a day  Fat helps your body absorb essential vitamins and helps your body's immune system. But too much fat increases your risk of heart disease, diabetes and obesity. The DASH diet strives for a healthy balance by limiting total fat to less than 30 percent of daily calories from fat, with a focus on the healthier monounsaturated fats.  Examples of one serving include 1 teaspoon soft margarine, 1 tablespoon  mayonnaise or 2 tablespoons salad dressing.  Saturated fat and trans fat are the main dietary culprits in increasing your risk of coronary artery disease. DASH helps keep your daily saturated fat to less than 6 percent of your total calories by limiting use of meat, butter, cheese, whole milk, cream and eggs in your diet, along with foods made from lard, solid shortenings, and palm and coconut oils.   Avoid trans fat, commonly found in such processed foods as crackers, baked goods and fried items.   Read food labels on margarine and salad dressing so that you can choose those that are lowest in saturated fat and free of trans fat.  Sweets: 5 servings or fewer a week  You don't have to banish sweets entirely while following the DASH diet -- just go easy on them. Examples of one serving include 1 tablespoon sugar, jelly or jam, 1/2 cup sorbet, or 1 cup lemonade.  When you eat sweets, choose those that are fat-free or low-fat, such as sorbets, fruit ices, jelly beans, hard candy, antonio crackers or low-fat cookies.   Artificial sweeteners such as aspartame (NutraSweet, Equal) and sucralose (Splenda) may help satisfy your sweet tooth while sparing the sugar. But remember that you still must use them sensibly. It's OK to swap a diet cola for a regular cola, but not in place of a more nutritious beverage such as low-fat milk or even plain water.   Cut back on added sugar, which has no nutritional value but can pack on calories.  Drinking too much alcohol can increase blood pressure. The Dietary Guidelines for Americans recommends that men limit alcohol to no more than two drinks a day and women to one or less.  The DASH diet doesn't address caffeine consumption. The influence of caffeine on blood pressure remains unclear. But caffeine can cause your blood pressure to rise at least temporarily. If you already have high blood pressure or if you think caffeine is affecting your blood pressure, talk to your doctor about your  "caffeine consumption.  While the DASH diet is not a weight-loss program, you may indeed lose unwanted pounds because it can help guide you toward healthier food choices.  The DASH diet generally includes about 2,000 calories a day. If you're trying to lose weight, you may need to eat fewer calories. You may also need to adjust your serving goals based on your individual circumstances -- something your health care team can help you decide.  The foods at the core of the DASH diet are naturally low in sodium. So just by following the DASH diet, you're likely to reduce your sodium intake. You also reduce sodium further by:  Using sodium-free spices or flavorings with your food instead of salt   Not adding salt when cooking rice, pasta or hot cereal   Rinsing canned foods to remove some of the sodium   Buying foods labeled "no salt added," "sodium-free," "low sodium" or "very low sodium"  One teaspoon of table salt has 2,325 mg of sodium. When you read food labels, you may be surprised at just how much sodium some processed foods contain. Even low-fat soups, canned vegetables, ready-to-eat cereals and sliced turkey from the local deli -- foods you may have considered healthy -- often have lots of sodium.  You may notice a difference in taste when you choose low-sodium food and beverages. If things seem too bland, gradually introduce low-sodium foods and cut back on table salt until you reach your sodium goal. That'll give your palate time to adjust.  Using salt-free seasoning blends or herbs and spices may also ease the transition. It can take several weeks for your taste buds to get used to less salty foods.  Try these strategies to get started on the DASH diet:   Change gradually. If you now eat only one or two servings of fruits or vegetables a day, try to add a serving at lunch and one at dinner. Rather than switching to all whole grains, start by making one or two of your grain servings whole grains. Increasing " fruits, vegetables and whole grains gradually can also help prevent bloating or diarrhea that may occur if you aren't used to eating a diet with lots of fiber. You can also try over-the-counter products to help reduce gas from beans and vegetables.   Reward successes and forgive slip-ups. Reward yourself with a nonfood treat for your accomplishments -- rent a movie, purchase a book or get together with a friend. Everyone slips, especially when learning something new. Remember that changing your lifestyle is a long-term process. Find out what triggered your setback and then just  where you left off with the DASH diet.   Add physical activity. To boost your blood pressure lowering efforts even more, consider increasing your physical activity in addition to following the DASH diet. Combining both the DASH diet and physical activity makes it more likely that you'll reduce your blood pressure.   Get support if you need it. If you're having trouble sticking to your diet, talk to your doctor or dietitian about it. You might get some tips that will help you stick to the DASH diet.  Remember, healthy eating isn't an all-or-nothing proposition. What's most important is that, on average, you eat healthier foods with plenty of variety -- both to keep your diet nutritious and to avoid boredom or extremes. And with the DASH diet, you can have both.

## 2024-09-10 ENCOUNTER — LAB VISIT (OUTPATIENT)
Dept: LAB | Facility: HOSPITAL | Age: 84
End: 2024-09-10
Attending: FAMILY MEDICINE
Payer: MEDICARE

## 2024-09-10 DIAGNOSIS — R79.9 ABNORMAL FINDING OF BLOOD CHEMISTRY, UNSPECIFIED: ICD-10-CM

## 2024-09-10 DIAGNOSIS — R60.0 PERIPHERAL EDEMA: ICD-10-CM

## 2024-09-10 LAB
BNP SERPL-MCNC: 295 PG/ML (ref 0–99)
CHOLEST SERPL-MCNC: 198 MG/DL (ref 120–199)
CHOLEST/HDLC SERPL: 5.2 {RATIO} (ref 2–5)
CORTIS SERPL-MCNC: 12.1 UG/DL
ESTIMATED AVG GLUCOSE: 120 MG/DL (ref 68–131)
FERRITIN SERPL-MCNC: 339 NG/ML (ref 20–300)
HBA1C MFR BLD: 5.8 % (ref 4–5.6)
HDLC SERPL-MCNC: 38 MG/DL (ref 40–75)
HDLC SERPL: 19.2 % (ref 20–50)
LDLC SERPL CALC-MCNC: 124.4 MG/DL (ref 63–159)
NONHDLC SERPL-MCNC: 160 MG/DL
TRIGL SERPL-MCNC: 178 MG/DL (ref 30–150)

## 2024-09-10 PROCEDURE — 36415 COLL VENOUS BLD VENIPUNCTURE: CPT | Mod: PO | Performed by: FAMILY MEDICINE

## 2024-09-10 PROCEDURE — 83036 HEMOGLOBIN GLYCOSYLATED A1C: CPT | Performed by: FAMILY MEDICINE

## 2024-09-10 PROCEDURE — 83880 ASSAY OF NATRIURETIC PEPTIDE: CPT | Performed by: FAMILY MEDICINE

## 2024-09-10 PROCEDURE — 80061 LIPID PANEL: CPT | Performed by: FAMILY MEDICINE

## 2024-09-10 PROCEDURE — 82533 TOTAL CORTISOL: CPT | Performed by: FAMILY MEDICINE

## 2024-09-10 PROCEDURE — 82728 ASSAY OF FERRITIN: CPT | Performed by: FAMILY MEDICINE

## 2024-09-10 NOTE — PROGRESS NOTES
I note some minor lab abnormalities that have been stable over time, these are of doubtful clinical significance.  Please increase green vegetables and reduce starches.  Stop any iron supplements.  Repeat in 6 months.  No adrenal insufficiency, no diabetes.

## 2024-09-11 NOTE — PROGRESS NOTES
Subjective:   Sammi Abreu is a 83 y.o. female with hypertension.  The patient is taking hypertensive medications compliantly without side effects.  Denies chest pain, dyspnea, edema, or TIA's.  Chronic lymphedema, due to PAD, no claudication.  Chronic sedentary activity and also obese.  Partial results with diuretics.  No history of cellulitis nor DVT.  She has a COPD year with indeed dyspnea with weather changes wheezing at night no dyspnea no orthopnea.  She denies recent ER visits.  She denies hypoxemia.  She has history of sleep apnea with restrictive lung disease.  She denies weight loss hemoptysis no night sweats no chills.  Negative history of smoking.  She indeed has history of congestive heart failure.  Results for orders placed during the hospital encounter of 01/06/21    Echo Color Flow Doppler? Yes    Interpretation Summary  · Mild concentric hypertrophy and normal systolic function. The estimated ejection fraction is 69%  · Indeterminate left ventricular diastolic function with elevated left atrial pressure.  · Atrial fibrillation not observed.  · Normal right ventricular size with normal right ventricular systolic function.  · Mild tricuspid regurgitation.  · Normal central venous pressure (3 mmHg).  · Indeterminate diastolic function mean left atrial pressure is approximately 12, L wave present on mitral inflow suggesting elevation left ventricle end-diastolic pressure    .  Encounter Diagnoses   Name Primary?    Peripheral edema Yes    Obstructive sleep apnea syndrome     Other secondary osteoarthritis of right knee     Abnormal finding of blood chemistry, unspecified     Chronic obstructive pulmonary disease, unspecified COPD type        Current Outpatient Medications   Medication Sig Dispense Refill    calcitRIOL (ROCALTROL) 0.25 MCG Cap Take 1 capsule (0.25 mcg total) by mouth once daily. 90 capsule 3    ergocalciferol (ERGOCALCIFEROL) 50,000 unit Cap Take 50,000 Units by mouth every 7  days.      esomeprazole (NEXIUM) 20 MG capsule TAKE 1 CAPSULE BY MOUTH TWICE DAILY BEFORE MEAL(S) 180 capsule 3    ezetimibe (ZETIA) 10 mg tablet Take 1 tablet (10 mg total) by mouth once daily. 90 tablet 3    folic acid (FOLVITE) 1 MG tablet Take 1 tablet (1,000 mcg total) by mouth once daily. 90 tablet 3    furosemide (LASIX) 40 MG tablet Take 1 tablet (40 mg total) by mouth 2 (two) times daily as needed (edema). (Patient taking differently: Take 40 mg by mouth once daily.) 180 tablet 3    hydrALAZINE (APRESOLINE) 100 MG tablet Take 1 tablet (100 mg total) by mouth every 12 (twelve) hours. 180 tablet 4    losartan (COZAAR) 50 MG tablet Take 1 tablet by mouth once daily 90 tablet 3    metoprolol succinate (TOPROL-XL) 100 MG 24 hr tablet Take 1 tablet (100 mg total) by mouth once daily. 180 tablet 3    potassium chloride SA (K-DUR,KLOR-CON) 20 MEQ tablet Take 1 tablet (20 mEq total) by mouth 2 (two) times daily. 60 tablet 3    warfarin (COUMADIN) 5 MG tablet 1 tab to  1 and  1/2 tab oral evening as directed by the [facility] 120 tablet 3    acetaminophen (TYLENOL) 325 MG tablet Take 2 tablets (650 mg total) by mouth every 6 (six) hours as needed (Do not take with any other Tylenol or acetaminophen containing products).  0    albuterol (PROVENTIL/VENTOLIN HFA) 90 mcg/actuation inhaler 2 puffs every 4 hours as needed for cough, wheeze, or shortness of breath 18 g 3    albuterol-ipratropium (DUO-NEB) 2.5 mg-0.5 mg/3 mL nebulizer solution Take 3 mLs by nebulization daily 2 hours after breakfast. Rescue 75 mL 0    blood sugar diagnostic Strp To check BG 1 times daily, to use with insurance preferred meter 100 each 0    blood-glucose meter kit To check BG 1 times daily, to use with insurance preferred meter 1 each 0    budesonide (PULMICORT) 0.5 mg/2 mL nebulizer solution Take 2 mLs (0.5 mg total) by nebulization 2 (two) times daily. Controller 60 mL 3    diclofenac sodium (VOLTAREN) 1 % Gel Apply 2 g topically 2  "(two) times daily. 50 g 2    fluticasone propionate (FLONASE) 50 mcg/actuation nasal spray 2 sprays (100 mcg total) by Each Nostril route once daily. 48 g 3    fluticasone-umeclidin-vilanter (TRELEGY ELLIPTA) 200-62.5-25 mcg inhaler Inhale 1 puff into the lungs once daily. 60 each 3    ipratropium (ATROVENT) 21 mcg (0.03 %) nasal spray 2 sprays by Each Nostril route 2 (two) times daily. 30 mL 3    lancets Misc To check BG 1 times daily, to use with insurance preferred meter 100 each 0    magnesium oxide (MAG-OX) 400 mg (241.3 mg magnesium) tablet Take 1 tablet (400 mg total) by mouth 2 (two) times daily. (Patient not taking: Reported on 9/9/2024) 180 tablet 3    METAMUCIL, SUGAR, Powd 1 tabs oral daily  0    predniSONE (DELTASONE) 20 MG tablet Take one daily for 3 days and may repeat for shortness of breath. 21 tablet 1    triamcinolone acetonide 0.1% (KENALOG) 0.1 % ointment Apply topically 2 (two) times daily. 80 g 11     No current facility-administered medications for this visit.     Facility-Administered Medications Ordered in Other Visits   Medication Dose Route Frequency Provider Last Rate Last Admin    lactated ringers infusion   Intravenous Continuous Gino Reid MD 10 mL/hr at 07/13/20 1308 New Bag at 07/13/20 1405    lidocaine (PF) 10 mg/ml (1%) injection 10 mg  1 mL Intradermal Once Gino Reid MD          Hypertension ROS: taking medications as instructed, not taking medications regularly as instructed, no medication side effects noted, patient does not perform home BP monitoring, no TIA's, no chest pain on exertion, no dyspnea on exertion, and no swelling of ankles.   New concerns:  Chronic leg edema with previous history of right needle replacement chronic osteoarthritis of the left knee and hips and lower back..     Objective:   /62 (BP Location: Left arm, Patient Position: Sitting, BP Method: Medium (Manual))   Pulse (!) 55   Temp 97.9 °F (36.6 °C) (Oral)   Ht 5' 3" (1.6 m)  "  Wt 103.8 kg (228 lb 13.4 oz)   SpO2 99%   BMI 40.54 kg/m²    Appearance alert, well appearing, and in no distress, oriented to person, place, and time, morbidly obese, acyanotic, in no respiratory distress, and well hydrated.  General exam BP noted to be well controlled today in office, S1, S2 normal, no gallop, no murmur, chest clear, no JVD, no HSM, no edema, CVS exam  - normal rate, regular rhythm, normal S1, S2, no murmurs, rubs, clicks or gallops, normal rate and regular rhythm, S1 and S2 normal, no murmurs noted, no gallops noted, no JVD, distant breath sounds.  Decreased breath sound on lower bases.  There is indeed wheezes with forced expiratory.   Lab review: labs are reviewed, up to date and normal.     Assessment:    Hypertension improved and no significant medication side effects noted.   1. Peripheral edema  Lipid Panel    B-TYPE NATRIURETIC PEPTIDE    FERRITIN    HEMOGLOBIN A1C    CORTISOL, RANDOM      2. Obstructive sleep apnea syndrome  albuterol-ipratropium (DUO-NEB) 2.5 mg-0.5 mg/3 mL nebulizer solution    ipratropium (ATROVENT) 21 mcg (0.03 %) nasal spray      3. Other secondary osteoarthritis of right knee        4. Abnormal finding of blood chemistry, unspecified  Lipid Panel    B-TYPE NATRIURETIC PEPTIDE    FERRITIN    HEMOGLOBIN A1C    CORTISOL, RANDOM      5. Chronic obstructive pulmonary disease, unspecified COPD type  albuterol-ipratropium (DUO-NEB) 2.5 mg-0.5 mg/3 mL nebulizer solution    ipratropium (ATROVENT) 21 mcg (0.03 %) nasal spray      I counseled the patient on HTN education, management and recommendations.  I recommended weight loss toward a BMI < 25, avoidance of salt and the DASH diet, regular cardio exercise a minimum of 150 minutes per week and medications if indicated.  Printed materials were given. The goal is < 140/90 unless otherwise specified.  Advance Care Planning     Date: 09/11/2024    ACP Reviewed/No Changes  Voluntary advance care planning discussion had today  with patient. Previously completed HCPOA in electronic medical record is current, no changes made.      A total of 20 min was spent on advance care planning, goals of care discussion, emotional support, formulating and communicating prognosis and exploring burden/benefit of various approaches of treatment. This discussion occurred on a fully voluntary basis with the verbal consent of the patient and/or family.       The patient's BMI has been recorded in the chart. The patient has been provided educational materials regarding the benefits of attaining and maintaining a normal weight. We will continue to address and follow this issue during follow up visits.    Plan:   Reviewed diet, exercise and weight control.  Recommended sodium restriction.  Copy of written low fat low cholesterol diet provided and reviewed.  Cardiovascular risk and specific lipid/LDL goals reviewed.  Reviewed medications and side effects in detail..Discussed health maintenance guidelines appropriate for age.

## 2024-11-16 DIAGNOSIS — I15.2 HYPERTENSION ASSOCIATED WITH DIABETES: ICD-10-CM

## 2024-11-16 DIAGNOSIS — E11.59 HYPERTENSION ASSOCIATED WITH DIABETES: ICD-10-CM

## 2024-11-16 RX ORDER — METOPROLOL SUCCINATE 100 MG/1
100 TABLET, EXTENDED RELEASE ORAL
Qty: 90 TABLET | Refills: 3 | Status: SHIPPED | OUTPATIENT
Start: 2024-11-16

## 2024-11-16 NOTE — TELEPHONE ENCOUNTER
Provider Staff:  Action required for this patient    Requires labs      Please see care gap opportunities below in Care Due Message.    Thanks!  [facility]     Appointments      Date Provider   Last Visit   9/9/2024 Osmani Villatoro MD   Next Visit   3/17/2025 Osmani Villatoro MD     Refill Decision Note   Sammi Abreu  is requesting a refill authorization.  Brief Assessment and Rationale for Refill:  Approve     Medication Therapy Plan:         Comments:     Note composed:11:43 AM 11/16/2024

## 2024-11-16 NOTE — TELEPHONE ENCOUNTER
Care Due:                  Date            Visit Type   Department     Provider  --------------------------------------------------------------------------------                                EP -                              PRIMARY  Last Visit: 09-      CARE ([facility])   None Found     Osmani Villatoro                              EP -                              PRIMARY  Next Visit: 03-      CARE ([facility])   None Found     Osmani Villatoro                                                            Last  Test          Frequency    Reason                     Performed    Due Date  --------------------------------------------------------------------------------    Mg Level....  12 months..  magnesium................  04- 04-    [facility] Embedded Care Due Messages. Reference number: [id number].   11/16/2024 5:51:38 AM CST

## 2024-12-16 ENCOUNTER — OFFICE VISIT (OUTPATIENT)
Dept: PRIMARY CARE CLINIC | Facility: CLINIC | Age: 84
End: 2024-12-16
Payer: MEDICARE

## 2024-12-16 ENCOUNTER — LAB VISIT (OUTPATIENT)
Dept: LAB | Facility: HOSPITAL | Age: 84
End: 2024-12-16
Attending: INTERNAL MEDICINE
Payer: MEDICARE

## 2024-12-16 VITALS
HEART RATE: 68 BPM | WEIGHT: 228.38 LBS | HEIGHT: 63 IN | BODY MASS INDEX: 40.46 KG/M2 | TEMPERATURE: 98 F | OXYGEN SATURATION: 97 % | SYSTOLIC BLOOD PRESSURE: 138 MMHG | DIASTOLIC BLOOD PRESSURE: 62 MMHG

## 2024-12-16 DIAGNOSIS — I15.2 HYPERTENSION ASSOCIATED WITH DIABETES: Primary | ICD-10-CM

## 2024-12-16 DIAGNOSIS — N25.81 SECONDARY HYPERPARATHYROIDISM OF RENAL ORIGIN: ICD-10-CM

## 2024-12-16 DIAGNOSIS — E66.01 SEVERE OBESITY (BMI >= 40): ICD-10-CM

## 2024-12-16 DIAGNOSIS — D50.9 IRON DEFICIENCY ANEMIA, UNSPECIFIED IRON DEFICIENCY ANEMIA TYPE: ICD-10-CM

## 2024-12-16 DIAGNOSIS — E11.65 TYPE 2 DIABETES MELLITUS WITH HYPERGLYCEMIA, WITHOUT LONG-TERM CURRENT USE OF INSULIN: ICD-10-CM

## 2024-12-16 DIAGNOSIS — N18.30 CHRONIC KIDNEY DISEASE, STAGE III (MODERATE): Primary | ICD-10-CM

## 2024-12-16 DIAGNOSIS — R60.0 PERIPHERAL EDEMA: ICD-10-CM

## 2024-12-16 DIAGNOSIS — E11.59 HYPERTENSION ASSOCIATED WITH DIABETES: Primary | ICD-10-CM

## 2024-12-16 DIAGNOSIS — Z23 FLU VACCINE NEED: ICD-10-CM

## 2024-12-16 DIAGNOSIS — E11.69 HYPERLIPIDEMIA ASSOCIATED WITH TYPE 2 DIABETES MELLITUS: ICD-10-CM

## 2024-12-16 DIAGNOSIS — M17.11 PRIMARY OSTEOARTHRITIS OF RIGHT KNEE: ICD-10-CM

## 2024-12-16 DIAGNOSIS — M65.30 TRIGGER FINGER, UNSPECIFIED FINGER, UNSPECIFIED LATERALITY: ICD-10-CM

## 2024-12-16 DIAGNOSIS — J44.9 CHRONIC OBSTRUCTIVE PULMONARY DISEASE, UNSPECIFIED COPD TYPE: ICD-10-CM

## 2024-12-16 DIAGNOSIS — E78.5 HYPERLIPIDEMIA ASSOCIATED WITH TYPE 2 DIABETES MELLITUS: ICD-10-CM

## 2024-12-16 DIAGNOSIS — N18.32 STAGE 3B CHRONIC KIDNEY DISEASE: ICD-10-CM

## 2024-12-16 LAB
25(OH)D3+25(OH)D2 SERPL-MCNC: 56 NG/ML (ref 30–96)
ALBUMIN SERPL BCP-MCNC: 3.3 G/DL (ref 3.5–5.2)
ANION GAP SERPL CALC-SCNC: 6 MMOL/L (ref 8–16)
BASOPHILS # BLD AUTO: 0.02 K/UL (ref 0–0.2)
BASOPHILS NFR BLD: 0.3 % (ref 0–1.9)
BUN SERPL-MCNC: 25 MG/DL (ref 8–23)
CALCIUM SERPL-MCNC: 8.7 MG/DL (ref 8.7–10.5)
CHLORIDE SERPL-SCNC: 107 MMOL/L (ref 95–110)
CO2 SERPL-SCNC: 29 MMOL/L (ref 23–29)
CREAT SERPL-MCNC: 1.3 MG/DL (ref 0.5–1.4)
CREAT UR-MCNC: 287.6 MG/DL (ref 15–325)
DIFFERENTIAL METHOD BLD: ABNORMAL
EOSINOPHIL # BLD AUTO: 0.2 K/UL (ref 0–0.5)
EOSINOPHIL NFR BLD: 2.7 % (ref 0–8)
ERYTHROCYTE [DISTWIDTH] IN BLOOD BY AUTOMATED COUNT: 13.2 % (ref 11.5–14.5)
EST. GFR  (NO RACE VARIABLE): 41 ML/MIN/1.73 M^2
GLUCOSE SERPL-MCNC: 117 MG/DL (ref 70–110)
HCT VFR BLD AUTO: 39.2 % (ref 37–48.5)
HGB BLD-MCNC: 11.9 G/DL (ref 12–16)
IMM GRANULOCYTES # BLD AUTO: 0.01 K/UL (ref 0–0.04)
IMM GRANULOCYTES NFR BLD AUTO: 0.2 % (ref 0–0.5)
LYMPHOCYTES # BLD AUTO: 2.3 K/UL (ref 1–4.8)
LYMPHOCYTES NFR BLD: 35.6 % (ref 18–48)
MCH RBC QN AUTO: 31.4 PG (ref 27–31)
MCHC RBC AUTO-ENTMCNC: 30.4 G/DL (ref 32–36)
MCV RBC AUTO: 103 FL (ref 82–98)
MICROSCOPIC COMMENT: NORMAL
MONOCYTES # BLD AUTO: 0.6 K/UL (ref 0.3–1)
MONOCYTES NFR BLD: 9.8 % (ref 4–15)
NEUTROPHILS # BLD AUTO: 3.3 K/UL (ref 1.8–7.7)
NEUTROPHILS NFR BLD: 51.4 % (ref 38–73)
NRBC BLD-RTO: 0 /100 WBC
PHOSPHATE SERPL-MCNC: 3.5 MG/DL (ref 2.7–4.5)
PLATELET # BLD AUTO: 191 K/UL (ref 150–450)
PMV BLD AUTO: 11.1 FL (ref 9.2–12.9)
POTASSIUM SERPL-SCNC: 4.3 MMOL/L (ref 3.5–5.1)
PROT UR-MCNC: 24 MG/DL (ref 0–15)
PROT/CREAT UR: 0.08 MG/G{CREAT} (ref 0–0.2)
PTH-INTACT SERPL-MCNC: 169.3 PG/ML (ref 9–77)
RBC # BLD AUTO: 3.79 M/UL (ref 4–5.4)
RBC #/AREA URNS HPF: 1 /HPF (ref 0–4)
SODIUM SERPL-SCNC: 142 MMOL/L (ref 136–145)
SQUAMOUS #/AREA URNS HPF: 1 /HPF
WBC # BLD AUTO: 6.4 K/UL (ref 3.9–12.7)
WBC #/AREA URNS HPF: 1 /HPF (ref 0–5)

## 2024-12-16 PROCEDURE — 83970 ASSAY OF PARATHORMONE: CPT | Performed by: INTERNAL MEDICINE

## 2024-12-16 PROCEDURE — 99214 OFFICE O/P EST MOD 30 MIN: CPT | Mod: 25,S$GLB,, | Performed by: NURSE PRACTITIONER

## 2024-12-16 PROCEDURE — 80069 RENAL FUNCTION PANEL: CPT | Performed by: INTERNAL MEDICINE

## 2024-12-16 PROCEDURE — 1159F MED LIST DOCD IN RCRD: CPT | Mod: CPTII,S$GLB,, | Performed by: NURSE PRACTITIONER

## 2024-12-16 PROCEDURE — G0008 ADMIN INFLUENZA VIRUS VAC: HCPCS | Mod: S$GLB,,, | Performed by: NURSE PRACTITIONER

## 2024-12-16 PROCEDURE — 81000 URINALYSIS NONAUTO W/SCOPE: CPT | Performed by: INTERNAL MEDICINE

## 2024-12-16 PROCEDURE — 36415 COLL VENOUS BLD VENIPUNCTURE: CPT | Performed by: INTERNAL MEDICINE

## 2024-12-16 PROCEDURE — 90653 IIV ADJUVANT VACCINE IM: CPT | Mod: S$GLB,,, | Performed by: NURSE PRACTITIONER

## 2024-12-16 PROCEDURE — 85025 COMPLETE CBC W/AUTO DIFF WBC: CPT | Performed by: INTERNAL MEDICINE

## 2024-12-16 PROCEDURE — 1101F PT FALLS ASSESS-DOCD LE1/YR: CPT | Mod: CPTII,S$GLB,, | Performed by: NURSE PRACTITIONER

## 2024-12-16 PROCEDURE — 3078F DIAST BP <80 MM HG: CPT | Mod: CPTII,S$GLB,, | Performed by: NURSE PRACTITIONER

## 2024-12-16 PROCEDURE — 1160F RVW MEDS BY RX/DR IN RCRD: CPT | Mod: CPTII,S$GLB,, | Performed by: NURSE PRACTITIONER

## 2024-12-16 PROCEDURE — 82306 VITAMIN D 25 HYDROXY: CPT | Performed by: INTERNAL MEDICINE

## 2024-12-16 PROCEDURE — 99999 PR PBB SHADOW E&M-EST. PATIENT-LVL III: CPT | Mod: PBBFAC,,, | Performed by: NURSE PRACTITIONER

## 2024-12-16 PROCEDURE — 1126F AMNT PAIN NOTED NONE PRSNT: CPT | Mod: CPTII,S$GLB,, | Performed by: NURSE PRACTITIONER

## 2024-12-16 PROCEDURE — 3075F SYST BP GE 130 - 139MM HG: CPT | Mod: CPTII,S$GLB,, | Performed by: NURSE PRACTITIONER

## 2024-12-16 PROCEDURE — 84156 ASSAY OF PROTEIN URINE: CPT | Performed by: INTERNAL MEDICINE

## 2024-12-16 PROCEDURE — 3288F FALL RISK ASSESSMENT DOCD: CPT | Mod: CPTII,S$GLB,, | Performed by: NURSE PRACTITIONER

## 2024-12-16 PROCEDURE — 1157F ADVNC CARE PLAN IN RCRD: CPT | Mod: CPTII,S$GLB,, | Performed by: NURSE PRACTITIONER

## 2024-12-16 NOTE — PROGRESS NOTES
Subjective:       Patient ID: Sammi Abreu is a 84 y.o. female.    Chief Complaint: Follow-up    HPI    Patient presents today for follow up visit. Last visit with PCP- on 9/9/24. Labs done today. Last A1c 5.8    12/5/24 on coumadin for deep vein thrombosis of right upper extremity-last INR 2.8-goal 2.0-3.0  .  6/6/24 Nephrology-Dr. Amari Sanz: Chronic kidney disease, stage 3 unspecified,  Secondary hyperparathyroidism of renal origin     3/8/24 Ortho-Dave Bridges: Primary osteoarthritis of right knee -triamcinolone acetonide injection    11/30/23 : Severe persistent asthma without complication, Chronic sinus complaints,COPD with asthma-trelegy, budesonide neb, prednisone prn  Past Medical History:   Diagnosis Date    Acute deep vein thrombosis (DVT) of right upper extremity 8/5/2020    Occlusive thrombus of the right brachial vein and cephalic vein    LUIZ (acute kidney injury) 8/3/2020    ALLERGIC RHINITIS     Anemia     Arthritis     Atelectasis 8/3/2020    Atypical pneumonia 11/1/2020    Back pain     Bronchitis     Cataract     OU    CHF (congestive heart failure)     Cholelithiasis     COPD (chronic obstructive pulmonary disease)     Degenerative disc disease     Diabetes mellitus     pre diabetic    Diverticulosis     DVT (deep venous thrombosis)     Edema     Elevated brain natriuretic peptide (BNP) level 8/3/2020    Encounter for blood transfusion 1979    Fibromyalgia     Glaucoma     Gout     Hematuria 8/7/2020    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    Hx of colonic polyps     Hyperlipidemia     Hypertension     Hyponatremia 8/5/2020    Incontinence     MRSA bacteremia 7/22/2020    Non-traumatic rhabdomyolysis 8/4/2020    Osteoporosis     Pneumonia due to infectious organism 11/1/2020    Pulmonary embolism     Reflux     Refusal of blood transfusions as patient is [employer]     Sleep apnea     non compliant with CPAP    Vestibulitis of ear        Review of  patient's allergies indicates:   Allergen Reactions    Daptomycin Other (See Comments)     Kidney damage     Cymbalta [duloxetine] Other (See Comments)     Nightmares      Darvon [propoxyphene] Nausea Only and Other (See Comments)     Sweating, slept for 3 days    Atorvastatin Other (See Comments)     Muscle cramps    Naprosyn [naproxen] Nausea Only    Penicillins Rash    Tramadol Nausea Only and Palpitations         Current Outpatient Medications:     calcitRIOL (ROCALTROL) 0.25 MCG Cap, Take 1 capsule (0.25 mcg total) by mouth once daily., Disp: 90 capsule, Rfl: 3    ergocalciferol (ERGOCALCIFEROL) 50,000 unit Cap, Take 50,000 Units by mouth every 7 days., Disp: , Rfl:     esomeprazole (NEXIUM) 20 MG capsule, TAKE 1 CAPSULE BY MOUTH TWICE DAILY BEFORE MEAL(S), Disp: 180 capsule, Rfl: 3    ezetimibe (ZETIA) 10 mg tablet, Take 1 tablet (10 mg total) by mouth once daily., Disp: 90 tablet, Rfl: 3    folic acid (FOLVITE) 1 MG tablet, Take 1 tablet (1,000 mcg total) by mouth once daily., Disp: 90 tablet, Rfl: 3    hydrALAZINE (APRESOLINE) 100 MG tablet, Take 1 tablet (100 mg total) by mouth every 12 (twelve) hours., Disp: 180 tablet, Rfl: 4    losartan (COZAAR) 50 MG tablet, Take 1 tablet by mouth once daily, Disp: 90 tablet, Rfl: 3    magnesium oxide (MAG-OX) 400 mg (241.3 mg magnesium) tablet, Take 1 tablet (400 mg total) by mouth 2 (two) times daily., Disp: 180 tablet, Rfl: 3    metoprolol succinate (TOPROL-XL) 100 MG 24 hr tablet, Take 1 tablet by mouth once daily, Disp: 90 tablet, Rfl: 3    potassium chloride SA (K-DUR,KLOR-CON) 20 MEQ tablet, Take 1 tablet (20 mEq total) by mouth 2 (two) times daily., Disp: 60 tablet, Rfl: 3    predniSONE (DELTASONE) 20 MG tablet, Take one daily for 3 days and may repeat for shortness of breath., Disp: 21 tablet, Rfl: 1    warfarin (COUMADIN) 5 MG tablet, 1 tab to  1 and  1/2 tab oral evening as directed by the [facility], Disp: 120 tablet, Rfl: 3    acetaminophen (TYLENOL)  325 MG tablet, Take 2 tablets (650 mg total) by mouth every 6 (six) hours as needed (Do not take with any other Tylenol or acetaminophen containing products)., Disp: , Rfl: 0    albuterol (PROVENTIL/VENTOLIN HFA) 90 mcg/actuation inhaler, 2 puffs every 4 hours as needed for cough, wheeze, or shortness of breath, Disp: 18 g, Rfl: 3    albuterol-ipratropium (DUO-NEB) 2.5 mg-0.5 mg/3 mL nebulizer solution, Take 3 mLs by nebulization daily 2 hours after breakfast. Rescue, Disp: 75 mL, Rfl: 0    blood sugar diagnostic Strp, To check BG 1 times daily, to use with insurance preferred meter, Disp: 100 each, Rfl: 0    blood-glucose meter kit, To check BG 1 times daily, to use with insurance preferred meter, Disp: 1 each, Rfl: 0    budesonide (PULMICORT) 0.5 mg/2 mL nebulizer solution, Take 2 mLs (0.5 mg total) by nebulization 2 (two) times daily. Controller, Disp: 60 mL, Rfl: 3    diclofenac sodium (VOLTAREN) 1 % Gel, Apply 2 g topically 2 (two) times daily., Disp: 50 g, Rfl: 2    fluticasone propionate (FLONASE) 50 mcg/actuation nasal spray, 2 sprays (100 mcg total) by Each Nostril route once daily., Disp: 48 g, Rfl: 3    fluticasone-umeclidin-vilanter (TRELEGY ELLIPTA) 200-62.5-25 mcg inhaler, Inhale 1 puff into the lungs once daily., Disp: 60 each, Rfl: 3    furosemide (LASIX) 40 MG tablet, Take 1 tablet (40 mg total) by mouth 2 (two) times daily as needed (edema). (Patient taking differently: Take 40 mg by mouth once daily.), Disp: 180 tablet, Rfl: 3    ipratropium (ATROVENT) 21 mcg (0.03 %) nasal spray, 2 sprays by Each Nostril route 2 (two) times daily., Disp: 30 mL, Rfl: 3    lancets Misc, To check BG 1 times daily, to use with insurance preferred meter, Disp: 100 each, Rfl: 0    METAMUCIL, SUGAR, Powd, 1 tabs oral daily, Disp: , Rfl: 0    triamcinolone acetonide 0.1% (KENALOG) 0.1 % ointment, Apply topically 2 (two) times daily., Disp: 80 g, Rfl: 11    Current Facility-Administered Medications:     influenza  "(adjuvanted) (Fluad) 45 mcg/0.5 mL IM vaccine (> or = 66 yo) 0.5 mL, 0.5 mL, Intramuscular, 1 time in Clinic/HOD,     Facility-Administered Medications Ordered in Other Visits:     lactated ringers infusion, , Intravenous, Continuous, Gino Reid MD, Last Rate: 10 mL/hr at 07/13/20 1308, New Bag at 07/13/20 1405    lidocaine (PF) 10 mg/ml (1%) injection 10 mg, 1 mL, Intradermal, Once, Gino Reid MD    Review of Systems    Objective:      /62 (BP Location: Left arm, Patient Position: Sitting)   Pulse 68   Temp 98.2 °F (36.8 °C) (Oral)   Ht 5' 3" (1.6 m)   Wt 103.6 kg (228 lb 6.3 oz)   SpO2 97%   BMI 40.46 kg/m²   Physical Exam  Constitutional:       Appearance: She is well-developed. She is obese.   Eyes:      Pupils: Pupils are equal, round, and reactive to light.   Cardiovascular:      Rate and Rhythm: Normal rate and regular rhythm.      Heart sounds: Normal heart sounds.   Pulmonary:      Effort: Pulmonary effort is normal.      Breath sounds: Normal breath sounds.   Abdominal:      General: Bowel sounds are normal.      Palpations: Abdomen is soft.   Musculoskeletal:         General: Normal range of motion.        Arms:       Cervical back: Normal range of motion.      Right lower leg: Swelling present. 3+ Edema present.      Left lower leg: Swelling present. 3+ Edema present.      Right foot: Swelling present.      Left foot: Swelling present.      Comments:  locking or catching when she bend and straighten    Skin:     General: Skin is warm and dry.   Neurological:      Mental Status: She is alert and oriented to person, place, and time.   Psychiatric:         Behavior: Behavior normal.         Thought Content: Thought content normal.         Judgment: Judgment normal.         Assessment:       1. Hypertension associated with diabetes    2. Type 2 diabetes mellitus with hyperglycemia, without long-term current use of insulin    3. Chronic obstructive pulmonary disease, unspecified " COPD type    4. Hyperlipidemia associated with type 2 diabetes mellitus    5. Stage 3b chronic kidney disease    6. Iron deficiency anemia, unspecified iron deficiency anemia type    7. Trigger finger, unspecified finger, unspecified laterality    8. Flu vaccine need    9. Primary osteoarthritis of right knee    10. Peripheral edema    11. Severe obesity (BMI >= 40)        Plan:       Hypertension associated with diabetes  -     COMPREHENSIVE METABOLIC PANEL; Future; Expected date: 12/16/2024  -     CBC W/ AUTO DIFFERENTIAL; Future; Expected date: 12/16/2024  Stable, continue management  Low sodium diet  BP Readings from Last 3 Encounters:   12/16/24 138/62   09/09/24 134/62   03/07/24 138/64      Type 2 diabetes mellitus with hyperglycemia, without long-term current use of insulin  -     Hemoglobin A1C; Future; Expected date: 12/16/2024  -     Diabetic Eye Screening Photo; Future  Stable, continue management  Not on medication  Hemoglobin A1C   Date Value Ref Range Status   09/10/2024 5.8 (H) 4.0 - 5.6 % Final     Comment:     ADA Screening Guidelines:  5.7-6.4%  Consistent with prediabetes  >or=6.5%  Consistent with diabetes    High levels of fetal hemoglobin interfere with the HbA1C  assay. Heterozygous hemoglobin variants (HbS, HgC, etc)do  not significantly interfere with this assay.   However, presence of multiple variants may affect accuracy.     02/29/2024 5.7 (H) 4.0 - 5.6 % Final     Comment:     ADA Screening Guidelines:  5.7-6.4%  Consistent with prediabetes  >or=6.5%  Consistent with diabetes    High levels of fetal hemoglobin interfere with the HbA1C  assay. Heterozygous hemoglobin variants (HbS, HgC, etc)do  not significantly interfere with this assay.   However, presence of multiple variants may affect accuracy.     10/30/2023 5.6 4.0 - 5.6 % Final     Comment:     ADA Screening Guidelines:  5.7-6.4%  Consistent with prediabetes  >or=6.5%  Consistent with diabetes    High levels of fetal hemoglobin  "interfere with the HbA1C  assay. Heterozygous hemoglobin variants (HbS, HgC, etc)do  not significantly interfere with this assay.   However, presence of multiple variants may affect accuracy.        Chronic obstructive pulmonary disease, unspecified COPD type  Stable, continue management  Patient reports not taking the trelegy due to cost.  Hyperlipidemia associated with type 2 diabetes mellitus  -     Lipid Panel; Future; Expected date: 12/16/2024  Stable, on Zetia  Stage 3b chronic kidney disease  Stable, continue follow up  Please avoid or minimize all NSAIDS (ibuprofen, motrin, aleve, indocin, naprosyn) to minimize the risk to your kidneys.   Iron deficiency anemia, unspecified iron deficiency anemia type  -     Iron and TIBC; Future; Expected date: 12/16/2024  -     FERRITIN; Future; Expected date: 12/16/2024  Stable, continue management  Trigger finger, unspecified finger, unspecified laterality  -     Ambulatory referral/consult to Orthopedics; Future; Expected date: 12/23/2024    Flu vaccine need  -     influenza (adjuvanted) (Fluad) 45 mcg/0.5 mL IM vaccine (> or = 66 yo) 0.5 mL    Primary osteoarthritis of right knee  Stable, continue follow up  Peripheral edema  Stable, wear compress stocks, elevate legs while sitting, low sodium diet  Take lasix prn   Severe obesity (BMI >= 40)  Counseled patient on his ideal body weight, health consequences of being obese and current recommendations including weekly exercise and a heart healthy diet.  Current BMI is:Estimated body mass index is 40.46 kg/m² as calculated from the following:    Height as of this encounter: 5' 3" (1.6 m).    Weight as of this encounter: 103.6 kg (228 lb 6.3 oz)..  Patient is aware that ideal BMI < 25 or Weight in (lb) to have BMI = 25: 140.8.           Patient readiness: acceptance and barriers:none    During the course of the visit the patient was educated and counseled about the following:     Diabetes:  Discussed general issues about " diabetes pathophysiology and management.  Addressed ADA diet.  Suggested low cholesterol diet.  Encouraged aerobic exercise.  Discussed foot care.  Hypertension:   Dietary sodium restriction.  Regular aerobic exercise.  Obesity:   General weight loss/lifestyle modification strategies discussed (elicit support from others; identify saboteurs; non-food rewards, etc).  Regular aerobic exercise program discussed.    Goals: Diabetes: Maintain Hemoglobin A1C below 7, Hypertension: Reduce Blood Pressure, and Obesity: Reduce calorie intake and BMI    Did patient meet goals/outcomes: Yes    The following self management tools provided: declined    Patient Instructions (the written plan) was given to the patient/family.     Time spent with patient: 30 minutes    Barriers to medications present (no )    Adverse reactions to current medications (no)    Over the counter medications reviewed (Yes)

## 2025-01-10 DIAGNOSIS — E11.59 HYPERTENSION ASSOCIATED WITH DIABETES: ICD-10-CM

## 2025-01-10 DIAGNOSIS — I15.2 HYPERTENSION ASSOCIATED WITH DIABETES: ICD-10-CM

## 2025-01-10 NOTE — TELEPHONE ENCOUNTER
No care due was identified.  [facility] Embedded Care Due Messages. Reference number: [id number].   1/10/2025 5:51:34 AM CST

## 2025-01-10 NOTE — TELEPHONE ENCOUNTER
Refill Routing Note   Medication(s) are not appropriate for processing by [facility] for the following reason(s):        Outside of protocol    [facility] action(s):  Route             Appointments  past 12m or future 3m with PCP    Date Provider   Last Visit   9/9/2024 Osmani Villatoro MD   Next Visit   3/17/2025 Osmani Villatoro MD   ED visits in past 90 days: 0        Note composed:9:02 AM 01/10/2025

## 2025-01-12 RX ORDER — FOLIC ACID 1 MG/1
1000 TABLET ORAL
Qty: 90 TABLET | Refills: 0 | Status: SHIPPED | OUTPATIENT
Start: 2025-01-12

## 2025-02-06 DIAGNOSIS — I82.621 ACUTE DEEP VEIN THROMBOSIS (DVT) OF RIGHT UPPER EXTREMITY, UNSPECIFIED VEIN: ICD-10-CM

## 2025-02-06 RX ORDER — WARFARIN SODIUM 5 MG/1
TABLET ORAL
Qty: 120 TABLET | Refills: 3 | Status: SHIPPED | OUTPATIENT
Start: 2025-02-06

## 2025-02-06 NOTE — TELEPHONE ENCOUNTER
Refill Routing Note   Medication(s) are not appropriate for processing by [facility] for the following reason(s):        Outside of protocol    [facility] action(s):  Route               Appointments  past 12m or future 3m with PCP    Date Provider   Last Visit   9/9/2024 Osmani Villatoro MD   Next Visit   3/17/2025 Osmani Villatoro MD   ED visits in past 90 days: 0        Note composed:8:42 AM 02/06/2025

## 2025-02-26 ENCOUNTER — LAB VISIT (OUTPATIENT)
Dept: LAB | Facility: HOSPITAL | Age: 85
End: 2025-02-26
Attending: INTERNAL MEDICINE
Payer: MEDICARE

## 2025-02-26 DIAGNOSIS — N25.81 SECONDARY HYPERPARATHYROIDISM OF RENAL ORIGIN: ICD-10-CM

## 2025-02-26 DIAGNOSIS — N18.30 CHRONIC KIDNEY DISEASE, STAGE III (MODERATE): Primary | ICD-10-CM

## 2025-02-26 LAB
25(OH)D3+25(OH)D2 SERPL-MCNC: 52 NG/ML (ref 30–96)
ALBUMIN SERPL BCP-MCNC: 3.3 G/DL (ref 3.5–5.2)
ANION GAP SERPL CALC-SCNC: 11 MMOL/L (ref 8–16)
BACTERIA #/AREA URNS HPF: NORMAL /HPF
BASOPHILS # BLD AUTO: 0.03 K/UL (ref 0–0.2)
BASOPHILS NFR BLD: 0.5 % (ref 0–1.9)
BUN SERPL-MCNC: 25 MG/DL (ref 8–23)
CALCIUM SERPL-MCNC: 8.7 MG/DL (ref 8.7–10.5)
CHLORIDE SERPL-SCNC: 107 MMOL/L (ref 95–110)
CO2 SERPL-SCNC: 25 MMOL/L (ref 23–29)
CREAT SERPL-MCNC: 1.1 MG/DL (ref 0.5–1.4)
CREAT UR-MCNC: 235 MG/DL (ref 15–325)
DIFFERENTIAL METHOD BLD: ABNORMAL
EOSINOPHIL # BLD AUTO: 0.2 K/UL (ref 0–0.5)
EOSINOPHIL NFR BLD: 2.7 % (ref 0–8)
ERYTHROCYTE [DISTWIDTH] IN BLOOD BY AUTOMATED COUNT: 13.2 % (ref 11.5–14.5)
EST. GFR  (NO RACE VARIABLE): 50 ML/MIN/1.73 M^2
GLUCOSE SERPL-MCNC: 119 MG/DL (ref 70–110)
HCT VFR BLD AUTO: 39.3 % (ref 37–48.5)
HGB BLD-MCNC: 12.2 G/DL (ref 12–16)
IMM GRANULOCYTES # BLD AUTO: 0.02 K/UL (ref 0–0.04)
IMM GRANULOCYTES NFR BLD AUTO: 0.4 % (ref 0–0.5)
LYMPHOCYTES # BLD AUTO: 1.9 K/UL (ref 1–4.8)
LYMPHOCYTES NFR BLD: 33.9 % (ref 18–48)
MCH RBC QN AUTO: 32.4 PG (ref 27–31)
MCHC RBC AUTO-ENTMCNC: 31 G/DL (ref 32–36)
MCV RBC AUTO: 104 FL (ref 82–98)
MICROSCOPIC COMMENT: NORMAL
MONOCYTES # BLD AUTO: 0.6 K/UL (ref 0.3–1)
MONOCYTES NFR BLD: 9.9 % (ref 4–15)
NEUTROPHILS # BLD AUTO: 2.9 K/UL (ref 1.8–7.7)
NEUTROPHILS NFR BLD: 52.6 % (ref 38–73)
NRBC BLD-RTO: 0 /100 WBC
PHOSPHATE SERPL-MCNC: 3.2 MG/DL (ref 2.7–4.5)
PLATELET # BLD AUTO: 190 K/UL (ref 150–450)
PMV BLD AUTO: 10.9 FL (ref 9.2–12.9)
POTASSIUM SERPL-SCNC: 4.2 MMOL/L (ref 3.5–5.1)
PROT UR-MCNC: 19 MG/DL (ref 0–15)
PROT/CREAT UR: 0.08 MG/G{CREAT} (ref 0–0.2)
PTH-INTACT SERPL-MCNC: 128.7 PG/ML (ref 9–77)
RBC # BLD AUTO: 3.77 M/UL (ref 4–5.4)
RBC #/AREA URNS HPF: 0 /HPF (ref 0–4)
SODIUM SERPL-SCNC: 143 MMOL/L (ref 136–145)
SQUAMOUS #/AREA URNS HPF: 1 /HPF
WBC # BLD AUTO: 5.58 K/UL (ref 3.9–12.7)
WBC #/AREA URNS HPF: 0 /HPF (ref 0–5)

## 2025-02-26 PROCEDURE — 80069 RENAL FUNCTION PANEL: CPT | Performed by: INTERNAL MEDICINE

## 2025-02-26 PROCEDURE — 82570 ASSAY OF URINE CREATININE: CPT | Performed by: INTERNAL MEDICINE

## 2025-02-26 PROCEDURE — 81000 URINALYSIS NONAUTO W/SCOPE: CPT | Performed by: INTERNAL MEDICINE

## 2025-02-26 PROCEDURE — 83970 ASSAY OF PARATHORMONE: CPT | Performed by: INTERNAL MEDICINE

## 2025-02-26 PROCEDURE — 36415 COLL VENOUS BLD VENIPUNCTURE: CPT | Performed by: INTERNAL MEDICINE

## 2025-02-26 PROCEDURE — 85025 COMPLETE CBC W/AUTO DIFF WBC: CPT | Performed by: INTERNAL MEDICINE

## 2025-02-26 PROCEDURE — 82306 VITAMIN D 25 HYDROXY: CPT | Performed by: INTERNAL MEDICINE

## 2025-03-07 ENCOUNTER — PATIENT OUTREACH (OUTPATIENT)
Dept: ADMINISTRATIVE | Facility: HOSPITAL | Age: 85
End: 2025-03-07
Payer: MEDICARE

## 2025-03-07 DIAGNOSIS — N18.31 TYPE 2 DIABETES MELLITUS WITH STAGE 3A CHRONIC KIDNEY DISEASE, WITHOUT LONG-TERM CURRENT USE OF INSULIN: Primary | ICD-10-CM

## 2025-03-07 DIAGNOSIS — E11.22 TYPE 2 DIABETES MELLITUS WITH STAGE 3A CHRONIC KIDNEY DISEASE, WITHOUT LONG-TERM CURRENT USE OF INSULIN: Primary | ICD-10-CM

## 2025-03-10 ENCOUNTER — LAB VISIT (OUTPATIENT)
Dept: LAB | Facility: HOSPITAL | Age: 85
End: 2025-03-10
Attending: FAMILY MEDICINE
Payer: MEDICARE

## 2025-03-10 DIAGNOSIS — E11.59 HYPERTENSION ASSOCIATED WITH DIABETES: ICD-10-CM

## 2025-03-10 DIAGNOSIS — I15.2 HYPERTENSION ASSOCIATED WITH DIABETES: ICD-10-CM

## 2025-03-10 DIAGNOSIS — E11.65 TYPE 2 DIABETES MELLITUS WITH HYPERGLYCEMIA, WITHOUT LONG-TERM CURRENT USE OF INSULIN: ICD-10-CM

## 2025-03-10 DIAGNOSIS — E11.69 HYPERLIPIDEMIA ASSOCIATED WITH TYPE 2 DIABETES MELLITUS: ICD-10-CM

## 2025-03-10 DIAGNOSIS — D50.9 IRON DEFICIENCY ANEMIA, UNSPECIFIED IRON DEFICIENCY ANEMIA TYPE: ICD-10-CM

## 2025-03-10 DIAGNOSIS — E78.5 HYPERLIPIDEMIA ASSOCIATED WITH TYPE 2 DIABETES MELLITUS: ICD-10-CM

## 2025-03-10 LAB
ALBUMIN SERPL BCP-MCNC: 3.3 G/DL (ref 3.5–5.2)
ALP SERPL-CCNC: 64 U/L (ref 40–150)
ALT SERPL W/O P-5'-P-CCNC: 15 U/L (ref 10–44)
ANION GAP SERPL CALC-SCNC: 7 MMOL/L (ref 8–16)
AST SERPL-CCNC: 29 U/L (ref 10–40)
BASOPHILS # BLD AUTO: 0.02 K/UL (ref 0–0.2)
BASOPHILS NFR BLD: 0.3 % (ref 0–1.9)
BILIRUB SERPL-MCNC: 0.5 MG/DL (ref 0.1–1)
BUN SERPL-MCNC: 20 MG/DL (ref 8–23)
CALCIUM SERPL-MCNC: 8.8 MG/DL (ref 8.7–10.5)
CHLORIDE SERPL-SCNC: 105 MMOL/L (ref 95–110)
CHOLEST SERPL-MCNC: 172 MG/DL (ref 120–199)
CHOLEST/HDLC SERPL: 4.5 {RATIO} (ref 2–5)
CO2 SERPL-SCNC: 27 MMOL/L (ref 23–29)
CREAT SERPL-MCNC: 1.1 MG/DL (ref 0.5–1.4)
DIFFERENTIAL METHOD BLD: ABNORMAL
EOSINOPHIL # BLD AUTO: 0.2 K/UL (ref 0–0.5)
EOSINOPHIL NFR BLD: 3.3 % (ref 0–8)
ERYTHROCYTE [DISTWIDTH] IN BLOOD BY AUTOMATED COUNT: 13.5 % (ref 11.5–14.5)
EST. GFR  (NO RACE VARIABLE): 50.2 ML/MIN/1.73 M^2
ESTIMATED AVG GLUCOSE: 120 MG/DL (ref 68–131)
FERRITIN SERPL-MCNC: 246 NG/ML (ref 20–300)
GLUCOSE SERPL-MCNC: 111 MG/DL (ref 70–110)
HBA1C MFR BLD: 5.8 % (ref 4–5.6)
HCT VFR BLD AUTO: 40.3 % (ref 37–48.5)
HDLC SERPL-MCNC: 38 MG/DL (ref 40–75)
HDLC SERPL: 22.1 % (ref 20–50)
HGB BLD-MCNC: 12.2 G/DL (ref 12–16)
IMM GRANULOCYTES # BLD AUTO: 0.01 K/UL (ref 0–0.04)
IMM GRANULOCYTES NFR BLD AUTO: 0.2 % (ref 0–0.5)
IRON SERPL-MCNC: 78 UG/DL (ref 30–160)
LDLC SERPL CALC-MCNC: 107.8 MG/DL (ref 63–159)
LYMPHOCYTES # BLD AUTO: 2.2 K/UL (ref 1–4.8)
LYMPHOCYTES NFR BLD: 36.5 % (ref 18–48)
MCH RBC QN AUTO: 31.7 PG (ref 27–31)
MCHC RBC AUTO-ENTMCNC: 30.3 G/DL (ref 32–36)
MCV RBC AUTO: 105 FL (ref 82–98)
MONOCYTES # BLD AUTO: 0.6 K/UL (ref 0.3–1)
MONOCYTES NFR BLD: 9.8 % (ref 4–15)
NEUTROPHILS # BLD AUTO: 3.1 K/UL (ref 1.8–7.7)
NEUTROPHILS NFR BLD: 49.9 % (ref 38–73)
NONHDLC SERPL-MCNC: 134 MG/DL
NRBC BLD-RTO: 0 /100 WBC
PLATELET # BLD AUTO: 214 K/UL (ref 150–450)
PMV BLD AUTO: 11.3 FL (ref 9.2–12.9)
POTASSIUM SERPL-SCNC: 4.3 MMOL/L (ref 3.5–5.1)
PROT SERPL-MCNC: 6.7 G/DL (ref 6–8.4)
RBC # BLD AUTO: 3.85 M/UL (ref 4–5.4)
SATURATED IRON: 30 % (ref 20–50)
SODIUM SERPL-SCNC: 139 MMOL/L (ref 136–145)
TOTAL IRON BINDING CAPACITY: 256 UG/DL (ref 250–450)
TRANSFERRIN SERPL-MCNC: 173 MG/DL (ref 200–375)
TRIGL SERPL-MCNC: 131 MG/DL (ref 30–150)
WBC # BLD AUTO: 6.11 K/UL (ref 3.9–12.7)

## 2025-03-10 PROCEDURE — 83036 HEMOGLOBIN GLYCOSYLATED A1C: CPT | Performed by: NURSE PRACTITIONER

## 2025-03-10 PROCEDURE — 82728 ASSAY OF FERRITIN: CPT | Performed by: NURSE PRACTITIONER

## 2025-03-10 PROCEDURE — 80061 LIPID PANEL: CPT | Performed by: NURSE PRACTITIONER

## 2025-03-10 PROCEDURE — 36415 COLL VENOUS BLD VENIPUNCTURE: CPT | Mod: PO | Performed by: NURSE PRACTITIONER

## 2025-03-10 PROCEDURE — 83540 ASSAY OF IRON: CPT | Performed by: NURSE PRACTITIONER

## 2025-03-10 PROCEDURE — 80053 COMPREHEN METABOLIC PANEL: CPT | Performed by: NURSE PRACTITIONER

## 2025-03-10 PROCEDURE — 85025 COMPLETE CBC W/AUTO DIFF WBC: CPT | Performed by: NURSE PRACTITIONER

## 2025-03-11 ENCOUNTER — RESULTS FOLLOW-UP (OUTPATIENT)
Dept: PRIMARY CARE CLINIC | Facility: CLINIC | Age: 85
End: 2025-03-11

## 2025-03-17 ENCOUNTER — OFFICE VISIT (OUTPATIENT)
Dept: PRIMARY CARE CLINIC | Facility: CLINIC | Age: 85
End: 2025-03-17
Payer: MEDICARE

## 2025-03-17 VITALS
SYSTOLIC BLOOD PRESSURE: 118 MMHG | WEIGHT: 228.31 LBS | DIASTOLIC BLOOD PRESSURE: 58 MMHG | OXYGEN SATURATION: 99 % | BODY MASS INDEX: 40.44 KG/M2 | HEART RATE: 62 BPM

## 2025-03-17 DIAGNOSIS — J45.50 SEVERE PERSISTENT ASTHMA WITHOUT COMPLICATION: ICD-10-CM

## 2025-03-17 DIAGNOSIS — N18.32 STAGE 3B CHRONIC KIDNEY DISEASE: ICD-10-CM

## 2025-03-17 DIAGNOSIS — J44.9 COPD MIXED TYPE: ICD-10-CM

## 2025-03-17 DIAGNOSIS — J44.89 ASTHMA-COPD OVERLAP SYNDROME: ICD-10-CM

## 2025-03-17 DIAGNOSIS — R60.0 EDEMA OF EXTREMITIES: ICD-10-CM

## 2025-03-17 DIAGNOSIS — I27.20 PULMONARY HYPERTENSION: ICD-10-CM

## 2025-03-17 DIAGNOSIS — J45.40 MODERATE PERSISTENT ASTHMA WITHOUT COMPLICATION: ICD-10-CM

## 2025-03-17 DIAGNOSIS — G47.33 OBSTRUCTIVE SLEEP APNEA SYNDROME: ICD-10-CM

## 2025-03-17 DIAGNOSIS — R60.0 PERIPHERAL EDEMA: ICD-10-CM

## 2025-03-17 DIAGNOSIS — I50.32 CHRONIC DIASTOLIC CHF (CONGESTIVE HEART FAILURE): ICD-10-CM

## 2025-03-17 DIAGNOSIS — E66.01 SEVERE OBESITY (BMI >= 40): ICD-10-CM

## 2025-03-17 DIAGNOSIS — I15.2 HYPERTENSION ASSOCIATED WITH DIABETES: ICD-10-CM

## 2025-03-17 DIAGNOSIS — J44.89 COPD WITH ASTHMA: ICD-10-CM

## 2025-03-17 DIAGNOSIS — R06.02 SOB (SHORTNESS OF BREATH): Primary | ICD-10-CM

## 2025-03-17 DIAGNOSIS — E21.1 HYPERPARATHYROIDISM , SECONDARY, NON-RENAL: ICD-10-CM

## 2025-03-17 DIAGNOSIS — E11.59 HYPERTENSION ASSOCIATED WITH DIABETES: ICD-10-CM

## 2025-03-17 DIAGNOSIS — J44.9 CHRONIC OBSTRUCTIVE PULMONARY DISEASE, UNSPECIFIED COPD TYPE: ICD-10-CM

## 2025-03-17 PROCEDURE — 96372 THER/PROPH/DIAG INJ SC/IM: CPT | Mod: S$GLB,,, | Performed by: FAMILY MEDICINE

## 2025-03-17 PROCEDURE — 3288F FALL RISK ASSESSMENT DOCD: CPT | Mod: CPTII,S$GLB,, | Performed by: FAMILY MEDICINE

## 2025-03-17 PROCEDURE — 99999 PR PBB SHADOW E&M-EST. PATIENT-LVL IV: CPT | Mod: PBBFAC,,, | Performed by: FAMILY MEDICINE

## 2025-03-17 PROCEDURE — 1101F PT FALLS ASSESS-DOCD LE1/YR: CPT | Mod: CPTII,S$GLB,, | Performed by: FAMILY MEDICINE

## 2025-03-17 PROCEDURE — 1157F ADVNC CARE PLAN IN RCRD: CPT | Mod: CPTII,S$GLB,, | Performed by: FAMILY MEDICINE

## 2025-03-17 PROCEDURE — 1126F AMNT PAIN NOTED NONE PRSNT: CPT | Mod: CPTII,S$GLB,, | Performed by: FAMILY MEDICINE

## 2025-03-17 PROCEDURE — 3078F DIAST BP <80 MM HG: CPT | Mod: CPTII,S$GLB,, | Performed by: FAMILY MEDICINE

## 2025-03-17 PROCEDURE — 1159F MED LIST DOCD IN RCRD: CPT | Mod: CPTII,S$GLB,, | Performed by: FAMILY MEDICINE

## 2025-03-17 PROCEDURE — 99214 OFFICE O/P EST MOD 30 MIN: CPT | Mod: 25,S$GLB,, | Performed by: FAMILY MEDICINE

## 2025-03-17 PROCEDURE — 1160F RVW MEDS BY RX/DR IN RCRD: CPT | Mod: CPTII,S$GLB,, | Performed by: FAMILY MEDICINE

## 2025-03-17 PROCEDURE — 3074F SYST BP LT 130 MM HG: CPT | Mod: CPTII,S$GLB,, | Performed by: FAMILY MEDICINE

## 2025-03-17 RX ORDER — LANOLIN ALCOHOL/MO/W.PET/CERES
1 CREAM (GRAM) TOPICAL 2 TIMES DAILY
Qty: 180 TABLET | Refills: 3 | Status: SHIPPED | OUTPATIENT
Start: 2025-03-17

## 2025-03-17 RX ORDER — LOSARTAN POTASSIUM 50 MG/1
50 TABLET ORAL DAILY
Qty: 90 TABLET | Refills: 3 | Status: SHIPPED | OUTPATIENT
Start: 2025-03-17

## 2025-03-17 RX ORDER — CALCITRIOL 0.25 UG/1
0.25 CAPSULE ORAL DAILY
Qty: 90 CAPSULE | Refills: 3 | Status: SHIPPED | OUTPATIENT
Start: 2025-03-17

## 2025-03-17 RX ORDER — IPRATROPIUM BROMIDE AND ALBUTEROL SULFATE 2.5; .5 MG/3ML; MG/3ML
3 SOLUTION RESPIRATORY (INHALATION)
Qty: 75 ML | Refills: 0 | Status: SHIPPED | OUTPATIENT
Start: 2025-03-17 | End: 2026-03-17

## 2025-03-17 RX ORDER — BUDESONIDE 0.5 MG/2ML
0.5 INHALANT ORAL 2 TIMES DAILY
Qty: 60 ML | Refills: 3 | Status: SHIPPED | OUTPATIENT
Start: 2025-03-17 | End: 2026-03-17

## 2025-03-17 RX ORDER — DEXAMETHASONE SODIUM PHOSPHATE 4 MG/ML
4 INJECTION, SOLUTION INTRA-ARTICULAR; INTRALESIONAL; INTRAMUSCULAR; INTRAVENOUS; SOFT TISSUE
Status: COMPLETED | OUTPATIENT
Start: 2025-03-17 | End: 2025-03-17

## 2025-03-17 RX ORDER — FUROSEMIDE 40 MG/1
40 TABLET ORAL DAILY
Start: 2025-03-17

## 2025-03-17 RX ORDER — ALBUTEROL SULFATE 90 UG/1
INHALANT RESPIRATORY (INHALATION)
Qty: 18 G | Refills: 3 | Status: SHIPPED | OUTPATIENT
Start: 2025-03-17

## 2025-03-17 RX ORDER — HYDROGEN PEROXIDE 3 %
20 SOLUTION, NON-ORAL MISCELLANEOUS EVERY OTHER DAY
COMMUNITY
Start: 2025-03-17

## 2025-03-17 RX ORDER — PREDNISONE 20 MG/1
TABLET ORAL
Qty: 21 TABLET | Refills: 3 | Status: SHIPPED | OUTPATIENT
Start: 2025-03-17

## 2025-03-17 RX ADMIN — DEXAMETHASONE SODIUM PHOSPHATE 4 MG: 4 INJECTION, SOLUTION INTRA-ARTICULAR; INTRALESIONAL; INTRAMUSCULAR; INTRAVENOUS; SOFT TISSUE at 11:03

## 2025-03-24 DIAGNOSIS — Z00.00 ENCOUNTER FOR MEDICARE ANNUAL WELLNESS EXAM: ICD-10-CM

## 2025-04-09 DIAGNOSIS — I15.2 HYPERTENSION ASSOCIATED WITH DIABETES: ICD-10-CM

## 2025-04-09 DIAGNOSIS — E11.59 HYPERTENSION ASSOCIATED WITH DIABETES: ICD-10-CM

## 2025-04-09 RX ORDER — FOLIC ACID 1 MG/1
1000 TABLET ORAL
Qty: 90 TABLET | Refills: 0 | Status: SHIPPED | OUTPATIENT
Start: 2025-04-09

## 2025-04-09 NOTE — TELEPHONE ENCOUNTER
No care due was identified.  [facility] Embedded Care Due Messages. Reference number: [id number].   4/09/2025 5:52:13 AM CDT

## 2025-04-09 NOTE — TELEPHONE ENCOUNTER
Refill Routing Note   Medication(s) are not appropriate for processing by [facility] for the following reason(s):        Outside of protocol    [facility] action(s):  Route               Appointments  past 12m or future 3m with PCP    Date Provider   Last Visit   3/17/2025 Osmani Villatoro MD   Next Visit   Visit date not found Osmani Villatoro MD   ED visits in past 90 days: 0        Note composed:8:53 AM 04/09/2025

## 2025-04-17 NOTE — MR AVS SNAPSHOT
[facility] Cardiology   [address]  [place] [address]  Phone: [phone]                  Sammi Abreu   2017 1:30 PM   Office Visit    Description:  Female : 1940   Provider:  Burt Friend MD   Department:  [facility] - Card[facility]           Reason for Visit     Annual Exam           Diagnoses this Visit        Comments    Cardiovascular risk factor    -  Primary     Controlled type 2 diabetes mellitus with stage 3 chronic kidney disease, without long-term current use of insulin         BMI 39.0-39.9,adult         LVH (left ventricular hypertrophy) due to hypertensive disease, without heart failure         Essential hypertension         Pure hypercholesterolemia                To Do List           Future Appointments        Provider Department Dept Phone    2017 10:40 AM Sadaf Courtney PA-C [facility] [phone]    2017 9:30 AM Osmani Villatoro MD [facility] [phone]      Goals (5 Years of Data)     None      Follow-Up and Disposition     Return in about 1 year (around 2018).    Follow-up and Disposition History      [facility]     [facility] On Call Nurse Care Line -  Assistance  Registered nurses in the [facility] On Call Center provide clinical advisement, health education, appointment booking, and other advisory services.  Call for this free service at [phone].             Medications           Message regarding Medications     Verify the changes and/or additions to your medication regime listed below are the same as discussed with your clinician today.  If any of these changes or additions are incorrect, please notify your healthcare provider.        STOP taking these medications     benzonatate (TESSALON) 200 MG capsule Take 1 capsule (200 mg total) by mouth 3 (three) times daily as needed.           Verify that the below list of medications is an accurate representation of the medications you are  currently taking.  If none reported, the list may be blank. If incorrect, please contact your healthcare provider. Carry this list with you in case of emergency.           Current Medications     ascorbic acid (VITAMIN C) 100 MG tablet Take 100 mg by mouth once daily. Hold until further notice, due to blood clot    b complex vitamins tablet Take 1 tablet by mouth once daily.    calcitRIOL (ROCALTROL) 0.25 MCG Cap Take 0.25 mcg by mouth once daily.    calcium carbonate (OS-POLY) 600 mg (1,500 mg) Tab Take 600 mg by mouth 2 (two) times daily with meals.    cetirizine (ZYRTEC) 5 MG tablet Take 1 tablet (5 mg total) by mouth daily as needed.    diclofenac sodium 1 % Gel Apply 2 g topically 3 (three) times daily.    esomeprazole (NEXIUM) 40 MG capsule Take 40 mg by mouth once daily.    fish oil-omega-3 fatty acids 300-1,000 mg capsule Take 2 g by mouth once daily.    fluticasone (FLONASE) 50 mcg/actuation nasal spray 1 spray by Each Nare route once daily.    furosemide (LASIX) 40 MG tablet TAKE ONE TABLET BY MOUTH ONCE DAILY    hydrocodone-acetaminophen 5-325mg (NORCO) 5-325 mg per tablet Take 1 tablet by mouth every meal as needed for Pain.    INV losartan (COZAAR) 100 MG Tab Take 1 tablet (100 mg total) by mouth once daily.    IRON, FERROUS SULFATE, ORAL Take by mouth.    lactulose (CHRONULAC) 10 gram/15 mL solution Take 30 mLs (20 g total) by mouth 3 (three) times daily. 2 Teaspoon(s) Oral PRN Every evening.    metoprolol succinate (TOPROL-XL) 25 MG 24 hr tablet Take 1 tablet (25 mg total) by mouth once daily.    nifedipine 30 MG ORAL TR24 (PROCARDIA-XL) 30 MG (OSM) 24 hr tablet TAKE ONE TABLET BY MOUTH IN THE EVENING    spironolactone (ALDACTONE) 25 MG tablet 1 tab  Three a week    blood sugar diagnostic Strp TrueResult  Strips/ lancets ac breakfast and bedtime    blood-glucose meter kit DX DM True Test  Use as instructed ac breakfast    lancets Misc TrueResult lancets and strips ac breakfast.    mometasone 0.1%  "(ELOCON) 0.1 % cream Apply topically once daily.           Clinical Reference Information           Your Vitals Were     BP Pulse Height Weight SpO2 BMI    155/68 (BP Location: Left arm) 62 5' 4" (1.626 m) 105.6 kg (232 lb 14 oz) 99% 39.97 kg/m2      Blood Pressure          Most Recent Value    Right Arm BP - Sitting  138/62    Left Arm BP - Sitting  155/68    BP  (!)  155/68      Allergies as of 2/2/2017     Cymbalta [Duloxetine]    Darvon [Propoxyphene]    Atorvastatin    Naprosyn [Naproxen]    Penicillins      Immunizations Administered on Date of Encounter - 2/2/2017     None      Instructions      Heart Disease Education    The heart beats 60 to 100 times per minute, 24 hours a day. This equals almost 1000,000 times a day. It pumps blood with oxygen and nutrients to the tissues and organs of the body. But the heart is a muscle and needs its own supply of blood. Blood flow to the heart is supplied by the coronary arteries. Coronary artery disease (atherosclerosis) is a result of cholesterol, saturated fat, and calcium deposits (plaques) that build up inside the walls. This causes inflammation within the coronary arteries. These plaques narrow the artery and reduce blood flow to the heart muscle. The reduction in blood flow to the heart muscle decreases oxygen supply to the heart. If the narrowing is significant enough, the oxygen supply to one or more regions of the heart can be temporarily or permanently shut down. This can cause chest pain, and possibly death of heart tissue (heart attack).  Types of chest pain  Angina is the name for pain in the heart muscle. Angina is a warning sign of serious heart disease. When untreated it can lead to a heart attack, also known as acute myocardial infarction, or AMI. Angina occurs when there is not enough blood and oxygen flowing to the heart for the amount of work it is doing. This most often happens during physical exertion, when the heart is working hardest. It is " usually relieved by rest or nitroglycerin. Angina may also occur after a large meal when extra blood is sent to the digestive organs and less goes to the heart. In the case of advanced or unstable heart disease, angina can occur at rest or awaken you from sleep. Angina usually lasts from a few minutes up to 20 minutes or more. When treated early, the effects of angina can be reversed without permanent damage to the heart. Angina is a serious condition and needs to be evaluated by a medical professional immediately.  There are two types of angina -- stable and unstable:  · Stable angina usually occurs with a predictable level of activity. Being stable, its character, severity, and occurrence do not change much over time. It usually starts with activity, and resolves with rest or taking your medicine as instructed by your doctor. The symptoms usually do not last long.  · Unstable angina changes or gets worse over time. It is different from whatever you are used to. It may feel different or worse, begin without cause, occur with exercise or exertion, wake you up from sleep, and last longer. It may not respond in the same way as it does when you take your usual medicines for an attack. This type of angina can be a warning sign of an impending heart attack.     A heart attack is usually the result of a blood clot that suddenly forms in a coronary artery that has been narrowed with plaque. When this occurs, blood flow may be cut off to a part of the heart muscle, causing the cells to die. This weakens the pumping action of the heart, which affects the delivery of blood to all the other organs in the body including the brain. This damage is not reversible. However, early treatment can limit the amount of damage.  The pain you feel with angina and a heart attack may have a similar quality. However, it is usually different in intensity and duration. Here are some typical descriptions of a heart attack:  · It is most often  "experienced as a squeezing, crushing, pressure-like sensation in the center of the chest.  · It is sometimes described as something heavy sitting on my chest.  · It may feel more like a bad case of indigestion.  · The pain may spread from the chest to the arm, shoulder, throat or jaw.  · Sometimes the pain is not felt in the chest at all, but only in the arm, shoulder, throat or jaw.  · There may also be nausea, vomiting, dizziness or light-headedness, sweating and trouble breathing.  · Palpitations, or your heart beating rapidly  · A new, irregular heart beat  · Unexplained weakness  You may not be able to tell the difference between "bad" angina and a heart attack at home. Seek help if your symptoms are different than usual. Do not be in denial or just try to "tough it out."  Call 911  This is the fastest and safest way to get to the emergency department. The paramedics can also start treatment on the way to the hospital, saving valuable time for your heart.  · If the angina gets worse, if it continues, or if it stops and returns, call 911 immediately. Do not delay. You may be having a heart attack.  · After you call 911, take a second tablet or spray unless instructed otherwise. When repeating doses, sit down if possible, because it can make you feel lightheaded or dizzy. Wait another 5 minutes. If the angina still does not go away, take a third tablet or spray. Do not take more than 3 tablets or sprays within 15 minutes. Stay on the phone with 911 for further instruction.  · Your healthcare provider may give you slightly different instructions than those above. If so, follow them carefully.  Do not wait until symptoms become severe to call 911.  Other reasons to call 911 include:  · Trouble breathing  · Feeling lightheaded, faint, or dizzy  · Rapid heart beat  · Slower than usual heart rate compared to your normal  · Angina with weakness, dizziness, fainting, heavy sweating, nausea, or vomiting  · Extreme " "drowsiness, confusion  · Weakness of an arm or leg or one side of the face  · Difficulty with speech or vision  When to seek medical care  Remember, the signs and symptoms of a heart attack are not always like they are on TV. Sometimes they are not so obvious. You may only feel weak, or just not right. If it is not clear or if you have any doubt, call for advice.  · Seek help if there is a change in the type of pain, if it feels different, or if your symptoms are mild.  · Do not drive yourself. Have someone else drive you. If no one can drive, call 911.  · Do not delay. Fast diagnosis and treatment can prevent or limit the amount of heart damage during a heart attack.  · Do not go to your doctor's office or a clinic as they may not be able to provide all the testing and treatment required for this condition.  · If your doctor has given you medicine to take when symptoms occur, take them but don't delay getting help trying to locate medicines.  What happens in the emergency department  The emergency department is connected to your local emergency medical system (EMS) through 911. That's why during a cardiac emergency, calling 911 is the fastest way to get help. The goal of the emergency department is to rapidly screen, evaluate, and treat people.  Once you are there, an electrocardiogram (ECG or heart tracing) will be done. Blood samples may be taken to look for the presence of heart enzymes that leak from damaged heart cells and show if a heart attack is occurring. You will often be evaluated by a heart specialist (cardiologist) who decides the best course of action. In the case of severe angina or early heart attack, and depending on the circumstances, powerful "clot busting" medicines can be used to dissolve blood clots in the coronary artery. In other cases, you may be taken to a cardiac catheterization lab. Here, a tiny balloon-tipped catheter is advanced through blood vessels to the heart. There the balloon is " inflated pushing open the blood vessel restoring blood flow.  Risk factors for heart disease  Risk factors for heart disease are a combination of genetic and lifestyle. Many risk factors work by either directly or indirectly damaging the blood vessels of the heart, or by increasing the risk of forming blood or cholesterol clots, which then clog up and block the arteries.     Examples of physical lifestyle risk factors:  · Cigarette smoking  · High blood pressure  · High blood cholesterol  · Use of stimulant drugs such as cocaine, crack, and amphetamines  · Eating a high-fat, high-cholesterol meal  · Diabetes   · Obesity which increases risk for diabetes and high blood pressure  · Lack of regular physical activity     Examples of emotional lifestyle factors:  · Chronic high stress levels release stress hormones. These raise blood pressure and cholesterol level and makes blood clot more easily.  · Held-in anger, hostile or cynical attitude  · Social and emotional isolation, lack of intimacy  · Loss of relationship  · Depression  Other factors that increase the risk of heart attack that you cannot control :  · Age. The older you get beyond 40, the greater is your risk of significant coronary artery disease.  · Gender. More men than women get heart disease; but once past menopause, women who are not taking estrogen replacement have the same risk as men for a heart attack.  · Family history. If your mother, father, brother or sister has coronary artery disease, your risk of having it is higher than a person your age without this family history.  What can you do to decrease your risk  To reduce your risk of heart disease:  · Get regular checkups with your doctor.  · Take your medicines for blood pressure, cholesterol or diabetes as directed.  · Watch your diet. Eat a heart healthy diet choosing fresh foods, less salt, cholesterol, and fat  · Stop smoking. Get help if needed.  · Get regular exercise.  · Manage  "stress.  · Carry a list of medicines and doses in your wallet.  © 1437-8467 [employer]. [address] [place] [address], [place] [address]. All rights reserved. This information is not intended as a substitute for professional medical care. Always follow your healthcare professional's instructions.        Risk Factors for Heart Disease  A risk factor is something that increases your chance of having heart disease. Heart disease (also called coronary artery disease) involves damage to the heart arteries. These blood vessels need to work well to provide the oxygen your heart needs to pump blood to the rest of your body. Things like smoking or high cholesterol levels can damage arteries. You cant control some risk factors, such as age and a family history of heart disease. But there are many things you can control to reduce your risk for heart disease.    Unhealthy cholesterol levels  Cholesterol is a fat-like substance in your blood. It can build up along the artery walls. This is called plaque. Over time, plaque narrows the arteries and reduces blood flow to your heart or brain. If a blood clot forms or a piece of the plaque breaks off, it can completely block the artery and cause a heart attack or stroke. Your risk of heart disease goes up if you have high levels of LDL ("bad") cholesterol or triglycerides (another fatty substance that can build up). Youre also at risk if you have low HDL cholesterol ("good") cholesterol. HDL helps clear the bad cholesterol away. You're at risk if you have:  HDL cholesterol 50 mg/dL or lower; LDL cholesterol 100 mg/dL; or triglycerides of 150 mg/dL or higher.  Smoking  This is the most important risk factor you can change. Smoking causes inflammation and damages the smooth muscle that lines the arteries making them less flexible. It also raises your blood pressure, causing further damage to the artery lining. Smoking also increases your risk that your blood may clot, block " an artery, and cause a heart attack or stroke. Smoking also damages your lungs, which affects the delivery of oxygen to the body. If you smoke, you are 2 to 4 times more likely to develop coronary artery disease. If you smoke, it's never too late to help your heart. Ask your doctor about nicotine replacement products and smoking cessation support.  High blood pressure  High blood pressure occurs when blood pushes too hard against artery walls. This causes damage to the artery walls and the formation of scar tissue as it heals. This makes the arteries stiff and weak. Plaque sticks to the scarred tissue narrowing and hardening the arteries. High blood pressure also causes your heart to work harder to get blood out to the body. High blood pressure raises your risk of heart attack, also known as acute myocardial infarction, or AMI, and especially stroke. The brain tissue is especially sensitive to high blood pressure damage. You're at risk if your blood pressure is 120/80 or higher.  Negative emotions  Chronic stress, pent-up anger, and other negative emotions have been linked to heart disease. This occurs because stress increases the levels of a hormone that increase the demand on your heart. Over time, these emotions could raise your heart disease risk.  Metabolic syndrome  This is caused by a combination of certain risk factors. It puts you at extra high risk of heart disease, stroke, and diabetes. You have metabolic syndrome if you have 3 or more of the following: low HDL cholesterol; high triglycerides; high blood pressure; high blood sugar; extra weight around the waist.  Diabetes  Diabetes occurs when you have high levels of sugar (glucose) in your blood. This can damage arteries if not kept under control. Having diabetes also makes you more likely to have a silent heart attack--one without any symptoms.  Excess weight  Excess weight makes other risk factors, such as diabetes, more likely. Excess weight around  the waist or stomach increases your heart disease risk the most.  Lack of physical activity  When youre not active, youre more likely to develop diabetes, high blood pressure, high cholesterol levels, and excess weight.     Most people with heart disease have more than one risk factor.   © 7429-7854 [employer]. [address] [place] [address]. All rights reserved. This information is not intended as a substitute for professional medical care. Always follow your healthcare professional's instructions.        Women and Heart Disease: Understanding the Risks  Risk factors are habits and conditions that make heart disease more likely. The more you have, the higher your chances of heart attack, also known as acute myocardial infarction, or AMI, and other problems. You can manage most risk factors to help make your heart healthier. Below are factors that increase your risk for having heart disease.    Smoking  This is the most important risk factor you can change. Smoking causes inflammation and damages the smooth muscle that lines the arteries making them less flexible. It also raises your blood pressure, causing further damage to the artery lining. Smoking also increased your risk that your blood may clot, block an artery, and lead to a heart attack or stroke. Smoking also damages your lungs, which can affect the delivery of oxygen to the body. Research shows that smoking makes women up to 6 times more likely to have a heart attack. It's also important to avoid secondhand smoke (smoke from other peoples tobacco products). If you smoke, it's never too late to improve your heart. Ask your doctor about nicotine replacement products and smoking cessation counseling.  Diabetes  Diabetes causes high blood sugar, which can damage blood vessels if not kept under control. Having diabetes also makes you more likely to have a silent heart attack--one without any symptoms. This occurs because long  "periods of high sugar levels in your blood eventually degrade nerve conduction which reduces your sensation. This translates to decreased chest pain than would be felt in a person without diabetes during a heart attack. Youre at risk if your blood sugar level is above 100 mg/dL.  High cholesterol  Cholesterol is a fat-like substance in your blood. It can build up along the artery walls. This is called plaque. Over time, plaque narrows the arteries and reduces blood flow to your heart or brain. If a blood clot forms or a piece of the plaque breaks off, it can completely block the artery and cause a heart attack or stroke. Your risk of heart disease goes up if you have high levels of LDL ("bad") cholesterol or triglycerides (another fatty substance that can build up). Youre also at risk if you have low HDL cholesterol ("good") cholesterol. HDL helps clear the bad cholesterol away. Youre at risk if you have:  HDL cholesterol 50 mg/dL or lower; LDL cholesterol 100 mg/dL or higher; triglycerides of 150 mg/dL or higher.  High blood pressure  High blood pressure occurs when blood pushes too hard against artery walls. This causes damage to the artery walls and then the formation of scar tissue as it heals. This makes the arteries stiff and weak. Plaque sticks to the scarred tissue narrowing and hardening the arteries. High blood pressure also causes your heart to work harder to get blood out to the body. High blood pressure raises your risk of heart attack, also known as acute myocardial infarction, or AMI, and especially stroke. The brain tissue is especially sensitive to high blood pressure damage. Youre at risk if your blood pressure is 120/80 or higher.  Excess weight  Excess weight makes your heart work harder. This raises your risk of a heart attack. Being overweight also puts you at risk of developing diabetes and high blood pressure. Excess weight around the waist or stomach increases your risk the most. Being " obese puts you at risk for developing heart disease.  Lack of exercise  Without regular exercise, youre more likely to develop other risk factors, such as being overweight and developing diabetes. High blood pressure and unhealthy lipid levels are also more likely. Exercise promotes good blood flow and ensures your heart is able to meet the demands placed on it.  Negative emotions  Emotions such as stress and pent-up anger have been linked to heart disease. Over time, these emotions could raise your heart disease risk. If you have heart disease, emotion such as anxiety and depression can make it worse. Managing these emotions is important as they have been shown to reduce hormones that increase stress on the heart in the long run.  Metabolic syndrome  This is caused by a combination of certain risk factors. It puts you at extra high risk of heart disease, stroke, and diabetes. You have metabolic syndrome if you have 3 or more of the following: low HDL cholesterol; high triglycerides; high blood pressure; high blood sugar; extra weight around the waist.  Risks you cant control  A few risk factors cant be changed. But they still raise your heart disease risk.  · Family history. If your mother or sister had heart trouble before age 65 or your father or brother before age 55, youre at higher risk of having a heart attack.  · Age. The older you are, the higher your heart disease risk.     For more information  · www.smokefree.gov/qrmz-jo-ds-expert  · www.women.smokefree.gov  · National Cancer [place] Smoking Quitline: 877-44U-QUIT (039-193-1782)      © 8909-4347 [employer]. [address] [place] [address], [place] [address]. All rights reserved. This information is not intended as a substitute for professional medical care. Always follow your healthcare professional's instructions.        Women and Heart Disease: What Women Need to Know  You may be surprised to learn that heart disease is the biggest threat  to your health--even more so than breast cancer. And the same factors that put you at risk of a heart attack, also known as acute myocardial infarction, or AMI, also increase your chances of stroke and other health problems. The main risk factors include high blood pressure, smoking, poor diet, diabetes, family history, obesity, and sedentary lifestyle. If your heart is in trouble, your body may send you warning signs. Its up to you to notice these and talk to your healthcare provider about them. Your health--and your life--could depend on it.    Learn to read the signs  When your heart isnt getting enough oxygen, you may experience a feeling called angina. It can be a sign that you are at risk for having a heart attack.  Angina is often referred to as chest pain, but this can be misleading. Its not always painful, and its not always in the chest. Many women have other symptoms along with--or instead of--chest pain or discomfort. Talk to your healthcare provider if you notice any of the following:  · Discomfort, aching, tightness, or pressure that comes and goes, in the back, abdomen, arm, shoulder, neck, or jaw or chest  · Feeling much more tired than usual, for no clear reason  · Becoming breathless while doing something that used to be easy  · Heartburn, nausea, or a burning feeling that seems unrelated to food  · Lightheadedness or faintness  Get to the heart of the problem  Women often dont realize their symptoms could be related to heart trouble. Even some healthcare providers dont make the connection. If you feel any of the symptoms listed here, see your healthcare provider and ask to be tested for heart disease--even if youre not sure thats the cause. Tests, such as a stress echocardiogram and nuclear imaging, will reveal more about the problem. If your symptoms are heart-related, your healthcare provider will start treatment.  Hormone therapy is not the answer  Healthcare providers used to think  that older women could reduce their heart disease risk by taking hormones in pill form (hormone therapy, or HT). It turns out thats not true. In fact, HT could actually increase your risk of having a heart attack or stroke. Talk to your healthcare provider about the risks and benefits of HT. It may be prescribed for other health problems. But it should not be taken to prevent or treat heart disease.     Is it angina or a heart attack?  Angina that occurs on exertion and goes away after a few minutes of rest or with medicine is considered stable angina. Unstable angina is unpredictable or unexpected angina symptoms that do not resolve, or go away and come back. This is a medical emergency and could be a sign of an active heart attack. Call 911 right away!   © 1156-9089 [employer]. [address] [place] [address], [place] [address]. All rights reserved. This information is not intended as a substitute for professional medical care. Always follow your healthcare professional's instructions.        Women and Heart Disease: Tips for Making Small Changes  Making even one lifestyle change for your heart reduces your risk for heart disease. Change is hard for everyone, so take it one step at a time. Here are some tips to help you get started on making changes that are good for your heart.    Make a plan  Trying to do too much too fast can end in failure:  · Start by writing down all the things youd like to do to lower your risks.  · Break each one into small steps. If you said, Cut down on fat, a small step could be to use fruit spread instead of butter on your toast. Or to take soup and a roll for lunch instead of going out for a hamburger and fries.  · Decide which step youd like to take first. Then choose a second and a third step.  · Check off each step as you achieve it. Add new steps as you go along.  · If a step isnt working, try another. Come back to the first one later.  Keep records  Keeping records  helps you know your habits and see your successes:  · Keeping an exercise record can help you see your progress and keep you going.  · Keeping food records can help you see your eating patterns and plan ways to make small changes.  · Noting when you feel stressed or want to smoke can help you think of ways to avoid these triggers.  Reward yourself  Making changes isnt easy. You deserve to reward yourself when you succeed. Just making the change may be its own reward. But why not give yourself an extra pat on the back?  · Give yourself something special youve been wanting.  · Do something that youve always promised yourself youd do, such as going dancing.  © 4667-2540 [employer]. [address] [place] [address], [place] [address]. All rights reserved. This information is not intended as a substitute for professional medical care. Always follow your healthcare professional's instructions.        4 Steps for Eating Healthier  Changing the way you eat can improve your health. It can lower your cholesterol and blood pressure, and help you stay at a healthy weight. Your diet doesnt have to be bland and boring to be healthy. Just watch your calories and follow these steps:    1. Eat fewer unhealthy fats  · Choose more fish and lean meats instead of fatty cuts of meat.  · Skip butter and lard, and use less margarine.  · Pass on foods that have palm, coconut, or hydrogenated oils.  · Eat fewer high-fat dairy foods like cheese, ice cream, and whole milk.  · Get a heart-healthy cookbook and try some low-fat recipes.  2. Go light on salt  · Keep the saltshaker off the table.  · Limit high-salt ingredients, such as soy sauce, bouillon, and garlic salt.  · Instead of adding salt when cooking, season your food with herbs and flavorings. Try lemon, garlic, and onion.  · Limit convenience foods, such as boxed or canned foods and restaurant food.  · Read food labels and choose lower-sodium options.  3. Limit sugar  · Pause  before you add sugars to pancakes, cereal, coffee, or tea. This includes white and brown table sugar, syrup, honey, and molasses. Cut your usual amount by half.  · Use non-sugar sweeteners. Stevia, aspartame, and sucralose can satisfy a sweet tooth without adding calories.  · Swap out sugar-filled soda and other drinks. Buy sugar-free or low-calorie beverages. Remember water is always the best choice.  · Read labels and choose foods with less added sugar. Keep in mind that dairy foods and foods with fruit will have some natural sugar.  · Cut the sugar in recipes by 1/3 to 1/2. Boost the flavor with extracts like almond, vanilla, or orange. Or add spices such as cinnamon or nutmeg.  4. Eat more fiber  · Eat fresh fruits and vegetables every day.  · Boost your diet with whole grains. Go for oats, whole-grain rice, and bran.  · Add beans and lentils to your meals.  · Drink more water to match your fiber increase. This is to help prevent constipation.  © 4879-6846 [employer]. [address] [place] [address]. All rights reserved. This information is not intended as a substitute for professional medical care. Always follow your healthcare professional's instructions.        Medication for Cholesterol Control  Cholesterol is a waxy substance in your bloodstream. If there is too much of it in your blood, it can build up in the walls of your arteries. Medication can give you the extra help you need to control your cholesterol.  How medication helps  There are different kinds of medications to help with cholesterol levels. Some help lower your LDL (bad cholesterol). Some help raise your HDL (good cholesterol). And some do both. It may take some time to find the right medication for you. Taking medication will be only one part of your cholesterol control plan. You will still need to eat right and get regular exercise.  Taking your medication  It is important to:  · Tell your doctor about any other  medications you take. This includes over-the-counter medications. It also includes vitamins and herbs.  · Take your medication exactly as directed. This helps ensure that it works as it should.  · Do not skip a dose.  · Do not stop taking it if you feel better.  · Do not stop taking it when your cholesterol numbers improve.  · Order your refill before your medication runs out.  Side effects  Medications can cause side effects. These often occur at the start of taking a new medication. Side effects can include headache and upset stomach. These should go away in a few weeks. Tell your health care provider about any side effects you have.  When to call your health care provider  When taking your medication, let your health care provider know if you have:  · Yellowing of the whites of eyes  · Blurred vision  · Muscle aches  · Trouble breathing   © 0991-4207 [employer]. [address] [place] [address]. All rights reserved. This information is not intended as a substitute for professional medical care. Always follow your healthcare professional's instructions.        Lifestyle Changes to Control Cholesterol  Diet, exercise, weight management, quitting smoking, stress management, and taking your medications right can help you control your cholesterol.    Diet  Your health care provider will give you information on changes to your diet you may need to make, based on your situation. Your provider may recommend that you see a registered dietitian for help with diet changes. Changes may include:  · Reducing the amount of fat and cholesterol in your meals  · Reducing the amount of sodium (salt) in your food, especially if you have high blood pressure  · Eating more fresh vegetables and fruits  · Eating lean proteins, such as fish, poultry, and legumes (beans and peas), and eating less red meat and processed meats  · Using low-fat dairy products  · Using vegetable and nut oils in limited  amounts  · Limiting how many sweets and processed foods like chips, cookies, and baked goods that you eat   Exercise  Regular exercise is a good way to help your body control cholesterol. Regular exercise has many benefits. It can:  · Raise your good cholesterol.  · Help lower your bad cholesterol.  · Let blood flow better through your body.  · Give more oxygen to your muscles and tissues.  · Help you manage your weight.  Your health care provider may recommend that you get more physical activity if you haven't been active. Depending on your situation, your provider may recommend that you get moderate to vigorous physical activity for at least 40 minutes each day and for at least 3 to 4 days each week. A few examples of moderate to vigorous activity include:  · Walking at a brisk pace, about 3 to 4 miles per hour  · Jogging or running  · Swimming or water aerobics  · Hiking  · Dancing  · Martial arts  · Tennis  · Riding a bicycle or stationary bike  · Dancing  Weight management  If you are overweight or obese, your health care provider will work with you to help you lose weight and lower your BMI (body mass index) to a normal or near-normal level. Making diet changes and getting more physical activity can help.    Quitting smoking  Smoking and other tobacco use can raise cholesterol and make it harder to control. Quitting is tough. But millions of people have given up tobacco for good. You can quit, too! Think about some of the reasons below to quit smoking. Do any of them make you think twice about your smoking habit?  Stop smoking because it:  · Keeps your cholesterol high, even if you make all the other changes youre supposed to.  · Damages your body, especially your heart, lungs, and blood vessels.  · Makes you more likely to have a heart attack (also known as acute myocardial infarction, or AMI), stroke, or cancer.  · Stains your teeth and makes your skin, clothes, and breath smell bad.  · Costs a lot of  money.  Stress   Learn stress-management techniques to help you deal with stress in your home and work life.   Making the most of medications  Healthy eating and exercise are a good start to keeping your cholesterol down. But you may need some extra help from medication. If your doctor prescribes medication, be sure to take it exactly as directed. Remember:  · Tell your doctor about all other medications you take, including vitamins and herbs.  · Tell your doctor if you have any side effects after starting to take a medication. Examples of side effects to watch for include: muscle aches, weakness, blurred vision, rust-colored urine, yellowing of eyes or skin (jaundice), or headache.  · Dont skip a dose or stop taking your medication because you feel better or because your cholesterol numbers go down. Never stop taking your medication unless your doctor has told you its OK.  © 4643-4115 [employer]. [address] [place] [address]. All rights reserved. This information is not intended as a substitute for professional medical care. Always follow your healthcare professional's instructions.             Language Assistance Services     ATTENTION: Language assistance services are available, free of charge. Please call [phone].      ATENCIÓN: Si habla pia, tiene a de la cruz disposición servicios gratuitos de asistencia lingüística. Llame al [phone].     PRICILLA Ý: N?u b?n nói Ti?ng Vi?t, có các d?ch v? h? tr? ngôn ng? mi?n phí dành cho b?n. G?i s? [phone].         [facility] complies with applicable Federal civil rights laws and does not discriminate on the basis of race, color, national origin, age, disability, or sex.         Is This Visit For Evaluation During Treatment Or Follow Up Post Treatment?: evaluation Bill For Ultrasound Evaluation (): No Evaluation Plan: The patient is undergoing superficial radiation therapy for skin cancer and presents for weekly evaluation and management. Per protocol and as documented on the flow sheet, the patient was questioned as to subjective redness, pruritus, pain, drainage, fatigue, or any other symptoms. Objectively, the radiation area was evaluated with regards to erythema, atrophy, scale, crusting, erosion, ulceration, edema, purpura, tenderness, warmth, drainage, and any other findings. The plan was extensively reviewed including dose and dosing schedule. The simulation and clinical setup were also reviewed as were external and any internal shields and based on this review the appropriateness and sufficiency of treatment was determined. Radiation Therapy Oncology Group (Rtog) Score: 2 Ultrasound Used Text: Ultrasound depth is mm. Assessment: Appropriate reaction Ultrasound Not Used Text: Ultrasound was not performed today due to Radiation Therapy Oncology Group (Rtog) Score: 3 Additional Comments (Add Customization Of Note Here): Will reassess the patient’s current prescription if moist desquamation persists. Monitoring response to white vinegar and water soaks. Will consider modification of treatment plan or temporary break from therapy based on progression and clinical response. Assessment: Excessive reaction

## 2025-04-28 ENCOUNTER — LAB VISIT (OUTPATIENT)
Dept: LAB | Facility: HOSPITAL | Age: 85
End: 2025-04-28
Attending: FAMILY MEDICINE
Payer: MEDICARE

## 2025-04-28 DIAGNOSIS — N18.32 STAGE 3B CHRONIC KIDNEY DISEASE: ICD-10-CM

## 2025-04-28 DIAGNOSIS — J44.9 COPD MIXED TYPE: ICD-10-CM

## 2025-04-28 LAB
ANION GAP (OHS): 11 MMOL/L (ref 8–16)
BNP SERPL-MCNC: 215 PG/ML (ref 0–99)
BUN SERPL-MCNC: 31 MG/DL (ref 8–23)
CALCIUM SERPL-MCNC: 9.2 MG/DL (ref 8.7–10.5)
CHLORIDE SERPL-SCNC: 106 MMOL/L (ref 95–110)
CO2 SERPL-SCNC: 25 MMOL/L (ref 23–29)
CREAT SERPL-MCNC: 1.3 MG/DL (ref 0.5–1.4)
FERRITIN SERPL-MCNC: 295 NG/ML (ref 20–300)
GFR SERPLBLD CREATININE-BSD FMLA CKD-EPI: 41 ML/MIN/1.73/M2
GLUCOSE SERPL-MCNC: 105 MG/DL (ref 70–110)
MAGNESIUM SERPL-MCNC: 2.2 MG/DL (ref 1.6–2.6)
POTASSIUM SERPL-SCNC: 4.5 MMOL/L (ref 3.5–5.1)
PTH-INTACT SERPL-MCNC: 102.7 PG/ML (ref 9–77)
SODIUM SERPL-SCNC: 142 MMOL/L (ref 136–145)

## 2025-04-28 PROCEDURE — 80048 BASIC METABOLIC PNL TOTAL CA: CPT

## 2025-04-28 PROCEDURE — 83970 ASSAY OF PARATHORMONE: CPT

## 2025-04-28 PROCEDURE — 83880 ASSAY OF NATRIURETIC PEPTIDE: CPT

## 2025-04-28 PROCEDURE — 36415 COLL VENOUS BLD VENIPUNCTURE: CPT | Mod: PO

## 2025-04-28 PROCEDURE — 83735 ASSAY OF MAGNESIUM: CPT

## 2025-04-28 PROCEDURE — 82728 ASSAY OF FERRITIN: CPT

## 2025-05-01 ENCOUNTER — RESULTS FOLLOW-UP (OUTPATIENT)
Dept: PRIMARY CARE CLINIC | Facility: CLINIC | Age: 85
End: 2025-05-01

## 2025-05-02 ENCOUNTER — OFFICE VISIT (OUTPATIENT)
Dept: PRIMARY CARE CLINIC | Facility: CLINIC | Age: 85
End: 2025-05-02
Payer: MEDICARE

## 2025-05-02 VITALS
BODY MASS INDEX: 40.66 KG/M2 | OXYGEN SATURATION: 97 % | HEART RATE: 60 BPM | TEMPERATURE: 98 F | DIASTOLIC BLOOD PRESSURE: 58 MMHG | WEIGHT: 229.5 LBS | HEIGHT: 63 IN | SYSTOLIC BLOOD PRESSURE: 130 MMHG

## 2025-05-02 DIAGNOSIS — I50.32 CHRONIC DIASTOLIC CHF (CONGESTIVE HEART FAILURE): ICD-10-CM

## 2025-05-02 DIAGNOSIS — E11.69 HYPERLIPIDEMIA ASSOCIATED WITH TYPE 2 DIABETES MELLITUS: ICD-10-CM

## 2025-05-02 DIAGNOSIS — E11.65 TYPE 2 DIABETES MELLITUS WITH HYPERGLYCEMIA, WITHOUT LONG-TERM CURRENT USE OF INSULIN: ICD-10-CM

## 2025-05-02 DIAGNOSIS — J45.40 MODERATE PERSISTENT ASTHMA WITHOUT COMPLICATION: ICD-10-CM

## 2025-05-02 DIAGNOSIS — E21.1 HYPERPARATHYROIDISM , SECONDARY, NON-RENAL: ICD-10-CM

## 2025-05-02 DIAGNOSIS — E66.01 SEVERE OBESITY (BMI >= 40): ICD-10-CM

## 2025-05-02 DIAGNOSIS — J44.9 CHRONIC OBSTRUCTIVE PULMONARY DISEASE, UNSPECIFIED COPD TYPE: ICD-10-CM

## 2025-05-02 DIAGNOSIS — E78.5 HYPERLIPIDEMIA ASSOCIATED WITH TYPE 2 DIABETES MELLITUS: ICD-10-CM

## 2025-05-02 DIAGNOSIS — R60.0 PERIPHERAL EDEMA: ICD-10-CM

## 2025-05-02 DIAGNOSIS — I15.2 HYPERTENSION ASSOCIATED WITH DIABETES: Primary | ICD-10-CM

## 2025-05-02 DIAGNOSIS — E11.59 HYPERTENSION ASSOCIATED WITH DIABETES: Primary | ICD-10-CM

## 2025-05-02 DIAGNOSIS — J45.50 SEVERE PERSISTENT ASTHMA WITHOUT COMPLICATION: ICD-10-CM

## 2025-05-02 PROCEDURE — 99999 PR PBB SHADOW E&M-EST. PATIENT-LVL V: CPT | Mod: PBBFAC,,, | Performed by: NURSE PRACTITIONER

## 2025-05-02 RX ORDER — FOLIC ACID 1 MG/1
1000 TABLET ORAL DAILY
Qty: 90 TABLET | Refills: 3 | Status: SHIPPED | OUTPATIENT
Start: 2025-05-02 | End: 2026-05-02

## 2025-05-02 NOTE — PROGRESS NOTES
Subjective:       Patient ID: Sammi Abreu is a 84 y.o. female.    Chief Complaint: Follow-up    HPI    Last visit with PVP- on 3/17/25. Labs revived . Wants to take the hydralizine once daily instead of twice a day due to frequent urination      24 on coumadin for deep vein thrombosis of right upper extremity-last INR 2.8-goal 2.0-3.0  .  24 Nephrology-Dr. Amari Sanz: Chronic kidney disease, stage 3 unspecified,  Secondary hyperparathyroidism of renal origin      3/8/24 Ortho-Dave Bridges: Primary osteoarthritis of right knee -triamcinolone acetonide injection     23 : Severe persistent asthma without complication, Chronic sinus complaints,COPD with asthma-trelegy, budesonide neb, prednisone prn    23 Cardiology- Dr. АНДРЕЙ Maza: Chronic diastolic CHF (congestive heart failure) Patient is identified as having Diastolic (HFpEF) heart failure that is Chronic. CHF is currently controlled. Latest ECHO performed and demonstrates- Results for orders placed during the hospital encounter of 21 . Continue Beta Blocker, ACE/ARB, Furosemide, and Nitrate/Vasodilator and monitor clinical status closely     4Ms for Medical Decision-Making in Older Adults    Last Completed EAWV: 2023    MEDICATIONS:  High Risk Medications:  Total Active Medications: 0  This patient does not have an active medication from one of the medication groupers.    MOBILITY:  Activities of Daily Livin/14/2023    12:14 PM   Activities of Daily Living   Ambulation Independent   Dressing Independent   Transfers Independent   Toileting Continent of bladder;Continent of bowel   Feeding Independent   Cleaning home/Chores Independent   Telephone use Independent   Shopping Independent   Paying bills Independent   Taking meds Independent     Fall Risk:      2025    10:30 AM 3/17/2025    10:30 AM 2024    10:30 AM   Fall Risk Assessment - Outpatient   Mobility Status Ambulatory Ambulatory  w/ assistance Ambulatory w/ assistance   Number of falls 0 0 0   Identified as fall risk False True True     Disability Status:      2023    12:14 PM   Disability Status   Are you deaf or do you have serious difficulty hearing? N   Are you blind or do you have serious difficulty seeing, even when wearing glasses? N   Because of a physical, mental, or emotional condition, do you have serious difficulty concentrating, remembering, or making decisions? N   Do you have serious difficulty walking or climbing stairs? N   Do you have difficulty dressing or bathing? N   Because of a physical, mental, or emotional condition, do you have difficulty doing errands alone such as visiting a doctor's office or shopping? N     Nutrition Screenin/14/2023    12:14 PM   Nutrition Screening   Has food intake declined over the past three months due to loss of appetite, digestive problems, chewing or swallowing difficulties? No decrease in food intake   Involuntary weight loss during the last 3 months? No weight loss   Mobility? Goes out   Has the patient suffered psychological stress or acute disease in the past three months? No   Neuropsychological problems? No psychological problems   Body Mass Index (BMI)?  BMI 23 or greater   Screening Score 14   Interpretation Normal nutritional status    Screening Score: 0-7 Malnourished, 8-11 At Risk, 12-14 Normal  Get Up and Go:      2023    12:15 PM 2022     2:05 PM   Get Up and Go   Trial 1 12 seconds 11 seconds     Whisper Test:      2023    12:15 PM   Whisper Test   Whisper Test Normal           MENTATION:   Has Dementia Dx: No  Has Anxiety Dx: No    Depression Patient Health Questionnaire:      3/17/2025    10:44 AM   Depression Patient Health Questionnaire   Over the last two weeks how often have you been bothered by little interest or pleasure in doing things Not at all   Over the last two weeks how often have you been bothered by feeling down, depressed or  hopeless Not at all   PHQ-2 Total Score 0     Cognitive Function Screenin/14/2023    12:15 PM   Cognitive Function Screening   Clock Drawing Test 1    Mini-Cog 3 Minute Recall 3   Cognitive Function Screening 4       Data saved with a previous flowsheet row definition     Cognitive Function Screening Total - Less than 4 = Abnormal,  Greater than or equal to 4 = Normal        WHAT MATTERS MOST:  Advance Care Planning   ACP Status:   Patient has had an ACP conversation  Living Will: Yes  Power of : No  LaPOST: No    What is most important right now is to focus on remaining as independent as possible    Accordingly, we have decided that the best plan to meet the patient's goals includes continuing with treatment      What matters most to patient today is: quality of life          Past Medical History:   Diagnosis Date    Acute deep vein thrombosis (DVT) of right upper extremity 2020    Occlusive thrombus of the right brachial vein and cephalic vein    LUIZ (acute kidney injury) 8/3/2020    ALLERGIC RHINITIS     Anemia     Arthritis     Atelectasis 8/3/2020    Atypical pneumonia 2020    Back pain     Bronchitis     Cataract     OU    CHF (congestive heart failure)     Cholelithiasis     COPD (chronic obstructive pulmonary disease)     Degenerative disc disease     Diabetes mellitus     pre diabetic    Diverticulosis     DVT (deep venous thrombosis)     Edema     Elevated brain natriuretic peptide (BNP) level 8/3/2020    Encounter for blood transfusion 1979    Fibromyalgia     Glaucoma     Gout     Hematuria 2020    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    Hx of colonic polyps     Hyperlipidemia     Hypertension     Hyponatremia 2020    Incontinence     MRSA bacteremia 2020    Non-traumatic rhabdomyolysis 2020    Osteoporosis     Pneumonia due to infectious organism 2020    Pulmonary embolism     Reflux     Refusal of blood transfusions as patient is Lizzette's  "Witness     Sleep apnea     non compliant with CPAP    Vestibulitis of ear        Review of patient's allergies indicates:   Allergen Reactions    Daptomycin Other (See Comments)     Kidney damage     Cymbalta [duloxetine] Other (See Comments)     Nightmares      Darvon [propoxyphene] Nausea Only and Other (See Comments)     Sweating, slept for 3 days    Atorvastatin Other (See Comments)     Muscle cramps    Naprosyn [naproxen] Nausea Only    Penicillins Rash    Tramadol Nausea Only and Palpitations       Current Medications[1]    Review of Systems   Constitutional:  Negative for unexpected weight change.   HENT:  Negative for trouble swallowing.    Eyes:  Negative for visual disturbance.   Respiratory:  Negative for shortness of breath.    Cardiovascular:  Negative for chest pain, palpitations and leg swelling.   Gastrointestinal:  Negative for blood in stool.   Genitourinary:  Negative for hematuria.   Skin:  Negative for rash.   Allergic/Immunologic: Negative for immunocompromised state.   Neurological:  Negative for headaches.   Hematological:  Does not bruise/bleed easily.   Psychiatric/Behavioral:  Negative for agitation. The patient is not nervous/anxious.        Objective:      BP (!) 130/58 (BP Location: Left arm, Patient Position: Sitting)   Pulse 60   Temp 98.2 °F (36.8 °C) (Oral)   Ht 5' 3" (1.6 m)   Wt 104.1 kg (229 lb 8 oz)   SpO2 97%   BMI 40.65 kg/m²   Physical Exam  Constitutional:       Appearance: She is well-developed. She is obese.   Eyes:      Pupils: Pupils are equal, round, and reactive to light.   Cardiovascular:      Rate and Rhythm: Normal rate and regular rhythm.      Heart sounds: Normal heart sounds.   Pulmonary:      Effort: Pulmonary effort is normal.      Breath sounds: Normal breath sounds.   Abdominal:      General: Bowel sounds are normal.      Palpations: Abdomen is soft.   Musculoskeletal:         General: Normal range of motion.      Cervical back: Normal range of motion. "      Right lower leg: Swelling present.      Left lower leg: Swelling present.      Right foot: Swelling present.      Left foot: Swelling present.      Comments: Ambulates with a cane   Skin:     General: Skin is warm and dry.   Neurological:      Mental Status: She is alert and oriented to person, place, and time.   Psychiatric:         Behavior: Behavior normal.         Thought Content: Thought content normal.         Judgment: Judgment normal.         Assessment:       1. Hypertension associated with diabetes    2. Type 2 diabetes mellitus with hyperglycemia, without long-term current use of insulin    3. Hyperlipidemia associated with type 2 diabetes mellitus    4. Severe persistent asthma without complication    5. Hyperparathyroidism , secondary, non-renal    6. Chronic obstructive pulmonary disease, unspecified COPD type    7. Peripheral edema    8. Chronic diastolic CHF (congestive heart failure)    9. Moderate persistent asthma without complication    10. Severe obesity (BMI >= 40)        Plan:       Hypertension associated with diabetes  -     folic acid (FOLVITE) 1 MG tablet; Take 1 tablet (1,000 mcg total) by mouth once daily.  Dispense: 90 tablet; Refill: 3  -     CBC Auto Differential; Future; Expected date: 05/02/2025  -     Comprehensive Metabolic Panel; Future; Expected date: 05/02/2025  -     Lipid Panel; Future; Expected date: 05/02/2025  -     Diabetic Eye Screening Photo; Future  Stable, continue management  Low sodium diet  BP Readings from Last 3 Encounters:   05/02/25 (!) 130/58   03/17/25 (!) 118/58   12/16/24 138/62      Type 2 diabetes mellitus with hyperglycemia, without long-term current use of insulin  Stable, continue management  Follow the ADA diet  Hemoglobin A1C   Date Value Ref Range Status   03/10/2025 5.8 (H) 4.0 - 5.6 % Final     Comment:     ADA Screening Guidelines:  5.7-6.4%  Consistent with prediabetes  >or=6.5%  Consistent with diabetes    High levels of fetal hemoglobin  "interfere with the HbA1C  assay. Heterozygous hemoglobin variants (HbS, HgC, etc)do  not significantly interfere with this assay.   However, presence of multiple variants may affect accuracy.     09/10/2024 5.8 (H) 4.0 - 5.6 % Final     Comment:     ADA Screening Guidelines:  5.7-6.4%  Consistent with prediabetes  >or=6.5%  Consistent with diabetes    High levels of fetal hemoglobin interfere with the HbA1C  assay. Heterozygous hemoglobin variants (HbS, HgC, etc)do  not significantly interfere with this assay.   However, presence of multiple variants may affect accuracy.     02/29/2024 5.7 (H) 4.0 - 5.6 % Final     Comment:     ADA Screening Guidelines:  5.7-6.4%  Consistent with prediabetes  >or=6.5%  Consistent with diabetes    High levels of fetal hemoglobin interfere with the HbA1C  assay. Heterozygous hemoglobin variants (HbS, HgC, etc)do  not significantly interfere with this assay.   However, presence of multiple variants may affect accuracy.        Hyperlipidemia associated with type 2 diabetes mellitus  Stable, on Zetia  Severe persistent asthma without complication  Stable, continue resp inhaler  Hyperparathyroidism , secondary, non-renal  Stable, continue follow up  Chronic obstructive pulmonary disease, unspecified COPD type  -     BNP; Future; Expected date: 05/02/2025    Peripheral edema  Stable, on lasix, elevate legs while sitting, low sodium diet, wear compression stocks  Chronic diastolic CHF (congestive heart failure)  Stable, on lasix  Needs to schedule with Cardiology for follow up   Moderate persistent asthma without complication  Stable, continue resp inhaler  Severe obesity (BMI >= 40)  Counseled patient on his ideal body weight, health consequences of being obese and current recommendations including weekly exercise and a heart healthy diet.  Current BMI is:Estimated body mass index is 40.65 kg/m² as calculated from the following:    Height as of this encounter: 5' 3" (1.6 m).    Weight as " of this encounter: 104.1 kg (229 lb 8 oz)..  Patient is aware that ideal BMI < 25 or Weight in (lb) to have BMI = 25: 140.8.           Patient readiness: acceptance and barriers:none    During the course of the visit the patient was educated and counseled about the following:     Diabetes:  Discussed general issues about diabetes pathophysiology and management.  Addressed ADA diet.  Suggested low cholesterol diet.  Encouraged aerobic exercise.  Discussed foot care.  Hypertension:   Dietary sodium restriction.  Regular aerobic exercise.  Obesity:   General weight loss/lifestyle modification strategies discussed (elicit support from others; identify saboteurs; non-food rewards, etc).  Regular aerobic exercise program discussed.    Goals: Diabetes: Maintain Hemoglobin A1C below 7, Hypertension: Reduce Blood Pressure, and Obesity: Reduce calorie intake and BMI    Did patient meet goals/outcomes: Yes    The following self management tools provided: declined    Patient Instructions (the written plan) was given to the patient/family.     Time spent with patient: 30 minutes    Barriers to medications present (no )    Adverse reactions to current medications (no)    Over the counter medications reviewed (Yes)               [1]   Current Outpatient Medications:     acetaminophen (TYLENOL) 325 MG tablet, Take 2 tablets (650 mg total) by mouth every 6 (six) hours as needed (Do not take with any other Tylenol or acetaminophen containing products)., Disp: , Rfl: 0    albuterol (PROVENTIL/VENTOLIN HFA) 90 mcg/actuation inhaler, 2 puffs every 4 hours as needed for cough, wheeze, or shortness of breath, Disp: 18 g, Rfl: 3    albuterol-ipratropium (DUO-NEB) 2.5 mg-0.5 mg/3 mL nebulizer solution, USE 1 AMPULE IN NEBULIZER ONCE DAILY 2  HOURS  AFTER  BREAKFAST.  RESCUE, Disp: 270 mL, Rfl: 1    blood sugar diagnostic Strp, To check BG 1 times daily, to use with insurance preferred meter, Disp: 100 each, Rfl: 0    budesonide (PULMICORT)  0.5 mg/2 mL nebulizer solution, Take 2 mLs (0.5 mg total) by nebulization 2 (two) times daily. Controller, Disp: 60 mL, Rfl: 3    calcitRIOL (ROCALTROL) 0.25 MCG Cap, Take 1 capsule (0.25 mcg total) by mouth once daily., Disp: 90 capsule, Rfl: 3    diclofenac sodium (VOLTAREN) 1 % Gel, Apply 2 g topically 2 (two) times daily., Disp: 50 g, Rfl: 2    ergocalciferol (ERGOCALCIFEROL) 50,000 unit Cap, Take 50,000 Units by mouth every 7 days., Disp: , Rfl:     esomeprazole (NEXIUM) 20 MG capsule, Take 1 capsule (20 mg total) by mouth every other day., Disp: , Rfl:     fluticasone propionate (FLONASE) 50 mcg/actuation nasal spray, 2 sprays (100 mcg total) by Each Nostril route once daily., Disp: 48 g, Rfl: 3    furosemide (LASIX) 40 MG tablet, Take 1 tablet (40 mg total) by mouth once daily., Disp: , Rfl:     hydrALAZINE (APRESOLINE) 100 MG tablet, TAKE 1 TABLET BY MOUTH EVERY 12 HOURS, Disp: 180 tablet, Rfl: 2    ipratropium (ATROVENT) 21 mcg (0.03 %) nasal spray, 2 sprays by Each Nostril route 2 (two) times daily., Disp: 30 mL, Rfl: 3    lancets Misc, To check BG 1 times daily, to use with insurance preferred meter, Disp: 100 each, Rfl: 0    losartan (COZAAR) 50 MG tablet, Take 1 tablet (50 mg total) by mouth once daily., Disp: 90 tablet, Rfl: 3    magnesium oxide (MAG-OX) 400 mg (241.3 mg magnesium) tablet, Take 1 tablet (400 mg total) by mouth 2 (two) times daily., Disp: 180 tablet, Rfl: 3    METAMUCIL, SUGAR, Powd, 1 tabs oral daily, Disp: , Rfl: 0    metoprolol succinate (TOPROL-XL) 100 MG 24 hr tablet, Take 1 tablet by mouth once daily, Disp: 90 tablet, Rfl: 3    potassium chloride SA (K-DUR,KLOR-CON) 20 MEQ tablet, Take 1 tablet (20 mEq total) by mouth 2 (two) times daily., Disp: 60 tablet, Rfl: 3    predniSONE (DELTASONE) 20 MG tablet, Take one daily for 5 days and may repeat for shortness of breath., Disp: 21 tablet, Rfl: 3    triamcinolone acetonide 0.1% (KENALOG) 0.1 % ointment, Apply topically 2 (two) times  daily., Disp: 80 g, Rfl: 11    warfarin (COUMADIN) 5 MG tablet, TAKE 1 TO 1.5 TABLETS BY MOUTH IN THE EVENING AS DIRECTED, Disp: 120 tablet, Rfl: 3    blood-glucose meter kit, To check BG 1 times daily, to use with insurance preferred meter, Disp: 1 each, Rfl: 0    ezetimibe (ZETIA) 10 mg tablet, Take 1 tablet (10 mg total) by mouth once daily., Disp: 90 tablet, Rfl: 3    folic acid (FOLVITE) 1 MG tablet, Take 1 tablet (1,000 mcg total) by mouth once daily., Disp: 90 tablet, Rfl: 3  No current facility-administered medications for this visit.    Facility-Administered Medications Ordered in Other Visits:     lactated ringers infusion, , Intravenous, Continuous, Gino Reid MD, Last Rate: 10 mL/hr at 07/13/20 1308, New Bag at 07/13/20 1405    lidocaine (PF) 10 mg/ml (1%) injection 10 mg, 1 mL, Intradermal, Once, Gino Reid MD

## 2025-05-19 ENCOUNTER — PATIENT MESSAGE (OUTPATIENT)
Dept: FAMILY MEDICINE | Facility: CLINIC | Age: 85
End: 2025-05-19
Payer: MEDICARE

## 2025-06-09 NOTE — ED PROVIDER NOTES
"Encounter Date: 1/10/2019    SCRIBE #1 NOTE: IBerry, am scribing for, and in the presence of, Dr. Champagne .       History     Chief Complaint   Patient presents with    Hypertension     elevated even with medications       Time seen by provider: 8:21 PM on 01/10/2019    Sammi Abreu is a 78 y.o. female with a hx of HTN and DM who presents to the ED with c/o elevated BP associated with "slight" lightheadedness. Pt states she "can't control her BP." She has taken her daily medications including Losartan at noon and Metoprolol this morning. She also reports SOB on exertion for sometime and wheezing at rest. Pt has been using her inhaler for the sx. Allergens include Cymbalta, Darvon, Atorvastatin, Naprosyn, and Penicillin. Pt also has a hx of tobacco use, quit 35 years ago. FMHx include CVA (father, brother, and sister).      The history is provided by the patient.     Review of patient's allergies indicates:   Allergen Reactions    Cymbalta [duloxetine] Other (See Comments)     Nightmares      Darvon [propoxyphene] Nausea Only and Other (See Comments)     Sweating, slept for 3 days    Atorvastatin Other (See Comments)     Muscle cramps    Naprosyn [naproxen] Nausea Only    Penicillins Rash     Past Medical History:   Diagnosis Date    ALLERGIC RHINITIS     Arthritis     Cataract     OU    Degenerative disc disease     Diabetes mellitus type II     stopped meds    Diverticulosis     Edema     Fibromyalgia     Hyperlipidemia     Hypertension     Reflux     Sleep apnea     non compliant with CPAP    Vestibulitis of ear      Past Surgical History:   Procedure Laterality Date    COLONOSCOPY N/A 11/4/2014    Performed [facility]    EGD (ESOPHAGOGASTRODUODENOSCOPY) N/A 2/17/2012    Performed by [facility] at [facility] ENDO    TYLER-TRANSFORAMINAL Right 12/18/2012    [facility] at [facility]    HYSTERECTOMY      INJECTION, JOINT Left " [facility] service    80 yr old admitted on 6/5 after fall and found to have R hip Fx, underwent ORIF on 6/6, today patient dropped hb and was transfused, got up earlier and became pale and dropped bp that improved with fluids, underwent stat CTA chest, no PE, findings c/w chf exac and also found to be in afib with rvr    Imp  Afib with rvr  Chf exac  Acute hypoxic  resp failure  Acute on chronic anemia  Acute hip fx s/p orif  Compensated hypercapnia    Rec  Rate control with iv metoprolol, can consider iv amio if needed  Iv diuresis as bp tolerates  Bipap support  F/u h/h  D/w bedside RN, orders placed for iv metoprolol     2013    [facility] at [facility] OR    INJECTION, JOINT Left 2012    [facility] at [facility] OR    INJECTION, JOINT Left 2012    [facility] by [facility] at [facility] OR    INJECTION, JOINT, SHOULDER, HIP, OR KNEE Left 2014    Performed by [facility]am [facility] at [facility] OR    INJECTION, JOINT, SHOULDER, HIP, OR KNEE Left 1/10/2014    Per[facility] by [facility] at [facility]    INJECTION, JOINT, SHOULDER, HIP, OR KNEE Left 10/4/2013    Performed by [facility] at [facility]    INJECTION-JOINT Left 10/3/2014    Performed by Matt [facility]am [facility] at [facility]    INJECTION-STEROID-EPIDURAL-TRANSFORAMINAL Left 2014    Performed by [facility] at [facility]    INJECTION-STEROID-EPIDURAL-TRANSFORAMINAL Left 2012    Per[facility] by [facility] at [facility] OR    JOINT REPLACEMENT      R total hip     Family History   Problem Relation Age of Onset    Hypertension Mother     Diabetes Sister     Glaucoma Neg Hx     Macular degeneration Neg Hx     Retinal detachment Neg Hx      Social History     Tobacco Use    Smoking status: Former Smoker     Packs/day: 0.25     Years: 5.00     Pack years: 1.25     Last attempt to quit: 1972     Years since quittin.0    Smokeless tobacco: Never Used   Substance Use Topics    Alcohol use: Yes     Alcohol/week: 0.0 oz     Comment: Rarely    Drug use: Yes     Types: Oxycodone, Hydrocodone     Review of Systems   Constitutional: Negative for activity change, appetite change, chills, fatigue and fever.        + for elevated BP   HENT: Negative for facial swelling.    Eyes: Negative for visual disturbance.   Respiratory: Positive for shortness of breath (chronic) and wheezing. Negative for apnea.    Cardiovascular: Negative for chest pain and palpitations.   Gastrointestinal: Negative for abdominal distention and abdominal pain.   Genitourinary: Negative for difficulty urinating.   Musculoskeletal: Negative for neck  pain.   Skin: Negative for pallor and rash.   Neurological: Positive for light-headedness.   Hematological: Does not bruise/bleed easily.   Psychiatric/Behavioral: Negative for agitation.       Physical Exam     Initial Vitals [01/10/19 1948]   BP Pulse Resp Temp SpO2   (!) 231/93 73 18 98.3 °F (36.8 °C) 97 %      MAP       --         Physical Exam    Nursing note and vitals reviewed.  Constitutional: She appears well-developed and well-nourished.   HENT:   Head: Normocephalic and atraumatic.   Eyes: Conjunctivae are normal.   Neck: Normal range of motion. Neck supple.   Cardiovascular: Normal rate, regular rhythm and normal heart sounds. Exam reveals no gallop and no friction rub.    No murmur heard.  No carotid bruit.    Pulmonary/Chest: Effort normal and breath sounds normal. No respiratory distress. She has no wheezes. She has no rhonchi. She has no rales.   Abdominal: Soft. She exhibits no distension. There is no tenderness.   Musculoskeletal: Normal range of motion.   Neurological: She is alert and oriented to person, place, and time.   Skin: Skin is warm and dry. No erythema.   Psychiatric: She has a normal mood and affect.         ED Course   Procedures  Labs Reviewed   BASIC METABOLIC PANEL - Abnormal; Notable for the following components:       Result Value    Glucose 122 (*)     eGFR if  46 (*)     eGFR if non  40 (*)     All other components within normal limits          Imaging Results    None          Medical Decision Making:   History:   Old Medical Records: I decided to obtain old medical records.  Clinical Tests:   Lab Tests: Ordered and Reviewed  Medical Tests: Ordered and Reviewed  ED Management:  78-year-old female presents with a symptomatic blood pressure elevation.  She reports previous blood pressure control with her regimen of metoprolol and losartan.  She is given clonidine with return to normal blood pressure.  She is given p.r.n. clonidine to take as an  adjunct until a more permanent regimen is established.  She is encouraged to return for signs of end-organ damage such as headache, visual changes, confusion or focal weakness.       APC / Resident Notes:   I, Dr. Chepe Champagne III, personally performed the services described in this documentation. All medical record entries made by the scribe were at my direction and in my presence.  I have reviewed the chart and agree that the record reflects my personal performance and is accurate and complete       Scribe Attestation:   Scribe #1: I performed the above scribed service and the documentation accurately describes the services I performed. I attest to the accuracy of the note.               Clinical Impression:     1. Accelerated hypertension          Disposition:   Disposition: Discharged  Condition: Stable                        Chepe Champagne III, MD  01/11/19 0022

## 2025-07-29 ENCOUNTER — TELEPHONE (OUTPATIENT)
Dept: PULMONOLOGY | Facility: CLINIC | Age: 85
End: 2025-07-29
Payer: MEDICARE

## (undated) DEVICE — NDL HYPODERMIC BLUNT 18G 1.5IN

## (undated) DEVICE — NDL SAFETY 25G X 1.5 ECLIPSE

## (undated) DEVICE — SEE MEDLINE ITEM 152622

## (undated) DEVICE — GLOVE SURGEONS ULTRA TOUCH 6.5

## (undated) DEVICE — SHEATH PINNACLE 6FRX10CM W/GUIDEWIRE

## (undated) DEVICE — KIT ANTIFOG

## (undated) DEVICE — BAG TISS RETRV MONARCH 10MM

## (undated) DEVICE — GLOVE SURG ULTRA TOUCH 7.5

## (undated) DEVICE — TROCAR ENDOPATH XCEL 12X100MM

## (undated) DEVICE — CHLORAPREP 10.5 ML APPLICATOR

## (undated) DEVICE — CATHETER ANGIO OMNI FLUSH 10732201

## (undated) DEVICE — ELECTRODE REM PLYHSV RETURN 9

## (undated) DEVICE — APPLICATOR CHLORAPREP ORN 26ML

## (undated) DEVICE — SHEATH DESTINATION 6FR RSC05

## (undated) DEVICE — SUT MONOCRYL 4-0 PS-2

## (undated) DEVICE — GLOVE SURG BIOGEL LATEX SZ 7.5

## (undated) DEVICE — HEMOSTAT SURGICEL 2X3IN

## (undated) DEVICE — SYS LABEL CORRECT MED

## (undated) DEVICE — SUT 0 VICRYL / UR6 (J603)

## (undated) DEVICE — SYR DISP LL 5CC

## (undated) DEVICE — NDL 22GA X1 1/2 REG BEVEL

## (undated) DEVICE — TUBING MINIBORE EXTENSION

## (undated) DEVICE — GLOVE SURG ULTRA TOUCH 6

## (undated) DEVICE — Device

## (undated) DEVICE — SEE MEDLINE ITEM 157128

## (undated) DEVICE — SEE L#120831

## (undated) DEVICE — TUBING HEATED INSUFFLATOR

## (undated) DEVICE — SHEATH PINNACLE 5FRX10CM W/GUIDEWIRE

## (undated) DEVICE — CLOSURE SKIN STERI STRIP 1/2X4

## (undated) DEVICE — GUIDEWIRE STIFF ANGLED 035X260 LAUREATE

## (undated) DEVICE — NDL SPINAL SPINOCAN 22GX3.5

## (undated) DEVICE — APPLIER CLIP ENDO LIGAMAX 5MM

## (undated) DEVICE — NDL SPINAL 22GX5

## (undated) DEVICE — GUIDEWIRE REGALIA XS 0.014X300CM

## (undated) DEVICE — SCISSOR 5MMX35CM DIRECT DRIVE

## (undated) DEVICE — DRAPE ABDOMINAL TIBURON 14X11